# Patient Record
Sex: MALE | Race: WHITE | Employment: OTHER | URBAN - METROPOLITAN AREA
[De-identification: names, ages, dates, MRNs, and addresses within clinical notes are randomized per-mention and may not be internally consistent; named-entity substitution may affect disease eponyms.]

---

## 2018-02-19 PROBLEM — I10 ESSENTIAL HYPERTENSION WITH GOAL BLOOD PRESSURE LESS THAN 130/80: Status: ACTIVE | Noted: 2018-02-19

## 2018-11-20 ENCOUNTER — HOSPITAL ENCOUNTER (OUTPATIENT)
Dept: LAB | Age: 68
Discharge: HOME | End: 2018-11-20
Attending: INTERNAL MEDICINE
Payer: COMMERCIAL

## 2018-11-20 DIAGNOSIS — I10 ESSENTIAL HYPERTENSION WITH GOAL BLOOD PRESSURE LESS THAN 130/80: ICD-10-CM

## 2018-11-20 LAB
ANION GAP SERPL CALC-SCNC: 9 MMOL/L
BUN SERPL-MCNC: 14 MG/DL (ref 8–23)
CALCIUM SERPL-MCNC: 9.1 MG/DL (ref 8.3–10.4)
CHLORIDE SERPL-SCNC: 100 MMOL/L (ref 98–107)
CO2 SERPL-SCNC: 28 MMOL/L (ref 21–32)
CREAT SERPL-MCNC: 1.1 MG/DL (ref 0.8–1.5)
GLUCOSE SERPL-MCNC: 115 MG/DL (ref 65–100)
POTASSIUM SERPL-SCNC: 3.9 MMOL/L (ref 3.5–5.1)
SODIUM SERPL-SCNC: 137 MMOL/L (ref 136–145)

## 2018-11-20 PROCEDURE — 36415 COLL VENOUS BLD VENIPUNCTURE: CPT

## 2018-11-20 PROCEDURE — 80048 BASIC METABOLIC PNL TOTAL CA: CPT

## 2019-03-04 ENCOUNTER — HOSPITAL ENCOUNTER (OUTPATIENT)
Dept: LAB | Age: 69
Discharge: HOME | End: 2019-03-04
Attending: INTERNAL MEDICINE
Payer: COMMERCIAL

## 2019-03-04 DIAGNOSIS — I10 ESSENTIAL HYPERTENSION: ICD-10-CM

## 2019-03-04 LAB
ANION GAP SERPL CALC-SCNC: 9 MMOL/L
BUN SERPL-MCNC: 13 MG/DL (ref 8–23)
CALCIUM SERPL-MCNC: 9.4 MG/DL (ref 8.3–10.4)
CHLORIDE SERPL-SCNC: 101 MMOL/L (ref 98–107)
CO2 SERPL-SCNC: 28 MMOL/L (ref 21–32)
CREAT SERPL-MCNC: 1.2 MG/DL (ref 0.8–1.5)
GLUCOSE SERPL-MCNC: 132 MG/DL (ref 65–100)
POTASSIUM SERPL-SCNC: 3.7 MMOL/L (ref 3.5–5.1)
SODIUM SERPL-SCNC: 138 MMOL/L (ref 136–145)

## 2019-03-04 PROCEDURE — 36415 COLL VENOUS BLD VENIPUNCTURE: CPT

## 2019-03-04 PROCEDURE — 80048 BASIC METABOLIC PNL TOTAL CA: CPT

## 2020-01-01 ENCOUNTER — HOSPITAL ENCOUNTER (OUTPATIENT)
Dept: LAB | Age: 70
Discharge: HOME | End: 2020-12-23
Payer: COMMERCIAL

## 2020-01-01 ENCOUNTER — PATIENT OUTREACH (OUTPATIENT)
Dept: CASE MANAGEMENT | Age: 70
End: 2020-01-01

## 2020-01-01 ENCOUNTER — HOSPITAL ENCOUNTER (OUTPATIENT)
Dept: LAB | Age: 70
Discharge: HOME | End: 2020-09-29
Payer: MEDICARE

## 2020-01-01 ENCOUNTER — HOSPITAL ENCOUNTER (OUTPATIENT)
Age: 70
Setting detail: OUTPATIENT SURGERY
Discharge: HOME | End: 2020-12-28
Attending: INTERNAL MEDICINE | Admitting: INTERNAL MEDICINE
Payer: COMMERCIAL

## 2020-01-01 ENCOUNTER — HOSPITAL ENCOUNTER (OUTPATIENT)
Dept: INFUSION THERAPY | Age: 70
Discharge: HOME | End: 2020-10-27
Payer: COMMERCIAL

## 2020-01-01 ENCOUNTER — APPOINTMENT (OUTPATIENT)
Dept: INFUSION THERAPY | Age: 70
End: 2020-01-01

## 2020-01-01 ENCOUNTER — HOSPITAL ENCOUNTER (OUTPATIENT)
Dept: CT IMAGING | Age: 70
Discharge: HOME | End: 2020-12-22
Attending: INTERNAL MEDICINE

## 2020-01-01 ENCOUNTER — HOSPITAL ENCOUNTER (OUTPATIENT)
Dept: INFUSION THERAPY | Age: 70
Discharge: HOME | End: 2020-10-13
Payer: COMMERCIAL

## 2020-01-01 ENCOUNTER — HOSPITAL ENCOUNTER (OUTPATIENT)
Dept: INFUSION THERAPY | Age: 70
Discharge: HOME | End: 2020-10-06
Payer: COMMERCIAL

## 2020-01-01 ENCOUNTER — HOSPITAL ENCOUNTER (OUTPATIENT)
Dept: INFUSION THERAPY | Age: 70
Discharge: HOME | End: 2020-10-20
Payer: COMMERCIAL

## 2020-01-01 ENCOUNTER — HOSPITAL ENCOUNTER (OUTPATIENT)
Dept: INFUSION THERAPY | Age: 70
Discharge: HOME | End: 2020-09-29
Payer: COMMERCIAL

## 2020-01-01 ENCOUNTER — HOSPITAL ENCOUNTER (OUTPATIENT)
Dept: LAB | Age: 70
Discharge: HOME | End: 2020-11-20
Payer: COMMERCIAL

## 2020-01-01 VITALS — DIASTOLIC BLOOD PRESSURE: 59 MMHG | OXYGEN SATURATION: 89 % | SYSTOLIC BLOOD PRESSURE: 96 MMHG

## 2020-01-01 DIAGNOSIS — C34.31 MALIGNANT NEOPLASM OF LOWER LOBE OF RIGHT LUNG (HCC): ICD-10-CM

## 2020-01-01 DIAGNOSIS — D70.1 CHEMOTHERAPY INDUCED NEUTROPENIA (HCC): ICD-10-CM

## 2020-01-01 DIAGNOSIS — J90 PLEURAL EFFUSION ON RIGHT: ICD-10-CM

## 2020-01-01 DIAGNOSIS — C34.31 MALIGNANT NEOPLASM OF LOWER LOBE OF RIGHT LUNG (HCC): Chronic | ICD-10-CM

## 2020-01-01 DIAGNOSIS — J90 PLEURAL EFFUSION: ICD-10-CM

## 2020-01-01 DIAGNOSIS — R06.02 SHORTNESS OF BREATH: ICD-10-CM

## 2020-01-01 DIAGNOSIS — E86.0 DEHYDRATION: Primary | ICD-10-CM

## 2020-01-01 DIAGNOSIS — E86.0 DEHYDRATION: ICD-10-CM

## 2020-01-01 DIAGNOSIS — Z79.899 HIGH RISK MEDICATION USE: ICD-10-CM

## 2020-01-01 DIAGNOSIS — T45.1X5A CHEMOTHERAPY INDUCED NEUTROPENIA (HCC): ICD-10-CM

## 2020-01-01 DIAGNOSIS — C34.31 MALIGNANT NEOPLASM OF LOWER LOBE OF RIGHT LUNG (HCC): Primary | ICD-10-CM

## 2020-01-01 DIAGNOSIS — C34.91 MALIGNANT NEOPLASM OF RIGHT LUNG, UNSPECIFIED PART OF LUNG (HCC): ICD-10-CM

## 2020-01-01 DIAGNOSIS — R91.8 MASS OF LOWER LOBE OF RIGHT LUNG: ICD-10-CM

## 2020-01-01 LAB
ALBUMIN SERPL-MCNC: 3.2 G/DL (ref 3.2–4.6)
ALBUMIN SERPL-MCNC: 3.2 G/DL (ref 3.2–4.6)
ALBUMIN SERPL-MCNC: 3.3 G/DL (ref 3.2–4.6)
ALBUMIN SERPL-MCNC: 3.5 G/DL (ref 3.2–4.6)
ALBUMIN/GLOB SERPL: 0.8 {RATIO} (ref 1.2–3.5)
ALBUMIN/GLOB SERPL: 0.8 {RATIO} (ref 1.2–3.5)
ALBUMIN/GLOB SERPL: 0.9 {RATIO} (ref 1.2–3.5)
ALP SERPL-CCNC: 101 U/L (ref 50–136)
ALP SERPL-CCNC: 111 U/L (ref 50–136)
ALP SERPL-CCNC: 115 U/L (ref 50–136)
ALP SERPL-CCNC: 89 U/L (ref 50–136)
ALP SERPL-CCNC: 92 U/L (ref 50–136)
ALP SERPL-CCNC: 93 U/L (ref 50–136)
ALT SERPL-CCNC: 10 U/L (ref 12–65)
ALT SERPL-CCNC: 10 U/L (ref 12–65)
ALT SERPL-CCNC: 12 U/L (ref 12–65)
ALT SERPL-CCNC: 12 U/L (ref 12–65)
ALT SERPL-CCNC: 13 U/L (ref 12–65)
ALT SERPL-CCNC: 9 U/L (ref 12–65)
ANION GAP SERPL CALC-SCNC: 4 MMOL/L (ref 7–16)
ANION GAP SERPL CALC-SCNC: 6 MMOL/L (ref 7–16)
ANION GAP SERPL CALC-SCNC: 6 MMOL/L (ref 7–16)
ANION GAP SERPL CALC-SCNC: 7 MMOL/L (ref 7–16)
ANION GAP SERPL CALC-SCNC: 8 MMOL/L (ref 7–16)
ANION GAP SERPL CALC-SCNC: 8 MMOL/L (ref 7–16)
APPEARANCE FLD: NORMAL
AST SERPL-CCNC: 12 U/L (ref 15–37)
AST SERPL-CCNC: 14 U/L (ref 15–37)
AST SERPL-CCNC: 15 U/L (ref 15–37)
AST SERPL-CCNC: 16 U/L (ref 15–37)
BACTERIA SPEC CULT: NORMAL
BASOPHILS # BLD: 0 K/UL (ref 0–0.2)
BASOPHILS NFR BLD: 0 % (ref 0–2)
BASOPHILS NFR BLD: 1 % (ref 0–2)
BILIRUB SERPL-MCNC: 0.7 MG/DL (ref 0.2–1.1)
BILIRUB SERPL-MCNC: 0.8 MG/DL (ref 0.2–1.1)
BNP SERPL-MCNC: 202 PG/ML (ref 5–125)
BUN SERPL-MCNC: 12 MG/DL (ref 8–23)
BUN SERPL-MCNC: 6 MG/DL (ref 8–23)
BUN SERPL-MCNC: 8 MG/DL (ref 8–23)
BUN SERPL-MCNC: 8 MG/DL (ref 8–23)
BUN SERPL-MCNC: 9 MG/DL (ref 8–23)
BUN SERPL-MCNC: 9 MG/DL (ref 8–23)
CALCIUM SERPL-MCNC: 8.8 MG/DL (ref 8.3–10.4)
CALCIUM SERPL-MCNC: 8.8 MG/DL (ref 8.3–10.4)
CALCIUM SERPL-MCNC: 8.9 MG/DL (ref 8.3–10.4)
CALCIUM SERPL-MCNC: 8.9 MG/DL (ref 8.3–10.4)
CALCIUM SERPL-MCNC: 9.2 MG/DL (ref 8.3–10.4)
CALCIUM SERPL-MCNC: 9.6 MG/DL (ref 8.3–10.4)
CEA SERPL-MCNC: 0.6 NG/ML (ref 0–3)
CEA SERPL-MCNC: 1 NG/ML (ref 0–3)
CEA SERPL-MCNC: 1.4 NG/ML (ref 0–3)
CHLORIDE SERPL-SCNC: 101 MMOL/L (ref 98–107)
CHLORIDE SERPL-SCNC: 103 MMOL/L (ref 98–107)
CHLORIDE SERPL-SCNC: 103 MMOL/L (ref 98–107)
CHLORIDE SERPL-SCNC: 105 MMOL/L (ref 98–107)
CHLORIDE SERPL-SCNC: 106 MMOL/L (ref 98–107)
CHLORIDE SERPL-SCNC: 106 MMOL/L (ref 98–107)
CO2 SERPL-SCNC: 24 MMOL/L (ref 21–32)
CO2 SERPL-SCNC: 24 MMOL/L (ref 21–32)
CO2 SERPL-SCNC: 27 MMOL/L (ref 21–32)
CO2 SERPL-SCNC: 28 MMOL/L (ref 21–32)
COLOR FLD: NORMAL
CREAT SERPL-MCNC: 0.8 MG/DL (ref 0.8–1.5)
CREAT SERPL-MCNC: 0.8 MG/DL (ref 0.8–1.5)
CREAT SERPL-MCNC: 0.9 MG/DL (ref 0.8–1.5)
CREAT SERPL-MCNC: 1 MG/DL (ref 0.8–1.5)
DIFFERENTIAL METHOD BLD: ABNORMAL
EOSINOPHIL # BLD: 0 K/UL (ref 0–0.8)
EOSINOPHIL # BLD: 0.1 K/UL (ref 0–0.8)
EOSINOPHIL # BLD: 0.1 K/UL (ref 0–0.8)
EOSINOPHIL NFR BLD: 0 % (ref 0.5–7.8)
EOSINOPHIL NFR BLD: 1 % (ref 0.5–7.8)
EOSINOPHIL NFR BLD: 2 % (ref 0.5–7.8)
ERYTHROCYTE [DISTWIDTH] IN BLOOD BY AUTOMATED COUNT: 14.4 % (ref 11.9–14.6)
ERYTHROCYTE [DISTWIDTH] IN BLOOD BY AUTOMATED COUNT: 14.4 % (ref 11.9–14.6)
ERYTHROCYTE [DISTWIDTH] IN BLOOD BY AUTOMATED COUNT: 15.1 % (ref 11.9–14.6)
ERYTHROCYTE [DISTWIDTH] IN BLOOD BY AUTOMATED COUNT: 15.8 % (ref 11.9–14.6)
ERYTHROCYTE [DISTWIDTH] IN BLOOD BY AUTOMATED COUNT: 16.9 % (ref 11.9–14.6)
ERYTHROCYTE [DISTWIDTH] IN BLOOD BY AUTOMATED COUNT: 17 % (ref 11.9–14.6)
GLOBULIN SER CALC-MCNC: 3.5 G/DL (ref 2.3–3.5)
GLOBULIN SER CALC-MCNC: 3.6 G/DL (ref 2.3–3.5)
GLOBULIN SER CALC-MCNC: 3.7 G/DL (ref 2.3–3.5)
GLOBULIN SER CALC-MCNC: 3.7 G/DL (ref 2.3–3.5)
GLOBULIN SER CALC-MCNC: 4.1 G/DL (ref 2.3–3.5)
GLOBULIN SER CALC-MCNC: 4.4 G/DL (ref 2.3–3.5)
GLUCOSE FLD-MCNC: 67 MG/DL
GLUCOSE SERPL-MCNC: 108 MG/DL (ref 65–100)
GLUCOSE SERPL-MCNC: 113 MG/DL (ref 65–100)
GLUCOSE SERPL-MCNC: 125 MG/DL (ref 65–100)
GLUCOSE SERPL-MCNC: 93 MG/DL (ref 65–100)
GLUCOSE SERPL-MCNC: 97 MG/DL (ref 65–100)
GLUCOSE SERPL-MCNC: 99 MG/DL (ref 65–100)
GRAM STN SPEC: NORMAL
GRAM STN SPEC: NORMAL
HCT VFR BLD AUTO: 33 % (ref 41.1–50.3)
HCT VFR BLD AUTO: 33.4 % (ref 41.1–50.3)
HCT VFR BLD AUTO: 34.4 % (ref 41.1–50.3)
HCT VFR BLD AUTO: 34.8 % (ref 41.1–50.3)
HCT VFR BLD AUTO: 38.3 % (ref 41.1–50.3)
HCT VFR BLD AUTO: 39.5 % (ref 41.1–50.3)
HGB BLD-MCNC: 11.3 G/DL (ref 13.6–17.2)
HGB BLD-MCNC: 11.5 G/DL (ref 13.6–17.2)
HGB BLD-MCNC: 11.6 G/DL (ref 13.6–17.2)
HGB BLD-MCNC: 11.6 G/DL (ref 13.6–17.2)
HGB BLD-MCNC: 12.7 G/DL (ref 13.6–17.2)
HGB BLD-MCNC: 12.8 G/DL (ref 13.6–17.2)
IMM GRANULOCYTES # BLD AUTO: 0 K/UL (ref 0–0.5)
IMM GRANULOCYTES NFR BLD AUTO: 0 % (ref 0–5)
IMM GRANULOCYTES NFR BLD AUTO: 1 % (ref 0–5)
IMM GRANULOCYTES NFR BLD AUTO: 1 % (ref 0–5)
LDH FLD L TO P-CCNC: 263 U/L
LYMPHOCYTES # BLD: 0.7 K/UL (ref 0.5–4.6)
LYMPHOCYTES # BLD: 1 K/UL (ref 0.5–4.6)
LYMPHOCYTES # BLD: 1.1 K/UL (ref 0.5–4.6)
LYMPHOCYTES # BLD: 1.2 K/UL (ref 0.5–4.6)
LYMPHOCYTES NFR BLD: 18 % (ref 13–44)
LYMPHOCYTES NFR BLD: 22 % (ref 13–44)
LYMPHOCYTES NFR BLD: 34 % (ref 13–44)
LYMPHOCYTES NFR BLD: 40 % (ref 13–44)
LYMPHOCYTES NFR BLD: 40 % (ref 13–44)
LYMPHOCYTES NFR BLD: 48 % (ref 13–44)
LYMPHOCYTES NFR BRONCH MANUAL: 73 %
MACROPHAGES NFR BRONCH MANUAL: 25 %
MAGNESIUM SERPL-MCNC: 2 MG/DL (ref 1.8–2.4)
MAGNESIUM SERPL-MCNC: 2.1 MG/DL (ref 1.8–2.4)
MAGNESIUM SERPL-MCNC: 2.2 MG/DL (ref 1.8–2.4)
MAGNESIUM SERPL-MCNC: 2.3 MG/DL (ref 1.8–2.4)
MCH RBC QN AUTO: 28.3 PG (ref 26.1–32.9)
MCH RBC QN AUTO: 30 PG (ref 26.1–32.9)
MCH RBC QN AUTO: 30.3 PG (ref 26.1–32.9)
MCH RBC QN AUTO: 30.3 PG (ref 26.1–32.9)
MCH RBC QN AUTO: 30.5 PG (ref 26.1–32.9)
MCH RBC QN AUTO: 30.9 PG (ref 26.1–32.9)
MCHC RBC AUTO-ENTMCNC: 32.2 G/DL (ref 31.4–35)
MCHC RBC AUTO-ENTMCNC: 33.3 G/DL (ref 31.4–35)
MCHC RBC AUTO-ENTMCNC: 33.4 G/DL (ref 31.4–35)
MCHC RBC AUTO-ENTMCNC: 33.4 G/DL (ref 31.4–35)
MCHC RBC AUTO-ENTMCNC: 34.2 G/DL (ref 31.4–35)
MCHC RBC AUTO-ENTMCNC: 34.7 G/DL (ref 31.4–35)
MCV RBC AUTO: 88 FL (ref 79.6–97.8)
MCV RBC AUTO: 88.8 FL (ref 79.6–97.8)
MCV RBC AUTO: 88.9 FL (ref 79.6–97.8)
MCV RBC AUTO: 89.9 FL (ref 79.6–97.8)
MCV RBC AUTO: 90.8 FL (ref 79.6–97.8)
MCV RBC AUTO: 90.9 FL (ref 79.6–97.8)
MONOCYTES # BLD: 0.1 K/UL (ref 0.1–1.3)
MONOCYTES # BLD: 0.2 K/UL (ref 0.1–1.3)
MONOCYTES # BLD: 0.2 K/UL (ref 0.1–1.3)
MONOCYTES # BLD: 0.3 K/UL (ref 0.1–1.3)
MONOCYTES # BLD: 0.4 K/UL (ref 0.1–1.3)
MONOCYTES # BLD: 0.5 K/UL (ref 0.1–1.3)
MONOCYTES NFR BLD: 10 % (ref 4–12)
MONOCYTES NFR BLD: 14 % (ref 4–12)
MONOCYTES NFR BLD: 6 % (ref 4–12)
MONOCYTES NFR BLD: 7 % (ref 4–12)
MONOCYTES NFR BLD: 8 % (ref 4–12)
MONOCYTES NFR BLD: 9 % (ref 4–12)
NEUTROPHILS NFR BRONCH MANUAL: 2 %
NEUTS SEG # BLD: 0.8 K/UL (ref 1.7–8.2)
NEUTS SEG # BLD: 1 K/UL (ref 1.7–8.2)
NEUTS SEG # BLD: 1.3 K/UL (ref 1.7–8.2)
NEUTS SEG # BLD: 1.4 K/UL (ref 1.7–8.2)
NEUTS SEG # BLD: 3 K/UL (ref 1.7–8.2)
NEUTS SEG # BLD: 4.8 K/UL (ref 1.7–8.2)
NEUTS SEG NFR BLD: 39 % (ref 43–78)
NEUTS SEG NFR BLD: 49 % (ref 43–78)
NEUTS SEG NFR BLD: 50 % (ref 43–78)
NEUTS SEG NFR BLD: 53 % (ref 43–78)
NEUTS SEG NFR BLD: 67 % (ref 43–78)
NEUTS SEG NFR BLD: 72 % (ref 43–78)
NRBC # BLD: 0 K/UL (ref 0–0.2)
NUC CELL # FLD: 493 /CU MM
PLATELET # BLD AUTO: 111 K/UL (ref 150–450)
PLATELET # BLD AUTO: 141 K/UL (ref 150–450)
PLATELET # BLD AUTO: 142 K/UL (ref 150–450)
PLATELET # BLD AUTO: 144 K/UL (ref 150–450)
PLATELET # BLD AUTO: 192 K/UL (ref 150–450)
PLATELET # BLD AUTO: 219 K/UL (ref 150–450)
PMV BLD AUTO: 10 FL (ref 9.4–12.3)
PMV BLD AUTO: 10.1 FL (ref 9.4–12.3)
PMV BLD AUTO: 10.2 FL (ref 9.4–12.3)
PMV BLD AUTO: 9.1 FL (ref 9.4–12.3)
PMV BLD AUTO: 9.3 FL (ref 9.4–12.3)
PMV BLD AUTO: 9.5 FL (ref 9.4–12.3)
POTASSIUM SERPL-SCNC: 3.7 MMOL/L (ref 3.5–5.1)
POTASSIUM SERPL-SCNC: 3.7 MMOL/L (ref 3.5–5.1)
POTASSIUM SERPL-SCNC: 3.8 MMOL/L (ref 3.5–5.1)
POTASSIUM SERPL-SCNC: 3.8 MMOL/L (ref 3.5–5.1)
POTASSIUM SERPL-SCNC: 4.1 MMOL/L (ref 3.5–5.1)
POTASSIUM SERPL-SCNC: 4.2 MMOL/L (ref 3.5–5.1)
PROT FLD-MCNC: 3.8 G/DL
PROT SERPL-MCNC: 6.7 G/DL (ref 6.3–8.2)
PROT SERPL-MCNC: 6.8 G/DL (ref 6.3–8.2)
PROT SERPL-MCNC: 7 G/DL (ref 6.3–8.2)
PROT SERPL-MCNC: 7 G/DL (ref 6.3–8.2)
PROT SERPL-MCNC: 7.4 G/DL (ref 6.3–8.2)
PROT SERPL-MCNC: 7.9 G/DL (ref 6.3–8.2)
RBC # BLD AUTO: 3.71 M/UL (ref 4.23–5.67)
RBC # BLD AUTO: 3.76 M/UL (ref 4.23–5.67)
RBC # BLD AUTO: 3.79 M/UL (ref 4.23–5.67)
RBC # BLD AUTO: 3.83 M/UL (ref 4.23–5.67)
RBC # BLD AUTO: 4.26 M/UL (ref 4.23–5.67)
RBC # BLD AUTO: 4.49 M/UL (ref 4.23–5.67)
RBC # FLD: NORMAL /CU MM
SERVICE CMNT-IMP: NORMAL
SODIUM SERPL-SCNC: 134 MMOL/L (ref 136–145)
SODIUM SERPL-SCNC: 136 MMOL/L (ref 136–145)
SODIUM SERPL-SCNC: 137 MMOL/L (ref 136–145)
SODIUM SERPL-SCNC: 137 MMOL/L (ref 136–145)
SODIUM SERPL-SCNC: 138 MMOL/L (ref 136–145)
SODIUM SERPL-SCNC: 138 MMOL/L (ref 136–145)
SPECIMEN SOURCE FLD: NORMAL
TSH SERPL DL<=0.005 MIU/L-ACNC: 1.11 UIU/ML (ref 0.36–3.74)
WBC # BLD AUTO: 2.1 K/UL (ref 4.3–11.1)
WBC # BLD AUTO: 2.1 K/UL (ref 4.3–11.1)
WBC # BLD AUTO: 2.5 K/UL (ref 4.3–11.1)
WBC # BLD AUTO: 2.7 K/UL (ref 4.3–11.1)
WBC # BLD AUTO: 4.5 K/UL (ref 4.3–11.1)
WBC # BLD AUTO: 6.7 K/UL (ref 4.3–11.1)

## 2020-01-01 PROCEDURE — 74011000250 HC RX REV CODE- 250: Performed by: INTERNAL MEDICINE

## 2020-01-01 PROCEDURE — 36415 COLL VENOUS BLD VENIPUNCTURE: CPT

## 2020-01-01 PROCEDURE — 96413 CHEMO IV INFUSION 1 HR: CPT

## 2020-01-01 PROCEDURE — 2709999900 HC NON-CHARGEABLE SUPPLY: Performed by: INTERNAL MEDICINE

## 2020-01-01 PROCEDURE — 96375 TX/PRO/DX INJ NEW DRUG ADDON: CPT

## 2020-01-01 PROCEDURE — 82378 CARCINOEMBRYONIC ANTIGEN: CPT

## 2020-01-01 PROCEDURE — 83735 ASSAY OF MAGNESIUM: CPT

## 2020-01-01 PROCEDURE — 83615 LACTATE (LD) (LDH) ENZYME: CPT

## 2020-01-01 PROCEDURE — 85025 COMPLETE CBC W/AUTO DIFF WBC: CPT

## 2020-01-01 PROCEDURE — 80053 COMPREHEN METABOLIC PANEL: CPT

## 2020-01-01 PROCEDURE — 88305 TISSUE EXAM BY PATHOLOGIST: CPT

## 2020-01-01 PROCEDURE — 96361 HYDRATE IV INFUSION ADD-ON: CPT

## 2020-01-01 PROCEDURE — 74011250636 HC RX REV CODE- 250/636: Performed by: INTERNAL MEDICINE

## 2020-01-01 PROCEDURE — 82945 GLUCOSE OTHER FLUID: CPT

## 2020-01-01 PROCEDURE — 89050 BODY FLUID CELL COUNT: CPT

## 2020-01-01 PROCEDURE — 76040000007: Performed by: INTERNAL MEDICINE

## 2020-01-01 PROCEDURE — 87205 SMEAR GRAM STAIN: CPT

## 2020-01-01 PROCEDURE — 32555 ASPIRATE PLEURA W/ IMAGING: CPT | Performed by: INTERNAL MEDICINE

## 2020-01-01 PROCEDURE — 88112 CYTOPATH CELL ENHANCE TECH: CPT

## 2020-01-01 PROCEDURE — 83880 ASSAY OF NATRIURETIC PEPTIDE: CPT

## 2020-01-01 PROCEDURE — 96417 CHEMO IV INFUS EACH ADDL SEQ: CPT

## 2020-01-01 PROCEDURE — 84443 ASSAY THYROID STIM HORMONE: CPT

## 2020-01-01 PROCEDURE — 74011000258 HC RX REV CODE- 258: Performed by: INTERNAL MEDICINE

## 2020-01-01 PROCEDURE — 77030014147 HC TY THORCENT PARA TELE -B: Performed by: INTERNAL MEDICINE

## 2020-01-01 PROCEDURE — 96360 HYDRATION IV INFUSION INIT: CPT

## 2020-01-01 PROCEDURE — 36591 DRAW BLOOD OFF VENOUS DEVICE: CPT

## 2020-01-01 PROCEDURE — 84157 ASSAY OF PROTEIN OTHER: CPT

## 2020-01-01 PROCEDURE — 74011250636 HC RX REV CODE- 250/636: Performed by: NURSE PRACTITIONER

## 2020-01-01 PROCEDURE — 87102 FUNGUS ISOLATION CULTURE: CPT

## 2020-01-01 PROCEDURE — 87116 MYCOBACTERIA CULTURE: CPT

## 2020-01-01 RX ORDER — SODIUM CHLORIDE 9 MG/ML
1000 INJECTION, SOLUTION INTRAVENOUS CONTINUOUS
Status: DISCONTINUED | OUTPATIENT
Start: 2020-01-01 | End: 2020-01-01 | Stop reason: HOSPADM

## 2020-01-01 RX ORDER — SODIUM CHLORIDE 0.9 % (FLUSH) 0.9 %
10-40 SYRINGE (ML) INJECTION AS NEEDED
Status: DISCONTINUED | OUTPATIENT
Start: 2020-01-01 | End: 2020-01-01 | Stop reason: HOSPADM

## 2020-01-01 RX ORDER — SODIUM CHLORIDE 0.9 % (FLUSH) 0.9 %
10 SYRINGE (ML) INJECTION AS NEEDED
Status: ACTIVE | OUTPATIENT
Start: 2020-01-01 | End: 2020-01-01

## 2020-01-01 RX ORDER — ONDANSETRON 2 MG/ML
8 INJECTION INTRAMUSCULAR; INTRAVENOUS ONCE
Status: COMPLETED | OUTPATIENT
Start: 2020-01-01 | End: 2020-01-01

## 2020-01-01 RX ORDER — DIPHENHYDRAMINE HYDROCHLORIDE 50 MG/ML
50 INJECTION, SOLUTION INTRAMUSCULAR; INTRAVENOUS ONCE
Status: COMPLETED | OUTPATIENT
Start: 2020-01-01 | End: 2020-01-01

## 2020-01-01 RX ORDER — SODIUM CHLORIDE 0.9 % (FLUSH) 0.9 %
10 SYRINGE (ML) INJECTION
Status: COMPLETED | OUTPATIENT
Start: 2020-01-01 | End: 2020-01-01

## 2020-01-01 RX ORDER — SODIUM CHLORIDE 9 MG/ML
25 INJECTION, SOLUTION INTRAVENOUS CONTINUOUS
Status: ACTIVE | OUTPATIENT
Start: 2020-01-01 | End: 2020-01-01

## 2020-01-01 RX ORDER — HEPARIN SODIUM (PORCINE) LOCK FLUSH IV SOLN 100 UNIT/ML 100 UNIT/ML
500 SOLUTION INTRAVENOUS AS NEEDED
Status: DISCONTINUED | OUTPATIENT
Start: 2020-01-01 | End: 2020-01-01 | Stop reason: HOSPADM

## 2020-01-01 RX ADMIN — HEPARIN SODIUM (PORCINE) LOCK FLUSH IV SOLN 100 UNIT/ML 500 UNITS: 100 SOLUTION at 11:09

## 2020-01-01 RX ADMIN — Medication 10 ML: at 14:05

## 2020-01-01 RX ADMIN — PACLITAXEL 100 MG: 6 INJECTION, SOLUTION INTRAVENOUS at 11:20

## 2020-01-01 RX ADMIN — DEXAMETHASONE SODIUM PHOSPHATE 12 MG: 4 INJECTION, SOLUTION INTRAMUSCULAR; INTRAVENOUS at 10:54

## 2020-01-01 RX ADMIN — DIPHENHYDRAMINE HYDROCHLORIDE 50 MG: 50 INJECTION, SOLUTION INTRAMUSCULAR; INTRAVENOUS at 12:02

## 2020-01-01 RX ADMIN — Medication 10 ML: at 13:12

## 2020-01-01 RX ADMIN — DIPHENHYDRAMINE HYDROCHLORIDE 50 MG: 50 INJECTION, SOLUTION INTRAMUSCULAR; INTRAVENOUS at 11:10

## 2020-01-01 RX ADMIN — SODIUM CHLORIDE 25 ML/HR: 900 INJECTION, SOLUTION INTRAVENOUS at 10:38

## 2020-01-01 RX ADMIN — Medication 10 ML: at 14:12

## 2020-01-01 RX ADMIN — CARBOPLATIN 283 MG: 10 INJECTION, SOLUTION INTRAVENOUS at 13:41

## 2020-01-01 RX ADMIN — ONDANSETRON 8 MG: 2 INJECTION INTRAMUSCULAR; INTRAVENOUS at 10:43

## 2020-01-01 RX ADMIN — Medication 10 ML: at 10:35

## 2020-01-01 RX ADMIN — Medication 10 ML: at 11:00

## 2020-01-01 RX ADMIN — FAMOTIDINE 20 MG: 10 INJECTION INTRAVENOUS at 10:48

## 2020-01-01 RX ADMIN — CARBOPLATIN 256 MG: 10 INJECTION, SOLUTION INTRAVENOUS at 12:25

## 2020-01-01 RX ADMIN — Medication 10 ML: at 10:57

## 2020-01-01 RX ADMIN — SODIUM CHLORIDE 1000 ML: 900 INJECTION, SOLUTION INTRAVENOUS at 13:15

## 2020-01-01 RX ADMIN — SODIUM CHLORIDE 25 ML/HR: 9 INJECTION, SOLUTION INTRAVENOUS at 11:52

## 2020-01-01 RX ADMIN — Medication 10 ML: at 13:01

## 2020-01-01 RX ADMIN — ONDANSETRON 8 MG: 2 INJECTION INTRAMUSCULAR; INTRAVENOUS at 11:58

## 2020-01-01 RX ADMIN — FAMOTIDINE 20 MG: 10 INJECTION INTRAVENOUS at 11:03

## 2020-01-01 RX ADMIN — FAMOTIDINE 20 MG: 10 INJECTION INTRAVENOUS at 12:00

## 2020-01-01 RX ADMIN — SODIUM CHLORIDE 1000 ML: 9 INJECTION, SOLUTION INTRAVENOUS at 11:00

## 2020-01-01 RX ADMIN — DIPHENHYDRAMINE HYDROCHLORIDE 50 MG: 50 INJECTION, SOLUTION INTRAMUSCULAR; INTRAVENOUS at 10:40

## 2020-01-01 RX ADMIN — Medication 10 ML: at 14:30

## 2020-01-01 RX ADMIN — ONDANSETRON 8 MG: 2 INJECTION INTRAMUSCULAR; INTRAVENOUS at 11:06

## 2020-01-01 RX ADMIN — DEXAMETHASONE SODIUM PHOSPHATE 12 MG: 4 INJECTION, SOLUTION INTRAMUSCULAR; INTRAVENOUS at 12:05

## 2020-01-01 RX ADMIN — DEXAMETHASONE SODIUM PHOSPHATE 12 MG: 4 INJECTION, SOLUTION INTRAMUSCULAR; INTRAVENOUS at 11:59

## 2020-01-01 RX ADMIN — CARBOPLATIN 261 MG: 10 INJECTION, SOLUTION INTRAVENOUS at 13:29

## 2020-01-01 RX ADMIN — PACLITAXEL 100 MG: 6 INJECTION, SOLUTION INTRAVENOUS at 12:25

## 2020-01-01 RX ADMIN — PACLITAXEL 100 MG: 6 INJECTION, SOLUTION INTRAVENOUS at 12:40

## 2020-02-23 ENCOUNTER — HOSPITAL ENCOUNTER (EMERGENCY)
Age: 70
Discharge: OTHER INSTITUTION | DRG: 180 | End: 2020-02-23
Attending: EMERGENCY MEDICINE
Payer: COMMERCIAL

## 2020-02-23 ENCOUNTER — APPOINTMENT (OUTPATIENT)
Dept: GENERAL RADIOLOGY | Age: 70
DRG: 180 | End: 2020-02-23
Attending: EMERGENCY MEDICINE
Payer: COMMERCIAL

## 2020-02-23 ENCOUNTER — HOSPITAL ENCOUNTER (INPATIENT)
Age: 70
LOS: 6 days | Discharge: HOME | DRG: 180 | End: 2020-02-29
Attending: INTERNAL MEDICINE | Admitting: FAMILY MEDICINE
Payer: COMMERCIAL

## 2020-02-23 ENCOUNTER — APPOINTMENT (OUTPATIENT)
Dept: CT IMAGING | Age: 70
DRG: 180 | End: 2020-02-23
Attending: EMERGENCY MEDICINE
Payer: COMMERCIAL

## 2020-02-23 VITALS
DIASTOLIC BLOOD PRESSURE: 102 MMHG | BODY MASS INDEX: 31.84 KG/M2 | TEMPERATURE: 98.1 F | WEIGHT: 215 LBS | HEIGHT: 69 IN | RESPIRATION RATE: 16 BRPM | SYSTOLIC BLOOD PRESSURE: 154 MMHG | OXYGEN SATURATION: 94 % | HEART RATE: 115 BPM

## 2020-02-23 DIAGNOSIS — R59.0 MEDIASTINAL LYMPHADENOPATHY: ICD-10-CM

## 2020-02-23 DIAGNOSIS — R91.8 MASS OF LOWER LOBE OF RIGHT LUNG: Primary | ICD-10-CM

## 2020-02-23 DIAGNOSIS — C34.31 MALIGNANT NEOPLASM OF LOWER LOBE OF RIGHT LUNG (HCC): ICD-10-CM

## 2020-02-23 DIAGNOSIS — R09.02 HYPOXIA: ICD-10-CM

## 2020-02-23 DIAGNOSIS — R91.8 MASS OF LOWER LOBE OF RIGHT LUNG: ICD-10-CM

## 2020-02-23 DIAGNOSIS — R04.2 HEMOPTYSIS: ICD-10-CM

## 2020-02-23 DIAGNOSIS — J96.01 ACUTE RESPIRATORY FAILURE WITH HYPOXIA (HCC): ICD-10-CM

## 2020-02-23 LAB
ALBUMIN SERPL-MCNC: 4.3 G/DL (ref 3.2–4.6)
ALBUMIN/GLOB SERPL: 1.1 {RATIO} (ref 1.2–3.5)
ALP SERPL-CCNC: 98 U/L (ref 50–136)
ALT SERPL-CCNC: 19 U/L (ref 12–65)
ANION GAP SERPL CALC-SCNC: 9 MMOL/L (ref 7–16)
AST SERPL-CCNC: 28 U/L (ref 15–37)
ATRIAL RATE: 119 BPM
BASOPHILS # BLD: 0.1 K/UL (ref 0–0.2)
BASOPHILS NFR BLD: 1 % (ref 0–2)
BILIRUB SERPL-MCNC: 0.8 MG/DL (ref 0.2–1.1)
BUN SERPL-MCNC: 11 MG/DL (ref 8–23)
CALCIUM SERPL-MCNC: 9.5 MG/DL (ref 8.3–10.4)
CALCULATED P AXIS, ECG09: 52 DEGREES
CALCULATED R AXIS, ECG10: -38 DEGREES
CALCULATED T AXIS, ECG11: 59 DEGREES
CHLORIDE SERPL-SCNC: 103 MMOL/L (ref 98–107)
CO2 SERPL-SCNC: 26 MMOL/L (ref 21–32)
CREAT SERPL-MCNC: 0.99 MG/DL (ref 0.8–1.5)
DIAGNOSIS, 93000: NORMAL
DIFFERENTIAL METHOD BLD: ABNORMAL
EOSINOPHIL # BLD: 0.2 K/UL (ref 0–0.8)
EOSINOPHIL NFR BLD: 2 % (ref 0.5–7.8)
ERYTHROCYTE [DISTWIDTH] IN BLOOD BY AUTOMATED COUNT: 12.8 % (ref 11.9–14.6)
GLOBULIN SER CALC-MCNC: 3.9 G/DL (ref 2.3–3.5)
GLUCOSE SERPL-MCNC: 91 MG/DL (ref 65–100)
HCT VFR BLD AUTO: 52.7 % (ref 41.1–50.3)
HGB BLD-MCNC: 18.1 G/DL (ref 13.6–17.2)
IMM GRANULOCYTES # BLD AUTO: 0 K/UL (ref 0–0.5)
IMM GRANULOCYTES NFR BLD AUTO: 1 % (ref 0–5)
LYMPHOCYTES # BLD: 1.9 K/UL (ref 0.5–4.6)
LYMPHOCYTES NFR BLD: 21 % (ref 13–44)
MCH RBC QN AUTO: 30.8 PG (ref 26.1–32.9)
MCHC RBC AUTO-ENTMCNC: 34.3 G/DL (ref 31.4–35)
MCV RBC AUTO: 89.8 FL (ref 79.6–97.8)
MONOCYTES # BLD: 0.6 K/UL (ref 0.1–1.3)
MONOCYTES NFR BLD: 7 % (ref 4–12)
NEUTS SEG # BLD: 6 K/UL (ref 1.7–8.2)
NEUTS SEG NFR BLD: 69 % (ref 43–78)
NRBC # BLD: 0 K/UL (ref 0–0.2)
P-R INTERVAL, ECG05: 150 MS
PLATELET # BLD AUTO: 179 K/UL (ref 150–450)
PMV BLD AUTO: 10.7 FL (ref 9.4–12.3)
POTASSIUM SERPL-SCNC: 3.7 MMOL/L (ref 3.5–5.1)
PROT SERPL-MCNC: 8.2 G/DL (ref 6.3–8.2)
Q-T INTERVAL, ECG07: 308 MS
QRS DURATION, ECG06: 84 MS
QTC CALCULATION (BEZET), ECG08: 433 MS
RBC # BLD AUTO: 5.87 M/UL (ref 4.23–5.6)
SODIUM SERPL-SCNC: 138 MMOL/L (ref 136–145)
VENTRICULAR RATE, ECG03: 119 BPM
WBC # BLD AUTO: 8.8 K/UL (ref 4.3–11.1)

## 2020-02-23 PROCEDURE — 80053 COMPREHEN METABOLIC PANEL: CPT

## 2020-02-23 PROCEDURE — 74011250636 HC RX REV CODE- 250/636: Performed by: EMERGENCY MEDICINE

## 2020-02-23 PROCEDURE — 93005 ELECTROCARDIOGRAM TRACING: CPT | Performed by: EMERGENCY MEDICINE

## 2020-02-23 PROCEDURE — 99285 EMERGENCY DEPT VISIT HI MDM: CPT

## 2020-02-23 PROCEDURE — 74011250637 HC RX REV CODE- 250/637: Performed by: FAMILY MEDICINE

## 2020-02-23 PROCEDURE — 85025 COMPLETE CBC W/AUTO DIFF WBC: CPT

## 2020-02-23 PROCEDURE — 65270000029 HC RM PRIVATE

## 2020-02-23 PROCEDURE — 77030040361 HC SLV COMPR DVT MDII -B

## 2020-02-23 PROCEDURE — 74011636320 HC RX REV CODE- 636/320: Performed by: EMERGENCY MEDICINE

## 2020-02-23 PROCEDURE — 71260 CT THORAX DX C+: CPT

## 2020-02-23 PROCEDURE — 71045 X-RAY EXAM CHEST 1 VIEW: CPT

## 2020-02-23 PROCEDURE — 74011250636 HC RX REV CODE- 250/636: Performed by: FAMILY MEDICINE

## 2020-02-23 PROCEDURE — 74011000258 HC RX REV CODE- 258: Performed by: EMERGENCY MEDICINE

## 2020-02-23 RX ORDER — HYDROCODONE BITARTRATE AND ACETAMINOPHEN 10; 325 MG/1; MG/1
1 TABLET ORAL
Status: DISCONTINUED | OUTPATIENT
Start: 2020-02-23 | End: 2020-02-29 | Stop reason: HOSPADM

## 2020-02-23 RX ORDER — ACETAMINOPHEN 325 MG/1
650 TABLET ORAL
Status: DISCONTINUED | OUTPATIENT
Start: 2020-02-23 | End: 2020-02-29 | Stop reason: HOSPADM

## 2020-02-23 RX ORDER — DIPHENHYDRAMINE HCL 25 MG
25 CAPSULE ORAL
Status: DISCONTINUED | OUTPATIENT
Start: 2020-02-23 | End: 2020-02-29 | Stop reason: HOSPADM

## 2020-02-23 RX ORDER — SODIUM CHLORIDE 0.9 % (FLUSH) 0.9 %
5-40 SYRINGE (ML) INJECTION AS NEEDED
Status: DISCONTINUED | OUTPATIENT
Start: 2020-02-23 | End: 2020-02-29 | Stop reason: HOSPADM

## 2020-02-23 RX ORDER — LORAZEPAM 1 MG/1
1 TABLET ORAL
Status: DISCONTINUED | OUTPATIENT
Start: 2020-02-23 | End: 2020-02-29 | Stop reason: HOSPADM

## 2020-02-23 RX ORDER — BISACODYL 5 MG
5 TABLET, DELAYED RELEASE (ENTERIC COATED) ORAL DAILY PRN
Status: DISCONTINUED | OUTPATIENT
Start: 2020-02-23 | End: 2020-02-29 | Stop reason: HOSPADM

## 2020-02-23 RX ORDER — CHLORTHALIDONE 25 MG/1
25 TABLET ORAL DAILY
Status: DISCONTINUED | OUTPATIENT
Start: 2020-02-24 | End: 2020-02-29 | Stop reason: HOSPADM

## 2020-02-23 RX ORDER — METOPROLOL TARTRATE 25 MG/1
25 TABLET, FILM COATED ORAL ONCE
Status: COMPLETED | OUTPATIENT
Start: 2020-02-23 | End: 2020-02-23

## 2020-02-23 RX ORDER — METOPROLOL SUCCINATE 25 MG/1
50 TABLET, EXTENDED RELEASE ORAL DAILY
Status: DISCONTINUED | OUTPATIENT
Start: 2020-02-24 | End: 2020-02-29 | Stop reason: HOSPADM

## 2020-02-23 RX ORDER — NALOXONE HYDROCHLORIDE 0.4 MG/ML
0.4 INJECTION, SOLUTION INTRAMUSCULAR; INTRAVENOUS; SUBCUTANEOUS AS NEEDED
Status: DISCONTINUED | OUTPATIENT
Start: 2020-02-23 | End: 2020-02-29 | Stop reason: HOSPADM

## 2020-02-23 RX ORDER — SODIUM CHLORIDE 9 MG/ML
1000 INJECTION, SOLUTION INTRAVENOUS CONTINUOUS
Status: DISPENSED | OUTPATIENT
Start: 2020-02-23 | End: 2020-02-24

## 2020-02-23 RX ORDER — SODIUM CHLORIDE 0.9 % (FLUSH) 0.9 %
5-40 SYRINGE (ML) INJECTION EVERY 8 HOURS
Status: DISCONTINUED | OUTPATIENT
Start: 2020-02-23 | End: 2020-02-29 | Stop reason: HOSPADM

## 2020-02-23 RX ORDER — SODIUM CHLORIDE 0.9 % (FLUSH) 0.9 %
10 SYRINGE (ML) INJECTION
Status: COMPLETED | OUTPATIENT
Start: 2020-02-23 | End: 2020-02-23

## 2020-02-23 RX ORDER — ONDANSETRON 2 MG/ML
4 INJECTION INTRAMUSCULAR; INTRAVENOUS
Status: DISCONTINUED | OUTPATIENT
Start: 2020-02-23 | End: 2020-02-29 | Stop reason: HOSPADM

## 2020-02-23 RX ADMIN — IOPAMIDOL 100 ML: 755 INJECTION, SOLUTION INTRAVENOUS at 17:19

## 2020-02-23 RX ADMIN — Medication 1 AMPULE: at 21:47

## 2020-02-23 RX ADMIN — SODIUM CHLORIDE 1000 ML: 900 INJECTION, SOLUTION INTRAVENOUS at 21:47

## 2020-02-23 RX ADMIN — SODIUM CHLORIDE 100 ML: 900 INJECTION, SOLUTION INTRAVENOUS at 17:19

## 2020-02-23 RX ADMIN — Medication 10 ML: at 21:48

## 2020-02-23 RX ADMIN — METOPROLOL TARTRATE 25 MG: 25 TABLET, FILM COATED ORAL at 21:47

## 2020-02-23 RX ADMIN — SODIUM CHLORIDE 1000 ML: 900 INJECTION, SOLUTION INTRAVENOUS at 17:33

## 2020-02-23 RX ADMIN — Medication 10 ML: at 17:19

## 2020-02-23 NOTE — ED NOTES
Took bedside report from Ye Gipson, [address] [facility]. Wife went home to let dogs out, will return. Pt awaiting transfer to DT. Call bell within reach

## 2020-02-23 NOTE — ED PROVIDER NOTES
HPI: 
81 M, here with cough, shortness of breath and chest pain. Also coughing up blood. On and off for a few months now. No wt gain or wt loss. Was treated for pneumonia in November. Stated he never felt back to normal.  Was a previous smoker and quit about 6 years ago. Denies any abdominal pain nausea vomiting or diarrhea or fever. Shortness of breath and chest tightness. Coughing up blood today which is seems heavier than on and off that has been in the past few months which is what prompted him to come in. No legs swelling. ROS Constitutional: No fever, no chills Skin: no rash Eye: No vision changes ENMT:  
Respiratory: + shortness of breath, + cough Cardiovascular: + chest pain, no palpitations Gastrointestinal: No vomiting, no nausea, no diarrhea, no abdominal pain : No dysuria MSK: No back pain, no muscle pain, no joint pain Neuro: No headache, no change in mental status, no numbness, no tingling, no weakness Psych:  
Endocrine:  
All other review of systems positive per history of present illness and the above otherwise negative or noncontributory. Visit Vitals BP (!) 159/112 Pulse (!) 125 Temp 98.1 °F (36.7 °C) Resp 24 Ht 5' 9\" (1.753 m) Wt 97.5 kg (215 lb) SpO2 90% BMI 31.75 kg/m² Past Medical History:  
Diagnosis Date  GERD (gastroesophageal reflux disease)   
 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  
Procedure Laterality Date  HX KNEE ARTHROSCOPY Left   
 scope X 1, ACL recon X 1  
 HX KNEE ARTHROSCOPY Right 1974, 1975 X 2   
 HX KNEE REPLACEMENT    
 HX TONSILLECTOMY  1959  
[facility] Smoker SINUS SURGERY [address] [facility] \"nasal plasty\" Prior to Admission Medications Prescriptions Last Dose Informant Patient Reported? Taking?  
chlorthalidone (HYGROTEN) 25 mg tablet   No No  
Sig: Take 1 Tab by mouth daily. metoprolol succinate (TOPROL-XL) 50 mg XL tablet Not Taking at Unknown time  No No  
Sig: Take 1 Tab by mouth daily. Facility-Administered Medications: None Adult Exam  
General: alert, no acute distress Head: normocephalic, atraumatic ENT: moist mucous membranes Neck: supple, non-tender; full range of motion Cardiovascular: Tachycardic, normal peripheral perfusion, no edema, equal pulses Respiratory:  normal respirations Slightly diminished breath sound in the right lung base compared to the left without wheezing crackles or stridor Gastrointestinal: soft, non-tender; no rebound or guarding, no peritoneal signs, no distension Back: non-tender, full range of motion Musculoskeletal: normal range of motion, normal strength, no gross deformities Neurological: alert and oriented x 4, no gross focal deficits; normal speech Psychiatric: cooperative; appropriate mood and affect MDM: 
Is hypoxic and tachycardic on evaluation. With hemoptysis. Concern for PE, mass. Initial lab obtained from triage fairly unremarkable without renal dysfunction. Chest x-ray without consolidation however there is a right perihilar mass. Follow-up CT done stat appear consistent with a large right lung mass suspicious for malignancy. There are no PE, infiltrate or signs of pneumonia. He is likely tachycardic due to the large mass behind the right heart. Oxygenation improved to 92% on 4 L nasal cannula. Tachycardia is likely secondary to hypoxia. Does not need to be intubated at this time. Will need to speak with the hospitalist likely for transfer to [address] for evaluation with oncology and possible pulmonology intervention. Spoke with patient. Updated him about abnormal findings and recommendation for transfer. He and girlfriend at bedside understand. No further questions.  
EKG sinus tachycardia rate of 119 on arrival.  Left axis deviation without any acute STEMI or ischemic changes noted. Ct Chest W Cont Result Date: 2/23/2020 CT OF THE CHEST WITH INTRAVENOUS CONTRAST, 2/23/2020 Indication: Hemoptysis. Abnormal chest x-ray. Comparison: Chest x-ray 2/23/2020 Technique:   2.5 mm axial scans from above the aortic arch to the lung bases following the uneventful administration of 70 mL of Isovue-370. Intravenous contrast was given to evaluate for pulmonary embolism. All CT scans performed at this facility use one or all of the following: Automated exposure control, adjustment of the mA and/or kVp according to patient's size, iterative reconstruction. Findings: The base of the neck is unremarkable in appearance. Although a prominent subcarinal and mildly prominent left hilar lymph nodes are seen, these are not enlarged by size criteria. However, right hilar adenopathy is seen most evident in a right hilar lymph node seen on axial image 52 measuring 1.6 cm short axis. The thoracic aorta is normal in caliber. Opacification of the pulmonary arteries is good. No abnormal filling defects are seen to suggest pulmonary embolism. Evaluation with lung windows demonstrates right lower lobe collapse. This is felt to be due to a central mass which measures approximately 6.7 cm x 5.9 cm in greatest transverse dimension as measured on image 61. This attenuates right lower lobe pulmonary arteries although these remain patent at this time. However, this does appear to obstruct the right lower lobe airway. No concerning distinct pulmonary lesion is otherwise seen. Abnormal attenuation is seen within right middle lobe airways best appreciated on axial image 70 although the appearance is more consistent with fluid within the right middle lobe airways which could represent blood given the history of hemoptysis. No pleural effusion is seen. Lungs are expanded without evidence for pneumothorax. No acute osseous abnormality is seen.   Limited evaluation of the upper abdomen demonstrates no acute abnormality. Calcified stones are seen within a grossly noninflamed gallbladder. IMPRESSION:  1. No evidence for pulmonary. 2. Large central mass in the right lower lobe measuring 6.7 cm x 5.9 cm in greatest dimension. This obstructs the right lower lobe airway with complete collapse of the right lower lobe. The appearance is highly concerning for malignancy. Further evaluation as clinically indicated is recommended. 3. Right hilar adenopathy concerning for metastatic disease. 4.  Abnormal attenuation within right middle lobe airways best appreciated on axial image 70 although the appearance is more consistent with fluid within the right middle lobe airways which could represent blood given the history of hemoptysis. Xr Chest Alayna Hopkins Result Date: 2/23/2020 CHEST X-RAY, single portable view  2/23/2020 History: Coughing up blood. History of pneumonia Technique: Single frontal view of the chest. Comparison: Findings: The cardiac silhouette is normal in respect to size. The lungs are expanded without evidence for pneumothorax. Right basilar airspace changes. And additional somewhat defined masslike density is seen at the right inferior hilum. This is incompletely characterized on this single portable frontal chest x-ray. IMPRESSION: 1. Right basilar airspace changes which may represent atelectasis. However, an additional masslike density is seen at the right inferior hilum which is incompletely characterized. Given the patient's history of hemoptysis, further evaluation with a preferably contrasted CT scan of the chest is recommended. Recent Results (from the past 24 hour(s)) EKG, 12 LEAD, INITIAL Collection Time: 02/23/20  4:00 PM  
Result Value Ref Range Ventricular Rate 119 BPM  
 Atrial Rate 119 BPM  
 P-R Interval 150 ms QRS Duration 84 ms Q-T Interval 308 ms QTC Calculation (Bezet) 433 ms Calculated P Axis 52 degrees Calculated R Axis -38 degrees Calculated T Axis 59 degrees Diagnosis    
  !! AGE AND GENDER SPECIFIC ECG ANALYSIS !! Sinus tachycardia Left axis deviation Nonspecific T wave abnormality Abnormal ECG When compared with ECG of 08-FEB-2016 11:06, No significant change was found CBC WITH AUTOMATED DIFF Collection Time: 02/23/20  4:07 PM  
Result Value Ref Range WBC 8.8 4.3 - 11.1 K/uL  
 RBC 5.87 (H) 4.23 - 5.6 M/uL  
 HGB 18.1 (H) 13.6 - 17.2 g/dL HCT 52.7 (H) 41.1 - 50.3 % MCV 89.8 79.6 - 97.8 FL  
 MCH 30.8 26.1 - 32.9 PG  
 MCHC 34.3 31.4 - 35.0 g/dL  
 RDW 12.8 11.9 - 14.6 % PLATELET 403 072 - 558 K/uL MPV 10.7 9.4 - 12.3 FL ABSOLUTE NRBC 0.00 0.0 - 0.2 K/uL  
 DF AUTOMATED NEUTROPHILS 69 43 - 78 % LYMPHOCYTES 21 13 - 44 % MONOCYTES 7 4.0 - 12.0 % EOSINOPHILS 2 0.5 - 7.8 % BASOPHILS 1 0.0 - 2.0 % IMMATURE GRANULOCYTES 1 0.0 - 5.0 %  
 ABS. NEUTROPHILS 6.0 1.7 - 8.2 K/UL  
 ABS. LYMPHOCYTES 1.9 0.5 - 4.6 K/UL  
 ABS. MONOCYTES 0.6 0.1 - 1.3 K/UL  
 ABS. EOSINOPHILS 0.2 0.0 - 0.8 K/UL  
 ABS. BASOPHILS 0.1 0.0 - 0.2 K/UL  
 ABS. IMM. GRANS. 0.0 0.0 - 0.5 K/UL METABOLIC PANEL, COMPREHENSIVE Collection Time: 02/23/20  4:07 PM  
Result Value Ref Range Sodium 138 136 - 145 mmol/L Potassium 3.7 3.5 - 5.1 mmol/L Chloride 103 98 - 107 mmol/L  
 CO2 26 21 - 32 mmol/L Anion gap 9 7 - 16 mmol/L Glucose 91 65 - 100 mg/dL BUN 11 8 - 23 MG/DL Creatinine 0.99 0.8 - 1.5 MG/DL  
 GFR est AA >60 >60 ml/min/1.73m2 GFR est non-AA >60 >60 ml/min/1.73m2 Calcium 9.5 8.3 - 10.4 MG/DL Bilirubin, total 0.8 0.2 - 1.1 MG/DL  
 ALT (SGPT) 19 12 - 65 U/L  
 AST (SGOT) 28 15 - 37 U/L Alk. phosphatase 98 50 - 136 U/L Protein, total 8.2 6.3 - 8.2 g/dL Albumin 4.3 3.2 - 4.6 g/dL Globulin 3.9 (H) 2.3 - 3.5 g/dL A-G Ratio 1.1 (L) 1.2 - 3.5 Dragon voice recognition software was used to create this note.  Although the note has been reviewed and corrected where necessary, additional errors may have been overlooked and remain in the text.

## 2020-02-23 NOTE — ED TRIAGE NOTES
Patient co hemoptysis that started today. Patient co congestion that has been going on and off for about 3 months. Patient denies chest pain but does co some sob

## 2020-02-24 ENCOUNTER — APPOINTMENT (OUTPATIENT)
Dept: CT IMAGING | Age: 70
DRG: 180 | End: 2020-02-24
Attending: NURSE PRACTITIONER
Payer: COMMERCIAL

## 2020-02-24 PROBLEM — R04.2 HEMOPTYSIS: Status: ACTIVE | Noted: 2020-02-24

## 2020-02-24 PROBLEM — J96.01 ACUTE RESPIRATORY FAILURE WITH HYPOXIA (HCC): Status: ACTIVE | Noted: 2020-02-24

## 2020-02-24 PROBLEM — R09.02 HYPOXIA: Status: ACTIVE | Noted: 2020-02-24

## 2020-02-24 PROBLEM — I10 ESSENTIAL HYPERTENSION WITH GOAL BLOOD PRESSURE LESS THAN 130/80: Chronic | Status: ACTIVE | Noted: 2018-02-19

## 2020-02-24 LAB
ANION GAP SERPL CALC-SCNC: 5 MMOL/L (ref 7–16)
BASOPHILS # BLD: 0.1 K/UL (ref 0–0.2)
BASOPHILS NFR BLD: 1 % (ref 0–2)
BUN SERPL-MCNC: 10 MG/DL (ref 8–23)
CALCIUM SERPL-MCNC: 8.5 MG/DL (ref 8.3–10.4)
CEA SERPL-MCNC: 5 NG/ML (ref 0–3)
CHLORIDE SERPL-SCNC: 112 MMOL/L (ref 98–107)
CO2 SERPL-SCNC: 26 MMOL/L (ref 21–32)
CREAT SERPL-MCNC: 0.85 MG/DL (ref 0.8–1.5)
DIFFERENTIAL METHOD BLD: NORMAL
EOSINOPHIL # BLD: 0.3 K/UL (ref 0–0.8)
EOSINOPHIL NFR BLD: 3 % (ref 0.5–7.8)
ERYTHROCYTE [DISTWIDTH] IN BLOOD BY AUTOMATED COUNT: 12.8 % (ref 11.9–14.6)
GLUCOSE SERPL-MCNC: 101 MG/DL (ref 65–100)
HCT VFR BLD AUTO: 47.1 % (ref 41.1–50.3)
HGB BLD-MCNC: 16.2 G/DL (ref 13.6–17.2)
IMM GRANULOCYTES # BLD AUTO: 0 K/UL (ref 0–0.5)
IMM GRANULOCYTES NFR BLD AUTO: 1 % (ref 0–5)
LYMPHOCYTES # BLD: 2.4 K/UL (ref 0.5–4.6)
LYMPHOCYTES NFR BLD: 32 % (ref 13–44)
MCH RBC QN AUTO: 31.2 PG (ref 26.1–32.9)
MCHC RBC AUTO-ENTMCNC: 34.4 G/DL (ref 31.4–35)
MCV RBC AUTO: 90.6 FL (ref 79.6–97.8)
MONOCYTES # BLD: 0.7 K/UL (ref 0.1–1.3)
MONOCYTES NFR BLD: 9 % (ref 4–12)
NEUTS SEG # BLD: 4.1 K/UL (ref 1.7–8.2)
NEUTS SEG NFR BLD: 55 % (ref 43–78)
NRBC # BLD: 0 K/UL (ref 0–0.2)
PLATELET # BLD AUTO: 163 K/UL (ref 150–450)
PMV BLD AUTO: 11 FL (ref 9.4–12.3)
POTASSIUM SERPL-SCNC: 3.8 MMOL/L (ref 3.5–5.1)
RBC # BLD AUTO: 5.2 M/UL (ref 4.23–5.6)
SODIUM SERPL-SCNC: 143 MMOL/L (ref 136–145)
WBC # BLD AUTO: 7.5 K/UL (ref 4.3–11.1)

## 2020-02-24 PROCEDURE — 99223 1ST HOSP IP/OBS HIGH 75: CPT | Performed by: INTERNAL MEDICINE

## 2020-02-24 PROCEDURE — 74177 CT ABD & PELVIS W/CONTRAST: CPT

## 2020-02-24 PROCEDURE — 74011636320 HC RX REV CODE- 636/320: Performed by: INTERNAL MEDICINE

## 2020-02-24 PROCEDURE — 85025 COMPLETE CBC W/AUTO DIFF WBC: CPT

## 2020-02-24 PROCEDURE — 36415 COLL VENOUS BLD VENIPUNCTURE: CPT

## 2020-02-24 PROCEDURE — 74011250637 HC RX REV CODE- 250/637: Performed by: INTERNAL MEDICINE

## 2020-02-24 PROCEDURE — 94760 N-INVAS EAR/PLS OXIMETRY 1: CPT

## 2020-02-24 PROCEDURE — 74011000258 HC RX REV CODE- 258: Performed by: INTERNAL MEDICINE

## 2020-02-24 PROCEDURE — 65270000029 HC RM PRIVATE

## 2020-02-24 PROCEDURE — 74011250636 HC RX REV CODE- 250/636: Performed by: FAMILY MEDICINE

## 2020-02-24 PROCEDURE — 77010033678 HC OXYGEN DAILY

## 2020-02-24 PROCEDURE — 82378 CARCINOEMBRYONIC ANTIGEN: CPT

## 2020-02-24 PROCEDURE — 74011250637 HC RX REV CODE- 250/637: Performed by: FAMILY MEDICINE

## 2020-02-24 PROCEDURE — 80048 BASIC METABOLIC PNL TOTAL CA: CPT

## 2020-02-24 RX ORDER — AMOXICILLIN 250 MG
1 CAPSULE ORAL
Status: DISCONTINUED | OUTPATIENT
Start: 2020-02-24 | End: 2020-02-29 | Stop reason: HOSPADM

## 2020-02-24 RX ORDER — SODIUM CHLORIDE 0.9 % (FLUSH) 0.9 %
10 SYRINGE (ML) INJECTION
Status: COMPLETED | OUTPATIENT
Start: 2020-02-24 | End: 2020-02-24

## 2020-02-24 RX ORDER — HYDROCODONE BITARTRATE AND HOMATROPINE METHYLBROMIDE 1.5; 5 MG/5ML; MG/5ML
5 SYRUP ORAL
Status: DISPENSED | OUTPATIENT
Start: 2020-02-24 | End: 2020-02-26

## 2020-02-24 RX ADMIN — SODIUM CHLORIDE 100 ML: 900 INJECTION, SOLUTION INTRAVENOUS at 15:25

## 2020-02-24 RX ADMIN — METOPROLOL SUCCINATE 50 MG: 25 TABLET, FILM COATED, EXTENDED RELEASE ORAL at 08:15

## 2020-02-24 RX ADMIN — SENNOSIDES AND DOCUSATE SODIUM 1 TABLET: 8.6; 5 TABLET ORAL at 21:30

## 2020-02-24 RX ADMIN — DIATRIZOATE MEGLUMINE AND DIATRIZOATE SODIUM 15 ML: 660; 100 LIQUID ORAL; RECTAL at 12:28

## 2020-02-24 RX ADMIN — Medication 10 ML: at 22:00

## 2020-02-24 RX ADMIN — Medication 1 AMPULE: at 21:30

## 2020-02-24 RX ADMIN — Medication 10 ML: at 15:25

## 2020-02-24 RX ADMIN — IOPAMIDOL 100 ML: 755 INJECTION, SOLUTION INTRAVENOUS at 15:25

## 2020-02-24 RX ADMIN — HYDROCODONE BITARTRATE AND HOMATROPINE METHYLBROMIDE 5 ML: 5; 1.5 SOLUTION ORAL at 21:30

## 2020-02-24 RX ADMIN — CHLORTHALIDONE 25 MG: 25 TABLET ORAL at 08:15

## 2020-02-24 RX ADMIN — SODIUM CHLORIDE 1000 ML: 900 INJECTION, SOLUTION INTRAVENOUS at 07:55

## 2020-02-24 RX ADMIN — Medication 1 AMPULE: at 08:00

## 2020-02-24 NOTE — CONSULTS
CONSULT NOTE Kanchan Miranda. 
 
2/24/2020 Date of Admission:  2/23/2020 The patient's chart is reviewed and the patient is discussed with the staff. Subjective:  
 
Kanchan Brink  is a 81 y.o.  male seen and evaluated at the request of Dr. Monster Hand for new RLL mass, hypoxia and hemoptysis. He presented with hemoptysis and congestion intermittently for the last 3 months. Was treated for pneumonia in November and quit smoking 6 years ago after a 35pkyr smoking history. He is currently requiring 4lpm of oxygen to maintain O2 sat of 90-91%. Chest CT was performed and ruled out PE but noted RLL mass suggestive of malignancy. Oncology was consulted and CEA elevated 5.0. The pt reports no hx of HA, visual changes, focal neurologic deficits, bone pain, but has noted some dyspnea. Hemoptysis became more prominent yesterday and this prompted his ER evaluation. CT abd/pelvis performed today without evidence of abdominal metastases. He reports coughing up about 1-2 tsp of blood every hour. Not on any antiplatelets or anticoagulants. Review of Systems Denies: fevers, chills, sweats, fatigue, malaise, anorexia, weight loss Denies: blurry vision, loss of vision, eye pain, photophobia Denies: hearing loss, ringing in the ears, earache, epistaxis Denies: chest pain, palpitations, syncope, orthopnea, paroxysmal nocturnal dyspnea, claudication Denies: dysphagia, odynophagia, nausea, vomiting, diarrhea, constipation, abdominal pain, jaundice, melena Denies: frequency, dysuria, nocturia, urinary incontinence, stones, hematuria Denies: polydipsia/polyuria, skin changes, temperature intolerance, unexpected weight gain Denies: back pain, joint pain, joint swelling, muscle pain, muscle weakness Denies: bleeding problems, blood transfusions, bruising, pallor, swollen lymph nodes Denies: headache, dysarthria, blurred vision, diplopia,seizure, focal deficits. Admits to: hemoptysis Patient Active Problem List  
Diagnosis Code  Essential hypertension with goal blood pressure less than 130/80 I10  Mass of lower lobe of right lung R91.8  Right lower lobe lung mass R91.8  Hypoxia R09.02  
 Acute respiratory failure with hypoxia (HCC) J96.01  
 Hemoptysis R04.2 Prior to Admission Medications Prescriptions Last Dose Informant Patient Reported? Taking?  
chlorthalidone (HYGROTEN) 25 mg tablet Not Taking at Unknown time  No No  
Sig: Take 1 Tab by mouth daily. metoprolol succinate (TOPROL-XL) 50 mg XL tablet Not Taking at Unknown time  No No  
Sig: Take 1 Tab by mouth daily. Facility-Administered Medications: None Past Medical History:  
Diagnosis Date  GERD (gastroesophageal reflux disease)   
 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  
Procedure Laterality Date  HX KNEE ARTHROSCOPY Left   
 scope X 1, ACL recon X 1  
 HX KNEE ARTHROSCOPY Right 1974, 1975 X 2   
 HX KNEE REPLACEMENT    
 HX TONSILLECTOMY  1959  
[place] SINUS SURGERY [address] [facility] \"nasal plasty\" Social History Socioeconomic History  Marital status:  Spouse name: Not on file  Number of children: Not on file  Years of education: Not on file  Highest education level: Not on file Occupational History  Not on file Social Needs  Financial resource strain: Not on file  Food insecurity:  
  Worry: Not on file Inability: Not on file  Transportation needs:  
  Medical: Not on file Non-medical: Not on file Tobacco Use  Smoking status: Former Smoker Packs/day: 1.00 Years: 20.00 Pack years: 20.00  Smokeless tobacco: Never Used  Tobacco comment: quit 2014 // smoked off and on Substance and Sexual Activity  Alcohol use: Yes Alcohol/week: 2.0 standard drinks Types: 2 Glasses of wine per week  Drug use: No  
 Sexual activity: Yes  
  Partners: Female Lifestyle  Physical activity:  
  Days per week: Not on file Minutes per session: Not on file  Stress: Not on file Relationships  Social connections:  
  Talks on phone: Not on file Gets together: Not on file Attends Druze service: Not on file Active member of club or organization: Not on file Attends meetings of clubs or organizations: Not on file Relationship status: Not on file  Intimate partner violence:  
  Fear of current or ex partner: Not on file Emotionally abused: Not on file Physically abused: Not on file Forced sexual activity: Not on file Other Topics Concern   Service No  
 Blood Transfusions No  
 Caffeine Concern No  
 Occupational Exposure No  
 Hobby Hazards No  
 Sleep Concern No  
 Stress Concern No  
 Weight Concern No  
 Special Diet No  
 Back Care Not Asked  Exercise Yes  Bike Helmet Not Asked [address] [facility] Yes  Self-Exams Not Asked Social History Narrative  Not on file Family History Problem Relation Age of Onset  Cancer Mother   
     uterine  Arrhythmia Sister   
     afib Allergies Allergen Reactions  Celebrex [Celecoxib] Itching Current Facility-Administered Medications Medication Dose Route Frequency  chlorthalidone (HYGROTEN) tablet 25 mg  25 mg Oral DAILY  metoprolol succinate (TOPROL-XL) XL tablet 50 mg  50 mg Oral DAILY  sodium chloride (NS) flush 5-40 mL  5-40 mL IntraVENous Q8H  
 sodium chloride (NS) flush 5-40 mL  5-40 mL IntraVENous PRN  
 acetaminophen (TYLENOL) tablet 650 mg  650 mg Oral Q4H PRN  
 HYDROcodone-acetaminophen (NORCO)  mg tablet 1 Tab  1 Tab Oral Q4H PRN  
 naloxone (NARCAN) injection 0.4 mg  0.4 mg IntraVENous PRN  
 diphenhydrAMINE (BENADRYL) capsule 25 mg  25 mg Oral Q6H PRN  
  ondansetron (ZOFRAN) injection 4 mg  4 mg IntraVENous Q4H PRN  
 bisacodyL (DULCOLAX) tablet 5 mg  5 mg Oral DAILY PRN  
 LORazepam (ATIVAN) tablet 1 mg  1 mg Oral BID PRN  
 influenza vaccine 2019-20 (6 mos+)(PF) (FLUARIX/FLULAVAL/FLUZONE QUAD) injection 0.5 mL  0.5 mL IntraMUSCular PRIOR TO DISCHARGE  alcohol 62% (NOZIN) nasal  1 Ampule  1 Ampule Topical Q12H Objective:  
 
Vitals:  
 02/23/20 2347 02/24/20 0400 02/24/20 0805 02/24/20 1215 BP: (!) 144/98 (!) 138/91 (!) 145/101 (!) [id number] Pulse: (!) 105 92 (!) 102 88 Resp: 19 18 18 18 Temp: 98.2 °F (36.8 °C) 98 °F (36.7 °C) 98 °F (36.7 °C) 98 °F (36.7 °C) SpO2: 91% 93% 94% 93% Weight: PHYSICAL EXAM  
 
Constitutional:  the patient is well developed and in no acute distress, NC 4L sat 93% EENMT:  Sclera clear, pupils equal, oral mucosa moist 
Respiratory: decreased in R base, minor wheeze posterior r lung field Cardiovascular:  RRR without M,G,R 
Gastrointestinal: soft and non-tender; with positive bowel sounds. Musculoskeletal: warm without cyanosis. There is no lower extremity edema. Skin:  no jaundice ,no wounds +psoriasis on L elbow Neurologic: no gross neuro deficits Psychiatric:  alert and oriented x 3 Chest CT 2/23/20: 
1. No evidence for pulmonary. 2. Large central mass in the right lower lobe measuring 6.7 cm x 5.9 cm in 
greatest dimension. This obstructs the right lower lobe airway with complete 
collapse of the right lower lobe. The appearance is highly concerning for 
malignancy. Further evaluation as clinically indicated is recommended. 3. Right hilar adenopathy concerning for metastatic disease. 4.  Abnormal attenuation within right middle lobe airways best appreciated on 
axial image 70 although the appearance is more consistent with fluid within the 
right middle lobe airways which could represent blood given the history of 
hemoptysis.   
 
 
 
 
 
CXR:   
 2/23/20:  Right basilar airspace changes which may represent atelectasis. However, an 
additional masslike density is seen at the right inferior hilum which is 
incompletely characterized. Given the patient's history of hemoptysis, further 
evaluation with a preferably contrasted CT scan of the chest is recommended. Recent Labs  
  02/24/20 
0415 02/23/20 
1607 WBC 7.5 8.8 HGB 16.2 18.1* HCT 47.1 52.7*  
 179 Recent Labs  
  02/24/20 
0415 02/23/20 
1607  138  
K 3.8 3.7 * 103 * 91  
CO2 26 26 BUN 10 11 CREA 0.85 0.99 CA 8.5 9.5 ALB  --  4.3 TBILI  --  0.8 ALT  --  19 SGOT  --  28 No results for input(s): PH, PCO2, PO2, HCO3, PHI, PCO2I, PO2I, HCO3I in the last 72 hours. No results for input(s): LCAD, LAC in the last 72 hours. Assessment:  (Medical Decision Making) Hospital Problems  Date Reviewed: 2/24/2020 Codes Class Noted POA * (Principal) Hemoptysis ICD-10-CM: R04.2 ICD-9-CM: 786.30  2/24/2020 Yes From likely underlying lung cancer Acute respiratory failure with hypoxia [facility]) ICD-10-CM: J96.01 
ICD-9-CM: 518.81  2/24/2020 Yes Now on 4L Mass of lower lobe of right lung ICD-10-CM: R91.8 ICD-9-CM: 786.6  2/23/2020 Yes Probably has endobronchial disease Hypoxia ICD-10-CM: R09.02 
ICD-9-CM: 799.02  2/24/2020 Yes Right lower lobe lung mass ICD-10-CM: R91.8 ICD-9-CM: 786.6  2/23/2020 Yes Essential hypertension with goal blood pressure less than 130/80 (Chronic) ICD-10-CM: I10 
ICD-9-CM: 401.9  2/19/2018 Yes Plan:  (Medical Decision Making) --Will look for time time for bronch with Bx and likely EBUS to determine if mets to R hilar node and subcarinal node assuming there are no other metastatic lesions on the abdominopelvic CT. --Likely will need MRI with reg involvement. --Bronch tentative for Wednesday at 2 PM . More than 50% of the time documented was spent in face-to-face contact with the patient and in the care of the patient on the floor/unit where the patient is located. Thank you very much for this referral.  We appreciate the opportunity to participate in this patient's care. Will follow along with above stated plan. Bia Rollins NP I have spoken with and examined the patient. I agree with the above assessment and plan as documented. Patient planned for Bronch Wed 2pm/EBUS for airway evaluation and TBNA. In meantime, will add scheduled cough suppressant for hemoptysis. Gen: pleasant on 4lpm with O2 sat 91% Lungs: decreased on R with slight wheeze R midpost lung field Heart:  RRR with no Murmur/Rubs/Gallops Abd; NTND Ext: no edema 
 
--as above, bronch planned 2pm Wed with EBUS. Please keep NPO after breakfast of clears on Wednesday. --cough suppressant scheduled. Monitor volume of hemoptysis closely.  
 
Jaclyn Crespo MD

## 2020-02-24 NOTE — CONSULTS
[facility] Inpatient Hematology / Oncology Consult Note Reason for Consult:  Right lower lobe lung mass [R91.8] Referring Physician:  Ester Jenkins MD 
 
History of Present Illness: Mr. Adela Rich is a 81 y.o. male admitted on 2/23/2020 with a primary diagnosis of RLL lung mass. His PMH includes GERD, HTN, OA, and former smoker (quit 6 years ago). He presented to ED with c/o hemoptysis. He reports cough, shortness of breath, and intermittent chest pain since November 2019. He was found to be hypoxic in ED, improved with O2.  CT chest with RLL mass that obstructs RLL airway with complete collapse of the RLL; R hilar adenopathy; and RML with fluid/?blood. Pulm consult pending. We were consulted d/t the findings on his CT chest and concern for malignancy. Review of Systems: 
Constitutional Denies fever, chills, weight loss, appetite changes, fatigue, night sweats. HEENT Denies trauma, blurry vision, hearing loss, ear pain, nosebleeds, sore throat, neck pain and ear discharge. Skin Denies lesions or rashes. Lungs +cough +dyspnea +hemoptysis Cardiovascular +intermittent chest pain. Denies palpitations, or lower extremity edema. Gastrointestinal Denies nausea, vomiting, changes in bowel habits, bloody or black stools, abdominal pain.  Denies dysuria, frequency or hesitancy of urination. Neuro Denies headaches, visual changes or ataxia. Denies dizziness, tingling, tremors, sensory change, speech change, focal weakness or headaches. Hematology Denies easy bruising or bleeding, denies gingival bleeding or epistaxis. Endo Denies heat/cold intolerance, denies diabetes or thyroid abnormalities. MSK Denies back pain, arthralgias, myalgias or frequent falls. Psychiatric/Behavioral Denies depression and substance abuse. The patient is not nervous/anxious. Allergies Allergen Reactions  Celebrex [Celecoxib] Itching Past Medical History: Diagnosis Date  GERD (gastroesophageal reflux disease)   
 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  
Procedure Laterality Date  HX KNEE ARTHROSCOPY Left   
 scope X 1, ACL recon X 1  
 HX KNEE ARTHROSCOPY Right 1974, 1975 X 2   
 HX KNEE REPLACEMENT    
 HX TONSILLECTOMY  1959  
[facility] SINUS SURGERY [address] \"nasal plasty\" Family History Problem Relation Age of Onset  Cancer Mother   
     uterine  Arrhythmia Sister   
     afib Social History Socioeconomic History  Marital status:  Spouse name: Not on file  Number of children: Not on file  Years of education: Not on file  Highest education level: Not on file Occupational History  Not on file Social Needs  Financial resource strain: Not on file  Food insecurity:  
  Worry: Not on file Inability: Not on file  Transportation needs:  
  Medical: Not on file Non-medical: Not on file Tobacco Use  Smoking status: Former Smoker Packs/day: 1.00 Years: 20.00 Pack years: 20.00  Smokeless tobacco: Never Used  Tobacco comment: quit 2014 // smoked off and on Substance and Sexual Activity  Alcohol use: Yes Alcohol/week: 2.0 standard drinks Types: 2 Glasses of wine per week  Drug use: No  
 Sexual activity: Yes  
  Partners: Female Lifestyle  Physical activity:  
  Days per week: Not on file Minutes per session: Not on file  Stress: Not on file Relationships  Social connections:  
  Talks on phone: Not on file Gets together: Not on file Attends Religion service: Not on file Active member of club or organization: Not on file Attends meetings of clubs or organizations: Not on file Relationship status: Not on file  Intimate partner violence:  
  Fear of current or ex partner: Not on file Emotionally abused: Not on file Physically abused: Not on file Forced sexual activity: Not on file Other Topics Concern   Service No  
 Blood Transfusions No  
 Caffeine Concern No  
 Occupational Exposure No  
 Hobby Hazards No  
 Sleep Concern No  
 Stress Concern No  
 Weight Concern No  
 Special Diet No  
 Back Care Not Asked  Exercise Yes  Bike Helmet Not Asked [address] Yes  Self-Exams Not Asked Social History Narrative  Not on file Current Facility-Administered Medications Medication Dose Route Frequency Provider Last Rate Last Dose  chlorthalidone (HYGROTEN) tablet 25 mg  25 mg Oral DAILY Gael Smith MD   25 mg at 02/24/20 0815  
 metoprolol succinate (TOPROL-XL) XL tablet 50 mg  50 mg Oral DAILY Gael Smith MD   50 mg at 02/24/20 6352  sodium chloride (NS) flush 5-40 mL  5-40 mL IntraVENous Luz Maria Funes MD   Stopped at 02/24/20 0700  
 sodium chloride (NS) flush 5-40 mL  5-40 mL IntraVENous PRN Susanne Marroquin MD      
 acetaminophen (TYLENOL) tablet 650 mg  650 mg Oral Q4H PRN Gael Smith MD      
 HYDROcodone-acetaminophen [facility])  mg tablet 1 Tab  1 Tab Oral Q4H PRN Gael Smith MD      
 naloxone [facility]) injection 0.4 mg  0.4 mg IntraVENous PRN Gael Smith MD      
 diphenhydrAMINE (BENADRYL) capsule 25 mg  25 mg Oral Q6H PRN Gael Smith MD      
 ondansetron [facility]) injection 4 mg  4 mg IntraVENous Q4H PRN Susanne Marroquin MD      
 bisacodyL (DULCOLAX) tablet 5 mg  5 mg Oral DAILY PRN Gael Smith MD      
 LORazepam (ATIVAN) tablet 1 mg  1 mg Oral BID PRN Gael Smith MD      
 influenza vaccine 2019-20 (6 mos+)(PF) (FLUARIX/FLULAVAL/FLUZONE QUAD) injection 0.5 mL  0.5 mL IntraMUSCular PRIOR TO DISCHARGE Janay Fernandze MD      
 alcohol 62% (NOZIN) nasal  1 Ampule  1 Ampule Topical Q12H Windsor Holstein, MD   1 Ampule at 20 0800 OBJECTIVE: 
Patient Vitals for the past 8 hrs: 
 BP Temp Pulse Resp SpO2  
20 0805 (!) 145/101 98 °F (36.7 °C) (!) 102 18 94 % 20 0400 (!) 138/91 98 °F (36.7 °C) 92 18 93 % Temp (24hrs), Av.2 °F (36.8 °C), Min:98 °F (36.7 °C), Max:98.7 °F (37.1 °C) 
 
 0701 -  1900 In: -  
Out: 825 [Urine:825] Physical Exam: 
Constitutional: Well developed, well nourished male in no acute distress, sitting comfortably in the hospital bed. Wife at bedside. HEENT: Normocephalic and atraumatic. Oropharynx is clear, mucous membranes are moist.  Extraocular muscles are intact. Sclerae anicteric. Neck supple without JVD. No thyromegaly present. Skin Warm and dry. No bruising and no rash noted. No erythema. No pallor. Respiratory Lungs are clear to auscultation bilaterally without wheezes, rales or rhonchi, normal air exchange without accessory muscle use. On O2 via NC.  
CVS Normal rate, regular rhythm and normal S1 and S2. No murmurs, gallops, or rubs. Abdomen Soft, nontender and nondistended, normoactive bowel sounds. No palpable mass. No hepatosplenomegaly. Neuro Grossly nonfocal with no obvious sensory or motor deficits. MSK Normal range of motion in general.  No edema and no tenderness. Psych Appropriate mood and affect. Labs:   
Recent Results (from the past 24 hour(s)) EKG, 12 LEAD, INITIAL Collection Time: 20  4:00 PM  
Result Value Ref Range Ventricular Rate 119 BPM  
 Atrial Rate 119 BPM  
 P-R Interval 150 ms QRS Duration 84 ms Q-T Interval 308 ms QTC Calculation (Bezet) 433 ms Calculated P Axis 52 degrees Calculated R Axis -38 degrees Calculated T Axis 59 degrees Diagnosis    
  !! AGE AND GENDER SPECIFIC ECG ANALYSIS !! Sinus tachycardia Left axis deviation Nonspecific T wave abnormality Abnormal ECG When compared with ECG of 2016 11:06, 
 No significant change was found Confirmed by ST ELENI MIRANDA MD (), KARAN ATKINSON ([id number]) on 2/23/2020 9:27:05 PM 
  
CBC WITH AUTOMATED DIFF Collection Time: 02/23/20  4:07 PM  
Result Value Ref Range WBC 8.8 4.3 - 11.1 K/uL  
 RBC 5.87 (H) 4.23 - 5.6 M/uL  
 HGB 18.1 (H) 13.6 - 17.2 g/dL HCT 52.7 (H) 41.1 - 50.3 % MCV 89.8 79.6 - 97.8 FL  
 MCH 30.8 26.1 - 32.9 PG  
 MCHC 34.3 31.4 - 35.0 g/dL  
 RDW 12.8 11.9 - 14.6 % PLATELET 156 689 - 159 K/uL MPV 10.7 9.4 - 12.3 FL ABSOLUTE NRBC 0.00 0.0 - 0.2 K/uL  
 DF AUTOMATED NEUTROPHILS 69 43 - 78 % LYMPHOCYTES 21 13 - 44 % MONOCYTES 7 4.0 - 12.0 % EOSINOPHILS 2 0.5 - 7.8 % BASOPHILS 1 0.0 - 2.0 % IMMATURE GRANULOCYTES 1 0.0 - 5.0 %  
 ABS. NEUTROPHILS 6.0 1.7 - 8.2 K/UL  
 ABS. LYMPHOCYTES 1.9 0.5 - 4.6 K/UL  
 ABS. MONOCYTES 0.6 0.1 - 1.3 K/UL  
 ABS. EOSINOPHILS 0.2 0.0 - 0.8 K/UL  
 ABS. BASOPHILS 0.1 0.0 - 0.2 K/UL  
 ABS. IMM. GRANS. 0.0 0.0 - 0.5 K/UL METABOLIC PANEL, COMPREHENSIVE Collection Time: 02/23/20  4:07 PM  
Result Value Ref Range Sodium 138 136 - 145 mmol/L Potassium 3.7 3.5 - 5.1 mmol/L Chloride 103 98 - 107 mmol/L  
 CO2 26 21 - 32 mmol/L Anion gap 9 7 - 16 mmol/L Glucose 91 65 - 100 mg/dL BUN 11 8 - 23 MG/DL Creatinine 0.99 0.8 - 1.5 MG/DL  
 GFR est AA >60 >60 ml/min/1.73m2 GFR est non-AA >60 >60 ml/min/1.73m2 Calcium 9.5 8.3 - 10.4 MG/DL Bilirubin, total 0.8 0.2 - 1.1 MG/DL  
 ALT (SGPT) 19 12 - 65 U/L  
 AST (SGOT) 28 15 - 37 U/L Alk. phosphatase 98 50 - 136 U/L Protein, total 8.2 6.3 - 8.2 g/dL Albumin 4.3 3.2 - 4.6 g/dL Globulin 3.9 (H) 2.3 - 3.5 g/dL A-G Ratio 1.1 (L) 1.2 - 3.5 METABOLIC PANEL, BASIC Collection Time: 02/24/20  4:15 AM  
Result Value Ref Range Sodium 143 136 - 145 mmol/L Potassium 3.8 3.5 - 5.1 mmol/L Chloride 112 (H) 98 - 107 mmol/L  
 CO2 26 21 - 32 mmol/L Anion gap 5 (L) 7 - 16 mmol/L Glucose 101 (H) 65 - 100 mg/dL BUN 10 8 - 23 MG/DL Creatinine 0.85 0.8 - 1.5 MG/DL  
 GFR est AA >60 >60 ml/min/1.73m2 GFR est non-AA >60 >60 ml/min/1.73m2 Calcium 8.5 8.3 - 10.4 MG/DL  
CBC WITH AUTOMATED DIFF Collection Time: 02/24/20  4:15 AM  
Result Value Ref Range WBC 7.5 4.3 - 11.1 K/uL  
 RBC 5.20 4.23 - 5.6 M/uL  
 HGB 16.2 13.6 - 17.2 g/dL HCT 47.1 41.1 - 50.3 % MCV 90.6 79.6 - 97.8 FL  
 MCH 31.2 26.1 - 32.9 PG  
 MCHC 34.4 31.4 - 35.0 g/dL  
 RDW 12.8 11.9 - 14.6 % PLATELET 076 288 - 888 K/uL MPV 11.0 9.4 - 12.3 FL ABSOLUTE NRBC 0.00 0.0 - 0.2 K/uL  
 DF AUTOMATED NEUTROPHILS 55 43 - 78 % LYMPHOCYTES 32 13 - 44 % MONOCYTES 9 4.0 - 12.0 % EOSINOPHILS 3 0.5 - 7.8 % BASOPHILS 1 0.0 - 2.0 % IMMATURE GRANULOCYTES 1 0.0 - 5.0 %  
 ABS. NEUTROPHILS 4.1 1.7 - 8.2 K/UL  
 ABS. LYMPHOCYTES 2.4 0.5 - 4.6 K/UL  
 ABS. MONOCYTES 0.7 0.1 - 1.3 K/UL  
 ABS. EOSINOPHILS 0.3 0.0 - 0.8 K/UL  
 ABS. BASOPHILS 0.1 0.0 - 0.2 K/UL  
 ABS. IMM. GRANS. 0.0 0.0 - 0.5 K/UL Imaging: 
CT OF THE CHEST WITH INTRAVENOUS CONTRAST, 2/23/2020 
  
Indication: Hemoptysis. Abnormal chest x-ray. 
  
Comparison: Chest x-ray 2/23/2020 
  
Technique:   2.5 mm axial scans from above the aortic arch to the lung bases 
following the uneventful administration of 70 mL of Isovue-370. Intravenous 
contrast was given to evaluate for pulmonary embolism. All CT scans performed at 
this facility use one or all of the following: Automated exposure control, 
adjustment of the mA and/or kVp according to patient's size, iterative 
reconstruction. 
  
Findings: The base of the neck is unremarkable in appearance. Although a prominent 
subcarinal and mildly prominent left hilar lymph nodes are seen, these are not 
enlarged by size criteria. However, right hilar adenopathy is seen most evident 
in a right hilar lymph node seen on axial image 52 measuring 1.6 cm short axis. The thoracic aorta is normal in caliber. Opacification of the pulmonary arteries 
is good. No abnormal filling defects are seen to suggest pulmonary embolism.   
  
Evaluation with lung windows demonstrates right lower lobe collapse. This is 
felt to be due to a central mass which measures approximately 6.7 cm x 5.9 cm in 
greatest transverse dimension as measured on image 61. This attenuates right 
lower lobe pulmonary arteries although these remain patent at this time. However, this does appear to obstruct the right lower lobe airway. No concerning 
distinct pulmonary lesion is otherwise seen. Abnormal attenuation is seen within 
right middle lobe airways best appreciated on axial image 70 although the 
appearance is more consistent with fluid within the right middle lobe airways 
which could represent blood given the history of hemoptysis. No pleural effusion 
is seen. Lungs are expanded without evidence for pneumothorax. No acute 
osseous abnormality is seen.   
  
Limited evaluation of the upper abdomen demonstrates no acute abnormality. Calcified stones are seen within a grossly noninflamed gallbladder. 
  
IMPRESSION IMPRESSION:   
1. No evidence for pulmonary.  
  
2. Large central mass in the right lower lobe measuring 6.7 cm x 5.9 cm in 
greatest dimension. This obstructs the right lower lobe airway with complete 
collapse of the right lower lobe. The appearance is highly concerning for 
malignancy. Further evaluation as clinically indicated is recommended. 
  
3. Right hilar adenopathy concerning for metastatic disease. 
  
4. Abnormal attenuation within right middle lobe airways best appreciated on 
axial image 70 although the appearance is more consistent with fluid within the 
right middle lobe airways which could represent blood given the history of 
hemoptysis. ASSESSMENT: 
Problem List  Date Reviewed: 3/4/2019 Codes Class Noted Mass of lower lobe of right lung ICD-10-CM: R91.8 ICD-9-CM: 786.6  2/23/2020 Right lower lobe lung mass ICD-10-CM: R91.8 ICD-9-CM: 786.6  2/23/2020 Essential hypertension with goal blood pressure less than 130/80 ICD-10-CM: I10 
ICD-9-CM: 401.9  2/19/2018 RECOMMENDATIONS: 
RLL mass 
- CT chest with RLL mass that obstructs RLL airway with complete collapse of the RLL; R hilar adenopathy; and RML with fluid/?blood. - Pulm consult pending. Will need bx for tissue dx. - Check CT AP to complete staging. Check CEA. Lab studies and imaging studies were personally reviewed. Thank you for allowing us to participate in the care of Mr. Glinda Hashimoto. Our office will arrange f/u with Dr. Shane Elliott. Reese Matute NP [facility] [id number] [place] [address] Office : [phone] Fax : [phone] I personally saw, exammed and counselled the patient, and discussed with NP, agree with above history/assessment/plan. 81 y.o.male developed SOB, and admitted for hemoptysis and hypoxia, CT found RLL with atelectasis and mediastinal LN, I reviewed CT personally and discussed with pt this was most suspicious of lung cancer with probably LN metastasis, and he was motivated to treat, arrange complete staging CT A/P, EBUS/navigational biopsy and potential argon ablation vs stent to open the RLL bronchus, will also arrange MRI later. Will follow. Costa Armstrong M.D. [place] [address] [address] Office : [phone] Fax : [phone]

## 2020-02-24 NOTE — H&P
HOSPITALIST H&P 
NAME:  Checo Mccormick. Age:  81 y.o. 
:   1950 MRN:   [id number] PCP: Joce Lobato MD 
Treatment Team: Attending Provider: Ryan Hicks MD 
 
Prior CC: Reason for admission is: New right lower lung mass HPI:  
Patient history was obtained from the ER provider prior to seeing the patient. Patient is a 81 y.o. male who presents to the ER due to coughing up blood. He reports that this started today. He reports he has had an ongoing cough, shortness of breath, and intermittent chest pain since last November, when he had pneumonia. He just felt that it was taking a long time to get back to normal, and then he can get used to it. He was a previous smoker and states he quit 6 years ago. He denies fever/chills, nausea/vomiting/diarrhea, abdominal pain, weight loss, or pain/swelling in legs. In triage, he was noted to be hypoxic on room air, and ultimately placed on 4 L of oxygen with improvement to an O2 saturation of 92%. ER work-up included a CT scan of his chest to rule out a pulmonary embolus. He does not have a PE however a mass was found in his right lower lobe causing collapse of that lobe. Findings overall highly suggestive of malignancy. CT shows:IMPRESSION:  1. No evidence for pulmonary. 2. Large central mass in the right lower lobe measuring 6.7 cm x 5.9 cm in greatest dimension. This obstructs the right lower lobe airway with complete collapse of the right lower lobe. The appearance is highly concerning for malignancy. Further evaluation as clinically indicated is recommended. 3. Right hilar adenopathy concerning for metastatic disease. 4.  Abnormal attenuation within right middle lobe airways best appreciated on axial image 70 although the appearance is more consistent with fluid within the right middle lobe airways which could represent blood given the history of hemoptysis. I am asked to admit for transfer downtown for subsequent pulmonology and oncology work-up. ROS: 
All systems have been reviewed and are negative except as stated in HPI or elsewhere. Past Medical History:  
Diagnosis Date  GERD (gastroesophageal reflux disease)   
 nexium prn  Hypertension  Osteoarthritis  Status post total right knee replacement 2016 Past Surgical History:  
Procedure Laterality Date  HX KNEE ARTHROSCOPY Left   
 scope X 1, ACL recon X 1  
 HX KNEE ARTHROSCOPY Right , 1975 X 2   
 HX KNEE REPLACEMENT    
 HX TONSILLECTOMY    
[facility] SINUS SURGERY [address] [facility] \"nasal plasty\" Social History Tobacco Use  Smoking status: Former Smoker Packs/day: 1.00 Years: 20.00 Pack years: 20.00  Smokeless tobacco: Never Used  Tobacco comment: quit 2014 smoked off and on Substance Use Topics  Alcohol use: Yes Alcohol/week: 2.0 standard drinks Types: 2 Glasses of wine per week Family History Problem Relation Age of Onset  Cancer Mother   
     uterine  Arrhythmia Sister   
     afib FH Reviewed and non-contributory to admitting diagnosis Allergies Allergen Reactions  Celebrex [Celecoxib] Itching Cannot display prior to admission medications because the patient has not been admitted in this contact. Objective: No intake or output data in the 24 hours ending 20 Temp (24hrs), Av.1 °F (36.7 °C), Min:98.1 °F (36.7 °C), Max:98.1 °F (36.7 °C) There is no height or weight on file to calculate BMI. No data found. Physical Exam: 
 
General:    WD and WN, No apparent distress. Head:   Normocephalic, without obvious abnormality, atraumatic. Eyes:  PERRL; EOMI; sclera normal/non-icteric ENT:  Hearing is normal.  Oropharynx is clear with tacky mucous membranes Resp:    Clear to auscultation bilaterally. No Wheezing or Rhonchi.   Resp are even and unlabored; diminished breath sounds in the right lower lobe Heart[de-identified]  Regular rate and rhythm,  no murmur,   No LE edema Abdomen:   Soft, non-tender. Not distended. Bowel sounds normal.  hepato-splenomegaly -none Musc/SK: Muscle strength is good and tone normal; No cyanosis. No clubbing Skin:     Texture, turgor normal. No significant rashes or lesions. Capillary refill < 2 sec Neurologic: CN II - XII are grossly intact - Eye exam as noted above Psych: Alert and oriented x 4;  Judgement and insight are normal 
  
Data Review:  
Recent Results (from the past 24 hour(s)) EKG, 12 LEAD, INITIAL Collection Time: 02/23/20  4:00 PM  
Result Value Ref Range Ventricular Rate 119 BPM  
 Atrial Rate 119 BPM  
 P-R Interval 150 ms QRS Duration 84 ms Q-T Interval 308 ms QTC Calculation (Bezet) 433 ms Calculated P Axis 52 degrees Calculated R Axis -38 degrees Calculated T Axis 59 degrees Diagnosis    
  !! AGE AND GENDER SPECIFIC ECG ANALYSIS !! Sinus tachycardia Left axis deviation Nonspecific T wave abnormality Abnormal ECG When compared with ECG of 08-FEB-2016 11:06, No significant change was found CBC WITH AUTOMATED DIFF Collection Time: 02/23/20  4:07 PM  
Result Value Ref Range WBC 8.8 4.3 - 11.1 K/uL  
 RBC 5.87 (H) 4.23 - 5.6 M/uL  
 HGB 18.1 (H) 13.6 - 17.2 g/dL HCT 52.7 (H) 41.1 - 50.3 % MCV 89.8 79.6 - 97.8 FL  
 MCH 30.8 26.1 - 32.9 PG  
 MCHC 34.3 31.4 - 35.0 g/dL  
 RDW 12.8 11.9 - 14.6 % PLATELET 895 296 - 967 K/uL MPV 10.7 9.4 - 12.3 FL ABSOLUTE NRBC 0.00 0.0 - 0.2 K/uL  
 DF AUTOMATED NEUTROPHILS 69 43 - 78 % LYMPHOCYTES 21 13 - 44 % MONOCYTES 7 4.0 - 12.0 % EOSINOPHILS 2 0.5 - 7.8 % BASOPHILS 1 0.0 - 2.0 % IMMATURE GRANULOCYTES 1 0.0 - 5.0 %  
 ABS. NEUTROPHILS 6.0 1.7 - 8.2 K/UL  
 ABS. LYMPHOCYTES 1.9 0.5 - 4.6 K/UL  
 ABS. MONOCYTES 0.6 0.1 - 1.3 K/UL  
 ABS. EOSINOPHILS 0.2 0.0 - 0.8 K/UL ABS. BASOPHILS 0.1 0.0 - 0.2 K/UL  
 ABS. IMM. GRANS. 0.0 0.0 - 0.5 K/UL METABOLIC PANEL, COMPREHENSIVE Collection Time: 02/23/20  4:07 PM  
Result Value Ref Range Sodium 138 136 - 145 mmol/L Potassium 3.7 3.5 - 5.1 mmol/L Chloride 103 98 - 107 mmol/L  
 CO2 26 21 - 32 mmol/L Anion gap 9 7 - 16 mmol/L Glucose 91 65 - 100 mg/dL BUN 11 8 - 23 MG/DL Creatinine 0.99 0.8 - 1.5 MG/DL  
 GFR est AA >60 >60 ml/min/1.73m2 GFR est non-AA >60 >60 ml/min/1.73m2 Calcium 9.5 8.3 - 10.4 MG/DL Bilirubin, total 0.8 0.2 - 1.1 MG/DL  
 ALT (SGPT) 19 12 - 65 U/L  
 AST (SGOT) 28 15 - 37 U/L Alk. phosphatase 98 50 - 136 U/L Protein, total 8.2 6.3 - 8.2 g/dL Albumin 4.3 3.2 - 4.6 g/dL Globulin 3.9 (H) 2.3 - 3.5 g/dL A-G Ratio 1.1 (L) 1.2 - 3.5 CXR Results  (Last 48 hours) 02/23/20 1624  XR CHEST PORT Final result Impression:  IMPRESSION:   
1. Right basilar airspace changes which may represent atelectasis. However, an  
additional masslike density is seen at the right inferior hilum which is  
incompletely characterized. Given the patient's history of hemoptysis, further  
evaluation with a preferably contrasted CT scan of the chest is recommended. Narrative:  CHEST X-RAY, single portable view  2/23/2020 History: Coughing up blood. History of pneumonia Technique: Single frontal view of the chest.  
   
Comparison:  
   
Findings: The cardiac silhouette is normal in respect to size. The lungs are expanded  
without evidence for pneumothorax. Right basilar airspace changes. And  
additional somewhat defined masslike density is seen at the right inferior  
hilum. This is incompletely characterized on this single portable frontal chest  
x-ray. CT Results  (Last 48 hours) 02/23/20 1726  CT CHEST W CONT Final result Impression:  IMPRESSION:    
1. No evidence for pulmonary. 2. Large central mass in the right lower lobe measuring 6.7 cm x 5.9 cm in  
greatest dimension. This obstructs the right lower lobe airway with complete  
collapse of the right lower lobe. The appearance is highly concerning for  
malignancy. Further evaluation as clinically indicated is recommended. 3. Right hilar adenopathy concerning for metastatic disease. 4.  Abnormal attenuation within right middle lobe airways best appreciated on  
axial image 70 although the appearance is more consistent with fluid within the  
right middle lobe airways which could represent blood given the history of  
hemoptysis. Narrative:  CT OF THE CHEST WITH INTRAVENOUS CONTRAST, 2/23/2020 Indication: Hemoptysis. Abnormal chest x-ray. Comparison: Chest x-ray 2/23/2020 Technique:   2.5 mm axial scans from above the aortic arch to the lung bases  
following the uneventful administration of 70 mL of Isovue-370. Intravenous  
contrast was given to evaluate for pulmonary embolism. All CT scans performed at  
this facility use one or all of the following: Automated exposure control,  
adjustment of the mA and/or kVp according to patient's size, iterative  
reconstruction. Findings: The base of the neck is unremarkable in appearance. Although a prominent  
subcarinal and mildly prominent left hilar lymph nodes are seen, these are not  
enlarged by size criteria. However, right hilar adenopathy is seen most evident  
in a right hilar lymph node seen on axial image 52 measuring 1.6 cm short axis. The thoracic aorta is normal in caliber. Opacification of the pulmonary arteries  
is good. No abnormal filling defects are seen to suggest pulmonary embolism. Evaluation with lung windows demonstrates right lower lobe collapse.  This is  
felt to be due to a central mass which measures approximately 6.7 cm x 5.9 cm in  
 greatest transverse dimension as measured on image 61. This attenuates right  
lower lobe pulmonary arteries although these remain patent at this time. However, this does appear to obstruct the right lower lobe airway. No concerning  
distinct pulmonary lesion is otherwise seen. Abnormal attenuation is seen within  
right middle lobe airways best appreciated on axial image 70 although the  
appearance is more consistent with fluid within the right middle lobe airways  
which could represent blood given the history of hemoptysis. No pleural effusion  
is seen. Lungs are expanded without evidence for pneumothorax. No acute  
osseous abnormality is seen. Limited evaluation of the upper abdomen demonstrates no acute abnormality. Calcified stones are seen within a grossly noninflamed gallbladder. Assessment and Plan: Active Hospital Problems Diagnosis Date Noted  Mass of lower lobe of right lung 02/23/2020  Essential hypertension with goal blood pressure less than 130/80 02/19/2018 Active Problems: 
  Essential hypertension with goal blood pressure less than 130/80 (2/19/2018) Continue home meds and add prn hydralazine, if needed. Mass of lower lobe of right lung (2/23/2020) We will transfer patient downtown for consultation with pulmonology and oncology in the morning. I will keep him n.p.o. after midnight in case biopsy or other procedure is planned. Due to the hemoptysis I will place him on SCDs rather than Lovenox Tachycardia: Anticipate this will improve with improvement of oxygenation. If persist, can give him p.o. beta-blocker. · PLAN General 
· DVT prophylaxis: SCDs due to bleeding risk · Code status: Full;  HCPOA:  
· Risk: high · Anticipated DC needs: · Estimated LOS:  Greater than 2 midnights · Plans discussed with patient and/or caregiver; questions answered.  
 
Parts of this document were created using dragon voice recognition software. The chart has been reviewed but errors may still be present Med records reviewed if applicable; findings:  
 
Critical care time if applicable:   
 
Signed By: Cielo Belcher MD   
 February 23, 2020

## 2020-02-24 NOTE — PROGRESS NOTES
Hospitalist Progress Note Admit Date:  2020  9:06 PM  
Name:  Lynda Gonzalez. Age:  81 y.o. 
:  1950 MRN:  [id number] PCP:  Michelle Benedict MD 
Treatment Team: Attending Provider: Ester Jenkins MD; Consulting Provider: Hu Wilkes MD; Consulting Provider: Juanjo Braga MD 
 
Subjective:  
CC:  Coughing up blood HPI: 
 
  
Patient is a 81 y.o. male who presents to the ER due to coughing up blood. He reports that this started today. He reports he has had an ongoing cough, shortness of breath, and intermittent chest pain since last November, when he had pneumonia. He just felt that it was taking a long time to get back to normal, and then he can get used to it. He was a previous smoker and states he quit 6 years ago. He denies fever/chills, nausea/vomiting/diarrhea, abdominal pain, weight loss, or pain/swelling in legs. In triage, he was noted to be hypoxic on room air, and ultimately placed on 4 L of oxygen with improvement to an O2 saturation of 92%. ER work-up included a CT scan of his chest to rule out a pulmonary embolus. He does not have a PE however a mass was found in his right lower lobe causing collapse of that lobe. Findings overall highly suggestive of malignancy. CT shows:IMPRESSION:  1.  No evidence for pulmonary. 2. Large central mass in the right lower lobe measuring 6.7 cm x 5.9 cm in greatest dimension. This obstructs the right lower lobe airway with complete collapse of the right lower lobe. The appearance is highly concerning for malignancy. Further evaluation as clinically indicated is recommended. 3. Right hilar adenopathy concerning for metastatic disease. 4.  Abnormal attenuation within right middle lobe airways best appreciated on axial image 70 although the appearance is more consistent with fluid within the right middle lobe airways which could represent blood given the history of hemoptysis. I am asked to admit for transfer downtown for subsequent pulmonology and oncology work-up. 2/24:  Pt s/o mild shortness of breath. Occ hemoptysis. Awaiting pulm med eval.  Heme onc eval pt this am.  CT AP ordered for further staging. Pt denies CP. No N/V/D. No F/C. Other than HPI, a 10 pt ROS is negative Objective:  
 
Patient Vitals for the past 24 hrs: 
 Temp Pulse Resp BP SpO2  
02/24/20 0805 98 °F (36.7 °C) (!) 102 18 (!) 145/101 94 % 02/24/20 0400 98 °F (36.7 °C) 92 18 (!) 138/91 93 % 02/23/20 2347 98.2 °F (36.8 °C) (!) 105 19 (!) 144/98 91 % 02/23/20 2045 98.7 °F (37.1 °C) (!) 118 20 (!) 143/95 91 % Oxygen Therapy O2 Sat (%): 94 % (02/24/20 0805) O2 Device: Nasal cannula (02/24/20 0805) O2 Flow Rate (L/min): 4 l/min (02/24/20 0805) Intake/Output Summary (Last 24 hours) at 2/24/2020 1250 Last data filed at 2/24/2020 6650 Gross per 24 hour Intake 1000 ml Output 1525 ml Net -525 ml General:    Well nourished. Alert. NAD. Pleasant. Skin   Warm and dry. No rash. OP:   Moist 
CV:   RRR. No murmur, rub, or gallop. Lungs:   CTAB. No wheezing, rhonchi, or rales. Dimin   bs bases Abdomen:   Soft, nontender, nondistended. Extremities: Warm and dry. No cyanosis or edema. Neuro:   Nonfocal  
 
Data Review: 
I have reviewed all labs, meds, telemetry events, and studies from the last 24 hours. Recent Results (from the past 24 hour(s)) EKG, 12 LEAD, INITIAL Collection Time: 02/23/20  4:00 PM  
Result Value Ref Range Ventricular Rate 119 BPM  
 Atrial Rate 119 BPM  
 P-R Interval 150 ms QRS Duration 84 ms Q-T Interval 308 ms QTC Calculation (Bezet) 433 ms Calculated P Axis 52 degrees Calculated R Axis -38 degrees Calculated T Axis 59 degrees Diagnosis    
  !! AGE AND GENDER SPECIFIC ECG ANALYSIS !! Sinus tachycardia Left axis deviation Nonspecific T wave abnormality Abnormal ECG When compared with ECG of 08-FEB-2016 11:06, 
 No significant change was found Confirmed by [facility] Melo Garcia MD (), KARAN ATKINSON ([id number]) on 2/23/2020 9:27:05 PM 
  
CBC WITH AUTOMATED DIFF Collection Time: 02/23/20  4:07 PM  
Result Value Ref Range WBC 8.8 4.3 - 11.1 K/uL  
 RBC 5.87 (H) 4.23 - 5.6 M/uL  
 HGB 18.1 (H) 13.6 - 17.2 g/dL HCT 52.7 (H) 41.1 - 50.3 % MCV 89.8 79.6 - 97.8 FL  
 MCH 30.8 26.1 - 32.9 PG  
 MCHC 34.3 31.4 - 35.0 g/dL  
 RDW 12.8 11.9 - 14.6 % PLATELET 892 688 - 618 K/uL MPV 10.7 9.4 - 12.3 FL ABSOLUTE NRBC 0.00 0.0 - 0.2 K/uL  
 DF AUTOMATED NEUTROPHILS 69 43 - 78 % LYMPHOCYTES 21 13 - 44 % MONOCYTES 7 4.0 - 12.0 % EOSINOPHILS 2 0.5 - 7.8 % BASOPHILS 1 0.0 - 2.0 % IMMATURE GRANULOCYTES 1 0.0 - 5.0 %  
 ABS. NEUTROPHILS 6.0 1.7 - 8.2 K/UL  
 ABS. LYMPHOCYTES 1.9 0.5 - 4.6 K/UL  
 ABS. MONOCYTES 0.6 0.1 - 1.3 K/UL  
 ABS. EOSINOPHILS 0.2 0.0 - 0.8 K/UL  
 ABS. BASOPHILS 0.1 0.0 - 0.2 K/UL  
 ABS. IMM. GRANS. 0.0 0.0 - 0.5 K/UL METABOLIC PANEL, COMPREHENSIVE Collection Time: 02/23/20  4:07 PM  
Result Value Ref Range Sodium 138 136 - 145 mmol/L Potassium 3.7 3.5 - 5.1 mmol/L Chloride 103 98 - 107 mmol/L  
 CO2 26 21 - 32 mmol/L Anion gap 9 7 - 16 mmol/L Glucose 91 65 - 100 mg/dL BUN 11 8 - 23 MG/DL Creatinine 0.99 0.8 - 1.5 MG/DL  
 GFR est AA >60 >60 ml/min/1.73m2 GFR est non-AA >60 >60 ml/min/1.73m2 Calcium 9.5 8.3 - 10.4 MG/DL Bilirubin, total 0.8 0.2 - 1.1 MG/DL  
 ALT (SGPT) 19 12 - 65 U/L  
 AST (SGOT) 28 15 - 37 U/L Alk. phosphatase 98 50 - 136 U/L Protein, total 8.2 6.3 - 8.2 g/dL Albumin 4.3 3.2 - 4.6 g/dL Globulin 3.9 (H) 2.3 - 3.5 g/dL A-G Ratio 1.1 (L) 1.2 - 3.5 METABOLIC PANEL, BASIC Collection Time: 02/24/20  4:15 AM  
Result Value Ref Range Sodium 143 136 - 145 mmol/L Potassium 3.8 3.5 - 5.1 mmol/L Chloride 112 (H) 98 - 107 mmol/L  
 CO2 26 21 - 32 mmol/L Anion gap 5 (L) 7 - 16 mmol/L Glucose 101 (H) 65 - 100 mg/dL BUN 10 8 - 23 MG/DL Creatinine 0.85 0.8 - 1.5 MG/DL  
 GFR est AA >60 >60 ml/min/1.73m2 GFR est non-AA >60 >60 ml/min/1.73m2 Calcium 8.5 8.3 - 10.4 MG/DL  
CBC WITH AUTOMATED DIFF Collection Time: 02/24/20  4:15 AM  
Result Value Ref Range WBC 7.5 4.3 - 11.1 K/uL  
 RBC 5.20 4.23 - 5.6 M/uL  
 HGB 16.2 13.6 - 17.2 g/dL HCT 47.1 41.1 - 50.3 % MCV 90.6 79.6 - 97.8 FL  
 MCH 31.2 26.1 - 32.9 PG  
 MCHC 34.4 31.4 - 35.0 g/dL  
 RDW 12.8 11.9 - 14.6 % PLATELET 846 557 - 077 K/uL MPV 11.0 9.4 - 12.3 FL ABSOLUTE NRBC 0.00 0.0 - 0.2 K/uL  
 DF AUTOMATED NEUTROPHILS 55 43 - 78 % LYMPHOCYTES 32 13 - 44 % MONOCYTES 9 4.0 - 12.0 % EOSINOPHILS 3 0.5 - 7.8 % BASOPHILS 1 0.0 - 2.0 % IMMATURE GRANULOCYTES 1 0.0 - 5.0 %  
 ABS. NEUTROPHILS 4.1 1.7 - 8.2 K/UL  
 ABS. LYMPHOCYTES 2.4 0.5 - 4.6 K/UL  
 ABS. MONOCYTES 0.7 0.1 - 1.3 K/UL  
 ABS. EOSINOPHILS 0.3 0.0 - 0.8 K/UL  
 ABS. BASOPHILS 0.1 0.0 - 0.2 K/UL  
 ABS. IMM. GRANS. 0.0 0.0 - 0.5 K/UL All Micro Results None Current Meds: 
Current Facility-Administered Medications Medication Dose Route Frequency  saline peripheral flush soln 10 mL  10 mL InterCATHeter RAD ONCE  
 sodium chloride 0.9 % bolus infusion 100 mL  100 mL IntraVENous RAD ONCE  
 iopamidoL (ISOVUE-370) 76 % injection 100 mL  100 mL IntraVENous RAD ONCE  chlorthalidone (HYGROTEN) tablet 25 mg  25 mg Oral DAILY  metoprolol succinate (TOPROL-XL) XL tablet 50 mg  50 mg Oral DAILY  sodium chloride (NS) flush 5-40 mL  5-40 mL IntraVENous Q8H  
 sodium chloride (NS) flush 5-40 mL  5-40 mL IntraVENous PRN  
 acetaminophen (TYLENOL) tablet 650 mg  650 mg Oral Q4H PRN  
 HYDROcodone-acetaminophen (NORCO)  mg tablet 1 Tab  1 Tab Oral Q4H PRN  
 naloxone (NARCAN) injection 0.4 mg  0.4 mg IntraVENous PRN  
 diphenhydrAMINE (BENADRYL) capsule 25 mg  25 mg Oral Q6H PRN  
  ondansetron (ZOFRAN) injection 4 mg  4 mg IntraVENous Q4H PRN  
 bisacodyL (DULCOLAX) tablet 5 mg  5 mg Oral DAILY PRN  
 LORazepam (ATIVAN) tablet 1 mg  1 mg Oral BID PRN  
 influenza vaccine 2019-20 (6 mos+)(PF) (FLUARIX/FLULAVAL/FLUZONE QUAD) injection 0.5 mL  0.5 mL IntraMUSCular PRIOR TO DISCHARGE  alcohol 62% (NOZIN) nasal  1 Ampule  1 Ampule Topical Q12H Other Studies (last 24 hours): 
Ct Chest W Cont Result Date: 2/23/2020 CT OF THE CHEST WITH INTRAVENOUS CONTRAST, 2/23/2020 Indication: Hemoptysis. Abnormal chest x-ray. Comparison: Chest x-ray 2/23/2020 Technique:   2.5 mm axial scans from above the aortic arch to the lung bases following the uneventful administration of 70 mL of Isovue-370. Intravenous contrast was given to evaluate for pulmonary embolism. All CT scans performed at this facility use one or all of the following: Automated exposure control, adjustment of the mA and/or kVp according to patient's size, iterative reconstruction. Findings: The base of the neck is unremarkable in appearance. Although a prominent subcarinal and mildly prominent left hilar lymph nodes are seen, these are not enlarged by size criteria. However, right hilar adenopathy is seen most evident in a right hilar lymph node seen on axial image 52 measuring 1.6 cm short axis. The thoracic aorta is normal in caliber. Opacification of the pulmonary arteries is good. No abnormal filling defects are seen to suggest pulmonary embolism. Evaluation with lung windows demonstrates right lower lobe collapse. This is felt to be due to a central mass which measures approximately 6.7 cm x 5.9 cm in greatest transverse dimension as measured on image 61. This attenuates right lower lobe pulmonary arteries although these remain patent at this time. However, this does appear to obstruct the right lower lobe airway.  No concerning distinct pulmonary lesion is otherwise seen. Abnormal attenuation is seen within right middle lobe airways best appreciated on axial image 70 although the appearance is more consistent with fluid within the right middle lobe airways which could represent blood given the history of hemoptysis. No pleural effusion is seen. Lungs are expanded without evidence for pneumothorax. No acute osseous abnormality is seen. Limited evaluation of the upper abdomen demonstrates no acute abnormality. Calcified stones are seen within a grossly noninflamed gallbladder. IMPRESSION:  1. No evidence for pulmonary. 2. Large central mass in the right lower lobe measuring 6.7 cm x 5.9 cm in greatest dimension. This obstructs the right lower lobe airway with complete collapse of the right lower lobe. The appearance is highly concerning for malignancy. Further evaluation as clinically indicated is recommended. 3. Right hilar adenopathy concerning for metastatic disease. 4.  Abnormal attenuation within right middle lobe airways best appreciated on axial image 70 although the appearance is more consistent with fluid within the right middle lobe airways which could represent blood given the history of hemoptysis. Xr Chest [facility] Result Date: 2/23/2020 CHEST X-RAY, single portable view  2/23/2020 History: Coughing up blood. History of pneumonia Technique: Single frontal view of the chest. Comparison: Findings: The cardiac silhouette is normal in respect to size. The lungs are expanded without evidence for pneumothorax. Right basilar airspace changes. And additional somewhat defined masslike density is seen at the right inferior hilum. This is incompletely characterized on this single portable frontal chest x-ray. IMPRESSION: 1. Right basilar airspace changes which may represent atelectasis. However, an additional masslike density is seen at the right inferior hilum which is incompletely characterized.  Given the patient's history of hemoptysis, further evaluation with a preferably contrasted CT scan of the chest is recommended. Assessment and Plan:  
 
Hospital Problems as of 2/24/2020 Date Reviewed: 3/4/2019 Codes Class Noted - Resolved POA Mass of lower lobe of right lung ICD-10-CM: R91.8 ICD-9-CM: 786.6  2/23/2020 - Present Yes Right lower lobe lung mass ICD-10-CM: R91.8 ICD-9-CM: 786.6  2/23/2020 - Present Unknown Essential hypertension with goal blood pressure less than 130/80 ICD-10-CM: I10 
ICD-9-CM: 401.9  2/19/2018 - Present Yes PLAN:   
 
Hemopytsis 
-likely due to lung CA 
-Pulm and oncology consulted Lung Mass 
-as above Acute Hypoxemic Resp Failure 
-in part related to lung mass/cancer, may have underlying COPD as well HTN 
-home rx, monitor DVT Prophy 
-SCD's due to hemopysis DC planning/Dispo: DVT ppx:   
 
Signed: 
Oliva Mojica MD

## 2020-02-24 NOTE — PROGRESS NOTES
TRANSFER - IN REPORT: 
 
Verbal report received from [address] [place] [address] on Alma Delia Guard.  being received from ER at [place] for routine progression of care Report consisted of patients Situation, Background, Assessment and  
Recommendations(SBAR). Information from the following report(s) SBAR, Kardex and ED Summary was reviewed with the receiving nurse. Opportunity for questions and clarification was provided. Assessment completed upon patients arrival to unit and care assumed.

## 2020-02-24 NOTE — PROGRESS NOTES
Pt arrived to the floor alert and oriented x 4, resp even and unlabored on 4 liter nc. Pt denies pain, dual skin assessment completed with Alvarez Devries. Pt's skin is dry and intact. Pt has a dry patch on left elbow. Will continue to monitor.

## 2020-02-24 NOTE — ED NOTES
TRANSFER - OUT REPORT: 
 
Verbal report given to Salma Singh.  being transferred to [facility] [id number] [id number] DT for routine progression of care Report consisted of patients Situation, Background, Assessment and  
Recommendations(SBAR). Information from the following report(s) ED Summary was reviewed with the receiving nurse. Lines:  
Peripheral IV 02/23/20 Right Antecubital (Active) Site Assessment Clean, dry, & intact 2/23/2020  4:04 PM  
Phlebitis Assessment 0 2/23/2020  4:04 PM  
Infiltration Assessment 0 2/23/2020  4:04 PM  
Dressing Type Gauze;Tape;Transparent 2/23/2020  4:04 PM  
Hub Color/Line Status Green;Flushed 2/23/2020  4:04 PM  
Action Taken Blood drawn 2/23/2020  4:04 PM  
  
 
Opportunity for questions and clarification was provided. Patient transported with: 
Kwaku Denton

## 2020-02-24 NOTE — H&P (VIEW-ONLY)
CONSULT NOTE Clarissa Arguello. 
 
2/24/2020 Date of Admission:  2/23/2020 The patient's chart is reviewed and the patient is discussed with the staff. Subjective:  
 
Clarissa Glover  is a 81 y.o.  male seen and evaluated at the request of Dr. Brenden Leon for new RLL mass, hypoxia and hemoptysis. He presented with hemoptysis and congestion intermittently for the last 3 months. Was treated for pneumonia in November and quit smoking 6 years ago after a 35pkyr smoking history. He is currently requiring 4lpm of oxygen to maintain O2 sat of 90-91%. Chest CT was performed and ruled out PE but noted RLL mass suggestive of malignancy. Oncology was consulted and CEA elevated 5.0. The pt reports no hx of HA, visual changes, focal neurologic deficits, bone pain, but has noted some dyspnea. Hemoptysis became more prominent yesterday and this prompted his ER evaluation. CT abd/pelvis performed today without evidence of abdominal metastases. He reports coughing up about 1-2 tsp of blood every hour. Not on any antiplatelets or anticoagulants. Review of Systems Denies: fevers, chills, sweats, fatigue, malaise, anorexia, weight loss Denies: blurry vision, loss of vision, eye pain, photophobia Denies: hearing loss, ringing in the ears, earache, epistaxis Denies: chest pain, palpitations, syncope, orthopnea, paroxysmal nocturnal dyspnea, claudication Denies: dysphagia, odynophagia, nausea, vomiting, diarrhea, constipation, abdominal pain, jaundice, melena Denies: frequency, dysuria, nocturia, urinary incontinence, stones, hematuria Denies: polydipsia/polyuria, skin changes, temperature intolerance, unexpected weight gain Denies: back pain, joint pain, joint swelling, muscle pain, muscle weakness Denies: bleeding problems, blood transfusions, bruising, pallor, swollen lymph nodes Denies: headache, dysarthria, blurred vision, diplopia,seizure, focal deficits. Admits to: hemoptysis Patient Active Problem List  
Diagnosis Code  Essential hypertension with goal blood pressure less than 130/80 I10  Mass of lower lobe of right lung R91.8  Right lower lobe lung mass R91.8  Hypoxia R09.02  
 Acute respiratory failure with hypoxia (HCC) J96.01  
 Hemoptysis R04.2 Prior to Admission Medications Prescriptions Last Dose Informant Patient Reported? Taking?  
chlorthalidone (HYGROTEN) 25 mg tablet Not Taking at Unknown time  No No  
Sig: Take 1 Tab by mouth daily. metoprolol succinate (TOPROL-XL) 50 mg XL tablet Not Taking at Unknown time  No No  
Sig: Take 1 Tab by mouth daily. Facility-Administered Medications: None Past Medical History:  
Diagnosis Date  GERD (gastroesophageal reflux disease)   
 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  
Procedure Laterality Date  HX KNEE ARTHROSCOPY Left   
 scope X 1, ACL recon X 1  
 HX KNEE ARTHROSCOPY Right 1974, 1975 X 2   
 HX KNEE REPLACEMENT    
 HX TONSILLECTOMY  1959  
[facility] SINUS SURGERY [address] [facility] \"nasal plasty\" Social History Socioeconomic History  Marital status:  Spouse name: Not on file  Number of children: Not on file  Years of education: Not on file  Highest education level: Not on file Occupational History  Not on file Social Needs  Financial resource strain: Not on file  Food insecurity:  
  Worry: Not on file Inability: Not on file  Transportation needs:  
  Medical: Not on file Non-medical: Not on file Tobacco Use  Smoking status: Former Smoker Packs/day: 1.00 Years: 20.00 Pack years: 20.00  Smokeless tobacco: Never Used  Tobacco comment: quit 2014 // smoked off and on Substance and Sexual Activity  Alcohol use: Yes Alcohol/week: 2.0 standard drinks Types: 2 Glasses of wine per week  Drug use: No  
 Sexual activity: Yes  
  Partners: Female Lifestyle  Physical activity:  
  Days per week: Not on file Minutes per session: Not on file  Stress: Not on file Relationships  Social connections:  
  Talks on phone: Not on file Gets together: Not on file Attends Church service: Not on file Active member of club or organization: Not on file Attends meetings of clubs or organizations: Not on file Relationship status: Not on file  Intimate partner violence:  
  Fear of current or ex partner: Not on file Emotionally abused: Not on file Physically abused: Not on file Forced sexual activity: Not on file Other Topics Concern   Service No  
 Blood Transfusions No  
 Caffeine Concern No  
 Occupational Exposure No  
 Hobby Hazards No  
 Sleep Concern No  
 Stress Concern No  
 Weight Concern No  
 Special Diet No  
 Back Care Not Asked  Exercise Yes  Bike Helmet Not Asked [address] [facility] Yes  Self-Exams Not Asked Social History Narrative  Not on file Family History Problem Relation Age of Onset  Cancer Mother   
     uterine  Arrhythmia Sister   
     afib Allergies Allergen Reactions  Celebrex [Celecoxib] Itching Current Facility-Administered Medications Medication Dose Route Frequency  chlorthalidone (HYGROTEN) tablet 25 mg  25 mg Oral DAILY  metoprolol succinate (TOPROL-XL) XL tablet 50 mg  50 mg Oral DAILY  sodium chloride (NS) flush 5-40 mL  5-40 mL IntraVENous Q8H  
 sodium chloride (NS) flush 5-40 mL  5-40 mL IntraVENous PRN  
 acetaminophen (TYLENOL) tablet 650 mg  650 mg Oral Q4H PRN  
 HYDROcodone-acetaminophen (NORCO)  mg tablet 1 Tab  1 Tab Oral Q4H PRN  
 naloxone (NARCAN) injection 0.4 mg  0.4 mg IntraVENous PRN  
 diphenhydrAMINE (BENADRYL) capsule 25 mg  25 mg Oral Q6H PRN  
  ondansetron (ZOFRAN) injection 4 mg  4 mg IntraVENous Q4H PRN  
 bisacodyL (DULCOLAX) tablet 5 mg  5 mg Oral DAILY PRN  
 LORazepam (ATIVAN) tablet 1 mg  1 mg Oral BID PRN  
 influenza vaccine 2019-20 (6 mos+)(PF) (FLUARIX/FLULAVAL/FLUZONE QUAD) injection 0.5 mL  0.5 mL IntraMUSCular PRIOR TO DISCHARGE  alcohol 62% (NOZIN) nasal  1 Ampule  1 Ampule Topical Q12H Objective:  
 
Vitals:  
 02/23/20 2347 02/24/20 0400 02/24/20 0805 02/24/20 1215 BP: (!) 144/98 (!) 138/91 (!) 145/101 (!) [id number] Pulse: (!) 105 92 (!) 102 88 Resp: 19 18 18 18 Temp: 98.2 °F (36.8 °C) 98 °F (36.7 °C) 98 °F (36.7 °C) 98 °F (36.7 °C) SpO2: 91% 93% 94% 93% Weight: PHYSICAL EXAM  
 
Constitutional:  the patient is well developed and in no acute distress, NC 4L sat 93% EENMT:  Sclera clear, pupils equal, oral mucosa moist 
Respiratory: decreased in R base, minor wheeze posterior r lung field Cardiovascular:  RRR without M,G,R 
Gastrointestinal: soft and non-tender; with positive bowel sounds. Musculoskeletal: warm without cyanosis. There is no lower extremity edema. Skin:  no jaundice ,no wounds +psoriasis on L elbow Neurologic: no gross neuro deficits Psychiatric:  alert and oriented x 3 Chest CT 2/23/20: 
1. No evidence for pulmonary. 2. Large central mass in the right lower lobe measuring 6.7 cm x 5.9 cm in 
greatest dimension. This obstructs the right lower lobe airway with complete 
collapse of the right lower lobe. The appearance is highly concerning for 
malignancy. Further evaluation as clinically indicated is recommended. 3. Right hilar adenopathy concerning for metastatic disease. 4.  Abnormal attenuation within right middle lobe airways best appreciated on 
axial image 70 although the appearance is more consistent with fluid within the 
right middle lobe airways which could represent blood given the history of 
hemoptysis.   
 
 
 
 
 
CXR:   
 2/23/20:  Right basilar airspace changes which may represent atelectasis. However, an 
additional masslike density is seen at the right inferior hilum which is 
incompletely characterized. Given the patient's history of hemoptysis, further 
evaluation with a preferably contrasted CT scan of the chest is recommended. Recent Labs  
  02/24/20 
0415 02/23/20 
1607 WBC 7.5 8.8 HGB 16.2 18.1* HCT 47.1 52.7*  
 179 Recent Labs  
  02/24/20 
0415 02/23/20 
1607  138  
K 3.8 3.7 * 103 * 91  
CO2 26 26 BUN 10 11 CREA 0.85 0.99 CA 8.5 9.5 ALB  --  4.3 TBILI  --  0.8 ALT  --  19 SGOT  --  28 No results for input(s): PH, PCO2, PO2, HCO3, PHI, PCO2I, PO2I, HCO3I in the last 72 hours. No results for input(s): LCAD, LAC in the last 72 hours. Assessment:  (Medical Decision Making) Hospital Problems  Date Reviewed: 2/24/2020 Codes Class Noted POA * (Principal) Hemoptysis ICD-10-CM: R04.2 ICD-9-CM: 786.30  2/24/2020 Yes From likely underlying lung cancer Acute respiratory failure with hypoxia [facility]) ICD-10-CM: J96.01 
ICD-9-CM: 518.81  2/24/2020 Yes Now on 4L Mass of lower lobe of right lung ICD-10-CM: R91.8 ICD-9-CM: 786.6  2/23/2020 Yes Probably has endobronchial disease Hypoxia ICD-10-CM: R09.02 
ICD-9-CM: 799.02  2/24/2020 Yes Right lower lobe lung mass ICD-10-CM: R91.8 ICD-9-CM: 786.6  2/23/2020 Yes Essential hypertension with goal blood pressure less than 130/80 (Chronic) ICD-10-CM: I10 
ICD-9-CM: 401.9  2/19/2018 Yes Plan:  (Medical Decision Making) --Will look for time time for bronch with Bx and likely EBUS to determine if mets to R hilar node and subcarinal node assuming there are no other metastatic lesions on the abdominopelvic CT. --Likely will need MRI with reg involvement. --Bronch tentative for Wednesday at 2 PM . More than 50% of the time documented was spent in face-to-face contact with the patient and in the care of the patient on the floor/unit where the patient is located. Thank you very much for this referral.  We appreciate the opportunity to participate in this patient's care. Will follow along with above stated plan. Pieter Lucas NP I have spoken with and examined the patient. I agree with the above assessment and plan as documented. Patient planned for Bronch Wed 2pm/EBUS for airway evaluation and TBNA. In meantime, will add scheduled cough suppressant for hemoptysis. Gen: pleasant on 4lpm with O2 sat 91% Lungs: decreased on R with slight wheeze R midpost lung field Heart:  RRR with no Murmur/Rubs/Gallops Abd; NTND Ext: no edema 
 
--as above, bronch planned 2pm Wed with EBUS. Please keep NPO after breakfast of clears on Wednesday. --cough suppressant scheduled. Monitor volume of hemoptysis closely.  
 
Maren Baker MD

## 2020-02-25 LAB
ANION GAP SERPL CALC-SCNC: 6 MMOL/L (ref 7–16)
BASOPHILS # BLD: 0.1 K/UL (ref 0–0.2)
BASOPHILS NFR BLD: 1 % (ref 0–2)
BUN SERPL-MCNC: 9 MG/DL (ref 8–23)
CALCIUM SERPL-MCNC: 9.1 MG/DL (ref 8.3–10.4)
CHLORIDE SERPL-SCNC: 108 MMOL/L (ref 98–107)
CO2 SERPL-SCNC: 26 MMOL/L (ref 21–32)
CREAT SERPL-MCNC: 0.93 MG/DL (ref 0.8–1.5)
DIFFERENTIAL METHOD BLD: NORMAL
EOSINOPHIL # BLD: 0.3 K/UL (ref 0–0.8)
EOSINOPHIL NFR BLD: 3 % (ref 0.5–7.8)
ERYTHROCYTE [DISTWIDTH] IN BLOOD BY AUTOMATED COUNT: 13 % (ref 11.9–14.6)
GLUCOSE SERPL-MCNC: 95 MG/DL (ref 65–100)
HCT VFR BLD AUTO: 48.9 % (ref 41.1–50.3)
HGB BLD-MCNC: 16.5 G/DL (ref 13.6–17.2)
IMM GRANULOCYTES # BLD AUTO: 0 K/UL (ref 0–0.5)
IMM GRANULOCYTES NFR BLD AUTO: 0 % (ref 0–5)
LYMPHOCYTES # BLD: 2.4 K/UL (ref 0.5–4.6)
LYMPHOCYTES NFR BLD: 25 % (ref 13–44)
MCH RBC QN AUTO: 30.8 PG (ref 26.1–32.9)
MCHC RBC AUTO-ENTMCNC: 33.7 G/DL (ref 31.4–35)
MCV RBC AUTO: 91.4 FL (ref 79.6–97.8)
MONOCYTES # BLD: 0.7 K/UL (ref 0.1–1.3)
MONOCYTES NFR BLD: 8 % (ref 4–12)
NEUTS SEG # BLD: 5.9 K/UL (ref 1.7–8.2)
NEUTS SEG NFR BLD: 63 % (ref 43–78)
NRBC # BLD: 0 K/UL (ref 0–0.2)
PLATELET # BLD AUTO: 177 K/UL (ref 150–450)
PMV BLD AUTO: 10.7 FL (ref 9.4–12.3)
POTASSIUM SERPL-SCNC: 4 MMOL/L (ref 3.5–5.1)
RBC # BLD AUTO: 5.35 M/UL (ref 4.23–5.6)
SODIUM SERPL-SCNC: 140 MMOL/L (ref 136–145)
WBC # BLD AUTO: 9.4 K/UL (ref 4.3–11.1)

## 2020-02-25 PROCEDURE — 65270000029 HC RM PRIVATE

## 2020-02-25 PROCEDURE — 85025 COMPLETE CBC W/AUTO DIFF WBC: CPT

## 2020-02-25 PROCEDURE — 74011250637 HC RX REV CODE- 250/637: Performed by: FAMILY MEDICINE

## 2020-02-25 PROCEDURE — 80048 BASIC METABOLIC PNL TOTAL CA: CPT

## 2020-02-25 PROCEDURE — 36415 COLL VENOUS BLD VENIPUNCTURE: CPT

## 2020-02-25 PROCEDURE — 99232 SBSQ HOSP IP/OBS MODERATE 35: CPT | Performed by: INTERNAL MEDICINE

## 2020-02-25 PROCEDURE — 74011250637 HC RX REV CODE- 250/637: Performed by: INTERNAL MEDICINE

## 2020-02-25 RX ADMIN — Medication 10 ML: at 13:20

## 2020-02-25 RX ADMIN — Medication 1 AMPULE: at 20:46

## 2020-02-25 RX ADMIN — HYDROCODONE BITARTRATE AND HOMATROPINE METHYLBROMIDE 5 ML: 5; 1.5 SOLUTION ORAL at 05:51

## 2020-02-25 RX ADMIN — Medication 1 AMPULE: at 08:31

## 2020-02-25 RX ADMIN — CHLORTHALIDONE 25 MG: 25 TABLET ORAL at 08:31

## 2020-02-25 RX ADMIN — HYDROCODONE BITARTRATE AND HOMATROPINE METHYLBROMIDE 5 ML: 5; 1.5 SOLUTION ORAL at 20:47

## 2020-02-25 RX ADMIN — Medication 10 ML: at 20:47

## 2020-02-25 RX ADMIN — HYDROCODONE BITARTRATE AND HOMATROPINE METHYLBROMIDE 5 ML: 5; 1.5 SOLUTION ORAL at 17:50

## 2020-02-25 RX ADMIN — SENNOSIDES AND DOCUSATE SODIUM 1 TABLET: 8.6; 5 TABLET ORAL at 20:46

## 2020-02-25 RX ADMIN — METOPROLOL SUCCINATE 50 MG: 25 TABLET, FILM COATED, EXTENDED RELEASE ORAL at 08:31

## 2020-02-25 RX ADMIN — Medication 10 ML: at 05:51

## 2020-02-25 RX ADMIN — HYDROCODONE BITARTRATE AND HOMATROPINE METHYLBROMIDE 5 ML: 5; 1.5 SOLUTION ORAL at 09:49

## 2020-02-25 RX ADMIN — HYDROCODONE BITARTRATE AND HOMATROPINE METHYLBROMIDE 5 ML: 5; 1.5 SOLUTION ORAL at 13:19

## 2020-02-25 NOTE — PROGRESS NOTES
Problem: Falls - Risk of 
Goal: *Absence of Falls Description Document Beto Cobos Fall Risk and appropriate interventions in the flowsheet. Outcome: Progressing Towards Goal 
Note: Fall Risk Interventions: 
  
 
  
 
Medication Interventions: Patient to call before getting OOB, Teach patient to arise slowly History of Falls Interventions: Investigate reason for fall

## 2020-02-25 NOTE — PROGRESS NOTES
END OF SHIFT NOTE: 
 
Intake/Output 
02/24 1901 - 02/25 0700 In: 61 [P.O.:60] Out: -   
Voiding: YES Catheter: NO 
Drain:   
 
 
 
 
 
Stool:  0 occurrences. Emesis:  0 occurrences. VITAL SIGNS Patient Vitals for the past 12 hrs: 
 Temp Pulse Resp BP SpO2  
02/25/20 0400 98 °F (36.7 °C) 86 17 125/79 92 % 02/24/20 2332 97.9 °F (36.6 °C) 82 18 129/86 92 % 02/24/20 1934 97.9 °F (36.6 °C) 96 17 133/86 96 % Pain Assessment Pain 1 Pain Scale 1: Numeric (0 - 10) (02/24/20 2050) Pain Intensity 1: 0 (02/24/20 2050) Patient Stated Pain Goal: 0 (02/24/20 2050) Ambulating Yes Additional Information: Pt rested well throughout night. No needs voiced at this time Shift report given to oncoming nurse at the bedside.  
 
Beryl Parker RN

## 2020-02-25 NOTE — PROGRESS NOTES
Hospitalist Progress Note Admit Date:  2020  9:06 PM  
Name:  Aldair Florentino. Age:  81 y.o. 
:  1950 MRN:  [id number] PCP:  Colten Conley MD 
Treatment Team: Attending Provider: Arturo Ramirez MD; Consulting Provider: Paolo Ackerman MD; Consulting Provider: Fabricio Funes MD 
 
Subjective:  
CC:  Coughing up blood HPI: 
 
  
Patient is a 81 y.o. male who presents to the ER due to coughing up blood. He reports that this started today. He reports he has had an ongoing cough, shortness of breath, and intermittent chest pain since last November, when he had pneumonia. He just felt that it was taking a long time to get back to normal, and then he can get used to it. He was a previous smoker and states he quit 6 years ago. He denies fever/chills, nausea/vomiting/diarrhea, abdominal pain, weight loss, or pain/swelling in legs. In triage, he was noted to be hypoxic on room air, and ultimately placed on 4 L of oxygen with improvement to an O2 saturation of 92%. ER work-up included a CT scan of his chest to rule out a pulmonary embolus. He does not have a PE however a mass was found in his right lower lobe causing collapse of that lobe. Findings overall highly suggestive of malignancy. CT shows:IMPRESSION:  1.  No evidence for pulmonary. 2. Large central mass in the right lower lobe measuring 6.7 cm x 5.9 cm in greatest dimension. This obstructs the right lower lobe airway with complete collapse of the right lower lobe. The appearance is highly concerning for malignancy. Further evaluation as clinically indicated is recommended. 3. Right hilar adenopathy concerning for metastatic disease. 4.  Abnormal attenuation within right middle lobe airways best appreciated on axial image 70 although the appearance is more consistent with fluid within the right middle lobe airways which could represent blood given the history of hemoptysis. I am asked to admit for transfer downtown for subsequent pulmonology and oncology work-up. 2/24:  Pt s/o mild shortness of breath. Occ hemoptysis. Awaiting pulm med eval.  Heme onc eval pt this am.  CT AP ordered for further staging. Pt denies CP. No N/V/D. No F/C. 
 
2/25: Coughing up small amounts of blood at a time, less than a teaspoon. Chest pain. No shortness of breath. Colostomy scheduled for tomorrow. Allergy input appreciated. CT abdomen and pelvis negative for any obvious malignancy. Other than HPI, a 10 pt ROS is negative Objective:  
 
Patient Vitals for the past 24 hrs: 
 Temp Pulse Resp BP SpO2  
02/25/20 1526 98.8 °F (37.1 °C) 89 18 120/81 94 % 02/25/20 1127 98.4 °F (36.9 °C) 82 18 (!) 130/91 92 % 02/25/20 0758 98.2 °F (36.8 °C) 87 18 (!) 126/93 91 % 02/25/20 0400 98 °F (36.7 °C) 86 17 125/79 92 % 02/24/20 2332 97.9 °F (36.6 °C) 82 18 129/86 92 % 02/24/20 1934 97.9 °F (36.6 °C) 96 17 133/86 96 % Oxygen Therapy O2 Sat (%): 94 % (02/25/20 1526) O2 Device: Nasal cannula (02/24/20 1455) O2 Flow Rate (L/min): 4 l/min (02/24/20 1455) Intake/Output Summary (Last 24 hours) at 2/25/2020 1746 Last data filed at 2/24/2020 2050 Gross per 24 hour Intake 60 ml Output  Net 60 ml General:    Well nourished. Alert. NAD. Pleasant. Nontoxic appearance Skin   Warm and dry. No rash. OP:   Moist 
CV:   RRR. No murmur, rub, or gallop. Lungs:   CTAB. No wheezing, rhonchi, or rales. Dimin  bs bases Abdomen:   Soft, nontender, nondistended. Extremities: Warm and dry. No cyanosis or edema. Neuro:   Nonfocal  
 
Data Review: 
I have reviewed all labs, meds, telemetry events, and studies from the last 24 hours. Recent Results (from the past 24 hour(s)) METABOLIC PANEL, BASIC Collection Time: 02/25/20  3:46 AM  
Result Value Ref Range Sodium 140 136 - 145 mmol/L Potassium 4.0 3.5 - 5.1 mmol/L  Chloride 108 (H) 98 - 107 mmol/L  
 CO2 26 21 - 32 mmol/L Anion gap 6 (L) 7 - 16 mmol/L Glucose 95 65 - 100 mg/dL BUN 9 8 - 23 MG/DL Creatinine 0.93 0.8 - 1.5 MG/DL  
 GFR est AA >60 >60 ml/min/1.73m2 GFR est non-AA >60 >60 ml/min/1.73m2 Calcium 9.1 8.3 - 10.4 MG/DL  
CBC WITH AUTOMATED DIFF Collection Time: 02/25/20  3:46 AM  
Result Value Ref Range WBC 9.4 4.3 - 11.1 K/uL  
 RBC 5.35 4.23 - 5.6 M/uL  
 HGB 16.5 13.6 - 17.2 g/dL HCT 48.9 41.1 - 50.3 % MCV 91.4 79.6 - 97.8 FL  
 MCH 30.8 26.1 - 32.9 PG  
 MCHC 33.7 31.4 - 35.0 g/dL  
 RDW 13.0 11.9 - 14.6 % PLATELET 582 301 - 016 K/uL MPV 10.7 9.4 - 12.3 FL ABSOLUTE NRBC 0.00 0.0 - 0.2 K/uL  
 DF AUTOMATED NEUTROPHILS 63 43 - 78 % LYMPHOCYTES 25 13 - 44 % MONOCYTES 8 4.0 - 12.0 % EOSINOPHILS 3 0.5 - 7.8 % BASOPHILS 1 0.0 - 2.0 % IMMATURE GRANULOCYTES 0 0.0 - 5.0 %  
 ABS. NEUTROPHILS 5.9 1.7 - 8.2 K/UL  
 ABS. LYMPHOCYTES 2.4 0.5 - 4.6 K/UL  
 ABS. MONOCYTES 0.7 0.1 - 1.3 K/UL  
 ABS. EOSINOPHILS 0.3 0.0 - 0.8 K/UL  
 ABS. BASOPHILS 0.1 0.0 - 0.2 K/UL  
 ABS. IMM. GRANS. 0.0 0.0 - 0.5 K/UL All Micro Results None Current Meds: 
Current Facility-Administered Medications Medication Dose Route Frequency  HYDROcodone-homatropine (HYCODAN) 5-1.5 mg/5 mL (5 mL) syrup 5 mL  5 mL Oral Q4HWA  senna-docusate (PERICOLACE) 8.6-50 mg per tablet 1 Tab  1 Tab Oral QHS  chlorthalidone (HYGROTEN) tablet 25 mg  25 mg Oral DAILY  metoprolol succinate (TOPROL-XL) XL tablet 50 mg  50 mg Oral DAILY  sodium chloride (NS) flush 5-40 mL  5-40 mL IntraVENous Q8H  
 sodium chloride (NS) flush 5-40 mL  5-40 mL IntraVENous PRN  
 acetaminophen (TYLENOL) tablet 650 mg  650 mg Oral Q4H PRN  
 HYDROcodone-acetaminophen (NORCO)  mg tablet 1 Tab  1 Tab Oral Q4H PRN  
 naloxone (NARCAN) injection 0.4 mg  0.4 mg IntraVENous PRN  
 diphenhydrAMINE (BENADRYL) capsule 25 mg  25 mg Oral Q6H PRN  
  ondansetron (ZOFRAN) injection 4 mg  4 mg IntraVENous Q4H PRN  
 bisacodyL (DULCOLAX) tablet 5 mg  5 mg Oral DAILY PRN  
 LORazepam (ATIVAN) tablet 1 mg  1 mg Oral BID PRN  
 influenza vaccine 2019-20 (6 mos+)(PF) (FLUARIX/FLULAVAL/FLUZONE QUAD) injection 0.5 mL  0.5 mL IntraMUSCular PRIOR TO DISCHARGE  alcohol 62% (NOZIN) nasal  1 Ampule  1 Ampule Topical Q12H Other Studies (last 24 hours): No results found. Assessment and Plan:  
 
Hospital Problems as of 2/25/2020 Date Reviewed: 2/24/2020 Codes Class Noted - Resolved POA Hypoxia ICD-10-CM: R09.02 
ICD-9-CM: 799.02  2/24/2020 - Present Yes Acute respiratory failure with hypoxia [facility]) ICD-10-CM: J96.01 
ICD-9-CM: 518.81  2/24/2020 - Present Yes * (Principal) Hemoptysis ICD-10-CM: R04.2 ICD-9-CM: 786.30  2/24/2020 - Present Yes Mass of lower lobe of right lung ICD-10-CM: R91.8 ICD-9-CM: 786.6  2/23/2020 - Present Yes Right lower lobe lung mass ICD-10-CM: R91.8 ICD-9-CM: 786.6  2/23/2020 - Present Yes Essential hypertension with goal blood pressure less than 130/80 (Chronic) ICD-10-CM: I10 
ICD-9-CM: 401.9  2/19/2018 - Present Yes PLAN:   
 
Hemopytsis 
-likely due to lung CA 
-Pulm and oncology consulted 2/25:  Bronchoscopy planned for tomorrow Lung Mass 
-as above Acute Hypoxemic Resp Failure 
-in part related to lung mass/cancer, may have underlying COPD as well 2/25:  supplem O2 as neeed HTN 
-home rx, monitor DVT Prophy 
-SCD's due to hemopysis DC planning/Dispo: DVT ppx:   
 
Signed: 
Cordell Orozco MD

## 2020-02-25 NOTE — PROGRESS NOTES
Chari Bailon. Admission Date: 2/23/2020 Daily Progress Note: 2/25/2020 The patient's chart is reviewed and the patient is discussed with the staff. 81 y.o.  male seen and evaluated at the request of Dr. Shona Lesch for new RLL mass, hypoxia and hemoptysis. He presented with hemoptysis and congestion intermittently for the last 3 months. Was treated for pneumonia in November and quit smoking 6 years ago after a 35pkyr smoking history. He is currently requiring 4lpm of oxygen to maintain O2 sat of 90-91%. Chest CT was performed and ruled out PE but noted RLL mass suggestive of malignancy. Oncology was consulted and CEA elevated 5.0. Subjective:  
  Continues to cough up about a tsp of pure blood. Has been short of breath for several months at home. Wife has heard wheezing. Discussed xray results and plan for bronch tomorrow. Current Facility-Administered Medications Medication Dose Route Frequency  HYDROcodone-homatropine (HYCODAN) 5-1.5 mg/5 mL (5 mL) syrup 5 mL  5 mL Oral Q4HWA  senna-docusate (PERICOLACE) 8.6-50 mg per tablet 1 Tab  1 Tab Oral QHS  chlorthalidone (HYGROTEN) tablet 25 mg  25 mg Oral DAILY  metoprolol succinate (TOPROL-XL) XL tablet 50 mg  50 mg Oral DAILY  sodium chloride (NS) flush 5-40 mL  5-40 mL IntraVENous Q8H  
 sodium chloride (NS) flush 5-40 mL  5-40 mL IntraVENous PRN  
 acetaminophen (TYLENOL) tablet 650 mg  650 mg Oral Q4H PRN  
 HYDROcodone-acetaminophen (NORCO)  mg tablet 1 Tab  1 Tab Oral Q4H PRN  
 naloxone (NARCAN) injection 0.4 mg  0.4 mg IntraVENous PRN  
 diphenhydrAMINE (BENADRYL) capsule 25 mg  25 mg Oral Q6H PRN  
 ondansetron (ZOFRAN) injection 4 mg  4 mg IntraVENous Q4H PRN  
 bisacodyL (DULCOLAX) tablet 5 mg  5 mg Oral DAILY PRN  
 LORazepam (ATIVAN) tablet 1 mg  1 mg Oral BID PRN  
 influenza vaccine 2019-20 (6 mos+)(PF) (FLUARIX/FLULAVAL/FLUZONE QUAD) injection 0.5 mL  0.5 mL IntraMUSCular PRIOR TO DISCHARGE  alcohol 62% (NOZIN) nasal  1 Ampule  1 Ampule Topical Q12H Objective:  
 
Vitals:  
 02/24/20 2332 02/25/20 0400 02/25/20 0758 02/25/20 1127 BP: 129/86 125/79 (!) 126/93 (!) 130/91 Pulse: 82 86 87 82 Resp: 18 17 18 18 Temp: 97.9 °F (36.6 °C) 98 °F (36.7 °C) 98.2 °F (36.8 °C) 98.4 °F (36.9 °C) SpO2: 92% 92% 91% 92% Weight:      
 
Intake and Output:  
02/23 1901 - 02/25 0700 In: 1060 [P.O.:1060] Out: 1525 [ZDONA:7635] No intake/output data recorded. Physical Exam:  
Constitutional:  the patient is well developed and in no acute distress HEENT:  Sclera clear, pupils equal, oral mucosa moist 
Lungs: clear bilaterally. Wearing 4 liter cannula. Cardiovascular:  RRR without M,G,R 
Abd/GI: soft and non-tender; with positive bowel sounds. Ext: warm without cyanosis. There is no lower leg edema. Musculoskeletal: moves all four extremities with equal strength Skin:  no jaundice or rashes, no wounds Neuro: no gross neuro deficits Musculoskeletal: can ambulate. No deformity Psychiatric: Calm. Review of Systems - Review of Systems - General ROS: positive for - none Respiratory ROS: positive for - hemoptysis, shortness of breath and wheezing Cardiovascular ROS: positive for - none Gastrointestinal ROS: no abdominal pain, change in bowel habits, or black or bloody stools Musculoskeletal ROS: negative Lines: peripheral IV 
 
CHEST XRAY:   
 
 
 
Abd CT:   
1. No specific CT evidence of abdominopelvic metastatic disease.  
2.  Cholelithiasis and sigmoid diverticulosis without evidence of cholecystitis, 
diverticulitis, or other acute abdominopelvic abnormality identified. LAB Recent Labs  
  02/25/20 
0346 02/24/20 
0415 02/23/20 
1607 WBC 9.4 7.5 8.8 HGB 16.5 16.2 18.1* HCT 48.9 47.1 52.7*  
 163 179 Recent Labs  
  02/25/20 
0346 02/24/20 
0415 02/23/20 
1607  143 138 K 4.0 3.8 3.7 * 112* 103 CO2 26 26 26 GLU 95 101* 91  
BUN 9 10 11 CREA 0.93 0.85 0.99  
ALB  --   --  4.3 SGOT  --   --  28 Assessment:  (Medical Decision Making) Hospital Problems  Date Reviewed: 2/24/2020 Codes Class Noted POA Hypoxia ICD-10-CM: R09.02 
ICD-9-CM: 799.02  2/24/2020 Yes Noted polycythemia - ? chronic Acute respiratory failure with hypoxia [facility]) ICD-10-CM: J96.01 
ICD-9-CM: 518.81  2/24/2020 Yes Currently using 4 liters * (Principal) Hemoptysis ICD-10-CM: R04.2 ICD-9-CM: 786.30  2/24/2020 Yes Persistent and suspect related to below Mass of lower lobe of right lung ICD-10-CM: R91.8 ICD-9-CM: 786.6  2/23/2020 Yes Plans for bronch tomorrow Right lower lobe lung mass ICD-10-CM: R91.8 ICD-9-CM: 786.6  2/23/2020 Yes As above Essential hypertension with goal blood pressure less than 130/80 (Chronic) ICD-10-CM: I10 
ICD-9-CM: 401.9  2/19/2018 Yes  
 controlled Plan:  (Medical Decision Making) 1. Bronch scheduled for bronch tomorrow at 2 pm. CT abdomen negative. ? ablation/stent to RLL 2. Oncology has seen - plans for MRI later. Awaiting biopsy results 3. Hycodan for cough suppression 4. Assess oxygen needs at discharge - may need at home Nic Perry NP More than 50% of time documented was spent face-to-face contact with the patient and in the care of the patient on the floor/unit where the patient is located. Lungs:  Slightly decreased on R Heart:  RRR with no Murmur/Rubs/Gallops Additional Comments:  Pt to have bronch with EBUS Bx and ? Intervention tomorrow. f 
I have spoken with and examined the patient. I agree with the above assessment and plan as documented.  
 
Melanie Fraga MD

## 2020-02-25 NOTE — PROGRESS NOTES
END OF SHIFT NOTE: 
 
Intake/Output No intake/output data recorded. Voiding: YES Catheter: NO 
Drain:   
 
 
 
 
 
Stool:  0 occurrences. Emesis:  0 occurrences. VITAL SIGNS Patient Vitals for the past 12 hrs: 
 Temp Pulse Resp BP SpO2  
02/25/20 1526 98.8 °F (37.1 °C) 89 18 120/81 94 % 02/25/20 1127 98.4 °F (36.9 °C) 82 18 (!) 130/91 92 % 02/25/20 0758 98.2 °F (36.8 °C) 87 18 (!) 126/93 91 % Pain Assessment Pain 1 Pain Scale 1: Numeric (0 - 10) (02/25/20 0830) Pain Intensity 1: 0 (02/25/20 0830) Patient Stated Pain Goal: 0 (02/25/20 0830) Pain Reassessment 1: Yes (02/25/20 0830) Ambulating Yes Additional Information: NPO at 5AM  
 
Shift report given to oncoming nurse at the bedside.  
 
Mat Lyons RN

## 2020-02-25 NOTE — PROGRESS NOTES
Report given to [facility] AND [facility] - [facility]. Pt resting in bed, no complaints voiced.

## 2020-02-26 PROBLEM — C34.31 MALIGNANT NEOPLASM OF LOWER LOBE OF RIGHT LUNG (HCC): Status: ACTIVE | Noted: 2020-02-26

## 2020-02-26 PROCEDURE — 74011250636 HC RX REV CODE- 250/636: Performed by: INTERNAL MEDICINE

## 2020-02-26 PROCEDURE — 88305 TISSUE EXAM BY PATHOLOGIST: CPT

## 2020-02-26 PROCEDURE — 99152 MOD SED SAME PHYS/QHP 5/>YRS: CPT | Performed by: INTERNAL MEDICINE

## 2020-02-26 PROCEDURE — 77030003406 HC NDL ASPIR BIOP OCOA -C: Performed by: INTERNAL MEDICINE

## 2020-02-26 PROCEDURE — 31625 BRONCHOSCOPY W/BIOPSY(S): CPT | Performed by: INTERNAL MEDICINE

## 2020-02-26 PROCEDURE — 99153 MOD SED SAME PHYS/QHP EA: CPT | Performed by: INTERNAL MEDICINE

## 2020-02-26 PROCEDURE — 76040000026: Performed by: INTERNAL MEDICINE

## 2020-02-26 PROCEDURE — 88177 CYTP FNA EVAL EA ADDL: CPT

## 2020-02-26 PROCEDURE — 88173 CYTOPATH EVAL FNA REPORT: CPT

## 2020-02-26 PROCEDURE — 74011000250 HC RX REV CODE- 250: Performed by: INTERNAL MEDICINE

## 2020-02-26 PROCEDURE — 74011250637 HC RX REV CODE- 250/637: Performed by: FAMILY MEDICINE

## 2020-02-26 PROCEDURE — 77030021593 HC FCPS BIOP ENDOSC BSC -A: Performed by: INTERNAL MEDICINE

## 2020-02-26 PROCEDURE — 65270000029 HC RM PRIVATE

## 2020-02-26 PROCEDURE — 31652 BRONCH EBUS SAMPLNG 1/2 NODE: CPT | Performed by: INTERNAL MEDICINE

## 2020-02-26 PROCEDURE — 88172 CYTP DX EVAL FNA 1ST EA SITE: CPT

## 2020-02-26 PROCEDURE — 07D78ZX EXTRACTION OF THORAX LYMPHATIC, VIA NATURAL OR ARTIFICIAL OPENING ENDOSCOPIC, DIAGNOSTIC: ICD-10-PCS | Performed by: INTERNAL MEDICINE

## 2020-02-26 PROCEDURE — 74011250637 HC RX REV CODE- 250/637: Performed by: INTERNAL MEDICINE

## 2020-02-26 PROCEDURE — 88342 IMHCHEM/IMCYTCHM 1ST ANTB: CPT

## 2020-02-26 PROCEDURE — 88341 IMHCHEM/IMCYTCHM EA ADD ANTB: CPT

## 2020-02-26 PROCEDURE — 99232 SBSQ HOSP IP/OBS MODERATE 35: CPT | Performed by: INTERNAL MEDICINE

## 2020-02-26 PROCEDURE — 0BB38ZX EXCISION OF RIGHT MAIN BRONCHUS, VIA NATURAL OR ARTIFICIAL OPENING ENDOSCOPIC, DIAGNOSTIC: ICD-10-PCS | Performed by: INTERNAL MEDICINE

## 2020-02-26 PROCEDURE — 77030009046 HC CATH BRNCH BLLN OCOA -B: Performed by: INTERNAL MEDICINE

## 2020-02-26 PROCEDURE — 77030012699 HC VLV SUC CNTRL OCOA -A: Performed by: INTERNAL MEDICINE

## 2020-02-26 RX ORDER — LIDOCAINE HYDROCHLORIDE 20 MG/ML
JELLY TOPICAL ONCE
Status: COMPLETED | OUTPATIENT
Start: 2020-02-26 | End: 2020-02-26

## 2020-02-26 RX ORDER — MIDAZOLAM HYDROCHLORIDE 1 MG/ML
.25-5 INJECTION, SOLUTION INTRAMUSCULAR; INTRAVENOUS
Status: DISCONTINUED | OUTPATIENT
Start: 2020-02-26 | End: 2020-02-26 | Stop reason: HOSPADM

## 2020-02-26 RX ORDER — LIDOCAINE HYDROCHLORIDE 40 MG/ML
SOLUTION TOPICAL ONCE
Status: COMPLETED | OUTPATIENT
Start: 2020-02-26 | End: 2020-02-26

## 2020-02-26 RX ORDER — FENTANYL CITRATE 50 UG/ML
25-100 INJECTION, SOLUTION INTRAMUSCULAR; INTRAVENOUS
Status: DISCONTINUED | OUTPATIENT
Start: 2020-02-26 | End: 2020-02-26 | Stop reason: HOSPADM

## 2020-02-26 RX ORDER — SODIUM CHLORIDE 9 MG/ML
1000 INJECTION, SOLUTION INTRAVENOUS CONTINUOUS
Status: DISCONTINUED | OUTPATIENT
Start: 2020-02-26 | End: 2020-02-26 | Stop reason: HOSPADM

## 2020-02-26 RX ADMIN — Medication 1 AMPULE: at 07:28

## 2020-02-26 RX ADMIN — FENTANYL CITRATE 50 MCG: 50 INJECTION, SOLUTION INTRAMUSCULAR; INTRAVENOUS at 15:00

## 2020-02-26 RX ADMIN — Medication 10 ML: at 05:07

## 2020-02-26 RX ADMIN — SODIUM CHLORIDE 1000 ML: 900 INJECTION, SOLUTION INTRAVENOUS at 14:01

## 2020-02-26 RX ADMIN — Medication 10 ML: at 21:09

## 2020-02-26 RX ADMIN — FENTANYL CITRATE 50 MCG: 50 INJECTION, SOLUTION INTRAMUSCULAR; INTRAVENOUS at 15:11

## 2020-02-26 RX ADMIN — FENTANYL CITRATE 50 MCG: 50 INJECTION, SOLUTION INTRAMUSCULAR; INTRAVENOUS at 14:50

## 2020-02-26 RX ADMIN — LIDOCAINE HYDROCHLORIDE: 20 JELLY TOPICAL at 15:00

## 2020-02-26 RX ADMIN — MIDAZOLAM 2 MG: 1 INJECTION INTRAMUSCULAR; INTRAVENOUS at 14:50

## 2020-02-26 RX ADMIN — CHLORTHALIDONE 25 MG: 25 TABLET ORAL at 07:29

## 2020-02-26 RX ADMIN — METOPROLOL SUCCINATE 50 MG: 25 TABLET, FILM COATED, EXTENDED RELEASE ORAL at 07:29

## 2020-02-26 RX ADMIN — HYDROCODONE BITARTRATE AND HOMATROPINE METHYLBROMIDE 5 ML: 5; 1.5 SOLUTION ORAL at 09:24

## 2020-02-26 RX ADMIN — FENTANYL CITRATE 50 MCG: 50 INJECTION, SOLUTION INTRAMUSCULAR; INTRAVENOUS at 15:06

## 2020-02-26 RX ADMIN — BISACODYL 5 MG: 5 TABLET, COATED ORAL at 07:38

## 2020-02-26 RX ADMIN — LIDOCAINE HYDROCHLORIDE: 40 SOLUTION TOPICAL at 15:00

## 2020-02-26 RX ADMIN — SENNOSIDES AND DOCUSATE SODIUM 1 TABLET: 8.6; 5 TABLET ORAL at 21:09

## 2020-02-26 RX ADMIN — Medication 1 AMPULE: at 21:09

## 2020-02-26 RX ADMIN — Medication 10 ML: at 07:31

## 2020-02-26 NOTE — PROGRESS NOTES
Awaiting transport, vss, o2 at 4-5l via nc for o2 sat >/= 90%. Pt is alert and oriented x4, pt is [place], his right ear is best for hearing

## 2020-02-26 NOTE — PROGRESS NOTES
Admission for right lower lung mass Chart screened by  for discharge planning. No needs identified at this time. Please consult  if any new issues arise Case management will continue to follow. Care Management Interventions PCP Verified by CM: Yes Mode of Transport at Discharge: Self Transition of Care Consult (CM Consult): Discharge Planning Physical Therapy Consult: No 
Occupational Therapy Consult: No 
Speech Therapy Consult: No 
Current Support Network: Own Home, Family Lives Nearby(girlfriend lives with pt) Confirm Follow Up Transport: Family The Patient and/or Patient Representative was Provided with a Choice of Provider and Agrees with the Discharge Plan?: Yes Freedom of Choice List was Provided with Basic Dialogue that Supports the Patient's Individualized Plan of Care/Goals, Treatment Preferences and Shares the Quality Data Associated with the Providers?: Yes [employer] Information Provided?: No 
Discharge Location Discharge Placement: Unable to determine at this time

## 2020-02-26 NOTE — PROGRESS NOTES
Hospitalist Progress Note Admit Date:  2020  9:06 PM  
Name:  Margareth Gil. Age:  81 y.o. 
:  1950 MRN:  [id number] PCP:  Jennifer Law MD 
Treatment Team: Attending Provider: Valeta Rinne, MD; Consulting Provider: Robb Lujan MD; Consulting Provider: Gustabo Blackwell MD; Care Manager: Ovidio Lamas RN Subjective:  
CC:  Coughing up blood HPI: 
 
  
Patient is a 81 y.o. male who presents to the ER due to coughing up blood. He reports that this started today. He reports he has had an ongoing cough, shortness of breath, and intermittent chest pain since last November, when he had pneumonia. He just felt that it was taking a long time to get back to normal, and then he can get used to it. He was a previous smoker and states he quit 6 years ago. He denies fever/chills, nausea/vomiting/diarrhea, abdominal pain, weight loss, or pain/swelling in legs. In triage, he was noted to be hypoxic on room air, and ultimately placed on 4 L of oxygen with improvement to an O2 saturation of 92%. ER work-up included a CT scan of his chest to rule out a pulmonary embolus. He does not have a PE however a mass was found in his right lower lobe causing collapse of that lobe. Findings overall highly suggestive of malignancy. CT shows:IMPRESSION:  1.  No evidence for pulmonary. 2. Large central mass in the right lower lobe measuring 6.7 cm x 5.9 cm in greatest dimension. This obstructs the right lower lobe airway with complete collapse of the right lower lobe. The appearance is highly concerning for malignancy. Further evaluation as clinically indicated is recommended. 3. Right hilar adenopathy concerning for metastatic disease. 4.  Abnormal attenuation within right middle lobe airways best appreciated on axial image 70 although the appearance is more consistent with fluid within the right middle lobe airways which could represent blood given the history of hemoptysis. I am asked to admit for transfer downtown for subsequent pulmonology and oncology work-up. 2/24:  Pt s/o mild shortness of breath. Occ hemoptysis. Awaiting pulm med eval.  Heme onc eval pt this am.  CT AP ordered for further staging. Pt denies CP. No N/V/D. No F/C. 
 
2/26: States his hemoptysis has slowed down. No chest pain. Shortness of breath improved. Awaiting bronchoscopy Other than HPI, a 10 pt ROS is negative Objective:  
 
Patient Vitals for the past 24 hrs: 
 Temp Pulse Resp BP SpO2  
02/26/20 0720 98.8 °F (37.1 °C) 95 19 (!) 131/94 91 % 02/26/20 0346 98.5 °F (36.9 °C) 94 16 105/80 92 % 02/25/20 2341 98.1 °F (36.7 °C) 84 17 115/83 94 % 02/25/20 1914 98.2 °F (36.8 °C) 85 17 113/80 92 % 02/25/20 1526 98.8 °F (37.1 °C) 89 18 120/81 94 % 02/25/20 1127 98.4 °F (36.9 °C) 82 18 (!) 130/91 92 % Oxygen Therapy O2 Sat (%): 91 %(Increased to 5L NC, [facility] notified) (02/26/20 0720) O2 Device: Nasal cannula (02/26/20 0720) O2 Flow Rate (L/min): 5 l/min (02/26/20 0720) Intake/Output Summary (Last 24 hours) at 2/26/2020 9779 Last data filed at 2/26/2020 8862 Gross per 24 hour Intake 120 ml Output  Net 120 ml General:    Well nourished. Alert. NAD. Pleasant. Nontoxic appearance Skin   Warm and dry. No rash. OP:  Moist 
CV:   RRR. No murmur, rub, or gallop. Lungs:   CTAB. No wheezing, rhonchi, or rales. Dimin  bs bases Abdomen:   Soft, nontender, nondistended. Extremities: Warm and dry. No cyanosis or edema. Neuro:   Nonfocal  
 
Data Review: 
I have reviewed all labs, meds, telemetry events, and studies from the last 24 hours. No results found for this or any previous visit (from the past 24 hour(s)). All Micro Results None Current Meds: 
Current Facility-Administered Medications Medication Dose Route Frequency  HYDROcodone-homatropine (HYCODAN) 5-1.5 mg/5 mL (5 mL) syrup 5 mL  5 mL Oral Q4HWA  senna-docusate (PERICOLACE) 8.6-50 mg per tablet 1 Tab  1 Tab Oral QHS  chlorthalidone (HYGROTEN) tablet 25 mg  25 mg Oral DAILY  metoprolol succinate (TOPROL-XL) XL tablet 50 mg  50 mg Oral DAILY  sodium chloride (NS) flush 5-40 mL  5-40 mL IntraVENous Q8H  
 sodium chloride (NS) flush 5-40 mL  5-40 mL IntraVENous PRN  
 acetaminophen (TYLENOL) tablet 650 mg  650 mg Oral Q4H PRN  
 HYDROcodone-acetaminophen (NORCO)  mg tablet 1 Tab  1 Tab Oral Q4H PRN  
 naloxone (NARCAN) injection 0.4 mg  0.4 mg IntraVENous PRN  
 diphenhydrAMINE (BENADRYL) capsule 25 mg  25 mg Oral Q6H PRN  
 ondansetron (ZOFRAN) injection 4 mg  4 mg IntraVENous Q4H PRN  
 bisacodyL (DULCOLAX) tablet 5 mg  5 mg Oral DAILY PRN  
 LORazepam (ATIVAN) tablet 1 mg  1 mg Oral BID PRN  
 influenza vaccine 2019-20 (6 mos+)(PF) (FLUARIX/FLULAVAL/FLUZONE QUAD) injection 0.5 mL  0.5 mL IntraMUSCular PRIOR TO DISCHARGE  alcohol 62% (NOZIN) nasal  1 Ampule  1 Ampule Topical Q12H Other Studies (last 24 hours): No results found. Assessment and Plan:  
 
Hospital Problems as of 2/26/2020 Date Reviewed: 2/24/2020 Codes Class Noted - Resolved POA Hypoxia ICD-10-CM: R09.02 
ICD-9-CM: 799.02  2/24/2020 - Present Yes Acute respiratory failure with hypoxia [facility]) ICD-10-CM: J96.01 
ICD-9-CM: 518.81  2/24/2020 - Present Yes * (Principal) Hemoptysis ICD-10-CM: R04.2 ICD-9-CM: 786.30  2/24/2020 - Present Yes Mass of lower lobe of right lung ICD-10-CM: R91.8 ICD-9-CM: 786.6  2/23/2020 - Present Yes Right lower lobe lung mass ICD-10-CM: R91.8 ICD-9-CM: 786.6  2/23/2020 - Present Yes Essential hypertension with goal blood pressure less than 130/80 (Chronic) ICD-10-CM: I10 
ICD-9-CM: 401.9  2/19/2018 - Present Yes PLAN:   
 
Hemopytsis 
-likely due to lung CA 
-Pulm and oncology consulted 2/26:  Bronchoscopy planned this afternoon Lung Mass 
-as above Acute Hypoxemic Resp Failure 
-in part related to lung mass/cancer, may have underlying COPD as well 2/26:  supplem O2 as neeed, incentive spirom HTN 
-home rx, monitor DVT Prophy 
-SCD's due to hemopys Signed: 
Oliva Mojica MD

## 2020-02-26 NOTE — PROGRESS NOTES
TRANSFER - IN REPORT: 
 
Verbal report received from [place], [place] on [address] [place].  being received from [phone] for ordered procedure Report consisted of patients Situation, Background, Assessment and  
Recommendations(SBAR). Information from the following report(s) SBAR, Kardex, Intake/Output, MAR, Recent Results and Procedure Verification was reviewed with the receiving nurse. Opportunity for questions and clarification was provided.

## 2020-02-26 NOTE — PROGRESS NOTES
Aldair Florentino. Admission Date: 2/23/2020 Daily Progress Note: 2/26/2020 The patient's chart is reviewed and the patient is discussed with the staff. 81 y.o.  male seen and evaluated at the request of Dr. Basilio Carpio for new RLL mass, hypoxia and hemoptysis. He presented with hemoptysis and congestion intermittently for the last 3 months. Was treated for pneumonia in November and quit smoking 6 years ago after a 35pkyr smoking history. He is currently requiring 4lpm of oxygen to maintain O2 sat of 90-91%. Chest CT was performed and ruled out PE but noted RLL mass suggestive of malignancy. Oncology was consulted and CEA elevated 5.0. Subjective:  
 Seen prior to bronchoscopy very little hemoptysis today. Current Facility-Administered Medications Medication Dose Route Frequency  HYDROcodone-homatropine (HYCODAN) 5-1.5 mg/5 mL (5 mL) syrup 5 mL  5 mL Oral Q4HWA  senna-docusate (PERICOLACE) 8.6-50 mg per tablet 1 Tab  1 Tab Oral QHS  chlorthalidone (HYGROTEN) tablet 25 mg  25 mg Oral DAILY  metoprolol succinate (TOPROL-XL) XL tablet 50 mg  50 mg Oral DAILY  sodium chloride (NS) flush 5-40 mL  5-40 mL IntraVENous Q8H  
 sodium chloride (NS) flush 5-40 mL  5-40 mL IntraVENous PRN  
 acetaminophen (TYLENOL) tablet 650 mg  650 mg Oral Q4H PRN  
 HYDROcodone-acetaminophen (NORCO)  mg tablet 1 Tab  1 Tab Oral Q4H PRN  
 naloxone (NARCAN) injection 0.4 mg  0.4 mg IntraVENous PRN  
 diphenhydrAMINE (BENADRYL) capsule 25 mg  25 mg Oral Q6H PRN  
 ondansetron (ZOFRAN) injection 4 mg  4 mg IntraVENous Q4H PRN  
 bisacodyL (DULCOLAX) tablet 5 mg  5 mg Oral DAILY PRN  
 LORazepam (ATIVAN) tablet 1 mg  1 mg Oral BID PRN  
 influenza vaccine 2019-20 (6 mos+)(PF) (FLUARIX/FLULAVAL/FLUZONE QUAD) injection 0.5 mL  0.5 mL IntraMUSCular PRIOR TO DISCHARGE  alcohol 62% (NOZIN) nasal  1 Ampule  1 Ampule Topical Q12H Objective: Vitals:  
 02/25/20 3460 02/26/20 9836 02/26/20 0720 02/26/20 1238 BP: 115/83 105/80 (!) 131/94 113/84 Pulse: 84 94 95 (!) 101 Resp: 17 16 19 16 Temp: 98.1 °F (36.7 °C) 98.5 °F (36.9 °C) 98.8 °F (37.1 °C) 98.4 °F (36.9 °C) SpO2: 94% 92% 91% 93% Weight:      
 
Intake and Output:  
02/24 1901 - 02/26 0700 In: 180 [P.O.:180] Out: -  
02/26 9734 - 02/26 1900 In: 20 [I.V.:20] Out: - Physical Exam:  
Constitutional:  the patient is well developed and in no acute distress HEENT:  Sclera clear, pupils equal, oral mucosa moist 
Lungs: clear bilaterally. Wearing 4 liter cannula. Cardiovascular:  RRR without M,G,R 
Abd/GI: soft and non-tender; with positive bowel sounds. Ext: warm without cyanosis. There is no lower leg edema. Musculoskeletal: moves all four extremities with equal strength Skin:  no jaundice or rashes, no wounds Neuro: no gross neuro deficits Musculoskeletal: can ambulate. No deformity Psychiatric: Calm. Review of Systems - Review of Systems - General ROS: positive for - none Respiratory ROS: positive for - hemoptysis, shortness of breath and wheezing Cardiovascular ROS: positive for - none Gastrointestinal ROS: no abdominal pain, change in bowel habits, or black or bloody stools Musculoskeletal ROS: negative Lines: peripheral IV 
 
CHEST XRAY:   
 
 
 
Abd CT:   
1. No specific CT evidence of abdominopelvic metastatic disease.  
2.  Cholelithiasis and sigmoid diverticulosis without evidence of cholecystitis, 
diverticulitis, or other acute abdominopelvic abnormality identified. LAB Recent Labs  
  02/25/20 
0346 02/24/20 
0415 02/23/20 
1607 WBC 9.4 7.5 8.8 HGB 16.5 16.2 18.1* HCT 48.9 47.1 52.7*  
 163 179 Recent Labs  
  02/25/20 
0346 02/24/20 
0415 02/23/20 
1607  143 138  
K 4.0 3.8 3.7 * 112* 103 CO2 26 26 26 GLU 95 101* 91  
BUN 9 10 11 CREA 0.93 0.85 0.99  
ALB  --   --  4.3 SGOT  --   --  28  
 
 
 Assessment:  (Medical Decision Making) Hospital Problems  Date Reviewed: 2/24/2020 Codes Class Noted POA Hypoxia ICD-10-CM: R09.02 
ICD-9-CM: 799.02  2/24/2020 Yes Noted polycythemia - ? chronic Acute respiratory failure with hypoxia [facility]) ICD-10-CM: J96.01 
ICD-9-CM: 518.81  2/24/2020 Yes Currently using 4 liters * (Principal) Hemoptysis ICD-10-CM: R04.2 ICD-9-CM: 786.30  2/24/2020 Yes Persistent and suspect related to below Mass of lower lobe of right lung ICD-10-CM: R91.8 ICD-9-CM: 786.6  2/23/2020 Yes Plans for bronch tomorrow Right lower lobe lung mass ICD-10-CM: R91.8 ICD-9-CM: 786.6  2/23/2020 Yes As above Essential hypertension with goal blood pressure less than 130/80 (Chronic) ICD-10-CM: I10 
ICD-9-CM: 401.9  2/19/2018 Yes  
 controlled Plan:  (Medical Decision Making) 1. Proceed with  Bronch as scheduled 2. Oncology has seen - plans for MRI later. Awaiting biopsy results 3. Hycodan for cough suppression 4. Assess oxygen needs at discharge - may need at home Edwina Dunn MD 
 
More than 50% of time documented was spent face-to-face contact with the patient and in the care of the patient on the floor/unit where the patient is located.

## 2020-02-26 NOTE — PROCEDURES
PROCEDURE Bronchoscopy with Endobronchial Ultrasound Guided Fine Needle Aspiration Of Medistinal Lymph nodes and airway inspection. INDICATION Diagnosis of Mediastinal Lymphadenopathy/Staging of Lung Cancer POST OP DIAGNOSIS: 
Stations 7 and 11Rs  were biopsied and negative for malignancy on ITZEL. Based on CT chestand EBUS pt is a STAGE [T3,N0,Mx] as of now stage IIB NSC lung Cancer. EBBX from [facility] tumor was performed x4, ROS evaluation of first specimen was positive for non small cell cancer on ITZEL. ANESTHESIA Concious sedation with: Fentanyl 200 mcg IV; Versed  2 mg IV; Lidocaine 180 mg to tracheo-bronchial tree and vocal cords. AIRWAY INSPECTION After obtaining informed consent, using a bite block, an Olympus T 180 viedeo bronchoscope was  introduced into the trachea through the vocal cords without complications. RIGHT 
LOCATION NORM/ABNORMAL DESCRIPTION  
VOCAL CORDS NL   
TRACHEA NL   
MARISSA NL   
RMSB NL   
RUL NL   
BI ABNL Completely occluded by tumor. airways below this level could neither be seen or probed RML    
SUP SEGM RLL    
MED BASAL ANTERIOR BASAL    
LATERAL BASAL POSTERIOR BASAL    
      
      
       
 
 
      
 
 
      LEFT 
LOCATION NORMAL/ABNORMAL TYPE  
LMSB NL   
HOLA NL   
LINGULA NL   
SUPERIOR DIVISION NL   
SUPERIOR SEG LLL NL   
MAAME-MEDIAL LLL NL   
LATERAL LLL NL   
POSTERIOR LLL NL   
 
 
      
 
 
 
 
 
EBUS After completing the airway inspection an Olympus  F EBUS bronchoscope was introduced into the trachea through the vocal chords without complication. The balloon was inflated with saline and a mediastinal inspection commenced: STATION SIZE IN CM  
11R sup 1.2/0.9  
11R inf tumor  
10R No tgt  
7 1.2/1.0  
4R 0.2/0.3  
11L No tgt  
10L No tgt  
4L 0.2/0.2 2L No tgt 2R No tgt After identifying targets the following samples were obtained: STATION PASS# LYMPHOCYTES MALIGNANT ATYPICAL GRANULOMA NONDGNSTC  
7 1 - - - - blood 2 + - - -   
 3 + - - -   
        
 1 - - - - blood 2 - - - - \"  
 3 - - - - \"  
 4 + - - - Touch prep BI tumor forceps Bx 1 - ++++ Non small cell cancer - -   
 
© 2018 UpToDate, Inc. and/or its affiliates. All Rights Reserved. T, Jeff Mckeon and BEST descriptors for the eighth edition of TNM classification for lung cancer T: Primary tumor Tx Primary tumor cannot be assessed or tumor proven by presence of malignant cells in sputum or bronchial washings but not visualized by imaging or bronchoscopy T0 No evidence of primary tumor Tis Carcinoma in situ T1 Tumor ? 3 cm in greatest dimension surrounded by lung or visceral pleura without bronchoscopic evidence of invasion more proximal than the lobar bronchus (ie, not in the main bronchus)*  
T1a(mi) Minimally invasive adenocarcinoma¶  
T1a Tumor ? 1 cm in greatest dimension*  
T1b Tumor >1 cm but ?2 cm in greatest dimension*  
T1c Tumor >2 cm but ?3 cm in greatest dimension*  
T2 Tumor >3 cm but ?5 cm or tumor with any of the following features:? 
· Involves main bronchus regardless of distance from the niharika but without involvement of the niharika · Invades visceral pleura · Associated with atelectasis or obstructive pneumonitis that extends to the hilar region, involving part or all of the lung  
T2a Tumor >3 cm but ?4 cm in greatest dimension T2b Tumor >4 cm but ?5 cm in greatest dimension T3 Tumor >5 cm but ?7 cm in greatest dimension or associated with separate tumor nodule(s) in the same lobe as the primary tumor or directly invades any of the following structures: chest wall (including the parietal pleura and superior sulcus tumors), phrenic nerve, parietal pericardium T4 Tumor >7 cm in greatest dimension or associated with separate tumor nodule(s) in a different ipsilateral lobe than that of the primary tumor or invades any of the following structures: diaphragm, mediastinum, heart, great vessels, trachea, recurrent laryngeal nerve, esophagus, vertebral body, and niharika N: Regional lymph node involvement Nx Regional lymph nodes cannot be assessed N0 No regional lymph node metastasis N1 Metastasis in ipsilateral peribronchial and/or ipsilateral hilar lymph nodes and intrapulmonary nodes, including involvement by direct extension N2 Metastasis in ipsilateral mediastinal and/or subcarinal lymph node(s) N3 Metastasis in contralateral mediastinal, contralateral hilar, ipsilateral or contralateral scalene, or supraclavicular lymph node(s) M: Distant metastasis M0 No distant metastasis M1 Distant metastasis present M1a Separate tumor nodule(s) in a contralateral lobe; tumor with pleural or pericardial nodule(s) or malignant pleural or pericardial effusion? M1b Single extrathoracic metastasis§ M1c Multiple extrathoracic metastases in one or more organs Stage groupings Occult carcinoma TX N0 M0 Stage 0 Tis N0 M0 Stage IA1 T1a(mi) N0 M0  
 T1a N0 M0 Stage IA2 T1b N0 M0 Stage IA3 T1c N0 M0 Stage IB T2a N0 M0 Stage IIA T2b N0 M0 Stage IIB T1a to c N1 M0  
 T2a N1 M0  
 T2b N1 M0  
 T3 N0 M0 Stage IIIA T1a to c N2 M0  
 T2a to b N2 M0  
 T3 N1 M0  
 T4 N0 M0  
 T4 N1 M0 Stage IIIB T1a to c N3 M0  
 T2a to b N3 M0  
 T3 N2 M0  
 T4 N2 M0 Stage IIIC T3 N3 M0  
 T4 N3 M0 Stage SHAKIRA Any T Any N M1a Any T Any N M1b Stage IVB Any T Any N M1c  
NOTE: Changes to the seventh edition are in bold. TNM: tumor, node, metastasis; Tis: carcinoma in situ; T1a(mi): minimally invasive adenocarcinoma. * The uncommon superficial spreading tumor of any size with its invasive component limited to the bronchial wall, which may extend proximal to the main bronchus, is also classified as T1a. ¶ Solitary adenocarcinoma, ?3 cm with a predominately lepidic pattern and ?5 mm invasion in any one focus. ? T2 tumors with these features are classified as T2a if ?4 cm in greatest dimension or if size cannot be determined, and T2b if >4 cm but ?5 cm in greatest dimension. ? Most pleural (pericardial) effusions with lung cancer are due to tumor. In a few patients, however, multiple microscopic examinations of pleural (pericardial) fluid are negative for tumor and the fluid is nonbloody and not an exudate. When these elements and clinical judgment dictate that the effusion is not related to the tumor, the effusion should be excluded as a staging descriptor. § This includes involvement of a single distant (nonregional) lymph node. Reproduced from: Elizabeth Cota et al. The IASLC Lung Cancer Staging Project: Proposals for Revision of the TNM Stage Groupings in the Forthcoming (Eighth) Edition of the TNM Classification for Lung Cancer. J Thorac Oncol 2016; 11:39. Table used with the permission of [employer]. All rights reserved. Graphic [id number] Version 1.0 After completing the EBUS an Olympus T 180 bronchoscope was re introduced into the airways to biopsy the identified BI tumor on earlier airway inspection. The following was obtained: EBBX: BI tumor x 4- sent for histo, touch prep from first biopsy positive for non small cell cancer on ITZEL. The procedure was completed without complication and the patient tolerated the procedure well.  
 
EBL: 15 ml 
 
Julián Miranda MD.

## 2020-02-26 NOTE — PROGRESS NOTES
TRANSFER - OUT REPORT: 
 
Verbal report given to Keya RN(name) on Samantha Erwin.  being transferred to Endo(unit) for ordered procedure Report consisted of patients Situation, Background, Assessment and  
Recommendations(SBAR). Information from the following report(s) SBAR, Kardex, Intake/Output, MAR, Recent Results and Procedure Verification was reviewed with the receiving nurse. Lines:  
Peripheral IV 02/24/20 Right Hand (Active) Site Assessment Clean, dry, & intact 2/26/2020  8:00 AM  
Phlebitis Assessment 0 2/26/2020  8:00 AM  
Infiltration Assessment 0 2/26/2020  8:00 AM  
Dressing Status Clean, dry, & intact 2/26/2020  8:00 AM  
Dressing Type Tape;Transparent 2/26/2020  8:00 AM  
Hub Color/Line Status Pink;Flushed;Patent 2/26/2020  8:00 AM  
Alcohol Cap Used No 2/26/2020  8:00 AM  
  
 
Opportunity for questions and clarification was provided. Patient transported with: 
 [employer]

## 2020-02-26 NOTE — INTERVAL H&P NOTE
H&P Update: 
Teo Martinez. was seen and examined. History and physical has been reviewed. The patient has been examined.  There have been no significant clinical changes since the completion of the originally dated History and Physical.

## 2020-02-27 PROCEDURE — 74011250637 HC RX REV CODE- 250/637: Performed by: INTERNAL MEDICINE

## 2020-02-27 PROCEDURE — 74011250637 HC RX REV CODE- 250/637: Performed by: FAMILY MEDICINE

## 2020-02-27 PROCEDURE — 99232 SBSQ HOSP IP/OBS MODERATE 35: CPT | Performed by: INTERNAL MEDICINE

## 2020-02-27 PROCEDURE — 99233 SBSQ HOSP IP/OBS HIGH 50: CPT | Performed by: INTERNAL MEDICINE

## 2020-02-27 PROCEDURE — 65270000029 HC RM PRIVATE

## 2020-02-27 RX ADMIN — Medication 10 ML: at 05:19

## 2020-02-27 RX ADMIN — Medication 1 AMPULE: at 09:12

## 2020-02-27 RX ADMIN — METOPROLOL SUCCINATE 50 MG: 25 TABLET, FILM COATED, EXTENDED RELEASE ORAL at 09:12

## 2020-02-27 RX ADMIN — Medication 10 ML: at 21:48

## 2020-02-27 RX ADMIN — CHLORTHALIDONE 25 MG: 25 TABLET ORAL at 09:12

## 2020-02-27 RX ADMIN — Medication 1 AMPULE: at 21:47

## 2020-02-27 RX ADMIN — Medication 10 ML: at 14:06

## 2020-02-27 RX ADMIN — SENNOSIDES AND DOCUSATE SODIUM 1 TABLET: 8.6; 5 TABLET ORAL at 21:47

## 2020-02-27 NOTE — CONSULTS
H&P/Consult Note/Progress Note/Office Note:  
Clarissa Glover MRN: [id number]  :1950  Age:69 y.o. 
 
HPI: Clarissa Glover is a 81 y.o. male who has PMHx of GERD, HTN, OA who we are asked by Oncology to see for RLL mass. Pt presented to the ED on 2020 with hemoptysis. He reports a h/o PNA, mild SOB, productive cough and intermittent hemoptysis since November. He had a CT chest in ED to r/o PE which demonstrated obstructing RLL lung mass. He had an EBUS yesterday with Dr. Grider that demonstrated RLL malignancy, favors NSCLC. Station 7 and 11R LNS reportedly negative. Possible stage IIB. CEA 5. He continues to have hemoptysis after EBUS. Dr. Shira Mendoza was consulted to evaluate for surgical candidacy. Pt has a 35 pack year history, he quit 6 years ago. He has a family h/o uterine cancer in his mother. He was not on O2 prior to this admission. His last colonoscopy was 15 years ago and he was found to have polyps but did not follow up. CT Chest 2020: 
IMPRESSION:   
1. No evidence for pulmonary.  
  
2. Large central mass in the right lower lobe measuring 6.7 cm x 5.9 cm in 
greatest dimension. This obstructs the right lower lobe airway with complete 
collapse of the right lower lobe. The appearance is highly concerning for 
malignancy. Further evaluation as clinically indicated is recommended. 
  
3. Right hilar adenopathy concerning for metastatic disease. 
  
4. Abnormal attenuation within right middle lobe airways best appreciated on 
axial image 70 although the appearance is more consistent with fluid within the 
right middle lobe airways which could represent blood given the history of 
hemoptysis.  
  
 
CTAP 2020: 
IMPRESSION: 
  
1. No specific CT evidence of abdominopelvic metastatic disease. 
  
2. Cholelithiasis and sigmoid diverticulosis without evidence of cholecystitis, 
diverticulitis, or other acute abdominopelvic abnormality identified. CXR 2020: IMPRESSION:  
1. Right basilar airspace changes which may represent atelectasis. However, an 
additional masslike density is seen at the right inferior hilum which is 
incompletely characterized. Given the patient's history of hemoptysis, further 
evaluation with a preferably contrasted CT scan of the chest is recommended. 
  
 
Past Medical History:  
Diagnosis Date  GERD (gastroesophageal reflux disease)   
 nexium prn  Hypertension  Malignant neoplasm of lower lobe of right lung ([address] 2/26/2020  Osteoarthritis  Status post total right knee replacement 2/29/2016 Past Surgical History:  
Procedure Laterality Date  HX KNEE ARTHROSCOPY Left   
 scope X 1, ACL recon X 1  
 HX KNEE ARTHROSCOPY Right 1974, 1975 X 2   
 HX KNEE REPLACEMENT    
 HX TONSILLECTOMY  1959  
24 [facility] SINUS SURGERY [address] [place] [address] \"nasal plasty\" Current Facility-Administered Medications Medication Dose Route Frequency  senna-docusate (PERICOLACE) 8.6-50 mg per tablet 1 Tab  1 Tab Oral QHS  chlorthalidone (HYGROTEN) tablet 25 mg  25 mg Oral DAILY  metoprolol succinate (TOPROL-XL) XL tablet 50 mg  50 mg Oral DAILY  sodium chloride (NS) flush 5-40 mL  5-40 mL IntraVENous Q8H  
 sodium chloride (NS) flush 5-40 mL  5-40 mL IntraVENous PRN  
 acetaminophen (TYLENOL) tablet 650 mg  650 mg Oral Q4H PRN  
 HYDROcodone-acetaminophen (NORCO)  mg tablet 1 Tab  1 Tab Oral Q4H PRN  
 naloxone (NARCAN) injection 0.4 mg  0.4 mg IntraVENous PRN  
 diphenhydrAMINE (BENADRYL) capsule 25 mg  25 mg Oral Q6H PRN  
 ondansetron (ZOFRAN) injection 4 mg  4 mg IntraVENous Q4H PRN  
 bisacodyL (DULCOLAX) tablet 5 mg  5 mg Oral DAILY PRN  
 LORazepam (ATIVAN) tablet 1 mg  1 mg Oral BID PRN  
 influenza vaccine 2019-20 (6 mos+)(PF) (FLUARIX/FLULAVAL/FLUZONE QUAD) injection 0.5 mL  0.5 mL IntraMUSCular PRIOR TO DISCHARGE  alcohol 62% (NOZIN) nasal  1 Ampule  1 Ampule Topical Q12H Celebrex [celecoxib] Social History Socioeconomic History  Marital status:  Spouse name: Not on file  Number of children: Not on file  Years of education: Not on file  Highest education level: Not on file Tobacco Use  Smoking status: Former Smoker Packs/day: 1.00 Years: 20.00 Pack years: 20.00  Smokeless tobacco: Never Used  Tobacco comment: quit 2014 // smoked off and on Substance and Sexual Activity  Alcohol use: Yes Alcohol/week: 2.0 standard drinks Types: 2 Glasses of wine per week  Drug use: No  
 Sexual activity: Yes  
  Partners: Female Other Topics Concern   Service No  
 Blood Transfusions No  
 Caffeine Concern No  
 Occupational Exposure No  
 Hobby Hazards No  
 Sleep Concern No  
 Stress Concern No  
 Weight Concern No  
 Special Diet No  
 Exercise Yes [address] [facility] Yes Social History Tobacco Use Smoking Status Former Smoker  Packs/day: 1.00  Years: 20.00  Pack years: 20.00 Smokeless Tobacco Never Used Tobacco Comment  
 quit 2014 // smoked off and on Family History Problem Relation Age of Onset  Cancer Mother   
     uterine  Arrhythmia Sister   
     afib ROS: The patient has no difficulty with chest pain. + shortness of breath. No fever or chills. Comprehensive review of systems was otherwise unremarkable except as noted above. Physical Exam:  
Visit Vitals /76 (BP 1 Location: Left arm, BP Patient Position: Head of bed elevated (Comment degrees)) Pulse (!) 103 Temp 97.5 °F (36.4 °C) Resp 18 Ht 5' 9\" (1.753 m) Wt 216 lb (98 kg) SpO2 91% BMI 31.90 kg/m² Constitutional: Alert, oriented, cooperative patient in no acute distress; appears stated age Eyes:Sclera are clear. EOMs intact ENMT: no external lesions gross hearing normal; no obvious neck masses, no ear or lip lesions, nares normal; + NC 
CV: RRR. Normal perfusion Resp: No JVD. Breathing is  non-labored; no audible wheezing. Decreased BS right lung base. GI: soft and non-distended Musculoskeletal: unremarkable with normal function. No embolic signs or cyanosis. Neuro:  Oriented; moves all 4; no focal deficits Psychiatric: normal affect and mood, no memory impairment Recent vitals (if inpt): 
Patient Vitals for the past 24 hrs: 
 BP Temp Pulse Resp SpO2  
02/27/20 1156 113/76 97.5 °F (36.4 °C) (!) 103 18 91 % 02/27/20 0839 118/84 98.6 °F (37 °C) 100 17 94 % 02/27/20 0240 113/81 97.8 °F (36.6 °C) 96 18 93 % 02/26/20 2310 115/75 98 °F (36.7 °C) (!) 110 18 96 % 02/26/20 1915 134/87 99.3 °F (37.4 °C) (!) 106 20 96 % 02/26/20 1559 105/84  92 18 90 % 02/26/20 1549 105/79  97 20 90 % 02/26/20 1540 114/81  93 18 92 % 02/26/20 1529 131/89  98 20 91 % 02/26/20 1520 128/87  (!) 101 16 95 % 02/26/20 1515 123/84  94 16 94 % 02/26/20 1510 108/77  85 16 97 % 02/26/20 1505 116/73  85 18 95 % 02/26/20 1500 100/71  88 18 94 % 02/26/20 1455 (!) 134/100  (!) 105 18 91 % 02/26/20 1450 (!) 154/92  (!) 105 18 91 % Labs: 
Recent Labs  
  02/25/20 
4312 WBC 9.4 HGB 16.5    
K 4.0  
* CO2 26 BUN 9  
CREA 0.93 GLU 95 Lab Results Component Value Date/Time WBC 9.4 02/25/2020 03:46 AM  
 HGB 16.5 02/25/2020 03:46 AM  
 PLATELET 905 08/31/0219 03:46 AM  
 Sodium 140 02/25/2020 03:46 AM  
 Potassium 4.0 02/25/2020 03:46 AM  
 Chloride 108 (H) 02/25/2020 03:46 AM  
 CO2 26 02/25/2020 03:46 AM  
 BUN 9 02/25/2020 03:46 AM  
 Creatinine 0.93 02/25/2020 03:46 AM  
 Glucose 95 02/25/2020 03:46 AM  
 INR 1.1 02/08/2016 09:15 AM  
 aPTT 28.0 02/08/2016 09:15 AM  
 Bilirubin, total 0.8 02/23/2020 04:07 PM  
 AST (SGOT) 28 02/23/2020 04:07 PM  
 ALT (SGPT) 19 02/23/2020 04:07 PM  
 Alk. phosphatase 98 02/23/2020 04:07 PM  
 
 
I reviewed recent labs and recent radiologic studies. I independently reviewed radiology images for studies I described above or studies I have ordered. Admission date (for inpatients): 2/23/2020  
1 Day Post-Op  Procedure(s): BRONCHOSCOPY 
ENDOSCOPIC BRONCHOSCOPY ULTRASOUND (EBUS) FINE NEEDLE ASPIRATION 
BRONCHOSCOPY WITH BIOPSY ASSESSMENT/PLAN: 
Problem List  Date Reviewed: 2/24/2020 Codes Class Noted Malignant neoplasm of lower lobe of right lung [facility]) ICD-10-CM: C34.31 
ICD-9-CM: 162.5  2/26/2020 Hypoxia ICD-10-CM: R09.02 
ICD-9-CM: 799.02  2/24/2020 Acute respiratory failure with hypoxia [facility]) ICD-10-CM: J96.01 
ICD-9-CM: 518.81  2/24/2020 * (Principal) Hemoptysis ICD-10-CM: R04.2 ICD-9-CM: 786.30  2/24/2020 Mass of lower lobe of right lung ICD-10-CM: R91.8 ICD-9-CM: 786.6  2/23/2020 Right lower lobe lung mass ICD-10-CM: R91.8 ICD-9-CM: 786.6  2/23/2020 Essential hypertension with goal blood pressure less than 130/80 (Chronic) ICD-10-CM: I10 
ICD-9-CM: 401.9  2/19/2018 Principal Problem: 
  Hemoptysis (2/24/2020) Active Problems: 
  Essential hypertension with goal blood pressure less than 130/80 (2/19/2018) Mass of lower lobe of right lung (2/23/2020) Right lower lobe lung mass (2/23/2020) Hypoxia (2/24/2020) Acute respiratory failure with hypoxia ([address] (2/24/2020) Malignant neoplasm of lower lobe of right lung ([address] (2/26/2020) Plan: 
 
Await cPFTS and final path Dr. Casey Valle to review case and advise Continue current care Signed:  HARSH Gonzalez I have seen and examined the patient with the Nurse Practitioner and agree with the above assessment and plan.   
 
Right lower lobe mass with hemoptysis and post obstructive pneumonia, regional LN are negative on EBUS (still waiting for final results), however the mass is touching/invading the right main pulmonary artery trunk just distal to the upper lobe branches, upfront resection may be risky and could leave positive margin, especially in the setting of pneumonia. I would prefer chemotherapy first if possible, his hemoptysis appears mild and pneumonia appears in control. Will discuss with oncology.   
 
Lourdes Javier MD

## 2020-02-27 NOTE — PROGRESS NOTES
[facility] Inpatient Hematology / Oncology Progress Note Admission Date: 2020  9:06 PM 
Reason for Admission/Hospital Course: Right lower lobe lung mass [R91.8] 24 Hour Events: 
Had bronch yesterday Sitting in the bed Girlfriend at bedside ROS: 
No complaints this AM 
 
10 point review of systems is otherwise negative with the exception of the elements mentioned above in the HPI. Allergies Allergen Reactions  Celebrex [Celecoxib] Itching OBJECTIVE: 
Patient Vitals for the past 8 hrs: 
 BP Temp Pulse Resp SpO2  
20 1156 113/76 97.5 °F (36.4 °C) (!) 103 18 91 % 20 0839 118/84 98.6 °F (37 °C) 100 17 94 % Temp (24hrs), Av.3 °F (36.8 °C), Min:97.5 °F (36.4 °C), Max:99.3 °F (37.4 °C) No intake/output data recorded. Physical Exam: 
Constitutional: Well developed, well nourished male in no acute distress, sitting comfortably in the hospital bed. HEENT: Normocephalic and atraumatic. Oropharynx is clear, mucous membranes are moist.  Pupils are equal, round, and reactive to light. Extraocular muscles are intact. Sclerae anicteric. Neck supple without JVD. No thyromegaly present. Lymph node 
 deferred. Skin Warm and dry. No bruising and no rash noted. No erythema. No pallor. Respiratory Lungs are clear to auscultation bilaterally without wheezes, rales or rhonchi, normal air exchange without accessory muscle use. CVS Normal rate, regular rhythm and normal S1 and S2. No murmurs, gallops, or rubs. Abdomen Soft, nontender and nondistended, normoactive bowel sounds. No palpable mass. No hepatosplenomegaly. Neuro Grossly nonfocal with no obvious sensory or motor deficits. MSK Normal range of motion in general.  No edema and no tenderness. Psych Appropriate mood and affect. Labs: 
   
Recent Labs  
  20 
9063 WBC 9.4  
RBC 5.35  
HGB 16.5 HCT 48.9 MCV 91.4 MCH 30.8 MCHC 33.7 RDW 13.0  GRANS 63 LYMPH 25 MONOS 8  
EOS 3  
BASOS 1 IG 0  
DF AUTOMATED ANEU 5.9 ABL 2.4 ABM 0.7 RICHAR 0.3 ABB 0.1 AIG 0.0 Recent Labs  
  02/25/20 
3077   
K 4.0  
* CO2 26 AGAP 6*  
GLU 95 BUN 9  
CREA 0.93 GFRAA >60  
GFRNA >60  
CA 9.1 Imaging: 
 
Medications: 
Current Facility-Administered Medications Medication Dose Route Frequency  senna-docusate (PERICOLACE) 8.6-50 mg per tablet 1 Tab  1 Tab Oral QHS  chlorthalidone (HYGROTEN) tablet 25 mg  25 mg Oral DAILY  metoprolol succinate (TOPROL-XL) XL tablet 50 mg  50 mg Oral DAILY  sodium chloride (NS) flush 5-40 mL  5-40 mL IntraVENous Q8H  
 sodium chloride (NS) flush 5-40 mL  5-40 mL IntraVENous PRN  
 acetaminophen (TYLENOL) tablet 650 mg  650 mg Oral Q4H PRN  
 HYDROcodone-acetaminophen (NORCO)  mg tablet 1 Tab  1 Tab Oral Q4H PRN  
 naloxone (NARCAN) injection 0.4 mg  0.4 mg IntraVENous PRN  
 diphenhydrAMINE (BENADRYL) capsule 25 mg  25 mg Oral Q6H PRN  
 ondansetron (ZOFRAN) injection 4 mg  4 mg IntraVENous Q4H PRN  
 bisacodyL (DULCOLAX) tablet 5 mg  5 mg Oral DAILY PRN  
 LORazepam (ATIVAN) tablet 1 mg  1 mg Oral BID PRN  
 influenza vaccine 2019-20 (6 mos+)(PF) (FLUARIX/FLULAVAL/FLUZONE QUAD) injection 0.5 mL  0.5 mL IntraMUSCular PRIOR TO DISCHARGE  alcohol 62% (NOZIN) nasal  1 Ampule  1 Ampule Topical Q12H  
 
 
 
ASSESSMENT: 
 
Problem List  Date Reviewed: 2/24/2020 Codes Class Noted Malignant neoplasm of lower lobe of right lung [facility]) ICD-10-CM: C34.31 
ICD-9-CM: 162.5  2/26/2020 Hypoxia ICD-10-CM: R09.02 
ICD-9-CM: 799.02  2/24/2020 Acute respiratory failure with hypoxia [facility]) ICD-10-CM: J96.01 
ICD-9-CM: 518.81  2/24/2020 * (Principal) Hemoptysis ICD-10-CM: R04.2 ICD-9-CM: 786.30  2/24/2020 Mass of lower lobe of right lung ICD-10-CM: R91.8 ICD-9-CM: 786.6  2/23/2020 Right lower lobe lung mass ICD-10-CM: R91.8 ICD-9-CM: 786.6  2/23/2020 Essential hypertension with goal blood pressure less than 130/80 (Chronic) ICD-10-CM: I10 
ICD-9-CM: 401.9  2/19/2018 Mr. Lynette Lawson is a 81 y.o. male admitted on 2/23/2020 with a primary diagnosis of RLL lung mass. His PMH includes GERD, HTN, OA, and former smoker (quit 6 years ago). He presented to ED with c/o hemoptysis. He reports cough, shortness of breath, and intermittent chest pain since November 2019. He was found to be hypoxic in ED, improved with O2.  CT chest with RLL mass that obstructs RLL airway with complete collapse of the RLL; R hilar adenopathy; and RML with fluid/?blood. Pulm consult pending. We were consulted d/t the findings on his CT chest and concern for malignancy. 
  
 
PLAN: 
RLL mass 
- CT chest with RLL mass that obstructs RLL airway with complete collapse of the RLL; R hilar adenopathy; and RML with fluid/?blood. - Pulm consult pending. Will need bx for tissue dx. - Check CT AP to complete staging. Check CEA. 2/27 abdomen and pelvis without signs of disease. Underwent bronch yesterday with concerns for non small cell lung cancer. Await final pathology. ?surgical candidate, will consult Dr. Anthony Templeton for further recommendations. Appreciate pulmonology's thoughts as well on surgery.  
  
 
Goals and plan of care reviewed with the patient. All questions answered to the best of our ability. Martha Ndiaye, FNP-C [facility] [address] [address] Office : [phone] Fax : [phone] I personally saw, exammed and counselled the patient, and discussed with NP, agree with above history/assessment/plan.  81 y.o.male developed SOB, and admitted for hemoptysis and hypoxia, CT found RLL with atelectasis and mediastinal LN, I reviewed CT personally and discussed with pt this was most suspicious of lung cancer with probably LN metastasis, and he was motivated to treat, arrange complete staging CT A/P negative, EBUS/navigational biopsy result pending but ITZEL negative for LN metastasis, discussed the clinical staging may be eligible for surgery, current hypoxia can be result of right to left shunt due to RLL atelectasis, consult surgery and pulm to comment on resectability and PFT, arrange out pt PET and MRI. 
  
 
Tracy Sanchez M.D. [address] Office : [phone] Fax : [phone]

## 2020-02-27 NOTE — PROGRESS NOTES
Hospitalist Progress Note Admit Date:  2020  9:06 PM  
Name:  Mehnaz Javed. Age:  81 y.o. 
:  1950 MRN:  [id number] PCP:  Janelle Sandoval MD 
Treatment Team: Attending Provider: Ariadne Blandon MD; Consulting Provider: Felipe Barr MD; Consulting Provider: Quang Lara MD; Care Manager: Candelaria Ramos RN Subjective:  
CC:  Coughing up blood HPI: 
 
  
Patient is a 81 y.o. male who presents to the ER due to coughing up blood. He reports that this started today. He reports he has had an ongoing cough, shortness of breath, and intermittent chest pain since last November, when he had pneumonia. He just felt that it was taking a long time to get back to normal, and then he can get used to it. He was a previous smoker and states he quit 6 years ago. He denies fever/chills, nausea/vomiting/diarrhea, abdominal pain, weight loss, or pain/swelling in legs. In triage, he was noted to be hypoxic on room air, and ultimately placed on 4 L of oxygen with improvement to an O2 saturation of 92%. ER work-up included a CT scan of his chest to rule out a pulmonary embolus. He does not have a PE however a mass was found in his right lower lobe causing collapse of that lobe. Findings overall highly suggestive of malignancy. CT shows:IMPRESSION:  1.  No evidence for pulmonary. 2. Large central mass in the right lower lobe measuring 6.7 cm x 5.9 cm in greatest dimension. This obstructs the right lower lobe airway with complete collapse of the right lower lobe. The appearance is highly concerning for malignancy. Further evaluation as clinically indicated is recommended. 3. Right hilar adenopathy concerning for metastatic disease. 4.  Abnormal attenuation within right middle lobe airways best appreciated on axial image 70 although the appearance is more consistent with fluid within the right middle lobe airways which could represent blood given the history of hemoptysis. I am asked to admit for transfer downtown for subsequent pulmonology and oncology work-up. 2/24:  Pt s/o mild shortness of breath. Occ hemoptysis. Awaiting pulm med eval.  Heme onc eval pt this am.  CT AP ordered for further staging. Pt denies CP. No N/V/D. No F/C. 
 
2/27: States his hemoptysis has slowed down. No chest pain. Shortness of breath improved. Bronchoscopy done yest, [facility]. Other than HPI, a 10 pt ROS is negative Objective:  
 
Patient Vitals for the past 24 hrs: 
 Temp Pulse Resp BP SpO2  
02/27/20 0839 98.6 °F (37 °C) 100 17 118/84 94 % 02/27/20 0240 97.8 °F (36.6 °C) 96 18 113/81 93 % 02/26/20 2310 98 °F (36.7 °C) (!) 110 18 115/75 96 % 02/26/20 1915 99.3 °F (37.4 °C) (!) 106 20 134/87 96 % 02/26/20 1559  92 18 105/84 90 % 02/26/20 1549  97 20 105/79 90 % 02/26/20 1540  93 18 114/81 92 % 02/26/20 1529  98 20 131/89 91 % 02/26/20 1520  (!) 101 16 128/87 95 % 02/26/20 1515  94 16 123/84 94 % 02/26/20 1510  85 16 108/77 97 % 02/26/20 1505  85 18 116/73 95 % 02/26/20 1500  88 18 100/71 94 % 02/26/20 1455  (!) 105 18 (!) 134/100 91 % 02/26/20 1450  (!) 105 18 (!) 154/92 91 % 02/26/20 1354 98.8 °F (37.1 °C) (!) 107 18 127/86 92 % 02/26/20 1238 98.4 °F (36.9 °C) (!) 101 16 113/84 93 % Oxygen Therapy O2 Sat (%): 94 % (02/27/20 0839) Pulse via Oximetry: 92 beats per minute (02/26/20 1559) O2 Device: Nasal cannula (02/26/20 1915) O2 Flow Rate (L/min): 5 l/min (02/26/20 1915) Intake/Output Summary (Last 24 hours) at 2/27/2020 1131 Last data filed at 2/27/2020 5101 Gross per 24 hour Intake 720 ml Output  Net 720 ml General:    Well nourished. Alert. NAD. Pleasant. Nontoxic appearance Skin   Warm and dry. No rash. OP:  Moist 
CV:   RRR. No murmur, rub, or gallop. Lungs:   CTAB. No wheezing, rhonchi, or rales. Dimin bs bases Abdomen:   Soft, nontender, nondistended. Extremities: Warm and dry. No cyanosis or edema. Neuro:   Nonfocal  
 
Data Review: 
I have reviewed all labs, meds, telemetry events, and studies from the last 24 hours. No results found for this or any previous visit (from the past 24 hour(s)). All Micro Results None Current Meds: 
Current Facility-Administered Medications Medication Dose Route Frequency  senna-docusate (PERICOLACE) 8.6-50 mg per tablet 1 Tab  1 Tab Oral QHS  chlorthalidone (HYGROTEN) tablet 25 mg  25 mg Oral DAILY  metoprolol succinate (TOPROL-XL) XL tablet 50 mg  50 mg Oral DAILY  sodium chloride (NS) flush 5-40 mL  5-40 mL IntraVENous Q8H  
 sodium chloride (NS) flush 5-40 mL  5-40 mL IntraVENous PRN  
 acetaminophen (TYLENOL) tablet 650 mg  650 mg Oral Q4H PRN  
 HYDROcodone-acetaminophen (NORCO)  mg tablet 1 Tab  1 Tab Oral Q4H PRN  
 naloxone (NARCAN) injection 0.4 mg  0.4 mg IntraVENous PRN  
 diphenhydrAMINE (BENADRYL) capsule 25 mg  25 mg Oral Q6H PRN  
 ondansetron (ZOFRAN) injection 4 mg  4 mg IntraVENous Q4H PRN  
 bisacodyL (DULCOLAX) tablet 5 mg  5 mg Oral DAILY PRN  
 LORazepam (ATIVAN) tablet 1 mg  1 mg Oral BID PRN  
 influenza vaccine 2019-20 (6 mos+)(PF) (FLUARIX/FLULAVAL/FLUZONE QUAD) injection 0.5 mL  0.5 mL IntraMUSCular PRIOR TO DISCHARGE  alcohol 62% (NOZIN) nasal  1 Ampule  1 Ampule Topical Q12H Other Studies (last 24 hours): No results found. Assessment and Plan:  
 
Hospital Problems as of 2/27/2020 Date Reviewed: 2/24/2020 Codes Class Noted - Resolved POA Malignant neoplasm of lower lobe of right lung [facility]) ICD-10-CM: C34.31 
ICD-9-CM: 162.5  2/26/2020 - Present Unknown Hypoxia ICD-10-CM: R09.02 
ICD-9-CM: 799.02  2/24/2020 - Present Yes Acute respiratory failure with hypoxia [facility]) ICD-10-CM: J96.01 
ICD-9-CM: 518.81  2/24/2020 - Present Yes * (Principal) Hemoptysis ICD-10-CM: R04.2 ICD-9-CM: 786.30  2/24/2020 - Present Yes Mass of lower lobe of right lung ICD-10-CM: R91.8 ICD-9-CM: 786.6  2/23/2020 - Present Yes Right lower lobe lung mass ICD-10-CM: R91.8 ICD-9-CM: 786.6  2/23/2020 - Present Yes Essential hypertension with goal blood pressure less than 130/80 (Chronic) ICD-10-CM: I10 
ICD-9-CM: 401.9  2/19/2018 - Present Yes PLAN:   
 
Hemopytsis 
-likely due to lung CA 
-Pulm and oncology consulted 2/26:  Bronchoscopy planned this afternoon Lung Cancer 
-Pulm and Onc following Acute Hypoxemic Resp Failure 
-in part related to lung mass/cancer, may have underlying COPD as well 2/27:  Wean supplem O2 as able, incentive spirom HTN 
-home rx, monitor DVT Prophy 
-SCD's due to hemoptysis Signed: 
French Tate MD

## 2020-02-27 NOTE — PROGRESS NOTES
Debra Roslyn. Admission Date: 2/23/2020 Daily Progress Note: 2/27/2020 The patient's chart is reviewed and the patient is discussed with the staff. 
 
 32 y.o.  male seen and evaluated at the request of Dr. Ella Solomon for new RLL mass, hypoxia and hemoptysis. [facility] presented with hemoptysis and congestion intermittently for the last 3 months.  Was treated for pneumonia in November and quit smoking 6 years ago after a 35pkyr smoking history. He is currently requiring 4lpm of oxygen to maintain O2 sat of 90-91%.  Chest CT was performed and ruled out PE but noted RLL mass suggestive of malignancy.  Oncology was consulted and CEA elevated 5.0. Subjective:  
 
Patient underwent bronch with EBUS yesterday. Had station 7 and 11Rs biopsied and lymph nodes were negative. Endobronchial biopsies were then performed from the bronchus intermedius tumor which revealed malignancy, favor non small cell lung cancer. Pt's O2 need was up to 10L following the procedure. Down to 5L this morning. Still coughing up fresh blood every 20 minutes. Does not appear that cautery was attempted. Current Facility-Administered Medications Medication Dose Route Frequency  senna-docusate (PERICOLACE) 8.6-50 mg per tablet 1 Tab  1 Tab Oral QHS  chlorthalidone (HYGROTEN) tablet 25 mg  25 mg Oral DAILY  metoprolol succinate (TOPROL-XL) XL tablet 50 mg  50 mg Oral DAILY  sodium chloride (NS) flush 5-40 mL  5-40 mL IntraVENous Q8H  
 sodium chloride (NS) flush 5-40 mL  5-40 mL IntraVENous PRN  
 acetaminophen (TYLENOL) tablet 650 mg  650 mg Oral Q4H PRN  
 HYDROcodone-acetaminophen (NORCO)  mg tablet 1 Tab  1 Tab Oral Q4H PRN  
 naloxone (NARCAN) injection 0.4 mg  0.4 mg IntraVENous PRN  
 diphenhydrAMINE (BENADRYL) capsule 25 mg  25 mg Oral Q6H PRN  
 ondansetron (ZOFRAN) injection 4 mg  4 mg IntraVENous Q4H PRN  
 bisacodyL (DULCOLAX) tablet 5 mg  5 mg Oral DAILY PRN  
  LORazepam (ATIVAN) tablet 1 mg  1 mg Oral BID PRN  
 influenza vaccine 2019-20 (6 mos+)(PF) (FLUARIX/FLULAVAL/FLUZONE QUAD) injection 0.5 mL  0.5 mL IntraMUSCular PRIOR TO DISCHARGE  alcohol 62% (NOZIN) nasal  1 Ampule  1 Ampule Topical Q12H Review of Systems Constitutional: negative for fever, chills, sweats Cardiovascular: negative for chest pain, palpitations, syncope, edema Gastrointestinal: negative for dysphagia, reflux, vomiting, diarrhea, abdominal pain, or melena Neurologic: negative for focal weakness, numbness, headache Objective:  
 
Vitals:  
 02/26/20 2310 02/27/20 0240 02/27/20 1427 02/27/20 1156 BP: 115/75 113/81 118/84 113/76 Pulse: (!) 110 96 100 (!) 103 Resp: 18 18 17 18 Temp: 98 °F (36.7 °C) 97.8 °F (36.6 °C) 98.6 °F (37 °C) 97.5 °F (36.4 °C) SpO2: 96% 93% 94% 91% Weight:      
Height:      
 
Intake and Output:  
02/25 1901 - 02/27 0700 In: 18 [P.O.:840; I.V.:20] Out: - No intake/output data recorded. Physical Exam:  
Constitution:  the patient is well developed and in no acute distress EENMT:  Sclera clear, pupils equal, oral mucosa moist 
Respiratory: CTA B, no w/r/r 
Cardiovascular:  RRR without M,G,R 
Gastrointestinal: soft and non-tender; with positive bowel sounds. Musculoskeletal: warm without cyanosis. There is no lower leg edema. Skin:  no jaundice or rashes, no wounds Neurologic: no gross neuro deficits Psychiatric:  alert and oriented x ppt CHEST XRAY:  
No new imaging LAB No lab exists for component: [place] Recent Labs  
  02/25/20 
7947 WBC 9.4 HGB 16.5 HCT 48.9  Recent Labs  
  02/25/20 
9315   
K 4.0  
* CO2 26 GLU 95 BUN 9  
CREA 0.93  
CA 9.1 ABG:  No results found for: PH, PHI, PCO2, PCO2I, PO2, PO2I, HCO3, HCO3I, FIO2, FIO2I Assessment:  (Medical Decision Making) Hospital Problems  Date Reviewed: 2/24/2020 Codes Class Noted POA Malignant neoplasm of lower lobe of right lung [facility]) ICD-10-CM: C34.31 
ICD-9-CM: 162.5  2/26/2020 Unknown Hypoxia ICD-10-CM: R09.02 
ICD-9-CM: 799.02  2/24/2020 Yes Acute respiratory failure with hypoxia [facility]) ICD-10-CM: J96.01 
ICD-9-CM: 518.81  2/24/2020 Yes * (Principal) Hemoptysis ICD-10-CM: R04.2 ICD-9-CM: 786.30  2/24/2020 Yes Mass of lower lobe of right lung ICD-10-CM: R91.8 ICD-9-CM: 786.6  2/23/2020 Yes Right lower lobe lung mass ICD-10-CM: R91.8 ICD-9-CM: 786.6  2/23/2020 Yes Essential hypertension with goal blood pressure less than 130/80 (Chronic) ICD-10-CM: I10 
ICD-9-CM: 401.9  2/19/2018 Yes Patient with endobronchial disease fully occluding RML and RLL. Ongoing hemoptysis. Looks like non small cell lung cancer. Plan:  (Medical Decision Making)  
-will need cPFT's to determine if he is a surgical candidate.  
-will have to see if the location of the tumor is resectable in a way to leave the RUL as well.  
-having ongoing hemoptysis. Unsure why cautery was not attempted yesterday. Maybe due to high O2 requirement? Discussed that needs to be able to turn O2 to less than 2-3L before could safely attempt cautery not for tumor removal purposes but just to slow his hemoptysis. See how low can wean his o2 today.   
 
 
 
Brandon Carter MD

## 2020-02-28 PROCEDURE — 94760 N-INVAS EAR/PLS OXIMETRY 1: CPT

## 2020-02-28 PROCEDURE — 74011250637 HC RX REV CODE- 250/637: Performed by: FAMILY MEDICINE

## 2020-02-28 PROCEDURE — 94761 N-INVAS EAR/PLS OXIMETRY MLT: CPT

## 2020-02-28 PROCEDURE — 77010033678 HC OXYGEN DAILY

## 2020-02-28 PROCEDURE — 99232 SBSQ HOSP IP/OBS MODERATE 35: CPT | Performed by: INTERNAL MEDICINE

## 2020-02-28 PROCEDURE — 65270000029 HC RM PRIVATE

## 2020-02-28 PROCEDURE — 74011250637 HC RX REV CODE- 250/637: Performed by: INTERNAL MEDICINE

## 2020-02-28 RX ADMIN — Medication 1 AMPULE: at 19:39

## 2020-02-28 RX ADMIN — Medication 10 ML: at 15:15

## 2020-02-28 RX ADMIN — Medication 1 AMPULE: at 08:51

## 2020-02-28 RX ADMIN — METOPROLOL SUCCINATE 50 MG: 25 TABLET, FILM COATED, EXTENDED RELEASE ORAL at 08:51

## 2020-02-28 RX ADMIN — CHLORTHALIDONE 25 MG: 25 TABLET ORAL at 08:51

## 2020-02-28 RX ADMIN — SENNOSIDES AND DOCUSATE SODIUM 1 TABLET: 8.6; 5 TABLET ORAL at 19:38

## 2020-02-28 RX ADMIN — Medication 10 ML: at 19:39

## 2020-02-28 NOTE — PROGRESS NOTES
Problem: Falls - Risk of 
Goal: *Absence of Falls Description Document Nicole Moraels Fall Risk and appropriate interventions in the flowsheet. Outcome: Progressing Towards Goal 
Note: Fall Risk Interventions: 
  
 
  
 
Medication Interventions: Teach patient to arise slowly, Patient to call before getting OOB History of Falls Interventions: Room close to nurse's station, Door open when patient unattended

## 2020-02-28 NOTE — PROGRESS NOTES
Margareth Gil. Admission Date: 2/23/2020 Daily Progress Note: 2/28/2020 The patient's chart is reviewed and the patient is discussed with the staff.   87 y.o.  male seen and evaluated at the request of Dr. Nirav Massey for new RLL mass, hypoxia and hemoptysis. Debora Sanchez presented with hemoptysis and congestion intermittently for the last 3 months.  Was treated for pneumonia in November and quit smoking 6 years ago after a 35pkyr smoking history.  Chest CT was performed and ruled out PE but noted RLL mass suggestive of malignancy. Patient underwent bronch with EBUS on 2/26. . Had station 7 and 11Rs biopsied and lymph nodes were negative. Endobronchial biopsies were then performed from the bronchus intermedius tumor which revealed malignancy, favor non small cell lung cancer. He continues to have hemoptysis but it has improved.  
 
  
Subjective:  
   Not coughing up as much blood - none for at least an hour. Has talked to oncology and surgery and sounds like chemo with radiation favored. Current Facility-Administered Medications Medication Dose Route Frequency  senna-docusate (PERICOLACE) 8.6-50 mg per tablet 1 Tab  1 Tab Oral QHS  chlorthalidone (HYGROTEN) tablet 25 mg  25 mg Oral DAILY  metoprolol succinate (TOPROL-XL) XL tablet 50 mg  50 mg Oral DAILY  sodium chloride (NS) flush 5-40 mL  5-40 mL IntraVENous Q8H  
 sodium chloride (NS) flush 5-40 mL  5-40 mL IntraVENous PRN  
 acetaminophen (TYLENOL) tablet 650 mg  650 mg Oral Q4H PRN  
 HYDROcodone-acetaminophen (NORCO)  mg tablet 1 Tab  1 Tab Oral Q4H PRN  
 naloxone (NARCAN) injection 0.4 mg  0.4 mg IntraVENous PRN  
 diphenhydrAMINE (BENADRYL) capsule 25 mg  25 mg Oral Q6H PRN  
 ondansetron (ZOFRAN) injection 4 mg  4 mg IntraVENous Q4H PRN  
 bisacodyL (DULCOLAX) tablet 5 mg  5 mg Oral DAILY PRN  
 LORazepam (ATIVAN) tablet 1 mg  1 mg Oral BID PRN  
  influenza vaccine 2019-20 (6 mos+)(PF) (FLUARIX/FLULAVAL/FLUZONE QUAD) injection 0.5 mL  0.5 mL IntraMUSCular PRIOR TO DISCHARGE  alcohol 62% (NOZIN) nasal  1 Ampule  1 Ampule Topical Q12H Objective:  
 
Vitals:  
 02/27/20 2131 02/27/20 2315 02/28/20 3688 02/28/20 7670 BP:  115/80 112/90 (!) 112/93 Pulse:  97 92 (!) 102 Resp:  18 18 18 Temp:  97.8 °F (36.6 °C) 97.5 °F (36.4 °C) 97.8 °F (36.6 °C) SpO2:  90% 90% 93% Weight: 213 lb 12.8 oz (97 kg) Height:      
 
Intake and Output:  
02/26 1901 - 02/28 0700 In: 1200 [P.O.:1200] Out: - No intake/output data recorded. Physical Exam:  
Constitutional:  the patient is well developed and in no acute distress HEENT:  Sclera clear, pupils equal, oral mucosa moist 
Lungs: a few crackles in the left base. Oxygen via cannula - down to 3 liters Cardiovascular:  RRR without M,G,R 
Abd/GI: soft and non-tender; with positive bowel sounds. Ext: warm without cyanosis. There is no lower leg edema. Musculoskeletal: moves all four extremities with equal strength Skin:  no jaundice or rashes, no wounds Neuro: no gross neuro deficits Musculoskeletal: can ambulate. No deformity Psychiatric: Calm. Review of Systems - Review of Systems - General ROS: positive for  - none Respiratory ROS: positive for - cough, hemoptysis and shortness of breath Cardiovascular ROS: negative Gastrointestinal ROS: no abdominal pain, change in bowel habits, or black or bloody stools 
negative Lines: peripheral IV 
 
CHEST XRAY: none today LAB: none today Assessment:  (Medical Decision Making) Hospital Problems  Date Reviewed: 2/24/2020 Codes Class Noted POA Malignant neoplasm of lower lobe of right lung [facility]) ICD-10-CM: C34.31 
ICD-9-CM: 162.5  2/26/2020 Unknown Hypoxia ICD-10-CM: R09.02 
ICD-9-CM: 799.02  2/24/2020 Yes  Acute respiratory failure with hypoxia (HCC) ICD-10-CM: J96.01 
 ICD-9-CM: 518.81  2/24/2020 Yes * (Principal) Hemoptysis ICD-10-CM: R04.2 ICD-9-CM: 786.30  2/24/2020 Yes Mass of lower lobe of right lung ICD-10-CM: R91.8 ICD-9-CM: 786.6  2/23/2020 Yes Right lower lobe lung mass ICD-10-CM: R91.8 ICD-9-CM: 786.6  2/23/2020 Yes Essential hypertension with goal blood pressure less than 130/80 (Chronic) ICD-10-CM: I10 
ICD-9-CM: 401.9  2/19/2018 Yes Plan:  (Medical Decision Making) 1. Has been seen by surgery and oncology. Dr. Stanley Estrada noted that the mass is touching/invading the right main pulmonary artery trunk just distal to the upper lobe branches, and that upfront resection may be risky and could leave positive margin, especially in the setting of pneumonia. He prefers chemotherapy first if possible. Per oncology -- Dr. Ta Pelayo, oncology,  discussed the clinical staging and that he may be eligible for surgery, and that current hypoxia may be result of right to left shunt due to RLL atelectasis. Plans to arrange out pt PET and MRI. 2. Hemoptysis has improved. Will hold off on repeat bronch with cautery 3. Have RT assess oxygen needs - ? Home soon Kendall Myrick NP I have spoken with and examined the patient. I agree with the above assessment and plan as documented. Suspected IIB NSCLC based on CT/EBUS/bronch but final pathology and staging not complete. Considering upfront chemoradiation with possible surgery depending upon extent (currently touching/invading R PA). Gen: pleasant, no distress currently on RA Lungs: decreased R base Heart:  RRR with no Murmur/Rubs/Gallops Abd: NTND Ext: no edema 
 
--agree with assessment for home oxygen. --f/u pathology, with plans for close f/u with oncology. 
--Dr. King Santos reviewed and no potential for cautery (inaccessible for that bronchoscopically) Ade Montgomery MD 
 
 
More than 50% of time documented was spent face-to-face contact with the patient and in the care of the patient on the floor/unit where the patient is located.

## 2020-02-28 NOTE — PROGRESS NOTES
Hospitalist Progress Note Admit Date:  2020  9:06 PM  
Name:  Es Dodge. Age:  81 y.o. 
:  1950 MRN:  [id number] PCP:  Efe Christianson MD 
Treatment Team: Attending Provider: Mary Ching MD; Consulting Provider: Erlinda Carter MD; Consulting Provider: Jasen Fry MD; Care Manager: Cierra Garza, RN; Consulting Provider: Prachi Mccormick MD; Primary Nurse: French Moses; Staff Nurse: Kenan Francis Subjective:  
CC:  Coughing up blood HPI/Hosp course thru  (prelim dc summary) 53-year-old male who has a history of tobacco abuse (quit 6 yr ago), admitted for hemoptysis. RLL mass on CT. He underwent bronchoscopy on  with biopsy of a 6.7 cm x 5.9 cm right lower lobe mass revealing non-small cell lung cancer. Pt did have a CT abdomen and pelvis which showed no evidence of disease. He has been seen by Thoracic surgery who is recommending chemotherapy to reduce tumor bulk prior to any potential surgical resection, especially since the mass is poss invading his right main pulm artery, Patient remains on supplemental oxygen, he is going to be arranged for outpatient PET scan and MRI pulmonary recommendations. Oncology is following this patient as well and we are waiting further recommendations. We continue to wean oxygen as able but he will likely need to go home with oxygen. Suspect stable for discharge over the weekend. HPI 
: Patient states he still dyspneic but may be improved. He is having scant hemoptysis but this is certainly reduced in amount. Denies any chest pain. No fevers or chills. No nausea, vomiting, diarrhea. Other than that mentioned above in HPI, a 10 point review of systems is negative Objective:  
 
Patient Vitals for the past 24 hrs: 
 Temp Pulse Resp BP SpO2  
20 0749 97.8 °F (36.6 °C) (!) 102 18 (!) 112/93 93 % 20 0243 97.5 °F (36.4 °C) 92 18 112/90 90 % 02/27/20 2315 97.8 °F (36.6 °C) 97 18 115/80 90 % 02/27/20 1922 98.3 °F (36.8 °C) (!) 102 18 126/86 91 % 02/27/20 1533 97.9 °F (36.6 °C) 93 18 114/86 94 % 02/27/20 1156 97.5 °F (36.4 °C) (!) 103 18 113/76 91 % Oxygen Therapy O2 Sat (%): 93 % (02/28/20 0749) Pulse via Oximetry: 92 beats per minute (02/26/20 1559) O2 Device: Nasal cannula (02/28/20 0220) O2 Flow Rate (L/min): 3 l/min (02/28/20 0220) Intake/Output Summary (Last 24 hours) at 2/28/2020 1130 Last data filed at 2/27/2020 4416 Gross per 24 hour Intake 480 ml Output  Net 480 ml General:    Well nourished. Alert. NAD. Pleasant. Nontoxic appearance. Min conversational dyspnea Skin   Warm and dry. No rash. OP:  Moist 
CV:   RRR. No murmur, rub, or gallop. Lungs:   CTAB. No wheezing, rhonchi, or rales. Dimin bs bases Abdomen:   Soft, nontender, nondistended. Extremities: Warm and dry. No cyanosis or edema. Neuro:   Nonfocal  
 
Data Review: 
I have reviewed all labs, meds, telemetry events, and studies from the last 24 hours. No results found for this or any previous visit (from the past 24 hour(s)). All Micro Results None Current Meds: 
Current Facility-Administered Medications Medication Dose Route Frequency  senna-docusate (PERICOLACE) 8.6-50 mg per tablet 1 Tab  1 Tab Oral QHS  chlorthalidone (HYGROTEN) tablet 25 mg  25 mg Oral DAILY  metoprolol succinate (TOPROL-XL) XL tablet 50 mg  50 mg Oral DAILY  sodium chloride (NS) flush 5-40 mL  5-40 mL IntraVENous Q8H  
 sodium chloride (NS) flush 5-40 mL  5-40 mL IntraVENous PRN  
 acetaminophen (TYLENOL) tablet 650 mg  650 mg Oral Q4H PRN  
 HYDROcodone-acetaminophen (NORCO)  mg tablet 1 Tab  1 Tab Oral Q4H PRN  
 naloxone (NARCAN) injection 0.4 mg  0.4 mg IntraVENous PRN  
 diphenhydrAMINE (BENADRYL) capsule 25 mg  25 mg Oral Q6H PRN  
 ondansetron (ZOFRAN) injection 4 mg  4 mg IntraVENous Q4H PRN  
  bisacodyL (DULCOLAX) tablet 5 mg  5 mg Oral DAILY PRN  
 LORazepam (ATIVAN) tablet 1 mg  1 mg Oral BID PRN  
 influenza vaccine 2019-20 (6 mos+)(PF) (FLUARIX/FLULAVAL/FLUZONE QUAD) injection 0.5 mL  0.5 mL IntraMUSCular PRIOR TO DISCHARGE  alcohol 62% (NOZIN) nasal  1 Ampule  1 Ampule Topical Q12H Other Studies (last 24 hours): No results found. Assessment and Plan:  
 
Hospital Problems as of 2/28/2020 Date Reviewed: 2/24/2020 Codes Class Noted - Resolved POA Malignant neoplasm of lower lobe of right lung [facility]) ICD-10-CM: C34.31 
ICD-9-CM: 162.5  2/26/2020 - Present Unknown Hypoxia ICD-10-CM: R09.02 
ICD-9-CM: 799.02  2/24/2020 - Present Yes Acute respiratory failure with hypoxia [facility]) ICD-10-CM: J96.01 
ICD-9-CM: 518.81  2/24/2020 - Present Yes * (Principal) Hemoptysis ICD-10-CM: R04.2 ICD-9-CM: 786.30  2/24/2020 - Present Yes Mass of lower lobe of right lung ICD-10-CM: R91.8 ICD-9-CM: 786.6  2/23/2020 - Present Yes Right lower lobe lung mass ICD-10-CM: R91.8 ICD-9-CM: 786.6  2/23/2020 - Present Yes Essential hypertension with goal blood pressure less than 130/80 (Chronic) ICD-10-CM: I10 
ICD-9-CM: 401.9  2/19/2018 - Present Yes PLAN:   
 
Hemopytsis 
-likely due to lung CA 
-Pulm and oncology consulted 2/26:  Bronchoscopy with rLL Bx--->Nonsmall Cell Lung CA 
-CT A/P no evidence of metastasis 2/28: Mopped assist improving Lung Cancer 
-Pulm and Onc following 2/28: Awaiting final oncology recommendations 
-plannningfor chemo Acute Hypoxemic Resp Failure 
-in part related to lung mass/cancer, may have underlying COPD as well 2/28: Wean supplem O2 as able, incentive spirom 
-no obv infection 
-will likely need home O2 
 
HTN 
-home rx, monitor DVT Prophy 
-SCD's due to hemoptysis Signed: 
Nicki Schilling MD

## 2020-02-28 NOTE — PROGRESS NOTES
[facility] Inpatient Hematology / Oncology Progress Note Admission Date: 2020  9:06 PM 
Reason for Admission/Hospital Course: Right lower lobe lung mass [R91.8] 24 Hour Events: 
Sitting in the bed No complaints this AM 
 
ROS: 
No complaints this AM 
 
10 point review of systems is otherwise negative with the exception of the elements mentioned above in the HPI. Allergies Allergen Reactions  Celebrex [Celecoxib] Itching OBJECTIVE: 
Patient Vitals for the past 8 hrs: 
 BP Temp Pulse Resp SpO2  
20 1217     91 % 20 1205     92 % 20 1138 131/90 98.2 °F (36.8 °C) (!) 103 16 92 % 20 0749 (!) 112/93 97.8 °F (36.6 °C) (!) 102 18 93 % Temp (24hrs), Av.9 °F (36.6 °C), Min:97.5 °F (36.4 °C), Max:98.3 °F (36.8 °C) No intake/output data recorded. Physical Exam: 
Constitutional: Well developed, well nourished male in no acute distress, sitting comfortably in the hospital bed. HEENT: Normocephalic and atraumatic. Oropharynx is clear, mucous membranes are moist.  Pupils are equal, round, and reactive to light. Extraocular muscles are intact. Sclerae anicteric. Neck supple without JVD. No thyromegaly present. Lymph node 
 deferred. Skin Warm and dry. No bruising and no rash noted. No erythema. No pallor. Respiratory Lungs are clear to auscultation bilaterally without wheezes, rales or rhonchi, normal air exchange without accessory muscle use. CVS Normal rate, regular rhythm and normal S1 and S2. No murmurs, gallops, or rubs. Abdomen Soft, nontender and nondistended, normoactive bowel sounds. No palpable mass. No hepatosplenomegaly. Neuro Grossly nonfocal with no obvious sensory or motor deficits. MSK Normal range of motion in general.  No edema and no tenderness. Psych Appropriate mood and affect.    
 
 
Labs: 
   
No results for input(s): WBC, RBC, HGB, HCT, MCV, MCH, MCHC, RDW, PLT, GRANS, LYMPH, MONOS, EOS, BASOS, IG, DF, ANEU, ABL, ABM, RICHAR, ABB, AIG, HGBEXT, HCTEXT, PLTEXT, HGBEXT, HCTEXT, PLTEXT in the last 72 hours. No lab exists for component: MPV No results for input(s): NA, K, CL, CO2, AGAP, GLU, BUN, CREA, BUCR, GFRAA, GFRNA, CA, TBIL, GPT, SGOT, AP, TP, ALB, GLOB, AGRAT, MG, PHOS in the last 72 hours. Imaging: 
 
Medications: 
Current Facility-Administered Medications Medication Dose Route Frequency  senna-docusate (PERICOLACE) 8.6-50 mg per tablet 1 Tab  1 Tab Oral QHS  chlorthalidone (HYGROTEN) tablet 25 mg  25 mg Oral DAILY  metoprolol succinate (TOPROL-XL) XL tablet 50 mg  50 mg Oral DAILY  sodium chloride (NS) flush 5-40 mL  5-40 mL IntraVENous Q8H  
 sodium chloride (NS) flush 5-40 mL  5-40 mL IntraVENous PRN  
 acetaminophen (TYLENOL) tablet 650 mg  650 mg Oral Q4H PRN  
 HYDROcodone-acetaminophen (NORCO)  mg tablet 1 Tab  1 Tab Oral Q4H PRN  
 naloxone (NARCAN) injection 0.4 mg  0.4 mg IntraVENous PRN  
 diphenhydrAMINE (BENADRYL) capsule 25 mg  25 mg Oral Q6H PRN  
 ondansetron (ZOFRAN) injection 4 mg  4 mg IntraVENous Q4H PRN  
 bisacodyL (DULCOLAX) tablet 5 mg  5 mg Oral DAILY PRN  
 LORazepam (ATIVAN) tablet 1 mg  1 mg Oral BID PRN  
 influenza vaccine 2019-20 (6 mos+)(PF) (FLUARIX/FLULAVAL/FLUZONE QUAD) injection 0.5 mL  0.5 mL IntraMUSCular PRIOR TO DISCHARGE  alcohol 62% (NOZIN) nasal  1 Ampule  1 Ampule Topical Q12H  
 
 
 
ASSESSMENT: 
 
Problem List  Date Reviewed: 2/24/2020 Codes Class Noted Malignant neoplasm of lower lobe of right lung [facility]) ICD-10-CM: C34.31 
ICD-9-CM: 162.5  2/26/2020 Hypoxia ICD-10-CM: R09.02 
ICD-9-CM: 799.02  2/24/2020 Acute respiratory failure with hypoxia [facility]) ICD-10-CM: J96.01 
ICD-9-CM: 518.81  2/24/2020 * (Principal) Hemoptysis ICD-10-CM: R04.2 ICD-9-CM: 786.30  2/24/2020 Mass of lower lobe of right lung ICD-10-CM: R91.8 ICD-9-CM: 786.6  2/23/2020 Right lower lobe lung mass ICD-10-CM: R91.8 ICD-9-CM: 786.6  2/23/2020 Essential hypertension with goal blood pressure less than 130/80 (Chronic) ICD-10-CM: I10 
ICD-9-CM: 401.9  2/19/2018 Mr. Palma Swan is a 81 y.o. male admitted on 2/23/2020 with a primary diagnosis of RLL lung mass. His PMH includes GERD, HTN, OA, and former smoker (quit 6 years ago). He presented to ED with c/o hemoptysis. He reports cough, shortness of breath, and intermittent chest pain since November 2019. He was found to be hypoxic in ED, improved with O2.  CT chest with RLL mass that obstructs RLL airway with complete collapse of the RLL; R hilar adenopathy; and RML with fluid/?blood. Pulm consult pending. We were consulted d/t the findings on his CT chest and concern for malignancy. 
  
 
PLAN: 
RLL mass 
- CT chest with RLL mass that obstructs RLL airway with complete collapse of the RLL; R hilar adenopathy; and RML with fluid/?blood. - Pulm consult pending. Will need bx for tissue dx. - Check CT AP to complete staging. Check CEA. 2/27 abdomen and pelvis without signs of disease. Underwent bronch yesterday with concerns for non small cell lung cancer. Await final pathology. ?surgical candidate, will consult Dr. Lennox Cooks for further recommendations. Appreciate pulmonology's thoughts as well on surgery. 2/28 surgery recommends chemo prior to surgical resection. Will await pathology for final treatment plan. Consult rad onc as he may need chemo/XRT. Disposition: ok to discharge from hem/onc perspective when stable from a pulmonology perspective. He will need outpatient MRI and PET/CT scan which have been ordered.  
  
Goals and plan of care reviewed with the patient. All questions answered to the best of our ability. Martha Ndiaye, JOHANNA-C [facility] On[facility] [address] [place] [address] Office : [phone] Fax : [phone] I personally saw, exammed and counselled the patient, and discussed with NP, agree with above history/assessment/plan. 69 y.o.male developed SOB, and admitted for hemoptysis and hypoxia, CT found RLL with atelectasis and mediastinal LN, I reviewed CT personally and discussed with pt this was most suspicious of lung cancer with probably LN metastasis, and he was motivated to treat, arrange complete staging CT A/P negative, EBUS/navigational biopsy result pending but ITZEL negative for LN metastasis, discussed the clinical staging may be eligible for surgery, current hypoxia can be result of right to left shunt due to RLL atelectasis, consult surgery and pulm to comment on resectability and PFT, rec chemorad, consult rad onc, arrange out pt PET and MRI, pulm working on wean oxygen and possible cauterization. 
  
 
Tracy Sanchez M.D. [address] Office : [phone] Fax : [phone]

## 2020-02-28 NOTE — PROGRESS NOTES
Oxygen Qualifier Room air: SpO2 with O2 and liter flow Resting SpO2  91% Ambulating SpO2  93% Completed by: 
 
Mundo Nelson

## 2020-02-29 VITALS
HEART RATE: 101 BPM | SYSTOLIC BLOOD PRESSURE: 139 MMHG | BODY MASS INDEX: 31.67 KG/M2 | DIASTOLIC BLOOD PRESSURE: 83 MMHG | OXYGEN SATURATION: 93 % | WEIGHT: 213.8 LBS | RESPIRATION RATE: 18 BRPM | HEIGHT: 69 IN | TEMPERATURE: 97.9 F

## 2020-02-29 PROCEDURE — 74011250637 HC RX REV CODE- 250/637: Performed by: FAMILY MEDICINE

## 2020-02-29 RX ORDER — CHLORTHALIDONE 25 MG/1
25 TABLET ORAL DAILY
Qty: 90 TAB | Refills: 0 | Status: SHIPPED | OUTPATIENT
Start: 2020-02-29 | End: 2020-04-13

## 2020-02-29 RX ORDER — METOPROLOL SUCCINATE 50 MG/1
50 TABLET, EXTENDED RELEASE ORAL DAILY
Qty: 90 TAB | Refills: 0 | Status: SHIPPED | OUTPATIENT
Start: 2020-02-29 | End: 2020-05-11

## 2020-02-29 RX ADMIN — CHLORTHALIDONE 25 MG: 25 TABLET ORAL at 08:02

## 2020-02-29 RX ADMIN — METOPROLOL SUCCINATE 50 MG: 25 TABLET, FILM COATED, EXTENDED RELEASE ORAL at 08:02

## 2020-02-29 RX ADMIN — Medication 1 AMPULE: at 08:01

## 2020-02-29 RX ADMIN — Medication 10 ML: at 06:00

## 2020-02-29 NOTE — DISCHARGE INSTRUCTIONS
If worsened, call your doctor or return to hospital.   When follow up with your doctor, make sure that your doctor is aware of this admission and review hospital record and follow up on lab or other results for continuity of care. Also, review your medications with your doctor for possible need of adjustment or refills. DISCHARGE SUMMARY from Nurse    PATIENT INSTRUCTIONS:    What to do at Home:  Recommended activity: Activity as tolerated    If you experience any of the following symptoms fever greater than 100.4F, pain/nausea/vomiting/diarrhea not relieved by home medications, increasing shortness of breath, or any questions or concerns, please follow up with your primary doctor, oncologist, or pulmonologist.    *  Please give a list of your current medications to your Primary Care Provider. *  Please update this list whenever your medications are discontinued, doses are      changed, or new medications (including over-the-counter products) are added. *  Please carry medication information at all times in case of emergency situations. These are general instructions for a healthy lifestyle:    No smoking/ No tobacco products/ Avoid exposure to second hand smoke  Surgeon General's Warning:  Quitting smoking now greatly reduces serious risk to your health. Obesity, smoking, and sedentary lifestyle greatly increases your risk for illness    A healthy diet, regular physical exercise & weight monitoring are important for maintaining a healthy lifestyle    You may be retaining fluid if you have a history of heart failure or if you experience any of the following symptoms:  Weight gain of 3 pounds or more overnight or 5 pounds in a week, increased swelling in our hands or feet or shortness of breath while lying flat in bed. Please call your doctor as soon as you notice any of these symptoms; do not wait until your next office visit. The discharge information has been reviewed with the patient. The patient verbalized understanding. Discharge medications reviewed with the patient and appropriate educational materials and side effects teaching were provided.   ___________________________________________________________________________________________________________________________________

## 2020-02-29 NOTE — PROGRESS NOTES
Pt is for discharge home today with no needs/supportive care orders recieved for CM at this time. Pt will have close follow up at the [address] [place] [address]. Care Management Interventions PCP Verified by CM: Yes Mode of Transport at Discharge: Self Transition of Care Consult (CM Consult): Discharge Planning Physical Therapy Consult: No 
Occupational Therapy Consult: No 
Speech Therapy Consult: No 
Current Support Network: Own Home, Family Lives Nearby(girlfriend lives with pt) Confirm Follow Up Transport: Family The Patient and/or Patient Representative was Provided with a Choice of Provider and Agrees with the Discharge Plan?: Yes Freedom of Choice List was Provided with Basic Dialogue that Supports the Patient's Individualized Plan of Care/Goals, Treatment Preferences and Shares the Quality Data Associated with the Providers?: Yes [employer] Information Provided?: No 
Discharge Location Discharge Placement: Home

## 2020-02-29 NOTE — PROGRESS NOTES
Problem: Falls - Risk of 
Goal: *Absence of Falls Description Document [place] Fall Risk and appropriate interventions in the flowsheet. Outcome: Progressing Towards Goal 
Note: Fall Risk Interventions: 
  
 
  
 
Medication Interventions: Teach patient to arise slowly History of Falls Interventions: Room close to nurse's station Problem: Patient Education: Go to Patient Education Activity Goal: Patient/Family Education Outcome: Progressing Towards Goal 
  
Problem: General Medical Care Plan Goal: *Vital signs within specified parameters Outcome: Progressing Towards Goal 
Goal: *Labs within defined limits Outcome: Progressing Towards Goal 
Goal: *Absence of infection signs and symptoms Outcome: Progressing Towards Goal 
Goal: *Optimal pain control at patient's stated goal 
Outcome: Progressing Towards Goal 
Goal: *Skin integrity maintained Outcome: Progressing Towards Goal 
Goal: *Fluid volume balance Outcome: Progressing Towards Goal 
Goal: *Optimize nutritional status Outcome: Progressing Towards Goal 
Goal: *Anxiety reduced or absent Outcome: Progressing Towards Goal 
Goal: *Progressive mobility and function (eg: ADL's) Outcome: Progressing Towards Goal 
  
Problem: Patient Education: Go to Patient Education Activity Goal: Patient/Family Education Outcome: Progressing Towards Goal

## 2020-02-29 NOTE — DISCHARGE SUMMARY
Discharge Summary Patient: Ananda Barajas MRN: [id number]  SSN: [id number] YOB: 1950  Age: 81 y.o. Sex: male Admit Date: 2/23/2020 Discharge Date: 2/29/2020 Admission Diagnoses: Right lower lobe lung mass [R91.8] Discharge Diagnoses:  
Problem List as of 2/29/2020 Date Reviewed: 2/24/2020 Codes Class Noted - Resolved Malignant neoplasm of lower lobe of right lung [facility]) ICD-10-CM: C34.31 
ICD-9-CM: 162.5  2/26/2020 - Present Hypoxia ICD-10-CM: R09.02 
ICD-9-CM: 799.02  2/24/2020 - Present Acute respiratory failure with hypoxia [facility]) ICD-10-CM: J96.01 
ICD-9-CM: 518.81  2/24/2020 - Present * (Principal) Hemoptysis ICD-10-CM: R04.2 ICD-9-CM: 786.30  2/24/2020 - Present Mass of lower lobe of right lung ICD-10-CM: R91.8 ICD-9-CM: 786.6  2/23/2020 - Present Right lower lobe lung mass ICD-10-CM: R91.8 ICD-9-CM: 786.6  2/23/2020 - Present Essential hypertension with goal blood pressure less than 130/80 (Chronic) ICD-10-CM: I10 
ICD-9-CM: 401.9  2/19/2018 - Present RESOLVED: Status post total right knee replacement ICD-10-CM: Y21.011 ICD-9-CM: V43.65  2/29/2016 - 2/23/2020 Discharge Condition: Stable Hospital Course:  
 
Patient was admitted due to hemoptysis. PMH of smoking, hypertension. Found to have RLL mass on CT. He underwent bronchoscopy on February 26 with biopsy of a 6.7 cm x 5.9 cm right lower lobe mass revealing non-small cell lung cancer. Pt did have a CT abdomen and pelvis which showed no evidence of disease.   He has been seen by Thoracic surgery who is recommending chemotherapy to reduce tumor bulk prior to any potential surgical resection, especially since the mass is poss invading his right main pulm artery, 
  
Patient is going to be arranged for outpatient PET scan and MRI pulmonary recommendations.   
  
Oncology is following this patient as well and will arrange for chemotherapy.  
  
Patient is on room air now and feeling comfortable about being discharged to home. Physical Exam:   
 
General:                    The patient is a pleasant male in no acute distress. He is sitting up in bed on room air. Head:                                   Normocephalic/atraumatic. Eyes:                                   No palpebral pallor or scleral icterus. ENT:                                    External auricular and nasal exam within normal limits. Mucous membranes are moist. 
Neck:                                   Supple, non-tender, no JVD. Lungs:                       Clear to auscultation bilaterally without wheezes or crackles. No respiratory distress or accessory muscle use. Heart:                                  Regular rate and rhythm, without murmurs, rubs, or gallops. Abdomen:                  Soft, non-tender, mildly distended due to truncal obesity with normoactive bowel sounds. Genitourinary:           No tenderness over the bladder or bilateral CVAs. Extremities:               Without clubbing, cyanosis, or edema. Skin:                                    Normal color, texture, and turgor. No rashes, lesions, or jaundice. Pulses:                      Radial and dorsalis pedis pulses present 2+ bilaterally. Capillary refill <2s. Neurologic:                CN II-XII grossly intact and symmetrical.  
                                            Moving all four extremities well with no focal deficits. Psychiatric:                Pleasant demeanor, appropriate affect. Alert and oriented x 3 
 
  
 
Consults: General Surgery, Hematology/Oncology and Pulmonary/Critical Care Significant Diagnostic Studies: METABOLIC PANEL, BASIC [[id number]] (Order [id number]) Lab Date: 2/24/2020 Department: [facility] Released By: Alix Fernandez RN (auto-released) Authorizing: Trevor Paredes MD  
Component Value Flag Ref Range Units Status Sodium 140   136 - 145 mmol/L Final  
Potassium 4.0   3.5 - 5.1 mmol/L Final  
Chloride 108  High   98 - 107 mmol/L Final  
CO2 26   21 - 32 mmol/L Final  
Anion gap 6  Low   7 - 16 mmol/L Final  
Glucose 95   65 - 100 mg/dL Final  
Comment:  
47 - 60 mg/dl Consistent with, but not fully diagnostic of hypoglycemia. 101 - 125 mg/dl Impaired fasting glucose/consistent with pre-diabetes mellitus  
> 126 mg/dl Fasting glucose consistent with overt diabetes mellitus BUN 9   8 - 23 MG/DL Final  
Creatinine 0.93   0.8 - 1.5 MG/DL Final  
GFR est AA >60   >60 ml/min/1.73m2 Final  
GFR est non-AA >60   >60 ml/min/1.73m2 Final  
Comment:  
(NOTE) Estimated GFR is calculated using the Modification of Diet in Renal  
Disease (MDRD) Study equation, reported for both  [facility]) and non- Americans (GFRNA), and normalized to 1.73m2  
body surface area. The physician must decide which value applies to  
the patient. The MDRD study equation should only be used in  
individuals age 25 or older. It has not been validated for the  
following: pregnant women, patients with serious comorbid conditions,  
or on certain medications, or persons with extremes of body size,  
muscle mass, or nutritional status. Calcium 9.1   8.3 - 10.4 MG/DL Final  
 
CBC WITH AUTOMATED DIFF [[id number]] (Order [id number]) Lab Date: 2/24/2020 Department: [facility] Released By: Alix Fernandez RN (auto-released) Authorizing: Trevor Paredes MD  
Component Value Flag Ref Range Units Status WBC 9.4   4.3 - 11.1 K/uL Final  
RBC 5.35   4.23 - 5.6 M/uL Final  
HGB 16.5   13.6 - 17.2 g/dL Final  
HCT 48.9   41.1 - 50.3 % Final  
MCV 91.4   79.6 - 97.8 FL Final  
MCH 30.8   26.1 - 32.9 PG Final  
MCHC 33.7   31.4 - 35.0 g/dL Final  
 RDW 13.0   11.9 - 14.6 % Final  
PLATELET 534   314 - 450 K/uL Final  
MPV 10.7   9.4 - 12.3 FL Final  
ABSOLUTE NRBC 0.00   0.0 - 0.2 K/uL Final  
Comment: **Note: Absolute NRBC parameter is now reported with Hemogram**  
DF AUTOMATED      Final  
NEUTROPHILS 63   43 - 78 % Final  
LYMPHOCYTES 25   13 - 44 % Final  
MONOCYTES 8   4.0 - 12.0 % Final  
EOSINOPHILS 3   0.5 - 7.8 % Final  
BASOPHILS 1   0.0 - 2.0 % Final  
IMMATURE GRANULOCYTES 0   0.0 - 5.0 % Final  
ABS. NEUTROPHILS 5.9   1.7 - 8.2 K/UL Final  
ABS. LYMPHOCYTES 2.4   0.5 - 4.6 K/UL Final  
ABS. MONOCYTES 0.7   0.1 - 1.3 K/UL Final  
ABS. EOSINOPHILS 0.3   0.0 - 0.8 K/UL Final  
ABS. BASOPHILS 0.1   0.0 - 0.2 K/UL Final  
ABS. IMM. GRANS. 0.0   0.0 - 0.5 K/UL Final  
 
CT abdomen and pelvis 2- IMPRESSION: 
  
1. No specific CT evidence of abdominopelvic metastatic disease. 
  
2. Cholelithiasis and sigmoid diverticulosis without evidence of cholecystitis, 
diverticulitis, or other acute abdominopelvic abnormality identified. CT chest 
2- IMPRESSION:   
1. No evidence for pulmonary.  
  
2. Large central mass in the right lower lobe measuring 6.7 cm x 5.9 cm in 
greatest dimension. This obstructs the right lower lobe airway with complete 
collapse of the right lower lobe. The appearance is highly concerning for 
malignancy. Further evaluation as clinically indicated is recommended. 
  
3. Right hilar adenopathy concerning for metastatic disease. 
  
4. Abnormal attenuation within right middle lobe airways best appreciated on 
axial image 70 although the appearance is more consistent with fluid within the 
right middle lobe airways which could represent blood given the history of 
hemoptysis. XR chest  
2- IMPRESSION:  
1. Right basilar airspace changes which may represent atelectasis. However, an 
additional masslike density is seen at the right inferior hilum which is incompletely characterized. Given the patient's history of hemoptysis, further 
evaluation with a preferably contrasted CT scan of the chest is recommended. 
  
 
 
 
Disposition: home Discharge Medications:  
Current Discharge Medication List  
  
CONTINUE these medications which have CHANGED Details  
chlorthalidone (HYGROTEN) 25 mg tablet Take 1 Tab by mouth daily for 30 days. Qty: 90 Tab, Refills: 0  
  
metoprolol succinate (TOPROL-XL) 50 mg XL tablet Take 1 Tab by mouth daily for 30 days. Qty: 90 Tab, Refills: 0 Activity: Activity as tolerated Diet: cardiac diet Wound Care: Keep wound clean and dry Follow-up Appointments Procedures  FOLLOW UP VISIT Appointment in: Other (Specify) See your primary doctor in 3-5 days. See oncologist as per their recommendation. See pulmonologist as per their recommendation. See your primary doctor in 3-5 days. See oncologist as per their recommendation. See pulmonologist as per their recommendation. Standing Status:   Standing Number of Occurrences:   1 Order Specific Question:   Appointment in Answer: Other (Specify) I have discussed the plan of care with patient. Time spent on discharge is 37 minutes. Signed By: Pop Staley MD   
 February 29, 2020

## 2020-03-02 ENCOUNTER — PATIENT OUTREACH (OUTPATIENT)
Dept: CASE MANAGEMENT | Age: 70
End: 2020-03-02

## 2020-03-02 NOTE — PROGRESS NOTES
This note will not be viewable in [address] Transition of Care Discharge Follow-up Questionnaire Date/Time of Call: 
  3/2/2020 1:32 PM  
What was the patient hospitalized for? Hemoptysis Does the patient understand his/her diagnosis and/or treatment and what happened during the hospitalization? Yes, spoke with patient and he states he has an understanding of his recent hospitalization, diagnosis and treatment. Did the patient receive discharge instructions? Yes   
CM Assessed Risk for Readmission:  
  
  
Patient stated Risk for Readmission:  
  
  High risk for readmission. Review any discharge instructions (see discharge instructions/AVS in [facility]). Ask patient if they understand these. Do they have any questions? Discharge instructions reviewed with patient and he has an understanding. Were home services ordered (nursing, PT, OT, ST, etc.)? No   
If so, has the first visit occurred? If not, why? (Assist with coordination of services if necessary.) 
  N/A Was any DME ordered? No   
If so, has it been received? If not, why?  (Assist patient in obtaining DME orders &/or equipment if necessary.) N/A Completed review of all medications (new, discontinued, and continued meds per the d/c instructions and medication tab in [facility]) No changes in medication. Were all new prescriptions filled? If not, why?  (Assist patient in obtaining medications if necessary  escalate for CCM &/or SW if ongoing issues are verbalized by pt or anticipated) N/A Does the patient understand the purpose and dosing instructions for all medications? (If patient has questions, provide explanation and education.) Medications reviewed with patient and he verbalizes an understanding. Does the patient have any problems in performing ADLs? (If patient is unable to perform ADLs  what is the limiting factor(s)?   Do they have a support system that can assist? If no support system is present, discuss possible assistance that they may be able to obtain. Escalate for CCM/SW if ongoing issues are verbalized by pt or anticipated) Patient has no difficulty with ADLs. Does the patient have all follow-up appointments scheduled? 7 day f/up with PCP?  
(f/up with PCP may be w/in 14 days if patient has a f/up with their specialist w/in 7 days) 7-14 day f/up with specialist?  
(or per discharge instructions) If f/up has not been made  what actions has the care coordinator made to accomplish this? Has transportation been arranged? FU with PCP Dr. Shira BOONE. New patient appointment with oncology Dr. Shane Elliott scheduled for 3/10/2020. All other appointments are scheduled appropriately. Discussed the importance of FU appointments with patient and he verbalized understanding. Patient states that she has no transportation needs at this time. Any other questions or concerns expressed by the patient? Patient states that he is doing well. Patient is able to perform all ADLs independently. Patient does not voice any concerns or issues at this time. Schedule next appointment with GENE Hughes or refer to RN Case Manager/ per the workflow guidelines. When is care coordinators next follow-up call scheduled? If referred for CCM  what RN care manager Was the referral assigned? 7-14 days ZOEY Call Completed By: Bard Franklyn LPN Care Coordinator

## 2020-03-10 ENCOUNTER — HOSPITAL ENCOUNTER (OUTPATIENT)
Dept: PET IMAGING | Age: 70
End: 2020-03-10
Payer: COMMERCIAL

## 2020-03-10 ENCOUNTER — HOSPITAL ENCOUNTER (OUTPATIENT)
Dept: LAB | Age: 70
Discharge: HOME | End: 2020-03-10
Payer: COMMERCIAL

## 2020-03-10 DIAGNOSIS — C34.31 MALIGNANT NEOPLASM OF LOWER LOBE OF RIGHT LUNG (HCC): ICD-10-CM

## 2020-03-10 LAB
ALBUMIN SERPL-MCNC: 3.9 G/DL (ref 3.2–4.6)
ALBUMIN/GLOB SERPL: 1 {RATIO} (ref 1.2–3.5)
ALP SERPL-CCNC: 85 U/L (ref 50–136)
ALT SERPL-CCNC: 18 U/L (ref 12–65)
ANION GAP SERPL CALC-SCNC: 6 MMOL/L (ref 7–16)
AST SERPL-CCNC: 19 U/L (ref 15–37)
BASOPHILS # BLD: 0.1 K/UL (ref 0–0.2)
BASOPHILS NFR BLD: 1 % (ref 0–2)
BILIRUB SERPL-MCNC: 0.6 MG/DL (ref 0.2–1.1)
BUN SERPL-MCNC: 11 MG/DL (ref 8–23)
CALCIUM SERPL-MCNC: 9.6 MG/DL (ref 8.3–10.4)
CHLORIDE SERPL-SCNC: 104 MMOL/L (ref 98–107)
CO2 SERPL-SCNC: 27 MMOL/L (ref 21–32)
CREAT SERPL-MCNC: 1.15 MG/DL (ref 0.8–1.5)
DIFFERENTIAL METHOD BLD: ABNORMAL
EOSINOPHIL # BLD: 0.2 K/UL (ref 0–0.8)
EOSINOPHIL NFR BLD: 2 % (ref 0.5–7.8)
ERYTHROCYTE [DISTWIDTH] IN BLOOD BY AUTOMATED COUNT: 12.5 % (ref 11.9–14.6)
GLOBULIN SER CALC-MCNC: 4.1 G/DL (ref 2.3–3.5)
GLUCOSE SERPL-MCNC: 121 MG/DL (ref 65–100)
HCT VFR BLD AUTO: 51 % (ref 41.1–50.3)
HGB BLD-MCNC: 17.3 G/DL (ref 13.6–17.2)
IMM GRANULOCYTES # BLD AUTO: 0 K/UL (ref 0–0.5)
IMM GRANULOCYTES NFR BLD AUTO: 0 % (ref 0–5)
LYMPHOCYTES # BLD: 1.8 K/UL (ref 0.5–4.6)
LYMPHOCYTES NFR BLD: 22 % (ref 13–44)
MCH RBC QN AUTO: 30.5 PG (ref 26.1–32.9)
MCHC RBC AUTO-ENTMCNC: 33.9 G/DL (ref 31.4–35)
MCV RBC AUTO: 89.8 FL (ref 79.6–97.8)
MONOCYTES # BLD: 0.5 K/UL (ref 0.1–1.3)
MONOCYTES NFR BLD: 6 % (ref 4–12)
NEUTS SEG # BLD: 5.6 K/UL (ref 1.7–8.2)
NEUTS SEG NFR BLD: 68 % (ref 43–78)
NRBC # BLD: 0 K/UL (ref 0–0.2)
PLATELET # BLD AUTO: 210 K/UL (ref 150–450)
PMV BLD AUTO: 10.6 FL (ref 9.4–12.3)
POTASSIUM SERPL-SCNC: 4.1 MMOL/L (ref 3.5–5.1)
PROT SERPL-MCNC: 8 G/DL (ref 6.3–8.2)
RBC # BLD AUTO: 5.68 M/UL (ref 4.23–5.67)
SODIUM SERPL-SCNC: 137 MMOL/L (ref 136–145)
WBC # BLD AUTO: 8.2 K/UL (ref 4.3–11.1)

## 2020-03-10 PROCEDURE — 36415 COLL VENOUS BLD VENIPUNCTURE: CPT

## 2020-03-10 PROCEDURE — 80053 COMPREHEN METABOLIC PANEL: CPT

## 2020-03-10 PROCEDURE — 85025 COMPLETE CBC W/AUTO DIFF WBC: CPT

## 2020-03-12 ENCOUNTER — PATIENT OUTREACH (OUTPATIENT)
Dept: CASE MANAGEMENT | Age: 70
End: 2020-03-12

## 2020-03-12 NOTE — PROGRESS NOTES
This note will not be viewable in [address] Transitions of Care  Follow up Outreach Note Outreach type Phone call: Spoke with patient Home visit:  
Date/Time of Outreach: 3/12/2020 12:30 PM  
  
Has patient attended PCP or specialist follow-up appointments since last contact? What was outcome of appointment? When is next follow-up scheduled? FU with PCP Dr. Marjorie BOONE. FU with oncology Dr. Selina Crawford completed on 3/10/2020. All other appointments scheduled appropriately. Review medications. Any medication changes since last outreach? Does patient have any questions or issues related to their medications? Medications reviewed with patient. He verbalizes an understanding of medications. Home health active? If yes  any issue? Progress? No  
  
Referrals needed? 
(CM, SW, HH, etc. ) No   
Other issues/Miscellaneous? (Transportation, access to meals, ability to perform ADLs, adequate caregiver support, etc.) Patient states he is doing well and is in good spirits. He is able to perform all ADLs independently. Patient does not voice any concerns at this time. Next Outreach Scheduled? Graduation from program? 
  15-30 days Next Steps/Goals (if applicable): Outreach completed by: 
  Karin Turk LPN Care Coordinator

## 2020-03-13 ENCOUNTER — PATIENT OUTREACH (OUTPATIENT)
Dept: CASE MANAGEMENT | Age: 70
End: 2020-03-13

## 2020-03-13 ENCOUNTER — HOSPITAL ENCOUNTER (OUTPATIENT)
Dept: RADIATION ONCOLOGY | Age: 70
Discharge: HOME | End: 2020-03-13
Payer: COMMERCIAL

## 2020-03-13 VITALS
BODY MASS INDEX: 31.57 KG/M2 | RESPIRATION RATE: 16 BRPM | WEIGHT: 220 LBS | OXYGEN SATURATION: 93 % | DIASTOLIC BLOOD PRESSURE: 97 MMHG | HEART RATE: 93 BPM | SYSTOLIC BLOOD PRESSURE: 148 MMHG | TEMPERATURE: 98.5 F

## 2020-03-13 PROCEDURE — 77470 SPECIAL RADIATION TREATMENT: CPT

## 2020-03-13 PROCEDURE — 99211 OFF/OP EST MAY X REQ PHY/QHP: CPT

## 2020-03-13 NOTE — PROGRESS NOTES
Pt here today for the initial RT consult for RLL lung cancer. According to pt the plan is for him to start concurrent chemo/RT on 3/31/20. Pt will be completing a PET scan on 3/19/20 and MRI Brain on 3/17/20. An overview of RT was given. Mauro Marshall RN is pt's navigator. Pt is aware of the CT/Sim appt on 3/17/20. RT consents signed.

## 2020-03-13 NOTE — CONSULTS
Patient: Alma Delia Niño MRN: [id number]  SSN: [id number] YOB: 1950  Age: 81 y.o. Sex: male Other Providers: Caryn Eddy MD 
 
CHIEF COMPLAINT: Shortness of breath and hemoptysis DIAGNOSIS: T3N1M0, Stage IIIA NSCLC, SCC 
 
PREVIOUS TREATMENT: 
1) None HISTORY OF PRESENT ILLNESS:  Alma Delia Niño is a 81 y.o. male who I am seeing at the request of Dr. Charu Brice for lung cancer. He originally presented to the emergency department with a complaint of hemoptysis although he was also expressing cough, shortness of breath, and intermittent chest pain that have began in November 2019. He was found to be hypoxic and a CT of the chest showed a significant right lower lobe mass obstructing the right lower lobe airway with complete colla along with significant adenopathy. He was scheduled for biopsy which was positive for squamous cell carcinoma in the primary, while 11 R and 7 were negative. He did meet with surgery, Dr. Radha Gibbs, who did not feel the disease was resectable. He was scheduled as an outpatient with medical oncology who discussed definitive treatment with a combination of chemotherapy and radiation and referred him to our office for management. PET CT was pending at time of our consultation and was accompanied by his long-term girlfriend` PAST MEDICAL HISTORY:   
Past Medical History:  
Diagnosis Date  GERD (gastroesophageal reflux disease)   
 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 PAST SURGICAL HISTORY:  
Past Surgical History:  
Procedure Laterality Date  HX KNEE ARTHROSCOPY Left   
 scope X 1, ACL recon X 1  
 HX KNEE ARTHROSCOPY Right 1974, 1975 X 2   
 HX KNEE REPLACEMENT    
 HX TONSILLECTOMY  [id number] [address] [place] [address] W \"nasal plasty\" MEDICATIONS:  
 
Current Outpatient Medications:  
  betamethasone dipropionate (DIPROSONE) 0.05 % topical cream, , Disp: , Rfl:  
  chlorthalidone (HYGROTEN) 25 mg tablet, Take 1 Tab by mouth daily for 30 days. , Disp: 90 Tab, Rfl: 0 
  metoprolol succinate (TOPROL-XL) 50 mg XL tablet, Take 1 Tab by mouth daily for 30 days. , Disp: 90 Tab, Rfl: 0 
  prochlorperazine (COMPAZINE) 10 mg tablet, Take 1 Tab by mouth every six (6) hours as needed for Nausea. Indications: nausea and vomiting caused by cancer drugs, nausea and vomiting, Disp: 90 Tab, Rfl: 3 
  ondansetron hcl (ZOFRAN) 8 mg tablet, Take 1 Tab by mouth every eight (8) hours as needed for Nausea. Indications: nausea and vomiting caused by cancer drugs, Disp: 60 Tab, Rfl: 3 
  lidocaine-prilocaine (EMLA) topical cream, Apply  to affected area as needed for Pain., Disp: 30 g, Rfl: 0 ALLERGIES:  
Allergies Allergen Reactions  Celebrex [Celecoxib] Itching SOCIAL HISTORY:  
Social History Socioeconomic History  Marital status:  Spouse name: Not on file  Number of children: Not on file  Years of education: Not on file  Highest education level: Not on file Occupational History  Not on file Social Needs  Financial resource strain: Not on file  Food insecurity Worry: Not on file Inability: Not on file  Transportation needs Medical: Not on file Non-medical: Not on file Tobacco Use  Smoking status: Former Smoker Packs/day: 1.00 Years: 20.00 Pack years: 20.00  Smokeless tobacco: Never Used  Tobacco comment: quit 2014 // smoked off and on Substance and Sexual Activity  Alcohol use: Yes Alcohol/week: 2.0 standard drinks Types: 2 Glasses of wine per week  Drug use: No  
 Sexual activity: Yes  
  Partners: Female Lifestyle  Physical activity Days per week: Not on file Minutes per session: Not on file  Stress: Not on file Relationships  Social connections Talks on phone: Not on file Gets together: Not on file Attends Buddhism service: Not on file Active member of club or organization: Not on file Attends meetings of clubs or organizations: Not on file Relationship status: Not on file  Intimate partner violence Fear of current or ex partner: Not on file Emotionally abused: Not on file Physically abused: Not on file Forced sexual activity: Not on file Other Topics Concern   Service No  
 Blood Transfusions No  
 Caffeine Concern No  
 Occupational Exposure No  
 Hobby Hazards No  
 Sleep Concern No  
 Stress Concern No  
 Weight Concern No  
 Special Diet No  
 Back Care Not Asked  Exercise Yes  Bike Helmet Not Asked [address] Yes  Self-Exams Not Asked Social History Narrative  Not on file FAMILY HISTORY:  
Family History Problem Relation Age of Onset  Cancer Mother   
     uterine  Arrhythmia Sister   
     afib REVIEW OF SYSTEMS: Please see the completed review of systems sheet in the chart that I have reviewed today. PHYSICAL EXAMINATION:  
ECOG Performance status 1 
VITAL SIGNS:  
Visit Vitals BP (!) 148/97 (BP 1 Location: Left arm, BP Patient Position: Sitting) Pulse 93 Temp 98.5 °F (36.9 °C) Resp 16 Wt 99.8 kg (220 lb) SpO2 93% BMI 31.57 kg/m² GENERAL: The patient is well-developed, ambulatory, alert and in no acute distress. HEENT: Head is normocephalic, atraumatic. Pupils are equal, round and reactive to light and accommodation. Extraocular movement intact. Hearing is intact bilaterally to finger rub. Oral cavity reveals no lesions. Mucous membranes are moist. NECK: Neck is supple with no masses. CARDIOVASCULAR: Heart is regular rate and rhythm. There are no murmurs rubs or gallups.  Radial pulses are 2+ RESPIRATORY: Lungs are clear to auscultation and percussion. There is normal respiratory effort. GASTROINTESTINAL: The abdomen is soft, non-tender, nondistended with no hepatospelnomagaly. Digital rectal examination: deferred LYMPHATIC: There is no cervical, supraclavicular or axillary lymphadenopathy bilaterally. MUSCULOSKELETAL: Extremities reveal no cyanosis, clubbing or edema.  is 5+/5. NEURO:  Cranial nerves II-XII grossly intact. Muscular strength and sensation are intact throughout all four extremities. PATHOLOGY:   
 
 
  
 
LABORATORY:  
Lab Results Component Value Date/Time Sodium 137 03/10/2020 08:53 AM  
 Potassium 4.1 03/10/2020 08:53 AM  
 Chloride 104 03/10/2020 08:53 AM  
 CO2 27 03/10/2020 08:53 AM  
 Anion gap 6 (L) 03/10/2020 08:53 AM  
 Glucose 121 (H) 03/10/2020 08:53 AM  
 BUN 11 03/10/2020 08:53 AM  
 Creatinine 1.15 03/10/2020 08:53 AM  
 GFR est AA >60 03/10/2020 08:53 AM  
 GFR est non-AA >60 03/10/2020 08:53 AM  
 Calcium 9.6 03/10/2020 08:53 AM  
 Albumin 3.9 03/10/2020 08:53 AM  
 Protein, total 8.0 03/10/2020 08:53 AM  
 Globulin 4.1 (H) 03/10/2020 08:53 AM  
 A-G Ratio 1.0 (L) 03/10/2020 08:53 AM  
 AST (SGOT) 19 03/10/2020 08:53 AM  
 ALT (SGPT) 18 03/10/2020 08:53 AM  
 
Lab Results Component Value Date/Time WBC 8.2 03/10/2020 08:53 AM  
 HGB 17.3 (H) 03/10/2020 08:53 AM  
 HCT 51.0 (H) 03/10/2020 08:53 AM  
 PLATELET 589 33/94/5455 08:53 AM  
 
 
RADIOLOGY:   
Ct Chest W Cont Result Date: 2/23/2020 CT OF THE CHEST WITH INTRAVENOUS CONTRAST, 2/23/2020 Indication: Hemoptysis. Abnormal chest x-ray. Comparison: Chest x-ray 2/23/2020 Technique:   2.5 mm axial scans from above the aortic arch to the lung bases following the uneventful administration of 70 mL of Isovue-370. Intravenous contrast was given to evaluate for pulmonary embolism.  All CT scans performed at this facility use one or all of the following: Automated exposure control, adjustment of the mA and/or kVp according to patient's size, iterative reconstruction. Findings: The base of the neck is unremarkable in appearance. Although a prominent subcarinal and mildly prominent left hilar lymph nodes are seen, these are not enlarged by size criteria. However, right hilar adenopathy is seen most evident in a right hilar lymph node seen on axial image 52 measuring 1.6 cm short axis. The thoracic aorta is normal in caliber. Opacification of the pulmonary arteries is good. No abnormal filling defects are seen to suggest pulmonary embolism. Evaluation with lung windows demonstrates right lower lobe collapse. This is felt to be due to a central mass which measures approximately 6.7 cm x 5.9 cm in greatest transverse dimension as measured on image 61. This attenuates right lower lobe pulmonary arteries although these remain patent at this time. However, this does appear to obstruct the right lower lobe airway. No concerning distinct pulmonary lesion is otherwise seen. Abnormal attenuation is seen within right middle lobe airways best appreciated on axial image 70 although the appearance is more consistent with fluid within the right middle lobe airways which could represent blood given the history of hemoptysis. No pleural effusion is seen. Lungs are expanded without evidence for pneumothorax. No acute osseous abnormality is seen. Limited evaluation of the upper abdomen demonstrates no acute abnormality. Calcified stones are seen within a grossly noninflamed gallbladder. IMPRESSION:  1. No evidence for pulmonary. 2. Large central mass in the right lower lobe measuring 6.7 cm x 5.9 cm in greatest dimension. This obstructs the right lower lobe airway with complete collapse of the right lower lobe. The appearance is highly concerning for malignancy. Further evaluation as clinically indicated is recommended. 3. Right hilar adenopathy concerning for metastatic disease.  4.  Abnormal attenuation within right middle lobe airways best appreciated on axial image 70 although the appearance is more consistent with fluid within the right middle lobe airways which could represent blood given the history of hemoptysis. Ct Abd Pelv W Cont Result Date: 2/24/2020 CT OF THE ABDOMEN AND PELVIS WITH CONTRAST:          CLINICAL HISTORY:   Follow-up abnormal chest CT with large right lung mass in a patient with hemoptysis and presumed bronchogenic carcinoma. Now with elevated CEA. TECHNIQUE:  Oral and nonionic intravenous contrast was administered, and the abdomen and pelvis were scanned with spiral technique. Radiation dose reduction was achieved using one or all of the following techniques: automated exposure control, weight-based dosing, iterative reconstruction. COMPARISON:  February 23, 2020. CT ABDOMEN FINDINGS: Right infrahilar mass and postobstructive atelectasis/consolidation appears unchanged. There are calcified stones in the gallbladder without biliary ductal dilatation. The liver, spleen, pancreas, adrenals, and kidneys appear unremarkable. CT PELVIS FINDINGS: The appendix is not distended. There are multiple sigmoid diverticula without adjacent inflammatory changes. No pathologically enlarged lymph nodes or free fluid is evident. The prostate is not enlarged. Bone windows demonstrate no definite aggressive process, accounting for degenerative changes. IMPRESSION: 1. No specific CT evidence of abdominopelvic metastatic disease. 2.  Cholelithiasis and sigmoid diverticulosis without evidence of cholecystitis, diverticulitis, or other acute abdominopelvic abnormality identified. PET/CT and MRI  pending. IMPRESSION:  Nancy Campbell is a 81 y.o. male with locally advanced lung cancer.   We discussed the natural history of lung cancer and the implications of various tumor and patient characteristics including tumor markers, stage with both primary tumor stage and involvement of hilar and mediastinal lymph nodes, and performance status. He does have an MRI brain and PET/CT scan pending at time of initial consultation and could good certainly change our approach if more advanced disease is noted. We discussed the role of surgery which is limited in patients with locally advanced lung cancer. He did meet with surgery and this was not felt feasible But we may consider after appropriate therapy or during depending on his response. There is substantial clinical data in support of concurrent chemotherapy as demonstrated in the meta analysis published by Steph et al where a 5% improvement in overall survival was noted at the cost of increased esophagitis. We discussed the logistics of radiation delivering definitive radiation to the grossly involved tumor and lymph nodes without elective reg coverage over the course of 6 weeks delivering 60 Gy at 2 Gy per fraction. We discussed the potential toxicities related to the location of the disease but mainly including esophagitis, pneumonitis, pericarditis, fatigue, and rare but possible side effects such as brachial plexus or spinal cord injury. At the completion of our visit, all of the patient's questions were answered and informed consent was obtained. I will plan for CT simulation shortly with radiation to begin generally 7-10 days after this time. This start date will be coordinated with medical oncology. Again we will readdress  The plan if more advanced disease as noted on PET/CT  Or seen on MRI. PLAN:   
1) Consented patient for treatment with external beam radiation after discussing risk,  benefits, and side effects from treatment. 2) Reviewed available research treatment and cancer care protocols for which patient may be eligible. Unfortunately there are no matching clinical trials available at  this time. 3) Coordinate care and start date with medical oncology. Follow up on PET/CT and brain MRI. 4) CT Simulation planned shortly with radiation to begin 7-10 days after this time. Chiki Ramsey MD  
March 13, 2020

## 2020-03-13 NOTE — PROGRESS NOTES
Patient phoned to change appointments stating that after he met with [facility] Pt states he prefers to have weekly carbo/taxol vs. Definitive cis/eoposide. MD notified. D1C1 scheduled for 3/31/20. Encouraged to call with questions. Navigation will continue to follow.

## 2020-03-16 NOTE — PROGRESS NOTES
Screening for COVID-19 During Preassessment 1) Do you currently have signs or symptoms of a respiratory infection, such as fever, cough, shortness of breath or sore throat? 
no 
 
2) In the last 14 days have you had contact with any of the following: 
 A) Someone with confirmed or presumptive diagnosis of COVID-19? NO Or B) Someone under investigation for COVID-19? NO Or  C) Someone who has been diagnosed with COVID-19? NO 
 
3) In the last 14 days have you traveled or has someone in your home traveled to [place], [facility], [place], [place], [place], [place], [place], or [place]?  
 No

## 2020-03-17 ENCOUNTER — ANESTHESIA EVENT (OUTPATIENT)
Dept: SURGERY | Age: 70
End: 2020-03-17
Payer: COMMERCIAL

## 2020-03-17 ENCOUNTER — HOSPITAL ENCOUNTER (OUTPATIENT)
Dept: RADIATION ONCOLOGY | Age: 70
Discharge: HOME | End: 2020-03-17
Payer: COMMERCIAL

## 2020-03-17 ENCOUNTER — HOSPITAL ENCOUNTER (OUTPATIENT)
Dept: MRI IMAGING | Age: 70
Discharge: HOME | End: 2020-03-17
Attending: NURSE PRACTITIONER
Payer: COMMERCIAL

## 2020-03-17 DIAGNOSIS — R91.8 LUNG MASS: ICD-10-CM

## 2020-03-17 PROCEDURE — A9575 INJ GADOTERATE MEGLUMI 0.1ML: HCPCS | Performed by: NURSE PRACTITIONER

## 2020-03-17 PROCEDURE — 74011250636 HC RX REV CODE- 250/636: Performed by: NURSE PRACTITIONER

## 2020-03-17 PROCEDURE — 70553 MRI BRAIN STEM W/O & W/DYE: CPT

## 2020-03-17 RX ORDER — FLUMAZENIL 0.1 MG/ML
0.2 INJECTION INTRAVENOUS
Status: CANCELLED | OUTPATIENT
Start: 2020-03-17

## 2020-03-17 RX ORDER — SODIUM CHLORIDE, SODIUM LACTATE, POTASSIUM CHLORIDE, CALCIUM CHLORIDE 600; 310; 30; 20 MG/100ML; MG/100ML; MG/100ML; MG/100ML
75 INJECTION, SOLUTION INTRAVENOUS CONTINUOUS
Status: CANCELLED | OUTPATIENT
Start: 2020-03-17

## 2020-03-17 RX ORDER — DIPHENHYDRAMINE HYDROCHLORIDE 50 MG/ML
12.5 INJECTION, SOLUTION INTRAMUSCULAR; INTRAVENOUS
Status: CANCELLED | OUTPATIENT
Start: 2020-03-17

## 2020-03-17 RX ORDER — HYDROMORPHONE HYDROCHLORIDE 2 MG/ML
0.2 INJECTION, SOLUTION INTRAMUSCULAR; INTRAVENOUS; SUBCUTANEOUS
Status: CANCELLED | OUTPATIENT
Start: 2020-03-17

## 2020-03-17 RX ORDER — LIDOCAINE HYDROCHLORIDE 10 MG/ML
0.1 INJECTION INFILTRATION; PERINEURAL AS NEEDED
Status: CANCELLED | OUTPATIENT
Start: 2020-03-17

## 2020-03-17 RX ORDER — SODIUM CHLORIDE 0.9 % (FLUSH) 0.9 %
10 SYRINGE (ML) INJECTION
Status: COMPLETED | OUTPATIENT
Start: 2020-03-17 | End: 2020-03-17

## 2020-03-17 RX ORDER — SODIUM CHLORIDE, SODIUM LACTATE, POTASSIUM CHLORIDE, CALCIUM CHLORIDE 600; 310; 30; 20 MG/100ML; MG/100ML; MG/100ML; MG/100ML
100 INJECTION, SOLUTION INTRAVENOUS CONTINUOUS
Status: CANCELLED | OUTPATIENT
Start: 2020-03-17 | End: 2020-03-18

## 2020-03-17 RX ORDER — MIDAZOLAM HYDROCHLORIDE 1 MG/ML
2 INJECTION, SOLUTION INTRAMUSCULAR; INTRAVENOUS
Status: CANCELLED | OUTPATIENT
Start: 2020-03-17 | End: 2020-03-18

## 2020-03-17 RX ORDER — OXYCODONE HYDROCHLORIDE 5 MG/1
5 TABLET ORAL
Status: CANCELLED | OUTPATIENT
Start: 2020-03-17 | End: 2020-03-18

## 2020-03-17 RX ORDER — NALOXONE HYDROCHLORIDE 0.4 MG/ML
0.1 INJECTION, SOLUTION INTRAMUSCULAR; INTRAVENOUS; SUBCUTANEOUS AS NEEDED
Status: CANCELLED | OUTPATIENT
Start: 2020-03-17

## 2020-03-17 RX ORDER — GADOTERATE MEGLUMINE 376.9 MG/ML
20 INJECTION INTRAVENOUS
Status: COMPLETED | OUTPATIENT
Start: 2020-03-17 | End: 2020-03-17

## 2020-03-17 RX ADMIN — Medication 10 ML: at 08:24

## 2020-03-17 RX ADMIN — GADOTERATE MEGLUMINE 20 ML: 376.9 INJECTION INTRAVENOUS at 08:24

## 2020-03-18 ENCOUNTER — HOSPITAL ENCOUNTER (OUTPATIENT)
Age: 70
Setting detail: OUTPATIENT SURGERY
Discharge: HOME | End: 2020-03-18
Attending: RADIOLOGY | Admitting: RADIOLOGY
Payer: COMMERCIAL

## 2020-03-18 ENCOUNTER — ANESTHESIA (OUTPATIENT)
Dept: SURGERY | Age: 70
End: 2020-03-18
Payer: COMMERCIAL

## 2020-03-18 ENCOUNTER — APPOINTMENT (OUTPATIENT)
Dept: INTERVENTIONAL RADIOLOGY/VASCULAR | Age: 70
End: 2020-03-18
Attending: INTERNAL MEDICINE
Payer: COMMERCIAL

## 2020-03-18 VITALS
DIASTOLIC BLOOD PRESSURE: 93 MMHG | TEMPERATURE: 97.6 F | RESPIRATION RATE: 18 BRPM | HEART RATE: 78 BPM | SYSTOLIC BLOOD PRESSURE: 132 MMHG | OXYGEN SATURATION: 93 %

## 2020-03-18 DIAGNOSIS — Z79.899 NEED FOR PROPHYLACTIC CHEMOTHERAPY: ICD-10-CM

## 2020-03-18 PROCEDURE — 74011000250 HC RX REV CODE- 250: Performed by: RADIOLOGY

## 2020-03-18 PROCEDURE — 76060000032 HC ANESTHESIA 0.5 TO 1 HR: Performed by: RADIOLOGY

## 2020-03-18 PROCEDURE — 74011250636 HC RX REV CODE- 250/636: Performed by: NURSE ANESTHETIST, CERTIFIED REGISTERED

## 2020-03-18 PROCEDURE — 77001 FLUOROGUIDE FOR VEIN DEVICE: CPT

## 2020-03-18 PROCEDURE — 77030031131 HC SUT MXN P COVD -B

## 2020-03-18 PROCEDURE — C1788 PORT, INDWELLING, IMP: HCPCS

## 2020-03-18 PROCEDURE — 74011000250 HC RX REV CODE- 250: Performed by: NURSE ANESTHETIST, CERTIFIED REGISTERED

## 2020-03-18 PROCEDURE — 77030031139 HC SUT VCRL2 J&J -A

## 2020-03-18 PROCEDURE — C1894 INTRO/SHEATH, NON-LASER: HCPCS

## 2020-03-18 PROCEDURE — 74011250636 HC RX REV CODE- 250/636: Performed by: RADIOLOGY

## 2020-03-18 PROCEDURE — 77030010507 HC ADH SKN DERMBND J&J -B

## 2020-03-18 RX ORDER — PROPOFOL 10 MG/ML
INJECTION, EMULSION INTRAVENOUS
Status: DISCONTINUED | OUTPATIENT
Start: 2020-03-18 | End: 2020-03-18 | Stop reason: HOSPADM

## 2020-03-18 RX ORDER — CEFAZOLIN SODIUM/WATER 2 G/20 ML
2 SYRINGE (ML) INTRAVENOUS ONCE
Status: COMPLETED | OUTPATIENT
Start: 2020-03-18 | End: 2020-03-18

## 2020-03-18 RX ORDER — PROPOFOL 10 MG/ML
INJECTION, EMULSION INTRAVENOUS AS NEEDED
Status: DISCONTINUED | OUTPATIENT
Start: 2020-03-18 | End: 2020-03-18 | Stop reason: HOSPADM

## 2020-03-18 RX ORDER — LIDOCAINE HYDROCHLORIDE 20 MG/ML
INJECTION, SOLUTION EPIDURAL; INFILTRATION; INTRACAUDAL; PERINEURAL AS NEEDED
Status: DISCONTINUED | OUTPATIENT
Start: 2020-03-18 | End: 2020-03-18 | Stop reason: HOSPADM

## 2020-03-18 RX ORDER — HEPARIN SODIUM (PORCINE) LOCK FLUSH IV SOLN 100 UNIT/ML 100 UNIT/ML
500 SOLUTION INTRAVENOUS ONCE
Status: COMPLETED | OUTPATIENT
Start: 2020-03-18 | End: 2020-03-18

## 2020-03-18 RX ORDER — SODIUM CHLORIDE, SODIUM LACTATE, POTASSIUM CHLORIDE, CALCIUM CHLORIDE 600; 310; 30; 20 MG/100ML; MG/100ML; MG/100ML; MG/100ML
INJECTION, SOLUTION INTRAVENOUS
Status: DISCONTINUED | OUTPATIENT
Start: 2020-03-18 | End: 2020-03-18 | Stop reason: HOSPADM

## 2020-03-18 RX ADMIN — LIDOCAINE HYDROCHLORIDE 20 ML: 10; .005 INJECTION, SOLUTION EPIDURAL; INFILTRATION; INTRACAUDAL; PERINEURAL at 09:39

## 2020-03-18 RX ADMIN — SODIUM CHLORIDE, SODIUM LACTATE, POTASSIUM CHLORIDE, AND CALCIUM CHLORIDE: 600; 310; 30; 20 INJECTION, SOLUTION INTRAVENOUS at 09:19

## 2020-03-18 RX ADMIN — Medication 2 G: at 09:30

## 2020-03-18 RX ADMIN — PROPOFOL 40 MG: 10 INJECTION, EMULSION INTRAVENOUS at 09:25

## 2020-03-18 RX ADMIN — LIDOCAINE HYDROCHLORIDE 30 MG: 20 INJECTION, SOLUTION EPIDURAL; INFILTRATION; INTRACAUDAL; PERINEURAL at 09:25

## 2020-03-18 RX ADMIN — PROPOFOL 100 MCG/KG/MIN: 10 INJECTION, EMULSION INTRAVENOUS at 09:25

## 2020-03-18 RX ADMIN — HEPARIN SODIUM (PORCINE) LOCK FLUSH IV SOLN 100 UNIT/ML 500 UNITS: 100 SOLUTION at 09:45

## 2020-03-18 NOTE — ANESTHESIA POSTPROCEDURE EVALUATION
Procedure(s): PORT PLACEMENT. total IV anesthesia Anesthesia Post Evaluation Patient location during evaluation: PACU Patient participation: complete - patient participated Level of consciousness: awake and alert Pain management: adequate Airway patency: patent Anesthetic complications: no 
Cardiovascular status: acceptable Respiratory status: acceptable Hydration status: acceptable Post anesthesia nausea and vomiting:  controlled Visit Vitals BP (!) 132/93 Pulse 78 Temp 36.4 °C (97.6 °F) Resp 16 SpO2 93%

## 2020-03-18 NOTE — PROGRESS NOTES
Recovery period without difficulty. Pt alert and oriented and denies pain. Dressing is clean, dry, and intact. Reviewed discharge instructions with patient and family, both verbalized understanding. Pt escorted to lobby discharge area via wheelchair. Vital signs and Nel score completed.

## 2020-03-18 NOTE — PROGRESS NOTES
Spoke with Dr. Bia Weeks anesthesiologist and permission received to discharge patient from outpatient surgery .

## 2020-03-18 NOTE — PROGRESS NOTES
Spiritual Care visit. Initial Visit, Pre Surgery Consult. Visit and prayer before patient goes to surgery. Visit by Pa Fernandez M.Ed., Th.B. ,Staff

## 2020-03-18 NOTE — PROCEDURES
Department of Interventional Radiology 
[phone] Interventional Radiology Brief Procedure Note Patient: Ananda Barajas MRN: [id number]  SSN: [id number] YOB: 1950  Age: 81 y.o. Sex: male Date of Procedure: 3/18/2020 Pre-Procedure Diagnosis: lung cancer Post-Procedure Diagnosis: SAME Procedure(s): Venous Chest [facility] Placement Brief Description of Procedure: US, fluoro guided right IJ venous chest port placed Performed By: Donny Mike PA-C Assistants: None Anesthesia:TIVS/MAC Estimated Blood Loss: Less than 10ml Specimens:  None Implants:  Chest [facility] Placement Findings: catheter tip in right atrium Complications: None Recommendations: ok to use port Follow Up: referring MD 
 
Signed By: Donny Mike PA-C March 18, 2020

## 2020-03-18 NOTE — ANESTHESIA PREPROCEDURE EVALUATION
Anesthetic History No history of anesthetic complications Review of Systems / Medical History Patient summary reviewed and pertinent labs reviewed Pulmonary Smoker (former) Comments: Lung CA Neuro/Psych Within defined limits Cardiovascular Hypertension Exercise tolerance: >4 METS: Denied chest pain, SOB, syncope GI/Hepatic/Renal 
  
GERD: well controlled Endo/Other Obesity, arthritis and cancer (lung) Other Findings Physical Exam 
 
Airway Mallampati: II 
TM Distance: > 6 cm Neck ROM: normal range of motion Mouth opening: Normal 
 
 Cardiovascular Rhythm: regular Rate: normal 
 
 
 
 Dental 
 
Dentition: Caps/crowns Pulmonary Breath sounds clear to auscultation Abdominal 
GI exam deferred Other Findings Anesthetic Plan ASA: 2 Anesthesia type: total IV anesthesia Induction: Intravenous Anesthetic plan and risks discussed with: Patient Girlfriend present

## 2020-03-18 NOTE — DISCHARGE INSTRUCTIONS
[address]  Department of Interventional Radiology  [facility]  [phone] Office  [phone] Fax  Implanted Port Discharge Instructions      General Instructions:   A port is like an implanted IV. They are usually ordered for patients who will be getting chemotherapy, but can also be used as an IV for long term antibiotics, large amounts of fluids, and/or blood products. Your blood can be drawn from your port for labs also. Those patients who do not have good veins find the ports convenient as they can get the IV they need with one stick. The port can be used long term, and the care is easy. The device is under the skin, and once the skin heals, care is minimal. All that is required is the nurse who accesses the port will need to flush it with heparinized saline after each use. Ports are usually placed in the chest wall, usually on the right side. But they can be place in the arms and in the abdomen. Home Care Instructions: If your port is in your arm, do not allow blood pressure or other IVs to be place in that arm. Do not allow bra straps or any clothing to rub the skin over the port. Do not bathe or swim until the skin has healed and if the port is accessed. Once it is healed, and when the port is not accessed, it is okay to bathe and swim. Restrict yourself to light activity for the first 5 days after getting the port put in, after that, resume normal activity slowly. You may resume your normal diet and medications. Follow-Up Instructions: Please see your oncologist, or whatever physician ordered the port as he/she has requested of you. Call If: You should call your Physician and/or the Radiology Nurse if you notice redness, pus, swelling, or pain from the area of your incision. Call if you should develop a fever. The nurses who access your port will know to call your doctor if the port does not seem to be working properly. You need to tell the nurses who use the port if you should have any pain or swelling at the site during an infusion. To Reach Us: Interventional Radiology General Nurse Discharge    After general anesthesia or intravenous sedation, for 24 hours or while taking prescription Narcotics:  · Limit your activities  · Do not drive and operate hazardous machinery  · Do not make important personal or business decisions  · Do  not drink alcoholic beverages  · If you have not urinated within 8 hours after discharge, please contact your surgeon on call. * Please give a list of your current medications to your Primary Care Provider. * Please update this list whenever your medications are discontinued, doses are     changed, or new medications (including over-the-counter products) are added. * Please carry medication information at all times in case of emergency situations. These are general instructions for a healthy lifestyle:    No smoking/ No tobacco products/ Avoid exposure to second hand smoke  Surgeon General's Warning:  Quitting smoking now greatly reduces serious risk to your health. Obesity, smoking, and sedentary lifestyle greatly increases your risk for illness  A healthy diet, regular physical exercise & weight monitoring are important for maintaining a healthy lifestyle    You may be retaining fluid if you have a history of heart failure or if you experience any of the following symptoms:  Weight gain of 3 pounds or more overnight or 5 pounds in a week, increased swelling in our hands or feet or shortness of breath while lying flat in bed. Please call your doctor as soon as you notice any of these symptoms; do not wait until your next office visit.     Recognize signs and symptoms of STROKE:  F-face looks uneven    A-arms unable to move or move unevenly    S-speech slurred or non-existent    T-time-call 911 as soon as signs and symptoms begin-DO NOT go       Back to bed or wait to see if you get better-TIME IS BRAIN. If you have any questions about your procedure, please call the Interventional Radiology department at [phone]. After business hours (5pm) and weekends, call the answering service at [phone] and ask for the Radiologist on call to be paged.    .         Patient Signature:  Date: 3/18/2020  Discharging Nurse: Vipin Miranda RN

## 2020-03-18 NOTE — H&P
Department of Interventional Radiology 
[phone] History and Physical 
 
Patient:  Sally Craig. MRN:  [id number]  SSN:  [id number] YOB: 1950  Age:  81 y.o. Sex:  male Primary Care Provider:  Karen Mckeon MD 
Referring Physician:  Liam Gillette MD 
 
Subjective: Chief Complaint: port History of the Present Illness: The patient is a 81 y.o. male with lung cancer who presents for venous chest port placement. NPO x Toprol. Past Medical History:  
Diagnosis Date  GERD (gastroesophageal reflux disease)  Hypertension  Hypoxia 02/24/2020  Malignant neoplasm of lower lobe of right lung ([address] 2/26/2020  Osteoarthritis  Right lower lobe lung mass 02/24/2020  Status post total right knee replacement 2/29/2016 Past Surgical History:  
Procedure Laterality Date  HX COLONOSCOPY    
 HX KNEE ARTHROSCOPY Left   
 scope X 1, ACL recon X 1  
 HX KNEE ARTHROSCOPY Right 1974, 1975 X 2   
 HX KNEE REPLACEMENT Right [facility] \"nasal plasty\" Review of Systems:   
Pertinent items are noted in the History of Present Illness. Prior to Admission medications Medication Sig Start Date End Date Taking? Authorizing Provider  
chlorthalidone (HYGROTEN) 25 mg tablet Take 1 Tab by mouth daily for 30 days. 2/29/20 3/30/20 Yes Clifford Frank MD  
metoprolol succinate (TOPROL-XL) 50 mg XL tablet Take 1 Tab by mouth daily for 30 days. 2/29/20 3/30/20 Yes Clifford Frank MD  
esomeprazole (NexIUM) 20 mg capsule Take  by mouth daily. Provider, Historical  
betamethasone dipropionate (DIPROSONE) 0.05 % topical cream  3/7/20   Provider, Historical  
prochlorperazine (COMPAZINE) 10 mg tablet Take 1 Tab by mouth every six (6) hours as needed for Nausea.  Indications: nausea and vomiting caused by cancer drugs, nausea and vomiting 3/10/20   Liam Gillette MD  
 ondansetron hcl (ZOFRAN) 8 mg tablet Take 1 Tab by mouth every eight (8) hours as needed for Nausea. Indications: nausea and vomiting caused by cancer drugs 3/10/20   Adriel Medel MD  
lidocaine-prilocaine (EMLA) topical cream Apply  to affected area as needed for Pain. 3/10/20   Adriel Medel MD  
  
 
Allergies Allergen Reactions  Celebrex [Celecoxib] Itching Family History Problem Relation Age of Onset  Cancer Mother   
     uterine  Arrhythmia Sister   
     afib Social History Tobacco Use  Smoking status: Former Smoker Packs/day: 1.00 Years: 20.00 Pack years: 20.00 Last attempt to quit:  Years since quittin.2  Smokeless tobacco: Never Used Substance Use Topics  Alcohol use: Yes Alcohol/week: 4.0 standard drinks Types: 4 Glasses of wine per week Objective:    
 
Physical Examination:   
Vitals:  
 20 8139 BP: (!) 147/97 Pulse: 86 Resp: 18 Temp: 98.1 °F (36.7 °C) SpO2: 92% Pain Assessment Pain 0 HEART: regular rate and rhythm LUNG: clear to auscultation bilaterally ABDOMEN: normal findings: soft, non-tender EXTREMITIES: normal strength, tone, and muscle mass Laboratory:    
Lab Results Component Value Date/Time  Sodium 137 03/10/2020 08:53 AM  
 Sodium 140 2020 03:46 AM  
 Potassium 4.1 03/10/2020 08:53 AM  
 Potassium 4.0 2020 03:46 AM  
 Chloride 104 03/10/2020 08:53 AM  
 Chloride 108 (H) 2020 03:46 AM  
 CO2 27 03/10/2020 08:53 AM  
 CO2 26 2020 03:46 AM  
 Anion gap 6 (L) 03/10/2020 08:53 AM  
 Anion gap 6 (L) 2020 03:46 AM  
 Glucose 121 (H) 03/10/2020 08:53 AM  
 Glucose 95 2020 03:46 AM  
 BUN 11 03/10/2020 08:53 AM  
 BUN 9 2020 03:46 AM  
 Creatinine 1.15 03/10/2020 08:53 AM  
 Creatinine 0.93 2020 03:46 AM  
 GFR est AA >60 03/10/2020 08:53 AM  
 GFR est AA >60 2020 03:46 AM  
 GFR est non-AA >60 03/10/2020 08:53 AM  
 GFR est non-AA >60 02/25/2020 03:46 AM  
 Calcium 9.6 03/10/2020 08:53 AM  
 Calcium 9.1 02/25/2020 03:46 AM  
 Albumin 3.9 03/10/2020 08:53 AM  
 Albumin 4.3 02/23/2020 04:07 PM  
 Protein, total 8.0 03/10/2020 08:53 AM  
 Protein, total 8.2 02/23/2020 04:07 PM  
 Globulin 4.1 (H) 03/10/2020 08:53 AM  
 Globulin 3.9 (H) 02/23/2020 04:07 PM  
 A-G Ratio 1.0 (L) 03/10/2020 08:53 AM  
 A-G Ratio 1.1 (L) 02/23/2020 04:07 PM  
 AST (SGOT) 19 03/10/2020 08:53 AM  
 AST (SGOT) 28 02/23/2020 04:07 PM  
 ALT (SGPT) 18 03/10/2020 08:53 AM  
 ALT (SGPT) 19 02/23/2020 04:07 PM  
 
Lab Results Component Value Date/Time WBC 8.2 03/10/2020 08:53 AM  
 WBC 9.4 02/25/2020 03:46 AM  
 HGB 17.3 (H) 03/10/2020 08:53 AM  
 HGB 16.5 02/25/2020 03:46 AM  
 HCT 51.0 (H) 03/10/2020 08:53 AM  
 HCT 48.9 02/25/2020 03:46 AM  
 PLATELET 447 56/66/2635 08:53 AM  
 PLATELET 949 65/31/0872 03:46 AM  
 
Lab Results Component Value Date/Time  
 aPTT 28.0 02/08/2016 09:15 AM  
 Prothrombin time 11.0 02/08/2016 09:15 AM  
 INR 1.1 02/08/2016 09:15 AM  
 
 
Assessment:  
 
Lung cancer Hospital Problems  Date Reviewed: 3/10/2020 None Plan:  
 
Planned Procedure:  Port placement Risks, benefits, and alternatives reviewed with patient and he agrees to proceed with the procedure. Signed By: Alan Patel PA-C March 18, 2020

## 2020-03-19 ENCOUNTER — DOCUMENTATION ONLY (OUTPATIENT)
Dept: HEMATOLOGY | Age: 70
End: 2020-03-19

## 2020-03-19 ENCOUNTER — HOSPITAL ENCOUNTER (OUTPATIENT)
Dept: PET IMAGING | Age: 70
Discharge: HOME | End: 2020-03-19
Payer: COMMERCIAL

## 2020-03-19 DIAGNOSIS — R91.8 LUNG MASS: ICD-10-CM

## 2020-03-19 PROCEDURE — 74011636320 HC RX REV CODE- 636/320: Performed by: INTERNAL MEDICINE

## 2020-03-19 PROCEDURE — A9552 F18 FDG: HCPCS

## 2020-03-19 RX ORDER — SODIUM CHLORIDE 0.9 % (FLUSH) 0.9 %
10 SYRINGE (ML) INJECTION
Status: COMPLETED | OUTPATIENT
Start: 2020-03-19 | End: 2020-03-19

## 2020-03-19 RX ADMIN — Medication 10 ML: at 09:48

## 2020-03-19 RX ADMIN — DIATRIZOATE MEGLUMINE AND DIATRIZOATE SODIUM 10 ML: 660; 100 LIQUID ORAL; RECTAL at 09:48

## 2020-03-19 NOTE — PROGRESS NOTES
I spoke with Mr. Juvencio Madison. regarding his insurance coverage, potential oral medication authorizations, enrollment in the [address] [place] [address] ([facility]) and the [address] [facility] ([address]), and assistance organization resource sheet. Mr. Juvencio Vitale Has [employer] and Medicare A/B insurances. With [employer], he has a $1,500 deductible ($544 remaining). Once met, insurance pays approved claims at 80% with a 20% [place]. He has a max OP with this policy of $0,965 ($1,332.30 reminding). All approved claims with [facility] will to go his Medicare insurance to be paid. Medicare pays approved claims at 80% with a 20% [place]. Next, I spoke with Mr. Juvencio Madison. regarding potential oral medication authorizations. I told him that if he ever had any problems getting his oral medications filled to give the dedicated [facility] , Aye Smith, a call. Most of the time, it is simply an authorization that needs to be done with the insurance company. Next, I spoke with Mr. Juvencio Vitale regarding enrolling with [facility] and [facility]. I went over some of the services that [facility] and [facility] offers and the enrollment process. Mr. Juvencio Vitale Declined the enrollment with [facility]. Next, I gave Mr. Juvencio Vitale flyers about the free Yoga classes for cancer survivors and the Oncology Massage program.  I let him know that he can get a free 30 minute massage. Lastly, I gave Mr. Juvencio Vitale a form with various resource organizations that could assist with specific needs (example:  transportation, lodging, preparing meals, home cleaning)     Faxed Physician's Statement to the [address] at [phone]. Phone [phone]. Form scanned into chart. Patient expressed understanding of the information above and all questions were answered to his satisfaction. LPOA will be scanned into chart.

## 2020-03-27 ENCOUNTER — HOSPITAL ENCOUNTER (OUTPATIENT)
Dept: RADIATION ONCOLOGY | Age: 70
Discharge: HOME | End: 2020-03-27
Payer: COMMERCIAL

## 2020-03-27 PROCEDURE — 77301 RADIOTHERAPY DOSE PLAN IMRT: CPT

## 2020-03-27 PROCEDURE — 77399 UNLISTED PX MED RADJ PHYSICS: CPT

## 2020-03-27 PROCEDURE — 77300 RADIATION THERAPY DOSE PLAN: CPT

## 2020-03-30 ENCOUNTER — HOSPITAL ENCOUNTER (OUTPATIENT)
Dept: RADIATION ONCOLOGY | Age: 70
Discharge: HOME | End: 2020-03-30
Payer: COMMERCIAL

## 2020-03-30 ENCOUNTER — HOSPITAL ENCOUNTER (OUTPATIENT)
Dept: LAB | Age: 70
Discharge: HOME | End: 2020-03-30
Payer: COMMERCIAL

## 2020-03-30 DIAGNOSIS — C34.31 MALIGNANT NEOPLASM OF LOWER LOBE OF RIGHT LUNG (HCC): ICD-10-CM

## 2020-03-30 LAB
ALBUMIN SERPL-MCNC: 3.6 G/DL (ref 3.2–4.6)
ALBUMIN/GLOB SERPL: 0.9 {RATIO} (ref 1.2–3.5)
ALP SERPL-CCNC: 90 U/L (ref 50–136)
ALT SERPL-CCNC: 13 U/L (ref 12–65)
ANION GAP SERPL CALC-SCNC: 5 MMOL/L (ref 7–16)
AST SERPL-CCNC: 15 U/L (ref 15–37)
BASOPHILS # BLD: 0.1 K/UL (ref 0–0.2)
BASOPHILS NFR BLD: 1 % (ref 0–2)
BILIRUB SERPL-MCNC: 0.5 MG/DL (ref 0.2–1.1)
BUN SERPL-MCNC: 12 MG/DL (ref 8–23)
CALCIUM SERPL-MCNC: 9.4 MG/DL (ref 8.3–10.4)
CHLORIDE SERPL-SCNC: 107 MMOL/L (ref 98–107)
CO2 SERPL-SCNC: 26 MMOL/L (ref 21–32)
CREAT SERPL-MCNC: 1.01 MG/DL (ref 0.8–1.5)
DIFFERENTIAL METHOD BLD: NORMAL
EOSINOPHIL # BLD: 0.2 K/UL (ref 0–0.8)
EOSINOPHIL NFR BLD: 3 % (ref 0.5–7.8)
ERYTHROCYTE [DISTWIDTH] IN BLOOD BY AUTOMATED COUNT: 12.6 % (ref 11.9–14.6)
GLOBULIN SER CALC-MCNC: 4.1 G/DL (ref 2.3–3.5)
GLUCOSE SERPL-MCNC: 113 MG/DL (ref 65–100)
HCT VFR BLD AUTO: 48.4 % (ref 41.1–50.3)
HGB BLD-MCNC: 16.5 G/DL (ref 13.6–17.2)
IMM GRANULOCYTES # BLD AUTO: 0 K/UL (ref 0–0.5)
IMM GRANULOCYTES NFR BLD AUTO: 0 % (ref 0–5)
LYMPHOCYTES # BLD: 2.2 K/UL (ref 0.5–4.6)
LYMPHOCYTES NFR BLD: 26 % (ref 13–44)
MCH RBC QN AUTO: 30.3 PG (ref 26.1–32.9)
MCHC RBC AUTO-ENTMCNC: 34.1 G/DL (ref 31.4–35)
MCV RBC AUTO: 89 FL (ref 79.6–97.8)
MONOCYTES # BLD: 0.6 K/UL (ref 0.1–1.3)
MONOCYTES NFR BLD: 8 % (ref 4–12)
NEUTS SEG # BLD: 5.2 K/UL (ref 1.7–8.2)
NEUTS SEG NFR BLD: 63 % (ref 43–78)
NRBC # BLD: 0 K/UL (ref 0–0.2)
PLATELET # BLD AUTO: 184 K/UL (ref 150–450)
PMV BLD AUTO: 10.7 FL (ref 9.4–12.3)
POTASSIUM SERPL-SCNC: 3.9 MMOL/L (ref 3.5–5.1)
PROT SERPL-MCNC: 7.7 G/DL (ref 6.3–8.2)
RBC # BLD AUTO: 5.44 M/UL (ref 4.23–5.67)
SODIUM SERPL-SCNC: 138 MMOL/L (ref 136–145)
WBC # BLD AUTO: 8.4 K/UL (ref 4.3–11.1)

## 2020-03-30 PROCEDURE — 85025 COMPLETE CBC W/AUTO DIFF WBC: CPT

## 2020-03-30 PROCEDURE — 77338 DESIGN MLC DEVICE FOR IMRT: CPT

## 2020-03-30 PROCEDURE — 36415 COLL VENOUS BLD VENIPUNCTURE: CPT

## 2020-03-30 PROCEDURE — 80053 COMPREHEN METABOLIC PANEL: CPT

## 2020-03-30 NOTE — ADT AUTH CERT NOTES
Letter of Recommendation for Inpatient by Celi Meyer RN  
 
   
Review Status Review Entered In Primary 3/30/2020 09:39  
   
Criteria Review Letter of Determination: Inpatient Status Appropriate 
  
This patient was originally hospitalized as Inpatient Status on 2/23/2020 for 
hemoptysis. This patient is appropriate for Inpatient Admission in accordance 
with CMS regulation Section 43 .3. Specifically, patient's stay is 
expected to be more than Two Midnights and was medically necessary. The 
patient's stay was medically necessary based on right lower lung mass, with plan 
for tissue biopsy with pulmonary medicine. Consistent with CMS guidelines, 
patient meets for inpatient status. 
  
It is our recommendation that this patient's hospitalization status should be INPATIENT status.  
  
The final decision regarding the patient's hospitalization status depends on the 
attending physician's judgement. 
  
Ze Forrest MD, VICTOR MANUEL, Physician Advisor [address] [place] [address].  
   
Additional Clinical requested 3/30/20 by Celi Meyer RN  
 
   
Review Status Review Entered In Primary 3/30/2020 09:36  
   
Criteria Review 2/24/20 
  
Vitals: 98.0, 102, 18, 145/101, 94% 4L NC Meds: NS infusion 100mL/hr, hycodan 5mL PO Q4h, metoprolol 50mg PO daily Labs: chloride 112, anion gap 5, glucose 101, CEA 5.0 
CT abd/pelv: No specific CT evidence of abdominopelvic metastatic disease. Cholelithiasis and sigmoid diverticulosis without evidence of cholecystitis, diverticulitis, or other acute abdominopelvic abnormality identified. Plan: IV fluids, SCDs, regular diet 
  
IM: 
Pt s/o mild shortness of breath.  Occ hemoptysis.  Awaiting pulm med eval.  Heme onc eval pt this am.  CT AP ordered for further staging.  Pt denies CP.  No N/V/D.  No F/C. 
  
PLAN:   
  
Hemopytsis 
-likely due to lung CA 
-Pulm and oncology consulted 
  
Lung Mass 
-as above 
  
 Acute Hypoxemic Resp Failure 
-in part related to lung mass/cancer, may have underlying COPD as well 
  
HTN 
-home rx, monitor 
  
DVT Prophy 
-SCD's due to hemopysis 
  
Pulmonary: 
--Will look for time time for bronch with Bx and likely EBUS to determine if mets to R hilar node and subcarinal node assuming there are no other metastatic lesions on the abdominopelvic CT. --Likely will need MRI with reg involvement.  
--Bronch tentative for Wednesday at 2 PM .  
  
Hema/Onc: 
RLL mass 
- CT chest with RLL mass that obstructs RLL airway with complete collapse of the RLL; R hilar adenopathy; and RML with fluid/?blood.   
- Pulm consult pending.  Will need bx for tissue dx. - Check CT AP to complete staging.  Check CEA.

## 2020-03-31 ENCOUNTER — HOSPITAL ENCOUNTER (OUTPATIENT)
Dept: RADIATION ONCOLOGY | Age: 70
Discharge: HOME | End: 2020-03-31
Payer: COMMERCIAL

## 2020-03-31 ENCOUNTER — HOSPITAL ENCOUNTER (OUTPATIENT)
Dept: INFUSION THERAPY | Age: 70
Discharge: HOME | End: 2020-03-31
Payer: COMMERCIAL

## 2020-03-31 VITALS
SYSTOLIC BLOOD PRESSURE: 113 MMHG | WEIGHT: 215 LBS | BODY MASS INDEX: 30.85 KG/M2 | HEART RATE: 92 BPM | OXYGEN SATURATION: 92 % | TEMPERATURE: 97.8 F | RESPIRATION RATE: 17 BRPM | DIASTOLIC BLOOD PRESSURE: 82 MMHG

## 2020-03-31 DIAGNOSIS — C34.31 MALIGNANT NEOPLASM OF LOWER LOBE OF RIGHT LUNG (HCC): Primary | ICD-10-CM

## 2020-03-31 PROCEDURE — 74011250636 HC RX REV CODE- 250/636: Performed by: INTERNAL MEDICINE

## 2020-03-31 PROCEDURE — 74011000258 HC RX REV CODE- 258: Performed by: INTERNAL MEDICINE

## 2020-03-31 PROCEDURE — 77386 HC IMRT TRMT DLVR COMPL: CPT

## 2020-03-31 PROCEDURE — 74011000250 HC RX REV CODE- 250: Performed by: INTERNAL MEDICINE

## 2020-03-31 PROCEDURE — 96375 TX/PRO/DX INJ NEW DRUG ADDON: CPT

## 2020-03-31 PROCEDURE — 96417 CHEMO IV INFUS EACH ADDL SEQ: CPT

## 2020-03-31 PROCEDURE — 96413 CHEMO IV INFUSION 1 HR: CPT

## 2020-03-31 RX ORDER — SODIUM CHLORIDE 0.9 % (FLUSH) 0.9 %
10 SYRINGE (ML) INJECTION AS NEEDED
Status: ACTIVE | OUTPATIENT
Start: 2020-03-31 | End: 2020-03-31

## 2020-03-31 RX ORDER — ONDANSETRON 2 MG/ML
8 INJECTION INTRAMUSCULAR; INTRAVENOUS AS NEEDED
Status: DISPENSED | OUTPATIENT
Start: 2020-03-31 | End: 2020-03-31

## 2020-03-31 RX ORDER — SODIUM CHLORIDE 0.9 % (FLUSH) 0.9 %
10-30 SYRINGE (ML) INJECTION AS NEEDED
Status: DISCONTINUED | OUTPATIENT
Start: 2020-03-31 | End: 2020-04-04 | Stop reason: HOSPADM

## 2020-03-31 RX ORDER — DIPHENHYDRAMINE HYDROCHLORIDE 50 MG/ML
50 INJECTION, SOLUTION INTRAMUSCULAR; INTRAVENOUS ONCE
Status: COMPLETED | OUTPATIENT
Start: 2020-03-31 | End: 2020-03-31

## 2020-03-31 RX ORDER — DIPHENHYDRAMINE HYDROCHLORIDE 50 MG/ML
25 INJECTION, SOLUTION INTRAMUSCULAR; INTRAVENOUS AS NEEDED
Status: DISPENSED | OUTPATIENT
Start: 2020-03-31 | End: 2020-03-31

## 2020-03-31 RX ORDER — ONDANSETRON 2 MG/ML
8 INJECTION INTRAMUSCULAR; INTRAVENOUS ONCE
Status: COMPLETED | OUTPATIENT
Start: 2020-03-31 | End: 2020-03-31

## 2020-03-31 RX ORDER — EPINEPHRINE 1 MG/ML
0.3 INJECTION, SOLUTION, CONCENTRATE INTRAVENOUS AS NEEDED
Status: DISPENSED | OUTPATIENT
Start: 2020-03-31 | End: 2020-03-31

## 2020-03-31 RX ORDER — SODIUM CHLORIDE 9 MG/ML
25 INJECTION, SOLUTION INTRAVENOUS CONTINUOUS
Status: ACTIVE | OUTPATIENT
Start: 2020-03-31 | End: 2020-03-31

## 2020-03-31 RX ORDER — HYDROCORTISONE SODIUM SUCCINATE 100 MG/2ML
100 INJECTION, POWDER, FOR SOLUTION INTRAMUSCULAR; INTRAVENOUS AS NEEDED
Status: DISPENSED | OUTPATIENT
Start: 2020-03-31 | End: 2020-03-31

## 2020-03-31 RX ORDER — ALBUTEROL SULFATE 0.83 MG/ML
2.5 SOLUTION RESPIRATORY (INHALATION) AS NEEDED
Status: DISPENSED | OUTPATIENT
Start: 2020-03-31 | End: 2020-03-31

## 2020-03-31 RX ADMIN — DIPHENHYDRAMINE HYDROCHLORIDE 50 MG: 50 INJECTION, SOLUTION INTRAMUSCULAR; INTRAVENOUS at 09:49

## 2020-03-31 RX ADMIN — ONDANSETRON 8 MG: 2 INJECTION INTRAMUSCULAR; INTRAVENOUS at 09:51

## 2020-03-31 RX ADMIN — Medication 10 ML: at 09:42

## 2020-03-31 RX ADMIN — SODIUM CHLORIDE 25 ML/HR: 900 INJECTION, SOLUTION INTRAVENOUS at 10:28

## 2020-03-31 RX ADMIN — DEXAMETHASONE SODIUM PHOSPHATE 12 MG: 4 INJECTION, SOLUTION INTRAMUSCULAR; INTRAVENOUS at 10:13

## 2020-03-31 RX ADMIN — FAMOTIDINE 20 MG: 10 INJECTION, SOLUTION INTRAVENOUS at 09:52

## 2020-03-31 RX ADMIN — Medication 10 ML: at 13:02

## 2020-03-31 RX ADMIN — Medication 10 ML: at 09:25

## 2020-03-31 RX ADMIN — PACLITAXEL 100 MG: 6 INJECTION, SOLUTION INTRAVENOUS at 10:58

## 2020-03-31 RX ADMIN — SODIUM CHLORIDE 246 MG: 900 INJECTION, SOLUTION INTRAVENOUS at 12:00

## 2020-03-31 NOTE — PROGRESS NOTES
Arrived to the [facility]. Assessment complete, labs reviewed. Consent obtained/verified for chemotherapy. D1C1 Taxol and Carboplatin completed. Patient tolerated without problems. Pt moniterd thirty minuets post completion of treatment, no issues noted. Any issues or concerns during appointment: Pt educated on how to take prescribed antiemetics. Pt instructed to call with any concerns or issues. Pt verbalized understanding. Time for questions allowed. Pt denies any questions at this time. Patient aware of next infusion appointment on 4/6/2020 (date) at 1400 (time). Discharged ambulatory.

## 2020-03-31 NOTE — ADT AUTH CERT NOTES
Progress Note dos 3/28/20 by Dimas Blizzard, RN  
 
   
Review Status Review Entered In Primary 3/31/2020 13:09  
   
Criteria Review  
dos 3/28/20 Hospitalist Progress Note: 
  
Patient states he still dyspneic but may be improved. Zoraida Chapman is having scant hemoptysis but this is certainly reduced in amount.  Denies any chest pain.  No fevers or chills.  No nausea, vomiting, diarrhea. 
  
VS 97.8  -  102  -  18  -  112/93 O2 sat 93% 3 liters NC 
  
General:          Well nourished.  Alert.  NAD.  Pleasant. Nontoxic appearance.                    Min conversational dyspnea Skin                 Warm and dry.  No rash. OP:                  Moist 
CV:                  RRR.  No murmur, rub, or gallop. Lungs:             CTAB.  No wheezing, rhonchi, or rales.  Dimin bs bases Abdomen:        Soft, nontender, nondistended. Extremities:     Warm and dry.  No cyanosis or edema. Neuro:             Nonfocal 
  
A/P: 
Hemopytsis 
-likely due to lung CA 
-Pulm and oncology consulted 2/26:  Bronchoscopy with rLL Bx--->Nonsmall Cell Lung CA 
-CT A/P no evidence of metastasis 2/28: Mopped assist improving 
  
Lung Cancer 
-Pulm and Onc following 2/28: Awaiting final oncology recommendations 
-plannningfor chemo 
  
Acute Hypoxemic Resp Failure 
-in part related to lung mass/cancer, may have underlying COPD as well 2/28:  Wean supplem O2 as able, incentive spirom 
-no obv infection 
-will likely need home O2 
  
HTN 
-home rx, monitor 
  
DVT Prophy 
-SCD's due to hemoptysis 
  
Pulmonary Progress Note: 
  
Not coughing up as much blood - none for at least an hour. Has talked to oncology and surgery and sounds like chemo with radiation favored.   
  
Physical Exam:  
Constitutional:  the patient is well developed and in no acute distress Lungs: a few crackles in the left base. Oxygen via cannula - down to 3 liters Cardiovascular:  RRR without M,G,R 
  
LAB: none today 
  
Assessment:  (Medical Decision Making)  
  
                      
Hospital Problems      
                  
  Malignant neoplasm of lower lobe of right lung (HCC)            
     
  Hypoxia            
     
  Acute respiratory failure with hypoxia (HCC)            
     
  * (Principal) Hemoptysis            
     
  Mass of lower lobe of right lung            
     
  Right lower lobe lung mass            
     
  Essential hypertension with goal blood pressure less than 130/80 (Chronic)            
     
   
  
  
Plan:  (Medical Decision Making) 1. Has been seen by surgery and oncology. Dr. Anthony Templeton noted that the mass is touching/invading the right main pulmonary artery trunk just distal to the upper lobe branches, and that upfront resection may be risky and could leave positive margin, especially in the setting of pneumonia. He prefers chemotherapy first if possible.  Per oncology -- Dr. Lorenza Wilson, oncology,  discussed the clinical staging and that [facility] be eligible for surgery, and that current hypoxia may be result of right to left shunt due to RLL atelectasis. Plans to arrange out pt PET and MRI. 2. Hemoptysis has improved. Will hold off on repeat bronch with cautery 3. Have RT assess oxygen needs - ? Home soon 
  
  
Oncology Progress Note: 
  
Sitting in the bed No complaints this AM 
  
ROS: 
No complaints this AM 
  
Physical Exam: 
Constitutional: Well developed, well nourished male in no acute distress, sitting comfortably in the hospital bed. HEENT: Normocephalic and atraumatic. Oropharynx is clear, mucous membranes are moist.  Pupils are equal, round, and reactive to light. Extraocular muscles are intact.  Sclerae anicteric. Neck supple without JVD. No thyromegaly present. Lymph node 
  deferred. Skin Warm and dry.  No bruising and no rash noted.  No erythema.  No pallor.    
Respiratory Lungs are clear to auscultation bilaterally without wheezes, rales or rhonchi, normal air exchange without accessory muscle use. CVS Normal rate, regular rhythm and normal S1 and S2.  No murmurs, gallops, or rubs.   
  
  
A/P 
RLL mass 
- CT chest with RLL mass that obstructs RLL airway with complete collapse of the RLL; R hilar adenopathy; and RML with fluid/?blood.   
- Pulm consult pending.  Will need bx for tissue dx. - Check CT AP to complete staging.  Check CEA. 2/27 abdomen and pelvis without signs of disease. Underwent bronch yesterday with concerns for non small cell lung cancer. Await final pathology. ?surgical candidate, will consult Dr. Jeff Valerio for further recommendations. Appreciate pulmonology's thoughts as well on surgery.  
2/28 surgery recommends chemo prior to surgical resection. Will await pathology for final treatment plan. Consult rad onc as he may need chemo/XRT.    
  
 Disposition: ok to discharge from hem/onc perspective when stable from a pulmonology perspective. He will need outpatient MRI and PET/CT scan which have been ordered.   
   
progress note dos 2/27/20 by Alvaro Cisneros RN  
 
   
Review Status Review Entered In Primary 3/31/2020 13:01  
   
Criteria Review Hospitalist Progress Note 
  
States his hemoptysis has slowed down.  No chest pain.  Shortness of breath improved. Bronchoscopy done yest, Jane Lung CA.   
  
VS  98.6  -  100  -  17  -  118/84 O2 sat 94% 5 liters NC 
  
General:          Well nourished.  Alert.  NAD.  Pleasant. Nontoxic appearance Skin                 Warm and dry.  No rash. OP:                  Moist 
CV:                  RRR.  No murmur, rub, or gallop. Lungs:             CTAB.  No wheezing, rhonchi, or rales.  Dimin bs bases Abdomen:        Soft, nontender, nondistended. Extremities:     Warm and dry.  No cyanosis or edema. Neuro:             Nonfocal 
  
A/P: 
Hemopytsis 
-likely due to lung CA 
-Pulm and oncology consulted 2/26:  Bronchoscopy planned this afternoon 
  
Lung Cancer -Pulm and Onc following 
  
Acute Hypoxemic Resp Failure 
-in part related to lung mass/cancer, may have underlying COPD as well 2/27:  Wean supplem O2 as able, incentive spirom 
  
HTN 
-home rx, monitor 
  
DVT Prophy 
-SCD's due to hemoptysis 
  
Pulmonary Progress Note: 
  
Patient underwent bronch with EBUS yesterday. Had station 7 and 11Rs biopsied and lymph nodes were negative. Endobronchial biopsies were then performed from the bronchus intermedius tumor which revealed malignancy, favor non small cell lung cancer. Pt's O2 need was up to 10L following the procedure. Down to 5L this morning. Still coughing up fresh blood every 20 minutes. Does not appear that cautery was attempted.  
  
Physical Exam:  
Constitution:  the patient is well developed and in no acute distress Respiratory: CTA B, no w/r/r 
Cardiovascular:  RRR without M,G,R 
  
Assessment:  (Medical Decision Making)  
  
                       
                     
Hospital Problems      
                  
  Malignant neoplasm of lower lobe of right lung (HCC)            
     
  Hypoxia            
     
  Acute respiratory failure with hypoxia (HCC)            
     
  * (Principal) Hemoptysis            
     
  Mass of lower lobe of right lung            
     
  Right lower lobe lung mass            
     
  Essential hypertension with goal blood pressure less than 130/80 (Chronic)            
     
   
  
  
Patient with endobronchial disease fully occluding RML and RLL. Ongoing hemoptysis. Looks like non small cell lung cancer.  
  
Plan:  (Medical Decision Making)  
-will need cPFT's to determine if he is a surgical candidate.  
-will have to see if the location of the tumor is resectable in a way to leave the RUL as well.  
-having ongoing hemoptysis. Unsure why cautery was not attempted yesterday. Maybe due to high O2 requirement?  Discussed that needs to be able to turn O2 to less than 2-3L before could safely attempt cautery not for tumor removal purposes but just to slow his hemoptysis. See how low can wean his o2 today.  
  
  
  
Surgery Consult: 
  
Arti Nugent Jr. is L 89 y. o. male who has PMHx of GERD, HTN, OA who we are asked by Oncology to see for RLL mass.  Pt presented to the ED on 2/23/2020 with hemoptysis. Zoraida Chapman reports a h/o PNA, mild SOB, productive cough and intermittent hemoptysis since November. Zoraida Chapman had a CT chest in ED to r/o PE which demonstrated obstructing RLL lung mass. Zoraida Chapman had an EBUS yesterday with Dr. Grider that demonstrated RLL malignancy, favors NSCLC.  Station 7 and 11R LNS reportedly negative.  Possible stage IIB. Stephanie Valle continues to have hemoptysis after EBUS. Dr. Connie Caballero was consulted to evaluate for surgical candidacy.  
  
Pt has a 35 pack year history, he quit 6 years ago. He has a family h/o uterine cancer in his mother. Zoraida Chapman was not on O2 prior to this admission. His last colonoscopy was 15 years ago and he was found to have polyps but did not follow up. 
  
Constitutional: Alert, oriented, cooperative patient in no acute distress; appears stated age   
CV: RRR. Normal perfusion Resp: No JVD.  Breathing is  non-labored; no audible wheezing.  Decreased BS right lung base. 
  
A/P: 
Principal Problem: 
  Hemoptysis (2/24/2020) 
  Active Problems: 
  Essential hypertension with goal blood pressure less than 130/80 (2/19/2018)   
  Mass of lower lobe of right lung (2/23/2020)   
  Right lower lobe lung mass (2/23/2020)   
  Hypoxia (2/24/2020)   
  Acute respiratory failure with hypoxia ([address] (2/24/2020)   
  Malignant neoplasm of lower lobe of right lung ([address] (2/26/2020)   
  
  
Plan: 
 
Await cPFTS and final path Dr. Connie Caballero to review case and advise Continue current care 
  
I have seen and examined the patient with the Nurse Practitioner and agree with the above assessment and plan.  
  
Right lower lobe mass with hemoptysis and post obstructive pneumonia, regional LN are negative on EBUS (still waiting for final results), however the mass is touching/invading the right main pulmonary artery trunk just distal to the upper lobe branches, upfront resection may be risky and could leave positive margin, especially in the setting of pneumonia. I would prefer chemotherapy first if possible, his hemoptysis appears mild and pneumonia appears in control. Will discuss with oncology.  
  
Shasta Spaulding MD 
  
Oncology Progress Note 
  
24 Hour Events: 
Had bronch yesterday Sitting in the bed Girlfriend at bedside  
  
ROS: 
No complaints this AM 
  
Physical Exam: 
Constitutional: Well developed, well nourished male in no acute distress, sitting comfortably in the hospital bed. HEENT: Normocephalic and atraumatic. Oropharynx is clear, mucous membranes are moist.  Pupils are equal, round, and reactive to light. Extraocular muscles are intact.  Sclerae anicteric. Neck supple without JVD. No thyromegaly present. Lymph node 
  deferred. Skin Warm and dry.  No bruising and no rash noted.  No erythema.  No pallor. Respiratory Lungs are clear to auscultation bilaterally without wheezes, rales or rhonchi, normal air exchange without accessory muscle use. CVS Normal rate, regular rhythm and normal S1 and S2.  No murmurs, gallops, or rubs.  
  
AP 
RLL mass 
- CT chest with RLL mass that obstructs RLL airway with complete collapse of the RLL; R hilar adenopathy; and RML with fluid/?blood.   
- Pulm consult pending.  Will need bx for tissue dx. - Check CT AP to complete staging.  Check CEA. 2/27 abdomen and pelvis without signs of disease. Underwent bronch yesterday with concerns for non small cell lung cancer. Await final pathology. ?surgical candidate, will consult Dr. Army Coe for further recommendations. Appreciate pulmonology's thoughts as well on surgery.  
   
   
progress note dos 2/26/20 by Chinmay Hare RN  
 
   
Review Status Review Entered In Primary 3/31/2020 12:52  
   
Criteria Review Hospitalist Progress Note States his hemoptysis has slowed down.  No chest pain.  Shortness of breath improved.  Awaiting bronchoscopy 
  
VS 98.8  -  95  -  19  -  131/94 O2 sat 91% 5 liters NC 
  
General:          Well nourished.  Alert.  NAD.  Pleasant.                    Nontoxic appearance Skin                 Warm and dry.  No rash. OP:                  Moist 
CV:                  RRR.  No murmur, rub, or gallop. Lungs:             CTAB.  No wheezing, rhonchi, or rales.                     Dimin   bs bases Abdomen:        Soft, nontender, nondistended. Extremities:     Warm and dry.  No cyanosis or edema. Neuro:             Nonfocal 
  
A/P: 
Hemopytsis 
-likely due to lung CA 
-Pulm and oncology consulted 2/26:  Bronchoscopy planned this afternoon 
  
Lung Mass 
-as above 
  
Acute Hypoxemic Resp Failure 
-in part related to lung mass/cancer, may have underlying COPD as well 2/26:  supplem O2 as neeed, incentive spirom 
  
HTN 
-home rx, monitor 
  
DVT Prophy 
-SCD's due to hemopys 
  
Pulmonary Progress Note 
  
Seen prior to bronchoscopy very little hemoptysis today. 
  
Physical Exam:  
Constitutional:  the patient is well developed and in no acute distress Lungs: clear bilaterally. Wearing 4 liter cannula. Cardiovascular:  RRR without M,G,R 
  
no labs or imaging 
  
Assessment:  (Medical Decision Making)  
  
                       
                       
Hospital Problems      
                 
  Hypoxia           
      
  Acute respiratory failure with hypoxia (HCC)           
      
  * (Principal) Hemoptysis           
      
  Mass of lower lobe of right lung           
      
  Right lower lobe lung mass           
      
  Essential hypertension with goal blood pressure less than 130/80 (Chronic)           
  controlled   
    
  
  
Plan:  (Medical Decision Making) 1. Proceed with  Bronch as scheduled 2. Oncology has seen - plans for MRI later. Awaiting biopsy results 3. Hycodan for cough suppression 4. Assess oxygen needs at discharge - may need at home 
  
  
  
Procedure Note: PROCEDURE Bronchoscopy with Endobronchial Ultrasound Guided Fine Needle Aspiration Of Medistinal Lymph nodes and airway inspection. 
  
INDICATION  
Diagnosis of Mediastinal Lymphadenopathy/Staging of Lung Cancer AIRWAY INSPECTION After obtaining informed consent, using a bite block, an Olympus T 180 viedeo bronchoscope was  introduced into the trachea through the vocal cords without complications. 
  
                                                            RIGHT 
LOCATION NORM/ABNORMAL DESCRIPTION  
VOCAL CORDS NL    
TRACHEA NL    
MARISSA NL    
RMSB NL    
RUL NL    
BI ABNL Completely occluded by tumor. airways below this level could neither be seen or probed RML      
SUP SEGM RLL      
MED BASAL      
ANTERIOR BASAL      
LATERAL BASAL      
POSTERIOR BASAL      
   
   
progress note dos 2/25/20 by Drake Bello RN  
 
   
Review Status Review Entered In Primary 3/31/2020 12:46  
   
Criteria Review Pulmonary Progress Note Continues to cough up about a tsp of pure blood.  Has been short of breath for several months at home. Ether Im has heard wheezing. Discussed xray results and plan for bronch tomorrow. 
  
Physical Exam:  
Constitutional:  the patient is well developed and in no acute distress Lungs: clear bilaterally. Wearing 4 liter cannula.  
Cardiovascular:  RRR without M,G,R 
  
WBC 9.4      
HGB 16.5      
HCT 48.9      
      
  
      
K 4.0      
*      
CO2 26      
GLU 95      
BUN 9      
CREA 0.93      
  
no imaging 
  
Assessment:  (Medical Decision Making)  
  
                  
                     
Hospital Problems      
                 
  Hypoxia          
      
  Acute respiratory failure with hypoxia (HCC)          
      
  * (Principal) Hemoptysis          
      
   Mass of lower lobe of right lung          
      
  Right lower lobe lung mass          
      
  Essential hypertension with goal blood pressure less than 130/80 (Chronic)          
  controlled   
   
  
  
Plan:  (Medical Decision Making) 1. Bronch scheduled for bronch tomorrow at 2 pm. CT abdomen negative. ? ablation/stent to RLL 2. Oncology has seen - plans for MRI later. Awaiting biopsy results 3. Hycodan for cough suppression 4. Assess oxygen needs at discharge - may need at home 
  
  
Hospitalist Progress Note 
  
Coughing up small amounts of blood at a time, less than a teaspoon.  Chest pain.  No shortness of breath.  Colostomy scheduled for tomorrow. Gabbie Oconnell input appreciated.  CT abdomen and pelvis negative for any obvious malignancy. 
  
VS 98.8  -  89  -  18  -  120/81 O2 sat 94% 4 liters NC 
  
  
General:          Well nourished.  Alert.  NAD.  Pleasant.                    Nontoxic appearance Skin                 Warm and dry.  No rash. OP:                   Moist 
CV:                  RRR.  No murmur, rub, or gallop. Lungs:             CTAB.  No wheezing, rhonchi, or rales.                     Dimin   bs bases Abdomen:        Soft, nontender, nondistended. Extremities:     Warm and dry.  No cyanosis or edema. Neuro:             Nonfocal 
  
A/P Hemopytsis 
-likely due to lung CA 
-Pulm and oncology consulted 2/25:  Bronchoscopy planned for tomorrow 
  
Lung Mass 
-as above 
  
Acute Hypoxemic Resp Failure 
-in part related to lung mass/cancer, may have underlying COPD as well 2/25:  supplem O2 as neeed 
  
HTN 
-home rx, monitor 
  
DVT Prophy 
-SCD's due to hemopysis

## 2020-04-01 ENCOUNTER — HOSPITAL ENCOUNTER (OUTPATIENT)
Dept: RADIATION ONCOLOGY | Age: 70
Discharge: HOME | End: 2020-04-01
Payer: COMMERCIAL

## 2020-04-01 ENCOUNTER — HOSPITAL ENCOUNTER (OUTPATIENT)
Dept: INFUSION THERAPY | Age: 70
End: 2020-04-01

## 2020-04-01 PROCEDURE — 77386 HC IMRT TRMT DLVR COMPL: CPT

## 2020-04-02 ENCOUNTER — HOSPITAL ENCOUNTER (OUTPATIENT)
Dept: RADIATION ONCOLOGY | Age: 70
Discharge: HOME | End: 2020-04-02
Payer: COMMERCIAL

## 2020-04-02 ENCOUNTER — HOSPITAL ENCOUNTER (OUTPATIENT)
Dept: INFUSION THERAPY | Age: 70
End: 2020-04-02

## 2020-04-02 PROCEDURE — 77386 HC IMRT TRMT DLVR COMPL: CPT

## 2020-04-03 ENCOUNTER — HOSPITAL ENCOUNTER (OUTPATIENT)
Dept: RADIATION ONCOLOGY | Age: 70
Discharge: HOME | End: 2020-04-03
Payer: COMMERCIAL

## 2020-04-03 ENCOUNTER — APPOINTMENT (OUTPATIENT)
Dept: INFUSION THERAPY | Age: 70
End: 2020-04-03

## 2020-04-03 PROCEDURE — 77399 UNLISTED PX MED RADJ PHYSICS: CPT

## 2020-04-03 PROCEDURE — 77293 RESPIRATOR MOTION MGMT SIMUL: CPT

## 2020-04-03 PROCEDURE — 77386 HC IMRT TRMT DLVR COMPL: CPT

## 2020-04-03 PROCEDURE — 77300 RADIATION THERAPY DOSE PLAN: CPT

## 2020-04-03 PROCEDURE — 77301 RADIOTHERAPY DOSE PLAN IMRT: CPT

## 2020-04-06 ENCOUNTER — HOSPITAL ENCOUNTER (OUTPATIENT)
Dept: LAB | Age: 70
Discharge: HOME | End: 2020-04-06
Payer: COMMERCIAL

## 2020-04-06 ENCOUNTER — HOSPITAL ENCOUNTER (OUTPATIENT)
Dept: INFUSION THERAPY | Age: 70
Discharge: HOME | End: 2020-04-06
Payer: COMMERCIAL

## 2020-04-06 ENCOUNTER — HOSPITAL ENCOUNTER (OUTPATIENT)
Dept: RADIATION ONCOLOGY | Age: 70
Discharge: HOME | End: 2020-04-06
Payer: COMMERCIAL

## 2020-04-06 DIAGNOSIS — C34.31 MALIGNANT NEOPLASM OF LOWER LOBE OF RIGHT LUNG (HCC): Primary | ICD-10-CM

## 2020-04-06 DIAGNOSIS — C34.31 MALIGNANT NEOPLASM OF LOWER LOBE OF RIGHT LUNG (HCC): ICD-10-CM

## 2020-04-06 LAB
ALBUMIN SERPL-MCNC: 3.7 G/DL (ref 3.2–4.6)
ALBUMIN/GLOB SERPL: 0.9 {RATIO} (ref 1.2–3.5)
ALP SERPL-CCNC: 87 U/L (ref 50–136)
ALT SERPL-CCNC: 16 U/L (ref 12–65)
ANION GAP SERPL CALC-SCNC: 5 MMOL/L (ref 7–16)
AST SERPL-CCNC: 16 U/L (ref 15–37)
BASOPHILS # BLD: 0.1 K/UL (ref 0–0.2)
BASOPHILS NFR BLD: 1 % (ref 0–2)
BILIRUB SERPL-MCNC: 0.8 MG/DL (ref 0.2–1.1)
BUN SERPL-MCNC: 12 MG/DL (ref 8–23)
CALCIUM SERPL-MCNC: 9.3 MG/DL (ref 8.3–10.4)
CHLORIDE SERPL-SCNC: 103 MMOL/L (ref 98–107)
CO2 SERPL-SCNC: 27 MMOL/L (ref 21–32)
CREAT SERPL-MCNC: 0.99 MG/DL (ref 0.8–1.5)
DIFFERENTIAL METHOD BLD: NORMAL
EOSINOPHIL # BLD: 0.2 K/UL (ref 0–0.8)
EOSINOPHIL NFR BLD: 3 % (ref 0.5–7.8)
ERYTHROCYTE [DISTWIDTH] IN BLOOD BY AUTOMATED COUNT: 12.4 % (ref 11.9–14.6)
GLOBULIN SER CALC-MCNC: 4 G/DL (ref 2.3–3.5)
GLUCOSE SERPL-MCNC: 114 MG/DL (ref 65–100)
HCT VFR BLD AUTO: 47.8 % (ref 41.1–50.3)
HGB BLD-MCNC: 16.6 G/DL (ref 13.6–17.2)
IMM GRANULOCYTES # BLD AUTO: 0.1 K/UL (ref 0–0.5)
IMM GRANULOCYTES NFR BLD AUTO: 1 % (ref 0–5)
LYMPHOCYTES # BLD: 1.5 K/UL (ref 0.5–4.6)
LYMPHOCYTES NFR BLD: 24 % (ref 13–44)
MCH RBC QN AUTO: 30.7 PG (ref 26.1–32.9)
MCHC RBC AUTO-ENTMCNC: 34.7 G/DL (ref 31.4–35)
MCV RBC AUTO: 88.5 FL (ref 79.6–97.8)
MONOCYTES # BLD: 0.4 K/UL (ref 0.1–1.3)
MONOCYTES NFR BLD: 6 % (ref 4–12)
NEUTS SEG # BLD: 4.2 K/UL (ref 1.7–8.2)
NEUTS SEG NFR BLD: 65 % (ref 43–78)
NRBC # BLD: 0 K/UL (ref 0–0.2)
PLATELET # BLD AUTO: 176 K/UL (ref 150–450)
PMV BLD AUTO: 10.7 FL (ref 9.4–12.3)
POTASSIUM SERPL-SCNC: 3.8 MMOL/L (ref 3.5–5.1)
PROT SERPL-MCNC: 7.7 G/DL (ref 6.3–8.2)
RBC # BLD AUTO: 5.4 M/UL (ref 4.23–5.67)
SODIUM SERPL-SCNC: 135 MMOL/L (ref 136–145)
WBC # BLD AUTO: 6.4 K/UL (ref 4.3–11.1)

## 2020-04-06 PROCEDURE — 80053 COMPREHEN METABOLIC PANEL: CPT

## 2020-04-06 PROCEDURE — 74011000250 HC RX REV CODE- 250: Performed by: INTERNAL MEDICINE

## 2020-04-06 PROCEDURE — 96417 CHEMO IV INFUS EACH ADDL SEQ: CPT

## 2020-04-06 PROCEDURE — 74011250636 HC RX REV CODE- 250/636: Performed by: INTERNAL MEDICINE

## 2020-04-06 PROCEDURE — 96375 TX/PRO/DX INJ NEW DRUG ADDON: CPT

## 2020-04-06 PROCEDURE — 74011000258 HC RX REV CODE- 258: Performed by: INTERNAL MEDICINE

## 2020-04-06 PROCEDURE — 85025 COMPLETE CBC W/AUTO DIFF WBC: CPT

## 2020-04-06 PROCEDURE — 77338 DESIGN MLC DEVICE FOR IMRT: CPT

## 2020-04-06 PROCEDURE — 96413 CHEMO IV INFUSION 1 HR: CPT

## 2020-04-06 PROCEDURE — 77386 HC IMRT TRMT DLVR COMPL: CPT

## 2020-04-06 PROCEDURE — 77336 RADIATION PHYSICS CONSULT: CPT

## 2020-04-06 RX ORDER — SODIUM CHLORIDE 0.9 % (FLUSH) 0.9 %
10 SYRINGE (ML) INJECTION AS NEEDED
Status: DISCONTINUED | OUTPATIENT
Start: 2020-04-06 | End: 2020-04-07 | Stop reason: HOSPADM

## 2020-04-06 RX ORDER — ONDANSETRON 2 MG/ML
8 INJECTION INTRAMUSCULAR; INTRAVENOUS ONCE
Status: COMPLETED | OUTPATIENT
Start: 2020-04-06 | End: 2020-04-06

## 2020-04-06 RX ORDER — DIPHENHYDRAMINE HYDROCHLORIDE 50 MG/ML
50 INJECTION, SOLUTION INTRAMUSCULAR; INTRAVENOUS ONCE
Status: COMPLETED | OUTPATIENT
Start: 2020-04-06 | End: 2020-04-06

## 2020-04-06 RX ORDER — SODIUM CHLORIDE 9 MG/ML
25 INJECTION, SOLUTION INTRAVENOUS CONTINUOUS
Status: DISCONTINUED | OUTPATIENT
Start: 2020-04-06 | End: 2020-04-07 | Stop reason: HOSPADM

## 2020-04-06 RX ADMIN — PACLITAXEL 100 MG: 6 INJECTION, SOLUTION INTRAVENOUS at 16:25

## 2020-04-06 RX ADMIN — ONDANSETRON 8 MG: 2 INJECTION, SOLUTION INTRAMUSCULAR; INTRAVENOUS at 15:41

## 2020-04-06 RX ADMIN — SODIUM CHLORIDE 250 MG: 900 INJECTION, SOLUTION INTRAVENOUS at 17:28

## 2020-04-06 RX ADMIN — Medication 10 ML: at 18:02

## 2020-04-06 RX ADMIN — DEXAMETHASONE SODIUM PHOSPHATE 12 MG: 4 INJECTION, SOLUTION INTRAMUSCULAR; INTRAVENOUS at 15:51

## 2020-04-06 RX ADMIN — DIPHENHYDRAMINE HYDROCHLORIDE 50 MG: 50 INJECTION, SOLUTION INTRAMUSCULAR; INTRAVENOUS at 15:42

## 2020-04-06 RX ADMIN — FAMOTIDINE 20 MG: 10 INJECTION, SOLUTION INTRAVENOUS at 15:45

## 2020-04-06 RX ADMIN — SODIUM CHLORIDE 25 ML/HR: 900 INJECTION, SOLUTION INTRAVENOUS at 15:23

## 2020-04-06 NOTE — PROGRESS NOTES
Arrived to the [facility]. Chemo completed. Patient tolerated well. Any issues or concerns during appointment: None. Patient aware of next infusion appointment on 4/13 (date) at 1400 (time). Discharged ambulatory in stable condition.

## 2020-04-07 ENCOUNTER — HOSPITAL ENCOUNTER (OUTPATIENT)
Dept: RADIATION ONCOLOGY | Age: 70
Discharge: HOME | End: 2020-04-07
Payer: COMMERCIAL

## 2020-04-07 ENCOUNTER — HOSPITAL ENCOUNTER (OUTPATIENT)
Dept: CT IMAGING | Age: 70
Discharge: HOME | End: 2020-04-07
Attending: RADIOLOGY

## 2020-04-07 DIAGNOSIS — C34.91 MALIGNANT NEOPLASM OF RIGHT LUNG, UNSPECIFIED PART OF LUNG (HCC): ICD-10-CM

## 2020-04-07 DIAGNOSIS — C34.91 MALIGNANT NEOPLASM OF RIGHT LUNG, UNSPECIFIED PART OF LUNG (HCC): Primary | ICD-10-CM

## 2020-04-08 ENCOUNTER — HOSPITAL ENCOUNTER (OUTPATIENT)
Dept: CT IMAGING | Age: 70
Discharge: HOME | End: 2020-04-08
Attending: RADIOLOGY
Payer: COMMERCIAL

## 2020-04-08 ENCOUNTER — HOSPITAL ENCOUNTER (OUTPATIENT)
Dept: RADIATION ONCOLOGY | Age: 70
Discharge: HOME | End: 2020-04-08
Payer: COMMERCIAL

## 2020-04-08 PROCEDURE — 77386 HC IMRT TRMT DLVR COMPL: CPT

## 2020-04-08 PROCEDURE — 71260 CT THORAX DX C+: CPT

## 2020-04-08 PROCEDURE — 74011636320 HC RX REV CODE- 636/320: Performed by: RADIOLOGY

## 2020-04-08 PROCEDURE — 74011000258 HC RX REV CODE- 258: Performed by: RADIOLOGY

## 2020-04-08 RX ORDER — SODIUM CHLORIDE 0.9 % (FLUSH) 0.9 %
10 SYRINGE (ML) INJECTION
Status: COMPLETED | OUTPATIENT
Start: 2020-04-08 | End: 2020-04-08

## 2020-04-08 RX ADMIN — IOPAMIDOL 80 ML: 755 INJECTION, SOLUTION INTRAVENOUS at 11:10

## 2020-04-08 RX ADMIN — Medication 10 ML: at 11:11

## 2020-04-08 RX ADMIN — SODIUM CHLORIDE 100 ML: 900 INJECTION, SOLUTION INTRAVENOUS at 11:11

## 2020-04-09 ENCOUNTER — HOSPITAL ENCOUNTER (OUTPATIENT)
Dept: RADIATION ONCOLOGY | Age: 70
Discharge: HOME | End: 2020-04-09
Payer: COMMERCIAL

## 2020-04-09 PROCEDURE — 77386 HC IMRT TRMT DLVR COMPL: CPT

## 2020-04-10 ENCOUNTER — HOSPITAL ENCOUNTER (OUTPATIENT)
Dept: RADIATION ONCOLOGY | Age: 70
Discharge: HOME | End: 2020-04-10
Payer: COMMERCIAL

## 2020-04-10 PROCEDURE — 77386 HC IMRT TRMT DLVR COMPL: CPT

## 2020-04-13 ENCOUNTER — HOSPITAL ENCOUNTER (OUTPATIENT)
Dept: RADIATION ONCOLOGY | Age: 70
Discharge: HOME | End: 2020-04-13
Payer: COMMERCIAL

## 2020-04-13 ENCOUNTER — HOSPITAL ENCOUNTER (OUTPATIENT)
Dept: LAB | Age: 70
Discharge: HOME | End: 2020-04-13
Payer: COMMERCIAL

## 2020-04-13 ENCOUNTER — HOSPITAL ENCOUNTER (OUTPATIENT)
Dept: INFUSION THERAPY | Age: 70
Discharge: HOME | End: 2020-04-13
Payer: COMMERCIAL

## 2020-04-13 DIAGNOSIS — C34.31 MALIGNANT NEOPLASM OF LOWER LOBE OF RIGHT LUNG (HCC): Primary | ICD-10-CM

## 2020-04-13 DIAGNOSIS — C34.91 MALIGNANT NEOPLASM OF RIGHT LUNG, UNSPECIFIED PART OF LUNG (HCC): ICD-10-CM

## 2020-04-13 DIAGNOSIS — C34.31 MALIGNANT NEOPLASM OF LOWER LOBE OF RIGHT LUNG (HCC): ICD-10-CM

## 2020-04-13 LAB
ALBUMIN SERPL-MCNC: 3.6 G/DL (ref 3.2–4.6)
ALBUMIN/GLOB SERPL: 1 {RATIO} (ref 1.2–3.5)
ALP SERPL-CCNC: 83 U/L (ref 50–136)
ALT SERPL-CCNC: 14 U/L (ref 12–65)
ANION GAP SERPL CALC-SCNC: 5 MMOL/L (ref 7–16)
AST SERPL-CCNC: 16 U/L (ref 15–37)
BASOPHILS # BLD: 0 K/UL (ref 0–0.2)
BASOPHILS NFR BLD: 1 % (ref 0–2)
BILIRUB SERPL-MCNC: 0.7 MG/DL (ref 0.2–1.1)
BUN SERPL-MCNC: 8 MG/DL (ref 8–23)
CALCIUM SERPL-MCNC: 9.4 MG/DL (ref 8.3–10.4)
CHLORIDE SERPL-SCNC: 104 MMOL/L (ref 98–107)
CO2 SERPL-SCNC: 28 MMOL/L (ref 21–32)
CREAT SERPL-MCNC: 0.89 MG/DL (ref 0.8–1.5)
DIFFERENTIAL METHOD BLD: ABNORMAL
EOSINOPHIL # BLD: 0.1 K/UL (ref 0–0.8)
EOSINOPHIL NFR BLD: 3 % (ref 0.5–7.8)
ERYTHROCYTE [DISTWIDTH] IN BLOOD BY AUTOMATED COUNT: 12.8 % (ref 11.9–14.6)
GLOBULIN SER CALC-MCNC: 3.7 G/DL (ref 2.3–3.5)
GLUCOSE SERPL-MCNC: 110 MG/DL (ref 65–100)
HCT VFR BLD AUTO: 44.5 % (ref 41.1–50.3)
HGB BLD-MCNC: 15.5 G/DL (ref 13.6–17.2)
IMM GRANULOCYTES # BLD AUTO: 0 K/UL (ref 0–0.5)
IMM GRANULOCYTES NFR BLD AUTO: 1 % (ref 0–5)
LYMPHOCYTES # BLD: 0.8 K/UL (ref 0.5–4.6)
LYMPHOCYTES NFR BLD: 27 % (ref 13–44)
MAGNESIUM SERPL-MCNC: 2.2 MG/DL (ref 1.8–2.4)
MCH RBC QN AUTO: 30.7 PG (ref 26.1–32.9)
MCHC RBC AUTO-ENTMCNC: 34.8 G/DL (ref 31.4–35)
MCV RBC AUTO: 88.1 FL (ref 79.6–97.8)
MONOCYTES # BLD: 0.3 K/UL (ref 0.1–1.3)
MONOCYTES NFR BLD: 8 % (ref 4–12)
NEUTS SEG # BLD: 1.8 K/UL (ref 1.7–8.2)
NEUTS SEG NFR BLD: 60 % (ref 43–78)
NRBC # BLD: 0 K/UL (ref 0–0.2)
PLATELET # BLD AUTO: 151 K/UL (ref 150–450)
PMV BLD AUTO: 10.4 FL (ref 9.4–12.3)
POTASSIUM SERPL-SCNC: 3.6 MMOL/L (ref 3.5–5.1)
PROT SERPL-MCNC: 7.3 G/DL (ref 6.3–8.2)
RBC # BLD AUTO: 5.05 M/UL (ref 4.23–5.67)
SODIUM SERPL-SCNC: 137 MMOL/L (ref 136–145)
WBC # BLD AUTO: 3 K/UL (ref 4.3–11.1)

## 2020-04-13 PROCEDURE — 74011250636 HC RX REV CODE- 250/636: Performed by: INTERNAL MEDICINE

## 2020-04-13 PROCEDURE — 96413 CHEMO IV INFUSION 1 HR: CPT

## 2020-04-13 PROCEDURE — 83735 ASSAY OF MAGNESIUM: CPT

## 2020-04-13 PROCEDURE — 74011000258 HC RX REV CODE- 258: Performed by: INTERNAL MEDICINE

## 2020-04-13 PROCEDURE — 80053 COMPREHEN METABOLIC PANEL: CPT

## 2020-04-13 PROCEDURE — 96375 TX/PRO/DX INJ NEW DRUG ADDON: CPT

## 2020-04-13 PROCEDURE — 85025 COMPLETE CBC W/AUTO DIFF WBC: CPT

## 2020-04-13 PROCEDURE — 74011000250 HC RX REV CODE- 250: Performed by: INTERNAL MEDICINE

## 2020-04-13 PROCEDURE — 96417 CHEMO IV INFUS EACH ADDL SEQ: CPT

## 2020-04-13 PROCEDURE — 77386 HC IMRT TRMT DLVR COMPL: CPT

## 2020-04-13 RX ORDER — SODIUM CHLORIDE 0.9 % (FLUSH) 0.9 %
10 SYRINGE (ML) INJECTION AS NEEDED
Status: DISCONTINUED | OUTPATIENT
Start: 2020-04-13 | End: 2020-04-14 | Stop reason: HOSPADM

## 2020-04-13 RX ORDER — SODIUM CHLORIDE 9 MG/ML
25 INJECTION, SOLUTION INTRAVENOUS CONTINUOUS
Status: DISCONTINUED | OUTPATIENT
Start: 2020-04-13 | End: 2020-04-14 | Stop reason: HOSPADM

## 2020-04-13 RX ORDER — DIPHENHYDRAMINE HYDROCHLORIDE 50 MG/ML
50 INJECTION, SOLUTION INTRAMUSCULAR; INTRAVENOUS ONCE
Status: COMPLETED | OUTPATIENT
Start: 2020-04-13 | End: 2020-04-13

## 2020-04-13 RX ORDER — ONDANSETRON 2 MG/ML
8 INJECTION INTRAMUSCULAR; INTRAVENOUS ONCE
Status: COMPLETED | OUTPATIENT
Start: 2020-04-13 | End: 2020-04-13

## 2020-04-13 RX ADMIN — Medication 10 ML: at 15:15

## 2020-04-13 RX ADMIN — SODIUM CHLORIDE 271 MG: 900 INJECTION, SOLUTION INTRAVENOUS at 17:06

## 2020-04-13 RX ADMIN — FAMOTIDINE 20 MG: 10 INJECTION, SOLUTION INTRAVENOUS at 15:28

## 2020-04-13 RX ADMIN — DIPHENHYDRAMINE HYDROCHLORIDE 50 MG: 50 INJECTION, SOLUTION INTRAMUSCULAR; INTRAVENOUS at 15:30

## 2020-04-13 RX ADMIN — PACLITAXEL 100 MG: 6 INJECTION, SOLUTION INTRAVENOUS at 16:03

## 2020-04-13 RX ADMIN — Medication 10 ML: at 17:44

## 2020-04-13 RX ADMIN — ONDANSETRON 8 MG: 2 INJECTION INTRAMUSCULAR; INTRAVENOUS at 15:27

## 2020-04-13 RX ADMIN — SODIUM CHLORIDE 25 ML/HR: 900 INJECTION, SOLUTION INTRAVENOUS at 15:15

## 2020-04-13 RX ADMIN — DEXAMETHASONE SODIUM PHOSPHATE 12 MG: 4 INJECTION, SOLUTION INTRAMUSCULAR; INTRAVENOUS at 15:33

## 2020-04-13 NOTE — PROGRESS NOTES
Arrived ambulatory to [facility] with port previously accessed with good blood return. NS infusing. Pre meds given as ordered. Taxol infused. Carboplatin infused. Port flushed and de accessed. Pt aware of next appt on 4/20/2020 at 0830. Discharged ambulatory.

## 2020-04-14 ENCOUNTER — PATIENT OUTREACH (OUTPATIENT)
Dept: CASE MANAGEMENT | Age: 70
End: 2020-04-14

## 2020-04-14 ENCOUNTER — HOSPITAL ENCOUNTER (OUTPATIENT)
Dept: RADIATION ONCOLOGY | Age: 70
Discharge: HOME | End: 2020-04-14
Payer: COMMERCIAL

## 2020-04-14 PROCEDURE — 77386 HC IMRT TRMT DLVR COMPL: CPT

## 2020-04-14 PROCEDURE — 77336 RADIATION PHYSICS CONSULT: CPT

## 2020-04-15 ENCOUNTER — PATIENT OUTREACH (OUTPATIENT)
Dept: CASE MANAGEMENT | Age: 70
End: 2020-04-15

## 2020-04-15 ENCOUNTER — HOSPITAL ENCOUNTER (OUTPATIENT)
Dept: RADIATION ONCOLOGY | Age: 70
Discharge: HOME | End: 2020-04-15
Payer: COMMERCIAL

## 2020-04-15 PROCEDURE — 77386 HC IMRT TRMT DLVR COMPL: CPT

## 2020-04-15 NOTE — PROGRESS NOTES
This note will not be viewable in [address] Transitions of Care  Follow up Outreach Note Outreach type Phone call: Spoke with patient Home visit:  
Date/Time of Outreach: 4/15/2020 2:00 PM  
  
Has patient attended PCP or specialist follow-up appointments since last contact? What was outcome of appointment? When is next follow-up scheduled? FU with PCP Dr. Fredrick BOONE, FU with oncology Dr. Xi Gaffney completed on 3/30/2020. Patient is currently receiving chemo treatments. All other appointments scheduled appropriately. Review medications. Any medication changes since last outreach? Does patient have any questions or issues related to their medications? Medications reviewed with patient. He verbalizes an understanding of medications. Home health active? If yes  any issue? Progress? No  
  
Referrals needed? 
(CM, SW, HH, etc. ) No   
Other issues/Miscellaneous? (Transportation, access to meals, ability to perform ADLs, adequate caregiver support, etc.) Patient states that he is doing well. He remains in good spirits considering chemotherapy has started. Patient states his appetite is good. Patient is not having any pain and is able to perform all ADLs independently. No concerns voiced by patient at this time. Next Outreach Scheduled? Graduation from program? 
  Yes Next Steps/Goals (if applicable): Outreach completed by: 
  Nadia Jacobo LPN Care Coordinator

## 2020-04-15 NOTE — PROGRESS NOTES
DIAGNOSIS: Lung cancer. 
  
TREATMENT SITE: lung DOSE and FRACTIONATION: 1200/5200 cGy, 6/26 fractions 
  INTERVAL HISTORY:  Deena Zuñiga is a 81 y.o. male being treated for . He was doing well without complaints .    
  
OBJECTIVE:  No findings. There were no vitals taken for this visit. Lab Results Component Value Date/Time  
  Sodium 135 (L) 04/06/2020 02:03 PM  
  Potassium 3.8 04/06/2020 02:03 PM  
  Chloride 103 04/06/2020 02:03 PM  
  CO2 27 04/06/2020 02:03 PM  
  Anion gap 5 (L) 04/06/2020 02:03 PM  
  Glucose 114 (H) 04/06/2020 02:03 PM  
  BUN 12 04/06/2020 02:03 PM  
  Creatinine 0.99 04/06/2020 02:03 PM  
  GFR est AA >60 04/06/2020 02:03 PM  
  GFR est non-AA >60 04/06/2020 02:03 PM  
  Calcium 9.3 04/06/2020 02:03 PM  
  Albumin 3.7 04/06/2020 02:03 PM  
  Protein, total 7.7 04/06/2020 02:03 PM  
  Globulin 4.0 (H) 04/06/2020 02:03 PM  
  A-G Ratio 0.9 (L) 04/06/2020 02:03 PM  
  AST (SGOT) 16 04/06/2020 02:03 PM  
  ALT (SGPT) 16 04/06/2020 02:03 PM  
  
     
Lab Results Component Value Date/Time  
  WBC 6.4 04/06/2020 02:03 PM  
  HGB 16.6 04/06/2020 02:03 PM  
  HCT 47.8 04/06/2020 02:03 PM  
  PLATELET 182 96/33/4952 02:03 PM  
  
  
ASSESSMENT and PLAN:  Deena Zuñiga is tolerating radiation as anticipated for the current dose and fraction. We will continue on as planned with another treatment visit anticipated next week.

## 2020-04-16 ENCOUNTER — HOSPITAL ENCOUNTER (OUTPATIENT)
Dept: RADIATION ONCOLOGY | Age: 70
Discharge: HOME | End: 2020-04-16
Payer: COMMERCIAL

## 2020-04-16 PROCEDURE — 77386 HC IMRT TRMT DLVR COMPL: CPT

## 2020-04-17 ENCOUNTER — HOSPITAL ENCOUNTER (OUTPATIENT)
Dept: RADIATION ONCOLOGY | Age: 70
Discharge: HOME | End: 2020-04-17
Payer: COMMERCIAL

## 2020-04-17 PROCEDURE — 77386 HC IMRT TRMT DLVR COMPL: CPT

## 2020-04-20 ENCOUNTER — HOSPITAL ENCOUNTER (OUTPATIENT)
Dept: INFUSION THERAPY | Age: 70
Discharge: HOME | End: 2020-04-20
Payer: COMMERCIAL

## 2020-04-20 ENCOUNTER — PATIENT OUTREACH (OUTPATIENT)
Dept: CASE MANAGEMENT | Age: 70
End: 2020-04-20

## 2020-04-20 ENCOUNTER — HOSPITAL ENCOUNTER (OUTPATIENT)
Dept: RADIATION ONCOLOGY | Age: 70
Discharge: HOME | End: 2020-04-20
Payer: COMMERCIAL

## 2020-04-20 DIAGNOSIS — C34.31 MALIGNANT NEOPLASM OF LOWER LOBE OF RIGHT LUNG (HCC): Primary | ICD-10-CM

## 2020-04-20 PROCEDURE — 74011000258 HC RX REV CODE- 258: Performed by: INTERNAL MEDICINE

## 2020-04-20 PROCEDURE — 74011250636 HC RX REV CODE- 250/636: Performed by: INTERNAL MEDICINE

## 2020-04-20 PROCEDURE — 74011000250 HC RX REV CODE- 250: Performed by: INTERNAL MEDICINE

## 2020-04-20 PROCEDURE — 96413 CHEMO IV INFUSION 1 HR: CPT

## 2020-04-20 PROCEDURE — 96417 CHEMO IV INFUS EACH ADDL SEQ: CPT

## 2020-04-20 PROCEDURE — 96375 TX/PRO/DX INJ NEW DRUG ADDON: CPT

## 2020-04-20 PROCEDURE — 77386 HC IMRT TRMT DLVR COMPL: CPT

## 2020-04-20 RX ORDER — DIPHENHYDRAMINE HYDROCHLORIDE 50 MG/ML
50 INJECTION, SOLUTION INTRAMUSCULAR; INTRAVENOUS ONCE
Status: COMPLETED | OUTPATIENT
Start: 2020-04-20 | End: 2020-04-20

## 2020-04-20 RX ORDER — SODIUM CHLORIDE 9 MG/ML
25 INJECTION, SOLUTION INTRAVENOUS CONTINUOUS
Status: ACTIVE | OUTPATIENT
Start: 2020-04-20 | End: 2020-04-20

## 2020-04-20 RX ORDER — SODIUM CHLORIDE 0.9 % (FLUSH) 0.9 %
10 SYRINGE (ML) INJECTION AS NEEDED
Status: ACTIVE | OUTPATIENT
Start: 2020-04-20 | End: 2020-04-20

## 2020-04-20 RX ORDER — ONDANSETRON 2 MG/ML
8 INJECTION INTRAMUSCULAR; INTRAVENOUS ONCE
Status: COMPLETED | OUTPATIENT
Start: 2020-04-20 | End: 2020-04-20

## 2020-04-20 RX ADMIN — Medication 10 ML: at 11:42

## 2020-04-20 RX ADMIN — SODIUM CHLORIDE 25 ML/HR: 900 INJECTION, SOLUTION INTRAVENOUS at 08:34

## 2020-04-20 RX ADMIN — PACLITAXEL 100 MG: 6 INJECTION, SOLUTION INTRAVENOUS at 09:51

## 2020-04-20 RX ADMIN — ONDANSETRON 8 MG: 2 INJECTION INTRAMUSCULAR; INTRAVENOUS at 08:57

## 2020-04-20 RX ADMIN — SODIUM CHLORIDE 268 MG: 900 INJECTION, SOLUTION INTRAVENOUS at 11:02

## 2020-04-20 RX ADMIN — DIPHENHYDRAMINE HYDROCHLORIDE 50 MG: 50 INJECTION, SOLUTION INTRAMUSCULAR; INTRAVENOUS at 09:04

## 2020-04-20 RX ADMIN — FAMOTIDINE 20 MG: 10 INJECTION INTRAVENOUS at 08:59

## 2020-04-20 RX ADMIN — Medication 10 ML: at 08:34

## 2020-04-20 RX ADMIN — DEXAMETHASONE SODIUM PHOSPHATE 12 MG: 4 INJECTION, SOLUTION INTRAMUSCULAR; INTRAVENOUS at 09:08

## 2020-04-20 NOTE — PROGRESS NOTES
4/20/20:  Patient in for cycle #4 carbo/taxol with daily radiation. Patient doing well but admits to not drinking enough fluids throughout the day. Holly Nicole NP reviewed labs and assessed the patient. Patient encouraged to increase oral hydration. Continuing with radiation and chemo as planned. Patient to return to see NP in 1 week.

## 2020-04-20 NOTE — PROGRESS NOTES
Arrived to the [facility]. Chemo completed. Patient tolerated well. Port flushed and de-accessed, site clear. Any issues or concerns during appointment: None. Patient aware of next infusion appointment on 4/27 (date) at 1400 (time). Discharged ambulatory in stable condition.

## 2020-04-21 ENCOUNTER — HOSPITAL ENCOUNTER (OUTPATIENT)
Dept: RADIATION ONCOLOGY | Age: 70
Discharge: HOME | End: 2020-04-21
Payer: COMMERCIAL

## 2020-04-21 PROCEDURE — 77386 HC IMRT TRMT DLVR COMPL: CPT

## 2020-04-21 NOTE — PROGRESS NOTES
Progress Notes Signed Date of Service:  04/21/20 1245 []Hide copied text []Junior for details DIAGNOSIS: Lung cancer. 
  
TREATMENT SITE: lung DOSE and FRACTIONATION: 2200/5200 cGy, 11/26 fractions 
  INTERVAL HISTORY:  Jeramie Gurrola Jr. is a 81 y. o. male being treated for . [facility] doing well without complaints .    
  
OBJECTIVE:  No findings. There were no vitals taken for this visit.   
         
Lab Results Component Value Date/Time  
  Sodium 135 (L) 04/06/2020 02:03 PM  
  Potassium 3.8 04/06/2020 02:03 PM  
  Chloride 103 04/06/2020 02:03 PM  
  CO2 27 04/06/2020 02:03 PM  
  Anion gap 5 (L) 04/06/2020 02:03 PM  
  Glucose 114 (H) 04/06/2020 02:03 PM  
  BUN 12 04/06/2020 02:03 PM  
  Creatinine 0.99 04/06/2020 02:03 PM  
  GFR est AA >60 04/06/2020 02:03 PM  
  GFR est non-AA >60 04/06/2020 02:03 PM  
  Calcium 9.3 04/06/2020 02:03 PM  
  Albumin 3.7 04/06/2020 02:03 PM  
  Protein, total 7.7 04/06/2020 02:03 PM  
  Globulin 4.0 (H) 04/06/2020 02:03 PM  
  A-G Ratio 0.9 (L) 04/06/2020 02:03 PM  
  AST (SGOT) 16 04/06/2020 02:03 PM  
  ALT (SGPT) 16 04/06/2020 02:03 PM  
  
         
Lab Results Component Value Date/Time  
  WBC 6.4 04/06/2020 02:03 PM  
  HGB 16.6 04/06/2020 02:03 PM  
  HCT 47.8 04/06/2020 02:03 PM  
  PLATELET 635 97/16/5259 02:03 PM  
  
  
ASSESSMENT and PLAN:  Jeramie Dowd Jr. is tolerating radiation as anticipated for the current dose and fraction.  We will continue on as planned with another treatment visit anticipated next week.

## 2020-04-21 NOTE — PROGRESS NOTES
DIAGNOSIS: Lung cancer. 
  
TREATMENT SITE: lung DOSE and FRACTIONATION: 2200/5200 cGy, 11/26 fractions 
  INTERVAL HISTORY:  Joe Delila Collet Jr. is a 81 y. o. male being treated for . [facility] doing well without complaints .    
  
OBJECTIVE:  No findings. There were no vitals taken for this visit.   
         
Lab Results Component Value Date/Time  
  Sodium 135 (L) 04/06/2020 02:03 PM  
  Potassium 3.8 04/06/2020 02:03 PM  
  Chloride 103 04/06/2020 02:03 PM  
  CO2 27 04/06/2020 02:03 PM  
  Anion gap 5 (L) 04/06/2020 02:03 PM  
  Glucose 114 (H) 04/06/2020 02:03 PM  
  BUN 12 04/06/2020 02:03 PM  
  Creatinine 0.99 04/06/2020 02:03 PM  
  GFR est AA >60 04/06/2020 02:03 PM  
  GFR est non-AA >60 04/06/2020 02:03 PM  
  Calcium 9.3 04/06/2020 02:03 PM  
  Albumin 3.7 04/06/2020 02:03 PM  
  Protein, total 7.7 04/06/2020 02:03 PM  
  Globulin 4.0 (H) 04/06/2020 02:03 PM  
  A-G Ratio 0.9 (L) 04/06/2020 02:03 PM  
  AST (SGOT) 16 04/06/2020 02:03 PM  
  ALT (SGPT) 16 04/06/2020 02:03 PM  
  
         
Lab Results Component Value Date/Time  
  WBC 6.4 04/06/2020 02:03 PM  
  HGB 16.6 04/06/2020 02:03 PM  
  HCT 47.8 04/06/2020 02:03 PM  
  PLATELET 626 56/89/6998 02:03 PM  
  
  
ASSESSMENT and PLAN:  Jeramie Dowd Jr. is tolerating radiation as anticipated for the current dose and fraction.  We will continue on as planned with another treatment visit anticipated next week.

## 2020-04-22 ENCOUNTER — HOSPITAL ENCOUNTER (OUTPATIENT)
Dept: RADIATION ONCOLOGY | Age: 70
Discharge: HOME | End: 2020-04-22
Payer: COMMERCIAL

## 2020-04-22 PROCEDURE — 77336 RADIATION PHYSICS CONSULT: CPT

## 2020-04-22 PROCEDURE — 77386 HC IMRT TRMT DLVR COMPL: CPT

## 2020-04-23 ENCOUNTER — HOSPITAL ENCOUNTER (OUTPATIENT)
Dept: RADIATION ONCOLOGY | Age: 70
Discharge: HOME | End: 2020-04-23
Payer: COMMERCIAL

## 2020-04-23 PROCEDURE — 77386 HC IMRT TRMT DLVR COMPL: CPT

## 2020-04-24 ENCOUNTER — HOSPITAL ENCOUNTER (OUTPATIENT)
Dept: RADIATION ONCOLOGY | Age: 70
Discharge: HOME | End: 2020-04-24
Payer: COMMERCIAL

## 2020-04-24 PROCEDURE — 77386 HC IMRT TRMT DLVR COMPL: CPT

## 2020-04-27 ENCOUNTER — HOSPITAL ENCOUNTER (OUTPATIENT)
Dept: RADIATION ONCOLOGY | Age: 70
Discharge: HOME | End: 2020-04-27
Payer: COMMERCIAL

## 2020-04-27 ENCOUNTER — HOSPITAL ENCOUNTER (OUTPATIENT)
Dept: INFUSION THERAPY | Age: 70
Discharge: HOME | End: 2020-04-27
Payer: COMMERCIAL

## 2020-04-27 DIAGNOSIS — C34.31 MALIGNANT NEOPLASM OF LOWER LOBE OF RIGHT LUNG (HCC): Primary | ICD-10-CM

## 2020-04-27 LAB
ALBUMIN SERPL-MCNC: 3.4 G/DL (ref 3.2–4.6)
ALBUMIN/GLOB SERPL: 0.9 {RATIO} (ref 1.2–3.5)
ALP SERPL-CCNC: 78 U/L (ref 50–136)
ALT SERPL-CCNC: 19 U/L (ref 12–65)
ANION GAP SERPL CALC-SCNC: 7 MMOL/L (ref 7–16)
AST SERPL-CCNC: 20 U/L (ref 15–37)
BASOPHILS # BLD: 0 K/UL (ref 0–0.2)
BASOPHILS NFR BLD: 1 % (ref 0–2)
BILIRUB SERPL-MCNC: 0.9 MG/DL (ref 0.2–1.1)
BUN SERPL-MCNC: 15 MG/DL (ref 8–23)
CALCIUM SERPL-MCNC: 9.2 MG/DL (ref 8.3–10.4)
CHLORIDE SERPL-SCNC: 105 MMOL/L (ref 98–107)
CO2 SERPL-SCNC: 25 MMOL/L (ref 21–32)
CREAT SERPL-MCNC: 0.8 MG/DL (ref 0.8–1.5)
DIFFERENTIAL METHOD BLD: ABNORMAL
EOSINOPHIL # BLD: 0 K/UL (ref 0–0.8)
EOSINOPHIL NFR BLD: 0 % (ref 0.5–7.8)
ERYTHROCYTE [DISTWIDTH] IN BLOOD BY AUTOMATED COUNT: 13.3 % (ref 11.9–14.6)
GLOBULIN SER CALC-MCNC: 3.9 G/DL (ref 2.3–3.5)
GLUCOSE SERPL-MCNC: 108 MG/DL (ref 65–100)
HCT VFR BLD AUTO: 39.8 % (ref 41.1–50.3)
HGB BLD-MCNC: 14.2 G/DL (ref 13.6–17.2)
IMM GRANULOCYTES # BLD AUTO: 0 K/UL (ref 0–0.5)
IMM GRANULOCYTES NFR BLD AUTO: 2 % (ref 0–5)
LYMPHOCYTES # BLD: 0.7 K/UL (ref 0.5–4.6)
LYMPHOCYTES NFR BLD: 26 % (ref 13–44)
MCH RBC QN AUTO: 30.7 PG (ref 26.1–32.9)
MCHC RBC AUTO-ENTMCNC: 35.7 G/DL (ref 31.4–35)
MCV RBC AUTO: 86 FL (ref 79.6–97.8)
MONOCYTES # BLD: 0.3 K/UL (ref 0.1–1.3)
MONOCYTES NFR BLD: 12 % (ref 4–12)
NEUTS SEG # BLD: 1.6 K/UL (ref 1.7–8.2)
NEUTS SEG NFR BLD: 60 % (ref 43–78)
NRBC # BLD: 0 K/UL (ref 0–0.2)
PLATELET # BLD AUTO: 102 K/UL (ref 150–450)
PMV BLD AUTO: 10.2 FL (ref 9.4–12.3)
POTASSIUM SERPL-SCNC: 3.6 MMOL/L (ref 3.5–5.1)
PROT SERPL-MCNC: 7.3 G/DL (ref 6.3–8.2)
RBC # BLD AUTO: 4.63 M/UL (ref 4.23–5.67)
SODIUM SERPL-SCNC: 137 MMOL/L (ref 136–145)
WBC # BLD AUTO: 2.7 K/UL (ref 4.3–11.1)

## 2020-04-27 PROCEDURE — 77386 HC IMRT TRMT DLVR COMPL: CPT

## 2020-04-27 PROCEDURE — 80053 COMPREHEN METABOLIC PANEL: CPT

## 2020-04-27 PROCEDURE — 74011000258 HC RX REV CODE- 258: Performed by: INTERNAL MEDICINE

## 2020-04-27 PROCEDURE — 96375 TX/PRO/DX INJ NEW DRUG ADDON: CPT

## 2020-04-27 PROCEDURE — 74011250636 HC RX REV CODE- 250/636: Performed by: INTERNAL MEDICINE

## 2020-04-27 PROCEDURE — 96417 CHEMO IV INFUS EACH ADDL SEQ: CPT

## 2020-04-27 PROCEDURE — 74011000250 HC RX REV CODE- 250: Performed by: INTERNAL MEDICINE

## 2020-04-27 PROCEDURE — 85025 COMPLETE CBC W/AUTO DIFF WBC: CPT

## 2020-04-27 PROCEDURE — 96413 CHEMO IV INFUSION 1 HR: CPT

## 2020-04-27 RX ORDER — SODIUM CHLORIDE 9 MG/ML
25 INJECTION, SOLUTION INTRAVENOUS CONTINUOUS
Status: DISCONTINUED | OUTPATIENT
Start: 2020-04-27 | End: 2020-04-28 | Stop reason: HOSPADM

## 2020-04-27 RX ORDER — DIPHENHYDRAMINE HYDROCHLORIDE 50 MG/ML
50 INJECTION, SOLUTION INTRAMUSCULAR; INTRAVENOUS ONCE
Status: COMPLETED | OUTPATIENT
Start: 2020-04-27 | End: 2020-04-27

## 2020-04-27 RX ORDER — SODIUM CHLORIDE 0.9 % (FLUSH) 0.9 %
10 SYRINGE (ML) INJECTION AS NEEDED
Status: DISCONTINUED | OUTPATIENT
Start: 2020-04-27 | End: 2020-04-28 | Stop reason: HOSPADM

## 2020-04-27 RX ORDER — ONDANSETRON 2 MG/ML
8 INJECTION INTRAMUSCULAR; INTRAVENOUS ONCE
Status: COMPLETED | OUTPATIENT
Start: 2020-04-27 | End: 2020-04-27

## 2020-04-27 RX ADMIN — SODIUM CHLORIDE 295 MG: 900 INJECTION, SOLUTION INTRAVENOUS at 16:11

## 2020-04-27 RX ADMIN — DIPHENHYDRAMINE HYDROCHLORIDE 50 MG: 50 INJECTION, SOLUTION INTRAMUSCULAR; INTRAVENOUS at 14:30

## 2020-04-27 RX ADMIN — Medication 10 ML: at 14:24

## 2020-04-27 RX ADMIN — FAMOTIDINE 20 MG: 10 INJECTION, SOLUTION INTRAVENOUS at 14:28

## 2020-04-27 RX ADMIN — PACLITAXEL 100 MG: 6 INJECTION, SOLUTION INTRAVENOUS at 15:04

## 2020-04-27 RX ADMIN — SODIUM CHLORIDE 25 ML/HR: 900 INJECTION, SOLUTION INTRAVENOUS at 14:25

## 2020-04-27 RX ADMIN — ONDANSETRON 8 MG: 2 INJECTION INTRAMUSCULAR; INTRAVENOUS at 14:26

## 2020-04-27 RX ADMIN — DEXAMETHASONE SODIUM PHOSPHATE 12 MG: 4 INJECTION, SOLUTION INTRAMUSCULAR; INTRAVENOUS at 14:40

## 2020-04-27 NOTE — PROGRESS NOTES
Arrived to the [facility]. Taxol and Carboplatin completed. Patient tolerated without difficulty. Port with positive blood return before and after chemo . Port de-accessed at the completion of chemo  Any issues or concerns during appointment: none. Patient aware of next infusion appointment on 5/4 (date) at 1430 (time). Discharged to home.

## 2020-04-28 ENCOUNTER — HOSPITAL ENCOUNTER (OUTPATIENT)
Dept: RADIATION ONCOLOGY | Age: 70
Discharge: HOME | End: 2020-04-28
Payer: COMMERCIAL

## 2020-04-28 ENCOUNTER — PATIENT OUTREACH (OUTPATIENT)
Dept: CASE MANAGEMENT | Age: 70
End: 2020-04-28

## 2020-04-28 PROBLEM — C34.31 MALIGNANT NEOPLASM OF LOWER LOBE OF RIGHT LUNG (HCC): Chronic | Status: ACTIVE | Noted: 2020-02-26

## 2020-04-28 PROCEDURE — 77336 RADIATION PHYSICS CONSULT: CPT

## 2020-04-28 PROCEDURE — 77386 HC IMRT TRMT DLVR COMPL: CPT

## 2020-04-28 NOTE — PROGRESS NOTES
I saw patient on 4-27-20 with Rekha Severino on D15/26 pre Carbo/Taxol. Pt states he had fever times 1 last week but this was isolated. Instructed pt to call office if this happens again and temp >100.4 and he verbalizes understanding. Navigation will continue to monitor

## 2020-04-29 ENCOUNTER — HOSPITAL ENCOUNTER (OUTPATIENT)
Dept: RADIATION ONCOLOGY | Age: 70
Discharge: HOME | End: 2020-04-29
Payer: COMMERCIAL

## 2020-04-29 PROCEDURE — 77386 HC IMRT TRMT DLVR COMPL: CPT

## 2020-04-30 ENCOUNTER — HOSPITAL ENCOUNTER (OUTPATIENT)
Dept: RADIATION ONCOLOGY | Age: 70
Discharge: HOME | End: 2020-04-30
Payer: COMMERCIAL

## 2020-04-30 PROCEDURE — 77386 HC IMRT TRMT DLVR COMPL: CPT

## 2020-05-01 ENCOUNTER — HOSPITAL ENCOUNTER (OUTPATIENT)
Dept: RADIATION ONCOLOGY | Age: 70
Discharge: HOME | End: 2020-05-01
Payer: COMMERCIAL

## 2020-05-01 PROCEDURE — 77386 HC IMRT TRMT DLVR COMPL: CPT

## 2020-05-04 ENCOUNTER — HOSPITAL ENCOUNTER (OUTPATIENT)
Dept: RADIATION ONCOLOGY | Age: 70
Discharge: HOME | End: 2020-05-04
Payer: COMMERCIAL

## 2020-05-04 ENCOUNTER — HOSPITAL ENCOUNTER (OUTPATIENT)
Dept: INFUSION THERAPY | Age: 70
Discharge: HOME | End: 2020-05-04
Payer: COMMERCIAL

## 2020-05-04 DIAGNOSIS — R91.8 MASS OF LOWER LOBE OF RIGHT LUNG: ICD-10-CM

## 2020-05-04 DIAGNOSIS — C34.31 MALIGNANT NEOPLASM OF LOWER LOBE OF RIGHT LUNG (HCC): Primary | ICD-10-CM

## 2020-05-04 LAB
ALBUMIN SERPL-MCNC: 3.4 G/DL (ref 3.2–4.6)
ALBUMIN/GLOB SERPL: 0.9 {RATIO} (ref 1.2–3.5)
ALP SERPL-CCNC: 87 U/L (ref 50–136)
ALT SERPL-CCNC: 24 U/L (ref 12–65)
ANION GAP SERPL CALC-SCNC: 6 MMOL/L (ref 7–16)
AST SERPL-CCNC: 28 U/L (ref 15–37)
BASOPHILS # BLD: 0 K/UL (ref 0–0.2)
BASOPHILS NFR BLD: 1 % (ref 0–2)
BILIRUB SERPL-MCNC: 1.6 MG/DL (ref 0.2–1.1)
BUN SERPL-MCNC: 16 MG/DL (ref 8–23)
CALCIUM SERPL-MCNC: 9.5 MG/DL (ref 8.3–10.4)
CHLORIDE SERPL-SCNC: 106 MMOL/L (ref 98–107)
CO2 SERPL-SCNC: 23 MMOL/L (ref 21–32)
CREAT SERPL-MCNC: 0.91 MG/DL (ref 0.8–1.5)
DIFFERENTIAL METHOD BLD: ABNORMAL
EOSINOPHIL # BLD: 0 K/UL (ref 0–0.8)
EOSINOPHIL NFR BLD: 0 % (ref 0.5–7.8)
ERYTHROCYTE [DISTWIDTH] IN BLOOD BY AUTOMATED COUNT: 14 % (ref 11.9–14.6)
GLOBULIN SER CALC-MCNC: 3.9 G/DL (ref 2.3–3.5)
GLUCOSE SERPL-MCNC: 96 MG/DL (ref 65–100)
HCT VFR BLD AUTO: 36 % (ref 41.1–50.3)
HGB BLD-MCNC: 13 G/DL (ref 13.6–17.2)
IMM GRANULOCYTES # BLD AUTO: 0 K/UL (ref 0–0.5)
IMM GRANULOCYTES NFR BLD AUTO: 0 % (ref 0–5)
LYMPHOCYTES # BLD: 0.5 K/UL (ref 0.5–4.6)
LYMPHOCYTES NFR BLD: 30 % (ref 13–44)
MAGNESIUM SERPL-MCNC: 2.2 MG/DL (ref 1.8–2.4)
MCH RBC QN AUTO: 31.1 PG (ref 26.1–32.9)
MCHC RBC AUTO-ENTMCNC: 36.1 G/DL (ref 31.4–35)
MCV RBC AUTO: 86.1 FL (ref 79.6–97.8)
MONOCYTES # BLD: 0.3 K/UL (ref 0.1–1.3)
MONOCYTES NFR BLD: 15 % (ref 4–12)
NEUTS SEG # BLD: 1 K/UL (ref 1.7–8.2)
NEUTS SEG NFR BLD: 54 % (ref 43–78)
NRBC # BLD: 0.02 K/UL (ref 0–0.2)
PLATELET # BLD AUTO: 115 K/UL (ref 150–450)
PLATELET COMMENTS,PCOM: SLIGHT
PMV BLD AUTO: 10.2 FL (ref 9.4–12.3)
POTASSIUM SERPL-SCNC: 3.5 MMOL/L (ref 3.5–5.1)
PROT SERPL-MCNC: 7.3 G/DL (ref 6.3–8.2)
RBC # BLD AUTO: 4.18 M/UL (ref 4.23–5.67)
RBC MORPH BLD: ABNORMAL
SODIUM SERPL-SCNC: 135 MMOL/L (ref 136–145)
WBC # BLD AUTO: 1.8 K/UL (ref 4.3–11.1)
WBC MORPH BLD: ABNORMAL

## 2020-05-04 PROCEDURE — 77386 HC IMRT TRMT DLVR COMPL: CPT

## 2020-05-04 PROCEDURE — 96413 CHEMO IV INFUSION 1 HR: CPT

## 2020-05-04 PROCEDURE — 83735 ASSAY OF MAGNESIUM: CPT

## 2020-05-04 PROCEDURE — 96417 CHEMO IV INFUS EACH ADDL SEQ: CPT

## 2020-05-04 PROCEDURE — 96375 TX/PRO/DX INJ NEW DRUG ADDON: CPT

## 2020-05-04 PROCEDURE — 74011250636 HC RX REV CODE- 250/636: Performed by: INTERNAL MEDICINE

## 2020-05-04 PROCEDURE — 80053 COMPREHEN METABOLIC PANEL: CPT

## 2020-05-04 PROCEDURE — 74011000258 HC RX REV CODE- 258: Performed by: INTERNAL MEDICINE

## 2020-05-04 PROCEDURE — 74011000250 HC RX REV CODE- 250: Performed by: INTERNAL MEDICINE

## 2020-05-04 PROCEDURE — 85025 COMPLETE CBC W/AUTO DIFF WBC: CPT

## 2020-05-04 RX ORDER — ONDANSETRON 2 MG/ML
8 INJECTION INTRAMUSCULAR; INTRAVENOUS ONCE
Status: COMPLETED | OUTPATIENT
Start: 2020-05-04 | End: 2020-05-04

## 2020-05-04 RX ORDER — SODIUM CHLORIDE 0.9 % (FLUSH) 0.9 %
10 SYRINGE (ML) INJECTION AS NEEDED
Status: DISCONTINUED | OUTPATIENT
Start: 2020-05-04 | End: 2020-05-05 | Stop reason: HOSPADM

## 2020-05-04 RX ORDER — SODIUM CHLORIDE 9 MG/ML
25 INJECTION, SOLUTION INTRAVENOUS CONTINUOUS
Status: DISCONTINUED | OUTPATIENT
Start: 2020-05-04 | End: 2020-05-05 | Stop reason: HOSPADM

## 2020-05-04 RX ORDER — DIPHENHYDRAMINE HYDROCHLORIDE 50 MG/ML
50 INJECTION, SOLUTION INTRAMUSCULAR; INTRAVENOUS ONCE
Status: COMPLETED | OUTPATIENT
Start: 2020-05-04 | End: 2020-05-04

## 2020-05-04 RX ADMIN — FAMOTIDINE 20 MG: 10 INJECTION, SOLUTION INTRAVENOUS at 15:10

## 2020-05-04 RX ADMIN — Medication 10 ML: at 15:05

## 2020-05-04 RX ADMIN — SODIUM CHLORIDE 25 ML/HR: 900 INJECTION, SOLUTION INTRAVENOUS at 15:05

## 2020-05-04 RX ADMIN — Medication 10 ML: at 18:10

## 2020-05-04 RX ADMIN — ONDANSETRON 8 MG: 2 INJECTION INTRAMUSCULAR; INTRAVENOUS at 15:07

## 2020-05-04 RX ADMIN — DEXAMETHASONE SODIUM PHOSPHATE 12 MG: 4 INJECTION, SOLUTION INTRAMUSCULAR; INTRAVENOUS at 15:23

## 2020-05-04 RX ADMIN — PACLITAXEL 100 MG: 6 INJECTION, SOLUTION INTRAVENOUS at 16:16

## 2020-05-04 RX ADMIN — DIPHENHYDRAMINE HYDROCHLORIDE 50 MG: 50 INJECTION, SOLUTION INTRAMUSCULAR; INTRAVENOUS at 15:15

## 2020-05-04 RX ADMIN — SODIUM CHLORIDE 256 MG: 900 INJECTION, SOLUTION INTRAVENOUS at 17:25

## 2020-05-04 NOTE — PROGRESS NOTES
Arrived to the [facility]. Taxol and Carboplatin completed. Patient tolerated well. Any issues or concerns during appointment: NO.  Patient aware of next infusion appointment on 05/11/2020 (date) at 56 (time). Discharged ambulatory.

## 2020-05-05 ENCOUNTER — HOSPITAL ENCOUNTER (INPATIENT)
Age: 70
LOS: 2 days | Discharge: HOME | DRG: 872 | End: 2020-05-07
Attending: EMERGENCY MEDICINE | Admitting: HOSPITALIST
Payer: COMMERCIAL

## 2020-05-05 ENCOUNTER — PATIENT OUTREACH (OUTPATIENT)
Dept: CASE MANAGEMENT | Age: 70
End: 2020-05-05

## 2020-05-05 ENCOUNTER — APPOINTMENT (OUTPATIENT)
Dept: RADIATION ONCOLOGY | Age: 70
End: 2020-05-05
Payer: COMMERCIAL

## 2020-05-05 ENCOUNTER — APPOINTMENT (OUTPATIENT)
Dept: GENERAL RADIOLOGY | Age: 70
DRG: 872 | End: 2020-05-05
Attending: EMERGENCY MEDICINE
Payer: COMMERCIAL

## 2020-05-05 DIAGNOSIS — D70.9 NEUTROPENIC FEVER (HCC): Primary | ICD-10-CM

## 2020-05-05 DIAGNOSIS — C34.31 MALIGNANT NEOPLASM OF LOWER LOBE OF RIGHT LUNG (HCC): Chronic | ICD-10-CM

## 2020-05-05 DIAGNOSIS — R50.81 FEBRILE NEUTROPENIA (HCC): ICD-10-CM

## 2020-05-05 DIAGNOSIS — R50.81 NEUTROPENIC FEVER (HCC): Primary | ICD-10-CM

## 2020-05-05 DIAGNOSIS — A41.9 SEPSIS DUE TO UNDETERMINED ORGANISM (HCC): ICD-10-CM

## 2020-05-05 DIAGNOSIS — D70.9 FEBRILE NEUTROPENIA (HCC): ICD-10-CM

## 2020-05-05 DIAGNOSIS — I10 ESSENTIAL HYPERTENSION WITH GOAL BLOOD PRESSURE LESS THAN 130/80: Chronic | ICD-10-CM

## 2020-05-05 PROBLEM — I95.9 HYPOTENSION: Status: ACTIVE | Noted: 2020-05-05

## 2020-05-05 PROBLEM — R00.0 TACHYCARDIA: Status: ACTIVE | Noted: 2020-05-05

## 2020-05-05 LAB
ALBUMIN SERPL-MCNC: 3.3 G/DL (ref 3.2–4.6)
ALBUMIN/GLOB SERPL: 0.8 {RATIO} (ref 1.2–3.5)
ALP SERPL-CCNC: 94 U/L (ref 50–136)
ALT SERPL-CCNC: 24 U/L (ref 12–65)
ANION GAP SERPL CALC-SCNC: 11 MMOL/L (ref 7–16)
APPEARANCE UR: ABNORMAL
AST SERPL-CCNC: 32 U/L (ref 15–37)
ATRIAL RATE: 159 BPM
BASOPHILS # BLD: 0 K/UL (ref 0–0.2)
BASOPHILS NFR BLD: 2 % (ref 0–2)
BILIRUB DIRECT SERPL-MCNC: 0.8 MG/DL
BILIRUB INDIRECT SERPL-MCNC: 1.2 MG/DL (ref 0–1.1)
BILIRUB SERPL-MCNC: 1.9 MG/DL (ref 0.2–1.1)
BILIRUB SERPL-MCNC: 2 MG/DL (ref 0.2–1.1)
BILIRUB UR QL: NEGATIVE
BUN SERPL-MCNC: 15 MG/DL (ref 8–23)
CALCIUM SERPL-MCNC: 8.3 MG/DL (ref 8.3–10.4)
CALCULATED R AXIS, ECG10: -19 DEGREES
CALCULATED T AXIS, ECG11: 43 DEGREES
CHLORIDE SERPL-SCNC: 107 MMOL/L (ref 98–107)
CO2 SERPL-SCNC: 18 MMOL/L (ref 21–32)
COLOR UR: ABNORMAL
CREAT SERPL-MCNC: 1.02 MG/DL (ref 0.8–1.5)
DIAGNOSIS, 93000: NORMAL
DIFFERENTIAL METHOD BLD: ABNORMAL
EOSINOPHIL # BLD: 0 K/UL (ref 0–0.8)
EOSINOPHIL NFR BLD: 0 % (ref 0.5–7.8)
ERYTHROCYTE [DISTWIDTH] IN BLOOD BY AUTOMATED COUNT: 14.2 % (ref 11.9–14.6)
GLOBULIN SER CALC-MCNC: 4 G/DL (ref 2.3–3.5)
GLUCOSE SERPL-MCNC: 136 MG/DL (ref 65–100)
GLUCOSE UR STRIP.AUTO-MCNC: NEGATIVE MG/DL
HCT VFR BLD AUTO: 35.9 % (ref 41.1–50.3)
HGB BLD-MCNC: 12.8 G/DL (ref 13.6–17.2)
HGB UR QL STRIP: NEGATIVE
IMM GRANULOCYTES # BLD AUTO: 0 K/UL (ref 0–0.5)
IMM GRANULOCYTES NFR BLD AUTO: 0 % (ref 0–5)
KETONES UR QL STRIP.AUTO: ABNORMAL MG/DL
LACTATE SERPL-SCNC: 1.3 MMOL/L (ref 0.4–2)
LACTATE SERPL-SCNC: 2.6 MMOL/L (ref 0.4–2)
LEUKOCYTE ESTERASE UR QL STRIP.AUTO: NEGATIVE
LYMPHOCYTES # BLD: 0.1 K/UL (ref 0.5–4.6)
LYMPHOCYTES NFR BLD: 8 % (ref 13–44)
MCH RBC QN AUTO: 30.7 PG (ref 26.1–32.9)
MCHC RBC AUTO-ENTMCNC: 35.7 G/DL (ref 31.4–35)
MCV RBC AUTO: 86.1 FL (ref 79.6–97.8)
MONOCYTES # BLD: 0 K/UL (ref 0.1–1.3)
MONOCYTES NFR BLD: 5 % (ref 4–12)
NEUTS SEG # BLD: 0.5 K/UL (ref 1.7–8.2)
NEUTS SEG NFR BLD: 85 % (ref 43–78)
NITRITE UR QL STRIP.AUTO: NEGATIVE
NRBC # BLD: 0 K/UL (ref 0–0.2)
P-R INTERVAL, ECG05: 88 MS
PH UR STRIP: 5 [PH] (ref 5–9)
PLATELET # BLD AUTO: 83 K/UL (ref 150–450)
PMV BLD AUTO: 10 FL (ref 9.4–12.3)
POTASSIUM SERPL-SCNC: 3.5 MMOL/L (ref 3.5–5.1)
PROT SERPL-MCNC: 7.3 G/DL (ref 6.3–8.2)
PROT UR STRIP-MCNC: NEGATIVE MG/DL
Q-T INTERVAL, ECG07: 328 MS
QRS DURATION, ECG06: 76 MS
QTC CALCULATION (BEZET), ECG08: 533 MS
RBC # BLD AUTO: 4.17 M/UL (ref 4.23–5.6)
SODIUM SERPL-SCNC: 136 MMOL/L (ref 136–145)
SP GR UR REFRACTOMETRY: 1.03 (ref 1–1.02)
TROPONIN I SERPL-MCNC: <0.02 NG/ML (ref 0.02–0.05)
UROBILINOGEN UR QL STRIP.AUTO: 1 EU/DL (ref 0.2–1)
VENTRICULAR RATE, ECG03: 159 BPM
WBC # BLD AUTO: 0.6 K/UL (ref 4.3–11.1)

## 2020-05-05 PROCEDURE — 83605 ASSAY OF LACTIC ACID: CPT

## 2020-05-05 PROCEDURE — 71045 X-RAY EXAM CHEST 1 VIEW: CPT

## 2020-05-05 PROCEDURE — 87086 URINE CULTURE/COLONY COUNT: CPT

## 2020-05-05 PROCEDURE — 77030040361 HC SLV COMPR DVT MDII -B

## 2020-05-05 PROCEDURE — 74011250636 HC RX REV CODE- 250/636: Performed by: EMERGENCY MEDICINE

## 2020-05-05 PROCEDURE — 87040 BLOOD CULTURE FOR BACTERIA: CPT

## 2020-05-05 PROCEDURE — 80053 COMPREHEN METABOLIC PANEL: CPT

## 2020-05-05 PROCEDURE — 74011250636 HC RX REV CODE- 250/636: Performed by: NURSE PRACTITIONER

## 2020-05-05 PROCEDURE — 65660000000 HC RM CCU STEPDOWN

## 2020-05-05 PROCEDURE — 74011250637 HC RX REV CODE- 250/637: Performed by: HOSPITALIST

## 2020-05-05 PROCEDURE — 74011000258 HC RX REV CODE- 258: Performed by: EMERGENCY MEDICINE

## 2020-05-05 PROCEDURE — 77030003560 HC NDL HUBR BARD -A

## 2020-05-05 PROCEDURE — 82248 BILIRUBIN DIRECT: CPT

## 2020-05-05 PROCEDURE — 93005 ELECTROCARDIOGRAM TRACING: CPT | Performed by: EMERGENCY MEDICINE

## 2020-05-05 PROCEDURE — 96361 HYDRATE IV INFUSION ADD-ON: CPT

## 2020-05-05 PROCEDURE — 84484 ASSAY OF TROPONIN QUANT: CPT

## 2020-05-05 PROCEDURE — 96367 TX/PROPH/DG ADDL SEQ IV INF: CPT

## 2020-05-05 PROCEDURE — 85025 COMPLETE CBC W/AUTO DIFF WBC: CPT

## 2020-05-05 PROCEDURE — 74011000258 HC RX REV CODE- 258: Performed by: HOSPITALIST

## 2020-05-05 PROCEDURE — 74011250636 HC RX REV CODE- 250/636: Performed by: HOSPITALIST

## 2020-05-05 PROCEDURE — 81003 URINALYSIS AUTO W/O SCOPE: CPT

## 2020-05-05 PROCEDURE — 99285 EMERGENCY DEPT VISIT HI MDM: CPT

## 2020-05-05 PROCEDURE — 96365 THER/PROPH/DIAG IV INF INIT: CPT

## 2020-05-05 PROCEDURE — 99223 1ST HOSP IP/OBS HIGH 75: CPT | Performed by: INTERNAL MEDICINE

## 2020-05-05 RX ORDER — LIDOCAINE AND PRILOCAINE 25; 25 MG/G; MG/G
CREAM TOPICAL AS NEEDED
Status: DISCONTINUED | OUTPATIENT
Start: 2020-05-05 | End: 2020-05-07 | Stop reason: HOSPADM

## 2020-05-05 RX ORDER — PROCHLORPERAZINE MALEATE 10 MG
10 TABLET ORAL
Status: DISCONTINUED | OUTPATIENT
Start: 2020-05-05 | End: 2020-05-07 | Stop reason: HOSPADM

## 2020-05-05 RX ORDER — ONDANSETRON 2 MG/ML
4 INJECTION INTRAMUSCULAR; INTRAVENOUS
Status: DISCONTINUED | OUTPATIENT
Start: 2020-05-05 | End: 2020-05-07 | Stop reason: HOSPADM

## 2020-05-05 RX ORDER — BETAMETHASONE DIPROPIONATE 0.5 MG/G
CREAM TOPICAL
Status: DISCONTINUED | OUTPATIENT
Start: 2020-05-05 | End: 2020-05-07 | Stop reason: HOSPADM

## 2020-05-05 RX ORDER — VANCOMYCIN HYDROCHLORIDE
1250 EVERY 12 HOURS
Status: DISCONTINUED | OUTPATIENT
Start: 2020-05-05 | End: 2020-05-06

## 2020-05-05 RX ORDER — POLYETHYLENE GLYCOL 3350 17 G/17G
17 POWDER, FOR SOLUTION ORAL DAILY
Status: DISCONTINUED | OUTPATIENT
Start: 2020-05-05 | End: 2020-05-07 | Stop reason: HOSPADM

## 2020-05-05 RX ORDER — ACETAMINOPHEN 325 MG/1
650 TABLET ORAL
Status: DISCONTINUED | OUTPATIENT
Start: 2020-05-05 | End: 2020-05-07 | Stop reason: HOSPADM

## 2020-05-05 RX ORDER — MAG HYDROX/ALUMINUM HYD/SIMETH 200-200-20
30 SUSPENSION, ORAL (FINAL DOSE FORM) ORAL
Status: DISCONTINUED | OUTPATIENT
Start: 2020-05-05 | End: 2020-05-07 | Stop reason: HOSPADM

## 2020-05-05 RX ORDER — AMOXICILLIN 250 MG
1 CAPSULE ORAL DAILY
Status: DISCONTINUED | OUTPATIENT
Start: 2020-05-05 | End: 2020-05-07 | Stop reason: HOSPADM

## 2020-05-05 RX ORDER — PANTOPRAZOLE SODIUM 40 MG/1
40 TABLET, DELAYED RELEASE ORAL
Status: DISCONTINUED | OUTPATIENT
Start: 2020-05-05 | End: 2020-05-07 | Stop reason: HOSPADM

## 2020-05-05 RX ORDER — ONDANSETRON 8 MG/1
8 TABLET, ORALLY DISINTEGRATING ORAL
Status: DISCONTINUED | OUTPATIENT
Start: 2020-05-05 | End: 2020-05-07 | Stop reason: HOSPADM

## 2020-05-05 RX ORDER — SODIUM CHLORIDE 9 MG/ML
75 INJECTION, SOLUTION INTRAVENOUS CONTINUOUS
Status: DISCONTINUED | OUTPATIENT
Start: 2020-05-05 | End: 2020-05-07 | Stop reason: HOSPADM

## 2020-05-05 RX ORDER — ALBUTEROL SULFATE 0.83 MG/ML
2.5 SOLUTION RESPIRATORY (INHALATION)
Status: DISCONTINUED | OUTPATIENT
Start: 2020-05-05 | End: 2020-05-07 | Stop reason: HOSPADM

## 2020-05-05 RX ADMIN — SODIUM CHLORIDE 1000 ML: 900 INJECTION, SOLUTION INTRAVENOUS at 01:11

## 2020-05-05 RX ADMIN — SODIUM CHLORIDE 4.5 G: 900 INJECTION, SOLUTION INTRAVENOUS at 01:12

## 2020-05-05 RX ADMIN — PIPERACILLIN AND TAZOBACTAM 3.38 G: 3; .375 INJECTION, POWDER, LYOPHILIZED, FOR SOLUTION INTRAVENOUS at 22:12

## 2020-05-05 RX ADMIN — SODIUM CHLORIDE 125 ML/HR: 900 INJECTION, SOLUTION INTRAVENOUS at 19:18

## 2020-05-05 RX ADMIN — PIPERACILLIN AND TAZOBACTAM 3.38 G: 3; .375 INJECTION, POWDER, LYOPHILIZED, FOR SOLUTION INTRAVENOUS at 08:14

## 2020-05-05 RX ADMIN — ACETAMINOPHEN 650 MG: 325 TABLET, FILM COATED ORAL at 07:11

## 2020-05-05 RX ADMIN — PANTOPRAZOLE SODIUM 40 MG: 40 TABLET, DELAYED RELEASE ORAL at 08:14

## 2020-05-05 RX ADMIN — VANCOMYCIN HYDROCHLORIDE 1250 MG: 10 INJECTION, POWDER, LYOPHILIZED, FOR SOLUTION INTRAVENOUS at 11:27

## 2020-05-05 RX ADMIN — SODIUM CHLORIDE 125 ML/HR: 900 INJECTION, SOLUTION INTRAVENOUS at 17:53

## 2020-05-05 RX ADMIN — SODIUM CHLORIDE 1000 ML: 900 INJECTION, SOLUTION INTRAVENOUS at 01:16

## 2020-05-05 RX ADMIN — PIPERACILLIN AND TAZOBACTAM 3.38 G: 3; .375 INJECTION, POWDER, LYOPHILIZED, FOR SOLUTION INTRAVENOUS at 17:50

## 2020-05-05 RX ADMIN — SODIUM CHLORIDE 125 ML/HR: 900 INJECTION, SOLUTION INTRAVENOUS at 04:00

## 2020-05-05 RX ADMIN — VANCOMYCIN HYDROCHLORIDE 1250 MG: 10 INJECTION, POWDER, LYOPHILIZED, FOR SOLUTION INTRAVENOUS at 22:12

## 2020-05-05 RX ADMIN — VANCOMYCIN HYDROCHLORIDE 1000 MG: 1 INJECTION, POWDER, LYOPHILIZED, FOR SOLUTION INTRAVENOUS at 02:08

## 2020-05-05 RX ADMIN — SODIUM CHLORIDE 1000 ML: 900 INJECTION, SOLUTION INTRAVENOUS at 08:14

## 2020-05-05 NOTE — PROGRESS NOTES
Pharmacokinetic Consult to Pharmacist 
 
Raysa Arredondo. is a 81 y.o. male being treated for sepsis of unknown etiology with vancomycin. Lab Results Component Value Date/Time BUN 15 05/05/2020 01:09 AM  
 Creatinine 1.02 05/05/2020 01:09 AM  
 WBC 0.6 (LL) 05/05/2020 01:09 AM  
 Lactic acid 2.6 (HH) 05/05/2020 01:09 AM  
  
Estimated Creatinine Clearance: 80.3 mL/min (based on SCr of 1.02 mg/dL). Day 1 of vancomycin. Goal trough is 15-20. Vancomycin dose initiated at 1000 mg x 1 dose; followed by Vanc 1250 mg IV q12h. Will continue to follow patient and order levels when clinically indicated. Thank you, Odalis Buitrago, PharmD.

## 2020-05-05 NOTE — ED NOTES
TRANSFER - OUT REPORT: 
 
Verbal report given to Donal Sawyer RN (name) on Gwen Sanchez.  being transferred to oncology (unit) for routine progression of care Report consisted of patients Situation, Background, Assessment and  
Recommendations(SBAR). Information from the following report(s) SBAR and ED Summary was reviewed with the receiving nurse. Lines:  
Venous Access Device chest port  03/18/20 Upper chest (subclavicular area, right (Active) Peripheral IV 05/05/20 Right Wrist (Active) Site Assessment Clean, dry, & intact 5/5/2020  1:06 AM  
Phlebitis Assessment 0 5/5/2020  1:06 AM  
Infiltration Assessment 0 5/5/2020  1:06 AM  
Dressing Status Clean, dry, & intact 5/5/2020  1:06 AM  
Dressing Type Tape;Transparent 5/5/2020  1:06 AM  
Hub Color/Line Status Pink;Flushed;Patent 5/5/2020  1:06 AM  
Action Taken Blood drawn 5/5/2020  1:06 AM  
   
Peripheral IV 05/05/20 Left Forearm (Active) Opportunity for questions and clarification was provided. Patient transported with: 
 Registered Nurse

## 2020-05-05 NOTE — PROGRESS NOTES
Nutrition Assessment for:  
Best Practice Alert for Malnutrition Screening Tool: Recently Lost Weight Without Trying: Yes, If Yes, How Much Weight Loss: 2-13 lbs, Eating Poorly Due to Decreased Appetite: Yes Assessment: Patient with recent diagnosis of SCC right lung undergoing weekly chemo (Carbo/taxol) and XRT (21/26 fractions) presented to hospital with fever. He was admitted with neutropenic fever. His PMH is also significant for HTN and GERD. Noted that patient also c/o constipation and bowel regimen on MAR. He was seen today and endorses weight loss. He states that he has not been eating at baseline secondary to poor appetite, taste changes, and nausea following chemo. He does not provide much history regarding intake at home, but states that he has been using [employer] brand of ONS daily. He prefers vanilla. He reports that he ate better for dinner last evening than this am. He reports ~50% of spaghetti and meatballs from [employer]. This am he was able to tolerate his muffin, bites of eggs, and a full Ensure. He states that he does have some sores on his tongue, but denies interfering with PO intake. He states it just makes him want to drink more. DIET REGULAR 
DIET NUTRITIONAL SUPPLEMENTS All Meals; [employer]ure [employer] DIET NUTRITIONAL SUPPLEMENTS Breakfast, Lunch; [employer]ure [employer] Anthropometrics: 
Height: 70\", Weight: 95.6 kg (210 lb 12.8 oz), Weight Source: Bed, Body mass index is 30.25 kg/m². BMI class of obese. Weight history per Oncology Office visits: 
10/20/2019 99.3 kg (215#) 4/6/2020 97.7 kg (215#) 5/4/2020 92.5 kg (204#), BMI 29.27 (overweight) This reveals an 11# (5.1%) weight loss in last month which is clinically significant. Macronutrient needs: (using CBW (Current body weight) standing scale from office yesterday 92.5 kg) EER: 7658-5047 kcal/day (20-25 kcal/kg) EPR:  g/day (20% of kcals) Nutrition Diagnosis: Unintended weight loss related to poor appetite, nausea, dysgeusia  as evidenced by patient reported barriers to PO intake, oral nutrition supplements at baseline due to decline in PO intake, 5.1% weight loss in last month. Nutrition Intervention: 
Meals and snacks: Continue current diet. Explained that trying to keep something on your stomach can help with nausea sensation. Recommended snacks or ONS between meals and in the evening. Provided patient with vanilla pudding from floor stock and explained snacks available when needed. Medical food supplement therapy: Discontinue duplicate order for Ensure Enlive. Add Vanilla preference Coordination of nutrition care by a Nutrition Professional: Discussed with Jimmy Baires Discharge Nutrition Plan: Too soon to determine. [address] [place] [address], [address], [address] [facility]

## 2020-05-05 NOTE — PROGRESS NOTES
END OF SHIFT NOTE: 
 
Intake/Output No intake/output data recorded. Voiding: YES Catheter: NO 
Drain:   
 
 
 
 
 
Stool:  1 occurrences. Stool Assessment Stool Color: [place] (05/05/20 0402) Stool Appearance: Loose; Watery (05/05/20 0402) Stool Amount: Smear (05/05/20 0402) Stool Source/Status: Incontinence; Rectum (05/05/20 0402) Emesis:  0 occurrences. VITAL SIGNS Patient Vitals for the past 12 hrs: 
 Temp Pulse Resp BP SpO2  
05/05/20 0549 98.9 °F (37.2 °C) (!) 126 20 (!) 89/55   
05/05/20 0400 99.3 °F (37.4 °C) (!) 129 25 (!) 89/55 95 % 05/05/20 0342  (!) 123     
05/05/20 0240  (!) 135  106/66 96 % 05/05/20 0220  (!) 137  96/63 94 % 05/05/20 0200 98.3 °F (36.8 °C) (!) 147 20 98/63 95 % 05/05/20 0146  (!) 146 (!) 35  92 % 05/05/20 0140  (!) 145  95/69   
05/05/20 0120  (!) 148 23 102/71   
05/05/20 0118     93 % 05/05/20 0052 100.3 °F (37.9 °C) (!) 160 22 125/71 93 % Pain Assessment Pain 1 Pain Scale 1: Numeric (0 - 10) (05/05/20 0356) Pain Intensity 1: 0 (05/05/20 0356) Patient Stated Pain Goal: 0 (05/05/20 0356) Ambulating Yes Additional Information:  
 
Shift report given to oncoming nurse at the bedside.  
 
Jaimie Sanford RN

## 2020-05-05 NOTE — PROGRESS NOTES
Care Management Interventions Transition of Care Consult (CM Consult): Discharge Planning Current Support Network: Own Home Confirm Follow Up Transport: Family Discharge Location Discharge Placement: Home SW spoke with pt who is A&O. PTA pt indep with [employer], working full time. Pt's girlfriend Genoveva Buerger ) lives with him. Pt is currently receiving weekly chemo ( which he had yesterday & then developed a high fever which led to admission ) & daily radiation. Pt was provided a list of PCP as he states has not seen a PCP in 2 yrs. Pt is followed by Dr. Camden Alegre and NP with oncology. SW will be available if any d/c needs arise.

## 2020-05-05 NOTE — PROGRESS NOTES
I saw patient on 5-4-20 with Dr Paco Barrios in office. Pt will have Carbo/Taxol with WBC 1000 and he is scheduled to finish radiation 5-14-20. Pt will also return for Chemo/radiation in one week if able. Dr Paco Barrios states he understands with ANC 1000, pt may not be able to receive chemo next week and pt also verbalizes understanding of this. Pt told to wash hands frequently and often and report any temps>100.4. Pt will receive fluids on Thursday 5-7-20.  Nurse navigation is following

## 2020-05-05 NOTE — PROGRESS NOTES
OUTREACH NURSE PROGRESS REPORT SUBJECTIVE: In to assess patient secondary to MEWS 5. Raysa Bell was seen and focused assessment complete. MEWS Score: 5 (05/05/20 0929) Vitals:  
 05/05/20 0400 05/05/20 0549 05/05/20 0745 05/05/20 7759 BP: (!) 89/55 (!) 89/55 94/63 98/56 Pulse: (!) 129 (!) 126 (!) 121 (!) 120 Resp: 25 20 18 24 Temp: 99.3 °F (37.4 °C) 98.9 °F (37.2 °C) (!) 101.5 °F (38.6 °C) 99.2 °F (37.3 °C) SpO2: 95%  95% 92% Weight: 95.6 kg (210 lb 12.8 oz) OBJECTIVE: On arrival to room, I found patient to be lying in bed. ASSESSMENT:  
General appearance: alert, cooperative, no distress, appears stated age Lungs: clear to auscultation bilaterally Heart: tachycardic rate and normal rythm Pain Assessment Pain Intensity 1: 0 (05/05/20 0813) Patient Stated Pain Goal: 0 PLAN:  Patient in bed. No distress noted. Pt developed fever of 101.5 this morning and was given tylenol. Temperature down to 99.2. repeat lactic was 1. 3. will continue to follow patient Q6 per protocol.

## 2020-05-05 NOTE — CONSULTS
[address] Hematology Oncology Inpatient Hematology / Oncology Consult Note Reason for Consult:  Febrile neutropenia ([facility] [address] [D70.9, R50.81] Tachycardia [R00.0] Referring Physician:  Magdiel Wen MD 
 
History of Present Illness: Mr. Yasmany Murillo is a 81 y.o. male admitted on 5/5/2020 with a primary diagnosis of neutropenic fever. He is a known patient of Dr. 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 He developed a few last night of 104 so he proceeded to the ER for evaluation. He denies any significant infectious symptoms. No cough, wheezing, chest pain or shortness of breath. No diarrhea or abdominal pain.  
  
In ED he was tachycardic to 160 with a RR of 35. BP 89/55. WBC ct 0.6 with abs neutrophil of 0.5. Lactic acid ws 2.6 and has now normalized. UA negative for infection. Chest xray reported as clear. Pan-cultures were obtained and broad-spectrum antibiotics were started. We have been consulted on our known patient. Review of Systems: 
Constitutional Denies chills, weight loss, night sweats. + fever, fatigue, decreased appetite. HEENT Denies trauma, blurry vision, hearing loss, ear pain, nosebleeds, sore throat, neck pain and ear discharge. Skin Denies lesions or rashes. Lungs Denies dyspnea, cough, sputum production or hemoptysis. Cardiovascular Denies chest pain, palpitations, or lower extremity edema. Gastrointestinal Denies vomiting, bloody or black stools, abdominal pain. + nausea, constipation.  Denies dysuria, frequency or hesitancy of urination. Neuro Denies headaches, visual changes or ataxia. Denies dizziness, tingling, tremors, sensory change, speech change, focal weakness or headaches. Hematology Denies easy bruising or bleeding, denies gingival bleeding or epistaxis. Endo Denies heat/cold intolerance, denies diabetes or thyroid abnormalities. MSK Denies back pain, arthralgias, myalgias or frequent falls. Psychiatric/Behavioral Denies depression and substance abuse. The patient is not nervous/anxious. Allergies Allergen Reactions  Celebrex [Celecoxib] Itching Past Medical History:  
Diagnosis Date  GERD (gastroesophageal reflux disease)  Hypertension  Hypoxia 2020  Malignant neoplasm of lower lobe of right lung ([address] 2020  Osteoarthritis  Right lower lobe lung mass 2020  Status post total right knee replacement 2016 Past Surgical History:  
Procedure Laterality Date  HX COLONOSCOPY    
 HX KNEE ARTHROSCOPY Left   
 scope X 1, ACL recon X 1  
 HX KNEE ARTHROSCOPY Right , 1975 X 2   
 HX KNEE REPLACEMENT Right [address]  HX VASCULAR ACCESS    
 IR INSERT TUNL CVC W PORT OVER 5 YEARS  3/18/2020  
Perfecto SINUS SURGERY PROC UNLISTED  ,  \"nasal plasty\" Family History Problem Relation Age of Onset  Cancer Mother   
     uterine  Arrhythmia Sister   
     afib Social History Socioeconomic History  Marital status:  Spouse name: Not on file  Number of children: Not on file  Years of education: Not on file  Highest education level: Not on file Occupational History  Not on file Social Needs  Financial resource strain: Not on file  Food insecurity Worry: Not on file Inability: Not on file  Transportation needs Medical: Not on file Non-medical: Not on file Tobacco Use  Smoking status: Former Smoker Packs/day: 1.00 Years: 20.00 Pack years: 20.00 Last attempt to quit:  Years since quittin.3  Smokeless tobacco: Never Used Substance and Sexual Activity  Alcohol use: Yes Alcohol/week: 4.0 standard drinks Types: 4 Glasses of wine per week  Drug use:  No  
  Sexual activity: Yes  
  Partners: Female Lifestyle  Physical activity Days per week: Not on file Minutes per session: Not on file  Stress: Not on file Relationships  Social connections Talks on phone: Not on file Gets together: Not on file Attends [employer] service: Not on file Active member of club or organization: Not on file Attends meetings of clubs or organizations: Not on file Relationship status: Not on file  Intimate partner violence Fear of current or ex partner: Not on file Emotionally abused: Not on file Physically abused: Not on file Forced sexual activity: Not on file Other Topics Concern   Service No  
 Blood Transfusions No  
 Caffeine Concern No  
 Occupational Exposure No  
 Hobby Hazards No  
 Sleep Concern No  
 Stress Concern No  
 Weight Concern No  
 Special Diet No  
 Back Care Not Asked  Exercise Yes  Bike Helmet Not Asked [address] [facility] [facility] Yes  Self-Exams Not Asked Social History Narrative  Not on file Current Facility-Administered Medications Medication Dose Route Frequency Provider Last Rate Last Dose  lidocaine-prilocaine (EMLA) 2.5-2.5 % cream   Topical PRN Randolph Berger MD      
 pantoprazole (PROTONIX) tablet 40 mg  40 mg Oral ACB Randolph Berger MD   40 mg at 05/05/20 5655  prochlorperazine (COMPAZINE) tablet 10 mg  10 mg Oral Q6H PRN Randolph Berger MD      
 ondansetron [facility] ODT) tablet 8 mg  8 mg Oral Q8H PRN Randolph Berger MD      
 betamethasone dipropionate (DIPROSONE) 0.05 % cream   Topical DAILY PRN Randolph Berger MD      
 0.9% sodium chloride infusion  125 mL/hr IntraVENous CONTINUOUS Randolph Berger  mL/hr at 05/05/20 0400 125 mL/hr at 05/05/20 0400  piperacillin-tazobactam (ZOSYN) 3.375 g in 0.9% sodium chloride (MBP/ADV) 100 mL  3.375 g IntraVENous Q8H Randolph Berger MD 25 mL/hr at 05/05/20 0814 3.375 g at 05/05/20 5998  ondansetron (ZOFRAN) injection 4 mg  4 mg IntraVENous Q4H PRN Guero Lutz MD      
 acetaminophen (TYLENOL) tablet 650 mg  650 mg Oral Q6H PRN Guero Lutz MD   650 mg at 20 0711  
 albuterol (PROVENTIL VENTOLIN) nebulizer solution 2.5 mg  2.5 mg Nebulization Q6H PRN Guero Lutz MD      
 [facility]) 1250 mg in  ml infusion  1,250 mg IntraVENous Q12H Guero Lutz MD      
 sodium chloride 0.9 % bolus infusion 1,000 mL  1,000 mL IntraVENous ONCE Job Hadley NP 1,000 mL/hr at 20 0814 1,000 mL at 20 2973 OBJECTIVE: 
Patient Vitals for the past 8 hrs: 
 BP Temp Pulse Resp SpO2 Weight 20 0745 94/63 (!) 101.5 °F (38.6 °C) (!) 121 18 95 %   
20 0549 (!) 89/55 98.9 °F (37.2 °C) (!) 126 20    
20 0400 (!) 89/55 99.3 °F (37.4 °C) (!) 129 25 95 % 210 lb 12.8 oz (95.6 kg) 20 0342   (!) 123     
20 0240 106/66  (!) 135  96 %   
20 0220 96/63  (!) 137  94 %   
20 0200 98/63 98.3 °F (36.8 °C) (!) 147 20 95 %   
20 0146   (!) 146 (!) 35 92 %   
20 0140 95/69  (!) 145     
20 0120 102/71  (!) 148 23    
20 0118     93 %   
20 0052 125/71 100.3 °F (37.9 °C) (!) 160 22 93 %  Temp (24hrs), Av.7 °F (37.6 °C), Min:98.3 °F (36.8 °C), Max:101.5 °F (38.6 °C) 
 
 0701 -  1900 In: 616 [I.V.:616] Out: - Physical Exam: 
Constitutional: Well developed, well nourished male in no acute distress, sitting comfortably in the hospital bed. HEENT: Normocephalic and atraumatic. Oropharynx is clear, mucous membranes are moist.  Extraocular muscles are intact. Sclerae anicteric. Neck supple. Skin Warm and dry. No bruising and no rash noted. No erythema. No pallor. Respiratory Lungs are clear to auscultation bilaterally without wheezes, rales or rhonchi, normal air exchange without accessory muscle use. Tachypnea. CVS Tachycardic rate, regular rhythm and normal S1 and S2. No murmurs, gallops, or rubs. Abdomen Soft, nontender and nondistended, normoactive bowel sounds. No palpable mass. Neuro Grossly nonfocal with no obvious sensory or motor deficits. MSK Normal range of motion in general.  No edema and no tenderness. Psych Appropriate mood and affect. Labs:   
Recent Results (from the past 24 hour(s)) MAGNESIUM Collection Time: 05/04/20  1:26 PM  
Result Value Ref Range Magnesium 2.2 1.8 - 2.4 mg/dL CBC WITH AUTOMATED DIFF Collection Time: 05/04/20  1:26 PM  
Result Value Ref Range WBC 1.8 (LL) 4.3 - 11.1 K/uL  
 RBC 4.18 (L) 4.23 - 5.67 M/uL  
 HGB 13.0 (L) 13.6 - 17.2 g/dL HCT 36.0 (L) 41.1 - 50.3 % MCV 86.1 79.6 - 97.8 FL  
 MCH 31.1 26.1 - 32.9 PG  
 MCHC 36.1 (H) 31.4 - 35.0 g/dL  
 RDW 14.0 11.9 - 14.6 % PLATELET 754 (L) 703 - 450 K/uL MPV 10.2 9.4 - 12.3 FL ABSOLUTE NRBC 0.02 0.0 - 0.2 K/uL NEUTROPHILS 54 43 - 78 % LYMPHOCYTES 30 13 - 44 % MONOCYTES 15 (H) 4.0 - 12.0 % EOSINOPHILS 0 (L) 0.5 - 7.8 % BASOPHILS 1 0.0 - 2.0 % IMMATURE GRANULOCYTES 0 0.0 - 5.0 %  
 ABS. NEUTROPHILS 1.0 (L) 1.7 - 8.2 K/UL  
 ABS. LYMPHOCYTES 0.5 0.5 - 4.6 K/UL  
 ABS. MONOCYTES 0.3 0.1 - 1.3 K/UL  
 ABS. EOSINOPHILS 0.0 0.0 - 0.8 K/UL  
 ABS. BASOPHILS 0.0 0.0 - 0.2 K/UL  
 ABS. IMM. GRANS. 0.0 0.0 - 0.5 K/UL  
 RBC COMMENTS SLIGHT 
ANISOCYTOSIS + POIKILOCYTOSIS 
    
 WBC COMMENTS Result Confirmed By Smear PLATELET COMMENTS SLIGHT    
 DF AUTOMATED METABOLIC PANEL, COMPREHENSIVE Collection Time: 05/04/20  1:26 PM  
Result Value Ref Range Sodium 135 (L) 136 - 145 mmol/L Potassium 3.5 3.5 - 5.1 mmol/L Chloride 106 98 - 107 mmol/L  
 CO2 23 21 - 32 mmol/L Anion gap 6 (L) 7 - 16 mmol/L Glucose 96 65 - 100 mg/dL BUN 16 8 - 23 MG/DL Creatinine 0.91 0.8 - 1.5 MG/DL  
 GFR est AA >60 >60 ml/min/1.73m2 GFR est non-AA >60 >60 ml/min/1.73m2 Calcium 9.5 8.3 - 10.4 MG/DL Bilirubin, total 1.6 (H) 0.2 - 1.1 MG/DL  
 ALT (SGPT) 24 12 - 65 U/L  
 AST (SGOT) 28 15 - 37 U/L Alk. phosphatase 87 50 - 136 U/L Protein, total 7.3 6.3 - 8.2 g/dL Albumin 3.4 3.2 - 4.6 g/dL Globulin 3.9 (H) 2.3 - 3.5 g/dL A-G Ratio 0.9 (L) 1.2 - 3.5    
CBC WITH AUTOMATED DIFF Collection Time: 05/05/20  1:09 AM  
Result Value Ref Range WBC 0.6 (LL) 4.3 - 11.1 K/uL  
 RBC 4.17 (L) 4.23 - 5.6 M/uL  
 HGB 12.8 (L) 13.6 - 17.2 g/dL HCT 35.9 (L) 41.1 - 50.3 % MCV 86.1 79.6 - 97.8 FL  
 MCH 30.7 26.1 - 32.9 PG  
 MCHC 35.7 (H) 31.4 - 35.0 g/dL  
 RDW 14.2 11.9 - 14.6 % PLATELET 83 (L) 045 - 450 K/uL MPV 10.0 9.4 - 12.3 FL ABSOLUTE NRBC 0.00 0.0 - 0.2 K/uL  
 DF AUTOMATED NEUTROPHILS 85 (H) 43 - 78 % LYMPHOCYTES 8 (L) 13 - 44 % MONOCYTES 5 4.0 - 12.0 % EOSINOPHILS 0 (L) 0.5 - 7.8 % BASOPHILS 2 0.0 - 2.0 % IMMATURE GRANULOCYTES 0 0.0 - 5.0 %  
 ABS. NEUTROPHILS 0.5 (L) 1.7 - 8.2 K/UL  
 ABS. LYMPHOCYTES 0.1 (L) 0.5 - 4.6 K/UL  
 ABS. MONOCYTES 0.0 (L) 0.1 - 1.3 K/UL  
 ABS. EOSINOPHILS 0.0 0.0 - 0.8 K/UL  
 ABS. BASOPHILS 0.0 0.0 - 0.2 K/UL  
 ABS. IMM. GRANS. 0.0 0.0 - 0.5 K/UL METABOLIC PANEL, COMPREHENSIVE Collection Time: 05/05/20  1:09 AM  
Result Value Ref Range Sodium 136 136 - 145 mmol/L Potassium 3.5 3.5 - 5.1 mmol/L Chloride 107 98 - 107 mmol/L  
 CO2 18 (L) 21 - 32 mmol/L Anion gap 11 7 - 16 mmol/L Glucose 136 (H) 65 - 100 mg/dL BUN 15 8 - 23 MG/DL Creatinine 1.02 0.8 - 1.5 MG/DL  
 GFR est AA >60 >60 ml/min/1.73m2 GFR est non-AA >60 >60 ml/min/1.73m2 Calcium 8.3 8.3 - 10.4 MG/DL Bilirubin, total 1.9 (H) 0.2 - 1.1 MG/DL  
 ALT (SGPT) 24 12 - 65 U/L  
 AST (SGOT) 32 15 - 37 U/L Alk. phosphatase 94 50 - 136 U/L Protein, total 7.3 6.3 - 8.2 g/dL Albumin 3.3 3.2 - 4.6 g/dL Globulin 4.0 (H) 2.3 - 3.5 g/dL A-G Ratio 0.8 (L) 1.2 - 3.5 LACTIC ACID  
 Collection Time: 05/05/20  1:09 AM  
Result Value Ref Range Lactic acid 2.6 (HH) 0.4 - 2.0 MMOL/L  
TROPONIN I Collection Time: 05/05/20  1:09 AM  
Result Value Ref Range Troponin-I, Qt. <0.02 (L) 0.02 - 0.05 NG/ML  
URINALYSIS W/ RFLX MICROSCOPIC Collection Time: 05/05/20  2:46 AM  
Result Value Ref Range Color LAURA Appearance CLOUDY Specific gravity 1.028 (H) 1.001 - 1.023    
 pH (UA) 5.0 5.0 - 9.0 Protein Negative NEG mg/dL Glucose Negative mg/dL Ketone TRACE (A) NEG mg/dL Bilirubin Negative NEG Blood Negative NEG Urobilinogen 1.0 0.2 - 1.0 EU/dL Nitrites Negative NEG Leukocyte Esterase Negative NEG    
LACTIC ACID Collection Time: 05/05/20  6:41 AM  
Result Value Ref Range Lactic acid 1.3 0.4 - 2.0 MMOL/L Imaging: XR CHEST PORT [[id number]] Collected: 05/05/20 0125 Order Status: Completed Updated: 05/05/20 0127 Narrative:    
EXAM: XR CHEST PORT INDICATION: fever COMPARISON: 2/23/2020 FINDINGS: A portable AP radiograph of the chest was obtained at 0117 hours. Unremarkable [id number]. The lungs are clear. The cardiac and mediastinal 
contours and pulmonary vascularity are normal.  The bones and soft tissues are 
grossly within normal limits. Impression:    
IMPRESSION: Normal chest.  
 
 
 
ASSESSMENT: 
Problem List  Date Reviewed: 4/27/2020 Codes Class Noted Tachycardia ICD-10-CM: R00.0 ICD-9-CM: 785.0  5/5/2020 * (Principal) Febrile neutropenia (HCC) ICD-10-CM: D70.9, R50.81 ICD-9-CM: 288.00, 780.61  5/5/2020 GERD (gastroesophageal reflux disease) ICD-10-CM: K21.9 ICD-9-CM: 530.81  Unknown Hypotension ICD-10-CM: I95.9 ICD-9-CM: 458.9  5/5/2020 Sepsis due to undetermined organism [facility]) ICD-10-CM: A41.9 ICD-9-CM: 038.9  5/5/2020 Malignant neoplasm of lower lobe of right lung (HCC) (Chronic) ICD-10-CM: C34.31 
ICD-9-CM: 162.5  2/26/2020  Hypoxia ICD-10-CM: R09.02 
 ICD-9-CM: 799.02  2/24/2020 Acute respiratory failure with hypoxia [facility]) ICD-10-CM: J96.01 
ICD-9-CM: 518.81  2/24/2020 Hemoptysis ICD-10-CM: R04.2 ICD-9-CM: 786.30  2/24/2020 Mass of lower lobe of right lung ICD-10-CM: R91.8 ICD-9-CM: 786.6  2/23/2020 Right lower lobe lung mass ICD-10-CM: R91.8 ICD-9-CM: 786.6  2/23/2020 Essential hypertension with goal blood pressure less than 130/80 (Chronic) ICD-10-CM: I10 
ICD-9-CM: 401.9  2/19/2018 RECOMMENDATIONS: 
Squamous Cell Carcinoma * Currently undergoing chemoradiation - he completed week six of chemotherapy and 21/26 scheduled radiation treatments yesterday. Hold radiation today. Neutropenic Fever/Sepsis * In ED he was tachycardic to 160 with a RR of 35. BP 89/55. WBC ct 0.6 with abs neutrophil of 0.5. Lactic acid ws 2.6 and has now normalized. * Pan-cultures pending. Continue vanc/zosyn. * Ongoing hypotension/fever/tachycardia this morning. IV NS bolus ordered, continue telemetry. Constipation * Start Miralax and violet-colace daily. Elevated Bilirubin * Tbili 1.9 - fractionate. Rashel SOP's 
DVT prophylaxis with SCD's Discharge home once counts recover, sepsis symptoms resolved and pan-cultures return. Lab studies and imaging studies were personally reviewed. Pertinent old records were reviewed. Thank you for allowing us to participate in the care of Mr. Zane Schwartz. Salbador Childers  [address] Hematology Oncology [id number] [place] [address] Avenue Office : [phone] Fax : [phone] Attending Addendum: 
I have personally performed a face to face diagnostic evaluation on this patient.  I have reviewed and agree with the care plan as documented above by  Jemima Gaspar N.P.  My findings are as follows: Patient appears lethargic, heart rate regular without murmurs, abdomen is non-tender, bowel sounds are positive. 81 male history of right-sided non-small cell lung cancer, stage IIb, undergoing chemoXRT w weekly carbo/taxol, now admitted with neutropenic fevers. Panculture, and agree with continuing broad-spectrum IV antibiotics. Reasonable to hold XRT till patient clinically improves. Will get fractionated T bili, already on IV Zosyn. If mostly direct then will consider RUQ abdominal ultrasound as well. Thank you for allowing us to participate in care of this pleasant patient. Please call w any questions. Marisol Short MD 
[facility] Hematology/Oncology [address] [place] [address] Office : [phone] Fax : [phone]

## 2020-05-05 NOTE — PROGRESS NOTES
Discussed with oncology who will assume primary. Hospitalist will sign off. Please call back with questions/concerns.

## 2020-05-05 NOTE — PROGRESS NOTES
OUTREACH NURSE PROGRESS REPORT SUBJECTIVE: In to assess patient secondary to MEWS = 5, admit from ER to [facility] Onc. Amina Pulido was seen and focused assessment complete. MEWS Score: 5 (05/05/20 0400) Vitals:  
 05/05/20 0220 05/05/20 0240 05/05/20 0342 05/05/20 0400 BP: 96/63 106/66  (!) 89/55 Pulse: (!) 137 (!) 135 (!) 123 (!) 129 Resp:    25 Temp:    99.3 °F (37.4 °C) SpO2: 94% 96%  95% OBJECTIVE: On arrival to room, I found patient to be resting in bed with eyes open. ASSESSMENT:  
General appearance: alert, cooperative, no distress, appears stated age Lungs: clear to auscultation bilaterally Pulses: + radial pulses bilaterally Neurologic: Grossly normal 
 
 
 Pain Assessment Pain Intensity 1: 0 (05/05/20 0356) Patient Stated Pain Goal: 0 PLAN:  Spoke with primary RN. Patient admitted with neutropenic fever, tachycardia. IVF given in ED, ABX initiated. Patient with improvement in HR from ER admission, still with tachycardia. Patient with reported temp of 104 at home, 99.3 oral at time of admission. Patient alert/oriented, able to answer all questions. Patient denies any dyspnea or pain. Patient endorses feeling lethargic. With IVF, improvement in HR from 140s to 120s. BP remains marginal, will continue to monitor. Initial lactic in ER > 2, primary RN ordering repeat at current. Patient denies any needs at current, primary RN aware outreach is available to assist. Will initiate Q6 rounding schedule per protocol.

## 2020-05-05 NOTE — PROGRESS NOTES
Outreach Follow Up Note Aubery Dubin. was seen and assessed. MEWS Score: 3 (05/05/20 1156) Vitals:  
 05/05/20 1814 05/05/20 0745 05/05/20 0929 05/05/20 1156 BP: (!) 89/55 94/63 98/56 96/60 Pulse: (!) 126 (!) 121 (!) 120 (!) 106 Resp: 20 18 24 18 Temp: 98.9 °F (37.2 °C) (!) 101.5 °F (38.6 °C) 99.2 °F (37.3 °C) 98.6 °F (37 °C) SpO2:  95% 92% 93% Weight:      
  
 
 Pain Assessment Pain Intensity 1: 0 (05/05/20 1438) Patient Stated Pain Goal: 0 Patient reviewed and discussed with primary nurse. There have been no significant clinical changes since the completion of the last dated Outreach assessment. Will continue to follow up per outreach protocol. Signed By:   Connie Degroot RN   May 5, 2020 4:11 PM

## 2020-05-05 NOTE — PROGRESS NOTES
Pt arrived to the room alert and oriented x 3 resp even and unlabored pt denies pain at the present time. Remote tele placed and verified, skin assessment completed with Bharti Santo RN. Pt has no open areas noted.

## 2020-05-05 NOTE — PROGRESS NOTES
Problem: Falls - Risk of 
Goal: *Absence of Falls Description: Document Earma Russell Fall Risk and appropriate interventions in the flowsheet. Outcome: Progressing Towards Goal 
Note: Fall Risk Interventions: 
  
 
  
 
Medication Interventions: Teach patient to arise slowly

## 2020-05-05 NOTE — ED PROVIDER NOTES
Patient with previous radiation and current chemotherapy last dose today. Noted low white blood cell count prior to chemo. Developed fever tonight 104 at home. Here for evaluation. Tachycardic on arrival.  Has lung cancer. Followed by Dr. Carlos Mesa. Denies any cough congestion chest pain or shortness of breath. The history is provided by the patient. No  was used. Fever This is a new problem. The current episode started 3 to 5 hours ago. The problem occurs constantly. The problem has not changed since onset. The maximum temperature noted was 103 - 104 F. Pertinent negatives include no chest pain, no diarrhea, no vomiting, no congestion, no headaches, no sore throat, no muscle aches, no cough, no shortness of breath, no mental status change, no neck pain, no rash and no urinary symptoms. He has tried nothing for the symptoms. Past Medical History:  
Diagnosis Date  GERD (gastroesophageal reflux disease)  Hypertension  Hypoxia 02/24/2020  Malignant neoplasm of lower lobe of right lung ([facility] [address] 2/26/2020  Osteoarthritis  Right lower lobe lung mass 02/24/2020  Status post total right knee replacement 2/29/2016 Past Surgical History:  
Procedure Laterality Date  HX COLONOSCOPY    
 HX KNEE ARTHROSCOPY Left   
 scope X 1, ACL recon X 1  
 HX KNEE ARTHROSCOPY Right 1974, 1975 X 2   
 HX KNEE REPLACEMENT Right [address]  HX VASCULAR ACCESS    
 IR INSERT TUNL CVC W PORT OVER 5 YEARS  3/18/2020  
[address] [place] [address] SINUS SURGERY PROC UNLISTED  1988, 1991 \"nasal plasty\" Family History:  
Problem Relation Age of Onset  Cancer Mother   
     uterine  Arrhythmia Sister   
     afib Social History Socioeconomic History  Marital status:  Spouse name: Not on file  Number of children: Not on file  Years of education: Not on file  Highest education level: Not on file Occupational History  Not on file Social Needs  Financial resource strain: Not on file  Food insecurity Worry: Not on file Inability: Not on file  Transportation needs Medical: Not on file Non-medical: Not on file Tobacco Use  Smoking status: Former Smoker Packs/day: 1.00 Years: 20.00 Pack years: 20.00 Last attempt to quit: 2014 Years since quittin.3  Smokeless tobacco: Never Used Substance and Sexual Activity  Alcohol use: Yes Alcohol/week: 4.0 standard drinks Types: 4 Glasses of wine per week  Drug use: No  
 Sexual activity: Yes  
  Partners: Female Lifestyle  Physical activity Days per week: Not on file Minutes per session: Not on file  Stress: Not on file Relationships  Social connections Talks on phone: Not on file Gets together: Not on file Attends [employer] service: Not on file Active member of club or organization: Not on file Attends meetings of clubs or organizations: Not on file Relationship status: Not on file  Intimate partner violence Fear of current or ex partner: Not on file Emotionally abused: Not on file Physically abused: Not on file Forced sexual activity: Not on file Other Topics Concern   Service No  
 Blood Transfusions No  
 Caffeine Concern No  
 Occupational Exposure No  
 Hobby Hazards No  
 Sleep Concern No  
 Stress Concern No  
 Weight Concern No  
 Special Diet No  
 Back Care Not Asked  Exercise Yes  Bike Helmet Not Asked  [address] Yes  Self-Exams Not Asked Social History Narrative  Not on file ALLERGIES: Celebrex [celecoxib] Review of Systems Constitutional: Positive for fatigue and fever. Negative for chills. HENT: Negative for congestion and sore throat. Eyes: Negative for pain and redness. Respiratory: Negative for cough, chest tightness, shortness of breath and wheezing. Cardiovascular: Negative for chest pain and leg swelling. Gastrointestinal: Negative for abdominal pain, diarrhea, nausea and vomiting. Genitourinary: Negative for dysuria and hematuria. Musculoskeletal: Negative for back pain, gait problem, neck pain and neck stiffness. Skin: Negative for color change and rash. Neurological: Negative for weakness, numbness and headaches. Vitals:  
 05/05/20 9623 BP: 125/71 Pulse: (!) 160 Resp: 22 Temp: 100.3 °F (37.9 °C) SpO2: 93% Physical Exam 
Constitutional:   
   Appearance: Normal appearance. He is well-developed. HENT:  
   Head: Normocephalic and atraumatic. Eyes:  
   Extraocular Movements: Extraocular movements intact. Pupils: Pupils are equal, round, and reactive to light. Neck: Musculoskeletal: Normal range of motion and neck supple. Cardiovascular:  
   Rate and Rhythm: Regular rhythm. Tachycardia present. Pulmonary:  
   Effort: Pulmonary effort is normal.  
   Breath sounds: Normal breath sounds. No wheezing. Abdominal:  
   General: Bowel sounds are normal.  
   Palpations: Abdomen is soft. Tenderness: There is no abdominal tenderness. Musculoskeletal: Normal range of motion. General: No swelling. Skin: 
   General: Skin is warm and dry. Neurological:  
   Mental Status: He is alert and oriented to person, place, and time. MDM Number of Diagnoses or Management Options Diagnosis management comments: Pt with neutropenic fever. Chemo today. Will admit for abx. Amount and/or Complexity of Data Reviewed Clinical lab tests: ordered and reviewed Tests in the radiology section of CPT®: ordered and reviewed Tests in the medicine section of CPT®: ordered and reviewed Patient Progress Patient progress: stable Procedures EKG: nonspecific ST and T waves changes, sinus tachycardia. Rate 159. XR CHEST PORT (Final result) Result time 05/05/20 01:25:48  
 Final result by Hay Allen MD (05/05/20 01:25:48) Impression:  
 IMPRESSION: Normal chest.  
  
  
  
Narrative: EXAM: XR CHEST PORT INDICATION: fever COMPARISON: 2/23/2020 FINDINGS: A portable AP radiograph of the chest was obtained at 0117 hours. Unremarkable [id number]. The lungs are clear. The cardiac and mediastinal 
contours and pulmonary vascularity are normal.  The bones and soft tissues are 
grossly within normal limits.   
  
  
  
   
 
Results Include: 
 
Recent Results (from the past 24 hour(s)) MAGNESIUM Collection Time: 05/04/20  1:26 PM  
Result Value Ref Range Magnesium 2.2 1.8 - 2.4 mg/dL CBC WITH AUTOMATED DIFF Collection Time: 05/04/20  1:26 PM  
Result Value Ref Range WBC 1.8 (LL) 4.3 - 11.1 K/uL  
 RBC 4.18 (L) 4.23 - 5.67 M/uL  
 HGB 13.0 (L) 13.6 - 17.2 g/dL HCT 36.0 (L) 41.1 - 50.3 % MCV 86.1 79.6 - 97.8 FL  
 MCH 31.1 26.1 - 32.9 PG  
 MCHC 36.1 (H) 31.4 - 35.0 g/dL  
 RDW 14.0 11.9 - 14.6 % PLATELET 525 (L) 271 - 450 K/uL MPV 10.2 9.4 - 12.3 FL ABSOLUTE NRBC 0.02 0.0 - 0.2 K/uL NEUTROPHILS 54 43 - 78 % LYMPHOCYTES 30 13 - 44 % MONOCYTES 15 (H) 4.0 - 12.0 % EOSINOPHILS 0 (L) 0.5 - 7.8 % BASOPHILS 1 0.0 - 2.0 % IMMATURE GRANULOCYTES 0 0.0 - 5.0 %  
 ABS. NEUTROPHILS 1.0 (L) 1.7 - 8.2 K/UL  
 ABS. LYMPHOCYTES 0.5 0.5 - 4.6 K/UL  
 ABS. MONOCYTES 0.3 0.1 - 1.3 K/UL  
 ABS. EOSINOPHILS 0.0 0.0 - 0.8 K/UL  
 ABS. BASOPHILS 0.0 0.0 - 0.2 K/UL  
 ABS. IMM. GRANS. 0.0 0.0 - 0.5 K/UL  
 RBC COMMENTS SLIGHT 
ANISOCYTOSIS + POIKILOCYTOSIS 
    
 WBC COMMENTS Result Confirmed By Smear PLATELET COMMENTS SLIGHT    
 DF AUTOMATED METABOLIC PANEL, COMPREHENSIVE Collection Time: 05/04/20  1:26 PM  
Result Value Ref Range Sodium 135 (L) 136 - 145 mmol/L Potassium 3.5 3.5 - 5.1 mmol/L Chloride 106 98 - 107 mmol/L  
 CO2 23 21 - 32 mmol/L Anion gap 6 (L) 7 - 16 mmol/L Glucose 96 65 - 100 mg/dL BUN 16 8 - 23 MG/DL Creatinine 0.91 0.8 - 1.5 MG/DL  
 GFR est AA >60 >60 ml/min/1.73m2 GFR est non-AA >60 >60 ml/min/1.73m2 Calcium 9.5 8.3 - 10.4 MG/DL Bilirubin, total 1.6 (H) 0.2 - 1.1 MG/DL  
 ALT (SGPT) 24 12 - 65 U/L  
 AST (SGOT) 28 15 - 37 U/L Alk. phosphatase 87 50 - 136 U/L Protein, total 7.3 6.3 - 8.2 g/dL Albumin 3.4 3.2 - 4.6 g/dL Globulin 3.9 (H) 2.3 - 3.5 g/dL A-G Ratio 0.9 (L) 1.2 - 3.5    
CBC WITH AUTOMATED DIFF Collection Time: 05/05/20  1:09 AM  
Result Value Ref Range WBC 0.6 (LL) 4.3 - 11.1 K/uL  
 RBC 4.17 (L) 4.23 - 5.6 M/uL  
 HGB 12.8 (L) 13.6 - 17.2 g/dL HCT 35.9 (L) 41.1 - 50.3 % MCV 86.1 79.6 - 97.8 FL  
 MCH 30.7 26.1 - 32.9 PG  
 MCHC 35.7 (H) 31.4 - 35.0 g/dL  
 RDW 14.2 11.9 - 14.6 % PLATELET 83 (L) 914 - 450 K/uL MPV 10.0 9.4 - 12.3 FL ABSOLUTE NRBC 0.00 0.0 - 0.2 K/uL  
 DF AUTOMATED NEUTROPHILS 85 (H) 43 - 78 % LYMPHOCYTES 8 (L) 13 - 44 % MONOCYTES 5 4.0 - 12.0 % EOSINOPHILS 0 (L) 0.5 - 7.8 % BASOPHILS 2 0.0 - 2.0 % IMMATURE GRANULOCYTES 0 0.0 - 5.0 %  
 ABS. NEUTROPHILS 0.5 (L) 1.7 - 8.2 K/UL  
 ABS. LYMPHOCYTES 0.1 (L) 0.5 - 4.6 K/UL  
 ABS. MONOCYTES 0.0 (L) 0.1 - 1.3 K/UL  
 ABS. EOSINOPHILS 0.0 0.0 - 0.8 K/UL  
 ABS. BASOPHILS 0.0 0.0 - 0.2 K/UL  
 ABS. IMM. GRANS. 0.0 0.0 - 0.5 K/UL METABOLIC PANEL, COMPREHENSIVE Collection Time: 05/05/20  1:09 AM  
Result Value Ref Range Sodium 136 136 - 145 mmol/L Potassium 3.5 3.5 - 5.1 mmol/L Chloride 107 98 - 107 mmol/L  
 CO2 18 (L) 21 - 32 mmol/L Anion gap 11 7 - 16 mmol/L Glucose 136 (H) 65 - 100 mg/dL BUN 15 8 - 23 MG/DL Creatinine 1.02 0.8 - 1.5 MG/DL  
 GFR est AA >60 >60 ml/min/1.73m2 GFR est non-AA >60 >60 ml/min/1.73m2 Calcium 8.3 8.3 - 10.4 MG/DL Bilirubin, total 1.9 (H) 0.2 - 1.1 MG/DL  
 ALT (SGPT) 24 12 - 65 U/L  
 AST (SGOT) 32 15 - 37 U/L Alk. phosphatase 94 50 - 136 U/L Protein, total 7.3 6.3 - 8.2 g/dL Albumin 3.3 3.2 - 4.6 g/dL Globulin 4.0 (H) 2.3 - 3.5 g/dL A-G Ratio 0.8 (L) 1.2 - 3.5 LACTIC ACID Collection Time: 05/05/20  1:09 AM  
Result Value Ref Range  Lactic acid 2.6 (HH) 0.4 - 2.0 MMOL/L

## 2020-05-05 NOTE — H&P
Hospitalist H&P/Consult Note Admit Date:  2020 12:48 AM  
Name:  Toni Adams. Age:  81 y.o. 
:  1950 MRN:  [id number] PCP:  Haily Strauss MD 
Treatment Team: Attending Provider: Randolph Berger MD; Consulting Provider: Abdulaziz Benoit MD 
 
HPI:  
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 His last chemotherapy treatment was yesterday afternoon. He later developed a fever of 104 around 8-10 pm and came to ED for evaluation. He denies any chest pain or shortness of breath. No abdominal pain. He does have nausea after chemo. In ED he was tachycardic to 160 with a RR of 35. BP 89/55. WBC ct 0.6 with abs neutrophil of 0.5. Lactic acid is 2.6. UA negative for infection. Chest xray reported as clear. Broad-spectrum antibiotics started and Hospitalist service consulted for admission. 10 systems reviewed and negative except as noted in HPI. Past Medical History:  
Diagnosis Date  GERD (gastroesophageal reflux disease)  Hypertension  Hypoxia 2020  Malignant neoplasm of lower lobe of right lung ([facility] [address] 2020  Osteoarthritis  Right lower lobe lung mass 2020  Status post total right knee replacement 2016 Past Surgical History:  
Procedure Laterality Date  HX COLONOSCOPY    
 HX KNEE ARTHROSCOPY Left   
 scope X 1, ACL recon X 1  
 HX KNEE ARTHROSCOPY Right , 1975 X 2   
 HX KNEE REPLACEMENT Right [address]  HX VASCULAR ACCESS    
 IR INSERT TUNL CVC W PORT OVER 5 YEARS  3/18/2020  
[address] [place] [address] SINUS SURGERY PROC UNLISTED  ,  \"nasal plasty\" Prior to Admission Medications Prescriptions Last Dose Informant Patient Reported? Taking?   
betamethasone dipropionate (DIPROSONE) 0.05 % topical cream   Yes No  
dexAMETHasone (DECADRON) 2 mg tablet   No No  
 Sig: daily  Indications: prevent nausea and vomiting from cancer chemotherapy  
esomeprazole (NexIUM) 20 mg capsule   Yes No  
Sig: Take  by mouth daily. prn  
lidocaine-prilocaine (EMLA) topical cream   No No  
Sig: Apply  to affected area as needed for Pain. ondansetron hcl (ZOFRAN) 8 mg tablet   No No  
Sig: Take 1 Tab by mouth every eight (8) hours as needed for Nausea. Indications: nausea and vomiting caused by cancer drugs  
prochlorperazine (COMPAZINE) 10 mg tablet   No No  
Sig: Take 1 Tab by mouth every six (6) hours as needed for Nausea. Indications: nausea and vomiting caused by cancer drugs, nausea and vomiting Facility-Administered Medications: None Allergies Allergen Reactions  Celebrex [Celecoxib] Itching Social History Tobacco Use  Smoking status: Former Smoker Packs/day: 1.00 Years: 20.00 Pack years: 20.00 Last attempt to quit:  Years since quittin.3  Smokeless tobacco: Never Used Substance Use Topics  Alcohol use: Yes Alcohol/week: 4.0 standard drinks Types: 4 Glasses of wine per week Family History Problem Relation Age of Onset  Cancer Mother   
     uterine  Arrhythmia Sister   
     afib Immunization History Administered Date(s) Administered  Influenza Vaccine (Quad) PF 2018  TB Skin Test (PPD) Intradermal 2016 Objective:  
 
Patient Vitals for the past 24 hrs: 
 Temp Pulse Resp BP SpO2  
20 0549 98.9 °F (37.2 °C)   (!) 89/55   
20 0400 99.3 °F (37.4 °C) (!) 129 25 (!) 89/55 95 % 20 0342  (!) 123     
20 0240  (!) 135  106/66 96 % 20 0220  (!) 137  96/63 94 % 20 0200 98.3 °F (36.8 °C) (!) 147 20 98/63 95 % 20 0146  (!) 146 (!) 35  92 % 20 0140  (!) 145  95/69   
20 0120  (!) 148 23 102/71   
20 0118     93 % 20 0052 100.3 °F (37.9 °C) (!) 160 22 125/71 93 % Oxygen Therapy O2 Sat (%): 95 % (05/05/20 0400) Pulse via Oximetry: 138 beats per minute (05/05/20 0240) O2 Device: Room air (05/05/20 0200) No intake or output data in the 24 hours ending 05/05/20 0550 Physical Exam: 
General:    Well nourished. Alert. hypotensive Eyes:   Normal sclera. Extraocular movements intact. ENT:  Normocephalic, atraumatic. Moist mucous membranes Wearing mask CV:   tachycardic. No murmur, rub, or gallop. Lungs:  Coarse breath sounds bilaterally Abdomen: Soft, nontender, nondistended. Bowel sounds normal.  
Extremities: Warm and dry. No cyanosis or edema. No peripheral vasoconstriction Neurologic: CN II-XII grossly intact. Sensation intact. Skin:     No rashes or jaundice. No wounds. Psych:  Normal mood and affect. I reviewed the labs, imaging, EKGs, telemetry, and other studies done this admission. Data Review:  
Recent Results (from the past 24 hour(s)) MAGNESIUM Collection Time: 05/04/20  1:26 PM  
Result Value Ref Range Magnesium 2.2 1.8 - 2.4 mg/dL CBC WITH AUTOMATED DIFF Collection Time: 05/04/20  1:26 PM  
Result Value Ref Range WBC 1.8 (LL) 4.3 - 11.1 K/uL  
 RBC 4.18 (L) 4.23 - 5.67 M/uL  
 HGB 13.0 (L) 13.6 - 17.2 g/dL HCT 36.0 (L) 41.1 - 50.3 % MCV 86.1 79.6 - 97.8 FL  
 MCH 31.1 26.1 - 32.9 PG  
 MCHC 36.1 (H) 31.4 - 35.0 g/dL  
 RDW 14.0 11.9 - 14.6 % PLATELET 840 (L) 678 - 450 K/uL MPV 10.2 9.4 - 12.3 FL ABSOLUTE NRBC 0.02 0.0 - 0.2 K/uL NEUTROPHILS 54 43 - 78 % LYMPHOCYTES 30 13 - 44 % MONOCYTES 15 (H) 4.0 - 12.0 % EOSINOPHILS 0 (L) 0.5 - 7.8 % BASOPHILS 1 0.0 - 2.0 % IMMATURE GRANULOCYTES 0 0.0 - 5.0 %  
 ABS. NEUTROPHILS 1.0 (L) 1.7 - 8.2 K/UL  
 ABS. LYMPHOCYTES 0.5 0.5 - 4.6 K/UL  
 ABS. MONOCYTES 0.3 0.1 - 1.3 K/UL  
 ABS. EOSINOPHILS 0.0 0.0 - 0.8 K/UL  
 ABS. BASOPHILS 0.0 0.0 - 0.2 K/UL  
 ABS. IMM.  GRANS. 0.0 0.0 - 0.5 K/UL  
 RBC COMMENTS SLIGHT 
ANISOCYTOSIS + POIKILOCYTOSIS 
    
 WBC COMMENTS Result Confirmed By Smear PLATELET COMMENTS SLIGHT    
 DF AUTOMATED METABOLIC PANEL, COMPREHENSIVE Collection Time: 05/04/20  1:26 PM  
Result Value Ref Range Sodium 135 (L) 136 - 145 mmol/L Potassium 3.5 3.5 - 5.1 mmol/L Chloride 106 98 - 107 mmol/L  
 CO2 23 21 - 32 mmol/L Anion gap 6 (L) 7 - 16 mmol/L Glucose 96 65 - 100 mg/dL BUN 16 8 - 23 MG/DL Creatinine 0.91 0.8 - 1.5 MG/DL  
 GFR est AA >60 >60 ml/min/1.73m2 GFR est non-AA >60 >60 ml/min/1.73m2 Calcium 9.5 8.3 - 10.4 MG/DL Bilirubin, total 1.6 (H) 0.2 - 1.1 MG/DL  
 ALT (SGPT) 24 12 - 65 U/L  
 AST (SGOT) 28 15 - 37 U/L Alk. phosphatase 87 50 - 136 U/L Protein, total 7.3 6.3 - 8.2 g/dL Albumin 3.4 3.2 - 4.6 g/dL Globulin 3.9 (H) 2.3 - 3.5 g/dL A-G Ratio 0.9 (L) 1.2 - 3.5    
CBC WITH AUTOMATED DIFF Collection Time: 05/05/20  1:09 AM  
Result Value Ref Range WBC 0.6 (LL) 4.3 - 11.1 K/uL  
 RBC 4.17 (L) 4.23 - 5.6 M/uL  
 HGB 12.8 (L) 13.6 - 17.2 g/dL HCT 35.9 (L) 41.1 - 50.3 % MCV 86.1 79.6 - 97.8 FL  
 MCH 30.7 26.1 - 32.9 PG  
 MCHC 35.7 (H) 31.4 - 35.0 g/dL  
 RDW 14.2 11.9 - 14.6 % PLATELET 83 (L) 673 - 450 K/uL MPV 10.0 9.4 - 12.3 FL ABSOLUTE NRBC 0.00 0.0 - 0.2 K/uL  
 DF AUTOMATED NEUTROPHILS 85 (H) 43 - 78 % LYMPHOCYTES 8 (L) 13 - 44 % MONOCYTES 5 4.0 - 12.0 % EOSINOPHILS 0 (L) 0.5 - 7.8 % BASOPHILS 2 0.0 - 2.0 % IMMATURE GRANULOCYTES 0 0.0 - 5.0 %  
 ABS. NEUTROPHILS 0.5 (L) 1.7 - 8.2 K/UL  
 ABS. LYMPHOCYTES 0.1 (L) 0.5 - 4.6 K/UL  
 ABS. MONOCYTES 0.0 (L) 0.1 - 1.3 K/UL  
 ABS. EOSINOPHILS 0.0 0.0 - 0.8 K/UL  
 ABS. BASOPHILS 0.0 0.0 - 0.2 K/UL  
 ABS. IMM. GRANS. 0.0 0.0 - 0.5 K/UL METABOLIC PANEL, COMPREHENSIVE Collection Time: 05/05/20  1:09 AM  
Result Value Ref Range Sodium 136 136 - 145 mmol/L Potassium 3.5 3.5 - 5.1 mmol/L Chloride 107 98 - 107 mmol/L  
 CO2 18 (L) 21 - 32 mmol/L  Anion gap 11 7 - 16 mmol/L  
 Glucose 136 (H) 65 - 100 mg/dL BUN 15 8 - 23 MG/DL Creatinine 1.02 0.8 - 1.5 MG/DL  
 GFR est AA >60 >60 ml/min/1.73m2 GFR est non-AA >60 >60 ml/min/1.73m2 Calcium 8.3 8.3 - 10.4 MG/DL Bilirubin, total 1.9 (H) 0.2 - 1.1 MG/DL  
 ALT (SGPT) 24 12 - 65 U/L  
 AST (SGOT) 32 15 - 37 U/L Alk. phosphatase 94 50 - 136 U/L Protein, total 7.3 6.3 - 8.2 g/dL Albumin 3.3 3.2 - 4.6 g/dL Globulin 4.0 (H) 2.3 - 3.5 g/dL A-G Ratio 0.8 (L) 1.2 - 3.5 LACTIC ACID Collection Time: 05/05/20  1:09 AM  
Result Value Ref Range Lactic acid 2.6 (HH) 0.4 - 2.0 MMOL/L  
TROPONIN I Collection Time: 05/05/20  1:09 AM  
Result Value Ref Range Troponin-I, Qt. <0.02 (L) 0.02 - 0.05 NG/ML  
URINALYSIS W/ RFLX MICROSCOPIC Collection Time: 05/05/20  2:46 AM  
Result Value Ref Range Color LAURA Appearance CLOUDY Specific gravity 1.028 (H) 1.001 - 1.023    
 pH (UA) 5.0 5.0 - 9.0 Protein Negative NEG mg/dL Glucose Negative mg/dL Ketone TRACE (A) NEG mg/dL Bilirubin Negative NEG Blood Negative NEG Urobilinogen 1.0 0.2 - 1.0 EU/dL Nitrites Negative NEG Leukocyte Esterase Negative NEG Imaging Studies: CXR Results  (Last 48 hours) 05/05/20 0122  XR CHEST PORT Final result Impression:  IMPRESSION: Normal chest.  
  
 Narrative:  EXAM: XR CHEST PORT INDICATION: fever COMPARISON: 2/23/2020 FINDINGS: A portable AP radiograph of the chest was obtained at 0117 hours. Unremarkable [id number]-[id number]. The lungs are clear. The cardiac and mediastinal  
contours and pulmonary vascularity are normal.  The bones and soft tissues are  
grossly within normal limits. CT Results  (Last 48 hours) None Assessment and Plan:  
 
Hospital Problems as of 5/5/2020 Date Reviewed: 4/27/2020 Codes Class Noted - Resolved POA Sepsis due to undetermined organism [facility]) ICD-10-CM: A41.9 ICD-9-CM: 038.9  5/5/2020 - Present Yes * (Principal) Febrile neutropenia (HCC) ICD-10-CM: D70.9, R50.81 ICD-9-CM: 288.00, 780.61  5/5/2020 - Present Yes Tachycardia ICD-10-CM: R00.0 ICD-9-CM: 785.0  5/5/2020 - Present Yes Hypotension ICD-10-CM: I95.9 ICD-9-CM: 458.9  5/5/2020 - Present Yes Malignant neoplasm of lower lobe of right lung (HCC) (Chronic) ICD-10-CM: C34.31 
ICD-9-CM: 162.5  2/26/2020 - Present Yes GERD (gastroesophageal reflux disease) ICD-10-CM: K21.9 ICD-9-CM: 530.81  Unknown - Present Yes Right lower lobe lung mass ICD-10-CM: R91.8 ICD-9-CM: 786.6  2/23/2020 - Present Yes Essential hypertension with goal blood pressure less than 130/80 (Chronic) ICD-10-CM: I10 
ICD-9-CM: 401.9  2/19/2018 - Present Yes PLAN: 
· Admit inpatient to remote telemetry, Oncology floor · Continue broad-spectrum antibiotics · Follow-up blood cultures · IV fluids, trend lactic acid · Place on neutropenic and thrombocytopenic precautions · Daily CBC. Monitor BMP, Mg, Phos, Uric acid · Prn albuterol nebulizer treatments · SCDs for DVT prophylaxis, incentive spirometry · Oncology consult in am 
 
 
Estimated LOS:  Greater than 2 midnights Signed: 
Duane Fendt, MD

## 2020-05-06 ENCOUNTER — TELEPHONE (OUTPATIENT)
Dept: CASE MANAGEMENT | Age: 70
End: 2020-05-06

## 2020-05-06 ENCOUNTER — APPOINTMENT (OUTPATIENT)
Dept: RADIATION ONCOLOGY | Age: 70
End: 2020-05-06
Payer: COMMERCIAL

## 2020-05-06 LAB
ALBUMIN SERPL-MCNC: 2.3 G/DL (ref 3.2–4.6)
ALBUMIN/GLOB SERPL: 0.9 {RATIO} (ref 1.2–3.5)
ALP SERPL-CCNC: 63 U/L (ref 50–136)
ALT SERPL-CCNC: 27 U/L (ref 12–65)
ANION GAP SERPL CALC-SCNC: 7 MMOL/L (ref 7–16)
AST SERPL-CCNC: 37 U/L (ref 15–37)
BASOPHILS # BLD: 0 K/UL (ref 0–0.2)
BASOPHILS NFR BLD: 2 % (ref 0–2)
BILIRUB SERPL-MCNC: 1.5 MG/DL (ref 0.2–1.1)
BUN SERPL-MCNC: 11 MG/DL (ref 8–23)
CALCIUM SERPL-MCNC: 7.3 MG/DL (ref 8.3–10.4)
CHLORIDE SERPL-SCNC: 107 MMOL/L (ref 98–107)
CO2 SERPL-SCNC: 22 MMOL/L (ref 21–32)
CREAT SERPL-MCNC: 0.71 MG/DL (ref 0.8–1.5)
DIFFERENTIAL METHOD BLD: ABNORMAL
EOSINOPHIL # BLD: 0 K/UL (ref 0–0.8)
EOSINOPHIL NFR BLD: 0 % (ref 0.5–7.8)
ERYTHROCYTE [DISTWIDTH] IN BLOOD BY AUTOMATED COUNT: 14.5 % (ref 11.9–14.6)
GLOBULIN SER CALC-MCNC: 2.7 G/DL (ref 2.3–3.5)
GLUCOSE SERPL-MCNC: 95 MG/DL (ref 65–100)
HCT VFR BLD AUTO: 24.3 % (ref 41.1–50.3)
HGB BLD-MCNC: 8.4 G/DL (ref 13.6–17.2)
IMM GRANULOCYTES # BLD AUTO: 0 K/UL (ref 0–0.5)
IMM GRANULOCYTES NFR BLD AUTO: 2 % (ref 0–5)
LYMPHOCYTES # BLD: 0.1 K/UL (ref 0.5–4.6)
LYMPHOCYTES NFR BLD: 15 % (ref 13–44)
MAGNESIUM SERPL-MCNC: 2 MG/DL (ref 1.8–2.4)
MCH RBC QN AUTO: 30.4 PG (ref 26.1–32.9)
MCHC RBC AUTO-ENTMCNC: 34.6 G/DL (ref 31.4–35)
MCV RBC AUTO: 88 FL (ref 79.6–97.8)
MONOCYTES # BLD: 0.1 K/UL (ref 0.1–1.3)
MONOCYTES NFR BLD: 13 % (ref 4–12)
NEUTS SEG # BLD: 0.3 K/UL (ref 1.7–8.2)
NEUTS SEG NFR BLD: 68 % (ref 43–78)
NRBC # BLD: 0 K/UL (ref 0–0.2)
PHOSPHATE SERPL-MCNC: 2.5 MG/DL (ref 2.3–3.7)
PLATELET # BLD AUTO: 53 K/UL (ref 150–450)
PLATELET COMMENTS,PCOM: ABNORMAL
PMV BLD AUTO: 11.6 FL (ref 9.4–12.3)
POTASSIUM SERPL-SCNC: 3.2 MMOL/L (ref 3.5–5.1)
PROT SERPL-MCNC: 5 G/DL (ref 6.3–8.2)
RBC # BLD AUTO: 2.76 M/UL (ref 4.23–5.6)
RBC MORPH BLD: ABNORMAL
RBC MORPH BLD: ABNORMAL
SODIUM SERPL-SCNC: 136 MMOL/L (ref 136–145)
URATE SERPL-MCNC: 3.5 MG/DL (ref 2.6–6)
VANCOMYCIN TROUGH SERPL-MCNC: 6.6 UG/ML (ref 5–20)
WBC # BLD AUTO: 0.5 K/UL (ref 4.3–11.1)
WBC MORPH BLD: ABNORMAL

## 2020-05-06 PROCEDURE — 80202 ASSAY OF VANCOMYCIN: CPT

## 2020-05-06 PROCEDURE — 65660000000 HC RM CCU STEPDOWN

## 2020-05-06 PROCEDURE — 99232 SBSQ HOSP IP/OBS MODERATE 35: CPT | Performed by: INTERNAL MEDICINE

## 2020-05-06 PROCEDURE — 74011250637 HC RX REV CODE- 250/637: Performed by: HOSPITALIST

## 2020-05-06 PROCEDURE — 74011250637 HC RX REV CODE- 250/637: Performed by: NURSE PRACTITIONER

## 2020-05-06 PROCEDURE — 74011000258 HC RX REV CODE- 258: Performed by: HOSPITALIST

## 2020-05-06 PROCEDURE — 74011250636 HC RX REV CODE- 250/636: Performed by: HOSPITALIST

## 2020-05-06 PROCEDURE — 80053 COMPREHEN METABOLIC PANEL: CPT

## 2020-05-06 PROCEDURE — 83735 ASSAY OF MAGNESIUM: CPT

## 2020-05-06 PROCEDURE — 74011250636 HC RX REV CODE- 250/636: Performed by: INTERNAL MEDICINE

## 2020-05-06 PROCEDURE — 84100 ASSAY OF PHOSPHORUS: CPT

## 2020-05-06 PROCEDURE — 74011250636 HC RX REV CODE- 250/636: Performed by: NURSE PRACTITIONER

## 2020-05-06 PROCEDURE — 84550 ASSAY OF BLOOD/URIC ACID: CPT

## 2020-05-06 PROCEDURE — 36591 DRAW BLOOD OFF VENOUS DEVICE: CPT

## 2020-05-06 PROCEDURE — 74011250637 HC RX REV CODE- 250/637: Performed by: INTERNAL MEDICINE

## 2020-05-06 PROCEDURE — 85025 COMPLETE CBC W/AUTO DIFF WBC: CPT

## 2020-05-06 RX ORDER — VANCOMYCIN HYDROCHLORIDE
1250 EVERY 8 HOURS
Status: DISCONTINUED | OUTPATIENT
Start: 2020-05-06 | End: 2020-05-07 | Stop reason: HOSPADM

## 2020-05-06 RX ORDER — SUCRALFATE 1 G/1
1 TABLET ORAL
Status: DISCONTINUED | OUTPATIENT
Start: 2020-05-07 | End: 2020-05-07 | Stop reason: HOSPADM

## 2020-05-06 RX ORDER — POTASSIUM CHLORIDE 20 MEQ/1
20 TABLET, EXTENDED RELEASE ORAL 2 TIMES DAILY
Status: DISCONTINUED | OUTPATIENT
Start: 2020-05-06 | End: 2020-05-07 | Stop reason: HOSPADM

## 2020-05-06 RX ADMIN — SUCRALFATE 1 G: 1 TABLET ORAL at 23:23

## 2020-05-06 RX ADMIN — VANCOMYCIN HYDROCHLORIDE 1250 MG: 10 INJECTION, POWDER, LYOPHILIZED, FOR SOLUTION INTRAVENOUS at 12:03

## 2020-05-06 RX ADMIN — TBO-FILGRASTIM 480 MCG: 480 INJECTION, SOLUTION SUBCUTANEOUS at 12:01

## 2020-05-06 RX ADMIN — PANTOPRAZOLE SODIUM 40 MG: 40 TABLET, DELAYED RELEASE ORAL at 09:49

## 2020-05-06 RX ADMIN — POLYETHYLENE GLYCOL (3350) 17 G: 17 POWDER, FOR SOLUTION ORAL at 09:47

## 2020-05-06 RX ADMIN — POTASSIUM CHLORIDE 20 MEQ: 1500 TABLET, EXTENDED RELEASE ORAL at 09:49

## 2020-05-06 RX ADMIN — POTASSIUM CHLORIDE 20 MEQ: 1500 TABLET, EXTENDED RELEASE ORAL at 17:34

## 2020-05-06 RX ADMIN — PIPERACILLIN AND TAZOBACTAM 3.38 G: 3; .375 INJECTION, POWDER, LYOPHILIZED, FOR SOLUTION INTRAVENOUS at 05:25

## 2020-05-06 RX ADMIN — SODIUM CHLORIDE 125 ML/HR: 900 INJECTION, SOLUTION INTRAVENOUS at 05:10

## 2020-05-06 RX ADMIN — VANCOMYCIN HYDROCHLORIDE 1250 MG: 10 INJECTION, POWDER, LYOPHILIZED, FOR SOLUTION INTRAVENOUS at 22:54

## 2020-05-06 RX ADMIN — PIPERACILLIN AND TAZOBACTAM 3.38 G: 3; .375 INJECTION, POWDER, LYOPHILIZED, FOR SOLUTION INTRAVENOUS at 21:00

## 2020-05-06 RX ADMIN — ALUMINUM HYDROXIDE, MAGNESIUM HYDROXIDE, AND SIMETHICONE 30 ML: 200; 200; 20 SUSPENSION ORAL at 02:33

## 2020-05-06 RX ADMIN — SENNOSIDES AND DOCUSATE SODIUM 1 TABLET: 8.6; 5 TABLET ORAL at 09:49

## 2020-05-06 RX ADMIN — PIPERACILLIN AND TAZOBACTAM 3.38 G: 3; .375 INJECTION, POWDER, LYOPHILIZED, FOR SOLUTION INTRAVENOUS at 14:15

## 2020-05-06 RX ADMIN — SODIUM CHLORIDE 75 ML/HR: 900 INJECTION, SOLUTION INTRAVENOUS at 20:42

## 2020-05-06 NOTE — PROGRESS NOTES
OUTREACH NURSE PROGRESS REPORT SUBJECTIVE: In to assess patient secondary to outreach follow due to MEWS = 5 last night. Deena Zuñiga. was seen and focused assessment complete. MEWS Score: 4 (05/05/20 1935) Vitals:  
 05/05/20 2423 05/05/20 1156 05/05/20 1504 05/05/20 1935 BP: 98/56 96/60 93/55 92/63 Pulse: (!) 120 (!) 106 100 (!) 111 Resp: 24 18 22 18 Temp: 99.2 °F (37.3 °C) 98.6 °F (37 °C) 98.2 °F (36.8 °C) 98.3 °F (36.8 °C) SpO2: 92% 93% 95% 96% Weight: OBJECTIVE: On arrival to room, I found patient to be supine in bed, restful with eyes closed and even non-labored respirations. ASSESSMENT:  
Patient sleeping at current. Patient with even, non-labored respirations. LS clear = bilat by ausc, skin warm/dry. + bilat radial pulses. Pain Assessment Pain Intensity 1: 0 (05/05/20 1438) Patient Stated Pain Goal: 0 PLAN:  Chart reviewed, patient with improvement during the course of the day. Spoke with primary RN, she endorses the same. Repeat lactic this AM < 2. Patient with no significant fevers during the course of the day. HR improved from 130s/140s at admission to 100-110s today. Spoke with DT monitor room, they advise patient with S. Tach throughout the day. Patient allowed to remain at rest, will round later to speak with him. Primary RN aware that outreach is available with any needs. 2344 In with patient. Patient speaking clearly and in complete sentences. Patient endorses feeling better this evening. Patient denies any needs at current.

## 2020-05-06 NOTE — TELEPHONE ENCOUNTER
Per Dr. Roma Colin, radiation oncologist, pt will not resume RT until his [address] is 500 or above. A CBC was drawn this morning, ANC is 300. Pt is currently an inpatient, this was discussed with the unit charge nurse. Radiation will be held today.

## 2020-05-06 NOTE — PROGRESS NOTES
Outreach Follow Up Note Jazminfransico Menard. was seen and assessed. MEWS Score: 2 (05/06/20 0317) Vitals:  
 05/06/20 5355 05/06/20 0347 05/06/20 0716 05/06/20 1051 BP: 94/62  100/66 97/72 Pulse: 96 96 93 90 Resp: 16  16 16 Temp: 98.2 °F (36.8 °C)  97.9 °F (36.6 °C) 97.8 °F (36.6 °C) SpO2: 95%  93% 97% Weight:      
  
 
 Pain Assessment Pain Intensity 1: 0 (05/06/20 0850) Patient Stated Pain Goal: 0 Patient reviewed and discussed with [place] Nurse Practitioner. The patient has been taken off telemetry monitoring. There have been no other significant clinical changes since the completion of the last dated Outreach assessment. Patient was laying in bed awake, pleasant and cooperative. Will continue to follow up per outreach protocol. Signed By:   Rebecca Larsen RN   May 6, 2020 11:11 AM

## 2020-05-06 NOTE — PROGRESS NOTES
END OF SHIFT NOTE: 
 
Intake/Output 05/05 1901 - 05/06 0700 In: 1992 [P.O.:480; I.V.:1512] Out: 1250 [COKGR:3201] Voiding: YES Catheter: NO 
Drain:   
 
Stool:  0 occurrences. Stool Assessment Stool Color: [place] (05/05/20 0402) Stool Appearance: Loose; Watery(per patient) (05/05/20 1935) Stool Amount: Smear (05/05/20 0402) Stool Source/Status: Incontinence; Rectum (05/05/20 0402) Emesis:  0 occurrences. VITAL SIGNS Patient Vitals for the past 12 hrs: 
 Temp Pulse Resp BP SpO2  
05/06/20 0347  96     
05/06/20 0317 98.2 °F (36.8 °C) 96 16 94/62 95 % 05/05/20 2358  99     
05/05/20 2327 98.8 °F (37.1 °C) 99 18 91/52 93 % 05/05/20 2000  (!) 111     
05/05/20 1935 98.3 °F (36.8 °C) (!) 111 18 92/63 96 % Pain Assessment Pain 1 Pain Scale 1: Numeric (0 - 10) (05/06/20 0231) Pain Intensity 1: 0 (05/06/20 0231) Patient Stated Pain Goal: 0 (05/06/20 0231) Pain Reassessment 1: Patient resting w/respiratory rate greater than 10 (05/05/20 1438) Ambulating Yes Additional Information:  
 
Shift report given to oncoming nurse at the bedside. Mark Oliver Daily Assessment

## 2020-05-06 NOTE — PROGRESS NOTES
Pharmacokinetic Consult to Pharmacist 
 
Deena Zuñiga. is a 81 y.o. male being treated for sepsis with vancomycin and pip/tazo. Weight: 97.5 kg (215 lb) Lab Results Component Value Date/Time BUN 11 05/06/2020 02:35 AM  
 Creatinine 0.71 (L) 05/06/2020 02:35 AM  
 WBC 0.5 (LL) 05/06/2020 02:35 AM  
 Lactic acid 1.3 05/05/2020 06:41 AM  
  
Estimated Creatinine Clearance: 118.2 mL/min (A) (based on SCr of 0.71 mg/dL (L)). Lab Results Component Value Date/Time Vancomycin,trough 6.6 05/06/2020 10:28 AM  
 
 
Day 2 of vancomycin. Goal trough is 15-20. Change to 1.25g q8h, next dose at 2100. Will continue to follow patient. Thank you, Joanne Gooden, Pharm. D. Clinical Pharmacist 
[phone] Normal for race

## 2020-05-06 NOTE — PROGRESS NOTES
OUTREACH NURSE PROGRESS REPORT SUBJECTIVE: In to assess patient secondary to follow due to MEWS of 5 on 5/5/20. Gwen Bonilla was seen and focused assessment complete. MEWS Score: 2 (05/06/20 0317) Vitals:  
 05/05/20 2358 05/06/20 7694 05/06/20 1343 05/06/20 5545 BP:  94/62  100/66 Pulse: 99 96 96 93 Resp:  16  16 Temp:  98.2 °F (36.8 °C)  97.9 °F (36.6 °C) SpO2:  95%  93% Weight: OBJECTIVE: On arrival to room, I found patient to be in bed sleeping. ASSESSMENT:  
Visit Vitals /66 (BP 1 Location: Right arm) Pulse 93 Temp 97.9 °F (36.6 °C) Resp 16 Wt 97.5 kg (215 lb) SpO2 93% BMI 30.85 kg/m² Patient sleeping evaluation,  On evaluation. respirations non-labored, eyes closed, skin warm and dry pulse 90. Pain Assessment Pain Intensity 1: 0 (05/06/20 0231) Patient Stated Pain Goal: 0 PLAN:  Spoke with primary RN regarding patient. Will reevaluate patent per outreach policy.

## 2020-05-06 NOTE — PROGRESS NOTES
[facility] Hematology Oncology Inpatient Hematology / Oncology Progress Note Admission Date: 2020 12:48 AM 
Reason for Admission/Hospital Course: Febrile neutropenia ([address] [D70.9, R50.81] Tachycardia [R00.0] 24 Hour Events: 
Feeling better overall No new complaints Afebrile, VSS  
 
 
ROS: 
Constitutional: Nnegative for fever, chills, weakness, malaise. + mild fatigue. CV: Negative for chest pain, palpitations, edema. Respiratory: Negative for dyspnea, cough, wheezing. GI: Negative for nausea, abdominal pain, diarrhea. + constipation. 10 point review of systems is otherwise negative with the exception of the elements mentioned above in the HPI. Allergies Allergen Reactions  Celebrex [Celecoxib] Itching OBJECTIVE: 
Patient Vitals for the past 8 hrs: 
 BP Temp Pulse Resp SpO2  
20 0716 100/66 97.9 °F (36.6 °C) 93 16 93 % 20 0347   96    
20 0317 94/62 98.2 °F (36.8 °C) 96 16 95 % Temp (24hrs), Av.3 °F (36.8 °C), Min:97.9 °F (36.6 °C), Max:98.8 °F (37.1 °C) 
 
 0701 -  1900 In: 240 [P.O.:240] Out: - Physical Exam: 
Constitutional: Well developed, well nourished male in no acute distress, sitting comfortably in the hospital bed. HEENT: Normocephalic and atraumatic. Oropharynx is clear, mucous membranes are moist.  Extraocular muscles are intact. Sclerae anicteric. Neck supple. Skin Warm and dry. No bruising and no rash noted. No erythema. No pallor. Respiratory Lungs are clear to auscultation bilaterally without wheezes, rales or rhonchi, normal air exchange without accessory muscle use. CVS Normal rate, regular rhythm and normal S1 and S2. No murmurs, gallops, or rubs. Abdomen Soft, nontender and nondistended, normoactive bowel sounds. No palpable mass. No hepatosplenomegaly. Neuro Grossly nonfocal with no obvious sensory or motor deficits. MSK Normal range of motion in general.  No edema and no tenderness. Psych Appropriate mood and affect. Labs: 
   
Recent Labs 05/06/20 0235 05/05/20 
0109 05/04/20 
1326 WBC 0.5* 0.6* 1.8*  
RBC 2.76* 4.17* 4.18* HGB 8.4* 12.8* 13.0*  
HCT 24.3* 35.9* 36.0*  
MCV 88.0 86.1 86.1 MCH 30.4 30.7 31.1 MCHC 34.6 35.7* 36.1*  
RDW 14.5 14.2 14.0  
PLT 53* 83* 115* GRANS 68 85* 54  
LYMPH 15 8* 30  
MONOS 13* 5 15* EOS 0* 0* 0*  
BASOS 2 2 1 IG 2 0 0  
DF AUTOMATED AUTOMATED AUTOMATED ANEU 0.3* 0.5* 1.0* ABL 0.1* 0.1* 0.5 ABM 0.1 0.0* 0.3 RICHAR 0.0 0.0 0.0 ABB 0.0 0.0 0.0 AIG 0.0 0.0 0.0 Recent Labs 05/06/20 0235 05/05/20 
0109 05/04/20 
1326  136 135* K 3.2* 3.5 3.5  107 106 CO2 22 18* 23 AGAP 7 11 6* GLU 95 136* 96 BUN 11 15 16 CREA 0.71* 1.02 0.91 GFRAA >60 >60 >60 GFRNA >60 >60 >60  
CA 7.3* 8.3 9.5 SGOT 37 32 28 AP 63 94 87  
TP 5.0* 7.3 7.3 ALB 2.3* 3.3 3.4 GLOB 2.7 4.0* 3.9* AGRAT 0.9* 0.8* 0.9* MG 2.0  --  2.2 PHOS 2.5  --   --   
 
 
 
Imaging: XR CHEST PORT [[id number]] Collected: 05/05/20 0125 Order Status: Completed Updated: 05/05/20 0127 Narrative:    
EXAM: XR CHEST PORT INDICATION: fever COMPARISON: 2/23/2020 FINDINGS: A portable AP radiograph of the chest was obtained at 0117 hours. Unremarkable [id number]. The lungs are clear. The cardiac and mediastinal 
contours and pulmonary vascularity are normal.  The bones and soft tissues are 
grossly within normal limits. Impression:    
IMPRESSION: Normal chest.  
 
 
 
ASSESSMENT: 
 
Problem List  Date Reviewed: 4/27/2020 Codes Class Noted Tachycardia ICD-10-CM: R00.0 ICD-9-CM: 785.0  5/5/2020 * (Principal) Febrile neutropenia (HCC) ICD-10-CM: D70.9, R50.81 ICD-9-CM: 288.00, 780.61  5/5/2020 GERD (gastroesophageal reflux disease) ICD-10-CM: K21.9 ICD-9-CM: 530.81  Unknown Hypotension ICD-10-CM: I95.9 ICD-9-CM: 458.9  5/5/2020 Sepsis due to undetermined organism [facility]) ICD-10-CM: A41.9 ICD-9-CM: 038.9  5/5/2020 Malignant neoplasm of lower lobe of right lung (HCC) (Chronic) ICD-10-CM: C34.31 
ICD-9-CM: 162.5  2/26/2020 Hypoxia ICD-10-CM: R09.02 
ICD-9-CM: 799.02  2/24/2020 Acute respiratory failure with hypoxia [facility]) ICD-10-CM: J96.01 
ICD-9-CM: 518.81  2/24/2020 Hemoptysis ICD-10-CM: R04.2 ICD-9-CM: 786.30  2/24/2020 Mass of lower lobe of right lung ICD-10-CM: R91.8 ICD-9-CM: 786.6  2/23/2020 Right lower lobe lung mass ICD-10-CM: R91.8 ICD-9-CM: 786.6  2/23/2020 Essential hypertension with goal blood pressure less than 130/80 (Chronic) ICD-10-CM: I10 
ICD-9-CM: 401.9  2/19/2018 PLAN: 
Squamous Cell Carcinoma * Currently undergoing chemoradiation - he completed week six of chemotherapy and 21/26 scheduled radiation treatments yesterday. Hold radiation today. 05/06  - hold radiation per Dr. Brandon Jeter.  
  
Neutropenic Fever/Sepsis * In ED he was tachycardic to 160 with a RR of 35. BP 89/55. WBC ct 0.6 with abs neutrophil of 0.5. Lactic acid ws 2.6 and has now normalized. * Pan-cultures pending. Continue vanc/zosyn. * Ongoing hypotension/fever/tachycardia this morning. IV NS bolus ordered, continue telemetry. 05/06 VS stable today, afebrile. Pan-cultures NGTD. Continue IV antibiotics. [address] - holding radiation so give one dose of G-CSF.  
  
Constipation * Start Miralax and violet-colace daily.  
  
Elevated Bilirubin * Tbili 1.9 - fractionate. 05/06 Trending down. Indirect>direct.  
  
Rashel SOP's 
DVT prophylaxis with SCD's Discharge home once counts recover, sepsis symptoms resolved and pan-cultures return. Aaron Young  [address] Hematology Oncology [address] [place] [address] Office : [phone] Fax : [phone] Attending Addendum: I have personally performed a face to face diagnostic evaluation on this patient. I have reviewed and agree with the care plan as documented above by  Jon Severe, N.P.  My findings are as follows: Patient appears lethargic, heart rate regular without murmurs, abdomen is non-tender, bowel sounds are positive. 81 male history of right-sided non-small cell lung cancer, stage IIb, undergoing chemoXRT w weekly carbo/taxol, now admitted with neutropenic fevers. Cultures -ve, continue broad-spectrum IV antibiotics. Holding XRT till patient clinically improves. T bili mostly indirect, improving, already on IV Zosyn. Granix x 1 today.  
  
     
Total time 25min, 50% in direct consultation. Zachary Cisse MD 
[facility] Hematology/Oncology [id number] [place] [address] Office : [phone] Fax : [phone]

## 2020-05-06 NOTE — PROGRESS NOTES
Outreach Follow Up Note Jose Hines was seen and assessed. MEWS Score: 1 (05/06/20 1559) Vitals:  
 05/06/20 0347 05/06/20 0716 05/06/20 1051 05/06/20 1559 BP:  100/66 97/72 101/66 Pulse: 96 93 90 91 Resp:  16 16 16 Temp:  97.9 °F (36.6 °C) 97.8 °F (36.6 °C) 97.4 °F (36.3 °C) SpO2:  93% 97% 97% Weight:      
  
 
 Pain Assessment Pain Intensity 1: 0 (05/06/20 0850) Patient Stated Pain Goal: 0 Patient reviewed and discussed with primary nurse. There have been no significant clinical changes since the completion of the last dated Outreach assessment. Will continue to follow up per outreach protocol. Signed By:   Kalina Tejeda RN   May 6, 2020 4:09 PM

## 2020-05-06 NOTE — PROGRESS NOTES
Outreach Follow Up Note Pooja Mcgregor. was seen and assessed. MEWS Score: 2 (05/06/20 0317) Vitals:  
 05/05/20 1632 05/05/20 2358 05/06/20 6926 05/06/20 0854 BP: 91/52  94/62 Pulse: 99 99 96 96 Resp: 18  16 Temp: 98.8 °F (37.1 °C)  98.2 °F (36.8 °C) SpO2: 93%  95% Weight: 97.5 kg (215 lb) Pain Assessment Pain Intensity 1: 0 (05/06/20 0231) Patient Stated Pain Goal: 0 Patient reviewed and discussed with primary nurse. There have been no significant clinical changes since the completion of the last dated Outreach assessment. Improvement in VS noted, patient afebrile and now with HR <100. Will continue to follow up per outreach protocol. Signed By:   Nga Stephenson RN   May 6, 2020 4:14 AM

## 2020-05-07 ENCOUNTER — HOSPITAL ENCOUNTER (OUTPATIENT)
Dept: RADIATION ONCOLOGY | Age: 70
Discharge: HOME | End: 2020-05-07
Payer: COMMERCIAL

## 2020-05-07 VITALS
HEART RATE: 93 BPM | WEIGHT: 224 LBS | OXYGEN SATURATION: 95 % | TEMPERATURE: 97.6 F | DIASTOLIC BLOOD PRESSURE: 75 MMHG | RESPIRATION RATE: 16 BRPM | BODY MASS INDEX: 32.14 KG/M2 | SYSTOLIC BLOOD PRESSURE: 120 MMHG

## 2020-05-07 LAB
ALBUMIN SERPL-MCNC: 2.5 G/DL (ref 3.2–4.6)
ALBUMIN/GLOB SERPL: 0.9 {RATIO} (ref 1.2–3.5)
ALP SERPL-CCNC: 64 U/L (ref 50–136)
ALT SERPL-CCNC: 23 U/L (ref 12–65)
ANION GAP SERPL CALC-SCNC: 7 MMOL/L (ref 7–16)
AST SERPL-CCNC: 28 U/L (ref 15–37)
BACTERIA SPEC CULT: NORMAL
BILIRUB SERPL-MCNC: 1.3 MG/DL (ref 0.2–1.1)
BUN SERPL-MCNC: 7 MG/DL (ref 8–23)
CALCIUM SERPL-MCNC: 7.9 MG/DL (ref 8.3–10.4)
CHLORIDE SERPL-SCNC: 110 MMOL/L (ref 98–107)
CO2 SERPL-SCNC: 22 MMOL/L (ref 21–32)
CREAT SERPL-MCNC: 0.62 MG/DL (ref 0.8–1.5)
DIFFERENTIAL METHOD BLD: ABNORMAL
ERYTHROCYTE [DISTWIDTH] IN BLOOD BY AUTOMATED COUNT: 14.4 % (ref 11.9–14.6)
GLOBULIN SER CALC-MCNC: 2.8 G/DL (ref 2.3–3.5)
GLUCOSE SERPL-MCNC: 87 MG/DL (ref 65–100)
HCT VFR BLD AUTO: 25.9 % (ref 41.1–50.3)
HGB BLD-MCNC: 9.2 G/DL (ref 13.6–17.2)
LYMPHOCYTES # BLD: 0.2 K/UL (ref 0.5–4.6)
LYMPHOCYTES NFR BLD MANUAL: 22 % (ref 16–44)
MCH RBC QN AUTO: 31.1 PG (ref 26.1–32.9)
MCHC RBC AUTO-ENTMCNC: 35.5 G/DL (ref 31.4–35)
MCV RBC AUTO: 87.5 FL (ref 79.6–97.8)
MONOCYTES # BLD: 0 K/UL (ref 0.1–1.3)
MONOCYTES NFR BLD MANUAL: 6 % (ref 3–9)
MYELOCYTES NFR BLD MANUAL: 6 %
NEUTS SEG # BLD: 0.6 K/UL (ref 1.7–8.2)
NEUTS SEG NFR BLD MANUAL: 66 % (ref 47–75)
NRBC # BLD: 0 K/UL (ref 0–0.2)
NRBC BLD-RTO: 2 PER 100 WBC
PLATELET # BLD AUTO: 54 K/UL (ref 150–450)
PLATELET COMMENTS,PCOM: ABNORMAL
PMV BLD AUTO: 10.9 FL (ref 9.4–12.3)
POTASSIUM SERPL-SCNC: 3.7 MMOL/L (ref 3.5–5.1)
PROT SERPL-MCNC: 5.3 G/DL (ref 6.3–8.2)
RBC # BLD AUTO: 2.96 M/UL (ref 4.23–5.6)
RBC MORPH BLD: ABNORMAL
SERVICE CMNT-IMP: NORMAL
SODIUM SERPL-SCNC: 139 MMOL/L (ref 136–145)
TOTAL CELLS COUNTED SPEC: 50
WBC # BLD AUTO: 0.8 K/UL (ref 4.3–11.1)

## 2020-05-07 PROCEDURE — 74011250637 HC RX REV CODE- 250/637: Performed by: INTERNAL MEDICINE

## 2020-05-07 PROCEDURE — 74011250637 HC RX REV CODE- 250/637: Performed by: NURSE PRACTITIONER

## 2020-05-07 PROCEDURE — 80053 COMPREHEN METABOLIC PANEL: CPT

## 2020-05-07 PROCEDURE — 77336 RADIATION PHYSICS CONSULT: CPT

## 2020-05-07 PROCEDURE — 77386 HC IMRT TRMT DLVR COMPL: CPT

## 2020-05-07 PROCEDURE — 74011250636 HC RX REV CODE- 250/636: Performed by: HOSPITALIST

## 2020-05-07 PROCEDURE — 85025 COMPLETE CBC W/AUTO DIFF WBC: CPT

## 2020-05-07 PROCEDURE — 74011250637 HC RX REV CODE- 250/637: Performed by: HOSPITALIST

## 2020-05-07 PROCEDURE — 36591 DRAW BLOOD OFF VENOUS DEVICE: CPT

## 2020-05-07 PROCEDURE — 74011250636 HC RX REV CODE- 250/636: Performed by: INTERNAL MEDICINE

## 2020-05-07 PROCEDURE — 99239 HOSP IP/OBS DSCHRG MGMT >30: CPT | Performed by: INTERNAL MEDICINE

## 2020-05-07 PROCEDURE — 74011000258 HC RX REV CODE- 258: Performed by: HOSPITALIST

## 2020-05-07 RX ORDER — POTASSIUM CHLORIDE 20 MEQ/1
20 TABLET, EXTENDED RELEASE ORAL 2 TIMES DAILY
Qty: 14 TAB | Refills: 0 | Status: SHIPPED | OUTPATIENT
Start: 2020-05-07 | End: 2020-05-14

## 2020-05-07 RX ORDER — LEVOFLOXACIN 500 MG/1
500 TABLET, FILM COATED ORAL DAILY
Qty: 7 TAB | Refills: 0 | Status: SHIPPED | OUTPATIENT
Start: 2020-05-07 | End: 2020-06-08 | Stop reason: ALTCHOICE

## 2020-05-07 RX ORDER — POLYETHYLENE GLYCOL 3350 17 G/17G
17 POWDER, FOR SOLUTION ORAL DAILY
Qty: 30 PACKET | Refills: 0 | Status: SHIPPED | OUTPATIENT
Start: 2020-05-08

## 2020-05-07 RX ADMIN — POTASSIUM CHLORIDE 20 MEQ: 1500 TABLET, EXTENDED RELEASE ORAL at 08:09

## 2020-05-07 RX ADMIN — SUCRALFATE 1 G: 1 TABLET ORAL at 08:09

## 2020-05-07 RX ADMIN — PIPERACILLIN AND TAZOBACTAM 3.38 G: 3; .375 INJECTION, POWDER, LYOPHILIZED, FOR SOLUTION INTRAVENOUS at 06:00

## 2020-05-07 RX ADMIN — VANCOMYCIN HYDROCHLORIDE 1250 MG: 10 INJECTION, POWDER, LYOPHILIZED, FOR SOLUTION INTRAVENOUS at 06:01

## 2020-05-07 RX ADMIN — PANTOPRAZOLE SODIUM 40 MG: 40 TABLET, DELAYED RELEASE ORAL at 08:09

## 2020-05-07 RX ADMIN — SENNOSIDES AND DOCUSATE SODIUM 1 TABLET: 8.6; 5 TABLET ORAL at 08:09

## 2020-05-07 NOTE — PROGRESS NOTES
OUTREACH NURSE PROGRESS REPORT SUBJECTIVE: In to assess patient secondary to RN MarcoA Antonio was seen and focused assessment complete. MEWS Score: 1 (05/07/20 0741) Vitals:  
 05/06/20 1925 05/06/20 2235 05/07/20 6992 05/07/20 7154 BP: 114/71 119/78 116/78 120/75 Pulse: 97 96 95 93 Resp: 18 18 16 16 Temp: 98.3 °F (36.8 °C) 98.7 °F (37.1 °C) 98.1 °F (36.7 °C) 97.6 °F (36.4 °C) SpO2: 97% 96% 95% 95% Weight: 101.6 kg (224 lb) OBJECTIVE: On arrival to room, I found patient to be resting ASSESSMENT:  
Visit Vitals /75 (BP 1 Location: Left arm, BP Patient Position: Supine) Pulse 93 Temp 97.6 °F (36.4 °C) Resp 16 Wt 101.6 kg (224 lb) SpO2 95% BMI 32.14 kg/m² General:  Alert, cooperative, no distress. Head:  Normocephalic, without obvious abnormality, atraumatic. Eyes:  Conjunctivae/corneas clear. Pupils equal, round, reactive to light. Extraocular movements intact. Lungs:   Clear to auscultation bilaterally. Chest wall:  No tenderness or deformity. Heart:  Regular rate and rhythm, S1, S2 normal, no murmur, click, rub, or gallop. Abdomen:   Soft, non-tender. Bowel sounds normal. No masses. No organomegaly. Extremities: Extremities normal, atraumatic, no cyanosis or edema. Pulses: 2+ and symmetric all extremities. Skin: Skin color, texture, turgor normal. No rashes or lesions. Lymph nodes: Cervical, supraclavicular, and axillary nodes normal.  
Neurologic: CNII-XII intact. Normal strength, sensation, and reflexes throughout. Pain Assessment Pain Intensity 1: 0 (05/07/20 0810) Patient Stated Pain Goal: 0 PLAN:  To possibly discharge home

## 2020-05-07 NOTE — DISCHARGE INSTRUCTIONS
DISCHARGE SUMMARY from Nurse    PATIENT INSTRUCTIONS:    Report the following to your doctor:  · Temperature over 100.5  · Nausea and vomiting lasting longer than 4 hours or if unable to take medications  · Any signs of decreased circulation or nerve impairment to extremity: change in color, persistent  numbness, tingling, coldness or increase pain  · Any questions    What to do at Home:  Recommended activity: Activity as tolerated    If you experience any of the following symptoms: fever above 100.5, persistent nausea or vomiting or diarrhea, SOB, or any concerns; please follow up with oncology. *  Please give a list of your current medications to your Primary Care Provider. *  Please update this list whenever your medications are discontinued, doses are      changed, or new medications (including over-the-counter products) are added. *  Please carry medication information at all times in case of emergency situations. These are general instructions for a healthy lifestyle:    No smoking/ No tobacco products/ Avoid exposure to second hand smoke     Obesity, smoking, and sedentary lifestyle greatly increases your risk for illness    A healthy diet, regular physical exercise & weight monitoring are important for maintaining a healthy lifestyle    You may be retaining fluid if you have a history of heart failure or if you experience any of the following symptoms:  Weight gain of 3 pounds or more overnight or 5 pounds in a week, increased swelling in our hands or feet or shortness of breath while lying flat in bed. Please call your doctor as soon as you notice any of these symptoms; do not wait until your next office visit. The discharge information has been reviewed with the patient. The patient verbalized understanding.   Discharge medications reviewed with the patient and appropriate educational materials and side effects teaching were provided.   ___________________________________________________________________________________________________________________________________

## 2020-05-07 NOTE — PROGRESS NOTES
OUTREACH NURSE PROGRESS REPORT SUBJECTIVE: In to assess patient secondary to [facility]. Abhijeet Pickard was seen and focused assessment complete. MEWS Score: 1 (05/07/20 0335) Vitals:  
 05/06/20 1559 05/06/20 1925 05/06/20 2235 05/07/20 0335 BP: 101/66 114/71 119/78 116/78 Pulse: 91 97 96 95 Resp: 16 18 18 16 Temp: 97.4 °F (36.3 °C) 98.3 °F (36.8 °C) 98.7 °F (37.1 °C) 98.1 °F (36.7 °C) SpO2: 97% 97% 96% 95% Weight:  101.6 kg (224 lb) OBJECTIVE: On arrival to room, I found patient to be asleep comfortable, afebrile. ASSESSMENT:  
Visit Vitals /78 (BP 1 Location: Left arm, BP Patient Position: At rest;Supine) Pulse 95 Temp 98.1 °F (36.7 °C) Resp 16 Wt 101.6 kg (224 lb) SpO2 95% BMI 32.14 kg/m² General:  Alert, cooperative, no distress. Head:  Normocephalic, without obvious abnormality, atraumatic. Eyes:  Conjunctivae/corneas clear. Pupils equal, round, reactive to light. Extraocular movements intact. Lungs:   Clear to auscultation bilaterally. Chest wall:  No tenderness or deformity. Heart:  Regular rate and rhythm, S1, S2 normal, no murmur, click, rub, or gallop. Abdomen:   Soft, non-tender. Bowel sounds normal. No masses. No organomegaly. Extremities: Extremities normal, atraumatic, no cyanosis or edema. Pulses: 2+ and symmetric all extremities. Skin: Skin color, texture, turgor normal. No rashes or lesions. Lymph nodes: Cervical, supraclavicular, and axillary nodes normal.  
Neurologic: CNII-XII intact. Normal strength, sensation, and reflexes throughout. Pain Assessment Pain Intensity 1: 0 (05/07/20 0053) Patient Stated Pain Goal: 0 PLAN:  Neutropenic fevers resolved with possible d/c in AM

## 2020-05-07 NOTE — PROGRESS NOTES
END OF SHIFT NOTE: 
 
Intake/Output No intake/output data recorded. Voiding: YES Catheter: NO 
Drain:   
Stool:  0 occurrences. Stool Assessment Stool Color: Doren Crandall (05/05/20 0402) Stool Appearance: Loose; Watery(per patient) (05/05/20 1935) Stool Amount: Smear (05/05/20 0402) Stool Source/Status: Incontinence; Rectum (05/05/20 0402) Emesis:  0 occurrences. VITAL SIGNS Patient Vitals for the past 12 hrs: 
 Temp Pulse Resp BP SpO2  
05/06/20 1925 98.3 °F (36.8 °C) 97 18 114/71 97 % 05/06/20 1559 97.4 °F (36.3 °C) 91 16 101/66 97 % 05/06/20 1051 97.8 °F (36.6 °C) 90 16 97/72 97 % Pain Assessment Pain 1 Pain Scale 1: Numeric (0 - 10) (05/06/20 0850) Pain Intensity 1: 0 (05/06/20 0850) Patient Stated Pain Goal: 0 (05/06/20 0850) Pain Reassessment 1: Patient resting w/respiratory rate greater than 10 (05/05/20 1438) Ambulating Yes Additional Information:  
Pt is weak, recovering counts, pt needs assistance with standing. Shift report given to oncoming nurse at the bedside. Chantelle Rocha

## 2020-05-07 NOTE — PROGRESS NOTES
End of Shift Note: Pt was very pleasant the entire night. Pt's temperature has dropped and is within normal range. Pt's upper back is red and irritated. Patient stated that it itches and that he thinks its from radiation. Betamethasone cream was ordered for patient. Pt also stated that his throat hurts when he swallows and eats food; carafate was ordered and administered. Report was given to oncoming RN, Eugenie Felder.  
 
Filomena Olivera RN

## 2020-05-07 NOTE — DISCHARGE SUMMARY
[address] Hematology Oncology In Patient Hematology / Oncology Discharge Summary Note Patient ID: Kasia Mcqueen. 
[id number] 
81 y.o. 
1950 Admit Date: 5/5/2020 Discharge Date: 5/7/2020 Admission Diagnoses: Febrile neutropenia ([address] [D70.9, R50.81] Tachycardia [R00.0] Discharge Diagnoses: 
Principal Diagnosis: Febrile neutropenia ([address] Principal Problem: 
  Febrile neutropenia ([address] (5/5/2020) Active Problems: 
  Essential hypertension with goal blood pressure less than 130/80 (2/19/2018) Right lower lobe lung mass (2/23/2020) Malignant neoplasm of lower lobe of right lung ([address] (2/26/2020) Tachycardia (5/5/2020) GERD (gastroesophageal reflux disease) () Hypotension (5/5/2020) Sepsis due to undetermined organism ([address] (5/5/2020) Hospital Course: Mr. Lida Bird is a 81 y.o. male admitted on 5/5/2020 with a primary diagnosis of neutropenic fever.  
  
He is a known patient of Dr. Grayson Short with a history of squamous cell carcinoma of the right lower lung who is currently undergoing chemoradiation - he completed week six of chemotherapy and 21/26 scheduled radiation treatments yesterday. He developed a few last night of 104 so he proceeded to the ER for evaluation. He denies any significant infectious symptoms. No cough, wheezing, chest pain or shortness of breath. No diarrhea or abdominal pain.  
  
In ED he was tachycardic to 160 with a RR of 35. BP 89/55. WBC ct 0.6 with abs neutrophil of 0.5. Lactic acid ws 2.6 and has now normalized. UA negative for infection. Chest xray reported as clear. Pan-cultures were obtained and broad-spectrum antibiotics were started. We have been consulted on our known patient. Radiation has been held two days but he will resume today. He received one dose of G-CSF yesterday and his ANC is up to 600. Pan-cultures have remained negative and he has remained afebrile for more than 48 hours. Vital signs are stable, hypotension and tachycardia has resolved. He will go home on daily Levaquin x 7 days. He admits to feeling well overall. He will follow up on Monday prior to his scheduled chemoradiation. He knows to call with any fevers, uncontrolled side effects from treatment or any other worrisome/concerning symptoms. Consults: 
IP CONSULT TO ONCOLOGY Significant Diagnostic Studies: XR CHEST PORT [[id number]] Collected: 20 Order Status: Completed Updated: 20 Narrative:    
EXAM: XR CHEST PORT INDICATION: fever COMPARISON: 2020 FINDINGS: A portable AP radiograph of the chest was obtained at 0117 hours. Unremarkable Mtjjya-x-Dccn. The lungs are clear. The cardiac and mediastinal 
contours and pulmonary vascularity are normal.  The bones and soft tissues are 
grossly within normal limits. Impression:    
IMPRESSION: Normal chest.  
 
 
Allergies Allergen Reactions  Celebrex [Celecoxib] Itching OBJECTIVE: 
Patient Vitals for the past 8 hrs: 
 BP Temp Pulse Resp SpO2  
20 0741 120/75 97.6 °F (36.4 °C) 93 16 95 % 20 0335 116/78 98.1 °F (36.7 °C) 95 16 95 % Temp (24hrs), Av °F (36.7 °C), Min:97.4 °F (36.3 °C), Max:98.7 °F (37.1 °C) 
 
 07 -  1900 In: -  
Out: 700 [Urine:700] Physical Exam: 
Constitutional: Oriented to person, place, and time. Well-developed and well-nourished. HEENT: Normocephalic and atraumatic. Oropharynx is clear and moist.  
Conjunctivae and EOM are normal. No scleral icterus. Neck supple. Skin Warm and dry. No bruising and no rash noted. No erythema. No pallor. Respiratory Effort normal and breath sounds normal.  No respiratory distress. No wheezes. No rales. No tenderness. CVS Normal rate, regular rhythm and normal heart sounds. Exam reveals no gallop, no friction and no rub. No murmur heard. Abdomen Soft. Bowel sounds are normal. Exhibits no distension. There is no tenderness. There is no rebound and no guarding. Neuro No obvious focal deficits. MSK Normal range of motion. No edema and no tenderness. Psych Normal mood, affect, behavior, judgment and thought content Labs:   
Recent Labs 05/07/20 
0403 05/06/20 
0235 05/05/20 
0109 05/04/20 
1326 WBC 0.8* 0.5* 0.6* 1.8*  
RBC 2.96* 2.76* 4.17* 4.18* HGB 9.2* 8.4* 12.8* 13.0*  
HCT 25.9* 24.3* 35.9* 36.0*  
MCV 87.5 88.0 86.1 86.1 MCH 31.1 30.4 30.7 31.1 MCHC 35.5* 34.6 35.7* 36.1*  
RDW 14.4 14.5 14.2 14.0  
PLT 54* 53* 83* 115* GRANS 66 68 85* 54  
LYMPH 22 15 8* 30  
MONOS 6 13* 5 15* EOS  --  0* 0* 0*  
BASOS  --  2 2 1 IG  --  2 0 0  
DF MANUAL AUTOMATED AUTOMATED AUTOMATED ANEU 0.6* 0.3* 0.5* 1.0* ABL 0.2* 0.1* 0.1* 0.5 ABM 0.0* 0.1 0.0* 0.3 RICHAR  --  0.0 0.0 0.0 ABB  --  0.0 0.0 0.0 AIG  --  0.0 0.0 0.0 Recent Labs 05/07/20 
0403 05/06/20 
0235 05/05/20 
0109 05/04/20 
1326  136 136 135* K 3.7 3.2* 3.5 3.5 * 107 107 106 CO2 22 22 18* 23 AGAP 7 7 11 6* GLU 87 95 136* 96 BUN 7* 11 15 16 CREA 0.62* 0.71* 1.02 0.91 GFRAA >60 >60 >60 >60 GFRNA >60 >60 >60 >60  
CA 7.9* 7.3* 8.3 9.5 SGOT 28 37 32 28 AP 64 63 94 87  
TP 5.3* 5.0* 7.3 7.3 ALB 2.5* 2.3* 3.3 3.4 GLOB 2.8 2.7 4.0* 3.9* AGRAT 0.9* 0.9* 0.8* 0.9* MG  --  2.0  --  2.2 PHOS  --  2.5  --   --   
 
 
ASSESSMENT: 
 
Principal Problem: 
  Febrile neutropenia ([facility] (5/5/2020) Active Problems: 
  Essential hypertension with goal blood pressure less than 130/80 (2/19/2018) Right lower lobe lung mass (2/23/2020) Malignant neoplasm of lower lobe of right lung ([address] (2/26/2020) Tachycardia (5/5/2020) GERD (gastroesophageal reflux disease) () Hypotension (5/5/2020) Sepsis due to undetermined organism ([facility] (5/5/2020) Current Discharge Medication List  
  
 START taking these medications Details  
polyethylene glycol (MIRALAX) 17 gram packet Take 1 Packet by mouth daily. Indications: constipation 
Qty: 30 Packet, Refills: 0  
  
potassium chloride (K-DUR, KLOR-CON) 20 mEq tablet Take 1 Tab by mouth two (2) times a day for 7 days. Qty: 14 Tab, Refills: 0  
  
levoFLOXacin (Levaquin) 500 mg tablet Take 1 Tab by mouth daily. Qty: 7 Tab, Refills: 0 CONTINUE these medications which have NOT CHANGED Details  
dexAMETHasone (DECADRON) 2 mg tablet daily  Indications: prevent nausea and vomiting from cancer chemotherapy Qty: 30 Tab, Refills: 3  
  
esomeprazole (NexIUM) 20 mg capsule Take  by mouth daily. prn  
  
betamethasone dipropionate (DIPROSONE) 0.05 % topical cream   
  
prochlorperazine (COMPAZINE) 10 mg tablet Take 1 Tab by mouth every six (6) hours as needed for Nausea. Indications: nausea and vomiting caused by cancer drugs, nausea and vomiting 
Qty: 90 Tab, Refills: 3 Associated Diagnoses: CINV (chemotherapy-induced nausea and vomiting)  
  
ondansetron hcl (ZOFRAN) 8 mg tablet Take 1 Tab by mouth every eight (8) hours as needed for Nausea. Indications: nausea and vomiting caused by cancer drugs Qty: 60 Tab, Refills: 3 Associated Diagnoses: CINV (chemotherapy-induced nausea and vomiting) lidocaine-prilocaine (EMLA) topical cream Apply  to affected area as needed for Pain. Qty: 30 g, Refills: 0 Associated Diagnoses: Port-A-Cath in place PLAN: 
 
Follow-up Appointments Procedures  FOLLOW UP VISIT Appointment in: Other (Specify) Please obtain an office visit with labs prior (prior to his scheduled chemotherapy) on Monday, May 11. Please obtain an office visit with labs prior (prior to his scheduled chemotherapy) on Monday, May 11. Standing Status:   Standing Number of Occurrences:   1 Order Specific Question:   Appointment in Answer: Other (Specify) Roshan Ndiaye NP 
 100 [facility] [place] [address] [address] Office : [phone] Fax : [phone] Attending Addendum: 
I have personally performed a face to face diagnostic evaluation on this patient. I have reviewed and agree with the care plan as documented by German Oliver N.P. Patient appears table, heart rate regular without murmurs, abdomen is non-tender, bowel sounds are positive. 81 male history of right-sided non-small cell lung cancer, stage IIb, undergoing chemoXRT w weekly carbo/taxol, hospitalized with neutropenic fevers. Cultures remained -ve. Will resume XRT. ANC improving. Clinically stable for discharge. I spent 32 minutes on evaluation, management, counseling and discharge planning on patient. Dennison Goodpasture, MD 
[facility] Hematology/Oncology [address] [address] Office : [phone] Fax : [phone]

## 2020-05-07 NOTE — PROGRESS NOTES
OUTREACH NURSE PROGRESS REPORT SUBJECTIVE: In to assess patient secondary to [facility]. Juan Carlos Torres was seen and focused assessment complete. MEWS Score: 1 (05/06/20 1925) Vitals:  
 05/06/20 0716 05/06/20 1051 05/06/20 1559 05/06/20 1925 BP: 100/66 97/72 101/66 114/71 Pulse: 93 90 91 97 Resp: 16 16 16 18 Temp: 97.9 °F (36.6 °C) 97.8 °F (36.6 °C) 97.4 °F (36.3 °C) 98.3 °F (36.8 °C) SpO2: 93% 97% 97% 97% Weight:    101.6 kg (224 lb) OBJECTIVE: On arrival to room, I found patient to be non febrile temp 98.3 A & O x4. ASSESSMENT:  
Visit Vitals /71 (BP 1 Location: Left arm) Pulse 97 Temp 98.3 °F (36.8 °C) Resp 18 Wt 101.6 kg (224 lb) SpO2 97% BMI 32.14 kg/m² General:  Alert, cooperative, no distress. Head:  Normocephalic, without obvious abnormality, atraumatic. Eyes:  Conjunctivae/corneas clear. Pupils equal, round, reactive to light. Extraocular movements intact. Lungs:   Clear to auscultation bilaterally. Chest wall:  No tenderness or deformity. Heart:  Regular rate and rhythm, S1, S2 normal, no murmur, click, rub, or gallop. Abdomen:   Soft, non-tender. Bowel sounds normal. No masses. No organomegaly. Extremities: Extremities normal, atraumatic, no cyanosis or edema. Pulses: 2+ and symmetric all extremities. Skin: Skin color, texture, turgor normal. No rashes or lesions. Lymph nodes: Cervical, supraclavicular, and axillary nodes normal.  
Neurologic: CNII-XII intact. Normal strength, sensation, and reflexes throughout. Pain Assessment Pain Intensity 1: 0 (05/06/20 1925) Patient Stated Pain Goal: 0 PLAN:  Neutropenic fevers resolved with possible discharge in AM

## 2020-05-08 ENCOUNTER — HOSPITAL ENCOUNTER (OUTPATIENT)
Dept: RADIATION ONCOLOGY | Age: 70
Discharge: HOME | End: 2020-05-08
Payer: COMMERCIAL

## 2020-05-08 ENCOUNTER — PATIENT OUTREACH (OUTPATIENT)
Dept: CASE MANAGEMENT | Age: 70
End: 2020-05-08

## 2020-05-08 PROCEDURE — 77386 HC IMRT TRMT DLVR COMPL: CPT

## 2020-05-08 NOTE — PROGRESS NOTES
Initial [employer] outreach attempt to patient's home/mobile number was unsuccessful. Left message to return call. Will attempt second outreach within 24 hours.

## 2020-05-10 LAB
BACTERIA SPEC CULT: NORMAL
BACTERIA SPEC CULT: NORMAL
SERVICE CMNT-IMP: NORMAL
SERVICE CMNT-IMP: NORMAL

## 2020-05-11 ENCOUNTER — HOSPITAL ENCOUNTER (OUTPATIENT)
Dept: INFUSION THERAPY | Age: 70
Discharge: HOME | End: 2020-05-11
Payer: COMMERCIAL

## 2020-05-11 ENCOUNTER — PATIENT OUTREACH (OUTPATIENT)
Dept: CASE MANAGEMENT | Age: 70
End: 2020-05-11

## 2020-05-11 ENCOUNTER — HOSPITAL ENCOUNTER (OUTPATIENT)
Dept: RADIATION ONCOLOGY | Age: 70
Discharge: HOME | End: 2020-05-11
Payer: COMMERCIAL

## 2020-05-11 DIAGNOSIS — R91.8 MASS OF LOWER LOBE OF RIGHT LUNG: ICD-10-CM

## 2020-05-11 DIAGNOSIS — C34.11 MALIGNANT NEOPLASM OF UPPER LOBE OF RIGHT LUNG (HCC): ICD-10-CM

## 2020-05-11 DIAGNOSIS — D70.1 CHEMOTHERAPY-INDUCED NEUTROPENIA (HCC): Primary | ICD-10-CM

## 2020-05-11 DIAGNOSIS — T45.1X5A CHEMOTHERAPY-INDUCED NEUTROPENIA (HCC): Primary | ICD-10-CM

## 2020-05-11 DIAGNOSIS — C34.31 MALIGNANT NEOPLASM OF LOWER LOBE OF RIGHT LUNG (HCC): Chronic | ICD-10-CM

## 2020-05-11 LAB
ALBUMIN SERPL-MCNC: 3.4 G/DL (ref 3.2–4.6)
ALBUMIN/GLOB SERPL: 1 {RATIO} (ref 1.2–3.5)
ALP SERPL-CCNC: 86 U/L (ref 50–136)
ALT SERPL-CCNC: 31 U/L (ref 12–65)
ANION GAP SERPL CALC-SCNC: 7 MMOL/L (ref 7–16)
AST SERPL-CCNC: 34 U/L (ref 15–37)
BASOPHILS # BLD: 0 K/UL (ref 0–0.2)
BASOPHILS NFR BLD: 1 % (ref 0–2)
BILIRUB SERPL-MCNC: 1.1 MG/DL (ref 0.2–1.1)
BUN SERPL-MCNC: 13 MG/DL (ref 8–23)
CALCIUM SERPL-MCNC: 9.4 MG/DL (ref 8.3–10.4)
CHLORIDE SERPL-SCNC: 108 MMOL/L (ref 98–107)
CO2 SERPL-SCNC: 23 MMOL/L (ref 21–32)
CREAT SERPL-MCNC: 0.8 MG/DL (ref 0.8–1.5)
DIFFERENTIAL METHOD BLD: ABNORMAL
EOSINOPHIL # BLD: 0 K/UL (ref 0–0.8)
EOSINOPHIL NFR BLD: 0 % (ref 0.5–7.8)
ERYTHROCYTE [DISTWIDTH] IN BLOOD BY AUTOMATED COUNT: 15.2 % (ref 11.9–14.6)
GLOBULIN SER CALC-MCNC: 3.5 G/DL (ref 2.3–3.5)
GLUCOSE SERPL-MCNC: 97 MG/DL (ref 65–100)
HCT VFR BLD AUTO: 30.6 % (ref 41.1–50.3)
HGB BLD-MCNC: 11 G/DL (ref 13.6–17.2)
IMM GRANULOCYTES # BLD AUTO: 0.1 K/UL (ref 0–0.5)
IMM GRANULOCYTES NFR BLD AUTO: 6 % (ref 0–5)
LYMPHOCYTES # BLD: 0.4 K/UL (ref 0.5–4.6)
LYMPHOCYTES NFR BLD: 22 % (ref 13–44)
MCH RBC QN AUTO: 31.6 PG (ref 26.1–32.9)
MCHC RBC AUTO-ENTMCNC: 35.9 G/DL (ref 31.4–35)
MCV RBC AUTO: 87.9 FL (ref 79.6–97.8)
MONOCYTES # BLD: 0.4 K/UL (ref 0.1–1.3)
MONOCYTES NFR BLD: 23 % (ref 4–12)
NEUTS SEG # BLD: 0.8 K/UL (ref 1.7–8.2)
NEUTS SEG NFR BLD: 48 % (ref 43–78)
NRBC # BLD: 0.04 K/UL (ref 0–0.2)
PLATELET # BLD AUTO: 138 K/UL (ref 150–450)
PLATELET COMMENTS,PCOM: ABNORMAL
PMV BLD AUTO: 10.7 FL (ref 9.4–12.3)
POTASSIUM SERPL-SCNC: 3.6 MMOL/L (ref 3.5–5.1)
PROT SERPL-MCNC: 6.9 G/DL (ref 6.3–8.2)
RBC # BLD AUTO: 3.48 M/UL (ref 4.23–5.67)
RBC MORPH BLD: ABNORMAL
SODIUM SERPL-SCNC: 138 MMOL/L (ref 136–145)
WBC # BLD AUTO: 1.7 K/UL (ref 4.3–11.1)
WBC MORPH BLD: ABNORMAL

## 2020-05-11 PROCEDURE — 80053 COMPREHEN METABOLIC PANEL: CPT

## 2020-05-11 PROCEDURE — 77386 HC IMRT TRMT DLVR COMPL: CPT

## 2020-05-11 PROCEDURE — 85025 COMPLETE CBC W/AUTO DIFF WBC: CPT

## 2020-05-11 RX ORDER — HYDROCORTISONE 25 MG/G
CREAM TOPICAL
Qty: 50 G | Refills: 0 | Status: SHIPPED | OUTPATIENT
Start: 2020-05-11 | End: 2020-08-26

## 2020-05-11 NOTE — PROGRESS NOTES
Second [employer] outreach attempt made to patient's home/mobile number was unsuccessful. Left message to return call. Will attempt third outreach within 5 business days.

## 2020-05-11 NOTE — PROGRESS NOTES
s DIAGNOSIS: Lung cancer. 
  
TREATMENT SITE: lung DOSE and FRACTIONATION: 5400/6000 cGy, 23/30 fractions 
  INTERVAL HISTORY:  Jeramie Gurrola Jr. is a 81 y. o. male being treated for . [facility] he feels much better than last week however he complained of increased skin erythema over the back on examination there is significant skin erythema over the back from exit field of radiation. His neutrophil count is 800. OBJECTIVE: On examination there is significant skin irritation and erythema over the back at the exit beam of radiation treatment. There were no vitals taken for this visit.   
         
Lab Results Component Value Date/Time  
  Sodium 135 (L) 04/06/2020 02:03 PM  
  Potassium 3.8 04/06/2020 02:03 PM  
  Chloride 103 04/06/2020 02:03 PM  
  CO2 27 04/06/2020 02:03 PM  
  Anion gap 5 (L) 04/06/2020 02:03 PM  
  Glucose 114 (H) 04/06/2020 02:03 PM  
  BUN 12 04/06/2020 02:03 PM  
  Creatinine 0.99 04/06/2020 02:03 PM  
  GFR est AA >60 04/06/2020 02:03 PM  
  GFR est non-AA >60 04/06/2020 02:03 PM  
  Calcium 9.3 04/06/2020 02:03 PM  
  Albumin 3.7 04/06/2020 02:03 PM  
  Protein, total 7.7 04/06/2020 02:03 PM  
  Globulin 4.0 (H) 04/06/2020 02:03 PM  
  A-G Ratio 0.9 (L) 04/06/2020 02:03 PM  
  AST (SGOT) 16 04/06/2020 02:03 PM  
  ALT (SGPT) 16 04/06/2020 02:03 PM  
  
         
Lab Results Component Value Date/Time  
  WBC 6.4 04/06/2020 02:03 PM  
  HGB 16.6 04/06/2020 02:03 PM  
  HCT 47.8 04/06/2020 02:03 PM  
  PLATELET 643 72/15/7682 02:03 PM  
  
  
ASSESSMENT and PLAN:  Jeramie Dowd Jr. is tolerating radiation as anticipated for the current dose and fraction. He was given hydrocortisone cream to use 4 times a day to his back skin erythema. He will undergo CBC tomorrow to to see how much his neutrophil count.  We will continue on as planned with another treatment visit anticipated next week

## 2020-05-12 ENCOUNTER — HOSPITAL ENCOUNTER (OUTPATIENT)
Dept: RADIATION ONCOLOGY | Age: 70
Discharge: HOME | End: 2020-05-12
Payer: COMMERCIAL

## 2020-05-12 ENCOUNTER — PATIENT OUTREACH (OUTPATIENT)
Dept: CASE MANAGEMENT | Age: 70
End: 2020-05-12

## 2020-05-12 DIAGNOSIS — T45.1X5A CHEMOTHERAPY-INDUCED NEUTROPENIA (HCC): ICD-10-CM

## 2020-05-12 DIAGNOSIS — D70.1 CHEMOTHERAPY-INDUCED NEUTROPENIA (HCC): ICD-10-CM

## 2020-05-12 LAB
BASOPHILS # BLD: 0 K/UL (ref 0–0.2)
BASOPHILS NFR BLD: 1 % (ref 0–2)
DIFFERENTIAL METHOD BLD: ABNORMAL
EOSINOPHIL # BLD: 0 K/UL (ref 0–0.8)
EOSINOPHIL NFR BLD: 0 % (ref 0.5–7.8)
ERYTHROCYTE [DISTWIDTH] IN BLOOD BY AUTOMATED COUNT: 16.2 % (ref 11.9–14.6)
HCT VFR BLD AUTO: 33.3 % (ref 41.1–50.3)
HGB BLD-MCNC: 11.6 G/DL (ref 13.6–17.2)
IMM GRANULOCYTES # BLD AUTO: 0.2 K/UL (ref 0–0.5)
IMM GRANULOCYTES NFR BLD AUTO: 6 % (ref 0–5)
LYMPHOCYTES # BLD: 0.3 K/UL (ref 0.5–4.6)
LYMPHOCYTES NFR BLD: 10 % (ref 13–44)
MCH RBC QN AUTO: 31.3 PG (ref 26.1–32.9)
MCHC RBC AUTO-ENTMCNC: 34.8 G/DL (ref 31.4–35)
MCV RBC AUTO: 89.8 FL (ref 79.6–97.8)
MONOCYTES # BLD: 0.5 K/UL (ref 0.1–1.3)
MONOCYTES NFR BLD: 19 % (ref 4–12)
NEUTS SEG # BLD: 1.5 K/UL (ref 1.7–8.2)
NEUTS SEG NFR BLD: 64 % (ref 43–78)
NRBC # BLD: 0.05 K/UL (ref 0–0.2)
PLATELET # BLD AUTO: 123 K/UL (ref 150–450)
PLATELET COMMENTS,PCOM: ADEQUATE
PMV BLD AUTO: 10.5 FL (ref 9.4–12.3)
RBC # BLD AUTO: 3.71 M/UL (ref 4.23–5.67)
RBC MORPH BLD: ABNORMAL
WBC # BLD AUTO: 2.5 K/UL (ref 4.3–11.1)
WBC MORPH BLD: ABNORMAL

## 2020-05-12 PROCEDURE — 77386 HC IMRT TRMT DLVR COMPL: CPT

## 2020-05-12 PROCEDURE — 36415 COLL VENOUS BLD VENIPUNCTURE: CPT

## 2020-05-12 PROCEDURE — 85025 COMPLETE CBC W/AUTO DIFF WBC: CPT

## 2020-05-12 NOTE — PROGRESS NOTES
Patient: Mely Fontenot MRN: [id number]  SSN: [id number] YOB: 1950  Age: 81 y.o. Sex: male DIAGNOSIS:   
 
TREATMENT SITE:   Lung cancer DOSE and FRACTIONATION: 800 out of 6000 cGy, 4 out of 30 treatments INTERVAL HISTORY:  Mely Fontenot is a 81 y.o. male being treated for lung cancer. He was doing well without complaints week 1. OBJECTIVE:  No findings week 1. There were no vitals taken for this visit. Lab Results Component Value Date/Time Sodium 138 05/11/2020 08:00 AM  
 Potassium 3.6 05/11/2020 08:00 AM  
 Chloride 108 (H) 05/11/2020 08:00 AM  
 CO2 23 05/11/2020 08:00 AM  
 Anion gap 7 05/11/2020 08:00 AM  
 Glucose 97 05/11/2020 08:00 AM  
 BUN 13 05/11/2020 08:00 AM  
 Creatinine 0.80 05/11/2020 08:00 AM  
 GFR est AA >60 05/11/2020 08:00 AM  
 GFR est non-AA >60 05/11/2020 08:00 AM  
 Calcium 9.4 05/11/2020 08:00 AM  
 Magnesium 2.0 05/06/2020 02:35 AM  
 Phosphorus 2.5 05/06/2020 02:35 AM  
 Albumin 3.4 05/11/2020 08:00 AM  
 Protein, total 6.9 05/11/2020 08:00 AM  
 Globulin 3.5 05/11/2020 08:00 AM  
 A-G Ratio 1.0 (L) 05/11/2020 08:00 AM  
 AST (SGOT) 34 05/11/2020 08:00 AM  
 ALT (SGPT) 31 05/11/2020 08:00 AM  
 
Lab Results Component Value Date/Time WBC 2.5 (L) 05/12/2020 12:54 PM  
 HGB 11.6 (L) 05/12/2020 12:54 PM  
 HCT 33.3 (L) 05/12/2020 12:54 PM  
 PLATELET 644 (L) 88/65/6057 12:54 PM  
 
 
ASSESSMENT and PLAN:  Mely Fontenot is tolerating radiation as anticipated for the current dose and fraction. We will continue on as planned with another treatment visit anticipated next week. Walter Mandel MD  
May 12, 2020

## 2020-05-12 NOTE — PROGRESS NOTES
Third [employer] outreach attempt made to home/mobile numbers. Left message to return calls. Unable to reach for [facility] program, will close case. Will reopen if call is returned.

## 2020-05-13 ENCOUNTER — HOSPITAL ENCOUNTER (OUTPATIENT)
Dept: RADIATION ONCOLOGY | Age: 70
Discharge: HOME | End: 2020-05-13
Payer: COMMERCIAL

## 2020-05-13 PROCEDURE — 77386 HC IMRT TRMT DLVR COMPL: CPT

## 2020-05-14 ENCOUNTER — HOSPITAL ENCOUNTER (OUTPATIENT)
Dept: RADIATION ONCOLOGY | Age: 70
Discharge: HOME | End: 2020-05-14
Payer: COMMERCIAL

## 2020-05-14 PROCEDURE — 77336 RADIATION PHYSICS CONSULT: CPT

## 2020-05-14 PROCEDURE — 77386 HC IMRT TRMT DLVR COMPL: CPT

## 2020-05-15 NOTE — DISCHARGE SUMMARY
Patient: Thanh Butler MRN: [id number]  SSN: [id number] YOB: 1950  Age: 81 y.o. Sex: male Thanh Butler is a 81 y.o. male who was seen by Dr. Leti Rangel at the request of Dr. Meryle Christen for lung cancer. He originally presented to the emergency department with a complaint of hemoptysis although he was also expressing cough, shortness of breath, and intermittent chest pain that have began in November 2019. He was found to be hypoxic and a CT of the chest showed a significant right lower lobe mass obstructing the right lower lobe airway with complete colla along with significant adenopathy. He was scheduled for biopsy which was positive for squamous cell carcinoma in the primary, while 11 R and 7 were negative. He did meet with surgery, Dr. Rachel Montes De Oca, who did not feel the disease was resectable. He was scheduled as an outpatient with medical oncology who discussed definitive treatment with a combination of chemotherapy and radiation and referred him to our office for management. He was seen in consultation by Dr Polina Dawson on 3/13/2020. PET CT was pending at time of his consultation. He was accompanied by his long-term girlfriend. The recommendation of Dr. Polina Dawson was to deliver definitive radiation to the grossly involved tumor and lymph nodes without elective reg coverage over the course of 6 weeks delivering 60 Gy at 2 Gy per fraction combined with concurrent chemotherapy. Please see the details of his treatment below as well as my plans for future care and surveillance. Please do not hesitate to call with questions or concerns at any time. DIAGNOSIS: T3N1M0, Stage IIIA NSCLC, SCC 
  
PREVIOUS TREATMENT:   
1) 2/26/2020, biopsy TREATMENT DATES:  Right lung: 3/31/2020 - 5/14/71331/3/2020 ANATOMIC SITE:  Right lung DOSE:  6000 cGy in 30 fractions BEAM ARRANGEMENT:  IMRT - 6MV CHEMOTHERAPY: Carboplatin/Taxol TREATMENT COURSE: Laury Pagan tolerated radiation reasonably well. PLAN:  The patient will be seen in follow up in 4 weeks to assess acute and sub acute side effects. There were no immediate complications. He was able to complete treatment as planned.    
 
Jamin Blancas NP

## 2020-06-04 ENCOUNTER — HOSPITAL ENCOUNTER (OUTPATIENT)
Dept: CT IMAGING | Age: 70
Discharge: HOME | End: 2020-06-04
Attending: INTERNAL MEDICINE

## 2020-06-04 DIAGNOSIS — C34.11 MALIGNANT NEOPLASM OF UPPER LOBE OF RIGHT LUNG (HCC): ICD-10-CM

## 2020-06-04 RX ORDER — SODIUM CHLORIDE 0.9 % (FLUSH) 0.9 %
10 SYRINGE (ML) INJECTION
Status: COMPLETED | OUTPATIENT
Start: 2020-06-04 | End: 2020-06-04

## 2020-06-04 RX ORDER — HEPARIN SODIUM (PORCINE) LOCK FLUSH IV SOLN 100 UNIT/ML 100 UNIT/ML
500 SOLUTION INTRAVENOUS AS NEEDED
Status: DISCONTINUED | OUTPATIENT
Start: 2020-06-04 | End: 2020-06-08 | Stop reason: HOSPADM

## 2020-06-04 RX ADMIN — Medication 10 ML: at 13:45

## 2020-06-04 RX ADMIN — HEPARIN SODIUM (PORCINE) LOCK FLUSH IV SOLN 100 UNIT/ML 500 UNITS: 100 SOLUTION at 14:07

## 2020-06-04 NOTE — PROGRESS NOTES
Port de-accessed past being flushed with 20 cc of normal saline and 5 cc of heparin flush. Site without redness or swelling, had good blood return. Pressure dressing applied with 2x2 and paper tape. Pt tolerated procedure well, offering no complaints.

## 2020-06-04 NOTE — PROGRESS NOTES
Port accessed with #20 gauge 3/4 inch Meza needle. Site without redness or swelling or swelling, has good blood return. Pt tolerated well, without any complaints.

## 2020-06-08 ENCOUNTER — HOSPITAL ENCOUNTER (OUTPATIENT)
Dept: INFUSION THERAPY | Age: 70
Discharge: HOME | End: 2020-06-08
Payer: COMMERCIAL

## 2020-06-08 DIAGNOSIS — C34.11 MALIGNANT NEOPLASM OF UPPER LOBE OF RIGHT LUNG (HCC): ICD-10-CM

## 2020-06-08 LAB
ALBUMIN SERPL-MCNC: 3.4 G/DL (ref 3.2–4.6)
ALBUMIN/GLOB SERPL: 0.9 {RATIO} (ref 1.2–3.5)
ALP SERPL-CCNC: 84 U/L (ref 50–136)
ALT SERPL-CCNC: 19 U/L (ref 12–65)
ANION GAP SERPL CALC-SCNC: 5 MMOL/L (ref 7–16)
AST SERPL-CCNC: 20 U/L (ref 15–37)
BASOPHILS # BLD: 0 K/UL (ref 0–0.2)
BASOPHILS NFR BLD: 1 % (ref 0–2)
BILIRUB SERPL-MCNC: 0.6 MG/DL (ref 0.2–1.1)
BUN SERPL-MCNC: 14 MG/DL (ref 8–23)
CALCIUM SERPL-MCNC: 9 MG/DL (ref 8.3–10.4)
CHLORIDE SERPL-SCNC: 107 MMOL/L (ref 98–107)
CO2 SERPL-SCNC: 26 MMOL/L (ref 21–32)
CREAT SERPL-MCNC: 0.9 MG/DL (ref 0.8–1.5)
DIFFERENTIAL METHOD BLD: ABNORMAL
EOSINOPHIL # BLD: 0.1 K/UL (ref 0–0.8)
EOSINOPHIL NFR BLD: 2 % (ref 0.5–7.8)
ERYTHROCYTE [DISTWIDTH] IN BLOOD BY AUTOMATED COUNT: 17.8 % (ref 11.9–14.6)
GLOBULIN SER CALC-MCNC: 3.6 G/DL (ref 2.3–3.5)
GLUCOSE SERPL-MCNC: 92 MG/DL (ref 65–100)
HCT VFR BLD AUTO: 38.7 % (ref 41.1–50.3)
HGB BLD-MCNC: 13.3 G/DL (ref 13.6–17.2)
IMM GRANULOCYTES # BLD AUTO: 0.1 K/UL (ref 0–0.5)
IMM GRANULOCYTES NFR BLD AUTO: 1 % (ref 0–5)
LYMPHOCYTES # BLD: 1.9 K/UL (ref 0.5–4.6)
LYMPHOCYTES NFR BLD: 30 % (ref 13–44)
MAGNESIUM SERPL-MCNC: 2.2 MG/DL (ref 1.8–2.4)
MCH RBC QN AUTO: 33.8 PG (ref 26.1–32.9)
MCHC RBC AUTO-ENTMCNC: 34.4 G/DL (ref 31.4–35)
MCV RBC AUTO: 98.2 FL (ref 79.6–97.8)
MONOCYTES # BLD: 0.6 K/UL (ref 0.1–1.3)
MONOCYTES NFR BLD: 9 % (ref 4–12)
NEUTS SEG # BLD: 3.6 K/UL (ref 1.7–8.2)
NEUTS SEG NFR BLD: 58 % (ref 43–78)
NRBC # BLD: 0 K/UL (ref 0–0.2)
PLATELET # BLD AUTO: 104 K/UL (ref 150–450)
PMV BLD AUTO: 9.7 FL (ref 9.4–12.3)
POTASSIUM SERPL-SCNC: 3.9 MMOL/L (ref 3.5–5.1)
PROT SERPL-MCNC: 7 G/DL (ref 6.3–8.2)
RBC # BLD AUTO: 3.94 M/UL (ref 4.23–5.67)
SODIUM SERPL-SCNC: 138 MMOL/L (ref 136–145)
WBC # BLD AUTO: 6.2 K/UL (ref 4.3–11.1)

## 2020-06-08 PROCEDURE — 36591 DRAW BLOOD OFF VENOUS DEVICE: CPT

## 2020-06-08 PROCEDURE — 83735 ASSAY OF MAGNESIUM: CPT

## 2020-06-08 PROCEDURE — 80053 COMPREHEN METABOLIC PANEL: CPT

## 2020-06-08 PROCEDURE — 85025 COMPLETE CBC W/AUTO DIFF WBC: CPT

## 2020-06-24 RX ORDER — SODIUM CHLORIDE 0.9 % (FLUSH) 0.9 %
5-40 SYRINGE (ML) INJECTION EVERY 8 HOURS
Status: CANCELLED | OUTPATIENT
Start: 2020-06-24

## 2020-06-24 RX ORDER — SODIUM CHLORIDE 0.9 % (FLUSH) 0.9 %
5-40 SYRINGE (ML) INJECTION AS NEEDED
Status: CANCELLED | OUTPATIENT
Start: 2020-06-24

## 2020-06-30 ENCOUNTER — APPOINTMENT (OUTPATIENT)
Dept: GENERAL RADIOLOGY | Age: 70
End: 2020-06-30
Attending: EMERGENCY MEDICINE
Payer: COMMERCIAL

## 2020-06-30 ENCOUNTER — HOSPITAL ENCOUNTER (EMERGENCY)
Age: 70
Discharge: OTHER INSTITUTION | End: 2020-07-01
Attending: EMERGENCY MEDICINE
Payer: COMMERCIAL

## 2020-06-30 ENCOUNTER — APPOINTMENT (OUTPATIENT)
Dept: CT IMAGING | Age: 70
End: 2020-06-30
Attending: EMERGENCY MEDICINE
Payer: COMMERCIAL

## 2020-06-30 DIAGNOSIS — B34.9 VIRAL SYNDROME: ICD-10-CM

## 2020-06-30 DIAGNOSIS — R09.02 HYPOXIA: Primary | ICD-10-CM

## 2020-06-30 DIAGNOSIS — C34.30 MALIGNANT NEOPLASM OF LOWER LOBE OF LUNG, UNSPECIFIED LATERALITY (HCC): ICD-10-CM

## 2020-06-30 LAB
ANION GAP SERPL CALC-SCNC: 8 MMOL/L (ref 7–16)
BASOPHILS # BLD: 0 K/UL (ref 0–0.2)
BASOPHILS NFR BLD: 0 % (ref 0–2)
BUN SERPL-MCNC: 15 MG/DL (ref 8–23)
CALCIUM SERPL-MCNC: 8.7 MG/DL (ref 8.3–10.4)
CHLORIDE SERPL-SCNC: 102 MMOL/L (ref 98–107)
CO2 SERPL-SCNC: 22 MMOL/L (ref 21–32)
CREAT SERPL-MCNC: 1.08 MG/DL (ref 0.8–1.5)
CRP SERPL-MCNC: 10.5 MG/DL (ref 0–0.9)
DIFFERENTIAL METHOD BLD: ABNORMAL
EOSINOPHIL # BLD: 0.1 K/UL (ref 0–0.8)
EOSINOPHIL NFR BLD: 2 % (ref 0.5–7.8)
ERYTHROCYTE [DISTWIDTH] IN BLOOD BY AUTOMATED COUNT: 13.4 % (ref 11.9–14.6)
GLUCOSE SERPL-MCNC: 131 MG/DL (ref 65–100)
HCT VFR BLD AUTO: 39 % (ref 41.1–50.3)
HGB BLD-MCNC: 13.8 G/DL (ref 13.6–17.2)
IMM GRANULOCYTES # BLD AUTO: 0 K/UL (ref 0–0.5)
IMM GRANULOCYTES NFR BLD AUTO: 0 % (ref 0–5)
LACTATE SERPL-SCNC: 2.4 MMOL/L (ref 0.4–2)
LYMPHOCYTES # BLD: 1 K/UL (ref 0.5–4.6)
LYMPHOCYTES NFR BLD: 17 % (ref 13–44)
MAGNESIUM SERPL-MCNC: 2.1 MG/DL (ref 1.8–2.4)
MCH RBC QN AUTO: 33.9 PG (ref 26.1–32.9)
MCHC RBC AUTO-ENTMCNC: 35.4 G/DL (ref 31.4–35)
MCV RBC AUTO: 95.8 FL (ref 79.6–97.8)
MONOCYTES # BLD: 0.4 K/UL (ref 0.1–1.3)
MONOCYTES NFR BLD: 7 % (ref 4–12)
NEUTS SEG # BLD: 4.2 K/UL (ref 1.7–8.2)
NEUTS SEG NFR BLD: 73 % (ref 43–78)
NRBC # BLD: 0 K/UL (ref 0–0.2)
PLATELET # BLD AUTO: 98 K/UL (ref 150–450)
PMV BLD AUTO: 10.7 FL (ref 9.4–12.3)
POTASSIUM SERPL-SCNC: 4.4 MMOL/L (ref 3.5–5.1)
PROCALCITONIN SERPL-MCNC: 0.05 NG/ML
RBC # BLD AUTO: 4.07 M/UL (ref 4.23–5.6)
SODIUM SERPL-SCNC: 132 MMOL/L (ref 136–145)
WBC # BLD AUTO: 5.8 K/UL (ref 4.3–11.1)

## 2020-06-30 PROCEDURE — 83735 ASSAY OF MAGNESIUM: CPT

## 2020-06-30 PROCEDURE — 87040 BLOOD CULTURE FOR BACTERIA: CPT

## 2020-06-30 PROCEDURE — 99285 EMERGENCY DEPT VISIT HI MDM: CPT

## 2020-06-30 PROCEDURE — 71045 X-RAY EXAM CHEST 1 VIEW: CPT

## 2020-06-30 PROCEDURE — 84145 PROCALCITONIN (PCT): CPT

## 2020-06-30 PROCEDURE — 74011250637 HC RX REV CODE- 250/637: Performed by: EMERGENCY MEDICINE

## 2020-06-30 PROCEDURE — 80048 BASIC METABOLIC PNL TOTAL CA: CPT

## 2020-06-30 PROCEDURE — 93005 ELECTROCARDIOGRAM TRACING: CPT | Performed by: EMERGENCY MEDICINE

## 2020-06-30 PROCEDURE — 74011250636 HC RX REV CODE- 250/636: Performed by: EMERGENCY MEDICINE

## 2020-06-30 PROCEDURE — 71260 CT THORAX DX C+: CPT

## 2020-06-30 PROCEDURE — 85025 COMPLETE CBC W/AUTO DIFF WBC: CPT

## 2020-06-30 PROCEDURE — 86140 C-REACTIVE PROTEIN: CPT

## 2020-06-30 PROCEDURE — 83605 ASSAY OF LACTIC ACID: CPT

## 2020-06-30 PROCEDURE — 87635 SARS-COV-2 COVID-19 AMP PRB: CPT

## 2020-06-30 RX ORDER — SODIUM CHLORIDE 0.9 % (FLUSH) 0.9 %
10 SYRINGE (ML) INJECTION
Status: COMPLETED | OUTPATIENT
Start: 2020-06-30 | End: 2020-07-01

## 2020-06-30 RX ORDER — ACETAMINOPHEN 500 MG
1000 TABLET ORAL
Status: COMPLETED | OUTPATIENT
Start: 2020-06-30 | End: 2020-06-30

## 2020-06-30 RX ADMIN — ACETAMINOPHEN 1000 MG: 500 TABLET, FILM COATED ORAL at 21:17

## 2020-06-30 RX ADMIN — SODIUM CHLORIDE 1000 ML: 900 INJECTION, SOLUTION INTRAVENOUS at 21:37

## 2020-07-01 ENCOUNTER — HOSPITAL ENCOUNTER (INPATIENT)
Age: 70
LOS: 2 days | Discharge: HOME | DRG: 864 | End: 2020-07-03
Attending: FAMILY MEDICINE | Admitting: FAMILY MEDICINE
Payer: COMMERCIAL

## 2020-07-01 VITALS
HEART RATE: 117 BPM | WEIGHT: 210 LBS | OXYGEN SATURATION: 95 % | HEIGHT: 70 IN | TEMPERATURE: 99.3 F | DIASTOLIC BLOOD PRESSURE: 63 MMHG | BODY MASS INDEX: 30.06 KG/M2 | SYSTOLIC BLOOD PRESSURE: 107 MMHG | RESPIRATION RATE: 20 BRPM

## 2020-07-01 DIAGNOSIS — C34.31 MALIGNANT NEOPLASM OF LOWER LOBE OF RIGHT LUNG (HCC): Chronic | ICD-10-CM

## 2020-07-01 DIAGNOSIS — J96.01 ACUTE RESPIRATORY FAILURE WITH HYPOXIA (HCC): ICD-10-CM

## 2020-07-01 DIAGNOSIS — R50.9 FEVER, UNSPECIFIED FEVER CAUSE: ICD-10-CM

## 2020-07-01 DIAGNOSIS — J18.9 PNEUMONITIS: ICD-10-CM

## 2020-07-01 DIAGNOSIS — R05.9 COUGH: ICD-10-CM

## 2020-07-01 LAB
ALBUMIN SERPL-MCNC: 2.8 G/DL (ref 3.2–4.6)
ALBUMIN/GLOB SERPL: 0.7 {RATIO} (ref 1.2–3.5)
ALP SERPL-CCNC: 88 U/L (ref 50–136)
ALT SERPL-CCNC: 14 U/L (ref 12–65)
ANION GAP SERPL CALC-SCNC: 7 MMOL/L (ref 7–16)
AST SERPL-CCNC: 18 U/L (ref 15–37)
ATRIAL RATE: 144 BPM
BILIRUB SERPL-MCNC: 0.9 MG/DL (ref 0.2–1.1)
BUN SERPL-MCNC: 10 MG/DL (ref 8–23)
CALCIUM SERPL-MCNC: 8.8 MG/DL (ref 8.3–10.4)
CALCULATED P AXIS, ECG09: 138 DEGREES
CALCULATED R AXIS, ECG10: -140 DEGREES
CALCULATED T AXIS, ECG11: 133 DEGREES
CHLORIDE SERPL-SCNC: 106 MMOL/L (ref 98–107)
CO2 SERPL-SCNC: 25 MMOL/L (ref 21–32)
CREAT SERPL-MCNC: 0.73 MG/DL (ref 0.8–1.5)
DIAGNOSIS, 93000: NORMAL
GLOBULIN SER CALC-MCNC: 4.2 G/DL (ref 2.3–3.5)
GLUCOSE SERPL-MCNC: 109 MG/DL (ref 65–100)
P-R INTERVAL, ECG05: 176 MS
POTASSIUM SERPL-SCNC: 3.8 MMOL/L (ref 3.5–5.1)
PROT SERPL-MCNC: 7 G/DL (ref 6.3–8.2)
Q-T INTERVAL, ECG07: 268 MS
QRS DURATION, ECG06: 76 MS
QTC CALCULATION (BEZET), ECG08: 414 MS
SODIUM SERPL-SCNC: 138 MMOL/L (ref 136–145)
VENTRICULAR RATE, ECG03: 144 BPM

## 2020-07-01 PROCEDURE — 74011250637 HC RX REV CODE- 250/637: Performed by: FAMILY MEDICINE

## 2020-07-01 PROCEDURE — 80053 COMPREHEN METABOLIC PANEL: CPT

## 2020-07-01 PROCEDURE — 87086 URINE CULTURE/COLONY COUNT: CPT

## 2020-07-01 PROCEDURE — 77010033678 HC OXYGEN DAILY

## 2020-07-01 PROCEDURE — 74011636320 HC RX REV CODE- 636/320: Performed by: EMERGENCY MEDICINE

## 2020-07-01 PROCEDURE — 94760 N-INVAS EAR/PLS OXIMETRY 1: CPT

## 2020-07-01 PROCEDURE — 74011250636 HC RX REV CODE- 250/636: Performed by: FAMILY MEDICINE

## 2020-07-01 PROCEDURE — 36415 COLL VENOUS BLD VENIPUNCTURE: CPT

## 2020-07-01 PROCEDURE — 65660000000 HC RM CCU STEPDOWN

## 2020-07-01 PROCEDURE — 74011000258 HC RX REV CODE- 258: Performed by: EMERGENCY MEDICINE

## 2020-07-01 RX ORDER — PANTOPRAZOLE SODIUM 40 MG/1
40 TABLET, DELAYED RELEASE ORAL
Status: DISCONTINUED | OUTPATIENT
Start: 2020-07-01 | End: 2020-07-03 | Stop reason: HOSPADM

## 2020-07-01 RX ORDER — PROCHLORPERAZINE MALEATE 10 MG
10 TABLET ORAL
Status: DISCONTINUED | OUTPATIENT
Start: 2020-07-01 | End: 2020-07-03 | Stop reason: HOSPADM

## 2020-07-01 RX ORDER — SODIUM CHLORIDE 0.9 % (FLUSH) 0.9 %
5-40 SYRINGE (ML) INJECTION EVERY 8 HOURS
Status: DISCONTINUED | OUTPATIENT
Start: 2020-07-01 | End: 2020-07-03 | Stop reason: HOSPADM

## 2020-07-01 RX ORDER — UREA 10 %
220 LOTION (ML) TOPICAL DAILY
Status: DISCONTINUED | OUTPATIENT
Start: 2020-07-01 | End: 2020-07-03 | Stop reason: HOSPADM

## 2020-07-01 RX ORDER — HYDROXYCHLOROQUINE SULFATE 200 MG/1
400 TABLET, FILM COATED ORAL
Status: DISCONTINUED | OUTPATIENT
Start: 2020-07-02 | End: 2020-07-02

## 2020-07-01 RX ORDER — ONDANSETRON 2 MG/ML
4 INJECTION INTRAMUSCULAR; INTRAVENOUS
Status: DISCONTINUED | OUTPATIENT
Start: 2020-07-01 | End: 2020-07-03 | Stop reason: HOSPADM

## 2020-07-01 RX ORDER — POLYETHYLENE GLYCOL 3350 17 G/17G
17 POWDER, FOR SOLUTION ORAL DAILY
Status: DISCONTINUED | OUTPATIENT
Start: 2020-07-01 | End: 2020-07-03 | Stop reason: HOSPADM

## 2020-07-01 RX ORDER — SODIUM CHLORIDE 0.9 % (FLUSH) 0.9 %
5-40 SYRINGE (ML) INJECTION AS NEEDED
Status: DISCONTINUED | OUTPATIENT
Start: 2020-07-01 | End: 2020-07-03 | Stop reason: HOSPADM

## 2020-07-01 RX ORDER — ENOXAPARIN SODIUM 100 MG/ML
40 INJECTION SUBCUTANEOUS EVERY 24 HOURS
Status: DISCONTINUED | OUTPATIENT
Start: 2020-07-01 | End: 2020-07-03 | Stop reason: HOSPADM

## 2020-07-01 RX ORDER — ACETAMINOPHEN 325 MG/1
650 TABLET ORAL
Status: DISCONTINUED | OUTPATIENT
Start: 2020-07-01 | End: 2020-07-03 | Stop reason: HOSPADM

## 2020-07-01 RX ORDER — ASCORBIC ACID 500 MG
1500 TABLET ORAL 4 TIMES DAILY
Status: DISCONTINUED | OUTPATIENT
Start: 2020-07-01 | End: 2020-07-03 | Stop reason: HOSPADM

## 2020-07-01 RX ORDER — DEXAMETHASONE 4 MG/1
2 TABLET ORAL DAILY
Status: DISCONTINUED | OUTPATIENT
Start: 2020-07-01 | End: 2020-07-02

## 2020-07-01 RX ORDER — ONDANSETRON 8 MG/1
8 TABLET, ORALLY DISINTEGRATING ORAL
Status: DISCONTINUED | OUTPATIENT
Start: 2020-07-01 | End: 2020-07-03 | Stop reason: HOSPADM

## 2020-07-01 RX ORDER — HYDROXYCHLOROQUINE SULFATE 200 MG/1
400 TABLET, FILM COATED ORAL 2 TIMES DAILY WITH MEALS
Status: COMPLETED | OUTPATIENT
Start: 2020-07-01 | End: 2020-07-01

## 2020-07-01 RX ADMIN — ENOXAPARIN SODIUM 40 MG: 40 INJECTION SUBCUTANEOUS at 09:04

## 2020-07-01 RX ADMIN — Medication 10 ML: at 05:30

## 2020-07-01 RX ADMIN — POLYETHYLENE GLYCOL 3350 17 G: 17 POWDER, FOR SOLUTION ORAL at 09:04

## 2020-07-01 RX ADMIN — IOPAMIDOL 100 ML: 755 INJECTION, SOLUTION INTRAVENOUS at 00:10

## 2020-07-01 RX ADMIN — HYDROXYCHLOROQUINE SULFATE 400 MG: 200 TABLET ORAL at 09:03

## 2020-07-01 RX ADMIN — SODIUM CHLORIDE 100 ML: 900 INJECTION, SOLUTION INTRAVENOUS at 00:10

## 2020-07-01 RX ADMIN — Medication 5 ML: at 13:17

## 2020-07-01 RX ADMIN — Medication 1500 MG: at 13:17

## 2020-07-01 RX ADMIN — Medication 1500 MG: at 09:03

## 2020-07-01 RX ADMIN — PANTOPRAZOLE SODIUM 40 MG: 40 TABLET, DELAYED RELEASE ORAL at 09:04

## 2020-07-01 RX ADMIN — DEXAMETHASONE 2 MG: 4 TABLET ORAL at 09:04

## 2020-07-01 RX ADMIN — HYDROXYCHLOROQUINE SULFATE 400 MG: 200 TABLET ORAL at 17:01

## 2020-07-01 RX ADMIN — Medication 1500 MG: at 17:01

## 2020-07-01 RX ADMIN — Medication 10 ML: at 00:10

## 2020-07-01 RX ADMIN — Medication 1500 MG: at 21:00

## 2020-07-01 RX ADMIN — Medication 10 ML: at 21:00

## 2020-07-01 RX ADMIN — Medication 220 MG: at 09:03

## 2020-07-01 NOTE — H&P
HOSPITALIST H&P/CONSULT  NAME:  Kg Bermudez. Age:  81 y.o.  :   1950   MRN:   [id number]  PCP: Ev Moreno MD  Consulting MD:  Treatment Team: Attending Provider: Li Sheehan MD  HPI:   Patient is a 89NVQ with PMH significant for lung cancer who presents with fatigue and general malaise. Reports it started almost a week ago. He reports that yesterday (), he began having fevers. He reports poor PO intake. Also reports associated cough and body aches. Patient found to be tachycardic on ER evaluation with borderline low O2 sats on room air. Given his symptoms, patient was swabbed for COVID-19 and sent to [facility] from [address] for further care. Of note, CT chest with contrast performed to rule-out PE given his tachycardia, did not show evidence of PE, but did show mild bilateral groundglass opacities. Complete ROS done and is as stated in HPI or otherwise negative. Past Medical History:   Diagnosis Date    GERD (gastroesophageal reflux disease)     Hypertension     Hypoxia 2020    Malignant neoplasm of lower lobe of right lung ([facility] [address] 2020    Osteoarthritis     Right lower lobe lung mass 2020    Status post total right knee replacement 2016    Past Medical History reviewed. Past Surgical History:   Procedure Laterality Date    HX COLONOSCOPY      HX KNEE ARTHROSCOPY Left     scope X 1, ACL recon X 1    HX KNEE ARTHROSCOPY Right , 1975    X 2     HX KNEE REPLACEMENT Right     HX TONSILLECTOMY      HX VASCULAR ACCESS      IR INSERT TUNL CVC W PORT OVER 5 YEARS  3/18/2020   [address] [place] [address] SINUS SURGERY PROC UNLISTED  ,     \"nasal plasty\"      Prior to Admission Medications   Prescriptions Last Dose Informant Patient Reported? Taking?    betamethasone dipropionate (DIPROSONE) 0.05 % topical cream   Yes No   dexAMETHasone (DECADRON) 2 mg tablet   No No   Sig: daily  Indications: prevent nausea and vomiting from cancer chemotherapy esomeprazole (NexIUM) 20 mg capsule   Yes No   Sig: Take  by mouth daily. prn   hydrocortisone (HYTONE) 2.5 % topical cream   No No   Sig: Apply  to affected area four (4) times daily as needed (skin changes related to radiation therapy). use thin layer   lidocaine-prilocaine (EMLA) topical cream   No No   Sig: Apply  to affected area as needed for Pain. ondansetron hcl (ZOFRAN) 8 mg tablet   No No   Sig: Take 1 Tab by mouth every eight (8) hours as needed for Nausea. Indications: nausea and vomiting caused by cancer drugs   polyethylene glycol (MIRALAX) 17 gram packet   No No   Sig: Take 1 Packet by mouth daily. Indications: constipation   prochlorperazine (COMPAZINE) 10 mg tablet   No No   Sig: Take 1 Tab by mouth every six (6) hours as needed for Nausea. Indications: nausea and vomiting caused by cancer drugs, nausea and vomiting      Facility-Administered Medications: None     Allergies   Allergen Reactions    Celebrex [Celecoxib] Itching      Social History     Tobacco Use    Smoking status: Former Smoker     Packs/day: 1.00     Years: 20.00     Pack years: 20.00     Last attempt to quit:      Years since quittin.5    Smokeless tobacco: Never Used   Substance Use Topics    Alcohol use: Yes     Alcohol/week: 4.0 standard drinks     Types: 4 Glasses of wine per week      Family History   Problem Relation Age of Onset    Cancer Mother         uterine    Arrhythmia Sister         afib    Family History reviewed and is non-contributory to current presentation. Objective:     Visit Vitals  BP 95/63 (BP 1 Location: Left arm)   Pulse 97   Temp 98.1 °F (36.7 °C)   Resp 18   SpO2 97%      Temp (24hrs), Av.3 °F (37.4 °C), Min:98.1 °F (36.7 °C), Max:100.5 °F (38.1 °C)    Oxygen Therapy  O2 Sat (%): 97 % (20 0130)  Physical Exam:  General:    Alert, cooperative, no distress, appears stated age. Head:   Normocephalic, without obvious abnormality, atraumatic.   Nose:  Nares normal. No drainage or sinus tenderness. Lungs:   Diminished. No Wheezing or Rhonchi. No rales. Heart:   Regular rate and rhythm, no murmur, rub or gallop. Abdomen:   Soft, non-tender. Not distended. Bowel sounds normal.   Extremities: No cyanosis. No edema. No clubbing. Skin:     Texture, turgor normal.  Not Jaundiced. Neurologic: Alert and oriented x 3, no focal deficits. Data Review:   Recent Results (from the past 24 hour(s))   EKG, 12 LEAD, INITIAL    Collection Time: 06/30/20  9:12 PM   Result Value Ref Range    Ventricular Rate 144 BPM    Atrial Rate 144 BPM    P-R Interval 176 ms    QRS Duration 76 ms    Q-T Interval 268 ms    QTC Calculation (Bezet) 414 ms    Calculated P Axis 138 degrees    Calculated R Axis -140 degrees    Calculated T Axis 133 degrees    Diagnosis       !!!  Suspect arm lead reversal, interpretation assumes no reversal  !! AGE AND GENDER SPECIFIC ECG ANALYSIS !!  Unusual P axis, possible ectopic atrial tachycardia with undetermined rhythm   irregularity  Anterolateral infarct , age undetermined  Abnormal ECG  When compared with ECG of 05-MAY-2020 00:55,  Ectopic atrial rhythm has replaced Sinus rhythm  QRS axis Shifted left  Anterolateral infarct is now Present  T wave inversion now evident in Lateral leads     SARS-COV-2    Collection Time: 06/30/20  9:25 PM   Result Value Ref Range    SARS-CoV-2 PENDING     Specimen source Nasopharyngeal      COVID-19 rapid test Not detected NOTD     METABOLIC PANEL, BASIC    Collection Time: 06/30/20  9:36 PM   Result Value Ref Range    Sodium 132 (L) 136 - 145 mmol/L    Potassium 4.4 3.5 - 5.1 mmol/L    Chloride 102 98 - 107 mmol/L    CO2 22 21 - 32 mmol/L    Anion gap 8 7 - 16 mmol/L    Glucose 131 (H) 65 - 100 mg/dL    BUN 15 8 - 23 MG/DL    Creatinine 1.08 0.8 - 1.5 MG/DL    GFR est AA >60 >60 ml/min/1.73m2    GFR est non-AA >60 >60 ml/min/1.73m2    Calcium 8.7 8.3 - 10.4 MG/DL   MAGNESIUM    Collection Time: 06/30/20  9:36 PM   Result Value Ref Range Magnesium 2.1 1.8 - 2.4 mg/dL   CBC WITH AUTOMATED DIFF    Collection Time: 06/30/20  9:36 PM   Result Value Ref Range    WBC 5.8 4.3 - 11.1 K/uL    RBC 4.07 (L) 4.23 - 5.6 M/uL    HGB 13.8 13.6 - 17.2 g/dL    HCT 39.0 (L) 41.1 - 50.3 %    MCV 95.8 79.6 - 97.8 FL    MCH 33.9 (H) 26.1 - 32.9 PG    MCHC 35.4 (H) 31.4 - 35.0 g/dL    RDW 13.4 11.9 - 14.6 %    PLATELET 98 (L) 396 - 450 K/uL    MPV 10.7 9.4 - 12.3 FL    ABSOLUTE NRBC 0.00 0.0 - 0.2 K/uL    DF AUTOMATED      NEUTROPHILS 73 43 - 78 %    LYMPHOCYTES 17 13 - 44 %    MONOCYTES 7 4.0 - 12.0 %    EOSINOPHILS 2 0.5 - 7.8 %    BASOPHILS 0 0.0 - 2.0 %    IMMATURE GRANULOCYTES 0 0.0 - 5.0 %    ABS. NEUTROPHILS 4.2 1.7 - 8.2 K/UL    ABS. LYMPHOCYTES 1.0 0.5 - 4.6 K/UL    ABS. MONOCYTES 0.4 0.1 - 1.3 K/UL    ABS. EOSINOPHILS 0.1 0.0 - 0.8 K/UL    ABS. BASOPHILS 0.0 0.0 - 0.2 K/UL    ABS. IMM. GRANS. 0.0 0.0 - 0.5 K/UL   PROCALCITONIN    Collection Time: 06/30/20  9:36 PM   Result Value Ref Range    Procalcitonin 0.05 ng/mL   C REACTIVE PROTEIN, QT    Collection Time: 06/30/20  9:36 PM   Result Value Ref Range    C-Reactive protein 10.5 (H) 0.0 - 0.9 mg/dL   LACTIC ACID    Collection Time: 06/30/20  9:36 PM   Result Value Ref Range    Lactic acid 2.4 (HH) 0.4 - 2.0 MMOL/L     Imaging /Procedures /Studies   CT Results  (Last 48 hours)               07/01/20 0017  CT CHEST W CONT Final result    Impression:  IMPRESSION:       1. No evidence of pulmonary embolism. 2. Bilateral mild patchy groundglass opacities, nonspecific but may be secondary   to hypersensitivity pneumonitis. Atypical infection is considered less likely. No new lobar consolidation. 3. Know right infrahilar mass, similar to June 4, 2020 study. There is a history   of right lower lobe lung neoplasm. Narrative:  CT Chest with contrast       INDICATION: Chest pain, dyspnea. PE protocol.         COMPARISON: June 4, 2020       TECHNIQUE: Contiguous axial images were obtained from the neck base through the   upper abdomen with intravenous contrast, 100 mL Isovue 370. Radiation dose   reduction techniques were used for this study:  Our CT scanners use one or all   of the following: Automated exposure control, adjustment of the mA and/or kVp   according to patient's size, iterative reconstruction. FINDINGS:       There is no evidence of pulmonary embolism. There is a 3.5 cm ill-defined right infrahilar mass (series 2, image 64). There   is associated thickening of the adjacent right middle and lower lobe bronchi. Findings similar to June 4, 2020 study. There is no endobronchial lesion. There are patchy areas of groundglass   opacities in both lungs. There is no pneumothorax, pulmonary edema or pleural   effusion. No evidence of pulmonary edema. The heart is not enlarged. There is no pericardial effusion. There is coronary   artery calcification. The thoracic aorta is normal in course and caliber. Included upper abdomen is unremarkable. There are degenerative changes of the   spine                     Assessment and Plan: Active Hospital Problems    Diagnosis Date Noted    Fever 07/01/2020    Tachycardia 05/05/2020    Malignant neoplasm of lower lobe of right lung ([address] 02/26/2020    Essential hypertension with goal blood pressure less than 130/80 02/19/2018       PLAN  COVID-19 Rule Out  - CT chest with contrast to rule out PE shows no evidence of PE, but does show \"bilateral mild patchy groundglass opacities\"  - COVID swabbed in ER  - Discussed HCQ with patient. Patient is willing to try this. Will start zinc, vit C as well.   - Wean O2 as tolerated    Fever/Tachycardia  - Possibly related to infection  - Symptomatic treatment    R Lung Cancer  - Mass seen on CT  - Consult with Oncology to make them aware he is admitted for COVID rule out  - Also, patient has appt with Dr. Flaherty Files today in preparation for surgery next week, will need to contact his office as well to make them aware that he is here    HTN  - Home meds      Anticipated discharge: 2 days, pending clinical course    Signed By: Wili Mcnamara MD     July 1, 2020

## 2020-07-01 NOTE — ACP (ADVANCE CARE PLANNING)
Advance Care Planning Advance Care Planning Activator (Inpatient) Conversation Note Date of ACP Conversation: 07/01/20 Conversation Conducted with:   Patient with Decision Making Capacity Healthcare Decision Maker: Named in Advance Directive or Healthcare Power of  (name) ACP Activator: Efraín Cuevas *When Decision Maker makes decisions on behalf of the incapacitated patient: Decision Maker is asked to consider and make decisions based on patient values, known preferences, or best interests. Health Care Decision Maker: 
 
Current Designated Health Care Decision Maker:  
(If there is a valid [place] named in the [address] Makers\" box in the ACP activity, but it is not visible above, be sure to open that field and then select the health care decision maker relationship (ie \"primary\") in the blank space to the right of the name.) Validate  this information as still accurate & up-to-date; edit [place] field as needed.) Note: Assess and validate information in current ACP documents, as indicated. If no Decision Maker listed above or available through scanned documents, then: 
 
If no Authorized Decision Maker has previously been identifie***d, then patient chooses Health Care Decision Maker: \"Who would you like to name as your primary health care decision-maker? \" Name:  Mary Delgado  Relationship: girlfriend Phone number: [phone] \"Can this person be reached easily? \" Kishan Croft \"Who would you like to name as your back-up decision maker? \" Name:Aracelis Petersen Relationship: sister Phone number: [phone] \"Can this person be reached easily? \" Kishan Croft Note: If the relationship of these Decision-Makers to the patient does NOT follow your state's Next of Kin hierarchy, recommend that patient complete ACP document that meets state-specific requirements to allow them to act on the patient's behalf when appropriate. Care Preferences Ventilation: \"If you were in your present state of health and suddenly became very ill and were unable to breathe on your own, what would your preference be about the use of a ventilator (breathing machine) if it were available to you? \" If patient would desire the use of a ventilator (breathing machine), answer \"yes\", if not \"no\":yes \"If your health worsens and it becomes clear that your chance of recovery is unlikely, what would your preference be about the use of a ventilator (breathing machine) if it were available to you? \" Would the patient desire the use of a ventilator (breathing machine)? YES Resuscitation \"CPR works best to restart the heart when there is a sudden event, like a heart attack, in someone who is otherwise healthy. Unfortunately, CPR does not typically restart the heart for people who have serious health conditions or who are very sick. \" \"In the event your heart stopped as a result of an underlying serious health condition, would you want attempts to be made to restart your heart (answer \"yes\" for attempt to resuscitate) or would you prefer a natural death (answer \"no\" for do not attempt to resuscitate)? \" yes NOTE: If the patient has a valid advance directive AND now provides care preference(s) that are inconsistent with that prior directive, advise the patient to consider either: creating a new advance directive that complies with state-specific requirements; or, if that is not possible, orally revoking that prior directive in accordance with state-specific requirements, which must be documented in the EHR. [x] Yes  [] No   Educated Patient / Vicci Ores regarding differences between Advance Directives and portable DNR orders. Length of ACP Conversation in minutes:   
 
Conversation Outcomes: 
[x] ACP discussion completed 
[] Existing advance directive reviewed with patient; no changes to patient's previously recorded wishes [] New Advance Directive completed 
 [] Portable Do Not Resuscitate prepared for Provider review and signature 
[] POLST/POST/MOLST/MOST prepared for Provider review and signature Follow-up plan:   
[] Schedule follow-up conversation to continue planning 
[] Referred individual to Provider for additional questions/concerns  
[] Advised patient/agent/surrogate to review completed ACP document and update if needed with changes in condition, patient preferences or care setting  
 
[] This note routed to one or more involved healthcare providers

## 2020-07-01 NOTE — PROGRESS NOTES
Care Management Interventions PCP Verified by CM: Yes Current Support Network: Other Confirm Follow Up Transport: Self Freedom of Choice List was Provided with Basic Dialogue that Supports the Patient's Individualized Plan of Care/Goals, Treatment Preferences and Shares the Quality Data Associated with the Providers?: Yes [employer] Information Provided?: No 
Discharge Location Discharge Placement: Home

## 2020-07-01 NOTE — ED PROVIDER NOTES
51-year-old gentleman with lung cancer presents with 6-day history of malaise and fatigue. He did not start running fevers until today. Patient has lung cancer and is completed his rounds of chemotherapy he is due for surgery soon. No energy, no appetite, has not been eating or drinking over these 6 days. He notes his urine to be darker than usual. 
Body aches, headache, and a dry nonproductive cough. He has no history of CHF COPD, or asthma. Patient does feel dyspneic with exertion. Past Medical History:  
Diagnosis Date  GERD (gastroesophageal reflux disease)  Hypertension  Hypoxia 02/24/2020  Malignant neoplasm of lower lobe of right lung ([address] 2/26/2020  Osteoarthritis  Right lower lobe lung mass 02/24/2020  Status post total right knee replacement 2/29/2016 Past Surgical History:  
Procedure Laterality Date  HX COLONOSCOPY    
 HX KNEE ARTHROSCOPY Left   
 scope X 1, ACL recon X 1  
 HX KNEE ARTHROSCOPY Right 1974, 1975 X 2   
 HX KNEE REPLACEMENT Right [address] [place] [address]  HX VASCULAR ACCESS    
 IR INSERT TUNL CVC W PORT OVER 5 YEARS  3/18/2020  
[facility] SINUS SURGERY PROC UNLISTED  1988, 1991 \"nasal plasty\" Family History:  
Problem Relation Age of Onset  Cancer Mother   
     uterine  Arrhythmia Sister   
     afib Social History Socioeconomic History  Marital status:  Spouse name: Not on file  Number of children: Not on file  Years of education: Not on file  Highest education level: Not on file Occupational History  Not on file Social Needs  Financial resource strain: Not on file  Food insecurity Worry: Not on file Inability: Not on file  Transportation needs Medical: Not on file Non-medical: Not on file Tobacco Use  Smoking status: Former Smoker Packs/day: 1.00 Years: 20.00 Pack years: 20.00 Last attempt to quit: 2014 Years since quittin.4  Smokeless tobacco: Never Used Substance and Sexual Activity  Alcohol use: Yes Alcohol/week: 4.0 standard drinks Types: 4 Glasses of wine per week  Drug use: No  
 Sexual activity: Yes  
  Partners: Female Lifestyle  Physical activity Days per week: Not on file Minutes per session: Not on file  Stress: Not on file Relationships  Social connections Talks on phone: Not on file Gets together: Not on file Attends Rastafari service: Not on file Active member of club or organization: Not on file Attends meetings of clubs or organizations: Not on file Relationship status: Not on file  Intimate partner violence Fear of current or ex partner: Not on file Emotionally abused: Not on file Physically abused: Not on file Forced sexual activity: Not on file Other Topics Concern   Service No  
 Blood Transfusions No  
 Caffeine Concern No  
 Occupational Exposure No  
 Hobby Hazards No  
 Sleep Concern No  
 Stress Concern No  
 Weight Concern No  
 Special Diet No  
 Back Care Not Asked  Exercise Yes  Bike Helmet Not Asked [place] Yes  Self-Exams Not Asked Social History Narrative  Not on file ALLERGIES: Celebrex [celecoxib] Review of Systems Constitutional: Positive for activity change, appetite change, fatigue and fever. Negative for chills. HENT: Negative for rhinorrhea and sore throat. Eyes: Negative for discharge and redness. Respiratory: Positive for cough and shortness of breath. Cardiovascular: Negative for chest pain and palpitations. Gastrointestinal: Negative for abdominal pain, diarrhea, nausea and vomiting. Genitourinary: Negative for dysuria. Musculoskeletal: Positive for arthralgias, back pain and myalgias. Skin: Negative for rash. Neurological: Positive for headaches. Negative for dizziness and syncope. All other systems reviewed and are negative. Vitals:  
 06/30/20 2102 BP: 132/83 Pulse: (!) 143 Resp: 26 Temp: (!) 100.5 °F (38.1 °C) SpO2: 94% Weight: 95.3 kg (210 lb) Height: 5' 10\" (1.778 m) Physical Exam 
Vitals signs and nursing note reviewed. Constitutional:   
   General: He is not in acute distress. Appearance: Normal appearance. He is well-developed. He is not ill-appearing, toxic-appearing or diaphoretic. Comments: A bit more tachycardic than would be expected for his degree of fever HENT:  
   Head: Normocephalic and atraumatic. Eyes:  
   General: No scleral icterus. Right eye: No discharge. Left eye: No discharge. Conjunctiva/sclera: Conjunctivae normal.  
   Pupils: Pupils are equal, round, and reactive to light. Neck: Musculoskeletal: Normal range of motion and neck supple. Cardiovascular:  
   Rate and Rhythm: Regular rhythm. Tachycardia present. Heart sounds: Normal heart sounds. No murmur. No gallop. Pulmonary:  
   Effort: Pulmonary effort is normal. No respiratory distress. Breath sounds: Normal breath sounds. No wheezing or rales. Abdominal:  
   Palpations: Abdomen is soft. Tenderness: There is no abdominal tenderness. There is no guarding. Musculoskeletal: Normal range of motion. Skin: 
   General: Skin is warm and dry. Neurological:  
   General: No focal deficit present. Mental Status: He is alert and oriented to person, place, and time. Mental status is at baseline. Motor: No abnormal muscle tone. Comments: cni 2-12 grossly Psychiatric:     
   Mood and Affect: Mood normal.     
   Behavior: Behavior normal.  
 
  
 
MDM Number of Diagnoses or Management Options Hypoxia: new and requires workup Malignant neoplasm of lower lobe of lung, unspecified laterality [facility]):  
Viral syndrome: new and requires workup Diagnosis management comments: Medical decision making note: Cancer patient, recently finished with chemo presents with viral syndrome fevers loss of appetite. Will perform COVID swab, after fluids his oxygen saturation dropped to 88 and 89% on room air while laying in bed at rest semi-reclined. Sats up to 95% on 2 L nasal cannula Heart rate has slowed a little bit, we will go ahead and scan his chest in light of the hypoxia to rule out PE as an explanation for tachycardia and hypoxia. We will transfer to [facility] rule out an hypoxic admission. This concludes the \"medical decision making note\" part of this emergency department visit note. Procedures

## 2020-07-01 NOTE — PROGRESS NOTES
Shift assessment complete. A&OX4. Lungs diminished in the bases. Respirations present. Even and unlabored. No s/s of distress. No c/o pain at this time. Call light within reach. Encouraged patient to call for assistance. Patient verbalizes understanding. See Doc Flowsheet for assessment details. Patient resting in bed. Will continue to monitor.

## 2020-07-01 NOTE — ED NOTES
TRANSFER - OUT REPORT: 
 
Verbal report given to [facility], RN(name) on [address].  being transferred to 23-14-20-09 [facility](unit) for routine progression of care Report consisted of patients Situation, Background, Assessment and  
Recommendations(SBAR). Information from the following report(s) SBAR was reviewed with the receiving nurse. Lines:  
Venous Access Device 03/18/20 (Active) Peripheral IV 06/30/20 Right Hand (Active) Site Assessment Clean, dry, & intact 6/30/2020  9:22 PM  
Phlebitis Assessment 0 6/30/2020  9:22 PM  
Infiltration Assessment 0 6/30/2020  9:22 PM  
Dressing Type Gauze;Tape;Transparent 6/30/2020  9:22 PM  
Hub Color/Line Status Pink;Flushed 6/30/2020  9:22 PM  
Action Taken Blood drawn 6/30/2020  9:22 PM  
   
Peripheral IV 07/01/20 Right Antecubital (Active) Site Assessment Clean, dry, & intact 7/1/2020 12:00 AM  
Phlebitis Assessment 0 7/1/2020 12:00 AM  
Infiltration Assessment 0 7/1/2020 12:00 AM  
Dressing Status Clean, dry, & intact 7/1/2020 12:00 AM  
  
 
Opportunity for questions and clarification was provided. Patient transported with: 
 O2 @ 2 liters

## 2020-07-01 NOTE — PROGRESS NOTES
TRANSFER - IN REPORT:    Verbal report received from Teresita, [facility] on [facility] [place].  being received from  ED for routine progression of care      Report consisted of patients Situation, Background, Assessment and   Recommendations(SBAR). Information from the following report(s) ED Summary was reviewed with the receiving nurse. Opportunity for questions and clarification was provided. Assessment completed upon patients arrival to unit and care assumed.

## 2020-07-01 NOTE — ED TRIAGE NOTES
Pt has a hx of lung cancer. Pt states that he has been feeling ill  Since last Thursday. Pt states that he feels weak, lethargic, not himself. Pt states that he has had a headache, cough and loss of suzette itate. Pt masked apon arrival to triage.

## 2020-07-01 NOTE — PROGRESS NOTES
07/01/20 0130 Dual Skin Pressure Injury Assessment Dual Skin Pressure Injury Assessment WDL Second Care Provider (Based on [address] [place] [address]) Derek Felty, [facility] Skin Integumentary Skin Integumentary (WDL) WDL Pressure  Injury Documentation No Pressure Injury Noted-Pressure Ulcer Prevention Initiated

## 2020-07-02 LAB
ANION GAP SERPL CALC-SCNC: 15 MMOL/L (ref 7–16)
ANION GAP SERPL CALC-SCNC: 8 MMOL/L (ref 7–16)
BASOPHILS # BLD: 0 K/UL (ref 0–0.2)
BASOPHILS NFR BLD: 0 % (ref 0–2)
BUN SERPL-MCNC: 10 MG/DL (ref 8–23)
BUN SERPL-MCNC: 9 MG/DL (ref 8–23)
CALCIUM SERPL-MCNC: 9 MG/DL (ref 8.3–10.4)
CALCIUM SERPL-MCNC: 9 MG/DL (ref 8.3–10.4)
CHLORIDE SERPL-SCNC: 107 MMOL/L (ref 98–107)
CHLORIDE SERPL-SCNC: 109 MMOL/L (ref 98–107)
CO2 SERPL-SCNC: 18 MMOL/L (ref 21–32)
CO2 SERPL-SCNC: 26 MMOL/L (ref 21–32)
COVID-19 RAPID TEST, COVR: NOT DETECTED
CREAT SERPL-MCNC: 0.58 MG/DL (ref 0.8–1.5)
CREAT SERPL-MCNC: 0.74 MG/DL (ref 0.8–1.5)
DIFFERENTIAL METHOD BLD: ABNORMAL
EOSINOPHIL # BLD: 0.1 K/UL (ref 0–0.8)
EOSINOPHIL NFR BLD: 1 % (ref 0.5–7.8)
ERYTHROCYTE [DISTWIDTH] IN BLOOD BY AUTOMATED COUNT: 13.1 % (ref 11.9–14.6)
ERYTHROCYTE [DISTWIDTH] IN BLOOD BY AUTOMATED COUNT: 13.3 % (ref 11.9–14.6)
GLUCOSE SERPL-MCNC: 100 MG/DL (ref 65–100)
GLUCOSE SERPL-MCNC: 110 MG/DL (ref 65–100)
HCT VFR BLD AUTO: 34.6 % (ref 41.1–50.3)
HCT VFR BLD AUTO: 35 % (ref 41.1–50.3)
HGB BLD-MCNC: 11.9 G/DL (ref 13.6–17.2)
HGB BLD-MCNC: 12.4 G/DL (ref 13.6–17.2)
IMM GRANULOCYTES # BLD AUTO: 0 K/UL (ref 0–0.5)
IMM GRANULOCYTES NFR BLD AUTO: 1 % (ref 0–5)
LYMPHOCYTES # BLD: 0.7 K/UL (ref 0.5–4.6)
LYMPHOCYTES NFR BLD: 18 % (ref 13–44)
MAGNESIUM SERPL-MCNC: 2.2 MG/DL (ref 1.8–2.4)
MCH RBC QN AUTO: 33.6 PG (ref 26.1–32.9)
MCH RBC QN AUTO: 34.9 PG (ref 26.1–32.9)
MCHC RBC AUTO-ENTMCNC: 34.4 G/DL (ref 31.4–35)
MCHC RBC AUTO-ENTMCNC: 35.4 G/DL (ref 31.4–35)
MCV RBC AUTO: 97.7 FL (ref 79.6–97.8)
MCV RBC AUTO: 98.6 FL (ref 79.6–97.8)
MONOCYTES # BLD: 0.3 K/UL (ref 0.1–1.3)
MONOCYTES NFR BLD: 9 % (ref 4–12)
NEUTS SEG # BLD: 2.6 K/UL (ref 1.7–8.2)
NEUTS SEG NFR BLD: 71 % (ref 43–78)
NRBC # BLD: 0 K/UL (ref 0–0.2)
NRBC # BLD: 0 K/UL (ref 0–0.2)
PHOSPHATE SERPL-MCNC: 3.3 MG/DL (ref 2.3–3.7)
PLATELET # BLD AUTO: 86 K/UL (ref 150–450)
PLATELET # BLD AUTO: 94 K/UL (ref 150–450)
PMV BLD AUTO: 10.2 FL (ref 9.4–12.3)
PMV BLD AUTO: 10.7 FL (ref 9.4–12.3)
POTASSIUM SERPL-SCNC: 3.4 MMOL/L (ref 3.5–5.1)
POTASSIUM SERPL-SCNC: 4 MMOL/L (ref 3.5–5.1)
RBC # BLD AUTO: 3.54 M/UL (ref 4.23–5.6)
RBC # BLD AUTO: 3.55 M/UL (ref 4.23–5.6)
SARS-COV-2, COV2NT: NOT DETECTED
SODIUM SERPL-SCNC: 141 MMOL/L (ref 136–145)
SODIUM SERPL-SCNC: 142 MMOL/L (ref 136–145)
SOURCE, COVRS: NORMAL
WBC # BLD AUTO: 3.7 K/UL (ref 4.3–11.1)
WBC # BLD AUTO: 4.5 K/UL (ref 4.3–11.1)

## 2020-07-02 PROCEDURE — 80048 BASIC METABOLIC PNL TOTAL CA: CPT

## 2020-07-02 PROCEDURE — 85025 COMPLETE CBC W/AUTO DIFF WBC: CPT

## 2020-07-02 PROCEDURE — 74011250636 HC RX REV CODE- 250/636: Performed by: FAMILY MEDICINE

## 2020-07-02 PROCEDURE — 65270000029 HC RM PRIVATE

## 2020-07-02 PROCEDURE — 74011250637 HC RX REV CODE- 250/637: Performed by: FAMILY MEDICINE

## 2020-07-02 PROCEDURE — 85027 COMPLETE CBC AUTOMATED: CPT

## 2020-07-02 PROCEDURE — 87635 SARS-COV-2 COVID-19 AMP PRB: CPT

## 2020-07-02 PROCEDURE — 84100 ASSAY OF PHOSPHORUS: CPT

## 2020-07-02 PROCEDURE — 99222 1ST HOSP IP/OBS MODERATE 55: CPT | Performed by: INTERNAL MEDICINE

## 2020-07-02 PROCEDURE — 83735 ASSAY OF MAGNESIUM: CPT

## 2020-07-02 RX ORDER — DEXAMETHASONE 4 MG/1
6 TABLET ORAL DAILY
Status: DISCONTINUED | OUTPATIENT
Start: 2020-07-03 | End: 2020-07-03 | Stop reason: HOSPADM

## 2020-07-02 RX ORDER — ALBUTEROL SULFATE 90 UG/1
1 AEROSOL, METERED RESPIRATORY (INHALATION)
Status: DISCONTINUED | OUTPATIENT
Start: 2020-07-02 | End: 2020-07-03 | Stop reason: HOSPADM

## 2020-07-02 RX ADMIN — HYDROXYCHLOROQUINE SULFATE 400 MG: 200 TABLET ORAL at 09:43

## 2020-07-02 RX ADMIN — Medication 1500 MG: at 09:41

## 2020-07-02 RX ADMIN — Medication 220 MG: at 09:44

## 2020-07-02 RX ADMIN — PANTOPRAZOLE SODIUM 40 MG: 40 TABLET, DELAYED RELEASE ORAL at 09:41

## 2020-07-02 RX ADMIN — ENOXAPARIN SODIUM 40 MG: 40 INJECTION SUBCUTANEOUS at 09:41

## 2020-07-02 RX ADMIN — Medication 5 ML: at 22:08

## 2020-07-02 RX ADMIN — Medication 1500 MG: at 12:09

## 2020-07-02 RX ADMIN — Medication 1500 MG: at 22:08

## 2020-07-02 RX ADMIN — DEXAMETHASONE 2 MG: 4 TABLET ORAL at 09:41

## 2020-07-02 RX ADMIN — Medication 1500 MG: at 17:11

## 2020-07-02 RX ADMIN — Medication 5 ML: at 12:49

## 2020-07-02 RX ADMIN — POLYETHYLENE GLYCOL 3350 17 G: 17 POWDER, FOR SOLUTION ORAL at 09:42

## 2020-07-02 NOTE — PROGRESS NOTES
Hospitalist Progress Note    2020  Admit Date: 2020  1:24 AM   NAME: Amor Lopez. :  1950   MRN:  [id number]   Attending: Charmayne Living, MD  PCP:  Teri Canavan, MD    SUBJECTIVE:   Patient 89OUS with PMH significant for lung cancer who presents with fatigue and general malaise, body aches, cough, fever symptoms began day prior. Patient found to be tachycardic on ER evaluation with borderline low O2 sats on room air. Given his symptoms, patient was swabbed for COVID-19 and sent to [facility] from [address] for further care.     Of note, CT chest with contrast performed to rule-out PE given his tachycardia, did not show evidence of PE, but did show mild bilateral groundglass opacities.  - reports moderate fatigue and still some dyspnea with exertion. Has been afebrile x 24 hrs.      Review of Systems negative with exception of pertinent positives noted above  PHYSICAL EXAM     Visit Vitals  /73 (BP 1 Location: Right arm, BP Patient Position: At rest)   Pulse 99   Temp 97.9 °F (36.6 °C)   Resp 14   SpO2 97%      Temp (24hrs), Av.7 °F (36.5 °C), Min:97 °F (36.1 °C), Max:98.2 °F (36.8 °C)    Oxygen Therapy  O2 Sat (%): 97 % (20 1525)  Pulse via Oximetry: 112 beats per minute (20 1156)  O2 Device: Nasal cannula (20 1156)  O2 Flow Rate (L/min): 2 l/min (20 1156)    Intake/Output Summary (Last 24 hours) at 2020 1752  Last data filed at 2020 0830  Gross per 24 hour   Intake 300 ml   Output 625 ml   Net -325 ml      General: No acute distress    Lungs:  Diminished bilaterally  Heart:  Regular rate and rhythm,  No murmur, rub, or gallop  Abdomen: Soft, Non distended, Non tender, Positive bowel sounds  Extremities: No cyanosis, clubbing or edema  Neurologic:  No focal deficits    ASSESSMENT      Active Hospital Problems    Diagnosis Date Noted    Fever 2020    Tachycardia 2020    Malignant neoplasm of lower lobe of right lung ([address] 2020    Essential hypertension with goal blood pressure less than 130/80 02/19/2018     A/P  COVID-19 Rule Out  - CT chest with contrast to rule out PE shows no evidence of PE, but does show \"bilateral mild patchy groundglass opacities\"  - COVID swabbed in ER and pending. Rapid negative   - continue zinc/vitamin C  - still requiring supplemental oxygen. - Continue to try to wean. - prn alb inhaler  - already on decadron for recent rtx tx's. Will increase to 6mg daily for now.    - If covid pos would qualify for remdesivir and /or convalescent plasma     Fever/Tachycardia  - Possibly related to infection  - Symptomatic treatment     R Lung Cancer  - Mass seen on CT  - s/p chemo/rtx treatments completed 6 weeks ago  - will need to reschedule follow-up with Dr Lisa Perea to discuss possible resection when clinically improved     HTN  - Home meds    DVT Prophylaxis: lovenox sq    Signed By: Desiree Carreon DO     July 2, 2020

## 2020-07-02 NOTE — PROGRESS NOTES
Pt resting in bed watching tv. On 2L NC. No s/sx of distress noted. Pt denies pain at this time. No needs stated. Encouraged to call for assistance as needed. Call light within reach. Will monitor.

## 2020-07-02 NOTE — PROGRESS NOTES
Brief hospitalist note    Pt seen at bedside. Please refer to H&P dated today. Admitted for concerns of hypoxia, dyspnea, fatigue and CT w/ b/l GGO concerning for covid-19. Pt sitting up in bed conversational reporting fatigue and dyspnea. CVS rrr s1 s2 no mrg, lungs diminished bilaterally, ext no edema. Continue current treatment.  Courtesy consult Dr Jorge A Singh and heme/onc

## 2020-07-02 NOTE — PROGRESS NOTES
Pt resting in bed watching tv. No s/sx of distress noted. Denies pain. Call light within reach. Will monitor.

## 2020-07-02 NOTE — CONSULTS
[facility] Hematology & Oncology        Inpatient Hematology / Oncology Consult Note    Reason for Consult:  Fever [R50.9]  Tachycardia [R00.0]  Referring Physician:  Lauren Aaron MD    History of Present Illness:  Mr. Dawna Mitchell is a 81 y.o. male admitted on 7/1/2020. Mr Dawna Mitchell has a PMH of HTN, GERD, arthritis. He is a known patient of Dr Candy Sam. He is being treated for squamous cell carcinoma of the lung that was diagnosed earlier this year. He completed carbo/taxol and 30 fractions of XRT from 3/31-5/14. Restaging scan on 6/4/202 showed CR and resolution of post-obstructive pneumonitis on previous scan. He was then referred to Dr Jesse Santiago to determine if he is a surgical candidate for resection. Dr Jesse Santiago and Mr Dawna Mitchell agreed to resection as mass was no longer abutting pulmonary artery. Surgery was scheduled for 7/8/2020. Mr Dawna Mitchell presented to the ED 6/30 with complaints of fever, cough, and bodyaches. CT chest showed no PE but showed GGO concerning for possible COVID given symptoms. COVID test pending but preliminarily negative. We have been consulted for recommendations for our patient. Review of Systems:  Constitutional +fever, +malaise +bodyaches. Denies weight loss, appetite changes, night sweats. HEENT Denies trauma, blurry vision, hearing loss, ear pain, nosebleeds, sore throat, neck pain. Skin Denies lesions or rashes. Lungs +cough. Denies dyspnea, sputum production or hemoptysis. Cardiovascular Denies chest pain, palpitations, or lower extremity edema. Gastrointestinal Denies nausea, vomiting, changes in bowel habits, bloody or black stools, abdominal pain.  Denies dysuria, frequency or hesitancy of urination. Neuro Denies headaches, visual changes or ataxia. Denies dizziness, tingling, tremors, sensory change, speech change, focal weakness or headaches. Hematology Denies easy bruising or bleeding, denies gingival bleeding or epistaxis.    Endo Denies heat/cold intolerance, denies diabetes or thyroid abnormalities. MSK Denies back pain, arthralgias, myalgias or frequent falls. Psychiatric/Behavioral Denies depression and substance abuse. The patient is not nervous/anxious. Allergies   Allergen Reactions    Celebrex [Celecoxib] Itching     Past Medical History:   Diagnosis Date    GERD (gastroesophageal reflux disease)     Hypertension     Hypoxia 2020    Malignant neoplasm of lower lobe of right lung ([address] 2020    Osteoarthritis     Right lower lobe lung mass 2020    Status post total right knee replacement 2016     Past Surgical History:   Procedure Laterality Date    HX COLONOSCOPY      HX KNEE ARTHROSCOPY Left     scope X 1, ACL recon X 1    HX KNEE ARTHROSCOPY Right , 1975    X 2     HX KNEE REPLACEMENT Right     HX TONSILLECTOMY      HX VASCULAR ACCESS      IR INSERT TUNL CVC W PORT OVER 5 YEARS  3/18/2020   [employer] SINUS SURGERY PROC UNLISTED  ,     \"nasal plasty\"     Family History   Problem Relation Age of Onset    Cancer Mother         uterine    Arrhythmia Sister         afib     Social History     Socioeconomic History    Marital status:      Spouse name: Not on file    Number of children: Not on file    Years of education: Not on file    Highest education level: Not on file   Occupational History    Not on file   Social Needs    Financial resource strain: Not on file    Food insecurity     Worry: Not on file     Inability: Not on file    Transportation needs     Medical: Not on file     Non-medical: Not on file   Tobacco Use    Smoking status: Former Smoker     Packs/day: 1.00     Years: 20.00     Pack years: 20.00     Last attempt to quit:      Years since quittin.5    Smokeless tobacco: Never Used   Substance and Sexual Activity    Alcohol use:  Yes     Alcohol/week: 4.0 standard drinks     Types: 4 Glasses of wine per week    Drug use: No    Sexual activity: Yes     Partners: Female   Lifestyle    Physical activity     Days per week: Not on file     Minutes per session: Not on file    Stress: Not on file   Relationships    Social connections     Talks on phone: Not on file     Gets together: Not on file     Attends Restorationism service: Not on file     Active member of club or organization: Not on file     Attends meetings of clubs or organizations: Not on file     Relationship status: Not on file    Intimate partner violence     Fear of current or ex partner: Not on file     Emotionally abused: Not on file     Physically abused: Not on file     Forced sexual activity: Not on file   Other Topics Concern     Service No    Blood Transfusions No    Caffeine Concern No    Occupational Exposure No    Hobby Hazards No    Sleep Concern No    Stress Concern No    Weight Concern No    Special Diet No    Back Care Not Asked    Exercise Yes    Bike Helmet Not Asked    Seat Belt Yes    Self-Exams Not Asked   Social History Narrative    Not on file     Current Facility-Administered Medications   Medication Dose Route Frequency Provider Last Rate Last Dose    albuterol (PROVENTIL HFA, VENTOLIN HFA, PROAIR HFA) inhaler 1 Puff  1 Puff Inhalation Q4H PRN Peyton Valadez C, DO        prochlorperazine (COMPAZINE) tablet 10 mg  10 mg Oral Q6H PRN Carlos Washington MD        ondansetron (ZOFRAN ODT) tablet 8 mg  8 mg Oral Q8H PRN Carlos Washington MD        pantoprazole (PROTONIX) tablet 40 mg  40 mg Oral ACB Carlos Washington MD   40 mg at 07/02/20 0941    polyethylene glycol (MIRALAX) packet 17 g  17 g Oral DAILY Carlos Washington MD   17 g at 07/02/20 4433    dexAMETHasone (DECADRON) tablet 2 mg  2 mg Oral DAILY Carlos Washington MD   2 mg at 07/02/20 0941    sodium chloride (NS) flush 5-40 mL  5-40 mL IntraVENous Q8H Carlos Washington MD   10 mL at 07/01/20 2100    sodium chloride (NS) flush 5-40 mL  5-40 mL IntraVENous PRN Carlos Washington MD        acetaminophen (TYLENOL) tablet 650 mg  650 mg Oral Q4H PRN Cheryl Salcedo MD        ondansetron [facility]) injection 4 mg  4 mg IntraVENous Q4H PRN Cheryl Salcedo MD        enoxaparin (LOVENOX) injection 40 mg  40 mg SubCUTAneous Q24H Cheryl Salcedo MD   40 mg at 20 0941    hydrOXYchloroQUINE (PLAQUENIL) tablet 400 mg  400 mg Oral DAILY WITH Damir Paez MD   400 mg at 20 3787    ascorbic acid (vitamin C) (VITAMIN C) tablet 1,500 mg  1,500 mg Oral QID Cheryl Salcedo MD   1,500 mg at 20 0941    zinc sulfate tablet 220 mg  220 mg Oral DAILY Cheryl Salcedo MD   220 mg at 20 0944       OBJECTIVE:  Patient Vitals for the past 8 hrs:   BP Temp Pulse Resp SpO2   20 0830 102/76 98.2 °F (36.8 °C) 97 18 93 %   20 0452 96/68 97 °F (36.1 °C) 86 18 97 %     Temp (24hrs), Av.9 °F (36.6 °C), Min:97 °F (36.1 °C), Max:98.6 °F (37 °C)    701 -  1900  In: 300 [P.O.:300]  Out: -     Physical Exam:  Constitutional: Well developed, well nourished male in no acute distress, sitting comfortably in the hospital bed. HEENT: Normocephalic and atraumatic. Oropharynx is clear, mucous membranes are moist.  Pupils are equal, round, and reactive to light. Extraocular muscles are intact. Sclerae anicteric. Neck supple without JVD. No thyromegaly present. Lymph node   No palpable submandibular, cervical, supraclavicular, axillary or inguinal lymph nodes. Skin Warm and dry. No bruising and no rash noted. No erythema. No pallor. Respiratory Lungs are clear to auscultation bilaterally without wheezes, rales or rhonchi, normal air exchange without accessory muscle use. CVS Normal rate, regular rhythm and normal S1 and S2. No murmurs, gallops, or rubs. Abdomen Soft, nontender and nondistended, normoactive bowel sounds. No palpable mass. No hepatosplenomegaly. Neuro Grossly nonfocal with no obvious sensory or motor deficits.    MSK Normal range of motion in general.  No edema and no tenderness. Psych Appropriate mood and affect. Labs:    Recent Results (from the past 24 hour(s))   CBC WITH AUTOMATED DIFF    Collection Time: 07/02/20  4:23 AM   Result Value Ref Range    WBC 3.7 (L) 4.3 - 11.1 K/uL    RBC 3.55 (L) 4.23 - 5.6 M/uL    HGB 12.4 (L) 13.6 - 17.2 g/dL    HCT 35.0 (L) 41.1 - 50.3 %    MCV 98.6 (H) 79.6 - 97.8 FL    MCH 34.9 (H) 26.1 - 32.9 PG    MCHC 35.4 (H) 31.4 - 35.0 g/dL    RDW 13.3 11.9 - 14.6 %    PLATELET 86 (L) 039 - 450 K/uL    MPV 10.7 9.4 - 12.3 FL    ABSOLUTE NRBC 0.00 0.0 - 0.2 K/uL    DF AUTOMATED      NEUTROPHILS 71 43 - 78 %    LYMPHOCYTES 18 13 - 44 %    MONOCYTES 9 4.0 - 12.0 %    EOSINOPHILS 1 0.5 - 7.8 %    BASOPHILS 0 0.0 - 2.0 %    IMMATURE GRANULOCYTES 1 0.0 - 5.0 %    ABS. NEUTROPHILS 2.6 1.7 - 8.2 K/UL    ABS. LYMPHOCYTES 0.7 0.5 - 4.6 K/UL    ABS. MONOCYTES 0.3 0.1 - 1.3 K/UL    ABS. EOSINOPHILS 0.1 0.0 - 0.8 K/UL    ABS. BASOPHILS 0.0 0.0 - 0.2 K/UL    ABS. IMM.  GRANS. 0.0 0.0 - 0.5 K/UL   MAGNESIUM    Collection Time: 07/02/20  4:23 AM   Result Value Ref Range    Magnesium 2.2 1.8 - 2.4 mg/dL   PHOSPHORUS    Collection Time: 07/02/20  4:23 AM   Result Value Ref Range    Phosphorus 3.3 2.3 - 3.7 MG/DL   METABOLIC PANEL, BASIC    Collection Time: 07/02/20  4:23 AM   Result Value Ref Range    Sodium 142 136 - 145 mmol/L    Potassium 4.0 3.5 - 5.1 mmol/L    Chloride 109 (H) 98 - 107 mmol/L    CO2 18 (L) 21 - 32 mmol/L    Anion gap 15 7 - 16 mmol/L    Glucose 100 65 - 100 mg/dL    BUN 9 8 - 23 MG/DL    Creatinine 0.58 (L) 0.8 - 1.5 MG/DL    GFR est AA >60 >60 ml/min/1.73m2    GFR est non-AA >60 >60 ml/min/1.73m2    Calcium 9.0 8.3 - 10.4 MG/DL       Imaging:  [unfilled]    ASSESSMENT:  Problem List  Date Reviewed: 6/12/2020          Codes Class Noted    * (Principal) Fever ICD-10-CM: R50.9  ICD-9-CM: 780.60  7/1/2020        Tachycardia ICD-10-CM: R00.0  ICD-9-CM: 785.0  5/5/2020        Febrile neutropenia (HCC) ICD-10-CM: D70.9, R50.81  ICD-9-CM: 288.00, 780.61  5/5/2020        GERD (gastroesophageal reflux disease) ICD-10-CM: K21.9  ICD-9-CM: 530.81  Unknown        Hypotension ICD-10-CM: I95.9  ICD-9-CM: 458.9  5/5/2020        Sepsis due to undetermined organism [facility]) ICD-10-CM: A41.9  ICD-9-CM: 038.9  5/5/2020        Malignant neoplasm of lower lobe of right lung (HCC) (Chronic) ICD-10-CM: C34.31  ICD-9-CM: 162.5  2/26/2020        Hypoxia ICD-10-CM: R09.02  ICD-9-CM: 799.02  2/24/2020        Acute respiratory failure with hypoxia (HCC) ICD-10-CM: J96.01  ICD-9-CM: 518.81  2/24/2020        Hemoptysis ICD-10-CM: R04.2  ICD-9-CM: 786.30  2/24/2020        Mass of lower lobe of right lung ICD-10-CM: R91.8  ICD-9-CM: 786.6  2/23/2020        Right lower lobe lung mass ICD-10-CM: R91.8  ICD-9-CM: 786.6  2/23/2020        Essential hypertension with goal blood pressure less than 130/80 (Chronic) ICD-10-CM: I10  ICD-9-CM: 401.9  2/19/2018                RECOMMENDATIONS:    Squamous cell carcinoma of the lung  - s/p chemo/rad  - CT showed no remarkable change in mass since previous study  - scheduled for resection 7/8/2020 with Dr David Lee - per Dr David Lee, may need to reschedule    GGO on CT scan  - r/o COVID, preliminarily negative  - respiratory status stable, afebrile  - started on zinc, plaquenil, decadron  - continue supportive care    Lab studies and imaging studies were personally reviewed. Pertinent old records were reviewed. Thank you for allowing us to participate in the care of Mr. Crystal Vidal. [facility] Hematology & Oncology  [address] [facility] [address]  Office : [phone]  Fax : [phone]  I personally saw, exammed and counselled the patient, and discussed with NP, agree with above history/assessment/plan.  81 y.o.male lung SCC s/p concurrent chemorad with near CR, and scheduled resection with Dr. David Lee in a week but admitted for fever, cough, body ache, CT showed B/L ground glass pneumonitis, had dined out in the pandemic, COVID test pending, agree with above care, please call if needed. Oneyda Dover M.D.   [address], [address] [place] [address]  Office : [phone]  Fax : [phone]

## 2020-07-02 NOTE — PROGRESS NOTES
Problem: Falls - Risk of  Goal: *Absence of Falls  Description: Document Josh Aguirreshire Fall Risk and appropriate interventions in the flowsheet.   Outcome: Progressing Towards Goal  Note: Fall Risk Interventions:  Mobility Interventions: Patient to call before getting OOB              Elimination Interventions: Call light in reach, Toileting schedule/hourly rounds              Problem: Patient Education: Go to Patient Education Activity  Goal: Patient/Family Education  Outcome: Progressing Towards Goal

## 2020-07-02 NOTE — PROGRESS NOTES
Pt sitting up in bed. Pt alert oriented times 3 at this time. Pt on 2 1/2  L NC without distress noted at this time. Pt denies pain. Pt on droplet plus precautions. Pt encouraged to call for assistance if needed call light in reach, will monitor.

## 2020-07-02 NOTE — INTERDISCIPLINARY ROUNDS
Interdisciplinary team rounds were held 7/2/2020 with the following team members:Care Management, Nursing, Nutrition and Physician. Plan of care discussed. See clinical pathway and/or care plan for interventions and desired outcomes.

## 2020-07-03 VITALS
OXYGEN SATURATION: 94 % | TEMPERATURE: 98.1 F | HEART RATE: 100 BPM | DIASTOLIC BLOOD PRESSURE: 66 MMHG | SYSTOLIC BLOOD PRESSURE: 108 MMHG | RESPIRATION RATE: 18 BRPM

## 2020-07-03 LAB
ANION GAP SERPL CALC-SCNC: 8 MMOL/L (ref 7–16)
BACTERIA SPEC CULT: NORMAL
BASOPHILS # BLD: 0 K/UL (ref 0–0.2)
BASOPHILS NFR BLD: 0 % (ref 0–2)
BUN SERPL-MCNC: 9 MG/DL (ref 8–23)
CALCIUM SERPL-MCNC: 9 MG/DL (ref 8.3–10.4)
CHLORIDE SERPL-SCNC: 108 MMOL/L (ref 98–107)
CO2 SERPL-SCNC: 25 MMOL/L (ref 21–32)
COVID-19, XGCOVT: NOT DETECTED
CREAT SERPL-MCNC: 0.65 MG/DL (ref 0.8–1.5)
DIFFERENTIAL METHOD BLD: ABNORMAL
EOSINOPHIL # BLD: 0.1 K/UL (ref 0–0.8)
EOSINOPHIL NFR BLD: 2 % (ref 0.5–7.8)
ERYTHROCYTE [DISTWIDTH] IN BLOOD BY AUTOMATED COUNT: 13.2 % (ref 11.9–14.6)
GLUCOSE SERPL-MCNC: 98 MG/DL (ref 65–100)
HCT VFR BLD AUTO: 35.4 % (ref 41.1–50.3)
HGB BLD-MCNC: 12.6 G/DL (ref 13.6–17.2)
IMM GRANULOCYTES # BLD AUTO: 0 K/UL (ref 0–0.5)
IMM GRANULOCYTES NFR BLD AUTO: 1 % (ref 0–5)
LYMPHOCYTES # BLD: 0.7 K/UL (ref 0.5–4.6)
LYMPHOCYTES NFR BLD: 21 % (ref 13–44)
MAGNESIUM SERPL-MCNC: 2.3 MG/DL (ref 1.8–2.4)
MCH RBC QN AUTO: 34.7 PG (ref 26.1–32.9)
MCHC RBC AUTO-ENTMCNC: 35.6 G/DL (ref 31.4–35)
MCV RBC AUTO: 97.5 FL (ref 79.6–97.8)
MONOCYTES # BLD: 0.3 K/UL (ref 0.1–1.3)
MONOCYTES NFR BLD: 8 % (ref 4–12)
NEUTS SEG # BLD: 2.2 K/UL (ref 1.7–8.2)
NEUTS SEG NFR BLD: 69 % (ref 43–78)
NRBC # BLD: 0 K/UL (ref 0–0.2)
PHOSPHATE SERPL-MCNC: 4.1 MG/DL (ref 2.3–3.7)
PLATELET # BLD AUTO: 102 K/UL (ref 150–450)
PMV BLD AUTO: 10.3 FL (ref 9.4–12.3)
POTASSIUM SERPL-SCNC: 3.9 MMOL/L (ref 3.5–5.1)
RBC # BLD AUTO: 3.63 M/UL (ref 4.23–5.6)
SERVICE CMNT-IMP: NORMAL
SODIUM SERPL-SCNC: 141 MMOL/L (ref 136–145)
WBC # BLD AUTO: 3.2 K/UL (ref 4.3–11.1)

## 2020-07-03 PROCEDURE — 94760 N-INVAS EAR/PLS OXIMETRY 1: CPT

## 2020-07-03 PROCEDURE — 80048 BASIC METABOLIC PNL TOTAL CA: CPT

## 2020-07-03 PROCEDURE — 74011250637 HC RX REV CODE- 250/637: Performed by: INTERNAL MEDICINE

## 2020-07-03 PROCEDURE — 74011250636 HC RX REV CODE- 250/636: Performed by: FAMILY MEDICINE

## 2020-07-03 PROCEDURE — 84100 ASSAY OF PHOSPHORUS: CPT

## 2020-07-03 PROCEDURE — 74011250637 HC RX REV CODE- 250/637: Performed by: FAMILY MEDICINE

## 2020-07-03 PROCEDURE — 94761 N-INVAS EAR/PLS OXIMETRY MLT: CPT

## 2020-07-03 PROCEDURE — 74011250636 HC RX REV CODE- 250/636: Performed by: INTERNAL MEDICINE

## 2020-07-03 PROCEDURE — 85025 COMPLETE CBC W/AUTO DIFF WBC: CPT

## 2020-07-03 PROCEDURE — 94640 AIRWAY INHALATION TREATMENT: CPT

## 2020-07-03 PROCEDURE — 83735 ASSAY OF MAGNESIUM: CPT

## 2020-07-03 RX ORDER — DEXAMETHASONE 2 MG/1
TABLET ORAL
Qty: 30 TAB | Refills: 0 | Status: SHIPPED | OUTPATIENT
Start: 2020-07-03 | End: 2020-01-01

## 2020-07-03 RX ORDER — IPRATROPIUM BROMIDE AND ALBUTEROL SULFATE 2.5; .5 MG/3ML; MG/3ML
3 SOLUTION RESPIRATORY (INHALATION)
Qty: 30 NEBULE | Refills: 0 | Status: SHIPPED | OUTPATIENT
Start: 2020-07-03 | End: 2020-07-07 | Stop reason: SDUPTHER

## 2020-07-03 RX ORDER — ALBUTEROL SULFATE 90 UG/1
1 AEROSOL, METERED RESPIRATORY (INHALATION)
Qty: 1 INHALER | Refills: 0 | Status: SHIPPED | OUTPATIENT
Start: 2020-07-03 | End: 2020-07-07 | Stop reason: SDUPTHER

## 2020-07-03 RX ORDER — NEBULIZER AND COMPRESSOR
EACH MISCELLANEOUS
Qty: 1 EACH | Refills: 0 | Status: SHIPPED | OUTPATIENT
Start: 2020-07-03

## 2020-07-03 RX ADMIN — ALBUTEROL SULFATE 1 PUFF: 90 AEROSOL, METERED RESPIRATORY (INHALATION) at 05:27

## 2020-07-03 RX ADMIN — POLYETHYLENE GLYCOL 3350 17 G: 17 POWDER, FOR SOLUTION ORAL at 09:44

## 2020-07-03 RX ADMIN — PANTOPRAZOLE SODIUM 40 MG: 40 TABLET, DELAYED RELEASE ORAL at 09:34

## 2020-07-03 RX ADMIN — Medication 1500 MG: at 09:34

## 2020-07-03 RX ADMIN — Medication 1500 MG: at 14:43

## 2020-07-03 RX ADMIN — DEXAMETHASONE 6 MG: 4 TABLET ORAL at 09:34

## 2020-07-03 RX ADMIN — ENOXAPARIN SODIUM 40 MG: 40 INJECTION SUBCUTANEOUS at 09:37

## 2020-07-03 RX ADMIN — Medication 220 MG: at 09:36

## 2020-07-03 NOTE — PROGRESS NOTES
Provided nebulizer from [facility], and faxed them the order. He did not qualify for supplemental oxygen. Care Management Interventions  PCP Verified by CM: Yes  Current Support Network:  Other  Confirm Follow Up Transport: Self  Freedom of Choice List was Provided with Basic Dialogue that Supports the Patient's Individualized Plan of Care/Goals, Treatment Preferences and Shares the Quality Data Associated with the Providers?: Yes   Resource Information Provided?: No  Discharge Location  Discharge Placement: Home

## 2020-07-03 NOTE — PROGRESS NOTES
Oxygen Qualifier Room air: SpO2 with O2 and liter flow Resting SpO2  98%  21% on room air Ambulating SpO2  95% While ambulating the patient was 95% on room air Patient recovered at rest at 96% on room air. Completed by: Earle Ramirez

## 2020-07-03 NOTE — PROGRESS NOTES
HOB elevated, no acute distress, neurovascular checks WDL, pain level 0, no complaints of calf pain.

## 2020-07-03 NOTE — DISCHARGE SUMMARY
Hospitalist Discharge Summary     Patient ID:  Josh Perkins  [id number]  81 y.o.  1950  Admit date: 7/1/2020  1:24 AM  Discharge date and time: 7/3/2020  Attending: Alicia Bazzi MD  PCP:  Rebecca Hunt MD  Treatment Team: Attending Provider: Alicia Bazzi MD; Care Manager: Brody Gregorio.; Utilization Review: Ashley Tellez RN; Primary Nurse: Jennifer Holt RN    Principal Diagnosis Fever   Principal Problem:    Fever (7/1/2020)    Active Problems:    Essential hypertension with goal blood pressure less than 130/80 (2/19/2018)      Malignant neoplasm of lower lobe of right lung ([address] (2/26/2020)      Tachycardia (5/5/2020)             Hospital Course:  Please refer to the admission H&P for details of presentation. In summary, the patient is 74yoM with PMH significant for right lung cancer who presents with fatigue and general malaise, body aches, cough, fever symptoms began day prior. Patient found to be tachycardic on ER evaluation with borderline low O2 sats on room air.  Given his symptoms, patient was swabbed for COVID-19 and sent to [facility] from [address] for further care.     Of note, CT chest with contrast performed to rule-out PE given his tachycardia, did not show evidence of PE, but did show mild bilateral groundglass opacities. Patient was treated with zinc, increased dose of decadron and prn albuterol. His covid testing has resulted as negative x 2. He now has good o2 sats on room air and reports he is feeling much better. Suspect pt has underlying COPD with some wheezing on exam. Will prescribe nebulizer for home. Uncertain as to etiology of his mild GGO bilaterally on CT (although R>L) and have requested pulm follow up in clinic next week. Dr Sera Moya made aware of his admission and has put a hold on plans for resection for now.      Significant Diagnostic Studies:   Final [99] 7/01/2020 00:10 7/01/2020 00:17   Study Result     CT Chest with contrast     INDICATION: Chest pain, dyspnea. PE protocol.      COMPARISON: June 4, 2020     TECHNIQUE: Contiguous axial images were obtained from the neck base through the  upper abdomen with intravenous contrast, 100 mL Isovue 370. Radiation dose  reduction techniques were used for this study:  Our CT scanners use one or all  of the following: Automated exposure control, adjustment of the mA and/or kVp  according to patient's size, iterative reconstruction.     FINDINGS:     There is no evidence of pulmonary embolism.     There is a 3.5 cm ill-defined right infrahilar mass (series 2, image 64). There  is associated thickening of the adjacent right middle and lower lobe bronchi. Findings similar to June 4, 2020 study.     There is no endobronchial lesion. There are patchy areas of groundglass  opacities in both lungs. There is no pneumothorax, pulmonary edema or pleural  effusion. No evidence of pulmonary edema.     The heart is not enlarged. There is no pericardial effusion. There is coronary  artery calcification. The thoracic aorta is normal in course and caliber.      Included upper abdomen is unremarkable. There are degenerative changes of the  spine        IMPRESSION  IMPRESSION:     1. No evidence of pulmonary embolism.     2. Bilateral mild patchy groundglass opacities, nonspecific but may be secondary  to hypersensitivity pneumonitis. Atypical infection is considered less likely. No new lobar consolidation.     3. Know right infrahilar mass, similar to June 4, 2020 study. There is a history  of right lower lobe lung neoplasm. Final [99] 6/30/2020 21:12 6/30/2020 21:21   Study Result     Chest portable     CLINICAL INDICATION: Acute fever and tachycardia, sepsis, lung cancer right  lower lobe; GERD, hypertension     COMPARISON: 5/5/2020 radiograph and 6/4/2020 CT     TECHNIQUE: single AP portable view chest at 9:20 PM upright      FINDINGS: There is a stable right portacatheter.  The mediastinal and hilar  contours are within normal limits given technique. The patient's known right  infrahilar mass is very faintly visible and demonstrates no interval worsening  compared to the prior CT. There is no pneumothorax, dense consolidation, pleural  effusion or CHF.         IMPRESSION  IMPRESSION:   1. No new airspace disease. 2. Stable right lower lobe opacity in keeping with the known malignancy, better  evaluated on prior CT. Labs: Results:       Chemistry Recent Labs     07/03/20  0532 07/02/20  1051 07/02/20  0423 07/01/20  0729   GLU 98 110* 100 109*    141 142 138   K 3.9 3.4* 4.0 3.8   * 107 109* 106   CO2 25 26 18* 25   BUN 9 10 9 10   CREA 0.65* 0.74* 0.58* 0.73*   CA 9.0 9.0 9.0 8.8   AGAP 8 8 15 7   AP  --   --   --  88   TP  --   --   --  7.0   ALB  --   --   --  2.8*   GLOB  --   --   --  4.2*   AGRAT  --   --   --  0.7*      CBC w/Diff Recent Labs     07/03/20  0532 07/02/20  1051 07/02/20  0423 06/30/20  2136   WBC 3.2* 4.5 3.7* 5.8   RBC 3.63* 3.54* 3.55* 4.07*   HGB 12.6* 11.9* 12.4* 13.8   HCT 35.4* 34.6* 35.0* 39.0*   * 94* 86* 98*   GRANS 69  --  71 73   LYMPH 21  --  18 17   EOS 2  --  1 2      Cardiac Enzymes No results for input(s): CPK, CKND1, JERMAINE in the last 72 hours. No lab exists for component: CKRMB, TROIP   Coagulation No results for input(s): PTP, INR, APTT, INREXT in the last 72 hours. Lipid Panel Lab Results   Component Value Date/Time    Cholesterol, total 148 05/23/2018 11:20 AM    HDL Cholesterol 34 (L) 05/23/2018 11:20 AM    LDL, calculated 75 05/23/2018 11:20 AM    VLDL, calculated 39 05/23/2018 11:20 AM    Triglyceride 196 (H) 05/23/2018 11:20 AM      BNP No results for input(s): BNPP in the last 72 hours.    Liver Enzymes Recent Labs     07/01/20  0729   TP 7.0   ALB 2.8*   AP 88      Thyroid Studies No results found for: T4, T3U, TSH, TSHEXT         Discharge Exam:  Visit Vitals  /66 (BP 1 Location: Right arm, BP Patient Position: At rest)   Pulse 100   Temp 98.1 °F (36.7 °C)   Resp 18   SpO2 94%     General appearance: alert, cooperative, no distress, appears stated age  Lungs: diminished with few scattered wheezes  Heart: regular rate and rhythm, S1, S2 normal, no murmur, click, rub or gallop  Abdomen: soft, non-tender. Bowel sounds normal. No masses,  no organomegaly  Extremities: no cyanosis or edema  Neurologic: Grossly normal    Disposition: home  Discharge Condition: stable  Patient Instructions:   Current Discharge Medication List      START taking these medications    Details   albuterol (PROVENTIL HFA, VENTOLIN HFA, PROAIR HFA) 90 mcg/actuation inhaler Take 1 Puff by inhalation every four (4) hours as needed for Wheezing or Shortness of Breath. Qty: 1 Inhaler, Refills: 0      albuterol-ipratropium (DUO-NEB) 2.5 mg-0.5 mg/3 ml nebu 3 mL by Nebulization route every six (6) hours as needed for Wheezing. Qty: 30 Nebule, Refills: 0      Nebulizer & Compressor machine COPD  Qty: 1 Each, Refills: 0         CONTINUE these medications which have CHANGED    Details   dexAMETHasone (DECADRON) 2 mg tablet 3 tabs by mouth daily x 4 days then 2 tabs by mouth daily x 4 days then one tab daily then stop  Indications: prevent nausea and vomiting from cancer chemotherapy  Qty: 30 Tab, Refills: 0         CONTINUE these medications which have NOT CHANGED    Details   hydrocortisone (HYTONE) 2.5 % topical cream Apply  to affected area four (4) times daily as needed (skin changes related to radiation therapy). use thin layer  Qty: 50 g, Refills: 0      betamethasone dipropionate (DIPROSONE) 0.05 % topical cream       lidocaine-prilocaine (EMLA) topical cream Apply  to affected area as needed for Pain. Qty: 30 g, Refills: 0    Associated Diagnoses: Port-A-Cath in place             Activity: as tolerated  Diet: regular      Follow-up  · Pulmonology within 1 week, PCP in 1-2 weeks, General surgery Dr Shelby Carrion in 1-2 weeks.      Time spent to discharge patient 35 minutes  Signed:  Jeremy Dowling [address] Joshi DO  7/3/2020  1:09 PM

## 2020-07-05 LAB
BACTERIA SPEC CULT: NORMAL
SERVICE CMNT-IMP: NORMAL

## 2020-07-07 ENCOUNTER — PATIENT OUTREACH (OUTPATIENT)
Dept: CASE MANAGEMENT | Age: 70
End: 2020-07-07

## 2020-07-07 PROBLEM — J96.01 ACUTE RESPIRATORY FAILURE WITH HYPOXIA (HCC): Status: RESOLVED | Noted: 2020-02-24 | Resolved: 2020-07-07

## 2020-07-07 PROBLEM — R04.2 HEMOPTYSIS: Status: RESOLVED | Noted: 2020-02-24 | Resolved: 2020-07-07

## 2020-07-07 PROBLEM — R50.9 FEVER: Status: RESOLVED | Noted: 2020-07-01 | Resolved: 2020-07-07

## 2020-07-07 PROBLEM — R09.02 HYPOXIA: Status: RESOLVED | Noted: 2020-02-24 | Resolved: 2020-07-07

## 2020-07-07 PROBLEM — D70.9 FEBRILE NEUTROPENIA (HCC): Status: RESOLVED | Noted: 2020-05-05 | Resolved: 2020-07-07

## 2020-07-07 PROBLEM — I95.9 HYPOTENSION: Status: RESOLVED | Noted: 2020-05-05 | Resolved: 2020-07-07

## 2020-07-07 PROBLEM — R00.0 TACHYCARDIA: Status: RESOLVED | Noted: 2020-05-05 | Resolved: 2020-07-07

## 2020-07-07 PROBLEM — J43.9 PULMONARY EMPHYSEMA (HCC): Status: ACTIVE | Noted: 2020-07-07

## 2020-07-07 PROBLEM — R50.81 FEBRILE NEUTROPENIA (HCC): Status: RESOLVED | Noted: 2020-05-05 | Resolved: 2020-07-07

## 2020-07-07 NOTE — PROGRESS NOTES
This note will not be viewable in [address] 1st Attempt to contact patient for [facility] call, no answer, left  for returned call. Will attempt to contact patient again within 24 hours

## 2020-07-09 ENCOUNTER — PATIENT OUTREACH (OUTPATIENT)
Dept: CASE MANAGEMENT | Age: 70
End: 2020-07-09

## 2020-07-09 RX ORDER — SODIUM CHLORIDE 0.9 % (FLUSH) 0.9 %
5-40 SYRINGE (ML) INJECTION AS NEEDED
Status: CANCELLED | OUTPATIENT
Start: 2020-07-09

## 2020-07-09 RX ORDER — SODIUM CHLORIDE 0.9 % (FLUSH) 0.9 %
5-40 SYRINGE (ML) INJECTION EVERY 8 HOURS
Status: CANCELLED | OUTPATIENT
Start: 2020-07-09

## 2020-07-09 NOTE — PROGRESS NOTES
This note will not be viewable in [address] 3rd attempt to contact patient for [facility] Call. No answer, left VM for returned call. Episode will be closed at this time as [address] has been unsuccessful in reaching the patient.

## 2020-07-14 ENCOUNTER — APPOINTMENT (OUTPATIENT)
Dept: RADIATION ONCOLOGY | Age: 70
End: 2020-07-14

## 2020-07-16 ENCOUNTER — HOSPITAL ENCOUNTER (OUTPATIENT)
Dept: GENERAL RADIOLOGY | Age: 70
Discharge: HOME | End: 2020-07-16
Attending: SURGERY
Payer: COMMERCIAL

## 2020-07-16 ENCOUNTER — ANESTHESIA EVENT (OUTPATIENT)
Dept: SURGERY | Age: 70
DRG: 164 | End: 2020-07-16
Payer: COMMERCIAL

## 2020-07-16 ENCOUNTER — HOSPITAL ENCOUNTER (OUTPATIENT)
Dept: SURGERY | Age: 70
Discharge: HOME | End: 2020-07-16
Payer: COMMERCIAL

## 2020-07-16 VITALS
OXYGEN SATURATION: 94 % | HEIGHT: 70 IN | DIASTOLIC BLOOD PRESSURE: 82 MMHG | SYSTOLIC BLOOD PRESSURE: 125 MMHG | HEART RATE: 118 BPM | RESPIRATION RATE: 20 BRPM | WEIGHT: 207.6 LBS | BODY MASS INDEX: 29.72 KG/M2 | TEMPERATURE: 97.8 F

## 2020-07-16 LAB
ALBUMIN SERPL-MCNC: 3.2 G/DL (ref 3.2–4.6)
ALBUMIN/GLOB SERPL: 0.8 {RATIO} (ref 1.2–3.5)
ALP SERPL-CCNC: 95 U/L (ref 50–136)
ALT SERPL-CCNC: 17 U/L (ref 12–65)
ANION GAP SERPL CALC-SCNC: 8 MMOL/L (ref 7–16)
APPEARANCE UR: CLEAR
AST SERPL-CCNC: 16 U/L (ref 15–37)
BASOPHILS # BLD: 0 K/UL (ref 0–0.2)
BASOPHILS NFR BLD: 0 % (ref 0–2)
BILIRUB SERPL-MCNC: 0.6 MG/DL (ref 0.2–1.1)
BILIRUB UR QL: NEGATIVE
BUN SERPL-MCNC: 15 MG/DL (ref 8–23)
CALCIUM SERPL-MCNC: 8.9 MG/DL (ref 8.3–10.4)
CHLORIDE SERPL-SCNC: 105 MMOL/L (ref 98–107)
CO2 SERPL-SCNC: 25 MMOL/L (ref 21–32)
COLOR UR: YELLOW
CREAT SERPL-MCNC: 0.9 MG/DL (ref 0.8–1.5)
DIFFERENTIAL METHOD BLD: ABNORMAL
EOSINOPHIL # BLD: 0 K/UL (ref 0–0.8)
EOSINOPHIL NFR BLD: 0 % (ref 0.5–7.8)
ERYTHROCYTE [DISTWIDTH] IN BLOOD BY AUTOMATED COUNT: 14.2 % (ref 11.9–14.6)
GLOBULIN SER CALC-MCNC: 4.1 G/DL (ref 2.3–3.5)
GLUCOSE SERPL-MCNC: 101 MG/DL (ref 65–100)
GLUCOSE UR STRIP.AUTO-MCNC: NEGATIVE MG/DL
HCT VFR BLD AUTO: 44.6 % (ref 41.1–50.3)
HGB BLD-MCNC: 15.6 G/DL (ref 13.6–17.2)
HGB UR QL STRIP: NEGATIVE
IMM GRANULOCYTES # BLD AUTO: 0.1 K/UL (ref 0–0.5)
IMM GRANULOCYTES NFR BLD AUTO: 1 % (ref 0–5)
INR PPP: 1
KETONES UR QL STRIP.AUTO: NEGATIVE MG/DL
LEUKOCYTE ESTERASE UR QL STRIP.AUTO: NEGATIVE
LYMPHOCYTES # BLD: 0.7 K/UL (ref 0.5–4.6)
LYMPHOCYTES NFR BLD: 9 % (ref 13–44)
MCH RBC QN AUTO: 34.8 PG (ref 26.1–32.9)
MCHC RBC AUTO-ENTMCNC: 35 G/DL (ref 31.4–35)
MCV RBC AUTO: 99.6 FL (ref 79.6–97.8)
MONOCYTES # BLD: 0.4 K/UL (ref 0.1–1.3)
MONOCYTES NFR BLD: 6 % (ref 4–12)
NEUTS SEG # BLD: 6.5 K/UL (ref 1.7–8.2)
NEUTS SEG NFR BLD: 85 % (ref 43–78)
NITRITE UR QL STRIP.AUTO: NEGATIVE
NRBC # BLD: 0 K/UL (ref 0–0.2)
PH UR STRIP: 5.5 [PH] (ref 5–9)
PLATELET # BLD AUTO: 122 K/UL (ref 150–450)
PMV BLD AUTO: 10 FL (ref 9.4–12.3)
POTASSIUM SERPL-SCNC: 4 MMOL/L (ref 3.5–5.1)
PROT SERPL-MCNC: 7.3 G/DL (ref 6.3–8.2)
PROT UR STRIP-MCNC: NEGATIVE MG/DL
PROTHROMBIN TIME: 13.1 SEC (ref 12–14.7)
RBC # BLD AUTO: 4.48 M/UL (ref 4.23–5.6)
SODIUM SERPL-SCNC: 138 MMOL/L (ref 136–145)
SP GR UR REFRACTOMETRY: 1.01 (ref 1–1.02)
UROBILINOGEN UR QL STRIP.AUTO: 0.2 EU/DL (ref 0.2–1)
WBC # BLD AUTO: 7.7 K/UL (ref 4.3–11.1)

## 2020-07-16 PROCEDURE — 85025 COMPLETE CBC W/AUTO DIFF WBC: CPT

## 2020-07-16 PROCEDURE — 71046 X-RAY EXAM CHEST 2 VIEWS: CPT

## 2020-07-16 PROCEDURE — 93005 ELECTROCARDIOGRAM TRACING: CPT | Performed by: SURGERY

## 2020-07-16 PROCEDURE — 81003 URINALYSIS AUTO W/O SCOPE: CPT

## 2020-07-16 PROCEDURE — 80053 COMPREHEN METABOLIC PANEL: CPT

## 2020-07-16 PROCEDURE — 36415 COLL VENOUS BLD VENIPUNCTURE: CPT

## 2020-07-16 PROCEDURE — 85610 PROTHROMBIN TIME: CPT

## 2020-07-16 NOTE — PERIOP NOTES
PLEASE CONTINUE TAKING ALL PRESCRIPTION MEDICATIONS UP TO THE DAY OF SURGERY UNLESS OTHERWISE DIRECTED BELOW. DISCONTINUE all vitamins and supplements 7 days prior to surgery. DISCONTINUE Non-Steriodal Anti-Inflammatory (NSAIDS) such as Advil and Aleve 5 days prior to surgery. Home Medications to take  the day of surgery    duo-neb - breathing treatment, dexamethasone, nexium and if needed zofran           Home Medications   to Hold   Bring albuterol inhaler day of surg. Comments          *Visitor policy of 1 visitor per patient discussed. Please do not bring home medications with you on the day of surgery unless otherwise directed by your nurse. If you are instructed to bring home medications, please give them to your nurse as they will be administered by the nursing staff. If you have any questions, please call [facility] [phone] or [facility] [phone]. A copy of this note was provided to the patient for reference.

## 2020-07-16 NOTE — PERIOP NOTES
Patient verified name and     Order for consent WAS found in EHR and matches case posting; patient verified. Type 3 surgery, WALK IN assessment complete. Labs per surgeon: CBC WITH DIFF, CMP, PT, URINE, CXR  Labs per anesthesia protocol: TYPE AND SCREEN DOS--- ORDER PLACED IN CC FOR DOS  EKG: TODAY--  DR ROGERS IN TO SEE PT   A negative Covid swab result is required to proceed with surgery; The testing center is located at the [address], [place]. An appointment is required therefore the patient will be contacted by the Covid swab team. The testing clinic is closed from  for lunch and on weekends. For questions or concerns the patient should call [phone]. Appointment date/time  found in EHR and provided to patient. [facility] approved surgical skin cleanser and instructions given per hospital policy. Patient provided with and instructed on educational handouts including Guide to Surgery, Pain Management, Hand Hygiene, Blood Transfusion Education, and [facility] Brochure. Patient answered medical/surgical history questions at their best of ability. All prior to admission medications documented in [facility]. Original medication prescription bottle WAS NOT visualized during patient appointment. Patient instructed to hold all vitamins 7 days prior to surgery and NSAIDS 5 days prior to surgery, patient verbalized understanding. Patient teach back successful and patient demonstrates knowledge of instructions.

## 2020-07-16 NOTE — ANESTHESIA PREPROCEDURE EVALUATION
Anesthetic History   No history of anesthetic complications            Review of Systems / Medical History  Patient summary reviewed and pertinent labs reviewed    Pulmonary    COPD: mild  Recent URI (negative COVID tests with hospitalization a few weeks ago)        Pertinent negatives: No smoker (former, quit 7 years ago)  Comments: Lung CA   Neuro/Psych   Within defined limits           Cardiovascular    Hypertension              Exercise tolerance: >4 METS: Denied chest pain, SOB, syncope      GI/Hepatic/Renal     GERD: well controlled           Endo/Other        Obesity, arthritis and cancer (lung)     Other Findings            Physical Exam    Airway  Mallampati: II  TM Distance: > 6 cm  Neck ROM: normal range of motion   Mouth opening: Normal     Cardiovascular    Rhythm: regular  Rate: abnormal        Comments: tachycardia Dental    Dentition: Caps/crowns     Pulmonary  Breath sounds clear to auscultation               Abdominal  GI exam deferred       Other Findings            Anesthetic Plan    ASA: 3  Anesthesia type: general    Monitoring Plan: Arterial line      Induction: Intravenous  Anesthetic plan and risks discussed with: Patient    Girlfriend present

## 2020-07-16 NOTE — PERIOP NOTES
todays labs still in process--will send to chartroom for f/u--- cxr results reviewed-- ok for surg--

## 2020-07-17 LAB
ATRIAL RATE: 105 BPM
CALCULATED P AXIS, ECG09: 38 DEGREES
CALCULATED R AXIS, ECG10: -9 DEGREES
CALCULATED T AXIS, ECG11: 36 DEGREES
DIAGNOSIS, 93000: NORMAL
P-R INTERVAL, ECG05: 130 MS
Q-T INTERVAL, ECG07: 338 MS
QRS DURATION, ECG06: 84 MS
QTC CALCULATION (BEZET), ECG08: 446 MS
VENTRICULAR RATE, ECG03: 105 BPM

## 2020-07-23 ENCOUNTER — APPOINTMENT (OUTPATIENT)
Dept: GENERAL RADIOLOGY | Age: 70
DRG: 164 | End: 2020-07-23
Attending: NURSE PRACTITIONER
Payer: COMMERCIAL

## 2020-07-23 ENCOUNTER — ANESTHESIA (OUTPATIENT)
Dept: SURGERY | Age: 70
DRG: 164 | End: 2020-07-23
Payer: COMMERCIAL

## 2020-07-23 ENCOUNTER — HOSPITAL ENCOUNTER (INPATIENT)
Age: 70
LOS: 9 days | Discharge: HOME | DRG: 164 | End: 2020-08-01
Attending: SURGERY | Admitting: SURGERY
Payer: COMMERCIAL

## 2020-07-23 DIAGNOSIS — C34.31 CANCER OF BRONCHUS OF RIGHT LOWER LOBE (HCC): ICD-10-CM

## 2020-07-23 DIAGNOSIS — C34.31 MALIGNANT NEOPLASM OF LOWER LOBE OF RIGHT LUNG (HCC): Chronic | ICD-10-CM

## 2020-07-23 DIAGNOSIS — I47.1 ATRIAL TACHYCARDIA (HCC): ICD-10-CM

## 2020-07-23 DIAGNOSIS — R05.9 COUGH: ICD-10-CM

## 2020-07-23 DIAGNOSIS — Z90.2 S/P LOBECTOMY OF LUNG: ICD-10-CM

## 2020-07-23 DIAGNOSIS — C34.90 MALIGNANT NEOPLASM OF LUNG, UNSPECIFIED LATERALITY, UNSPECIFIED PART OF LUNG (HCC): ICD-10-CM

## 2020-07-23 DIAGNOSIS — I48.91 ATRIAL FIBRILLATION WITH RVR (HCC): ICD-10-CM

## 2020-07-23 DIAGNOSIS — I10 ESSENTIAL HYPERTENSION WITH GOAL BLOOD PRESSURE LESS THAN 130/80: Chronic | ICD-10-CM

## 2020-07-23 PROBLEM — C34.91 CANCER OF RIGHT LUNG (HCC): Status: ACTIVE | Noted: 2020-07-23

## 2020-07-23 LAB
ABO + RH BLD: NORMAL
ARTERIAL PATENCY WRIST A: YES
BASE EXCESS BLD CALC-SCNC: 1 MMOL/L
BDY SITE: ABNORMAL
BLOOD GROUP ANTIBODIES SERPL: NORMAL
CA-I BLD-MCNC: 1.34 MMOL/L (ref 1.12–1.32)
COLLECT TIME,HTIME: 1750
FLOW RATE ISTAT,IFRATE: 4 L/MIN
GAS FLOW.O2 O2 DELIVERY SYS: ABNORMAL L/MIN
GLUCOSE BLD STRIP.AUTO-MCNC: 159 MG/DL (ref 65–100)
HCO3 BLD-SCNC: 26.1 MMOL/L (ref 22–26)
MAGNESIUM SERPL-MCNC: 2.2 MG/DL (ref 1.8–2.4)
PCO2 BLD: 43.5 MMHG (ref 35–45)
PH BLD: 7.39 [PH] (ref 7.35–7.45)
PO2 BLD: 72 MMHG (ref 75–100)
POTASSIUM BLD-SCNC: 4.7 MMOL/L (ref 3.5–5.1)
SAO2 % BLD: 94 % (ref 95–98)
SERVICE CMNT-IMP: ABNORMAL
SERVICE CMNT-IMP: ABNORMAL
SODIUM BLD-SCNC: 135 MMOL/L (ref 136–145)
SPECIMEN EXP DATE BLD: NORMAL
SPECIMEN TYPE: ABNORMAL

## 2020-07-23 PROCEDURE — 77030031139 HC SUT VCRL2 J&J -A: Performed by: SURGERY

## 2020-07-23 PROCEDURE — 77030037378 HC BRONCHOSCOPE DISP TRAN -D: Performed by: NURSE ANESTHETIST, CERTIFIED REGISTERED

## 2020-07-23 PROCEDURE — 74011250636 HC RX REV CODE- 250/636: Performed by: NURSE PRACTITIONER

## 2020-07-23 PROCEDURE — 76010000174 HC OR TIME 3.5 TO 4 HR INTENSV-TIER 1: Performed by: SURGERY

## 2020-07-23 PROCEDURE — 74011000250 HC RX REV CODE- 250: Performed by: NURSE ANESTHETIST, CERTIFIED REGISTERED

## 2020-07-23 PROCEDURE — 71045 X-RAY EXAM CHEST 1 VIEW: CPT

## 2020-07-23 PROCEDURE — C2615 SEALANT, PULMONARY, LIQUID: HCPCS | Performed by: SURGERY

## 2020-07-23 PROCEDURE — 77030020782 HC GWN BAIR PAWS FLX 3M -B: Performed by: NURSE ANESTHETIST, CERTIFIED REGISTERED

## 2020-07-23 PROCEDURE — 77030038079 HC RELD STPLR ENDOSC J&J -G: Performed by: SURGERY

## 2020-07-23 PROCEDURE — 77030039425 HC BLD LARYNG TRULITE DISP TELE -A: Performed by: ANESTHESIOLOGY

## 2020-07-23 PROCEDURE — 77030018846 HC SOL IRR STRL H20 ICUM -A: Performed by: SURGERY

## 2020-07-23 PROCEDURE — 77030020782 HC GWN BAIR PAWS FLX 3M -B: Performed by: ANESTHESIOLOGY

## 2020-07-23 PROCEDURE — 74011000250 HC RX REV CODE- 250: Performed by: NURSE PRACTITIONER

## 2020-07-23 PROCEDURE — 77030040361 HC SLV COMPR DVT MDII -B: Performed by: SURGERY

## 2020-07-23 PROCEDURE — 76060000039 HC ANESTHESIA 4 TO 4.5 HR: Performed by: SURGERY

## 2020-07-23 PROCEDURE — 74011250637 HC RX REV CODE- 250/637: Performed by: NURSE PRACTITIONER

## 2020-07-23 PROCEDURE — 77030005401 HC CATH RAD ARRO -A: Performed by: ANESTHESIOLOGY

## 2020-07-23 PROCEDURE — 77030013292 HC BOWL MX PRSM J&J -A: Performed by: NURSE ANESTHETIST, CERTIFIED REGISTERED

## 2020-07-23 PROCEDURE — 74011250637 HC RX REV CODE- 250/637: Performed by: ANESTHESIOLOGY

## 2020-07-23 PROCEDURE — 77030034850: Performed by: SURGERY

## 2020-07-23 PROCEDURE — 88307 TISSUE EXAM BY PATHOLOGIST: CPT

## 2020-07-23 PROCEDURE — C2618 PROBE/NEEDLE, CRYO: HCPCS | Performed by: SURGERY

## 2020-07-23 PROCEDURE — 82947 ASSAY GLUCOSE BLOOD QUANT: CPT

## 2020-07-23 PROCEDURE — 07B70ZZ EXCISION OF THORAX LYMPHATIC, OPEN APPROACH: ICD-10-PCS | Performed by: SURGERY

## 2020-07-23 PROCEDURE — 74011250636 HC RX REV CODE- 250/636: Performed by: ANESTHESIOLOGY

## 2020-07-23 PROCEDURE — 77030021668 HC NEB PREFIL KT VYRM -A

## 2020-07-23 PROCEDURE — 77030002996 HC SUT SLK J&J -A: Performed by: SURGERY

## 2020-07-23 PROCEDURE — 77030013794 HC KT TRNSDUC BLD EDWD -B: Performed by: NURSE ANESTHETIST, CERTIFIED REGISTERED

## 2020-07-23 PROCEDURE — 77030009967 HC RELD STPLR ENDOSC J&J -C: Performed by: SURGERY

## 2020-07-23 PROCEDURE — 77030008462 HC STPLR SKN PROX J&J -A: Performed by: SURGERY

## 2020-07-23 PROCEDURE — 88305 TISSUE EXAM BY PATHOLOGIST: CPT

## 2020-07-23 PROCEDURE — 74011250636 HC RX REV CODE- 250/636: Performed by: SURGERY

## 2020-07-23 PROCEDURE — 77030040922 HC BLNKT HYPOTHRM STRY -A: Performed by: ANESTHESIOLOGY

## 2020-07-23 PROCEDURE — 74011250636 HC RX REV CODE- 250/636: Performed by: NURSE ANESTHETIST, CERTIFIED REGISTERED

## 2020-07-23 PROCEDURE — 77030027876 HC STPLR ENDOSC FLX PWR J&J -G1: Performed by: SURGERY

## 2020-07-23 PROCEDURE — 77030040922 HC BLNKT HYPOTHRM STRY -A: Performed by: NURSE ANESTHETIST, CERTIFIED REGISTERED

## 2020-07-23 PROCEDURE — 77030036731 HC STPLR ENDOSC J&J -F: Performed by: SURGERY

## 2020-07-23 PROCEDURE — 94640 AIRWAY INHALATION TREATMENT: CPT

## 2020-07-23 PROCEDURE — 83735 ASSAY OF MAGNESIUM: CPT

## 2020-07-23 PROCEDURE — C1729 CATH, DRAINAGE: HCPCS | Performed by: SURGERY

## 2020-07-23 PROCEDURE — 77030008671 HC TU ENDO/BRNC CUF COVD -B: Performed by: ANESTHESIOLOGY

## 2020-07-23 PROCEDURE — 77030013292 HC BOWL MX PRSM J&J -A: Performed by: ANESTHESIOLOGY

## 2020-07-23 PROCEDURE — 76210000006 HC OR PH I REC 0.5 TO 1 HR: Performed by: SURGERY

## 2020-07-23 PROCEDURE — 77030012390 HC DRN CHST BTL GTNG -B: Performed by: SURGERY

## 2020-07-23 PROCEDURE — 77030037378 HC BRONCHOSCOPE DISP TRAN -D: Performed by: ANESTHESIOLOGY

## 2020-07-23 PROCEDURE — 77030013140 HC MSK NEB VYRM -A

## 2020-07-23 PROCEDURE — 65610000001 HC ROOM ICU GENERAL

## 2020-07-23 PROCEDURE — 01580ZZ DESTRUCTION OF THORACIC NERVE, OPEN APPROACH: ICD-10-PCS | Performed by: SURGERY

## 2020-07-23 PROCEDURE — 82803 BLOOD GASES ANY COMBINATION: CPT

## 2020-07-23 PROCEDURE — 77030008671 HC TU ENDO/BRNC CUF COVD -B: Performed by: NURSE ANESTHETIST, CERTIFIED REGISTERED

## 2020-07-23 PROCEDURE — 77030019908 HC STETH ESOPH SIMS -A: Performed by: ANESTHESIOLOGY

## 2020-07-23 PROCEDURE — 0BTD0ZZ RESECTION OF RIGHT MIDDLE LUNG LOBE, OPEN APPROACH: ICD-10-PCS | Performed by: SURGERY

## 2020-07-23 PROCEDURE — 74011000258 HC RX REV CODE- 258: Performed by: NURSE PRACTITIONER

## 2020-07-23 PROCEDURE — 86900 BLOOD TYPING SEROLOGIC ABO: CPT

## 2020-07-23 PROCEDURE — 0BTF0ZZ RESECTION OF RIGHT LOWER LUNG LOBE, OPEN APPROACH: ICD-10-PCS | Performed by: SURGERY

## 2020-07-23 PROCEDURE — 77030011264 HC ELECTRD BLD EXT COVD -A: Performed by: SURGERY

## 2020-07-23 RX ORDER — ACETAMINOPHEN 500 MG
1000 TABLET ORAL ONCE
Status: COMPLETED | OUTPATIENT
Start: 2020-07-23 | End: 2020-07-23

## 2020-07-23 RX ORDER — NALOXONE HYDROCHLORIDE 0.4 MG/ML
0.4 INJECTION, SOLUTION INTRAMUSCULAR; INTRAVENOUS; SUBCUTANEOUS AS NEEDED
Status: DISCONTINUED | OUTPATIENT
Start: 2020-07-23 | End: 2020-08-01 | Stop reason: HOSPADM

## 2020-07-23 RX ORDER — HYDROMORPHONE HYDROCHLORIDE 1 MG/ML
1 INJECTION, SOLUTION INTRAMUSCULAR; INTRAVENOUS; SUBCUTANEOUS
Status: DISCONTINUED | OUTPATIENT
Start: 2020-07-23 | End: 2020-08-01 | Stop reason: HOSPADM

## 2020-07-23 RX ORDER — SODIUM CHLORIDE, SODIUM LACTATE, POTASSIUM CHLORIDE, CALCIUM CHLORIDE 600; 310; 30; 20 MG/100ML; MG/100ML; MG/100ML; MG/100ML
75 INJECTION, SOLUTION INTRAVENOUS CONTINUOUS
Status: DISCONTINUED | OUTPATIENT
Start: 2020-07-23 | End: 2020-07-23 | Stop reason: HOSPADM

## 2020-07-23 RX ORDER — OXYCODONE HYDROCHLORIDE 5 MG/1
5 TABLET ORAL
Status: DISCONTINUED | OUTPATIENT
Start: 2020-07-23 | End: 2020-07-23 | Stop reason: HOSPADM

## 2020-07-23 RX ORDER — ONDANSETRON 2 MG/ML
INJECTION INTRAMUSCULAR; INTRAVENOUS AS NEEDED
Status: DISCONTINUED | OUTPATIENT
Start: 2020-07-23 | End: 2020-07-23 | Stop reason: HOSPADM

## 2020-07-23 RX ORDER — ONDANSETRON 2 MG/ML
4 INJECTION INTRAMUSCULAR; INTRAVENOUS
Status: DISCONTINUED | OUTPATIENT
Start: 2020-07-23 | End: 2020-08-01 | Stop reason: HOSPADM

## 2020-07-23 RX ORDER — HYDROCODONE BITARTRATE AND ACETAMINOPHEN 7.5; 325 MG/1; MG/1
1 TABLET ORAL
Status: DISCONTINUED | OUTPATIENT
Start: 2020-07-23 | End: 2020-08-01 | Stop reason: HOSPADM

## 2020-07-23 RX ORDER — SODIUM CHLORIDE 0.9 % (FLUSH) 0.9 %
5-40 SYRINGE (ML) INJECTION EVERY 8 HOURS
Status: DISCONTINUED | OUTPATIENT
Start: 2020-07-23 | End: 2020-08-01 | Stop reason: HOSPADM

## 2020-07-23 RX ORDER — OXYCODONE AND ACETAMINOPHEN 10; 325 MG/1; MG/1
1 TABLET ORAL AS NEEDED
Status: DISCONTINUED | OUTPATIENT
Start: 2020-07-23 | End: 2020-07-23 | Stop reason: HOSPADM

## 2020-07-23 RX ORDER — GABAPENTIN 300 MG/1
300 CAPSULE ORAL ONCE
Status: COMPLETED | OUTPATIENT
Start: 2020-07-23 | End: 2020-07-23

## 2020-07-23 RX ORDER — DEXAMETHASONE SODIUM PHOSPHATE 4 MG/ML
INJECTION, SOLUTION INTRA-ARTICULAR; INTRALESIONAL; INTRAMUSCULAR; INTRAVENOUS; SOFT TISSUE AS NEEDED
Status: DISCONTINUED | OUTPATIENT
Start: 2020-07-23 | End: 2020-07-23 | Stop reason: HOSPADM

## 2020-07-23 RX ORDER — GLYCOPYRROLATE 0.2 MG/ML
INJECTION INTRAMUSCULAR; INTRAVENOUS AS NEEDED
Status: DISCONTINUED | OUTPATIENT
Start: 2020-07-23 | End: 2020-07-23 | Stop reason: HOSPADM

## 2020-07-23 RX ORDER — SODIUM CHLORIDE, SODIUM LACTATE, POTASSIUM CHLORIDE, CALCIUM CHLORIDE 600; 310; 30; 20 MG/100ML; MG/100ML; MG/100ML; MG/100ML
INJECTION, SOLUTION INTRAVENOUS
Status: DISCONTINUED | OUTPATIENT
Start: 2020-07-23 | End: 2020-07-23 | Stop reason: HOSPADM

## 2020-07-23 RX ORDER — HYDROMORPHONE HYDROCHLORIDE 2 MG/ML
0.5 INJECTION, SOLUTION INTRAMUSCULAR; INTRAVENOUS; SUBCUTANEOUS
Status: DISCONTINUED | OUTPATIENT
Start: 2020-07-23 | End: 2020-07-23 | Stop reason: HOSPADM

## 2020-07-23 RX ORDER — SUCCINYLCHOLINE CHLORIDE 20 MG/ML
INJECTION INTRAMUSCULAR; INTRAVENOUS AS NEEDED
Status: DISCONTINUED | OUTPATIENT
Start: 2020-07-23 | End: 2020-07-23 | Stop reason: HOSPADM

## 2020-07-23 RX ORDER — GABAPENTIN 300 MG/1
300 CAPSULE ORAL 3 TIMES DAILY
Status: DISCONTINUED | OUTPATIENT
Start: 2020-07-23 | End: 2020-08-01 | Stop reason: HOSPADM

## 2020-07-23 RX ORDER — PANTOPRAZOLE SODIUM 40 MG/1
40 TABLET, DELAYED RELEASE ORAL
Status: DISCONTINUED | OUTPATIENT
Start: 2020-07-24 | End: 2020-08-01 | Stop reason: HOSPADM

## 2020-07-23 RX ORDER — SODIUM CHLORIDE 0.9 % (FLUSH) 0.9 %
5-40 SYRINGE (ML) INJECTION AS NEEDED
Status: DISCONTINUED | OUTPATIENT
Start: 2020-07-23 | End: 2020-07-23 | Stop reason: HOSPADM

## 2020-07-23 RX ORDER — SODIUM CHLORIDE 0.9 % (FLUSH) 0.9 %
5-40 SYRINGE (ML) INJECTION EVERY 8 HOURS
Status: DISCONTINUED | OUTPATIENT
Start: 2020-07-23 | End: 2020-07-23 | Stop reason: HOSPADM

## 2020-07-23 RX ORDER — SODIUM CHLORIDE 0.9 % (FLUSH) 0.9 %
5-40 SYRINGE (ML) INJECTION AS NEEDED
Status: DISCONTINUED | OUTPATIENT
Start: 2020-07-23 | End: 2020-08-01 | Stop reason: HOSPADM

## 2020-07-23 RX ORDER — IPRATROPIUM BROMIDE AND ALBUTEROL SULFATE 2.5; .5 MG/3ML; MG/3ML
3 SOLUTION RESPIRATORY (INHALATION)
Status: DISCONTINUED | OUTPATIENT
Start: 2020-07-23 | End: 2020-07-24 | Stop reason: ALTCHOICE

## 2020-07-23 RX ORDER — FENTANYL CITRATE 50 UG/ML
INJECTION, SOLUTION INTRAMUSCULAR; INTRAVENOUS AS NEEDED
Status: DISCONTINUED | OUTPATIENT
Start: 2020-07-23 | End: 2020-07-23 | Stop reason: HOSPADM

## 2020-07-23 RX ORDER — ROCURONIUM BROMIDE 10 MG/ML
INJECTION, SOLUTION INTRAVENOUS AS NEEDED
Status: DISCONTINUED | OUTPATIENT
Start: 2020-07-23 | End: 2020-07-23 | Stop reason: HOSPADM

## 2020-07-23 RX ORDER — PROPOFOL 10 MG/ML
INJECTION, EMULSION INTRAVENOUS AS NEEDED
Status: DISCONTINUED | OUTPATIENT
Start: 2020-07-23 | End: 2020-07-23 | Stop reason: HOSPADM

## 2020-07-23 RX ORDER — SODIUM CHLORIDE, SODIUM LACTATE, POTASSIUM CHLORIDE, CALCIUM CHLORIDE 600; 310; 30; 20 MG/100ML; MG/100ML; MG/100ML; MG/100ML
50 INJECTION, SOLUTION INTRAVENOUS CONTINUOUS
Status: DISCONTINUED | OUTPATIENT
Start: 2020-07-23 | End: 2020-07-24

## 2020-07-23 RX ORDER — AMOXICILLIN 250 MG
2 CAPSULE ORAL 2 TIMES DAILY
Status: DISCONTINUED | OUTPATIENT
Start: 2020-07-23 | End: 2020-08-01 | Stop reason: HOSPADM

## 2020-07-23 RX ORDER — CEFAZOLIN SODIUM/WATER 2 G/20 ML
2 SYRINGE (ML) INTRAVENOUS ONCE
Status: COMPLETED | OUTPATIENT
Start: 2020-07-23 | End: 2020-07-23

## 2020-07-23 RX ORDER — GUAIFENESIN 600 MG/1
1200 TABLET, EXTENDED RELEASE ORAL EVERY 12 HOURS
Status: DISCONTINUED | OUTPATIENT
Start: 2020-07-23 | End: 2020-08-01 | Stop reason: HOSPADM

## 2020-07-23 RX ORDER — NEOSTIGMINE METHYLSULFATE 1 MG/ML
INJECTION, SOLUTION INTRAVENOUS AS NEEDED
Status: DISCONTINUED | OUTPATIENT
Start: 2020-07-23 | End: 2020-07-23 | Stop reason: HOSPADM

## 2020-07-23 RX ORDER — ENOXAPARIN SODIUM 100 MG/ML
40 INJECTION SUBCUTANEOUS EVERY 24 HOURS
Status: DISCONTINUED | OUTPATIENT
Start: 2020-07-24 | End: 2020-07-26

## 2020-07-23 RX ORDER — LIDOCAINE HYDROCHLORIDE 20 MG/ML
INJECTION, SOLUTION EPIDURAL; INFILTRATION; INTRACAUDAL; PERINEURAL AS NEEDED
Status: DISCONTINUED | OUTPATIENT
Start: 2020-07-23 | End: 2020-07-23 | Stop reason: HOSPADM

## 2020-07-23 RX ORDER — MIDAZOLAM HYDROCHLORIDE 1 MG/ML
2 INJECTION, SOLUTION INTRAMUSCULAR; INTRAVENOUS
Status: DISCONTINUED | OUTPATIENT
Start: 2020-07-23 | End: 2020-07-23 | Stop reason: HOSPADM

## 2020-07-23 RX ORDER — KETAMINE HYDROCHLORIDE 50 MG/ML
INJECTION, SOLUTION INTRAMUSCULAR; INTRAVENOUS AS NEEDED
Status: DISCONTINUED | OUTPATIENT
Start: 2020-07-23 | End: 2020-07-23 | Stop reason: HOSPADM

## 2020-07-23 RX ADMIN — PROPOFOL 150 MG: 10 INJECTION, EMULSION INTRAVENOUS at 13:18

## 2020-07-23 RX ADMIN — FENTANYL CITRATE 50 MCG: 50 INJECTION INTRAMUSCULAR; INTRAVENOUS at 14:12

## 2020-07-23 RX ADMIN — KETAMINE HYDROCHLORIDE 25 MG: 50 INJECTION INTRAMUSCULAR; INTRAVENOUS at 15:00

## 2020-07-23 RX ADMIN — ACETAMINOPHEN 1000 MG: 500 TABLET, FILM COATED ORAL at 10:16

## 2020-07-23 RX ADMIN — SODIUM CHLORIDE 1000 MG: 900 INJECTION, SOLUTION INTRAVENOUS at 20:12

## 2020-07-23 RX ADMIN — SODIUM CHLORIDE, SODIUM LACTATE, POTASSIUM CHLORIDE, AND CALCIUM CHLORIDE: 600; 310; 30; 20 INJECTION, SOLUTION INTRAVENOUS at 13:25

## 2020-07-23 RX ADMIN — Medication 2 G: at 13:49

## 2020-07-23 RX ADMIN — FENTANYL CITRATE 50 MCG: 50 INJECTION INTRAMUSCULAR; INTRAVENOUS at 14:01

## 2020-07-23 RX ADMIN — Medication 10 ML: at 23:23

## 2020-07-23 RX ADMIN — Medication 3 MG: at 16:39

## 2020-07-23 RX ADMIN — FENTANYL CITRATE 50 MCG: 50 INJECTION INTRAMUSCULAR; INTRAVENOUS at 13:18

## 2020-07-23 RX ADMIN — ROCURONIUM BROMIDE 10 MG: 10 INJECTION, SOLUTION INTRAVENOUS at 16:10

## 2020-07-23 RX ADMIN — LIDOCAINE HYDROCHLORIDE 100 MG: 20 INJECTION, SOLUTION EPIDURAL; INFILTRATION; INTRACAUDAL; PERINEURAL at 13:18

## 2020-07-23 RX ADMIN — IPRATROPIUM BROMIDE AND ALBUTEROL SULFATE 3 ML: .5; 3 SOLUTION RESPIRATORY (INHALATION) at 19:51

## 2020-07-23 RX ADMIN — GUAIFENESIN 1200 MG: 600 TABLET ORAL at 20:06

## 2020-07-23 RX ADMIN — FENTANYL CITRATE 50 MCG: 50 INJECTION INTRAMUSCULAR; INTRAVENOUS at 14:28

## 2020-07-23 RX ADMIN — SUCCINYLCHOLINE CHLORIDE 180 MG: 20 INJECTION, SOLUTION INTRAMUSCULAR; INTRAVENOUS at 13:18

## 2020-07-23 RX ADMIN — HYDROMORPHONE HYDROCHLORIDE 1 MG: 1 INJECTION, SOLUTION INTRAMUSCULAR; INTRAVENOUS; SUBCUTANEOUS at 20:06

## 2020-07-23 RX ADMIN — SODIUM CHLORIDE, SODIUM LACTATE, POTASSIUM CHLORIDE, AND CALCIUM CHLORIDE 50 ML/HR: 600; 310; 30; 20 INJECTION, SOLUTION INTRAVENOUS at 19:40

## 2020-07-23 RX ADMIN — SENNOSIDES AND DOCUSATE SODIUM 2 TABLET: 8.6; 5 TABLET ORAL at 20:06

## 2020-07-23 RX ADMIN — KETAMINE HYDROCHLORIDE 25 MG: 50 INJECTION INTRAMUSCULAR; INTRAVENOUS at 15:57

## 2020-07-23 RX ADMIN — ROCURONIUM BROMIDE 30 MG: 10 INJECTION, SOLUTION INTRAVENOUS at 13:31

## 2020-07-23 RX ADMIN — ROCURONIUM BROMIDE 20 MG: 10 INJECTION, SOLUTION INTRAVENOUS at 14:25

## 2020-07-23 RX ADMIN — SODIUM CHLORIDE, SODIUM LACTATE, POTASSIUM CHLORIDE, AND CALCIUM CHLORIDE 75 ML/HR: 600; 310; 30; 20 INJECTION, SOLUTION INTRAVENOUS at 10:23

## 2020-07-23 RX ADMIN — PROPOFOL 50 MG: 10 INJECTION, EMULSION INTRAVENOUS at 14:01

## 2020-07-23 RX ADMIN — ROCURONIUM BROMIDE 20 MG: 10 INJECTION, SOLUTION INTRAVENOUS at 14:01

## 2020-07-23 RX ADMIN — KETAMINE HYDROCHLORIDE 50 MG: 50 INJECTION INTRAMUSCULAR; INTRAVENOUS at 14:00

## 2020-07-23 RX ADMIN — ROCURONIUM BROMIDE 20 MG: 10 INJECTION, SOLUTION INTRAVENOUS at 15:00

## 2020-07-23 RX ADMIN — HYDROMORPHONE HYDROCHLORIDE 0.5 MG: 2 INJECTION INTRAMUSCULAR; INTRAVENOUS; SUBCUTANEOUS at 17:45

## 2020-07-23 RX ADMIN — ONDANSETRON 4 MG: 2 INJECTION INTRAMUSCULAR; INTRAVENOUS at 16:18

## 2020-07-23 RX ADMIN — GABAPENTIN 300 MG: 300 CAPSULE ORAL at 23:22

## 2020-07-23 RX ADMIN — GLYCOPYRROLATE 0.4 MG: 0.2 INJECTION, SOLUTION INTRAMUSCULAR; INTRAVENOUS at 16:39

## 2020-07-23 RX ADMIN — DEXAMETHASONE SODIUM PHOSPHATE 4 MG: 4 INJECTION, SOLUTION INTRAMUSCULAR; INTRAVENOUS at 15:04

## 2020-07-23 RX ADMIN — GABAPENTIN 300 MG: 300 CAPSULE ORAL at 10:16

## 2020-07-23 NOTE — PERIOP NOTES
TRANSFER - OUT REPORT:    Verbal report given to Kia Diamond RN on [employer].  being transferred to [address] [place] [address] for routine post - op       Report consisted of patients Situation, Background, Assessment and   Recommendations(SBAR). Information from the following report(s) SBAR, OR Summary, Procedure Summary, Intake/Output, MAR and Cardiac Rhythm NSR was reviewed with the receiving nurse. Lines:   Peripheral IV 07/23/20 Left Wrist (Active)   Site Assessment Clean, dry, & intact 07/23/20 1705   Phlebitis Assessment 0 07/23/20 1705   Infiltration Assessment 0 07/23/20 1705   Dressing Status Clean, dry, & intact; Occlusive 07/23/20 1705   Dressing Type Transparent;Tape 07/23/20 1705   Hub Color/Line Status Green;Patent 07/23/20 1705   Alcohol Cap Used No 07/23/20 1705       Peripheral IV 07/23/20 Right Arm (Active)   Site Assessment Clean, dry, & intact 07/23/20 1705   Phlebitis Assessment 0 07/23/20 1705   Infiltration Assessment 0 07/23/20 1705   Dressing Status Clean, dry, & intact; Occlusive 07/23/20 1705   Dressing Type Transparent;Tape 07/23/20 1705   Hub Color/Line Status Gray;Patent 07/23/20 1705   Alcohol Cap Used No 07/23/20 1705       Arterial Line 07/23/20 Left Radial artery (Active)   Site Assessment Clean, dry, & intact 07/23/20 1705   Dressing Status Clean, dry, & intact; Occlusive 07/23/20 1705   Dressing Type Transparent;Tape 07/23/20 1705   Line Status Intact and in place 07/23/20 1705   Treatment Zeroed or re-zeroed 07/23/20 1705        Opportunity for questions and clarification was provided. Patient transported with:   Monitor  O2 @ 4 liters  Registered Nurse    VTE prophylaxis orders have been written for [employer]. .    Patient and family given floor number and nurses name. Family updated re: pt status after security code verified.

## 2020-07-23 NOTE — H&P
[address] [place] [address]  Arnulfo Bowman, [address] [id number]        Patient:  Ed Mendenhall. : 1950     HPI  Ed Mendenhall. is a 81 y.o. male who is seen for surgical discussion. He was seen before for a large central right lung cancer, and neoadjuvant chemoradiation was recommended. He has finished chemoradiation about 3 weeks ago. Today, he is still recovering from treatments, some congestion and cough, but no SOB.      His other medical issues are reviewed as below. He was quite healthy, no cardiac issues. He quit smoking 7 years ago.      On 20 we are asked by Yvette Borrero see for RLL mass.  Pt presented to the ED on 2020 with hemoptysis. Ana Chavez reports a h/o PNA, mild SOB, productive cough and intermittent hemoptysis since November. Ana Chavez had a CT chest in ED to r/o PE which demonstrated obstructing RLL lung mass. Ana Chavez had an EBUS yesterday with Dr. Shantell Foster that demonstrated RLL malignancy, favors NSCLC.  Station 7 and 11R LNS reportedly negative.  Possible stage IIB. Vivi Smith continues to have hemoptysis after EBUS. Dr. Jl Palacio was consulted to evaluate for surgical candidacy. Pt has a 35 pack year history, he quit 6 years ago. He has a family h/o uterine cancer in his mother. Ana Chavez was not on O2 prior to this admission. His last colonoscopy was 15 years ago and he was found to have polyps but did not follow up.                 Past Medical History:   Diagnosis Date    GERD (gastroesophageal reflux disease)      Hypertension      Hypoxia 2020    Malignant neoplasm of lower lobe of right lung ([facility] 2020    Osteoarthritis      Right lower lobe lung mass 2020    Status post total right knee replacement 2016            Current Outpatient Medications   Medication Sig Dispense Refill    hydrocortisone (HYTONE) 2.5 % topical cream Apply  to affected area four (4) times daily as needed (skin changes related to radiation therapy).  use thin layer 50 g 0    polyethylene glycol (MIRALAX) 17 gram packet Take 1 Packet by mouth daily. Indications: constipation 30 Packet 0    dexAMETHasone (DECADRON) 2 mg tablet daily  Indications: prevent nausea and vomiting from cancer chemotherapy 30 Tab 3    esomeprazole (NexIUM) 20 mg capsule Take  by mouth daily. prn        betamethasone dipropionate (DIPROSONE) 0.05 % topical cream          prochlorperazine (COMPAZINE) 10 mg tablet Take 1 Tab by mouth every six (6) hours as needed for Nausea. Indications: nausea and vomiting caused by cancer drugs, nausea and vomiting 90 Tab 3    ondansetron hcl (ZOFRAN) 8 mg tablet Take 1 Tab by mouth every eight (8) hours as needed for Nausea. Indications: nausea and vomiting caused by cancer drugs 60 Tab 3    lidocaine-prilocaine (EMLA) topical cream Apply  to affected area as needed for Pain. 30 g 0          Allergies   Allergen Reactions    Celebrex [Celecoxib] Itching           Past Surgical History:   Procedure Laterality Date    HX COLONOSCOPY        HX KNEE ARTHROSCOPY Left       scope X 1, ACL recon X 1    HX KNEE ARTHROSCOPY Right ,      X 2     HX KNEE REPLACEMENT Right      HX TONSILLECTOMY       HX VASCULAR ACCESS        IR INSERT TUNL CVC W PORT OVER 5 YEARS   3/18/2020    SINUS SURGERY PROC UNLISTED   ,      \"nasal plasty\"           Family History   Problem Relation Age of Onset    Cancer Mother           uterine    Arrhythmia Sister           afib     Social History            Tobacco Use    Smoking status: Former Smoker       Packs/day: 1.00       Years: 20.00       Pack years: 20.00       Last attempt to quit:        Years since quittin.4    Smokeless tobacco: Never Used   Substance Use Topics    Alcohol use: Yes       Alcohol/week: 4.0 standard drinks       Types: 4 Glasses of wine per week         Review of Systems   A comprehensive review of systems was negative except for that written in the HPI.      Physical Exam  Visit Vitals  /80 Pulse 94   Ht 5' 10\" (1.778 m)   Wt 223 lb (101.2 kg)   SpO2 97%   BMI 32.00 kg/m²         General:          Alert, oriented, cooperative, awake patient in no acute distress   Skin:                Warm, moist with good texture   Eyes:               Sclera are clear, extraocular muscles intact  HENT:             Normocephalic; oral mucosa moist, nares patent; neck is supple; trachea midline  Respiratory:     Lungs clear to auscultation bilaterally, breathing is non-labored   Chest:              Symmetric throughout one respiratory excursion; no supraclavicular lymphadenopathy  CV:                  Regular rate and rhythm, no appreciable murmurs, rubs, gallops  Abdomen:        Soft, protuberant but non-distended; bowel sounds are normoactive   Extremities:     No cyanosis, clubbing or edema  Neurological:   No focal signs        CT:  CT Results (most recent):       Results from Hospital Encounter encounter on 06/04/20   CT CHEST ABD PELV W CONT     Narrative CT of the chest, abdomen, and pelvis with contrast 6/4/2020     Comparison: Chest CT 4/8/2020, PET/CT 3/19/2020.     Indication: Malignant neoplasm upper lobe of right lung, restaging.     Technique:  CT imaging was performed of the chest, abdomen, and pelvis following  the uncomplicated administration of intravenous contrast (Isovue 370, 100 mL). Intravenous contrast was used for better evaluation of solid organs and vascular  structures. Oral contrast was used for bowel opacification. Radiation dose  reduction techniques were used for this study:  Our CT scanners use one or all  of the following: Automated exposure control, adjustment of the mA and/or kVp  according to patient's size, iterative reconstruction.        Findings:   CHEST CT:   Right IJ portacatheter tip cavoatrial junction. Scattered coronary artery  calcifications. The heart size is normal. No pathologically enlarged  mediastinal, hilar, or axillary lymph nodes.  No pleural or pericardial effusion.     The lung parenchyma is unremarkable. Interval patency of the right lower lobe  bronchus. The previously measured associated soft tissue mass with  postobstructive airspace opacities markedly diminished with residual tissue  measuring approximately 1.7 x 1.4 cm, previously 5.3 x 5.3 cm. No new pulmonary  lesion.     ABDOMEN CT:  The liver is normal in appearance, with no focal abnormalities.     Cholelithiasis. The adrenal glands, spleen, kidneys, and pancreas within normal  limits. Upper abdominal vasculature patent. There is no intra or extrahepatic  biliary ductal dilatation.     PELVIS CT:  The bowel is normal in caliber, and there is no focal or diffuse bowel wall  thickening. The appendix is normal. Mild sigmoid diverticulosis.     There is no free air or free fluid in the abdomen or pelvis. There is no  significant retroperitoneal, pelvic, mesenteric, or inguinal lymphadenopathy. The bladder is unremarkable. There are no aggressive appearing osseous lesions.        Impression IMPRESSION:  1. Response to therapy as evidenced by near complete resolution of right lower  lobe mass as above. This results in interval patency right lower lobe bronchus  and near complete resolution of postobstructive pneumonitis mentioned on  previous exam.  2. No new site of disease within the chest, abdomen, or pelvis. PFT: DLCO 65%        Assessment/Plan:   Juanis Valdez is a 81 y.o. male who has signs and symptoms consistent with right central lung cancer, s/p neoadjuvant chemoradiation with excellent response. The cancer is now off right main pulmonary artery, surgical resection (right lower lobectomy) is recommended for better long term outcome.      However, he couldn't make a decision about surgery today, he is instructed that surgery has to be done within a safety window, about 3-5 weeks from now, he promises to call me as soon as he decides.      He had possible pneumonia recently, and surgery was postponed, he appears recovering well.  Ok to proceed with surgery.   Cora Martinez MD

## 2020-07-23 NOTE — BRIEF OP NOTE
Brief Postoperative Note    Patient: Caryn Lama. YOB: 1950  MRN: [id number]    Date of Procedure: 7/23/2020     Pre-Op Diagnosis: Cancer of lower lobe of right lung ([facility] [address] [facility] [C34.31]    Post-Op Diagnosis: Same as preoperative diagnosis. Procedure(s):  RIGHT THORACOTOMY W/ LOWER AND MIDDLE  LOBECTOMY AND MEDIASTINAL LYMPHADNECTOMY/CRYOablation. Surgeon(s):   Stephen Mg MD    Surgical Assistant: Nurse Practitioner: Thomas Sparks NP    Anesthesia: General     Estimated Blood Loss (mL): 641 ml    Complications: None    Specimens:   ID Type Source Tests Collected by Time Destination   1 : BOBO BRONCIAL LYMPH NODE Preservative Lymph Node  Stephen Mg MD 7/23/2020  3:12 PM Pathology   2 : Seng [place] Lymph Node  Stephen Mg MD 7/23/2020  3:23 PM Pathology   3 : rRIGHT LOWER LOBE Fresh Lung  Stephen Mg MD 7/23/2020  3:43 PM Pathology   4 : NUMBER 7 LYMPH NODE Preservative Lymph Node  Stephen Mg MD 7/23/2020  3:50 PM Pathology   5 : MIDDLE LOBE Fresh Lung  Stephen Mg MD 7/23/2020  4:08 PM Pathology        Implants: * No implants in log *    Drains: CT x 2    Findings: 1 dense scarring around lower lobe and middle lobe bronchus, which is consistent with cancer response to chemoradiation    Electronically Signed by Smiley Brito MD on 7/23/2020 at 5:18 PM

## 2020-07-23 NOTE — PROGRESS NOTES
Patient received from PACU and placed on monitors. Patient's VSS, on 4L NC. Patient has two chest tubes with sanquinous drainage, dressing C/D/I. Patient's girlfriend updated on patient's status and visitor policy. Bedside shift report given to [facility].

## 2020-07-23 NOTE — PROGRESS NOTES
TRANSFER - IN REPORT:    Verbal report received from Tuan(name) on Evelia Tristan.  being received from [facility]) for routine post - op      Report consisted of patients Situation, Background, Assessment and   Recommendations(SBAR). Information from the following report(s) SBAR was reviewed with the receiving nurse. Opportunity for questions and clarification was provided. Assessment completed upon patients arrival to unit and care assumed.

## 2020-07-23 NOTE — ANESTHESIA POSTPROCEDURE EVALUATION
Procedure(s):  RIGHT THORACOTOMY W/ LOWER AND MIDDLE  LOBECTOMY AND MEDIASTINAL LYMPHAIDNECTOMY/CRYOPEXY. Hugo Celis general    Anesthesia Post Evaluation      Multimodal analgesia: multimodal analgesia used between 6 hours prior to anesthesia start to PACU discharge  Patient location during evaluation: PACU  Patient participation: complete - patient participated  Level of consciousness: awake  Pain management: adequate  Airway patency: patent  Anesthetic complications: no  Cardiovascular status: acceptable and hemodynamically stable  Respiratory status: acceptable  Hydration status: acceptable  Comments: Acceptable for discharge from PACU. Post anesthesia nausea and vomiting:  none  Final Post Anesthesia Temperature Assessment:  Normothermia (36.0-37.5 degrees C)      INITIAL Post-op Vital signs:   Vitals Value Taken Time   /82 7/23/2020  6:10 PM   Temp 36.5 °C (97.7 °F) 7/23/2020  5:08 PM   Pulse 103 7/23/2020  6:13 PM   Resp 18 7/23/2020  5:20 PM   SpO2 92 % 7/23/2020  6:13 PM   Vitals shown include unvalidated device data.

## 2020-07-23 NOTE — PROGRESS NOTES
Spiritual Care visit. Initial Visit, Pre Surgery Consult. Visit and prayer before patient goes to surgery.     Visit by Patricio Velazco M.Ed., Th.B. ,Staff

## 2020-07-23 NOTE — PROGRESS NOTES
Report received. Patient drowsy. Alert and oriented X 4. Pain rated at 8/10 with a deep breath he states. Vital signs within acceptable range. Left radial arterial line with good waveform, correlating with cuff blood pressures. Patient educated on use of call bell, placed within reach, instructed to call nursing for any needs. Two chest tubes to suction, anterior chest tube with air leak. Dressing clean and dry. Head of bed elevated 30 degrees.

## 2020-07-24 ENCOUNTER — APPOINTMENT (OUTPATIENT)
Dept: GENERAL RADIOLOGY | Age: 70
DRG: 164 | End: 2020-07-24
Attending: NURSE PRACTITIONER
Payer: COMMERCIAL

## 2020-07-24 LAB
ANION GAP SERPL CALC-SCNC: 9 MMOL/L (ref 7–16)
BASOPHILS # BLD: 0 K/UL (ref 0–0.2)
BASOPHILS NFR BLD: 0 % (ref 0–2)
BUN SERPL-MCNC: 18 MG/DL (ref 8–23)
CALCIUM SERPL-MCNC: 9.5 MG/DL (ref 8.3–10.4)
CHLORIDE SERPL-SCNC: 100 MMOL/L (ref 98–107)
CO2 SERPL-SCNC: 27 MMOL/L (ref 21–32)
CREAT SERPL-MCNC: 0.68 MG/DL (ref 0.8–1.5)
DIFFERENTIAL METHOD BLD: ABNORMAL
EOSINOPHIL # BLD: 0 K/UL (ref 0–0.8)
EOSINOPHIL NFR BLD: 0 % (ref 0.5–7.8)
ERYTHROCYTE [DISTWIDTH] IN BLOOD BY AUTOMATED COUNT: 14.1 % (ref 11.9–14.6)
GLUCOSE SERPL-MCNC: 127 MG/DL (ref 65–100)
HCT VFR BLD AUTO: 42.2 % (ref 41.1–50.3)
HGB BLD-MCNC: 14.2 G/DL (ref 13.6–17.2)
IMM GRANULOCYTES # BLD AUTO: 0.1 K/UL (ref 0–0.5)
IMM GRANULOCYTES NFR BLD AUTO: 1 % (ref 0–5)
LYMPHOCYTES # BLD: 0.6 K/UL (ref 0.5–4.6)
LYMPHOCYTES NFR BLD: 8 % (ref 13–44)
MCH RBC QN AUTO: 33 PG (ref 26.1–32.9)
MCHC RBC AUTO-ENTMCNC: 33.6 G/DL (ref 31.4–35)
MCV RBC AUTO: 98.1 FL (ref 79.6–97.8)
MONOCYTES # BLD: 0.4 K/UL (ref 0.1–1.3)
MONOCYTES NFR BLD: 5 % (ref 4–12)
NEUTS SEG # BLD: 7.2 K/UL (ref 1.7–8.2)
NEUTS SEG NFR BLD: 87 % (ref 43–78)
NRBC # BLD: 0 K/UL (ref 0–0.2)
PLATELET # BLD AUTO: 103 K/UL (ref 150–450)
PMV BLD AUTO: 9.6 FL (ref 9.4–12.3)
POTASSIUM SERPL-SCNC: 4.4 MMOL/L (ref 3.5–5.1)
RBC # BLD AUTO: 4.3 M/UL (ref 4.23–5.6)
SODIUM SERPL-SCNC: 136 MMOL/L (ref 136–145)
WBC # BLD AUTO: 8.3 K/UL (ref 4.3–11.1)

## 2020-07-24 PROCEDURE — 74011250637 HC RX REV CODE- 250/637: Performed by: NURSE PRACTITIONER

## 2020-07-24 PROCEDURE — 77010033678 HC OXYGEN DAILY

## 2020-07-24 PROCEDURE — 77030027138 HC INCENT SPIROMETER -A

## 2020-07-24 PROCEDURE — 74011000250 HC RX REV CODE- 250: Performed by: NURSE PRACTITIONER

## 2020-07-24 PROCEDURE — 74011250636 HC RX REV CODE- 250/636: Performed by: NURSE PRACTITIONER

## 2020-07-24 PROCEDURE — 94640 AIRWAY INHALATION TREATMENT: CPT

## 2020-07-24 PROCEDURE — 80048 BASIC METABOLIC PNL TOTAL CA: CPT

## 2020-07-24 PROCEDURE — 74011000258 HC RX REV CODE- 258: Performed by: NURSE PRACTITIONER

## 2020-07-24 PROCEDURE — 85025 COMPLETE CBC W/AUTO DIFF WBC: CPT

## 2020-07-24 PROCEDURE — 71045 X-RAY EXAM CHEST 1 VIEW: CPT

## 2020-07-24 PROCEDURE — 94760 N-INVAS EAR/PLS OXIMETRY 1: CPT

## 2020-07-24 PROCEDURE — 74011250637 HC RX REV CODE- 250/637: Performed by: SURGERY

## 2020-07-24 PROCEDURE — 65270000029 HC RM PRIVATE

## 2020-07-24 PROCEDURE — 51798 US URINE CAPACITY MEASURE: CPT

## 2020-07-24 RX ORDER — LEVALBUTEROL INHALATION SOLUTION 0.63 MG/3ML
0.63 SOLUTION RESPIRATORY (INHALATION)
Status: DISCONTINUED | OUTPATIENT
Start: 2020-07-24 | End: 2020-08-01 | Stop reason: HOSPADM

## 2020-07-24 RX ADMIN — HYDROMORPHONE HYDROCHLORIDE 1 MG: 1 INJECTION, SOLUTION INTRAMUSCULAR; INTRAVENOUS; SUBCUTANEOUS at 07:13

## 2020-07-24 RX ADMIN — HYDROMORPHONE HYDROCHLORIDE 1 MG: 1 INJECTION, SOLUTION INTRAMUSCULAR; INTRAVENOUS; SUBCUTANEOUS at 01:01

## 2020-07-24 RX ADMIN — SODIUM CHLORIDE, SODIUM LACTATE, POTASSIUM CHLORIDE, AND CALCIUM CHLORIDE 50 ML/HR: 600; 310; 30; 20 INJECTION, SOLUTION INTRAVENOUS at 15:48

## 2020-07-24 RX ADMIN — HYDROCODONE BITARTRATE AND ACETAMINOPHEN 1 TABLET: 7.5; 325 TABLET ORAL at 20:07

## 2020-07-24 RX ADMIN — SENNOSIDES AND DOCUSATE SODIUM 2 TABLET: 8.6; 5 TABLET ORAL at 08:17

## 2020-07-24 RX ADMIN — Medication 10 ML: at 20:08

## 2020-07-24 RX ADMIN — HYDROCODONE BITARTRATE AND ACETAMINOPHEN 1 TABLET: 7.5; 325 TABLET ORAL at 11:58

## 2020-07-24 RX ADMIN — SODIUM CHLORIDE, SODIUM LACTATE, POTASSIUM CHLORIDE, AND CALCIUM CHLORIDE 50 ML/HR: 600; 310; 30; 20 INJECTION, SOLUTION INTRAVENOUS at 18:13

## 2020-07-24 RX ADMIN — HYDROCODONE BITARTRATE AND ACETAMINOPHEN 1 TABLET: 7.5; 325 TABLET ORAL at 08:17

## 2020-07-24 RX ADMIN — HYDROCODONE BITARTRATE AND ACETAMINOPHEN 1 TABLET: 7.5; 325 TABLET ORAL at 16:32

## 2020-07-24 RX ADMIN — PANTOPRAZOLE SODIUM 40 MG: 40 TABLET, DELAYED RELEASE ORAL at 07:13

## 2020-07-24 RX ADMIN — Medication 10 ML: at 06:00

## 2020-07-24 RX ADMIN — IPRATROPIUM BROMIDE AND ALBUTEROL SULFATE 3 ML: .5; 3 SOLUTION RESPIRATORY (INHALATION) at 09:38

## 2020-07-24 RX ADMIN — GUAIFENESIN 1200 MG: 600 TABLET ORAL at 08:17

## 2020-07-24 RX ADMIN — GUAIFENESIN 1200 MG: 600 TABLET ORAL at 20:06

## 2020-07-24 RX ADMIN — IPRATROPIUM BROMIDE AND ALBUTEROL SULFATE 3 ML: .5; 3 SOLUTION RESPIRATORY (INHALATION) at 01:20

## 2020-07-24 RX ADMIN — LEVALBUTEROL HYDROCHLORIDE 0.63 MG: 0.63 SOLUTION RESPIRATORY (INHALATION) at 20:48

## 2020-07-24 RX ADMIN — SENNOSIDES AND DOCUSATE SODIUM 2 TABLET: 8.6; 5 TABLET ORAL at 18:12

## 2020-07-24 RX ADMIN — GABAPENTIN 300 MG: 300 CAPSULE ORAL at 18:12

## 2020-07-24 RX ADMIN — ENOXAPARIN SODIUM 40 MG: 40 INJECTION SUBCUTANEOUS at 08:17

## 2020-07-24 RX ADMIN — SODIUM CHLORIDE 1000 MG: 900 INJECTION, SOLUTION INTRAVENOUS at 04:18

## 2020-07-24 RX ADMIN — IPRATROPIUM BROMIDE AND ALBUTEROL SULFATE 3 ML: .5; 3 SOLUTION RESPIRATORY (INHALATION) at 13:11

## 2020-07-24 RX ADMIN — HYDROMORPHONE HYDROCHLORIDE 1 MG: 1 INJECTION, SOLUTION INTRAMUSCULAR; INTRAVENOUS; SUBCUTANEOUS at 21:11

## 2020-07-24 RX ADMIN — GABAPENTIN 300 MG: 300 CAPSULE ORAL at 21:13

## 2020-07-24 RX ADMIN — GABAPENTIN 300 MG: 300 CAPSULE ORAL at 08:17

## 2020-07-24 RX ADMIN — HYDROCODONE BITARTRATE AND ACETAMINOPHEN 1 TABLET: 7.5; 325 TABLET ORAL at 04:27

## 2020-07-24 NOTE — OP NOTES
[facility]  OPERATIVE REPORT    Name:  Estelita Bear  MR#:  [id number]  :  1950  ACCOUNT #:  [de-identified]  DATE OF SERVICE:  2020    PREOPERATIVE DIAGNOSIS:  Right lung cancer. POSTOPERATIVE DIAGNOSIS:  Right lung cancer. PROCEDURES PERFORMED:  1. Right thoracotomy with lower and middle lobectomy. 2.  Mediastinal lymphadenectomy. 3.  Cryoablation of intercostal nerve. SURGEON:  Mariel Chavez MD    ASSISTANT:  Dinah Fragoso NP    ANESTHESIA:   general    COMPLICATIONS:  none    SPECIMENS REMOVED:  As above    IMPLANTS:  CT x 2    ESTIMATED BLOOD LOSS:  150 mL. INDICATION:  This is a 17-year-old gentleman who presented with right lung cancer, which is centrally located involving the major bronchus and the pulmonary artery. He had neoadjuvant chemoradiation and then surgery offered to him for potential best long-term outcome and he understood risks and benefits, agreed to proceed. FINDINGS:  He does have very dense scarring around the lower lobe bronchus and the middle lobe bronchus, which is consistent with cancer response to chemoradiation. No definitive pleural disease or definitive residual tumor identified. PROCEDURE:  After informed consent obtained, the patient was brought into the operating room, left in supine position. General anesthesia was administered. Double-lumen tube placed per Anesthesia. The patient then positioned to left decubitus position with the right side up and his right chest was prepped and draped in routine fashion    A standard posterior lateral thoracotomy incision was made. Latissimus dorsi muscle was divided. The serratus anterior was  and retracted and then the fifth rib was isolated posteriorly and then divided. The intercostal space was entered between the fourth and fifth rib and the rib  was placed to facilitate exposure. The lung, especially the lower lobe was adequately exposed.     We first dissected to divide the inferior pulmonary ligaments and then isolation was performed around the inferior pulmonary vein. This was able to be isolated circumferentially and divided with a vascular stapler. Then, attempt was made to divide the fissure and we immediately noticed dense scarring in the fissure suggesting the previous cancer site. The major fissure was partially divided with Bovie and stapler and posteriorly we tried to isolate the bronchus. Again, there was some dense scarring around the lower lobe bronchus. With tremendous effort, I was able to open the fissure, able to identify the pulmonary artery to the lower lobe. This was carefully dissected and then the Endo-LUIGI stapler with vascular load again was used to divide the pulmonary artery to the lower lobe and lastly I isolated the lower lobe bronchus and the bronchus was very thickened, but I used the Endo-LUIGI stapler with black load to divide the bronchus to the lower lobe. After the lower lobe was removed and the upper lobe inflated very well, however, the middle lobe was not. Then, further examination revealed the middle lobe bronchus is actually really close to the transection line to the lower lobe and so it appears to me middle lobe is not going to function and it may remain atelectatic and causing pneumonia. So, a decision was made to proceed with middle lobe lobectomy. The middle lobe bronchus was divided with black load stapler. Then further dissection revealed the middle lobe vein and artery; both divided with vascular stapler . Then, the minor fissure was divided with a stapler with blue load. After the middle lobe was removed, the staple line was tested under water and no evidence of air leak. The intercostal nerve cryoablation was performed for postop pain control, multiple levels were performed to cover both thoracotomy and chest tube site. Then,  Progel was applied to the lung surface, also the hilum.   Two 32-chest tube was placed, one to the apex, one along the diaphragm. Then the thoracotomy incision was reapproximated with #2 Vicryl in figure-of-eight fashion. Posterior and anterior fascia was closed with 0 Vicryl in a running fashion. Skin closed with staples. The patient tolerated the procedure well, transferred to recovery room in stable condition. All the instrument count and lap count were correct. Estimated blood loss was about 150 mL. As noted, Lita Bañuelos is the first assistant in this case and this is a very challenging case due to chemoradiation and centrally located mass. Crystal offered excellent exposure to make this surgery possible.         Joleen Da Silva MD      [id number]  D:  07/23/2020 17:29  T:  07/24/2020 1:31  JOB #:  [id number]

## 2020-07-24 NOTE — ROUTINE PROCESS
END OF SHIFT NOTE:    INTAKE/OUTPUT  07/23 0701 - 07/24 0700  In: 1700 [I.V.:1600]  Out: 2140 [Urine:1760]  Voiding: no  Catheter: YES               Flatus: Patient does not have flatus present. Stool:  0 occurrences. Characteristics:       Emesis: {0occurrences. VITAL SIGNS  Patient Vitals for the past 12 hrs:   Temp Pulse Resp BP SpO2   07/24/20 1719 98.3 °F (36.8 °C) (!) 114 20 111/84 95 %   07/24/20 1657  (!) 120 27  93 %   07/24/20 1635  (!) 132 25  92 %   07/24/20 1631    115/80 94 %   07/24/20 1623 98.4 °F (36.9 °C)       07/24/20 1601  (!) 109 21 121/83 95 %   07/24/20 1531  (!) 113 28 114/77 95 %   07/24/20 1500  (!) 118 19 112/80 95 %   07/24/20 1431  (!) 120 22 101/73 94 %   07/24/20 1400  (!) 122 20 127/76 94 %   07/24/20 1331  (!) 125 25 (!) 126/93 94 %   07/24/20 1311     93 %   07/24/20 1301  (!) 117  (!) 124/91 93 %   07/24/20 1257  (!) 105 16  92 %   07/24/20 1231  (!) 103 25 110/74 95 %   07/24/20 1200  (!) 101 18 110/74 96 %   07/24/20 1158 98.5 °F (36.9 °C)       07/24/20 1131  (!) 109 21 106/68 94 %   07/24/20 1100  (!) 111 8 103/71 94 %   07/24/20 1031  (!) 125  103/73 94 %   07/24/20 1000  (!) 118 16 104/81 94 %   07/24/20 0938     96 %   07/24/20 0931  (!) 104 13 101/75 95 %   07/24/20 0900  (!) 108 16 110/74 94 %   07/24/20 0832  (!) 108 21 132/79 93 %   07/24/20 0800  (!) 111 (!) 32 116/87 92 %   07/24/20 0731  98 13 121/79 95 %   07/24/20 0700 97.6 °F (36.4 °C) 98 24 114/79 95 %   07/24/20 0645  96 14  95 %       Pain Assessment  Pain Intensity 1: 6 (07/24/20 9453)  Pain Location 1: Incisional  Pain Intervention(s) 1: Repositioned  Patient Stated Pain Goal: 0    Ambulating  Bedrest maintained. Chest tubes x2 intact to right flank, at 20 cm suction. Dressings dry and intact. Pt tolerating meal tray well. Shift report given to oncoming nurse at the bedside.     Roxy Turner RN

## 2020-07-24 NOTE — PROGRESS NOTES
Interdisciplinary team rounds were held 7/24/2020 with the following team members:Nursing, Nurse Practitioner, Nutrition, Palliative Care, Pastoral Care, Pharmacy, Physician and Respiratory Therapy and the patient. Plan of care discussed. See clinical pathway and/or care plan for interventions and desired outcomes.

## 2020-07-24 NOTE — ROUTINE PROCESS
TRANSFER - IN REPORT:    Verbal report received from [address] [place] [address] on Dunia Joseph.  being received from PACU for transfer of care to [facility] ,     Report consisted of patients Situation, Background, Assessment and   Recommendations(SBAR). Pre and Post-op Information  was reviewed with the sending  nurse. Opportunity for questions and clarification was provided. Assessment completed upon patients arrival to unit and care assumed.

## 2020-07-24 NOTE — PROGRESS NOTES
Chart screened by  for discharge planning. Emergency Contact Cordell Sepulveda  No needs identified at this time. Please consult  if any new issues arise  Pt does have a ACP on file dated 2016    Care Management Interventions  PCP Verified by CM:  Yes  Mode of Transport at Discharge: [address] (CM Consult): Discharge Planning  Current Support Network: Own Home, Other(Lives with girlfriend Cordell Sepulveda [phone])  Confirm Follow Up Transport: Family  The Patient and/or Patient Representative was Provided with a Choice of Provider and Agrees with the Discharge Plan?: Yes  Freedom of Choice List was Provided with Basic Dialogue that Supports the Patient's Individualized Plan of Care/Goals, Treatment Preferences and Shares the Quality Data Associated with the Providers?: Yes  [place] Resource Information Provided?: No  Discharge Location  Discharge Placement: Unable to determine at this time

## 2020-07-24 NOTE — PROGRESS NOTES
7/24/20              PLAN:  Diet- Regular   DVT Prophylactics- SCD/Lovenox  Pain control- Dilaudid/Norco/ Neurontin  SUP prophylaxis- Protonix  Encourage C/DB/IS  Right CT x 2 to suction  CXR  Ok to transfer to surgical floor today    ASSESSMENT:  Admit Date: 7/23/2020   1 Day Post-Op  Procedure(s) with comments:  RIGHT THORACOTOMY W/ LOWER AND MIDDLE  LOBECTOMY AND MEDIASTINAL LYMPHAIDNECTOMY/CRYOPEXY. - RIGHT LOWER LUNG    Active Problems:    Cancer of bronchus of right lower lobe ([address] (7/23/2020)      Lung cancer ([address] (7/23/2020)      Cancer of right lung ([address] (7/23/2020)         SUBJECTIVE:  Pt awake in bed. Pain controlled. Right CT x 2 to suction. CXR this am showing: Right chest tubes in place with no pneumothorax evident, Aeration has improved in the left lung, and Possible left effusion, AF, NAD. OBJECTIVE:  Constitutional: Alert oriented cooperative patient in no acute distress; appears stated age   Visit Vitals  /74   Pulse (!) 101   Temp 98.5 °F (36.9 °C)   Resp 18   Ht 5' 10\" (1.778 m)   Wt 209 lb 12.8 oz (95.2 kg)   SpO2 93%   BMI 30.10 kg/m²     Eyes:Sclera are clear. ENMT: no external lesions gross hearing normal; no obvious neck masses, no ear or lip lesions  CV: Tachy. Normal perfusion  Resp: No JVD. Breathing is  non-labored; no audible wheezing, Right CTx 2 to suction. GI: soft and non-distended     Musculoskeletal: unremarkable with normal function. No embolic signs or cyanosis.    Neuro:  Oriented; moves all 4; no focal deficits  Psychiatric: normal affect and mood, no memory impairment      Patient Vitals for the past 24 hrs:   BP Temp Pulse Resp SpO2   07/24/20 1311     93 %   07/24/20 1200 110/74  (!) 101 18 96 %   07/24/20 1158  98.5 °F (36.9 °C)      07/24/20 1131 106/68  (!) 109 21 94 %   07/24/20 1100 103/71  (!) 111 8 94 % 07/24/20 1031 103/73  (!) 125  94 %   07/24/20 1000 104/81  (!) 118 16 94 %   07/24/20 0938     96 %   07/24/20 0931 101/75  (!) 104 13 95 %   07/24/20 0900 110/74  (!) 108 16 94 %   07/24/20 0832 132/79  (!) 108 21 93 %   07/24/20 0800 116/87  (!) 111 (!) 32 92 %   07/24/20 0731 121/79  98 13 95 %   07/24/20 0700 114/79 97.6 °F (36.4 °C) 98 24 95 %   07/24/20 0645   96 14 95 %   07/24/20 0632 119/83  97 14 95 %   07/24/20 0615   99 16 94 %   07/24/20 0600 103/79  97 16 95 %   07/24/20 0545   91 17 94 %   07/24/20 0531 107/81  100 11 94 %   07/24/20 0530   94 14 94 %   07/24/20 0515   96 17 93 %   07/24/20 0500 102/78  98 17 94 %   07/24/20 0445   97 15 94 %   07/24/20 0433 118/81  99 14 94 %   07/24/20 0415   97 26 94 %   07/24/20 0400 104/73 98.8 °F (37.1 °C) 96 21 94 %   07/24/20 0345   99 25 94 %   07/24/20 0331 98/72  (!) 101 26 94 %   07/24/20 0315   (!) 101 27 93 %   07/24/20 0300 113/75  (!) 103 19 94 %   07/24/20 0245   (!) 102 25 94 %   07/24/20 0231 107/80  (!) 101 25 94 %   07/24/20 0215   (!) 101 25 94 %   07/24/20 0200 108/75  (!) 102 14 93 %   07/24/20 0146   (!) 105 15 94 %   07/24/20 0137 120/63  (!) 102 15 93 %   07/24/20 0120     93 %   07/24/20 0115   98 20 93 %   07/24/20 0100 119/80  93 18 94 %   07/24/20 0045   94 15 94 %   07/24/20 0031 110/78  97 15 94 %   07/24/20 0015   97 15 94 %   07/24/20 0000 122/77 98.3 °F (36.8 °C) 93 16 95 %   07/23/20 2345   93 15 95 %   07/23/20 2331 117/84  (!) 102 21 95 %   07/23/20 2300 126/81  89 15 95 %   07/23/20 2245   98 19 95 %   07/23/20 2231 112/86  99 15 95 %   07/23/20 2215   (!) 101 16 95 %   07/23/20 2200 112/79  (!) 102 16 95 %   07/23/20 2131 120/74  (!) 104 16 95 %   07/23/20 2115   (!) 105 16 95 %   07/23/20 2100 129/88  (!) 105 17 95 %   07/23/20 2045   (!) 106 20 95 %   07/23/20 2031 118/82  (!) 109 17 94 %   07/23/20 2015   (!) 117 (!) 37 95 %   07/23/20 2000 119/76  100 19 96 %   07/23/20 1951   (!) 104 19 95 %   07/23/20 1932   (!) 104 18 94 %   07/23/20 1931 127/73  (!) 105 19 95 %   07/23/20 1930   (!) 103 19 94 %   07/23/20 1929   (!) 105 20 95 %   07/23/20 1928   (!) 103 20 95 %   07/23/20 1927   (!) 106 19 94 %   07/23/20 1926   (!) 106 21 94 %   07/23/20 1925   (!) 105 19 95 %   07/23/20 1924   (!) 102 19 95 %   07/23/20 1923   100 19 95 %   07/23/20 1922   (!) 110 29 95 %   07/23/20 1921   (!) 104 19 95 %   07/23/20 1920   (!) 104 20 94 %   07/23/20 1919   (!) 102 19 94 %   07/23/20 1918   (!) 107 25 95 %   07/23/20 1917   (!) 106 24 95 %   07/23/20 1916 129/82  (!) 106 21 95 %   07/23/20 1913   (!) 105 19 94 %   07/23/20 1912   (!) 104 19 95 %   07/23/20 1911   (!) 106 20 94 %   07/23/20 1910   (!) 106 19 94 %   07/23/20 1909   (!) 106 20 94 %   07/23/20 1908   (!) 105 19 95 %   07/23/20 1907   (!) 109 20 95 %   07/23/20 1906   (!) 108 21 94 %   07/23/20 1905   (!) 107 19 94 %   07/23/20 1904   (!) 108 21 95 %   07/23/20 1903   (!) 105 18 94 %   07/23/20 1902   (!) 104 19 94 %   07/23/20 1901 116/81 97.7 °F (36.5 °C) (!) 102 19 95 %   07/23/20 1900   (!) 107 21 94 %   07/23/20 1845   100 20 93 %   07/23/20 1810 129/82  99 18 93 %   07/23/20 1805 125/85  97 16 93 %   07/23/20 1800 114/83  100 18 93 %   07/23/20 1755 121/88  100 18 93 %   07/23/20 1750 120/89 98.5 °F (36.9 °C) 92 18 93 %   07/23/20 1746 120/86  96 16 93 %   07/23/20 1720 120/88  100 18 92 %   07/23/20 1715 (!) 127/94  100 18 93 %   07/23/20 1710 (!) 126/93  (!) 104 16 92 %   07/23/20 1708 (!) 137/96 97.7 °F (36.5 °C) (!) 108 16 93 %   07/23/20 1705 (!) 137/96 97.7 °F (36.5 °C) (!) 108 16 92 %     Labs:    Recent Labs     07/24/20  0325   WBC 8.3   HGB 14.2   *      K 4.4      CO2 27   BUN 18   CREA 0.68*   *       Nola Khoury, NP

## 2020-07-24 NOTE — PROGRESS NOTES
TRANSFER - OUT REPORT:    Verbal report given to Jocelyn(name) on Kayla Rojas.  being transferred to [facility](unit) for routine progression of care       Report consisted of patients Situation, Background, Assessment and   Recommendations(SBAR). Information from the following report(s) SBAR was reviewed with the receiving nurse. Opportunity for questions and clarification was provided.       Patient transported with:   O2 @ 4 liters

## 2020-07-25 ENCOUNTER — APPOINTMENT (OUTPATIENT)
Dept: GENERAL RADIOLOGY | Age: 70
DRG: 164 | End: 2020-07-25
Attending: NURSE PRACTITIONER
Payer: COMMERCIAL

## 2020-07-25 LAB
ANION GAP SERPL CALC-SCNC: 2 MMOL/L (ref 7–16)
BASOPHILS # BLD: 0 K/UL (ref 0–0.2)
BASOPHILS NFR BLD: 0 % (ref 0–2)
BUN SERPL-MCNC: 23 MG/DL (ref 8–23)
CALCIUM SERPL-MCNC: 8.8 MG/DL (ref 8.3–10.4)
CHLORIDE SERPL-SCNC: 102 MMOL/L (ref 98–107)
CO2 SERPL-SCNC: 35 MMOL/L (ref 21–32)
CREAT SERPL-MCNC: 0.81 MG/DL (ref 0.8–1.5)
DIFFERENTIAL METHOD BLD: ABNORMAL
EOSINOPHIL # BLD: 0.1 K/UL (ref 0–0.8)
EOSINOPHIL NFR BLD: 1 % (ref 0.5–7.8)
ERYTHROCYTE [DISTWIDTH] IN BLOOD BY AUTOMATED COUNT: 14.6 % (ref 11.9–14.6)
GLUCOSE SERPL-MCNC: 116 MG/DL (ref 65–100)
HCT VFR BLD AUTO: 40.4 % (ref 41.1–50.3)
HGB BLD-MCNC: 13.5 G/DL (ref 13.6–17.2)
IMM GRANULOCYTES # BLD AUTO: 0.1 K/UL (ref 0–0.5)
IMM GRANULOCYTES NFR BLD AUTO: 1 % (ref 0–5)
LYMPHOCYTES # BLD: 0.9 K/UL (ref 0.5–4.6)
LYMPHOCYTES NFR BLD: 11 % (ref 13–44)
MCH RBC QN AUTO: 33.5 PG (ref 26.1–32.9)
MCHC RBC AUTO-ENTMCNC: 33.4 G/DL (ref 31.4–35)
MCV RBC AUTO: 100.2 FL (ref 79.6–97.8)
MONOCYTES # BLD: 0.6 K/UL (ref 0.1–1.3)
MONOCYTES NFR BLD: 8 % (ref 4–12)
NEUTS SEG # BLD: 6.6 K/UL (ref 1.7–8.2)
NEUTS SEG NFR BLD: 79 % (ref 43–78)
NRBC # BLD: 0 K/UL (ref 0–0.2)
PLATELET # BLD AUTO: 90 K/UL (ref 150–450)
PMV BLD AUTO: 9.8 FL (ref 9.4–12.3)
POTASSIUM SERPL-SCNC: 4.2 MMOL/L (ref 3.5–5.1)
RBC # BLD AUTO: 4.03 M/UL (ref 4.23–5.6)
SODIUM SERPL-SCNC: 139 MMOL/L (ref 136–145)
WBC # BLD AUTO: 8.3 K/UL (ref 4.3–11.1)

## 2020-07-25 PROCEDURE — 85025 COMPLETE CBC W/AUTO DIFF WBC: CPT

## 2020-07-25 PROCEDURE — 80048 BASIC METABOLIC PNL TOTAL CA: CPT

## 2020-07-25 PROCEDURE — 74011250637 HC RX REV CODE- 250/637: Performed by: SURGERY

## 2020-07-25 PROCEDURE — 74011250636 HC RX REV CODE- 250/636: Performed by: SURGERY

## 2020-07-25 PROCEDURE — 94640 AIRWAY INHALATION TREATMENT: CPT

## 2020-07-25 PROCEDURE — 74011000258 HC RX REV CODE- 258: Performed by: SURGERY

## 2020-07-25 PROCEDURE — 94760 N-INVAS EAR/PLS OXIMETRY 1: CPT

## 2020-07-25 PROCEDURE — 74011250636 HC RX REV CODE- 250/636: Performed by: NURSE PRACTITIONER

## 2020-07-25 PROCEDURE — 71045 X-RAY EXAM CHEST 1 VIEW: CPT

## 2020-07-25 PROCEDURE — 65660000000 HC RM CCU STEPDOWN

## 2020-07-25 PROCEDURE — 77010033678 HC OXYGEN DAILY

## 2020-07-25 PROCEDURE — 74011000250 HC RX REV CODE- 250: Performed by: NURSE PRACTITIONER

## 2020-07-25 PROCEDURE — 93005 ELECTROCARDIOGRAM TRACING: CPT | Performed by: SURGERY

## 2020-07-25 PROCEDURE — 74011250637 HC RX REV CODE- 250/637: Performed by: NURSE PRACTITIONER

## 2020-07-25 PROCEDURE — 36415 COLL VENOUS BLD VENIPUNCTURE: CPT

## 2020-07-25 RX ORDER — DEXTROSE MONOHYDRATE AND SODIUM CHLORIDE 5; .45 G/100ML; G/100ML
25 INJECTION, SOLUTION INTRAVENOUS CONTINUOUS
Status: DISCONTINUED | OUTPATIENT
Start: 2020-07-25 | End: 2020-07-30

## 2020-07-25 RX ADMIN — GABAPENTIN 300 MG: 300 CAPSULE ORAL at 08:13

## 2020-07-25 RX ADMIN — GABAPENTIN 300 MG: 300 CAPSULE ORAL at 22:01

## 2020-07-25 RX ADMIN — GUAIFENESIN 1200 MG: 600 TABLET ORAL at 08:13

## 2020-07-25 RX ADMIN — SODIUM CHLORIDE 500 ML: 900 INJECTION, SOLUTION INTRAVENOUS at 21:00

## 2020-07-25 RX ADMIN — DEXTROSE MONOHYDRATE AND SODIUM CHLORIDE 75 ML/HR: 5; .45 INJECTION, SOLUTION INTRAVENOUS at 22:46

## 2020-07-25 RX ADMIN — PANTOPRAZOLE SODIUM 40 MG: 40 TABLET, DELAYED RELEASE ORAL at 05:33

## 2020-07-25 RX ADMIN — GABAPENTIN 300 MG: 300 CAPSULE ORAL at 16:46

## 2020-07-25 RX ADMIN — SENNOSIDES AND DOCUSATE SODIUM 2 TABLET: 8.6; 5 TABLET ORAL at 08:13

## 2020-07-25 RX ADMIN — HYDROCODONE BITARTRATE AND ACETAMINOPHEN 1 TABLET: 7.5; 325 TABLET ORAL at 08:36

## 2020-07-25 RX ADMIN — Medication 20 ML: at 03:08

## 2020-07-25 RX ADMIN — GUAIFENESIN 1200 MG: 600 TABLET ORAL at 22:01

## 2020-07-25 RX ADMIN — HYDROMORPHONE HYDROCHLORIDE 1 MG: 1 INJECTION, SOLUTION INTRAMUSCULAR; INTRAVENOUS; SUBCUTANEOUS at 03:06

## 2020-07-25 RX ADMIN — ENOXAPARIN SODIUM 40 MG: 40 INJECTION SUBCUTANEOUS at 08:13

## 2020-07-25 RX ADMIN — LEVALBUTEROL HYDROCHLORIDE 0.63 MG: 0.63 SOLUTION RESPIRATORY (INHALATION) at 08:53

## 2020-07-25 RX ADMIN — LEVALBUTEROL HYDROCHLORIDE 0.63 MG: 0.63 SOLUTION RESPIRATORY (INHALATION) at 19:23

## 2020-07-25 RX ADMIN — Medication 10 ML: at 22:04

## 2020-07-25 RX ADMIN — LEVALBUTEROL HYDROCHLORIDE 0.63 MG: 0.63 SOLUTION RESPIRATORY (INHALATION) at 13:34

## 2020-07-25 RX ADMIN — Medication 10 ML: at 16:47

## 2020-07-25 RX ADMIN — SENNOSIDES AND DOCUSATE SODIUM 2 TABLET: 8.6; 5 TABLET ORAL at 16:47

## 2020-07-25 RX ADMIN — HYDROCODONE BITARTRATE AND ACETAMINOPHEN 1 TABLET: 7.5; 325 TABLET ORAL at 18:17

## 2020-07-25 NOTE — PROGRESS NOTES
Reviewed notes for spiritual concerns prior to visit Visit with patient to build rapport with . Calm Encouraged with presence and words of hope Patient demonstrates confidence in the care team. 
Appears to be coping with illness. No physical contact with patient per guidelines Assurance of ongoing prayers Colette Pratt,  Staff  C: [phone]  /  [email or web address]

## 2020-07-25 NOTE — PROGRESS NOTES
Date of Outreach Update: 
Aleks Singh. was seen and assessed. MEWS Score: 1 (07/25/20 0707) Vitals:  
 07/25/20 0315 07/25/20 0707 07/25/20 0855 07/25/20 1140 BP: 112/77 103/72  111/69 Pulse: (!) 115 100  98 Resp: 18 16  19 Temp: 97.6 °F (36.4 °C) 98.1 °F (36.7 °C)  96.9 °F (36.1 °C) SpO2: 96% 92% 94% 93% Weight:      
Height:      
  
 
 Pain Assessment Pain Intensity 1: 0 (07/25/20 0715) Pain Location 1: Chest, Incisional 
Pain Intervention(s) 1: Medication (see MAR) Patient Stated Pain Goal: 0 Previous Outreach assessment has been reviewed. There have been no significant clinical changes since the completion of the last dated Outreach assessment. Patient alert and oriented. Respirations unlabored. Both chest tubes patent without air leaks. VS, labs, and progress notes reviewed. Will continue to follow up per outreach protocol.  
 
Signed By:   Kat Powers RN 
  July 25, 2020 1:13 PM

## 2020-07-25 NOTE — PROGRESS NOTES
END OF SHIFT NOTE: 
 
INTAKE/OUTPUT 
07/24 0701 - 07/25 0700 In: 1899.7 [P.O.:960; I.V.:939.7] Out: 2695 [ACNCQ:8770] Voiding: NO 
Catheter: YES Drain:   
 
 
 
 
 
Flatus: Patient does not have flatus present. Stool:  0 occurrences. Characteristics: 
  
 
Emesis: 0 occurrences. Characteristics: VITAL SIGNS Patient Vitals for the past 12 hrs: 
 Temp Pulse Resp BP SpO2  
07/25/20 0315 97.6 °F (36.4 °C) (!) 115 18 112/77 96 %  
07/25/20 0122  (!) 112   95 %  
07/24/20 2316 97.6 °F (36.4 °C) (!) 115 20 103/79 94 %  
07/24/20 2111  (!) 104     
07/24/20 2048     93 % 07/24/20 1927 98 °F (36.7 °C) (!) 120 20 119/79 94 %  
07/24/20 1719 98.3 °F (36.8 °C) (!) 114 20 111/84 95 % Pain Assessment Pain Intensity 1: 0 (07/25/20 0415) Pain Location 1: Chest, Incisional 
Pain Intervention(s) 1: Medication (see MAR) Patient Stated Pain Goal: 0 Ambulating Not oob this shift. Dry drsg to right thoracotomy site Shift report given to oncoming nurse at the bedside.  
 
Luann Howe RN

## 2020-07-25 NOTE — PROGRESS NOTES
[facility] CRITICAL CARE OUTREACH NURSE PROGRESS REPORT      SUBJECTIVE: Called to assess patient secondary to outreach protocol. MEWS Score: 3 (07/24/20 1719)  Vitals:    07/24/20 1657 07/24/20 1719 07/24/20 1927 07/24/20 2048   BP:  111/84 119/79    Pulse: (!) 120 (!) 114 (!) 120    Resp: 27 20 20    Temp:  98.3 °F (36.8 °C) 98 °F (36.7 °C)    SpO2: 93% 95% 94% (P) 93%   Weight:       Height:            LAB DATA:    Recent Labs     07/24/20  0325 07/23/20  1739     --    K 4.4  --      --    CO2 27  --    AGAP 9  --    *  --    BUN 18  --    CREA 0.68*  --    GFRAA >60  --    GFRNA >60  --    CA 9.5  --    MG  --  2.2        Recent Labs     07/24/20 0325   WBC 8.3   HGB 14.2   HCT 42.2   *          OBJECTIVE: On arrival to room, I found patient to be eating in bed. Pain Assessment  Pain Intensity 1: 6 (07/24/20 1719)  Pain Location 1: Incisional  Pain Intervention(s) 1: Repositioned  Patient Stated Pain Goal: 0                                 ASSESSMENT:  Pt is axox4. O2= 93% on RA. HR= 110-120. #1 Chest tube= 110mls and #2 Chest tube= 400mls of sanguinoserous fluid. PLAN:  Will continue to monitor per protocol.

## 2020-07-25 NOTE — PROGRESS NOTES
7/24/20 PLAN: 
Diet- Regular DVT Prophylactics- SCD/Lovenox Pain control- Dilaudid/Norco/ Neurontin SUP prophylaxis- Protonix Encourage C/DB/IS Continue Right CT x 2 to suction CXR Transferred to surgical floor 7/25/20 ASSESSMENT:  Admit Date: 7/23/2020  
2 Day Post-Op  Procedure(s) with comments: 
RIGHT THORACOTOMY W/ LOWER AND MIDDLE  LOBECTOMY AND MEDIASTINAL LYMPHAIDNECTOMY/CRYOPEXY. - RIGHT LOWER LUNG Principal Problem: 
  Cancer of right lung ([address] (7/23/2020) Active Problems: 
  Cancer of bronchus of right lower lobe ([address] (7/23/2020) Lung cancer ([address] (7/23/2020) SUBJECTIVE: 
Pt awake in bed. C/o postoperative pain; controlled with medication. Right CT x 2 to suction. CXR this am showing: Improved mild left lung base atelectasis or infiltrate. AF, NAD. OBJECTIVE: 
Constitutional: Alert oriented cooperative patient in no acute distress; appears stated age Visit Vitals /69 Pulse 98 Temp 96.9 °F (36.1 °C) Resp 19 Ht 5' 10\" (1.778 m) Wt 209 lb 12.8 oz (95.2 kg) SpO2 93% BMI 30.10 kg/m² Eyes: Sclera are clear. ENMT: no external lesions gross hearing normal; no obvious neck masses, no ear or lip lesions CV: Tachy. Normal perfusion Resp: No JVD. Breathing is  non-labored; no audible wheezing, Right CTx 2 to suction. GI: soft and non-distended Musculoskeletal: unremarkable with normal function. No embolic signs or cyanosis. Neuro:  Oriented; moves all 4; no focal deficits Psychiatric: normal affect and mood, no memory impairment Patient Vitals for the past 24 hrs: 
 BP Temp Pulse Resp SpO2  
07/25/20 1140 111/69 96.9 °F (36.1 °C) 98 19 93 % 07/25/20 0855     94 %  
07/25/20 0707 103/72 98.1 °F (36.7 °C) 100 16 92 %  
07/25/20 0315 112/77 97.6 °F (36.4 °C) (!) 115 18 96 % 07/25/20 0122   (!) 112  95 %  
07/24/20 2316 103/79 97.6 °F (36.4 °C) (!) 115 20 94 %  
07/24/20 2111   (!) 104    
07/24/20 2048     93 % 07/24/20 1927 119/79 98 °F (36.7 °C) (!) 120 20 94 %  
07/24/20 1719 111/84 98.3 °F (36.8 °C) (!) 114 20 95 %  
07/24/20 1657   (!) 120 27 93 % 07/24/20 1635   (!) 132 25 92 %  
07/24/20 1631 115/80    94 %  
07/24/20 1623  98.4 °F (36.9 °C)     
07/24/20 1601 121/83  (!) 109 21 95 %  
07/24/20 1531 114/77  (!) 113 28 95 %  
07/24/20 1500 112/80  (!) 118 19 95 %  
07/24/20 1431 101/73  (!) 120 22 94 %  
07/24/20 1400 127/76  (!) 122 20 94 %  
07/24/20 1331 (!) 126/93  (!) 125 25 94 %  
07/24/20 1311     93 % Labs:   
Recent Labs  
  07/25/20 
0530 WBC 8.3 HGB 13.5* PLT 90*   
K 4.2  CO2 35* BUN 23  
CREA 0.81 * Christin Lyons NP I have personally performed a face-to-face diagnostic evaluation and management  service on this patient. I have independently seen the patient. I have independently obtained the above history from the patient/family. I have independently examined the patient with above findings. I have independently reviewed data/labs for this patient and developed the above plan of care (MDM).  
 
Roberto Palacio MD

## 2020-07-25 NOTE — PROGRESS NOTES
Date of Outreach Update: 
Tonja Nichol was seen and assessed. Chest tubes #1 and #2 both putting out serous fluid. Previous Outreach assessment has been reviewed. There have been no significant clinical changes since the completion of the last dated Outreach assessment. Will continue to follow up per outreach protocol. Signed By:   Alix Charles   July 25, 2020 3:56 AM

## 2020-07-25 NOTE — PROGRESS NOTES
Problem: Falls - Risk of 
Goal: *Absence of Falls Description: Document Jodie Enrique Fall Risk and appropriate interventions in the flowsheet. Outcome: Progressing Towards Goal 
Note: Fall Risk Interventions: 
Mobility Interventions: Patient to call before getting OOB Medication Interventions: Patient to call before getting OOB Elimination Interventions: Call light in reach, Toileting schedule/hourly rounds History of Falls Interventions: Door open when patient unattended, Room close to nurse's station Problem: Patient Education: Go to Patient Education Activity Goal: Patient/Family Education Outcome: Progressing Towards Goal 
  
Problem: Pain Goal: *Control of Pain Outcome: Progressing Towards Goal 
Goal: *PALLIATIVE CARE:  Alleviation of Pain Outcome: Progressing Towards Goal 
  
Problem: Patient Education: Go to Patient Education Activity Goal: Patient/Family Education Outcome: Progressing Towards Goal 
  
Problem: Patient Education: Go to Patient Education Activity Goal: Patient/Family Education Outcome: Progressing Towards Goal 
  
Problem: Surgical Pathway Post-Op Day 1 Goal: Activity/Safety Outcome: Progressing Towards Goal 
Goal: Nutrition/Diet Outcome: Progressing Towards Goal 
Goal: Respiratory Outcome: Progressing Towards Goal 
Goal: Treatments/Interventions/Procedures Outcome: Progressing Towards Goal 
Goal: Psychosocial 
Outcome: Progressing Towards Goal 
Goal: *No signs and symptoms of infection or wound complications Outcome: Progressing Towards Goal 
Goal: *Optimal pain control at patient's stated goal 
Outcome: Progressing Towards Goal 
Goal: *Adequate urinary output (equal to or greater than 30 milliliters/hour) Outcome: Progressing Towards Goal 
Goal: *Hemodynamically stable Outcome: Progressing Towards Goal 
Goal: *Tolerating diet Outcome: Progressing Towards Goal 
Goal: *Demonstrates progressive activity Outcome: Progressing Towards Goal 
 Goal: *Lungs clear or at baseline Outcome: Progressing Towards Goal 
  
Problem: Surgical Pathway Post-Op Day 2 through Discharge Goal: Medications Outcome: Progressing Towards Goal 
  
Problem: Pressure Injury - Risk of 
Goal: *Prevention of pressure injury Description: Document Sidney Scale and appropriate interventions in the flowsheet. Outcome: Progressing Towards Goal 
Note: Pressure Injury Interventions: 
Sensory Interventions: Assess changes in LOC Activity Interventions: Increase time out of bed Mobility Interventions: HOB 30 degrees or less Nutrition Interventions: Document food/fluid/supplement intake, Offer support with meals,snacks and hydration Problem: Patient Education: Go to Patient Education Activity Goal: Patient/Family Education Outcome: Progressing Towards Goal

## 2020-07-25 NOTE — ROUTINE PROCESS
END OF SHIFT NOTE: 
 
INTAKE/OUTPUT 
07/24 0701 - 07/25 0700 In: 1899.7 [P.O.:960; I.V.:939.7] Out: 9291 [Y:[id number]] Voiding: NO, catheter removed, feeling urge to void. Instructed to inform staff when able before 8 pm 
 
 
 
 
Flatus: Patient does have flatus present. Stool:  0 occurrences. Characteristics: 
  
 
Emesis: 0 occurrences. Characteristics: VITAL SIGNS Patient Vitals for the past 12 hrs: 
 Temp Pulse Resp BP SpO2  
07/25/20 1452 98.1 °F (36.7 °C) (!) 116 21 103/68 94 %  
07/25/20 1337     95 %  
07/25/20 1140 96.9 °F (36.1 °C) 98 19 111/69 93 % 07/25/20 0855     94 % Pain Assessment Pain Intensity 1: 0 (07/25/20 0715) Pain Location 1: Chest, Incisional 
Pain Intervention(s) 1: Medication (see MAR) Patient Stated Pain Goal: 0 Ambulating Pt up in chair part of shift. Tolerated activity well. Medicated once with [place] for pain this shift. Shift report given to oncoming nurse at the bedside.  
 
Alfred Gibbs RN

## 2020-07-25 NOTE — PROGRESS NOTES
Date of Outreach Update: 
Caryn Meadows was seen and assessed. MEWS Score: 1 (07/25/20 0707) Vitals:  
 07/25/20 0855 07/25/20 1140 07/25/20 1337 07/25/20 1452 BP:  111/69  103/68 Pulse:  98  (!) 116 Resp:  19 21 Temp:  96.9 °F (36.1 °C)  98.1 °F (36.7 °C) SpO2: 94% 93% 95% 94% Weight:      
Height:      
  
 
 Pain Assessment Pain Intensity 1: 0 (07/25/20 0715) Pain Location 1: Chest, Incisional 
Pain Intervention(s) 1: Medication (see MAR) Patient Stated Pain Goal: 0 Previous Outreach assessment has been reviewed. There have been no significant clinical changes since the completion of the last dated Outreach assessment. Will continue to follow up per outreach protocol.  
 
Signed By:   Shawn Olmedo RN 
  July 25, 2020 5:43 PM

## 2020-07-26 ENCOUNTER — APPOINTMENT (OUTPATIENT)
Dept: GENERAL RADIOLOGY | Age: 70
DRG: 164 | End: 2020-07-26
Attending: NURSE PRACTITIONER
Payer: COMMERCIAL

## 2020-07-26 PROBLEM — I47.1 ATRIAL TACHYCARDIA (HCC): Status: ACTIVE | Noted: 2020-07-26

## 2020-07-26 LAB
ANION GAP SERPL CALC-SCNC: 6 MMOL/L (ref 7–16)
ATRIAL RATE: 129 BPM
BASOPHILS # BLD: 0 K/UL (ref 0–0.2)
BASOPHILS NFR BLD: 0 % (ref 0–2)
BUN SERPL-MCNC: 18 MG/DL (ref 8–23)
CALCIUM SERPL-MCNC: 8 MG/DL (ref 8.3–10.4)
CALCULATED P AXIS, ECG09: 27 DEGREES
CALCULATED R AXIS, ECG10: -14 DEGREES
CALCULATED T AXIS, ECG11: 40 DEGREES
CHLORIDE SERPL-SCNC: 104 MMOL/L (ref 98–107)
CO2 SERPL-SCNC: 28 MMOL/L (ref 21–32)
CREAT SERPL-MCNC: 0.68 MG/DL (ref 0.8–1.5)
DIAGNOSIS, 93000: NORMAL
DIFFERENTIAL METHOD BLD: ABNORMAL
EOSINOPHIL # BLD: 0.1 K/UL (ref 0–0.8)
EOSINOPHIL NFR BLD: 1 % (ref 0.5–7.8)
ERYTHROCYTE [DISTWIDTH] IN BLOOD BY AUTOMATED COUNT: 14.4 % (ref 11.9–14.6)
GLUCOSE SERPL-MCNC: 115 MG/DL (ref 65–100)
HCT VFR BLD AUTO: 36.2 % (ref 41.1–50.3)
HGB BLD-MCNC: 12.8 G/DL (ref 13.6–17.2)
IMM GRANULOCYTES # BLD AUTO: 0.1 K/UL (ref 0–0.5)
IMM GRANULOCYTES NFR BLD AUTO: 2 % (ref 0–5)
LYMPHOCYTES # BLD: 0.8 K/UL (ref 0.5–4.6)
LYMPHOCYTES NFR BLD: 13 % (ref 13–44)
MAGNESIUM SERPL-MCNC: 2.2 MG/DL (ref 1.8–2.4)
MCH RBC QN AUTO: 34.4 PG (ref 26.1–32.9)
MCHC RBC AUTO-ENTMCNC: 35.4 G/DL (ref 31.4–35)
MCV RBC AUTO: 97.3 FL (ref 79.6–97.8)
MONOCYTES # BLD: 0.4 K/UL (ref 0.1–1.3)
MONOCYTES NFR BLD: 7 % (ref 4–12)
NEUTS SEG # BLD: 4.8 K/UL (ref 1.7–8.2)
NEUTS SEG NFR BLD: 77 % (ref 43–78)
NRBC # BLD: 0 K/UL (ref 0–0.2)
P-R INTERVAL, ECG05: 130 MS
PLATELET # BLD AUTO: 83 K/UL (ref 150–450)
PMV BLD AUTO: 9.7 FL (ref 9.4–12.3)
POTASSIUM SERPL-SCNC: 3.6 MMOL/L (ref 3.5–5.1)
Q-T INTERVAL, ECG07: 290 MS
QRS DURATION, ECG06: 74 MS
QTC CALCULATION (BEZET), ECG08: 465 MS
RBC # BLD AUTO: 3.72 M/UL (ref 4.23–5.6)
SODIUM SERPL-SCNC: 138 MMOL/L (ref 136–145)
VENTRICULAR RATE, ECG03: 155 BPM
WBC # BLD AUTO: 6.2 K/UL (ref 4.3–11.1)

## 2020-07-26 PROCEDURE — 74011000258 HC RX REV CODE- 258: Performed by: SURGERY

## 2020-07-26 PROCEDURE — 94640 AIRWAY INHALATION TREATMENT: CPT

## 2020-07-26 PROCEDURE — 83735 ASSAY OF MAGNESIUM: CPT

## 2020-07-26 PROCEDURE — 65660000000 HC RM CCU STEPDOWN

## 2020-07-26 PROCEDURE — 71045 X-RAY EXAM CHEST 1 VIEW: CPT

## 2020-07-26 PROCEDURE — 74011250637 HC RX REV CODE- 250/637: Performed by: PHYSICIAN ASSISTANT

## 2020-07-26 PROCEDURE — 74011250637 HC RX REV CODE- 250/637: Performed by: SURGERY

## 2020-07-26 PROCEDURE — 94760 N-INVAS EAR/PLS OXIMETRY 1: CPT

## 2020-07-26 PROCEDURE — 36415 COLL VENOUS BLD VENIPUNCTURE: CPT

## 2020-07-26 PROCEDURE — 85025 COMPLETE CBC W/AUTO DIFF WBC: CPT

## 2020-07-26 PROCEDURE — 74011250637 HC RX REV CODE- 250/637: Performed by: NURSE PRACTITIONER

## 2020-07-26 PROCEDURE — 80048 BASIC METABOLIC PNL TOTAL CA: CPT

## 2020-07-26 PROCEDURE — 77010033678 HC OXYGEN DAILY

## 2020-07-26 PROCEDURE — 74011000250 HC RX REV CODE- 250: Performed by: NURSE PRACTITIONER

## 2020-07-26 PROCEDURE — 99254 IP/OBS CNSLTJ NEW/EST MOD 60: CPT | Performed by: INTERNAL MEDICINE

## 2020-07-26 RX ORDER — METOPROLOL TARTRATE 25 MG/1
25 TABLET, FILM COATED ORAL EVERY 12 HOURS
Status: DISCONTINUED | OUTPATIENT
Start: 2020-07-26 | End: 2020-07-27

## 2020-07-26 RX ORDER — POTASSIUM CHLORIDE 20 MEQ/1
40 TABLET, EXTENDED RELEASE ORAL
Status: COMPLETED | OUTPATIENT
Start: 2020-07-26 | End: 2020-07-26

## 2020-07-26 RX ADMIN — HYDROCODONE BITARTRATE AND ACETAMINOPHEN 1 TABLET: 7.5; 325 TABLET ORAL at 15:19

## 2020-07-26 RX ADMIN — PANTOPRAZOLE SODIUM 40 MG: 40 TABLET, DELAYED RELEASE ORAL at 05:35

## 2020-07-26 RX ADMIN — GABAPENTIN 300 MG: 300 CAPSULE ORAL at 15:19

## 2020-07-26 RX ADMIN — Medication 10 ML: at 14:00

## 2020-07-26 RX ADMIN — LEVALBUTEROL HYDROCHLORIDE 0.63 MG: 0.63 SOLUTION RESPIRATORY (INHALATION) at 07:54

## 2020-07-26 RX ADMIN — GABAPENTIN 300 MG: 300 CAPSULE ORAL at 09:04

## 2020-07-26 RX ADMIN — GUAIFENESIN 1200 MG: 600 TABLET ORAL at 21:18

## 2020-07-26 RX ADMIN — GABAPENTIN 300 MG: 300 CAPSULE ORAL at 21:18

## 2020-07-26 RX ADMIN — HYDROCODONE BITARTRATE AND ACETAMINOPHEN 1 TABLET: 7.5; 325 TABLET ORAL at 05:35

## 2020-07-26 RX ADMIN — DEXTROSE MONOHYDRATE AND SODIUM CHLORIDE 25 ML/HR: 5; .45 INJECTION, SOLUTION INTRAVENOUS at 17:13

## 2020-07-26 RX ADMIN — Medication 10 ML: at 05:37

## 2020-07-26 RX ADMIN — SENNOSIDES AND DOCUSATE SODIUM 2 TABLET: 8.6; 5 TABLET ORAL at 09:04

## 2020-07-26 RX ADMIN — LEVALBUTEROL HYDROCHLORIDE 0.63 MG: 0.63 SOLUTION RESPIRATORY (INHALATION) at 19:51

## 2020-07-26 RX ADMIN — SENNOSIDES AND DOCUSATE SODIUM 2 TABLET: 8.6; 5 TABLET ORAL at 18:00

## 2020-07-26 RX ADMIN — LEVALBUTEROL HYDROCHLORIDE 0.63 MG: 0.63 SOLUTION RESPIRATORY (INHALATION) at 14:45

## 2020-07-26 RX ADMIN — METOPROLOL TARTRATE 25 MG: 25 TABLET, FILM COATED ORAL at 12:45

## 2020-07-26 RX ADMIN — Medication 10 ML: at 21:23

## 2020-07-26 RX ADMIN — POTASSIUM CHLORIDE 40 MEQ: 20 TABLET, EXTENDED RELEASE ORAL at 09:04

## 2020-07-26 RX ADMIN — METOPROLOL TARTRATE 25 MG: 25 TABLET, FILM COATED ORAL at 21:18

## 2020-07-26 RX ADMIN — GUAIFENESIN 1200 MG: 600 TABLET ORAL at 09:04

## 2020-07-26 NOTE — PROGRESS NOTES
Call to Dr Jorge Hoyt regarding increased HR to 140/regular, bp 98/63. EKG, bolus of NS, bladder scan ordered. Will monitor 2048-Up to [facility]; voided 300cc julio césar urine; passed flatus. After resting, EKG done showing rate 130-155 sinus tach with PACs. No CP or SOB. 2130-Call to Dr Jorge Hoyt to inform of this and bp 86//62. 2nd bolus ordered 2230-Bp up to 103/58 /irregular. Call to Dr Jorge Hoyt with orders for D5 1/2ns at 76 and remote tele. No CP or SOB 0000-HR up to 150s when up to [facility] to pass flatus and void. HR staying 110-120 while resting.

## 2020-07-26 NOTE — PROGRESS NOTES
7/26/20 PLAN: 
Diet- Regular DVT Prophylactics- SCD/Lovenox Pain control- Dilaudid/Norco/ Neurontin SUP prophylaxis- Protonix Encourage C/DB/IS Continue Right CT x 2 to suction CXR Transferred to surgical floor 7/25/20 ASSESSMENT:  Admit Date: 7/23/2020  
3 Day Post-Op  Procedure(s) with comments: 
RIGHT THORACOTOMY W/ LOWER AND MIDDLE  LOBECTOMY AND MEDIASTINAL LYMPHAIDNECTOMY/CRYOPEXY. - RIGHT LOWER LUNG Principal Problem: 
  Cancer of right lung ([address] (7/23/2020) Active Problems: 
  Cancer of bronchus of right lower lobe ([address] (7/23/2020) Lung cancer ([address] (7/23/2020) SUBJECTIVE: 
Pt awake in bed. Postoperative pain; controlled with medication. Right CT x 2 to suction. CXR this am showing: Unchanged bibasilar lung atelectasis or infiltrates. AF, NAD. OBJECTIVE: 
Constitutional: Alert oriented cooperative patient in no acute distress; appears stated age Visit Vitals /67 Pulse (!) 114 Temp 98.5 °F (36.9 °C) Resp 20 Ht 5' 10\" (1.778 m) Wt 209 lb 12.8 oz (95.2 kg) SpO2 97% BMI 30.10 kg/m² Eyes: Sclera are clear. ENMT: no external lesions gross hearing normal; no obvious neck masses, no ear or lip lesions CV: Tachy. Normal perfusion Resp: No JVD. Breathing is  non-labored; no audible wheezing, Right CTx 2 to suction. CT#1 with 15mL output last 24hrs, CT#2 with 250mL out last 24 hours. GI: soft and non-distended Musculoskeletal: unremarkable with normal function. No embolic signs or cyanosis. Neuro:  Oriented; moves all 4; no focal deficits Psychiatric: normal affect and mood, no memory impairment Patient Vitals for the past 24 hrs: 
 BP Temp Pulse Resp SpO2  
07/26/20 0754     97 %  
07/26/20 0713 104/67 98.5 °F (36.9 °C) (!) 114 20 95 %  
07/26/20 0323 111/76 99 °F (37.2 °C) (!) 114 18 96 % 07/25/20 2233 103/58 98.7 °F (37.1 °C) (!) 110 18 97 %  
07/25/20 2120 (!) 85/62  (!) 130  95 %  
07/25/20 2119 (!) 86/59      
07/25/20 1958 98/63 98.1 °F (36.7 °C) (!) 140 20 98 %  
07/25/20 1926     92 %  
07/25/20 1915 112/73 99 °F (37.2 °C) (!) 136 21 92 %  
07/25/20 1452 103/68 98.1 °F (36.7 °C) (!) 116 21 94 %  
07/25/20 1337     95 %  
07/25/20 1140 111/69 96.9 °F (36.1 °C) 98 19 93 % 07/25/20 0855     94 % Labs:   
Recent Labs  
  07/26/20 
0281 WBC 6.2 HGB 12.8* PLT 83*   
K 3.6  CO2 28 BUN 18 CREA 0.68* * Linda Holt, NP

## 2020-07-26 NOTE — CONSULTS
[facility] Cardiology Consultation Date of  Admission: 7/23/2020  9:40 AM  
 
Primary Cardiologist: Dr. Turner Both Referring Physician: Dr. Shy Robbins Physician: Dr. Robyn Galo 
 
CC/Reason for consult: tachycardia HPI:  Zoraida Cho is a 81 y.o. male h/o GERD, HTN, OA s/p knee replacement and diagnosed with lung cancer s/p chemo and radiation who was admitted for thoractomy with lobectomy and lymphadenectomy on 7/23. Pt had sugery and was admitted to the floor post operatively. He has had sinus tachycardia with PAC's chronically since 2016. He has had some faster rates up to 150's and [facility] Cardiology was consulted. He denies CP, palpitations, LE swelling, N/V, diaphoresis. He is chronically SOB but not on home O2. He has seen Dr. Juan Carlos Torres in the past for long standing HTN. He has had >20 lb weight loss and BP is much improved. Past Medical History:  
Diagnosis Date  GERD (gastroesophageal reflux disease)   
 controlled with med  Hypertension hx-- off meds since 4/2020--- followed by dr. Nalini Clayton  Hypoxia 02/24/2020  Malignant neoplasm of lower lobe of right lung [address] [facility] 02/26/2020 REC'D CHEMO AND RADIATION--- FOLLOWED BY DR. Kaylan Jonas Osteoarthritis  Right lower lobe lung mass 02/24/2020  Status post total right knee replacement 2/29/2016 Past Surgical History:  
Procedure Laterality Date  HX COLONOSCOPY    
 HX KNEE ARTHROSCOPY Left   
 scope X 1, ACL recon X 1  
 HX KNEE ARTHROSCOPY Right 1974, 1975 X 2   
 HX KNEE REPLACEMENT Right 2016 [address]  HX VASCULAR ACCESS Right placed 3/2020  
 port in chest   
 IR INSERT TUNL CVC W PORT OVER 5 YEARS  3/18/2020  
[address] [facility] UNLISTED  1988, 1991  
 sinus surg Allergies Allergen Reactions  Celebrex [Celecoxib] Itching Social History Socioeconomic History  Marital status:  Spouse name: Not on file  Number of children: Not on file  Years of education: Not on file  Highest education level: Not on file Occupational History  Not on file Social Needs  Financial resource strain: Not on file  Food insecurity Worry: Not on file Inability: Not on file  Transportation needs Medical: Not on file Non-medical: Not on file Tobacco Use  Smoking status: Former Smoker Packs/day: 1.00 Years: 20.00 Pack years: 20.00 Last attempt to quit:  Years since quittin.5  Smokeless tobacco: Never Used Substance and Sexual Activity  Alcohol use: Yes Alcohol/week: 4.0 standard drinks Types: 4 Glasses of wine per week  Drug use: No  
 Sexual activity: Not on file Lifestyle  Physical activity Days per week: Not on file Minutes per session: Not on file  Stress: Not on file Relationships  Social connections Talks on phone: Not on file Gets together: Not on file Attends Uatsdin service: Not on file Active member of club or organization: Not on file Attends meetings of clubs or organizations: Not on file Relationship status: Not on file  Intimate partner violence Fear of current or ex partner: Not on file Emotionally abused: Not on file Physically abused: Not on file Forced sexual activity: Not on file Other Topics Concern   Service No  
 Blood Transfusions No  
 Caffeine Concern No  
 Occupational Exposure No  
 Hobby Hazards No  
 Sleep Concern No  
 Stress Concern No  
 Weight Concern No  
 Special Diet No  
 Back Care Not Asked  Exercise Yes  Bike Helmet Not Asked  [address] Yes  Self-Exams Not Asked Social History Narrative . Has long-time girlfriend. Continues to work doing IT support. Family History Problem Relation Age of Onset  Cancer Mother   
     uterine  Arrhythmia Sister   
     afib Current Facility-Administered Medications Medication Dose Route Frequency  dextrose 5 % - 0.45% NaCl infusion  25 mL/hr IntraVENous CONTINUOUS  
 levalbuterol (XOPENEX) nebulizer soln 0.63 mg/3 mL  0.63 mg Nebulization TID RT  
 sodium chloride (NS) flush 5-40 mL  5-40 mL IntraVENous Q8H  
 sodium chloride (NS) flush 5-40 mL  5-40 mL IntraVENous PRN  
 HYDROmorphone (PF) (DILAUDID) injection 1 mg  1 mg IntraVENous Q4H PRN  
 naloxone (NARCAN) injection 0.4 mg  0.4 mg IntraVENous PRN  
 ondansetron (ZOFRAN) injection 4 mg  4 mg IntraVENous Q4H PRN  
 gabapentin (NEURONTIN) capsule 300 mg  300 mg Oral TID  guaiFENesin ER (MUCINEX) tablet 1,200 mg  1,200 mg Oral Q12H  
 senna-docusate (PERICOLACE) 8.6-50 mg per tablet 2 Tab  2 Tab Oral BID  
 HYDROcodone-acetaminophen (NORCO) 7.5-325 mg per tablet 1 Tab  1 Tab Oral Q4H PRN  pantoprazole (PROTONIX) tablet 40 mg  40 mg Oral ACB Review of symptoms: 
General: + recent weight loss/gain, +weakness, fatigue, no fever or chills Skin: no rashes, lumps, or other skin changes HEENT: no headache, dizziness, lightheadedness, vision changes, hearing changes, tinnitus, vertigo, sinus pressure/pain, bleeding gums, sore throat, or hoarseness Neck: no swollen glands, goiter, pain or stiffness Respiratory: no cough, sputum, hemoptysis, +dyspnea, wheezing Cardiovascular: no chest pain or discomfort, palpitations, +dyspnea, orthopnea, paroxysmal nocturnal dyspnea, peripheral edema Gastrointestinal: no trouble swallowing, heartburn, change of appetite, nausea, change in bowel habits, pain with defecation, rectal bleeding or black/tarry stools, hemorrhoids, constipation, diarrhea, abdominal pain, jaundice, liver or gallbladder problems Urinary: no frequency, urgency , hematuria, burning/pain with urination, recent flank pain, polyuria, nocturia, or difficulty urinating Peripheral Vascular: no claudication, leg cramps, prior DVTs, swelling of calves, legs, or feet, color change, or swelling with redness or tenderness Musculoskeletal: no muscle or joint pain/stiffness, joint swelling, erythema of joints, or back pain Psychiatric: no depression, mental disorders, or excessive stress Neurological: no history of CVA, dizziness, no sensory or motor loss, seizures, syncope, tremors, numbness, tingling, no changes in mood, attention, or speech, no changes in orientation, memory, insight, or judgment. no headache, vertigo. Hematologic: + thrombocytopenia, no anemia, easy bruising or bleeding Endocrine: no diabetes, thyroid problems, heat or cold intolerance, excessive sweating, polyuria, polydipsia Subjective:  
Physical Exam 
 
Visit Vitals /67 Pulse (!) 120 Temp 98.5 °F (36.9 °C) Resp 20 Ht 5' 10\" (1.778 m) Wt 95.2 kg (209 lb 12.8 oz) SpO2 97% BMI 30.10 kg/m² General Appearance:  Well developed, well nourished, alert and oriented x 3, and individual in no acute distress. Appears dyspneic Ears/Nose/Mouth/Throat:   Hearing grossly normal. 
  
    Neck: Supple. Chest:   Lungs diminished on right. Cardiovascular:  Rapid regular rate and rhythm, S1, S2 Abdomen:   Soft, non-tender, bowel sounds are active. Extremities: No edema bilaterally. Skin: Warm and dry. Labs:  
Recent Results (from the past 24 hour(s)) EKG, 12 LEAD, INITIAL Collection Time: 07/25/20  8:53 PM  
Result Value Ref Range Ventricular Rate 155 BPM  
 Atrial Rate 129 BPM  
 P-R Interval 130 ms QRS Duration 74 ms Q-T Interval 290 ms QTC Calculation (Bezet) 465 ms Calculated P Axis 27 degrees Calculated R Axis -14 degrees Calculated T Axis 40 degrees Diagnosis Sinus tachycardia with Fusion complexes Otherwise normal ECG frequent PACS When compared with ECG of 25-JUL-2020 20:52, Fusion complexes are now Present Confirmed by Elina Alicea MD (), CARINE CHRISTINA ([id number]) on 7/26/2020 11:52:33 AM 
  
 CBC WITH AUTOMATED DIFF Collection Time: 07/26/20  5:09 AM  
Result Value Ref Range WBC 6.2 4.3 - 11.1 K/uL  
 RBC 3.72 (L) 4.23 - 5.6 M/uL  
 HGB 12.8 (L) 13.6 - 17.2 g/dL HCT 36.2 (L) 41.1 - 50.3 % MCV 97.3 79.6 - 97.8 FL  
 MCH 34.4 (H) 26.1 - 32.9 PG  
 MCHC 35.4 (H) 31.4 - 35.0 g/dL  
 RDW 14.4 11.9 - 14.6 % PLATELET 83 (L) 867 - 450 K/uL MPV 9.7 9.4 - 12.3 FL ABSOLUTE NRBC 0.00 0.0 - 0.2 K/uL  
 DF AUTOMATED NEUTROPHILS 77 43 - 78 % LYMPHOCYTES 13 13 - 44 % MONOCYTES 7 4.0 - 12.0 % EOSINOPHILS 1 0.5 - 7.8 % BASOPHILS 0 0.0 - 2.0 % IMMATURE GRANULOCYTES 2 0.0 - 5.0 %  
 ABS. NEUTROPHILS 4.8 1.7 - 8.2 K/UL  
 ABS. LYMPHOCYTES 0.8 0.5 - 4.6 K/UL  
 ABS. MONOCYTES 0.4 0.1 - 1.3 K/UL  
 ABS. EOSINOPHILS 0.1 0.0 - 0.8 K/UL  
 ABS. BASOPHILS 0.0 0.0 - 0.2 K/UL  
 ABS. IMM. GRANS. 0.1 0.0 - 0.5 K/UL METABOLIC PANEL, BASIC Collection Time: 07/26/20  5:09 AM  
Result Value Ref Range Sodium 138 136 - 145 mmol/L Potassium 3.6 3.5 - 5.1 mmol/L Chloride 104 98 - 107 mmol/L  
 CO2 28 21 - 32 mmol/L Anion gap 6 (L) 7 - 16 mmol/L Glucose 115 (H) 65 - 100 mg/dL BUN 18 8 - 23 MG/DL Creatinine 0.68 (L) 0.8 - 1.5 MG/DL  
 GFR est AA >60 >60 ml/min/1.73m2 GFR est non-AA >60 >60 ml/min/1.73m2 Calcium 8.0 (L) 8.3 - 10.4 MG/DL MAGNESIUM Collection Time: 07/26/20  5:09 AM  
Result Value Ref Range Magnesium 2.2 1.8 - 2.4 mg/dL Pt has been seen and examined by Dr. Darrius Schreiber. He agrees with the following assessment and plan. Assessment/Plan:  
   
 Diagnosis  Cancer of bronchus of right lower lobe s/p thoracotomy, lobectomy and lymphadenectomy on 7/23 by Dr. Ady Ridley Sinus Tachycardia with PACs and intermittent runs of atrial tach- add Lopressor 25 mg BID, monitor, check echo  Former smoker  Pulmonary emphysema ([address]  GERD (gastroesophageal reflux disease)- PPI  Essential hypertension with goal blood pressure less than 130/80- improved, monitor with addition of BB Thank you for consulting [facility] Cardiology and allowing us to participate in the care of this patient. We will continue to follow along with you.  
 
Marlen Vasquez PA-C

## 2020-07-26 NOTE — PROGRESS NOTES
END OF SHIFT NOTE: 
 
INTAKE/OUTPUT 
07/25 0701 - 07/26 0700 In: 0205 [I.V.:1496] Out: 1490 [Urine:1225] Voiding: YES Catheter: NO 
Drain:   
 
 
 
 
 
Flatus: Patient does have flatus present. Stool:  0 occurrences. Characteristics: 
  
 
Emesis: 0 occurrences. Characteristics: VITAL SIGNS Patient Vitals for the past 12 hrs: 
 Temp Pulse Resp BP SpO2  
07/26/20 0323 99 °F (37.2 °C) (!) 114 18 111/76 96 %  
07/25/20 2233 98.7 °F (37.1 °C) (!) 110 18 103/58 97 %  
07/25/20 2120  (!) 130  (!) 85/62 95 %  
07/25/20 2119    (!) 86/59   
07/25/20 1958 98.1 °F (36.7 °C) (!) 140 20 98/63 98 %  
07/25/20 1926     92 %  
07/25/20 1915 99 °F (37.2 °C) (!) 136 21 112/73 92 % Pain Assessment Pain Intensity 1: 0 (07/25/20 0715) Pain Location 1: Chest, Incisional 
Pain Intervention(s) 1: Medication (see MAR) Patient Stated Pain Goal: 0 Ambulating No but did take a few steps in room. Remains in sinus tach with PACs on monitor with one short burst of SVT rate in 160s while labs drawn and CXR done. Per monitor room HR stayed predominantly in the 110-120 range. Given Norco this am for pain. 0718Call to Dr. Sheldon Padilla with am vitals. Orders obtained for cardiology consult. Will have days call this. Shift report given to oncoming nurse at the bedside.  
 
Angela Gonzalez RN

## 2020-07-26 NOTE — PROGRESS NOTES
END OF SHIFT NOTE: 
 
INTAKE/OUTPUT 
07/25 0701 - 07/26 0700 In: 4898 [I.V.:1496] Out: 1490 [Urine:1225] Voiding: YES Catheter: NO 
Drain:   
 
 
Flatus: Patient does have flatus present. Stool:  1 occurrences. Characteristics: 
Stool Assessment Stool Color: Annemarie Fogo Stool Appearance: Formed Stool Amount: Large Stool Source/Status: Rectum Emesis: 0 occurrences. Characteristics: VITAL SIGNS Patient Vitals for the past 12 hrs: 
 Temp Pulse Resp BP SpO2  
07/26/20 1600  (!) 116     
07/26/20 1505 98 °F (36.7 °C) 86 21 107/70 96 %  
07/26/20 1445     95 %  
07/26/20 1200  (!) 116     
07/26/20 1130 98 °F (36.7 °C) (!) 115 22 125/77 95 %  
07/26/20 0800  (!) 120     
07/26/20 0754     97 % Pain Assessment Pain Intensity 1: 5 (07/26/20 0715) Pain Location 1: Flank Pain Intervention(s) 1: Declines Patient Stated Pain Goal: 0 Ambulating Yes, to bathroom. Shift report given to oncoming nurse at the bedside.  
 
Jess Campo RN

## 2020-07-27 LAB
ANION GAP SERPL CALC-SCNC: 6 MMOL/L (ref 7–16)
BASOPHILS # BLD: 0 K/UL (ref 0–0.2)
BASOPHILS NFR BLD: 0 % (ref 0–2)
BNP SERPL-MCNC: 238 PG/ML (ref 5–125)
BUN SERPL-MCNC: 12 MG/DL (ref 8–23)
CALCIUM SERPL-MCNC: 8.3 MG/DL (ref 8.3–10.4)
CHLORIDE SERPL-SCNC: 103 MMOL/L (ref 98–107)
CO2 SERPL-SCNC: 26 MMOL/L (ref 21–32)
CREAT SERPL-MCNC: 0.61 MG/DL (ref 0.8–1.5)
DIFFERENTIAL METHOD BLD: ABNORMAL
EOSINOPHIL # BLD: 0.1 K/UL (ref 0–0.8)
EOSINOPHIL NFR BLD: 1 % (ref 0.5–7.8)
ERYTHROCYTE [DISTWIDTH] IN BLOOD BY AUTOMATED COUNT: 14.2 % (ref 11.9–14.6)
GLUCOSE SERPL-MCNC: 108 MG/DL (ref 65–100)
HCT VFR BLD AUTO: 35.1 % (ref 41.1–50.3)
HGB BLD-MCNC: 12.6 G/DL (ref 13.6–17.2)
IMM GRANULOCYTES # BLD AUTO: 0.1 K/UL (ref 0–0.5)
IMM GRANULOCYTES NFR BLD AUTO: 2 % (ref 0–5)
LYMPHOCYTES # BLD: 0.7 K/UL (ref 0.5–4.6)
LYMPHOCYTES NFR BLD: 12 % (ref 13–44)
MCH RBC QN AUTO: 34.9 PG (ref 26.1–32.9)
MCHC RBC AUTO-ENTMCNC: 35.9 G/DL (ref 31.4–35)
MCV RBC AUTO: 97.2 FL (ref 79.6–97.8)
MONOCYTES # BLD: 0.4 K/UL (ref 0.1–1.3)
MONOCYTES NFR BLD: 6 % (ref 4–12)
NEUTS SEG # BLD: 4.9 K/UL (ref 1.7–8.2)
NEUTS SEG NFR BLD: 80 % (ref 43–78)
NRBC # BLD: 0 K/UL (ref 0–0.2)
PLATELET # BLD AUTO: 84 K/UL (ref 150–450)
PMV BLD AUTO: 9.7 FL (ref 9.4–12.3)
POTASSIUM SERPL-SCNC: 4 MMOL/L (ref 3.5–5.1)
RBC # BLD AUTO: 3.61 M/UL (ref 4.23–5.6)
SODIUM SERPL-SCNC: 135 MMOL/L (ref 136–145)
WBC # BLD AUTO: 6.2 K/UL (ref 4.3–11.1)

## 2020-07-27 PROCEDURE — 65660000000 HC RM CCU STEPDOWN

## 2020-07-27 PROCEDURE — 83880 ASSAY OF NATRIURETIC PEPTIDE: CPT

## 2020-07-27 PROCEDURE — 99232 SBSQ HOSP IP/OBS MODERATE 35: CPT | Performed by: INTERNAL MEDICINE

## 2020-07-27 PROCEDURE — 74011250636 HC RX REV CODE- 250/636: Performed by: SURGERY

## 2020-07-27 PROCEDURE — 80048 BASIC METABOLIC PNL TOTAL CA: CPT

## 2020-07-27 PROCEDURE — 36415 COLL VENOUS BLD VENIPUNCTURE: CPT

## 2020-07-27 PROCEDURE — 94640 AIRWAY INHALATION TREATMENT: CPT

## 2020-07-27 PROCEDURE — C8929 TTE W OR WO FOL WCON,DOPPLER: HCPCS

## 2020-07-27 PROCEDURE — 74011250637 HC RX REV CODE- 250/637: Performed by: NURSE PRACTITIONER

## 2020-07-27 PROCEDURE — 77010033678 HC OXYGEN DAILY

## 2020-07-27 PROCEDURE — 74011000250 HC RX REV CODE- 250: Performed by: SURGERY

## 2020-07-27 PROCEDURE — 74011250637 HC RX REV CODE- 250/637: Performed by: PHYSICIAN ASSISTANT

## 2020-07-27 PROCEDURE — 94760 N-INVAS EAR/PLS OXIMETRY 1: CPT

## 2020-07-27 PROCEDURE — 85025 COMPLETE CBC W/AUTO DIFF WBC: CPT

## 2020-07-27 PROCEDURE — 74011000250 HC RX REV CODE- 250: Performed by: NURSE PRACTITIONER

## 2020-07-27 PROCEDURE — 74011250637 HC RX REV CODE- 250/637: Performed by: SURGERY

## 2020-07-27 PROCEDURE — 74011250637 HC RX REV CODE- 250/637: Performed by: INTERNAL MEDICINE

## 2020-07-27 RX ORDER — METOPROLOL TARTRATE 25 MG/1
37.5 TABLET, FILM COATED ORAL EVERY 12 HOURS
Status: DISCONTINUED | OUTPATIENT
Start: 2020-07-27 | End: 2020-07-28

## 2020-07-27 RX ADMIN — GABAPENTIN 300 MG: 300 CAPSULE ORAL at 22:12

## 2020-07-27 RX ADMIN — HYDROCODONE BITARTRATE AND ACETAMINOPHEN 1 TABLET: 7.5; 325 TABLET ORAL at 08:57

## 2020-07-27 RX ADMIN — GUAIFENESIN 1200 MG: 600 TABLET ORAL at 22:12

## 2020-07-27 RX ADMIN — LEVALBUTEROL HYDROCHLORIDE 0.63 MG: 0.63 SOLUTION RESPIRATORY (INHALATION) at 13:48

## 2020-07-27 RX ADMIN — PANTOPRAZOLE SODIUM 40 MG: 40 TABLET, DELAYED RELEASE ORAL at 05:47

## 2020-07-27 RX ADMIN — METOPROLOL TARTRATE 37.5 MG: 25 TABLET, FILM COATED ORAL at 22:12

## 2020-07-27 RX ADMIN — GABAPENTIN 300 MG: 300 CAPSULE ORAL at 08:57

## 2020-07-27 RX ADMIN — GABAPENTIN 300 MG: 300 CAPSULE ORAL at 17:03

## 2020-07-27 RX ADMIN — METOPROLOL TARTRATE 25 MG: 25 TABLET, FILM COATED ORAL at 08:57

## 2020-07-27 RX ADMIN — PERFLUTREN 1 ML: 6.52 INJECTION, SUSPENSION INTRAVENOUS at 10:40

## 2020-07-27 RX ADMIN — SENNOSIDES AND DOCUSATE SODIUM 2 TABLET: 8.6; 5 TABLET ORAL at 17:03

## 2020-07-27 RX ADMIN — SENNOSIDES AND DOCUSATE SODIUM 2 TABLET: 8.6; 5 TABLET ORAL at 08:57

## 2020-07-27 RX ADMIN — Medication 10 ML: at 17:02

## 2020-07-27 RX ADMIN — HYDROCODONE BITARTRATE AND ACETAMINOPHEN 1 TABLET: 7.5; 325 TABLET ORAL at 03:24

## 2020-07-27 RX ADMIN — LEVALBUTEROL HYDROCHLORIDE 0.63 MG: 0.63 SOLUTION RESPIRATORY (INHALATION) at 20:40

## 2020-07-27 RX ADMIN — Medication 10 ML: at 22:14

## 2020-07-27 RX ADMIN — LEVALBUTEROL HYDROCHLORIDE 0.63 MG: 0.63 SOLUTION RESPIRATORY (INHALATION) at 07:09

## 2020-07-27 RX ADMIN — Medication 10 ML: at 03:36

## 2020-07-27 RX ADMIN — GUAIFENESIN 1200 MG: 600 TABLET ORAL at 08:57

## 2020-07-27 NOTE — PROGRESS NOTES
END OF SHIFT NOTE: 
 
INTAKE/OUTPUT 
07/26 0701 - 07/27 0700 In: 743.9 [I.V.:743.9] Out: 2164 [[facility]:1574] Voiding: YES Catheter: NO 
Drain:   
 
 
 
 
 
Flatus: Patient does have flatus present. Stool:  0 occurrences. Characteristics: 
Stool Assessment Stool Color: Lewis Hams Stool Appearance: Formed Stool Amount: Large Stool Source/Status: Rectum Emesis: 0 occurrences. Characteristics: VITAL SIGNS Patient Vitals for the past 12 hrs: 
 Temp Pulse Resp BP SpO2  
07/27/20 0357 98.9 °F (37.2 °C) (!) 102 18 102/71 94 %  
07/26/20 2320 99.1 °F (37.3 °C) (!) 101 18 98/66 96 %  
07/26/20 2118 98.6 °F (37 °C) (!) 112 18 100/66 96 %  
07/26/20 1953 98.4 °F (36.9 °C) (!) 114 20 100/75 94 %  
07/26/20 1951     97 % Pain Assessment Pain Intensity 1: 5 (07/26/20 0715) Pain Location 1: Flank Pain Intervention(s) 1: Declines Patient Stated Pain Goal: 0 Ambulating Not oob this shift. Telemetry reports he remains in sinus tach 120-130 With a burst of SVT to 160 once this shift. Spoke with  [facility] cardiology NP; will continue to monitor/ no new orders. Shift report given to oncoming nurse at the bedside.  
 
Charmayne Motts, RN

## 2020-07-27 NOTE — PROGRESS NOTES
Saw patient via virtual visit w Dr Ever Ellis and Alejandra Sullivan on 4-13-20 for Carbo/taxol weekly (week 3) with radioation. He is doing well overall. He was instructed to rinse mouth with Biotene rinse 3-4 times daily nd continue eating and drinking. Pt verbalized understanding and will return in 1 week for prechemo visit C4.  Navigation is following This is a recent snapshot of the patient's [facility] medical record.  For current drug dose and complete information and questions, call [phone] or In Basket pool, [facility]v home infusion ([id number])  CSN Number:  [id number]

## 2020-07-27 NOTE — PROGRESS NOTES
7/26/20 PLAN: 
Diet- Regular DVT Prophylactics- SCD/Lovenox Pain control- Dilaudid/Norco/ Neurontin SUP prophylaxis- Protonix Encourage C/DB/IS 
CXR- daily Transferred to surgical floor 7/25/20 Will DC posterior chest tube today Anterior chest tube to water seal 
Ambulate ASSESSMENT:  Admit Date: 7/23/2020  
4 Day Post-Op  Procedure(s) with comments: 
RIGHT THORACOTOMY W/ LOWER AND MIDDLE  LOBECTOMY AND MEDIASTINAL LYMPHAIDNECTOMY/CRYOPEXY. - RIGHT LOWER LUNG Principal Problem: 
  Cancer of right lung ([address] (7/23/2020) Active Problems: 
  Essential hypertension with goal blood pressure less than 130/80 (2/19/2018) Cancer of bronchus of right lower lobe ([address] (7/23/2020) Lung cancer ([address] (7/23/2020) Atrial tachycardia ([address] (7/26/2020) SUBJECTIVE: 
Pt in bed. No complaints. Encourage to ambulate. On RA. No air leak noted to chest tubes. Good cough effort OBJECTIVE: 
Constitutional: Alert oriented cooperative patient in no acute distress; appears stated age Visit Vitals /72 Pulse (!) 105 Temp 97.7 °F (36.5 °C) Resp 18 Ht 5' 10\" (1.778 m) Wt 209 lb 12.8 oz (95.2 kg) SpO2 96% BMI 30.10 kg/m² Eyes: Sclera are clear. ENMT: no external lesions gross hearing normal; no obvious neck masses, no ear or lip lesions CV: Tachy. Normal perfusion Resp: No JVD. Breathing is  non-labored; no audible wheezing, Right CTx 2 to suction. Place to water seal 
GI: soft and non-distended Musculoskeletal: unremarkable with normal function. No embolic signs or cyanosis. Neuro:  Oriented; moves all 4; no focal deficits Psychiatric: normal affect and mood, no memory impairment Patient Vitals for the past 24 hrs: 
 BP Temp Pulse Resp SpO2  
07/27/20 0711     96 % 07/27/20 0709 101/72 97.7 °F (36.5 °C) (!) 105 18 98 %  
07/27/20 0357 102/71 98.9 °F (37.2 °C) (!) 102 18 94 %  
07/26/20 2320 98/66 99.1 °F (37.3 °C) (!) 101 18 96 %  
07/26/20 2118 100/66 98.6 °F (37 °C) (!) 112 18 96 %  
07/26/20 1953 100/75 98.4 °F (36.9 °C) (!) 114 20 94 %  
07/26/20 1951     97 %  
07/26/20 1600   (!) 116    
07/26/20 1505 107/70 98 °F (36.7 °C) 86 21 96 %  
07/26/20 1445     95 %  
07/26/20 1200   (!) 116    
07/26/20 1130 125/77 98 °F (36.7 °C) (!) 115 22 95 %  
07/26/20 0800   (!) 120   Labs:   
Recent Labs  
  07/27/20 
8687 WBC 6.2 HGB 12.6* PLT 84* * K 4.0  
 CO2 26 BUN 12  
CREA 0.61* * Jody Hudson NP

## 2020-07-27 NOTE — PROGRESS NOTES
[facility] CARDIOLOGY PROGRESS NOTE 
      
 
7/27/2020 6:31 AM 
 
Admit Date: 7/23/2020 Subjective: Remains tachycardic. Breathing is okay this AM no CP. 
 
ROS: 
Cardiovascular:  As noted above Objective:  
  
Vitals:  
 07/26/20 1953 07/26/20 2118 07/26/20 2320 07/27/20 5222 BP: 100/75 100/66 98/66 102/71 Pulse: (!) 114 (!) 112 (!) 101 (!) 102 Resp: 20 18 18 18 Temp: 98.4 °F (36.9 °C) 98.6 °F (37 °C) 99.1 °F (37.3 °C) 98.9 °F (37.2 °C) SpO2: 94% 96% 96% 94% Weight:      
Height:      
 
 
 
Physical Exam: 
General: Well Developed, Well Nourished, No Acute Distress, Alert & Oriented x 3, Appropriate mood Neck: supple, no JVD Heart: S1S2 with RRR without murmurs or gallops Lungs: Clear throughout auscultation bilaterally without adventitious sounds Abd: soft, nontender, nondistended, with good bowel sounds Ext: no edema bilaterally Skin: warm and dry Data Review:  
Recent Labs  
  07/27/20 
0458 07/26/20 
5296 * 138  
K 4.0 3.6 MG  --  2.2 BUN 12 18 CREA 0.61* 0.68* * 115* WBC 6.2 6.2 HGB 12.6* 12.8* HCT 35.1* 36.2*  
PLT 84* 83* No results for input(s): Curlene Grade in the last 72 hours. Assessment/Plan:  
 
Principal Problem: 
  Cancer of right lung ([facility] (7/23/2020) Active Problems: 
  Essential hypertension with goal blood pressure less than 130/80 (2/19/2018) Cancer of bronchus of right lower lobe ([address] (7/23/2020) Lung cancer ([facility] [address] (7/23/2020) Atrial tachycardia ([address] (7/26/2020) A/P 
1) Atrial tach - increase metop to 37.5 mg bid 2) Lung CA - per primary team 
3) HTN - controlled Italia Rob MD 
7/27/2020 6:31 AM

## 2020-07-27 NOTE — PROGRESS NOTES
Posterior chest tube removed at end inspiration. Dressing applied. Patient tolerated well. CXR ordered for AM. Yamini Coulter NP

## 2020-07-28 ENCOUNTER — APPOINTMENT (OUTPATIENT)
Dept: GENERAL RADIOLOGY | Age: 70
DRG: 164 | End: 2020-07-28
Attending: NURSE PRACTITIONER
Payer: COMMERCIAL

## 2020-07-28 LAB
ANION GAP SERPL CALC-SCNC: 6 MMOL/L (ref 7–16)
BASOPHILS # BLD: 0 K/UL (ref 0–0.2)
BASOPHILS NFR BLD: 0 % (ref 0–2)
BUN SERPL-MCNC: 14 MG/DL (ref 8–23)
CALCIUM SERPL-MCNC: 8.1 MG/DL (ref 8.3–10.4)
CHLORIDE SERPL-SCNC: 105 MMOL/L (ref 98–107)
CO2 SERPL-SCNC: 27 MMOL/L (ref 21–32)
CREAT SERPL-MCNC: 0.7 MG/DL (ref 0.8–1.5)
DIFFERENTIAL METHOD BLD: ABNORMAL
EOSINOPHIL # BLD: 0.1 K/UL (ref 0–0.8)
EOSINOPHIL NFR BLD: 1 % (ref 0.5–7.8)
ERYTHROCYTE [DISTWIDTH] IN BLOOD BY AUTOMATED COUNT: 14.1 % (ref 11.9–14.6)
GLUCOSE SERPL-MCNC: 108 MG/DL (ref 65–100)
HCT VFR BLD AUTO: 35.1 % (ref 41.1–50.3)
HGB BLD-MCNC: 11.9 G/DL (ref 13.6–17.2)
IMM GRANULOCYTES # BLD AUTO: 0.1 K/UL (ref 0–0.5)
IMM GRANULOCYTES NFR BLD AUTO: 2 % (ref 0–5)
LYMPHOCYTES # BLD: 0.8 K/UL (ref 0.5–4.6)
LYMPHOCYTES NFR BLD: 13 % (ref 13–44)
MCH RBC QN AUTO: 33.2 PG (ref 26.1–32.9)
MCHC RBC AUTO-ENTMCNC: 33.9 G/DL (ref 31.4–35)
MCV RBC AUTO: 98 FL (ref 79.6–97.8)
MONOCYTES # BLD: 0.4 K/UL (ref 0.1–1.3)
MONOCYTES NFR BLD: 6 % (ref 4–12)
NEUTS SEG # BLD: 4.6 K/UL (ref 1.7–8.2)
NEUTS SEG NFR BLD: 77 % (ref 43–78)
NRBC # BLD: 0 K/UL (ref 0–0.2)
PLATELET # BLD AUTO: 99 K/UL (ref 150–450)
PMV BLD AUTO: 10 FL (ref 9.4–12.3)
POTASSIUM SERPL-SCNC: 3.9 MMOL/L (ref 3.5–5.1)
RBC # BLD AUTO: 3.58 M/UL (ref 4.23–5.6)
SODIUM SERPL-SCNC: 138 MMOL/L (ref 136–145)
WBC # BLD AUTO: 5.9 K/UL (ref 4.3–11.1)

## 2020-07-28 PROCEDURE — 80048 BASIC METABOLIC PNL TOTAL CA: CPT

## 2020-07-28 PROCEDURE — 74011000250 HC RX REV CODE- 250: Performed by: NURSE PRACTITIONER

## 2020-07-28 PROCEDURE — 74011250636 HC RX REV CODE- 250/636: Performed by: SURGERY

## 2020-07-28 PROCEDURE — 77010033678 HC OXYGEN DAILY

## 2020-07-28 PROCEDURE — 74011250637 HC RX REV CODE- 250/637: Performed by: SURGERY

## 2020-07-28 PROCEDURE — 74011250637 HC RX REV CODE- 250/637: Performed by: NURSE PRACTITIONER

## 2020-07-28 PROCEDURE — 65660000000 HC RM CCU STEPDOWN

## 2020-07-28 PROCEDURE — 94760 N-INVAS EAR/PLS OXIMETRY 1: CPT

## 2020-07-28 PROCEDURE — 74011250636 HC RX REV CODE- 250/636: Performed by: NURSE PRACTITIONER

## 2020-07-28 PROCEDURE — 71045 X-RAY EXAM CHEST 1 VIEW: CPT

## 2020-07-28 PROCEDURE — 85025 COMPLETE CBC W/AUTO DIFF WBC: CPT

## 2020-07-28 PROCEDURE — 74011000258 HC RX REV CODE- 258: Performed by: SURGERY

## 2020-07-28 PROCEDURE — 99232 SBSQ HOSP IP/OBS MODERATE 35: CPT | Performed by: INTERNAL MEDICINE

## 2020-07-28 PROCEDURE — 77030040393 HC DRSG OPTIFOAM GENT MDII -B

## 2020-07-28 PROCEDURE — 94640 AIRWAY INHALATION TREATMENT: CPT

## 2020-07-28 PROCEDURE — 74011000250 HC RX REV CODE- 250: Performed by: SURGERY

## 2020-07-28 PROCEDURE — 74011000258 HC RX REV CODE- 258: Performed by: NURSE PRACTITIONER

## 2020-07-28 PROCEDURE — 36415 COLL VENOUS BLD VENIPUNCTURE: CPT

## 2020-07-28 PROCEDURE — 74011250636 HC RX REV CODE- 250/636: Performed by: INTERNAL MEDICINE

## 2020-07-28 RX ORDER — ACETYLCYSTEINE 100 MG/ML
2 SOLUTION ORAL; RESPIRATORY (INHALATION)
Status: DISCONTINUED | OUTPATIENT
Start: 2020-07-28 | End: 2020-07-29

## 2020-07-28 RX ORDER — ADENOSINE 3 MG/ML
12 INJECTION, SOLUTION INTRAVENOUS ONCE
Status: COMPLETED | OUTPATIENT
Start: 2020-07-28 | End: 2020-07-28

## 2020-07-28 RX ORDER — METOPROLOL TARTRATE 25 MG/1
25 TABLET, FILM COATED ORAL EVERY 6 HOURS
Status: DISCONTINUED | OUTPATIENT
Start: 2020-07-28 | End: 2020-07-31

## 2020-07-28 RX ORDER — ALBUTEROL SULFATE 0.83 MG/ML
2.5 SOLUTION RESPIRATORY (INHALATION)
Status: DISCONTINUED | OUTPATIENT
Start: 2020-07-28 | End: 2020-07-28

## 2020-07-28 RX ORDER — ALBUTEROL SULFATE 0.83 MG/ML
2.5 SOLUTION RESPIRATORY (INHALATION)
Status: DISCONTINUED | OUTPATIENT
Start: 2020-07-28 | End: 2020-08-01 | Stop reason: HOSPADM

## 2020-07-28 RX ORDER — ACETAMINOPHEN 325 MG/1
650 TABLET ORAL
Status: DISCONTINUED | OUTPATIENT
Start: 2020-07-27 | End: 2020-08-01 | Stop reason: HOSPADM

## 2020-07-28 RX ADMIN — LEVALBUTEROL HYDROCHLORIDE 0.63 MG: 0.63 SOLUTION RESPIRATORY (INHALATION) at 07:25

## 2020-07-28 RX ADMIN — METOPROLOL TARTRATE 25 MG: 25 TABLET, FILM COATED ORAL at 15:40

## 2020-07-28 RX ADMIN — Medication 10 ML: at 23:28

## 2020-07-28 RX ADMIN — METOPROLOL TARTRATE 25 MG: 25 TABLET, FILM COATED ORAL at 03:44

## 2020-07-28 RX ADMIN — GUAIFENESIN 1200 MG: 600 TABLET ORAL at 22:21

## 2020-07-28 RX ADMIN — Medication 10 ML: at 22:21

## 2020-07-28 RX ADMIN — PANTOPRAZOLE SODIUM 40 MG: 40 TABLET, DELAYED RELEASE ORAL at 06:47

## 2020-07-28 RX ADMIN — GUAIFENESIN 1200 MG: 600 TABLET ORAL at 08:04

## 2020-07-28 RX ADMIN — GABAPENTIN 300 MG: 300 CAPSULE ORAL at 15:40

## 2020-07-28 RX ADMIN — METOPROLOL TARTRATE 25 MG: 25 TABLET, FILM COATED ORAL at 11:44

## 2020-07-28 RX ADMIN — SODIUM CHLORIDE 250 ML: 900 INJECTION, SOLUTION INTRAVENOUS at 00:05

## 2020-07-28 RX ADMIN — LEVALBUTEROL HYDROCHLORIDE 0.63 MG: 0.63 SOLUTION RESPIRATORY (INHALATION) at 14:10

## 2020-07-28 RX ADMIN — ACETYLCYSTEINE 200 MG: 100 SOLUTION ORAL; RESPIRATORY (INHALATION) at 21:44

## 2020-07-28 RX ADMIN — ADENOSINE 12 MG: 3 INJECTION INTRAVENOUS at 22:49

## 2020-07-28 RX ADMIN — METOPROLOL TARTRATE 25 MG: 25 TABLET, FILM COATED ORAL at 22:21

## 2020-07-28 RX ADMIN — DEXTROSE MONOHYDRATE AND SODIUM CHLORIDE 25 ML/HR: 5; .45 INJECTION, SOLUTION INTRAVENOUS at 15:41

## 2020-07-28 RX ADMIN — Medication 10 ML: at 06:47

## 2020-07-28 RX ADMIN — GABAPENTIN 300 MG: 300 CAPSULE ORAL at 08:04

## 2020-07-28 RX ADMIN — SODIUM CHLORIDE 500 ML: 9 INJECTION, SOLUTION INTRAVENOUS at 16:17

## 2020-07-28 RX ADMIN — GABAPENTIN 300 MG: 300 CAPSULE ORAL at 22:21

## 2020-07-28 RX ADMIN — AMIODARONE HYDROCHLORIDE 1 MG/MIN: 50 INJECTION, SOLUTION INTRAVENOUS at 23:33

## 2020-07-28 RX ADMIN — LEVALBUTEROL HYDROCHLORIDE 0.63 MG: 0.63 SOLUTION RESPIRATORY (INHALATION) at 21:44

## 2020-07-28 RX ADMIN — SENNOSIDES AND DOCUSATE SODIUM 2 TABLET: 8.6; 5 TABLET ORAL at 08:04

## 2020-07-28 RX ADMIN — Medication 10 ML: at 13:43

## 2020-07-28 RX ADMIN — AMIODARONE HYDROCHLORIDE 150 MG: 50 INJECTION, SOLUTION INTRAVENOUS at 23:23

## 2020-07-28 RX ADMIN — ACETAMINOPHEN 650 MG: 325 TABLET, FILM COATED ORAL at 00:08

## 2020-07-28 NOTE — PROGRESS NOTES
Problem: Falls - Risk of 
Goal: *Absence of Falls Description: Document Ana Dosss Fall Risk and appropriate interventions in the flowsheet. Outcome: Progressing Towards Goal 
Note: Fall Risk Interventions: 
Mobility Interventions: Patient to call before getting OOB Medication Interventions: Evaluate medications/consider consulting pharmacy, Patient to call before getting OOB Elimination Interventions: Call light in reach, Patient to call for help with toileting needs, Urinal in reach History of Falls Interventions: Door open when patient unattended, Room close to nurse's station

## 2020-07-28 NOTE — PROGRESS NOTES
500 cc NS bolus infusing per order. While in room monitor tech called and stated HR back in the 180's SVT. Sent perfect serve message to Dr. Anca Fuentes re: patient had 2D Echo on 7- @ 0000 and currently back in SVT. No new orders at present.

## 2020-07-28 NOTE — PROGRESS NOTES
Spoke with Dr. Lizzy Patrick (Cardiology) via perfect serve re: monitor tech called and stated patient HR is ranging in the 140-150's and looks to be Afib on remote telemetry monitor. BP=90/63. Verbal order received to continue to scheduled Lopressor.

## 2020-07-28 NOTE — PROGRESS NOTES
Called by primary nurse with continued tachycardia with rates reported into the 180s. Review of monitor strips show sinus tach currently with rate in the 130s. Previous review of strips show sinus tach with PACs probable short runs of ATACH. Per RN and MAR, patient was given 250ml IV bolus ordered by primary team and 37.5mg lopressor ordered by rounding cardiology team. Patient became febrile around 1912 with temp of 101, now 99.8. BP recorded at 95/61. Unable to titrate up BB at this time due to BP. Plan:  
 
- Continue scheduled BB in attempt for rate control as BP allows 
- manage temp and fever control per primary Seema Jenkins NP 
11:44 PM

## 2020-07-28 NOTE — PROGRESS NOTES
[facility] CARDIOLOGY PROGRESS NOTE 
      
 
7/28/2020 7:41 AM 
 
Admit Date: 7/23/2020 Subjective: febrile yesterday had run of AT again. Rate control limited by BP. Had one of his chest tube removed yesterday. ROS: 
Cardiovascular:  As noted above Objective:  
  
Vitals:  
 07/27/20 2303 07/28/20 0215 07/28/20 0234 07/28/20 7780 BP: 95/61 99/62 Pulse: (!) 57 96 98 Resp: 18 18 Temp: 99.8 °F (37.7 °C) 97.9 °F (36.6 °C) SpO2: 90% 92%  93% Weight:      
Height:      
 
 
 
Physical Exam: 
General: Well Developed, Well Nourished, No Acute Distress, Alert & Oriented x 3, Appropriate mood Neck: supple, no JVD Heart: S1S2 with RRR without murmurs or gallops Lungs: Clear throughout auscultation bilaterally without adventitious sounds Abd: soft, nontender, nondistended, with good bowel sounds Ext: no edema bilaterally Skin: warm and dry Data Review:  
Recent Labs  
  07/28/20 
0407 07/27/20 
0458 07/26/20 
6957  135* 138  
K 3.9 4.0 3.6 MG  --   --  2.2 BUN 14 12 18 CREA 0.70* 0.61* 0.68* * 108* 115* WBC 5.9 6.2 6.2 HGB 11.9* 12.6* 12.8* HCT 35.1* 35.1* 36.2*  
PLT 99* 84* 83* No results for input(s): Ro Seats in the last 72 hours. Assessment/Plan:  
 
Principal Problem: 
  Can[facility] ([facility] (7/23/2020) Active Problems: 
  Essential hypertension with goal blood pressure less than 130/80 (2/19/2018) Cancer of bronchus of right lower lobe ([facility] (7/23/2020) [facility] (7/23/2020) Atrial tachycardia ([facility] [facility] (7/26/2020) A/P 
1) Atrial tach - continue metoprolol at 37.5 mg BID BP limits dose escalation 2) Lung CA - per primary team 
3) HTN - controlled 4) Fever - per primary team 
 
 
Acosta Benitez MD 
7/28/2020 7:41 AM

## 2020-07-28 NOTE — PROGRESS NOTES
END OF SHIFT NOTE: 
 
INTAKE/OUTPUT 
07/27 0701 - 07/28 0700 In: 373.4 [I.V.:373.4] Out: 2640 [[facility]:[id number]] Voiding: YES Catheter: NO 
Drain:   
 
 
 
 
 
Flatus: Patient does have flatus present. Stool:  1 occurrences. Characteristics: 
Stool Assessment Stool Color: [place] Stool Appearance: Soft Stool Amount: Large Stool Source/Status: Rectum Emesis: 0 occurrences. Characteristics: VITAL SIGNS Patient Vitals for the past 12 hrs: 
 Temp Pulse Resp BP SpO2  
07/28/20 1923 98.3 °F (36.8 °C) (!) 120 20 94/63 91 %  
07/28/20 1715  (!) 124  108/76 95 %  
07/28/20 1548  (!) 189  90/65   
07/28/20 1502 97.8 °F (36.6 °C) (!) 113 20 103/72 92 %  
07/28/20 1500    90/63   
07/28/20 1410     95 %  
07/28/20 1100 98.1 °F (36.7 °C) (!) 107 18 104/73 95 % Pain Assessment Pain Intensity 1: 0 (07/28/20 1100) Pain Location 1: Chest 
Pain Intervention(s) 1: Declines Patient Stated Pain Goal: 0 Ambulating Yes Shift report given to oncoming nurse at the bedside.  
 
Idris Jesus RN

## 2020-07-28 NOTE — PROGRESS NOTES
Monitor room called regarding heart rate staying in the 180's. Patient was jumping between 130's and 180's. He is now staying in the 180's. Patient was slightly SOB, O2 sat was 92%. Patient placed on 2 liters of oxygen. Dr Cristian Holland called and notified. BP was 95/61. New order for 250 ml bolus of NS and was asked to call Cardiology. Cardiology was notified of the changes. Cardiology to come see patient. Will continue to monitor.

## 2020-07-28 NOTE — PROGRESS NOTES
7/26/20 PLAN: 
Diet- Regular DVT Prophylactics- SCD/Lovenox Pain control- Dilaudid/Norco/ Neurontin SUP prophylaxis- Protonix Encourage C/DB/IS 
CXR- daily Posterior chest tube removed 7/27/20 DC anterior chest tube today Ambulate Appreciate cardiology input ASSESSMENT:  Admit Date: 7/23/2020  
5 Day Post-Op  Procedure(s) with comments: 
RIGHT THORACOTOMY W/ LOWER AND MIDDLE  LOBECTOMY AND MEDIASTINAL LYMPHAIDNECTOMY/CRYOPEXY. - RIGHT LOWER LUNG Principal Problem: 
  Cancer of right lung ([address] (7/23/2020) Active Problems: 
  Essential hypertension with goal blood pressure less than 130/80 (2/19/2018) Cancer of bronchus of right lower lobe ([address] (7/23/2020) Lung cancer ([address] (7/23/2020) Atrial tachycardia ([address] (7/26/2020) SUBJECTIVE: 
Pt in bed. No complaints. Chest tube to water seal, no air leak noted, chest xray reviewed. O2 2L 93%. Heart rate currently low 100's and BP 91/68. Cardiology following. - continue BB as BP allows. Tmax 101 OBJECTIVE: 
Constitutional: Alert oriented cooperative patient in no acute distress; appears stated age Visit Vitals BP 91/68 (BP 1 Location: Left arm, BP Patient Position: At rest) Pulse (!) 104 Temp 98.8 °F (37.1 °C) Resp 18 Ht 5' 10\" (1.778 m) Wt 209 lb 12.8 oz (95.2 kg) SpO2 100% BMI 30.10 kg/m² Eyes: Sclera are clear. ENMT: no external lesions gross hearing normal; no obvious neck masses, no ear or lip lesions CV: Tachy. Normal perfusion Resp: No JVD. Breathing is  non-labored; no audible wheezing, chest tube times 1 with no air leak noted GI: soft and non-distended Musculoskeletal: unremarkable with normal function. No embolic signs or cyanosis. Neuro:  Oriented; moves all 4; no focal deficits Psychiatric: normal affect and mood, no memory impairment Patient Vitals for the past 24 hrs: 
 BP Temp Pulse Resp SpO2  
07/28/20 0730 91/68 98.8 °F (37.1 °C) (!) 104 18 100 % 07/28/20 0726     93 % 07/28/20 0344   98    
07/28/20 0215 99/62 97.9 °F (36.6 °C) 96 18 92 %  
07/27/20 2303 95/61 99.8 °F (37.7 °C) (!) 57 18 90 % 07/27/20 2212   (!) 135    
07/27/20 2137   (!) 131    
07/27/20 2040     95 %  
07/27/20 1922  99.3 °F (37.4 °C) (!) 122    
07/27/20 1912 111/72 (!) 101 °F (38.3 °C) (!) 126 18 94 %  
07/27/20 1600   (!) 116    
07/27/20 1517 90/58 98.7 °F (37.1 °C) (!) 109 18 93 % 07/27/20 1348     95 %  
07/27/20 1200   94    
07/27/20 1134 94/62 97.8 °F (36.6 °C) 95 18 95 % Labs:   
Recent Labs  
  07/28/20 
0407 WBC 5.9 HGB 11.9*  
PLT 99*   
K 3.9  CO2 27 BUN 14  
CREA 0.70* * Luciana Ripper, NP

## 2020-07-28 NOTE — PROGRESS NOTES
Rapid Response called d/t monitor tech called and stated HR was in the high 180's Aflutter. BP=90/65. 02= 93% RA. Patient with intermit SOB. Patient warm/dry. Scheduled Lopressor given @ 66 91 21 Dr. Phoebe Beck and Dr. Celia Fair responded to RR.  
 
1551- BP=82/60. EG=430 Verbal order received for 500 cc NS bolus and 2D Echo .  Labs reviewed by MD.

## 2020-07-28 NOTE — PROGRESS NOTES
END OF SHIFT NOTE: 
 
INTAKE/OUTPUT 
07/27 0701 - 07/28 0700 In: 373.4 [I.V.:373.4] Out: 2640 [[facility]:[id number]] Voiding: YES Catheter: YES Drain:   
 
 
 
 
 
Flatus: Patient does not have flatus present. Stool:  0 occurrences. Characteristics: 
Stool Assessment Stool Color: [place] Stool Appearance: Formed Stool Amount: Large Stool Source/Status: Rectum Emesis: 0 occurrences. Characteristics: VITAL SIGNS Patient Vitals for the past 12 hrs: 
 Temp Pulse Resp BP SpO2  
07/28/20 0730 98.8 °F (37.1 °C) (!) 104 18 91/68 100 % 07/28/20 0726     93 % 07/28/20 0344  98     
07/28/20 0215 97.9 °F (36.6 °C) 96 18 99/62 92 %  
07/27/20 2303 99.8 °F (37.7 °C) (!) 57 18 95/61 90 % 07/27/20 2212  (!) 135     
07/27/20 2137  (!) 131     
07/27/20 2040     95 % Pain Assessment Pain Intensity 1: 0 (07/28/20 0245) Pain Location 1: Flank Pain Intervention(s) 1: Declines Patient Stated Pain Goal: 0 Ambulating No 
 
Shift report given to oncoming nurse at the bedside. Venessa Bui

## 2020-07-29 ENCOUNTER — APPOINTMENT (OUTPATIENT)
Dept: GENERAL RADIOLOGY | Age: 70
DRG: 164 | End: 2020-07-29
Attending: NURSE PRACTITIONER
Payer: COMMERCIAL

## 2020-07-29 PROBLEM — Z90.2 S/P LOBECTOMY OF LUNG: Status: ACTIVE | Noted: 2020-07-29

## 2020-07-29 PROBLEM — R05.9 COUGH: Status: ACTIVE | Noted: 2020-07-29

## 2020-07-29 LAB
ANION GAP SERPL CALC-SCNC: 5 MMOL/L (ref 7–16)
BASOPHILS # BLD: 0 K/UL (ref 0–0.2)
BASOPHILS NFR BLD: 0 % (ref 0–2)
BNP SERPL-MCNC: 390 PG/ML (ref 5–125)
BUN SERPL-MCNC: 13 MG/DL (ref 8–23)
CALCIUM SERPL-MCNC: 8.6 MG/DL (ref 8.3–10.4)
CHLORIDE SERPL-SCNC: 104 MMOL/L (ref 98–107)
CO2 SERPL-SCNC: 29 MMOL/L (ref 21–32)
CREAT SERPL-MCNC: 0.78 MG/DL (ref 0.8–1.5)
DIFFERENTIAL METHOD BLD: ABNORMAL
EOSINOPHIL # BLD: 0.1 K/UL (ref 0–0.8)
EOSINOPHIL NFR BLD: 1 % (ref 0.5–7.8)
ERYTHROCYTE [DISTWIDTH] IN BLOOD BY AUTOMATED COUNT: 14.4 % (ref 11.9–14.6)
GLUCOSE SERPL-MCNC: 100 MG/DL (ref 65–100)
HCT VFR BLD AUTO: 34.8 % (ref 41.1–50.3)
HGB BLD-MCNC: 11.7 G/DL (ref 13.6–17.2)
IMM GRANULOCYTES # BLD AUTO: 0.1 K/UL (ref 0–0.5)
IMM GRANULOCYTES NFR BLD AUTO: 1 % (ref 0–5)
LYMPHOCYTES # BLD: 0.9 K/UL (ref 0.5–4.6)
LYMPHOCYTES NFR BLD: 15 % (ref 13–44)
MCH RBC QN AUTO: 33.4 PG (ref 26.1–32.9)
MCHC RBC AUTO-ENTMCNC: 33.6 G/DL (ref 31.4–35)
MCV RBC AUTO: 99.4 FL (ref 79.6–97.8)
MONOCYTES # BLD: 0.5 K/UL (ref 0.1–1.3)
MONOCYTES NFR BLD: 8 % (ref 4–12)
NEUTS SEG # BLD: 4.6 K/UL (ref 1.7–8.2)
NEUTS SEG NFR BLD: 75 % (ref 43–78)
NRBC # BLD: 0 K/UL (ref 0–0.2)
PLATELET # BLD AUTO: 108 K/UL (ref 150–450)
PMV BLD AUTO: 9.9 FL (ref 9.4–12.3)
POTASSIUM SERPL-SCNC: 4.1 MMOL/L (ref 3.5–5.1)
RBC # BLD AUTO: 3.5 M/UL (ref 4.23–5.6)
SODIUM SERPL-SCNC: 138 MMOL/L (ref 136–145)
WBC # BLD AUTO: 6.1 K/UL (ref 4.3–11.1)

## 2020-07-29 PROCEDURE — 74011250636 HC RX REV CODE- 250/636: Performed by: NURSE PRACTITIONER

## 2020-07-29 PROCEDURE — 74011250637 HC RX REV CODE- 250/637: Performed by: NURSE PRACTITIONER

## 2020-07-29 PROCEDURE — 94640 AIRWAY INHALATION TREATMENT: CPT

## 2020-07-29 PROCEDURE — 85025 COMPLETE CBC W/AUTO DIFF WBC: CPT

## 2020-07-29 PROCEDURE — 74011000250 HC RX REV CODE- 250: Performed by: SURGERY

## 2020-07-29 PROCEDURE — 74011000250 HC RX REV CODE- 250: Performed by: NURSE PRACTITIONER

## 2020-07-29 PROCEDURE — 99232 SBSQ HOSP IP/OBS MODERATE 35: CPT | Performed by: INTERNAL MEDICINE

## 2020-07-29 PROCEDURE — 80048 BASIC METABOLIC PNL TOTAL CA: CPT

## 2020-07-29 PROCEDURE — 99223 1ST HOSP IP/OBS HIGH 75: CPT | Performed by: INTERNAL MEDICINE

## 2020-07-29 PROCEDURE — 74011000250 HC RX REV CODE- 250: Performed by: INTERNAL MEDICINE

## 2020-07-29 PROCEDURE — 83880 ASSAY OF NATRIURETIC PEPTIDE: CPT

## 2020-07-29 PROCEDURE — 65660000000 HC RM CCU STEPDOWN

## 2020-07-29 PROCEDURE — 74011250637 HC RX REV CODE- 250/637: Performed by: SURGERY

## 2020-07-29 PROCEDURE — 71045 X-RAY EXAM CHEST 1 VIEW: CPT

## 2020-07-29 PROCEDURE — 74011000258 HC RX REV CODE- 258: Performed by: NURSE PRACTITIONER

## 2020-07-29 PROCEDURE — 77030003560 HC NDL HUBR BARD -A

## 2020-07-29 PROCEDURE — 36415 COLL VENOUS BLD VENIPUNCTURE: CPT

## 2020-07-29 RX ORDER — GUAIFENESIN/DEXTROMETHORPHAN 100-10MG/5
10 SYRUP ORAL
Status: DISCONTINUED | OUTPATIENT
Start: 2020-07-29 | End: 2020-08-01 | Stop reason: HOSPADM

## 2020-07-29 RX ORDER — ACETYLCYSTEINE 100 MG/ML
2 SOLUTION ORAL; RESPIRATORY (INHALATION)
Status: DISCONTINUED | OUTPATIENT
Start: 2020-07-29 | End: 2020-08-01 | Stop reason: HOSPADM

## 2020-07-29 RX ADMIN — GUAIFENESIN 1200 MG: 600 TABLET ORAL at 08:47

## 2020-07-29 RX ADMIN — ACETYLCYSTEINE 200 MG: 100 SOLUTION ORAL; RESPIRATORY (INHALATION) at 08:38

## 2020-07-29 RX ADMIN — SENNOSIDES AND DOCUSATE SODIUM 2 TABLET: 8.6; 5 TABLET ORAL at 08:47

## 2020-07-29 RX ADMIN — GABAPENTIN 300 MG: 300 CAPSULE ORAL at 21:57

## 2020-07-29 RX ADMIN — GUAIFENESIN AND DEXTROMETHORPHAN 10 ML: 100; 10 SYRUP ORAL at 14:05

## 2020-07-29 RX ADMIN — ACETYLCYSTEINE 200 MG: 100 SOLUTION ORAL; RESPIRATORY (INHALATION) at 13:59

## 2020-07-29 RX ADMIN — AMIODARONE HYDROCHLORIDE 150 MG: 50 INJECTION, SOLUTION INTRAVENOUS at 23:32

## 2020-07-29 RX ADMIN — Medication 10 ML: at 14:11

## 2020-07-29 RX ADMIN — GUAIFENESIN 1200 MG: 600 TABLET ORAL at 21:57

## 2020-07-29 RX ADMIN — Medication 10 ML: at 06:49

## 2020-07-29 RX ADMIN — SENNOSIDES AND DOCUSATE SODIUM 2 TABLET: 8.6; 5 TABLET ORAL at 18:35

## 2020-07-29 RX ADMIN — METOPROLOL TARTRATE 25 MG: 25 TABLET, FILM COATED ORAL at 03:51

## 2020-07-29 RX ADMIN — AMIODARONE HYDROCHLORIDE 1 MG/MIN: 50 INJECTION, SOLUTION INTRAVENOUS at 22:01

## 2020-07-29 RX ADMIN — LEVALBUTEROL HYDROCHLORIDE 0.63 MG: 0.63 SOLUTION RESPIRATORY (INHALATION) at 08:38

## 2020-07-29 RX ADMIN — PANTOPRAZOLE SODIUM 40 MG: 40 TABLET, DELAYED RELEASE ORAL at 06:49

## 2020-07-29 RX ADMIN — GABAPENTIN 300 MG: 300 CAPSULE ORAL at 08:47

## 2020-07-29 RX ADMIN — LEVALBUTEROL HYDROCHLORIDE 0.63 MG: 0.63 SOLUTION RESPIRATORY (INHALATION) at 20:09

## 2020-07-29 RX ADMIN — METOPROLOL TARTRATE 25 MG: 25 TABLET, FILM COATED ORAL at 15:48

## 2020-07-29 RX ADMIN — GABAPENTIN 300 MG: 300 CAPSULE ORAL at 15:48

## 2020-07-29 RX ADMIN — METOPROLOL TARTRATE 25 MG: 25 TABLET, FILM COATED ORAL at 10:08

## 2020-07-29 RX ADMIN — AMIODARONE HYDROCHLORIDE 1 MG/MIN: 50 INJECTION, SOLUTION INTRAVENOUS at 08:49

## 2020-07-29 RX ADMIN — METOPROLOL TARTRATE 25 MG: 25 TABLET, FILM COATED ORAL at 21:57

## 2020-07-29 RX ADMIN — LEVALBUTEROL HYDROCHLORIDE 0.63 MG: 0.63 SOLUTION RESPIRATORY (INHALATION) at 13:58

## 2020-07-29 RX ADMIN — ACETYLCYSTEINE 200 MG: 100 SOLUTION ORAL; RESPIRATORY (INHALATION) at 20:09

## 2020-07-29 NOTE — PROGRESS NOTES
TRANSFER - IN REPORT: 
 
[facility] [address] [place]. being received from @nd surgical  for routine progression of care. Report consisted of patients Situation, Background, Assessment and Recommendations(SBAR). Information from the following report(s) SBAR, Kardex, OR Summary, MAR, Recent Results, Med Rec Status and Cardiac Rhythm SVT was reviewed. Opportunity for questions and clarification was provided. Assessment completed upon patients arrival to unit and care assumed. Patient received to room 315. Patient connected to monitor and assessment completed. Plan of care reviewed. Patient oriented to room and call light. Patient aware to use call light to communicate any chest pain or needs. Admission skin assessment completed with second RN and reveals the following: Right side of chest with dressing from post chest tube site. Incision to posterior right side of back with staples and opened to air. Scar to right knee. Sacrum is free of redness or breakdown.

## 2020-07-29 NOTE — PROGRESS NOTES
Left forearm IV with amio infiltration. IV removed, warm compress placed over site, site tender, no redness, will monitor.

## 2020-07-29 NOTE — PROGRESS NOTES
7/26/20 PLAN: 
Diet- Regular DVT Prophylactics- SCD Pain control- Dilaudid/Norco/ Neurontin SUP prophylaxis- Protonix Encourage C/DB/IS 
CXR- daily Posterior chest tube removed 7/27/20 Anterior chest tube removed 7/28/20 Ambulate Appreciate cardiology input Will consult pulmonary for constant cough OK to start anticoagulation per cardiology from surgery standpoint ASSESSMENT:  Admit Date: 7/23/2020  
5 Day Post-Op  Procedure(s) with comments: 
RIGHT THORACOTOMY W/ LOWER AND MIDDLE  LOBECTOMY AND MEDIASTINAL LYMPHAIDNECTOMY/CRYOPEXY. - RIGHT LOWER LUNG Principal Problem: 
  Cancer of right lung ([address] (7/23/2020) Active Problems: 
  Essential hypertension with goal blood pressure less than 130/80 (2/19/2018) Cancer of bronchus of right lower lobe ([address] (7/23/2020) Lung cancer ([address] (7/23/2020) Atrial tachycardia ([address] (7/26/2020) SUBJECTIVE: 
Pt in bed. Complains of cough. O2 2L 93%. Cardiology following. -currently on amiodarone gtt and in NSR this AM. OBJECTIVE: 
Constitutional: Alert oriented cooperative patient in no acute distress; appears stated age Visit Vitals /77 (BP 1 Location: Left arm, BP Patient Position: At rest) Pulse (!) 102 Temp 98.7 °F (37.1 °C) Resp 18 Ht 5' 10\" (1.778 m) Wt 215 lb (97.5 kg) SpO2 94% BMI 30.85 kg/m² Eyes: Sclera are clear. ENMT: no external lesions gross hearing normal; no obvious neck masses, no ear or lip lesions CV: Tachy. Normal perfusion Resp: No JVD. Breathing is  non-labored; no audible wheezing GI: soft and non-distended Musculoskeletal: unremarkable with normal function. No embolic signs or cyanosis. Neuro:  Oriented; moves all 4; no focal deficits Psychiatric: normal affect and mood, no memory impairment Patient Vitals for the past 24 hrs: 
 BP Temp Pulse Resp SpO2 Height Weight  
07/29/20 0854 106/77 98.7 °F (37.1 °C) (!) 102 18 94 %    
07/29/20 0838     94 %    
07/29/20 0647 99/71  93      
07/29/20 0519 99/70 98.4 °F (36.9 °C) 88 20 95 %  215 lb (97.5 kg) 07/29/20 0345 104/65  96      
07/29/20 0211 91/67  98      
07/29/20 0154 94/63  97      
07/29/20 0145 (!) 86/61  97      
07/29/20 0123 92/62 99.4 °F (37.4 °C) (!) 104 20 97 %    
07/28/20 2337 96/69  (!) 159      
07/28/20 2329 103/55  (!) 176      
07/28/20 2320 107/78 98.9 °F (37.2 °C) (!) 103 20 94 % 5' 10\" (1.778 m) 211 lb 8 oz (95.9 kg) 07/28/20 2249   (!) 190      
07/28/20 2144     92 %    
07/28/20 1923 94/63 98.3 °F (36.8 °C) (!) 120 20 91 %    
07/28/20 1715 108/76  (!) 124  95 %    
07/28/20 1548 90/65  (!) 189      
07/28/20 1502 103/72 97.8 °F (36.6 °C) (!) 113 20 92 %    
07/28/20 1500 90/63        
07/28/20 1410     95 %    
07/28/20 1100 104/73 98.1 °F (36.7 °C) (!) 107 18 95 %   Labs:   
Recent Labs  
  07/29/20 
0107 WBC 6.1 HGB 11.7* *   
K 4.1  CO2 29 BUN 13  
CREA 0.78*  Shaniqua Loza NP

## 2020-07-29 NOTE — PROGRESS NOTES
Called by primary RN with HR in 175-190. Review of cardiac monitor shows SVT v AFIB w RVR. Will give 12mg IV adenosine at the bedside. After adenosine, rhythm strip shows AFIB w RVR. Will transfer to telemetry for continued care. Due to BP of 94/63, will start IV amiodarone drip with 150mg bolus. No anticoagulation for now, will need to be addressed with primary team due to recent surgery.   
 
 
Franca Owen NP 
10:54 PM

## 2020-07-29 NOTE — PROGRESS NOTES
Bedside and verbal report received from Jess, [address] [facility] Sarahi freemanusing

## 2020-07-29 NOTE — PROGRESS NOTES
Amiodarone drip stopped at this time due to low BP 86/61. Patient is in NSR, HR 97. Patient is sleeping without any distress.

## 2020-07-29 NOTE — PROGRESS NOTES
TRANSFER - OUT REPORT: 
 
Verbal report given to Monique Fuentes on [address] [place].  being transferred to Telemetry for change in patient condition(Increase heartrate) Report consisted of patients Situation, Background, Assessment and  
Recommendations(SBAR). Information from the following report(s) SBAR, Kardex and MAR was reviewed with the receiving nurse. Lines:  
Peripheral IV 07/23/20 Right Arm (Active) Site Assessment Clean, dry, & intact 07/28/20 1925 Phlebitis Assessment 0 07/28/20 1925 Infiltration Assessment 0 07/28/20 1925 Dressing Status Clean, dry, & intact 07/28/20 1925 Dressing Type Stabilization/securement device;Tape;Transparent 07/28/20 1925 Hub Color/Line Status Flushed;Patent 07/28/20 1925 Alcohol Cap Used No 07/24/20 0700 Peripheral IV 07/28/20 Left;Posterior Forearm (Active) Opportunity for questions and clarification was provided. Patient transported with: 
 Registered Nurse and WhoAPI

## 2020-07-29 NOTE — PROGRESS NOTES
Care Management Interventions PCP Verified by CM: Yes Mode of Transport at Discharge: Self Transition of Care Consult (CM Consult): Discharge Planning Physical Therapy Consult: No 
Occupational Therapy Consult: No 
Current Support Network: Own Home, Other(Liives with girlfriend) Confirm Follow Up Transport: Family The Patient and/or Patient Representative was Provided with a Choice of Provider and Agrees with the Discharge Plan?: Yes Freedom of Choice List was Provided with Basic Dialogue that Supports the Patient's Individualized Plan of Care/Goals, Treatment Preferences and Shares the Quality Data Associated with the Providers?: Yes [employer] Information Provided?: No 
Discharge Location Discharge Placement: Home CM met with pt to discuss DCP. Pt has no DME needs at this time. No difficulty obtaining medications. Pt drives. CM to continue to follow.

## 2020-07-29 NOTE — PROGRESS NOTES
Heart rate much more controlled 105-110. Continues Amiodarone drip at 1mg. BP's are soft in 90's. Will continue to monitor.

## 2020-07-29 NOTE — PROGRESS NOTES
Per monitor room patient is having V Tach's 190's. On call MEHDI Schulte notified of patient's change in condition. Will be down to see patient.

## 2020-07-29 NOTE — CONSULTS
PULMONARY/CCM CONSULT :  7/29/2020 Date of Admission:  7/23/2020 The patient's chart has been reviewed and the chart has been discussed with nursing staff. Subjective: This patient has been seen and evaluated at the request of Nieves Valladares NP. Patient is a 81 y.o. male presents with persistent cough 5 days post right thoracotomy w/ lower and middle lobectomy and mediastinal lymphoidectomy/cryopexy of the right lower lung  for a large central right lung cancer per Dr David Lee. Anterior CT pulled 7/27, posterior CT pulled 7/28. Now with worsening CXR today and cough since procedure. He is a known patient to us last seen in the office 7/7/20 , but this admission was initially a Category C. He is currently sitting in room on 2 LPM via NC with O2 sat 94%. This hospitalization has been complicated by his ongoing issues with tachycardia. Cardiology was initially trying to stay away from [facility], given his lung history, but he had to be initiated on an infusion on 7/28 for A fib. Anticoagulation are currently being held with recent surgery. He reports a nagging mildly productive cough since surgery. He is a former smoker, 35 pack year history, quit 7 years ago. 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 We were consulted for assistance with management of a cough. Past Surgical History:  
Procedure Laterality Date  HX COLONOSCOPY    
 HX KNEE ARTHROSCOPY Left   
 scope X 1, ACL recon X 1  
 HX KNEE ARTHROSCOPY Right 1974, 1975 X 2   
 HX KNEE REPLACEMENT Right 2016 [address] [facility] [address]  HX VASCULAR ACCESS Right placed 3/2020  
 port in chest   
 IR INSERT TUNL CVC W PORT OVER 5 YEARS  3/18/2020  
[facility] UNLISTED  1988, 1991  
 sinus surg Social History Tobacco Use  Smoking status: Former Smoker Packs/day: 1.00 Years: 20.00 Pack years: 20.00 Last attempt to quit: 2014 Years since quittin.5  Smokeless tobacco: Never Used Substance Use Topics  Alcohol use: Yes Alcohol/week: 4.0 standard drinks Types: 4 Glasses of wine per week Family History Problem Relation Age of Onset  Cancer Mother   
     uterine  Arrhythmia Sister   
     afib Allergies Allergen Reactions  Celebrex [Celecoxib] Itching Prior to Admission Medications Prescriptions Last Dose Informant Patient Reported? Taking? Nebulizer & Compressor machine Unknown at Unknown time  No No  
Sig: COPD  
albuterol (PROVENTIL HFA, VENTOLIN HFA, PROAIR HFA) 90 mcg/actuation inhaler Unknown at Unknown time  No No  
Sig: Take 1 Puff by inhalation every four (4) hours as needed for Wheezing or Shortness of Breath. Patient taking differently: Take 1 Puff by inhalation every four (4) hours as needed for Wheezing or Shortness of Breath. Uses 2 times daily  
albuterol-ipratropium (DUO-NEB) 2.5 mg-0.5 mg/3 ml nebu Unknown at Unknown time  No No  
Sig: 3 mL by Nebulization route every six (6) hours as needed for Wheezing. Patient taking differently: 3 mL by Nebulization route every six (6) hours as needed for Wheezing. Uses 2 times daily  
dexAMETHasone (DECADRON) 2 mg tablet Unknown at Unknown time  No No  
Sig: 3 tabs by mouth daily x 4 days then 2 tabs by mouth daily x 4 days then one tab daily then stop  Indications: prevent nausea and vomiting from cancer chemotherapy Patient taking differently: Take 2 mg by mouth Daily (before breakfast). Indications: prevent nausea and vomiting from cancer chemotherapy  
esomeprazole (NexIUM) 20 mg capsule Unknown at Unknown time  Yes No  
Sig: Take 20 mg by mouth nightly.   
hydrocortisone (HYTONE) 2.5 % topical cream Not Taking at Unknown time  No No  
Sig: Apply  to affected area four (4) times daily as needed (skin changes related to radiation therapy). use thin layer  
lidocaine-prilocaine (EMLA) topical cream Unknown at Unknown time  No No  
Sig: Apply  to affected area as needed for Pain. ondansetron hcl (ZOFRAN) 8 mg tablet Not Taking at Unknown time  No No  
Sig: Take 1 Tab by mouth every eight (8) hours as needed for Nausea. Indications: nausea and vomiting caused by cancer drugs  
polyethylene glycol (MIRALAX) 17 gram packet Unknown at Unknown time  No No  
Sig: Take 1 Packet by mouth daily. Indications: constipation Patient taking differently: Take 17 g by mouth daily as needed. Indications: constipation  
prochlorperazine (COMPAZINE) 10 mg tablet Not Taking at Unknown time  No No  
Sig: Take 1 Tab by mouth every six (6) hours as needed for Nausea. Indications: nausea and vomiting caused by cancer drugs, nausea and vomiting Facility-Administered Medications: None MEDS SCHEDULED:   
Current Facility-Administered Medications Medication Dose Route Frequency  guaiFENesin-dextromethorphan (ROBITUSSIN DM) 100-10 mg/5 mL syrup 10 mL  10 mL Oral Q6H PRN  
 acetylcysteine (MUCOMYST) 100 mg/mL (10 %) nebulizer solution 200 mg  2 mL Nebulization Q6HWA RT  
 acetaminophen (TYLENOL) tablet 650 mg  650 mg Oral Q4H PRN  
 metoprolol tartrate (LOPRESSOR) tablet 25 mg  25 mg Oral Q6H  
 albuterol (PROVENTIL VENTOLIN) nebulizer solution 2.5 mg  2.5 mg Nebulization Q4H PRN  
 amiodarone (CORDARONE) 450 mg in dextrose 5% 250 mL infusion  0.5-1 mg/min IntraVENous TITRATE  dextrose 5 % - 0.45% NaCl infusion  25 mL/hr IntraVENous CONTINUOUS  
 levalbuterol (XOPENEX) nebulizer soln 0.63 mg/3 mL  0.63 mg Nebulization TID RT  
 sodium chloride (NS) flush 5-40 mL  5-40 mL IntraVENous Q8H  
 sodium chloride (NS) flush 5-40 mL  5-40 mL IntraVENous PRN  
 HYDROmorphone (PF) (DILAUDID) injection 1 mg  1 mg IntraVENous Q4H PRN  
 naloxone (NARCAN) injection 0.4 mg  0.4 mg IntraVENous PRN  
  ondansetron (ZOFRAN) injection 4 mg  4 mg IntraVENous Q4H PRN  
 gabapentin (NEURONTIN) capsule 300 mg  300 mg Oral TID  guaiFENesin ER (MUCINEX) tablet 1,200 mg  1,200 mg Oral Q12H  
 senna-docusate (PERICOLACE) 8.6-50 mg per tablet 2 Tab  2 Tab Oral BID  
 HYDROcodone-acetaminophen (NORCO) 7.5-325 mg per tablet 1 Tab  1 Tab Oral Q4H PRN  pantoprazole (PROTONIX) tablet 40 mg  40 mg Oral ACB Review of Systems Pertinent items are noted in HPI. Objective:  
 
Vitals:  
 20 3042 20 0882 20 6918 20 3040 BP: 99/70 99/71  106/77 Pulse: 88 93  (!) 102 Resp: 20   18 Temp: 98.4 °F (36.9 °C)   98.7 °F (37.1 °C) SpO2: 95%  94% 94% Weight: 215 lb (97.5 kg) Height:      
 
No intake/output data recorded.  1901 -  0700 In: 548.1 [I.V.:548.1] Out: 1950 [SMHI] PHYSICAL EXAM  
 
Physical Exam:  
General:  Alert, cooperative, no acute distress, appears stated age. Eyes:  Conjunctivae/corneas clear. Nose: Nares patent and moist.   
Mouth/Throat: Lips, mucosa, and tongue pink and intact. Neck: Supple, symmetrical.  
Respiratory:   Rhonchi on R, L clear Cardiovascular:  Regular rate and rhythm, S1, S2, no murmur, click, rub or gallop. Telemetry monitor:A fib, on amio GI:   Abdomen soft, non-tender. Bowel sounds active X 4 Q. No masses, Musculoskeletal: Extremities symmetrical, atraumatic, no cyanosis, no edema. Pulses: 2+ and symmetric all extremities. Skin: Skin color, texture, turgor normal. No rashes or lesions Neurologic: 2+ strength bilateral upper and lower extremities, sensation throughout appropriate. Alert and oriented. [facility]: 
2020 LABS Recent Labs  
  20 
0348 20 
0407 20 
0458 WBC 6.1 5.9 6.2 HGB 11.7* 11.9* 12.6* HCT 34.8* 35.1* 35.1*  
* 99* 84* Recent Labs  
  20 
0348 20 
0407 20 
0458  138 135* K 4.1 3.9 4.0  
 105 103  108* 108* CO2 29 27 26 BUN 13 14 12 CREA 0.78* 0.70* 0.61* No results for input(s): PH, PCO2, PO2, HCO3 in the last 72 hours. Assessment:  
 
Hospital Problems  Date Reviewed: 7/23/2020 Codes Class Noted POA  
 S/P lobectomy of lung ICD-10-CM: Z90.2 ICD-9-CM: V45.89  7/29/2020 Unknown Cough ICD-10-CM: R05 ICD-9-CM: 786.2  7/29/2020 Unknown Atrial tachycardia (HCC) ICD-10-CM: I47.1 ICD-9-CM: 427.89  7/26/2020 Unknown Cancer of bronchus of right lower lobe [facility]) ICD-10-CM: C34.31 
ICD-9-CM: 162.5  7/23/2020 Unknown Lung cancer [facility]) ICD-10-CM: C34.90 ICD-9-CM: 162.9  7/23/2020 Unknown * (Principal) Cancer of right lung [facility]) ICD-10-CM: C34.91 
ICD-9-CM: 162.9  7/23/2020 Unknown Essential hypertension with goal blood pressure less than 130/80 (Chronic) ICD-10-CM: I10 
ICD-9-CM: 401.9  2/19/2018 Yes 5 days post op RIGHT THORACOTOMY W/ LOWER AND MIDDLE  LOBECTOMY AND MEDIASTINAL LYMPHAIDNECTOMY/CRYOPEXY. - RIGHT LOWER LUNG. Anterior CT pulled 7/27, posterior CT pulled 7/28. Now with worsening CXR today and cough since procedure. Plan:  
--continue protonix --cont BD 
--cont mucomyst nebs 
--cont mucinex 
--add Robitussin, try to limit as don't want to suppress cough entirely, but may help him rest at night 
--monitor R small effusion, may need ultrasound to evaluate need for tap>>  Tap if further development? He is only 5 days post of thoracotomy  
--Pa/Lat tomorrow 
--wean O2 as tolerated  
--monitor labs Theodor Erm,  NP-C More than 50% of time documented was spent in face-to-face contact with the patient and in the care of the patient on the floor/unit where the patient is located. Lungs:  CTA B, no w/r/r Heart:  RRR with no Murmur/Rubs/Gallops Additional Comments:   
Patient with acute onset of cough following lobectomy for stage III lung cancer. Did have cough with B pulmonary infiltrates, covid negative, in the past month treated with steroids. Possibly related to pneumonitis. This time, however, he describes a \"gurgling\" feeling and inability to clear secretions linked to his cough. Most likely retained secretions following surgery. Was bad last night but states he feels significantly better today. Discussed with him the role of bronchoscopy. Does not sound like he needs that right now, but will make him NPO after MN in case he worsens again and needs this tomorrow. I have spoken with and examined the patient. I agree with the above assessment and plan as documented.  
 
Carrol Rojas MD

## 2020-07-29 NOTE — PROGRESS NOTES
Problem: Falls - Risk of 
Goal: *Absence of Falls Description: Document Sissy Wadsworth Fall Risk and appropriate interventions in the flowsheet. Outcome: Progressing Towards Goal 
Note: Fall Risk Interventions: 
Mobility Interventions: Communicate number of staff needed for ambulation/transfer, Patient to call before getting OOB, PT Consult for mobility concerns Medication Interventions: Patient to call before getting OOB, Teach patient to arise slowly Elimination Interventions: Call light in reach, Patient to call for help with toileting needs, Stay With Me (per policy), Toilet paper/wipes in reach, Toileting schedule/hourly rounds, Urinal in reach History of Falls Interventions: Door open when patient unattended, Room close to nurse's station

## 2020-07-29 NOTE — PROGRESS NOTES
[facility] CARDIOLOGY PROGRESS NOTE 
      
 
7/29/2020 9:12 AM 
 
Admit Date: 7/23/2020 Subjective: Multiple episode of Atrial tach yesterday, afib overnight now on amiodarone gtt ROS: 
Cardiovascular:  As noted above Objective:  
  
Vitals:  
 07/29/20 4161 07/29/20 2836 07/29/20 7931 07/29/20 0354 BP: 99/70 99/71  106/77 Pulse: 88 93  (!) 102 Resp: 20   18 Temp: 98.4 °F (36.9 °C)   98.7 °F (37.1 °C) SpO2: 95%  94% 94% Weight: 215 lb (97.5 kg) Height: On telemetry: sr 
 
 
Physical Exam: 
General: Well Developed, Well Nourished, No Acute Distress, Alert & Oriented x 3, Appropriate mood Neck: supple, no JVD Heart: S1S2 with RRR without murmurs or gallops Lungs: Clear throughout auscultation bilaterally without adventitious sounds Abd: soft, nontender, nondistended, with good bowel sounds Ext: no edema bilaterally Skin: warm and dry Data Review:  
Recent Labs  
  07/29/20 
0348 07/28/20 
0407  138  
K 4.1 3.9 BUN 13 14 CREA 0.78* 0.70*  108* WBC 6.1 5.9 HGB 11.7* 11.9*  
HCT 34.8* 35.1*  
* 99* No results for input(s): Yunior Macias in the last 72 hours. Assessment/Plan:  
 
Principal Problem: 
  Cancer of right lung ([address] (7/23/2020) Active Problems: 
  Essential hypertension with goal blood pressure less than 130/80 (2/19/2018) Cancer of bronchus of right lower lobe ([address] (7/23/2020) Lung cancer ([facility] [address] (7/23/2020) Atrial tachycardia ([address] (7/26/2020) A/P 
1) Atrial tach now Afib- started on amiodarone gtt was trying to avoid this with his lung disease be feel that our hands are tied at this point. Will do a gtt, hold anticoag with recent surgery. 2) Lung CA - per primary team 
3) HTN - controlled Michelle Gonzalez MD 
7/29/2020 9:12 AM

## 2020-07-30 ENCOUNTER — APPOINTMENT (OUTPATIENT)
Dept: GENERAL RADIOLOGY | Age: 70
DRG: 164 | End: 2020-07-30
Attending: NURSE PRACTITIONER
Payer: COMMERCIAL

## 2020-07-30 LAB
ATRIAL RATE: 166 BPM
CALCULATED R AXIS, ECG10: -19 DEGREES
CALCULATED T AXIS, ECG11: 43 DEGREES
DIAGNOSIS, 93000: NORMAL
Q-T INTERVAL, ECG07: 354 MS
QRS DURATION, ECG06: 86 MS
QTC CALCULATION (BEZET), ECG08: 476 MS
VENTRICULAR RATE, ECG03: 109 BPM

## 2020-07-30 PROCEDURE — 74011250637 HC RX REV CODE- 250/637: Performed by: INTERNAL MEDICINE

## 2020-07-30 PROCEDURE — 93005 ELECTROCARDIOGRAM TRACING: CPT | Performed by: INTERNAL MEDICINE

## 2020-07-30 PROCEDURE — 74011000250 HC RX REV CODE- 250: Performed by: INTERNAL MEDICINE

## 2020-07-30 PROCEDURE — 74011250636 HC RX REV CODE- 250/636: Performed by: NURSE PRACTITIONER

## 2020-07-30 PROCEDURE — 74011000258 HC RX REV CODE- 258: Performed by: NURSE PRACTITIONER

## 2020-07-30 PROCEDURE — 65660000000 HC RM CCU STEPDOWN

## 2020-07-30 PROCEDURE — 74011000250 HC RX REV CODE- 250: Performed by: NURSE PRACTITIONER

## 2020-07-30 PROCEDURE — 94760 N-INVAS EAR/PLS OXIMETRY 1: CPT

## 2020-07-30 PROCEDURE — 74011250637 HC RX REV CODE- 250/637: Performed by: NURSE PRACTITIONER

## 2020-07-30 PROCEDURE — 99232 SBSQ HOSP IP/OBS MODERATE 35: CPT | Performed by: INTERNAL MEDICINE

## 2020-07-30 PROCEDURE — 71046 X-RAY EXAM CHEST 2 VIEWS: CPT

## 2020-07-30 PROCEDURE — 74011250637 HC RX REV CODE- 250/637: Performed by: SURGERY

## 2020-07-30 PROCEDURE — 94640 AIRWAY INHALATION TREATMENT: CPT

## 2020-07-30 RX ORDER — AMIODARONE HYDROCHLORIDE 200 MG/1
400 TABLET ORAL 2 TIMES DAILY
Status: DISCONTINUED | OUTPATIENT
Start: 2020-07-30 | End: 2020-08-01

## 2020-07-30 RX ADMIN — PANTOPRAZOLE SODIUM 40 MG: 40 TABLET, DELAYED RELEASE ORAL at 05:21

## 2020-07-30 RX ADMIN — LEVALBUTEROL HYDROCHLORIDE 0.63 MG: 0.63 SOLUTION RESPIRATORY (INHALATION) at 13:15

## 2020-07-30 RX ADMIN — ACETYLCYSTEINE 200 MG: 100 SOLUTION ORAL; RESPIRATORY (INHALATION) at 13:15

## 2020-07-30 RX ADMIN — GABAPENTIN 300 MG: 300 CAPSULE ORAL at 21:40

## 2020-07-30 RX ADMIN — GABAPENTIN 300 MG: 300 CAPSULE ORAL at 16:11

## 2020-07-30 RX ADMIN — GUAIFENESIN 1200 MG: 600 TABLET ORAL at 08:34

## 2020-07-30 RX ADMIN — ACETAMINOPHEN 650 MG: 325 TABLET, FILM COATED ORAL at 21:51

## 2020-07-30 RX ADMIN — SENNOSIDES AND DOCUSATE SODIUM 2 TABLET: 8.6; 5 TABLET ORAL at 17:39

## 2020-07-30 RX ADMIN — METOPROLOL TARTRATE 25 MG: 25 TABLET, FILM COATED ORAL at 05:21

## 2020-07-30 RX ADMIN — Medication 10 ML: at 05:24

## 2020-07-30 RX ADMIN — ACETYLCYSTEINE 200 MG: 100 SOLUTION ORAL; RESPIRATORY (INHALATION) at 07:26

## 2020-07-30 RX ADMIN — METOPROLOL TARTRATE 25 MG: 25 TABLET, FILM COATED ORAL at 21:40

## 2020-07-30 RX ADMIN — GABAPENTIN 300 MG: 300 CAPSULE ORAL at 08:34

## 2020-07-30 RX ADMIN — ACETYLCYSTEINE 200 MG: 100 SOLUTION ORAL; RESPIRATORY (INHALATION) at 21:19

## 2020-07-30 RX ADMIN — LEVALBUTEROL HYDROCHLORIDE 0.63 MG: 0.63 SOLUTION RESPIRATORY (INHALATION) at 07:26

## 2020-07-30 RX ADMIN — SENNOSIDES AND DOCUSATE SODIUM 2 TABLET: 8.6; 5 TABLET ORAL at 08:34

## 2020-07-30 RX ADMIN — METOPROLOL TARTRATE 25 MG: 25 TABLET, FILM COATED ORAL at 16:11

## 2020-07-30 RX ADMIN — AMIODARONE HYDROCHLORIDE 400 MG: 200 TABLET ORAL at 17:39

## 2020-07-30 RX ADMIN — AMIODARONE HYDROCHLORIDE 1 MG/MIN: 50 INJECTION, SOLUTION INTRAVENOUS at 05:45

## 2020-07-30 RX ADMIN — APIXABAN 5 MG: 5 TABLET, FILM COATED ORAL at 17:41

## 2020-07-30 RX ADMIN — GUAIFENESIN 1200 MG: 600 TABLET ORAL at 21:40

## 2020-07-30 RX ADMIN — LEVALBUTEROL HYDROCHLORIDE 0.63 MG: 0.63 SOLUTION RESPIRATORY (INHALATION) at 21:20

## 2020-07-30 RX ADMIN — METOPROLOL TARTRATE 25 MG: 25 TABLET, FILM COATED ORAL at 11:32

## 2020-07-30 NOTE — PROGRESS NOTES
[facility] CARDIOLOGY PROGRESS NOTE 
      
 
7/30/2020 12:04 PM 
 
Admit Date: 7/23/2020 Subjective: Doing well ROS: 
Cardiovascular:  As noted above Objective:  
  
Vitals:  
 07/30/20 9664 07/30/20 7306 07/30/20 6559 07/30/20 1132 BP: 96/68  92/64 98/67 Pulse: (!) 108  (!) 110 (!) 110 Resp:   19 Temp:   98.5 °F (36.9 °C) SpO2:  94% 94% Weight:      
Height: On telemetry: In and out of afib Physical Exam: 
General: Well Developed, Well Nourished, No Acute Distress, Alert & Oriented x 3, Appropriate mood Neck: supple, no JVD Heart: S1S2 with RRR without murmurs or gallops Lungs: Clear throughout auscultation bilaterally without adventitious sounds Abd: soft, nontender, nondistended, with good bowel sounds Ext: no edema bilaterally Skin: warm and dry Data Review:  
Recent Labs  
  07/29/20 
0348 07/28/20 
0407  138  
K 4.1 3.9 BUN 13 14 CREA 0.78* 0.70*  108* WBC 6.1 5.9 HGB 11.7* 11.9*  
HCT 34.8* 35.1*  
* 99* No results for input(s): Suzanne Solano in the last 72 hours. Assessment/Plan:  
 
Principal Problem: 
  Cancer of right lung ([facility] [address] (7/23/2020) Active Problems: 
  Essential hypertension with goal blood pressure less than 130/80 (2/19/2018) Cancer of bronchus of right lower lobe ([address] (7/23/2020) Lung cancer ([address] (7/23/2020) Atrial tachycardia ([address] (7/26/2020) S/P lobectomy of lung (7/29/2020) Cough (7/29/2020) A/P 
1) Atrial tach now Afib- will change to oral amiodarone 400 mg BID. Start eliquis 5 mg BID for primary stroke prevention. 2) Lung CA - per primary team 
3) HTN - controlled Rolando Cash MD 
7/30/2020 12:04 PM

## 2020-07-30 NOTE — PROGRESS NOTES
Comprehensive Nutrition Assessment Type and Reason for Visit: Initial, RD nutrition re-screen/LOS Nutrition Assessment:  Patient with recent diagnosis of lung cancer s/p chemo/XRT. Admitted for thoracotomy and lobectomy. Now s/p surgery 7/23. other PMG significant for HTN, GERD. Patient seen today. He states that his appetite is good. He states that he was eating well early in the admission, but has declined recently as he is tired of the food. He states that some things are very good, like dinner last evening, but does not like other foods like lunch today. He states that he thinks he is doing okay, and reports he does not need anything. Current Nutrition Therapies: DIET CARDIAC Regular Anthropometric Measures: 
· Height:  5' 10\" (177.8 cm) · Current Body Wt:  98.9 kg (218 lb 0.6 oz)(bed weight) · Body mass index is 31.28 kg/m². · BMI Categories:  Obese class 1 (BMI 30.0-34. 9) · Patient seen by this RD last admission. Weight then was 95.6 kg, this reveals weight gain. Nutrition Diagnosis:  
· Inadequate oral intake related to (food preferences) as evidenced by (patient reported barrier to PO intake, diet recall) Nutrition Interventions:  
Food and/or Nutrient Delivery: Continue current diet - encouraged patient to work with PDA for meal selections to increase acceptance of meals. Goals: 
Meet at least 75% nutrition needs within 7 days Nutrition Monitoring and Evaluation:  
Food/Nutrient Intake Outcomes: Food and nutrient intake Discharge Planning: Too soon to determine [address] [place], LD on 7/30/2020 at 2:02 PM 
Contact: [phone]

## 2020-07-30 NOTE — PROGRESS NOTES
TRANSFER - OUT REPORT: 
 
Verbal report given to Tamy Means on [employer].  being transferred to GI lab for ordered procedure Report consisted of patients Situation, Background, Assessment and Recommendations(SBAR). Information from the following report(s) SBAR, Kardex, Intake/Output and MAR was reviewed with the receiving nurse. Opportunity for questions and clarification was provided.

## 2020-07-30 NOTE — ROUTINE PROCESS
Bedside and Verbal shift change report given to Toro lipscomb RN (oncoming nurse) by self Gil phoenix). Report included the following information SBAR, Kardex, Intake/Output, MAR, and Recent Results.

## 2020-07-30 NOTE — ROUTINE PROCESS
Bedside and Verbal shift change report given to Branden Quintanilla RN (oncoming nurse) by  Mey hirsch. Report included the following information SBAR, Kardex, Intake/Output, MAR and Recent Results.

## 2020-07-30 NOTE — PROGRESS NOTES
Dunia Joseph. Admission Date: 7/23/2020 Daily Progress Note: 7/30/2020 The patient's chart is reviewed and the patient is discussed with the staff. Patient is a 81 y.o. male presents with persistent cough 5 days post right thoracotomy w/ lower and middle lobectomy and mediastinal lymphoidectomy/cryopexy of the right lower lung  for a large central right lung cancer per Dr Chapin Cowart. Anterior CT pulled 7/27, posterior CT pulled 7/28. Now with worsening CXR today and cough since procedure. He is a known patient to us last seen in the office 7/7/20 , but this admission was initially a Category C. He is currently sitting in room on 2 LPM via NC with O2 sat 94%. This hospitalization has been complicated by his ongoing issues with tachycardia. Cardiology was initially trying to stay away from [facility], given his lung history, but he had to be initiated on an infusion on 7/28 for A fib. Anticoagulation are currently being held with recent surgery. He reports a nagging mildly productive cough since surgery. He is a former smoker, 35 pack year history, quit 7 years ago. PMHx includes RLL mass measuring 6.7 cm x 5.9 cm, EBUS 2/26/20 per Dr Jenna Vásquez, negative brain MRI for mets, 2 mc RT parotid mass on 3/17, GERD, HTN, hypoxia, OA, and emphysema (he was not on home O2 PTA). We were consulted for assistance with management of a cough. Subjective:  
 
Patient is feeling much better today. No reported cough through the night. O2 @ 2L at this time. Took off to go to the bathroom. No reported dyspnea with this activity. Denies other symptoms at this time. Current Facility-Administered Medications Medication Dose Route Frequency  guaiFENesin-dextromethorphan (ROBITUSSIN DM) 100-10 mg/5 mL syrup 10 mL  10 mL Oral Q6H PRN  
 acetylcysteine (MUCOMYST) 100 mg/mL (10 %) nebulizer solution 200 mg  2 mL Nebulization Q6HWA RT  
 acetaminophen (TYLENOL) tablet 650 mg  650 mg Oral Q4H PRN  
  metoprolol tartrate (LOPRESSOR) tablet 25 mg  25 mg Oral Q6H  
 albuterol (PROVENTIL VENTOLIN) nebulizer solution 2.5 mg  2.5 mg Nebulization Q4H PRN  
 amiodarone (CORDARONE) 450 mg in dextrose 5% 250 mL infusion  0.5-1 mg/min IntraVENous TITRATE  dextrose 5 % - 0.45% NaCl infusion  25 mL/hr IntraVENous CONTINUOUS  
 levalbuterol (XOPENEX) nebulizer soln 0.63 mg/3 mL  0.63 mg Nebulization TID RT  
 sodium chloride (NS) flush 5-40 mL  5-40 mL IntraVENous Q8H  
 sodium chloride (NS) flush 5-40 mL  5-40 mL IntraVENous PRN  
 HYDROmorphone (PF) (DILAUDID) injection 1 mg  1 mg IntraVENous Q4H PRN  
 naloxone (NARCAN) injection 0.4 mg  0.4 mg IntraVENous PRN  
 ondansetron (ZOFRAN) injection 4 mg  4 mg IntraVENous Q4H PRN  
 gabapentin (NEURONTIN) capsule 300 mg  300 mg Oral TID  guaiFENesin ER (MUCINEX) tablet 1,200 mg  1,200 mg Oral Q12H  
 senna-docusate (PERICOLACE) 8.6-50 mg per tablet 2 Tab  2 Tab Oral BID  
 HYDROcodone-acetaminophen (NORCO) 7.5-325 mg per tablet 1 Tab  1 Tab Oral Q4H PRN  pantoprazole (PROTONIX) tablet 40 mg  40 mg Oral ACB Review of Systems Constitutional: negative for fever, chills, sweats Cardiovascular: negative for chest pain, palpitations, syncope, edema Gastrointestinal:  negative for dysphagia, reflux, vomiting, diarrhea, abdominal pain, or melena Neurologic:  negative for focal weakness, numbness, headache Objective:  
 
Vitals:  
 07/30/20 9092 07/30/20 4123 07/30/20 9725 07/30/20 1132 BP: 96/68  92/64 98/67 Pulse: (!) 108  (!) 110 (!) 110 Resp:   19 Temp:   98.5 °F (36.9 °C) SpO2:  94% 94% Weight:      
Height:      
 
 
 
Intake/Output Summary (Last 24 hours) at 7/30/2020 1152 Last data filed at 7/30/2020 8445 Gross per 24 hour Intake 370 ml Output 1800 ml Net -1430 ml Physical Exam:  
Constitution:  the patient is well developed and in no acute distress EENMT:  Sclera clear, pupils equal, oral mucosa moist 
 Respiratory: Clear upper lobes bilateral. Diminished right lower. Cardiovascular:  Irregular regular rate & rhythm without M,G,R 
Gastrointestinal: soft and non-tender; with positive bowel sounds. Musculoskeletal: warm without cyanosis. There is no lower extremity edema. Skin:  no jaundice or rashes Neurologic: no gross neuro deficits Psychiatric:  alert and oriented x 3 CXR:  
 
 
CHEST X-RAY, 2 views. 
  
HISTORY:  Status post thoracotomy, right lower lobe lung cancer and pleural 
effusion. 
  
TECHNIQUE: PA and lateral views. 
  
COMPARISON: Multiple recent exams. . 
  
FINDINGS:  
-Lungs: Right hemidiaphragm is elevated. Blunting right costophrenic angle. Faint patchy density right lower lung zone stable. Left lung is clear.  
-Costophrenic angles: are sharp.  
-Heart size: is normal.  
-Pulmonary vasculature: is unremarkable. -Included portion of the upper abdomen: is unremarkable. -Bones: No gross bony lesions. 
-Other: Right-sided chest port and skin staples are present 
  
IMPRESSION IMPRESSION: Stable faint densities right lower lung zone. Probable small pleural 
effusion. No new abnormality. LAB No results for input(s): GLUCPOC in the last 72 hours. No lab exists for component: [place] Recent Labs  
  07/29/20 
0348 07/28/20 
0407 WBC 6.1 5.9 HGB 11.7* 11.9*  
HCT 34.8* 35.1*  
* 99* Recent Labs  
  07/29/20 
0348 07/28/20 
0407  138  
K 4.1 3.9  105 CO2 29 27  108* BUN 13 14 CREA 0.78* 0.70* CA 8.6 8.1* No results for input(s): PH, PCO2, PO2, HCO3, PHI, PCO2I, PO2I, HCO3I in the last 72 hours. No results for input(s): LCAD, LAC in the last 72 hours. Assessment:  (Medical Decision Making) Hospital Problems  Date Reviewed: 7/23/2020 Codes Class Noted POA  
 S/P lobectomy of lung ICD-10-CM: Z90.2 ICD-9-CM: V45.89  7/29/2020 Unknown Cough ICD-10-CM: R05 ICD-9-CM: 786.2  7/29/2020 Unknown Atrial tachycardia (HCC) ICD-10-CM: I47.1 ICD-9-CM: 427.89  7/26/2020 Unknown Cancer of bronchus of right lower lobe [facility]) ICD-10-CM: C34.31 
ICD-9-CM: 162.5  7/23/2020 Unknown Lung cancer [facility]) ICD-10-CM: C34.90 ICD-9-CM: 162.9  7/23/2020 Unknown * (Principal) Cancer of right lung [facility]) ICD-10-CM: C34.91 
ICD-9-CM: 162.9  7/23/2020 Unknown Essential hypertension with goal blood pressure less than 130/80 (Chronic) ICD-10-CM: I10 
ICD-9-CM: 401.9  2/19/2018 Yes Plan:  (Medical Decision Making) --CXR today with stable findings 
--Cough improved today - One dose of Robitussin given 
--O2 at 2L - Continue weaning 
--No labs today 
--Encourage OOT and activity 
--Mucomyst & Xopenox nebs More than 50% of the time documented was spent in face-to-face contact with the patient and in the care of the patient on the floor/unit where the patient is located. Mikie Handler, NP Lungs:  Mildly reduced on the right. Heart:  RRR with no Murmur/Rubs/Gallops Additional Comments:   
Patient's cough significantly improved/resolved. No need for bronch today. Already has diet re-ordered. Continue current cough medications. Hopefully will continue to improve from here. Will check on him once more tomorrow. I have spoken with and examined the patient. I agree with the above assessment and plan as documented.  
 
Eliecer Perry MD

## 2020-07-30 NOTE — PROGRESS NOTES
TRANSFER - IN REPORT: 
 
Verbal report received from Den Lynn RN on Sudha Alvarez.  being received from [id number] for ordered procedure Report consisted of patients Situation, Background, Assessment and  
Recommendations(SBAR). Information from the following report(s) SBAR, Kardex, OR Summary, Intake/Output, MAR, Cardiac Rhythm AFib and Procedure Verification was reviewed with the receiving nurse. Opportunity for questions and clarification was provided.

## 2020-07-30 NOTE — PROGRESS NOTES
Patient has Amio IV drip infusing at 1mg/min. Per protocol says to decrease to 0.5 mg/min after 6 hours. Patient HR is sustaining 130-160 , BP 94/58 patient asymptomatic. Notified Amarilis Andrew NP orders given to give 150 mg bolus of amiodarone and keep the IV infusing at 1 mg/min.

## 2020-07-30 NOTE — ROUTINE PROCESS
Bedside and Verbal shift change report given to self (oncoming nurse) by  Jesus Huang RN (offgoing nurse). Report included the following information SBAR, Kardex, Intake/Output, MAR, and Recent Results.

## 2020-07-30 NOTE — PROGRESS NOTES
Bedside and Verbal shift change report given to  (oncoming nurse) by Nona Spaulding RN (offgoing nurse). Report included the following information SBAR, Kardex, Intake/Output, MAR and Recent Results.

## 2020-07-30 NOTE — ROUTINE PROCESS
Bedside and Verbal shift change report given to  (oncoming nurse) by Bessy Gutierrez RN (offgoing nurse). Report included the following information SBAR, Kardex, Intake/Output, MAR and Recent Results.

## 2020-07-30 NOTE — PROGRESS NOTES
7/26/20 PLAN: 
Diet- Regular DVT Prophylactics- SCD Pain control- Dilaudid/Norco/ Neurontin SUP prophylaxis- Protonix Encourage C/DB/IS Posterior chest tube removed 7/27/20 Anterior chest tube removed 7/28/20 Can remove chest tube dressings 7/31/2020 Ambulate Appreciate cardiology input Appreciate pulmonary input OK for discharge when ok from cardiology standpoint- currently still on amiodarone gtt OK to start anticoagulation per cardiology from surgery standpoint ASSESSMENT:  Admit Date: 7/23/2020  
7 Day Post-Op  Procedure(s) with comments: 
RIGHT THORACOTOMY W/ LOWER AND MIDDLE  LOBECTOMY AND MEDIASTINAL LYMPHAIDNECTOMY/CRYOPEXY. - RIGHT LOWER LUNG Principal Problem: 
  Cancer of right lung ([address] (7/23/2020) Active Problems: 
  Essential hypertension with goal blood pressure less than 130/80 (2/19/2018) Cancer of bronchus of right lower lobe ([address] (7/23/2020) Lung cancer ([address] (7/23/2020) Atrial tachycardia ([address] (7/26/2020) S/P lobectomy of lung (7/29/2020) Cough (7/29/2020) SUBJECTIVE: 
Pt in bed. States cough is much improved. No complaints  Cardiology following. -currently on amiodarone gtt and in NSR this AM. OBJECTIVE: 
Constitutional: Alert oriented cooperative patient in no acute distress; appears stated age Visit Vitals BP 96/68 Pulse (!) 108 Temp 98 °F (36.7 °C) Resp 20 Ht 5' 10\" (1.778 m) Wt 218 lb (98.9 kg) SpO2 94% BMI 31.28 kg/m² Eyes: Sclera are clear. ENMT: no external lesions gross hearing normal; no obvious neck masses, no ear or lip lesions CV: Tachy. Normal perfusion Resp: No JVD. Breathing is  non-labored; no audible wheezing GI: soft and non-distended Musculoskeletal: unremarkable with normal function. No embolic signs or cyanosis. Neuro: Oriented; moves all 4; no focal deficits Psychiatric: normal affect and mood, no memory impairment Patient Vitals for the past 24 hrs: 
 BP Temp Pulse Resp SpO2 Weight  
07/30/20 0724     94 %   
07/30/20 0521 96/68  (!) 108     
07/30/20 0507 96/68 98 °F (36.7 °C) (!) 114 20 96 % 218 lb (98.9 kg) 07/30/20 0257   (!) 101     
07/30/20 0114 102/64 98.1 °F (36.7 °C) (!) 102 20 95 %   
07/30/20 0029   (!) 114     
07/29/20 2345 95/69  (!) 115     
07/29/20 2341 93/58  (!) 149     
07/29/20 2337 (!) 87/60  (!) 140     
07/29/20 2334 92/69  (!) 130     
07/29/20 2332 94/68  (!) 150     
07/29/20 2123 95/61 98.7 °F (37.1 °C) (!) 137 18 94 %   
07/29/20 2045 97/61  (!) 150     
07/29/20 2009     97 %   
07/29/20 1743 100/69 98.3 °F (36.8 °C) (!) 135 18 98 %   
07/29/20 1359     96 %   
07/29/20 1321 99/71 98.6 °F (37 °C) 94 19 96 %   
07/29/20 0854 106/77 98.7 °F (37.1 °C) (!) 102 18 94 %   
07/29/20 0838     94 %  Labs:   
Recent Labs  
  07/29/20 
2174 WBC 6.1 HGB 11.7* *   
K 4.1  CO2 29 BUN 13  
CREA 0.78*  Jody Hudson, NP

## 2020-07-30 NOTE — PROGRESS NOTES
Spiritual Care Visit, initial visit. Visited with patient at bedside. Prayed for patient's healing and health. Visit by Ozzie Bollman Ronda Hammans, Staff .  Drea., Kelechi.B., B.A.

## 2020-07-31 LAB
ANION GAP SERPL CALC-SCNC: 8 MMOL/L (ref 7–16)
BASOPHILS # BLD: 0 K/UL (ref 0–0.2)
BASOPHILS NFR BLD: 0 % (ref 0–2)
BUN SERPL-MCNC: 11 MG/DL (ref 8–23)
CALCIUM SERPL-MCNC: 8.5 MG/DL (ref 8.3–10.4)
CHLORIDE SERPL-SCNC: 105 MMOL/L (ref 98–107)
CO2 SERPL-SCNC: 25 MMOL/L (ref 21–32)
CREAT SERPL-MCNC: 0.77 MG/DL (ref 0.8–1.5)
DIFFERENTIAL METHOD BLD: ABNORMAL
EOSINOPHIL # BLD: 0.1 K/UL (ref 0–0.8)
EOSINOPHIL NFR BLD: 1 % (ref 0.5–7.8)
ERYTHROCYTE [DISTWIDTH] IN BLOOD BY AUTOMATED COUNT: 14.1 % (ref 11.9–14.6)
GLUCOSE SERPL-MCNC: 127 MG/DL (ref 65–100)
HCT VFR BLD AUTO: 35.8 % (ref 41.1–50.3)
HGB BLD-MCNC: 12.1 G/DL (ref 13.6–17.2)
IMM GRANULOCYTES # BLD AUTO: 0.1 K/UL (ref 0–0.5)
IMM GRANULOCYTES NFR BLD AUTO: 1 % (ref 0–5)
LYMPHOCYTES # BLD: 0.7 K/UL (ref 0.5–4.6)
LYMPHOCYTES NFR BLD: 12 % (ref 13–44)
MCH RBC QN AUTO: 33.4 PG (ref 26.1–32.9)
MCHC RBC AUTO-ENTMCNC: 33.8 G/DL (ref 31.4–35)
MCV RBC AUTO: 98.9 FL (ref 79.6–97.8)
MONOCYTES # BLD: 0.2 K/UL (ref 0.1–1.3)
MONOCYTES NFR BLD: 4 % (ref 4–12)
NEUTS SEG # BLD: 4.7 K/UL (ref 1.7–8.2)
NEUTS SEG NFR BLD: 82 % (ref 43–78)
NRBC # BLD: 0 K/UL (ref 0–0.2)
PLATELET # BLD AUTO: 132 K/UL (ref 150–450)
PMV BLD AUTO: 10.1 FL (ref 9.4–12.3)
POTASSIUM SERPL-SCNC: 3.9 MMOL/L (ref 3.5–5.1)
RBC # BLD AUTO: 3.62 M/UL (ref 4.23–5.6)
SODIUM SERPL-SCNC: 138 MMOL/L (ref 136–145)
WBC # BLD AUTO: 5.8 K/UL (ref 4.3–11.1)

## 2020-07-31 PROCEDURE — 74011250637 HC RX REV CODE- 250/637: Performed by: SURGERY

## 2020-07-31 PROCEDURE — 99152 MOD SED SAME PHYS/QHP 5/>YRS: CPT

## 2020-07-31 PROCEDURE — 93314 ECHO TRANSESOPHAGEAL: CPT | Performed by: INTERNAL MEDICINE

## 2020-07-31 PROCEDURE — 74011000250 HC RX REV CODE- 250: Performed by: NURSE PRACTITIONER

## 2020-07-31 PROCEDURE — 93312 ECHO TRANSESOPHAGEAL: CPT

## 2020-07-31 PROCEDURE — 99232 SBSQ HOSP IP/OBS MODERATE 35: CPT | Performed by: INTERNAL MEDICINE

## 2020-07-31 PROCEDURE — B24BZZ4 ULTRASONOGRAPHY OF HEART WITH AORTA, TRANSESOPHAGEAL: ICD-10-PCS | Performed by: INTERNAL MEDICINE

## 2020-07-31 PROCEDURE — 65660000000 HC RM CCU STEPDOWN

## 2020-07-31 PROCEDURE — 74011250637 HC RX REV CODE- 250/637: Performed by: INTERNAL MEDICINE

## 2020-07-31 PROCEDURE — 74011250637 HC RX REV CODE- 250/637: Performed by: NURSE PRACTITIONER

## 2020-07-31 PROCEDURE — 92960 CARDIOVERSION ELECTRIC EXT: CPT | Performed by: INTERNAL MEDICINE

## 2020-07-31 PROCEDURE — 94760 N-INVAS EAR/PLS OXIMETRY 1: CPT

## 2020-07-31 PROCEDURE — 93313 ECHO TRANSESOPHAGEAL: CPT | Performed by: INTERNAL MEDICINE

## 2020-07-31 PROCEDURE — 92960 CARDIOVERSION ELECTRIC EXT: CPT

## 2020-07-31 PROCEDURE — 80048 BASIC METABOLIC PNL TOTAL CA: CPT

## 2020-07-31 PROCEDURE — 36415 COLL VENOUS BLD VENIPUNCTURE: CPT

## 2020-07-31 PROCEDURE — 74011000250 HC RX REV CODE- 250: Performed by: INTERNAL MEDICINE

## 2020-07-31 PROCEDURE — 85025 COMPLETE CBC W/AUTO DIFF WBC: CPT

## 2020-07-31 PROCEDURE — 74011250636 HC RX REV CODE- 250/636: Performed by: INTERNAL MEDICINE

## 2020-07-31 PROCEDURE — 5A2204Z RESTORATION OF CARDIAC RHYTHM, SINGLE: ICD-10-PCS | Performed by: INTERNAL MEDICINE

## 2020-07-31 PROCEDURE — 94640 AIRWAY INHALATION TREATMENT: CPT

## 2020-07-31 PROCEDURE — 77010033678 HC OXYGEN DAILY

## 2020-07-31 PROCEDURE — 77030009397 HC ELECTRD ECG PACE ZOLL -B

## 2020-07-31 RX ORDER — FENTANYL CITRATE 50 UG/ML
25-100 INJECTION, SOLUTION INTRAMUSCULAR; INTRAVENOUS
Status: ACTIVE | OUTPATIENT
Start: 2020-07-31 | End: 2020-07-31

## 2020-07-31 RX ORDER — METOPROLOL TARTRATE 50 MG/1
50 TABLET ORAL EVERY 6 HOURS
Status: DISCONTINUED | OUTPATIENT
Start: 2020-07-31 | End: 2020-08-01

## 2020-07-31 RX ORDER — MIDAZOLAM HYDROCHLORIDE 1 MG/ML
.5-5 INJECTION, SOLUTION INTRAMUSCULAR; INTRAVENOUS
Status: ACTIVE | OUTPATIENT
Start: 2020-07-31 | End: 2020-07-31

## 2020-07-31 RX ADMIN — ALBUTEROL SULFATE 2.5 MG: 2.5 SOLUTION RESPIRATORY (INHALATION) at 13:53

## 2020-07-31 RX ADMIN — LEVALBUTEROL HYDROCHLORIDE 0.63 MG: 0.63 SOLUTION RESPIRATORY (INHALATION) at 13:54

## 2020-07-31 RX ADMIN — SENNOSIDES AND DOCUSATE SODIUM 2 TABLET: 8.6; 5 TABLET ORAL at 08:07

## 2020-07-31 RX ADMIN — APIXABAN 5 MG: 5 TABLET, FILM COATED ORAL at 08:09

## 2020-07-31 RX ADMIN — METOPROLOL TARTRATE 25 MG: 25 TABLET, FILM COATED ORAL at 05:09

## 2020-07-31 RX ADMIN — METOPROLOL TARTRATE 50 MG: 50 TABLET, FILM COATED ORAL at 22:01

## 2020-07-31 RX ADMIN — GUAIFENESIN 1200 MG: 600 TABLET ORAL at 08:08

## 2020-07-31 RX ADMIN — AMIODARONE HYDROCHLORIDE 400 MG: 200 TABLET ORAL at 17:29

## 2020-07-31 RX ADMIN — GABAPENTIN 300 MG: 300 CAPSULE ORAL at 22:02

## 2020-07-31 RX ADMIN — Medication 5 ML: at 05:10

## 2020-07-31 RX ADMIN — ACETYLCYSTEINE 200 MG: 100 SOLUTION ORAL; RESPIRATORY (INHALATION) at 19:35

## 2020-07-31 RX ADMIN — SENNOSIDES AND DOCUSATE SODIUM 2 TABLET: 8.6; 5 TABLET ORAL at 17:29

## 2020-07-31 RX ADMIN — GABAPENTIN 300 MG: 300 CAPSULE ORAL at 08:08

## 2020-07-31 RX ADMIN — APIXABAN 5 MG: 5 TABLET, FILM COATED ORAL at 17:29

## 2020-07-31 RX ADMIN — LEVALBUTEROL HYDROCHLORIDE 0.63 MG: 0.63 SOLUTION RESPIRATORY (INHALATION) at 19:36

## 2020-07-31 RX ADMIN — GABAPENTIN 300 MG: 300 CAPSULE ORAL at 15:45

## 2020-07-31 RX ADMIN — ACETYLCYSTEINE 200 MG: 100 SOLUTION ORAL; RESPIRATORY (INHALATION) at 13:54

## 2020-07-31 RX ADMIN — MIDAZOLAM 3 MG: 1 INJECTION INTRAMUSCULAR; INTRAVENOUS at 09:30

## 2020-07-31 RX ADMIN — GUAIFENESIN 1200 MG: 600 TABLET ORAL at 22:02

## 2020-07-31 RX ADMIN — Medication 10 ML: at 22:01

## 2020-07-31 RX ADMIN — FENTANYL CITRATE 50 MCG: 50 INJECTION INTRAMUSCULAR; INTRAVENOUS at 09:30

## 2020-07-31 RX ADMIN — METOPROLOL TARTRATE 50 MG: 50 TABLET, FILM COATED ORAL at 15:45

## 2020-07-31 RX ADMIN — AMIODARONE HYDROCHLORIDE 400 MG: 200 TABLET ORAL at 08:08

## 2020-07-31 RX ADMIN — PANTOPRAZOLE SODIUM 40 MG: 40 TABLET, DELAYED RELEASE ORAL at 05:09

## 2020-07-31 NOTE — PROGRESS NOTES
REGINO/CVN by Dr Johnson [facility] II ASA II Mallampati 
3mg versed 50mcg fentanyl Viscous Solution given at 0920 
2 shock at 200 joules synced Post cardioversion rhythm Afib Pt tolerated well.

## 2020-07-31 NOTE — PROGRESS NOTES
TRANSFER - IN REPORT: 
 
Verbal report received from Zofia Kebede RN on [employer]. being received from [address] [place] [address] for routine post - op Report consisted of patients Situation, Background, Assessment and Recommendations(SBAR). Information from the following report(s) Procedure Summary was reviewed with the receiving nurse. Opportunity for questions and clarification was provided.

## 2020-07-31 NOTE — ROUTINE PROCESS
Bedside and Verbal shift change report given to self (oncoming nurse) by  Derek Vega RN (offgoing nurse). Report included the following information SBAR, Kardex, Intake/Output, MAR, and Recent Results.

## 2020-07-31 NOTE — PROGRESS NOTES
[facility] CARDIOLOGY PROGRESS NOTE 
      
 
7/31/2020 7:29 AM 
 
Admit Date: 7/23/2020 Subjective:  
Cough better. Some right sided chest discomfort. Reports some pressure a couple of days ago but now at surgical site. Dyspnea was a bit worse this morning. Rates remain 100-110's, afib. ROS: 
Cardiovascular:  As noted above Objective:  
  
Vitals:  
 07/30/20 2319 07/31/20 9419 07/31/20 6371 07/31/20 4268 BP:  102/70  97/68 Pulse: (!) 106 (!) 113 (!) 117 98 Resp:  20  17 Temp:  97.9 °F (36.6 °C)  98.3 °F (36.8 °C) SpO2:  97%  96% Weight:      
Height:      
 
 
Physical Exam: 
General-No Acute Distress Neck- supple, no JVD 
CV- irregularly irregular, tachycardia, distant heart sounds Lung- diminished right base Abd- soft, nontender, nondistended Ext- no edema bilaterally. Skin- warm and dry Data Review:  
Recent Labs  
  07/29/20 
8234   
K 4.1 BUN 13  
CREA 0.78*  WBC 6.1 HGB 11.7* HCT 34.8*  
* Telemetry reviewed - afib with RVR, no ischemic changes Echo - EF 55-60%, ascending aorta said to be mildly dilated dimensions not reported. Assessment/Plan:  
 
Principal Problem: 
  Cancer of right lung ([address] (7/23/2020) Active Problems: 
  Essential hypertension with goal blood pressure less than 130/80 (2/19/2018) Cancer of bronchus of right lower lobe ([address] (7/23/2020) Lung cancer ([address] (7/23/2020) Atrial tachycardia ([address] (7/26/2020) S/P lobectomy of lung (7/29/2020) Cough (7/29/2020) Persistent atrial fib with RVR. BP low, no room to push rate control. Patient would benefit from restoration of sinus rhythm. Amiodarone and eliquis started yesterday. Reviewed REIGNO cardioversion with patient.  Discussed risks of procedure including, but not limited to esophageal damage, allergic reaction, bleeding, low risk of stroke and skin irritation. Pt understands and agrees to proceed.     
 
 
 
 
 
Cat Cagle MD 
7/31/2020 7:29 AM

## 2020-07-31 NOTE — PROGRESS NOTES
7/26/20 PLAN: 
Diet- Regular DVT Prophylactics- SCD Pain control- Dilaudid/Norco/ Neurontin SUP prophylaxis- Protonix Encourage C/DB/IS Posterior chest tube removed 7/27/20 Anterior chest tube removed 7/28/20 Can remove chest tube dressings 7/31/2020 Ambulate Appreciate cardiology input Appreciate pulmonary input OK for discharge when ok from cardiology standpoint- currently still on amiodarone gtt OK to start anticoagulation per cardiology from surgery standpoint ASSESSMENT:  Admit Date: 7/23/2020  
8 Day Post-Op  Procedure(s) with comments: 
RIGHT THORACOTOMY W/ LOWER AND MIDDLE  LOBECTOMY AND MEDIASTINAL LYMPHAIDNECTOMY/CRYOPEXY. - RIGHT LOWER LUNG Principal Problem: 
  Cancer of right lung ([address] (7/23/2020) Active Problems: 
  Essential hypertension with goal blood pressure less than 130/80 (2/19/2018) Cancer of bronchus of right lower lobe ([address] (7/23/2020) Lung cancer ([address] (7/23/2020) Atrial tachycardia ([address] (7/26/2020) S/P lobectomy of lung (7/29/2020) Cough (7/29/2020) SUBJECTIVE: 
Pt in bed. No complaints, no new issues. Cardiology following. AF. NAD. OBJECTIVE: 
Constitutional: Alert oriented cooperative patient in no acute distress; appears stated age Visit Vitals BP (!) 87/61 Pulse (!) 120 Temp 98.3 °F (36.8 °C) Resp 17 Ht 5' 10\" (1.778 m) Wt 218 lb (98.9 kg) SpO2 94% BMI 31.28 kg/m² Eyes: Sclera are clear. ENMT: no external lesions gross hearing normal; no obvious neck masses, no ear or lip lesions CV: Tachy. Normal perfusion Resp: No JVD. Breathing is  non-labored; no audible wheezing GI: soft and non-distended Musculoskeletal: unremarkable with normal function. No embolic signs or cyanosis. Neuro:  Oriented; moves all 4; no focal deficits Psychiatric: normal affect and mood, no memory impairment Patient Vitals for the past 24 hrs: 
 BP Temp Pulse Resp SpO2 Height  
07/31/20 1000 (!) 87/61  (!) 120  94 %   
07/31/20 0955 (!) 83/57  (!) 116     
07/31/20 0952 99/68  (!) 132     
07/31/20 0949 97/75  (!) 120     
07/31/20 0946 102/79  (!) 145     
07/31/20 0943 100/68  (!) 108     
07/31/20 0942 (!) 84/72  (!) 116     
07/31/20 0941   (!) 142     
07/31/20 0940 (!) 89/69  (!) 138  96 %   
07/31/20 0938 (!) 86/72  (!) 125  92 %   
07/31/20 0936 100/65  (!) 110  96 %   
07/31/20 0934 (!) 87/74  (!) 114  99 %   
07/31/20 0932 97/78  (!) 116  99 %   
07/31/20 0931 97/78  (!) 129  100 %   
07/31/20 0930 97/78  (!) 121  100 %   
07/31/20 0807 (!) 87/51  (!) 128     
07/31/20 0526 97/68 98.3 °F (36.8 °C) 98 17 96 %   
07/31/20 0509   (!) 117     
07/31/20 0152 102/70 97.9 °F (36.6 °C) (!) 113 20 97 %   
07/30/20 2319   (!) 106     
07/30/20 2151  99.2 °F (37.3 °C) (!) 145     
07/30/20 2128 109/57 100.2 °F (37.9 °C) (!) 130 18 99 %   
07/30/20 2120     96 %   
07/30/20 1739 94/59  (!) 106     
07/30/20 1649 106/75 100 °F (37.8 °C) 64 18 98 %   
07/30/20 1611 104/66  94     
07/30/20 1350      5' 10\" (1.778 m) 07/30/20 1251 99/61 98.8 °F (37.1 °C) 88 18 94 %   
07/30/20 1132 98/67  (!) 110    Labs:   
Recent Labs  
  07/29/20 
3507 WBC 6.1 HGB 11.7* *   
K 4.1  CO2 29 BUN 13  
CREA 0.78*  Kimani Rendon NP

## 2020-07-31 NOTE — PROGRESS NOTES
Maci Hagen. Admission Date: 7/23/2020 Daily Progress Note: 7/31/2020 The patient's chart is reviewed and the patient is discussed with the staff. Patient is a 81 y.o. male presents with persistent cough 5 days post right thoracotomy w/ lower and middle lobectomy and mediastinal lymphoidectomy/cryopexy of the right lower lung  for a large central right lung cancer per Dr Smith Pod. Anterior CT pulled 7/27, posterior CT pulled 7/28. Now with worsening CXR today and cough since procedure. He is a known patient to us last seen in the office 7/7/20 , but this admission was initially a Category C. He is currently sitting in room on 2 LPM via NC with O2 sat 94%. This hospitalization has been complicated by his ongoing issues with tachycardia. Cardiology was initially trying to stay away from [facility], given his lung history, but he had to be initiated on an infusion on 7/28 for A fib. Anticoagulation are currently being held with recent surgery. He reports a nagging mildly productive cough since surgery. He is a former smoker, 35 pack year history, quit 7 years ago. PMHx includes RLL mass measuring 6.7 cm x 5.9 cm, EBUS 2/26/20 per Dr Amara August, negative brain MRI for mets, 2 mc RT parotid mass on 3/17, GERD, HTN, hypoxia, OA, and emphysema (he was not on home O2 PTA). We were consulted for assistance with management of a cough. Subjective:  
 
Cough is better He went for CV today but it did bot work On RA now Current Facility-Administered Medications Medication Dose Route Frequency  metoprolol tartrate (LOPRESSOR) tablet 50 mg  50 mg Oral Q6H  
 amiodarone (CORDARONE) tablet 400 mg  400 mg Oral BID  
 apixaban (ELIQUIS) tablet 5 mg  5 mg Oral BID  
 guaiFENesin-dextromethorphan (ROBITUSSIN DM) 100-10 mg/5 mL syrup 10 mL  10 mL Oral Q6H PRN  
 acetylcysteine (MUCOMYST) 100 mg/mL (10 %) nebulizer solution 200 mg  2 mL Nebulization Q6HWA RT  
  acetaminophen (TYLENOL) tablet 650 mg  650 mg Oral Q4H PRN  
 albuterol (PROVENTIL VENTOLIN) nebulizer solution 2.5 mg  2.5 mg Nebulization Q4H PRN  
 levalbuterol (XOPENEX) nebulizer soln 0.63 mg/3 mL  0.63 mg Nebulization TID RT  
 sodium chloride (NS) flush 5-40 mL  5-40 mL IntraVENous Q8H  
 sodium chloride (NS) flush 5-40 mL  5-40 mL IntraVENous PRN  
 HYDROmorphone (PF) (DILAUDID) injection 1 mg  1 mg IntraVENous Q4H PRN  
 naloxone (NARCAN) injection 0.4 mg  0.4 mg IntraVENous PRN  
 ondansetron (ZOFRAN) injection 4 mg  4 mg IntraVENous Q4H PRN  
 gabapentin (NEURONTIN) capsule 300 mg  300 mg Oral TID  guaiFENesin ER (MUCINEX) tablet 1,200 mg  1,200 mg Oral Q12H  
 senna-docusate (PERICOLACE) 8.6-50 mg per tablet 2 Tab  2 Tab Oral BID  
 HYDROcodone-acetaminophen (NORCO) 7.5-325 mg per tablet 1 Tab  1 Tab Oral Q4H PRN  pantoprazole (PROTONIX) tablet 40 mg  40 mg Oral ACB Review of Systems Constitutional: negative for fever, chills, sweats Cardiovascular: negative for chest pain, palpitations, syncope, edema Gastrointestinal:  negative for dysphagia, reflux, vomiting, diarrhea, abdominal pain, or melena Neurologic:  negative for focal weakness, numbness, headache Objective:  
 
Vitals:  
 07/31/20 1045 07/31/20 1100 07/31/20 1353 07/31/20 1354 BP: (!) 89/67 (!) 45/97 Pulse: (!) 113 (!) 115 (!) 115 97 Resp:      
Temp:      
SpO2: 95% 96%  92% Weight:      
Height:      
 
 
 
Intake/Output Summary (Last 24 hours) at 7/31/2020 1404 Last data filed at 7/31/2020 1115 Gross per 24 hour Intake 723 ml Output 1200 ml Net -477 ml Physical Exam:  
Constitution:  the patient is well developed and in no acute distress EENMT:  Sclera clear, pupils equal, oral mucosa moist 
Respiratory: Clear upper lobes bilateral. Diminished right lower.   
Cardiovascular:  Irregular regular rate & rhythm without M,G,R 
 Gastrointestinal: soft and non-tender; with positive bowel sounds. Musculoskeletal: warm without cyanosis. There is no lower extremity edema. Skin:  no jaundice or rashes Neurologic: no gross neuro deficits Psychiatric:  alert and oriented x 3 CXR:  
 
 
CHEST X-RAY, 2 views. 
  
HISTORY:  Status post thoracotomy, right lower lobe lung cancer and pleural 
effusion. 
  
TECHNIQUE: PA and lateral views. 
  
COMPARISON: Multiple recent exams. . 
  
FINDINGS:  
-Lungs: Right hemidiaphragm is elevated. Blunting right costophrenic angle. Faint patchy density right lower lung zone stable. Left lung is clear.  
-Costophrenic angles: are sharp.  
-Heart size: is normal.  
-Pulmonary vasculature: is unremarkable. -Included portion of the upper abdomen: is unremarkable. -Bones: No gross bony lesions. 
-Other: Right-sided chest port and skin staples are present 
  
IMPRESSION IMPRESSION: Stable faint densities right lower lung zone. Probable small pleural 
effusion. No new abnormality. LAB No results for input(s): GLUCPOC in the last 72 hours. No lab exists for component: [place] Recent Labs  
  07/31/20 
1218 07/29/20 
0348 WBC 5.8 6.1 HGB 12.1* 11.7* HCT 35.8* 34.8*  
* 108* Recent Labs  
  07/31/20 
1218 07/29/20 
0348  138  
K 3.9 4.1  104 CO2 25 29 * 100 BUN 11 13 CREA 0.77* 0.78* CA 8.5 8.6 No results for input(s): PH, PCO2, PO2, HCO3, PHI, PCO2I, PO2I, HCO3I in the last 72 hours. No results for input(s): LCAD, LAC in the last 72 hours. Assessment:  (Medical Decision Making) Hospital Problems  Date Reviewed: 7/23/2020 Codes Class Noted POA  
 S/P lobectomy of lung ICD-10-CM: Z90.2 ICD-9-CM: V45.89  7/29/2020 Unknown Cough ICD-10-CM: R05 ICD-9-CM: 786.2  7/29/2020 Unknown Atrial tachycardia (HCC) ICD-10-CM: I47.1 ICD-9-CM: 427.89  7/26/2020 Unknown Cancer of bronchus of right lower lobe [facility]) ICD-10-CM: C34.31 
ICD-9-CM: 162.5  7/23/2020 Unknown Lung cancer [facility]) ICD-10-CM: C34.90 ICD-9-CM: 162.9  7/23/2020 Unknown * (Principal) Cancer of right lung [facility]) ICD-10-CM: C34.91 
ICD-9-CM: 162.9  7/23/2020 Unknown Essential hypertension with goal blood pressure less than 130/80 (Chronic) ICD-10-CM: I10 
ICD-9-CM: 401.9  2/19/2018 Yes Plan:  (Medical Decision Making) --CXR today with stable findings 
-cough improving 
-Encourage OOT and activity 
--Mucomyst & Xopenox nebs 
-nothing else to add ,will sign off, will follow up in our office More than 50% of the time documented was spent in face-to-face contact with the patient and in the care of the patient on the floor/unit where the patient is located.  
 
Blair Trevizo MD

## 2020-07-31 NOTE — PROGRESS NOTES
Problem: Falls - Risk of 
Goal: *Absence of Falls Description: Document Ana Ruts Fall Risk and appropriate interventions in the flowsheet. Outcome: Progressing Towards Goal 
Note: Fall Risk Interventions: 
Mobility Interventions: Communicate number of staff needed for ambulation/transfer, Patient to call before getting OOB Medication Interventions: Patient to call before getting OOB, Teach patient to arise slowly Elimination Interventions: Call light in reach History of Falls Interventions: Vital signs minimum Q4HRs X 24 hrs (comment for end date) Problem: Pain Goal: *Control of Pain Outcome: Progressing Towards Goal 
  
Problem: Pressure Injury - Risk of 
Goal: *Prevention of pressure injury Description: Document Sidney Scale and appropriate interventions in the flowsheet. Outcome: Progressing Towards Goal 
Note: Pressure Injury Interventions: 
Sensory Interventions: Avoid rigorous massage over bony prominences, Assess need for specialty bed, Assess changes in LOC Activity Interventions: Increase time out of bed Mobility Interventions: Chair cushion, HOB 30 degrees or less, Pressure redistribution bed/mattress (bed type) Nutrition Interventions: Document food/fluid/supplement intake

## 2020-07-31 NOTE — PROGRESS NOTES
Spiritual Care Visit, initial visit. Visited with patient at bedside. Prayed for patient's healing and health. Visit by Kavon Wadsworth, Staff .  Drea., Th.B., B.A.

## 2020-07-31 NOTE — PROGRESS NOTES
TRANSFER - IN REPORT: 
 
Verbal report received from [facility] on [address]. being received from [facility] for routine progression of care Report consisted of patients Situation, Background, Assessment and Recommendations(SBAR). Information from the following report(s) Procedure Summary was reviewed with the receiving nurse. Opportunity for questions and clarification was provided. Assessment completed upon patients arrival to unit and care assumed.

## 2020-07-31 NOTE — ROUTINE PROCESS
Bedside and Verbal shift change report given to Dian Leach RN (oncoming nurse) by  Nathalie hirsch. Report included the following information SBAR, Kardex, Intake/Output, MAR and Recent Results.

## 2020-07-31 NOTE — PROGRESS NOTES
TRANSFER - OUT REPORT: 
 
Verbal report given to Maliha Jain RN on Racquel Wang.  being transferred to 3rd floor for routine progression of care Report consisted of patients Situation, Background, Assessment and Recommendations(SBAR). Information from the following report(s) SBAR, Kardex and Procedure Summary was reviewed with the receiving nurse. Opportunity for questions and clarification was provided.

## 2020-07-31 NOTE — ROUTINE PROCESS
Bedside and Verbal shift change report given to Maria Alejandra Riley RN (oncoming nurse) by self Gil [place]). Report included the following information SBAR, Kardex, Intake/Output, MAR, and Recent Results.

## 2020-07-31 NOTE — PROCEDURES
Procedure: REGINO directed cardioversion Indications:  Atrial fib with RVR Personnel:   
MD Larry Garcia, RN Sedation time: 1032-8566 Sedation regimen:  50 mcg IV fentanyl/3 mg IV versed Procedure in detail: 
Patient was brought to lab in fasting state and time out performed. Informed consent was previously obtained. Sedated as noted above REGINO probe inserted easily into the distal esophagus and images of the left atrial appendage obtained. There being no evidence of JOSUE thrombus, probe was removed. Following this, 200 J of synchronized energy were delivered to the heart on 2 occasions, unfortunately unsuccessful in restoring SR. Because of baseline low BP  systolic, further attempts with additional sedation were felt to be unwise and the procedure was aborted. Procedure was well tolerated without apparent complications Summary: 
REGINO negative for thrombus. Unsuccessful attempts at cardioversion x 2. Recommendations: 
Continue amiodarone, low dose metoprolol, attempt to carefully increase metoprolol for rate control with attention to BP. Should ongoing rate control prove difficult will need to get EP input. All discussed with patient's significant other following procedure.

## 2020-07-31 NOTE — ROUTINE PROCESS
Bedside and Verbal shift change report given to  (oncoming nurse) by Fredy Michel RN (offgoing nurse). Report included the following information SBAR, Kardex, Intake/Output, MAR and Recent Results.

## 2020-08-01 VITALS
TEMPERATURE: 97.8 F | RESPIRATION RATE: 18 BRPM | HEIGHT: 70 IN | DIASTOLIC BLOOD PRESSURE: 65 MMHG | WEIGHT: 218.5 LBS | OXYGEN SATURATION: 91 % | SYSTOLIC BLOOD PRESSURE: 98 MMHG | HEART RATE: 93 BPM | BODY MASS INDEX: 31.28 KG/M2

## 2020-08-01 LAB
ANION GAP SERPL CALC-SCNC: 7 MMOL/L (ref 7–16)
BASOPHILS # BLD: 0 K/UL (ref 0–0.2)
BASOPHILS NFR BLD: 0 % (ref 0–2)
BUN SERPL-MCNC: 10 MG/DL (ref 8–23)
CALCIUM SERPL-MCNC: 8.6 MG/DL (ref 8.3–10.4)
CHLORIDE SERPL-SCNC: 104 MMOL/L (ref 98–107)
CO2 SERPL-SCNC: 27 MMOL/L (ref 21–32)
CREAT SERPL-MCNC: 0.7 MG/DL (ref 0.8–1.5)
DIFFERENTIAL METHOD BLD: ABNORMAL
EOSINOPHIL # BLD: 0.1 K/UL (ref 0–0.8)
EOSINOPHIL NFR BLD: 2 % (ref 0.5–7.8)
ERYTHROCYTE [DISTWIDTH] IN BLOOD BY AUTOMATED COUNT: 14.1 % (ref 11.9–14.6)
GLUCOSE SERPL-MCNC: 90 MG/DL (ref 65–100)
HCT VFR BLD AUTO: 33.3 % (ref 41.1–50.3)
HGB BLD-MCNC: 11.2 G/DL (ref 13.6–17.2)
IMM GRANULOCYTES # BLD AUTO: 0 K/UL (ref 0–0.5)
IMM GRANULOCYTES NFR BLD AUTO: 1 % (ref 0–5)
LYMPHOCYTES # BLD: 0.6 K/UL (ref 0.5–4.6)
LYMPHOCYTES NFR BLD: 12 % (ref 13–44)
MCH RBC QN AUTO: 32.7 PG (ref 26.1–32.9)
MCHC RBC AUTO-ENTMCNC: 33.6 G/DL (ref 31.4–35)
MCV RBC AUTO: 97.4 FL (ref 79.6–97.8)
MONOCYTES # BLD: 0.4 K/UL (ref 0.1–1.3)
MONOCYTES NFR BLD: 7 % (ref 4–12)
NEUTS SEG # BLD: 4 K/UL (ref 1.7–8.2)
NEUTS SEG NFR BLD: 78 % (ref 43–78)
NRBC # BLD: 0 K/UL (ref 0–0.2)
PLATELET # BLD AUTO: 130 K/UL (ref 150–450)
PMV BLD AUTO: 10.1 FL (ref 9.4–12.3)
POTASSIUM SERPL-SCNC: 3.9 MMOL/L (ref 3.5–5.1)
RBC # BLD AUTO: 3.42 M/UL (ref 4.23–5.6)
SODIUM SERPL-SCNC: 138 MMOL/L (ref 136–145)
WBC # BLD AUTO: 5.1 K/UL (ref 4.3–11.1)

## 2020-08-01 PROCEDURE — 94640 AIRWAY INHALATION TREATMENT: CPT

## 2020-08-01 PROCEDURE — 80048 BASIC METABOLIC PNL TOTAL CA: CPT

## 2020-08-01 PROCEDURE — 74011250637 HC RX REV CODE- 250/637: Performed by: INTERNAL MEDICINE

## 2020-08-01 PROCEDURE — 74011250637 HC RX REV CODE- 250/637: Performed by: SURGERY

## 2020-08-01 PROCEDURE — 74011000250 HC RX REV CODE- 250: Performed by: INTERNAL MEDICINE

## 2020-08-01 PROCEDURE — 99253 IP/OBS CNSLTJ NEW/EST LOW 45: CPT | Performed by: INTERNAL MEDICINE

## 2020-08-01 PROCEDURE — 74011250637 HC RX REV CODE- 250/637: Performed by: NURSE PRACTITIONER

## 2020-08-01 PROCEDURE — 85025 COMPLETE CBC W/AUTO DIFF WBC: CPT

## 2020-08-01 PROCEDURE — 36415 COLL VENOUS BLD VENIPUNCTURE: CPT

## 2020-08-01 PROCEDURE — 74011000250 HC RX REV CODE- 250: Performed by: NURSE PRACTITIONER

## 2020-08-01 RX ORDER — METOPROLOL SUCCINATE 50 MG/1
50 TABLET, EXTENDED RELEASE ORAL DAILY
Qty: 30 TAB | Refills: 0 | Status: SHIPPED | OUTPATIENT
Start: 2020-08-01 | End: 2020-08-26 | Stop reason: SDUPTHER

## 2020-08-01 RX ORDER — GABAPENTIN 300 MG/1
300 CAPSULE ORAL 3 TIMES DAILY
Qty: 30 CAP | Refills: 0 | Status: SHIPPED | OUTPATIENT
Start: 2020-08-01 | End: 2020-08-07 | Stop reason: SDUPTHER

## 2020-08-01 RX ORDER — AMIODARONE HYDROCHLORIDE 400 MG/1
400 TABLET ORAL DAILY
Qty: 30 TAB | Refills: 0 | Status: SHIPPED | OUTPATIENT
Start: 2020-08-02 | End: 2020-08-26 | Stop reason: DRUGHIGH

## 2020-08-01 RX ORDER — METOPROLOL SUCCINATE 50 MG/1
50 TABLET, EXTENDED RELEASE ORAL DAILY
Status: DISCONTINUED | OUTPATIENT
Start: 2020-08-01 | End: 2020-08-01 | Stop reason: HOSPADM

## 2020-08-01 RX ORDER — AMIODARONE HYDROCHLORIDE 200 MG/1
400 TABLET ORAL DAILY
Status: DISCONTINUED | OUTPATIENT
Start: 2020-08-02 | End: 2020-08-01 | Stop reason: HOSPADM

## 2020-08-01 RX ADMIN — GABAPENTIN 300 MG: 300 CAPSULE ORAL at 09:01

## 2020-08-01 RX ADMIN — PANTOPRAZOLE SODIUM 40 MG: 40 TABLET, DELAYED RELEASE ORAL at 05:52

## 2020-08-01 RX ADMIN — APIXABAN 5 MG: 5 TABLET, FILM COATED ORAL at 09:01

## 2020-08-01 RX ADMIN — APIXABAN 5 MG: 5 TABLET, FILM COATED ORAL at 17:00

## 2020-08-01 RX ADMIN — GABAPENTIN 300 MG: 300 CAPSULE ORAL at 17:00

## 2020-08-01 RX ADMIN — ACETYLCYSTEINE 200 MG: 100 SOLUTION ORAL; RESPIRATORY (INHALATION) at 07:34

## 2020-08-01 RX ADMIN — GUAIFENESIN 1200 MG: 600 TABLET ORAL at 09:01

## 2020-08-01 RX ADMIN — AMIODARONE HYDROCHLORIDE 400 MG: 200 TABLET ORAL at 09:00

## 2020-08-01 RX ADMIN — Medication 10 ML: at 05:53

## 2020-08-01 RX ADMIN — Medication 5 ML: at 13:05

## 2020-08-01 RX ADMIN — LEVALBUTEROL HYDROCHLORIDE 0.63 MG: 0.63 SOLUTION RESPIRATORY (INHALATION) at 07:34

## 2020-08-01 RX ADMIN — LEVALBUTEROL HYDROCHLORIDE 0.63 MG: 0.63 SOLUTION RESPIRATORY (INHALATION) at 15:34

## 2020-08-01 RX ADMIN — ACETYLCYSTEINE 200 MG: 100 SOLUTION ORAL; RESPIRATORY (INHALATION) at 15:35

## 2020-08-01 RX ADMIN — METOPROLOL TARTRATE 50 MG: 50 TABLET, FILM COATED ORAL at 09:02

## 2020-08-01 RX ADMIN — METOPROLOL TARTRATE 50 MG: 50 TABLET, FILM COATED ORAL at 05:52

## 2020-08-01 NOTE — ROUTINE PROCESS
Discharge instructions reviewed with pt. Prescriptions given for new meds and med info sheets provided for all new medications. Opportunity for questions provided. pt voiced understanding of all discharge instructions. Iv and lines removed. Pt set up for home oxygen

## 2020-08-01 NOTE — PROGRESS NOTES
Oxygen Qualifier Room air: SpO2 with O2 and liter flow Resting SpO2  87%  94% on 2L Ambulating SpO2  85% 90% on 2L Completed by: 
 
Preet Bhagat RT

## 2020-08-01 NOTE — DISCHARGE SUMMARY
Physician Discharge Summary Patient ID: Laura Canales. 
[id number] 
81 y.o. 
1950 Allergies: Celebrex [celecoxib] Admit Date: 7/23/2020 HPI: Osvaldo Kerr Jr. is G 28 y. o. male who is seen for surgical discussion. He was seen before for a large central right lung cancer, and neoadjuvant chemoradiation was recommended. He has finished chemoradiation about 3 weeks ago. Today, he is still recovering from treatments, some congestion and cough, but no SOB.  
  
His other medical issues are reviewed as below. He was quite healthy, no cardiac issues. He quit smoking 7 years ago.  
  
On 2/27/20 we are asked by Severiano Fujita see for RLL mass.  Pt presented to the ED on 2/23/2020 with hemoptysis. [facility] reports a h/o PNA, mild SOB, productive cough and intermittent hemoptysis since November. [facility] had a CT chest in ED to r/o PE which demonstrated obstructing RLL lung mass. [facility] had an EBUS yesterday with Dr. Fidel Mathur that demonstrated RLL malignancy, favors NSCLC.  Station 7 and 11R LNS reportedly negative.  Possible stage IIB. Dominique Cano continues to have hemoptysis after EBUS. Dr. Amandeep Lakhani was consulted to evaluate for surgical candidacy.  Pt has a 35 pack year history, he quit 6 years ago. He has a family h/o uterine cancer in his mother. [facility] was not on O2 prior to this admission. His last colonoscopy was 15 years ago and he was found to have polyps but did not follow up. Pt underwent Right Thoracotomy w/ Lower and Middle  Lobectomy and Mediastinal Lymphadnectomy/Cryoablation 7/23/20 by Dr Amandeep Lakhani. Pt's hospital stay was complicated by Atrial Fib and Cardiology was consulted.  
  
 
 
Discharge Date: 8/1/2020 * Admission Diagnoses: Cancer of lower lobe of right lung ([address] [C34.31] Cancer of bronchus of right lower lobe ([address] [C34.31] Lung cancer ([address] [C34.90] Cancer of right lung ([address] [C34.91] * Discharge Diagnoses:   
Hospital Problems as of 8/1/2020 Date Reviewed: 7/23/2020 Codes Class Noted - Resolved POA  
 S/P lobectomy of lung ICD-10-CM: Z90.2 ICD-9-CM: V45.89  7/29/2020 - Present Unknown Cough ICD-10-CM: R05 ICD-9-CM: 786.2  7/29/2020 - Present Unknown Atrial tachycardia (HCC) ICD-10-CM: I47.1 ICD-9-CM: 427.89  7/26/2020 - Present Unknown Cancer of bronchus of right lower lobe [facility]) ICD-10-CM: C34.31 
ICD-9-CM: 162.5  7/23/2020 - Present Unknown Lung cancer [facility]) ICD-10-CM: C34.90 ICD-9-CM: 162.9  7/23/2020 - Present Unknown * (Principal) Cancer of right lung [facility]) ICD-10-CM: C34.91 
ICD-9-CM: 162.9  7/23/2020 - Present Unknown Essential hypertension with goal blood pressure less than 130/80 (Chronic) ICD-10-CM: I10 
ICD-9-CM: 401.9  2/19/2018 - Present Yes Admission Condition: Stable * Discharge Condition: good * Procedures: Procedure(s) with comments: 
RIGHT THORACOTOMY W/ LOWER AND MIDDLE  LOBECTOMY AND MEDIASTINAL LYMPHAIDNECTOMY/CRYOPEXY. - RIGHT LOWER LUNG 
 
* Hospital Course:  
Hospital course was complicated by Atrial Fib, which added days to the patient's length of stay. Consults: Cardiology and Pulmonary Significant Diagnostic Studies: labs and radiology * Disposition: Home Discharge Medications:  
Current Discharge Medication List  
  
START taking these medications Details  
metoprolol succinate (TOPROL-XL) 50 mg XL tablet Take 1 Tab by mouth daily for 30 days. Qty: 30 Tab, Refills: 0  
  
amiodarone (PACERONE) 400 mg tablet Take 1 Tab by mouth daily for 30 days. Qty: 30 Tab, Refills: 0  
  
apixaban (ELIQUIS) 5 mg tablet Take 1 Tab by mouth two (2) times a day for 30 days. Qty: 60 Tab, Refills: 0  
  
gabapentin (NEURONTIN) 300 mg capsule Take 1 Cap by mouth three (3) times daily for 10 days. Qty: 30 Cap, Refills: 0 CONTINUE these medications which have NOT CHANGED  Details  
albuterol (PROVENTIL HFA, VENTOLIN HFA, PROAIR HFA) 90 mcg/actuation inhaler Take 1 Puff by inhalation every four (4) hours as needed for Wheezing or Shortness of Breath. Qty: 1 Inhaler, Refills: 5  
  
albuterol-ipratropium (DUO-NEB) 2.5 mg-0.5 mg/3 ml nebu 3 mL by Nebulization route every six (6) hours as needed for Wheezing. Qty: 120 Nebule, Refills: 3  
  
dexAMETHasone (DECADRON) 2 mg tablet 3 tabs by mouth daily x 4 days then 2 tabs by mouth daily x 4 days then one tab daily then stop  Indications: prevent nausea and vomiting from cancer chemotherapy Qty: 30 Tab, Refills: 0 Nebulizer & Compressor machine COPD Qty: 1 Each, Refills: 0  
  
hydrocortisone (HYTONE) 2.5 % topical cream Apply  to affected area four (4) times daily as needed (skin changes related to radiation therapy). use thin layer Qty: 50 g, Refills: 0  
  
polyethylene glycol (MIRALAX) 17 gram packet Take 1 Packet by mouth daily. Indications: constipation 
Qty: 30 Packet, Refills: 0  
  
esomeprazole (NexIUM) 20 mg capsule Take 20 mg by mouth nightly. prochlorperazine (COMPAZINE) 10 mg tablet Take 1 Tab by mouth every six (6) hours as needed for Nausea. Indications: nausea and vomiting caused by cancer drugs, nausea and vomiting 
Qty: 90 Tab, Refills: 3 Associated Diagnoses: CINV (chemotherapy-induced nausea and vomiting)  
  
ondansetron hcl (ZOFRAN) 8 mg tablet Take 1 Tab by mouth every eight (8) hours as needed for Nausea. Indications: nausea and vomiting caused by cancer drugs Qty: 60 Tab, Refills: 3 Associated Diagnoses: CINV (chemotherapy-induced nausea and vomiting) lidocaine-prilocaine (EMLA) topical cream Apply  to affected area as needed for Pain. Qty: 30 g, Refills: 0 Associated Diagnoses: Port-A-Cath in place * Follow-up Care/Patient Instructions: Activity: Activity as tolerated Diet: Regular Diet Wound Care: Keep wound clean and dry Follow-up Information Follow up With Specialties Details Why Contact Info None    None ([phone]) Patient stated that they have no PCP Discharge Instructions/Follow-up Plans: MD Instructions: 
  
Follow-up with Dr. Gus Waldron on 8/7/20. Call office on Monday to schedule appt time Keep incisions clean and dry, may remain uncovered. Do not apply lotions, creams or ointments to incisions. 
  
Diet - as tolerated - Soft foods diet. Activity - ambulate - as tolerated - no heavy lifting >10lb. May shower - no tub baths or soaking/submerging. 
  
F/u in office with Cardiology as instructed.  
 
Resume other home medications.  
  
If problems or questions arise, please call our office at [phone]. 
  
Greater than 30 minutes were spent discharging the patient 
  
  
 
Signed: 
Yasmin Mckeon NP 
8/1/2020 
11:21 AM

## 2020-08-01 NOTE — DISCHARGE INSTRUCTIONS
Discharge Instructions/Follow-up Plans:   MD Instructions:     Follow-up with Dr. Elda Chapman on 8/7/20. Call office on Monday to schedule appt time  Keep incisions clean and dry, may remain uncovered. Do not apply lotions, creams or ointments to incisions.     Diet - as tolerated - Soft foods diet. Activity - ambulate - as tolerated - no heavy lifting >10lb. May shower - no tub baths or soaking/submerging.     F/u in office with Cardiology as instructed.     Resume other home medications. If problems or questions arise, please call our office at [phone]. Patient Education        Learning About Atrial Fibrillation  What is atrial fibrillation? Atrial fibrillation (say \"AY-tree-hi wqa-oikn-MKG-shun\") is the most common type of irregular heartbeat (arrhythmia). Normally, the heart beats in a strong, steady rhythm. In atrial fibrillation, a problem with the heart's electrical system causes the two upper parts of the heart (the atria) to quiver, or fibrillate. Your heart rate also may be faster than normal.  Atrial fibrillation can be dangerous because if the heartbeat isn't strong and steady, blood can collect, or pool, in the atria. And pooled blood is more likely to form clots. Clots can travel to the brain, block blood flow, and cause a stroke. Atrial fibrillation can also lead to heart failure. Treatment for atrial fibrillation helps prevent stroke and heart failure. It also helps relieve symptoms. Atrial fibrillation is often caused by another heart problem. It may happen after heart surgery. It may also be caused by other problems, such as an overactive thyroid gland or lung disease. Many people with atrial fibrillation are able to live full and active lives. What are the symptoms? Some people feel symptoms when they have episodes of atrial fibrillation. But other people don't notice any symptoms.   If you have symptoms, you may feel:  · A fluttering, racing, or pounding feeling in your chest called palpitations. · Weak or tired. · Dizzy or lightheaded. · Short of breath. · Chest pain. · Confused. You may notice signs of atrial fibrillation when you check your pulse. Your pulse may seem uneven or fast.  What can you expect when you have atrial fibrillation? At first, spells of atrial fibrillation may come on suddenly and last a short time. It may go away on its own or it goes away after treatment. This is called paroxysmal atrial fibrillation. Over time, the spells may last longer and occur more often. They often don't go away on their own. How is it treated? Treatments can help you feel better and prevent future problems, especially stroke and heart failure. The main types of treatment slow the heart rate, control the heart rhythm, and help prevent stroke. Your treatment will depend on the cause of your atrial fibrillation, your symptoms, and your risk for stroke. · Heart rate treatment. Medicine may be used to slow your heart rate. Your heartbeat may still be irregular. But these medicines keep your heart from beating too fast. They may also help relieve your symptoms. · Heart rhythm treatment. Different treatments may be used to try to stop atrial fibrillation and keep it from returning. They can also relieve symptoms. These treatments include medicine, electrical cardioversion to shock the heart back to a normal rhythm, a procedure called catheter ablation, and heart surgery. · Stroke prevention. You and your doctor can decide how to lower your risk. You may decide to take a blood-thinning medicine called an anticoagulant. How can you live well with it? You can live well and help manage atrial fibrillation by having a heart-healthy lifestyle. This lifestyle may help reduce how often you have episodes of atrial fibrillation. If you are overweight, losing weight can help relieve symptoms. To have a heart-healthy lifestyle:  · Don't smoke. · Eat heart-healthy foods. · Be active.  Talk to your doctor about what type and level of exercise is safe for you. · Stay at a healthy weight. Lose weight if you need to. · Avoid alcohol if it triggers symptoms. · Manage other health problems such as high blood pressure, high cholesterol, and diabetes. · Avoid getting sick from the flu. Get a flu shot every year. · Manage stress. Where can you learn more? Go to http://diomedesSpect[employer] Imagelashonda.info/  Enter L274 in the search box to learn more about \"Learning About Atrial Fibrillation. \"  Current as of: December 16, 2019               Content Version: 12.5  © 8327-3262 [employer]. Care instructions adapted under license by [employer] (which disclaims liability or warranty for this information). If you have questions about a medical condition or this instruction, always ask your healthcare professional. [place] [address] any warranty or liability for your use of this information.            Patient Education        Thoracotomy: What to Expect at [facility]  A thoracotomy (say \"gjgn-oy-CSG-tuh-danny\") is a cut (incision) that the doctor makes in the chest wall through your front, side, or back. The doctor is able to do surgery inside the chest through the incision. A thoracotomy may be used to do surgery on the lungs, esophagus, trachea, heart, aorta, or diaphragm. The exact place in the chest where the doctor makes the incision depends on the reason for the surgery. It is common to feel tired for 6 to 8 weeks after surgery. Your chest may hurt and be swollen for up to 6 weeks. It may ache or feel stiff for up to 3 months. You may also feel tightness, itching, numbness, or tingling around the incision for up to 3 months. Your doctor will give you medicine to help with pain. You will have stitches or staples in the incision. You may have one or more tubes coming out of your chest to drain fluid and air that can build up after surgery.  The tubes are often removed before you leave the hospital. Your doctor will remove the stitches or staples at your follow-up visit. You may feel short of breath at first after the surgery. Your doctor, nurse, or respiratory therapist will teach you deep-breathing and coughing exercises to help your body get as much oxygen as possible. You also may need to get extra oxygen through a mask or a plastic tube in your nostrils (nasal cannula). This is called oxygen therapy. The amount of time you will need to recover depends on the surgery you had. You probably will need to take at least 1 to 2 months off work. This care sheet gives you a general idea about how long it will take for you to recover. But each person recovers at a different pace. Follow the steps below to get better as quickly as possible. How can you care for yourself at home? Activity  · Rest when you feel tired. Getting enough sleep will help you recover. · Try to walk each day. Start by walking a little more than you did the day before. Bit by bit, increase the amount you walk. Walking boosts blood flow and helps prevent pneumonia and constipation. · Do not smoke or allow others to smoke around you. If you need help quitting, talk to your doctor about stop-smoking programs and medicines. These can increase your chances of quitting for good. · Try to avoid being around people who you know have a cold, the flu, or other illness. · Avoid strenuous activities, such as bicycle riding, jogging, weight lifting, or aerobic exercise, until your doctor says it is okay. Also avoid swimming, tennis, golf, or other activities that could strain your arm and shoulder muscles, until your doctor says it is okay. · Until your doctor says it is okay, avoid lifting anything that would make you strain. This may include a child, heavy grocery bags and milk containers, a heavy briefcase or backpack, cat litter or dog food bags, or a vacuum .   · If your incision is in the front or the side of your chest, hold a pillow over the incision when you cough or take deep breaths. This will support your chest and decrease your pain. · Ask your doctor when it is safe to you to drive or fly. You probably will not be able to drive for at least 4 weeks. This is because your arm and shoulder muscles may be stiff after surgery and could make it difficult to steer. · You may be able to take showers (unless you have a drain near your incision). If you have a drain near your incision, follow your doctor's instructions to empty and care for it. Do not take a bath for the first 2 weeks, or until your doctor tells you it is okay. · Ask your doctor when it is okay for you to have sex. · You will probably need to take at least 1 to 2 months off from work. It depends on the surgery you had and the type of work you do. Diet  · You can eat your normal diet. If your stomach is upset, try bland, low-fat foods like plain rice, broiled chicken, toast, and yogurt. · Drink plenty of fluids (unless your doctor tells you not to). · You may notice that your bowel movements are not regular right after your surgery. This is common. Try to avoid constipation and straining with bowel movements. You may want to take a fiber supplement every day. If you have not had a bowel movement after a couple of days, ask your doctor about taking a mild laxative. Medicines  · Your doctor will tell you if and when you can restart your medicines. He or she will also give you instructions about taking any new medicines. · If you take aspirin or some other blood thinner, ask your doctor if and when to start taking it again. Make sure that you understand exactly what your doctor wants you to do. · Be safe with medicines. Take pain medicines exactly as directed. ? If the doctor gave you a prescription medicine for pain, take it as prescribed.   ? If you are not taking a prescription pain medicine, ask your doctor if you can take an over-the-counter medicine. ? Do not take two or more pain medicines at the same time unless the doctor told you to. Many pain medicines have acetaminophen, which is Tylenol. Too much acetaminophen (Tylenol) can be harmful. · If you think your pain medicine is making you sick to your stomach:  ? Take your medicine after meals (unless your doctor has told you not to). ? Ask your doctor for a different pain medicine. · If your doctor prescribed antibiotics, take them as directed. Do not stop taking them just because you feel better. You need to take the full course of antibiotics. Incision care  · If you have strips of tape on the incision, leave the tape on for a week or until it falls off. · Wash the area daily with warm, soapy water, and pat it dry. Don't use hydrogen peroxide or alcohol, which may delay healing. You may cover the area with a gauze bandage if it weeps or rubs against clothing. Change the bandage every day. · Keep the area clean and dry. Exercise  · To help keep your lungs clear, cough and do deep breathing exercises as you are told by your doctor, nurse, or respiratory therapist.  · Your doctor may send you home with an incentive spirometer. This device helps you practice taking deep breaths, which can help keep your lungs healthy. · Ask your doctor about exercises to keep your arm and shoulder muscles strong and flexible while you recover. Follow-up care is a key part of your treatment and safety. Be sure to make and go to all appointments, and call your doctor if you are having problems. It's also a good idea to know your test results and keep a list of the medicines you take. When should you call for help? [id number] anytime you think you may need emergency care. For example, call if:  · You passed out (lost consciousness). · You have severe trouble breathing. · You have sudden chest pain and shortness of breath, or you cough up blood.   Call your doctor now or seek immediate medical care if:  · You are sick to your stomach or cannot keep fluids down. · You have pain that does not get better after you take pain medicine. · You have a fever over 100°F.  · You have loose stitches, or your incision comes open. · Bright red blood has soaked through the bandage over your incision. · You have signs of infection, such as:  ? Increased pain, swelling, warmth, or redness. ? Red streaks leading from the incision. ? Pus draining from the incision. ? Swollen lymph nodes in your neck, armpits, or groin. ? A fever. · You cough up a lot more mucus than normal, or your mucus changes color. Watch closely for changes in your health, and be sure to contact your doctor if you have any problems. Where can you learn more? Go to http://diomedes-lashonda.info/  Enter T374 in the search box to learn more about \"Thoracotomy: What to Expect at Home. \"  Current as of: February 24, 2020               Content Version: 12.5  © 2006-2020 [employer]. Care instructions adapted under license by [employer] (which disclaims liability or warranty for this information).  If you have questions about a medical condition or this instruction, always ask your healthcare professional. [address] any warranty or liability for your use of this information.

## 2020-08-01 NOTE — CONSULTS
[facility] Cardiology/Electrophyiology Consult Date of  Admission: 7/23/2020  9:40 AM  
 
CC/Reason for consult: afib with RVR, failed DCCV this admission Isabella Linton is a 81 y.o. male admitted for Cancer of lower lobe of right lung ([facility] [address] [C34.31]; Cancer of bronchus of right lower lobe ([facility] [address] [C34.31];Lung cancer ([facility] [address] [C34.90]; Cancer of right lung ([facility] [address] [C34.91]. 81 y.o. male h/o GERD, HTN, OA s/p knee replacement and diagnosed with lung cancer s/p chemo and radiation who was admitted for thoractomy with lobectomy and lymphadenectomy on 7/23 and admission complicated by afib with RVR s/p failed DCCV yesterday and EP was consulted for management of afib with RVR. Pt had sugery and was admitted to the floor post operatively. He has had sinus tachycardia with PAC's chronically since 2016. He has had some faster rates up to 150's and [facility] Card[facility] was consulted and he was noted to be in afib with RVR. He denies CP, palpitations, LE swelling, N/V, diaphoresis. No known prior history of afib, no prior history of rapid or irregular heart rates. He is chronically SOB but not on home O2. He has seen Dr. Bhavna Estes in the past for long standing HTN. He has had >20 lb weight loss and BP is much improved. Pt afib is a newly recognized problem, controlled, no aggravating or alleviating factors, asymptomatic. Cardiac PMH: (Old records have been reviewed and summarized below) Patient Active Problem List  
Diagnosis Code  Essential hypertension with goal blood pressure less than 130/80 I10  Mass of lower lobe of right lung R91.8  Right lower lobe lung mass R91.8  Malignant neoplasm of lower lobe of right lung (HCC) C34.31  
 GERD (gastroesophageal reflux disease) K21.9  Sepsis due to undetermined organism ([facility] [address] A41.9  Pulmonary emphysema ([address] J43.9  Cancer of bronchus of right lower lobe (HCC) C34.31  
 Lung cancer ([address] C34.90  
 Cancer of right lung (HCC) C34.91  
  Atrial tachycardia (HCC) I47.1  S/P lobectomy of lung Z90.2  Cough R05 Past Medical History:  
Diagnosis Date  GERD (gastroesophageal reflux disease)   
 controlled with med  Hypertension hx-- off meds since 4/2020--- followed by dr. Nixon Irizarry  Hypoxia 02/24/2020  Malignant neoplasm of lower lobe of right lung ([address] 02/26/2020 REC'D CHEMO AND RADIATION--- FOLLOWED BY DR. Nedra Santiago Osteoarthritis  Right lower lobe lung mass 02/24/2020  Status post total right knee replacement 2/29/2016 Past Surgical History:  
Procedure Laterality Date  HX COLONOSCOPY    
 HX KNEE ARTHROSCOPY Left   
 scope X 1, ACL recon X 1  
 HX KNEE ARTHROSCOPY Right 1974, 1975 X 2   
 HX KNEE REPLACEMENT Right 2016 [address] [facility] [address]  HX VASCULAR ACCESS Right placed 3/2020  
 port in chest   
 IR INSERT TUNL CVC W PORT OVER 5 YEARS  3/18/2020  
[facility] UNLISTED  1988, 1991  
 sinus surg Allergies Allergen Reactions  Celebrex [Celecoxib] Itching Family History Problem Relation Age of Onset  Cancer Mother   
     uterine  Arrhythmia Sister   
     afib Current Facility-Administered Medications Medication Dose Route Frequency  metoprolol tartrate (LOPRESSOR) tablet 50 mg  50 mg Oral Q6H  
 amiodarone (CORDARONE) tablet 400 mg  400 mg Oral BID  
 apixaban (ELIQUIS) tablet 5 mg  5 mg Oral BID  
 guaiFENesin-dextromethorphan (ROBITUSSIN DM) 100-10 mg/5 mL syrup 10 mL  10 mL Oral Q6H PRN  
 acetylcysteine (MUCOMYST) 100 mg/mL (10 %) nebulizer solution 200 mg  2 mL Nebulization Q6HWA RT  
 acetaminophen (TYLENOL) tablet 650 mg  650 mg Oral Q4H PRN  
 albuterol (PROVENTIL VENTOLIN) nebulizer solution 2.5 mg  2.5 mg Nebulization Q4H PRN  
 levalbuterol (XOPENEX) nebulizer soln 0.63 mg/3 mL  0.63 mg Nebulization TID RT  
 sodium chloride (NS) flush 5-40 mL  5-40 mL IntraVENous Q8H  
  sodium chloride (NS) flush 5-40 mL  5-40 mL IntraVENous PRN  
 HYDROmorphone (PF) (DILAUDID) injection 1 mg  1 mg IntraVENous Q4H PRN  
 naloxone (NARCAN) injection 0.4 mg  0.4 mg IntraVENous PRN  
 ondansetron (ZOFRAN) injection 4 mg  4 mg IntraVENous Q4H PRN  
 gabapentin (NEURONTIN) capsule 300 mg  300 mg Oral TID  guaiFENesin ER (MUCINEX) tablet 1,200 mg  1,200 mg Oral Q12H  
 senna-docusate (PERICOLACE) 8.6-50 mg per tablet 2 Tab  2 Tab Oral BID  
 HYDROcodone-acetaminophen (NORCO) 7.5-325 mg per tablet 1 Tab  1 Tab Oral Q4H PRN  pantoprazole (PROTONIX) tablet 40 mg  40 mg Oral ACB Review of Symptoms: A comprehensive ROS was performed with the pertinent positives and negatives mentioned in the HPI, all other systems reviewed and are negative Physical Exam 
Vitals:  
 07/31/20 2201 08/01/20 5114 08/01/20 0457 08/01/20 8875 BP: 94/55 109/79 95/60 Pulse: 100 92 98 Resp:  19 18 Temp:  98.3 °F (36.8 °C) 97.5 °F (36.4 °C) SpO2:  97% 97% 94% Weight:   218 lb 8 oz (99.1 kg) Height:      
 
 
Physical Exam: 
General appearance - Alert, well appearing, and in no distress Mental status - Affect appropriate to mood. Eyes - Sclerae anicteric, ENMT - Hearing grossly normal bilaterally, Dental hygiene good. Neck - Carotids upstroke normal bilaterally, no bruits, no JVD. Resp - Clear to auscultation, no wheezes, rales or rhonchi, symmetric air entry. Heart - Normal rate, regular rhythm, normal S1, S2, no murmurs, rubs, clicks or gallops. GI - Soft, nontender, nondistended, no masses or organomegaly. Neurological - Grossly intact - normal speech, no focal findings Musculoskeletal - No joint tenderness, deformity or swelling, no muscular tenderness noted. Extremities - Peripheral pulses normal, no pedal edema, no clubbing or cyanosis. Skin - Normal coloration and turgor. Psych -  oriented to person, place, and time. Cardiographics Telemetry: NSR 
 ECG (Indpendently visualized and interpreted): 
Echocardiogram:  
 
Labs:  
Recent Labs 08/01/20 
0406 07/31/20 
1218  138  
K 3.9 3.9 BUN 10 11 CREA 0.70* 0.77* GLU 90 127* WBC 5.1 5.8 HGB 11.2* 12.1*  
HCT 33.3* 35.8*  
* 132* Assessment:  
  
Principal Problem: 
  Cancer of right lung ([facility] [address] (7/23/2020) Active Problems: 
  Essential hypertension with goal blood pressure less than 130/80 (2/19/2018) Cancer of bronchus of right lower lobe ([facility] [address] (7/23/2020) Lung cancer ([facility] [address] (7/23/2020) Atrial tachycardia ([facility] [address] (7/26/2020) S/P lobectomy of lung (7/29/2020) Cough (7/29/2020) 81 y.o. male h/o GERD, HTN, OA s/p knee replacement and diagnosed with lung cancer s/p chemo and radiation who was admitted for thoractomy with lobectomy and lymphadenectomy on 7/23 and admission complicated by afib with RVR s/p failed DCCV yesterday and EP was consulted for management of afib with RVR. Plan: 1. Atrial fibrillation: currently NSR, will transition to oral amiodarone 400mg daily, will need OP cardiology follow up, will consolidate BB and decrease dose to 50mg XL. 2. CVA protection: cont [facility]iology will sign off, will arrange OP follow up Thank you very much for this referral. We appreciate the opportunity to participate in this patient's care. We will follow along with above stated plan. Brady Arreguin MD, MS 
Cardiology/Electrophysiology

## 2020-08-01 NOTE — ROUTINE PROCESS
Bedside and Verbal shift change report given to Andreea Dey RN (oncoming nurse) by  Mendel Kerbs nurse). Report included the following information SBAR, Kardex, Intake/Output, MAR and Recent Results.

## 2020-08-01 NOTE — PROGRESS NOTES
Pt is for discharge home today. Received recommendation for pt to go home on 2 liters O2 as respiratory verified pt has low O2 sats. Referral made to [facility] for home O2, portable tank to pt room prior to his discharge and remainder of equipment to be delivered to pt home. .   
 Care Management Interventions PCP Verified by CM: Yes Mode of Transport at Discharge: Self Transition of Care Consult (CM Consult): Discharge Planning Physical Therapy Consult: No 
Occupational Therapy Consult: No 
Current Support Network: Own Home, Other(Liives with girlfriend) Confirm Follow Up Transport: Family The Patient and/or Patient Representative was Provided with a Choice of Provider and Agrees with the Discharge Plan?: Yes Freedom of Choice List was Provided with Basic Dialogue that Supports the Patient's Individualized Plan of Care/Goals, Treatment Preferences and Shares the Quality Data Associated with the Providers?: Yes [employer] Information Provided?: No 
Discharge Location Discharge Placement: Home

## 2020-08-01 NOTE — PROGRESS NOTES
Problem: Falls - Risk of 
Goal: *Absence of Falls Description: Document Tejas Deyncer Fall Risk and appropriate interventions in the flowsheet. Outcome: Progressing Towards Goal 
Note: Fall Risk Interventions: 
Mobility Interventions: Communicate number of staff needed for ambulation/transfer, Patient to call before getting OOB Medication Interventions: Evaluate medications/consider consulting pharmacy, Patient to call before getting OOB, Teach patient to arise slowly Elimination Interventions: Call light in reach, Elevated toilet seat, Patient to call for help with toileting needs History of Falls Interventions: Door open when patient unattended

## 2020-08-01 NOTE — PROGRESS NOTES
Suzy Waller. Admission Date: 7/23/2020 POD: 9 Days Post-Op Daily Progress Note: 8/1/2020 Subjective: No complaints Objective:  
AVSS Physical Exam:         
GEN: well developed and in no acute distress alert and oriented Incisions clean and dry. Healing well LAB Recent Labs 08/01/20 
0406 07/31/20 
1218 WBC 5.1 5.8 HGB 11.2* 12.1*  
HCT 33.3* 35.8*  
* 132*  138  
K 3.9 3.9  105 CO2 27 25 BUN 10 11 CREA 0.70* 0.77* GLU 90 127* Assessment:  
 
Hospital Problems  Date Reviewed: 7/23/2020 Codes Class Noted POA  
 S/P lobectomy of lung ICD-10-CM: Z90.2 ICD-9-CM: V45.89  7/29/2020 Unknown Cough ICD-10-CM: R05 ICD-9-CM: 786.2  7/29/2020 Unknown Atrial tachycardia (HCC) ICD-10-CM: I47.1 ICD-9-CM: 427.89  7/26/2020 Unknown Cancer of bronchus of right lower lobe [facility]) ICD-10-CM: C34.31 
ICD-9-CM: 162.5  7/23/2020 Unknown Lung cancer [facility]) ICD-10-CM: C34.90 ICD-9-CM: 162.9  7/23/2020 Unknown * (Principal) Cancer of right lung [facility]) ICD-10-CM: C34.91 
ICD-9-CM: 162.9  7/23/2020 Unknown Essential hypertension with goal blood pressure less than 130/80 (Chronic) ICD-10-CM: I10 
ICD-9-CM: 401.9  2/19/2018 Yes Plan:  
 
Doing fine from surgery standpoint Alejandro Sin MD

## 2020-08-03 ENCOUNTER — PATIENT OUTREACH (OUTPATIENT)
Dept: CASE MANAGEMENT | Age: 70
End: 2020-08-03

## 2020-08-03 NOTE — PROGRESS NOTES
Transition of [facility] Discharge Follow-Up Date/Time:  8/3/2020 3:27 PM 
 
Patient was admitted to [facility] on 20 and discharged on 20 for cancer of right lung; s/p RIGHT THORACOTOMY W/ LOWER AND MIDDLE  LOBECTOMY AND MEDIASTINAL LYMPHAIDNECTOMY/CRYOPEXY.p  . The physician discharge summary was available at the time of outreach. Patient was contacted within 1 business days of discharge. Inpatient RUR score: 27 Was this a readmission? No, planned surgery Patient stated reason for the readmission: n/a Patients top risk factors for readmission: comorbidities, treatment and/or complications of surgery LPN Care Coordinator contacted the patient by telephone to perform post hospital discharge assessment. Verified name and  with patient as identifiers. Provided introduction to self, and explanation of the Care Coordinator role. Patient received hospital discharge instructions. LPN reviewed discharge instructions and red flags with patient who verbalized understanding. Patient given an opportunity to ask questions and does not have any further questions or concerns at this time. The patient agrees to contact the PCP office for questions related to their healthcare. LPN provided contact information for future reference. Home Health orders at discharge: none [facility]: n/a Date of initial or scheduled visit: n/a (Assist with coordination of services if necessary.) Durable Medical Equipment ordered at discharge: none [facility]: n/a Durable Medical Equipment received: n/a (Assist patient in obtaining DME orders &/or equipment if necessary.) Medication(s):  
Medication review was performed with patient, who verbalizes understanding of administration of home medications. There were no barriers to obtaining medications identified at this time. Current Outpatient Medications Medication Sig  
 Britt Caruso  metoprolol succinate (TOPROL-XL) 50 mg XL tablet Take 1 Tab by mouth daily for 30 days.  amiodarone (PACERONE) 400 mg tablet Take 1 Tab by mouth daily for 30 days.  apixaban (ELIQUIS) 5 mg tablet Take 1 Tab by mouth two (2) times a day for 30 days.  gabapentin (NEURONTIN) 300 mg capsule Take 1 Cap by mouth three (3) times daily for 10 days.  albuterol (PROVENTIL HFA, VENTOLIN HFA, PROAIR HFA) 90 mcg/actuation inhaler Take 1 Puff by inhalation every four (4) hours as needed for Wheezing or Shortness of Breath. (Patient taking differently: Take 1 Puff by inhalation every four (4) hours as needed for Wheezing or Shortness of Breath. Uses 2 times daily)  albuterol-ipratropium (DUO-NEB) 2.5 mg-0.5 mg/3 ml nebu 3 mL by Nebulization route every six (6) hours as needed for Wheezing. (Patient taking differently: 3 mL by Nebulization route every six (6) hours as needed for Wheezing. Uses 2 times daily)  dexAMETHasone (DECADRON) 2 mg tablet 3 tabs by mouth daily x 4 days then 2 tabs by mouth daily x 4 days then one tab daily then stop  Indications: prevent nausea and vomiting from cancer chemotherapy (Patient taking differently: Take 2 mg by mouth Daily (before breakfast). Indications: prevent nausea and vomiting from cancer chemotherapy)  Nebulizer & Compressor machine COPD  hydrocortisone (HYTONE) 2.5 % topical cream Apply  to affected area four (4) times daily as needed (skin changes related to radiation therapy). use thin layer  polyethylene glycol (MIRALAX) 17 gram packet Take 1 Packet by mouth daily. Indications: constipation (Patient taking differently: Take 17 g by mouth daily as needed. Indications: constipation)  esomeprazole (NexIUM) 20 mg capsule Take 20 mg by mouth nightly.  prochlorperazine (COMPAZINE) 10 mg tablet Take 1 Tab by mouth every six (6) hours as needed for Nausea. Indications: nausea and vomiting caused by cancer drugs, nausea and vomiting  ondansetron hcl (ZOFRAN) 8 mg tablet Take 1 Tab by mouth every eight (8) hours as needed for Nausea. Indications: nausea and vomiting caused by cancer drugs  lidocaine-prilocaine (EMLA) topical cream Apply  to affected area as needed for Pain. No current facility-administered medications for this visit. There are no discontinued medications. ADL assessment: independent (If patient is unable to perform ADLs  what is the limiting factor(s)? Do they have a support system that can assist? If no support system is present, discuss possible assistance that they may be able to obtain. Escalate for SW if ongoing issues are verbalized by pt or anticipated) [facility] follow up appointment(s):  
Future Appointments Date Time Provider Aly Xavier 8/7/2020 12:15 PM Radha Portillo MD [facility] MAIN  
8/10/2020 10:50 AM [facility] [address] [facility]  
8/10/2020 11:15 AM Asya Dominique MD [facility]  
8/25/2020 10:30 AM Linnea Francis NP [facility] PP PP Non[facility] follow up appointment(s): n/a 
7 Day follow up with PCP or Specialist: yes Transportation arranged: no needs at this time Covid Risk Education Patient has following risk factors of: right lung cancer. Education provided regarding infection prevention, and signs and symptoms of COVID-19 and when to seek medical attention with patient who verbalized understanding. Discussed exposure protocols and quarantine From CDC: Are you at higher risk for severe illness?  and given an opportunity for questions and concerns. The patient agrees to contact the COVID-19 hotline [phone] or PCP office for questions related to COVID-19. For more information on steps you can take to protect yourself, see CDC's How to Protect Yourself Patient/family/caregiver given information for [employer] and agrees to enroll no Any other questions or concerns expressed by patient? Not at this time Scheduled next follow up call or referral to CTN/ ACM:  CCC will follow per workflow guidelines Next follow up call: within 21 days Goals Addressed This Visit's Progress  Attends follow-up appointments as directed.

## 2020-08-10 ENCOUNTER — HOSPITAL ENCOUNTER (OUTPATIENT)
Dept: LAB | Age: 70
Discharge: HOME | End: 2020-08-10
Payer: COMMERCIAL

## 2020-08-10 DIAGNOSIS — C34.90 MALIGNANT NEOPLASM OF LUNG, UNSPECIFIED LATERALITY, UNSPECIFIED PART OF LUNG (HCC): ICD-10-CM

## 2020-08-10 LAB
ALBUMIN SERPL-MCNC: 2.8 G/DL (ref 3.2–4.6)
ALBUMIN/GLOB SERPL: 0.6 {RATIO} (ref 1.2–3.5)
ALP SERPL-CCNC: 111 U/L (ref 50–136)
ALT SERPL-CCNC: 12 U/L (ref 12–65)
ANION GAP SERPL CALC-SCNC: 2 MMOL/L (ref 7–16)
AST SERPL-CCNC: 21 U/L (ref 15–37)
BASOPHILS # BLD: 0 K/UL (ref 0–0.2)
BASOPHILS NFR BLD: 1 % (ref 0–2)
BILIRUB SERPL-MCNC: 0.6 MG/DL (ref 0.2–1.1)
BUN SERPL-MCNC: 9 MG/DL (ref 8–23)
CALCIUM SERPL-MCNC: 9.5 MG/DL (ref 8.3–10.4)
CHLORIDE SERPL-SCNC: 104 MMOL/L (ref 98–107)
CO2 SERPL-SCNC: 32 MMOL/L (ref 21–32)
CREAT SERPL-MCNC: 0.9 MG/DL (ref 0.8–1.5)
DIFFERENTIAL METHOD BLD: ABNORMAL
EOSINOPHIL # BLD: 0.5 K/UL (ref 0–0.8)
EOSINOPHIL NFR BLD: 9 % (ref 0.5–7.8)
ERYTHROCYTE [DISTWIDTH] IN BLOOD BY AUTOMATED COUNT: 13.7 % (ref 11.9–14.6)
GLOBULIN SER CALC-MCNC: 4.4 G/DL (ref 2.3–3.5)
GLUCOSE SERPL-MCNC: 114 MG/DL (ref 65–100)
HCT VFR BLD AUTO: 38.2 % (ref 41.1–50.3)
HGB BLD-MCNC: 12.9 G/DL (ref 13.6–17.2)
IMM GRANULOCYTES # BLD AUTO: 0 K/UL (ref 0–0.5)
IMM GRANULOCYTES NFR BLD AUTO: 1 % (ref 0–5)
LYMPHOCYTES # BLD: 0.9 K/UL (ref 0.5–4.6)
LYMPHOCYTES NFR BLD: 16 % (ref 13–44)
MCH RBC QN AUTO: 32.3 PG (ref 26.1–32.9)
MCHC RBC AUTO-ENTMCNC: 33.8 G/DL (ref 31.4–35)
MCV RBC AUTO: 95.7 FL (ref 79.6–97.8)
MONOCYTES # BLD: 0.5 K/UL (ref 0.1–1.3)
MONOCYTES NFR BLD: 9 % (ref 4–12)
NEUTS SEG # BLD: 3.6 K/UL (ref 1.7–8.2)
NEUTS SEG NFR BLD: 64 % (ref 43–78)
NRBC # BLD: 0 K/UL (ref 0–0.2)
PLATELET # BLD AUTO: 201 K/UL (ref 150–450)
PMV BLD AUTO: 9.2 FL (ref 9.4–12.3)
POTASSIUM SERPL-SCNC: 4.2 MMOL/L (ref 3.5–5.1)
PROT SERPL-MCNC: 7.2 G/DL (ref 6.3–8.2)
RBC # BLD AUTO: 3.99 M/UL (ref 4.23–5.67)
SODIUM SERPL-SCNC: 138 MMOL/L (ref 136–145)
WBC # BLD AUTO: 5.7 K/UL (ref 4.3–11.1)

## 2020-08-10 PROCEDURE — 85025 COMPLETE CBC W/AUTO DIFF WBC: CPT

## 2020-08-10 PROCEDURE — 80053 COMPREHEN METABOLIC PANEL: CPT

## 2020-08-10 NOTE — ADT AUTH CERT NOTES
Utilization Reviews  
 
   
clinical for 7/31/20 by Berenice Velasco, RN  
 
   
Review Status  Review Entered In Primary  8/10/2020 15:05   
   
Criteria Review   
  
Pulm MD note: 
Plan:  (Medical Decision Making) --CXR today with stable findings 
-cough improving 
-Encourage OOT and activity 
--Mucomyst & Xopenox nebs 
-nothing else to add ,will sign off, will follow up in our office 
  
    
Recent Labs  
  07/31/20 
1218 07/29/20 
0348 WBC 5.8 6.1 HGB 12.1* 11.7* HCT 35.8* 34.8*  
* 108*  
  
       
Recent Labs  
  07/31/20 
1218 07/29/20 
5128  138  
K 3.9 4.1  104 CO2 25 29 * 100 BUN 11 13 CREA 0.77* 0.78* CA 8.5 8.6  
  
ASSESSMENT:  Admit Date: 7/23/2020  
8 Day Post-Op  Procedure(s) with comments: 
RIGHT THORACOTOMY W/ LOWER AND MIDDLE  LOBECTOMY AND MEDIASTINAL LYMPHAIDNECTOMY/CRYOPEXY. - RIGHT LOWER LUNG 
  
Principal Problem: 
  Cancer of right lung ([address] (7/23/2020) 
  Active Problems: 
  Essential hypertension with goal blood pressure less than 130/80 (2/19/2018)   
  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   
  Lung cancer ([address] (7/23/2020)   
  Atrial tachycardia ([address] (7/26/2020)   
  S/P lobectomy of lung (7/29/2020)   
  Cough (7/29/2020)   
  
  
SUBJECTIVE: 
Pt in bed. No complaints, no new issues.  Cardiology following. AF. NAD. 
  
PLAN: 
Diet- Regular DVT Prophylactics- SCD Pain control- Dilaudid/Norco/ Neurontin SUP prophylaxis- Protonix Encourage C/DB/IS Posterior chest tube removed 7/27/20 Anterior chest tube removed 7/28/20 Can remove chest tube dressings 7/31/2020 Ambulate Appreciate cardiology input Appreciate pulmonary input OK for discharge when ok from cardiology standpoint- currently still on amiodarone gtt OK to start anticoagulation per cardiology from surgery standpoint 
  
Cardiology note: 
Persistent atrial fib with RVR.  BP low, no room to push rate control.  Patient would benefit from restoration of sinus rhythm.  Amiodarone and eliquis started yesterday.  Reviewed REGINO cardioversion with patient. Discussed risks of procedure including, but not limited to esophageal damage, allergic reaction, bleeding, low risk of stroke and skin irritation. Pt understands and agrees to proceed.    
  
BP (!) 87/61 Pulse (!) 120 Temp 98.3 °F (36.8 °C) Resp 17 Ht 5' 10\" (1.778 m) Wt 218 lb (98.9 kg) SpO2 94%  
  Meds: cordarone 450mg IV gtt to titrate, mucomyst 200mg neb tx q6hrs , cordarone 400mg po 2x/d,  Xopenex 0.63mg IN 3x/d, lopressor 50mg po x2, Eliquis 5mg po 2x/d, fentanyl 50mcg IV, Xopenex 0.63mg neb tx 3x/d, versed 3mg IV  
   
clinical for 7/29/20 by Filippo Chung RN  
 
   
Review Status  Review Entered In Primary  8/10/2020 14:49   
   
Criteria Review   
  
  
ASSESSMENT:  Admit Date: 7/23/2020  
5 Day Post-Op  Procedure(s) with comments: 
RIGHT THORACOTOMY W/ LOWER AND MIDDLE  LOBECTOMY AND MEDIASTINAL LYMPHAIDNECTOMY/CRYOPEXY. - RIGHT LOWER LUNG 
  
Principal Problem: 
  Cancer of right lung ([address] (7/23/2020) 
  Active Problems: 
  Essential hypertension with goal blood pressure less than 130/80 (2/19/2018)   
  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   
  Lung cancer ([address] (7/23/2020)   
  Atrial tachycardia ([address] (7/26/2020)   
  
  
SUBJECTIVE: 
Pt in bed. Complains of cough. O2 2L 93%.  Cardiology following. -currently on amiodarone gtt and in NSR this AM.  PLAN: 
Diet- Regular DVT Prophylactics- SCD Pain control- Dilaudid/Norco/ Neurontin SUP prophylaxis- Protonix Encourage C/DB/IS 
CXR- daily Posterior chest tube removed 7/27/20 Anterior chest tube removed 7/28/20 Ambulate Appreciate cardiology input Will consult pulmonary for constant cough 
  
OK to start anticoagulation per cardiology from surgery standpoint 
  
  
Cardiology note: 
  
 Subjective: Multiple episode of Atrial tach yesterday, afib overnight now on amiodarone gtt 
  
A/P: 
Principal Problem: 
  Cancer of right lung ([address] (7/23/2020) 
  Active Problems: 
  Essential hypertension with goal blood pressure less than 130/80 (2/19/2018)   
  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   
  Lung cancer ([address] (7/23/2020)   
  Atrial tachycardia ([address] (7/26/2020) 
  
A/P 
1) Atrial tach now Afib- started on amiodarone gtt was trying to avoid this with his lung disease be feel that our hands are tied at this point. Will do a gtt, hold anticoag with recent surgery. 2) Lung CA - per primary team 
3) HTN - controlled 
  
/77 (BP 1 Location: Left arm, BP Patient Position: At rest) Pulse (!) 102 Temp 98.7 °F (37.1 °C) Resp 18 Ht 5' 10\" (1.778 m) Wt 215 lb (97.5 kg) SpO2 94%  
  
   
Recent Labs  
  07/29/20 
0348 WBC 6.1 HGB 11.7* *   
K 4.1  CO2 29 BUN 13  
CREA 0.78*   
  
    
Recent Labs  
  07/29/20 
0348 07/28/20 
0407  138  
K 4.1 3.9 BUN 13 14 CREA 0.78* 0.70*  108* WBC 6.1 5.9 HGB 11.7* 11.9*  
HCT 34.8* 35.1*  
* 99  
  
  
Meds: cordarone 450mg IV gtt tot titrate, mucomyst 200mg neb tx x3, cordarone 150mg IV bolus, Xopenex 0.63mg IN 3x/dlopressor 25mg po q6hrs  
   
   
clinical for 7/28/20 by Pako Shetty RN  
 
   
Review Status  Review Entered In Primary  8/10/2020 14:43   
   
Criteria Review   
  
ASSESSMENT:  Admit Date: 7/23/2020  
5 Day Post-Op  Procedure(s) with comments: 
RIGHT THORACOTOMY W/ LOWER AND MIDDLE  LOBECTOMY AND MEDIASTINAL LYMPHAIDNECTOMY/CRYOPEXY. - RIGHT LOWER LUNG 
  
Principal Problem: 
  Cancer of right lung ([address] (7/23/2020) 
  Active Problems: 
  Essential hypertension with goal blood pressure less than 130/80 (2/19/2018)   
  Cancer of bronchus of right lower lobe ([facility] (7/23/2020)   
  Lung cancer ([facility] (7/23/2020) 
  
   Atrial tachycardia ([facility] (7/26/2020) 
  
Plan: PLAN: 
Diet- Regular DVT Prophylactics- SCD/Lovenox Pain control- Dilaudid/Norco/ Neurontin SUP prophylaxis- Protonix Encourage C/DB/IS 
CXR- daily Posterior chest tube removed 7/27/20 DC anterior chest tube today Ambulate Appreciate cardiology input 
  
Cardiology note: 
Called by primary RN with HR in 175-190. Review of cardiac monitor shows SVT v AFIB w RVR. Will give 12mg IV adenosine at the bedside. After adenosine, rhythm strip shows AFIB w RVR. Will transfer to telemetry for continued care. Due to BP of 94/63, will start IV amiodarone drip with 150mg bolus. No anticoagulation for now, will need to be addressed with primary team due to recent surgery. VS: 98.9, 103, 20, 107/78, 94% Meds: adenosine 12mg IV, cordarone 150mg IV, NS 250cc/hr IV, NS 500cc IV bolus, cordarone 450mg IV gtt to titrate Labs: hgb 11.9, hct 35.1, plt 99, anion gap 6, gluc 108, creat 0.70 CXR: IMPRESSION: 
1. Stable volume loss and right basilar airspace opacity, favored to represent 
atelectasis status post lobectomy on the right  
   
Lobectomy, Lung - Care Day 8 (7/30/2020) by Sumeet Lomeli RN  
 
   
Review Status  Review Entered Completed  7/31/2020 12:12   
   
Criteria Review Care Day: 8 Care Date: 7/30/2020 Level of Care: Inpatient Floor Guideline Day 3 Level Of Care (X) Telemetry or floor Clinical Status   
(X) * Pain absent or managed ( ) * Respiratory distress absent   
( ) * CXR acceptable Activity   
(X) * Increased ambulation Routes (X) Parenteral or oral medications 7/31/2020 12:12:42 EDT by Barrera Gomez   
  :  Tylenol 650mg po q4 prn x1   mucomyst neb q6   cordarone 400mg po bid   Eliquis 5mg po bid   Neurontin 300mg po tid   Mucinex 1200mg po q12     xopenex neb tid   protonix 40mg po qd    cordarone titrated gtt (X) Advance diet Interventions   
(X) * Chest tube absent   
(X) * Central lines absent (X) Oxygen 7/31/2020 12:12:42 EDT by Brady Yancey 94% 2lnc Medications   
(X) * Epidural analgesia absent * Milestone Additional Notes 7/30/20  inpt telem 100.2   145   19   92/64  94% 2lnc No labs Meds:  Tylenol 650mg po q4 prn x1   mucomyst neb q6   cordarone 400mg po bid   Eliquis 5mg po bid   Neurontin 300mg po tid   Mucinex 1200mg po q12     xopenex neb tid   protonix 40mg po qd    cordarone titrated gtt     
Card note Assessment/Plan:   
 Principal Problem:   
  Cancer of right lung ([address] (7/23/2020)   
Rodo Keto Problems:   
  Essential hypertension with goal blood pressure less than 130/80 (2/19/2018)   
   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   
   Lung cancer ([address] (7/23/2020)   
   Atrial tachycardia ([address] (7/26/2020)   
   S/P lobectomy of lung (7/29/2020)   
   Cough (7/29/2020)   
 A/P   
1) Atrial tach now Afib- will change to oral amiodarone 400 mg BID.  Start eliquis 5 mg BID for primary stroke prevention. 2) Lung CA - per primary team   
3) HTN - controlled   
  
  
 Surgery note PLAN:   
Diet- Regular DVT Prophylactics- SCD Pain control- Dilaudid/Norco/ Neurontin SUP prophylaxis- Protonix Encourage C/DB/IS Posterior chest tube removed 7/27/20 Anterior chest tube removed 7/28/20 Can remove chest tube dressings 7/31/2020 Ambulate Appreciate cardiology input Appreciate pulmonary input OK for discharge when ok from cardiology standpoint- currently still on amiodarone gtt OK to start anticoagulation per cardiology from surgery standpoint   
  ASSESSMENT:  Admit Date: 7/23/2020   
7 Day Post-Op  Procedure(s) with comments:   
RIGHT THORACOTOMY W/ LOWER AND MIDDLE  LOBECTOMY AND MEDIASTINAL LYMPHAIDNECTOMY/CRYOPEXY. - RIGHT LOWER LUNG   
 Principal Problem:   
  Cancer of right lung ([address] (7/23/2020)   
Rodo Machado Problems:   
  Essential hypertension with goal blood pressure less than 130/80 (2/19/2018)    Cancer of bronchus of right lower lobe ([facility] (7/23/2020)   
   Lung cancer ([facility] (7/23/2020)   
   Atrial tachycardia ([facility] (7/26/2020)   
   S/P lobectomy of lung (7/29/2020)   
   Cough (7/29/2020) SUBJECTIVE:   
Pt in bed. States cough is much improved. No complaints  Cardiology following. -currently on amiodarone gtt and in NSR this AM.   
    
    
Pulmonary note Subjective:   
 Patient is feeling much better today. No reported cough through the night. O2 @ 2L at this time. Took off to go to the bathroom. No reported dyspnea with this activity. Denies other symptoms at this time. Plan:  (Medical Decision Making) --CXR today with stable findings   
--Cough improved today - One dose of Robitussin given   
--O2 at 2L - Continue weaning   
--No labs today   
--Encourage OOT and activity   
--Mucomyst & Xopenox nebs   
 More than 50% of the time documented was spent in face-to-face contact with the patient and in the care of the patient on the floor/unit where the patient is located.   
Nicki August, EMHDI   
  Lungs: Jenyoselin Mocha reduced on the right. Heart:  RRR with no Murmur/Rubs/Gallops   
 Additional Comments:     
Patient's cough significantly improved/resolved. No need for bronch today. Already has diet re-ordered. Continue current cough medications. Hopefully will continue to improve from here. Will check on him once more tomorrow

## 2020-08-24 ENCOUNTER — HOSPITAL ENCOUNTER (OUTPATIENT)
Dept: LAB | Age: 70
Discharge: HOME | End: 2020-08-24
Payer: COMMERCIAL

## 2020-08-24 DIAGNOSIS — C34.90 MALIGNANT NEOPLASM OF LUNG, UNSPECIFIED LATERALITY, UNSPECIFIED PART OF LUNG (HCC): ICD-10-CM

## 2020-08-24 LAB
ALBUMIN SERPL-MCNC: 3.4 G/DL (ref 3.2–4.6)
ALBUMIN/GLOB SERPL: 0.8 {RATIO} (ref 1.2–3.5)
ALP SERPL-CCNC: 124 U/L (ref 50–136)
ALT SERPL-CCNC: 12 U/L (ref 12–65)
ANION GAP SERPL CALC-SCNC: 4 MMOL/L (ref 7–16)
AST SERPL-CCNC: 20 U/L (ref 15–37)
BASOPHILS # BLD: 0.1 K/UL (ref 0–0.2)
BASOPHILS NFR BLD: 1 % (ref 0–2)
BILIRUB SERPL-MCNC: 1 MG/DL (ref 0.2–1.1)
BUN SERPL-MCNC: 9 MG/DL (ref 8–23)
CALCIUM SERPL-MCNC: 9.7 MG/DL (ref 8.3–10.4)
CHLORIDE SERPL-SCNC: 104 MMOL/L (ref 98–107)
CO2 SERPL-SCNC: 29 MMOL/L (ref 21–32)
CREAT SERPL-MCNC: 1.1 MG/DL (ref 0.8–1.5)
DIFFERENTIAL METHOD BLD: ABNORMAL
EOSINOPHIL # BLD: 0.3 K/UL (ref 0–0.8)
EOSINOPHIL NFR BLD: 5 % (ref 0.5–7.8)
ERYTHROCYTE [DISTWIDTH] IN BLOOD BY AUTOMATED COUNT: 13.8 % (ref 11.9–14.6)
GLOBULIN SER CALC-MCNC: 4.3 G/DL (ref 2.3–3.5)
GLUCOSE SERPL-MCNC: 113 MG/DL (ref 65–100)
HCT VFR BLD AUTO: 39.7 % (ref 41.1–50.3)
HGB BLD-MCNC: 13.3 G/DL (ref 13.6–17.2)
IMM GRANULOCYTES # BLD AUTO: 0 K/UL (ref 0–0.5)
IMM GRANULOCYTES NFR BLD AUTO: 1 % (ref 0–5)
LYMPHOCYTES # BLD: 1.3 K/UL (ref 0.5–4.6)
LYMPHOCYTES NFR BLD: 19 % (ref 13–44)
MCH RBC QN AUTO: 31.2 PG (ref 26.1–32.9)
MCHC RBC AUTO-ENTMCNC: 33.5 G/DL (ref 31.4–35)
MCV RBC AUTO: 93.2 FL (ref 79.6–97.8)
MONOCYTES # BLD: 0.5 K/UL (ref 0.1–1.3)
MONOCYTES NFR BLD: 8 % (ref 4–12)
NEUTS SEG # BLD: 4.5 K/UL (ref 1.7–8.2)
NEUTS SEG NFR BLD: 67 % (ref 43–78)
NRBC # BLD: 0 K/UL (ref 0–0.2)
PLATELET # BLD AUTO: 187 K/UL (ref 150–450)
PMV BLD AUTO: 9.8 FL (ref 9.4–12.3)
POTASSIUM SERPL-SCNC: 4.1 MMOL/L (ref 3.5–5.1)
PROT SERPL-MCNC: 7.7 G/DL (ref 6.3–8.2)
RBC # BLD AUTO: 4.26 M/UL (ref 4.23–5.67)
SODIUM SERPL-SCNC: 137 MMOL/L (ref 136–145)
WBC # BLD AUTO: 6.7 K/UL (ref 4.3–11.1)

## 2020-08-24 PROCEDURE — 85025 COMPLETE CBC W/AUTO DIFF WBC: CPT

## 2020-08-24 PROCEDURE — 36415 COLL VENOUS BLD VENIPUNCTURE: CPT

## 2020-08-24 PROCEDURE — 80053 COMPREHEN METABOLIC PANEL: CPT

## 2020-08-25 ENCOUNTER — PATIENT OUTREACH (OUTPATIENT)
Dept: CASE MANAGEMENT | Age: 70
End: 2020-08-25

## 2020-08-25 NOTE — PROGRESS NOTES
Transition of Care Hospital Discharge Follow-Up      Date/Time:  2020 12:58 PM    LPN Care Coordinator contacted the patient by telephone to perform post hospital follow up. Verified name and  with patient as identifiers. LPN reinforced discharge instructions and red flags with patient who verbalized understanding. Patient given an opportunity to ask questions and does not have any further questions or concerns at this time. The patient agrees to contact the PCP office for questions related to their healthcare    [facility] follow up appointment(s):   Future Appointments   Date Time Provider Aly Xavier   2020  2:45 PM Lc Sahu MD [facility] UCDG UCD   2020 10:15 AM Ton Burnham MD [facility] CSA CSA MAIN   2020  9:20 AM Maribelvela [address] [facility]   2020  9:45 AM Efraín Blancas MD [facility]   2020 10:30 AM POD1B GCCOPIG [facility]   2020  1:30 PM Elida Maldonado NP [facility] PP PP      Non-[facility] follow up appointment(s): n/a  Patient attended follow up appointments since last contact: Patient has attended follow up visits as schedule and will begin chemo  What was the outcome of the appointment:     [address] [place] [address] Street: [address] [place] [address] Road: n/a  Any issues/ progress:  (Assist with coordination of services if necessary.)      Medication(s):   Medication changes since discharge:     Current Outpatient Medications   Medication Sig    levalbuterol tartrate (Xopenex HFA) 45 mcg/actuation inhaler Take 2 Puffs by inhalation every four (4) hours as needed for Wheezing or Shortness of Breath.  gabapentin (NEURONTIN) 300 mg capsule Take 1 Cap by mouth three (3) times daily for 30 days.  metoprolol succinate (TOPROL-XL) 50 mg XL tablet Take 1 Tab by mouth daily for 30 days.  amiodarone (PACERONE) 400 mg tablet Take 1 Tab by mouth daily for 30 days.  apixaban (ELIQUIS) 5 mg tablet Take 1 Tab by mouth two (2) times a day for 30 days.     dexAMETHasone (DECADRON) 2 mg tablet 3 tabs by mouth daily x 4 days then 2 tabs by mouth daily x 4 days then one tab daily then stop  Indications: prevent nausea and vomiting from cancer chemotherapy (Patient taking differently: Take 2 mg by mouth Daily (before breakfast). Indications: prevent nausea and vomiting from cancer chemotherapy)    Nebulizer & Compressor machine COPD    hydrocortisone (HYTONE) 2.5 % topical cream Apply  to affected area four (4) times daily as needed (skin changes related to radiation therapy). use thin layer    polyethylene glycol (MIRALAX) 17 gram packet Take 1 Packet by mouth daily. Indications: constipation (Patient taking differently: Take 17 g by mouth daily as needed. Indications: constipation)    esomeprazole (NexIUM) 20 mg capsule Take 20 mg by mouth nightly.  prochlorperazine (COMPAZINE) 10 mg tablet Take 1 Tab by mouth every six (6) hours as needed for Nausea. Indications: nausea and vomiting caused by cancer drugs, nausea and vomiting    ondansetron hcl (ZOFRAN) 8 mg tablet Take 1 Tab by mouth every eight (8) hours as needed for Nausea. Indications: nausea and vomiting caused by cancer drugs    lidocaine-prilocaine (EMLA) topical cream Apply  to affected area as needed for Pain. No current facility-administered medications for this visit. Other Issues/ Miscellaneous? (Transportation, access to meals, ability to perform ADLs, adequate caregiver support, etc.) no  Referrals needed? (SW, Diabetes education, HH, etc.) no, patient states he is followed by oncology nurse navigator  Any other questions or concerns expressed by patient?  Not at this time    Schedule next appointment with ZOEY or referral to CTN/ ACM: n/a  Graduation: yes  Next Outreach Scheduled: n/a

## 2020-08-26 ENCOUNTER — HOSPITAL ENCOUNTER (OUTPATIENT)
Dept: LAB | Age: 70
Discharge: HOME | End: 2020-08-26
Payer: COMMERCIAL

## 2020-08-26 DIAGNOSIS — Z79.899 HIGH RISK MEDICATION USE: ICD-10-CM

## 2020-08-26 LAB — TSH SERPL DL<=0.005 MIU/L-ACNC: 2.22 UIU/ML (ref 0.36–3.74)

## 2020-08-26 PROCEDURE — 36415 COLL VENOUS BLD VENIPUNCTURE: CPT

## 2020-08-26 PROCEDURE — 84443 ASSAY THYROID STIM HORMONE: CPT

## 2020-09-08 ENCOUNTER — HOSPITAL ENCOUNTER (OUTPATIENT)
Dept: LAB | Age: 70
Discharge: HOME | End: 2020-09-08
Payer: MEDICARE

## 2020-09-08 ENCOUNTER — HOSPITAL ENCOUNTER (OUTPATIENT)
Dept: INFUSION THERAPY | Age: 70
Discharge: HOME | End: 2020-09-08
Payer: COMMERCIAL

## 2020-09-08 VITALS — WEIGHT: 207.5 LBS | BODY MASS INDEX: 29.77 KG/M2

## 2020-09-08 DIAGNOSIS — C34.31 MALIGNANT NEOPLASM OF LOWER LOBE OF RIGHT LUNG (HCC): ICD-10-CM

## 2020-09-08 DIAGNOSIS — R91.8 MASS OF LOWER LOBE OF RIGHT LUNG: Primary | ICD-10-CM

## 2020-09-08 LAB
ALBUMIN SERPL-MCNC: 3.4 G/DL (ref 3.2–4.6)
ALBUMIN/GLOB SERPL: 0.9 {RATIO} (ref 1.2–3.5)
ALP SERPL-CCNC: 106 U/L (ref 50–136)
ALT SERPL-CCNC: 13 U/L (ref 12–65)
ANION GAP SERPL CALC-SCNC: 5 MMOL/L (ref 7–16)
AST SERPL-CCNC: 18 U/L (ref 15–37)
BASOPHILS # BLD: 0.1 K/UL (ref 0–0.2)
BASOPHILS NFR BLD: 1 % (ref 0–2)
BILIRUB SERPL-MCNC: 0.7 MG/DL (ref 0.2–1.1)
BUN SERPL-MCNC: 10 MG/DL (ref 8–23)
CALCIUM SERPL-MCNC: 9.1 MG/DL (ref 8.3–10.4)
CHLORIDE SERPL-SCNC: 106 MMOL/L (ref 98–107)
CO2 SERPL-SCNC: 24 MMOL/L (ref 21–32)
CREAT SERPL-MCNC: 1 MG/DL (ref 0.8–1.5)
DIFFERENTIAL METHOD BLD: ABNORMAL
EOSINOPHIL # BLD: 0.3 K/UL (ref 0–0.8)
EOSINOPHIL NFR BLD: 5 % (ref 0.5–7.8)
ERYTHROCYTE [DISTWIDTH] IN BLOOD BY AUTOMATED COUNT: 13.6 % (ref 11.9–14.6)
GLOBULIN SER CALC-MCNC: 4 G/DL (ref 2.3–3.5)
GLUCOSE SERPL-MCNC: 99 MG/DL (ref 65–100)
HCT VFR BLD AUTO: 41.8 % (ref 41.1–50.3)
HGB BLD-MCNC: 13.4 G/DL (ref 13.6–17.2)
IMM GRANULOCYTES # BLD AUTO: 0 K/UL (ref 0–0.5)
IMM GRANULOCYTES NFR BLD AUTO: 1 % (ref 0–5)
LYMPHOCYTES # BLD: 1.1 K/UL (ref 0.5–4.6)
LYMPHOCYTES NFR BLD: 18 % (ref 13–44)
MCH RBC QN AUTO: 31.2 PG (ref 26.1–32.9)
MCHC RBC AUTO-ENTMCNC: 32.1 G/DL (ref 31.4–35)
MCV RBC AUTO: 97.4 FL (ref 79.6–97.8)
MONOCYTES # BLD: 0.4 K/UL (ref 0.1–1.3)
MONOCYTES NFR BLD: 7 % (ref 4–12)
NEUTS SEG # BLD: 4.1 K/UL (ref 1.7–8.2)
NEUTS SEG NFR BLD: 69 % (ref 43–78)
NRBC # BLD: 0 K/UL (ref 0–0.2)
PLATELET # BLD AUTO: 221 K/UL (ref 150–450)
PMV BLD AUTO: 9.9 FL (ref 9.4–12.3)
POTASSIUM SERPL-SCNC: 4 MMOL/L (ref 3.5–5.1)
PROT SERPL-MCNC: 7.4 G/DL (ref 6.3–8.2)
RBC # BLD AUTO: 4.29 M/UL (ref 4.23–5.67)
SODIUM SERPL-SCNC: 135 MMOL/L (ref 136–145)
WBC # BLD AUTO: 6 K/UL (ref 4.3–11.1)

## 2020-09-08 PROCEDURE — 74011000250 HC RX REV CODE- 250: Performed by: INTERNAL MEDICINE

## 2020-09-08 PROCEDURE — 96375 TX/PRO/DX INJ NEW DRUG ADDON: CPT

## 2020-09-08 PROCEDURE — 96417 CHEMO IV INFUS EACH ADDL SEQ: CPT

## 2020-09-08 PROCEDURE — 96413 CHEMO IV INFUSION 1 HR: CPT

## 2020-09-08 PROCEDURE — 85025 COMPLETE CBC W/AUTO DIFF WBC: CPT

## 2020-09-08 PROCEDURE — 96368 THER/DIAG CONCURRENT INF: CPT

## 2020-09-08 PROCEDURE — 80053 COMPREHEN METABOLIC PANEL: CPT

## 2020-09-08 PROCEDURE — 96361 HYDRATE IV INFUSION ADD-ON: CPT

## 2020-09-08 PROCEDURE — 74011000258 HC RX REV CODE- 258: Performed by: INTERNAL MEDICINE

## 2020-09-08 PROCEDURE — 36415 COLL VENOUS BLD VENIPUNCTURE: CPT

## 2020-09-08 PROCEDURE — 74011250636 HC RX REV CODE- 250/636: Performed by: INTERNAL MEDICINE

## 2020-09-08 RX ORDER — SODIUM CHLORIDE 9 MG/ML
25 INJECTION, SOLUTION INTRAVENOUS CONTINUOUS
Status: ACTIVE | OUTPATIENT
Start: 2020-09-08 | End: 2020-09-08

## 2020-09-08 RX ORDER — SODIUM CHLORIDE 0.9 % (FLUSH) 0.9 %
10 SYRINGE (ML) INJECTION AS NEEDED
Status: ACTIVE | OUTPATIENT
Start: 2020-09-08 | End: 2020-09-08

## 2020-09-08 RX ORDER — ONDANSETRON 2 MG/ML
8 INJECTION INTRAMUSCULAR; INTRAVENOUS ONCE
Status: COMPLETED | OUTPATIENT
Start: 2020-09-08 | End: 2020-09-08

## 2020-09-08 RX ORDER — DIPHENHYDRAMINE HYDROCHLORIDE 50 MG/ML
50 INJECTION, SOLUTION INTRAMUSCULAR; INTRAVENOUS ONCE
Status: COMPLETED | OUTPATIENT
Start: 2020-09-08 | End: 2020-09-08

## 2020-09-08 RX ADMIN — ONDANSETRON 8 MG: 2 INJECTION INTRAMUSCULAR; INTRAVENOUS at 11:48

## 2020-09-08 RX ADMIN — SODIUM CHLORIDE 1000 ML: 900 INJECTION, SOLUTION INTRAVENOUS at 11:10

## 2020-09-08 RX ADMIN — DIPHENHYDRAMINE HYDROCHLORIDE 50 MG: 50 INJECTION, SOLUTION INTRAMUSCULAR; INTRAVENOUS at 11:46

## 2020-09-08 RX ADMIN — Medication 10 ML: at 11:10

## 2020-09-08 RX ADMIN — Medication 10 ML: at 14:30

## 2020-09-08 RX ADMIN — CARBOPLATIN 241 MG: 10 INJECTION, SOLUTION INTRAVENOUS at 13:35

## 2020-09-08 RX ADMIN — PACLITAXEL 100 MG: 6 INJECTION, SOLUTION INTRAVENOUS at 12:30

## 2020-09-08 RX ADMIN — DEXAMETHASONE SODIUM PHOSPHATE 12 MG: 4 INJECTION, SOLUTION INTRAMUSCULAR; INTRAVENOUS at 11:52

## 2020-09-08 RX ADMIN — FAMOTIDINE 20 MG: 10 INJECTION, SOLUTION INTRAVENOUS at 11:50

## 2020-09-08 RX ADMIN — SODIUM CHLORIDE 25 ML/HR: 900 INJECTION, SOLUTION INTRAVENOUS at 13:30

## 2020-09-08 NOTE — PROGRESS NOTES
Pt arrived ambulatory today at 1105, to receive IV chemotherapy and IV fluids. Pt tolerated without difficulty. Patient discharged via ambulatory accompanied by self. Instructed to notify physician of any problems, questions or concerns. Allowed opportunity for patient/family to ask questions. Verbalized understanding. Next appointment is Sept 15 at 1100 with Nieves Shipman.

## 2020-09-08 NOTE — PROGRESS NOTES
Verbalizes/demonstrates understanding of purpose/procedure/potential side effects of taxol and carboplatin.

## 2020-09-15 ENCOUNTER — HOSPITAL ENCOUNTER (OUTPATIENT)
Dept: INFUSION THERAPY | Age: 70
Discharge: HOME | End: 2020-09-15
Payer: COMMERCIAL

## 2020-09-15 ENCOUNTER — PATIENT OUTREACH (OUTPATIENT)
Dept: CASE MANAGEMENT | Age: 70
End: 2020-09-15

## 2020-09-15 DIAGNOSIS — C34.31 MALIGNANT NEOPLASM OF LOWER LOBE OF RIGHT LUNG (HCC): ICD-10-CM

## 2020-09-15 DIAGNOSIS — E86.0 DEHYDRATION: Primary | ICD-10-CM

## 2020-09-15 PROCEDURE — 96417 CHEMO IV INFUS EACH ADDL SEQ: CPT

## 2020-09-15 PROCEDURE — 74011000250 HC RX REV CODE- 250: Performed by: INTERNAL MEDICINE

## 2020-09-15 PROCEDURE — 74011000258 HC RX REV CODE- 258: Performed by: INTERNAL MEDICINE

## 2020-09-15 PROCEDURE — 96413 CHEMO IV INFUSION 1 HR: CPT

## 2020-09-15 PROCEDURE — 74011250636 HC RX REV CODE- 250/636: Performed by: INTERNAL MEDICINE

## 2020-09-15 PROCEDURE — 96375 TX/PRO/DX INJ NEW DRUG ADDON: CPT

## 2020-09-15 RX ORDER — ONDANSETRON 2 MG/ML
8 INJECTION INTRAMUSCULAR; INTRAVENOUS ONCE
Status: COMPLETED | OUTPATIENT
Start: 2020-09-15 | End: 2020-09-15

## 2020-09-15 RX ORDER — SODIUM CHLORIDE 0.9 % (FLUSH) 0.9 %
10 SYRINGE (ML) INJECTION AS NEEDED
Status: ACTIVE | OUTPATIENT
Start: 2020-09-15 | End: 2020-09-15

## 2020-09-15 RX ORDER — SODIUM CHLORIDE 0.9 % (FLUSH) 0.9 %
10-30 SYRINGE (ML) INJECTION AS NEEDED
Status: DISCONTINUED | OUTPATIENT
Start: 2020-09-15 | End: 2020-09-17 | Stop reason: HOSPADM

## 2020-09-15 RX ORDER — DIPHENHYDRAMINE HYDROCHLORIDE 50 MG/ML
50 INJECTION, SOLUTION INTRAMUSCULAR; INTRAVENOUS ONCE
Status: COMPLETED | OUTPATIENT
Start: 2020-09-15 | End: 2020-09-15

## 2020-09-15 RX ORDER — SODIUM CHLORIDE 9 MG/ML
25 INJECTION, SOLUTION INTRAVENOUS CONTINUOUS
Status: ACTIVE | OUTPATIENT
Start: 2020-09-15 | End: 2020-09-15

## 2020-09-15 RX ADMIN — DIPHENHYDRAMINE HYDROCHLORIDE 50 MG: 50 INJECTION, SOLUTION INTRAMUSCULAR; INTRAVENOUS at 12:00

## 2020-09-15 RX ADMIN — Medication 10 ML: at 14:34

## 2020-09-15 RX ADMIN — Medication 20 ML: at 10:23

## 2020-09-15 RX ADMIN — FAMOTIDINE 20 MG: 10 INJECTION, SOLUTION INTRAVENOUS at 11:55

## 2020-09-15 RX ADMIN — PACLITAXEL 100 MG: 6 INJECTION, SOLUTION INTRAVENOUS at 12:35

## 2020-09-15 RX ADMIN — CARBOPLATIN 263 MG: 10 INJECTION, SOLUTION INTRAVENOUS at 13:45

## 2020-09-15 RX ADMIN — DEXAMETHASONE SODIUM PHOSPHATE 12 MG: 4 INJECTION, SOLUTION INTRAMUSCULAR; INTRAVENOUS at 12:05

## 2020-09-15 RX ADMIN — ONDANSETRON 8 MG: 2 INJECTION INTRAMUSCULAR; INTRAVENOUS at 11:50

## 2020-09-15 RX ADMIN — SODIUM CHLORIDE 25 ML/HR: 900 INJECTION, SOLUTION INTRAVENOUS at 12:20

## 2020-09-15 NOTE — PROGRESS NOTES
Pt. Discharged ambulatory. Tolerated chemotherapy well. No distress noted. To call physician with any problems or concerns. Understanding voiced. To return to Infusions on 9/22/20.

## 2020-09-15 NOTE — PROGRESS NOTES
9/15/2020  Patient seen with [facility] NP for pre-chemo Taxol/Carboplatin C8. Encouraged patient to take his nausea medications. Patient states PO intake is good. Questions and discussion answered to the satisfaction of the patient.  Patient wishes to continue on treatment plan and will continue to be navigated

## 2020-09-22 ENCOUNTER — HOSPITAL ENCOUNTER (OUTPATIENT)
Dept: INFUSION THERAPY | Age: 70
Discharge: HOME | End: 2020-09-22
Payer: COMMERCIAL

## 2020-09-22 DIAGNOSIS — C34.31 MALIGNANT NEOPLASM OF LOWER LOBE OF RIGHT LUNG (HCC): Primary | ICD-10-CM

## 2020-09-22 DIAGNOSIS — E86.0 DEHYDRATION: ICD-10-CM

## 2020-09-22 LAB
ALBUMIN SERPL-MCNC: 3.4 G/DL (ref 3.2–4.6)
ALBUMIN/GLOB SERPL: 0.9 {RATIO} (ref 1.2–3.5)
ALP SERPL-CCNC: 95 U/L (ref 50–136)
ALT SERPL-CCNC: 13 U/L (ref 12–65)
ANION GAP SERPL CALC-SCNC: 5 MMOL/L (ref 7–16)
AST SERPL-CCNC: 14 U/L (ref 15–37)
BASOPHILS # BLD: 0 K/UL (ref 0–0.2)
BASOPHILS NFR BLD: 1 % (ref 0–2)
BILIRUB SERPL-MCNC: 0.8 MG/DL (ref 0.2–1.1)
BUN SERPL-MCNC: 7 MG/DL (ref 8–23)
CALCIUM SERPL-MCNC: 8.6 MG/DL (ref 8.3–10.4)
CEA SERPL-MCNC: 1.1 NG/ML (ref 0–3)
CHLORIDE SERPL-SCNC: 106 MMOL/L (ref 98–107)
CO2 SERPL-SCNC: 26 MMOL/L (ref 21–32)
CREAT SERPL-MCNC: 0.9 MG/DL (ref 0.8–1.5)
DIFFERENTIAL METHOD BLD: ABNORMAL
EOSINOPHIL # BLD: 0.1 K/UL (ref 0–0.8)
EOSINOPHIL NFR BLD: 3 % (ref 0.5–7.8)
ERYTHROCYTE [DISTWIDTH] IN BLOOD BY AUTOMATED COUNT: 13.5 % (ref 11.9–14.6)
GLOBULIN SER CALC-MCNC: 3.9 G/DL (ref 2.3–3.5)
GLUCOSE SERPL-MCNC: 100 MG/DL (ref 65–100)
HCT VFR BLD AUTO: 37.2 % (ref 41.1–50.3)
HGB BLD-MCNC: 12.7 G/DL (ref 13.6–17.2)
IMM GRANULOCYTES # BLD AUTO: 0 K/UL (ref 0–0.5)
IMM GRANULOCYTES NFR BLD AUTO: 0 % (ref 0–5)
LYMPHOCYTES # BLD: 1.1 K/UL (ref 0.5–4.6)
LYMPHOCYTES NFR BLD: 29 % (ref 13–44)
MAGNESIUM SERPL-MCNC: 2 MG/DL (ref 1.8–2.4)
MCH RBC QN AUTO: 30.8 PG (ref 26.1–32.9)
MCHC RBC AUTO-ENTMCNC: 34.1 G/DL (ref 31.4–35)
MCV RBC AUTO: 90.1 FL (ref 79.6–97.8)
MONOCYTES # BLD: 0.2 K/UL (ref 0.1–1.3)
MONOCYTES NFR BLD: 6 % (ref 4–12)
NEUTS SEG # BLD: 2.2 K/UL (ref 1.7–8.2)
NEUTS SEG NFR BLD: 61 % (ref 43–78)
NRBC # BLD: 0 K/UL (ref 0–0.2)
PLATELET # BLD AUTO: 180 K/UL (ref 150–450)
PMV BLD AUTO: 9.5 FL (ref 9.4–12.3)
POTASSIUM SERPL-SCNC: 3.6 MMOL/L (ref 3.5–5.1)
PROT SERPL-MCNC: 7.3 G/DL (ref 6.3–8.2)
RBC # BLD AUTO: 4.13 M/UL (ref 4.23–5.67)
SODIUM SERPL-SCNC: 137 MMOL/L (ref 136–145)
WBC # BLD AUTO: 3.7 K/UL (ref 4.3–11.1)

## 2020-09-22 PROCEDURE — 82378 CARCINOEMBRYONIC ANTIGEN: CPT

## 2020-09-22 PROCEDURE — 96417 CHEMO IV INFUS EACH ADDL SEQ: CPT

## 2020-09-22 PROCEDURE — 74011250636 HC RX REV CODE- 250/636: Performed by: INTERNAL MEDICINE

## 2020-09-22 PROCEDURE — 96413 CHEMO IV INFUSION 1 HR: CPT

## 2020-09-22 PROCEDURE — 96375 TX/PRO/DX INJ NEW DRUG ADDON: CPT

## 2020-09-22 PROCEDURE — 74011000258 HC RX REV CODE- 258: Performed by: INTERNAL MEDICINE

## 2020-09-22 PROCEDURE — 74011000250 HC RX REV CODE- 250: Performed by: INTERNAL MEDICINE

## 2020-09-22 PROCEDURE — 80053 COMPREHEN METABOLIC PANEL: CPT

## 2020-09-22 PROCEDURE — 83735 ASSAY OF MAGNESIUM: CPT

## 2020-09-22 PROCEDURE — 85025 COMPLETE CBC W/AUTO DIFF WBC: CPT

## 2020-09-22 RX ORDER — ONDANSETRON 2 MG/ML
8 INJECTION INTRAMUSCULAR; INTRAVENOUS ONCE
Status: COMPLETED | OUTPATIENT
Start: 2020-09-22 | End: 2020-09-22

## 2020-09-22 RX ORDER — SODIUM CHLORIDE 0.9 % (FLUSH) 0.9 %
10-30 SYRINGE (ML) INJECTION AS NEEDED
Status: DISCONTINUED | OUTPATIENT
Start: 2020-09-22 | End: 2020-01-01 | Stop reason: HOSPADM

## 2020-09-22 RX ORDER — SODIUM CHLORIDE 0.9 % (FLUSH) 0.9 %
10 SYRINGE (ML) INJECTION AS NEEDED
Status: ACTIVE | OUTPATIENT
Start: 2020-09-22 | End: 2020-09-22

## 2020-09-22 RX ORDER — DIPHENHYDRAMINE HYDROCHLORIDE 50 MG/ML
50 INJECTION, SOLUTION INTRAMUSCULAR; INTRAVENOUS ONCE
Status: COMPLETED | OUTPATIENT
Start: 2020-09-22 | End: 2020-09-22

## 2020-09-22 RX ORDER — SODIUM CHLORIDE 9 MG/ML
25 INJECTION, SOLUTION INTRAVENOUS CONTINUOUS
Status: ACTIVE | OUTPATIENT
Start: 2020-09-22 | End: 2020-09-22

## 2020-09-22 RX ADMIN — Medication 20 ML: at 08:50

## 2020-09-22 RX ADMIN — PACLITAXEL 100 MG: 6 INJECTION, SOLUTION INTRAVENOUS at 10:38

## 2020-09-22 RX ADMIN — Medication 10 ML: at 12:28

## 2020-09-22 RX ADMIN — DIPHENHYDRAMINE HYDROCHLORIDE 50 MG: 50 INJECTION, SOLUTION INTRAMUSCULAR; INTRAVENOUS at 10:23

## 2020-09-22 RX ADMIN — FAMOTIDINE 20 MG: 10 INJECTION INTRAVENOUS at 10:26

## 2020-09-22 RX ADMIN — DEXAMETHASONE SODIUM PHOSPHATE 12 MG: 4 INJECTION, SOLUTION INTRAMUSCULAR; INTRAVENOUS at 10:05

## 2020-09-22 RX ADMIN — CARBOPLATIN 260 MG: 10 INJECTION, SOLUTION INTRAVENOUS at 11:46

## 2020-09-22 RX ADMIN — SODIUM CHLORIDE 25 ML/HR: 9 INJECTION, SOLUTION INTRAVENOUS at 09:57

## 2020-09-22 RX ADMIN — ONDANSETRON 8 MG: 2 INJECTION INTRAMUSCULAR; INTRAVENOUS at 10:21

## 2020-09-22 NOTE — PROGRESS NOTES
Arrived to the [facility]. Taxol/Carboplatin completed. Patient tolerated well. Any issues or concerns during appointment: none. Patient aware of next infusion appointment on 9/29 at 11am.  Discharged ambulatory to home.

## 2020-09-29 NOTE — PROGRESS NOTES
Arrived to the [facility]. Carboplatin/Taxol completed. Patient tolerated well. Any issues or concerns during appointment: none. Patient aware of next infusion appointment on 10/6 (date) at 11:30 am (time). Discharged ambulatory.

## 2020-09-29 NOTE — PROGRESS NOTES
9/29/20 saw pt today with Karin Knutson NP for pre chemo cycle 10 carbo/taxol. He is tolerating chemo well. PO intake is good. Having some seasonal allergy issues, instructed to use OTC meds. Extra fluids today. Proceed to infusion. Follow up in 1 week. Encouraged to call with any concerns. Navigation will continue to follow.

## 2020-10-06 NOTE — PROGRESS NOTES
[facility] Taxol/Carbo completed. Patient tolerated well. Any issues or concerns during appointment: none. Discharged ambulatory.     Prachi Astorga RN

## 2020-10-06 NOTE — PROGRESS NOTES
10/6/2020  Pt seen today with Dr Shay Arciniega follow up lung cancer. Due for C11 Carbo/Taxol today. Pt having increased fatigue with some nausea. Instructed pt on proper way to take nausea meds, verbalized understanding. Continue to Infusion for treatment. Encouraged to call with any questions/concerns. Navigation will continue to follow.

## 2020-10-13 NOTE — PROGRESS NOTES
Saw patient with Abigail Zapata NP for lung cancer/Prechemo. Treatment held for low ANC. Dr Shahana Saldivar does intend for patient to receive one last cycle of Carbo/Taxol so he will RTC in one week. IV fluids given today for hypotension/ NBP of 86/64. Called Dr Juan Salinas at [address] Cardiology who agreed to holding Metoprolol at this time. States patient could also cut it in half if he prefers. Notified patient. Encouraged to call with questions. Navigation will continue to follow.

## 2020-10-23 PROBLEM — D70.1 CHEMOTHERAPY INDUCED NEUTROPENIA (HCC): Status: ACTIVE | Noted: 2020-01-01

## 2020-10-23 PROBLEM — T45.1X5A CHEMOTHERAPY INDUCED NEUTROPENIA (HCC): Status: ACTIVE | Noted: 2020-01-01

## 2020-10-27 NOTE — PROGRESS NOTES
Arrived to the [facility]. Taxol/Carboplatin completed. Patient tolerated well. Any issues or concerns during appointment: none  No future infusion appointments. Discharged ambulatory to home.

## 2020-12-22 NOTE — PROGRESS NOTES
Port de-accessed after being flushed with 20 cc of normal saline and 5 cc of heparin flush. Site without redness or swelling, had good blood return. Pressure dressing applied with 2x2 and paper tape. Pt tolerated procedure well, offering no complaints.

## 2020-12-22 NOTE — PROGRESS NOTES
Port accessed with # 20 gauge 3/4 inch Meza needle. Site without redness or swelling, has good blood return. Blood drawn from port for stat creatine, after 10 cc of blood wasted. Then flushed with 10 cc of normal saline. Pt tolerated well, offering no complaints.

## 2020-12-28 PROBLEM — J90 PLEURAL EFFUSION ON RIGHT: Status: ACTIVE | Noted: 2020-01-01

## 2020-12-28 NOTE — H&P
Elke Blount. Admission Date: 12/28/2020             Daily Progress Note: 12/28/2020    The patient's chart is reviewed and the patient is discussed with the staff. Subjective:     77 yo WM with hx of squamous cell lung CA s/p recent lung resection in 8/2020. S/P chemo x 6 cycles. Developed increased SOB and found to have large R effusion on CT. Now here for diagnostic/therapeutic thoracentesis. No current facility-administered medications for this encounter. Current Outpatient Medications   Medication Sig    gabapentin (NEURONTIN) 300 mg capsule Take 1 Cap by mouth three (3) times daily.  Eliquis 5 mg tablet TAKE 1 TAB BY MOUTH TWO (2) TIMES A DAY    METOPROLOL SUCCINATE PO Take  by mouth. Indications: 25 mg once daily    amiodarone (CORDARONE) 200 mg tablet Take 1 Tab by mouth daily.  polyethylene glycol (MIRALAX) 17 gram packet Take 1 Packet by mouth daily. Indications: constipation (Patient taking differently: Take 17 g by mouth daily as needed. Indications: constipation)    esomeprazole (NexIUM) 20 mg capsule Take 20 mg by mouth nightly.  prochlorperazine (COMPAZINE) 10 mg tablet Take 1 Tab by mouth every six (6) hours as needed for Nausea. Indications: nausea and vomiting caused by cancer drugs, nausea and vomiting    ondansetron hcl (ZOFRAN) 8 mg tablet Take 1 Tab by mouth every eight (8) hours as needed for Nausea. Indications: nausea and vomiting caused by cancer drugs    DISABLED PLACARD (DISABLED PLACARD) DMV Pt has medical condition that makes it difficult to walk 100 feet non stop without exacerbating existing condition.  levalbuterol tartrate (Xopenex HFA) 45 mcg/actuation inhaler Take 2 Puffs by inhalation every four (4) hours as needed for Wheezing or Shortness of Breath.  Nebulizer & Compressor machine COPD    lidocaine-prilocaine (EMLA) topical cream Apply  to affected area as needed for Pain.        Review of Systems    Constitutional: negative for fever, chills, sweats  Cardiovascular: negative for chest pain, palpitations, syncope, edema  Gastrointestinal:  negative for dysphagia, reflux, vomiting, diarrhea, abdominal pain, or melena  Neurologic:  negative for focal weakness, numbness, headache    Objective:     Vitals:    12/28/20 1246 12/28/20 1311 12/28/20 1316   BP: 112/68 106/64 (!) 96/59   SpO2: 94% 95% (!) 89%       No intake or output data in the 24 hours ending 12/28/20 1325    Physical Exam:   Constitution:  the patient is well developed and in no acute distress  EENMT:  Sclera clear, pupils equal, oral mucosa moist  Respiratory: Decreased BS R  Cardiovascular:  RRR without M,G,R  Gastrointestinal: soft and non-tender; with positive bowel sounds. Musculoskeletal: warm without cyanosis. There is no lower extremity edema. Skin:  no jaundice or rashes  Neurologic: no gross neuro deficits     Psychiatric:  alert and oriented x 3    CXR:     Chest CT: IMPRESSION:  1. New from July 30,2020, there is complete collapse of the residual right lung  with development of a large pleural effusion involving the entire hemithorax. There is some smooth thin peripheral enhancement of the effusion which may  reflect mild complexity. 2. No definite mediastinal adenopathy  3. Possible circumferential thickening of the distal esophagus. Correlation  could be made for esophagitis. LAB  No results for input(s): GLUCPOC in the last 72 hours. No lab exists for component: GLPOC   No results for input(s): WBC, HGB, HCT, PLT, INR, HGBEXT, HCTEXT, PLTEXT, INREXT, HGBEXT, HCTEXT, PLTEXT, INREXT in the last 72 hours. No results for input(s): NA, K, CL, CO2, GLU, BUN, CREA, MG, CA, PHOS, TROIQ, ALB, TBIL, TBILI, ALT, BNPP in the last 72 hours. No lab exists for component: TROIP, GPT, SGOT  No results for input(s): PH, PCO2, PO2, HCO3, PHI, PCO2I, PO2I, HCO3I in the last 72 hours.   No results for input(s): LCAD, LAC in the last 72 hours.      Assessment:  (Medical Decision Making)     Hospital Problems  Date Reviewed: 12/28/2020          Codes Class Noted POA    Pleural effusion on right ICD-10-CM: J90  ICD-9-CM: 511.9  12/28/2020 Unknown    Likely malignant in setting of prior lung CA    Malignant neoplasm of lower lobe of right lung (HCC) (Chronic) ICD-10-CM: C34.31  ICD-9-CM: 162.5  2/26/2020 Yes    Squamous cell on prior Bx. Plan:  (Medical Decision Making)     Diagnostic and therapeutic thoracentesis. Send fluid for full studies. --    More than 50% of the time documented was spent in face-to-face contact with the patient and in the care of the patient on the floor/unit where the patient is located.     Charley Jenkins MD

## 2020-12-28 NOTE — DISCHARGE INSTRUCTIONS
Patient Education        Thoracentesis: What to Expect at Home  Your Recovery  Thoracentesis (say \"vuze-dq-kym-REGINO-sis\") is a procedure to remove fluid from the space between the lungs and the chest wall (pleural space). This procedure may also be called a \"chest tap. \" It's normal to have a small amount of fluid in the pleural space. But too much fluid can build up because of problems such as infection, heart failure, or lung cancer. The procedure may have been done to help with shortness of breath and pain caused by the fluid buildup. Or you may have had this procedure so the doctor could test the fluid to find the cause of the buildup. Your chest may be sore where the doctor put the needle or catheter into your skin (the puncture site). This usually gets better after a day or two. You can go back to work or your normal activities as soon as you feel up to it. If a large amount of pleural fluid was removed during the procedure, you will probably be able to breathe more easily. If more pleural fluid collects and needs to be removed, another thoracentesis may be done later. If the doctor sent the fluid to a lab for testing, it may take several days to get the results. The doctor or nurse will discuss the results with you. This care sheet gives you a general idea about how long it will take for you to recover. But each person recovers at a different pace. Follow the steps below to feel better as quickly as possible. How can you care for yourself at home? Activity    · Rest when you feel tired. Getting enough sleep will help you recover.     · Avoid strenuous activities, such as bicycle riding, jogging, weight lifting, or aerobic exercise, until your doctor says it is okay.     · You may shower. Do not take a bath until the puncture site has healed, or until your doctor tells you it is okay.     · Ask your doctor when you can drive again.     · You may need to take 1 or 2 days off from work.  It depends on the type of work you do and how you feel. Diet    · You can eat your normal diet.     · Drink plenty of fluids (unless your doctor tells you not to). Medicines    · Your doctor will tell you if and when you can restart your medicines. He or she will also give you instructions about taking any new medicines.     · If you take aspirin or some other blood thinner, ask your doctor if and when to start taking it again. Make sure that you understand exactly what your doctor wants you to do.     · Be safe with medicines. Take pain medicines exactly as directed. ? If the doctor gave you a prescription medicine for pain, take it as prescribed. ? If you are not taking a prescription pain medicine, ask your doctor if you can take an over-the-counter medicine. ? Do not take two or more pain medicines at the same time unless the doctor told you to. Many pain medicines have acetaminophen, which is Tylenol. Too much acetaminophen (Tylenol) can be harmful.     · If you think your pain medicine is making you sick to your stomach:  ? Take your medicine after meals (unless your doctor has told you not to). ? Ask your doctor for a different pain medicine.     · If your doctor prescribed antibiotics, take them as directed. Do not stop taking them just because you feel better. You need to take the full course of antibiotics. Care of the puncture site    · Wash the area daily with warm, soapy water, and pat it dry. Don't use hydrogen peroxide or alcohol, which may delay healing. You may cover the area with a gauze bandage if it weeps or rubs against clothing. Change the bandage every day.     · Keep the area clean and dry. Follow-up care is a key part of your treatment and safety. Be sure to make and go to all appointments, and call your doctor if you are having problems. It's also a good idea to know your test results and keep a list of the medicines you take. When should you call for help?    Call 911 anytime you think you may need emergency care. For example, call if:    · You passed out (lost consciousness).     · You have severe trouble breathing.     · You have sudden chest pain and shortness of breath, or you cough up blood. Call your doctor now or seek immediate medical care if:    · You have shortness of breath that is new or getting worse.     · You have new or worse pain in your chest, especially when you take a deep breath.     · You are sick to your stomach or cannot keep fluids down.     · You have a fever over 100°F.     · Bright red blood has soaked through the bandage over your puncture site.     · You have signs of infection, such as:  ? Increased pain, swelling, warmth, or redness. ? Red streaks leading from the puncture site. ? Pus draining from the puncture site. ? Swollen lymph nodes in your neck, armpits, or groin. ? A fever.     · You cough up a lot more mucus than normal, or your mucus changes color. Watch closely for changes in your health, and be sure to contact your doctor if you have any problems. Where can you learn more? Go to http://www.gray.com/  Enter Q755 in the search box to learn more about \"Thoracentesis: What to Expect at Home. \"  Current as of: February 24, 2020               Content Version: 12.6  © 6329-2251 [employer]. Care instructions adapted under license by [employer] (which disclaims liability or warranty for this information). If you have questions about a medical condition or this instruction, always ask your healthcare professional. [place] [address] any warranty or liability for your use of this information.

## 2020-12-28 NOTE — OP NOTES
Thoracentesis Procedure Note      Procedure:  R Thoracentesis with ultrasound guidance    Indication:  R Pleural effusion      Summary:    After informed consent was obtained, the patient was positioned upright. Ultrasound was used to identify the optimal spot for pleural drainage. The lower L intercostal space was anesthetized with 1% Lidocaine to achieve adequate anesthesia. The pleural space was entered with bloody fluid identified. Lidocaine was instilled within the pleural space as well. A small stab incision was made at the site of local anesthesia and the thoracentesis catheter was advanced into the pleural space. Approximately 900ml of fluid was obtained with ease. The procedure was terminated after significant R chest pain developed. Air was not aspirated during the procedure. A pressure dressing was placed over the incision after adequate hemostasis was achieved. A CXR is not pending as a follow up US revealed a + lung slide c/w no PTX. The pleural fluid will be sent for protein, LDH, glucose, cell count with differential, Gram stain, culture and sensitivity, AFB smear and culture, fungal smear and culture, cytology and cell block.     EBL: 1 drop    Post Procedure Dx: Pleural effusion         Signed By: iKrt Carvalho MD

## 2020-12-28 NOTE — ROUTINE PROCESS
Patient denies any needs. Vital signs stable. Discharge instructions given to patient. No other concerns noted at this time. Patient wheeled out to car via wheelchair with staff.

## 2021-01-01 ENCOUNTER — HOSPITAL ENCOUNTER (OUTPATIENT)
Dept: CARDIAC REHAB | Age: 71
Discharge: HOME | End: 2021-04-19
Payer: COMMERCIAL

## 2021-01-01 ENCOUNTER — APPOINTMENT (OUTPATIENT)
Dept: CARDIAC CATH/INVASIVE PROCEDURES | Age: 71
End: 2021-01-01
Payer: COMMERCIAL

## 2021-01-01 ENCOUNTER — APPOINTMENT (OUTPATIENT)
Dept: GENERAL RADIOLOGY | Age: 71
DRG: 190 | End: 2021-01-01
Attending: EMERGENCY MEDICINE
Payer: COMMERCIAL

## 2021-01-01 ENCOUNTER — APPOINTMENT (OUTPATIENT)
Dept: GENERAL RADIOLOGY | Age: 71
DRG: 853 | End: 2021-01-01
Attending: INTERNAL MEDICINE
Payer: COMMERCIAL

## 2021-01-01 ENCOUNTER — APPOINTMENT (OUTPATIENT)
Dept: GENERAL RADIOLOGY | Age: 71
DRG: 853 | End: 2021-01-01
Attending: EMERGENCY MEDICINE
Payer: COMMERCIAL

## 2021-01-01 ENCOUNTER — ANESTHESIA (OUTPATIENT)
Dept: SURGERY | Age: 71
DRG: 163 | End: 2021-01-01
Payer: COMMERCIAL

## 2021-01-01 ENCOUNTER — HOSPITAL ENCOUNTER (OUTPATIENT)
Dept: GENERAL RADIOLOGY | Age: 71
Discharge: HOME | End: 2021-04-05
Attending: INTERNAL MEDICINE

## 2021-01-01 ENCOUNTER — HOSPITAL ENCOUNTER (OUTPATIENT)
Dept: CARDIAC REHAB | Age: 71
End: 2021-01-01

## 2021-01-01 ENCOUNTER — APPOINTMENT (OUTPATIENT)
Dept: GENERAL RADIOLOGY | Age: 71
DRG: 163 | End: 2021-01-01
Attending: NURSE PRACTITIONER
Payer: COMMERCIAL

## 2021-01-01 ENCOUNTER — APPOINTMENT (OUTPATIENT)
Dept: CARDIAC REHAB | Age: 71
End: 2021-01-01
Payer: COMMERCIAL

## 2021-01-01 ENCOUNTER — HOSPITAL ENCOUNTER (INPATIENT)
Age: 71
LOS: 1 days | DRG: 871 | End: 2021-09-24
Admitting: INTERNAL MEDICINE
Payer: MEDICARE

## 2021-01-01 ENCOUNTER — HOSPITAL ENCOUNTER (OUTPATIENT)
Dept: CARDIAC REHAB | Age: 71
Discharge: HOME | End: 2021-05-03

## 2021-01-01 ENCOUNTER — APPOINTMENT (OUTPATIENT)
Dept: CARDIAC REHAB | Age: 71
End: 2021-01-01

## 2021-01-01 ENCOUNTER — APPOINTMENT (OUTPATIENT)
Dept: GENERAL RADIOLOGY | Age: 71
DRG: 871 | End: 2021-01-01
Attending: ANESTHESIOLOGY
Payer: MEDICARE

## 2021-01-01 ENCOUNTER — APPOINTMENT (OUTPATIENT)
Dept: GENERAL RADIOLOGY | Age: 71
DRG: 186 | End: 2021-01-01
Attending: NURSE PRACTITIONER
Payer: MEDICARE

## 2021-01-01 ENCOUNTER — APPOINTMENT (OUTPATIENT)
Dept: GENERAL RADIOLOGY | Age: 71
DRG: 853 | End: 2021-01-01
Attending: SURGERY
Payer: COMMERCIAL

## 2021-01-01 ENCOUNTER — APPOINTMENT (OUTPATIENT)
Dept: GENERAL RADIOLOGY | Age: 71
DRG: 163 | End: 2021-01-01
Attending: INTERNAL MEDICINE
Payer: COMMERCIAL

## 2021-01-01 ENCOUNTER — APPOINTMENT (OUTPATIENT)
Dept: CT IMAGING | Age: 71
DRG: 853 | End: 2021-01-01
Attending: NURSE PRACTITIONER
Payer: COMMERCIAL

## 2021-01-01 ENCOUNTER — HOSPITAL ENCOUNTER (OUTPATIENT)
Dept: LAB | Age: 71
Discharge: HOME | End: 2021-04-08
Payer: COMMERCIAL

## 2021-01-01 ENCOUNTER — HOSPITAL ENCOUNTER (OUTPATIENT)
Dept: CARDIAC REHAB | Age: 71
Discharge: HOME | End: 2021-03-31
Payer: COMMERCIAL

## 2021-01-01 ENCOUNTER — TRANSCRIBE ORDER (OUTPATIENT)
Dept: SCHEDULING | Age: 71
End: 2021-01-01

## 2021-01-01 ENCOUNTER — APPOINTMENT (OUTPATIENT)
Dept: CT IMAGING | Age: 71
DRG: 163 | End: 2021-01-01
Attending: INTERNAL MEDICINE
Payer: COMMERCIAL

## 2021-01-01 ENCOUNTER — HOSPITAL ENCOUNTER (OUTPATIENT)
Dept: CARDIAC REHAB | Age: 71
Discharge: HOME | End: 2021-03-30
Payer: COMMERCIAL

## 2021-01-01 ENCOUNTER — ANESTHESIA EVENT (OUTPATIENT)
Dept: ENDOSCOPY | Age: 71
DRG: 186 | End: 2021-01-01
Payer: MEDICARE

## 2021-01-01 ENCOUNTER — APPOINTMENT (OUTPATIENT)
Dept: GENERAL RADIOLOGY | Age: 71
DRG: 163 | End: 2021-01-01
Payer: COMMERCIAL

## 2021-01-01 ENCOUNTER — APPOINTMENT (OUTPATIENT)
Dept: GENERAL RADIOLOGY | Age: 71
End: 2021-01-01
Attending: INTERNAL MEDICINE
Payer: COMMERCIAL

## 2021-01-01 ENCOUNTER — APPOINTMENT (OUTPATIENT)
Dept: GENERAL RADIOLOGY | Age: 71
DRG: 871 | End: 2021-01-01
Attending: INTERNAL MEDICINE
Payer: MEDICARE

## 2021-01-01 ENCOUNTER — ANESTHESIA (OUTPATIENT)
Dept: SURGERY | Age: 71
DRG: 853 | End: 2021-01-01
Payer: COMMERCIAL

## 2021-01-01 ENCOUNTER — HOSPITAL ENCOUNTER (OUTPATIENT)
Dept: LAB | Age: 71
Discharge: HOME | End: 2021-02-17
Payer: COMMERCIAL

## 2021-01-01 ENCOUNTER — HOSPITAL ENCOUNTER (INPATIENT)
Age: 71
LOS: 13 days | Discharge: NURSING, REHAB OR LONG-TERM CARE | DRG: 186 | End: 2021-07-08
Attending: SURGERY | Admitting: SURGERY
Payer: MEDICARE

## 2021-01-01 ENCOUNTER — HOSPITAL ENCOUNTER (INPATIENT)
Age: 71
LOS: 19 days | Discharge: HOME | DRG: 163 | End: 2021-01-23
Attending: INTERNAL MEDICINE | Admitting: SURGERY
Payer: COMMERCIAL

## 2021-01-01 ENCOUNTER — ANESTHESIA EVENT (OUTPATIENT)
Dept: SURGERY | Age: 71
End: 2021-01-01
Payer: COMMERCIAL

## 2021-01-01 ENCOUNTER — APPOINTMENT (OUTPATIENT)
Dept: GENERAL RADIOLOGY | Age: 71
DRG: 186 | End: 2021-01-01
Attending: INTERNAL MEDICINE
Payer: MEDICARE

## 2021-01-01 ENCOUNTER — HOSPITAL ENCOUNTER (OUTPATIENT)
Dept: CARDIAC REHAB | Age: 71
Discharge: HOME | End: 2021-06-02
Payer: COMMERCIAL

## 2021-01-01 ENCOUNTER — HOSPITAL ENCOUNTER (OUTPATIENT)
Dept: CARDIAC REHAB | Age: 71
Discharge: HOME | End: 2021-04-28
Payer: COMMERCIAL

## 2021-01-01 ENCOUNTER — HOSPITAL ENCOUNTER (OUTPATIENT)
Dept: CARDIAC REHAB | Age: 71
Discharge: HOME | End: 2021-05-10

## 2021-01-01 ENCOUNTER — APPOINTMENT (OUTPATIENT)
Dept: CT IMAGING | Age: 71
DRG: 190 | End: 2021-01-01
Attending: HOSPITALIST
Payer: COMMERCIAL

## 2021-01-01 ENCOUNTER — ANESTHESIA (OUTPATIENT)
Dept: ENDOSCOPY | Age: 71
DRG: 186 | End: 2021-01-01
Payer: MEDICARE

## 2021-01-01 ENCOUNTER — ANESTHESIA EVENT (OUTPATIENT)
Dept: SURGERY | Age: 71
DRG: 853 | End: 2021-01-01
Payer: COMMERCIAL

## 2021-01-01 ENCOUNTER — APPOINTMENT (OUTPATIENT)
Dept: GENERAL RADIOLOGY | Age: 71
End: 2021-01-01
Attending: EMERGENCY MEDICINE
Payer: COMMERCIAL

## 2021-01-01 ENCOUNTER — HOSPITAL ENCOUNTER (OUTPATIENT)
Dept: CARDIAC REHAB | Age: 71
Discharge: HOME | End: 2021-04-12
Payer: COMMERCIAL

## 2021-01-01 ENCOUNTER — HOSPITAL ENCOUNTER (OUTPATIENT)
Dept: CARDIAC REHAB | Age: 71
End: 2021-01-01
Payer: COMMERCIAL

## 2021-01-01 ENCOUNTER — HOSPITAL ENCOUNTER (EMERGENCY)
Age: 71
Discharge: HOME | End: 2021-02-25
Payer: COMMERCIAL

## 2021-01-01 ENCOUNTER — HOSPITAL ENCOUNTER (OUTPATIENT)
Age: 71
Setting detail: OBSERVATION
Discharge: HOME | End: 2021-05-04
Attending: INTERNAL MEDICINE | Admitting: INTERNAL MEDICINE
Payer: COMMERCIAL

## 2021-01-01 ENCOUNTER — APPOINTMENT (OUTPATIENT)
Dept: GENERAL RADIOLOGY | Age: 71
DRG: 163 | End: 2021-01-01
Attending: SURGERY
Payer: COMMERCIAL

## 2021-01-01 ENCOUNTER — HOSPITAL ENCOUNTER (OUTPATIENT)
Dept: CARDIAC REHAB | Age: 71
Discharge: HOME | End: 2021-07-21

## 2021-01-01 ENCOUNTER — ANESTHESIA EVENT (OUTPATIENT)
Dept: SURGERY | Age: 71
DRG: 163 | End: 2021-01-01
Payer: COMMERCIAL

## 2021-01-01 ENCOUNTER — HOSPITAL ENCOUNTER (OUTPATIENT)
Dept: CARDIAC REHAB | Age: 71
Discharge: HOME | End: 2021-04-14
Payer: COMMERCIAL

## 2021-01-01 ENCOUNTER — ANESTHESIA (OUTPATIENT)
Dept: SURGERY | Age: 71
End: 2021-01-01
Payer: COMMERCIAL

## 2021-01-01 ENCOUNTER — APPOINTMENT (OUTPATIENT)
Dept: GENERAL RADIOLOGY | Age: 71
DRG: 186 | End: 2021-01-01
Attending: ANESTHESIOLOGY
Payer: MEDICARE

## 2021-01-01 ENCOUNTER — HOSPITAL ENCOUNTER (OUTPATIENT)
Dept: CARDIAC REHAB | Age: 71
Discharge: HOME | End: 2021-06-07
Payer: COMMERCIAL

## 2021-01-01 ENCOUNTER — HOSPITAL ENCOUNTER (OUTPATIENT)
Dept: CARDIAC REHAB | Age: 71
Discharge: HOME | End: 2021-04-21
Payer: COMMERCIAL

## 2021-01-01 ENCOUNTER — HOSPITAL ENCOUNTER (INPATIENT)
Age: 71
LOS: 3 days | Discharge: HOME | DRG: 190 | End: 2021-05-19
Attending: EMERGENCY MEDICINE | Admitting: HOSPITALIST
Payer: COMMERCIAL

## 2021-01-01 ENCOUNTER — HOSPITAL ENCOUNTER (OUTPATIENT)
Dept: CARDIAC REHAB | Age: 71
Discharge: HOME | End: 2021-04-26
Payer: COMMERCIAL

## 2021-01-01 ENCOUNTER — HOSPITAL ENCOUNTER (OUTPATIENT)
Dept: CARDIAC REHAB | Age: 71
Discharge: HOME | End: 2021-06-21
Payer: COMMERCIAL

## 2021-01-01 ENCOUNTER — HOSPITAL ENCOUNTER (INPATIENT)
Age: 71
LOS: 9 days | Discharge: HOME | DRG: 853 | End: 2021-06-17
Attending: EMERGENCY MEDICINE | Admitting: SURGERY
Payer: COMMERCIAL

## 2021-01-01 ENCOUNTER — HOSPITAL ENCOUNTER (OUTPATIENT)
Dept: CT IMAGING | Age: 71
Discharge: HOME | End: 2021-07-21
Attending: INTERNAL MEDICINE
Payer: MEDICARE

## 2021-01-01 ENCOUNTER — APPOINTMENT (OUTPATIENT)
Dept: GENERAL RADIOLOGY | Age: 71
DRG: 871 | End: 2021-01-01
Payer: MEDICARE

## 2021-01-01 ENCOUNTER — HOSPITAL ENCOUNTER (OUTPATIENT)
Dept: CARDIAC REHAB | Age: 71
Discharge: HOME | End: 2021-04-05
Payer: COMMERCIAL

## 2021-01-01 ENCOUNTER — HOSPITAL ENCOUNTER (OUTPATIENT)
Dept: CARDIAC REHAB | Age: 71
Discharge: HOME | End: 2021-03-22

## 2021-01-01 VITALS
RESPIRATION RATE: 18 BRPM | BODY MASS INDEX: 25.31 KG/M2 | OXYGEN SATURATION: 95 % | SYSTOLIC BLOOD PRESSURE: 101 MMHG | HEIGHT: 70 IN | DIASTOLIC BLOOD PRESSURE: 72 MMHG | TEMPERATURE: 98.4 F | HEART RATE: 68 BPM | WEIGHT: 176.8 LBS

## 2021-01-01 VITALS
TEMPERATURE: 98.2 F | SYSTOLIC BLOOD PRESSURE: 115 MMHG | RESPIRATION RATE: 18 BRPM | HEIGHT: 70 IN | HEART RATE: 72 BPM | WEIGHT: 172.7 LBS | DIASTOLIC BLOOD PRESSURE: 72 MMHG | OXYGEN SATURATION: 97 % | BODY MASS INDEX: 24.73 KG/M2

## 2021-01-01 VITALS
HEART RATE: 124 BPM | SYSTOLIC BLOOD PRESSURE: 123 MMHG | BODY MASS INDEX: 25.77 KG/M2 | TEMPERATURE: 97.8 F | OXYGEN SATURATION: 97 % | HEIGHT: 70 IN | WEIGHT: 180 LBS | DIASTOLIC BLOOD PRESSURE: 89 MMHG | RESPIRATION RATE: 20 BRPM

## 2021-01-01 VITALS
WEIGHT: 169 LBS | DIASTOLIC BLOOD PRESSURE: 59 MMHG | TEMPERATURE: 97.5 F | OXYGEN SATURATION: 67 % | BODY MASS INDEX: 25.03 KG/M2 | SYSTOLIC BLOOD PRESSURE: 91 MMHG | HEIGHT: 69 IN | RESPIRATION RATE: 24 BRPM

## 2021-01-01 VITALS
SYSTOLIC BLOOD PRESSURE: 103 MMHG | WEIGHT: 188.05 LBS | OXYGEN SATURATION: 98 % | TEMPERATURE: 97.8 F | DIASTOLIC BLOOD PRESSURE: 69 MMHG | BODY MASS INDEX: 26.92 KG/M2 | RESPIRATION RATE: 17 BRPM | HEIGHT: 70 IN | HEART RATE: 86 BPM

## 2021-01-01 VITALS — BODY MASS INDEX: 25.11 KG/M2 | WEIGHT: 175 LBS

## 2021-01-01 VITALS
DIASTOLIC BLOOD PRESSURE: 60 MMHG | TEMPERATURE: 98.2 F | BODY MASS INDEX: 25.81 KG/M2 | HEIGHT: 70 IN | OXYGEN SATURATION: 98 % | SYSTOLIC BLOOD PRESSURE: 91 MMHG | RESPIRATION RATE: 17 BRPM | WEIGHT: 180.3 LBS | HEART RATE: 84 BPM

## 2021-01-01 VITALS
RESPIRATION RATE: 18 BRPM | SYSTOLIC BLOOD PRESSURE: 104 MMHG | BODY MASS INDEX: 26.08 KG/M2 | HEART RATE: 69 BPM | DIASTOLIC BLOOD PRESSURE: 75 MMHG | WEIGHT: 182.2 LBS | HEIGHT: 70 IN | TEMPERATURE: 98.5 F | OXYGEN SATURATION: 94 %

## 2021-01-01 VITALS — BODY MASS INDEX: 25.37 KG/M2 | WEIGHT: 176.8 LBS

## 2021-01-01 VITALS — BODY MASS INDEX: 24.22 KG/M2 | WEIGHT: 168.8 LBS

## 2021-01-01 VITALS — WEIGHT: 171.6 LBS | BODY MASS INDEX: 24.62 KG/M2

## 2021-01-01 VITALS — WEIGHT: 174.8 LBS | BODY MASS INDEX: 25.08 KG/M2

## 2021-01-01 VITALS — WEIGHT: 174 LBS | HEIGHT: 70 IN | BODY MASS INDEX: 24.91 KG/M2

## 2021-01-01 VITALS — WEIGHT: 177.8 LBS | BODY MASS INDEX: 25.51 KG/M2

## 2021-01-01 DIAGNOSIS — J18.9 PNEUMONIA OF LEFT LUNG DUE TO INFECTIOUS ORGANISM, UNSPECIFIED PART OF LUNG: ICD-10-CM

## 2021-01-01 DIAGNOSIS — J96.11 CHRONIC RESPIRATORY FAILURE WITH HYPOXIA (HCC): Chronic | ICD-10-CM

## 2021-01-01 DIAGNOSIS — I47.1 ATRIAL TACHYCARDIA (HCC): ICD-10-CM

## 2021-01-01 DIAGNOSIS — J44.1 COPD EXACERBATION (HCC): Primary | ICD-10-CM

## 2021-01-01 DIAGNOSIS — R09.02 HYPOXIA: ICD-10-CM

## 2021-01-01 DIAGNOSIS — J96.01 ACUTE HYPOXEMIC RESPIRATORY FAILURE (HCC): ICD-10-CM

## 2021-01-01 DIAGNOSIS — J86.9 EMPYEMA (HCC): Primary | ICD-10-CM

## 2021-01-01 DIAGNOSIS — C34.31 MALIGNANT NEOPLASM OF LOWER LOBE OF RIGHT LUNG (HCC): ICD-10-CM

## 2021-01-01 DIAGNOSIS — R50.9 FEVER, UNSPECIFIED FEVER CAUSE: ICD-10-CM

## 2021-01-01 DIAGNOSIS — J86.9 EMPYEMA (HCC): ICD-10-CM

## 2021-01-01 DIAGNOSIS — Z98.890 HISTORY OF THORACOTOMY: Chronic | ICD-10-CM

## 2021-01-01 DIAGNOSIS — J98.11 COLLAPSE OF RIGHT LUNG: ICD-10-CM

## 2021-01-01 DIAGNOSIS — J86.0 BRONCHOPLEURAL FISTULA (HCC): ICD-10-CM

## 2021-01-01 DIAGNOSIS — I95.89 OTHER SPECIFIED HYPOTENSION: ICD-10-CM

## 2021-01-01 DIAGNOSIS — C34.91 MALIGNANT NEOPLASM OF RIGHT LUNG, UNSPECIFIED PART OF LUNG (HCC): ICD-10-CM

## 2021-01-01 DIAGNOSIS — J94.2 HEMOTHORAX ON RIGHT: ICD-10-CM

## 2021-01-01 DIAGNOSIS — R53.81 DEBILITY: ICD-10-CM

## 2021-01-01 DIAGNOSIS — Z98.890 S/P THORACOTOMY: ICD-10-CM

## 2021-01-01 DIAGNOSIS — I48.91 ATRIAL FIBRILLATION WITH RVR (HCC): ICD-10-CM

## 2021-01-01 DIAGNOSIS — J98.09 BRONCHIAL STENOSIS: ICD-10-CM

## 2021-01-01 DIAGNOSIS — Z90.2 S/P PNEUMONECTOMY: ICD-10-CM

## 2021-01-01 DIAGNOSIS — R91.8 ENDOBRONCHIAL MASS: ICD-10-CM

## 2021-01-01 DIAGNOSIS — J96.21 ACUTE ON CHRONIC RESPIRATORY FAILURE WITH HYPOXEMIA (HCC): ICD-10-CM

## 2021-01-01 DIAGNOSIS — I48.91 ATRIAL FIBRILLATION, UNSPECIFIED TYPE (HCC): ICD-10-CM

## 2021-01-01 DIAGNOSIS — J98.11 ATELECTASIS OF RIGHT LUNG: ICD-10-CM

## 2021-01-01 DIAGNOSIS — J90 PLEURAL EFFUSION ON RIGHT: ICD-10-CM

## 2021-01-01 DIAGNOSIS — J40 TRACHEOBRONCHITIS: ICD-10-CM

## 2021-01-01 DIAGNOSIS — J43.9 PULMONARY EMPHYSEMA, UNSPECIFIED EMPHYSEMA TYPE (HCC): Chronic | ICD-10-CM

## 2021-01-01 DIAGNOSIS — I48.0 PAROXYSMAL ATRIAL FIBRILLATION (HCC): Chronic | ICD-10-CM

## 2021-01-01 DIAGNOSIS — J96.22 ACUTE ON CHRONIC RESPIRATORY FAILURE WITH HYPOXIA AND HYPERCAPNIA (HCC): ICD-10-CM

## 2021-01-01 DIAGNOSIS — R06.03 RESPIRATORY DISTRESS: Primary | ICD-10-CM

## 2021-01-01 DIAGNOSIS — C34.31 CANCER OF BRONCHUS OF RIGHT LOWER LOBE (HCC): ICD-10-CM

## 2021-01-01 DIAGNOSIS — J96.11 CHRONIC RESPIRATORY FAILURE WITH HYPOXIA (HCC): ICD-10-CM

## 2021-01-01 DIAGNOSIS — Z90.2 S/P LOBECTOMY OF LUNG: ICD-10-CM

## 2021-01-01 DIAGNOSIS — Z95.0 PACEMAKER: Primary | ICD-10-CM

## 2021-01-01 DIAGNOSIS — J43.2 CENTRILOBULAR EMPHYSEMA (HCC): Chronic | ICD-10-CM

## 2021-01-01 DIAGNOSIS — J18.9 PNEUMONIA OF RIGHT LOWER LOBE DUE TO INFECTIOUS ORGANISM: Primary | ICD-10-CM

## 2021-01-01 DIAGNOSIS — J96.21 ACUTE ON CHRONIC RESPIRATORY FAILURE WITH HYPOXIA AND HYPERCAPNIA (HCC): ICD-10-CM

## 2021-01-01 DIAGNOSIS — R05.9 COUGH: ICD-10-CM

## 2021-01-01 DIAGNOSIS — R09.02 HYPOXEMIA: ICD-10-CM

## 2021-01-01 DIAGNOSIS — C34.31 CANCER OF BRONCHUS OF RIGHT LOWER LOBE (HCC): Chronic | ICD-10-CM

## 2021-01-01 DIAGNOSIS — J86.0: ICD-10-CM

## 2021-01-01 DIAGNOSIS — C34.31 MALIGNANT NEOPLASM OF LOWER LOBE OF RIGHT LUNG (HCC): Chronic | ICD-10-CM

## 2021-01-01 DIAGNOSIS — A41.9 SEPSIS DUE TO UNDETERMINED ORGANISM (HCC): ICD-10-CM

## 2021-01-01 DIAGNOSIS — R06.03 RESPIRATORY DISTRESS: ICD-10-CM

## 2021-01-01 DIAGNOSIS — I48.0 PAROXYSMAL ATRIAL FIBRILLATION WITH RVR (HCC): Primary | ICD-10-CM

## 2021-01-01 DIAGNOSIS — A41.9 SEPSIS DUE TO UNDETERMINED ORGANISM (HCC): Primary | ICD-10-CM

## 2021-01-01 DIAGNOSIS — I95.89 OTHER SPECIFIED HYPOTENSION: Chronic | ICD-10-CM

## 2021-01-01 DIAGNOSIS — I48.4 ATYPICAL ATRIAL FLUTTER (HCC): ICD-10-CM

## 2021-01-01 DIAGNOSIS — J98.01 ACUTE BRONCHOSPASM: ICD-10-CM

## 2021-01-01 LAB
ABO + RH BLD: NORMAL
ACID FAST STN SPEC: NEGATIVE
ACID FAST STN SPEC: NEGATIVE
ALBUMIN SERPL-MCNC: 2.3 G/DL (ref 3.2–4.6)
ALBUMIN SERPL-MCNC: 2.4 G/DL (ref 3.2–4.6)
ALBUMIN SERPL-MCNC: 2.6 G/DL (ref 3.2–4.6)
ALBUMIN SERPL-MCNC: 2.7 G/DL (ref 3.2–4.6)
ALBUMIN SERPL-MCNC: 3.1 G/DL (ref 3.2–4.6)
ALBUMIN SERPL-MCNC: 3.1 G/DL (ref 3.2–4.6)
ALBUMIN SERPL-MCNC: 3.2 G/DL (ref 3.2–4.6)
ALBUMIN SERPL-MCNC: 3.2 G/DL (ref 3.2–4.6)
ALBUMIN/GLOB SERPL: 0.5 {RATIO} (ref 1.2–3.5)
ALBUMIN/GLOB SERPL: 0.6 {RATIO} (ref 1.2–3.5)
ALBUMIN/GLOB SERPL: 0.6 {RATIO} (ref 1.2–3.5)
ALBUMIN/GLOB SERPL: 0.7 {RATIO} (ref 1.2–3.5)
ALP SERPL-CCNC: 112 U/L (ref 50–136)
ALP SERPL-CCNC: 113 U/L (ref 50–136)
ALP SERPL-CCNC: 121 U/L (ref 50–136)
ALP SERPL-CCNC: 124 U/L (ref 50–136)
ALP SERPL-CCNC: 133 U/L (ref 50–136)
ALP SERPL-CCNC: 136 U/L (ref 50–136)
ALP SERPL-CCNC: 142 U/L (ref 50–136)
ALP SERPL-CCNC: 156 U/L (ref 50–136)
ALP SERPL-CCNC: 162 U/L (ref 50–136)
ALP SERPL-CCNC: 99 U/L (ref 50–136)
ALT SERPL-CCNC: 11 U/L (ref 12–65)
ALT SERPL-CCNC: 12 U/L (ref 12–65)
ALT SERPL-CCNC: 13 U/L (ref 12–65)
ALT SERPL-CCNC: 17 U/L (ref 12–65)
ALT SERPL-CCNC: 17 U/L (ref 12–65)
ALT SERPL-CCNC: 8 U/L (ref 12–65)
ALT SERPL-CCNC: 8 U/L (ref 12–65)
ALT SERPL-CCNC: 9 U/L (ref 12–65)
ALT SERPL-CCNC: <6 U/L (ref 12–65)
ALT SERPL-CCNC: <6 U/L (ref 12–65)
ANION GAP SERPL CALC-SCNC: 11 MMOL/L (ref 7–16)
ANION GAP SERPL CALC-SCNC: 2 MMOL/L (ref 7–16)
ANION GAP SERPL CALC-SCNC: 3 MMOL/L (ref 7–16)
ANION GAP SERPL CALC-SCNC: 4 MMOL/L (ref 7–16)
ANION GAP SERPL CALC-SCNC: 5 MMOL/L (ref 7–16)
ANION GAP SERPL CALC-SCNC: 6 MMOL/L (ref 7–16)
ANION GAP SERPL CALC-SCNC: 7 MMOL/L (ref 7–16)
ANION GAP SERPL CALC-SCNC: 7 MMOL/L (ref 7–16)
ANION GAP SERPL CALC-SCNC: 8 MMOL/L (ref 7–16)
ANION GAP SERPL CALC-SCNC: 8 MMOL/L (ref 7–16)
ANION GAP SERPL CALC-SCNC: ABNORMAL MMOL/L (ref 7–16)
APPEARANCE FLD: NORMAL
APTT PPP: 35.2 SEC (ref 24.1–35.1)
APTT PPP: 36.1 SEC (ref 24.1–35.1)
APTT PPP: 41.2 SEC (ref 24.1–35.1)
APTT PPP: 43.4 SEC (ref 24.1–35.1)
APTT PPP: 45.6 SEC (ref 24.1–35.1)
APTT PPP: 51 SEC (ref 24.1–35.1)
APTT PPP: 71.2 SEC (ref 24.1–35.1)
ARTERIAL PATENCY WRIST A: ABNORMAL
ARTERIAL PATENCY WRIST A: POSITIVE
AST SERPL-CCNC: 11 U/L (ref 15–37)
AST SERPL-CCNC: 11 U/L (ref 15–37)
AST SERPL-CCNC: 18 U/L (ref 15–37)
AST SERPL-CCNC: 21 U/L (ref 15–37)
AST SERPL-CCNC: 22 U/L (ref 15–37)
AST SERPL-CCNC: 22 U/L (ref 15–37)
AST SERPL-CCNC: 24 U/L (ref 15–37)
AST SERPL-CCNC: 25 U/L (ref 15–37)
AST SERPL-CCNC: 37 U/L (ref 15–37)
AST SERPL-CCNC: 8 U/L (ref 15–37)
ATRIAL RATE: 122 BPM
ATRIAL RATE: 277 BPM
ATRIAL RATE: 45 BPM
ATRIAL RATE: 70 BPM
ATRIAL RATE: 74 BPM
ATRIAL RATE: 89 BPM
ATRIAL RATE: 90 BPM
BACTERIA SPEC CULT: ABNORMAL
BACTERIA SPEC CULT: NORMAL
BASE DEFICIT BLD-SCNC: 2.1 MMOL/L
BASE DEFICIT BLD-SCNC: 2.2 MMOL/L
BASE EXCESS BLD CALC-SCNC: 2.6 MMOL/L
BASE EXCESS BLD CALC-SCNC: 3 MMOL/L
BASE EXCESS BLD CALC-SCNC: 3.1 MMOL/L
BASE EXCESS BLD CALC-SCNC: 4.3 MMOL/L
BASE EXCESS BLD CALC-SCNC: 5 MMOL/L
BASE EXCESS BLD CALC-SCNC: 6 MMOL/L
BASOPHILS # BLD: 0 K/UL (ref 0–0.2)
BASOPHILS # BLD: 0.1 K/UL (ref 0–0.2)
BASOPHILS # BLD: 0.1 K/UL (ref 0–0.2)
BASOPHILS NFR BLD: 0 % (ref 0–2)
BASOPHILS NFR BLD: 1 % (ref 0–2)
BDY SITE: ABNORMAL
BILIRUB DIRECT SERPL-MCNC: 0.2 MG/DL
BILIRUB INDIRECT SERPL-MCNC: 0.5 MG/DL (ref 0–1.1)
BILIRUB SERPL-MCNC: 0.2 MG/DL (ref 0.2–1.1)
BILIRUB SERPL-MCNC: 0.3 MG/DL (ref 0.2–1.1)
BILIRUB SERPL-MCNC: 0.4 MG/DL (ref 0.2–1.1)
BILIRUB SERPL-MCNC: 0.5 MG/DL (ref 0.2–1.1)
BILIRUB SERPL-MCNC: 0.7 MG/DL (ref 0.2–1.1)
BILIRUB SERPL-MCNC: 1.2 MG/DL (ref 0.2–1.1)
BILIRUB SERPL-MCNC: 1.5 MG/DL (ref 0.2–1.1)
BLD PROD TYP BPU: NORMAL
BLOOD BANK CMNT PATIENT-IMP: NORMAL
BLOOD GROUP ANTIBODIES SERPL: NORMAL
BNP SERPL-MCNC: 1673 PG/ML (ref 5–125)
BODY FLD TYPE: NORMAL
BPU ID: NORMAL
BUN SERPL-MCNC: 10 MG/DL (ref 8–23)
BUN SERPL-MCNC: 11 MG/DL (ref 8–23)
BUN SERPL-MCNC: 12 MG/DL (ref 8–23)
BUN SERPL-MCNC: 13 MG/DL (ref 8–23)
BUN SERPL-MCNC: 14 MG/DL (ref 8–23)
BUN SERPL-MCNC: 15 MG/DL (ref 8–23)
BUN SERPL-MCNC: 15 MG/DL (ref 8–23)
BUN SERPL-MCNC: 16 MG/DL (ref 8–23)
BUN SERPL-MCNC: 18 MG/DL (ref 8–23)
BUN SERPL-MCNC: 4 MG/DL (ref 8–23)
BUN SERPL-MCNC: 5 MG/DL (ref 8–23)
BUN SERPL-MCNC: 6 MG/DL (ref 8–23)
BUN SERPL-MCNC: 6 MG/DL (ref 8–23)
BUN SERPL-MCNC: 7 MG/DL (ref 8–23)
BUN SERPL-MCNC: 7 MG/DL (ref 8–23)
BUN SERPL-MCNC: 8 MG/DL (ref 8–23)
BUN SERPL-MCNC: 9 MG/DL (ref 8–23)
CA-I BLD-MCNC: 1.2 MMOL/L (ref 1.12–1.32)
CA-I BLD-MCNC: 1.2 MMOL/L (ref 1.12–1.32)
CALCIUM SERPL-MCNC: 10.1 MG/DL (ref 8.3–10.4)
CALCIUM SERPL-MCNC: 8.1 MG/DL (ref 8.3–10.4)
CALCIUM SERPL-MCNC: 8.2 MG/DL (ref 8.3–10.4)
CALCIUM SERPL-MCNC: 8.2 MG/DL (ref 8.3–10.4)
CALCIUM SERPL-MCNC: 8.4 MG/DL (ref 8.3–10.4)
CALCIUM SERPL-MCNC: 8.6 MG/DL (ref 8.3–10.4)
CALCIUM SERPL-MCNC: 8.7 MG/DL (ref 8.3–10.4)
CALCIUM SERPL-MCNC: 8.8 MG/DL (ref 8.3–10.4)
CALCIUM SERPL-MCNC: 8.9 MG/DL (ref 8.3–10.4)
CALCIUM SERPL-MCNC: 9 MG/DL (ref 8.3–10.4)
CALCIUM SERPL-MCNC: 9.2 MG/DL (ref 8.3–10.4)
CALCIUM SERPL-MCNC: 9.2 MG/DL (ref 8.3–10.4)
CALCIUM SERPL-MCNC: 9.3 MG/DL (ref 8.3–10.4)
CALCIUM SERPL-MCNC: 9.4 MG/DL (ref 8.3–10.4)
CALCIUM SERPL-MCNC: 9.4 MG/DL (ref 8.3–10.4)
CALCIUM SERPL-MCNC: 9.6 MG/DL (ref 8.3–10.4)
CALCIUM SERPL-MCNC: 9.8 MG/DL (ref 8.3–10.4)
CALCULATED P AXIS, ECG09: -27 DEGREES
CALCULATED P AXIS, ECG09: -63 DEGREES
CALCULATED P AXIS, ECG09: 0 DEGREES
CALCULATED P AXIS, ECG09: 17 DEGREES
CALCULATED P AXIS, ECG09: 89 DEGREES
CALCULATED R AXIS, ECG10: -101 DEGREES
CALCULATED R AXIS, ECG10: -107 DEGREES
CALCULATED R AXIS, ECG10: -116 DEGREES
CALCULATED R AXIS, ECG10: -15 DEGREES
CALCULATED R AXIS, ECG10: -15 DEGREES
CALCULATED R AXIS, ECG10: -85 DEGREES
CALCULATED R AXIS, ECG10: 36 DEGREES
CALCULATED T AXIS, ECG11: 21 DEGREES
CALCULATED T AXIS, ECG11: 41 DEGREES
CALCULATED T AXIS, ECG11: 46 DEGREES
CALCULATED T AXIS, ECG11: 61 DEGREES
CALCULATED T AXIS, ECG11: 61 DEGREES
CALCULATED T AXIS, ECG11: 62 DEGREES
CALCULATED T AXIS, ECG11: 64 DEGREES
CHLORIDE SERPL-SCNC: 100 MMOL/L (ref 98–107)
CHLORIDE SERPL-SCNC: 100 MMOL/L (ref 98–107)
CHLORIDE SERPL-SCNC: 101 MMOL/L (ref 98–107)
CHLORIDE SERPL-SCNC: 101 MMOL/L (ref 98–107)
CHLORIDE SERPL-SCNC: 102 MMOL/L (ref 98–107)
CHLORIDE SERPL-SCNC: 103 MMOL/L (ref 98–107)
CHLORIDE SERPL-SCNC: 104 MMOL/L (ref 98–107)
CHLORIDE SERPL-SCNC: 105 MMOL/L (ref 98–107)
CHLORIDE SERPL-SCNC: 106 MMOL/L (ref 98–107)
CHLORIDE SERPL-SCNC: 107 MMOL/L (ref 98–107)
CHLORIDE SERPL-SCNC: 107 MMOL/L (ref 98–107)
CHLORIDE SERPL-SCNC: 108 MMOL/L (ref 98–107)
CHLORIDE SERPL-SCNC: 108 MMOL/L (ref 98–107)
CHLORIDE SERPL-SCNC: 109 MMOL/L (ref 98–107)
CHLORIDE SERPL-SCNC: 99 MMOL/L (ref 98–107)
CHLORIDE SERPL-SCNC: 99 MMOL/L (ref 98–107)
CO2 BLD-SCNC: 29 MMOL/L (ref 13–23)
CO2 BLD-SCNC: 32 MMOL/L
CO2 SERPL-SCNC: 25 MMOL/L (ref 21–32)
CO2 SERPL-SCNC: 26 MMOL/L (ref 21–32)
CO2 SERPL-SCNC: 26 MMOL/L (ref 21–32)
CO2 SERPL-SCNC: 27 MMOL/L (ref 21–32)
CO2 SERPL-SCNC: 27 MMOL/L (ref 21–32)
CO2 SERPL-SCNC: 28 MMOL/L (ref 21–32)
CO2 SERPL-SCNC: 29 MMOL/L (ref 21–32)
CO2 SERPL-SCNC: 30 MMOL/L (ref 21–32)
CO2 SERPL-SCNC: 32 MMOL/L (ref 21–32)
CO2 SERPL-SCNC: 33 MMOL/L (ref 21–32)
CO2 SERPL-SCNC: 34 MMOL/L (ref 21–32)
CO2 SERPL-SCNC: 35 MMOL/L (ref 21–32)
CO2 SERPL-SCNC: 36 MMOL/L (ref 21–32)
COLLECT TIME,HTIME: 1220
COLLECT TIME,HTIME: 430
COLOR FLD: NORMAL
COVID-19 RAPID TEST, COVR: NOT DETECTED
CREAT SERPL-MCNC: 0.4 MG/DL (ref 0.8–1.5)
CREAT SERPL-MCNC: 0.42 MG/DL (ref 0.8–1.5)
CREAT SERPL-MCNC: 0.45 MG/DL (ref 0.8–1.5)
CREAT SERPL-MCNC: 0.47 MG/DL (ref 0.8–1.5)
CREAT SERPL-MCNC: 0.5 MG/DL (ref 0.8–1.5)
CREAT SERPL-MCNC: 0.5 MG/DL (ref 0.8–1.5)
CREAT SERPL-MCNC: 0.51 MG/DL (ref 0.8–1.5)
CREAT SERPL-MCNC: 0.52 MG/DL (ref 0.8–1.5)
CREAT SERPL-MCNC: 0.52 MG/DL (ref 0.8–1.5)
CREAT SERPL-MCNC: 0.54 MG/DL (ref 0.8–1.5)
CREAT SERPL-MCNC: 0.54 MG/DL (ref 0.8–1.5)
CREAT SERPL-MCNC: 0.55 MG/DL (ref 0.8–1.5)
CREAT SERPL-MCNC: 0.57 MG/DL (ref 0.8–1.5)
CREAT SERPL-MCNC: 0.6 MG/DL (ref 0.8–1.5)
CREAT SERPL-MCNC: 0.62 MG/DL (ref 0.8–1.5)
CREAT SERPL-MCNC: 0.63 MG/DL (ref 0.8–1.5)
CREAT SERPL-MCNC: 0.63 MG/DL (ref 0.8–1.5)
CREAT SERPL-MCNC: 0.65 MG/DL (ref 0.8–1.5)
CREAT SERPL-MCNC: 0.68 MG/DL (ref 0.8–1.5)
CREAT SERPL-MCNC: 0.77 MG/DL (ref 0.8–1.5)
CREAT SERPL-MCNC: 0.77 MG/DL (ref 0.8–1.5)
CREAT SERPL-MCNC: 0.79 MG/DL (ref 0.8–1.5)
CREAT SERPL-MCNC: 0.79 MG/DL (ref 0.8–1.5)
CREAT SERPL-MCNC: 0.8 MG/DL (ref 0.8–1.5)
CREAT SERPL-MCNC: 0.82 MG/DL (ref 0.8–1.5)
CREAT SERPL-MCNC: 0.86 MG/DL (ref 0.8–1.5)
CREAT SERPL-MCNC: 0.88 MG/DL (ref 0.8–1.5)
CREAT SERPL-MCNC: 0.96 MG/DL (ref 0.8–1.5)
CREAT SERPL-MCNC: 0.97 MG/DL (ref 0.8–1.5)
CREAT SERPL-MCNC: 0.97 MG/DL (ref 0.8–1.5)
CREAT SERPL-MCNC: 1 MG/DL (ref 0.8–1.5)
CREAT SERPL-MCNC: 1.02 MG/DL (ref 0.8–1.5)
CREAT SERPL-MCNC: 1.03 MG/DL (ref 0.8–1.5)
CROSSMATCH RESULT,%XM: NORMAL
CRP SERPL-MCNC: 14.6 MG/DL (ref 0–0.9)
DIAGNOSIS, 93000: NORMAL
DIFFERENTIAL METHOD BLD: ABNORMAL
EOSINOPHIL # BLD: 0 K/UL (ref 0–0.8)
EOSINOPHIL # BLD: 0.1 K/UL (ref 0–0.8)
EOSINOPHIL # BLD: 0.2 K/UL (ref 0–0.8)
EOSINOPHIL # BLD: 0.3 K/UL (ref 0–0.8)
EOSINOPHIL # BLD: 0.3 K/UL (ref 0–0.8)
EOSINOPHIL NFR BLD: 0 % (ref 0.5–7.8)
EOSINOPHIL NFR BLD: 1 % (ref 0.5–7.8)
EOSINOPHIL NFR BLD: 2 % (ref 0.5–7.8)
EOSINOPHIL NFR BLD: 3 % (ref 0.5–7.8)
EOSINOPHIL NFR BLD: 4 % (ref 0.5–7.8)
EOSINOPHIL NFR BLD: 5 % (ref 0.5–7.8)
ERYTHROCYTE [DISTWIDTH] IN BLOOD BY AUTOMATED COUNT: 14.6 % (ref 11.9–14.6)
ERYTHROCYTE [DISTWIDTH] IN BLOOD BY AUTOMATED COUNT: 14.6 % (ref 11.9–14.6)
ERYTHROCYTE [DISTWIDTH] IN BLOOD BY AUTOMATED COUNT: 14.8 % (ref 11.9–14.6)
ERYTHROCYTE [DISTWIDTH] IN BLOOD BY AUTOMATED COUNT: 14.9 % (ref 11.9–14.6)
ERYTHROCYTE [DISTWIDTH] IN BLOOD BY AUTOMATED COUNT: 14.9 % (ref 11.9–14.6)
ERYTHROCYTE [DISTWIDTH] IN BLOOD BY AUTOMATED COUNT: 15 % (ref 11.9–14.6)
ERYTHROCYTE [DISTWIDTH] IN BLOOD BY AUTOMATED COUNT: 15 % (ref 11.9–14.6)
ERYTHROCYTE [DISTWIDTH] IN BLOOD BY AUTOMATED COUNT: 15.1 % (ref 11.9–14.6)
ERYTHROCYTE [DISTWIDTH] IN BLOOD BY AUTOMATED COUNT: 15.1 % (ref 11.9–14.6)
ERYTHROCYTE [DISTWIDTH] IN BLOOD BY AUTOMATED COUNT: 15.2 % (ref 11.9–14.6)
ERYTHROCYTE [DISTWIDTH] IN BLOOD BY AUTOMATED COUNT: 15.3 % (ref 11.9–14.6)
ERYTHROCYTE [DISTWIDTH] IN BLOOD BY AUTOMATED COUNT: 15.7 % (ref 11.9–14.6)
ERYTHROCYTE [DISTWIDTH] IN BLOOD BY AUTOMATED COUNT: 15.8 % (ref 11.9–14.6)
ERYTHROCYTE [DISTWIDTH] IN BLOOD BY AUTOMATED COUNT: 16.2 % (ref 11.9–14.6)
ERYTHROCYTE [DISTWIDTH] IN BLOOD BY AUTOMATED COUNT: 16.2 % (ref 11.9–14.6)
ERYTHROCYTE [DISTWIDTH] IN BLOOD BY AUTOMATED COUNT: 16.4 % (ref 11.9–14.6)
ERYTHROCYTE [DISTWIDTH] IN BLOOD BY AUTOMATED COUNT: 16.6 % (ref 11.9–14.6)
ERYTHROCYTE [DISTWIDTH] IN BLOOD BY AUTOMATED COUNT: 16.8 % (ref 11.9–14.6)
ERYTHROCYTE [DISTWIDTH] IN BLOOD BY AUTOMATED COUNT: 17.2 % (ref 11.9–14.6)
ERYTHROCYTE [DISTWIDTH] IN BLOOD BY AUTOMATED COUNT: 17.8 % (ref 11.9–14.6)
ERYTHROCYTE [DISTWIDTH] IN BLOOD BY AUTOMATED COUNT: 17.9 % (ref 11.9–14.6)
ERYTHROCYTE [DISTWIDTH] IN BLOOD BY AUTOMATED COUNT: 17.9 % (ref 11.9–14.6)
ERYTHROCYTE [DISTWIDTH] IN BLOOD BY AUTOMATED COUNT: 18 % (ref 11.9–14.6)
ERYTHROCYTE [DISTWIDTH] IN BLOOD BY AUTOMATED COUNT: 18.1 % (ref 11.9–14.6)
ERYTHROCYTE [DISTWIDTH] IN BLOOD BY AUTOMATED COUNT: 18.1 % (ref 11.9–14.6)
ERYTHROCYTE [DISTWIDTH] IN BLOOD BY AUTOMATED COUNT: 18.3 % (ref 11.9–14.6)
ERYTHROCYTE [DISTWIDTH] IN BLOOD BY AUTOMATED COUNT: 18.4 % (ref 11.9–14.6)
ERYTHROCYTE [DISTWIDTH] IN BLOOD BY AUTOMATED COUNT: 18.7 % (ref 11.9–14.6)
ERYTHROCYTE [DISTWIDTH] IN BLOOD BY AUTOMATED COUNT: 18.7 % (ref 11.9–14.6)
ERYTHROCYTE [DISTWIDTH] IN BLOOD BY AUTOMATED COUNT: 18.8 % (ref 11.9–14.6)
FERRITIN SERPL-MCNC: 815 NG/ML (ref 8–388)
FLOW RATE ISTAT,IFRATE: 2 L/MIN
FLOW RATE ISTAT,IFRATE: 6 L/MIN
FOLATE SERPL-MCNC: 9.2 NG/ML (ref 3.1–17.5)
FUNGUS CULTURE, RFCO2T: NEGATIVE
FUNGUS CULTURE, RFCO2T: NEGATIVE
FUNGUS CULTURE, RFCO2T: POSITIVE
FUNGUS CULTURE, RFCO2T: POSITIVE
FUNGUS ID 1, (DID), RFIM1T: NORMAL
FUNGUS ID 1, (DID), RFIM1T: NORMAL
FUNGUS SMEAR, RFCO1T: ABNORMAL
FUNGUS SMEAR, RFCO1T: ABNORMAL
FUNGUS SMEAR, RFCO1T: NORMAL
FUNGUS SMEAR, RFCO1T: NORMAL
FUNGUS SPEC CULT: NORMAL
FUNGUS STAIN, 188244: NORMAL
GAS FLOW.O2 O2 DELIVERY SYS: ABNORMAL L/MIN
GLOBULIN SER CALC-MCNC: 3.8 G/DL (ref 2.3–3.5)
GLOBULIN SER CALC-MCNC: 4.3 G/DL (ref 2.3–3.5)
GLOBULIN SER CALC-MCNC: 4.5 G/DL (ref 2.3–3.5)
GLOBULIN SER CALC-MCNC: 4.7 G/DL (ref 2.3–3.5)
GLOBULIN SER CALC-MCNC: 4.7 G/DL (ref 2.3–3.5)
GLOBULIN SER CALC-MCNC: 4.9 G/DL (ref 2.3–3.5)
GLOBULIN SER CALC-MCNC: 4.9 G/DL (ref 2.3–3.5)
GLOBULIN SER CALC-MCNC: 5 G/DL (ref 2.3–3.5)
GLOBULIN SER CALC-MCNC: 5 G/DL (ref 2.3–3.5)
GLOBULIN SER CALC-MCNC: 6 G/DL (ref 2.3–3.5)
GLUCOSE BLD STRIP.AUTO-MCNC: 100 MG/DL (ref 65–100)
GLUCOSE BLD STRIP.AUTO-MCNC: 103 MG/DL (ref 65–100)
GLUCOSE BLD STRIP.AUTO-MCNC: 104 MG/DL (ref 65–100)
GLUCOSE BLD STRIP.AUTO-MCNC: 105 MG/DL (ref 65–100)
GLUCOSE BLD STRIP.AUTO-MCNC: 110 MG/DL (ref 65–100)
GLUCOSE BLD STRIP.AUTO-MCNC: 134 MG/DL (ref 65–100)
GLUCOSE BLD STRIP.AUTO-MCNC: 156 MG/DL (ref 65–100)
GLUCOSE FLD-MCNC: 7 MG/DL
GLUCOSE SERPL-MCNC: 100 MG/DL (ref 65–100)
GLUCOSE SERPL-MCNC: 101 MG/DL (ref 65–100)
GLUCOSE SERPL-MCNC: 103 MG/DL (ref 65–100)
GLUCOSE SERPL-MCNC: 105 MG/DL (ref 65–100)
GLUCOSE SERPL-MCNC: 106 MG/DL (ref 65–100)
GLUCOSE SERPL-MCNC: 108 MG/DL (ref 65–100)
GLUCOSE SERPL-MCNC: 110 MG/DL (ref 65–100)
GLUCOSE SERPL-MCNC: 110 MG/DL (ref 65–100)
GLUCOSE SERPL-MCNC: 112 MG/DL (ref 65–100)
GLUCOSE SERPL-MCNC: 116 MG/DL (ref 65–100)
GLUCOSE SERPL-MCNC: 117 MG/DL (ref 65–100)
GLUCOSE SERPL-MCNC: 117 MG/DL (ref 65–100)
GLUCOSE SERPL-MCNC: 120 MG/DL (ref 65–100)
GLUCOSE SERPL-MCNC: 132 MG/DL (ref 65–100)
GLUCOSE SERPL-MCNC: 157 MG/DL (ref 65–100)
GLUCOSE SERPL-MCNC: 158 MG/DL (ref 65–100)
GLUCOSE SERPL-MCNC: 184 MG/DL (ref 65–100)
GLUCOSE SERPL-MCNC: 86 MG/DL (ref 65–100)
GLUCOSE SERPL-MCNC: 88 MG/DL (ref 65–100)
GLUCOSE SERPL-MCNC: 90 MG/DL (ref 65–100)
GLUCOSE SERPL-MCNC: 90 MG/DL (ref 65–100)
GLUCOSE SERPL-MCNC: 92 MG/DL (ref 65–100)
GLUCOSE SERPL-MCNC: 92 MG/DL (ref 65–100)
GLUCOSE SERPL-MCNC: 93 MG/DL (ref 65–100)
GLUCOSE SERPL-MCNC: 94 MG/DL (ref 65–100)
GLUCOSE SERPL-MCNC: 96 MG/DL (ref 65–100)
GLUCOSE SERPL-MCNC: 97 MG/DL (ref 65–100)
GLUCOSE SERPL-MCNC: 99 MG/DL (ref 65–100)
GRAM STN SPEC: ABNORMAL
GRAM STN SPEC: NORMAL
HAPTOGLOB SERPL-MCNC: 172 MG/DL (ref 30–200)
HCO3 BLD-SCNC: 25.6 MMOL/L (ref 22–26)
HCO3 BLD-SCNC: 25.9 MMOL/L (ref 22–26)
HCO3 BLD-SCNC: 28.8 MMOL/L (ref 22–26)
HCO3 BLD-SCNC: 28.9 MMOL/L (ref 22–26)
HCO3 BLD-SCNC: 29.5 MMOL/L (ref 22–26)
HCO3 BLD-SCNC: 29.6 MMOL/L (ref 22–26)
HCO3 BLD-SCNC: 29.8 MMOL/L (ref 22–26)
HCO3 BLD-SCNC: 30.7 MMOL/L (ref 22–26)
HCT VFR BLD AUTO: 21.9 % (ref 41.1–50.3)
HCT VFR BLD AUTO: 22.2 % (ref 41.1–50.3)
HCT VFR BLD AUTO: 23.7 % (ref 41.1–50.3)
HCT VFR BLD AUTO: 24.5 % (ref 41.1–50.3)
HCT VFR BLD AUTO: 24.9 % (ref 41.1–50.3)
HCT VFR BLD AUTO: 25.2 % (ref 41.1–50.3)
HCT VFR BLD AUTO: 25.4 % (ref 41.1–50.3)
HCT VFR BLD AUTO: 26 % (ref 41.1–50.3)
HCT VFR BLD AUTO: 26.4 % (ref 41.1–50.3)
HCT VFR BLD AUTO: 27.4 % (ref 41.1–50.3)
HCT VFR BLD AUTO: 27.7 % (ref 41.1–50.3)
HCT VFR BLD AUTO: 27.8 % (ref 41.1–50.3)
HCT VFR BLD AUTO: 28.3 % (ref 41.1–50.3)
HCT VFR BLD AUTO: 28.4 % (ref 41.1–50.3)
HCT VFR BLD AUTO: 28.6 % (ref 41.1–50.3)
HCT VFR BLD AUTO: 29.3 % (ref 41.1–50.3)
HCT VFR BLD AUTO: 30.3 % (ref 41.1–50.3)
HCT VFR BLD AUTO: 30.6 % (ref 41.1–50.3)
HCT VFR BLD AUTO: 31.5 % (ref 41.1–50.3)
HCT VFR BLD AUTO: 31.9 % (ref 41.1–50.3)
HCT VFR BLD AUTO: 32.5 % (ref 41.1–50.3)
HCT VFR BLD AUTO: 32.7 % (ref 41.1–50.3)
HCT VFR BLD AUTO: 32.9 % (ref 41.1–50.3)
HCT VFR BLD AUTO: 33.3 % (ref 41.1–50.3)
HCT VFR BLD AUTO: 33.7 % (ref 41.1–50.3)
HCT VFR BLD AUTO: 33.9 % (ref 41.1–50.3)
HCT VFR BLD AUTO: 34.8 % (ref 41.1–50.3)
HCT VFR BLD AUTO: 34.8 % (ref 41.1–50.3)
HCT VFR BLD AUTO: 35.1 % (ref 41.1–50.3)
HCT VFR BLD AUTO: 35.7 % (ref 41.1–50.3)
HCT VFR BLD AUTO: 36.3 % (ref 41.1–50.3)
HCT VFR BLD AUTO: 36.4 % (ref 41.1–50.3)
HCT VFR BLD AUTO: 37.8 % (ref 41.1–50.3)
HCT VFR BLD AUTO: 37.9 % (ref 41.1–50.3)
HCT VFR BLD AUTO: 39.5 % (ref 41.1–50.3)
HCT VFR BLD AUTO: 39.6 % (ref 41.1–50.3)
HCT VFR BLD AUTO: 40.2 % (ref 41.1–50.3)
HCT VFR BLD AUTO: 40.7 % (ref 41.1–50.3)
HGB BLD-MCNC: 10 G/DL (ref 13.6–17.2)
HGB BLD-MCNC: 10.5 G/DL (ref 13.6–17.2)
HGB BLD-MCNC: 10.6 G/DL (ref 13.6–17.2)
HGB BLD-MCNC: 10.8 G/DL (ref 13.6–17.2)
HGB BLD-MCNC: 11 G/DL (ref 13.6–17.2)
HGB BLD-MCNC: 11.1 G/DL (ref 13.6–17.2)
HGB BLD-MCNC: 11.1 G/DL (ref 13.6–17.2)
HGB BLD-MCNC: 11.3 G/DL (ref 13.6–17.2)
HGB BLD-MCNC: 11.4 G/DL (ref 13.6–17.2)
HGB BLD-MCNC: 11.5 G/DL (ref 13.6–17.2)
HGB BLD-MCNC: 11.7 G/DL (ref 13.6–17.2)
HGB BLD-MCNC: 11.8 G/DL (ref 13.6–17.2)
HGB BLD-MCNC: 12.1 G/DL (ref 13.6–17.2)
HGB BLD-MCNC: 12.2 G/DL (ref 13.6–17.2)
HGB BLD-MCNC: 12.7 G/DL (ref 13.6–17.2)
HGB BLD-MCNC: 12.7 G/DL (ref 13.6–17.2)
HGB BLD-MCNC: 6.7 G/DL (ref 13.6–17.2)
HGB BLD-MCNC: 7 G/DL (ref 13.6–17.2)
HGB BLD-MCNC: 7.3 G/DL (ref 13.6–17.2)
HGB BLD-MCNC: 7.7 G/DL (ref 13.6–17.2)
HGB BLD-MCNC: 7.8 G/DL (ref 13.6–17.2)
HGB BLD-MCNC: 7.9 G/DL (ref 13.6–17.2)
HGB BLD-MCNC: 7.9 G/DL (ref 13.6–17.2)
HGB BLD-MCNC: 8 G/DL (ref 13.6–17.2)
HGB BLD-MCNC: 8.1 G/DL (ref 13.6–17.2)
HGB BLD-MCNC: 8.4 G/DL (ref 13.6–17.2)
HGB BLD-MCNC: 8.5 G/DL (ref 13.6–17.2)
HGB BLD-MCNC: 8.6 G/DL (ref 13.6–17.2)
HGB BLD-MCNC: 8.6 G/DL (ref 13.6–17.2)
HGB BLD-MCNC: 8.7 G/DL (ref 13.6–17.2)
HGB BLD-MCNC: 9 G/DL (ref 13.6–17.2)
HGB BLD-MCNC: 9.1 G/DL (ref 13.6–17.2)
HGB BLD-MCNC: 9.1 G/DL (ref 13.6–17.2)
HGB BLD-MCNC: 9.4 G/DL (ref 13.6–17.2)
HGB BLD-MCNC: 9.7 G/DL (ref 13.6–17.2)
HGB BLD-MCNC: 9.9 G/DL (ref 13.6–17.2)
HGB RETIC QN AUTO: 32 PG (ref 29–35)
HISTORY CHECKED?,CKHIST: NORMAL
IMM GRANULOCYTES # BLD AUTO: 0 K/UL (ref 0–0.5)
IMM GRANULOCYTES # BLD AUTO: 0.1 K/UL (ref 0–0.5)
IMM GRANULOCYTES # BLD AUTO: 0.2 K/UL (ref 0–0.5)
IMM GRANULOCYTES # BLD AUTO: 0.2 K/UL (ref 0–0.5)
IMM GRANULOCYTES NFR BLD AUTO: 0 % (ref 0–5)
IMM GRANULOCYTES NFR BLD AUTO: 1 % (ref 0–5)
IMM GRANULOCYTES NFR BLD AUTO: 2 % (ref 0–5)
IMM GRANULOCYTES NFR BLD AUTO: 2 % (ref 0–5)
IMM RETICS NFR: 24.3 % (ref 2.3–13.4)
INR PPP: 1.1
INR PPP: 1.2
INR PPP: 1.9
INSPIRATION.DURATION SETTING TIME VENT: 0.9 SEC
IRON SATN MFR SERPL: 20 %
IRON SERPL-MCNC: 36 UG/DL (ref 35–150)
LACTATE SERPL-SCNC: 1.7 MMOL/L (ref 0.4–2)
LACTATE SERPL-SCNC: 2.2 MMOL/L (ref 0.4–2)
LACTATE SERPL-SCNC: 2.2 MMOL/L (ref 0.4–2)
LACTATE SERPL-SCNC: 3 MMOL/L (ref 0.4–2)
LDH FLD L TO P-CCNC: 3156 U/L
LDH SERPL L TO P-CCNC: 344 U/L (ref 110–210)
LYMPHOCYTES # BLD: 0.4 K/UL (ref 0.5–4.6)
LYMPHOCYTES # BLD: 0.4 K/UL (ref 0.5–4.6)
LYMPHOCYTES # BLD: 0.5 K/UL (ref 0.5–4.6)
LYMPHOCYTES # BLD: 0.6 K/UL (ref 0.5–4.6)
LYMPHOCYTES # BLD: 0.6 K/UL (ref 0.5–4.6)
LYMPHOCYTES # BLD: 0.7 K/UL (ref 0.5–4.6)
LYMPHOCYTES # BLD: 0.8 K/UL (ref 0.5–4.6)
LYMPHOCYTES # BLD: 0.9 K/UL (ref 0.5–4.6)
LYMPHOCYTES # BLD: 1.1 K/UL (ref 0.5–4.6)
LYMPHOCYTES # BLD: 1.2 K/UL (ref 0.5–4.6)
LYMPHOCYTES # BLD: 1.3 K/UL (ref 0.5–4.6)
LYMPHOCYTES # BLD: 1.4 K/UL (ref 0.5–4.6)
LYMPHOCYTES # BLD: 1.5 K/UL (ref 0.5–4.6)
LYMPHOCYTES # BLD: 1.9 K/UL (ref 0.5–4.6)
LYMPHOCYTES NFR BLD: 10 % (ref 13–44)
LYMPHOCYTES NFR BLD: 11 % (ref 13–44)
LYMPHOCYTES NFR BLD: 13 % (ref 13–44)
LYMPHOCYTES NFR BLD: 14 % (ref 13–44)
LYMPHOCYTES NFR BLD: 14 % (ref 13–44)
LYMPHOCYTES NFR BLD: 15 % (ref 13–44)
LYMPHOCYTES NFR BLD: 15 % (ref 13–44)
LYMPHOCYTES NFR BLD: 16 % (ref 13–44)
LYMPHOCYTES NFR BLD: 17 % (ref 13–44)
LYMPHOCYTES NFR BLD: 18 % (ref 13–44)
LYMPHOCYTES NFR BLD: 19 % (ref 13–44)
LYMPHOCYTES NFR BLD: 20 % (ref 13–44)
LYMPHOCYTES NFR BLD: 20 % (ref 13–44)
LYMPHOCYTES NFR BLD: 3 % (ref 13–44)
LYMPHOCYTES NFR BLD: 4 % (ref 13–44)
LYMPHOCYTES NFR BLD: 4 % (ref 13–44)
LYMPHOCYTES NFR BLD: 6 % (ref 13–44)
LYMPHOCYTES NFR BLD: 6 % (ref 13–44)
LYMPHOCYTES NFR BLD: 7 % (ref 13–44)
LYMPHOCYTES NFR BLD: 8 % (ref 13–44)
LYMPHOCYTES NFR BLD: 9 % (ref 13–44)
LYMPHOCYTES NFR BLD: 9 % (ref 13–44)
LYMPHOCYTES NFR BRONCH MANUAL: 46 %
Lab: NORMAL
Lab: NORMAL
MACROPHAGES NFR BRONCH MANUAL: 5 %
MAGNESIUM SERPL-MCNC: 1.8 MG/DL (ref 1.8–2.4)
MAGNESIUM SERPL-MCNC: 2.1 MG/DL (ref 1.8–2.4)
MAGNESIUM SERPL-MCNC: 2.2 MG/DL (ref 1.8–2.4)
MAGNESIUM SERPL-MCNC: 2.3 MG/DL (ref 1.8–2.4)
MAGNESIUM SERPL-MCNC: 2.3 MG/DL (ref 1.8–2.4)
MAGNESIUM SERPL-MCNC: 2.4 MG/DL (ref 1.8–2.4)
MAGNESIUM SERPL-MCNC: 2.5 MG/DL (ref 1.8–2.4)
MAGNESIUM SERPL-MCNC: 2.5 MG/DL (ref 1.8–2.4)
MAGNESIUM SERPL-MCNC: 2.6 MG/DL (ref 1.8–2.4)
MCH RBC QN AUTO: 24.8 PG (ref 26.1–32.9)
MCH RBC QN AUTO: 25.4 PG (ref 26.1–32.9)
MCH RBC QN AUTO: 25.5 PG (ref 26.1–32.9)
MCH RBC QN AUTO: 25.7 PG (ref 26.1–32.9)
MCH RBC QN AUTO: 26 PG (ref 26.1–32.9)
MCH RBC QN AUTO: 26.2 PG (ref 26.1–32.9)
MCH RBC QN AUTO: 26.2 PG (ref 26.1–32.9)
MCH RBC QN AUTO: 26.3 PG (ref 26.1–32.9)
MCH RBC QN AUTO: 26.5 PG (ref 26.1–32.9)
MCH RBC QN AUTO: 26.6 PG (ref 26.1–32.9)
MCH RBC QN AUTO: 26.7 PG (ref 26.1–32.9)
MCH RBC QN AUTO: 26.7 PG (ref 26.1–32.9)
MCH RBC QN AUTO: 26.9 PG (ref 26.1–32.9)
MCH RBC QN AUTO: 27.1 PG (ref 26.1–32.9)
MCH RBC QN AUTO: 27.2 PG (ref 26.1–32.9)
MCH RBC QN AUTO: 27.3 PG (ref 26.1–32.9)
MCH RBC QN AUTO: 27.4 PG (ref 26.1–32.9)
MCH RBC QN AUTO: 27.4 PG (ref 26.1–32.9)
MCH RBC QN AUTO: 27.7 PG (ref 26.1–32.9)
MCH RBC QN AUTO: 27.8 PG (ref 26.1–32.9)
MCH RBC QN AUTO: 28 PG (ref 26.1–32.9)
MCH RBC QN AUTO: 28.1 PG (ref 26.1–32.9)
MCH RBC QN AUTO: 28.1 PG (ref 26.1–32.9)
MCH RBC QN AUTO: 28.2 PG (ref 26.1–32.9)
MCH RBC QN AUTO: 28.3 PG (ref 26.1–32.9)
MCH RBC QN AUTO: 28.4 PG (ref 26.1–32.9)
MCH RBC QN AUTO: 28.4 PG (ref 26.1–32.9)
MCH RBC QN AUTO: 28.6 PG (ref 26.1–32.9)
MCH RBC QN AUTO: 28.8 PG (ref 26.1–32.9)
MCH RBC QN AUTO: 29.1 PG (ref 26.1–32.9)
MCHC RBC AUTO-ENTMCNC: 29.4 G/DL (ref 31.4–35)
MCHC RBC AUTO-ENTMCNC: 29.7 G/DL (ref 31.4–35)
MCHC RBC AUTO-ENTMCNC: 29.8 G/DL (ref 31.4–35)
MCHC RBC AUTO-ENTMCNC: 30 G/DL (ref 31.4–35)
MCHC RBC AUTO-ENTMCNC: 30 G/DL (ref 31.4–35)
MCHC RBC AUTO-ENTMCNC: 30.3 G/DL (ref 31.4–35)
MCHC RBC AUTO-ENTMCNC: 30.4 G/DL (ref 31.4–35)
MCHC RBC AUTO-ENTMCNC: 30.5 G/DL (ref 31.4–35)
MCHC RBC AUTO-ENTMCNC: 30.7 G/DL (ref 31.4–35)
MCHC RBC AUTO-ENTMCNC: 30.8 G/DL (ref 31.4–35)
MCHC RBC AUTO-ENTMCNC: 30.9 G/DL (ref 31.4–35)
MCHC RBC AUTO-ENTMCNC: 30.9 G/DL (ref 31.4–35)
MCHC RBC AUTO-ENTMCNC: 31 G/DL (ref 31.4–35)
MCHC RBC AUTO-ENTMCNC: 31 G/DL (ref 31.4–35)
MCHC RBC AUTO-ENTMCNC: 31.1 G/DL (ref 31.4–35)
MCHC RBC AUTO-ENTMCNC: 31.1 G/DL (ref 31.4–35)
MCHC RBC AUTO-ENTMCNC: 31.2 G/DL (ref 31.4–35)
MCHC RBC AUTO-ENTMCNC: 31.2 G/DL (ref 31.4–35)
MCHC RBC AUTO-ENTMCNC: 31.3 G/DL (ref 31.4–35)
MCHC RBC AUTO-ENTMCNC: 31.3 G/DL (ref 31.4–35)
MCHC RBC AUTO-ENTMCNC: 31.4 G/DL (ref 31.4–35)
MCHC RBC AUTO-ENTMCNC: 31.5 G/DL (ref 31.4–35)
MCHC RBC AUTO-ENTMCNC: 31.6 G/DL (ref 31.4–35)
MCHC RBC AUTO-ENTMCNC: 31.6 G/DL (ref 31.4–35)
MCHC RBC AUTO-ENTMCNC: 31.7 G/DL (ref 31.4–35)
MCHC RBC AUTO-ENTMCNC: 31.8 G/DL (ref 31.4–35)
MCHC RBC AUTO-ENTMCNC: 31.8 G/DL (ref 31.4–35)
MCHC RBC AUTO-ENTMCNC: 31.9 G/DL (ref 31.4–35)
MCHC RBC AUTO-ENTMCNC: 31.9 G/DL (ref 31.4–35)
MCHC RBC AUTO-ENTMCNC: 32 G/DL (ref 31.4–35)
MCHC RBC AUTO-ENTMCNC: 32.1 G/DL (ref 31.4–35)
MCHC RBC AUTO-ENTMCNC: 32.4 G/DL (ref 31.4–35)
MCHC RBC AUTO-ENTMCNC: 32.8 G/DL (ref 31.4–35)
MCHC RBC AUTO-ENTMCNC: 33.9 G/DL (ref 31.4–35)
MCV RBC AUTO: 82.7 FL (ref 79.6–97.8)
MCV RBC AUTO: 83.4 FL (ref 79.6–97.8)
MCV RBC AUTO: 83.5 FL (ref 79.6–97.8)
MCV RBC AUTO: 83.7 FL (ref 79.6–97.8)
MCV RBC AUTO: 84.2 FL (ref 79.6–97.8)
MCV RBC AUTO: 84.7 FL (ref 79.6–97.8)
MCV RBC AUTO: 85.7 FL (ref 79.6–97.8)
MCV RBC AUTO: 86 FL (ref 79.6–97.8)
MCV RBC AUTO: 86.2 FL (ref 79.6–97.8)
MCV RBC AUTO: 86.5 FL (ref 79.6–97.8)
MCV RBC AUTO: 86.5 FL (ref 79.6–97.8)
MCV RBC AUTO: 86.6 FL (ref 79.6–97.8)
MCV RBC AUTO: 86.9 FL (ref 79.6–97.8)
MCV RBC AUTO: 87.1 FL (ref 79.6–97.8)
MCV RBC AUTO: 87.3 FL (ref 79.6–97.8)
MCV RBC AUTO: 87.4 FL (ref 79.6–97.8)
MCV RBC AUTO: 87.9 FL (ref 79.6–97.8)
MCV RBC AUTO: 88 FL (ref 79.6–97.8)
MCV RBC AUTO: 88.1 FL (ref 79.6–97.8)
MCV RBC AUTO: 88.3 FL (ref 79.6–97.8)
MCV RBC AUTO: 88.5 FL (ref 79.6–97.8)
MCV RBC AUTO: 88.7 FL (ref 79.6–97.8)
MCV RBC AUTO: 88.8 FL (ref 79.6–97.8)
MCV RBC AUTO: 88.8 FL (ref 79.6–97.8)
MCV RBC AUTO: 88.9 FL (ref 79.6–97.8)
MCV RBC AUTO: 89 FL (ref 79.6–97.8)
MCV RBC AUTO: 89 FL (ref 79.6–97.8)
MCV RBC AUTO: 89.1 FL (ref 79.6–97.8)
MCV RBC AUTO: 89.3 FL (ref 79.6–97.8)
MCV RBC AUTO: 89.3 FL (ref 79.6–97.8)
MCV RBC AUTO: 89.4 FL (ref 79.6–97.8)
MCV RBC AUTO: 89.5 FL (ref 79.6–97.8)
MCV RBC AUTO: 89.5 FL (ref 79.6–97.8)
MCV RBC AUTO: 89.8 FL (ref 79.6–97.8)
MM INDURATION POC: 0 MM (ref 0–5)
MONOCYTES # BLD: 0.1 K/UL (ref 0.1–1.3)
MONOCYTES # BLD: 0.1 K/UL (ref 0.1–1.3)
MONOCYTES # BLD: 0.2 K/UL (ref 0.1–1.3)
MONOCYTES # BLD: 0.2 K/UL (ref 0.1–1.3)
MONOCYTES # BLD: 0.3 K/UL (ref 0.1–1.3)
MONOCYTES # BLD: 0.4 K/UL (ref 0.1–1.3)
MONOCYTES # BLD: 0.5 K/UL (ref 0.1–1.3)
MONOCYTES # BLD: 0.6 K/UL (ref 0.1–1.3)
MONOCYTES # BLD: 0.6 K/UL (ref 0.1–1.3)
MONOCYTES # BLD: 0.7 K/UL (ref 0.1–1.3)
MONOCYTES # BLD: 0.9 K/UL (ref 0.1–1.3)
MONOCYTES NFR BLD: 1 % (ref 4–12)
MONOCYTES NFR BLD: 2 % (ref 4–12)
MONOCYTES NFR BLD: 3 % (ref 4–12)
MONOCYTES NFR BLD: 4 % (ref 4–12)
MONOCYTES NFR BLD: 4 % (ref 4–12)
MONOCYTES NFR BLD: 5 % (ref 4–12)
MONOCYTES NFR BLD: 5 % (ref 4–12)
MONOCYTES NFR BLD: 6 % (ref 4–12)
MONOCYTES NFR BLD: 6 % (ref 4–12)
MONOCYTES NFR BLD: 7 % (ref 4–12)
MONOCYTES NFR BLD: 8 % (ref 4–12)
MONOCYTES NFR BLD: 9 % (ref 4–12)
MYCOBACTERIUM SPEC QL CULT: NEGATIVE
MYCOBACTERIUM SPEC QL CULT: NEGATIVE
NEUTROPHILS NFR BRONCH MANUAL: 49 %
NEUTS SEG # BLD: 13 K/UL (ref 1.7–8.2)
NEUTS SEG # BLD: 15.3 K/UL (ref 1.7–8.2)
NEUTS SEG # BLD: 19.3 K/UL (ref 1.7–8.2)
NEUTS SEG # BLD: 2.2 K/UL (ref 1.7–8.2)
NEUTS SEG # BLD: 2.2 K/UL (ref 1.7–8.2)
NEUTS SEG # BLD: 2.7 K/UL (ref 1.7–8.2)
NEUTS SEG # BLD: 2.8 K/UL (ref 1.7–8.2)
NEUTS SEG # BLD: 2.8 K/UL (ref 1.7–8.2)
NEUTS SEG # BLD: 2.9 K/UL (ref 1.7–8.2)
NEUTS SEG # BLD: 3.1 K/UL (ref 1.7–8.2)
NEUTS SEG # BLD: 3.3 K/UL (ref 1.7–8.2)
NEUTS SEG # BLD: 3.9 K/UL (ref 1.7–8.2)
NEUTS SEG # BLD: 3.9 K/UL (ref 1.7–8.2)
NEUTS SEG # BLD: 4.1 K/UL (ref 1.7–8.2)
NEUTS SEG # BLD: 4.2 K/UL (ref 1.7–8.2)
NEUTS SEG # BLD: 4.4 K/UL (ref 1.7–8.2)
NEUTS SEG # BLD: 4.5 K/UL (ref 1.7–8.2)
NEUTS SEG # BLD: 4.7 K/UL (ref 1.7–8.2)
NEUTS SEG # BLD: 4.7 K/UL (ref 1.7–8.2)
NEUTS SEG # BLD: 5.4 K/UL (ref 1.7–8.2)
NEUTS SEG # BLD: 5.5 K/UL (ref 1.7–8.2)
NEUTS SEG # BLD: 5.6 K/UL (ref 1.7–8.2)
NEUTS SEG # BLD: 5.7 K/UL (ref 1.7–8.2)
NEUTS SEG # BLD: 5.8 K/UL (ref 1.7–8.2)
NEUTS SEG # BLD: 6.4 K/UL (ref 1.7–8.2)
NEUTS SEG # BLD: 7 K/UL (ref 1.7–8.2)
NEUTS SEG # BLD: 7 K/UL (ref 1.7–8.2)
NEUTS SEG # BLD: 7.1 K/UL (ref 1.7–8.2)
NEUTS SEG # BLD: 7.5 K/UL (ref 1.7–8.2)
NEUTS SEG # BLD: 7.6 K/UL (ref 1.7–8.2)
NEUTS SEG # BLD: 8.8 K/UL (ref 1.7–8.2)
NEUTS SEG # BLD: 8.9 K/UL (ref 1.7–8.2)
NEUTS SEG # BLD: 9.5 K/UL (ref 1.7–8.2)
NEUTS SEG NFR BLD: 67 % (ref 43–78)
NEUTS SEG NFR BLD: 67 % (ref 43–78)
NEUTS SEG NFR BLD: 69 % (ref 43–78)
NEUTS SEG NFR BLD: 69 % (ref 43–78)
NEUTS SEG NFR BLD: 70 % (ref 43–78)
NEUTS SEG NFR BLD: 71 % (ref 43–78)
NEUTS SEG NFR BLD: 71 % (ref 43–78)
NEUTS SEG NFR BLD: 72 % (ref 43–78)
NEUTS SEG NFR BLD: 73 % (ref 43–78)
NEUTS SEG NFR BLD: 74 % (ref 43–78)
NEUTS SEG NFR BLD: 75 % (ref 43–78)
NEUTS SEG NFR BLD: 75 % (ref 43–78)
NEUTS SEG NFR BLD: 76 % (ref 43–78)
NEUTS SEG NFR BLD: 76 % (ref 43–78)
NEUTS SEG NFR BLD: 78 % (ref 43–78)
NEUTS SEG NFR BLD: 78 % (ref 43–78)
NEUTS SEG NFR BLD: 79 % (ref 43–78)
NEUTS SEG NFR BLD: 79 % (ref 43–78)
NEUTS SEG NFR BLD: 80 % (ref 43–78)
NEUTS SEG NFR BLD: 80 % (ref 43–78)
NEUTS SEG NFR BLD: 82 % (ref 43–78)
NEUTS SEG NFR BLD: 82 % (ref 43–78)
NEUTS SEG NFR BLD: 83 % (ref 43–78)
NEUTS SEG NFR BLD: 85 % (ref 43–78)
NEUTS SEG NFR BLD: 88 % (ref 43–78)
NEUTS SEG NFR BLD: 89 % (ref 43–78)
NEUTS SEG NFR BLD: 90 % (ref 43–78)
NEUTS SEG NFR BLD: 90 % (ref 43–78)
NEUTS SEG NFR BLD: 93 % (ref 43–78)
NEUTS SEG NFR BLD: 93 % (ref 43–78)
NEUTS SEG NFR BLD: 95 % (ref 43–78)
NRBC # BLD: 0 K/UL (ref 0–0.2)
NUC CELL # FLD: 1915 /CU MM
O2/TOTAL GAS SETTING VFR VENT: 100 %
O2/TOTAL GAS SETTING VFR VENT: 32 %
O2/TOTAL GAS SETTING VFR VENT: 50 %
O2/TOTAL GAS SETTING VFR VENT: 60 %
P-R INTERVAL, ECG05: 134 MS
P-R INTERVAL, ECG05: 150 MS
P-R INTERVAL, ECG05: 168 MS
PCO2 BLD: 42.3 MMHG (ref 35–45)
PCO2 BLD: 42.8 MMHG (ref 35–45)
PCO2 BLD: 45.5 MMHG (ref 35–45)
PCO2 BLD: 49.1 MMHG (ref 35–45)
PCO2 BLD: 54.3 MMHG (ref 35–45)
PCO2 BLD: 55.4 MMHG (ref 35–45)
PCO2 BLD: 55.6 MMHG (ref 35–45)
PCO2 BLD: 58.1 MMHG (ref 35–45)
PEEP RESPIRATORY: 10 CMH2O
PEEP RESPIRATORY: 10 CMH2O
PEEP RESPIRATORY: 6 CMH2O
PH BLD: 7.26 [PH] (ref 7.35–7.45)
PH BLD: 7.27 [PH] (ref 7.35–7.45)
PH BLD: 7.34 [PH] (ref 7.35–7.45)
PH BLD: 7.34 [PH] (ref 7.35–7.45)
PH BLD: 7.38 [PH] (ref 7.35–7.45)
PH BLD: 7.44 [PH] (ref 7.35–7.45)
PH BLD: 7.44 [PH] (ref 7.35–7.45)
PH BLD: 7.45 [PH] (ref 7.35–7.45)
PH FLD: 7 [PH]
PHOSPHATE SERPL-MCNC: 3.3 MG/DL (ref 2.3–3.7)
PHOSPHATE SERPL-MCNC: 3.4 MG/DL (ref 2.3–3.7)
PHOSPHATE SERPL-MCNC: 3.5 MG/DL (ref 2.3–3.7)
PHOSPHATE SERPL-MCNC: 3.5 MG/DL (ref 2.3–3.7)
PHOSPHATE SERPL-MCNC: 3.6 MG/DL (ref 2.3–3.7)
PHOSPHATE SERPL-MCNC: 3.8 MG/DL (ref 2.3–3.7)
PHOSPHATE SERPL-MCNC: 3.9 MG/DL (ref 2.3–3.7)
PHOSPHATE SERPL-MCNC: 4 MG/DL (ref 2.3–3.7)
PHOSPHATE SERPL-MCNC: 4.5 MG/DL (ref 2.3–3.7)
PLATELET # BLD AUTO: 121 K/UL (ref 150–450)
PLATELET # BLD AUTO: 124 K/UL (ref 150–450)
PLATELET # BLD AUTO: 130 K/UL (ref 150–450)
PLATELET # BLD AUTO: 130 K/UL (ref 150–450)
PLATELET # BLD AUTO: 131 K/UL (ref 150–450)
PLATELET # BLD AUTO: 131 K/UL (ref 150–450)
PLATELET # BLD AUTO: 138 K/UL (ref 150–450)
PLATELET # BLD AUTO: 141 K/UL (ref 150–450)
PLATELET # BLD AUTO: 141 K/UL (ref 150–450)
PLATELET # BLD AUTO: 144 K/UL (ref 150–450)
PLATELET # BLD AUTO: 152 K/UL (ref 150–450)
PLATELET # BLD AUTO: 155 K/UL (ref 150–450)
PLATELET # BLD AUTO: 158 K/UL (ref 150–450)
PLATELET # BLD AUTO: 162 K/UL (ref 150–450)
PLATELET # BLD AUTO: 167 K/UL (ref 150–450)
PLATELET # BLD AUTO: 168 K/UL (ref 150–450)
PLATELET # BLD AUTO: 171 K/UL (ref 150–450)
PLATELET # BLD AUTO: 182 K/UL (ref 150–450)
PLATELET # BLD AUTO: 197 K/UL (ref 150–450)
PLATELET # BLD AUTO: 197 K/UL (ref 150–450)
PLATELET # BLD AUTO: 200 K/UL (ref 150–450)
PLATELET # BLD AUTO: 214 K/UL (ref 150–450)
PLATELET # BLD AUTO: 214 K/UL (ref 150–450)
PLATELET # BLD AUTO: 217 K/UL (ref 150–450)
PLATELET # BLD AUTO: 223 K/UL (ref 150–450)
PLATELET # BLD AUTO: 234 K/UL (ref 150–450)
PLATELET # BLD AUTO: 235 K/UL (ref 150–450)
PLATELET # BLD AUTO: 237 K/UL (ref 150–450)
PLATELET # BLD AUTO: 238 K/UL (ref 150–450)
PLATELET # BLD AUTO: 245 K/UL (ref 150–450)
PLATELET # BLD AUTO: 247 K/UL (ref 150–450)
PLATELET # BLD AUTO: 253 K/UL (ref 150–450)
PLATELET # BLD AUTO: 254 K/UL (ref 150–450)
PLATELET # BLD AUTO: 262 K/UL (ref 150–450)
PLATELET # BLD AUTO: 273 K/UL (ref 150–450)
PLATELET # BLD AUTO: 311 K/UL (ref 150–450)
PLATELET COMMENTS,PCOM: ADEQUATE
PMV BLD AUTO: 10 FL (ref 9.4–12.3)
PMV BLD AUTO: 10.1 FL (ref 9.4–12.3)
PMV BLD AUTO: 10.3 FL (ref 9.4–12.3)
PMV BLD AUTO: 10.4 FL (ref 9.4–12.3)
PMV BLD AUTO: 9 FL (ref 9.4–12.3)
PMV BLD AUTO: 9.1 FL (ref 9.4–12.3)
PMV BLD AUTO: 9.2 FL (ref 9.4–12.3)
PMV BLD AUTO: 9.3 FL (ref 9.4–12.3)
PMV BLD AUTO: 9.4 FL (ref 9.4–12.3)
PMV BLD AUTO: 9.5 FL (ref 9.4–12.3)
PMV BLD AUTO: 9.6 FL (ref 9.4–12.3)
PMV BLD AUTO: 9.6 FL (ref 9.4–12.3)
PMV BLD AUTO: 9.7 FL (ref 9.4–12.3)
PMV BLD AUTO: 9.7 FL (ref 9.4–12.3)
PMV BLD AUTO: 9.8 FL (ref 9.4–12.3)
PMV BLD AUTO: 9.9 FL (ref 9.4–12.3)
PO2 BLD: 112 MMHG (ref 75–100)
PO2 BLD: 136 MMHG (ref 75–100)
PO2 BLD: 163 MMHG (ref 75–100)
PO2 BLD: 190 MMHG (ref 75–100)
PO2 BLD: 240 MMHG (ref 75–100)
PO2 BLD: 365 MMHG (ref 75–100)
PO2 BLD: 84 MMHG (ref 75–100)
PO2 BLD: 94 MMHG (ref 75–100)
POTASSIUM BLD-SCNC: 4.1 MMOL/L (ref 3.5–5.1)
POTASSIUM BLD-SCNC: 4.5 MMOL/L (ref 3.5–5.1)
POTASSIUM BLD-SCNC: 4.6 MMOL/L (ref 3.5–5.1)
POTASSIUM SERPL-SCNC: 3.5 MMOL/L (ref 3.5–5.1)
POTASSIUM SERPL-SCNC: 3.5 MMOL/L (ref 3.5–5.1)
POTASSIUM SERPL-SCNC: 3.6 MMOL/L (ref 3.5–5.1)
POTASSIUM SERPL-SCNC: 3.8 MMOL/L (ref 3.5–5.1)
POTASSIUM SERPL-SCNC: 3.9 MMOL/L (ref 3.5–5.1)
POTASSIUM SERPL-SCNC: 4 MMOL/L (ref 3.5–5.1)
POTASSIUM SERPL-SCNC: 4.1 MMOL/L (ref 3.5–5.1)
POTASSIUM SERPL-SCNC: 4.2 MMOL/L (ref 3.5–5.1)
POTASSIUM SERPL-SCNC: 4.3 MMOL/L (ref 3.5–5.1)
POTASSIUM SERPL-SCNC: 4.3 MMOL/L (ref 3.5–5.1)
POTASSIUM SERPL-SCNC: 4.4 MMOL/L (ref 3.5–5.1)
POTASSIUM SERPL-SCNC: 4.5 MMOL/L (ref 3.5–5.1)
POTASSIUM SERPL-SCNC: 4.5 MMOL/L (ref 3.5–5.1)
POTASSIUM SERPL-SCNC: 4.6 MMOL/L (ref 3.5–5.1)
POTASSIUM SERPL-SCNC: 4.6 MMOL/L (ref 3.5–5.1)
POTASSIUM SERPL-SCNC: 5.2 MMOL/L (ref 3.5–5.1)
POTASSIUM SERPL-SCNC: 5.2 MMOL/L (ref 3.5–5.1)
PPD POC: NEGATIVE NEGATIVE
PROCALCITONIN SERPL-MCNC: 0.05 NG/ML
PROCALCITONIN SERPL-MCNC: 0.05 NG/ML
PROCALCITONIN SERPL-MCNC: 0.07 NG/ML
PROCALCITONIN SERPL-MCNC: 0.09 NG/ML
PROCALCITONIN SERPL-MCNC: 0.19 NG/ML
PROCALCITONIN SERPL-MCNC: <0.05 NG/ML
PROT FLD-MCNC: 2.6 G/DL
PROT SERPL-MCNC: 6.5 G/DL (ref 6.3–8.2)
PROT SERPL-MCNC: 6.7 G/DL (ref 6.3–8.2)
PROT SERPL-MCNC: 6.8 G/DL (ref 6.3–8.2)
PROT SERPL-MCNC: 7.6 G/DL (ref 6.3–8.2)
PROT SERPL-MCNC: 7.6 G/DL (ref 6.3–8.2)
PROT SERPL-MCNC: 7.7 G/DL (ref 6.3–8.2)
PROT SERPL-MCNC: 7.8 G/DL (ref 6.3–8.2)
PROT SERPL-MCNC: 7.8 G/DL (ref 6.3–8.2)
PROT SERPL-MCNC: 8.1 G/DL (ref 6.3–8.2)
PROT SERPL-MCNC: 9.2 G/DL (ref 6.3–8.2)
PROTHROMBIN TIME: 14.8 SEC (ref 12.5–14.7)
PROTHROMBIN TIME: 15.2 SEC (ref 12.5–14.7)
PROTHROMBIN TIME: 21.7 SEC (ref 12.5–14.7)
Q-T INTERVAL, ECG07: 272 MS
Q-T INTERVAL, ECG07: 384 MS
Q-T INTERVAL, ECG07: 442 MS
Q-T INTERVAL, ECG07: 444 MS
Q-T INTERVAL, ECG07: 456 MS
Q-T INTERVAL, ECG07: 520 MS
Q-T INTERVAL, ECG07: 588 MS
QRS DURATION, ECG06: 138 MS
QRS DURATION, ECG06: 144 MS
QRS DURATION, ECG06: 162 MS
QRS DURATION, ECG06: 164 MS
QRS DURATION, ECG06: 80 MS
QRS DURATION, ECG06: 82 MS
QRS DURATION, ECG06: 84 MS
QTC CALCULATION (BEZET), ECG08: 456 MS
QTC CALCULATION (BEZET), ECG08: 467 MS
QTC CALCULATION (BEZET), ECG08: 473 MS
QTC CALCULATION (BEZET), ECG08: 488 MS
QTC CALCULATION (BEZET), ECG08: 508 MS
QTC CALCULATION (BEZET), ECG08: 531 MS
QTC CALCULATION (BEZET), ECG08: 561 MS
RBC # BLD AUTO: 2.62 M/UL (ref 4.23–5.6)
RBC # BLD AUTO: 2.74 M/UL (ref 4.23–5.6)
RBC # BLD AUTO: 2.85 M/UL (ref 4.23–5.6)
RBC # BLD AUTO: 2.88 M/UL (ref 4.23–5.6)
RBC # BLD AUTO: 2.91 M/UL (ref 4.23–5.6)
RBC # BLD AUTO: 2.91 M/UL (ref 4.23–5.6)
RBC # BLD AUTO: 2.94 M/UL (ref 4.23–5.6)
RBC # BLD AUTO: 3.08 M/UL (ref 4.23–5.6)
RBC # BLD AUTO: 3.16 M/UL (ref 4.23–5.6)
RBC # BLD AUTO: 3.17 M/UL (ref 4.23–5.6)
RBC # BLD AUTO: 3.2 M/UL (ref 4.23–5.6)
RBC # BLD AUTO: 3.2 M/UL (ref 4.23–5.6)
RBC # BLD AUTO: 3.25 M/UL (ref 4.23–5.6)
RBC # BLD AUTO: 3.3 M/UL (ref 4.23–5.6)
RBC # BLD AUTO: 3.43 M/UL (ref 4.23–5.6)
RBC # BLD AUTO: 3.45 M/UL (ref 4.23–5.6)
RBC # BLD AUTO: 3.52 M/UL (ref 4.23–5.6)
RBC # BLD AUTO: 3.64 M/UL (ref 4.23–5.6)
RBC # BLD AUTO: 3.68 M/UL (ref 4.23–5.6)
RBC # BLD AUTO: 3.71 M/UL (ref 4.23–5.6)
RBC # BLD AUTO: 3.79 M/UL (ref 4.23–5.6)
RBC # BLD AUTO: 3.83 M/UL (ref 4.23–5.6)
RBC # BLD AUTO: 3.89 M/UL (ref 4.23–5.6)
RBC # BLD AUTO: 3.91 M/UL (ref 4.23–5.6)
RBC # BLD AUTO: 3.94 M/UL (ref 4.23–5.6)
RBC # BLD AUTO: 4.02 M/UL (ref 4.23–5.6)
RBC # BLD AUTO: 4.04 M/UL (ref 4.23–5.6)
RBC # BLD AUTO: 4.05 M/UL (ref 4.23–5.6)
RBC # BLD AUTO: 4.35 M/UL (ref 4.23–5.6)
RBC # BLD AUTO: 4.35 M/UL (ref 4.23–5.67)
RBC # BLD AUTO: 4.39 M/UL (ref 4.23–5.6)
RBC # BLD AUTO: 4.5 M/UL (ref 4.23–5.6)
RBC # BLD AUTO: 4.58 M/UL (ref 4.23–5.6)
RBC # BLD AUTO: 4.69 M/UL (ref 4.23–5.6)
RBC # BLD AUTO: 4.75 M/UL (ref 4.23–5.6)
RBC # BLD AUTO: 4.88 M/UL (ref 4.23–5.6)
RBC # FLD: NORMAL /CU MM
RBC MORPH BLD: ABNORMAL
REFERENCE LAB,REFLB: NORMAL
REFERENCE LAB,REFLB: NORMAL
REFLEX TO ID, RFCO3T: ABNORMAL
REFLEX TO ID, RFCO3T: ABNORMAL
REFLEX TO ID, RFCO3T: NORMAL
REFLEX TO ID, RFCO3T: NORMAL
RETICS # AUTO: 0.1 M/UL (ref 0.03–0.1)
RETICS/RBC NFR AUTO: 3.2 % (ref 0.3–2)
SAO2 % BLD: 100 %
SAO2 % BLD: 100 % (ref 95–98)
SAO2 % BLD: 94.5 % (ref 95–98)
SAO2 % BLD: 96.6 % (ref 95–98)
SAO2 % BLD: 97.4 % (ref 95–98)
SAO2 % BLD: 99 % (ref 95–98)
SAO2 % BLD: 99 % (ref 95–98)
SAO2 % BLD: 99.6 % (ref 95–98)
SARS COV-2, XPGCVT: NEGATIVE
SARS-COV-2, COV2: NORMAL
SARS-COV-2, COV2: NOT DETECTED
SERVICE CMNT-IMP: ABNORMAL
SERVICE CMNT-IMP: NORMAL
SODIUM BLD-SCNC: 132 MMOL/L (ref 136–145)
SODIUM BLD-SCNC: 132 MMOL/L (ref 136–145)
SODIUM BLD-SCNC: 142 MMOL/L (ref 136–145)
SODIUM SERPL-SCNC: 136 MMOL/L (ref 136–145)
SODIUM SERPL-SCNC: 136 MMOL/L (ref 138–145)
SODIUM SERPL-SCNC: 136 MMOL/L (ref 138–145)
SODIUM SERPL-SCNC: 137 MMOL/L (ref 136–145)
SODIUM SERPL-SCNC: 137 MMOL/L (ref 138–145)
SODIUM SERPL-SCNC: 137 MMOL/L (ref 138–145)
SODIUM SERPL-SCNC: 138 MMOL/L (ref 136–145)
SODIUM SERPL-SCNC: 138 MMOL/L (ref 138–145)
SODIUM SERPL-SCNC: 139 MMOL/L (ref 136–145)
SODIUM SERPL-SCNC: 139 MMOL/L (ref 138–145)
SODIUM SERPL-SCNC: 140 MMOL/L (ref 136–145)
SODIUM SERPL-SCNC: 141 MMOL/L (ref 136–145)
SODIUM SERPL-SCNC: 143 MMOL/L (ref 136–145)
SODIUM SERPL-SCNC: 144 MMOL/L (ref 136–145)
SOURCE, COVRS: NORMAL
SPECIMEN EXP DATE BLD: NORMAL
SPECIMEN PREPARATION: NORMAL
SPECIMEN PREPARATION: NORMAL
SPECIMEN SITE: ABNORMAL
SPECIMEN SOURCE FLD: NORMAL
SPECIMEN SOURCE, FCOV2M: NORMAL
SPECIMEN SOURCE: ABNORMAL
SPECIMEN SOURCE: ABNORMAL
SPECIMEN SOURCE: NORMAL
SPECIMEN TYPE: ABNORMAL
STATUS OF UNIT,%ST: NORMAL
TEST DESCRIPTION:,ATST: NORMAL
TEST DESCRIPTION:,ATST: NORMAL
TIBC SERPL-MCNC: 179 UG/DL (ref 250–450)
TOTAL RESP. RATE, ITRR: 35
TROPONIN-HIGH SENSITIVITY: 5.7 PG/ML (ref 0–14)
TSH SERPL DL<=0.005 MIU/L-ACNC: 0.49 UIU/ML (ref 0.36–3.74)
UFH PPP CHRO-ACNC: 0.87 IU/ML (ref 0.3–0.7)
UFH PPP CHRO-ACNC: 1.02 IU/ML (ref 0.3–0.7)
UFH PPP CHRO-ACNC: >1.1 IU/ML (ref 0.3–0.7)
UFH PPP CHRO-ACNC: >1.1 IU/ML (ref 0.3–0.7)
UNIT DIVISION, %UDIV: 0
VANCOMYCIN TROUGH SERPL-MCNC: 20.1 UG/ML (ref 5–20)
VENTILATION MODE VENT: ABNORMAL
VENTRICULAR RATE, ECG03: 169 BPM
VENTRICULAR RATE, ECG03: 45 BPM
VENTRICULAR RATE, ECG03: 69 BPM
VENTRICULAR RATE, ECG03: 69 BPM
VENTRICULAR RATE, ECG03: 70 BPM
VENTRICULAR RATE, ECG03: 86 BPM
VENTRICULAR RATE, ECG03: 89 BPM
VIT B12 SERPL-MCNC: 413 PG/ML (ref 193–986)
VT SETTING VENT: 435 ML
WBC # BLD AUTO: 10 K/UL (ref 4.3–11.1)
WBC # BLD AUTO: 10.1 K/UL (ref 4.3–11.1)
WBC # BLD AUTO: 11.7 K/UL (ref 4.3–11.1)
WBC # BLD AUTO: 14 K/UL (ref 4.3–11.1)
WBC # BLD AUTO: 16.2 K/UL (ref 4.3–11.1)
WBC # BLD AUTO: 21.5 K/UL (ref 4.3–11.1)
WBC # BLD AUTO: 3.2 K/UL (ref 4.3–11.1)
WBC # BLD AUTO: 3.3 K/UL (ref 4.3–11.1)
WBC # BLD AUTO: 3.8 K/UL (ref 4.3–11.1)
WBC # BLD AUTO: 3.9 K/UL (ref 4.3–11.1)
WBC # BLD AUTO: 4.2 K/UL (ref 4.3–11.1)
WBC # BLD AUTO: 4.2 K/UL (ref 4.3–11.1)
WBC # BLD AUTO: 4.5 K/UL (ref 4.3–11.1)
WBC # BLD AUTO: 4.5 K/UL (ref 4.3–11.1)
WBC # BLD AUTO: 5.2 K/UL (ref 4.3–11.1)
WBC # BLD AUTO: 5.4 K/UL (ref 4.3–11.1)
WBC # BLD AUTO: 5.6 K/UL (ref 4.3–11.1)
WBC # BLD AUTO: 5.7 K/UL (ref 4.3–11.1)
WBC # BLD AUTO: 5.8 K/UL (ref 4.3–11.1)
WBC # BLD AUTO: 5.9 K/UL (ref 4.3–11.1)
WBC # BLD AUTO: 5.9 K/UL (ref 4.3–11.1)
WBC # BLD AUTO: 6.2 K/UL (ref 4.3–11.1)
WBC # BLD AUTO: 6.3 K/UL (ref 4.3–11.1)
WBC # BLD AUTO: 6.4 K/UL (ref 4.3–11.1)
WBC # BLD AUTO: 6.6 K/UL (ref 4.3–11.1)
WBC # BLD AUTO: 6.8 K/UL (ref 4.3–11.1)
WBC # BLD AUTO: 7 K/UL (ref 4.3–11.1)
WBC # BLD AUTO: 7.1 K/UL (ref 4.3–11.1)
WBC # BLD AUTO: 7.2 K/UL (ref 4.3–11.1)
WBC # BLD AUTO: 7.8 K/UL (ref 4.3–11.1)
WBC # BLD AUTO: 7.9 K/UL (ref 4.3–11.1)
WBC # BLD AUTO: 8.8 K/UL (ref 4.3–11.1)
WBC # BLD AUTO: 8.9 K/UL (ref 4.3–11.1)
WBC # BLD AUTO: 9.3 K/UL (ref 4.3–11.1)
WBC # BLD AUTO: 9.5 K/UL (ref 4.3–11.1)
WBC # BLD AUTO: 9.8 K/UL (ref 4.3–11.1)
WBC MORPH BLD: ABNORMAL

## 2021-01-01 PROCEDURE — 74011250637 HC RX REV CODE- 250/637: Performed by: SURGERY

## 2021-01-01 PROCEDURE — 0BJ08ZZ INSPECTION OF TRACHEOBRONCHIAL TREE, VIA NATURAL OR ARTIFICIAL OPENING ENDOSCOPIC: ICD-10-PCS | Performed by: INTERNAL MEDICINE

## 2021-01-01 PROCEDURE — 74011250637 HC RX REV CODE- 250/637: Performed by: INTERNAL MEDICINE

## 2021-01-01 PROCEDURE — G0239 OTH RESP PROC, GROUP: HCPCS

## 2021-01-01 PROCEDURE — 96376 TX/PRO/DX INJ SAME DRUG ADON: CPT

## 2021-01-01 PROCEDURE — 99233 SBSQ HOSP IP/OBS HIGH 50: CPT | Performed by: SURGERY

## 2021-01-01 PROCEDURE — 74011250636 HC RX REV CODE- 250/636: Performed by: INTERNAL MEDICINE

## 2021-01-01 PROCEDURE — 77030018673: Performed by: SURGERY

## 2021-01-01 PROCEDURE — 74011250637 HC RX REV CODE- 250/637: Performed by: NURSE PRACTITIONER

## 2021-01-01 PROCEDURE — 01580ZZ DESTRUCTION OF THORACIC NERVE, OPEN APPROACH: ICD-10-PCS | Performed by: SURGERY

## 2021-01-01 PROCEDURE — 94660 CPAP INITIATION&MGMT: CPT

## 2021-01-01 PROCEDURE — 94760 N-INVAS EAR/PLS OXIMETRY 1: CPT

## 2021-01-01 PROCEDURE — 36415 COLL VENOUS BLD VENIPUNCTURE: CPT

## 2021-01-01 PROCEDURE — 97110 THERAPEUTIC EXERCISES: CPT

## 2021-01-01 PROCEDURE — 88112 CYTOPATH CELL ENHANCE TECH: CPT

## 2021-01-01 PROCEDURE — 74011000250 HC RX REV CODE- 250: Performed by: INTERNAL MEDICINE

## 2021-01-01 PROCEDURE — 97165 OT EVAL LOW COMPLEX 30 MIN: CPT

## 2021-01-01 PROCEDURE — 77030013292 HC BOWL MX PRSM J&J -A: Performed by: ANESTHESIOLOGY

## 2021-01-01 PROCEDURE — 85025 COMPLETE CBC W/AUTO DIFF WBC: CPT

## 2021-01-01 PROCEDURE — 83735 ASSAY OF MAGNESIUM: CPT

## 2021-01-01 PROCEDURE — 87075 CULTR BACTERIA EXCEPT BLOOD: CPT

## 2021-01-01 PROCEDURE — 0BBM8ZX EXCISION OF BILATERAL LUNGS, VIA NATURAL OR ARTIFICIAL OPENING ENDOSCOPIC, DIAGNOSTIC: ICD-10-PCS | Performed by: INTERNAL MEDICINE

## 2021-01-01 PROCEDURE — 74011250636 HC RX REV CODE- 250/636: Performed by: PHYSICIAN ASSISTANT

## 2021-01-01 PROCEDURE — 77030020407 HC IV BLD WRMR ST 3M -A: Performed by: NURSE ANESTHETIST, CERTIFIED REGISTERED

## 2021-01-01 PROCEDURE — 99218 HC RM OBSERVATION: CPT

## 2021-01-01 PROCEDURE — 65270000029 HC RM PRIVATE

## 2021-01-01 PROCEDURE — 74011250636 HC RX REV CODE- 250/636: Performed by: ANESTHESIOLOGY

## 2021-01-01 PROCEDURE — 94640 AIRWAY INHALATION TREATMENT: CPT

## 2021-01-01 PROCEDURE — 85730 THROMBOPLASTIN TIME PARTIAL: CPT

## 2021-01-01 PROCEDURE — 99223 1ST HOSP IP/OBS HIGH 75: CPT | Performed by: INTERNAL MEDICINE

## 2021-01-01 PROCEDURE — 2709999900 HC NON-CHARGEABLE SUPPLY

## 2021-01-01 PROCEDURE — 74011000250 HC RX REV CODE- 250: Performed by: NURSE PRACTITIONER

## 2021-01-01 PROCEDURE — 83010 ASSAY OF HAPTOGLOBIN QUANT: CPT

## 2021-01-01 PROCEDURE — 77010033678 HC OXYGEN DAILY

## 2021-01-01 PROCEDURE — 99232 SBSQ HOSP IP/OBS MODERATE 35: CPT | Performed by: INTERNAL MEDICINE

## 2021-01-01 PROCEDURE — 74011250637 HC RX REV CODE- 250/637: Performed by: HOSPITALIST

## 2021-01-01 PROCEDURE — 03HY32Z INSERTION OF MONITORING DEVICE INTO UPPER ARTERY, PERCUTANEOUS APPROACH: ICD-10-PCS | Performed by: ANESTHESIOLOGY

## 2021-01-01 PROCEDURE — 97530 THERAPEUTIC ACTIVITIES: CPT

## 2021-01-01 PROCEDURE — 74011000250 HC RX REV CODE- 250: Performed by: ANESTHESIOLOGY

## 2021-01-01 PROCEDURE — 99153 MOD SED SAME PHYS/QHP EA: CPT | Performed by: INTERNAL MEDICINE

## 2021-01-01 PROCEDURE — 85014 HEMATOCRIT: CPT

## 2021-01-01 PROCEDURE — 65610000001 HC ROOM ICU GENERAL

## 2021-01-01 PROCEDURE — P9016 RBC LEUKOCYTES REDUCED: HCPCS

## 2021-01-01 PROCEDURE — 74011250636 HC RX REV CODE- 250/636: Performed by: SURGERY

## 2021-01-01 PROCEDURE — 74011250637 HC RX REV CODE- 250/637: Performed by: ANESTHESIOLOGY

## 2021-01-01 PROCEDURE — 77030027138 HC INCENT SPIROMETER -A

## 2021-01-01 PROCEDURE — 74011250636 HC RX REV CODE- 250/636: Performed by: HOSPITALIST

## 2021-01-01 PROCEDURE — 76040000025: Performed by: INTERNAL MEDICINE

## 2021-01-01 PROCEDURE — 74011250637 HC RX REV CODE- 250/637: Performed by: PHYSICIAN ASSISTANT

## 2021-01-01 PROCEDURE — 82803 BLOOD GASES ANY COMBINATION: CPT

## 2021-01-01 PROCEDURE — 84100 ASSAY OF PHOSPHORUS: CPT

## 2021-01-01 PROCEDURE — APPSS45 APP SPLIT SHARED TIME 31-45 MINUTES: Performed by: NURSE PRACTITIONER

## 2021-01-01 PROCEDURE — 74011250636 HC RX REV CODE- 250/636: Performed by: NURSE PRACTITIONER

## 2021-01-01 PROCEDURE — 87076 CULTURE ANAEROBE IDENT EACH: CPT

## 2021-01-01 PROCEDURE — 86901 BLOOD TYPING SEROLOGIC RH(D): CPT

## 2021-01-01 PROCEDURE — 84295 ASSAY OF SERUM SODIUM: CPT

## 2021-01-01 PROCEDURE — 80053 COMPREHEN METABOLIC PANEL: CPT

## 2021-01-01 PROCEDURE — 71045 X-RAY EXAM CHEST 1 VIEW: CPT

## 2021-01-01 PROCEDURE — 0W9930Z DRAINAGE OF RIGHT PLEURAL CAVITY WITH DRAINAGE DEVICE, PERCUTANEOUS APPROACH: ICD-10-PCS | Performed by: INTERNAL MEDICINE

## 2021-01-01 PROCEDURE — 77030008542 HC TBNG MON PRSS EDWD -A: Performed by: ANESTHESIOLOGY

## 2021-01-01 PROCEDURE — 74011000250 HC RX REV CODE- 250: Performed by: NURSE ANESTHETIST, CERTIFIED REGISTERED

## 2021-01-01 PROCEDURE — 97161 PT EVAL LOW COMPLEX 20 MIN: CPT

## 2021-01-01 PROCEDURE — 74011000250 HC RX REV CODE- 250: Performed by: PHYSICIAN ASSISTANT

## 2021-01-01 PROCEDURE — 80048 BASIC METABOLIC PNL TOTAL CA: CPT

## 2021-01-01 PROCEDURE — 74011250636 HC RX REV CODE- 250/636

## 2021-01-01 PROCEDURE — 74011250636 HC RX REV CODE- 250/636: Performed by: NURSE ANESTHETIST, CERTIFIED REGISTERED

## 2021-01-01 PROCEDURE — 77030013794 HC KT TRNSDUC BLD EDWD -B: Performed by: ANESTHESIOLOGY

## 2021-01-01 PROCEDURE — 77030040393 HC DRSG OPTIFOAM GENT MDII -B

## 2021-01-01 PROCEDURE — 77030020407 HC IV BLD WRMR ST 3M -A: Performed by: ANESTHESIOLOGY

## 2021-01-01 PROCEDURE — 94668 MNPJ CHEST WALL SBSQ: CPT

## 2021-01-01 PROCEDURE — 77030013670 HC FCPS BIOP DISP ENDOSC OCOA -B: Performed by: INTERNAL MEDICINE

## 2021-01-01 PROCEDURE — 74011000258 HC RX REV CODE- 258: Performed by: PHYSICIAN ASSISTANT

## 2021-01-01 PROCEDURE — 77030013794 HC KT TRNSDUC BLD EDWD -B: Performed by: NURSE ANESTHETIST, CERTIFIED REGISTERED

## 2021-01-01 PROCEDURE — 94002 VENT MGMT INPAT INIT DAY: CPT

## 2021-01-01 PROCEDURE — 74011000250 HC RX REV CODE- 250

## 2021-01-01 PROCEDURE — 86923 COMPATIBILITY TEST ELECTRIC: CPT

## 2021-01-01 PROCEDURE — 2709999900 HC NON-CHARGEABLE SUPPLY: Performed by: INTERNAL MEDICINE

## 2021-01-01 PROCEDURE — 77030005401 HC CATH RAD ARRO -A: Performed by: NURSE ANESTHETIST, CERTIFIED REGISTERED

## 2021-01-01 PROCEDURE — 85027 COMPLETE CBC AUTOMATED: CPT

## 2021-01-01 PROCEDURE — 36430 TRANSFUSION BLD/BLD COMPNT: CPT

## 2021-01-01 PROCEDURE — 77030019908 HC STETH ESOPH SIMS -A: Performed by: NURSE ANESTHETIST, CERTIFIED REGISTERED

## 2021-01-01 PROCEDURE — 87070 CULTURE OTHR SPECIMN AEROBIC: CPT

## 2021-01-01 PROCEDURE — 85610 PROTHROMBIN TIME: CPT

## 2021-01-01 PROCEDURE — 87635 SARS-COV-2 COVID-19 AMP PRB: CPT

## 2021-01-01 PROCEDURE — 85520 HEPARIN ASSAY: CPT

## 2021-01-01 PROCEDURE — 77030037088 HC TUBE ENDOTRACH ORAL NSL COVD-A: Performed by: INTERNAL MEDICINE

## 2021-01-01 PROCEDURE — 94664 DEMO&/EVAL PT USE INHALER: CPT

## 2021-01-01 PROCEDURE — APPSS15 APP SPLIT SHARED TIME 0-15 MINUTES: Performed by: NURSE PRACTITIONER

## 2021-01-01 PROCEDURE — 85046 RETICYTE/HGB CONCENTRATE: CPT

## 2021-01-01 PROCEDURE — 77030040922 HC BLNKT HYPOTHRM STRY -A: Performed by: NURSE ANESTHETIST, CERTIFIED REGISTERED

## 2021-01-01 PROCEDURE — 74011000258 HC RX REV CODE- 258: Performed by: INTERNAL MEDICINE

## 2021-01-01 PROCEDURE — 76010000176 HC OR TIME 4.5 TO 5 HR INTENSV-TIER 1: Performed by: SURGERY

## 2021-01-01 PROCEDURE — 65660000000 HC RM CCU STEPDOWN

## 2021-01-01 PROCEDURE — 33208 INSRT HEART PM ATRIAL & VENT: CPT | Performed by: INTERNAL MEDICINE

## 2021-01-01 PROCEDURE — 77030003560 HC NDL HUBR BARD -A: Performed by: INTERNAL MEDICINE

## 2021-01-01 PROCEDURE — 87641 MR-STAPH DNA AMP PROBE: CPT

## 2021-01-01 PROCEDURE — C2615 SEALANT, PULMONARY, LIQUID: HCPCS | Performed by: SURGERY

## 2021-01-01 PROCEDURE — 74011250636 HC RX REV CODE- 250/636: Performed by: EMERGENCY MEDICINE

## 2021-01-01 PROCEDURE — 99024 POSTOP FOLLOW-UP VISIT: CPT | Performed by: NURSE PRACTITIONER

## 2021-01-01 PROCEDURE — 92610 EVALUATE SWALLOWING FUNCTION: CPT

## 2021-01-01 PROCEDURE — 77030012699 HC VLV SUC CNTRL OCOA -A: Performed by: INTERNAL MEDICINE

## 2021-01-01 PROCEDURE — 76060000036 HC ANESTHESIA 2.5 TO 3 HR: Performed by: INTERNAL MEDICINE

## 2021-01-01 PROCEDURE — C1892 INTRO/SHEATH,FIXED,PEEL-AWAY: HCPCS

## 2021-01-01 PROCEDURE — 99024 POSTOP FOLLOW-UP VISIT: CPT | Performed by: PHYSICIAN ASSISTANT

## 2021-01-01 PROCEDURE — 76010000172 HC OR TIME 2.5 TO 3 HR INTENSV-TIER 1: Performed by: SURGERY

## 2021-01-01 PROCEDURE — 76604 US EXAM CHEST: CPT | Performed by: INTERNAL MEDICINE

## 2021-01-01 PROCEDURE — 99233 SBSQ HOSP IP/OBS HIGH 50: CPT | Performed by: INTERNAL MEDICINE

## 2021-01-01 PROCEDURE — 84145 PROCALCITONIN (PCT): CPT

## 2021-01-01 PROCEDURE — 77030008518 HC TBNG INSUF ENDO STRY -B: Performed by: SURGERY

## 2021-01-01 PROCEDURE — 31645 BRNCHSC W/THER ASPIR 1ST: CPT | Performed by: INTERNAL MEDICINE

## 2021-01-01 PROCEDURE — 77030012390 HC DRN CHST BTL GTNG -B: Performed by: SURGERY

## 2021-01-01 PROCEDURE — 84484 ASSAY OF TROPONIN QUANT: CPT

## 2021-01-01 PROCEDURE — 77030008477 HC STYL SATN SLP COVD -A: Performed by: ANESTHESIOLOGY

## 2021-01-01 PROCEDURE — 0BQ30ZZ REPAIR RIGHT MAIN BRONCHUS, OPEN APPROACH: ICD-10-PCS | Performed by: SURGERY

## 2021-01-01 PROCEDURE — 74011000258 HC RX REV CODE- 258: Performed by: NURSE PRACTITIONER

## 2021-01-01 PROCEDURE — 76010010054 HC POST OP PAIN BLOCK: Performed by: SURGERY

## 2021-01-01 PROCEDURE — 65270000015 HC RM PRIVATE ONCOLOGY

## 2021-01-01 PROCEDURE — 77030031139 HC SUT VCRL2 J&J -A: Performed by: SURGERY

## 2021-01-01 PROCEDURE — 97112 NEUROMUSCULAR REEDUCATION: CPT

## 2021-01-01 PROCEDURE — 99232 SBSQ HOSP IP/OBS MODERATE 35: CPT | Performed by: NURSE PRACTITIONER

## 2021-01-01 PROCEDURE — 74011000258 HC RX REV CODE- 258: Performed by: HOSPITALIST

## 2021-01-01 PROCEDURE — 77030008031

## 2021-01-01 PROCEDURE — 87185 SC STD ENZYME DETCJ PER NZM: CPT

## 2021-01-01 PROCEDURE — 77030008606 HC TRCR ENDOSC KII AMR -B: Performed by: SURGERY

## 2021-01-01 PROCEDURE — 87040 BLOOD CULTURE FOR BACTERIA: CPT

## 2021-01-01 PROCEDURE — 74011000272 HC RX REV CODE- 272: Performed by: INTERNAL MEDICINE

## 2021-01-01 PROCEDURE — C1769 GUIDE WIRE: HCPCS

## 2021-01-01 PROCEDURE — C2618 PROBE/NEEDLE, CRYO: HCPCS | Performed by: SURGERY

## 2021-01-01 PROCEDURE — 74011250636 HC RX REV CODE- 250/636: Performed by: STUDENT IN AN ORGANIZED HEALTH CARE EDUCATION/TRAINING PROGRAM

## 2021-01-01 PROCEDURE — 97164 PT RE-EVAL EST PLAN CARE: CPT

## 2021-01-01 PROCEDURE — 74011000258 HC RX REV CODE- 258: Performed by: EMERGENCY MEDICINE

## 2021-01-01 PROCEDURE — 74011250636 HC RX REV CODE- 250/636: Performed by: REGISTERED NURSE

## 2021-01-01 PROCEDURE — 36600 WITHDRAWAL OF ARTERIAL BLOOD: CPT

## 2021-01-01 PROCEDURE — 97110 THERAPEUTIC EXERCISES: CPT | Performed by: PHYSICAL THERAPIST

## 2021-01-01 PROCEDURE — 4A133B1 MONITORING OF ARTERIAL PRESSURE, PERIPHERAL, PERCUTANEOUS APPROACH: ICD-10-PCS | Performed by: ANESTHESIOLOGY

## 2021-01-01 PROCEDURE — 77030041030 HC DEV AEROSZR TRACHBRONCH STNT MRTM -C: Performed by: INTERNAL MEDICINE

## 2021-01-01 PROCEDURE — 77030020829: Performed by: SURGERY

## 2021-01-01 PROCEDURE — 94667 MNPJ CHEST WALL 1ST: CPT

## 2021-01-01 PROCEDURE — 77030013658: Performed by: INTERNAL MEDICINE

## 2021-01-01 PROCEDURE — 77030037088 HC TUBE ENDOTRACH ORAL NSL COVD-A: Performed by: NURSE ANESTHETIST, CERTIFIED REGISTERED

## 2021-01-01 PROCEDURE — 97535 SELF CARE MNGMENT TRAINING: CPT

## 2021-01-01 PROCEDURE — 0W993ZZ DRAINAGE OF RIGHT PLEURAL CAVITY, PERCUTANEOUS APPROACH: ICD-10-PCS | Performed by: INTERNAL MEDICINE

## 2021-01-01 PROCEDURE — 77030006565 HC BG DRN BILE BARD -A: Performed by: SURGERY

## 2021-01-01 PROCEDURE — 5A09357 ASSISTANCE WITH RESPIRATORY VENTILATION, LESS THAN 24 CONSECUTIVE HOURS, CONTINUOUS POSITIVE AIRWAY PRESSURE: ICD-10-PCS | Performed by: EMERGENCY MEDICINE

## 2021-01-01 PROCEDURE — 32551 INSERTION OF CHEST TUBE: CPT | Performed by: INTERNAL MEDICINE

## 2021-01-01 PROCEDURE — APPSS15 APP SPLIT SHARED TIME 0-15 MINUTES: Performed by: PHYSICIAN ASSISTANT

## 2021-01-01 PROCEDURE — 0B5C8ZZ DESTRUCTION OF RIGHT UPPER LUNG LOBE, VIA NATURAL OR ARTIFICIAL OPENING ENDOSCOPIC: ICD-10-PCS | Performed by: INTERNAL MEDICINE

## 2021-01-01 PROCEDURE — C1732 CATH, EP, DIAG/ABL, 3D/VECT: HCPCS

## 2021-01-01 PROCEDURE — 96374 THER/PROPH/DIAG INJ IV PUSH: CPT

## 2021-01-01 PROCEDURE — 83550 IRON BINDING TEST: CPT

## 2021-01-01 PROCEDURE — 77030008462 HC STPLR SKN PROX J&J -A

## 2021-01-01 PROCEDURE — 77030038552 HC DRN WND MDII -A: Performed by: SURGERY

## 2021-01-01 PROCEDURE — 88305 TISSUE EXAM BY PATHOLOGIST: CPT

## 2021-01-01 PROCEDURE — 71260 CT THORAX DX C+: CPT

## 2021-01-01 PROCEDURE — 30233N1 TRANSFUSION OF NONAUTOLOGOUS RED BLOOD CELLS INTO PERIPHERAL VEIN, PERCUTANEOUS APPROACH: ICD-10-PCS | Performed by: NURSE PRACTITIONER

## 2021-01-01 PROCEDURE — 82247 BILIRUBIN TOTAL: CPT

## 2021-01-01 PROCEDURE — 89050 BODY FLUID CELL COUNT: CPT

## 2021-01-01 PROCEDURE — 77030012390 HC DRN CHST BTL GTNG -B: Performed by: INTERNAL MEDICINE

## 2021-01-01 PROCEDURE — 77030011264 HC ELECTRD BLD EXT COVD -A: Performed by: SURGERY

## 2021-01-01 PROCEDURE — APPSS30 APP SPLIT SHARED TIME 16-30 MINUTES: Performed by: NURSE PRACTITIONER

## 2021-01-01 PROCEDURE — 77030034850: Performed by: SURGERY

## 2021-01-01 PROCEDURE — 2709999900 HC NON-CHARGEABLE SUPPLY: Performed by: SURGERY

## 2021-01-01 PROCEDURE — 86140 C-REACTIVE PROTEIN: CPT

## 2021-01-01 PROCEDURE — C1729 CATH, DRAINAGE: HCPCS | Performed by: SURGERY

## 2021-01-01 PROCEDURE — 0BTC0ZZ RESECTION OF RIGHT UPPER LUNG LOBE, OPEN APPROACH: ICD-10-PCS | Performed by: SURGERY

## 2021-01-01 PROCEDURE — 74011000250 HC RX REV CODE- 250: Performed by: SURGERY

## 2021-01-01 PROCEDURE — 0B948ZZ DRAINAGE OF RIGHT UPPER LOBE BRONCHUS, VIA NATURAL OR ARTIFICIAL OPENING ENDOSCOPIC: ICD-10-PCS | Performed by: INTERNAL MEDICINE

## 2021-01-01 PROCEDURE — 82962 GLUCOSE BLOOD TEST: CPT

## 2021-01-01 PROCEDURE — 77030008756 HC TU IRR SUC STRY -B: Performed by: SURGERY

## 2021-01-01 PROCEDURE — 74011000258 HC RX REV CODE- 258

## 2021-01-01 PROCEDURE — C1898 LEAD, PMKR, OTHER THAN TRANS: HCPCS

## 2021-01-01 PROCEDURE — 32220 RELEASE OF LUNG: CPT | Performed by: SURGERY

## 2021-01-01 PROCEDURE — 82947 ASSAY GLUCOSE BLOOD QUANT: CPT

## 2021-01-01 PROCEDURE — 74011250637 HC RX REV CODE- 250/637: Performed by: STUDENT IN AN ORGANIZED HEALTH CARE EDUCATION/TRAINING PROGRAM

## 2021-01-01 PROCEDURE — 77030002996 HC SUT SLK J&J -A: Performed by: SURGERY

## 2021-01-01 PROCEDURE — 93005 ELECTROCARDIOGRAM TRACING: CPT

## 2021-01-01 PROCEDURE — 83615 LACTATE (LD) (LDH) ENZYME: CPT

## 2021-01-01 PROCEDURE — 99285 EMERGENCY DEPT VISIT HI MDM: CPT

## 2021-01-01 PROCEDURE — 77030035291 HC TBNG PMP SMARTABLATE J&J -B

## 2021-01-01 PROCEDURE — APPSS45 APP SPLIT SHARED TIME 31-45 MINUTES: Performed by: PHYSICIAN ASSISTANT

## 2021-01-01 PROCEDURE — APPNB15 APP NON BILLABLE TIME 0-15 MINS: Performed by: PHYSICIAN ASSISTANT

## 2021-01-01 PROCEDURE — 77030040361 HC SLV COMPR DVT MDII -B: Performed by: SURGERY

## 2021-01-01 PROCEDURE — C2621 PMKR, OTHER THAN SING/DUAL: HCPCS

## 2021-01-01 PROCEDURE — 76210000017 HC OR PH I REC 1.5 TO 2 HR: Performed by: SURGERY

## 2021-01-01 PROCEDURE — 99152 MOD SED SAME PHYS/QHP 5/>YRS: CPT | Performed by: INTERNAL MEDICINE

## 2021-01-01 PROCEDURE — 77030027876 HC STPLR ENDOSC FLX PWR J&J -G1: Performed by: SURGERY

## 2021-01-01 PROCEDURE — 87205 SMEAR GRAM STAIN: CPT

## 2021-01-01 PROCEDURE — 74011000302 HC RX REV CODE- 302: Performed by: SURGERY

## 2021-01-01 PROCEDURE — 76040000027: Performed by: INTERNAL MEDICINE

## 2021-01-01 PROCEDURE — 0BH17EZ INSERTION OF ENDOTRACHEAL AIRWAY INTO TRACHEA, VIA NATURAL OR ARTIFICIAL OPENING: ICD-10-PCS | Performed by: ANESTHESIOLOGY

## 2021-01-01 PROCEDURE — 84443 ASSAY THYROID STIM HORMONE: CPT

## 2021-01-01 PROCEDURE — 77030008671 HC TU ENDO/BRNC CUF COVD -B: Performed by: NURSE ANESTHETIST, CERTIFIED REGISTERED

## 2021-01-01 PROCEDURE — 32440 REMOVE LUNG PNEUMONECTOMY: CPT | Performed by: NURSE PRACTITIONER

## 2021-01-01 PROCEDURE — 77030040830 HC CATH URETH FOL MDII -A: Performed by: SURGERY

## 2021-01-01 PROCEDURE — 94762 N-INVAS EAR/PLS OXIMTRY CONT: CPT

## 2021-01-01 PROCEDURE — 74011000636 HC RX REV CODE- 636: Performed by: INTERNAL MEDICINE

## 2021-01-01 PROCEDURE — 77030008031: Performed by: SURGERY

## 2021-01-01 PROCEDURE — 96366 THER/PROPH/DIAG IV INF ADDON: CPT

## 2021-01-01 PROCEDURE — C1900 LEAD, CORONARY VENOUS: HCPCS

## 2021-01-01 PROCEDURE — 87077 CULTURE AEROBIC IDENTIFY: CPT

## 2021-01-01 PROCEDURE — 96365 THER/PROPH/DIAG IV INF INIT: CPT

## 2021-01-01 PROCEDURE — 82746 ASSAY OF FOLIC ACID SERUM: CPT

## 2021-01-01 PROCEDURE — 74011000258 HC RX REV CODE- 258: Performed by: SURGERY

## 2021-01-01 PROCEDURE — 99222 1ST HOSP IP/OBS MODERATE 55: CPT | Performed by: INTERNAL MEDICINE

## 2021-01-01 PROCEDURE — P9045 ALBUMIN (HUMAN), 5%, 250 ML: HCPCS | Performed by: ANESTHESIOLOGY

## 2021-01-01 PROCEDURE — 77030031139 HC SUT VCRL2 J&J -A

## 2021-01-01 PROCEDURE — 0BNC0ZZ RELEASE RIGHT UPPER LUNG LOBE, OPEN APPROACH: ICD-10-PCS | Performed by: SURGERY

## 2021-01-01 PROCEDURE — 87186 SC STD MICRODIL/AGAR DIL: CPT

## 2021-01-01 PROCEDURE — 77030013687 HC GD NDL BARD -B

## 2021-01-01 PROCEDURE — 32551 INSERTION OF CHEST TUBE: CPT

## 2021-01-01 PROCEDURE — 4A133J1 MONITORING OF ARTERIAL PULSE, PERIPHERAL, PERCUTANEOUS APPROACH: ICD-10-PCS | Performed by: ANESTHESIOLOGY

## 2021-01-01 PROCEDURE — 99283 EMERGENCY DEPT VISIT LOW MDM: CPT | Performed by: INTERNAL MEDICINE

## 2021-01-01 PROCEDURE — 77030008462 HC STPLR SKN PROX J&J -A: Performed by: SURGERY

## 2021-01-01 PROCEDURE — 36591 DRAW BLOOD OFF VENOUS DEVICE: CPT

## 2021-01-01 PROCEDURE — C1729 CATH, DRAINAGE: HCPCS | Performed by: INTERNAL MEDICINE

## 2021-01-01 PROCEDURE — 33225 L VENTRIC PACING LEAD ADD-ON: CPT

## 2021-01-01 PROCEDURE — 70486 CT MAXILLOFACIAL W/O DYE: CPT

## 2021-01-01 PROCEDURE — 77030005401 HC CATH RAD ARRO -A: Performed by: ANESTHESIOLOGY

## 2021-01-01 PROCEDURE — U0005 INFEC AGEN DETEC AMPLI PROBE: HCPCS

## 2021-01-01 PROCEDURE — 76210000063 HC OR PH I REC FIRST 0.5 HR: Performed by: SURGERY

## 2021-01-01 PROCEDURE — 77030039425 HC BLD LARYNG TRULITE DISP TELE -A: Performed by: NURSE ANESTHETIST, CERTIFIED REGISTERED

## 2021-01-01 PROCEDURE — 77030019605: Performed by: SURGERY

## 2021-01-01 PROCEDURE — 76942 ECHO GUIDE FOR BIOPSY: CPT | Performed by: SURGERY

## 2021-01-01 PROCEDURE — 99223 1ST HOSP IP/OBS HIGH 75: CPT | Performed by: SURGERY

## 2021-01-01 PROCEDURE — 77030037378 HC BRONCHOSCOPE DISP TRAN -D: Performed by: NURSE ANESTHETIST, CERTIFIED REGISTERED

## 2021-01-01 PROCEDURE — 82728 ASSAY OF FERRITIN: CPT

## 2021-01-01 PROCEDURE — 83605 ASSAY OF LACTIC ACID: CPT

## 2021-01-01 PROCEDURE — 93650 ICAR CATH ABLTJ AV NODE FUNC: CPT | Performed by: INTERNAL MEDICINE

## 2021-01-01 PROCEDURE — 88307 TISSUE EXAM BY PATHOLOGIST: CPT

## 2021-01-01 PROCEDURE — 87106 FUNGI IDENTIFICATION YEAST: CPT

## 2021-01-01 PROCEDURE — 93005 ELECTROCARDIOGRAM TRACING: CPT | Performed by: EMERGENCY MEDICINE

## 2021-01-01 PROCEDURE — 76060000041 HC ANESTHESIA 5 TO 5.5 HR: Performed by: SURGERY

## 2021-01-01 PROCEDURE — 74011000250 HC RX REV CODE- 250: Performed by: HOSPITALIST

## 2021-01-01 PROCEDURE — 77030037088 HC TUBE ENDOTRACH ORAL NSL COVD-A: Performed by: REGISTERED NURSE

## 2021-01-01 PROCEDURE — 80202 ASSAY OF VANCOMYCIN: CPT

## 2021-01-01 PROCEDURE — 31646 BRNCHSC W/THER ASPIR SBSQ: CPT | Performed by: INTERNAL MEDICINE

## 2021-01-01 PROCEDURE — 77030037378 HC BRONCHOSCOPE DISP TRAN -D: Performed by: ANESTHESIOLOGY

## 2021-01-01 PROCEDURE — 87176 TISSUE HOMOGENIZATION CULTR: CPT

## 2021-01-01 PROCEDURE — 77030019908 HC STETH ESOPH SIMS -A: Performed by: ANESTHESIOLOGY

## 2021-01-01 PROCEDURE — P9045 ALBUMIN (HUMAN), 5%, 250 ML: HCPCS | Performed by: NURSE ANESTHETIST, CERTIFIED REGISTERED

## 2021-01-01 PROCEDURE — 74011000636 HC RX REV CODE- 636: Performed by: HOSPITALIST

## 2021-01-01 PROCEDURE — 36592 COLLECT BLOOD FROM PICC: CPT

## 2021-01-01 PROCEDURE — 65610000006 HC RM INTENSIVE CARE

## 2021-01-01 PROCEDURE — 77030039425 HC BLD LARYNG TRULITE DISP TELE -A: Performed by: ANESTHESIOLOGY

## 2021-01-01 PROCEDURE — C1887 CATHETER, GUIDING: HCPCS

## 2021-01-01 PROCEDURE — 5A09357 ASSISTANCE WITH RESPIRATORY VENTILATION, LESS THAN 24 CONSECUTIVE HOURS, CONTINUOUS POSITIVE AIRWAY PRESSURE: ICD-10-PCS | Performed by: INTERNAL MEDICINE

## 2021-01-01 PROCEDURE — 0B748ZZ DILATION OF RIGHT UPPER LOBE BRONCHUS, VIA NATURAL OR ARTIFICIAL OPENING ENDOSCOPIC: ICD-10-PCS | Performed by: INTERNAL MEDICINE

## 2021-01-01 PROCEDURE — APPNB30 APP NON BILLABLE TIME 0-30 MINS: Performed by: PHYSICIAN ASSISTANT

## 2021-01-01 PROCEDURE — 32220 RELEASE OF LUNG: CPT | Performed by: NURSE PRACTITIONER

## 2021-01-01 PROCEDURE — 77030009968 HC RELD STPLR ENDOSC J&J -D: Performed by: SURGERY

## 2021-01-01 PROCEDURE — APPSS180 APP SPLIT SHARED TIME > 60 MINUTES: Performed by: NURSE PRACTITIONER

## 2021-01-01 PROCEDURE — 30233N1 TRANSFUSION OF NONAUTOLOGOUS RED BLOOD CELLS INTO PERIPHERAL VEIN, PERCUTANEOUS APPROACH: ICD-10-PCS | Performed by: INTERNAL MEDICINE

## 2021-01-01 PROCEDURE — 84132 ASSAY OF SERUM POTASSIUM: CPT

## 2021-01-01 PROCEDURE — 84157 ASSAY OF PROTEIN OTHER: CPT

## 2021-01-01 PROCEDURE — 76060000040 HC ANESTHESIA 4.5 TO 5 HR: Performed by: SURGERY

## 2021-01-01 PROCEDURE — 93005 ELECTROCARDIOGRAM TRACING: CPT | Performed by: NURSE PRACTITIONER

## 2021-01-01 PROCEDURE — 0BCN0ZZ EXTIRPATION OF MATTER FROM RIGHT PLEURA, OPEN APPROACH: ICD-10-PCS | Performed by: SURGERY

## 2021-01-01 PROCEDURE — 77030013292 HC BOWL MX PRSM J&J -A: Performed by: NURSE ANESTHETIST, CERTIFIED REGISTERED

## 2021-01-01 PROCEDURE — 74018 RADEX ABDOMEN 1 VIEW: CPT

## 2021-01-01 PROCEDURE — 76060000036 HC ANESTHESIA 2.5 TO 3 HR: Performed by: SURGERY

## 2021-01-01 PROCEDURE — 83880 ASSAY OF NATRIURETIC PEPTIDE: CPT

## 2021-01-01 PROCEDURE — 77030008671 HC TU ENDO/BRNC CUF COVD -B: Performed by: ANESTHESIOLOGY

## 2021-01-01 PROCEDURE — 33208 INSRT HEART PM ATRIAL & VENT: CPT

## 2021-01-01 PROCEDURE — 77030002912 HC SUT ETHBND J&J -A

## 2021-01-01 PROCEDURE — 74011250637 HC RX REV CODE- 250/637: Performed by: EMERGENCY MEDICINE

## 2021-01-01 PROCEDURE — 99221 1ST HOSP IP/OBS SF/LOW 40: CPT | Performed by: INTERNAL MEDICINE

## 2021-01-01 PROCEDURE — 97162 PT EVAL MOD COMPLEX 30 MIN: CPT

## 2021-01-01 PROCEDURE — APPNB180 APP NON BILLABLE TIME > 60 MINS: Performed by: NURSE PRACTITIONER

## 2021-01-01 PROCEDURE — 87206 SMEAR FLUORESCENT/ACID STAI: CPT

## 2021-01-01 PROCEDURE — 77030040132 HC BLANKET THRM DISP CSZ -B

## 2021-01-01 PROCEDURE — 76040000026: Performed by: INTERNAL MEDICINE

## 2021-01-01 PROCEDURE — 99291 CRITICAL CARE FIRST HOUR: CPT | Performed by: INTERNAL MEDICINE

## 2021-01-01 PROCEDURE — 31640 BRONCHOSCOPY W/TUMOR EXCISE: CPT | Performed by: INTERNAL MEDICINE

## 2021-01-01 PROCEDURE — 77030014147 HC TY THORCENT PARA TELE -B: Performed by: INTERNAL MEDICINE

## 2021-01-01 PROCEDURE — 77030041279 HC DRSG PRMSL AG MDII -B

## 2021-01-01 PROCEDURE — 83986 ASSAY PH BODY FLUID NOS: CPT

## 2021-01-01 PROCEDURE — 77030034849: Performed by: SURGERY

## 2021-01-01 PROCEDURE — C1894 INTRO/SHEATH, NON-LASER: HCPCS

## 2021-01-01 PROCEDURE — 0BJK4ZZ INSPECTION OF RIGHT LUNG, PERCUTANEOUS ENDOSCOPIC APPROACH: ICD-10-PCS | Performed by: SURGERY

## 2021-01-01 PROCEDURE — 93650 ICAR CATH ABLTJ AV NODE FUNC: CPT

## 2021-01-01 PROCEDURE — 76060000032 HC ANESTHESIA 0.5 TO 1 HR: Performed by: INTERNAL MEDICINE

## 2021-01-01 PROCEDURE — 77030013140 HC MSK NEB VYRM -A

## 2021-01-01 PROCEDURE — C8924 2D TTE W OR W/O FOL W/CON,FU: HCPCS

## 2021-01-01 PROCEDURE — 87102 FUNGUS ISOLATION CULTURE: CPT

## 2021-01-01 PROCEDURE — 76010000177 HC OR TIME 5 TO 5.5 HR INTENSV-TIER 1: Performed by: SURGERY

## 2021-01-01 PROCEDURE — APPNB45 APP NON BILLABLE 31-45 MINUTES: Performed by: PHYSICIAN ASSISTANT

## 2021-01-01 PROCEDURE — 77030002966 HC SUT PDS J&J -A: Performed by: SURGERY

## 2021-01-01 PROCEDURE — 77030012770 HC TRCR OPT FX AMR -B: Performed by: SURGERY

## 2021-01-01 PROCEDURE — 74011000636 HC RX REV CODE- 636: Performed by: SURGERY

## 2021-01-01 PROCEDURE — 32815 CLOSE BRONCHIAL FISTULA: CPT | Performed by: SURGERY

## 2021-01-01 PROCEDURE — 77030040361 HC SLV COMPR DVT MDII -B

## 2021-01-01 PROCEDURE — 74011000250 HC RX REV CODE- 250: Performed by: REGISTERED NURSE

## 2021-01-01 PROCEDURE — 32440 REMOVE LUNG PNEUMONECTOMY: CPT | Performed by: SURGERY

## 2021-01-01 PROCEDURE — 32555 ASPIRATE PLEURA W/ IMAGING: CPT | Performed by: INTERNAL MEDICINE

## 2021-01-01 PROCEDURE — C1751 CATH, INF, PER/CENT/MIDLINE: HCPCS

## 2021-01-01 PROCEDURE — 31630 BRONCHOSCOPY DILATE/FX REPR: CPT | Performed by: INTERNAL MEDICINE

## 2021-01-01 PROCEDURE — 76040000019: Performed by: INTERNAL MEDICINE

## 2021-01-01 PROCEDURE — 82945 GLUCOSE OTHER FLUID: CPT

## 2021-01-01 PROCEDURE — 76210000006 HC OR PH I REC 0.5 TO 1 HR: Performed by: SURGERY

## 2021-01-01 PROCEDURE — 77030039425 HC BLD LARYNG TRULITE DISP TELE -A: Performed by: REGISTERED NURSE

## 2021-01-01 PROCEDURE — 82607 VITAMIN B-12: CPT

## 2021-01-01 PROCEDURE — 33225 L VENTRIC PACING LEAD ADD-ON: CPT | Performed by: INTERNAL MEDICINE

## 2021-01-01 PROCEDURE — 5A1935Z RESPIRATORY VENTILATION, LESS THAN 24 CONSECUTIVE HOURS: ICD-10-PCS | Performed by: ANESTHESIOLOGY

## 2021-01-01 PROCEDURE — 31625 BRONCHOSCOPY W/BIOPSY(S): CPT | Performed by: INTERNAL MEDICINE

## 2021-01-01 PROCEDURE — 77030040361 HC SLV COMPR DVT MDII -B: Performed by: INTERNAL MEDICINE

## 2021-01-01 PROCEDURE — 86580 TB INTRADERMAL TEST: CPT | Performed by: SURGERY

## 2021-01-01 PROCEDURE — 99232 SBSQ HOSP IP/OBS MODERATE 35: CPT | Performed by: SURGERY

## 2021-01-01 PROCEDURE — 77030003602 HC NDL NRV BLK BBMI -B: Performed by: ANESTHESIOLOGY

## 2021-01-01 PROCEDURE — 93005 ELECTROCARDIOGRAM TRACING: CPT | Performed by: INTERNAL MEDICINE

## 2021-01-01 PROCEDURE — 77030040922 HC BLNKT HYPOTHRM STRY -A: Performed by: ANESTHESIOLOGY

## 2021-01-01 PROCEDURE — 71250 CT THORAX DX C-: CPT

## 2021-01-01 PROCEDURE — 93798 PHYS/QHP OP CAR RHAB W/ECG: CPT

## 2021-01-01 PROCEDURE — 97168 OT RE-EVAL EST PLAN CARE: CPT

## 2021-01-01 RX ORDER — VANCOMYCIN 2 GRAM/500 ML IN 0.9 % SODIUM CHLORIDE INTRAVENOUS
2000 ONCE
Status: DISCONTINUED | OUTPATIENT
Start: 2021-01-01 | End: 2021-01-01

## 2021-01-01 RX ORDER — SODIUM CHLORIDE, SODIUM LACTATE, POTASSIUM CHLORIDE, CALCIUM CHLORIDE 600; 310; 30; 20 MG/100ML; MG/100ML; MG/100ML; MG/100ML
75 INJECTION, SOLUTION INTRAVENOUS CONTINUOUS
Status: DISCONTINUED | OUTPATIENT
Start: 2021-01-01 | End: 2021-01-01

## 2021-01-01 RX ORDER — DEXMEDETOMIDINE HYDROCHLORIDE 4 UG/ML
.1-1.5 INJECTION, SOLUTION INTRAVENOUS
Status: DISCONTINUED | OUTPATIENT
Start: 2021-01-01 | End: 2021-01-01

## 2021-01-01 RX ORDER — DOCUSATE SODIUM 100 MG/1
100 CAPSULE, LIQUID FILLED ORAL 2 TIMES DAILY
Status: DISCONTINUED | OUTPATIENT
Start: 2021-01-01 | End: 2021-01-01 | Stop reason: HOSPADM

## 2021-01-01 RX ORDER — NOREPINEPHRINE BITARTRATE/D5W 4MG/250ML
.5-3 PLASTIC BAG, INJECTION (ML) INTRAVENOUS
Status: DISCONTINUED | OUTPATIENT
Start: 2021-01-01 | End: 2021-01-01

## 2021-01-01 RX ORDER — ACETAMINOPHEN 500 MG
1000 TABLET ORAL
Status: DISCONTINUED | OUTPATIENT
Start: 2021-01-01 | End: 2021-01-01 | Stop reason: SDUPTHER

## 2021-01-01 RX ORDER — METRONIDAZOLE 500 MG/100ML
500 INJECTION, SOLUTION INTRAVENOUS EVERY 12 HOURS
Status: DISCONTINUED | OUTPATIENT
Start: 2021-01-01 | End: 2021-01-01

## 2021-01-01 RX ORDER — ETOMIDATE 2 MG/ML
0.3 INJECTION INTRAVENOUS ONCE
Status: DISCONTINUED | OUTPATIENT
Start: 2021-01-01 | End: 2021-01-01

## 2021-01-01 RX ORDER — DOCUSATE SODIUM 100 MG/1
100 CAPSULE, LIQUID FILLED ORAL 2 TIMES DAILY
Qty: 60 CAPSULE | Refills: 2 | Status: SHIPPED | COMMUNITY
Start: 2021-01-01

## 2021-01-01 RX ORDER — LIDOCAINE HYDROCHLORIDE 20 MG/ML
JELLY TOPICAL ONCE
Status: COMPLETED | OUTPATIENT
Start: 2021-01-01 | End: 2021-01-01

## 2021-01-01 RX ORDER — GENTAMICIN SULFATE 0.3 %
0.5 OINTMENT (GRAM) OPHTHALMIC (EYE) 3 TIMES DAILY
Status: COMPLETED | OUTPATIENT
Start: 2021-01-01 | End: 2021-01-01

## 2021-01-01 RX ORDER — IPRATROPIUM BROMIDE 21 UG/1
2 SPRAY, METERED NASAL 2 TIMES DAILY
Status: DISCONTINUED | OUTPATIENT
Start: 2021-01-01 | End: 2021-01-01 | Stop reason: HOSPADM

## 2021-01-01 RX ORDER — SODIUM CHLORIDE 0.9 % (FLUSH) 0.9 %
5-40 SYRINGE (ML) INJECTION EVERY 8 HOURS
Status: DISCONTINUED | OUTPATIENT
Start: 2021-01-01 | End: 2021-01-01 | Stop reason: HOSPADM

## 2021-01-01 RX ORDER — OXYMETAZOLINE HCL 0.05 %
2 SPRAY, NON-AEROSOL (ML) NASAL 2 TIMES DAILY
Qty: 1 EACH | Refills: 0 | Status: SHIPPED | OUTPATIENT
Start: 2021-01-01 | End: 2021-01-01

## 2021-01-01 RX ORDER — ONDANSETRON 2 MG/ML
INJECTION INTRAMUSCULAR; INTRAVENOUS AS NEEDED
Status: DISCONTINUED | OUTPATIENT
Start: 2021-01-01 | End: 2021-01-01 | Stop reason: HOSPADM

## 2021-01-01 RX ORDER — SODIUM CHLORIDE FOR INHALATION 3 %
4 VIAL, NEBULIZER (ML) INHALATION 2 TIMES DAILY
Qty: 240 ML | Refills: 0 | Status: SHIPPED
Start: 2021-01-01 | End: 2021-01-01

## 2021-01-01 RX ORDER — ENOXAPARIN SODIUM 100 MG/ML
80 INJECTION SUBCUTANEOUS EVERY 12 HOURS
Status: DISCONTINUED | OUTPATIENT
Start: 2021-01-01 | End: 2021-01-01

## 2021-01-01 RX ORDER — PANTOPRAZOLE SODIUM 40 MG/1
40 TABLET, DELAYED RELEASE ORAL
Status: DISCONTINUED | OUTPATIENT
Start: 2021-01-01 | End: 2021-01-01 | Stop reason: HOSPADM

## 2021-01-01 RX ORDER — OXYCODONE HYDROCHLORIDE 5 MG/1
5 TABLET ORAL
Status: DISCONTINUED | OUTPATIENT
Start: 2021-01-01 | End: 2021-01-01

## 2021-01-01 RX ORDER — FACIAL-BODY WIPES
10 EACH TOPICAL DAILY PRN
Status: DISCONTINUED | OUTPATIENT
Start: 2021-01-01 | End: 2021-01-01 | Stop reason: HOSPADM

## 2021-01-01 RX ORDER — CEPHALEXIN 500 MG/1
500 CAPSULE ORAL 4 TIMES DAILY
Qty: 88 CAPSULE | Refills: 0 | Status: SHIPPED
Start: 2021-01-01 | End: 2021-01-01

## 2021-01-01 RX ORDER — DEXAMETHASONE SODIUM PHOSPHATE 4 MG/ML
INJECTION, SOLUTION INTRA-ARTICULAR; INTRALESIONAL; INTRAMUSCULAR; INTRAVENOUS; SOFT TISSUE
Status: COMPLETED | OUTPATIENT
Start: 2021-01-01 | End: 2021-01-01

## 2021-01-01 RX ORDER — METRONIDAZOLE 500 MG/1
500 TABLET ORAL EVERY 12 HOURS
Status: DISCONTINUED | OUTPATIENT
Start: 2021-01-01 | End: 2021-01-01

## 2021-01-01 RX ORDER — METOPROLOL TARTRATE 25 MG/1
12.5 TABLET, FILM COATED ORAL ONCE
Status: COMPLETED | OUTPATIENT
Start: 2021-01-01 | End: 2021-01-01

## 2021-01-01 RX ORDER — SODIUM CHLORIDE 0.9 % (FLUSH) 0.9 %
5-40 SYRINGE (ML) INJECTION AS NEEDED
Status: CANCELLED | OUTPATIENT
Start: 2021-01-01

## 2021-01-01 RX ORDER — GLYCOPYRROLATE 0.2 MG/ML
INJECTION INTRAMUSCULAR; INTRAVENOUS AS NEEDED
Status: DISCONTINUED | OUTPATIENT
Start: 2021-01-01 | End: 2021-01-01 | Stop reason: HOSPADM

## 2021-01-01 RX ORDER — AMOXICILLIN AND CLAVULANATE POTASSIUM 875; 125 MG/1; MG/1
1 TABLET, FILM COATED ORAL EVERY 12 HOURS
Status: DISCONTINUED | OUTPATIENT
Start: 2021-01-01 | End: 2021-01-01 | Stop reason: HOSPADM

## 2021-01-01 RX ORDER — FENTANYL CITRATE 50 UG/ML
50 INJECTION, SOLUTION INTRAMUSCULAR; INTRAVENOUS
Status: DISCONTINUED | OUTPATIENT
Start: 2021-01-01 | End: 2021-01-01 | Stop reason: HOSPADM

## 2021-01-01 RX ORDER — KETAMINE HYDROCHLORIDE 50 MG/ML
INJECTION, SOLUTION INTRAMUSCULAR; INTRAVENOUS AS NEEDED
Status: DISCONTINUED | OUTPATIENT
Start: 2021-01-01 | End: 2021-01-01 | Stop reason: HOSPADM

## 2021-01-01 RX ORDER — MONTELUKAST SODIUM 10 MG/1
10 TABLET ORAL
Qty: 30 TABLET | Refills: 1 | Status: SHIPPED
Start: 2021-01-01

## 2021-01-01 RX ORDER — MIDAZOLAM HYDROCHLORIDE 1 MG/ML
.25-5 INJECTION, SOLUTION INTRAMUSCULAR; INTRAVENOUS
Status: DISCONTINUED | OUTPATIENT
Start: 2021-01-01 | End: 2021-01-01 | Stop reason: HOSPADM

## 2021-01-01 RX ORDER — SODIUM CHLORIDE, SODIUM LACTATE, POTASSIUM CHLORIDE, CALCIUM CHLORIDE 600; 310; 30; 20 MG/100ML; MG/100ML; MG/100ML; MG/100ML
100 INJECTION, SOLUTION INTRAVENOUS CONTINUOUS
Status: CANCELLED | OUTPATIENT
Start: 2021-01-01

## 2021-01-01 RX ORDER — OXYMETAZOLINE HCL 0.05 %
2 SPRAY, NON-AEROSOL (ML) NASAL 2 TIMES DAILY
Status: DISCONTINUED | OUTPATIENT
Start: 2021-01-01 | End: 2021-01-01 | Stop reason: HOSPADM

## 2021-01-01 RX ORDER — LIDOCAINE HYDROCHLORIDE 20 MG/ML
INJECTION, SOLUTION EPIDURAL; INFILTRATION; INTRACAUDAL; PERINEURAL AS NEEDED
Status: DISCONTINUED | OUTPATIENT
Start: 2021-01-01 | End: 2021-01-01 | Stop reason: HOSPADM

## 2021-01-01 RX ORDER — ASPIRIN 81 MG/1
81 TABLET ORAL DAILY
Status: DISCONTINUED | OUTPATIENT
Start: 2021-01-01 | End: 2021-01-01 | Stop reason: HOSPADM

## 2021-01-01 RX ORDER — HEPARIN SODIUM 5000 [USP'U]/100ML
600 INJECTION, SOLUTION INTRAVENOUS
Status: DISCONTINUED | OUTPATIENT
Start: 2021-01-01 | End: 2021-01-01

## 2021-01-01 RX ORDER — HEPARIN SODIUM (PORCINE) LOCK FLUSH IV SOLN 100 UNIT/ML 100 UNIT/ML
500 SOLUTION INTRAVENOUS AS NEEDED
Status: DISCONTINUED | OUTPATIENT
Start: 2021-01-01 | End: 2021-01-01

## 2021-01-01 RX ORDER — SODIUM CHLORIDE 0.9 % (FLUSH) 0.9 %
5-40 SYRINGE (ML) INJECTION AS NEEDED
Status: DISCONTINUED | OUTPATIENT
Start: 2021-01-01 | End: 2021-01-01 | Stop reason: HOSPADM

## 2021-01-01 RX ORDER — SODIUM CHLORIDE 0.9 % (FLUSH) 0.9 %
5-10 SYRINGE (ML) INJECTION AS NEEDED
Status: DISCONTINUED | OUTPATIENT
Start: 2021-01-01 | End: 2021-01-01

## 2021-01-01 RX ORDER — METOPROLOL SUCCINATE 50 MG/1
50 TABLET, EXTENDED RELEASE ORAL DAILY
Status: CANCELLED | OUTPATIENT
Start: 2021-01-01

## 2021-01-01 RX ORDER — LORAZEPAM 0.5 MG/1
0.5 TABLET ORAL
Status: DISCONTINUED | OUTPATIENT
Start: 2021-01-01 | End: 2021-01-01 | Stop reason: HOSPADM

## 2021-01-01 RX ORDER — PANTOPRAZOLE SODIUM 40 MG/1
40 TABLET, DELAYED RELEASE ORAL DAILY
COMMUNITY

## 2021-01-01 RX ORDER — FENTANYL CITRATE 50 UG/ML
INJECTION, SOLUTION INTRAMUSCULAR; INTRAVENOUS AS NEEDED
Status: DISCONTINUED | OUTPATIENT
Start: 2021-01-01 | End: 2021-01-01 | Stop reason: HOSPADM

## 2021-01-01 RX ORDER — BUDESONIDE 0.5 MG/2ML
500 INHALANT ORAL
Status: DISCONTINUED | OUTPATIENT
Start: 2021-01-01 | End: 2021-01-01

## 2021-01-01 RX ORDER — OXYCODONE AND ACETAMINOPHEN 5; 325 MG/1; MG/1
1 TABLET ORAL
Qty: 20 TABLET | Refills: 0 | Status: SHIPPED | OUTPATIENT
Start: 2021-01-01 | End: 2021-01-01

## 2021-01-01 RX ORDER — PROPOFOL 10 MG/ML
INJECTION, EMULSION INTRAVENOUS AS NEEDED
Status: DISCONTINUED | OUTPATIENT
Start: 2021-01-01 | End: 2021-01-01 | Stop reason: HOSPADM

## 2021-01-01 RX ORDER — SODIUM CHLORIDE 0.9 % (FLUSH) 0.9 %
5-40 SYRINGE (ML) INJECTION AS NEEDED
Status: DISCONTINUED | OUTPATIENT
Start: 2021-01-01 | End: 2021-01-01

## 2021-01-01 RX ORDER — SODIUM CHLORIDE, SODIUM LACTATE, POTASSIUM CHLORIDE, CALCIUM CHLORIDE 600; 310; 30; 20 MG/100ML; MG/100ML; MG/100ML; MG/100ML
125 INJECTION, SOLUTION INTRAVENOUS CONTINUOUS
Status: DISCONTINUED | OUTPATIENT
Start: 2021-01-01 | End: 2021-01-01

## 2021-01-01 RX ORDER — ONDANSETRON 2 MG/ML
4 INJECTION INTRAMUSCULAR; INTRAVENOUS
Status: DISCONTINUED | OUTPATIENT
Start: 2021-01-01 | End: 2021-01-01 | Stop reason: HOSPADM

## 2021-01-01 RX ORDER — DIPHENHYDRAMINE HYDROCHLORIDE 50 MG/ML
12.5 INJECTION, SOLUTION INTRAMUSCULAR; INTRAVENOUS
Status: DISCONTINUED | OUTPATIENT
Start: 2021-01-01 | End: 2021-01-01

## 2021-01-01 RX ORDER — FENTANYL CITRATE 50 UG/ML
100 INJECTION, SOLUTION INTRAMUSCULAR; INTRAVENOUS ONCE
Status: COMPLETED | OUTPATIENT
Start: 2021-01-01 | End: 2021-01-01

## 2021-01-01 RX ORDER — FENTANYL CITRATE 50 UG/ML
50 INJECTION, SOLUTION INTRAMUSCULAR; INTRAVENOUS ONCE
Status: DISCONTINUED | OUTPATIENT
Start: 2021-01-01 | End: 2021-01-01

## 2021-01-01 RX ORDER — OXYCODONE AND ACETAMINOPHEN 5; 325 MG/1; MG/1
1 TABLET ORAL
Qty: 30 TAB | Refills: 0 | Status: SHIPPED
Start: 2021-01-01 | End: 2021-01-01

## 2021-01-01 RX ORDER — FLUMAZENIL 0.1 MG/ML
0.2 INJECTION INTRAVENOUS AS NEEDED
Status: DISCONTINUED | OUTPATIENT
Start: 2021-01-01 | End: 2021-01-01

## 2021-01-01 RX ORDER — METOPROLOL SUCCINATE 50 MG/1
50 TABLET, EXTENDED RELEASE ORAL DAILY
Status: DISCONTINUED | OUTPATIENT
Start: 2021-01-01 | End: 2021-01-01 | Stop reason: HOSPADM

## 2021-01-01 RX ORDER — ACETAMINOPHEN 500 MG
1000 TABLET ORAL ONCE
Status: COMPLETED | OUTPATIENT
Start: 2021-01-01 | End: 2021-01-01

## 2021-01-01 RX ORDER — MORPHINE SULFATE 2 MG/ML
2 INJECTION, SOLUTION INTRAMUSCULAR; INTRAVENOUS
Status: DISCONTINUED | OUTPATIENT
Start: 2021-01-01 | End: 2021-01-01 | Stop reason: HOSPADM

## 2021-01-01 RX ORDER — ROPIVACAINE HYDROCHLORIDE 5 MG/ML
INJECTION, SOLUTION EPIDURAL; INFILTRATION; PERINEURAL
Status: COMPLETED | OUTPATIENT
Start: 2021-01-01 | End: 2021-01-01

## 2021-01-01 RX ORDER — FLUTICASONE PROPIONATE 50 MCG
2 SPRAY, SUSPENSION (ML) NASAL DAILY
Status: DISCONTINUED | OUTPATIENT
Start: 2021-01-01 | End: 2021-01-01 | Stop reason: HOSPADM

## 2021-01-01 RX ORDER — SODIUM CHLORIDE 0.9 % (FLUSH) 0.9 %
5-10 SYRINGE (ML) INJECTION EVERY 8 HOURS
Status: DISCONTINUED | OUTPATIENT
Start: 2021-01-01 | End: 2021-09-25 | Stop reason: HOSPADM

## 2021-01-01 RX ORDER — SODIUM CHLORIDE, SODIUM LACTATE, POTASSIUM CHLORIDE, CALCIUM CHLORIDE 600; 310; 30; 20 MG/100ML; MG/100ML; MG/100ML; MG/100ML
75 INJECTION, SOLUTION INTRAVENOUS ONCE
Status: COMPLETED | OUTPATIENT
Start: 2021-01-01 | End: 2021-01-01

## 2021-01-01 RX ORDER — VANCOMYCIN 1.75 GRAM/500 ML IN 0.9 % SODIUM CHLORIDE INTRAVENOUS
1750 EVERY 12 HOURS
Status: DISCONTINUED | OUTPATIENT
Start: 2021-01-01 | End: 2021-01-01

## 2021-01-01 RX ORDER — SODIUM CHLORIDE 0.9 % (FLUSH) 0.9 %
5-10 SYRINGE (ML) INJECTION EVERY 8 HOURS
Status: DISCONTINUED | OUTPATIENT
Start: 2021-01-01 | End: 2021-01-01 | Stop reason: HOSPADM

## 2021-01-01 RX ORDER — LORAZEPAM 2 MG/ML
2 INJECTION INTRAMUSCULAR
Status: DISCONTINUED | OUTPATIENT
Start: 2021-01-01 | End: 2021-09-25 | Stop reason: HOSPADM

## 2021-01-01 RX ORDER — LIDOCAINE HYDROCHLORIDE 10 MG/ML
0.1 INJECTION INFILTRATION; PERINEURAL AS NEEDED
Status: DISCONTINUED | OUTPATIENT
Start: 2021-01-01 | End: 2021-01-01

## 2021-01-01 RX ORDER — DIPHENHYDRAMINE HYDROCHLORIDE 50 MG/ML
25 INJECTION, SOLUTION INTRAMUSCULAR; INTRAVENOUS ONCE
Status: DISCONTINUED | OUTPATIENT
Start: 2021-01-01 | End: 2021-01-01 | Stop reason: HOSPADM

## 2021-01-01 RX ORDER — ALBUMIN HUMAN 50 G/1000ML
25 SOLUTION INTRAVENOUS
Status: COMPLETED | OUTPATIENT
Start: 2021-01-01 | End: 2021-01-01

## 2021-01-01 RX ORDER — DOXYCYCLINE 100 MG/1
100 CAPSULE ORAL EVERY 12 HOURS
Status: DISCONTINUED | OUTPATIENT
Start: 2021-01-01 | End: 2021-01-01 | Stop reason: HOSPADM

## 2021-01-01 RX ORDER — ONDANSETRON 8 MG/1
4 TABLET, ORALLY DISINTEGRATING ORAL
Status: DISCONTINUED | OUTPATIENT
Start: 2021-01-01 | End: 2021-01-01 | Stop reason: HOSPADM

## 2021-01-01 RX ORDER — SODIUM CHLORIDE FOR INHALATION 3 %
4 VIAL, NEBULIZER (ML) INHALATION 2 TIMES DAILY
Status: DISCONTINUED | OUTPATIENT
Start: 2021-01-01 | End: 2021-01-01

## 2021-01-01 RX ORDER — SODIUM CHLORIDE FOR INHALATION 3 %
4 VIAL, NEBULIZER (ML) INHALATION
Status: DISCONTINUED | OUTPATIENT
Start: 2021-01-01 | End: 2021-01-01 | Stop reason: HOSPADM

## 2021-01-01 RX ORDER — OXYCODONE HYDROCHLORIDE 5 MG/1
10 TABLET ORAL
Status: DISCONTINUED | OUTPATIENT
Start: 2021-01-01 | End: 2021-01-01

## 2021-01-01 RX ORDER — MIDAZOLAM HYDROCHLORIDE 1 MG/ML
2 INJECTION, SOLUTION INTRAMUSCULAR; INTRAVENOUS ONCE
Status: DISCONTINUED | OUTPATIENT
Start: 2021-01-01 | End: 2021-01-01

## 2021-01-01 RX ORDER — LORAZEPAM 2 MG/ML
0.5 INJECTION INTRAMUSCULAR
Status: COMPLETED | OUTPATIENT
Start: 2021-01-01 | End: 2021-01-01

## 2021-01-01 RX ORDER — ACETAMINOPHEN 650 MG/1
650 SUPPOSITORY RECTAL
Status: DISCONTINUED | OUTPATIENT
Start: 2021-01-01 | End: 2021-01-01 | Stop reason: HOSPADM

## 2021-01-01 RX ORDER — HEPARIN 100 UNIT/ML
500 SYRINGE INTRAVENOUS AS NEEDED
Status: DISCONTINUED | OUTPATIENT
Start: 2021-01-01 | End: 2021-01-01 | Stop reason: HOSPADM

## 2021-01-01 RX ORDER — BUDESONIDE 0.5 MG/2ML
500 INHALANT ORAL
Status: DISCONTINUED | OUTPATIENT
Start: 2021-01-01 | End: 2021-01-01 | Stop reason: HOSPADM

## 2021-01-01 RX ORDER — SODIUM CHLORIDE 9 MG/ML
INJECTION, SOLUTION INTRAVENOUS
Status: DISCONTINUED | OUTPATIENT
Start: 2021-01-01 | End: 2021-01-01 | Stop reason: HOSPADM

## 2021-01-01 RX ORDER — POLYETHYLENE GLYCOL 3350 17 G/17G
17 POWDER, FOR SOLUTION ORAL
Status: DISCONTINUED | OUTPATIENT
Start: 2021-01-01 | End: 2021-01-01

## 2021-01-01 RX ORDER — LIDOCAINE HYDROCHLORIDE 10 MG/ML
10 INJECTION INFILTRATION; PERINEURAL
Status: DISCONTINUED | OUTPATIENT
Start: 2021-01-01 | End: 2021-01-01 | Stop reason: HOSPADM

## 2021-01-01 RX ORDER — METRONIDAZOLE 500 MG/1
500 TABLET ORAL EVERY 8 HOURS
Status: DISCONTINUED | OUTPATIENT
Start: 2021-01-01 | End: 2021-01-01

## 2021-01-01 RX ORDER — ENOXAPARIN SODIUM 100 MG/ML
40 INJECTION SUBCUTANEOUS EVERY 24 HOURS
Status: DISCONTINUED | OUTPATIENT
Start: 2021-01-01 | End: 2021-01-01 | Stop reason: HOSPADM

## 2021-01-01 RX ORDER — SODIUM CHLORIDE, SODIUM LACTATE, POTASSIUM CHLORIDE, CALCIUM CHLORIDE 600; 310; 30; 20 MG/100ML; MG/100ML; MG/100ML; MG/100ML
1000 INJECTION, SOLUTION INTRAVENOUS CONTINUOUS
Status: DISCONTINUED | OUTPATIENT
Start: 2021-01-01 | End: 2021-01-01

## 2021-01-01 RX ORDER — NALOXONE HYDROCHLORIDE 0.4 MG/ML
0.4 INJECTION, SOLUTION INTRAMUSCULAR; INTRAVENOUS; SUBCUTANEOUS
Status: DISCONTINUED | OUTPATIENT
Start: 2021-01-01 | End: 2021-01-01 | Stop reason: HOSPADM

## 2021-01-01 RX ORDER — MONTELUKAST SODIUM 10 MG/1
10 TABLET ORAL
Status: DISCONTINUED | OUTPATIENT
Start: 2021-01-01 | End: 2021-01-01 | Stop reason: HOSPADM

## 2021-01-01 RX ORDER — FENTANYL CITRATE 50 UG/ML
100 INJECTION, SOLUTION INTRAMUSCULAR; INTRAVENOUS ONCE
Status: DISCONTINUED | OUTPATIENT
Start: 2021-01-01 | End: 2021-01-01

## 2021-01-01 RX ORDER — NEOSTIGMINE METHYLSULFATE 1 MG/ML
INJECTION INTRAVENOUS AS NEEDED
Status: DISCONTINUED | OUTPATIENT
Start: 2021-01-01 | End: 2021-01-01 | Stop reason: HOSPADM

## 2021-01-01 RX ORDER — ASPIRIN 81 MG/1
81 TABLET ORAL DAILY
Qty: 30 TAB | Refills: 0 | Status: SHIPPED
Start: 2021-01-01 | End: 2021-01-01

## 2021-01-01 RX ORDER — SODIUM CHLORIDE 0.9 % (FLUSH) 0.9 %
5-40 SYRINGE (ML) INJECTION EVERY 8 HOURS
Status: DISCONTINUED | OUTPATIENT
Start: 2021-01-01 | End: 2021-01-01 | Stop reason: SDUPTHER

## 2021-01-01 RX ORDER — MIDODRINE HYDROCHLORIDE 5 MG/1
10 TABLET ORAL
Status: DISCONTINUED | OUTPATIENT
Start: 2021-01-01 | End: 2021-01-01 | Stop reason: HOSPADM

## 2021-01-01 RX ORDER — AMOXICILLIN AND CLAVULANATE POTASSIUM 875; 125 MG/1; MG/1
1 TABLET, FILM COATED ORAL EVERY 12 HOURS
Qty: 6 TABLET | Refills: 0 | Status: SHIPPED | OUTPATIENT
Start: 2021-01-01 | End: 2021-01-01

## 2021-01-01 RX ORDER — BISACODYL 5 MG
5 TABLET, DELAYED RELEASE (ENTERIC COATED) ORAL DAILY PRN
Status: DISCONTINUED | OUTPATIENT
Start: 2021-01-01 | End: 2021-01-01 | Stop reason: HOSPADM

## 2021-01-01 RX ORDER — NOREPINEPHRINE BITARTRATE/D5W 4MG/250ML
PLASTIC BAG, INJECTION (ML) INTRAVENOUS
Status: COMPLETED
Start: 2021-01-01 | End: 2021-01-01

## 2021-01-01 RX ORDER — EPHEDRINE SULFATE/0.9% NACL/PF 50 MG/5 ML
SYRINGE (ML) INTRAVENOUS AS NEEDED
Status: DISCONTINUED | OUTPATIENT
Start: 2021-01-01 | End: 2021-01-01 | Stop reason: HOSPADM

## 2021-01-01 RX ORDER — HYDROCODONE BITARTRATE AND ACETAMINOPHEN 5; 325 MG/1; MG/1
1 TABLET ORAL
Status: CANCELLED | OUTPATIENT
Start: 2021-01-01

## 2021-01-01 RX ORDER — SODIUM CHLORIDE 0.9 % (FLUSH) 0.9 %
5-40 SYRINGE (ML) INJECTION EVERY 8 HOURS
Status: CANCELLED | OUTPATIENT
Start: 2021-01-01

## 2021-01-01 RX ORDER — AMOXICILLIN 250 MG
1 CAPSULE ORAL 2 TIMES DAILY
Status: DISCONTINUED | OUTPATIENT
Start: 2021-01-01 | End: 2021-01-01 | Stop reason: HOSPADM

## 2021-01-01 RX ORDER — LEVALBUTEROL INHALATION SOLUTION 1.25 MG/3ML
1.25 SOLUTION RESPIRATORY (INHALATION) 4 TIMES DAILY
Status: CANCELLED | OUTPATIENT
Start: 2021-01-01

## 2021-01-01 RX ORDER — SODIUM CHLORIDE 0.9 % (FLUSH) 0.9 %
5-10 SYRINGE (ML) INJECTION AS NEEDED
Status: DISCONTINUED | OUTPATIENT
Start: 2021-01-01 | End: 2021-09-25 | Stop reason: HOSPADM

## 2021-01-01 RX ORDER — SODIUM CHLORIDE 0.9 % (FLUSH) 0.9 %
10 SYRINGE (ML) INJECTION AS NEEDED
Status: DISCONTINUED | OUTPATIENT
Start: 2021-01-01 | End: 2021-01-01 | Stop reason: HOSPADM

## 2021-01-01 RX ORDER — OXYCODONE AND ACETAMINOPHEN 5; 325 MG/1; MG/1
1 TABLET ORAL
Status: DISCONTINUED | OUTPATIENT
Start: 2021-01-01 | End: 2021-01-01 | Stop reason: HOSPADM

## 2021-01-01 RX ORDER — NOREPINEPHRINE BITARTRATE/D5W 4MG/250ML
PLASTIC BAG, INJECTION (ML) INTRAVENOUS
Status: ACTIVE
Start: 2021-01-01 | End: 2021-01-01

## 2021-01-01 RX ORDER — ETOMIDATE 2 MG/ML
20 INJECTION INTRAVENOUS ONCE
Status: COMPLETED | OUTPATIENT
Start: 2021-01-01 | End: 2021-01-01

## 2021-01-01 RX ORDER — SODIUM CHLORIDE, SODIUM LACTATE, POTASSIUM CHLORIDE, CALCIUM CHLORIDE 600; 310; 30; 20 MG/100ML; MG/100ML; MG/100ML; MG/100ML
50 INJECTION, SOLUTION INTRAVENOUS CONTINUOUS
Status: DISCONTINUED | OUTPATIENT
Start: 2021-01-01 | End: 2021-01-01

## 2021-01-01 RX ORDER — HYDROMORPHONE HYDROCHLORIDE 1 MG/ML
1 INJECTION, SOLUTION INTRAMUSCULAR; INTRAVENOUS; SUBCUTANEOUS
Status: DISCONTINUED | OUTPATIENT
Start: 2021-01-01 | End: 2021-01-01 | Stop reason: HOSPADM

## 2021-01-01 RX ORDER — GABAPENTIN 300 MG/1
300 CAPSULE ORAL 3 TIMES DAILY
Status: DISCONTINUED | OUTPATIENT
Start: 2021-01-01 | End: 2021-01-01 | Stop reason: HOSPADM

## 2021-01-01 RX ORDER — ROCURONIUM BROMIDE 10 MG/ML
INJECTION, SOLUTION INTRAVENOUS AS NEEDED
Status: DISCONTINUED | OUTPATIENT
Start: 2021-01-01 | End: 2021-01-01 | Stop reason: HOSPADM

## 2021-01-01 RX ORDER — PROPOFOL 10 MG/ML
INJECTION, EMULSION INTRAVENOUS
Status: DISCONTINUED | OUTPATIENT
Start: 2021-01-01 | End: 2021-01-01 | Stop reason: HOSPADM

## 2021-01-01 RX ORDER — ALBUMIN HUMAN 50 G/1000ML
SOLUTION INTRAVENOUS AS NEEDED
Status: DISCONTINUED | OUTPATIENT
Start: 2021-01-01 | End: 2021-01-01 | Stop reason: HOSPADM

## 2021-01-01 RX ORDER — LEVALBUTEROL TARTRATE 45 UG/1
2 AEROSOL, METERED ORAL
Status: DISCONTINUED | OUTPATIENT
Start: 2021-01-01 | End: 2021-01-01 | Stop reason: HOSPADM

## 2021-01-01 RX ORDER — ESMOLOL HYDROCHLORIDE 10 MG/ML
0-200 INJECTION INTRAVENOUS
Status: DISCONTINUED | OUTPATIENT
Start: 2021-01-01 | End: 2021-01-01

## 2021-01-01 RX ORDER — ACETAMINOPHEN 500 MG
1000 TABLET ORAL
Status: CANCELLED | OUTPATIENT
Start: 2021-01-01 | End: 2021-01-01

## 2021-01-01 RX ORDER — ACETAMINOPHEN 500 MG
1000 TABLET ORAL
Status: COMPLETED | OUTPATIENT
Start: 2021-01-01 | End: 2021-01-01

## 2021-01-01 RX ORDER — SODIUM CHLORIDE 9 MG/ML
250 INJECTION, SOLUTION INTRAVENOUS AS NEEDED
Status: DISCONTINUED | OUTPATIENT
Start: 2021-01-01 | End: 2021-01-01

## 2021-01-01 RX ORDER — MONTELUKAST SODIUM 10 MG/1
10 TABLET ORAL
Qty: 7 TABLET | Refills: 0 | Status: SHIPPED | OUTPATIENT
Start: 2021-01-01 | End: 2021-01-01

## 2021-01-01 RX ORDER — LIDOCAINE HYDROCHLORIDE ANHYDROUS AND DEXTROSE MONOHYDRATE .8; 5 G/100ML; G/100ML
INJECTION, SOLUTION INTRAVENOUS
Status: DISCONTINUED | OUTPATIENT
Start: 2021-01-01 | End: 2021-01-01 | Stop reason: HOSPADM

## 2021-01-01 RX ORDER — LIDOCAINE HYDROCHLORIDE 40 MG/ML
SOLUTION TOPICAL
Status: COMPLETED | OUTPATIENT
Start: 2021-01-01 | End: 2021-01-01

## 2021-01-01 RX ORDER — NALOXONE HYDROCHLORIDE 0.4 MG/ML
0.1 INJECTION, SOLUTION INTRAMUSCULAR; INTRAVENOUS; SUBCUTANEOUS
Status: DISCONTINUED | OUTPATIENT
Start: 2021-01-01 | End: 2021-01-01

## 2021-01-01 RX ORDER — POLYETHYLENE GLYCOL 3350 17 G/17G
17 POWDER, FOR SOLUTION ORAL DAILY
Status: DISCONTINUED | OUTPATIENT
Start: 2021-01-01 | End: 2021-01-01 | Stop reason: HOSPADM

## 2021-01-01 RX ORDER — HYDROCODONE BITARTRATE AND HOMATROPINE METHYLBROMIDE 1.5; 5 MG/5ML; MG/5ML
5 SYRUP ORAL
Status: DISCONTINUED | OUTPATIENT
Start: 2021-01-01 | End: 2021-01-01 | Stop reason: HOSPADM

## 2021-01-01 RX ORDER — CEFAZOLIN SODIUM/WATER 2 G/20 ML
2 SYRINGE (ML) INTRAVENOUS
Status: COMPLETED | OUTPATIENT
Start: 2021-01-01 | End: 2021-01-01

## 2021-01-01 RX ORDER — HYDROMORPHONE HYDROCHLORIDE 1 MG/ML
0.5 INJECTION, SOLUTION INTRAMUSCULAR; INTRAVENOUS; SUBCUTANEOUS
Status: DISCONTINUED | OUTPATIENT
Start: 2021-01-01 | End: 2021-01-01 | Stop reason: HOSPADM

## 2021-01-01 RX ORDER — FENTANYL CITRATE-0.9 % NACL/PF 25 MCG/ML
0-200 PLASTIC BAG, INJECTION (ML) INJECTION
Status: DISCONTINUED | OUTPATIENT
Start: 2021-01-01 | End: 2021-01-01

## 2021-01-01 RX ORDER — AMIODARONE HYDROCHLORIDE 200 MG/1
200 TABLET ORAL 2 TIMES DAILY
Qty: 90 TAB | Refills: 3 | Status: SHIPPED
Start: 2021-01-01 | End: 2021-01-01

## 2021-01-01 RX ORDER — SODIUM CHLORIDE 0.9 % (FLUSH) 0.9 %
10 SYRINGE (ML) INJECTION
Status: COMPLETED | OUTPATIENT
Start: 2021-01-01 | End: 2021-01-01

## 2021-01-01 RX ORDER — LEVALBUTEROL INHALATION SOLUTION 1.25 MG/3ML
1.25 SOLUTION RESPIRATORY (INHALATION) 4 TIMES DAILY
Qty: 120 NEBULE | Refills: 5 | Status: SHIPPED | OUTPATIENT
Start: 2021-01-01

## 2021-01-01 RX ORDER — ONDANSETRON 4 MG/1
8 TABLET, ORALLY DISINTEGRATING ORAL
Status: DISCONTINUED | OUTPATIENT
Start: 2021-01-01 | End: 2021-01-01 | Stop reason: HOSPADM

## 2021-01-01 RX ORDER — OXYCODONE HYDROCHLORIDE 5 MG/1
5 TABLET ORAL
Status: DISCONTINUED | OUTPATIENT
Start: 2021-01-01 | End: 2021-01-01 | Stop reason: HOSPADM

## 2021-01-01 RX ORDER — MORPHINE SULFATE 2 MG/ML
2 INJECTION, SOLUTION INTRAMUSCULAR; INTRAVENOUS
Status: DISCONTINUED | OUTPATIENT
Start: 2021-01-01 | End: 2021-01-01

## 2021-01-01 RX ORDER — ACETAMINOPHEN 500 MG
500 TABLET ORAL
Status: DISCONTINUED | OUTPATIENT
Start: 2021-01-01 | End: 2021-01-01 | Stop reason: HOSPADM

## 2021-01-01 RX ORDER — MIDAZOLAM HYDROCHLORIDE 1 MG/ML
2 INJECTION, SOLUTION INTRAMUSCULAR; INTRAVENOUS
Status: COMPLETED | OUTPATIENT
Start: 2021-01-01 | End: 2021-01-01

## 2021-01-01 RX ORDER — LEVALBUTEROL INHALATION SOLUTION 1.25 MG/3ML
1.25 SOLUTION RESPIRATORY (INHALATION)
Status: DISCONTINUED | OUTPATIENT
Start: 2021-01-01 | End: 2021-01-01 | Stop reason: HOSPADM

## 2021-01-01 RX ORDER — SODIUM CHLORIDE FOR INHALATION 3 %
4 VIAL, NEBULIZER (ML) INHALATION
Status: DISCONTINUED | OUTPATIENT
Start: 2021-01-01 | End: 2021-09-25 | Stop reason: HOSPADM

## 2021-01-01 RX ORDER — OXYCODONE HYDROCHLORIDE 5 MG/1
10 TABLET ORAL
Status: COMPLETED | OUTPATIENT
Start: 2021-01-01 | End: 2021-01-01

## 2021-01-01 RX ORDER — BUDESONIDE AND FORMOTEROL FUMARATE DIHYDRATE 160; 4.5 UG/1; UG/1
2 AEROSOL RESPIRATORY (INHALATION)
Status: DISCONTINUED | OUTPATIENT
Start: 2021-01-01 | End: 2021-01-01 | Stop reason: HOSPADM

## 2021-01-01 RX ORDER — SUCCINYLCHOLINE CHLORIDE 20 MG/ML INJECTION SOLUTION
1.5 SOLUTION
Status: DISCONTINUED | OUTPATIENT
Start: 2021-01-01 | End: 2021-01-01

## 2021-01-01 RX ORDER — HYDROMORPHONE HYDROCHLORIDE 2 MG/ML
0.5 INJECTION, SOLUTION INTRAMUSCULAR; INTRAVENOUS; SUBCUTANEOUS
Status: DISCONTINUED | OUTPATIENT
Start: 2021-01-01 | End: 2021-01-01 | Stop reason: HOSPADM

## 2021-01-01 RX ORDER — SODIUM CHLORIDE, SODIUM LACTATE, POTASSIUM CHLORIDE, CALCIUM CHLORIDE 600; 310; 30; 20 MG/100ML; MG/100ML; MG/100ML; MG/100ML
75 INJECTION, SOLUTION INTRAVENOUS CONTINUOUS
Status: DISCONTINUED | OUTPATIENT
Start: 2021-01-01 | End: 2021-01-01 | Stop reason: HOSPADM

## 2021-01-01 RX ORDER — HEPARIN 100 UNIT/ML
500 SYRINGE INTRAVENOUS ONCE
Status: COMPLETED | OUTPATIENT
Start: 2021-01-01 | End: 2021-01-01

## 2021-01-01 RX ORDER — MIDAZOLAM HYDROCHLORIDE 1 MG/ML
2 INJECTION, SOLUTION INTRAMUSCULAR; INTRAVENOUS
Status: DISCONTINUED | OUTPATIENT
Start: 2021-01-01 | End: 2021-01-01

## 2021-01-01 RX ORDER — HYDROCODONE BITARTRATE AND ACETAMINOPHEN 5; 325 MG/1; MG/1
1 TABLET ORAL
Status: DISCONTINUED | OUTPATIENT
Start: 2021-01-01 | End: 2021-01-01 | Stop reason: HOSPADM

## 2021-01-01 RX ORDER — LORAZEPAM 2 MG/ML
2 INJECTION INTRAMUSCULAR
Status: COMPLETED | OUTPATIENT
Start: 2021-01-01 | End: 2021-01-01

## 2021-01-01 RX ORDER — VANCOMYCIN 1.75 GRAM/500 ML IN 0.9 % SODIUM CHLORIDE INTRAVENOUS
1750 EVERY 12 HOURS
Status: DISCONTINUED | OUTPATIENT
Start: 2021-01-01 | End: 2021-09-25 | Stop reason: HOSPADM

## 2021-01-01 RX ORDER — MIDAZOLAM HYDROCHLORIDE 1 MG/ML
2 INJECTION, SOLUTION INTRAMUSCULAR; INTRAVENOUS
Status: DISCONTINUED | OUTPATIENT
Start: 2021-01-01 | End: 2021-01-01 | Stop reason: HOSPADM

## 2021-01-01 RX ORDER — IPRATROPIUM BROMIDE AND ALBUTEROL SULFATE 2.5; .5 MG/3ML; MG/3ML
3 SOLUTION RESPIRATORY (INHALATION)
Status: COMPLETED | OUTPATIENT
Start: 2021-01-01 | End: 2021-01-01

## 2021-01-01 RX ORDER — ONDANSETRON 8 MG/1
8 TABLET, ORALLY DISINTEGRATING ORAL
Status: DISCONTINUED | OUTPATIENT
Start: 2021-01-01 | End: 2021-01-01 | Stop reason: HOSPADM

## 2021-01-01 RX ORDER — HYDROMORPHONE HYDROCHLORIDE 2 MG/ML
0.5 INJECTION, SOLUTION INTRAMUSCULAR; INTRAVENOUS; SUBCUTANEOUS
Status: DISCONTINUED | OUTPATIENT
Start: 2021-01-01 | End: 2021-01-01

## 2021-01-01 RX ORDER — BUDESONIDE 0.5 MG/2ML
500 INHALANT ORAL
Status: CANCELLED | OUTPATIENT
Start: 2021-01-01

## 2021-01-01 RX ORDER — METOPROLOL TARTRATE 25 MG/1
12.5 TABLET, FILM COATED ORAL EVERY 12 HOURS
Status: DISCONTINUED | OUTPATIENT
Start: 2021-01-01 | End: 2021-01-01 | Stop reason: HOSPADM

## 2021-01-01 RX ORDER — ADHESIVE BANDAGE
30 BANDAGE TOPICAL DAILY PRN
Status: DISCONTINUED | OUTPATIENT
Start: 2021-01-01 | End: 2021-01-01 | Stop reason: HOSPADM

## 2021-01-01 RX ORDER — PREDNISONE 10 MG/1
TABLET ORAL
Qty: 21 TABLET | Refills: 0 | Status: SHIPPED | OUTPATIENT
Start: 2021-01-01 | End: 2021-01-01

## 2021-01-01 RX ORDER — VANCOMYCIN 2 GRAM/500 ML IN 0.9 % SODIUM CHLORIDE INTRAVENOUS
2000 ONCE
Status: COMPLETED | OUTPATIENT
Start: 2021-01-01 | End: 2021-01-01

## 2021-01-01 RX ORDER — ALBUTEROL SULFATE 0.83 MG/ML
5 SOLUTION RESPIRATORY (INHALATION)
Status: COMPLETED | OUTPATIENT
Start: 2021-01-01 | End: 2021-01-01

## 2021-01-01 RX ORDER — SODIUM CHLORIDE 0.9 % (FLUSH) 0.9 %
5-10 SYRINGE (ML) INJECTION EVERY 8 HOURS
Status: DISCONTINUED | OUTPATIENT
Start: 2021-01-01 | End: 2021-01-01

## 2021-01-01 RX ORDER — LIDOCAINE AND PRILOCAINE 25; 25 MG/G; MG/G
CREAM TOPICAL AS NEEDED
Status: DISCONTINUED | OUTPATIENT
Start: 2021-01-01 | End: 2021-01-01 | Stop reason: HOSPADM

## 2021-01-01 RX ORDER — DEXAMETHASONE SODIUM PHOSPHATE 100 MG/10ML
INJECTION INTRAMUSCULAR; INTRAVENOUS AS NEEDED
Status: DISCONTINUED | OUTPATIENT
Start: 2021-01-01 | End: 2021-01-01 | Stop reason: HOSPADM

## 2021-01-01 RX ORDER — SODIUM CHLORIDE, SODIUM LACTATE, POTASSIUM CHLORIDE, CALCIUM CHLORIDE 600; 310; 30; 20 MG/100ML; MG/100ML; MG/100ML; MG/100ML
INJECTION, SOLUTION INTRAVENOUS
Status: DISCONTINUED | OUTPATIENT
Start: 2021-01-01 | End: 2021-01-01 | Stop reason: HOSPADM

## 2021-01-01 RX ORDER — ALBUTEROL SULFATE 90 UG/1
2 AEROSOL, METERED RESPIRATORY (INHALATION)
Status: DISCONTINUED | OUTPATIENT
Start: 2021-01-01 | End: 2021-01-01

## 2021-01-01 RX ORDER — LIDOCAINE HYDROCHLORIDE 40 MG/ML
SOLUTION TOPICAL ONCE
Status: COMPLETED | OUTPATIENT
Start: 2021-01-01 | End: 2021-01-01

## 2021-01-01 RX ORDER — HEPARIN SODIUM 200 [USP'U]/100ML
1000 INJECTION, SOLUTION INTRAVENOUS AS NEEDED
Status: DISCONTINUED | OUTPATIENT
Start: 2021-01-01 | End: 2021-01-01 | Stop reason: HOSPADM

## 2021-01-01 RX ORDER — ACETAMINOPHEN 325 MG/1
650 TABLET ORAL
Status: DISCONTINUED | OUTPATIENT
Start: 2021-01-01 | End: 2021-01-01 | Stop reason: HOSPADM

## 2021-01-01 RX ORDER — SODIUM CHLORIDE 9 MG/ML
1000 INJECTION, SOLUTION INTRAVENOUS CONTINUOUS
Status: DISCONTINUED | OUTPATIENT
Start: 2021-01-01 | End: 2021-01-01

## 2021-01-01 RX ORDER — DOXYCYCLINE 100 MG/1
100 CAPSULE ORAL EVERY 12 HOURS
Status: COMPLETED | OUTPATIENT
Start: 2021-01-01 | End: 2021-01-01

## 2021-01-01 RX ORDER — GABAPENTIN 300 MG/1
300 CAPSULE ORAL 3 TIMES DAILY
Qty: 90 CAPSULE | Refills: 1 | Status: SHIPPED | OUTPATIENT
Start: 2021-01-01

## 2021-01-01 RX ORDER — LIDOCAINE HYDROCHLORIDE 10 MG/ML
0.1 INJECTION INFILTRATION; PERINEURAL AS NEEDED
Status: DISCONTINUED | OUTPATIENT
Start: 2021-01-01 | End: 2021-01-01 | Stop reason: HOSPADM

## 2021-01-01 RX ORDER — METOPROLOL SUCCINATE 50 MG/1
25 TABLET, EXTENDED RELEASE ORAL DAILY
Status: DISCONTINUED | OUTPATIENT
Start: 2021-01-01 | End: 2021-01-01 | Stop reason: HOSPADM

## 2021-01-01 RX ORDER — METOPROLOL SUCCINATE 50 MG/1
50 TABLET, EXTENDED RELEASE ORAL DAILY
Status: DISCONTINUED | OUTPATIENT
Start: 2021-01-01 | End: 2021-01-01

## 2021-01-01 RX ORDER — VANCOMYCIN 2 GRAM/500 ML IN 0.9 % SODIUM CHLORIDE INTRAVENOUS
2000 ONCE
Status: DISCONTINUED | OUTPATIENT
Start: 2021-01-01 | End: 2021-01-01 | Stop reason: ALTCHOICE

## 2021-01-01 RX ORDER — HYDROCODONE BITARTRATE AND HOMATROPINE METHYLBROMIDE 1.5; 5 MG/5ML; MG/5ML
5 SYRUP ORAL
Qty: 150 ML | Refills: 0 | Status: SHIPPED | OUTPATIENT
Start: 2021-01-01 | End: 2021-01-01

## 2021-01-01 RX ORDER — PANTOPRAZOLE SODIUM 40 MG/1
40 TABLET, DELAYED RELEASE ORAL DAILY
Qty: 30 TABLET | Refills: 0 | Status: SHIPPED | OUTPATIENT
Start: 2021-01-01 | End: 2021-01-01

## 2021-01-01 RX ORDER — MIDODRINE HYDROCHLORIDE 10 MG/1
10 TABLET ORAL
Qty: 90 TAB | Refills: 0 | Status: SHIPPED | OUTPATIENT
Start: 2021-01-01 | End: 2021-01-01

## 2021-01-01 RX ORDER — CEPHALEXIN 500 MG/1
500 CAPSULE ORAL 4 TIMES DAILY
Status: DISCONTINUED | OUTPATIENT
Start: 2021-01-01 | End: 2021-01-01 | Stop reason: HOSPADM

## 2021-01-01 RX ORDER — CEFAZOLIN SODIUM/WATER 2 G/20 ML
SYRINGE (ML) INTRAVENOUS AS NEEDED
Status: DISCONTINUED | OUTPATIENT
Start: 2021-01-01 | End: 2021-01-01 | Stop reason: HOSPADM

## 2021-01-01 RX ORDER — SUCCINYLCHOLINE CHLORIDE 20 MG/ML
INJECTION INTRAMUSCULAR; INTRAVENOUS AS NEEDED
Status: DISCONTINUED | OUTPATIENT
Start: 2021-01-01 | End: 2021-01-01 | Stop reason: HOSPADM

## 2021-01-01 RX ORDER — ENOXAPARIN SODIUM 100 MG/ML
40 INJECTION SUBCUTANEOUS EVERY 24 HOURS
Status: CANCELLED | OUTPATIENT
Start: 2021-01-01

## 2021-01-01 RX ORDER — LEVALBUTEROL INHALATION SOLUTION 0.63 MG/3ML
0.63 SOLUTION RESPIRATORY (INHALATION)
Status: DISCONTINUED | OUTPATIENT
Start: 2021-01-01 | End: 2021-01-01 | Stop reason: HOSPADM

## 2021-01-01 RX ORDER — IPRATROPIUM BROMIDE 21 UG/1
2 SPRAY, METERED NASAL 2 TIMES DAILY
Status: CANCELLED | OUTPATIENT
Start: 2021-01-01

## 2021-01-01 RX ORDER — IPRATROPIUM BROMIDE AND ALBUTEROL SULFATE 2.5; .5 MG/3ML; MG/3ML
3 SOLUTION RESPIRATORY (INHALATION)
Status: DISCONTINUED | OUTPATIENT
Start: 2021-01-01 | End: 2021-01-01

## 2021-01-01 RX ORDER — METOPROLOL SUCCINATE 50 MG/1
50 TABLET, EXTENDED RELEASE ORAL DAILY
Qty: 30 TAB | Refills: 5 | Status: SHIPPED | OUTPATIENT
Start: 2021-01-01

## 2021-01-01 RX ORDER — SODIUM CHLORIDE 9 MG/ML
250 INJECTION, SOLUTION INTRAVENOUS AS NEEDED
Status: DISCONTINUED | OUTPATIENT
Start: 2021-01-01 | End: 2021-01-01 | Stop reason: HOSPADM

## 2021-01-01 RX ORDER — HEPARIN 100 UNIT/ML
300 SYRINGE INTRAVENOUS AS NEEDED
Status: DISCONTINUED | OUTPATIENT
Start: 2021-01-01 | End: 2021-09-25 | Stop reason: HOSPADM

## 2021-01-01 RX ORDER — HYDROCODONE BITARTRATE AND ACETAMINOPHEN 5; 325 MG/1; MG/1
1 TABLET ORAL ONCE
Status: COMPLETED | OUTPATIENT
Start: 2021-01-01 | End: 2021-01-01

## 2021-01-01 RX ORDER — OXYCODONE AND ACETAMINOPHEN 5; 325 MG/1; MG/1
1 TABLET ORAL
Qty: 10 TAB | Refills: 0 | Status: SHIPPED | OUTPATIENT
Start: 2021-01-01 | End: 2021-01-01

## 2021-01-01 RX ORDER — HYDROCODONE BITARTRATE AND ACETAMINOPHEN 5; 325 MG/1; MG/1
1 TABLET ORAL
Qty: 10 TAB | Refills: 0 | Status: SHIPPED | OUTPATIENT
Start: 2021-01-01 | End: 2021-01-01

## 2021-01-01 RX ORDER — MIDODRINE HYDROCHLORIDE 5 MG/1
5 TABLET ORAL
Status: DISCONTINUED | OUTPATIENT
Start: 2021-01-01 | End: 2021-01-01 | Stop reason: HOSPADM

## 2021-01-01 RX ORDER — GUAIFENESIN 600 MG/1
1200 TABLET, EXTENDED RELEASE ORAL EVERY 12 HOURS
Status: DISCONTINUED | OUTPATIENT
Start: 2021-01-01 | End: 2021-01-01 | Stop reason: HOSPADM

## 2021-01-01 RX ORDER — MORPHINE SULFATE 2 MG/ML
2 INJECTION, SOLUTION INTRAMUSCULAR; INTRAVENOUS
Status: DISCONTINUED | OUTPATIENT
Start: 2021-01-01 | End: 2021-09-25 | Stop reason: HOSPADM

## 2021-01-01 RX ORDER — METRONIDAZOLE 500 MG/1
500 TABLET ORAL EVERY 12 HOURS
Status: DISCONTINUED | OUTPATIENT
Start: 2021-01-01 | End: 2021-01-01 | Stop reason: HOSPADM

## 2021-01-01 RX ORDER — FLUMAZENIL 0.1 MG/ML
0.2 INJECTION INTRAVENOUS
Status: DISCONTINUED | OUTPATIENT
Start: 2021-01-01 | End: 2021-01-01 | Stop reason: HOSPADM

## 2021-01-01 RX ORDER — LORAZEPAM 2 MG/ML
INJECTION INTRAMUSCULAR
Status: COMPLETED
Start: 2021-01-01 | End: 2021-01-01

## 2021-01-01 RX ORDER — HEPARIN SODIUM 5000 [USP'U]/100ML
12-25 INJECTION, SOLUTION INTRAVENOUS
Status: DISCONTINUED | OUTPATIENT
Start: 2021-01-01 | End: 2021-01-01

## 2021-01-01 RX ORDER — DOXYCYCLINE 100 MG/1
100 CAPSULE ORAL EVERY 12 HOURS
Qty: 56 CAPSULE | Refills: 0 | Status: SHIPPED
Start: 2021-01-01 | End: 2021-01-01

## 2021-01-01 RX ORDER — VANCOMYCIN HYDROCHLORIDE
1250 EVERY 8 HOURS
Status: DISCONTINUED | OUTPATIENT
Start: 2021-01-01 | End: 2021-01-01

## 2021-01-01 RX ORDER — FENTANYL CITRATE 50 UG/ML
INJECTION, SOLUTION INTRAMUSCULAR; INTRAVENOUS
Status: COMPLETED
Start: 2021-01-01 | End: 2021-01-01

## 2021-01-01 RX ORDER — NEOSTIGMINE METHYLSULFATE 1 MG/ML
INJECTION, SOLUTION INTRAVENOUS AS NEEDED
Status: DISCONTINUED | OUTPATIENT
Start: 2021-01-01 | End: 2021-01-01 | Stop reason: HOSPADM

## 2021-01-01 RX ORDER — SODIUM CHLORIDE 9 MG/ML
1000 INJECTION, SOLUTION INTRAVENOUS CONTINUOUS
Status: DISCONTINUED | OUTPATIENT
Start: 2021-01-01 | End: 2021-01-01 | Stop reason: HOSPADM

## 2021-01-01 RX ORDER — LORAZEPAM 2 MG/ML
4 INJECTION INTRAMUSCULAR ONCE
Status: COMPLETED | OUTPATIENT
Start: 2021-01-01 | End: 2021-01-01

## 2021-01-01 RX ORDER — SCOLOPAMINE TRANSDERMAL SYSTEM 1 MG/1
1 PATCH, EXTENDED RELEASE TRANSDERMAL
Status: DISCONTINUED | OUTPATIENT
Start: 2021-01-01 | End: 2021-09-25 | Stop reason: HOSPADM

## 2021-01-01 RX ORDER — CEPHALEXIN 500 MG/1
500 CAPSULE ORAL EVERY 6 HOURS
Status: DISCONTINUED | OUTPATIENT
Start: 2021-01-01 | End: 2021-01-01

## 2021-01-01 RX ORDER — HYDROCODONE BITARTRATE AND HOMATROPINE METHYLBROMIDE ORAL SOLUTION 5; 1.5 MG/5ML; MG/5ML
5 LIQUID ORAL
Status: CANCELLED | OUTPATIENT
Start: 2021-01-01

## 2021-01-01 RX ORDER — SODIUM CHLORIDE 0.9 % (FLUSH) 0.9 %
5-40 SYRINGE (ML) INJECTION EVERY 8 HOURS
Status: DISCONTINUED | OUTPATIENT
Start: 2021-01-01 | End: 2021-01-01

## 2021-01-01 RX ORDER — ENOXAPARIN SODIUM 100 MG/ML
40 INJECTION SUBCUTANEOUS DAILY
Status: DISCONTINUED | OUTPATIENT
Start: 2021-01-01 | End: 2021-01-01 | Stop reason: SDUPTHER

## 2021-01-01 RX ORDER — SODIUM CHLORIDE 0.9 % (FLUSH) 0.9 %
5-10 SYRINGE (ML) INJECTION AS NEEDED
Status: DISCONTINUED | OUTPATIENT
Start: 2021-01-01 | End: 2021-01-01 | Stop reason: HOSPADM

## 2021-01-01 RX ORDER — IPRATROPIUM BROMIDE AND ALBUTEROL SULFATE 2.5; .5 MG/3ML; MG/3ML
3 SOLUTION RESPIRATORY (INHALATION)
Status: DISCONTINUED | OUTPATIENT
Start: 2021-01-01 | End: 2021-01-01 | Stop reason: HOSPADM

## 2021-01-01 RX ORDER — CEFAZOLIN SODIUM/WATER 2 G/20 ML
2 SYRINGE (ML) INTRAVENOUS EVERY 8 HOURS
Status: COMPLETED | OUTPATIENT
Start: 2021-01-01 | End: 2021-01-01

## 2021-01-01 RX ORDER — BISACODYL 5 MG
5 TABLET, DELAYED RELEASE (ENTERIC COATED) ORAL DAILY PRN
Status: CANCELLED | OUTPATIENT
Start: 2021-01-01

## 2021-01-01 RX ORDER — CEPHALEXIN 500 MG/1
500 CAPSULE ORAL 4 TIMES DAILY
Qty: 120 CAPSULE | Refills: 0 | Status: ON HOLD | OUTPATIENT
Start: 2021-01-01 | End: 2021-01-01 | Stop reason: SDUPTHER

## 2021-01-01 RX ORDER — GLYCOPYRROLATE 0.2 MG/ML
0.2 INJECTION INTRAMUSCULAR; INTRAVENOUS
Status: DISCONTINUED | OUTPATIENT
Start: 2021-01-01 | End: 2021-09-25 | Stop reason: HOSPADM

## 2021-01-01 RX ORDER — AMIODARONE HYDROCHLORIDE 200 MG/1
200 TABLET ORAL DAILY
Status: DISCONTINUED | OUTPATIENT
Start: 2021-01-01 | End: 2021-01-01 | Stop reason: HOSPADM

## 2021-01-01 RX ORDER — DEXAMETHASONE SODIUM PHOSPHATE 4 MG/ML
INJECTION, SOLUTION INTRA-ARTICULAR; INTRALESIONAL; INTRAMUSCULAR; INTRAVENOUS; SOFT TISSUE AS NEEDED
Status: DISCONTINUED | OUTPATIENT
Start: 2021-01-01 | End: 2021-01-01 | Stop reason: HOSPADM

## 2021-01-01 RX ORDER — DIPHENHYDRAMINE HCL 25 MG
25 CAPSULE ORAL
Status: DISCONTINUED | OUTPATIENT
Start: 2021-01-01 | End: 2021-01-01 | Stop reason: HOSPADM

## 2021-01-01 RX ORDER — HEPARIN 100 UNIT/ML
500 SYRINGE INTRAVENOUS AS NEEDED
Status: DISCONTINUED | OUTPATIENT
Start: 2021-01-01 | End: 2021-01-01 | Stop reason: SDUPTHER

## 2021-01-01 RX ORDER — LEVALBUTEROL INHALATION SOLUTION 1.25 MG/3ML
1.25 SOLUTION RESPIRATORY (INHALATION)
Status: DISCONTINUED | OUTPATIENT
Start: 2021-01-01 | End: 2021-01-01

## 2021-01-01 RX ORDER — SODIUM CHLORIDE, SODIUM LACTATE, POTASSIUM CHLORIDE, CALCIUM CHLORIDE 600; 310; 30; 20 MG/100ML; MG/100ML; MG/100ML; MG/100ML
1000 INJECTION, SOLUTION INTRAVENOUS CONTINUOUS
Status: DISCONTINUED | OUTPATIENT
Start: 2021-01-01 | End: 2021-01-01 | Stop reason: HOSPADM

## 2021-01-01 RX ORDER — TRAMADOL HYDROCHLORIDE 50 MG/1
50 TABLET ORAL
Status: DISCONTINUED | OUTPATIENT
Start: 2021-01-01 | End: 2021-01-01 | Stop reason: HOSPADM

## 2021-01-01 RX ORDER — METRONIDAZOLE 500 MG/1
500 TABLET ORAL 3 TIMES DAILY
Qty: 90 TABLET | Refills: 0 | Status: SHIPPED | OUTPATIENT
Start: 2021-01-01 | End: 2021-01-01

## 2021-01-01 RX ORDER — SODIUM CHLORIDE FOR INHALATION 3 %
4 VIAL, NEBULIZER (ML) INHALATION 3 TIMES DAILY
Status: DISCONTINUED | OUTPATIENT
Start: 2021-01-01 | End: 2021-01-01

## 2021-01-01 RX ORDER — BUDESONIDE 0.5 MG/2ML
500 INHALANT ORAL 2 TIMES DAILY
Status: DISCONTINUED | OUTPATIENT
Start: 2021-01-01 | End: 2021-01-01

## 2021-01-01 RX ORDER — SODIUM CHLORIDE, SODIUM LACTATE, POTASSIUM CHLORIDE, CALCIUM CHLORIDE 600; 310; 30; 20 MG/100ML; MG/100ML; MG/100ML; MG/100ML
100 INJECTION, SOLUTION INTRAVENOUS CONTINUOUS
Status: ACTIVE | OUTPATIENT
Start: 2021-01-01 | End: 2021-01-01

## 2021-01-01 RX ORDER — LORAZEPAM 2 MG/ML
INJECTION INTRAMUSCULAR
Status: DISCONTINUED
Start: 2021-01-01 | End: 2021-09-25 | Stop reason: HOSPADM

## 2021-01-01 RX ORDER — AMIODARONE HYDROCHLORIDE 200 MG/1
400 TABLET ORAL ONCE
Status: COMPLETED | OUTPATIENT
Start: 2021-01-01 | End: 2021-01-01

## 2021-01-01 RX ORDER — HEPARIN SODIUM 5000 [USP'U]/ML
5000 INJECTION, SOLUTION INTRAVENOUS; SUBCUTANEOUS ONCE
Status: CANCELLED | OUTPATIENT
Start: 2021-01-01 | End: 2021-01-01

## 2021-01-01 RX ORDER — ONDANSETRON 2 MG/ML
4 INJECTION INTRAMUSCULAR; INTRAVENOUS
Status: CANCELLED | OUTPATIENT
Start: 2021-01-01

## 2021-01-01 RX ORDER — SUCCINYLCHOLINE CHLORIDE 20 MG/ML INJECTION SOLUTION
120 SOLUTION
Status: COMPLETED | OUTPATIENT
Start: 2021-01-01 | End: 2021-01-01

## 2021-01-01 RX ADMIN — MIDODRINE HYDROCHLORIDE 5 MG: 5 TABLET ORAL at 16:17

## 2021-01-01 RX ADMIN — LEVALBUTEROL HYDROCHLORIDE 1.25 MG: 1.25 SOLUTION RESPIRATORY (INHALATION) at 19:25

## 2021-01-01 RX ADMIN — HYDROCODONE BITARTRATE AND HOMATROPINE METHYLBROMIDE 5 ML: 5; 1.5 SOLUTION ORAL at 15:02

## 2021-01-01 RX ADMIN — DEXAMETHASONE SODIUM PHOSPHATE 10 MG: 10 INJECTION INTRAMUSCULAR; INTRAVENOUS at 07:30

## 2021-01-01 RX ADMIN — PANTOPRAZOLE SODIUM 40 MG: 40 TABLET, DELAYED RELEASE ORAL at 17:09

## 2021-01-01 RX ADMIN — ACETAMINOPHEN 1000 MG: 500 TABLET ORAL at 05:50

## 2021-01-01 RX ADMIN — GUAIFENESIN 1200 MG: 600 TABLET ORAL at 08:20

## 2021-01-01 RX ADMIN — DOXYCYCLINE HYCLATE 100 MG: 100 CAPSULE ORAL at 09:18

## 2021-01-01 RX ADMIN — HYDROCODONE BITARTRATE AND HOMATROPINE METHYLBROMIDE 5 ML: 5; 1.5 SOLUTION ORAL at 19:24

## 2021-01-01 RX ADMIN — LEVALBUTEROL HYDROCHLORIDE 0.63 MG: 0.63 SOLUTION RESPIRATORY (INHALATION) at 20:05

## 2021-01-01 RX ADMIN — LEVALBUTEROL HYDROCHLORIDE 1.25 MG: 1.25 SOLUTION RESPIRATORY (INHALATION) at 12:06

## 2021-01-01 RX ADMIN — APIXABAN 5 MG: 5 TABLET, FILM COATED ORAL at 10:45

## 2021-01-01 RX ADMIN — AMIODARONE HYDROCHLORIDE 200 MG: 200 TABLET ORAL at 08:23

## 2021-01-01 RX ADMIN — SODIUM CHLORIDE 10 ML: 9 INJECTION, SOLUTION INTRAMUSCULAR; INTRAVENOUS; SUBCUTANEOUS at 11:26

## 2021-01-01 RX ADMIN — GABAPENTIN 300 MG: 300 CAPSULE ORAL at 22:40

## 2021-01-01 RX ADMIN — OXYCODONE AND ACETAMINOPHEN 1 TABLET: 5; 325 TABLET ORAL at 01:28

## 2021-01-01 RX ADMIN — SODIUM CHLORIDE SOLN NEBU 3% 4 ML: 3 NEBU SOLN at 20:08

## 2021-01-01 RX ADMIN — BUDESONIDE 500 MCG: 0.5 INHALANT RESPIRATORY (INHALATION) at 07:47

## 2021-01-01 RX ADMIN — GABAPENTIN 300 MG: 300 CAPSULE ORAL at 09:17

## 2021-01-01 RX ADMIN — GABAPENTIN 300 MG: 300 CAPSULE ORAL at 21:05

## 2021-01-01 RX ADMIN — SODIUM CHLORIDE 10 ML: 9 INJECTION, SOLUTION INTRAMUSCULAR; INTRAVENOUS; SUBCUTANEOUS at 16:07

## 2021-01-01 RX ADMIN — MIDODRINE HYDROCHLORIDE 10 MG: 5 TABLET ORAL at 07:59

## 2021-01-01 RX ADMIN — GUAIFENESIN 1200 MG: 600 TABLET ORAL at 09:36

## 2021-01-01 RX ADMIN — AMPICILLIN SODIUM AND SULBACTAM SODIUM 3 G: 2; 1 INJECTION, POWDER, FOR SOLUTION INTRAMUSCULAR; INTRAVENOUS at 15:25

## 2021-01-01 RX ADMIN — LEVALBUTEROL HYDROCHLORIDE 1.25 MG: 1.25 SOLUTION RESPIRATORY (INHALATION) at 21:22

## 2021-01-01 RX ADMIN — LEVALBUTEROL HYDROCHLORIDE 1.25 MG: 1.25 SOLUTION RESPIRATORY (INHALATION) at 04:40

## 2021-01-01 RX ADMIN — LEVALBUTEROL HYDROCHLORIDE 1.25 MG: 1.25 SOLUTION RESPIRATORY (INHALATION) at 15:39

## 2021-01-01 RX ADMIN — ROCURONIUM BROMIDE 50 MG: 10 INJECTION, SOLUTION INTRAVENOUS at 07:16

## 2021-01-01 RX ADMIN — BUDESONIDE AND FORMOTEROL FUMARATE DIHYDRATE 2 PUFF: 160; 4.5 AEROSOL RESPIRATORY (INHALATION) at 20:05

## 2021-01-01 RX ADMIN — BUDESONIDE 500 MCG: 0.5 INHALANT RESPIRATORY (INHALATION) at 20:33

## 2021-01-01 RX ADMIN — METOPROLOL SUCCINATE 50 MG: 50 TABLET, EXTENDED RELEASE ORAL at 09:00

## 2021-01-01 RX ADMIN — HYDROCODONE BITARTRATE AND ACETAMINOPHEN 1 TABLET: 5; 325 TABLET ORAL at 23:13

## 2021-01-01 RX ADMIN — SUGAMMADEX 200 MG: 100 INJECTION, SOLUTION INTRAVENOUS at 15:50

## 2021-01-01 RX ADMIN — AMIODARONE HYDROCHLORIDE 200 MG: 200 TABLET ORAL at 09:26

## 2021-01-01 RX ADMIN — LEVALBUTEROL HYDROCHLORIDE 1.25 MG: 1.25 SOLUTION RESPIRATORY (INHALATION) at 11:30

## 2021-01-01 RX ADMIN — GABAPENTIN 300 MG: 300 CAPSULE ORAL at 15:00

## 2021-01-01 RX ADMIN — OXYCODONE AND ACETAMINOPHEN 1 TABLET: 5; 325 TABLET ORAL at 22:21

## 2021-01-01 RX ADMIN — SODIUM CHLORIDE SOLN NEBU 3% 4 ML: 3 NEBU SOLN at 19:40

## 2021-01-01 RX ADMIN — SODIUM CHLORIDE 20 ML: 9 INJECTION, SOLUTION INTRAMUSCULAR; INTRAVENOUS; SUBCUTANEOUS at 06:01

## 2021-01-01 RX ADMIN — LEVALBUTEROL HYDROCHLORIDE 1.25 MG: 1.25 SOLUTION RESPIRATORY (INHALATION) at 01:10

## 2021-01-01 RX ADMIN — GABAPENTIN 300 MG: 300 CAPSULE ORAL at 21:57

## 2021-01-01 RX ADMIN — PIPERACILLIN SODIUM AND TAZOBACTAM SODIUM 4.5 G: 4; .5 INJECTION, POWDER, LYOPHILIZED, FOR SOLUTION INTRAVENOUS at 01:39

## 2021-01-01 RX ADMIN — ESMOLOL HYDROCHLORIDE 50 MCG/KG/MIN: 10 INJECTION INTRAVENOUS at 13:26

## 2021-01-01 RX ADMIN — SODIUM CHLORIDE 10 ML: 9 INJECTION, SOLUTION INTRAMUSCULAR; INTRAVENOUS; SUBCUTANEOUS at 06:00

## 2021-01-01 RX ADMIN — LEVALBUTEROL HYDROCHLORIDE 1.25 MG: 1.25 SOLUTION RESPIRATORY (INHALATION) at 07:47

## 2021-01-01 RX ADMIN — ACETAMINOPHEN 1000 MG: 500 TABLET ORAL at 16:36

## 2021-01-01 RX ADMIN — Medication 5 MG: at 13:50

## 2021-01-01 RX ADMIN — APIXABAN 5 MG: 5 TABLET, FILM COATED ORAL at 17:14

## 2021-01-01 RX ADMIN — LORAZEPAM 2 MG: 2 INJECTION INTRAMUSCULAR at 13:08

## 2021-01-01 RX ADMIN — LEVALBUTEROL HYDROCHLORIDE 1.25 MG: 1.25 SOLUTION RESPIRATORY (INHALATION) at 15:23

## 2021-01-01 RX ADMIN — SODIUM CHLORIDE: 900 INJECTION, SOLUTION INTRAVENOUS at 13:45

## 2021-01-01 RX ADMIN — SODIUM CHLORIDE 10 ML: 9 INJECTION, SOLUTION INTRAMUSCULAR; INTRAVENOUS; SUBCUTANEOUS at 16:32

## 2021-01-01 RX ADMIN — SODIUM CHLORIDE 10 ML: 9 INJECTION, SOLUTION INTRAMUSCULAR; INTRAVENOUS; SUBCUTANEOUS at 21:02

## 2021-01-01 RX ADMIN — GUAIFENESIN 1200 MG: 600 TABLET ORAL at 21:58

## 2021-01-01 RX ADMIN — BUDESONIDE 500 MCG: 0.5 INHALANT RESPIRATORY (INHALATION) at 19:40

## 2021-01-01 RX ADMIN — DOXYCYCLINE HYCLATE 100 MG: 100 CAPSULE ORAL at 21:07

## 2021-01-01 RX ADMIN — FENTANYL CITRATE 50 MCG: 50 INJECTION INTRAMUSCULAR; INTRAVENOUS at 08:59

## 2021-01-01 RX ADMIN — GUAIFENESIN 1200 MG: 600 TABLET ORAL at 09:26

## 2021-01-01 RX ADMIN — LEVALBUTEROL HYDROCHLORIDE 1.25 MG: 1.25 SOLUTION RESPIRATORY (INHALATION) at 07:50

## 2021-01-01 RX ADMIN — IOPAMIDOL 80 ML: 755 INJECTION, SOLUTION INTRAVENOUS at 09:41

## 2021-01-01 RX ADMIN — APIXABAN 5 MG: 5 TABLET, FILM COATED ORAL at 21:33

## 2021-01-01 RX ADMIN — GABAPENTIN 300 MG: 300 CAPSULE ORAL at 21:34

## 2021-01-01 RX ADMIN — GABAPENTIN 300 MG: 300 CAPSULE ORAL at 20:33

## 2021-01-01 RX ADMIN — METHYLPREDNISOLONE SODIUM SUCCINATE 40 MG: 40 INJECTION, POWDER, FOR SOLUTION INTRAMUSCULAR; INTRAVENOUS at 05:38

## 2021-01-01 RX ADMIN — FENTANYL CITRATE 100 MCG: 50 INJECTION, SOLUTION INTRAMUSCULAR; INTRAVENOUS at 18:42

## 2021-01-01 RX ADMIN — PHENYLEPHRINE HYDROCHLORIDE 100 MCG: 10 INJECTION INTRAVENOUS at 15:09

## 2021-01-01 RX ADMIN — GUAIFENESIN 1200 MG: 600 TABLET ORAL at 05:49

## 2021-01-01 RX ADMIN — MIDODRINE HYDROCHLORIDE 10 MG: 5 TABLET ORAL at 08:29

## 2021-01-01 RX ADMIN — SODIUM CHLORIDE 10 ML: 9 INJECTION, SOLUTION INTRAMUSCULAR; INTRAVENOUS; SUBCUTANEOUS at 21:43

## 2021-01-01 RX ADMIN — TRAMADOL HYDROCHLORIDE 50 MG: 50 TABLET, COATED ORAL at 08:51

## 2021-01-01 RX ADMIN — IPRATROPIUM BROMIDE 2 SPRAY: 21 SPRAY NASAL at 09:00

## 2021-01-01 RX ADMIN — LEVALBUTEROL HYDROCHLORIDE 1.25 MG: 1.25 SOLUTION RESPIRATORY (INHALATION) at 11:38

## 2021-01-01 RX ADMIN — DOXYCYCLINE HYCLATE 100 MG: 100 CAPSULE ORAL at 09:11

## 2021-01-01 RX ADMIN — AMPICILLIN SODIUM AND SULBACTAM SODIUM 3 G: 2; 1 INJECTION, POWDER, FOR SOLUTION INTRAMUSCULAR; INTRAVENOUS at 13:56

## 2021-01-01 RX ADMIN — FENTANYL CITRATE 50 MCG: 50 INJECTION INTRAMUSCULAR; INTRAVENOUS at 09:20

## 2021-01-01 RX ADMIN — POLYETHYLENE GLYCOL 3350 17 G: 17 POWDER, FOR SOLUTION ORAL at 09:14

## 2021-01-01 RX ADMIN — PANTOPRAZOLE SODIUM 40 MG: 40 TABLET, DELAYED RELEASE ORAL at 05:17

## 2021-01-01 RX ADMIN — DEXAMETHASONE SODIUM PHOSPHATE 10 MG: 10 INJECTION INTRAMUSCULAR; INTRAVENOUS at 07:50

## 2021-01-01 RX ADMIN — SODIUM CHLORIDE 10 ML: 9 INJECTION, SOLUTION INTRAMUSCULAR; INTRAVENOUS; SUBCUTANEOUS at 05:15

## 2021-01-01 RX ADMIN — NEOSTIGMINE METHYLSULFATE 4 MG: 1 INJECTION INTRAVENOUS at 18:00

## 2021-01-01 RX ADMIN — CEFTRIAXONE 2 G: 2 INJECTION, POWDER, FOR SOLUTION INTRAMUSCULAR; INTRAVENOUS at 12:18

## 2021-01-01 RX ADMIN — ENOXAPARIN SODIUM 40 MG: 40 INJECTION SUBCUTANEOUS at 08:59

## 2021-01-01 RX ADMIN — PHENYLEPHRINE HYDROCHLORIDE 100 MCG: 10 INJECTION INTRAVENOUS at 09:00

## 2021-01-01 RX ADMIN — VANCOMYCIN HYDROCHLORIDE 1250 MG: 10 INJECTION, POWDER, LYOPHILIZED, FOR SOLUTION INTRAVENOUS at 21:05

## 2021-01-01 RX ADMIN — METOPROLOL TARTRATE 12.5 MG: 25 TABLET, FILM COATED ORAL at 08:20

## 2021-01-01 RX ADMIN — FENTANYL CITRATE 50 MCG: 50 INJECTION INTRAMUSCULAR; INTRAVENOUS at 13:10

## 2021-01-01 RX ADMIN — MORPHINE SULFATE 2 MG: 2 INJECTION, SOLUTION INTRAMUSCULAR; INTRAVENOUS at 18:08

## 2021-01-01 RX ADMIN — ACETAMINOPHEN 500 MG: 500 TABLET ORAL at 20:40

## 2021-01-01 RX ADMIN — HYDROCODONE BITARTRATE AND ACETAMINOPHEN 1 TABLET: 5; 325 TABLET ORAL at 19:06

## 2021-01-01 RX ADMIN — SODIUM CHLORIDE SOLN NEBU 3% 4 ML: 3 NEBU SOLN at 21:13

## 2021-01-01 RX ADMIN — MIDODRINE HYDROCHLORIDE 5 MG: 5 TABLET ORAL at 09:03

## 2021-01-01 RX ADMIN — MIDODRINE HYDROCHLORIDE 10 MG: 5 TABLET ORAL at 09:26

## 2021-01-01 RX ADMIN — AMIODARONE HYDROCHLORIDE 200 MG: 200 TABLET ORAL at 09:12

## 2021-01-01 RX ADMIN — MIDODRINE HYDROCHLORIDE 10 MG: 5 TABLET ORAL at 16:18

## 2021-01-01 RX ADMIN — Medication 5 ML: at 05:52

## 2021-01-01 RX ADMIN — LEVALBUTEROL HYDROCHLORIDE 1.25 MG: 1.25 SOLUTION RESPIRATORY (INHALATION) at 08:00

## 2021-01-01 RX ADMIN — Medication 1 AMPULE: at 09:36

## 2021-01-01 RX ADMIN — CEFTRIAXONE 2 G: 2 INJECTION, POWDER, FOR SOLUTION INTRAMUSCULAR; INTRAVENOUS at 09:05

## 2021-01-01 RX ADMIN — Medication 5 MG: at 14:28

## 2021-01-01 RX ADMIN — SODIUM CHLORIDE 1000 ML: 900 INJECTION, SOLUTION INTRAVENOUS at 09:41

## 2021-01-01 RX ADMIN — FENTANYL CITRATE 100 MCG: 50 INJECTION INTRAMUSCULAR; INTRAVENOUS at 13:16

## 2021-01-01 RX ADMIN — LEVALBUTEROL HYDROCHLORIDE 1.25 MG: 1.25 SOLUTION RESPIRATORY (INHALATION) at 11:11

## 2021-01-01 RX ADMIN — SODIUM CHLORIDE, SODIUM LACTATE, POTASSIUM CHLORIDE, AND CALCIUM CHLORIDE 1000 ML: 600; 310; 30; 20 INJECTION, SOLUTION INTRAVENOUS at 01:51

## 2021-01-01 RX ADMIN — HYDROCODONE BITARTRATE AND HOMATROPINE METHYLBROMIDE 5 ML: 5; 1.5 SOLUTION ORAL at 23:05

## 2021-01-01 RX ADMIN — GABAPENTIN 300 MG: 300 CAPSULE ORAL at 08:29

## 2021-01-01 RX ADMIN — GABAPENTIN 300 MG: 300 CAPSULE ORAL at 21:00

## 2021-01-01 RX ADMIN — PANTOPRAZOLE SODIUM 40 MG: 40 TABLET, DELAYED RELEASE ORAL at 08:16

## 2021-01-01 RX ADMIN — IPRATROPIUM BROMIDE 2 SPRAY: 21 SPRAY NASAL at 09:13

## 2021-01-01 RX ADMIN — BUDESONIDE 500 MCG: 0.5 INHALANT RESPIRATORY (INHALATION) at 07:48

## 2021-01-01 RX ADMIN — LEVALBUTEROL HYDROCHLORIDE 1.25 MG: 1.25 SOLUTION RESPIRATORY (INHALATION) at 20:25

## 2021-01-01 RX ADMIN — GABAPENTIN 300 MG: 300 CAPSULE ORAL at 17:10

## 2021-01-01 RX ADMIN — LEVALBUTEROL HYDROCHLORIDE 1.25 MG: 1.25 SOLUTION RESPIRATORY (INHALATION) at 15:42

## 2021-01-01 RX ADMIN — PHENYLEPHRINE HYDROCHLORIDE 120 MCG: 10 INJECTION INTRAVENOUS at 13:30

## 2021-01-01 RX ADMIN — LEVALBUTEROL HYDROCHLORIDE 1.25 MG: 1.25 SOLUTION RESPIRATORY (INHALATION) at 20:12

## 2021-01-01 RX ADMIN — CEPHALEXIN 500 MG: 500 CAPSULE ORAL at 17:33

## 2021-01-01 RX ADMIN — LEVALBUTEROL HYDROCHLORIDE 1.25 MG: 1.25 SOLUTION RESPIRATORY (INHALATION) at 05:18

## 2021-01-01 RX ADMIN — ROCURONIUM BROMIDE 10 MG: 10 INJECTION, SOLUTION INTRAVENOUS at 10:01

## 2021-01-01 RX ADMIN — LEVALBUTEROL HYDROCHLORIDE 1.25 MG: 1.25 SOLUTION RESPIRATORY (INHALATION) at 15:50

## 2021-01-01 RX ADMIN — PANTOPRAZOLE SODIUM 40 MG: 40 TABLET, DELAYED RELEASE ORAL at 15:38

## 2021-01-01 RX ADMIN — BUDESONIDE 500 MCG: 0.5 INHALANT RESPIRATORY (INHALATION) at 21:21

## 2021-01-01 RX ADMIN — METRONIDAZOLE 500 MG: 500 TABLET ORAL at 09:05

## 2021-01-01 RX ADMIN — GABAPENTIN 300 MG: 300 CAPSULE ORAL at 10:11

## 2021-01-01 RX ADMIN — PHENYLEPHRINE HYDROCHLORIDE 120 MCG: 10 INJECTION INTRAVENOUS at 13:57

## 2021-01-01 RX ADMIN — SENNOSIDES AND DOCUSATE SODIUM 1 TABLET: 8.6; 5 TABLET ORAL at 21:22

## 2021-01-01 RX ADMIN — CEPHALEXIN 500 MG: 500 CAPSULE ORAL at 21:23

## 2021-01-01 RX ADMIN — GABAPENTIN 300 MG: 300 CAPSULE ORAL at 16:57

## 2021-01-01 RX ADMIN — PANTOPRAZOLE SODIUM 40 MG: 40 TABLET, DELAYED RELEASE ORAL at 18:04

## 2021-01-01 RX ADMIN — CEPHALEXIN 500 MG: 500 CAPSULE ORAL at 20:54

## 2021-01-01 RX ADMIN — Medication 5 ML: at 17:52

## 2021-01-01 RX ADMIN — ENOXAPARIN SODIUM 80 MG: 40 INJECTION SUBCUTANEOUS at 18:13

## 2021-01-01 RX ADMIN — SODIUM CHLORIDE SOLN NEBU 3% 4 ML: 3 NEBU SOLN at 07:36

## 2021-01-01 RX ADMIN — ROCURONIUM BROMIDE 20 MG: 10 INJECTION, SOLUTION INTRAVENOUS at 07:48

## 2021-01-01 RX ADMIN — PHENYLEPHRINE HYDROCHLORIDE 100 MCG: 10 INJECTION INTRAVENOUS at 15:00

## 2021-01-01 RX ADMIN — LEVALBUTEROL HYDROCHLORIDE 1.25 MG: 1.25 SOLUTION RESPIRATORY (INHALATION) at 11:34

## 2021-01-01 RX ADMIN — MIDODRINE HYDROCHLORIDE 10 MG: 5 TABLET ORAL at 08:59

## 2021-01-01 RX ADMIN — PIPERACILLIN SODIUM AND TAZOBACTAM SODIUM 4.5 G: 4; .5 INJECTION, POWDER, LYOPHILIZED, FOR SOLUTION INTRAVENOUS at 08:59

## 2021-01-01 RX ADMIN — PIPERACILLIN AND TAZOBACTAM 4.5 G: 4; .5 INJECTION, POWDER, FOR SOLUTION INTRAVENOUS at 23:44

## 2021-01-01 RX ADMIN — GABAPENTIN 300 MG: 300 CAPSULE ORAL at 15:45

## 2021-01-01 RX ADMIN — GABAPENTIN 300 MG: 300 CAPSULE ORAL at 08:34

## 2021-01-01 RX ADMIN — AMOXICILLIN AND CLAVULANATE POTASSIUM 1 TABLET: 875; 125 TABLET, FILM COATED ORAL at 21:22

## 2021-01-01 RX ADMIN — LEVALBUTEROL HYDROCHLORIDE 1.25 MG: 1.25 SOLUTION RESPIRATORY (INHALATION) at 11:08

## 2021-01-01 RX ADMIN — POTASSIUM BICARBONATE 40 MEQ: 391 TABLET, EFFERVESCENT ORAL at 17:05

## 2021-01-01 RX ADMIN — PHENYLEPHRINE HYDROCHLORIDE 100 MCG: 10 INJECTION INTRAVENOUS at 09:08

## 2021-01-01 RX ADMIN — DOXYCYCLINE HYCLATE 100 MG: 100 CAPSULE ORAL at 12:35

## 2021-01-01 RX ADMIN — GABAPENTIN 300 MG: 300 CAPSULE ORAL at 15:03

## 2021-01-01 RX ADMIN — LORAZEPAM 4 MG: 2 INJECTION INTRAMUSCULAR; INTRAVENOUS at 13:01

## 2021-01-01 RX ADMIN — METOPROLOL SUCCINATE 25 MG: 50 TABLET, EXTENDED RELEASE ORAL at 15:14

## 2021-01-01 RX ADMIN — PROPOFOL 100 MG: 10 INJECTION, EMULSION INTRAVENOUS at 07:16

## 2021-01-01 RX ADMIN — HYDROCODONE BITARTRATE AND ACETAMINOPHEN 1 TABLET: 5; 325 TABLET ORAL at 04:00

## 2021-01-01 RX ADMIN — AZITHROMYCIN MONOHYDRATE 500 MG: 500 INJECTION, POWDER, LYOPHILIZED, FOR SOLUTION INTRAVENOUS at 22:18

## 2021-01-01 RX ADMIN — Medication 2 G: at 17:13

## 2021-01-01 RX ADMIN — POLYETHYLENE GLYCOL 3350 17 G: 17 POWDER, FOR SOLUTION ORAL at 09:05

## 2021-01-01 RX ADMIN — SODIUM CHLORIDE 1000 ML: 900 INJECTION, SOLUTION INTRAVENOUS at 20:52

## 2021-01-01 RX ADMIN — CEPHALEXIN 500 MG: 500 CAPSULE ORAL at 13:00

## 2021-01-01 RX ADMIN — CEPHALEXIN 500 MG: 500 CAPSULE ORAL at 12:10

## 2021-01-01 RX ADMIN — SODIUM CHLORIDE, SODIUM LACTATE, POTASSIUM CHLORIDE, AND CALCIUM CHLORIDE 125 ML/HR: 600; 310; 30; 20 INJECTION, SOLUTION INTRAVENOUS at 01:40

## 2021-01-01 RX ADMIN — APIXABAN 5 MG: 5 TABLET, FILM COATED ORAL at 21:47

## 2021-01-01 RX ADMIN — Medication 10 ML: at 05:36

## 2021-01-01 RX ADMIN — PHENYLEPHRINE HYDROCHLORIDE 120 MCG: 10 INJECTION INTRAVENOUS at 13:23

## 2021-01-01 RX ADMIN — PANTOPRAZOLE SODIUM 40 MG: 40 TABLET, DELAYED RELEASE ORAL at 05:06

## 2021-01-01 RX ADMIN — Medication 10 ML: at 20:44

## 2021-01-01 RX ADMIN — FENTANYL CITRATE 50 MCG: 50 INJECTION INTRAMUSCULAR; INTRAVENOUS at 09:12

## 2021-01-01 RX ADMIN — LEVALBUTEROL HYDROCHLORIDE 1.25 MG: 1.25 SOLUTION RESPIRATORY (INHALATION) at 20:33

## 2021-01-01 RX ADMIN — LEVALBUTEROL HYDROCHLORIDE 1.25 MG: 1.25 SOLUTION RESPIRATORY (INHALATION) at 11:18

## 2021-01-01 RX ADMIN — KETAMINE HYDROCHLORIDE 25 MG: 50 INJECTION INTRAMUSCULAR; INTRAVENOUS at 08:15

## 2021-01-01 RX ADMIN — GABAPENTIN 300 MG: 300 CAPSULE ORAL at 21:31

## 2021-01-01 RX ADMIN — Medication 10 ML: at 22:00

## 2021-01-01 RX ADMIN — SODIUM CHLORIDE 100 ML: 900 INJECTION, SOLUTION INTRAVENOUS at 20:44

## 2021-01-01 RX ADMIN — OXYMETAZOLINE HCL 2 SPRAY: 0.05 SPRAY NASAL at 09:00

## 2021-01-01 RX ADMIN — DOXYCYCLINE HYCLATE 100 MG: 100 CAPSULE ORAL at 21:33

## 2021-01-01 RX ADMIN — PHENYLEPHRINE HYDROCHLORIDE 120 MCG: 10 INJECTION INTRAVENOUS at 14:07

## 2021-01-01 RX ADMIN — Medication 5 MG: at 11:13

## 2021-01-01 RX ADMIN — MIDODRINE HYDROCHLORIDE 10 MG: 5 TABLET ORAL at 12:39

## 2021-01-01 RX ADMIN — HYDROCODONE BITARTRATE AND HOMATROPINE METHYLBROMIDE 5 ML: 5; 1.5 SOLUTION ORAL at 16:47

## 2021-01-01 RX ADMIN — GABAPENTIN 300 MG: 300 CAPSULE ORAL at 21:07

## 2021-01-01 RX ADMIN — TIOTROPIUM BROMIDE INHALATION SPRAY 2 PUFF: 3.12 SPRAY, METERED RESPIRATORY (INHALATION) at 07:26

## 2021-01-01 RX ADMIN — HEPARIN SODIUM (PORCINE) LOCK FLUSH IV SOLN 100 UNIT/ML 500 UNITS: 100 SOLUTION at 11:15

## 2021-01-01 RX ADMIN — MIDODRINE HYDROCHLORIDE 10 MG: 5 TABLET ORAL at 21:37

## 2021-01-01 RX ADMIN — BUDESONIDE AND FORMOTEROL FUMARATE DIHYDRATE 2 PUFF: 160; 4.5 AEROSOL RESPIRATORY (INHALATION) at 07:34

## 2021-01-01 RX ADMIN — SODIUM CHLORIDE SOLN NEBU 3% 4 ML: 3 NEBU SOLN at 11:36

## 2021-01-01 RX ADMIN — PANTOPRAZOLE SODIUM 40 MG: 40 TABLET, DELAYED RELEASE ORAL at 17:44

## 2021-01-01 RX ADMIN — Medication 1 AMPULE: at 09:25

## 2021-01-01 RX ADMIN — CEPHALEXIN 500 MG: 500 CAPSULE ORAL at 09:11

## 2021-01-01 RX ADMIN — SODIUM CHLORIDE 10 ML: 9 INJECTION, SOLUTION INTRAMUSCULAR; INTRAVENOUS; SUBCUTANEOUS at 21:09

## 2021-01-01 RX ADMIN — BUDESONIDE AND FORMOTEROL FUMARATE DIHYDRATE 2 PUFF: 160; 4.5 AEROSOL RESPIRATORY (INHALATION) at 22:33

## 2021-01-01 RX ADMIN — APIXABAN 5 MG: 5 TABLET, FILM COATED ORAL at 09:13

## 2021-01-01 RX ADMIN — Medication 10 ML: at 13:45

## 2021-01-01 RX ADMIN — GABAPENTIN 300 MG: 300 CAPSULE ORAL at 22:22

## 2021-01-01 RX ADMIN — AMIODARONE HYDROCHLORIDE 200 MG: 200 TABLET ORAL at 09:14

## 2021-01-01 RX ADMIN — LIDOCAINE HYDROCHLORIDE 300 MG: 10; .005 INJECTION, SOLUTION EPIDURAL; INFILTRATION; INTRACAUDAL; PERINEURAL at 14:00

## 2021-01-01 RX ADMIN — Medication 10 ML: at 22:34

## 2021-01-01 RX ADMIN — Medication 10 ML: at 05:49

## 2021-01-01 RX ADMIN — PANTOPRAZOLE SODIUM 40 MG: 40 TABLET, DELAYED RELEASE ORAL at 08:20

## 2021-01-01 RX ADMIN — FENTANYL CITRATE 100 MCG: 50 INJECTION INTRAMUSCULAR; INTRAVENOUS at 07:09

## 2021-01-01 RX ADMIN — POLYETHYLENE GLYCOL 3350 17 G: 17 POWDER, FOR SOLUTION ORAL at 10:31

## 2021-01-01 RX ADMIN — SODIUM CHLORIDE, SODIUM LACTATE, POTASSIUM CHLORIDE, AND CALCIUM CHLORIDE 75 ML/HR: 600; 310; 30; 20 INJECTION, SOLUTION INTRAVENOUS at 06:44

## 2021-01-01 RX ADMIN — APIXABAN 5 MG: 5 TABLET, FILM COATED ORAL at 08:23

## 2021-01-01 RX ADMIN — CEFAZOLIN SODIUM 2 G: 100 INJECTION, POWDER, LYOPHILIZED, FOR SOLUTION INTRAVENOUS at 21:01

## 2021-01-01 RX ADMIN — POLYETHYLENE GLYCOL 3350 17 G: 17 POWDER, FOR SOLUTION ORAL at 09:18

## 2021-01-01 RX ADMIN — Medication 10 ML: at 13:59

## 2021-01-01 RX ADMIN — OXYCODONE AND ACETAMINOPHEN 1 TABLET: 5; 325 TABLET ORAL at 13:19

## 2021-01-01 RX ADMIN — GUAIFENESIN 1200 MG: 600 TABLET ORAL at 20:33

## 2021-01-01 RX ADMIN — Medication 10 ML: at 13:10

## 2021-01-01 RX ADMIN — LEVALBUTEROL HYDROCHLORIDE 1.25 MG: 1.25 SOLUTION RESPIRATORY (INHALATION) at 15:06

## 2021-01-01 RX ADMIN — LEVALBUTEROL HYDROCHLORIDE 1.25 MG: 1.25 SOLUTION RESPIRATORY (INHALATION) at 00:58

## 2021-01-01 RX ADMIN — MIDODRINE HYDROCHLORIDE 10 MG: 5 TABLET ORAL at 17:43

## 2021-01-01 RX ADMIN — DOXYCYCLINE HYCLATE 100 MG: 100 CAPSULE ORAL at 08:29

## 2021-01-01 RX ADMIN — ACETAMINOPHEN 1000 MG: 500 TABLET ORAL at 12:40

## 2021-01-01 RX ADMIN — LIDOCAINE HYDROCHLORIDE 60 MG: 20 INJECTION, SOLUTION EPIDURAL; INFILTRATION; INTRACAUDAL; PERINEURAL at 07:16

## 2021-01-01 RX ADMIN — HYDROCODONE BITARTRATE AND HOMATROPINE METHYLBROMIDE 5 ML: 5; 1.5 SOLUTION ORAL at 08:38

## 2021-01-01 RX ADMIN — LEVALBUTEROL HYDROCHLORIDE 1.25 MG: 1.25 SOLUTION RESPIRATORY (INHALATION) at 04:09

## 2021-01-01 RX ADMIN — SODIUM CHLORIDE SOLN NEBU 3% 4 ML: 3 NEBU SOLN at 07:52

## 2021-01-01 RX ADMIN — SUGAMMADEX 50 MG: 100 INJECTION, SOLUTION INTRAVENOUS at 09:18

## 2021-01-01 RX ADMIN — BUDESONIDE 500 MCG: 0.5 INHALANT RESPIRATORY (INHALATION) at 19:56

## 2021-01-01 RX ADMIN — BUDESONIDE 500 MCG: 0.5 INHALANT RESPIRATORY (INHALATION) at 09:25

## 2021-01-01 RX ADMIN — SUGAMMADEX 200 MG: 100 INJECTION, SOLUTION INTRAVENOUS at 11:40

## 2021-01-01 RX ADMIN — SODIUM CHLORIDE 10 ML: 9 INJECTION, SOLUTION INTRAMUSCULAR; INTRAVENOUS; SUBCUTANEOUS at 21:23

## 2021-01-01 RX ADMIN — SODIUM CHLORIDE, SODIUM LACTATE, POTASSIUM CHLORIDE, AND CALCIUM CHLORIDE 125 ML/HR: 600; 310; 30; 20 INJECTION, SOLUTION INTRAVENOUS at 00:00

## 2021-01-01 RX ADMIN — GUAIFENESIN 1200 MG: 600 TABLET ORAL at 08:12

## 2021-01-01 RX ADMIN — CEPHALEXIN 500 MG: 500 CAPSULE ORAL at 13:19

## 2021-01-01 RX ADMIN — LORAZEPAM 0.5 MG: 0.5 TABLET ORAL at 08:51

## 2021-01-01 RX ADMIN — LORAZEPAM 0.5 MG: 0.5 TABLET ORAL at 05:50

## 2021-01-01 RX ADMIN — PANTOPRAZOLE SODIUM 40 MG: 40 TABLET, DELAYED RELEASE ORAL at 16:42

## 2021-01-01 RX ADMIN — DEXMEDETOMIDINE HYDROCHLORIDE 0.4 MCG/KG/HR: 400 INJECTION INTRAVENOUS at 12:03

## 2021-01-01 RX ADMIN — LEVALBUTEROL HYDROCHLORIDE 1.25 MG: 1.25 SOLUTION RESPIRATORY (INHALATION) at 15:16

## 2021-01-01 RX ADMIN — PANTOPRAZOLE SODIUM 40 MG: 40 TABLET, DELAYED RELEASE ORAL at 05:30

## 2021-01-01 RX ADMIN — FENTANYL CITRATE 50 MCG/HR: 50 INJECTION INTRAVENOUS at 11:46

## 2021-01-01 RX ADMIN — DOCUSATE SODIUM 100 MG: 100 CAPSULE, LIQUID FILLED ORAL at 08:01

## 2021-01-01 RX ADMIN — SODIUM CHLORIDE, SODIUM LACTATE, POTASSIUM CHLORIDE, AND CALCIUM CHLORIDE 125 ML/HR: 600; 310; 30; 20 INJECTION, SOLUTION INTRAVENOUS at 07:59

## 2021-01-01 RX ADMIN — PIPERACILLIN AND TAZOBACTAM 4.5 G: 4; .5 INJECTION, POWDER, FOR SOLUTION INTRAVENOUS at 15:53

## 2021-01-01 RX ADMIN — OXYCODONE AND ACETAMINOPHEN 1 TABLET: 5; 325 TABLET ORAL at 21:58

## 2021-01-01 RX ADMIN — HYDROCODONE BITARTRATE AND HOMATROPINE METHYLBROMIDE 5 ML: 5; 1.5 SOLUTION ORAL at 23:54

## 2021-01-01 RX ADMIN — SODIUM CHLORIDE SOLN NEBU 3% 4 ML: 3 NEBU SOLN at 20:49

## 2021-01-01 RX ADMIN — LEVALBUTEROL HYDROCHLORIDE 1.25 MG: 1.25 SOLUTION RESPIRATORY (INHALATION) at 12:36

## 2021-01-01 RX ADMIN — GUAIFENESIN 1200 MG: 600 TABLET ORAL at 21:19

## 2021-01-01 RX ADMIN — TRAMADOL HYDROCHLORIDE 50 MG: 50 TABLET, COATED ORAL at 10:48

## 2021-01-01 RX ADMIN — Medication 10 ML: at 21:01

## 2021-01-01 RX ADMIN — PHENYLEPHRINE HYDROCHLORIDE 40 MCG/MIN: 10 INJECTION INTRAVENOUS at 21:00

## 2021-01-01 RX ADMIN — CEPHALEXIN 500 MG: 500 CAPSULE ORAL at 17:29

## 2021-01-01 RX ADMIN — ONDANSETRON 4 MG: 2 INJECTION INTRAMUSCULAR; INTRAVENOUS at 17:44

## 2021-01-01 RX ADMIN — GUAIFENESIN 1200 MG: 600 TABLET ORAL at 21:49

## 2021-01-01 RX ADMIN — HYDROCODONE BITARTRATE AND HOMATROPINE METHYLBROMIDE 5 ML: 5; 1.5 SOLUTION ORAL at 12:34

## 2021-01-01 RX ADMIN — APIXABAN 5 MG: 5 TABLET, FILM COATED ORAL at 10:00

## 2021-01-01 RX ADMIN — POLYETHYLENE GLYCOL 3350 17 G: 17 POWDER, FOR SOLUTION ORAL at 09:13

## 2021-01-01 RX ADMIN — OXYCODONE AND ACETAMINOPHEN 1 TABLET: 5; 325 TABLET ORAL at 08:13

## 2021-01-01 RX ADMIN — SODIUM CHLORIDE SOLN NEBU 3% 4 ML: 3 NEBU SOLN at 20:47

## 2021-01-01 RX ADMIN — Medication 10 MG: at 13:43

## 2021-01-01 RX ADMIN — SODIUM CHLORIDE SOLN NEBU 3% 4 ML: 3 NEBU SOLN at 07:35

## 2021-01-01 RX ADMIN — PROPOFOL 140 MCG/KG/MIN: 10 INJECTION, EMULSION INTRAVENOUS at 13:31

## 2021-01-01 RX ADMIN — SODIUM CHLORIDE SOLN NEBU 3% 4 ML: 3 NEBU SOLN at 19:42

## 2021-01-01 RX ADMIN — HYDROCODONE BITARTRATE AND HOMATROPINE METHYLBROMIDE 5 ML: 5; 1.5 SOLUTION ORAL at 08:49

## 2021-01-01 RX ADMIN — ROCURONIUM BROMIDE 50 MG: 10 INJECTION, SOLUTION INTRAVENOUS at 13:10

## 2021-01-01 RX ADMIN — GABAPENTIN 300 MG: 300 CAPSULE ORAL at 16:38

## 2021-01-01 RX ADMIN — SODIUM CHLORIDE, SODIUM LACTATE, POTASSIUM CHLORIDE, AND CALCIUM CHLORIDE 1000 ML: 600; 310; 30; 20 INJECTION, SOLUTION INTRAVENOUS at 06:15

## 2021-01-01 RX ADMIN — OXYMETAZOLINE HCL 2 SPRAY: 0.05 SPRAY NASAL at 21:35

## 2021-01-01 RX ADMIN — LEVALBUTEROL HYDROCHLORIDE 1.25 MG: 1.25 SOLUTION RESPIRATORY (INHALATION) at 11:27

## 2021-01-01 RX ADMIN — PHENYLEPHRINE HYDROCHLORIDE 120 MCG: 10 INJECTION INTRAVENOUS at 14:06

## 2021-01-01 RX ADMIN — GABAPENTIN 300 MG: 300 CAPSULE ORAL at 22:15

## 2021-01-01 RX ADMIN — CEPHALEXIN 500 MG: 500 CAPSULE ORAL at 08:56

## 2021-01-01 RX ADMIN — HYDROCODONE BITARTRATE AND HOMATROPINE METHYLBROMIDE 5 ML: 5; 1.5 SOLUTION ORAL at 21:29

## 2021-01-01 RX ADMIN — MIDODRINE HYDROCHLORIDE 5 MG: 5 TABLET ORAL at 16:38

## 2021-01-01 RX ADMIN — PANTOPRAZOLE SODIUM 40 MG: 40 TABLET, DELAYED RELEASE ORAL at 07:59

## 2021-01-01 RX ADMIN — AMPICILLIN SODIUM AND SULBACTAM SODIUM 3 G: 2; 1 INJECTION, POWDER, FOR SOLUTION INTRAMUSCULAR; INTRAVENOUS at 02:02

## 2021-01-01 RX ADMIN — AMIODARONE HYDROCHLORIDE 200 MG: 200 TABLET ORAL at 09:35

## 2021-01-01 RX ADMIN — MIDAZOLAM 2 MG: 1 INJECTION INTRAMUSCULAR; INTRAVENOUS at 11:04

## 2021-01-01 RX ADMIN — GABAPENTIN 300 MG: 300 CAPSULE ORAL at 21:59

## 2021-01-01 RX ADMIN — PHENYLEPHRINE HYDROCHLORIDE 120 MCG: 10 INJECTION INTRAVENOUS at 14:10

## 2021-01-01 RX ADMIN — ALBUMIN HUMAN 250 ML: 0.05 INJECTION, SOLUTION INTRAVENOUS at 15:39

## 2021-01-01 RX ADMIN — METHYLPREDNISOLONE SODIUM SUCCINATE 40 MG: 40 INJECTION, POWDER, FOR SOLUTION INTRAMUSCULAR; INTRAVENOUS at 21:34

## 2021-01-01 RX ADMIN — CEFAZOLIN 1 G: 1 INJECTION, POWDER, FOR SOLUTION INTRAMUSCULAR; INTRAVENOUS at 15:05

## 2021-01-01 RX ADMIN — BUDESONIDE 500 MCG: 0.5 INHALANT RESPIRATORY (INHALATION) at 11:36

## 2021-01-01 RX ADMIN — MIDODRINE HYDROCHLORIDE 5 MG: 5 TABLET ORAL at 11:10

## 2021-01-01 RX ADMIN — SENNOSIDES AND DOCUSATE SODIUM 1 TABLET: 8.6; 5 TABLET ORAL at 21:34

## 2021-01-01 RX ADMIN — PHENYLEPHRINE HYDROCHLORIDE 100 MCG: 10 INJECTION INTRAVENOUS at 08:35

## 2021-01-01 RX ADMIN — BUDESONIDE 500 MCG: 0.5 INHALANT RESPIRATORY (INHALATION) at 08:57

## 2021-01-01 RX ADMIN — SODIUM CHLORIDE, SODIUM LACTATE, POTASSIUM CHLORIDE, AND CALCIUM CHLORIDE 75 ML/HR: 600; 310; 30; 20 INJECTION, SOLUTION INTRAVENOUS at 12:17

## 2021-01-01 RX ADMIN — SODIUM CHLORIDE SOLN NEBU 3% 4 ML: 3 NEBU SOLN at 07:50

## 2021-01-01 RX ADMIN — AMIODARONE HYDROCHLORIDE 200 MG: 200 TABLET ORAL at 08:20

## 2021-01-01 RX ADMIN — SODIUM CHLORIDE, SODIUM LACTATE, POTASSIUM CHLORIDE, AND CALCIUM CHLORIDE: 600; 310; 30; 20 INJECTION, SOLUTION INTRAVENOUS at 13:27

## 2021-01-01 RX ADMIN — Medication 1 AMPULE: at 09:26

## 2021-01-01 RX ADMIN — GABAPENTIN 300 MG: 300 CAPSULE ORAL at 09:19

## 2021-01-01 RX ADMIN — SODIUM CHLORIDE 1000 ML: 900 INJECTION, SOLUTION INTRAVENOUS at 15:18

## 2021-01-01 RX ADMIN — LEVALBUTEROL HYDROCHLORIDE 1.25 MG: 1.25 SOLUTION RESPIRATORY (INHALATION) at 07:52

## 2021-01-01 RX ADMIN — GABAPENTIN 300 MG: 300 CAPSULE ORAL at 09:13

## 2021-01-01 RX ADMIN — LEVALBUTEROL HYDROCHLORIDE 1.25 MG: 1.25 SOLUTION RESPIRATORY (INHALATION) at 15:45

## 2021-01-01 RX ADMIN — Medication 2 G: at 07:44

## 2021-01-01 RX ADMIN — IPRATROPIUM BROMIDE 2 SPRAY: 21 SPRAY NASAL at 08:36

## 2021-01-01 RX ADMIN — ROCURONIUM BROMIDE 10 MG: 10 INJECTION, SOLUTION INTRAVENOUS at 09:16

## 2021-01-01 RX ADMIN — METRONIDAZOLE 500 MG: 500 INJECTION, SOLUTION INTRAVENOUS at 21:18

## 2021-01-01 RX ADMIN — MIDODRINE HYDROCHLORIDE 5 MG: 5 TABLET ORAL at 18:27

## 2021-01-01 RX ADMIN — GUAIFENESIN 1200 MG: 600 TABLET ORAL at 21:11

## 2021-01-01 RX ADMIN — BUDESONIDE 500 MCG: 0.5 INHALANT RESPIRATORY (INHALATION) at 11:16

## 2021-01-01 RX ADMIN — SODIUM CHLORIDE, SODIUM LACTATE, POTASSIUM CHLORIDE, AND CALCIUM CHLORIDE: 600; 310; 30; 20 INJECTION, SOLUTION INTRAVENOUS at 07:24

## 2021-01-01 RX ADMIN — GUAIFENESIN 1200 MG: 600 TABLET ORAL at 21:02

## 2021-01-01 RX ADMIN — Medication 10 ML: at 05:23

## 2021-01-01 RX ADMIN — PIPERACILLIN SODIUM AND TAZOBACTAM SODIUM 4.5 G: 4; .5 INJECTION, POWDER, LYOPHILIZED, FOR SOLUTION INTRAVENOUS at 15:17

## 2021-01-01 RX ADMIN — SODIUM CHLORIDE 10 ML: 9 INJECTION, SOLUTION INTRAMUSCULAR; INTRAVENOUS; SUBCUTANEOUS at 15:12

## 2021-01-01 RX ADMIN — Medication 1 AMPULE: at 21:37

## 2021-01-01 RX ADMIN — GUAIFENESIN 1200 MG: 600 TABLET ORAL at 09:00

## 2021-01-01 RX ADMIN — PHENYLEPHRINE HYDROCHLORIDE 100 MCG: 10 INJECTION INTRAVENOUS at 14:40

## 2021-01-01 RX ADMIN — BUDESONIDE 500 MCG: 0.5 INHALANT RESPIRATORY (INHALATION) at 19:15

## 2021-01-01 RX ADMIN — GUAIFENESIN 1200 MG: 600 TABLET ORAL at 20:40

## 2021-01-01 RX ADMIN — LEVALBUTEROL HYDROCHLORIDE 1.25 MG: 1.25 SOLUTION RESPIRATORY (INHALATION) at 19:56

## 2021-01-01 RX ADMIN — CEPHALEXIN 500 MG: 500 CAPSULE ORAL at 12:26

## 2021-01-01 RX ADMIN — LIDOCAINE HYDROCHLORIDE: 20 JELLY TOPICAL at 11:40

## 2021-01-01 RX ADMIN — PANTOPRAZOLE SODIUM 40 MG: 40 TABLET, DELAYED RELEASE ORAL at 16:24

## 2021-01-01 RX ADMIN — APIXABAN 5 MG: 5 TABLET, FILM COATED ORAL at 20:43

## 2021-01-01 RX ADMIN — OXYCODONE AND ACETAMINOPHEN 1 TABLET: 5; 325 TABLET ORAL at 06:54

## 2021-01-01 RX ADMIN — GABAPENTIN 300 MG: 300 CAPSULE ORAL at 21:14

## 2021-01-01 RX ADMIN — SODIUM CHLORIDE SOLN NEBU 3% 4 ML: 3 NEBU SOLN at 07:49

## 2021-01-01 RX ADMIN — GUAIFENESIN 1200 MG: 600 TABLET ORAL at 21:07

## 2021-01-01 RX ADMIN — PHENYLEPHRINE HYDROCHLORIDE 120 MCG: 10 INJECTION INTRAVENOUS at 16:17

## 2021-01-01 RX ADMIN — GABAPENTIN 300 MG: 300 CAPSULE ORAL at 21:38

## 2021-01-01 RX ADMIN — APIXABAN 5 MG: 5 TABLET, FILM COATED ORAL at 08:49

## 2021-01-01 RX ADMIN — SODIUM CHLORIDE 10 ML: 9 INJECTION, SOLUTION INTRAMUSCULAR; INTRAVENOUS; SUBCUTANEOUS at 17:14

## 2021-01-01 RX ADMIN — GABAPENTIN 300 MG: 300 CAPSULE ORAL at 08:56

## 2021-01-01 RX ADMIN — Medication 20 ML: at 06:00

## 2021-01-01 RX ADMIN — GABAPENTIN 300 MG: 300 CAPSULE ORAL at 16:42

## 2021-01-01 RX ADMIN — PHENYLEPHRINE HYDROCHLORIDE 35 MCG/MIN: 10 INJECTION INTRAVENOUS at 11:56

## 2021-01-01 RX ADMIN — GABAPENTIN 300 MG: 300 CAPSULE ORAL at 16:24

## 2021-01-01 RX ADMIN — GABAPENTIN 300 MG: 300 CAPSULE ORAL at 20:39

## 2021-01-01 RX ADMIN — MIDODRINE HYDROCHLORIDE 10 MG: 5 TABLET ORAL at 18:58

## 2021-01-01 RX ADMIN — SODIUM CHLORIDE SOLN NEBU 3% 4 ML: 3 NEBU SOLN at 20:50

## 2021-01-01 RX ADMIN — SENNOSIDES AND DOCUSATE SODIUM 1 TABLET: 8.6; 5 TABLET ORAL at 08:59

## 2021-01-01 RX ADMIN — ENOXAPARIN SODIUM 80 MG: 40 INJECTION SUBCUTANEOUS at 05:50

## 2021-01-01 RX ADMIN — PANTOPRAZOLE SODIUM 40 MG: 40 TABLET, DELAYED RELEASE ORAL at 16:25

## 2021-01-01 RX ADMIN — CEPHALEXIN 500 MG: 500 CAPSULE ORAL at 17:32

## 2021-01-01 RX ADMIN — AMPICILLIN SODIUM AND SULBACTAM SODIUM 3 G: 2; 1 INJECTION, POWDER, FOR SOLUTION INTRAMUSCULAR; INTRAVENOUS at 08:00

## 2021-01-01 RX ADMIN — LEVALBUTEROL HYDROCHLORIDE 1.25 MG: 1.25 SOLUTION RESPIRATORY (INHALATION) at 03:48

## 2021-01-01 RX ADMIN — ENOXAPARIN SODIUM 40 MG: 40 INJECTION SUBCUTANEOUS at 09:19

## 2021-01-01 RX ADMIN — METHYLPREDNISOLONE SODIUM SUCCINATE 40 MG: 40 INJECTION, POWDER, FOR SOLUTION INTRAMUSCULAR; INTRAVENOUS at 09:22

## 2021-01-01 RX ADMIN — OXYCODONE AND ACETAMINOPHEN 1 TABLET: 5; 325 TABLET ORAL at 21:54

## 2021-01-01 RX ADMIN — MIDODRINE HYDROCHLORIDE 10 MG: 5 TABLET ORAL at 09:35

## 2021-01-01 RX ADMIN — PIPERACILLIN SODIUM AND TAZOBACTAM SODIUM 4.5 G: 4; .5 INJECTION, POWDER, LYOPHILIZED, FOR SOLUTION INTRAVENOUS at 00:10

## 2021-01-01 RX ADMIN — MORPHINE SULFATE 2 MG: 2 INJECTION, SOLUTION INTRAMUSCULAR; INTRAVENOUS at 07:58

## 2021-01-01 RX ADMIN — FENTANYL CITRATE 50 MCG: 50 INJECTION INTRAMUSCULAR; INTRAVENOUS at 14:57

## 2021-01-01 RX ADMIN — PANTOPRAZOLE SODIUM 40 MG: 40 TABLET, DELAYED RELEASE ORAL at 17:43

## 2021-01-01 RX ADMIN — GUAIFENESIN 1200 MG: 600 TABLET ORAL at 10:12

## 2021-01-01 RX ADMIN — APIXABAN 5 MG: 5 TABLET, FILM COATED ORAL at 09:32

## 2021-01-01 RX ADMIN — ONDANSETRON 4 MG: 2 INJECTION INTRAMUSCULAR; INTRAVENOUS at 14:19

## 2021-01-01 RX ADMIN — LEVALBUTEROL HYDROCHLORIDE 1.25 MG: 1.25 SOLUTION RESPIRATORY (INHALATION) at 23:29

## 2021-01-01 RX ADMIN — PHENYLEPHRINE HYDROCHLORIDE 120 MCG: 10 INJECTION INTRAVENOUS at 13:32

## 2021-01-01 RX ADMIN — POLYETHYLENE GLYCOL 3350 17 G: 17 POWDER, FOR SOLUTION ORAL at 08:59

## 2021-01-01 RX ADMIN — Medication 10 ML: at 22:26

## 2021-01-01 RX ADMIN — CEPHALEXIN 500 MG: 500 CAPSULE ORAL at 10:45

## 2021-01-01 RX ADMIN — LEVALBUTEROL HYDROCHLORIDE 1.25 MG: 1.25 SOLUTION RESPIRATORY (INHALATION) at 00:12

## 2021-01-01 RX ADMIN — LEVALBUTEROL HYDROCHLORIDE 1.25 MG: 1.25 SOLUTION RESPIRATORY (INHALATION) at 11:40

## 2021-01-01 RX ADMIN — PHENYLEPHRINE HYDROCHLORIDE 120 MCG: 10 INJECTION INTRAVENOUS at 14:03

## 2021-01-01 RX ADMIN — PANTOPRAZOLE SODIUM 40 MG: 40 TABLET, DELAYED RELEASE ORAL at 08:12

## 2021-01-01 RX ADMIN — MIDODRINE HYDROCHLORIDE 10 MG: 5 TABLET ORAL at 10:00

## 2021-01-01 RX ADMIN — LIDOCAINE HYDROCHLORIDE 100 MG: 20 INJECTION, SOLUTION EPIDURAL; INFILTRATION; INTRACAUDAL; PERINEURAL at 13:16

## 2021-01-01 RX ADMIN — OXYMETAZOLINE HCL 2 SPRAY: 0.05 SPRAY NASAL at 17:15

## 2021-01-01 RX ADMIN — PANTOPRAZOLE SODIUM 40 MG: 40 TABLET, DELAYED RELEASE ORAL at 07:45

## 2021-01-01 RX ADMIN — GABAPENTIN 300 MG: 300 CAPSULE ORAL at 08:02

## 2021-01-01 RX ADMIN — MIDODRINE HYDROCHLORIDE 10 MG: 5 TABLET ORAL at 17:36

## 2021-01-01 RX ADMIN — ENOXAPARIN SODIUM 40 MG: 40 INJECTION SUBCUTANEOUS at 08:30

## 2021-01-01 RX ADMIN — PIPERACILLIN SODIUM AND TAZOBACTAM SODIUM 4.5 G: 4; .5 INJECTION, POWDER, LYOPHILIZED, FOR SOLUTION INTRAVENOUS at 00:02

## 2021-01-01 RX ADMIN — SODIUM CHLORIDE SOLN NEBU 3% 4 ML: 3 NEBU SOLN at 08:54

## 2021-01-01 RX ADMIN — LEVALBUTEROL HYDROCHLORIDE 1.25 MG: 1.25 SOLUTION RESPIRATORY (INHALATION) at 07:35

## 2021-01-01 RX ADMIN — IOPAMIDOL 100 ML: 755 INJECTION, SOLUTION INTRAVENOUS at 11:02

## 2021-01-01 RX ADMIN — VASOPRESSIN 0.03 UNITS/MIN: 20 INJECTION INTRAVENOUS at 17:09

## 2021-01-01 RX ADMIN — PIPERACILLIN SODIUM AND TAZOBACTAM SODIUM 4.5 G: 4; .5 INJECTION, POWDER, LYOPHILIZED, FOR SOLUTION INTRAVENOUS at 09:18

## 2021-01-01 RX ADMIN — PANTOPRAZOLE SODIUM 40 MG: 40 TABLET, DELAYED RELEASE ORAL at 06:00

## 2021-01-01 RX ADMIN — LEVALBUTEROL HYDROCHLORIDE 1.25 MG: 1.25 SOLUTION RESPIRATORY (INHALATION) at 12:03

## 2021-01-01 RX ADMIN — SODIUM CHLORIDE SOLN NEBU 3% 4 ML: 3 NEBU SOLN at 21:03

## 2021-01-01 RX ADMIN — PIPERACILLIN SODIUM AND TAZOBACTAM SODIUM 4.5 G: 4; .5 INJECTION, POWDER, LYOPHILIZED, FOR SOLUTION INTRAVENOUS at 09:00

## 2021-01-01 RX ADMIN — SODIUM CHLORIDE, SODIUM LACTATE, POTASSIUM CHLORIDE, AND CALCIUM CHLORIDE 1000 ML: 600; 310; 30; 20 INJECTION, SOLUTION INTRAVENOUS at 19:59

## 2021-01-01 RX ADMIN — BUDESONIDE 500 MCG: 0.5 INHALANT RESPIRATORY (INHALATION) at 07:01

## 2021-01-01 RX ADMIN — GABAPENTIN 300 MG: 300 CAPSULE ORAL at 21:41

## 2021-01-01 RX ADMIN — DOXYCYCLINE HYCLATE 100 MG: 100 CAPSULE ORAL at 22:22

## 2021-01-01 RX ADMIN — MIDODRINE HYDROCHLORIDE 10 MG: 5 TABLET ORAL at 11:48

## 2021-01-01 RX ADMIN — SODIUM CHLORIDE 5 ML: 9 INJECTION, SOLUTION INTRAMUSCULAR; INTRAVENOUS; SUBCUTANEOUS at 06:00

## 2021-01-01 RX ADMIN — MIDODRINE HYDROCHLORIDE 10 MG: 5 TABLET ORAL at 12:43

## 2021-01-01 RX ADMIN — GABAPENTIN 300 MG: 300 CAPSULE ORAL at 09:26

## 2021-01-01 RX ADMIN — LEVALBUTEROL HYDROCHLORIDE 1.25 MG: 1.25 SOLUTION RESPIRATORY (INHALATION) at 12:01

## 2021-01-01 RX ADMIN — ROCURONIUM BROMIDE 10 MG: 10 INJECTION, SOLUTION INTRAVENOUS at 08:30

## 2021-01-01 RX ADMIN — BUDESONIDE 500 MCG: 0.5 INHALANT RESPIRATORY (INHALATION) at 05:57

## 2021-01-01 RX ADMIN — PANTOPRAZOLE SODIUM 40 MG: 40 TABLET, DELAYED RELEASE ORAL at 05:49

## 2021-01-01 RX ADMIN — LEVALBUTEROL HYDROCHLORIDE 1.25 MG: 1.25 SOLUTION RESPIRATORY (INHALATION) at 07:01

## 2021-01-01 RX ADMIN — SODIUM CHLORIDE, SODIUM LACTATE, POTASSIUM CHLORIDE, AND CALCIUM CHLORIDE 125 ML/HR: 600; 310; 30; 20 INJECTION, SOLUTION INTRAVENOUS at 04:59

## 2021-01-01 RX ADMIN — ENOXAPARIN SODIUM 80 MG: 40 INJECTION SUBCUTANEOUS at 17:39

## 2021-01-01 RX ADMIN — SODIUM CHLORIDE 10 ML: 9 INJECTION, SOLUTION INTRAMUSCULAR; INTRAVENOUS; SUBCUTANEOUS at 05:24

## 2021-01-01 RX ADMIN — AMIODARONE HYDROCHLORIDE 200 MG: 200 TABLET ORAL at 08:00

## 2021-01-01 RX ADMIN — OXYCODONE AND ACETAMINOPHEN 1 TABLET: 5; 325 TABLET ORAL at 03:50

## 2021-01-01 RX ADMIN — PANTOPRAZOLE SODIUM 40 MG: 40 TABLET, DELAYED RELEASE ORAL at 17:10

## 2021-01-01 RX ADMIN — DOXYCYCLINE HYCLATE 100 MG: 100 CAPSULE ORAL at 08:39

## 2021-01-01 RX ADMIN — GABAPENTIN 300 MG: 300 CAPSULE ORAL at 09:32

## 2021-01-01 RX ADMIN — LEVALBUTEROL HYDROCHLORIDE 1.25 MG: 1.25 SOLUTION RESPIRATORY (INHALATION) at 23:45

## 2021-01-01 RX ADMIN — GABAPENTIN 300 MG: 300 CAPSULE ORAL at 16:18

## 2021-01-01 RX ADMIN — Medication 1 AMPULE: at 05:53

## 2021-01-01 RX ADMIN — Medication 10 ML: at 22:21

## 2021-01-01 RX ADMIN — SODIUM CHLORIDE 10 ML: 9 INJECTION, SOLUTION INTRAMUSCULAR; INTRAVENOUS; SUBCUTANEOUS at 17:10

## 2021-01-01 RX ADMIN — IPRATROPIUM BROMIDE AND ALBUTEROL SULFATE 3 ML: .5; 3 SOLUTION RESPIRATORY (INHALATION) at 17:38

## 2021-01-01 RX ADMIN — PANTOPRAZOLE SODIUM 40 MG: 40 TABLET, DELAYED RELEASE ORAL at 06:33

## 2021-01-01 RX ADMIN — LEVALBUTEROL HYDROCHLORIDE 1.25 MG: 1.25 SOLUTION RESPIRATORY (INHALATION) at 09:54

## 2021-01-01 RX ADMIN — GENTAMICIN SULFATE 0.5 INCH: 3 OINTMENT OPHTHALMIC at 14:13

## 2021-01-01 RX ADMIN — ACETAMINOPHEN 1000 MG: 500 TABLET, FILM COATED ORAL at 10:51

## 2021-01-01 RX ADMIN — IOPAMIDOL 80 ML: 755 INJECTION, SOLUTION INTRAVENOUS at 20:44

## 2021-01-01 RX ADMIN — MIDODRINE HYDROCHLORIDE 10 MG: 5 TABLET ORAL at 10:14

## 2021-01-01 RX ADMIN — NEOMYCIN AND POLYMYXIN B SULFATES 1000 ML: 40; 200000 SOLUTION IRRIGATION at 14:02

## 2021-01-01 RX ADMIN — BUDESONIDE 500 MCG: 0.5 INHALANT RESPIRATORY (INHALATION) at 07:35

## 2021-01-01 RX ADMIN — SODIUM CHLORIDE SOLN NEBU 3% 4 ML: 3 NEBU SOLN at 07:45

## 2021-01-01 RX ADMIN — LEVALBUTEROL HYDROCHLORIDE 1.25 MG: 1.25 SOLUTION RESPIRATORY (INHALATION) at 12:00

## 2021-01-01 RX ADMIN — GABAPENTIN 300 MG: 300 CAPSULE ORAL at 17:13

## 2021-01-01 RX ADMIN — LEVALBUTEROL HYDROCHLORIDE 1.25 MG: 1.25 SOLUTION RESPIRATORY (INHALATION) at 08:54

## 2021-01-01 RX ADMIN — PIPERACILLIN SODIUM AND TAZOBACTAM SODIUM 4.5 G: 4; .5 INJECTION, POWDER, LYOPHILIZED, FOR SOLUTION INTRAVENOUS at 17:15

## 2021-01-01 RX ADMIN — POLYETHYLENE GLYCOL 3350 17 G: 17 POWDER, FOR SOLUTION ORAL at 10:19

## 2021-01-01 RX ADMIN — LEVALBUTEROL HYDROCHLORIDE 1.25 MG: 1.25 SOLUTION RESPIRATORY (INHALATION) at 15:35

## 2021-01-01 RX ADMIN — LEVALBUTEROL HYDROCHLORIDE 1.25 MG: 1.25 SOLUTION RESPIRATORY (INHALATION) at 07:09

## 2021-01-01 RX ADMIN — AMPICILLIN SODIUM AND SULBACTAM SODIUM 3 G: 2; 1 INJECTION, POWDER, FOR SOLUTION INTRAMUSCULAR; INTRAVENOUS at 09:00

## 2021-01-01 RX ADMIN — MORPHINE SULFATE 2 MG: 2 INJECTION, SOLUTION INTRAMUSCULAR; INTRAVENOUS at 20:39

## 2021-01-01 RX ADMIN — GUAIFENESIN 1200 MG: 600 TABLET ORAL at 09:19

## 2021-01-01 RX ADMIN — MORPHINE SULFATE 2 MG: 2 INJECTION, SOLUTION INTRAMUSCULAR; INTRAVENOUS at 23:42

## 2021-01-01 RX ADMIN — BUDESONIDE 500 MCG: 0.5 INHALANT RESPIRATORY (INHALATION) at 21:22

## 2021-01-01 RX ADMIN — LEVALBUTEROL HYDROCHLORIDE 0.63 MG: 0.63 SOLUTION RESPIRATORY (INHALATION) at 13:32

## 2021-01-01 RX ADMIN — CEPHALEXIN 500 MG: 500 CAPSULE ORAL at 13:46

## 2021-01-01 RX ADMIN — GUAIFENESIN 1200 MG: 600 TABLET ORAL at 21:46

## 2021-01-01 RX ADMIN — GABAPENTIN 300 MG: 300 CAPSULE ORAL at 21:29

## 2021-01-01 RX ADMIN — ENOXAPARIN SODIUM 80 MG: 40 INJECTION SUBCUTANEOUS at 05:21

## 2021-01-01 RX ADMIN — PIPERACILLIN SODIUM AND TAZOBACTAM SODIUM 4.5 G: 4; .5 INJECTION, POWDER, LYOPHILIZED, FOR SOLUTION INTRAVENOUS at 06:01

## 2021-01-01 RX ADMIN — PHENYLEPHRINE HYDROCHLORIDE 120 MCG: 10 INJECTION INTRAVENOUS at 14:00

## 2021-01-01 RX ADMIN — PANTOPRAZOLE SODIUM 40 MG: 40 TABLET, DELAYED RELEASE ORAL at 16:06

## 2021-01-01 RX ADMIN — MIDODRINE HYDROCHLORIDE 10 MG: 5 TABLET ORAL at 09:16

## 2021-01-01 RX ADMIN — PHENYLEPHRINE HYDROCHLORIDE 120 MCG: 10 INJECTION INTRAVENOUS at 15:47

## 2021-01-01 RX ADMIN — CEFAZOLIN 1 G: 1 INJECTION, POWDER, FOR SOLUTION INTRAMUSCULAR; INTRAVENOUS at 00:21

## 2021-01-01 RX ADMIN — PANTOPRAZOLE SODIUM 40 MG: 40 TABLET, DELAYED RELEASE ORAL at 16:20

## 2021-01-01 RX ADMIN — Medication 10 ML: at 21:22

## 2021-01-01 RX ADMIN — AMIODARONE HYDROCHLORIDE 200 MG: 200 TABLET ORAL at 09:00

## 2021-01-01 RX ADMIN — SODIUM CHLORIDE 10 ML: 9 INJECTION, SOLUTION INTRAMUSCULAR; INTRAVENOUS; SUBCUTANEOUS at 13:29

## 2021-01-01 RX ADMIN — SODIUM CHLORIDE 10 ML: 9 INJECTION, SOLUTION INTRAMUSCULAR; INTRAVENOUS; SUBCUTANEOUS at 05:28

## 2021-01-01 RX ADMIN — LEVALBUTEROL HYDROCHLORIDE 1.25 MG: 1.25 SOLUTION RESPIRATORY (INHALATION) at 03:31

## 2021-01-01 RX ADMIN — AMIODARONE HYDROCHLORIDE 200 MG: 200 TABLET ORAL at 08:12

## 2021-01-01 RX ADMIN — Medication 10 ML: at 05:43

## 2021-01-01 RX ADMIN — CEFTRIAXONE 2 G: 2 INJECTION, POWDER, FOR SOLUTION INTRAMUSCULAR; INTRAVENOUS at 12:28

## 2021-01-01 RX ADMIN — GABAPENTIN 300 MG: 300 CAPSULE ORAL at 21:23

## 2021-01-01 RX ADMIN — PANTOPRAZOLE SODIUM 40 MG: 40 TABLET, DELAYED RELEASE ORAL at 17:40

## 2021-01-01 RX ADMIN — CEPHALEXIN 500 MG: 500 CAPSULE ORAL at 12:33

## 2021-01-01 RX ADMIN — PROPOFOL 150 MG: 10 INJECTION, EMULSION INTRAVENOUS at 13:10

## 2021-01-01 RX ADMIN — HYDROCODONE BITARTRATE AND HOMATROPINE METHYLBROMIDE 5 ML: 5; 1.5 SOLUTION ORAL at 23:53

## 2021-01-01 RX ADMIN — HYDROCODONE BITARTRATE AND HOMATROPINE METHYLBROMIDE 5 ML: 5; 1.5 SOLUTION ORAL at 17:27

## 2021-01-01 RX ADMIN — SODIUM CHLORIDE 20 ML: 9 INJECTION, SOLUTION INTRAMUSCULAR; INTRAVENOUS; SUBCUTANEOUS at 22:01

## 2021-01-01 RX ADMIN — GABAPENTIN 300 MG: 300 CAPSULE ORAL at 15:34

## 2021-01-01 RX ADMIN — PIPERACILLIN SODIUM AND TAZOBACTAM SODIUM 4.5 G: 4; .5 INJECTION, POWDER, LYOPHILIZED, FOR SOLUTION INTRAVENOUS at 08:57

## 2021-01-01 RX ADMIN — PHENYLEPHRINE HYDROCHLORIDE 30 MCG/MIN: 10 INJECTION INTRAVENOUS at 19:35

## 2021-01-01 RX ADMIN — CEPHALEXIN 500 MG: 500 CAPSULE ORAL at 08:39

## 2021-01-01 RX ADMIN — CEPHALEXIN 500 MG: 500 CAPSULE ORAL at 17:01

## 2021-01-01 RX ADMIN — SODIUM CHLORIDE 10 ML: 9 INJECTION, SOLUTION INTRAMUSCULAR; INTRAVENOUS; SUBCUTANEOUS at 15:03

## 2021-01-01 RX ADMIN — MIDODRINE HYDROCHLORIDE 5 MG: 5 TABLET ORAL at 12:28

## 2021-01-01 RX ADMIN — GLYCOPYRROLATE 0.6 MG: 0.2 INJECTION, SOLUTION INTRAMUSCULAR; INTRAVENOUS at 18:00

## 2021-01-01 RX ADMIN — Medication 120 MG: at 11:40

## 2021-01-01 RX ADMIN — PIPERACILLIN SODIUM AND TAZOBACTAM SODIUM 4.5 G: 4; .5 INJECTION, POWDER, LYOPHILIZED, FOR SOLUTION INTRAVENOUS at 08:30

## 2021-01-01 RX ADMIN — DOXYCYCLINE HYCLATE 100 MG: 100 CAPSULE ORAL at 10:14

## 2021-01-01 RX ADMIN — LIDOCAINE HYDROCHLORIDE 60 MG: 20 INJECTION, SOLUTION EPIDURAL; INFILTRATION; INTRACAUDAL; PERINEURAL at 13:10

## 2021-01-01 RX ADMIN — SODIUM CHLORIDE SOLN NEBU 3% 4 ML: 3 NEBU SOLN at 21:55

## 2021-01-01 RX ADMIN — LORAZEPAM 0.5 MG: 0.5 TABLET ORAL at 05:39

## 2021-01-01 RX ADMIN — LEVALBUTEROL HYDROCHLORIDE 1.25 MG: 1.25 SOLUTION RESPIRATORY (INHALATION) at 20:47

## 2021-01-01 RX ADMIN — SODIUM CHLORIDE SOLN NEBU 3% 4 ML: 3 NEBU SOLN at 05:57

## 2021-01-01 RX ADMIN — BUDESONIDE 500 MCG: 0.5 INHALANT RESPIRATORY (INHALATION) at 21:03

## 2021-01-01 RX ADMIN — LEVALBUTEROL HYDROCHLORIDE 1.25 MG: 1.25 SOLUTION RESPIRATORY (INHALATION) at 19:42

## 2021-01-01 RX ADMIN — SODIUM CHLORIDE 10 ML: 9 INJECTION, SOLUTION INTRAMUSCULAR; INTRAVENOUS; SUBCUTANEOUS at 05:53

## 2021-01-01 RX ADMIN — ENOXAPARIN SODIUM 80 MG: 40 INJECTION SUBCUTANEOUS at 05:18

## 2021-01-01 RX ADMIN — GABAPENTIN 300 MG: 300 CAPSULE ORAL at 17:40

## 2021-01-01 RX ADMIN — DOCUSATE SODIUM 100 MG: 100 CAPSULE, LIQUID FILLED ORAL at 18:27

## 2021-01-01 RX ADMIN — HYDROCODONE BITARTRATE AND HOMATROPINE METHYLBROMIDE 5 ML: 5; 1.5 SOLUTION ORAL at 23:50

## 2021-01-01 RX ADMIN — Medication 10 ML: at 14:01

## 2021-01-01 RX ADMIN — MIDODRINE HYDROCHLORIDE 10 MG: 5 TABLET ORAL at 05:49

## 2021-01-01 RX ADMIN — Medication 10 ML: at 05:18

## 2021-01-01 RX ADMIN — Medication 10 ML: at 22:41

## 2021-01-01 RX ADMIN — HYDROMORPHONE HYDROCHLORIDE 0.5 MG: 1 INJECTION, SOLUTION INTRAMUSCULAR; INTRAVENOUS; SUBCUTANEOUS at 18:42

## 2021-01-01 RX ADMIN — METOPROLOL SUCCINATE 50 MG: 50 TABLET, EXTENDED RELEASE ORAL at 09:13

## 2021-01-01 RX ADMIN — BUDESONIDE 500 MCG: 0.5 INHALANT RESPIRATORY (INHALATION) at 07:36

## 2021-01-01 RX ADMIN — GABAPENTIN 300 MG: 300 CAPSULE ORAL at 10:13

## 2021-01-01 RX ADMIN — SODIUM CHLORIDE 5 ML: 9 INJECTION, SOLUTION INTRAMUSCULAR; INTRAVENOUS; SUBCUTANEOUS at 15:02

## 2021-01-01 RX ADMIN — Medication 10 ML: at 17:10

## 2021-01-01 RX ADMIN — DOXYCYCLINE HYCLATE 100 MG: 100 CAPSULE ORAL at 21:23

## 2021-01-01 RX ADMIN — LEVALBUTEROL HYDROCHLORIDE 0.63 MG: 0.63 SOLUTION RESPIRATORY (INHALATION) at 08:03

## 2021-01-01 RX ADMIN — LORAZEPAM 0.5 MG: 2 INJECTION INTRAMUSCULAR; INTRAVENOUS at 17:33

## 2021-01-01 RX ADMIN — Medication 1 AMPULE: at 22:22

## 2021-01-01 RX ADMIN — GUAIFENESIN 1200 MG: 600 TABLET ORAL at 09:25

## 2021-01-01 RX ADMIN — HYDROCODONE BITARTRATE AND HOMATROPINE METHYLBROMIDE 5 ML: 5; 1.5 SOLUTION ORAL at 17:33

## 2021-01-01 RX ADMIN — POLYETHYLENE GLYCOL 3350 17 G: 17 POWDER, FOR SOLUTION ORAL at 09:03

## 2021-01-01 RX ADMIN — MORPHINE SULFATE 2 MG: 2 INJECTION, SOLUTION INTRAMUSCULAR; INTRAVENOUS at 14:01

## 2021-01-01 RX ADMIN — Medication 10 ML: at 05:53

## 2021-01-01 RX ADMIN — SODIUM CHLORIDE SOLN NEBU 3% 4 ML: 3 NEBU SOLN at 19:00

## 2021-01-01 RX ADMIN — PIPERACILLIN SODIUM AND TAZOBACTAM SODIUM 4.5 G: 4; .5 INJECTION, POWDER, LYOPHILIZED, FOR SOLUTION INTRAVENOUS at 16:56

## 2021-01-01 RX ADMIN — SODIUM CHLORIDE, SODIUM LACTATE, POTASSIUM CHLORIDE, AND CALCIUM CHLORIDE: 600; 310; 30; 20 INJECTION, SOLUTION INTRAVENOUS at 08:43

## 2021-01-01 RX ADMIN — HYDROMORPHONE HYDROCHLORIDE 0.5 MG: 2 INJECTION INTRAMUSCULAR; INTRAVENOUS; SUBCUTANEOUS at 21:17

## 2021-01-01 RX ADMIN — SODIUM CHLORIDE 10 ML: 9 INJECTION, SOLUTION INTRAMUSCULAR; INTRAVENOUS; SUBCUTANEOUS at 21:20

## 2021-01-01 RX ADMIN — MORPHINE SULFATE 2 MG: 2 INJECTION, SOLUTION INTRAMUSCULAR; INTRAVENOUS at 04:57

## 2021-01-01 RX ADMIN — Medication 10 MG: at 13:55

## 2021-01-01 RX ADMIN — LEVALBUTEROL HYDROCHLORIDE 1.25 MG: 1.25 SOLUTION RESPIRATORY (INHALATION) at 11:20

## 2021-01-01 RX ADMIN — OXYCODONE AND ACETAMINOPHEN 1 TABLET: 5; 325 TABLET ORAL at 20:23

## 2021-01-01 RX ADMIN — TRAMADOL HYDROCHLORIDE 50 MG: 50 TABLET, COATED ORAL at 21:13

## 2021-01-01 RX ADMIN — SODIUM CHLORIDE SOLN NEBU 3% 4 ML: 3 NEBU SOLN at 19:52

## 2021-01-01 RX ADMIN — PIPERACILLIN AND TAZOBACTAM 4.5 G: 4; .5 INJECTION, POWDER, FOR SOLUTION INTRAVENOUS at 13:28

## 2021-01-01 RX ADMIN — MIDODRINE HYDROCHLORIDE 5 MG: 5 TABLET ORAL at 17:13

## 2021-01-01 RX ADMIN — GUAIFENESIN 1200 MG: 600 TABLET ORAL at 08:00

## 2021-01-01 RX ADMIN — TIOTROPIUM BROMIDE INHALATION SPRAY 2 PUFF: 3.12 SPRAY, METERED RESPIRATORY (INHALATION) at 07:34

## 2021-01-01 RX ADMIN — Medication 10 ML: at 22:20

## 2021-01-01 RX ADMIN — LORAZEPAM 0.5 MG: 0.5 TABLET ORAL at 21:34

## 2021-01-01 RX ADMIN — PANTOPRAZOLE SODIUM 40 MG: 40 TABLET, DELAYED RELEASE ORAL at 10:31

## 2021-01-01 RX ADMIN — Medication 10 ML: at 14:02

## 2021-01-01 RX ADMIN — CEFTRIAXONE 2 G: 2 INJECTION, POWDER, FOR SOLUTION INTRAMUSCULAR; INTRAVENOUS at 12:40

## 2021-01-01 RX ADMIN — SODIUM CHLORIDE SOLN NEBU 3% 4 ML: 3 NEBU SOLN at 20:33

## 2021-01-01 RX ADMIN — ROCURONIUM BROMIDE 10 MG: 10 INJECTION, SOLUTION INTRAVENOUS at 14:40

## 2021-01-01 RX ADMIN — GUAIFENESIN 1200 MG: 600 TABLET ORAL at 11:20

## 2021-01-01 RX ADMIN — PANTOPRAZOLE SODIUM 40 MG: 40 TABLET, DELAYED RELEASE ORAL at 05:47

## 2021-01-01 RX ADMIN — OXYCODONE AND ACETAMINOPHEN 1 TABLET: 5; 325 TABLET ORAL at 04:21

## 2021-01-01 RX ADMIN — PIPERACILLIN SODIUM AND TAZOBACTAM SODIUM 4.5 G: 4; .5 INJECTION, POWDER, LYOPHILIZED, FOR SOLUTION INTRAVENOUS at 22:18

## 2021-01-01 RX ADMIN — METHYLPREDNISOLONE SODIUM SUCCINATE 40 MG: 40 INJECTION, POWDER, FOR SOLUTION INTRAMUSCULAR; INTRAVENOUS at 21:22

## 2021-01-01 RX ADMIN — BUDESONIDE 500 MCG: 0.5 INHALANT RESPIRATORY (INHALATION) at 20:12

## 2021-01-01 RX ADMIN — LEVALBUTEROL HYDROCHLORIDE 1.25 MG: 1.25 SOLUTION RESPIRATORY (INHALATION) at 04:28

## 2021-01-01 RX ADMIN — GABAPENTIN 300 MG: 300 CAPSULE ORAL at 17:43

## 2021-01-01 RX ADMIN — SODIUM CHLORIDE SOLN NEBU 3% 4 ML: 3 NEBU SOLN at 21:22

## 2021-01-01 RX ADMIN — SODIUM CHLORIDE 10 ML: 9 INJECTION, SOLUTION INTRAMUSCULAR; INTRAVENOUS; SUBCUTANEOUS at 14:30

## 2021-01-01 RX ADMIN — BUDESONIDE 500 MCG: 0.5 INHALANT RESPIRATORY (INHALATION) at 07:09

## 2021-01-01 RX ADMIN — TIOTROPIUM BROMIDE INHALATION SPRAY 2 PUFF: 3.12 SPRAY, METERED RESPIRATORY (INHALATION) at 08:03

## 2021-01-01 RX ADMIN — SODIUM CHLORIDE 10 ML: 9 INJECTION, SOLUTION INTRAMUSCULAR; INTRAVENOUS; SUBCUTANEOUS at 22:00

## 2021-01-01 RX ADMIN — ASPIRIN 81 MG: 81 TABLET ORAL at 09:16

## 2021-01-01 RX ADMIN — POTASSIUM BICARBONATE 40 MEQ: 391 TABLET, EFFERVESCENT ORAL at 08:09

## 2021-01-01 RX ADMIN — MORPHINE SULFATE 2 MG: 2 INJECTION, SOLUTION INTRAMUSCULAR; INTRAVENOUS at 01:26

## 2021-01-01 RX ADMIN — ENOXAPARIN SODIUM 80 MG: 40 INJECTION SUBCUTANEOUS at 17:05

## 2021-01-01 RX ADMIN — Medication 10 ML: at 22:15

## 2021-01-01 RX ADMIN — PHENYLEPHRINE HYDROCHLORIDE 120 MCG: 10 INJECTION INTRAVENOUS at 13:53

## 2021-01-01 RX ADMIN — CEFAZOLIN 1 G: 1 INJECTION, POWDER, FOR SOLUTION INTRAMUSCULAR; INTRAVENOUS at 08:46

## 2021-01-01 RX ADMIN — CEFAZOLIN 1 G: 1 INJECTION, POWDER, FOR SOLUTION INTRAMUSCULAR; INTRAVENOUS at 22:53

## 2021-01-01 RX ADMIN — BUDESONIDE 500 MCG: 0.5 INHALANT RESPIRATORY (INHALATION) at 21:55

## 2021-01-01 RX ADMIN — AMIODARONE HYDROCHLORIDE 200 MG: 200 TABLET ORAL at 08:59

## 2021-01-01 RX ADMIN — CEPHALEXIN 500 MG: 500 CAPSULE ORAL at 08:23

## 2021-01-01 RX ADMIN — Medication 10 ML: at 13:52

## 2021-01-01 RX ADMIN — LEVALBUTEROL HYDROCHLORIDE 1.25 MG: 1.25 SOLUTION RESPIRATORY (INHALATION) at 08:57

## 2021-01-01 RX ADMIN — SODIUM CHLORIDE 10 ML: 9 INJECTION, SOLUTION INTRAMUSCULAR; INTRAVENOUS; SUBCUTANEOUS at 21:21

## 2021-01-01 RX ADMIN — KETAMINE HYDROCHLORIDE 25 MG: 50 INJECTION INTRAMUSCULAR; INTRAVENOUS at 09:15

## 2021-01-01 RX ADMIN — MIDODRINE HYDROCHLORIDE 5 MG: 5 TABLET ORAL at 15:03

## 2021-01-01 RX ADMIN — Medication 10 ML: at 06:38

## 2021-01-01 RX ADMIN — OXYCODONE AND ACETAMINOPHEN 1 TABLET: 5; 325 TABLET ORAL at 20:33

## 2021-01-01 RX ADMIN — SODIUM CHLORIDE SOLN NEBU 3% 4 ML: 3 NEBU SOLN at 19:47

## 2021-01-01 RX ADMIN — DOXYCYCLINE HYCLATE 100 MG: 100 CAPSULE ORAL at 09:32

## 2021-01-01 RX ADMIN — LEVALBUTEROL HYDROCHLORIDE 1.25 MG: 1.25 SOLUTION RESPIRATORY (INHALATION) at 07:36

## 2021-01-01 RX ADMIN — SODIUM CHLORIDE 10 ML: 9 INJECTION, SOLUTION INTRAMUSCULAR; INTRAVENOUS; SUBCUTANEOUS at 05:49

## 2021-01-01 RX ADMIN — MIDODRINE HYDROCHLORIDE 10 MG: 5 TABLET ORAL at 12:32

## 2021-01-01 RX ADMIN — GUAIFENESIN 1200 MG: 600 TABLET ORAL at 21:57

## 2021-01-01 RX ADMIN — SODIUM CHLORIDE SOLN NEBU 3% 4 ML: 3 NEBU SOLN at 20:05

## 2021-01-01 RX ADMIN — POLYETHYLENE GLYCOL 3350 17 G: 17 POWDER, FOR SOLUTION ORAL at 08:01

## 2021-01-01 RX ADMIN — DOXYCYCLINE HYCLATE 100 MG: 100 CAPSULE ORAL at 21:49

## 2021-01-01 RX ADMIN — LEVALBUTEROL HYDROCHLORIDE 1.25 MG: 1.25 SOLUTION RESPIRATORY (INHALATION) at 07:56

## 2021-01-01 RX ADMIN — SENNOSIDES AND DOCUSATE SODIUM 1 TABLET: 8.6; 5 TABLET ORAL at 22:17

## 2021-01-01 RX ADMIN — MIDODRINE HYDROCHLORIDE 10 MG: 5 TABLET ORAL at 08:20

## 2021-01-01 RX ADMIN — DOXYCYCLINE HYCLATE 100 MG: 100 CAPSULE ORAL at 21:02

## 2021-01-01 RX ADMIN — OXYCODONE AND ACETAMINOPHEN 1 TABLET: 5; 325 TABLET ORAL at 22:11

## 2021-01-01 RX ADMIN — MIDAZOLAM 1 MG: 1 INJECTION INTRAMUSCULAR; INTRAVENOUS at 09:30

## 2021-01-01 RX ADMIN — PIPERACILLIN SODIUM AND TAZOBACTAM SODIUM 4.5 G: 4; .5 INJECTION, POWDER, LYOPHILIZED, FOR SOLUTION INTRAVENOUS at 10:19

## 2021-01-01 RX ADMIN — GUAIFENESIN 1200 MG: 600 TABLET ORAL at 08:30

## 2021-01-01 RX ADMIN — PHENYLEPHRINE HYDROCHLORIDE 100 MCG: 10 INJECTION INTRAVENOUS at 15:02

## 2021-01-01 RX ADMIN — GABAPENTIN 300 MG: 300 CAPSULE ORAL at 11:20

## 2021-01-01 RX ADMIN — BUDESONIDE 500 MCG: 0.5 INHALANT RESPIRATORY (INHALATION) at 08:08

## 2021-01-01 RX ADMIN — MIDODRINE HYDROCHLORIDE 5 MG: 5 TABLET ORAL at 17:39

## 2021-01-01 RX ADMIN — LEVALBUTEROL HYDROCHLORIDE 1.25 MG: 1.25 SOLUTION RESPIRATORY (INHALATION) at 20:50

## 2021-01-01 RX ADMIN — MIDODRINE HYDROCHLORIDE 5 MG: 5 TABLET ORAL at 17:24

## 2021-01-01 RX ADMIN — MIDODRINE HYDROCHLORIDE 5 MG: 5 TABLET ORAL at 13:10

## 2021-01-01 RX ADMIN — GABAPENTIN 300 MG: 300 CAPSULE ORAL at 18:58

## 2021-01-01 RX ADMIN — Medication 1 AMPULE: at 16:17

## 2021-01-01 RX ADMIN — HYDROCODONE BITARTRATE AND HOMATROPINE METHYLBROMIDE 5 ML: 5; 1.5 SOLUTION ORAL at 08:29

## 2021-01-01 RX ADMIN — IPRATROPIUM BROMIDE 2 SPRAY: 21 SPRAY NASAL at 17:15

## 2021-01-01 RX ADMIN — TRAMADOL HYDROCHLORIDE 50 MG: 50 TABLET, COATED ORAL at 08:59

## 2021-01-01 RX ADMIN — SODIUM CHLORIDE SOLN NEBU 3% 4 ML: 3 NEBU SOLN at 20:12

## 2021-01-01 RX ADMIN — GABAPENTIN 300 MG: 300 CAPSULE ORAL at 08:51

## 2021-01-01 RX ADMIN — PANTOPRAZOLE SODIUM 40 MG: 40 TABLET, DELAYED RELEASE ORAL at 18:58

## 2021-01-01 RX ADMIN — ALBUTEROL SULFATE 2 PUFF: 90 AEROSOL, METERED RESPIRATORY (INHALATION) at 22:33

## 2021-01-01 RX ADMIN — BUDESONIDE 500 MCG: 0.5 INHALANT RESPIRATORY (INHALATION) at 08:16

## 2021-01-01 RX ADMIN — VANCOMYCIN HYDROCHLORIDE 1250 MG: 10 INJECTION, POWDER, LYOPHILIZED, FOR SOLUTION INTRAVENOUS at 11:47

## 2021-01-01 RX ADMIN — KETAMINE HYDROCHLORIDE 20 MG: 50 INJECTION INTRAMUSCULAR; INTRAVENOUS at 15:04

## 2021-01-01 RX ADMIN — SODIUM CHLORIDE SOLN NEBU 3% 4 ML: 3 NEBU SOLN at 19:56

## 2021-01-01 RX ADMIN — CEFAZOLIN 2 G: 1 INJECTION, POWDER, FOR SOLUTION INTRAVENOUS at 13:57

## 2021-01-01 RX ADMIN — LEVALBUTEROL HYDROCHLORIDE 1.25 MG: 1.25 SOLUTION RESPIRATORY (INHALATION) at 23:18

## 2021-01-01 RX ADMIN — POLYETHYLENE GLYCOL 3350 17 G: 17 POWDER, FOR SOLUTION ORAL at 09:41

## 2021-01-01 RX ADMIN — Medication 5 ML: at 12:44

## 2021-01-01 RX ADMIN — Medication 1 AMPULE: at 09:17

## 2021-01-01 RX ADMIN — METRONIDAZOLE 500 MG: 500 TABLET ORAL at 22:08

## 2021-01-01 RX ADMIN — MIDODRINE HYDROCHLORIDE 10 MG: 5 TABLET ORAL at 18:04

## 2021-01-01 RX ADMIN — DIPHENHYDRAMINE HYDROCHLORIDE 25 MG: 25 CAPSULE ORAL at 21:08

## 2021-01-01 RX ADMIN — FLUTICASONE PROPIONATE 2 SPRAY: 50 SPRAY, METERED NASAL at 09:00

## 2021-01-01 RX ADMIN — PHENYLEPHRINE HYDROCHLORIDE 50 MCG: 10 INJECTION INTRAVENOUS at 08:04

## 2021-01-01 RX ADMIN — METOPROLOL SUCCINATE 50 MG: 50 TABLET, EXTENDED RELEASE ORAL at 08:39

## 2021-01-01 RX ADMIN — SODIUM CHLORIDE 10 ML: 9 INJECTION, SOLUTION INTRAMUSCULAR; INTRAVENOUS; SUBCUTANEOUS at 09:01

## 2021-01-01 RX ADMIN — MORPHINE SULFATE 2 MG: 2 INJECTION, SOLUTION INTRAMUSCULAR; INTRAVENOUS at 22:40

## 2021-01-01 RX ADMIN — BUDESONIDE 500 MCG: 0.5 INHALANT RESPIRATORY (INHALATION) at 08:27

## 2021-01-01 RX ADMIN — GABAPENTIN 300 MG: 300 CAPSULE ORAL at 09:03

## 2021-01-01 RX ADMIN — Medication 10 ML: at 05:50

## 2021-01-01 RX ADMIN — PIPERACILLIN AND TAZOBACTAM 4.5 G: 4; .5 INJECTION, POWDER, FOR SOLUTION INTRAVENOUS at 23:02

## 2021-01-01 RX ADMIN — DEXAMETHASONE SODIUM PHOSPHATE 4 MG: 4 INJECTION, SOLUTION INTRAMUSCULAR; INTRAVENOUS at 14:19

## 2021-01-01 RX ADMIN — OXYCODONE AND ACETAMINOPHEN 1 TABLET: 5; 325 TABLET ORAL at 08:51

## 2021-01-01 RX ADMIN — FLUTICASONE PROPIONATE 2 SPRAY: 50 SPRAY, METERED NASAL at 10:00

## 2021-01-01 RX ADMIN — POLYETHYLENE GLYCOL 3350 17 G: 17 POWDER, FOR SOLUTION ORAL at 08:02

## 2021-01-01 RX ADMIN — LEVALBUTEROL HYDROCHLORIDE 1.25 MG: 1.25 SOLUTION RESPIRATORY (INHALATION) at 07:28

## 2021-01-01 RX ADMIN — GABAPENTIN 300 MG: 300 CAPSULE ORAL at 18:04

## 2021-01-01 RX ADMIN — GABAPENTIN 300 MG: 300 CAPSULE ORAL at 17:00

## 2021-01-01 RX ADMIN — HYDROCODONE BITARTRATE AND HOMATROPINE METHYLBROMIDE 5 ML: 5; 1.5 SOLUTION ORAL at 20:37

## 2021-01-01 RX ADMIN — SODIUM CHLORIDE 20 ML: 9 INJECTION, SOLUTION INTRAMUSCULAR; INTRAVENOUS; SUBCUTANEOUS at 22:23

## 2021-01-01 RX ADMIN — DIPHENHYDRAMINE HYDROCHLORIDE 25 MG: 25 CAPSULE ORAL at 09:09

## 2021-01-01 RX ADMIN — LEVALBUTEROL HYDROCHLORIDE 1.25 MG: 1.25 SOLUTION RESPIRATORY (INHALATION) at 15:26

## 2021-01-01 RX ADMIN — GABAPENTIN 300 MG: 300 CAPSULE ORAL at 21:19

## 2021-01-01 RX ADMIN — OXYCODONE AND ACETAMINOPHEN 1 TABLET: 5; 325 TABLET ORAL at 16:06

## 2021-01-01 RX ADMIN — GABAPENTIN 300 MG: 300 CAPSULE ORAL at 20:49

## 2021-01-01 RX ADMIN — SODIUM CHLORIDE 10 ML: 9 INJECTION, SOLUTION INTRAMUSCULAR; INTRAVENOUS; SUBCUTANEOUS at 05:23

## 2021-01-01 RX ADMIN — LEVALBUTEROL HYDROCHLORIDE 1.25 MG: 1.25 SOLUTION RESPIRATORY (INHALATION) at 09:02

## 2021-01-01 RX ADMIN — OXYCODONE 10 MG: 5 TABLET ORAL at 05:33

## 2021-01-01 RX ADMIN — PANTOPRAZOLE SODIUM 40 MG: 40 TABLET, DELAYED RELEASE ORAL at 05:50

## 2021-01-01 RX ADMIN — Medication 10 ML: at 21:33

## 2021-01-01 RX ADMIN — LEVALBUTEROL HYDROCHLORIDE 0.63 MG: 0.63 SOLUTION RESPIRATORY (INHALATION) at 07:34

## 2021-01-01 RX ADMIN — Medication 1 AMPULE: at 21:13

## 2021-01-01 RX ADMIN — PERFLUTREN 1 ML: 6.52 INJECTION, SUSPENSION INTRAVENOUS at 14:40

## 2021-01-01 RX ADMIN — SODIUM CHLORIDE SOLN NEBU 3% 4 ML: 3 NEBU SOLN at 20:35

## 2021-01-01 RX ADMIN — BUDESONIDE 500 MCG: 0.5 INHALANT RESPIRATORY (INHALATION) at 20:25

## 2021-01-01 RX ADMIN — ACETAMINOPHEN 500 MG: 500 TABLET ORAL at 20:30

## 2021-01-01 RX ADMIN — MONTELUKAST 10 MG: 10 TABLET, FILM COATED ORAL at 22:17

## 2021-01-01 RX ADMIN — TRAMADOL HYDROCHLORIDE 50 MG: 50 TABLET, COATED ORAL at 16:35

## 2021-01-01 RX ADMIN — POLYETHYLENE GLYCOL 3350 17 G: 17 POWDER, FOR SOLUTION ORAL at 11:24

## 2021-01-01 RX ADMIN — SODIUM CHLORIDE, SODIUM LACTATE, POTASSIUM CHLORIDE, AND CALCIUM CHLORIDE 125 ML/HR: 600; 310; 30; 20 INJECTION, SOLUTION INTRAVENOUS at 09:22

## 2021-01-01 RX ADMIN — Medication 10 ML: at 05:46

## 2021-01-01 RX ADMIN — LEVALBUTEROL HYDROCHLORIDE 1.25 MG: 1.25 SOLUTION RESPIRATORY (INHALATION) at 16:39

## 2021-01-01 RX ADMIN — LEVALBUTEROL HYDROCHLORIDE 1.25 MG: 1.25 SOLUTION RESPIRATORY (INHALATION) at 00:11

## 2021-01-01 RX ADMIN — APIXABAN 5 MG: 5 TABLET, FILM COATED ORAL at 12:30

## 2021-01-01 RX ADMIN — HYDROMORPHONE HYDROCHLORIDE 0.5 MG: 1 INJECTION, SOLUTION INTRAMUSCULAR; INTRAVENOUS; SUBCUTANEOUS at 16:53

## 2021-01-01 RX ADMIN — GABAPENTIN 300 MG: 300 CAPSULE ORAL at 09:12

## 2021-01-01 RX ADMIN — METRONIDAZOLE 500 MG: 500 TABLET ORAL at 20:59

## 2021-01-01 RX ADMIN — APIXABAN 5 MG: 5 TABLET, FILM COATED ORAL at 22:17

## 2021-01-01 RX ADMIN — LORAZEPAM 0.5 MG: 0.5 TABLET ORAL at 17:14

## 2021-01-01 RX ADMIN — Medication 1 AMPULE: at 21:31

## 2021-01-01 RX ADMIN — SODIUM CHLORIDE: 900 INJECTION, SOLUTION INTRAVENOUS at 16:15

## 2021-01-01 RX ADMIN — VANCOMYCIN HYDROCHLORIDE 1750 MG: 10 INJECTION, POWDER, LYOPHILIZED, FOR SOLUTION INTRAVENOUS at 15:03

## 2021-01-01 RX ADMIN — GABAPENTIN 300 MG: 300 CAPSULE ORAL at 21:33

## 2021-01-01 RX ADMIN — GABAPENTIN 300 MG: 300 CAPSULE ORAL at 16:06

## 2021-01-01 RX ADMIN — TRAMADOL HYDROCHLORIDE 50 MG: 50 TABLET, COATED ORAL at 04:17

## 2021-01-01 RX ADMIN — CEPHALEXIN 500 MG: 500 CAPSULE ORAL at 21:36

## 2021-01-01 RX ADMIN — LORAZEPAM 0.5 MG: 0.5 TABLET ORAL at 21:22

## 2021-01-01 RX ADMIN — GABAPENTIN 300 MG: 300 CAPSULE ORAL at 08:20

## 2021-01-01 RX ADMIN — OXYCODONE AND ACETAMINOPHEN 1 TABLET: 5; 325 TABLET ORAL at 15:54

## 2021-01-01 RX ADMIN — SODIUM CHLORIDE 10 ML: 9 INJECTION, SOLUTION INTRAMUSCULAR; INTRAVENOUS; SUBCUTANEOUS at 21:38

## 2021-01-01 RX ADMIN — PANTOPRAZOLE SODIUM 40 MG: 40 TABLET, DELAYED RELEASE ORAL at 17:13

## 2021-01-01 RX ADMIN — AMOXICILLIN AND CLAVULANATE POTASSIUM 1 TABLET: 875; 125 TABLET, FILM COATED ORAL at 08:51

## 2021-01-01 RX ADMIN — SODIUM CHLORIDE, SODIUM LACTATE, POTASSIUM CHLORIDE, AND CALCIUM CHLORIDE 75 ML/HR: 600; 310; 30; 20 INJECTION, SOLUTION INTRAVENOUS at 08:00

## 2021-01-01 RX ADMIN — PHENYLEPHRINE HYDROCHLORIDE 240 MCG: 10 INJECTION INTRAVENOUS at 16:05

## 2021-01-01 RX ADMIN — LIDOCAINE HYDROCHLORIDE 10 ML: 10 INJECTION, SOLUTION INFILTRATION; PERINEURAL at 15:12

## 2021-01-01 RX ADMIN — FENTANYL CITRATE 50 MCG: 50 INJECTION INTRAMUSCULAR; INTRAVENOUS at 11:18

## 2021-01-01 RX ADMIN — LEVALBUTEROL HYDROCHLORIDE 1.25 MG: 1.25 SOLUTION RESPIRATORY (INHALATION) at 21:13

## 2021-01-01 RX ADMIN — Medication 10 ML: at 21:05

## 2021-01-01 RX ADMIN — PANTOPRAZOLE SODIUM 40 MG: 40 TABLET, DELAYED RELEASE ORAL at 05:38

## 2021-01-01 RX ADMIN — MIDODRINE HYDROCHLORIDE 10 MG: 5 TABLET ORAL at 09:18

## 2021-01-01 RX ADMIN — PHENYLEPHRINE HYDROCHLORIDE 120 MCG: 10 INJECTION INTRAVENOUS at 15:20

## 2021-01-01 RX ADMIN — PIPERACILLIN SODIUM AND TAZOBACTAM SODIUM 4.5 G: 4; .5 INJECTION, POWDER, LYOPHILIZED, FOR SOLUTION INTRAVENOUS at 23:58

## 2021-01-01 RX ADMIN — MIDODRINE HYDROCHLORIDE 10 MG: 5 TABLET ORAL at 12:12

## 2021-01-01 RX ADMIN — IOPAMIDOL 100 ML: 755 INJECTION, SOLUTION INTRAVENOUS at 14:01

## 2021-01-01 RX ADMIN — HEPARIN SODIUM AND DEXTROSE 12 UNITS/KG/HR: 5000; 5 INJECTION INTRAVENOUS at 10:23

## 2021-01-01 RX ADMIN — NOREPINEPHRINE BITARTRATE 4 MCG/MIN: 1 SOLUTION INTRAVENOUS at 13:54

## 2021-01-01 RX ADMIN — ENOXAPARIN SODIUM 40 MG: 40 INJECTION SUBCUTANEOUS at 09:36

## 2021-01-01 RX ADMIN — LEVALBUTEROL HYDROCHLORIDE 1.25 MG: 1.25 SOLUTION RESPIRATORY (INHALATION) at 08:17

## 2021-01-01 RX ADMIN — LEVALBUTEROL HYDROCHLORIDE 1.25 MG: 1.25 SOLUTION RESPIRATORY (INHALATION) at 11:15

## 2021-01-01 RX ADMIN — CEPHALEXIN 500 MG: 500 CAPSULE ORAL at 17:06

## 2021-01-01 RX ADMIN — DOXYCYCLINE HYCLATE 100 MG: 100 CAPSULE ORAL at 10:45

## 2021-01-01 RX ADMIN — PANTOPRAZOLE SODIUM 40 MG: 40 TABLET, DELAYED RELEASE ORAL at 05:36

## 2021-01-01 RX ADMIN — SODIUM CHLORIDE 10 ML: 9 INJECTION, SOLUTION INTRAMUSCULAR; INTRAVENOUS; SUBCUTANEOUS at 05:38

## 2021-01-01 RX ADMIN — Medication 5 ML: at 21:29

## 2021-01-01 RX ADMIN — HYDROMORPHONE HYDROCHLORIDE 0.5 MG: 2 INJECTION INTRAMUSCULAR; INTRAVENOUS; SUBCUTANEOUS at 23:38

## 2021-01-01 RX ADMIN — LEVALBUTEROL HYDROCHLORIDE 1.25 MG: 1.25 SOLUTION RESPIRATORY (INHALATION) at 15:04

## 2021-01-01 RX ADMIN — OXYCODONE AND ACETAMINOPHEN 1 TABLET: 5; 325 TABLET ORAL at 21:35

## 2021-01-01 RX ADMIN — LEVALBUTEROL HYDROCHLORIDE 1.25 MG: 1.25 SOLUTION RESPIRATORY (INHALATION) at 19:47

## 2021-01-01 RX ADMIN — LIDOCAINE HYDROCHLORIDE 1 MG/KG/HR: 8 INJECTION, SOLUTION INTRAVENOUS at 13:37

## 2021-01-01 RX ADMIN — Medication 1 AMPULE: at 21:59

## 2021-01-01 RX ADMIN — METRONIDAZOLE 500 MG: 500 TABLET ORAL at 09:04

## 2021-01-01 RX ADMIN — OXYCODONE AND ACETAMINOPHEN 1 TABLET: 5; 325 TABLET ORAL at 19:40

## 2021-01-01 RX ADMIN — AMPICILLIN SODIUM AND SULBACTAM SODIUM 3 G: 2; 1 INJECTION, POWDER, FOR SOLUTION INTRAMUSCULAR; INTRAVENOUS at 21:34

## 2021-01-01 RX ADMIN — SODIUM CHLORIDE SOLN NEBU 3% 4 ML: 3 NEBU SOLN at 08:27

## 2021-01-01 RX ADMIN — LEVALBUTEROL HYDROCHLORIDE 1.25 MG: 1.25 SOLUTION RESPIRATORY (INHALATION) at 12:35

## 2021-01-01 RX ADMIN — GABAPENTIN 300 MG: 300 CAPSULE ORAL at 17:24

## 2021-01-01 RX ADMIN — ALBUTEROL SULFATE 2 PUFF: 90 AEROSOL, METERED RESPIRATORY (INHALATION) at 07:25

## 2021-01-01 RX ADMIN — PIPERACILLIN AND TAZOBACTAM 4.5 G: 4; .5 INJECTION, POWDER, FOR SOLUTION INTRAVENOUS at 16:40

## 2021-01-01 RX ADMIN — KETAMINE HYDROCHLORIDE 25 MG: 50 INJECTION INTRAMUSCULAR; INTRAVENOUS at 10:15

## 2021-01-01 RX ADMIN — PHENYLEPHRINE HYDROCHLORIDE 120 MCG: 10 INJECTION INTRAVENOUS at 16:35

## 2021-01-01 RX ADMIN — MIDODRINE HYDROCHLORIDE 10 MG: 5 TABLET ORAL at 17:13

## 2021-01-01 RX ADMIN — METOPROLOL SUCCINATE 50 MG: 50 TABLET, EXTENDED RELEASE ORAL at 08:23

## 2021-01-01 RX ADMIN — SODIUM CHLORIDE 100 ML: 900 INJECTION, SOLUTION INTRAVENOUS at 11:02

## 2021-01-01 RX ADMIN — PIPERACILLIN SODIUM AND TAZOBACTAM SODIUM 4.5 G: 4; .5 INJECTION, POWDER, LYOPHILIZED, FOR SOLUTION INTRAVENOUS at 16:41

## 2021-01-01 RX ADMIN — PIPERACILLIN AND TAZOBACTAM 4.5 G: 4; .5 INJECTION, POWDER, FOR SOLUTION INTRAVENOUS at 17:28

## 2021-01-01 RX ADMIN — ROCURONIUM BROMIDE 10 MG: 10 INJECTION, SOLUTION INTRAVENOUS at 13:56

## 2021-01-01 RX ADMIN — SODIUM CHLORIDE SOLN NEBU 3% 4 ML: 3 NEBU SOLN at 09:24

## 2021-01-01 RX ADMIN — DOXYCYCLINE HYCLATE 100 MG: 100 CAPSULE ORAL at 09:12

## 2021-01-01 RX ADMIN — PANTOPRAZOLE SODIUM 40 MG: 40 TABLET, DELAYED RELEASE ORAL at 08:23

## 2021-01-01 RX ADMIN — MIDODRINE HYDROCHLORIDE 5 MG: 5 TABLET ORAL at 14:27

## 2021-01-01 RX ADMIN — DOCUSATE SODIUM 100 MG: 100 CAPSULE, LIQUID FILLED ORAL at 09:03

## 2021-01-01 RX ADMIN — PANTOPRAZOLE SODIUM 40 MG: 40 TABLET, DELAYED RELEASE ORAL at 06:02

## 2021-01-01 RX ADMIN — METOPROLOL SUCCINATE 50 MG: 50 TABLET, EXTENDED RELEASE ORAL at 09:11

## 2021-01-01 RX ADMIN — LEVALBUTEROL HYDROCHLORIDE 1.25 MG: 1.25 SOLUTION RESPIRATORY (INHALATION) at 15:52

## 2021-01-01 RX ADMIN — GABAPENTIN 300 MG: 300 CAPSULE ORAL at 16:25

## 2021-01-01 RX ADMIN — LEVALBUTEROL HYDROCHLORIDE 0.63 MG: 0.63 SOLUTION RESPIRATORY (INHALATION) at 14:11

## 2021-01-01 RX ADMIN — GABAPENTIN 300 MG: 300 CAPSULE ORAL at 21:54

## 2021-01-01 RX ADMIN — DOCUSATE SODIUM 100 MG: 100 CAPSULE, LIQUID FILLED ORAL at 09:04

## 2021-01-01 RX ADMIN — LEVALBUTEROL HYDROCHLORIDE 1.25 MG: 1.25 SOLUTION RESPIRATORY (INHALATION) at 09:24

## 2021-01-01 RX ADMIN — SODIUM CHLORIDE: 900 INJECTION, SOLUTION INTRAVENOUS at 13:18

## 2021-01-01 RX ADMIN — ONDANSETRON 4 MG: 2 INJECTION INTRAMUSCULAR; INTRAVENOUS at 09:10

## 2021-01-01 RX ADMIN — BUDESONIDE 500 MCG: 0.5 INHALANT RESPIRATORY (INHALATION) at 21:13

## 2021-01-01 RX ADMIN — SODIUM CHLORIDE 10 ML: 9 INJECTION, SOLUTION INTRAMUSCULAR; INTRAVENOUS; SUBCUTANEOUS at 21:36

## 2021-01-01 RX ADMIN — GABAPENTIN 300 MG: 300 CAPSULE ORAL at 15:38

## 2021-01-01 RX ADMIN — Medication 10 ML: at 21:41

## 2021-01-01 RX ADMIN — METOPROLOL SUCCINATE 50 MG: 50 TABLET, EXTENDED RELEASE ORAL at 08:56

## 2021-01-01 RX ADMIN — SODIUM CHLORIDE SOLN NEBU 3% 4 ML: 3 NEBU SOLN at 07:10

## 2021-01-01 RX ADMIN — GUAIFENESIN 1200 MG: 600 TABLET ORAL at 09:16

## 2021-01-01 RX ADMIN — HYDROCODONE BITARTRATE AND HOMATROPINE METHYLBROMIDE 5 ML: 5; 1.5 SOLUTION ORAL at 18:12

## 2021-01-01 RX ADMIN — Medication 10 ML: at 21:45

## 2021-01-01 RX ADMIN — GLYCOPYRROLATE 0.8 MG: 0.2 INJECTION, SOLUTION INTRAMUSCULAR; INTRAVENOUS at 11:13

## 2021-01-01 RX ADMIN — LIDOCAINE HYDROCHLORIDE: 40 SOLUTION TOPICAL at 09:25

## 2021-01-01 RX ADMIN — SODIUM CHLORIDE SOLN NEBU 3% 4 ML: 3 NEBU SOLN at 20:03

## 2021-01-01 RX ADMIN — POLYETHYLENE GLYCOL 3350 17 G: 17 POWDER, FOR SOLUTION ORAL at 10:11

## 2021-01-01 RX ADMIN — Medication 10 MG: at 14:48

## 2021-01-01 RX ADMIN — Medication 10 ML: at 16:30

## 2021-01-01 RX ADMIN — SODIUM CHLORIDE SOLN NEBU 3% 4 ML: 3 NEBU SOLN at 09:06

## 2021-01-01 RX ADMIN — GUAIFENESIN 1200 MG: 600 TABLET ORAL at 08:59

## 2021-01-01 RX ADMIN — Medication 10 ML: at 22:30

## 2021-01-01 RX ADMIN — POLYETHYLENE GLYCOL 3350 17 G: 17 POWDER, FOR SOLUTION ORAL at 08:30

## 2021-01-01 RX ADMIN — DOXYCYCLINE HYCLATE 100 MG: 100 CAPSULE ORAL at 09:26

## 2021-01-01 RX ADMIN — PANTOPRAZOLE SODIUM 40 MG: 40 TABLET, DELAYED RELEASE ORAL at 07:30

## 2021-01-01 RX ADMIN — GABAPENTIN 300 MG: 300 CAPSULE ORAL at 21:49

## 2021-01-01 RX ADMIN — GABAPENTIN 300 MG: 300 CAPSULE ORAL at 21:11

## 2021-01-01 RX ADMIN — POLYETHYLENE GLYCOL 3350 17 G: 17 POWDER, FOR SOLUTION ORAL at 08:51

## 2021-01-01 RX ADMIN — APIXABAN 5 MG: 5 TABLET, FILM COATED ORAL at 08:51

## 2021-01-01 RX ADMIN — MIDODRINE HYDROCHLORIDE 10 MG: 5 TABLET ORAL at 12:13

## 2021-01-01 RX ADMIN — DOXYCYCLINE HYCLATE 100 MG: 100 CAPSULE ORAL at 08:23

## 2021-01-01 RX ADMIN — PANTOPRAZOLE SODIUM 40 MG: 40 TABLET, DELAYED RELEASE ORAL at 05:27

## 2021-01-01 RX ADMIN — GABAPENTIN 300 MG: 300 CAPSULE ORAL at 21:58

## 2021-01-01 RX ADMIN — PANTOPRAZOLE SODIUM 40 MG: 40 TABLET, DELAYED RELEASE ORAL at 16:30

## 2021-01-01 RX ADMIN — BUDESONIDE 500 MCG: 0.5 INHALANT RESPIRATORY (INHALATION) at 20:47

## 2021-01-01 RX ADMIN — MIDODRINE HYDROCHLORIDE 10 MG: 5 TABLET ORAL at 17:10

## 2021-01-01 RX ADMIN — SODIUM CHLORIDE 10 ML: 9 INJECTION, SOLUTION INTRAMUSCULAR; INTRAVENOUS; SUBCUTANEOUS at 09:03

## 2021-01-01 RX ADMIN — SODIUM CHLORIDE SOLN NEBU 3% 4 ML: 3 NEBU SOLN at 09:26

## 2021-01-01 RX ADMIN — PANTOPRAZOLE SODIUM 40 MG: 40 TABLET, DELAYED RELEASE ORAL at 05:58

## 2021-01-01 RX ADMIN — SODIUM CHLORIDE 10 ML: 9 INJECTION, SOLUTION INTRAMUSCULAR; INTRAVENOUS; SUBCUTANEOUS at 21:50

## 2021-01-01 RX ADMIN — GUAIFENESIN 1200 MG: 600 TABLET ORAL at 21:59

## 2021-01-01 RX ADMIN — SODIUM CHLORIDE, SODIUM LACTATE, POTASSIUM CHLORIDE, AND CALCIUM CHLORIDE: 600; 310; 30; 20 INJECTION, SOLUTION INTRAVENOUS at 14:00

## 2021-01-01 RX ADMIN — LEVALBUTEROL HYDROCHLORIDE 1.25 MG: 1.25 SOLUTION RESPIRATORY (INHALATION) at 11:55

## 2021-01-01 RX ADMIN — FENTANYL CITRATE 100 MCG: 50 INJECTION INTRAMUSCULAR; INTRAVENOUS at 11:40

## 2021-01-01 RX ADMIN — Medication 10 ML: at 05:38

## 2021-01-01 RX ADMIN — AMIODARONE HYDROCHLORIDE 200 MG: 200 TABLET ORAL at 08:30

## 2021-01-01 RX ADMIN — HYDROCODONE BITARTRATE AND HOMATROPINE METHYLBROMIDE 5 ML: 5; 1.5 SOLUTION ORAL at 00:36

## 2021-01-01 RX ADMIN — METRONIDAZOLE 500 MG: 500 TABLET ORAL at 21:46

## 2021-01-01 RX ADMIN — PHENYLEPHRINE HYDROCHLORIDE 60 MCG: 10 INJECTION INTRAVENOUS at 15:41

## 2021-01-01 RX ADMIN — LEVALBUTEROL HYDROCHLORIDE 1.25 MG: 1.25 SOLUTION RESPIRATORY (INHALATION) at 21:03

## 2021-01-01 RX ADMIN — ROCURONIUM BROMIDE 5 MG: 10 INJECTION, SOLUTION INTRAVENOUS at 08:57

## 2021-01-01 RX ADMIN — AMIODARONE HYDROCHLORIDE 200 MG: 200 TABLET ORAL at 10:19

## 2021-01-01 RX ADMIN — MIDODRINE HYDROCHLORIDE 5 MG: 5 TABLET ORAL at 11:26

## 2021-01-01 RX ADMIN — PHENYLEPHRINE HYDROCHLORIDE 120 MCG: 10 INJECTION INTRAVENOUS at 15:27

## 2021-01-01 RX ADMIN — Medication 10 ML: at 05:31

## 2021-01-01 RX ADMIN — SODIUM CHLORIDE SOLN NEBU 3% 4 ML: 3 NEBU SOLN at 07:47

## 2021-01-01 RX ADMIN — TRAMADOL HYDROCHLORIDE 50 MG: 50 TABLET, COATED ORAL at 21:57

## 2021-01-01 RX ADMIN — SODIUM CHLORIDE SOLN NEBU 3% 4 ML: 3 NEBU SOLN at 08:08

## 2021-01-01 RX ADMIN — VANCOMYCIN HYDROCHLORIDE 2000 MG: 10 INJECTION, POWDER, LYOPHILIZED, FOR SOLUTION INTRAVENOUS at 06:04

## 2021-01-01 RX ADMIN — KETAMINE HYDROCHLORIDE 10 MG: 50 INJECTION INTRAMUSCULAR; INTRAVENOUS at 14:30

## 2021-01-01 RX ADMIN — MIDODRINE HYDROCHLORIDE 5 MG: 5 TABLET ORAL at 12:13

## 2021-01-01 RX ADMIN — GABAPENTIN 300 MG: 300 CAPSULE ORAL at 21:02

## 2021-01-01 RX ADMIN — SODIUM CHLORIDE 10 ML: 9 INJECTION, SOLUTION INTRAMUSCULAR; INTRAVENOUS; SUBCUTANEOUS at 21:06

## 2021-01-01 RX ADMIN — HYDROMORPHONE HYDROCHLORIDE 0.5 MG: 2 INJECTION INTRAMUSCULAR; INTRAVENOUS; SUBCUTANEOUS at 19:39

## 2021-01-01 RX ADMIN — GENTAMICIN SULFATE 0.5 INCH: 3 OINTMENT OPHTHALMIC at 22:54

## 2021-01-01 RX ADMIN — GABAPENTIN 300 MG: 300 CAPSULE ORAL at 17:09

## 2021-01-01 RX ADMIN — Medication 10 ML: at 22:29

## 2021-01-01 RX ADMIN — MIDODRINE HYDROCHLORIDE 5 MG: 5 TABLET ORAL at 09:04

## 2021-01-01 RX ADMIN — LEVALBUTEROL HYDROCHLORIDE 0.63 MG: 0.63 SOLUTION RESPIRATORY (INHALATION) at 13:41

## 2021-01-01 RX ADMIN — GABAPENTIN 300 MG: 300 CAPSULE ORAL at 15:22

## 2021-01-01 RX ADMIN — LEVALBUTEROL HYDROCHLORIDE 1.25 MG: 1.25 SOLUTION RESPIRATORY (INHALATION) at 09:06

## 2021-01-01 RX ADMIN — MIDODRINE HYDROCHLORIDE 10 MG: 5 TABLET ORAL at 12:09

## 2021-01-01 RX ADMIN — LEVALBUTEROL HYDROCHLORIDE 1.25 MG: 1.25 SOLUTION RESPIRATORY (INHALATION) at 04:15

## 2021-01-01 RX ADMIN — MORPHINE SULFATE 2 MG: 2 INJECTION, SOLUTION INTRAMUSCULAR; INTRAVENOUS at 13:48

## 2021-01-01 RX ADMIN — FENTANYL CITRATE 50 MCG: 50 INJECTION INTRAMUSCULAR; INTRAVENOUS at 09:30

## 2021-01-01 RX ADMIN — PIPERACILLIN AND TAZOBACTAM 4.5 G: 4; .5 INJECTION, POWDER, FOR SOLUTION INTRAVENOUS at 08:39

## 2021-01-01 RX ADMIN — HYDROCODONE BITARTRATE AND HOMATROPINE METHYLBROMIDE 5 ML: 5; 1.5 SOLUTION ORAL at 03:58

## 2021-01-01 RX ADMIN — SODIUM CHLORIDE 10 ML: 9 INJECTION, SOLUTION INTRAMUSCULAR; INTRAVENOUS; SUBCUTANEOUS at 21:22

## 2021-01-01 RX ADMIN — SODIUM CHLORIDE, SODIUM LACTATE, POTASSIUM CHLORIDE, AND CALCIUM CHLORIDE 500 ML: 600; 310; 30; 20 INJECTION, SOLUTION INTRAVENOUS at 00:00

## 2021-01-01 RX ADMIN — SODIUM CHLORIDE, SODIUM LACTATE, POTASSIUM CHLORIDE, AND CALCIUM CHLORIDE 125 ML/HR: 600; 310; 30; 20 INJECTION, SOLUTION INTRAVENOUS at 19:57

## 2021-01-01 RX ADMIN — GABAPENTIN 300 MG: 300 CAPSULE ORAL at 08:16

## 2021-01-01 RX ADMIN — MIDODRINE HYDROCHLORIDE 10 MG: 5 TABLET ORAL at 16:06

## 2021-01-01 RX ADMIN — SODIUM CHLORIDE SOLN NEBU 3% 4 ML: 3 NEBU SOLN at 21:25

## 2021-01-01 RX ADMIN — MIDODRINE HYDROCHLORIDE 10 MG: 5 TABLET ORAL at 16:22

## 2021-01-01 RX ADMIN — SODIUM CHLORIDE SOLN NEBU 3% 4 ML: 3 NEBU SOLN at 08:17

## 2021-01-01 RX ADMIN — MIDODRINE HYDROCHLORIDE 10 MG: 5 TABLET ORAL at 16:41

## 2021-01-01 RX ADMIN — SODIUM CHLORIDE SOLN NEBU 3% 4 ML: 3 NEBU SOLN at 12:37

## 2021-01-01 RX ADMIN — PHENYLEPHRINE HYDROCHLORIDE 100 MCG: 10 INJECTION INTRAVENOUS at 14:22

## 2021-01-01 RX ADMIN — LEVALBUTEROL HYDROCHLORIDE 1.25 MG: 1.25 SOLUTION RESPIRATORY (INHALATION) at 19:52

## 2021-01-01 RX ADMIN — PANTOPRAZOLE SODIUM 40 MG: 40 TABLET, DELAYED RELEASE ORAL at 07:16

## 2021-01-01 RX ADMIN — GUAIFENESIN 1200 MG: 600 TABLET ORAL at 09:12

## 2021-01-01 RX ADMIN — MORPHINE SULFATE 2 MG: 2 INJECTION, SOLUTION INTRAMUSCULAR; INTRAVENOUS at 23:24

## 2021-01-01 RX ADMIN — BUDESONIDE AND FORMOTEROL FUMARATE DIHYDRATE 2 PUFF: 160; 4.5 AEROSOL RESPIRATORY (INHALATION) at 08:03

## 2021-01-01 RX ADMIN — DOXYCYCLINE HYCLATE 100 MG: 100 CAPSULE ORAL at 21:36

## 2021-01-01 RX ADMIN — PHENYLEPHRINE HYDROCHLORIDE 180 MCG: 10 INJECTION INTRAVENOUS at 15:34

## 2021-01-01 RX ADMIN — MIDODRINE HYDROCHLORIDE 10 MG: 5 TABLET ORAL at 12:22

## 2021-01-01 RX ADMIN — BUDESONIDE 500 MCG: 0.5 INHALANT RESPIRATORY (INHALATION) at 19:00

## 2021-01-01 RX ADMIN — PHENYLEPHRINE HYDROCHLORIDE 120 MCG: 10 INJECTION INTRAVENOUS at 16:39

## 2021-01-01 RX ADMIN — GUAIFENESIN 1200 MG: 600 TABLET ORAL at 22:22

## 2021-01-01 RX ADMIN — MORPHINE SULFATE 2 MG: 2 INJECTION, SOLUTION INTRAMUSCULAR; INTRAVENOUS at 01:03

## 2021-01-01 RX ADMIN — PANTOPRAZOLE SODIUM 40 MG: 40 TABLET, DELAYED RELEASE ORAL at 08:51

## 2021-01-01 RX ADMIN — METOPROLOL SUCCINATE 50 MG: 50 TABLET, EXTENDED RELEASE ORAL at 08:34

## 2021-01-01 RX ADMIN — GABAPENTIN 300 MG: 300 CAPSULE ORAL at 22:00

## 2021-01-01 RX ADMIN — HYDROCODONE BITARTRATE AND HOMATROPINE METHYLBROMIDE 5 ML: 5; 1.5 SOLUTION ORAL at 21:47

## 2021-01-01 RX ADMIN — TRAMADOL HYDROCHLORIDE 50 MG: 50 TABLET, COATED ORAL at 17:09

## 2021-01-01 RX ADMIN — GABAPENTIN 300 MG: 300 CAPSULE ORAL at 16:20

## 2021-01-01 RX ADMIN — AMIODARONE HYDROCHLORIDE 200 MG: 200 TABLET ORAL at 09:18

## 2021-01-01 RX ADMIN — PANTOPRAZOLE SODIUM 40 MG: 40 TABLET, DELAYED RELEASE ORAL at 05:23

## 2021-01-01 RX ADMIN — BUDESONIDE 500 MCG: 0.5 INHALANT RESPIRATORY (INHALATION) at 20:06

## 2021-01-01 RX ADMIN — Medication 10 ML: at 21:34

## 2021-01-01 RX ADMIN — DOXYCYCLINE HYCLATE 100 MG: 100 CAPSULE ORAL at 08:58

## 2021-01-01 RX ADMIN — PANTOPRAZOLE SODIUM 40 MG: 40 TABLET, DELAYED RELEASE ORAL at 15:34

## 2021-01-01 RX ADMIN — VANCOMYCIN HYDROCHLORIDE 1250 MG: 10 INJECTION, POWDER, LYOPHILIZED, FOR SOLUTION INTRAVENOUS at 03:36

## 2021-01-01 RX ADMIN — LEVALBUTEROL HYDROCHLORIDE 1.25 MG: 1.25 SOLUTION RESPIRATORY (INHALATION) at 23:43

## 2021-01-01 RX ADMIN — MORPHINE SULFATE 2 MG: 2 INJECTION, SOLUTION INTRAMUSCULAR; INTRAVENOUS at 13:44

## 2021-01-01 RX ADMIN — TRAMADOL HYDROCHLORIDE 50 MG: 50 TABLET, COATED ORAL at 09:45

## 2021-01-01 RX ADMIN — SODIUM CHLORIDE SOLN NEBU 3% 4 ML: 3 NEBU SOLN at 08:57

## 2021-01-01 RX ADMIN — SUCCINYLCHOLINE CHLORIDE 140 MG: 20 INJECTION, SOLUTION INTRAMUSCULAR; INTRAVENOUS at 08:58

## 2021-01-01 RX ADMIN — MIDODRINE HYDROCHLORIDE 10 MG: 5 TABLET ORAL at 15:03

## 2021-01-01 RX ADMIN — POLYETHYLENE GLYCOL 3350 17 G: 17 POWDER, FOR SOLUTION ORAL at 08:23

## 2021-01-01 RX ADMIN — CEPHALEXIN 500 MG: 500 CAPSULE ORAL at 21:33

## 2021-01-01 RX ADMIN — PHENYLEPHRINE HYDROCHLORIDE 120 MCG: 10 INJECTION INTRAVENOUS at 13:16

## 2021-01-01 RX ADMIN — CEPHALEXIN 500 MG: 500 CAPSULE ORAL at 21:56

## 2021-01-01 RX ADMIN — SODIUM CHLORIDE 10 ML: 9 INJECTION, SOLUTION INTRAMUSCULAR; INTRAVENOUS; SUBCUTANEOUS at 14:00

## 2021-01-01 RX ADMIN — SODIUM CHLORIDE 10 ML: 9 INJECTION, SOLUTION INTRAMUSCULAR; INTRAVENOUS; SUBCUTANEOUS at 16:31

## 2021-01-01 RX ADMIN — LEVALBUTEROL HYDROCHLORIDE 1.25 MG: 1.25 SOLUTION RESPIRATORY (INHALATION) at 15:03

## 2021-01-01 RX ADMIN — BUDESONIDE 500 MCG: 0.5 INHALANT RESPIRATORY (INHALATION) at 09:02

## 2021-01-01 RX ADMIN — GABAPENTIN 300 MG: 300 CAPSULE ORAL at 22:20

## 2021-01-01 RX ADMIN — KETAMINE HYDROCHLORIDE 20 MG: 50 INJECTION INTRAMUSCULAR; INTRAVENOUS at 16:03

## 2021-01-01 RX ADMIN — METOPROLOL SUCCINATE 50 MG: 50 TABLET, EXTENDED RELEASE ORAL at 10:00

## 2021-01-01 RX ADMIN — DOXYCYCLINE HYCLATE 100 MG: 100 CAPSULE ORAL at 21:56

## 2021-01-01 RX ADMIN — FENTANYL CITRATE 50 MCG: 50 INJECTION INTRAMUSCULAR; INTRAVENOUS at 14:15

## 2021-01-01 RX ADMIN — POLYETHYLENE GLYCOL 3350 17 G: 17 POWDER, FOR SOLUTION ORAL at 09:25

## 2021-01-01 RX ADMIN — IPRATROPIUM BROMIDE 2 SPRAY: 21 SPRAY NASAL at 21:35

## 2021-01-01 RX ADMIN — MIDODRINE HYDROCHLORIDE 5 MG: 5 TABLET ORAL at 10:11

## 2021-01-01 RX ADMIN — LIDOCAINE HYDROCHLORIDE: 40 SOLUTION TOPICAL at 11:40

## 2021-01-01 RX ADMIN — CEPHALEXIN 500 MG: 500 CAPSULE ORAL at 13:59

## 2021-01-01 RX ADMIN — LORAZEPAM 2 MG: 2 INJECTION INTRAMUSCULAR; INTRAVENOUS at 13:08

## 2021-01-01 RX ADMIN — LEVALBUTEROL HYDROCHLORIDE 1.25 MG: 1.25 SOLUTION RESPIRATORY (INHALATION) at 03:43

## 2021-01-01 RX ADMIN — PIPERACILLIN AND TAZOBACTAM 4.5 G: 4; .5 INJECTION, POWDER, FOR SOLUTION INTRAVENOUS at 08:49

## 2021-01-01 RX ADMIN — MORPHINE SULFATE 2 MG: 2 INJECTION, SOLUTION INTRAMUSCULAR; INTRAVENOUS at 21:46

## 2021-01-01 RX ADMIN — DOXYCYCLINE HYCLATE 100 MG: 100 CAPSULE ORAL at 21:19

## 2021-01-01 RX ADMIN — ROCURONIUM BROMIDE 50 MG: 10 INJECTION, SOLUTION INTRAVENOUS at 13:16

## 2021-01-01 RX ADMIN — Medication 10 ML: at 05:48

## 2021-01-01 RX ADMIN — ETOMIDATE 20 MG: 2 INJECTION, SOLUTION INTRAVENOUS at 11:40

## 2021-01-01 RX ADMIN — BUDESONIDE 500 MCG: 0.5 INHALANT RESPIRATORY (INHALATION) at 20:05

## 2021-01-01 RX ADMIN — ALTEPLASE 1 MG: 2.2 INJECTION, POWDER, LYOPHILIZED, FOR SOLUTION INTRAVENOUS at 22:54

## 2021-01-01 RX ADMIN — TRAMADOL HYDROCHLORIDE 50 MG: 50 TABLET, COATED ORAL at 22:30

## 2021-01-01 RX ADMIN — MONTELUKAST 10 MG: 10 TABLET, FILM COATED ORAL at 21:34

## 2021-01-01 RX ADMIN — LEVALBUTEROL HYDROCHLORIDE 1.25 MG: 1.25 SOLUTION RESPIRATORY (INHALATION) at 20:24

## 2021-01-01 RX ADMIN — SODIUM CHLORIDE 1000 ML: 900 INJECTION, SOLUTION INTRAVENOUS at 09:44

## 2021-01-01 RX ADMIN — SODIUM CHLORIDE 10 ML: 9 INJECTION, SOLUTION INTRAMUSCULAR; INTRAVENOUS; SUBCUTANEOUS at 06:38

## 2021-01-01 RX ADMIN — LEVALBUTEROL HYDROCHLORIDE 1.25 MG: 1.25 SOLUTION RESPIRATORY (INHALATION) at 04:44

## 2021-01-01 RX ADMIN — GABAPENTIN 300 MG: 300 CAPSULE ORAL at 09:05

## 2021-01-01 RX ADMIN — CEFTRIAXONE 2 G: 2 INJECTION, POWDER, FOR SOLUTION INTRAMUSCULAR; INTRAVENOUS at 12:22

## 2021-01-01 RX ADMIN — CEFAZOLIN SODIUM 2 G: 100 INJECTION, POWDER, LYOPHILIZED, FOR SOLUTION INTRAVENOUS at 05:52

## 2021-01-01 RX ADMIN — GABAPENTIN 300 MG: 300 CAPSULE ORAL at 21:46

## 2021-01-01 RX ADMIN — Medication 10 ML: at 06:09

## 2021-01-01 RX ADMIN — BUDESONIDE 500 MCG: 0.5 INHALANT RESPIRATORY (INHALATION) at 19:48

## 2021-01-01 RX ADMIN — AZITHROMYCIN MONOHYDRATE 500 MG: 500 INJECTION, POWDER, LYOPHILIZED, FOR SOLUTION INTRAVENOUS at 22:15

## 2021-01-01 RX ADMIN — AMIODARONE HYDROCHLORIDE 400 MG: 200 TABLET ORAL at 15:46

## 2021-01-01 RX ADMIN — AMPICILLIN SODIUM AND SULBACTAM SODIUM 3 G: 2; 1 INJECTION, POWDER, FOR SOLUTION INTRAMUSCULAR; INTRAVENOUS at 00:48

## 2021-01-01 RX ADMIN — HEPARIN SODIUM AND DEXTROSE 600 UNITS/HR: 5000; 5 INJECTION INTRAVENOUS at 14:42

## 2021-01-01 RX ADMIN — VANCOMYCIN HYDROCHLORIDE 2000 MG: 10 INJECTION, POWDER, LYOPHILIZED, FOR SOLUTION INTRAVENOUS at 19:48

## 2021-01-01 RX ADMIN — LORAZEPAM 0.5 MG: 0.5 TABLET ORAL at 09:22

## 2021-01-01 RX ADMIN — DEXAMETHASONE SODIUM PHOSPHATE 4 MG: 4 INJECTION, SOLUTION INTRAMUSCULAR; INTRAVENOUS at 12:10

## 2021-01-01 RX ADMIN — Medication 10 ML: at 14:00

## 2021-01-01 RX ADMIN — HYDROMORPHONE HYDROCHLORIDE 0.5 MG: 2 INJECTION INTRAMUSCULAR; INTRAVENOUS; SUBCUTANEOUS at 01:22

## 2021-01-01 RX ADMIN — LEVALBUTEROL HYDROCHLORIDE 1.25 MG: 1.25 SOLUTION RESPIRATORY (INHALATION) at 15:48

## 2021-01-01 RX ADMIN — SODIUM CHLORIDE 100 ML: 900 INJECTION, SOLUTION INTRAVENOUS at 09:43

## 2021-01-01 RX ADMIN — LEVALBUTEROL HYDROCHLORIDE 1.25 MG: 1.25 SOLUTION RESPIRATORY (INHALATION) at 07:45

## 2021-01-01 RX ADMIN — SODIUM CHLORIDE, SODIUM LACTATE, POTASSIUM CHLORIDE, AND CALCIUM CHLORIDE 1000 ML: 600; 310; 30; 20 INJECTION, SOLUTION INTRAVENOUS at 11:00

## 2021-01-01 RX ADMIN — ALBUTEROL SULFATE 2 PUFF: 90 AEROSOL, METERED RESPIRATORY (INHALATION) at 05:55

## 2021-01-01 RX ADMIN — Medication 10 ML: at 06:04

## 2021-01-01 RX ADMIN — HYDROCODONE BITARTRATE AND HOMATROPINE METHYLBROMIDE 5 ML: 5; 1.5 SOLUTION ORAL at 17:03

## 2021-01-01 RX ADMIN — PANTOPRAZOLE SODIUM 40 MG: 40 TABLET, DELAYED RELEASE ORAL at 16:18

## 2021-01-01 RX ADMIN — MIDODRINE HYDROCHLORIDE 5 MG: 5 TABLET ORAL at 16:57

## 2021-01-01 RX ADMIN — PIPERACILLIN SODIUM AND TAZOBACTAM SODIUM 4.5 G: 4; .5 INJECTION, POWDER, LYOPHILIZED, FOR SOLUTION INTRAVENOUS at 16:17

## 2021-01-01 RX ADMIN — MORPHINE SULFATE 2 MG: 2 INJECTION, SOLUTION INTRAMUSCULAR; INTRAVENOUS at 09:05

## 2021-01-01 RX ADMIN — BUDESONIDE 500 MCG: 0.5 INHALANT RESPIRATORY (INHALATION) at 20:49

## 2021-01-01 RX ADMIN — BUDESONIDE 500 MCG: 0.5 INHALANT RESPIRATORY (INHALATION) at 08:28

## 2021-01-01 RX ADMIN — SODIUM CHLORIDE 10 ML: 9 INJECTION, SOLUTION INTRAMUSCULAR; INTRAVENOUS; SUBCUTANEOUS at 14:16

## 2021-01-01 RX ADMIN — SODIUM CHLORIDE SOLN NEBU 3% 4 ML: 3 NEBU SOLN at 20:25

## 2021-01-01 RX ADMIN — SODIUM CHLORIDE SOLN NEBU 3% 4 ML: 3 NEBU SOLN at 19:15

## 2021-01-01 RX ADMIN — PIPERACILLIN AND TAZOBACTAM 4.5 G: 4; .5 INJECTION, POWDER, FOR SOLUTION INTRAVENOUS at 23:36

## 2021-01-01 RX ADMIN — OXYCODONE AND ACETAMINOPHEN 1 TABLET: 5; 325 TABLET ORAL at 05:58

## 2021-01-01 RX ADMIN — SODIUM CHLORIDE, SODIUM LACTATE, POTASSIUM CHLORIDE, AND CALCIUM CHLORIDE 125 ML/HR: 600; 310; 30; 20 INJECTION, SOLUTION INTRAVENOUS at 16:37

## 2021-01-01 RX ADMIN — SODIUM CHLORIDE SOLN NEBU 3% 4 ML: 3 NEBU SOLN at 08:29

## 2021-01-01 RX ADMIN — SODIUM CHLORIDE 10 ML: 9 INJECTION, SOLUTION INTRAMUSCULAR; INTRAVENOUS; SUBCUTANEOUS at 15:04

## 2021-01-01 RX ADMIN — AMIODARONE HYDROCHLORIDE 200 MG: 200 TABLET ORAL at 09:43

## 2021-01-01 RX ADMIN — SODIUM CHLORIDE 10 ML: 9 INJECTION, SOLUTION INTRAMUSCULAR; INTRAVENOUS; SUBCUTANEOUS at 21:34

## 2021-01-01 RX ADMIN — Medication 10 ML: at 09:22

## 2021-01-01 RX ADMIN — FENTANYL CITRATE 50 MCG: 50 INJECTION INTRAMUSCULAR; INTRAVENOUS at 08:51

## 2021-01-01 RX ADMIN — DOCUSATE SODIUM 100 MG: 100 CAPSULE, LIQUID FILLED ORAL at 17:24

## 2021-01-01 RX ADMIN — KETAMINE HYDROCHLORIDE 40 MG: 50 INJECTION INTRAMUSCULAR; INTRAVENOUS at 13:30

## 2021-01-01 RX ADMIN — ROCURONIUM BROMIDE 20 MG: 10 INJECTION, SOLUTION INTRAVENOUS at 15:25

## 2021-01-01 RX ADMIN — PHENYLEPHRINE HYDROCHLORIDE 50 MCG: 10 INJECTION INTRAVENOUS at 07:42

## 2021-01-01 RX ADMIN — BUDESONIDE 500 MCG: 0.5 INHALANT RESPIRATORY (INHALATION) at 20:35

## 2021-01-01 RX ADMIN — GUAIFENESIN 1200 MG: 600 TABLET ORAL at 21:13

## 2021-01-01 RX ADMIN — GABAPENTIN 300 MG: 300 CAPSULE ORAL at 05:48

## 2021-01-01 RX ADMIN — SODIUM CHLORIDE 10 ML: 9 INJECTION, SOLUTION INTRAMUSCULAR; INTRAVENOUS; SUBCUTANEOUS at 20:40

## 2021-01-01 RX ADMIN — GUAIFENESIN 1200 MG: 600 TABLET ORAL at 21:54

## 2021-01-01 RX ADMIN — GABAPENTIN 300 MG: 300 CAPSULE ORAL at 21:36

## 2021-01-01 RX ADMIN — PIPERACILLIN SODIUM AND TAZOBACTAM SODIUM 4.5 G: 4; .5 INJECTION, POWDER, LYOPHILIZED, FOR SOLUTION INTRAVENOUS at 17:36

## 2021-01-01 RX ADMIN — GUAIFENESIN 1200 MG: 600 TABLET ORAL at 21:14

## 2021-01-01 RX ADMIN — OXYMETAZOLINE HCL 2 SPRAY: 0.05 SPRAY NASAL at 09:13

## 2021-01-01 RX ADMIN — MIDAZOLAM 1 MG: 1 INJECTION INTRAMUSCULAR; INTRAVENOUS at 12:05

## 2021-01-01 RX ADMIN — LEVALBUTEROL HYDROCHLORIDE 1.25 MG: 1.25 SOLUTION RESPIRATORY (INHALATION) at 00:05

## 2021-01-01 RX ADMIN — LEVALBUTEROL HYDROCHLORIDE 1.25 MG: 1.25 SOLUTION RESPIRATORY (INHALATION) at 16:00

## 2021-01-01 RX ADMIN — PHENYLEPHRINE HYDROCHLORIDE 100 MCG: 10 INJECTION INTRAVENOUS at 13:10

## 2021-01-01 RX ADMIN — METRONIDAZOLE 500 MG: 500 TABLET ORAL at 15:05

## 2021-01-01 RX ADMIN — DOXYCYCLINE HYCLATE 100 MG: 100 CAPSULE ORAL at 08:56

## 2021-01-01 RX ADMIN — MORPHINE SULFATE 2 MG: 2 INJECTION, SOLUTION INTRAMUSCULAR; INTRAVENOUS at 06:28

## 2021-01-01 RX ADMIN — HEPARIN SODIUM (PORCINE) LOCK FLUSH IV SOLN 100 UNIT/ML 500 UNITS: 100 SOLUTION at 14:12

## 2021-01-01 RX ADMIN — GABAPENTIN 300 MG: 300 CAPSULE ORAL at 08:09

## 2021-01-01 RX ADMIN — PIPERACILLIN SODIUM AND TAZOBACTAM SODIUM 4.5 G: 4; .5 INJECTION, POWDER, LYOPHILIZED, FOR SOLUTION INTRAVENOUS at 09:22

## 2021-01-01 RX ADMIN — SODIUM CHLORIDE 10 ML: 9 INJECTION, SOLUTION INTRAMUSCULAR; INTRAVENOUS; SUBCUTANEOUS at 06:01

## 2021-01-01 RX ADMIN — PROPOFOL 160 MG: 10 INJECTION, EMULSION INTRAVENOUS at 08:57

## 2021-01-01 RX ADMIN — LEVALBUTEROL HYDROCHLORIDE 1.25 MG: 1.25 SOLUTION RESPIRATORY (INHALATION) at 00:34

## 2021-01-01 RX ADMIN — SODIUM CHLORIDE 10 ML: 9 INJECTION, SOLUTION INTRAMUSCULAR; INTRAVENOUS; SUBCUTANEOUS at 06:29

## 2021-01-01 RX ADMIN — SODIUM CHLORIDE 10 ML: 9 INJECTION, SOLUTION INTRAMUSCULAR; INTRAVENOUS; SUBCUTANEOUS at 22:24

## 2021-01-01 RX ADMIN — MIDODRINE HYDROCHLORIDE 10 MG: 5 TABLET ORAL at 08:23

## 2021-01-01 RX ADMIN — MIDODRINE HYDROCHLORIDE 5 MG: 5 TABLET ORAL at 08:08

## 2021-01-01 RX ADMIN — OXYCODONE AND ACETAMINOPHEN 1 TABLET: 5; 325 TABLET ORAL at 00:12

## 2021-01-01 RX ADMIN — POLYETHYLENE GLYCOL 3350 17 G: 17 POWDER, FOR SOLUTION ORAL at 08:12

## 2021-01-01 RX ADMIN — GABAPENTIN 300 MG: 300 CAPSULE ORAL at 10:31

## 2021-01-01 RX ADMIN — MIDODRINE HYDROCHLORIDE 5 MG: 5 TABLET ORAL at 12:19

## 2021-01-01 RX ADMIN — SODIUM CHLORIDE, SODIUM LACTATE, POTASSIUM CHLORIDE, AND CALCIUM CHLORIDE: 600; 310; 30; 20 INJECTION, SOLUTION INTRAVENOUS at 14:30

## 2021-01-01 RX ADMIN — LEVALBUTEROL HYDROCHLORIDE 1.25 MG: 1.25 SOLUTION RESPIRATORY (INHALATION) at 19:00

## 2021-01-01 RX ADMIN — MIDODRINE HYDROCHLORIDE 10 MG: 5 TABLET ORAL at 07:16

## 2021-01-01 RX ADMIN — FLUTICASONE PROPIONATE 2 SPRAY: 50 SPRAY, METERED NASAL at 09:13

## 2021-01-01 RX ADMIN — PIPERACILLIN SODIUM AND TAZOBACTAM SODIUM 4.5 G: 4; .5 INJECTION, POWDER, LYOPHILIZED, FOR SOLUTION INTRAVENOUS at 17:42

## 2021-01-01 RX ADMIN — SODIUM CHLORIDE SOLN NEBU 3% 4 ML: 3 NEBU SOLN at 20:06

## 2021-01-01 RX ADMIN — PHENYLEPHRINE HYDROCHLORIDE 100 MCG: 10 INJECTION INTRAVENOUS at 14:10

## 2021-01-01 RX ADMIN — POLYETHYLENE GLYCOL 3350 17 G: 17 POWDER, FOR SOLUTION ORAL at 09:59

## 2021-01-01 RX ADMIN — TRAMADOL HYDROCHLORIDE 50 MG: 50 TABLET, COATED ORAL at 13:35

## 2021-01-01 RX ADMIN — MIDODRINE HYDROCHLORIDE 10 MG: 5 TABLET ORAL at 08:12

## 2021-01-01 RX ADMIN — APIXABAN 5 MG: 5 TABLET, FILM COATED ORAL at 21:36

## 2021-01-01 RX ADMIN — SODIUM CHLORIDE, SODIUM LACTATE, POTASSIUM CHLORIDE, AND CALCIUM CHLORIDE 500 ML: 600; 310; 30; 20 INJECTION, SOLUTION INTRAVENOUS at 14:00

## 2021-01-01 RX ADMIN — KETAMINE HYDROCHLORIDE 45 MG: 50 INJECTION INTRAMUSCULAR; INTRAVENOUS at 07:16

## 2021-01-01 RX ADMIN — PHENYLEPHRINE HYDROCHLORIDE 100 MCG: 10 INJECTION INTRAVENOUS at 07:56

## 2021-01-01 RX ADMIN — DOXYCYCLINE HYCLATE 100 MG: 100 CAPSULE ORAL at 20:33

## 2021-01-01 RX ADMIN — OXYCODONE AND ACETAMINOPHEN 1 TABLET: 5; 325 TABLET ORAL at 15:00

## 2021-01-01 RX ADMIN — Medication 10 ML: at 21:24

## 2021-01-01 RX ADMIN — MIDODRINE HYDROCHLORIDE 10 MG: 5 TABLET ORAL at 11:42

## 2021-01-01 RX ADMIN — DOXYCYCLINE HYCLATE 100 MG: 100 CAPSULE ORAL at 12:59

## 2021-01-01 RX ADMIN — BUDESONIDE AND FORMOTEROL FUMARATE DIHYDRATE 2 PUFF: 160; 4.5 AEROSOL RESPIRATORY (INHALATION) at 07:26

## 2021-01-01 RX ADMIN — DOXYCYCLINE HYCLATE 100 MG: 100 CAPSULE ORAL at 20:54

## 2021-01-01 RX ADMIN — LEVALBUTEROL HYDROCHLORIDE 1.25 MG: 1.25 SOLUTION RESPIRATORY (INHALATION) at 20:08

## 2021-01-01 RX ADMIN — LEVALBUTEROL HYDROCHLORIDE 1.25 MG: 1.25 SOLUTION RESPIRATORY (INHALATION) at 16:04

## 2021-01-01 RX ADMIN — APIXABAN 5 MG: 5 TABLET, FILM COATED ORAL at 21:22

## 2021-01-01 RX ADMIN — LEVALBUTEROL HYDROCHLORIDE 1.25 MG: 1.25 SOLUTION RESPIRATORY (INHALATION) at 19:15

## 2021-01-01 RX ADMIN — MIDODRINE HYDROCHLORIDE 10 MG: 5 TABLET ORAL at 17:09

## 2021-01-01 RX ADMIN — METOPROLOL SUCCINATE 50 MG: 50 TABLET, EXTENDED RELEASE ORAL at 08:51

## 2021-01-01 RX ADMIN — HYDROCODONE BITARTRATE AND HOMATROPINE METHYLBROMIDE 5 ML: 5; 1.5 SOLUTION ORAL at 00:56

## 2021-01-01 RX ADMIN — METHYLPREDNISOLONE SODIUM SUCCINATE 40 MG: 40 INJECTION, POWDER, FOR SOLUTION INTRAMUSCULAR; INTRAVENOUS at 08:52

## 2021-01-01 RX ADMIN — SODIUM CHLORIDE 10 ML: 9 INJECTION, SOLUTION INTRAMUSCULAR; INTRAVENOUS; SUBCUTANEOUS at 05:29

## 2021-01-01 RX ADMIN — PHENYLEPHRINE HYDROCHLORIDE 30 MCG/MIN: 10 INJECTION INTRAVENOUS at 23:07

## 2021-01-01 RX ADMIN — HYDROCODONE BITARTRATE AND HOMATROPINE METHYLBROMIDE 5 ML: 5; 1.5 SOLUTION ORAL at 23:26

## 2021-01-01 RX ADMIN — METOPROLOL SUCCINATE 50 MG: 50 TABLET, EXTENDED RELEASE ORAL at 09:32

## 2021-01-01 RX ADMIN — ONDANSETRON 4 MG: 2 INJECTION INTRAMUSCULAR; INTRAVENOUS at 11:52

## 2021-01-01 RX ADMIN — PIPERACILLIN SODIUM AND TAZOBACTAM SODIUM 4.5 G: 4; .5 INJECTION, POWDER, LYOPHILIZED, FOR SOLUTION INTRAVENOUS at 22:53

## 2021-01-01 RX ADMIN — IPRATROPIUM BROMIDE AND ALBUTEROL SULFATE 3 ML: .5; 3 SOLUTION RESPIRATORY (INHALATION) at 23:59

## 2021-01-01 RX ADMIN — MIDODRINE HYDROCHLORIDE 10 MG: 5 TABLET ORAL at 12:23

## 2021-01-01 RX ADMIN — MIDAZOLAM 2 MG: 1 INJECTION INTRAMUSCULAR; INTRAVENOUS at 09:12

## 2021-01-01 RX ADMIN — CEFTRIAXONE 2 G: 2 INJECTION, POWDER, FOR SOLUTION INTRAMUSCULAR; INTRAVENOUS at 13:09

## 2021-01-01 RX ADMIN — SENNOSIDES AND DOCUSATE SODIUM 1 TABLET: 8.6; 5 TABLET ORAL at 08:51

## 2021-01-01 RX ADMIN — LEVALBUTEROL HYDROCHLORIDE 1.25 MG: 1.25 SOLUTION RESPIRATORY (INHALATION) at 11:57

## 2021-01-01 RX ADMIN — SODIUM CHLORIDE 10 ML: 9 INJECTION, SOLUTION INTRAMUSCULAR; INTRAVENOUS; SUBCUTANEOUS at 21:14

## 2021-01-01 RX ADMIN — GABAPENTIN 300 MG: 300 CAPSULE ORAL at 21:13

## 2021-01-01 RX ADMIN — APIXABAN 5 MG: 5 TABLET, FILM COATED ORAL at 10:20

## 2021-01-01 RX ADMIN — NOREPINEPHRINE BITARTRATE 30 MCG/MIN: 1 INJECTION, SOLUTION, CONCENTRATE INTRAVENOUS at 17:05

## 2021-01-01 RX ADMIN — LEVALBUTEROL HYDROCHLORIDE 1.25 MG: 1.25 SOLUTION RESPIRATORY (INHALATION) at 12:12

## 2021-01-01 RX ADMIN — PIPERACILLIN SODIUM AND TAZOBACTAM SODIUM 4.5 G: 4; .5 INJECTION, POWDER, LYOPHILIZED, FOR SOLUTION INTRAVENOUS at 06:04

## 2021-01-01 RX ADMIN — BUDESONIDE 500 MCG: 0.5 INHALANT RESPIRATORY (INHALATION) at 07:52

## 2021-01-01 RX ADMIN — BUDESONIDE 500 MCG: 0.5 INHALANT RESPIRATORY (INHALATION) at 19:42

## 2021-01-01 RX ADMIN — Medication 10 ML: at 18:05

## 2021-01-01 RX ADMIN — ENOXAPARIN SODIUM 40 MG: 40 INJECTION SUBCUTANEOUS at 09:16

## 2021-01-01 RX ADMIN — LIDOCAINE HYDROCHLORIDE: 20 JELLY TOPICAL at 09:25

## 2021-01-01 RX ADMIN — MIDODRINE HYDROCHLORIDE 10 MG: 5 TABLET ORAL at 17:44

## 2021-01-01 RX ADMIN — SODIUM CHLORIDE 10 ML: 9 INJECTION, SOLUTION INTRAMUSCULAR; INTRAVENOUS; SUBCUTANEOUS at 18:05

## 2021-01-01 RX ADMIN — Medication 15 ML: at 15:07

## 2021-01-01 RX ADMIN — PHENYLEPHRINE HYDROCHLORIDE 120 MCG: 10 INJECTION INTRAVENOUS at 13:42

## 2021-01-01 RX ADMIN — LEVALBUTEROL HYDROCHLORIDE 1.25 MG: 1.25 SOLUTION RESPIRATORY (INHALATION) at 08:27

## 2021-01-01 RX ADMIN — LIDOCAINE HYDROCHLORIDE 60 MG: 20 INJECTION, SOLUTION EPIDURAL; INFILTRATION; INTRACAUDAL; PERINEURAL at 13:28

## 2021-01-01 RX ADMIN — SODIUM CHLORIDE SOLN NEBU 3% 4 ML: 3 NEBU SOLN at 09:02

## 2021-01-01 RX ADMIN — MAGNESIUM HYDROXIDE 30 ML: 2400 SUSPENSION ORAL at 08:58

## 2021-01-01 RX ADMIN — PIPERACILLIN SODIUM AND TAZOBACTAM SODIUM 4.5 G: 4; .5 INJECTION, POWDER, LYOPHILIZED, FOR SOLUTION INTRAVENOUS at 23:55

## 2021-01-01 RX ADMIN — MONTELUKAST 10 MG: 10 TABLET, FILM COATED ORAL at 21:22

## 2021-01-01 RX ADMIN — APIXABAN 5 MG: 5 TABLET, FILM COATED ORAL at 09:00

## 2021-01-01 RX ADMIN — METRONIDAZOLE 500 MG: 500 TABLET ORAL at 09:03

## 2021-01-01 RX ADMIN — METRONIDAZOLE 500 MG: 500 TABLET ORAL at 08:01

## 2021-01-01 RX ADMIN — APIXABAN 5 MG: 5 TABLET, FILM COATED ORAL at 08:39

## 2021-01-01 RX ADMIN — ROCURONIUM BROMIDE 20 MG: 10 INJECTION, SOLUTION INTRAVENOUS at 13:58

## 2021-01-01 RX ADMIN — GABAPENTIN 300 MG: 300 CAPSULE ORAL at 18:27

## 2021-01-01 RX ADMIN — APIXABAN 5 MG: 5 TABLET, FILM COATED ORAL at 21:23

## 2021-01-01 RX ADMIN — ENOXAPARIN SODIUM 40 MG: 40 INJECTION SUBCUTANEOUS at 07:17

## 2021-01-01 RX ADMIN — SODIUM CHLORIDE 5 ML: 9 INJECTION, SOLUTION INTRAMUSCULAR; INTRAVENOUS; SUBCUTANEOUS at 01:26

## 2021-01-01 RX ADMIN — GABAPENTIN 300 MG: 300 CAPSULE ORAL at 08:59

## 2021-01-01 RX ADMIN — POLYETHYLENE GLYCOL 3350 17 G: 17 POWDER, FOR SOLUTION ORAL at 12:19

## 2021-01-01 RX ADMIN — HEPARIN SODIUM AND DEXTROSE 500 UNITS/HR: 5000; 5 INJECTION INTRAVENOUS at 06:48

## 2021-01-01 RX ADMIN — SODIUM CHLORIDE 10 ML: 9 INJECTION, SOLUTION INTRAMUSCULAR; INTRAVENOUS; SUBCUTANEOUS at 13:17

## 2021-01-01 RX ADMIN — Medication 5 ML: at 21:01

## 2021-01-01 RX ADMIN — GUAIFENESIN 1200 MG: 600 TABLET ORAL at 21:38

## 2021-01-01 RX ADMIN — MIDODRINE HYDROCHLORIDE 5 MG: 5 TABLET ORAL at 07:45

## 2021-01-01 RX ADMIN — MORPHINE SULFATE 2 MG: 2 INJECTION, SOLUTION INTRAMUSCULAR; INTRAVENOUS at 06:33

## 2021-01-01 RX ADMIN — OXYCODONE AND ACETAMINOPHEN 1 TABLET: 5; 325 TABLET ORAL at 13:37

## 2021-01-01 RX ADMIN — APIXABAN 5 MG: 5 TABLET, FILM COATED ORAL at 17:10

## 2021-01-01 RX ADMIN — METOPROLOL SUCCINATE 50 MG: 50 TABLET, EXTENDED RELEASE ORAL at 08:30

## 2021-01-01 RX ADMIN — AMIODARONE HYDROCHLORIDE 200 MG: 200 TABLET ORAL at 09:17

## 2021-01-01 RX ADMIN — OXYCODONE AND ACETAMINOPHEN 1 TABLET: 5; 325 TABLET ORAL at 21:19

## 2021-01-01 RX ADMIN — AMIODARONE HYDROCHLORIDE 200 MG: 200 TABLET ORAL at 11:20

## 2021-01-01 RX ADMIN — METOPROLOL SUCCINATE 50 MG: 50 TABLET, EXTENDED RELEASE ORAL at 10:45

## 2021-01-01 RX ADMIN — GABAPENTIN 300 MG: 300 CAPSULE ORAL at 08:00

## 2021-01-01 RX ADMIN — BUDESONIDE 500 MCG: 0.5 INHALANT RESPIRATORY (INHALATION) at 07:29

## 2021-01-01 RX ADMIN — ROPIVACAINE HYDROCHLORIDE 30 ML: 150 INJECTION, SOLUTION EPIDURAL; INFILTRATION; PERINEURAL at 12:10

## 2021-01-01 RX ADMIN — MIDODRINE HYDROCHLORIDE 10 MG: 5 TABLET ORAL at 17:15

## 2021-01-01 RX ADMIN — POLYETHYLENE GLYCOL 3350 17 G: 17 POWDER, FOR SOLUTION ORAL at 09:16

## 2021-01-01 RX ADMIN — PANTOPRAZOLE SODIUM 40 MG: 40 TABLET, DELAYED RELEASE ORAL at 06:31

## 2021-01-01 RX ADMIN — APIXABAN 5 MG: 5 TABLET, FILM COATED ORAL at 08:56

## 2021-01-01 RX ADMIN — MIDAZOLAM 2 MG: 1 INJECTION INTRAMUSCULAR; INTRAVENOUS at 11:15

## 2021-01-01 RX ADMIN — Medication 10 MG: at 15:09

## 2021-01-01 RX ADMIN — GABAPENTIN 300 MG: 300 CAPSULE ORAL at 09:36

## 2021-01-01 RX ADMIN — CEFAZOLIN 2 G: 1 INJECTION, POWDER, FOR SOLUTION INTRAVENOUS at 13:38

## 2021-01-01 RX ADMIN — ALBUTEROL SULFATE 5 MG: 2.5 SOLUTION RESPIRATORY (INHALATION) at 09:04

## 2021-01-01 RX ADMIN — MIDODRINE HYDROCHLORIDE 10 MG: 5 TABLET ORAL at 17:14

## 2021-01-01 RX ADMIN — HYDROMORPHONE HYDROCHLORIDE 0.5 MG: 1 INJECTION, SOLUTION INTRAMUSCULAR; INTRAVENOUS; SUBCUTANEOUS at 16:20

## 2021-01-01 RX ADMIN — PROPOFOL 160 MG: 10 INJECTION, EMULSION INTRAVENOUS at 13:16

## 2021-01-01 RX ADMIN — PIPERACILLIN SODIUM AND TAZOBACTAM SODIUM 4.5 G: 4; .5 INJECTION, POWDER, LYOPHILIZED, FOR SOLUTION INTRAVENOUS at 17:46

## 2021-01-01 RX ADMIN — LIDOCAINE HYDROCHLORIDE 60 MG: 20 INJECTION, SOLUTION EPIDURAL; INFILTRATION; INTRACAUDAL; PERINEURAL at 08:57

## 2021-01-01 RX ADMIN — Medication 10 ML: at 09:42

## 2021-01-01 RX ADMIN — SODIUM CHLORIDE SOLN NEBU 3% 4 ML: 3 NEBU SOLN at 07:01

## 2021-01-01 RX ADMIN — SODIUM CHLORIDE 5 ML: 9 INJECTION, SOLUTION INTRAMUSCULAR; INTRAVENOUS; SUBCUTANEOUS at 15:01

## 2021-01-01 RX ADMIN — LEVALBUTEROL HYDROCHLORIDE 1.25 MG: 1.25 SOLUTION RESPIRATORY (INHALATION) at 19:39

## 2021-01-01 RX ADMIN — BUDESONIDE 500 MCG: 0.5 INHALANT RESPIRATORY (INHALATION) at 09:24

## 2021-01-01 RX ADMIN — CEPHALEXIN 500 MG: 500 CAPSULE ORAL at 17:57

## 2021-01-01 RX ADMIN — SENNOSIDES AND DOCUSATE SODIUM 1 TABLET: 8.6; 5 TABLET ORAL at 09:13

## 2021-01-01 RX ADMIN — Medication 10 ML: at 05:06

## 2021-01-01 RX ADMIN — PHENYLEPHRINE HYDROCHLORIDE 120 MCG: 10 INJECTION INTRAVENOUS at 17:29

## 2021-01-01 RX ADMIN — MIDODRINE HYDROCHLORIDE 10 MG: 5 TABLET ORAL at 09:12

## 2021-01-01 RX ADMIN — SODIUM CHLORIDE, SODIUM LACTATE, POTASSIUM CHLORIDE, AND CALCIUM CHLORIDE 125 ML/HR: 600; 310; 30; 20 INJECTION, SOLUTION INTRAVENOUS at 02:03

## 2021-01-01 RX ADMIN — GABAPENTIN 300 MG: 300 CAPSULE ORAL at 08:23

## 2021-01-01 RX ADMIN — FENTANYL CITRATE 50 MCG: 50 INJECTION INTRAMUSCULAR; INTRAVENOUS at 11:05

## 2021-01-01 RX ADMIN — SODIUM CHLORIDE, SODIUM LACTATE, POTASSIUM CHLORIDE, AND CALCIUM CHLORIDE 1000 ML: 600; 310; 30; 20 INJECTION, SOLUTION INTRAVENOUS at 23:04

## 2021-01-01 RX ADMIN — DOXYCYCLINE HYCLATE 100 MG: 100 CAPSULE ORAL at 21:31

## 2021-01-01 RX ADMIN — Medication 10 ML: at 06:05

## 2021-01-01 RX ADMIN — GABAPENTIN 300 MG: 300 CAPSULE ORAL at 08:12

## 2021-01-01 RX ADMIN — BUDESONIDE 500 MCG: 0.5 INHALANT RESPIRATORY (INHALATION) at 20:50

## 2021-01-01 RX ADMIN — CEPHALEXIN 500 MG: 500 CAPSULE ORAL at 09:32

## 2021-01-01 RX ADMIN — SODIUM CHLORIDE 10 ML: 9 INJECTION, SOLUTION INTRAMUSCULAR; INTRAVENOUS; SUBCUTANEOUS at 06:20

## 2021-01-01 RX ADMIN — PHENYLEPHRINE HYDROCHLORIDE 120 MCG: 10 INJECTION INTRAVENOUS at 13:55

## 2021-01-01 RX ADMIN — HYDROCODONE BITARTRATE AND HOMATROPINE METHYLBROMIDE 5 ML: 5; 1.5 SOLUTION ORAL at 09:59

## 2021-01-01 RX ADMIN — METOPROLOL SUCCINATE 50 MG: 50 TABLET, EXTENDED RELEASE ORAL at 08:49

## 2021-01-01 RX ADMIN — LEVALBUTEROL HYDROCHLORIDE 1.25 MG: 1.25 SOLUTION RESPIRATORY (INHALATION) at 00:48

## 2021-01-01 RX ADMIN — SODIUM CHLORIDE SOLN NEBU 3% 4 ML: 3 NEBU SOLN at 07:28

## 2021-01-01 RX ADMIN — PANTOPRAZOLE SODIUM 40 MG: 40 TABLET, DELAYED RELEASE ORAL at 06:48

## 2021-01-01 RX ADMIN — METOPROLOL SUCCINATE 50 MG: 50 TABLET, EXTENDED RELEASE ORAL at 09:31

## 2021-01-01 RX ADMIN — PIPERACILLIN SODIUM AND TAZOBACTAM SODIUM 4.5 G: 4; .5 INJECTION, POWDER, LYOPHILIZED, FOR SOLUTION INTRAVENOUS at 01:15

## 2021-01-01 RX ADMIN — LEVALBUTEROL HYDROCHLORIDE 1.25 MG: 1.25 SOLUTION RESPIRATORY (INHALATION) at 16:30

## 2021-01-01 RX ADMIN — FENTANYL CITRATE 50 MCG: 50 INJECTION INTRAMUSCULAR; INTRAVENOUS at 11:15

## 2021-01-01 RX ADMIN — LEVALBUTEROL HYDROCHLORIDE 1.25 MG: 1.25 SOLUTION RESPIRATORY (INHALATION) at 05:55

## 2021-01-01 RX ADMIN — Medication 10 ML: at 20:47

## 2021-01-01 RX ADMIN — MIDODRINE HYDROCHLORIDE 5 MG: 5 TABLET ORAL at 08:12

## 2021-01-01 RX ADMIN — CEFTRIAXONE 2 G: 2 INJECTION, POWDER, FOR SOLUTION INTRAMUSCULAR; INTRAVENOUS at 14:28

## 2021-01-01 RX ADMIN — BISACODYL 10 MG: 10 SUPPOSITORY RECTAL at 19:48

## 2021-01-01 RX ADMIN — HYDROCODONE BITARTRATE AND ACETAMINOPHEN 1 TABLET: 5; 325 TABLET ORAL at 15:10

## 2021-01-01 RX ADMIN — TUBERCULIN PURIFIED PROTEIN DERIVATIVE 5 UNITS: 5 INJECTION, SOLUTION INTRADERMAL at 15:04

## 2021-01-01 RX ADMIN — MIDODRINE HYDROCHLORIDE 5 MG: 5 TABLET ORAL at 08:20

## 2021-01-01 RX ADMIN — Medication 10 ML: at 06:00

## 2021-01-01 RX ADMIN — ONDANSETRON 4 MG: 2 INJECTION INTRAMUSCULAR; INTRAVENOUS at 10:45

## 2021-01-01 RX ADMIN — SODIUM CHLORIDE 10 ML: 9 INJECTION, SOLUTION INTRAMUSCULAR; INTRAVENOUS; SUBCUTANEOUS at 22:26

## 2021-01-01 RX ADMIN — HEPARIN SODIUM (PORCINE) LOCK FLUSH IV SOLN 100 UNIT/ML 300 UNITS: 100 SOLUTION at 06:03

## 2021-01-01 RX ADMIN — PHENYLEPHRINE HYDROCHLORIDE 120 MCG: 10 INJECTION INTRAVENOUS at 15:15

## 2021-01-01 RX ADMIN — OXYCODONE AND ACETAMINOPHEN 1 TABLET: 5; 325 TABLET ORAL at 07:16

## 2021-01-01 RX ADMIN — GUAIFENESIN 1200 MG: 600 TABLET ORAL at 08:23

## 2021-01-01 RX ADMIN — PHENYLEPHRINE HYDROCHLORIDE 100 MCG: 10 INJECTION INTRAVENOUS at 14:04

## 2021-01-01 RX ADMIN — Medication 10 ML: at 11:02

## 2021-01-01 RX ADMIN — VANCOMYCIN HYDROCHLORIDE 1250 MG: 10 INJECTION, POWDER, LYOPHILIZED, FOR SOLUTION INTRAVENOUS at 03:07

## 2021-01-01 RX ADMIN — ALBUMIN (HUMAN) 25 G: 12.5 INJECTION, SOLUTION INTRAVENOUS at 18:55

## 2021-01-01 RX ADMIN — MIDODRINE HYDROCHLORIDE 5 MG: 5 TABLET ORAL at 10:31

## 2021-01-01 RX ADMIN — POLYETHYLENE GLYCOL 3350 17 G: 17 POWDER, FOR SOLUTION ORAL at 09:26

## 2021-01-01 RX ADMIN — FENTANYL CITRATE 50 MCG: 50 INJECTION INTRAMUSCULAR; INTRAVENOUS at 11:19

## 2021-01-01 RX ADMIN — Medication 10 ML: at 12:30

## 2021-01-01 RX ADMIN — PIPERACILLIN SODIUM AND TAZOBACTAM SODIUM 4.5 G: 4; .5 INJECTION, POWDER, LYOPHILIZED, FOR SOLUTION INTRAVENOUS at 00:50

## 2021-01-01 RX ADMIN — HYDROCODONE BITARTRATE AND HOMATROPINE METHYLBROMIDE 5 ML: 5; 1.5 SOLUTION ORAL at 06:02

## 2021-01-01 RX ADMIN — SUGAMMADEX 50 MG: 100 INJECTION, SOLUTION INTRAVENOUS at 09:20

## 2021-01-01 RX ADMIN — Medication 2 G: at 13:15

## 2021-01-01 RX ADMIN — GABAPENTIN 300 MG: 300 CAPSULE ORAL at 16:00

## 2021-01-01 RX ADMIN — METRONIDAZOLE 500 MG: 500 TABLET ORAL at 05:40

## 2021-01-01 RX ADMIN — PIPERACILLIN SODIUM AND TAZOBACTAM SODIUM 4.5 G: 4; .5 INJECTION, POWDER, LYOPHILIZED, FOR SOLUTION INTRAVENOUS at 17:10

## 2021-01-01 RX ADMIN — MIDODRINE HYDROCHLORIDE 10 MG: 5 TABLET ORAL at 12:35

## 2021-01-01 RX ADMIN — GABAPENTIN 300 MG: 300 CAPSULE ORAL at 17:44

## 2021-01-01 RX ADMIN — PHENYLEPHRINE HYDROCHLORIDE 120 MCG: 10 INJECTION INTRAVENOUS at 14:19

## 2021-01-01 RX ADMIN — GENTAMICIN SULFATE 0.5 INCH: 3 OINTMENT OPHTHALMIC at 19:42

## 2021-01-01 RX ADMIN — Medication 4 MCG/MIN: at 13:54

## 2021-01-01 RX ADMIN — MIDODRINE HYDROCHLORIDE 10 MG: 5 TABLET ORAL at 11:20

## 2021-01-01 RX ADMIN — HEPARIN SODIUM AND DEXTROSE 500 UNITS/HR: 5000; 5 INJECTION INTRAVENOUS at 23:10

## 2021-01-01 RX ADMIN — METHYLPREDNISOLONE SODIUM SUCCINATE 40 MG: 40 INJECTION, POWDER, FOR SOLUTION INTRAMUSCULAR; INTRAVENOUS at 17:14

## 2021-01-01 RX ADMIN — SODIUM CHLORIDE SOLN NEBU 3% 4 ML: 3 NEBU SOLN at 19:25

## 2021-01-01 RX ADMIN — PROPOFOL 50 MG: 10 INJECTION, EMULSION INTRAVENOUS at 13:31

## 2021-01-01 RX ADMIN — LEVALBUTEROL HYDROCHLORIDE 1.25 MG: 1.25 SOLUTION RESPIRATORY (INHALATION) at 16:13

## 2021-01-01 RX ADMIN — AMIODARONE HYDROCHLORIDE 200 MG: 200 TABLET ORAL at 09:19

## 2021-01-01 RX ADMIN — PANTOPRAZOLE SODIUM 40 MG: 40 TABLET, DELAYED RELEASE ORAL at 05:37

## 2021-01-01 RX ADMIN — SODIUM CHLORIDE 10 ML: 9 INJECTION, SOLUTION INTRAMUSCULAR; INTRAVENOUS; SUBCUTANEOUS at 22:03

## 2021-01-01 RX ADMIN — GABAPENTIN 300 MG: 300 CAPSULE ORAL at 10:45

## 2021-01-01 RX ADMIN — CEFTRIAXONE 2 G: 2 INJECTION, POWDER, FOR SOLUTION INTRAMUSCULAR; INTRAVENOUS at 13:58

## 2021-01-01 RX ADMIN — LEVALBUTEROL HYDROCHLORIDE 1.25 MG: 1.25 SOLUTION RESPIRATORY (INHALATION) at 20:49

## 2021-01-04 PROBLEM — J96.11 CHRONIC RESPIRATORY FAILURE WITH HYPOXIA (HCC): Status: ACTIVE | Noted: 2021-01-01

## 2021-01-04 PROBLEM — J98.11 COLLAPSE OF RIGHT LUNG: Status: ACTIVE | Noted: 2021-01-01

## 2021-01-04 PROBLEM — I48.91 A-FIB (HCC): Status: ACTIVE | Noted: 2021-01-01

## 2021-01-04 NOTE — PROGRESS NOTES
TRANSFER - IN REPORT: 
 
Verbal report received from Al, RN(name) on Tally Gess.  being received from 7th floor Rm 733(unit) for routine progression of care Report consisted of patients Situation, Background, Assessment and  
Recommendations(SBAR). Information from the following report(s) SBAR was reviewed with the receiving nurse. Opportunity for questions and clarification was provided. Assessment completed upon patients arrival to unit and care assumed.

## 2021-01-04 NOTE — CONSULTS
H&P/Consult Note/Progress Note/Office Note:  
Ailyn Disla MRN: [id number]  :1950  Age:75 y.o. 
 
HPI: Ailyn Disla is a 78 y.o. male who is well known to Dr. Camilo Gannon.  He underwent right thoracotomy with lower and middle lobectomy 20 for squamous cell carcinoma. He underwent neoaduvant chemo/radiation and adjuvant chemo. .  CT 20 showed large pleural effusion and collapse of RUL. He underwent thoracentesis by Dr. Garcia Draft 20 with approximately drainage of 900ml, but was unable to completely drain related to complaints of pain. He felt should hold Eliquis for admission for Chest tube placement. Dr. Kayli Simmons placed chest tube this AM 21. General surgery was consulted for complicated right pleural effusion, ?hemothorax not draining properly with chest tube placement. On exam, Mr. Jose Miller appears comfortable.  CT scan reviewed and current chest xray, NAD. On 3 L NC sats upper 90's. Chest tube maintained with 800ml serosanguineous drainage noted. Remains on 20cmsx. Patient states since his lobectomy he has felt \"pressure\". He does use 2L oxygen at night. She states he does feel like he can breath better since this chest tube has been placed. His lost dose of eliquis was Friday (for A-fib). He does have smoking history and quit about 6 years ago Medical history as below Past Medical History:  
Diagnosis Date  GERD (gastroesophageal reflux disease)   
 controlled with med  Hypertension hx-- off meds since 2020--- followed by dr. Donna Santiago  Hypoxia 2020  Malignant neoplasm of lower lobe of right lung ([facility] [address] 2020 REC'D CHEMO AND RADIATION--- FOLLOWED BY DR. Barbara Pace Osteoarthritis  Right lower lobe lung mass 2020  Status post total right knee replacement 2016 Past Surgical History:  
Procedure Laterality Date  HX COLONOSCOPY    
 HX KNEE ARTHROSCOPY Left   
 scope X 1, ACL recon X 1  
 HX KNEE ARTHROSCOPY Right ,  X 2   
 HX KNEE REPLACEMENT Right 2016 [address]  HX VASCULAR ACCESS Right placed 3/2020  
 port in chest   
 IR INSERT TUNL CVC W PORT OVER 5 YEARS  3/18/2020  PA SINUS SURGERY PROC UNLISTED  1991  
 sinus surg Current Facility-Administered Medications Medication Dose Route Frequency  0.9% sodium chloride infusion 1,000 mL  1,000 mL IntraVENous CONTINUOUS  
 sodium chloride (NS) flush 5-40 mL  5-40 mL IntraVENous Q8H  
 sodium chloride (NS) flush 5-40 mL  5-40 mL IntraVENous PRN Celebrex [celecoxib] Social History Socioeconomic History  Marital status:  Spouse name: Not on file  Number of children: Not on file  Years of education: Not on file  Highest education level: Not on file Tobacco Use  Smoking status: Former Smoker Packs/day: 1.00 Years: 20.00 Pack years: 20.00 Quit date:  Years since quittin.0  Smokeless tobacco: Never Used Substance and Sexual Activity  Alcohol use: Yes Alcohol/week: 4.0 standard drinks Types: 4 Glasses of wine per week  Drug use: No  
Other Topics Concern   Service No  
 Blood Transfusions No  
 Caffeine Concern No  
 Occupational Exposure No  
 Hobby Hazards No  
 Sleep Concern No  
 Stress Concern No  
 Weight Concern No  
 Special Diet No  
 Exercise Yes  [facility] Yes Social History Narrative . Has long-time girlfriend. Continues to work doing IT support. Social History Tobacco Use Smoking Status Former Smoker  Packs/day: 1.00  Years: 20.00  Pack years: 20.00  Quit date:   Years since quittin.0 Smokeless Tobacco Never Used Family History Problem Relation Age of Onset  Cancer Mother   
     uterine  Arrhythmia Sister   
     afib ROS: The patient has no difficulty with chest pain or shortness of breath. No fever or chills. Comprehensive review of systems was otherwise unremarkable except as noted above. Physical Exam:  
Visit Vitals /74 (BP 1 Location: Left arm, BP Patient Position: At rest) Pulse 80 Temp 97.3 °F (36.3 °C) Resp 18 Ht 5' 10\" (1.778 m) Wt 195 lb (88.5 kg) SpO2 98% BMI 27.98 kg/m² Constitutional: Alert, oriented, cooperative patient in no acute distress; appears stated age Eyes:Sclera are clear. EOMs intact ENMT: no external lesions gross hearing normal; no obvious neck masses, no ear or lip lesions, nares normal 
CV: RRR. Normal perfusion Resp: No JVD. Breathing is  non-labored; no audible wheezing. Chest tube maintained to suction with 800ml serosanguineous drainage GI: soft and non-distended Musculoskeletal: unremarkable with normal function. No embolic signs or cyanosis. Neuro:  Oriented; moves all 4; no focal deficits Psychiatric: normal affect and mood, no memory impairment Recent vitals (if inpt): 
Patient Vitals for the past 24 hrs: 
 BP Temp Pulse Resp SpO2 Height Weight 01/04/21 1357 106/74 97.3 °F (36.3 °C) 80 18 98 %    
01/04/21 1222 94/68  81 16 98 %    
01/04/21 1213 102/69  83 17 92 %    
01/04/21 1203 99/66  84 16 91 %    
01/04/21 1143 99/64  90 16 96 %    
01/04/21 1135 104/67  77 16 96 %    
01/04/21 1130 100/68  77 16 97 %    
01/04/21 1125 100/68  87 16 98 %    
01/04/21 1120 96/64  86 16 98 %    
01/04/21 1116 100/66  88 16 98 %    
01/04/21 1111 96/67  88 18 100 %    
01/04/21 1106 103/70  95 18 94 %    
01/04/21 1101 122/81  96 20 95 %    
01/04/21 0933 118/77 98.5 °F (36.9 °C) (!) 101 20 96 % 5' 10\" (1.778 m) 195 lb (88.5 kg) XR Results (most recent): 
Results from Hospital Encounter encounter on 01/04/21 XR CHEST SNGL V  
 Narrative Portable chest:  
 
History: chest tube, large hemothorax. Comparison: 07/30/2020. Correlation is made to the CT chest 12/22/2020. Findings: A single view of the chest was obtained at 12:29 PM. A right internal 
jugular Mediport catheter is unchanged. Right-sided chest tube has been placed. There is extensive opacification of right hemithorax with mild ipsilateral 
mediastinal shift. Relative lucency at the right lung base could have present 
aerated lung versus a basilar pneumothorax. The left lung appears grossly clear. Cardiac silhouette appears obscured. Impression Impression: 
1. Interval placement of right-sided chest tube with possible aerated lung at 
the base versus basilar pneumothorax. 2. Large right pleural effusion, confirmed on recent chest CT. CT Results (most recent): 
Results from [facility] [place] encounter on 12/22/20 CT CHEST W CONT Narrative EXAMINATION: CT CHEST W CONT 12/22/2020 10:57 AM 
 
INDICATION: Malignant neoplasm of the right lower lobe. Restaging COMPARISON: CT chest July 1, 2020 and chest radiograph July 30, 2020 TECHNIQUE: Multiple contiguous axial CT images of the chest were obtained from 
the lung apices to the lung bases after the intravenous administration of 100 mL Isovue-370 contrast material . Radiation dose reduction techniques were used for this study:  Our CT scanners 
use one or all of the following: Automated exposure control, adjustment of the 
mA and/or kVp according to patient's size, iterative reconstruction. FINDINGS: 
Lower Neck: No abnormality Chest soft tissues: Right chest port terminates at the cavoatrial junction. Heart/Mediastinum: There is slight mediastinal shift to the right. No definite 
hilar or mediastinal adenopathy. There may be circumferential wall thickening of 
the distal esophagus. Normal caliber thoracic aorta. Patent proximal pulmonary 
arteries. Coronary atherosclerosis. Lungs: There has been complete atelectasis of the residual right lung.  No 
 abnormality of the left lung. Pleura: There has been development of a large right pleural effusion which fills 
the entirety of the right hemithorax there is some smooth peripheral enhancement 
of the pleural effusion. This is new from chest radiograph July 30, 2020 and 
chest CT July 1, 2020. Visualized upper abdomen: Cholelithiasis without cholecystitis. Osseous structures: No suspicious osseous lesion. Right posterior thoracotomy 
changes. Impression IMPRESSION: 
1. New from July 30,2020, there is complete collapse of the residual right lung 
with development of a large pleural effusion involving the entire hemithorax. There is some smooth thin peripheral enhancement of the effusion which may 
reflect mild complexity. 2. No definite mediastinal adenopathy 3. Possible circumferential thickening of the distal esophagus. Correlation 
could be made for esophagitis. [address] Labs: 
No results for input(s): WBC, HGB, PLT, NA, K, CL, CO2, BUN, CREA, GLU, PTP, INR, APTT, TBIL, TBILI, CBIL, ALT, AP, AML, LPSE, LCAD, NH4, TROPT, TROIQ, PCO2, PO2, HCO3, HGBEXT, PLTEXT, INREXT in the last 72 hours. No lab exists for component: SGOT, GPT,  PH Lab Results Component Value Date/Time WBC 6.7 12/23/2020 01:03 PM  
 HGB 12.7 (L) 12/23/2020 01:03 PM  
 PLATELET 662 45/81/7633 01:03 PM  
 Sodium 134 (L) 12/23/2020 01:03 PM  
 Potassium 4.2 12/23/2020 01:03 PM  
 Chloride 101 12/23/2020 01:03 PM  
 CO2 27 12/23/2020 01:03 PM  
 BUN 9 12/23/2020 01:03 PM  
 Creatinine 1.00 12/23/2020 01:03 PM  
 Glucose 97 12/23/2020 01:03 PM  
 INR 1.0 07/16/2020 03:40 PM  
 aPTT 28.0 02/08/2016 09:15 AM  
 Bilirubin, total 0.8 12/23/2020 01:03 PM  
 Bilirubin, direct 0.8 (H) 05/05/2020 02:27 PM  
 ALT (SGPT) 10 (L) 12/23/2020 01:03 PM  
 Alk. phosphatase 115 12/23/2020 01:03 PM  
 Troponin-I, Qt. <0.02 (L) 05/05/2020 01:09 AM  
 
 
I reviewed recent labs and recent radiologic studies. I independently reviewed radiology images for studies I described above or studies I have ordered. Admission date (for inpatients): 1/4/2021 Day of Surgery  Procedure(s): ULTRASOUND 
CHEST TUBES INSERTION pt to be admitted post procedure ASSESSMENT/PLAN: 
Problem List  Date Reviewed: 12/28/2020 Codes Class Noted * (Principal) Collapse of right lung ICD-10-CM: J98.11 ICD-9-CM: 518.0  1/4/2021 A-fib [facility]) ICD-10-CM: I48.91 
ICD-9-CM: 427.31  1/4/2021 Chronic respiratory failure with hypoxia [facility]) ICD-10-CM: J96.11 
ICD-9-CM: 518.83, 799.02  1/4/2021 Pleural effusion on right ICD-10-CM: J90 ICD-9-CM: 511.9  12/28/2020 Chemotherapy induced neutropenia (HCC) ICD-10-CM: D70.1, T45.1X5A 
ICD-9-CM: 288.03, E933.1  10/23/2020 Dehydration ICD-10-CM: E86.0 ICD-9-CM: 276.51  9/15/2020 S/P lobectomy of lung ICD-10-CM: Z90.2 ICD-9-CM: V45.89  7/29/2020 Cough ICD-10-CM: R05 ICD-9-CM: 786.2  7/29/2020 Atrial tachycardia (HCC) ICD-10-CM: I47.1 ICD-9-CM: 427.89  7/26/2020 Cancer of bronchus of right lower lobe [facility]) ICD-10-CM: C34.31 
ICD-9-CM: 162.5  7/23/2020 Lung cancer [facility]) ICD-10-CM: C34.90 ICD-9-CM: 162.9  7/23/2020 Cancer of right lung [facility]) ICD-10-CM: C34.91 
ICD-9-CM: 162.9  7/23/2020 Pulmonary emphysema ([address] ICD-10-CM: J43.9 ICD-9-CM: 492.8  7/7/2020 GERD (gastroesophageal reflux disease) ICD-10-CM: K21.9 ICD-9-CM: 530.81  Unknown Sepsis due to undetermined organism [facility]) ICD-10-CM: A41.9 ICD-9-CM: 038.9  5/5/2020 Malignant neoplasm of lower lobe of right lung (HCC) (Chronic) ICD-10-CM: C34.31 
ICD-9-CM: 162.5  2/26/2020 Overview Signed 1/4/2021  7:38 AM by Tamara Russo MD  
  Dec 2020- Echo EF 50%, technically difficult, cannot assess regional wall motion. Aortic root 4.1 cm. No significant valvular disease.   Normal DF 
  
  
   
 Mass of lower lobe of right lung ICD-10-CM: R91.8 ICD-9-CM: 786.6  2/23/2020 Right lower lobe lung mass ICD-10-CM: R91.8 ICD-9-CM: 786.6  2/23/2020 Essential hypertension with goal blood pressure less than 130/80 (Chronic) ICD-10-CM: I10 
ICD-9-CM: 401.9  2/19/2018 Principal Problem: 
  Collapse of right lung (1/4/2021) Active Problems: 
  Lung cancer ([address] (7/23/2020) Pleural effusion on right (12/28/2020) A-fib ([address] (1/4/2021) Chronic respiratory failure with hypoxia ([address] (1/4/2021) Plan Continue care per primary Review chest xray in AM (chest tube was only placed this AM by Dr. Genaro Gowers) May need to repeat CT scan or schedule for surgery. Continue chest tube to 20 cmsx Signed:  Richard Iglesias NP I have seen and examined the patient with the Nurse Practitioner and agree with the above assessment and plan. Right hemathorax, probably loculated. Last eliquis was 3 days ago. A large bore chest tube is placed, will repeat CXR in AM. May need VATS if no improvements.   
 
Bello Thomas MD

## 2021-01-04 NOTE — INTERVAL H&P NOTE
Update History & Physical 
 
The Patient's History and Physical of January 4, Chest tube was reviewed with the patient and I examined the patient. There was no change. The surgical site was confirmed by the patient and me. Plan:  The risk, benefits, expected outcome, and alternative to the recommended procedure have been discussed with the patient. Patient understands and wants to proceed with the procedure.  
 
Electronically signed by Karol Forrest MD on 1/4/2021 at 11:02 AM

## 2021-01-04 NOTE — H&P
HISTORY AND PHYSICAL Alejandro Vásquez. 
 
1/4/2021 Date of Admission:  1/4/2021 The patient's chart is reviewed and the patient is discussed with the staff. Subjective:  
 
Patient is a 78 y.o.  male presents with RLL collapse. 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 He was found to have RLL mass 2/2020, measuring 6.7cm x 5.9cm. He had an EBUS On 2/26/2020 and biopsy consistent with SCC. His Brain MRI was negative for brain mets but revealed a 2cm R parotid mass. His PET CT revealed a hypermetabolic RLL mass extending into the hilum and excessively hypermetabolic R parotid gland. He had radiation and chemo and then underwent RML lobectomy 7/23/2020. He then completed 6 more rounds of chemo around 10/2020. He had repeat Chest CT on 12/22/2020 which revealed a large R pleural effusion and collapse of the remaining RUL. Pt underwent R thoracentesis on 12/28/2020 by Dr. Nai Miranda with 900mL bloody fluid removed. Pt was unable to tolerate complete drainage and it was felt that his Eliquis should be held for admission for CT placement and possible surgical evaluation depending on findings. Pt is being admitted for CT placement and further evaluation. Pt reports that since his lobectomy, he has noticed shortness of breath and pressure in his R lung. He does cough up clear phlegm. He reports rare wheezing. He denies fever, chills, or sweats. He is on 2L O2 qhs. He has xopenex which he very rarely uses. He admits to chronic post nasal drip and uses flonase. Review of Systems Constitutional: positive for fatigue Eyes: positive for contacts/glasses Ears, nose, mouth, throat, and face: positive for nasal congestion Respiratory: positive for cough, sputum or dyspnea on exertion Cardiovascular: positive for palpitations, dyspnea on exertion Gastrointestinal: negative Genitourinary:positive for frequency Hematologic/lymphatic: negative Musculoskeletal:positive for arthralgias and stiff joints Neurological: negative Behavioral/Psych: positive for anxiety Endocrine: negative Allergic/Immunologic: positive for hay fever Patient Active Problem List  
Diagnosis Code  Essential hypertension with goal blood pressure less than 130/80 I10  Mass of lower lobe of right lung R91.8  Right lower lobe lung mass R91.8  Malignant neoplasm of lower lobe of right lung (HCC) C34.31  
 GERD (gastroesophageal reflux disease) K21.9  Sepsis due to undetermined organism ([facility] [address] A41.9  Pulmonary emphysema ([facility] [address] J43.9  Cancer of bronchus of right lower lobe (HCC) C34.31  
 Lung cancer ([facility] [address] C34.90  
 Cancer of right lung (HCC) C34.91  
 Atrial tachycardia (HCC) I47.1  S/P lobectomy of lung Z90.2  Cough R05  Dehydration E86.0  Chemotherapy induced neutropenia ([facility]) D70.1, T45.1X5A  Pleural effusion on right J90  
 Collapse of right lung J98.11 Prior to Admission Medications Prescriptions Last Dose Informant Patient Reported? Taking? DISABLED PLACARD (DISABLED PLACARD) DMV   No No  
Sig: Pt has medical condition that makes it difficult to walk 100 feet non stop without exacerbating existing condition. Eliquis 5 mg tablet   Yes No  
Sig: TAKE 1 TAB BY MOUTH TWO (2) TIMES A DAY METOPROLOL SUCCINATE PO 1/4/2021 at Unknown time  Yes Yes Sig: Take  by mouth. Indications: 25 mg once daily Nebulizer & Compressor machine   No No  
Sig: COPD  
amiodarone (CORDARONE) 200 mg tablet 1/4/2021 at Unknown time  No Yes Sig: Take 1 Tab by mouth daily. esomeprazole (NexIUM) 20 mg capsule   Yes No  
Sig: Take 20 mg by mouth nightly.  
gabapentin (NEURONTIN) 300 mg capsule 1/4/2021 at Unknown time  No Yes Sig: Take 1 Cap by mouth three (3) times daily.   
levalbuterol tartrate (Xopenex HFA) 45 mcg/actuation inhaler   No No  
 Sig: Take 2 Puffs by inhalation every four (4) hours as needed for Wheezing or Shortness of Breath.  
lidocaine-prilocaine (EMLA) topical cream   No No  
Sig: Apply  to affected area as needed for Pain. ondansetron hcl (ZOFRAN) 8 mg tablet   No No  
Sig: Take 1 Tab by mouth every eight (8) hours as needed for Nausea. Indications: nausea and vomiting caused by cancer drugs  
polyethylene glycol (MIRALAX) 17 gram packet   No No  
Sig: Take 1 Packet by mouth daily. Indications: constipation Patient taking differently: Take 17 g by mouth daily as needed. Indications: constipation  
prochlorperazine (COMPAZINE) 10 mg tablet   No No  
Sig: Take 1 Tab by mouth every six (6) hours as needed for Nausea. Indications: nausea and vomiting caused by cancer drugs, nausea and vomiting Facility-Administered Medications: None Past Medical History:  
Diagnosis Date  GERD (gastroesophageal reflux disease)   
 controlled with med  Hypertension hx-- off meds since 4/2020--- followed by dr. Lazara Concepcion  Hypoxia 02/24/2020  Malignant neoplasm of lower lobe of right lung ([facility] [address] 02/26/2020 REC'D CHEMO AND RADIATION--- FOLLOWED BY DR. Deja Colin Osteoarthritis  Right lower lobe lung mass 02/24/2020  Status post total right knee replacement 2/29/2016 Past Surgical History:  
Procedure Laterality Date  HX COLONOSCOPY    
 HX KNEE ARTHROSCOPY Left   
 scope X 1, ACL recon X 1  
 HX KNEE ARTHROSCOPY Right 1974, 1975 X 2   
 HX KNEE REPLACEMENT Right 2016 [address]  HX VASCULAR ACCESS Right placed 3/2020  
 port in chest   
 IR INSERT TUNL CVC W PORT OVER 5 YEARS  3/18/2020  [facility] SINUS SURGERY PROC UNLISTED  1988, 1991  
 sinus surg Social History Socioeconomic History  Marital status:  Spouse name: Not on file  Number of children: Not on file  Years of education: Not on file  Highest education level: Not on file Occupational History  Not on file Social Needs  Financial resource strain: Not on file  Food insecurity Worry: Not on file Inability: Not on file  Transportation needs Medical: Not on file Non-medical: Not on file Tobacco Use  Smoking status: Former Smoker Packs/day: 1.00 Years: 20.00 Pack years: 20.00 Quit date:  Years since quittin.0  Smokeless tobacco: Never Used Substance and Sexual Activity  Alcohol use: Yes Alcohol/week: 4.0 standard drinks Types: 4 Glasses of wine per week  Drug use: No  
 Sexual activity: Not on file Lifestyle  Physical activity Days per week: Not on file Minutes per session: Not on file  Stress: Not on file Relationships  Social connections Talks on phone: Not on file Gets together: Not on file Attends Hinduism service: Not on file Active member of club or organization: Not on file Attends meetings of clubs or organizations: Not on file Relationship status: Not on file  Intimate partner violence Fear of current or ex partner: Not on file Emotionally abused: Not on file Physically abused: Not on file Forced sexual activity: Not on file Other Topics Concern   Service No  
 Blood Transfusions No  
 Caffeine Concern No  
 Occupational Exposure No  
 Hobby Hazards No  
 Sleep Concern No  
 Stress Concern No  
 Weight Concern No  
 Special Diet No  
 Back Care Not Asked  Exercise Yes  Bike Helmet Not Asked  [address] Yes  Self-Exams Not Asked Social History Narrative . Has long-time girlfriend. Continues to work doing IT support. Family History Problem Relation Age of Onset  Cancer Mother   
     uterine  Arrhythmia Sister   
     afib Allergies Allergen Reactions  Celebrex [Celecoxib] Itching Current Facility-Administered Medications Medication Dose Route Frequency  0.9% sodium chloride infusion 1,000 mL  1,000 mL IntraVENous CONTINUOUS Objective:  
 
Vitals:  
 01/04/21 0933 BP: 118/77 Pulse: (!) 101 Resp: 20 Temp: 98.5 °F (36.9 °C) SpO2: 96% Weight: 195 lb (88.5 kg) Height: 5' 10\" (1.778 m) PHYSICAL EXAM  
 
Constitutional:  the patient is well developed and in no acute distress, on RA 
EENMT:  Sclera clear, pupils equal, oral mucosa moist 
Respiratory: diminished RLL Cardiovascular:  RRR without M,G,R 
Gastrointestinal: soft and non-tender; with positive bowel sounds. Musculoskeletal: warm without cyanosis. There is no lower extremity edema. Skin:  no jaundice or rashes Neurologic: no gross neuro deficits Psychiatric:  alert and oriented x 3 Chest CT:12/22/2020 CXR Results  (Last 48 hours) None No results for input(s): WBC, HGB, HCT, PLT, INR, HGBEXT, HCTEXT, PLTEXT, INREXT, HGBEXT, HCTEXT, PLTEXT, INREXT in the last 72 hours. No results for input(s): NA, K, CL, GLU, CO2, BUN, CREA, MG, PHOS, CA, TROIQ, ALB, TBIL, TBILI, ALT, BNPP in the last 72 hours. No lab exists for component: TROIP, GPT, SGOT No results for input(s): PH, PCO2, PO2, HCO3, PHI, PCO2I, PO2I, HCO3I in the last 72 hours. No results for input(s): LCAD, LAC in the last 72 hours. Assessment:  (Medical Decision Making) Hospital Problems  Date Reviewed: 12/28/2020 Codes Class Noted POA * (Principal) Collapse of right lung ICD-10-CM: J98.11 ICD-9-CM: 518.0  1/4/2021 Unknown Admit for CT placement A-fib [facility]) ICD-10-CM: I48.91 
ICD-9-CM: 427.31  1/4/2021 Unknown Continue home meds, Eliquis on hold Chronic respiratory failure with hypoxia [facility]) ICD-10-CM: J96.11 
ICD-9-CM: 518.83, 799.02  1/4/2021 Unknown On 2L qhs at home Pleural effusion on right ICD-10-CM: J90 ICD-9-CM: 511.9  12/28/2020 Unknown Lung cancer [facility]) ICD-10-CM: C34.90 ICD-9-CM: 162.9  7/23/2020 Yes Followed by Dr. Zoë Hooks Plan:  (Medical Decision Making) --Will admit for further medical management 
--Supplemental O2 qhs and PRN 
--Respiratory nebulizer treatments with xopenex 
--CT placement and imaging to evaluate cause of effusion and RLL collapse. Will make further recommendations based on findings. More than 50% of the time documented was spent in face-to-face contact with the patient and in the care of the patient on the floor/unit where the patient is located. HARSH White Lungs:  Diminished on R, RA 95% Heart:  RRR with no Murmur/Rubs/Gallops Additional Comments:  Plan for large bore chest tube. Will allow to drain initially with waterseal, possibly add suction to facilitate draining and get CT scan when drained or stopped draining to evaluate lungs. High likelihood of needing surgical consult. Discussed all this with the patient including need for admission. I have spoken with and examined the patient. I agree with the above assessment and plan as documented.  
 
Elizabeth Armendariz MD

## 2021-01-04 NOTE — PROGRESS NOTES
Spiritual Care visit. Initial Visit, Pre Surgery Consult. Visit and prayer before patient goes to surgery. Visit by Jordyn Parada M.Ed., Th.B. ,Staff

## 2021-01-04 NOTE — PROCEDURES
PROCEDURE: 
88565 Right 32F chest tube placement INDICATION: 
Hemothorax right Summary: 
 
The 6th chest was prepped and draped in the usual fashion with chlorhexidine. The 5th and 6th IC space in the right midaxillary line was identified and marked with needle cap. The skin, subcutaneous tissue and rib along with the IC muscles were also anesthetised with a total of 30cc of 1% lidocaine. Sterile technique was utilized during the entire procedure. An incision was then made in the skin followed by blunt dissection to the IC space previously marked as noted above. A curved hemostat was then used to penetrate the chest with a rush/ no rush of air present upon ontry into the chest indicating/excluding tension. The index finger was then placed into the chest throught the skin opening to assure lack of lung adherence to the chest and the pleura swiped 360 degrees through the chest opening. Following this a 32F chest tube was inserted into the chest with the aid of a large curved hemostat without complication. 1.0 silk suture was used to close the lateral edge of the wound and affix the chest tube in place. A pursed suture was then placed around the chest tube and the loose ends wrapped arround it and affixed to the chest tube with silk tape. An atrium was then attached and the entire apparatus placed to waterseal 
 
Dressing was applied and chest tube drainage affixed to the chest with silk tape. There were no complications.  
 
EBL:1cc 
 
Tyrel Lawrence MD

## 2021-01-05 NOTE — PROGRESS NOTES
Chart screened by  for discharge planning. No needs identified at this time. Please consult  if any new issues arise. Discharge Location Discharge Placement: Home

## 2021-01-05 NOTE — PROGRESS NOTES
Steve Nieto. Admission Date: 1/4/2021 Daily Progress Note: 1/5/2021 The patient's chart is reviewed and the patient is discussed with the staff. Patient is a 78 y.o.  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 He was found to have RLL mass 2/2020, measuring 6.7cm x 5.9cm. He had an EBUS On 2/26/2020 and biopsy consistent with SCC. His Brain MRI was negative for brain mets but revealed a 2cm R parotid mass. His PET CT revealed a hypermetabolic RLL mass extending into the hilum and excessively hypermetabolic R parotid gland. He had radiation and chemo and then underwent RML lobectomy 7/23/2020. He then completed 6 more rounds of chemo around 10/2020. He had repeat Chest CT on 12/22/2020 which revealed a large R pleural effusion and collapse of the remaining RUL. Pt underwent R thoracentesis on 12/28/2020 by Dr. Dakota King with 900mL bloody fluid removed. Pt was unable to tolerate complete drainage. His Eliquis was held and he was admitted for CT placement on 1/4/21 with 800cc of serosanguineous drainage. General Surgery was consulted and CXR revealed persistent loculated R effusion. Subjective:  
 
Pt sitting up in bed on 2L O2, sat 99%. Pt is on 2L only qhs at home. O2 was discontinued. Pt does report some cough but it is a dry cough. He complains of pressure and discomfort at chest tube insertion site. CT has -1300mL output. Pt to go to the OR for VATs tomorrow afternoon per his report. Current Facility-Administered Medications Medication Dose Route Frequency  0.9% sodium chloride infusion 1,000 mL  1,000 mL IntraVENous CONTINUOUS  
 amiodarone (CORDARONE) tablet 200 mg  200 mg Oral DAILY  [Held by provider] apixaban (ELIQUIS) tablet 5 mg  5 mg Oral Q12H  pantoprazole (PROTONIX) tablet 40 mg  40 mg Oral ACB&D  
  gabapentin (NEURONTIN) capsule 300 mg  300 mg Oral TID  levalbuterol tartrate (XOPENEX) 45 mcg/actuation inhaler HFAA 2 Puff- pt to supply (Patient Supplied)  2 Puff Inhalation Q4H PRN  
 metoprolol succinate (TOPROL-XL) XL tablet 50 mg  50 mg Oral DAILY  ondansetron (ZOFRAN ODT) tablet 8 mg  8 mg Oral Q8H PRN  polyethylene glycol (MIRALAX) packet 17 g  17 g Oral DAILY  sodium chloride (NS) flush 5-40 mL  5-40 mL IntraVENous Q8H  
 sodium chloride (NS) flush 5-40 mL  5-40 mL IntraVENous PRN  
 sodium chloride (NS) flush 5-40 mL  5-40 mL IntraVENous PRN  
 oxyCODONE-acetaminophen (PERCOCET) 5-325 mg per tablet 1 Tab  1 Tab Oral Q4H PRN  
 morphine injection 2 mg  2 mg IntraVENous Q4H PRN  
 influenza vaccine 2020-21 (6 mos+)(PF) (FLUARIX/FLULAVAL/FLUZONE QUAD) injection 0.5 mL  0.5 mL IntraMUSCular PRIOR TO DISCHARGE Review of Systems 
+cough 
+chest tube discomfort Constitutional: negative for fever, chills, sweats Cardiovascular: negative for chest pain, palpitations, syncope, edema Gastrointestinal:  negative for dysphagia, reflux, vomiting, diarrhea, abdominal pain, or melena Neurologic:  negative for focal weakness, numbness, headache Objective:  
 
Vitals:  
 01/04/21 2015 01/05/21 0008 01/05/21 7712 01/05/21 0901 BP: 97/65 111/73 96/64 94/68 Pulse: 81 73 76 72 Resp: 18 20 16 18 Temp: 98.2 °F (36.8 °C) 97.7 °F (36.5 °C) 98.1 °F (36.7 °C) 97.9 °F (36.6 °C) SpO2: 97% 92% 97% 98% Weight:      
Height:      
 
 
 
Intake/Output Summary (Last 24 hours) at 1/5/2021 1110 Last data filed at 1/4/2021 2349 Gross per 24 hour Intake  Output 1475 ml Net -1475 ml Physical Exam:  
Constitution:  the patient is well developed and in no acute distress, on 2L O2  
EENMT:  Sclera clear, pupils equal, oral mucosa moist 
Respiratory: diminished R base Cardiovascular:  RRR without M,G,R 
Gastrointestinal: soft and non-tender; with positive bowel sounds. Musculoskeletal: warm without cyanosis. There is no lower extremity edema. Skin:  no jaundice or rashes Neurologic: no gross neuro deficits Psychiatric:  alert and oriented x 3 CXR:  
 
 
LAB No results for input(s): GLUCPOC in the last 72 hours. No lab exists for component: [place] Recent Labs 01/05/21 
0890 WBC 4.2* HGB 11.0*  
HCT 34.8*  
 Recent Labs 01/05/21 
6434   
K 4.2  CO2 30  
GLU 94 BUN 8  
CREA 0.68* CA 8.8 ALB 2.7* TBILI 0.5 ALT 8* No results for input(s): PH, PCO2, PO2, HCO3, PHI, PCO2I, PO2I, HCO3I in the last 72 hours. No results for input(s): LCAD, LAC in the last 72 hours. Assessment:  (Medical Decision Making) Hospital Problems  Date Reviewed: 1/5/2021 Codes Class Noted POA * (Principal) Collapse of right lung ICD-10-CM: J98.11 ICD-9-CM: 518.0  1/4/2021 Unknown S/p CT placement A-fib [facility]) ICD-10-CM: I48.91 
ICD-9-CM: 427.31  1/4/2021 Unknown Chronic Chronic respiratory failure with hypoxia [facility]) ICD-10-CM: J96.11 
ICD-9-CM: 518.83, 799.02  1/4/2021 Unknown On 2L O2 qhs   
 Pleural effusion on right ICD-10-CM: J90 ICD-9-CM: 511.9  12/28/2020 Unknown S/p CT placement Lung cancer [facility]) ICD-10-CM: C34.90 ICD-9-CM: 162.9  7/23/2020 Yes Plan:  (Medical Decision Making)  
 
--wean O2 off during the day and continue O2 2L qhs as at home 
--CT in place 
--eliquis held --per general surgery, pt to go for R VATs with decortication 1/6. More than 50% of the time documented was spent in face-to-face contact with the patient and in the care of the patient on the floor/unit where the patient is located. Farrel Essex, PA Lungs:  Decreased on right. Chest tube in place with tidal movement and ongoing drainage. Heart:  RRR with no Murmur/Rubs/Gallops Additional Comments: Continue chest tube drainage in anticipation of VATS tomorrow. Hold eliquis and NPO at midnight. Pain adequately controlled at present. He is concerned that starting eliquis too soon after his initial lung surgery may have contributed to his current trapped lung. While this is impossible to prove may be worth holding his eliquis at discharge until healing has taken place. He is aware there is an increased stroke risk in this situation. I have spoken with and examined the patient. I agree with the above assessment and plan as documented.  
 
Yuri Orozco MD

## 2021-01-05 NOTE — ANESTHESIA PREPROCEDURE EVALUATION
Anesthetic History No history of anesthetic complications Review of Systems / Medical History Patient summary reviewed and pertinent labs reviewed Pulmonary COPD: mild Recent URI (negative COVID tests with hospitalization a few weeks ago) Pertinent negatives: No smoker (former, quit 7 years ago) Comments: Lung CA Neuro/Psych Within defined limits Cardiovascular Hypertension Dysrhythmias : atrial fibrillation Exercise tolerance: >4 METS: Denied chest pain, SOB, syncope GI/Hepatic/Renal 
  
GERD: well controlled Endo/Other Diabetes Obesity, arthritis and cancer (lung) Other Findings Comments: Recurrent hemothorax Last eliquis Friday Chest tube yesterday Chemo and radiation for Right Lung Cancer Physical Exam 
 
Airway Mallampati: II 
TM Distance: > 6 cm Neck ROM: normal range of motion Mouth opening: Normal 
 
 Cardiovascular Rhythm: regular Rate: abnormal 
 
 
 
Comments: tachycardia Dental 
 
Dentition: Caps/crowns Pulmonary Breath sounds clear to auscultation Abdominal 
GI exam deferred Other Findings Anesthetic Plan ASA: 3 Anesthesia type: general 
 
Monitoring Plan: Arterial line Induction: Intravenous Anesthetic plan and risks discussed with: Patient Girlfriend present

## 2021-01-05 NOTE — PROGRESS NOTES
's follow-up visit with Mr. Dean Lane requested by nurse. I visited with Mr. Dean Lane and offered spiritual support including prayer as requested. Mr. Dean Lane is anticipating a visitor today. Chaplains remain available for follow-up. Frederik Goldmann, MDiv Board Certified [facility]

## 2021-01-05 NOTE — PROGRESS NOTES
H&P/Consult Note/Progress Note/Office Note:  
Raysa Bell MRN: [id number]  :1950  Age:75 y.o. 
 
HPI: Raysa Bell is a 78 y.o. male who is well known to Dr. Dereje Beyer.  He underwent right thoracotomy with lower and middle lobectomy 20 for squamous cell carcinoma. He underwent neoaduvant chemo/radiation and adjuvant chemo. .  CT 20 showed large pleural effusion and collapse of RUL. He underwent thoracentesis by Dr. Junie Montgomery 20 with approximately drainage of 900ml, but was unable to completely drain related to complaints of pain. He felt should hold Eliquis for admission for Chest tube placement. Dr. Philipp Prieto placed chest tube this AM 21. General surgery was consulted for complicated right pleural effusion, ?hemothorax not draining properly with chest tube placement. On exam, Mr. Trina Woodson appears comfortable.  CT scan reviewed and current chest xray, NAD. On 3 L NC sats upper 90's. Chest tube maintained with 800ml serosanguineous drainage noted. Remains on 20cmsx. Patient states since his lobectomy he has felt \"pressure\". He does use 2L oxygen at night. She states he does feel like he can breath better since this chest tube has been placed. His lost dose of eliquis was Friday (for A-fib). He does have smoking history and quit about 6 years ago 21 Patient states he is breathing better since chest tube. NAD on 3L NC with 98% sat. Chest xray reviewed and fluid collection appears loculated- will need surgery- plan for tomorrow. Medical history as below Past Medical History:  
Diagnosis Date  GERD (gastroesophageal reflux disease)   
 controlled with med  Hypertension hx-- off meds since 2020--- followed by dr. Italia Nam  Hypoxia 2020  Malignant neoplasm of lower lobe of right lung ([facility] [address] 2020 REC'D CHEMO AND RADIATION--- FOLLOWED BY DR. 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  
Procedure Laterality Date  HX COLONOSCOPY    
 HX KNEE ARTHROSCOPY Left   
 scope X 1, ACL recon X 1  
 HX KNEE ARTHROSCOPY Right 1974, 1975 X 2   
 HX KNEE REPLACEMENT Right 2016 [address]  HX VASCULAR ACCESS Right placed 3/2020  
 port in chest   
 IR INSERT TUNL CVC W PORT OVER 5 YEARS  3/18/2020  UT SINUS SURGERY PROC UNLISTED  1988, 1991  
 sinus surg Current Facility-Administered Medications Medication Dose Route Frequency  [START ON 1/6/2021] ceFAZolin (ANCEF) 2 g/20 mL in sterile water IV syringe  2 g IntraVENous ON CALL TO OR  
 0.9% sodium chloride infusion 1,000 mL  1,000 mL IntraVENous CONTINUOUS  
 amiodarone (CORDARONE) tablet 200 mg  200 mg Oral DAILY  [Held by provider] apixaban (ELIQUIS) tablet 5 mg  5 mg Oral Q12H  pantoprazole (PROTONIX) tablet 40 mg  40 mg Oral ACB&D  
 gabapentin (NEURONTIN) capsule 300 mg  300 mg Oral TID  levalbuterol tartrate (XOPENEX) 45 mcg/actuation inhaler HFAA 2 Puff- pt to supply (Patient Supplied)  2 Puff Inhalation Q4H PRN  
 metoprolol succinate (TOPROL-XL) XL tablet 50 mg  50 mg Oral DAILY  ondansetron (ZOFRAN ODT) tablet 8 mg  8 mg Oral Q8H PRN  polyethylene glycol (MIRALAX) packet 17 g  17 g Oral DAILY  sodium chloride (NS) flush 5-40 mL  5-40 mL IntraVENous Q8H  
 sodium chloride (NS) flush 5-40 mL  5-40 mL IntraVENous PRN  
 sodium chloride (NS) flush 5-40 mL  5-40 mL IntraVENous PRN  
 oxyCODONE-acetaminophen (PERCOCET) 5-325 mg per tablet 1 Tab  1 Tab Oral Q4H PRN  
 morphine injection 2 mg  2 mg IntraVENous Q4H PRN  
 influenza vaccine 2020-21 (6 mos+)(PF) (FLUARIX/FLULAVAL/FLUZONE QUAD) injection 0.5 mL  0.5 mL IntraMUSCular PRIOR TO DISCHARGE Celebrex [celecoxib] Social History Socioeconomic History  Marital status:  Spouse name: Not on file  Number of children: Not on file  Years of education: Not on file  Highest education level: Not on file Tobacco Use  Smoking status: Former Smoker Packs/day: 1.00 Years: 20.00 Pack years: 20.00 Quit date:  Years since quittin.0  Smokeless tobacco: Never Used Substance and Sexual Activity  Alcohol use: Yes Alcohol/week: 4.0 standard drinks Types: 4 Glasses of wine per week  Drug use: No  
Other Topics Concern   Service No  
 Blood Transfusions No  
 Caffeine Concern No  
 Occupational Exposure No  
 Hobby Hazards No  
 Sleep Concern No  
 Stress Concern No  
 Weight Concern No  
 Special Diet No  
 Exercise Yes  [facility] Yes Social History Narrative . Has long-time girlfriend. Continues to work doing IT support. Social History Tobacco Use Smoking Status Former Smoker  Packs/day: 1.00  Years: 20.00  Pack years: 20.00  Quit date:   Years since quittin.0 Smokeless Tobacco Never Used Family History Problem Relation Age of Onset  Cancer Mother   
     uterine  Arrhythmia Sister   
     afib ROS: The patient has no difficulty with chest pain or shortness of breath. No fever or chills. Comprehensive review of systems was otherwise unremarkable except as noted above. Physical Exam:  
Visit Vitals BP 94/68 (BP 1 Location: Left arm, BP Patient Position: At rest) Pulse 72 Temp 97.9 °F (36.6 °C) Resp 18 Ht 5' 10\" (1.778 m) Wt 195 lb (88.5 kg) SpO2 98% BMI 27.98 kg/m² Constitutional: Alert, oriented, cooperative patient in no acute distress; appears stated age Eyes:Sclera are clear. EOMs intact ENMT: no external lesions gross hearing normal; no obvious neck masses, no ear or lip lesions, nares normal 
CV: RRR. Normal perfusion Resp: No JVD. Breathing is  non-labored; no audible wheezing. Chest tube maintained to suction GI: soft and non-distended Musculoskeletal: unremarkable with normal function. No embolic signs or cyanosis. Neuro:  Oriented; moves all 4; no focal deficits Psychiatric: normal affect and mood, no memory impairment Recent vitals (if inpt): 
Patient Vitals for the past 24 hrs: 
 BP Temp Pulse Resp SpO2  
01/05/21 0901 94/68 97.9 °F (36.6 °C) 72 18 98 % 01/05/21 0442 96/64 98.1 °F (36.7 °C) 76 16 97 % 01/05/21 0008 111/73 97.7 °F (36.5 °C) 73 20 92 % 01/04/21 2015 97/65 98.2 °F (36.8 °C) 81 18 97 % 01/04/21 1357 106/74 97.3 °F (36.3 °C) 80 18 98 % 01/04/21 1222 94/68  81 16 98 % 01/04/21 1213 102/69  83 17 92 % 01/04/21 1203 99/66  84 16 91 % 01/04/21 1143 99/64  90 16 96 % 01/04/21 1135 104/67  77 16 96 % 01/04/21 1130 100/68  77 16 97 % XR Results (most recent): 
Results from Hospital Encounter encounter on 01/04/21 XR CHEST SNGL V  
 Narrative Chest X-ray INDICATION: Follow-up pneumothorax A portable AP view of the chest was obtained. FINDINGS: Large loculated fluid collection is again noted in the upper right 
chest.  Chest tube is present. Left lung remains clear. The heart size is 
stable. Vascular port is also present. Impression IMPRESSION: Stable large loculated fluid collection in the right chest 
  
 
 
 
CT Results (most recent): 
Results from Hospital Encounter encounter on 12/22/20 CT CHEST W CONT Narrative EXAMINATION: CT CHEST W CONT 12/22/2020 10:57 AM 
 
INDICATION: Malignant neoplasm of the right lower lobe. Restaging COMPARISON: CT chest July 1, 2020 and chest radiograph July 30, 2020 TECHNIQUE: Multiple contiguous axial CT images of the chest were obtained from 
the lung apices to the lung bases after the intravenous administration of 100 mL Isovue-370 contrast material . Radiation dose reduction techniques were used for this study:  Our CT scanners 
use one or all of the following: Automated exposure control, adjustment of the 
 mA and/or kVp according to patient's size, iterative reconstruction. FINDINGS: 
Lower Neck: No abnormality Chest soft tissues: Right chest port terminates at the cavoatrial junction. Heart/Mediastinum: There is slight mediastinal shift to the right. No definite 
hilar or mediastinal adenopathy. There may be circumferential wall thickening of 
the distal esophagus. Normal caliber thoracic aorta. Patent proximal pulmonary 
arteries. Coronary atherosclerosis. Lungs: There has been complete atelectasis of the residual right lung. No 
abnormality of the left lung. Pleura: There has been development of a large right pleural effusion which fills 
the entirety of the right hemithorax there is some smooth peripheral enhancement 
of the pleural effusion. This is new from chest radiograph July 30, 2020 and 
chest CT July 1, 2020. Visualized upper abdomen: Cholelithiasis without cholecystitis. Osseous structures: No suspicious osseous lesion. Right posterior thoracotomy 
changes. Impression IMPRESSION: 
1. New from July 30,2020, there is complete collapse of the residual right lung 
with development of a large pleural effusion involving the entire hemithorax. There is some smooth thin peripheral enhancement of the effusion which may 
reflect mild complexity. 2. No definite mediastinal adenopathy 3. Possible circumferential thickening of the distal esophagus. Correlation 
could be made for esophagitis. [address] Labs: 
Recent Labs 01/05/21 
0396 WBC 4.2* HGB 11.0*  
   
K 4.2  CO2 30 BUN 8  
CREA 0.68* GLU 94 TBILI 0.5 ALT 8* AP 99 Lab Results Component Value Date/Time  WBC 4.2 (L) 01/05/2021 06:26 AM  
 HGB 11.0 (L) 01/05/2021 06:26 AM  
 PLATELET 861 60/51/0685 06:26 AM  
 Sodium 138 01/05/2021 06:26 AM  
 Potassium 4.2 01/05/2021 06:26 AM  
 Chloride 105 01/05/2021 06:26 AM  
 CO2 30 01/05/2021 06:26 AM  
 BUN 8 01/05/2021 06:26 AM  
 Creatinine 0.68 (L) 01/05/2021 06:26 AM  
 Glucose 94 01/05/2021 06:26 AM  
 INR 1.0 07/16/2020 03:40 PM  
 aPTT 28.0 02/08/2016 09:15 AM  
 Bilirubin, total 0.5 01/05/2021 06:26 AM  
 Bilirubin, direct 0.8 (H) 05/05/2020 02:27 PM  
 ALT (SGPT) 8 (L) 01/05/2021 06:26 AM  
 Alk. phosphatase 99 01/05/2021 06:26 AM  
 Troponin-I, Qt. <0.02 (L) 05/05/2020 01:09 AM  
 
 
I reviewed recent labs and recent radiologic studies. I independently reviewed radiology images for studies I described above or studies I have ordered. Admission date (for inpatients): 1/4/2021 Day of Surgery  Procedure(s): ULTRASOUND 
CHEST TUBES INSERTION pt to be admitted post procedure ASSESSMENT/PLAN: 
Problem List  Date Reviewed: 1/5/2021 Codes Class Noted * (Principal) Collapse of right lung ICD-10-CM: J98.11 ICD-9-CM: 518.0  1/4/2021 A-fib [facility]) ICD-10-CM: I48.91 
ICD-9-CM: 427.31  1/4/2021 Chronic respiratory failure with hypoxia [facility]) ICD-10-CM: J96.11 
ICD-9-CM: 518.83, 799.02  1/4/2021 Pleural effusion on right ICD-10-CM: J90 ICD-9-CM: 511.9  12/28/2020 Chemotherapy induced neutropenia (HCC) ICD-10-CM: D70.1, T45.1X5A 
ICD-9-CM: 288.03, E933.1  10/23/2020 Dehydration ICD-10-CM: E86.0 ICD-9-CM: 276.51  9/15/2020 S/P lobectomy of lung ICD-10-CM: Z90.2 ICD-9-CM: V45.89  7/29/2020 Cough ICD-10-CM: R05 ICD-9-CM: 786.2  7/29/2020 Atrial tachycardia (HCC) ICD-10-CM: I47.1 ICD-9-CM: 427.89  7/26/2020 Cancer of bronchus of right lower lobe [facility]) ICD-10-CM: C34.31 
ICD-9-CM: 162.5  7/23/2020 Lung cancer [facility]) ICD-10-CM: C34.90 ICD-9-CM: 162.9  7/23/2020 Cancer of right lung [facility]) ICD-10-CM: C34.91 
ICD-9-CM: 162.9  7/23/2020 Pulmonary emphysema ([address] ICD-10-CM: J43.9 ICD-9-CM: 492.8  7/7/2020 GERD (gastroesophageal reflux disease) ICD-10-CM: K21.9 ICD-9-CM: 530.81  Unknown Sepsis due to undetermined organism [facility]) ICD-10-CM: A41.9 ICD-9-CM: 038.9  5/5/2020 Malignant neoplasm of lower lobe of right lung (HCC) (Chronic) ICD-10-CM: C34.31 
ICD-9-CM: 162.5  2/26/2020 Overview Signed 1/4/2021  7:38 AM by Tamara Russo MD  
  Dec 2020- Echo EF 50%, technically difficult, cannot assess regional wall motion. Aortic root 4.1 cm. No significant valvular disease. Normal DF Mass of lower lobe of right lung ICD-10-CM: R91.8 ICD-9-CM: 786.6  2/23/2020 Right lower lobe lung mass ICD-10-CM: R91.8 ICD-9-CM: 786.6  2/23/2020 Essential hypertension with goal blood pressure less than 130/80 (Chronic) ICD-10-CM: I10 
ICD-9-CM: 401.9  2/19/2018 Principal Problem: 
  Collapse of right lung (1/4/2021) Active Problems: 
  Lung cancer ([address] (7/23/2020) Pleural effusion on right (12/28/2020) A-fib ([address] (1/4/2021) Chronic respiratory failure with hypoxia ([address] (1/4/2021) Plan Continue care per primary Right hemothorax, appears loculated Surgery tomorrow afternoon NPO after MN Continue chest tube to suction Consent for right VAT decortication, possible thoracotomy Will get stat EKG for pre op with history of Afib and holding Eliquis Last dose of Eliquis was Friday.   
 
 
Signed:  Olivier Hunt NP

## 2021-01-06 NOTE — PROGRESS NOTES
Jeramie Dowd Jr. 
Admission Date: 1/4/2021      
 
    Daily Progress Note: 1/6/2021 
 
The patient's chart is reviewed and the patient is discussed with the staff. 
 
Patient is a 70 y.o.  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 He was found to have RLL mass 2/2020, measuring 6.7cm x 5.9cm. He had an EBUS On 2/26/2020 and biopsy consistent with SCC. His Brain MRI was negative for brain mets but revealed a 2cm R parotid mass. His PET CT revealed a hypermetabolic RLL mass extending into the hilum and excessively hypermetabolic R parotid gland. He had radiation and chemo and then underwent RML lobectomy 7/23/2020. He then completed 6 more rounds of chemo around 10/2020. He had repeat Chest CT on 12/22/2020 which revealed a large R pleural effusion and collapse of the remaining RUL.Pt underwent R thoracentesis on 12/28/2020 by Dr. Mccord with 900mL bloody fluid removed. Pt was unable to tolerate complete drainage. His Eliquis was held and he was admitted for CT placement on 1/4/21 with 800cc of serosanguineous drainage. General Surgery was consulted and CXR revealed persistent loculated R effusion.  
Subjective:  
 
Pt lying in bed on 2L O2. Pt awaiting R VATS today.  
 
Current Facility-Administered Medications  
Medication Dose Route Frequency  
• ceFAZolin (ANCEF) 2 g/20 mL in sterile water IV syringe  2 g IntraVENous ON CALL TO OR  
• 0.9% sodium chloride infusion 1,000 mL  1,000 mL IntraVENous CONTINUOUS  
• amiodarone (CORDARONE) tablet 200 mg  200 mg Oral DAILY  
• [Held by provider] apixaban (ELIQUIS) tablet 5 mg  5 mg Oral Q12H  
• pantoprazole (PROTONIX) tablet 40 mg  40 mg Oral ACB&D  
• gabapentin (NEURONTIN) capsule 300 mg  300 mg Oral TID  
  levalbuterol tartrate (XOPENEX) 45 mcg/actuation inhaler HFAA 2 Puff- pt to supply (Patient Supplied)  2 Puff Inhalation Q4H PRN  
 metoprolol succinate (TOPROL-XL) XL tablet 50 mg  50 mg Oral DAILY  ondansetron (ZOFRAN ODT) tablet 8 mg  8 mg Oral Q8H PRN  polyethylene glycol (MIRALAX) packet 17 g  17 g Oral DAILY  sodium chloride (NS) flush 5-40 mL  5-40 mL IntraVENous Q8H  
 sodium chloride (NS) flush 5-40 mL  5-40 mL IntraVENous PRN  
 sodium chloride (NS) flush 5-40 mL  5-40 mL IntraVENous PRN  
 oxyCODONE-acetaminophen (PERCOCET) 5-325 mg per tablet 1 Tab  1 Tab Oral Q4H PRN  
 morphine injection 2 mg  2 mg IntraVENous Q4H PRN  
 influenza vaccine 2020-21 (6 mos+)(PF) (FLUARIX/FLULAVAL/FLUZONE QUAD) injection 0.5 mL  0.5 mL IntraMUSCular PRIOR TO DISCHARGE Review of Systems 
+cough 
+chest tube discomfort Constitutional: negative for fever, chills, sweats Cardiovascular: negative for chest pain, palpitations, syncope, edema Gastrointestinal:  negative for dysphagia, reflux, vomiting, diarrhea, abdominal pain, or melena Neurologic:  negative for focal weakness, numbness, headache Objective:  
 
Vitals:  
 01/06/21 0033 01/06/21 0349 01/06/21 0800 01/06/21 0919 BP: 100/68 98/63 (!) 85/60 98/69 Pulse: 99 89 85 89 Resp: 18 20 14 Temp: 97.4 °F (36.3 °C) 97.8 °F (36.6 °C) 98.2 °F (36.8 °C) SpO2: 92% 97% 95% Weight:      
Height:      
 
 
 
Intake/Output Summary (Last 24 hours) at 1/6/2021 1026 Last data filed at 1/5/2021 1428 Gross per 24 hour Intake  Output 1075 ml Net -1075 ml Physical Exam:  
Constitution:  the patient is well developed and in no acute distress, on 2L O2  
EENMT:  Sclera clear, pupils equal, oral mucosa moist 
Respiratory: diminished R base Cardiovascular:  RRR without M,G,R 
Gastrointestinal: soft and non-tender; with positive bowel sounds. Musculoskeletal: warm without cyanosis. There is no lower extremity edema. Skin:  no jaundice or rashes Neurologic: no gross neuro deficits Psychiatric:  alert and oriented x 3 CXR:  
 
 
LAB No results for input(s): GLUCPOC in the last 72 hours. No lab exists for component: [place] Recent Labs 01/05/21 
1602 01/05/21 
5407 WBC  --  4.2* HGB  --  11.0*  
HCT  --  34.8* PLT  --  171 INR 1.1  --   
 
Recent Labs 01/05/21 
5752   
K 4.2  CO2 30  
GLU 94 BUN 8  
CREA 0.68* CA 8.8 ALB 2.7* TBILI 0.5 ALT 8* No results for input(s): PH, PCO2, PO2, HCO3, PHI, PCO2I, PO2I, HCO3I in the last 72 hours. No results for input(s): LCAD, LAC in the last 72 hours. Assessment:  (Medical Decision Making) Hospital Problems  Date Reviewed: 1/5/2021 Codes Class Noted POA * (Principal) Collapse of right lung ICD-10-CM: J98.11 ICD-9-CM: 518.0  1/4/2021 Unknown S/p CT placement A-fib [facility]) ICD-10-CM: I48.91 
ICD-9-CM: 427.31  1/4/2021 Unknown Chronic Chronic respiratory failure with hypoxia [facility]) ICD-10-CM: J96.11 
ICD-9-CM: 518.83, 799.02  1/4/2021 Unknown On 2L O2 qhs   
 Pleural effusion on right ICD-10-CM: J90 ICD-9-CM: 511.9  12/28/2020 Unknown S/p CT placement Lung cancer [facility]) ICD-10-CM: C34.90 ICD-9-CM: 162.9  7/23/2020 Yes Plan:  (Medical Decision Making)  
 
--wean O2 off during the day and continue O2 2L qhs as at home 
--CT in place, ~1800mL output  
--eliquis on hold --per general surgery, pt to go for R VATs with decortication today More than 50% of the time documented was spent in face-to-face contact with the patient and in the care of the patient on the floor/unit where the patient is located. HARSH Ness Lungs:  Decreased on right. Heart:  RRR with no Murmur/Rubs/Gallops Additional Comments: Patient seen during surgery as well. Called into OR to perform bronchoscopy to evaluate for any mucus plugging. Even with removal of pleural rind the RUL was not inflating as well as would have been expected. Disposable bronchoscope was available and was utilized to inspect the right sided airways through a double lumen ETT. Architecture was skewed but could locate what appeared to be stump from previous right middle and lower lobectomy. Small area of white tissue could be granulation or cartilage. Less like possible tumor recurrence. No forceps available in the OR to perform biopsy at the time. Evaluated the RUL and all large airways appeared clear with no visible plugging. Airways were flushed with a total of 60cc of saline with suctioning returning a small amount of thick, white secretions. Likely has distal plugging from chronic atelectasis. Flushed until return was clear. Still less than ideal motion of RUL. Discussed with Dr Rosa Pappas. Will try airway clearance and repeat bronchoscopy in 1-2 days to see if can get any improved aeration of the RUL. If not, may need completion pneumonectomy of the right side. Will start 3% saline nebs and vibralung. Increase as tolerated. I have spoken with and examined the patient. I agree with the above assessment and plan as documented.  
 
Chandrika Bruner MD

## 2021-01-06 NOTE — BRIEF OP NOTE
Brief Postoperative Note Patient: Abhijeet Pickard YOB: 1950 MRN: [id number] Date of Procedure: 1/6/2021 Pre-Op Diagnosis: Hemothorax [J94.2] Post-Op Diagnosis: chronic hemothorax with fibrothorax; chronic atelectasis of right lung Procedure(s): RIGHT VIDEO ASISTED THORACOSCOPY converted to open thoracotomy Extensive pneumonolysis with DECORTICATION over 3 hours Intercostal nerve cryoablation Surgeon(s): Jossy Simons MD 
 
Surgical Assistant: Nurse Practitioner: Thelma Saini NP Anesthesia: General  
 
Estimated Blood Loss (mL): 300 Complications: None Specimens:  
ID Type Source Tests Collected by Time Destination 1 : [facility] Fresh Chest  Jossy Simons MD 1/6/2021  5:10 PM Pathology Implants: CT x 2 Drains: * No LDAs found * Findings:  
1 acute on chronic hemothorax, and formed fibrothorax, lysis of adhesion and decortication are extremely difficult to perform 2. After decortication, the right lung (upper lobe) is not able to be inflated with maximal efforts, plugs were washed out by anesthesiologist and pulmonologist (intraop consult), large airways (including 3 segmental branches to right upper lobe) are all open. After discussion with pulmonary (Dr Otoniel Duron), we decide to keep right upper lobe in, and hopefully it will inflate with continued pulmonary efforts. Otherwise he may need completion right pneumonectomy.   
 
Electronically Signed by Adilson Salazar MD on 1/6/2021 at 6:13 PM

## 2021-01-06 NOTE — PROCEDURES
PROCEDURE Bronchoscopy with airway inspection /cleanout. INDICATION Atelectasis RUL 
 
EQUIPMENT: 
Ambu Disposable Bronchoscope ANESTHESIA Please see OR medication record. IMAGING: 
CXR 1/6/2021 AIRWAY INSPECTION After obtaining informed consent, through an endotracheal tube, an Ambu Disposable Bronchoscope was introduced into the trachea without complication. RIGHT 
 
LOCATION NORM/ABNORM DESCRIPTION Larynx ETT   
VOCAL CORDS ETT   
TRACHEA NL   
MARISSA NL   
RMSB NL   
RUL ABNL Generally narrowed airways with poor expansion but no visible occluding lesions or plugs. BI Stump LEFT 
 
LOCATION NORM/ABNORM DESCRIPTION  
LMSB  Double lumen ETT, not examined. Saline was used in 20cc aliquants to flush the airways of the RUL with suction applied between each. Initially there was return of a small amount of thick, white sputum. This tapered off with repeat flushing and eventually cleared by the end of the procedure. No samples were collected. EBL: None Recommendations: 
Patient will complete his VATS and will work on airway clearance mechanisms post op. 3% saline nebs and vibralung have been ordered. Will make him NPO at midnight and depending on CXR findings in the morning may require additional bronchoscopy for clean out either tomorrow or the next day.   
 
Juan Carlos Avina MD

## 2021-01-06 NOTE — PROGRESS NOTES
TRANSFER - OUT REPORT: 
 
Verbal report given to Concepcion Garza on [facility].  being transferred to Pre-op for ordered procedure Report consisted of patients Situation, Background, Assessment and  
Recommendations(SBAR). Information from the following report(s) SBAR was reviewed with the receiving nurse. Lines:  
Venous Access Device chest port  03/18/20 Upper chest (subclavicular area, right (Active) Central Line Being Utilized Yes 01/06/21 5023 Criteria for Appropriate Use Irritant/vesicant medication 01/06/21 0919 Site Assessment Clean, dry, & intact 01/06/21 0919 Date of Last Dressing Change 01/04/21 01/06/21 0919 Dressing Status Clean, dry, & intact 01/06/21 0919 Dressing Type Disk with Chlorhexadine gluconate (CHG); Transparent 01/06/21 0919 Action Taken Blood drawn 01/05/21 2030 Access Needle Size (Site #1) 20 G 01/06/21 0919 Access Needle Length (Medial Site) 0.75 inches 01/06/21 0919 Positive Blood Return (Medial Site) Yes 01/05/21 2030 Action Taken (Medial Site) Infusing 01/06/21 5599 Peripheral IV 01/06/21 Left;Posterior Hand (Active) Site Assessment Clean, dry, & intact 01/06/21 1216 Phlebitis Assessment 0 01/06/21 1216 Infiltration Assessment 0 01/06/21 1216 Dressing Status Clean, dry, & intact 01/06/21 1216 Dressing Type Transparent 01/06/21 1216 Hub Color/Line Status Green; Infusing 01/06/21 1216 Action Taken Blood drawn 01/06/21 1216 Arterial Line 01/06/21 Left Radial artery (Active) Opportunity for questions and clarification was provided. Patient transported with: 
Oxygen, chest tube. Tech

## 2021-01-06 NOTE — PERIOP NOTES
TRANSFER - IN REPORT: 
 
Verbal report received from [employer] (name) on Alicia Person.  being received from 733(unit) for ordered procedure Report consisted of patients Situation, Background, Assessment and  
Recommendations(SBAR). Information from the following report(s) SBAR, Intake/Output, MAR, Recent Results and Med Rec Status was reviewed with the receiving nurse. Opportunity for questions and clarification was provided. Assessment completed upon patients arrival to unit and care assumed.

## 2021-01-06 NOTE — ANESTHESIA PROCEDURE NOTES
Arterial Line Placement Start time: 1/6/2021 1:27 PM 
End time: 1/6/2021 1:29 PM 
Performed by: Asim Nuñez CRNA Authorized by: Curtis Lomeli MD  
 
Pre-Procedure Indications:  Arterial pressure monitoring Preanesthetic Checklist: patient identified, risks and benefits discussed, anesthesia consent, site marked, patient being monitored, timeout performed and patient being monitored Timeout Time: 13:27 Procedure:  
Prep:  Chlorhexidine Seldinger Technique?: Yes Orientation:  Left Location:  Radial artery Catheter size:  20 G Number of attempts:  2 Assessment:  
Post-procedure:  Line secured and sterile dressing applied Patient Tolerance:  Patient tolerated the procedure well with no immediate complications Comment:  
Placed by Lawrance Duane

## 2021-01-07 PROBLEM — Z98.890 S/P THORACOTOMY: Status: ACTIVE | Noted: 2021-01-01

## 2021-01-07 NOTE — PROGRESS NOTES
Chart reviewed s/p tx from 7th floor to OFL ICU post RIGHT VIDEO ASISTED THORACOSCOPY converted to open thoracotomy Extensive pneumonolysis with DECORTICATION over 3 hours/ Intercostal nerve cryoablation by Dr. Jeannine Warner. Chart screened previously by [facility]. CM will continue to follow for any assist and d/c POC when stable per MD.  
 
Care Management Interventions PCP Verified by CM: (?) Mode of Transport at Discharge: Other (see comment) Transition of Care Consult (CM Consult): Discharge Planning Confirm Follow Up Transport: Family Freedom of Choice List was Provided with Basic Dialogue that Supports the Patient's Individualized Plan of Care/Goals, Treatment Preferences and Shares the Quality Data Associated with the Providers?: Yes [employer] Information Provided?: (Cign[employer]/MCR) Discharge Location Discharge Placement: Unable to determine at this time

## 2021-01-07 NOTE — ANESTHESIA POSTPROCEDURE EVALUATION
Procedure(s): RIGHT VIDEO ASISTED THORACOSCOPY, DECORTICATION 
THORACOTOMY . general 
 
Anesthesia Post Evaluation Multimodal analgesia: multimodal analgesia used between 6 hours prior to anesthesia start to PACU discharge Patient location during evaluation: PACU Patient participation: waiting for patient participation Level of consciousness: sleepy but conscious Pain management: adequate Airway patency: patent Anesthetic complications: no 
Cardiovascular status: hypotensive and acceptable (on phenylephrine drip) Respiratory status: acceptable and face mask Hydration status: acceptable (I suspect he was hypovolemic to start but now I think he is at least euvolemic with some colloid resuscitation) Comments: Patient will remain in ICU overnight Post anesthesia nausea and vomiting:  none INITIAL Post-op Vital signs:  
Vitals Value Taken Time BP 97/62 01/06/21 2020 Temp 36.1 °C (97 °F) 01/06/21 2000 Pulse 87 01/06/21 2022 Resp 18 01/06/21 2005 SpO2 96 % 01/06/21 2022 Vitals shown include unvalidated device data.

## 2021-01-07 NOTE — PROCEDURES
PROCEDURE Bronchoscopy with airway inspection /cleanout /EBBX. INDICATION Atelectasis of right lung. EQUIPMENT: 
 scope ANESTHESIA Concious sedation with: Fentanyl 100 mcg IV; Versed 2mg IV; Lidocaine 200 mg to tracheo-bronchial tree and vocal cords. IMAGING: 
CXR 1/7/2021 AIRWAY INSPECTION After obtaining informed consent, orally with use of a bite block, an Olympus  scope was introduced into the trachea without complication. RIGHT 
 
LOCATION NORM/ABNORM DESCRIPTION Larynx NL   
VOCAL CORDS NL   
TRACHEA NL   
MARISSA NL   
RMSB NL   
RUL ABNL No mucus plugging. Apical segment airway seemingly blocked by web. Unable to pass. BI ABNL Stump with small area of abnormal appearing tissue, ebbx x 3. LEFT 
 
LOCATION NORM/ABNORM DESCRIPTION  
LMSB NL   
HOLA NL   
LINGULA NL   
LLL NL   
SUPERIOR SEG LLL NL   
MAAME-MEDIAL LLL NL   
LATERAL LLL NL   
POSTERIOR LLL NL The following samples were obtained: EBBX: BI x 3 for histology. The procedure was completed without complication and the patient tolerated the procedure well. EBL: <10cc Recommendations: 
Continue nebs, vibralung. Daily CXR.   
 
Lory Smith MD

## 2021-01-07 NOTE — PROGRESS NOTES
Critical Care Daily Progress Note: 1/7/2021 Jacob Etienne. Admission Date: 1/4/2021 The patient's chart is reviewed and the patient is discussed with the staff. Patient is G 55 y.o.  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 He was found to have RLL mass 2/2020, measuring 6.7cm x 5.9cm. He had an EBUS On 2/26/2020 and biopsy consistent with SCC. His Brain MRI was negative for brain mets but revealed a 2cm R parotid mass. His PET CT revealed a hypermetabolic RLL mass extending into the hilum and excessively hypermetabolic R parotid gland. He had radiation and chemo and then underwent RML lobectomy 7/23/2020. He then completed 6 more rounds of chemo around 10/2020. He had repeat Chest CT on 12/22/2020 which revealed a large R pleural effusion and collapse of the remaining RUL. Pt underwent R thoracentesis on 12/28/2020 by Dr. Ned Cain with 900mL bloody fluid removed. Pt was unable to tolerate complete drainage. His Eliquis was held and he was admitted for CT placement on 1/4/21 with 800cc of serosanguineous drainage. General Surgery was consulted and CXR revealed persistent loculated R effusion. Pt went to the OR on 1/6 forR VATS which was converted to an open thoracotomy with extensive pneumolysis with decortication. Pt did require a bronchoscopy in the OR secondary to atelectasis of the RUL and mucous plugging. Pt was hypotensive postoperatively requiring donavan. Pt was transferred to ICU overflow. Subjective:  
 
Pt is lying in bed on LR 75/hr. He was just weaned off of Donavan. Pt reports some soreness in his chest, sore throat, and mild dry cough. We discussed findings in the OR and potential need for repeat bronch today or tomorrow. Current Facility-Administered Medications Medication Dose Route Frequency  sodium chloride 3% hypertonic nebulizer soln  4 mL Nebulization BID  ceFAZolin (ANCEF) 1 g in 0.9% sodium chloride (MBP/ADV) 50 mL MBP  1 g IntraVENous Q8H  
 albuterol-ipratropium (DUO-NEB) 2.5 MG-0.5 MG/3 ML  3 mL Nebulization Q6H RT  
 HYDROmorphone (PF) (DILAUDID) injection 1 mg  1 mg IntraVENous Q2H PRN  
 guaiFENesin ER (MUCINEX) tablet 1,200 mg  1,200 mg Oral Q12H  
 HYDROmorphone (PF) (DILAUDID) injection 0.5 mg  0.5 mg IntraVENous Multiple  promethazine (PHENERGAN) with saline injection 6.25 mg  6.25 mg IntraVENous Q15MIN PRN  
 PHENYLephrine (ENRIQUE-SYNEPHRINE) 30 mg in 0.9% sodium chloride 250 mL infusion   mcg/min IntraVENous TITRATE  amiodarone (CORDARONE) tablet 200 mg  200 mg Oral DAILY  [Held by provider] apixaban (ELIQUIS) tablet 5 mg  5 mg Oral Q12H  pantoprazole (PROTONIX) tablet 40 mg  40 mg Oral ACB&D  
 gabapentin (NEURONTIN) capsule 300 mg  300 mg Oral TID  levalbuterol tartrate (XOPENEX) 45 mcg/actuation inhaler HFAA 2 Puff- pt to supply (Patient Supplied)  2 Puff Inhalation Q4H PRN  
 metoprolol succinate (TOPROL-XL) XL tablet 50 mg  50 mg Oral DAILY  ondansetron (ZOFRAN ODT) tablet 8 mg  8 mg Oral Q8H PRN  polyethylene glycol (MIRALAX) packet 17 g  17 g Oral DAILY  sodium chloride (NS) flush 5-40 mL  5-40 mL IntraVENous Q8H  
 sodium chloride (NS) flush 5-40 mL  5-40 mL IntraVENous PRN  
 sodium chloride (NS) flush 5-40 mL  5-40 mL IntraVENous PRN  
 oxyCODONE-acetaminophen (PERCOCET) 5-325 mg per tablet 1 Tab  1 Tab Oral Q4H PRN  
 influenza vaccine 2020-21 (6 mos+)(PF) (FLUARIX/FLULAVAL/FLUZONE QUAD) injection 0.5 mL  0.5 mL IntraMUSCular PRIOR TO DISCHARGE Review of Systems 
+cough 
+chest soreness Constitutional:  negative for fever, chills, sweats Cardiovascular:  negative for chest pain, palpitations, syncope, edema Gastrointestinal:  negative for dysphagia, reflux, vomiting, diarrhea, abdominal pain, or melena Neurologic:  negative for focal weakness, numbness, headache Objective:  
 
Vitals:  
 01/07/21 5014 01/07/21 0628 01/07/21 9199 01/07/21 8392 BP: (!) 97/59 97/63 (!) 89/56 (!) 88/54 Pulse: (!) 102 (!) 103 (!) 104 (!) 108 Resp: 15 14 15 16 Temp: 98.6 °F (37 °C) SpO2: 96% 95% 98% 97% Weight:      
Height:      
 
 
 
Intake/Output Summary (Last 24 hours) at 1/7/2021 4513 Last data filed at 1/7/2021 9563 Gross per 24 hour Intake 2400 ml Output 2425 ml Net -25 ml Physical Exam:         
Constitutional:  the patient is well developed and in no acute distress, on 2L O@ EENMT:  Sclera clear, pupils equal, oral mucosa moist 
Respiratory: diminished on the R Cardiovascular:  RRR without M,G,R 
Gastrointestinal: soft and non-tender; with positive bowel sounds. Musculoskeletal: warm without cyanosis. There is no lower extremity edema. Skin:  no jaundice or rashes, Neurologic: no gross neuro deficits Psychiatric:  alert and oriented x 3 LINES: 
Chest port, peripheral IV L hand, art line, CT x 2, gabriel DRIPS: 
LR 
 
CXR:  
 
 
LAB No results for input(s): GLUCPOC in the last 72 hours. No lab exists for component: [place] Recent Labs 01/07/21 
0330 01/05/21 
1602 01/05/21 
2405 WBC 7.8  --  4.2* HGB 10.6*  --  11.0*  
HCT 32.7*  --  34.8*  
  --  171 INR  --  1.1  --   
 
Recent Labs 01/07/21 
0330 01/06/21 2005 01/05/21 
8098   --  138  
K 4.3  --  4.2   --  105 CO2 29  --  30  
*  --  94 BUN 8  --  8  
CREA 0.65*  --  0.68* MG 2.1 1.8  --   
CA 8.7  --  8.8 ALB  --   --  2.7* TBILI  --   --  0.5 ALT  --   --  8* No results for input(s): PH, PCO2, PO2, HCO3, PHI, PCO2I, PO2I, HCO3I in the last 72 hours. No results for input(s): LCAD, LAC in the last 72 hours. No results for input(s): PH, PCO2, PO2, HCO3, PHI, PCO2I, PO2I, HCO3I in the last 72 hours. Patient Active Problem List  
Diagnosis Code  Essential hypertension with goal blood pressure less than 130/80 I10  Mass of lower lobe of right lung R91.8  Right lower lobe lung mass R91.8  Malignant neoplasm of lower lobe of right lung (HCC) C34.31  
 GERD (gastroesophageal reflux disease) K21.9  Sepsis due to undetermined organism ([facility] A41.9  Pulmonary emphysema ([facility] J43.9  Cancer of bronchus of right lower lobe (HCC) C34.31  
 Lung cancer ([facility] C34.90  
 Cancer of right lung (HCC) C34.91  
 Atrial tachycardia (HCC) I47.1  S/P lobectomy of lung Z90.2  Cough R05  Dehydration E86.0  Chemotherapy induced neutropenia (HCC) D70.1, T45.1X5A  Pleural effusion on right J90  
 Atelectasis of right lung J98.11  
 A-fib (HCC) I48.91  
 Chronic respiratory failure with hypoxia ([facility]) J96.11 Assessment:  (Medical Decision Making) Hospital Problems  Date Reviewed: 1/7/2021 Codes Class Noted POA  
 S/P thoracotomy ICD-10-CM: K54.223 ICD-9-CM: V45.89  1/7/2021 Unknown Per Dr. Roosevelt Vazquez   
 * (Principal) Atelectasis of right lung ICD-10-CM: J98.11 ICD-9-CM: 518.0  1/4/2021 Unknown S/p bronch with airway clearance A-fib [facility]) ICD-10-CM: I48.91 
ICD-9-CM: 427.31  1/4/2021 Unknown Chronic Chronic respiratory failure with hypoxia [facility]) ICD-10-CM: J96.11 
ICD-9-CM: 518.83, 799.02  1/4/2021 Unknown On 2L O2 qhs at home Pleural effusion on right ICD-10-CM: J90 ICD-9-CM: 511.9  12/28/2020 Unknown S/p R VATs Lung cancer [facility]) ICD-10-CM: C34.90 ICD-9-CM: 162.9  7/23/2020 Yes S/p lobectomy 7/2020 Hypotension ICD-10-CM: I95.9 ICD-9-CM: 458.9  5/5/2020 Unknown Now off of Donavan Plan:  (Medical Decision Making)  
 
--wean O2 as tolerated 
--now off of Donavan 
--continue LR 75/hr for now --pt currently NPO for bronch today   
--pain control 
--continue airway clearance, IS 
 
 
 More than 50% of the time documented was spent in face-to-face contact with the patient and in the care of the patient on the floor/unit where the patient is located. HARSH Mendez Lungs:  Decreased on the right. Heart:  RRR with no Murmur/Rubs/Gallops Additional Comments:   
Patient with ongoing atelectasis of the RUL. PTX adjacent to this. Will continue vibralung and 3% saline nebs. Bronch performed today. Did not show any additional mucus plugs. There was an area of abnormal tissue at the BI stump that was biopsied. There was an area in the apical segment of the RUL that dead ended with a web of tissue covering it. This may be a long term issue and not at all related to his more recent atelectasis. Did not feel comfortable attempting to break through this area with risk of ending up in lung tissue or chest cavity. However, this could be attempted during surgery if he is ultimately to have pneumonectomy. I have spoken with and examined the patient. I agree with the above assessment and plan as documented.  
 
Car Gambino MD

## 2021-01-07 NOTE — PROGRESS NOTES
Called to assist with bedside bronchoscopy. Consent noted on chart. Time out performed. Pts vitals monitored throughout procedure. Scope # [id number] used. Procedure tolerated with no adverse rxn. 2 mg versed and 100 mcg fentanyl given Iv per MD order to pts iv line. BI stump site bx'ed with no complications. See pictures and MD notes for specifics. Pts Icu rn Mat at bedside throughout procedure.

## 2021-01-07 NOTE — OP NOTES
[address] [facility] 
OPERATIVE REPORT Name:  Amie Kapoor 
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 
1. Attemptted right thoracoscopy converted to right thoracotomy. 2.  Extensive pneumonolysis with decortication over 3 hours. 3.  Intercostal nerve cryoablation SURGEON:  Donnell Hankins MD 
 
ASSISTANT:  Lillian Cortez NP 
 
ANESTHESIA:  general 
 
COMPLICATIONS:  none SPECIMENS REMOVED: as above IMPLANTS:  CT x 2 on water seal 
 
ESTIMATED BLOOD LOSS:  300 ml INDICATION:  This is a 51-year-old gentleman who presented to Pulmonary Service with right hemothorax. This was initially identified on a scan at the end of 2020. However, he has been feeling sick, was short of breath for quite a while, probably over several months. Pulmonary attempted thoracentesis and eventually placed a large-bore chest tube. However, the fluid appeared to be loculated and Surgical Service was requested. As noted, the patient is well known to the Surgical Service for history of right lung cancer. He had neoadjuvant chemoradiation and then right middle and lower lobectomy in 2020 and he subsequently had adjuvant chemotherapy. Since the chest tube was not working, I offered him surgical exploration to clean up his hemothorax. He understood the risks and benefits, agreed to proceed. FINDINGS: 
1. He does have a very loculated acute-on-chronic hemothorax. 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 This is clearly a chronic process and lysis of adhesions and decortication were extremely difficult to perform. 2.  After decortications, the right lung (upper lobe only) was not able to be inflated with maximal effort. Numerous plugs were washed out by the anesthesiologist and the pulmonologist.  We requested Pulmonology for intraoperative consult. The large airways including three segmental branches to the right upper lobe were all open. However, the lung was not able to be inflated. After discussion with Dr. Socrates Pitts, the pulmonologist,  we decided to leave the right upper lobe in and hopefully, it will inflate with continued pulmonary efforts over the next few days. Otherwise, he may need completion right pneumonectomy. PROCEDURE:  After informed consent obtained, the patient brought into the operating room. General anesthesia was administered. A double-lumen tube placed per Anesthesia. The patient then left on left decubitus position with the right side up. The right chest was prepped and draped in routine fashion. We first placed a trocar at the anterior axillary line, seventh intercostal space. The pleural cavity was entered under direct vision. We immediately entered a loculated collection area. There was extensive scarring. With careful dissections, we created a space in the lower part of the thorax and then we can see the lung stuck to the lateral thoracic chest wall very densely. Blunt dissection turned out to be extremely difficult. We were able to clear some space anteriorly. Again, the fluid is very loculated. We attempted to release the lung from the chest wall. This was impossible. Also, the lung was completely encapsulated with thick rind tissues. This clearly needed open thoracotomy for decortications. Then we decided to proceed with thoracotomy with maximum release of the lower portion of the thorax at the previous thoracotomy incision right at this level and so with assistance of thoracoscopy, we performed the thoracotomy at the same incision. The latissimus dorsi was divided, serratus anterior was lifted anteriorly, and then we identified the previous intercostal space. It was actually covered by lung tissue from the inside and so we chose to take one level down. We divided the #6 rib posteriorly and then we got into the pleural cavity with thoracoscope assistance. Then with mainly blunt dissections, I tried to dissect the lung off the chest wall. This turned out to be extremely difficult process. The fibrosis was really hard.   I stayed extrapleural in some place and then I got back in intrapleural.  With tremendous effort, the lung was able to be released from the chest wall then further dissection to release the lung from the mediastinum and eventually, the upper lobe was released from the surrounding tissues. Then I started decortication. This also turned out to be extremely difficult process. There were multiple layers on the lung that represent a chronic process. He probably had repeated bleedings numerous times. With maximal effort, the lung was able to be released. The rind tissue was removed mostly. The parietal pleura was densely stuck on the lung surface. Some lung had to be decapsulized for decortication. After 3 hours of effort, I felt the decortication was satisfactory. Then I asked the anesthesiologist to inflate the right lung and after several efforts, there was no inflation. Bronchoscopy was performed by the anesthesiologist.  Numerous plugs were washed out. Still, the lung was not able to be inflated. Then we did intraoperative consult. Dr. Yousuf Hinds came in. He performed another bronchoscopy with better equipment and we can see the segmental branch to the upper lobe all open. He did numerous washings and eventually, the right lung was still not able to be inflated 300 mL. As noted, [employer]  is the first assistant in this case. She offered excellent exposure to make this difficult surgery possible. It would be very dangerous to perform this case without her assistance. Sydni Pagan MD 
 
 
[id number] 
D:  01/06/2021 18:59 T:  01/06/2021 20:27 
JOB #:  [id number]

## 2021-01-07 NOTE — PROGRESS NOTES
H&P/Consult Note/Progress Note/Office Note:  
John Subramanian MRN: [id number]  :1950  Age:75 y.o. 
 
HPI: John Subramanian is a 78 y.o. male who is well known to Dr. Alex Hawkins.  He underwent right thoracotomy with lower and middle lobectomy 20 for squamous cell carcinoma. He underwent neoaduvant chemo/radiation and adjuvant chemo. .  CT 20 showed large pleural effusion and collapse of RUL. He underwent thoracentesis by Dr. Lore Keane 20 with approximately drainage of 900ml, but was unable to completely drain related to complaints of pain. He felt should hold Eliquis for admission for Chest tube placement. Dr. Sunita Ayers placed chest tube this AM 21. General surgery was consulted for complicated right pleural effusion, ?hemothorax not draining properly with chest tube placement. On exam, Mr. Hamlet Kwan appears comfortable.  CT scan reviewed and current chest xray, NAD. On 3 L NC sats upper 90's. Chest tube maintained with 800ml serosanguineous drainage noted. Remains on 20cmsx. Patient states since his lobectomy he has felt \"pressure\". He does use 2L oxygen at night. She states he does feel like he can breath better since this chest tube has been placed. His lost dose of eliquis was Friday (for A-fib). He does have smoking history and quit about 6 years ago 21 Patient states he is breathing better since chest tube. NAD on 3L NC with 98% sat. Chest xray reviewed and fluid collection appears loculated- will need surgery- plan for tomorrow. 21 To the OR yesterday PO day1. Difficult surgery. Consulted pulmonary intraoperatively for broch. Unable to get lung to inflate intraoperative, ?chronic mucous plug. Pt in ICU, no complaints this AM- scheduled for bronch this AM. Chest tubes to water seal currently- no air leak- appears lung still collapsed from CXR. Pain controlled. Medical history as below Past Medical History:  
Diagnosis Date  GERD (gastroesophageal reflux disease)   
 controlled with med  Hypertension hx-- off meds since 4/2020--- followed by dr. Dhruv Martinez  Hypoxia 02/24/2020  Malignant neoplasm of lower lobe of right lung ([facility] [address] 02/26/2020 REC'D CHEMO AND RADIATION--- FOLLOWED BY DR. Lizz Nazario Osteoarthritis  Right lower lobe lung mass 02/24/2020  Status post total right knee replacement 2/29/2016 Past Surgical History:  
Procedure Laterality Date  HX COLONOSCOPY    
 HX KNEE ARTHROSCOPY Left   
 scope X 1, ACL recon X 1  
 HX KNEE ARTHROSCOPY Right 1974, 1975 X 2   
 HX KNEE REPLACEMENT Right 2016 [address] [place]  HX VASCULAR ACCESS Right placed 3/2020  
 port in chest   
 IR INSERT TUNL CVC W PORT OVER 5 YEARS  3/18/2020  MT SINUS SURGERY PROC UNLISTED  1988, 1991  
 sinus surg Current Facility-Administered Medications Medication Dose Route Frequency  sodium chloride 3% hypertonic nebulizer soln  4 mL Nebulization BID  albuterol-ipratropium (DUO-NEB) 2.5 MG-0.5 MG/3 ML  3 mL Nebulization Q6H RT  
 HYDROmorphone (PF) (DILAUDID) injection 1 mg  1 mg IntraVENous Q2H PRN  
 guaiFENesin ER (MUCINEX) tablet 1,200 mg  1,200 mg Oral Q12H  
 HYDROmorphone (PF) (DILAUDID) injection 0.5 mg  0.5 mg IntraVENous Multiple  promethazine (PHENERGAN) with saline injection 6.25 mg  6.25 mg IntraVENous Q15MIN PRN  
 PHENYLephrine (ENRIQUE-SYNEPHRINE) 30 mg in 0.9% sodium chloride 250 mL infusion   mcg/min IntraVENous TITRATE  amiodarone (CORDARONE) tablet 200 mg  200 mg Oral DAILY  [Held by provider] apixaban (ELIQUIS) tablet 5 mg  5 mg Oral Q12H  pantoprazole (PROTONIX) tablet 40 mg  40 mg Oral ACB&D  
 gabapentin (NEURONTIN) capsule 300 mg  300 mg Oral TID  levalbuterol tartrate (XOPENEX) 45 mcg/actuation inhaler HFAA 2 Puff- pt to supply (Patient Supplied)  2 Puff Inhalation Q4H PRN  
 metoprolol succinate (TOPROL-XL) XL tablet 50 mg  50 mg Oral DAILY  ondansetron (ZOFRAN ODT) tablet 8 mg  8 mg Oral Q8H PRN  polyethylene glycol (MIRALAX) packet 17 g  17 g Oral DAILY  sodium chloride (NS) flush 5-40 mL  5-40 mL IntraVENous Q8H  
 sodium chloride (NS) flush 5-40 mL  5-40 mL IntraVENous PRN  
 sodium chloride (NS) flush 5-40 mL  5-40 mL IntraVENous PRN  
 oxyCODONE-acetaminophen (PERCOCET) 5-325 mg per tablet 1 Tab  1 Tab Oral Q4H PRN  
 influenza vaccine - (6 mos+)(PF) (FLUARIX/FLULAVAL/FLUZONE QUAD) injection 0.5 mL  0.5 mL IntraMUSCular PRIOR TO DISCHARGE Celebrex [celecoxib] Social History Socioeconomic History  Marital status:  Spouse name: Not on file  Number of children: Not on file  Years of education: Not on file  Highest education level: Not on file Tobacco Use  Smoking status: Former Smoker Packs/day: 1.00 Years: 20.00 Pack years: 20.00 Quit date:  Years since quittin.0  Smokeless tobacco: Never Used Substance and Sexual Activity  Alcohol use: Yes Alcohol/week: 4.0 standard drinks Types: 4 Glasses of wine per week  Drug use: No  
Other Topics Concern   Service No  
 Blood Transfusions No  
 Caffeine Concern No  
 Occupational Exposure No  
 Hobby Hazards No  
 Sleep Concern No  
 Stress Concern No  
 Weight Concern No  
 Special Diet No  
 Exercise Yes  [facility] [facility] Yes Social History Narrative . Has long-time girlfriend. Continues to work doing IT support. Social History Tobacco Use Smoking Status Former Smoker  Packs/day: 1.00  Years: 20.00  Pack years: 20.00  Quit date:   Years since quittin.0 Smokeless Tobacco Never Used Family History Problem Relation Age of Onset  Cancer Mother   
     uterine  Arrhythmia Sister   
     afib ROS: The patient has no difficulty with chest pain or shortness of breath. No fever or chills. Comprehensive review of systems was otherwise unremarkable except as noted above. Physical Exam:  
Visit Vitals BP 95/60 Pulse (!) 111 Temp 98.6 °F (37 °C) Resp 15 Ht 5' 10\" (1.778 m) Wt 195 lb (88.5 kg) SpO2 97% BMI 27.98 kg/m² Constitutional: Alert, oriented, cooperative patient in no acute distress; appears stated age Eyes:Sclera are clear. EOMs intact ENMT: no external lesions gross hearing normal; no obvious neck masses, no ear or lip lesions, nares normal 
CV: RRR. Normal perfusion Resp: No JVD. Breathing is  non-labored; no audible wheezing. Chest tube maintained H2O seal- no air leak GI: soft and non-distended Musculoskeletal: unremarkable with normal function. No embolic signs or cyanosis. Neuro:  Oriented; moves all 4; no focal deficits Psychiatric: normal affect and mood, no memory impairment Recent vitals (if inpt): 
Patient Vitals for the past 24 hrs: 
 BP Temp Pulse Resp SpO2  
01/07/21 1040   (!) 111  97 % 01/07/21 1030 95/60  (!) 110 15 98 % 01/07/21 1022   (!) 109 14 97 % 01/07/21 1015   (!) 109 14   
01/07/21 1011   (!) 111 16 98 % 01/07/21 0959 (!) 95/58  (!) 112 15 97 % 01/07/21 0949   (!) 110 14 97 % 01/07/21 0945 (!) 94/58  (!) 110 16 98 % 01/07/21 0932   (!) 112 15 97 % 01/07/21 0925   (!) 107 14 97 % 01/07/21 0916 (!) 93/56  (!) 108 15 (!) 82 % 01/07/21 0852 96/63  (!) 107 14 94 % 01/07/21 0836   (!) 110 14   
01/07/21 0821 (!) 88/54  (!) 108 16 97 % 01/07/21 0806 (!) 89/56  (!) 104 15 98 % 01/07/21 0751 97/63  (!) 103 14 95 % 01/07/21 0750 (!) 97/59 98.6 °F (37 °C) (!) 102 15 96 % 01/07/21 0738   (!) 101 14 98 % 01/07/21 0721 (!) 86/57  100 16 98 % 01/07/21 0706 (!) 82/54  (!) 101 15 97 % 01/07/21 0650 (!) 89/58  (!) 101 15 97 % 01/07/21 0636 (!) 87/57  100 16 98 % 01/07/21 0621 (!) 89/56  99 16 96 % 01/07/21 0608 (!) 92/58  (!) 102 16 (!) 86 % 01/07/21 0553   98 15 99 % 01/07/21 0551 (!) 92/58  (!) 101 16 99 % 01/07/21 0538 96/60  98 16 98 % 01/07/21 0536 (!) 91/45 97.4 °F (36.3 °C) (!) 103 16 97 % 01/07/21 0523 96/60  97 14 100 % 01/07/21 0515   94 15 100 % 01/07/21 0507 94/62  96 15 98 % 01/07/21 0452 91/60  92 15 98 % 01/07/21 0438 97/60  93 16 100 % 01/07/21 0423 93/60  93 16 100 % 01/07/21 0407 (!) 92/57  94 16 99 % 01/07/21 0352 (!) 92/59  92 16 99 % 01/07/21 0308 98/70  (!) 125 16 96 % 01/07/21 0300 90/68 97.2 °F (36.2 °C) 94 14 98 % 01/07/21 0252 (!) 88/65  93 14 99 % 01/07/21 0238 (!) 89/66  (!) 121 16 96 % 01/07/21 0223 (!) 86/60  (!) 120 14 100 % 01/07/21 0207 (!) 81/53  (!) 129 14 100 % 01/07/21 0152 (!) 81/56  (!) 120 14 100 % 01/07/21 0138 (!) 83/59  (!) 120 16 100 % 01/07/21 0123 (!) 86/62  (!) 119 14 98 % 01/07/21 0108 90/61  (!) 113 14 100 % 01/07/21 0052 100/65  (!) 118 16 100 % 01/07/21 0038 92/65  (!) 114 14 100 % 01/07/21 0008 (!) 88/59  93 14 95 % 01/07/21 0004 (!) 88/59  89 16 97 % 01/06/21 2353 (!) 93/54  88 16 96 % 01/06/21 2347 (!) 74/52  90 17 95 % 01/06/21 2345 (!) 71/51  81 17 94 % 01/06/21 2340 (!) 75/41  93 16   
01/06/21 2323 (!) 85/61  (!) 118 17 95 % 01/06/21 2252 97/65  89 16 95 % 01/06/21 2240   90  95 % 01/06/21 2218 101/65  (!) 115 16 94 % 01/06/21 2212 (!) 83/57  (!) 118 16 94 % 01/06/21 2207 (!) 79/54  (!) 115 16 95 % 01/06/21 2151 (!) 94/59  90 16 94 % 01/06/21 2136 90/61  89 16 94 % 01/06/21 2121 (!) 95/57  91 16 96 % 01/06/21 2105 91/60  90 16 94 % 01/06/21 2051 (!) 94/58  90 18 95 % 01/06/21 2036 99/64  90 16 96 % 01/06/21 2020 97/62  83 18 97 % 01/06/21 2005 106/67  83 18 96 % 01/06/21 2000 100/65 97 °F (36.1 °C) 86 16 95 % 01/06/21 1955 96/60  85 18 94 % 01/06/21 1951 97/60  82 14 96 % 01/06/21 1946 96/63  85 16 96 % 01/06/21 1941 103/66  81 18 94 % 01/06/21 1939    16   
01/06/21 1937 (!) 95/58  78 18 93 % 01/06/21 1930 (!) 93/56  89 18 94 % 01/06/21 1926 (!) 87/62  87 18 93 % 01/06/21 1921 (!) 91/52  88 18 93 % 01/06/21 1917 (!) 91/54  92 18 93 % 01/06/21 1914 (!) 85/54  88 18 92 % 01/06/21 1911 (!) 85/54  94 16 92 % 01/06/21 1905 (!) 86/51  97 18 92 % 01/06/21 1856 104/62  93 16 98 % 01/06/21 1851 (!) 93/57  71 18 98 % 01/06/21 1850   87  97 % 01/06/21 1845 (!) 90/55  87 16 97 % 01/06/21 1840 (!) 89/55  84 14 100 % 01/06/21 1836 (!) 84/54  82 16 98 % 01/06/21 1830 101/65  90 18 98 % 01/06/21 1825 103/67  94 16 97 % 01/06/21 1821 112/66 (!) 96.7 °F (35.9 °C) (!) 101 18 96 % 01/06/21 1820 117/77  99    
01/06/21 1817 112/66  (!) 101  96 % 01/06/21 1816 117/68  (!) 101  97 % 01/06/21 1140 106/73 97.8 °F (36.6 °C) 97 16 98 % XR Results (most recent): 
Results from Hospital Encounter encounter on 01/04/21 XR CHEST SNGL V  
 Narrative  Portable view of the chest  
 
COMPARISON: Yesterday CLINICAL HISTORY: Postthoracotomy. FINDINGS: 
 
There are postsurgical changes in the right hemithorax. There is right 
pneumothorax. Right-sided chest tubes are stable. Right chest port is stable. There is slight deviation of the mediastinum to the right. There is mild left 
lung subsegmental atelectasis. Impression IMPRESSION: 
 
1. Stable post op findings in the right hemithorax. Right chest tubes and right 
pneumothorax, similar to prior exam. CT Results (most recent): 
[facility] encounter on 12/22/20 CT CHEST W CONT Narrative EXAMINATION: CT CHEST W CONT 12/22/2020 10:57 AM 
 
INDICATION: Malignant neoplasm of the right lower lobe. Restaging COMPARISON: CT chest July 1, 2020 and chest radiograph July 30, 2020 TECHNIQUE: Multiple contiguous axial CT images of the chest were obtained from 
the lung apices to the lung bases after the intravenous administration of 100 mL Isovue-370 contrast material . Radiation dose reduction techniques were used for this study:  Our CT scanners 
use one or all of the following: Automated exposure control, adjustment of the 
mA and/or kVp according to patient's size, iterative reconstruction. FINDINGS: 
Lower Neck: No abnormality Chest soft tissues: Right chest port terminates at the cavoatrial junction. Heart/Mediastinum: There is slight mediastinal shift to the right. No definite 
hilar or mediastinal adenopathy. There may be circumferential wall thickening of 
the distal esophagus. Normal caliber thoracic aorta. Patent proximal pulmonary 
arteries. Coronary atherosclerosis. Lungs: There has been complete atelectasis of the residual right lung. No 
abnormality of the left lung. Pleura: There has been development of a large right pleural effusion which fills 
the entirety of the right hemithorax there is some smooth peripheral enhancement 
of the pleural effusion. This is new from chest radiograph July 30, 2020 and 
chest CT July 1, 2020. Visualized upper abdomen: Cholelithiasis without cholecystitis. Osseous structures: No suspicious osseous lesion. Right posterior thoracotomy 
changes. Impression IMPRESSION: 
1. New from July 30,2020, there is complete collapse of the residual right lung 
with development of a large pleural effusion involving the entire hemithorax. There is some smooth thin peripheral enhancement of the effusion which may 
reflect mild complexity. 2. No definite mediastinal adenopathy 3. Possible circumferential thickening of the distal esophagus. Correlation 
could be made for esophagitis. [address] Labs: 
Recent Labs 01/07/21 
0330 01/05/21 
1602 01/05/21 
9554 WBC 7.8  --  4.2* HGB 10.6*  --  11.0*  
   --  171   --  138  
K 4.3  --  4.2   --  105 CO2 29  --  30  
BUN 8  --  8  
CREA 0.65*  --  0.68* *  --  94 PTP  --  14.8*  --   
INR  --  1.1  --   
APTT  --  35.2*  --   
TBILI  --   --  0.5 ALT  --   --  8* AP  --   --  99 Lab Results Component Value Date/Time WBC 7.8 01/07/2021 03:30 AM  
 HGB 10.6 (L) 01/07/2021 03:30 AM  
 PLATELET 919 04/10/7847 03:30 AM  
 Sodium 139 01/07/2021 03:30 AM  
 Potassium 4.3 01/07/2021 03:30 AM  
 Chloride 106 01/07/2021 03:30 AM  
 CO2 29 01/07/2021 03:30 AM  
 BUN 8 01/07/2021 03:30 AM  
 Creatinine 0.65 (L) 01/07/2021 03:30 AM  
 Glucose 105 (H) 01/07/2021 03:30 AM  
 INR 1.1 01/05/2021 04:02 PM  
 aPTT 35.2 (H) 01/05/2021 04:02 PM  
 Bilirubin, total 0.5 01/05/2021 06:26 AM  
 Bilirubin, direct 0.8 (H) 05/05/2020 02:27 PM  
 ALT (SGPT) 8 (L) 01/05/2021 06:26 AM  
 Alk. phosphatase 99 01/05/2021 06:26 AM  
 Troponin-I, Qt. <0.02 (L) 05/05/2020 01:09 AM  
 
 
I reviewed recent labs and recent radiologic studies. I independently reviewed radiology images for studies I described above or studies I have ordered. Admission date (for inpatients): 1/4/2021 Day of Surgery  Procedure(s): RIGHT VIDEO ASISTED THORACOSCOPY, DECORTICATION 
THORACOTOMY ASSESSMENT/PLAN: 
Problem List  Date Reviewed: 1/7/2021 Codes Class Noted S/P thoracotomy ICD-10-CM: F22.536 ICD-9-CM: V45.89  1/7/2021 * (Principal) Atelectasis of right lung ICD-10-CM: J98.11 ICD-9-CM: 518.0  1/4/2021 A-fib [facility]) ICD-10-CM: I48.91 
ICD-9-CM: 427.31  1/4/2021 Chronic respiratory failure with hypoxia [facility]) ICD-10-CM: J96.11 
ICD-9-CM: 518.83, 799.02  1/4/2021 Pleural effusion on right ICD-10-CM: J90 ICD-9-CM: 511.9  12/28/2020 Chemotherapy induced neutropenia (HCC) ICD-10-CM: D70.1, T45.1X5A 
ICD-9-CM: 288.03, E933.1  10/23/2020 Dehydration ICD-10-CM: E86.0 ICD-9-CM: 276.51  9/15/2020 S/P lobectomy of lung ICD-10-CM: Z90.2 ICD-9-CM: V45.89  7/29/2020 Cough ICD-10-CM: R05 ICD-9-CM: 786.2  7/29/2020 Atrial tachycardia (HCC) ICD-10-CM: I47.1 ICD-9-CM: 427.89  7/26/2020 Cancer of bronchus of right lower lobe [facility]) ICD-10-CM: C34.31 
ICD-9-CM: 162.5  7/23/2020 Lung cancer [facility]) ICD-10-CM: C34.90 ICD-9-CM: 162.9  7/23/2020 Cancer of right lung [facility]) ICD-10-CM: C34.91 
ICD-9-CM: 162.9  7/23/2020 Pulmonary emphysema ([address] ICD-10-CM: J43.9 ICD-9-CM: 492.8  7/7/2020 GERD (gastroesophageal reflux disease) ICD-10-CM: K21.9 ICD-9-CM: 530.81  Unknown Hypotension ICD-10-CM: I95.9 ICD-9-CM: 458.9  5/5/2020 Sepsis due to undetermined organism [facility]) ICD-10-CM: A41.9 ICD-9-CM: 038.9  5/5/2020 Malignant neoplasm of lower lobe of right lung (HCC) (Chronic) ICD-10-CM: C34.31 
ICD-9-CM: 162.5  2/26/2020 Overview Signed 1/4/2021  7:38 AM by Jonathan Johnsno MD  
  Dec 2020- Echo EF 50%, technically difficult, cannot assess regional wall motion. Aortic root 4.1 cm. No significant valvular disease. Normal DF Mass of lower lobe of right lung ICD-10-CM: R91.8 ICD-9-CM: 786.6  2/23/2020 Right lower lobe lung mass ICD-10-CM: R91.8 ICD-9-CM: 786.6  2/23/2020 Essential hypertension with goal blood pressure less than 130/80 (Chronic) ICD-10-CM: I10 
ICD-9-CM: 401.9  2/19/2018 Principal Problem: 
  Atelectasis of right lung (1/4/2021) Active Problems: Hypotension (5/5/2020) Lung cancer ([address] (7/23/2020) Pleural effusion on right (12/28/2020) A-fib ([address] (1/4/2021) Chronic respiratory failure with hypoxia ([address] (1/4/2021) S/P thoracotomy (1/7/2021) Plan Chest tubes to water seal 
Bronch per pulmonary today Clear liquid diet Activity as tolerated Pain control Encourage C/DB/IS Signed:  Panda Whitten, NP

## 2021-01-07 NOTE — PROGRESS NOTES
Bedside shift change report given to John Little RN (oncoming nurse) by Leandro Mcardle, RN (offgoing nurse). Report included the following information SBAR, Kardex, Intake/Output, Recent Results, Med Rec Status and Cardiac Rhythm ST.

## 2021-01-08 NOTE — PROGRESS NOTES
Bedside shift change report given to Noman Ramos RN (oncoming nurse) by Ayesha Aleman RN (offgoing nurse). Report included the following information SBAR, Kardex, Intake/Output, Recent Results, Med Rec Status and Cardiac Rhythm ST.

## 2021-01-08 NOTE — PROGRESS NOTES
H&P/Consult Note/Progress Note/Office Note:  
Alicia Butts MRN: [id number]  :1950  Age:75 y.o. 
 
HPI: Alicia Butts is a 78 y.o. male who is well known to Dr. Claudia Curtis.  He underwent right thoracotomy with lower and middle lobectomy 20 for squamous cell carcinoma. He underwent neoaduvant chemo/radiation and adjuvant chemo. .  CT 20 showed large pleural effusion and collapse of RUL. He underwent thoracentesis by Dr. Scarlett Bennett 20 with approximately drainage of 900ml, but was unable to completely drain related to complaints of pain. He felt should hold Eliquis for admission for Chest tube placement. Dr. Caron Chandler placed chest tube this AM 21. General surgery was consulted for complicated right pleural effusion, ?hemothorax not draining properly with chest tube placement. On exam, Mr. Chaparrita Pizarro appears comfortable.  CT scan reviewed and current chest xray, NAD. On 3 L NC sats upper 90's. Chest tube maintained with 800ml serosanguineous drainage noted. Remains on 20cmsx. Patient states since his lobectomy he has felt \"pressure\". He does use 2L oxygen at night. She states he does feel like he can breath better since this chest tube has been placed. His lost dose of eliquis was Friday (for A-fib). He does have smoking history and quit about 6 years ago 21 Patient states he is breathing better since chest tube. NAD on 3L NC with 98% sat. Chest xray reviewed and fluid collection appears loculated- will need surgery- plan for tomorrow. 21 To the OR yesterday PO day1. Difficult surgery. Consulted pulmonary intraoperatively for broch. Unable to get lung to inflate intraoperative, ?chronic mucous plug. Pt in ICU, no complaints this AM- scheduled for bronch this AM. Chest tubes to water seal currently- no air leak- appears lung still collapsed from CXR. Pain controlled. 01/08/21 POD 2. Pt remains in ICU, no complaints this AM. Pain controlled. Chest tubes to water seal currently- no air leak. CXR this am showing No Significant Interval Change. Medical history as below Past Medical History:  
Diagnosis Date  GERD (gastroesophageal reflux disease)   
 controlled with med  Hypertension hx-- off meds since 4/2020--- followed by dr. Anand Tucker  Hypoxia 02/24/2020  Malignant neoplasm of lower lobe of right lung ([address] 02/26/2020 REC'D CHEMO AND RADIATION--- FOLLOWED BY DR. Taco Mohr Osteoarthritis  Right lower lobe lung mass 02/24/2020  Status post total right knee replacement 2/29/2016 Past Surgical History:  
Procedure Laterality Date  HX COLONOSCOPY    
 HX KNEE ARTHROSCOPY Left   
 scope X 1, ACL recon X 1  
 HX KNEE ARTHROSCOPY Right 1974, 1975 X 2   
 HX KNEE REPLACEMENT Right 2016 [address] [place]  HX VASCULAR ACCESS Right placed 3/2020  
 port in chest   
 IR INSERT TUNL CVC W PORT OVER 5 YEARS  3/18/2020  [place] SINUS SURGERY PROC UNLISTED  1988, 1991  
 sinus surg Current Facility-Administered Medications Medication Dose Route Frequency  traMADoL (ULTRAM) tablet 50 mg  50 mg Oral Q6H PRN  
 sodium chloride (NS) flush 5-40 mL  5-40 mL IntraVENous Q8H  
 sodium chloride (NS) flush 5-40 mL  5-40 mL IntraVENous PRN  
 midazolam (VERSED) injection 0.25-5 mg  0.25-5 mg IntraVENous Multiple  fentaNYL citrate (PF) injection 50 mcg  50 mcg IntraVENous Multiple  sodium chloride 3% hypertonic nebulizer soln  4 mL Nebulization BID  budesonide (PULMICORT) 500 mcg/2 ml nebulizer suspension  500 mcg Nebulization BID RT  
 acetaminophen (TYLENOL) tablet 500 mg  500 mg Oral Q6H PRN  
 lactated Ringers infusion  125 mL/hr IntraVENous CONTINUOUS  
 HYDROmorphone (PF) (DILAUDID) injection 1 mg  1 mg IntraVENous Q2H PRN  
 guaiFENesin ER (MUCINEX) tablet 1,200 mg  1,200 mg Oral Q12H  PHENYLephrine (ENRIQUE-SYNEPHRINE) 30 mg in 0.9% sodium chloride 250 mL infusion   mcg/min IntraVENous TITRATE  amiodarone (CORDARONE) tablet 200 mg  200 mg Oral DAILY  [Held by provider] apixaban (ELIQUIS) tablet 5 mg  5 mg Oral Q12H  pantoprazole (PROTONIX) tablet 40 mg  40 mg Oral ACB&D  
 gabapentin (NEURONTIN) capsule 300 mg  300 mg Oral TID  levalbuterol tartrate (XOPENEX) 45 mcg/actuation inhaler HFAA 2 Puff- pt to supply (Patient Supplied)  2 Puff Inhalation Q4H PRN  
 metoprolol succinate (TOPROL-XL) XL tablet 50 mg  50 mg Oral DAILY  ondansetron (ZOFRAN ODT) tablet 8 mg  8 mg Oral Q8H PRN  polyethylene glycol (MIRALAX) packet 17 g  17 g Oral DAILY  sodium chloride (NS) flush 5-40 mL  5-40 mL IntraVENous Q8H  
 sodium chloride (NS) flush 5-40 mL  5-40 mL IntraVENous PRN  
 sodium chloride (NS) flush 5-40 mL  5-40 mL IntraVENous PRN  
 oxyCODONE-acetaminophen (PERCOCET) 5-325 mg per tablet 1 Tab  1 Tab Oral Q4H PRN  
 influenza vaccine 2020-21 (6 mos+)(PF) (FLUARIX/FLULAVAL/FLUZONE QUAD) injection 0.5 mL  0.5 mL IntraMUSCular PRIOR TO DISCHARGE Albuterol and Celebrex [celecoxib] Social History Socioeconomic History  Marital status:  Spouse name: Not on file  Number of children: Not on file  Years of education: Not on file  Highest education level: Not on file Tobacco Use  Smoking status: Former Smoker Packs/day: 1.00 Years: 20.00 Pack years: 20.00 Quit date:  Years since quittin.0  Smokeless tobacco: Never Used Substance and Sexual Activity  Alcohol use: Yes Alcohol/week: 4.0 standard drinks Types: 4 Glasses of wine per week  Drug use: No  
Other Topics Concern   Service No  
 Blood Transfusions No  
 Caffeine Concern No  
 Occupational Exposure No  
 Hobby Hazards No  
 Sleep Concern No  
 Stress Concern No  
 Weight Concern No  
 Special Diet No  
 Exercise Yes [place] Yes Social History Narrative . Has long-time girlfriend. Continues to work doing IT support. Social History Tobacco Use Smoking Status Former Smoker  Packs/day: 1.00  Years: 20.00  Pack years: 20.00  Quit date:   Years since quittin.0 Smokeless Tobacco Never Used Family History Problem Relation Age of Onset  Cancer Mother   
     uterine  Arrhythmia Sister   
     afib ROS: The patient has no difficulty with chest pain or shortness of breath. No fever or chills. Comprehensive review of systems was otherwise unremarkable except as noted above. Physical Exam:  
Visit Vitals /60 Pulse (!) 114 Temp 98.3 °F (36.8 °C) Resp 18 Ht 5' 10\" (1.778 m) Wt 201 lb 12.8 oz (91.5 kg) SpO2 94% BMI 28.96 kg/m² Constitutional: Alert, oriented, cooperative patient in no acute distress; appears stated age Eyes: Sclera are clear. EOMs intact ENMT: no external lesions gross hearing normal; no obvious neck masses, no ear or lip lesions, nares normal 
CV: Tachy. Normal perfusion Resp: No JVD. Breathing is  non-labored; no audible wheezing. Chest tube maintained H2O seal- no air leak GI: soft and non-distended Musculoskeletal: unremarkable with normal function. No embolic signs or cyanosis. Neuro:  Oriented; moves all 4; no focal deficits Psychiatric: normal affect and mood, no memory impairment Recent vitals (if inpt): 
Patient Vitals for the past 24 hrs: 
 BP Temp Pulse Resp SpO2 Height Weight 21 0929 100/60  (!) 114  94 %    
21 0858 94/63  (!) 104  94 %    
21 0843 (!) 94/53  (!) 110  94 %    
21 0833 100/62  (!) 104  97 %    
21 0813 90/65  (!) 109  97 %    
21 0802 (!) 86/52        
21 0758 (!) 91/52  (!) 114  96 %    
21 0743 (!) 89/52  (!) 113  96 %    
21 0728 (!) 92/54  (!) 108  95 %   01/08/21 0714 (!) 90/58  (!) 107  95 %    
01/08/21 0658 (!) 82/51 98.3 °F (36.8 °C) (!) 107 18 94 %    
01/08/21 0558 (!) 104/59  (!) 113  99 %    
01/08/21 0528 (!) 91/55  (!) 106  99 %    
01/08/21 0458 (!) 95/54  (!) 107 16 98 %    
01/08/21 0428 (!) 109/57  (!) 106  99 %    
01/08/21 0358 120/65 98.5 °F (36.9 °C) (!) 122 15 97 %    
01/08/21 0328 (!) 104/59  96  99 %    
01/08/21 0258 94/66  (!) 119 15 98 %    
01/08/21 0228 (!) 100/59  (!) 102  99 %    
01/08/21 0217 120/61  (!) 106      
01/08/21 0158 (!) 101/53  (!) 102 14 99 %    
01/08/21 0143 123/60  (!) 105  98 %    
01/08/21 0128 (!) 96/58  (!) 112  99 %    
01/08/21 0058 101/69  97 16 98 %    
01/08/21 0042 97/64  (!) 102  98 %    
01/08/21 0034 101/63  (!) 105  97 %    
01/08/21 0013 (!) 88/61  (!) 104  97 %    
01/07/21 2358 (!) 85/50 98.6 °F (37 °C) (!) 105 18 95 %    
01/07/21 2329 (!) 83/55  (!) 103      
01/07/21 2328 (!) 83/55  (!) 110  97 %    
01/07/21 2313 (!) 82/54  (!) 108  95 %    
01/07/21 2308 (!) 82/52  (!) 113  95 %    
01/07/21 2307 (!) 82/53  (!) 105      
01/07/21 2304 (!) 69/47  (!) 109  94 %    
01/07/21 2246 (!) 88/55  (!) 119  96 %    
01/07/21 2242 (!) 83/53  (!) 115  97 %    
01/07/21 2200 (!) 84/58  (!) 112 16 97 %    
01/07/21 2100   (!) 119 15 96 %    
01/07/21 2030   (!) 119  91 %    
01/07/21 2005     94 %    
01/07/21 2000 91/64 99 °F (37.2 °C) (!) 119 16 94 %    
01/07/21 1945   (!) 121  92 %    
01/07/21 1930   (!) 122  93 %    
01/07/21 1915 93/66  (!) 128  93 %    
01/07/21 1900   (!) 123  92 %    
01/07/21 1842 93/66  (!) 124  94 %    
01/07/21 1813 93/65  (!) 124  94 %    
01/07/21 1800   (!) 124  93 %    
01/07/21 1544       201 lb 12.8 oz (91.5 kg) 01/07/21 1542 (!) 93/57  (!) 116  92 %    
01/07/21 1528 90/60 98.7 °F (37.1 °C) (!) 113 16 92 %   01/07/21 1413 (!) 93/59  (!) 107  94 %    
01/07/21 1357 93/60  (!) 104  93 %    
01/07/21 1343 (!) 95/58 98.7 °F (37.1 °C) (!) 107 16 93 % 5' 10\" (1.778 m)   
01/07/21 1341   (!) 106  92 %    
01/07/21 1340   83 15 100 %    
01/07/21 1333   83 16 100 %    
01/07/21 1311   (!) 109 16 93 %    
01/07/21 1308   (!) 108 15 94 %    
01/07/21 1303 (!) 88/61  (!) 109 14 91 %    
01/07/21 1256   (!) 111 15 90 %    
01/07/21 1248 (!) 88/56  (!) 107 14 92 %    
01/07/21 1233 (!) 96/56  (!) 109 15 92 %    
01/07/21 1218 (!) 87/60  (!) 110 14 95 %    
01/07/21 1203 (!) 87/61  (!) 107 15 93 %    
01/07/21 1148 (!) 89/59  (!) 113 14 94 %    
01/07/21 1142  98.4 °F (36.9 °C) (!) 110 16 95 %    
01/07/21 1134 108/62  (!) 106 15 (!) 89 %    
01/07/21 1131   (!) 106 18 94 %    
01/07/21 1128 94/60  (!) 105 16 91 %    
01/07/21 1123 (!) 93/58  (!) 104 18 92 %    
01/07/21 1118 (!) 82/58  (!) 108 14 91 %    
01/07/21 1113 96/61  100 18 96 %    
01/07/21 1108 (!) 100/59  (!) 110 18 97 %    
01/07/21 1059 (!) 100/59  (!) 109 18 97 %    
01/07/21 1057   (!) 110 14 97 %    
01/07/21 1044 (!) 96/59  (!) 108  97 %    
01/07/21 1040   (!) 111  97 %    
01/07/21 1030 95/60  (!) 110 15 98 %    
01/07/21 1022   (!) 109 14 97 %    
01/07/21 1015   (!) 109 14     
01/07/21 1011   (!) 111 16 98 %    
01/07/21 0959 (!) 95/58  (!) 112 15 97 %    
01/07/21 0949   (!) 110 14 97 %    
01/07/21 0945 (!) 94/58  (!) 110 16 98 %   XR Results (most recent): 
Results from Hospital Encounter encounter on 01/04/21 XR CHEST SNGL V  
 Narrative PORTABLE CHEST, January 8, 2021 at 0704 hours CLINICAL HISTORY:  Follow-up thoracotomy. COMPARISON:  January 7, 2021. FINDINGS:  AP semi-erect image demonstrates persistent right pneumothorax status 
post thoracotomy with chest tubes in place. Left lung remains hyperexpanded but clear.  The heart size is within normal limits without evidence of congestive 
heart failure. There are overlying radiopaque support devices. Impression IMPRESSION:  NO SIGNIFICANT INTERVAL CHANGE. CT Results (most recent): 
Results from [facility] Encounter encounter on 12/22/20 CT CHEST W CONT Narrative EXAMINATION: CT CHEST W CONT 12/22/2020 10:57 AM 
 
INDICATION: Malignant neoplasm of the right lower lobe. Restaging COMPARISON: CT chest July 1, 2020 and chest radiograph July 30, 2020 TECHNIQUE: Multiple contiguous axial CT images of the chest were obtained from 
the lung apices to the lung bases after the intravenous administration of 100 mL Isovue-370 contrast material . Radiation dose reduction techniques were used for this study:  Our CT scanners 
use one or all of the following: Automated exposure control, adjustment of the 
mA and/or kVp according to patient's size, iterative reconstruction. FINDINGS: 
Lower Neck: No abnormality Chest soft tissues: Right chest port terminates at the cavoatrial junction. Heart/Mediastinum: There is slight mediastinal shift to the right. No definite 
hilar or mediastinal adenopathy. There may be circumferential wall thickening of 
the distal esophagus. Normal caliber thoracic aorta. Patent proximal pulmonary 
arteries. Coronary atherosclerosis. Lungs: There has been complete atelectasis of the residual right lung. No 
abnormality of the left lung. Pleura: There has been development of a large right pleural effusion which fills 
the entirety of the right hemithorax there is some smooth peripheral enhancement 
of the pleural effusion. This is new from chest radiograph July 30, 2020 and 
chest CT July 1, 2020. Visualized upper abdomen: Cholelithiasis without cholecystitis. Osseous structures: No suspicious osseous lesion. Right posterior thoracotomy 
changes.  
  
 Impression IMPRESSION: 
 1. New from July 30,2020, there is complete collapse of the residual right lung 
with development of a large pleural effusion involving the entire hemithorax. There is some smooth thin peripheral enhancement of the effusion which may 
reflect mild complexity. 2. No definite mediastinal adenopathy 3. Possible circumferential thickening of the distal esophagus. Correlation 
could be made for esophagitis. [address] Labs: 
Recent Labs 01/08/21 
0309 01/05/21 
1602 01/05/21 
5546 WBC 7.2   < >  --   
HGB 9.9*   < >  --   
*   < >  --   
   < >  --   
K 4.0   < >  --   
   < >  --   
CO2 33*   < >  --   
BUN 7*   < >  --   
CREA 0.57*   < >  --   
GLU 90   < >  --   
PTP  --   --  14.8* INR  --   --  1.1 APTT  --   --  35.2*  
 < > = values in this interval not displayed. Lab Results Component Value Date/Time WBC 7.2 01/08/2021 03:09 AM  
 HGB 9.9 (L) 01/08/2021 03:09 AM  
 PLATELET 078 (L) 65/36/4398 03:09 AM  
 Sodium 138 01/08/2021 03:09 AM  
 Potassium 4.0 01/08/2021 03:09 AM  
 Chloride 103 01/08/2021 03:09 AM  
 CO2 33 (H) 01/08/2021 03:09 AM  
 BUN 7 (L) 01/08/2021 03:09 AM  
 Creatinine 0.57 (L) 01/08/2021 03:09 AM  
 Glucose 90 01/08/2021 03:09 AM  
 INR 1.1 01/05/2021 04:02 PM  
 aPTT 35.2 (H) 01/05/2021 04:02 PM  
 Bilirubin, total 0.5 01/05/2021 06:26 AM  
 Bilirubin, direct 0.8 (H) 05/05/2020 02:27 PM  
 ALT (SGPT) 8 (L) 01/05/2021 06:26 AM  
 Alk. phosphatase 99 01/05/2021 06:26 AM  
 Troponin-I, Qt. <0.02 (L) 05/05/2020 01:09 AM  
 
 
I reviewed recent labs and recent radiologic studies. I independently reviewed radiology images for studies I described above or studies I have ordered. Admission date (for inpatients): 1/4/2021 Day of Surgery  Procedure(s): RIGHT VIDEO ASISTED THORACOSCOPY, DECORTICATION 
THORACOTOMY ASSESSMENT/PLAN: 
Problem List  Date Reviewed: 1/8/2021 Codes Class Noted  S/P thoracotomy ICD-10-CM: H28.115 
 ICD-9-CM: V45.89  1/7/2021 * (Principal) Atelectasis of right lung ICD-10-CM: J98.11 ICD-9-CM: 518.0  1/4/2021 A-fib [facility]) ICD-10-CM: I48.91 
ICD-9-CM: 427.31  1/4/2021 Chronic respiratory failure with hypoxia [facility]) ICD-10-CM: J96.11 
ICD-9-CM: 518.83, 799.02  1/4/2021 Pleural effusion on right ICD-10-CM: J90 ICD-9-CM: 511.9  12/28/2020 Chemotherapy induced neutropenia (HCC) ICD-10-CM: D70.1, T45.1X5A 
ICD-9-CM: 288.03, E933.1  10/23/2020 Dehydration ICD-10-CM: E86.0 ICD-9-CM: 276.51  9/15/2020 S/P lobectomy of lung ICD-10-CM: Z90.2 ICD-9-CM: V45.89  7/29/2020 Cough ICD-10-CM: R05 ICD-9-CM: 786.2  7/29/2020 Atrial tachycardia (HCC) ICD-10-CM: I47.1 ICD-9-CM: 427.89  7/26/2020 Cancer of bronchus of right lower lobe [facility]) ICD-10-CM: C34.31 
ICD-9-CM: 162.5  7/23/2020 Lung cancer [facility]) ICD-10-CM: C34.90 ICD-9-CM: 162.9  7/23/2020 Cancer of right lung [facility]) ICD-10-CM: C34.91 
ICD-9-CM: 162.9  7/23/2020 Pulmonary emphysema ([address] ICD-10-CM: J43.9 ICD-9-CM: 492.8  7/7/2020 GERD (gastroesophageal reflux disease) ICD-10-CM: K21.9 ICD-9-CM: 530.81  Unknown Hypotension ICD-10-CM: I95.9 ICD-9-CM: 458.9  5/5/2020 Sepsis due to undetermined organism [facility]) ICD-10-CM: A41.9 ICD-9-CM: 038.9  5/5/2020 Malignant neoplasm of lower lobe of right lung (HCC) (Chronic) ICD-10-CM: C34.31 
ICD-9-CM: 162.5  2/26/2020 Overview Signed 1/4/2021  7:38 AM by Erica Dudley MD  
  Dec 2020- Echo EF 50%, technically difficult, cannot assess regional wall motion. Aortic root 4.1 cm. No significant valvular disease. Normal DF Mass of lower lobe of right lung ICD-10-CM: R91.8 ICD-9-CM: 786.6  2/23/2020 Right lower lobe lung mass ICD-10-CM: R91.8 ICD-9-CM: 786.6  2/23/2020  Essential hypertension with goal blood pressure less than 130/80 (Chronic) ICD-10-CM: I10 
 ICD-9-CM: 401.9  2/19/2018 Principal Problem: 
  Atelectasis of right lung (1/4/2021) Active Problems: Hypotension (5/5/2020) Lung cancer ([address] (7/23/2020) Pleural effusion on right (12/28/2020) A-fib ([facility] (1/4/2021) Chronic respiratory failure with hypoxia ([facility] (1/4/2021) S/P thoracotomy (1/7/2021) Plan Clamp Chest tubes Bronch per pulmonary yesterday Clear liquid diet Activity as tolerated Pain control Encourage C/DB/IS Signed:  Tammy Loja NP

## 2021-01-08 NOTE — PROGRESS NOTES
Bedside shift change report given to Julienne Rahman RN (oncoming nurse) by Pramod Mcgrath RN (offgoing nurse). Report included the following information SBAR, Kardex, Intake/Output, Recent Results, Med Rec Status and Cardiac Rhythm ST.

## 2021-01-08 NOTE — PROGRESS NOTES
A follow up visit was made to the patient. Emotional support, spiritual presence and  
prayer were provided for the patient. He was awake, alert and requested prayer. Aiden Manzo, [address] [facility], [facility]

## 2021-01-08 NOTE — PROGRESS NOTES
Critical Care Daily Progress Note: 1/8/2021 eGnoveva Fry. Admission Date: 1/4/2021 The patient's chart is reviewed and the patient is discussed with the staff. Patient is P 8 y.o.  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 He was found to have RLL mass 2/2020, measuring 6.7cm x 5.9cm. He had an EBUS On 2/26/2020 and biopsy consistent with SCC. His Brain MRI was negative for brain mets but revealed a 2cm R parotid mass. His PET CT revealed a hypermetabolic RLL mass extending into the hilum and excessively hypermetabolic R parotid gland. He had radiation and chemo and then underwent RML lobectomy 7/23/2020. He then completed 6 more rounds of chemo around 10/2020. He had repeat Chest CT on 12/22/2020 which revealed a large R pleural effusion and collapse of the remaining RUL. Pt underwent R thoracentesis on 12/28/2020 by Dr. Joi Mooney with 900mL bloody fluid removed. Pt was unable to tolerate complete drainage. His Eliquis was held and he was admitted for CT placement on 1/4/21 with 800cc of serosanguineous drainage. General Surgery was consulted and CXR revealed persistent loculated R effusion. Pt went to the OR on 1/6 for R VATS which was converted to an open thoracotomy with extensive pneumolysis with decortication. Pt did require a bronchoscopy in the OR secondary to atelectasis of the RUL and mucous plugging. Pt was hypotensive postoperatively requiring aylin. Pt was transferred to ICU overflow. Pt had a repeat bronch on 1/7 secondary to persistent RUL atelectasis. He did not have any mucous plugging but his apical segment of the RUL was blocked by a web and the bronchoscope couldn't advance any further. Pt did have a stump area from prior lobectomy with abnormal appearing tissue that was biopsied,pathology is pending. Subjective: Pt lying in bed on 3L O2. Pt complains of chest soreness 7/10. He hasn't gotten anything for pain secondary to hypotension. He had to be started back on Enrique gtt last night. Pt has been tachycardic as well. We discussed bronch yesterday and he is worried about losing his R lung. Current Facility-Administered Medications Medication Dose Route Frequency  sodium chloride (NS) flush 5-40 mL  5-40 mL IntraVENous Q8H  
 sodium chloride (NS) flush 5-40 mL  5-40 mL IntraVENous PRN  
 midazolam (VERSED) injection 0.25-5 mg  0.25-5 mg IntraVENous Multiple  fentaNYL citrate (PF) injection 50 mcg  50 mcg IntraVENous Multiple  sodium chloride 3% hypertonic nebulizer soln  4 mL Nebulization BID  budesonide (PULMICORT) 500 mcg/2 ml nebulizer suspension  500 mcg Nebulization BID RT  
 acetaminophen (TYLENOL) tablet 500 mg  500 mg Oral Q6H PRN  
 lactated Ringers infusion  125 mL/hr IntraVENous CONTINUOUS  
 HYDROmorphone (PF) (DILAUDID) injection 1 mg  1 mg IntraVENous Q2H PRN  
 guaiFENesin ER (MUCINEX) tablet 1,200 mg  1,200 mg Oral Q12H  
 PHENYLephrine (ENRIQUE-SYNEPHRINE) 30 mg in 0.9% sodium chloride 250 mL infusion   mcg/min IntraVENous TITRATE  amiodarone (CORDARONE) tablet 200 mg  200 mg Oral DAILY  [Held by provider] apixaban (ELIQUIS) tablet 5 mg  5 mg Oral Q12H  pantoprazole (PROTONIX) tablet 40 mg  40 mg Oral ACB&D  
 gabapentin (NEURONTIN) capsule 300 mg  300 mg Oral TID  levalbuterol tartrate (XOPENEX) 45 mcg/actuation inhaler HFAA 2 Puff- pt to supply (Patient Supplied)  2 Puff Inhalation Q4H PRN  
 metoprolol succinate (TOPROL-XL) XL tablet 50 mg  50 mg Oral DAILY  ondansetron (ZOFRAN ODT) tablet 8 mg  8 mg Oral Q8H PRN  polyethylene glycol (MIRALAX) packet 17 g  17 g Oral DAILY  sodium chloride (NS) flush 5-40 mL  5-40 mL IntraVENous Q8H  
 sodium chloride (NS) flush 5-40 mL  5-40 mL IntraVENous PRN  
  sodium chloride (NS) flush 5-40 mL  5-40 mL IntraVENous PRN  
 oxyCODONE-acetaminophen (PERCOCET) 5-325 mg per tablet 1 Tab  1 Tab Oral Q4H PRN  
 influenza vaccine 2020-21 (6 mos+)(PF) (FLUARIX/FLULAVAL/FLUZONE QUAD) injection 0.5 mL  0.5 mL IntraMUSCular PRIOR TO DISCHARGE Review of Systems 
+cough 
+chest soreness Constitutional:  negative for fever, chills, sweats Cardiovascular:  negative for chest pain, palpitations, syncope, edema Gastrointestinal:  negative for dysphagia, reflux, vomiting, diarrhea, abdominal pain, or melena Neurologic:  negative for focal weakness, numbness, headache Objective:  
 
Vitals:  
 01/08/21 9223 01/08/21 1241 01/08/21 3960 01/08/21 4053 BP: (!) 109/57 (!) 95/54 (!) 91/55 (!) 104/59 Pulse: (!) 106 (!) 107 (!) 106 (!) 113 Resp:  16 Temp:      
SpO2: 99% 98% 99% 99% Weight:      
Height:      
 
 
 
Intake/Output Summary (Last 24 hours) at 1/8/2021 0759 Last data filed at 1/8/2021 1032 Gross per 24 hour Intake 2909.59 ml Output 3150 ml Net -240.41 ml Physical Exam:         
Constitutional:  the patient is well developed and in no acute distress, on 3L O2   
EENMT:  Sclera clear, pupils equal, oral mucosa moist 
Respiratory: diminished on the R Cardiovascular:  Irregularly irregular Gastrointestinal: soft and non-tender; with positive bowel sounds. Musculoskeletal: warm without cyanosis. There is no lower extremity edema. Skin:  no jaundice or rashes, Neurologic: no gross neuro deficits Psychiatric:  alert and oriented x 3 LINES: 
Chest port, peripheral IV L hand, art line, CT x 2, gabriel DRIPS: 
LR, aylin CXR:  
 
 
LAB No results for input(s): GLUCPOC in the last 72 hours. No lab exists for component: [place] Recent Labs 01/08/21 
0309 01/07/21 
0330 01/05/21 
1602 WBC 7.2 7.8  --   
HGB 9.9* 10.6*  --   
HCT 31.5* 32.7*  --   
* 158  --   
INR  --   --  1.1 Recent Labs 01/08/21 
0309 01/07/21 0330 01/06/21 2005  139  --   
K 4.0 4.3  --   
 106  --   
CO2 33* 29  --   
GLU 90 105*  --   
BUN 7* 8  --   
CREA 0.57* 0.65*  --   
MG 2.1 2.1 1.8  
CA 8.7 8.7  -- No results for input(s): PH, PCO2, PO2, HCO3, PHI, PCO2I, PO2I, HCO3I in the last 72 hours. No results for input(s): LCAD, LAC in the last 72 hours. No results for input(s): PH, PCO2, PO2, HCO3, PHI, PCO2I, PO2I, HCO3I in the last 72 hours. Patient Active Problem List  
Diagnosis Code  Essential hypertension with goal blood pressure less than 130/80 I10  Mass of lower lobe of right lung R91.8  Right lower lobe lung mass R91.8  Malignant neoplasm of lower lobe of right lung (HCC) C34.31  
 GERD (gastroesophageal reflux disease) K21.9  Hypotension I95.9  Sepsis due to undetermined organism ([facility] [address] A41.9  Pulmonary emphysema ([facility] [address] J43.9  Cancer of bronchus of right lower lobe (HCC) C34.31  
 Lung cancer ([facility] [address] C34.90  
 Cancer of right lung (HCC) C34.91  
 Atrial tachycardia (HCC) I47.1  S/P lobectomy of lung Z90.2  Cough R05  Dehydration E86.0  Chemotherapy induced neutropenia (HCC) D70.1, T45.1X5A  Pleural effusion on right J90  
 Atelectasis of right lung J98.11  
 A-fib (HCC) I48.91  
 Chronic respiratory failure with hypoxia (HCC) J96.11  
 S/P thoracotomy Z99.847 Assessment:  (Medical Decision Making) Hospital Problems  Date Reviewed: 1/7/2021 Codes Class Noted POA  
 S/P thoracotomy ICD-10-CM: Y13.058 ICD-9-CM: V45.89  1/7/2021 Unknown Per Dr. Claudia Curtis   
 * (Principal) Atelectasis of right lung ICD-10-CM: J98.11 ICD-9-CM: 518.0  1/4/2021 Unknown S/p bronch with airway clearance A-fib [facility]) ICD-10-CM: I48.91 
ICD-9-CM: 427.31  1/4/2021 Unknown Chronic Chronic respiratory failure with hypoxia [facility]) ICD-10-CM: J96.11 
ICD-9-CM: 518.83, 799.02  1/4/2021 Unknown On 2L O2 qhs at home  Pleural effusion on right ICD-10-CM: J90 
 ICD-9-CM: 511.9  12/28/2020 Unknown S/p R VATs Lung cancer [facility]) ICD-10-CM: C34.90 ICD-9-CM: 162.9  7/23/2020 Yes S/p lobectomy 7/2020 Hypotension ICD-10-CM: I95.9 ICD-9-CM: 458.9  5/5/2020 Unknown Back on Donavan Plan:  (Medical Decision Making)  
 
--wean O2 as tolerated 
--back on Donavan at 40mcg/hr. Wean as tolerated  
--continue /hr for now 
--pain control 
--continue airway clearance, vibra lung, IS, mucinex 
--on protonix, anticoagulation is on hold with recent surgery. Continue SCDs More than 50% of the time documented was spent in face-to-face contact with the patient and in the care of the patient on the floor/unit where the patient is located. Irish Rona, PA Lungs:  Decreased BS in the right base Heart:  RRR with no Murmur/Rubs/Gallops Additional Comments:  Increase IVF and wean Donavan drip. Cont. Airway clearance RX. Dr. Casas Books to manage CT I have spoken with and examined the patient. I agree with the above assessment and plan as documented.  
 
Stephanie Ortiz MD

## 2021-01-09 NOTE — PROGRESS NOTES
Critical Care Daily Progress Note: 1/9/2021 Jacob Etienne. Admission Date: 1/4/2021 The patient's chart is reviewed and the patient is discussed with the staff. Patient is O 64 y.o.  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 He was found to have RLL mass 2/2020, measuring 6.7cm x 5.9cm. He had an EBUS On 2/26/2020 and biopsy consistent with SCC. His Brain MRI was negative for brain mets but revealed a 2cm R parotid mass. His PET CT revealed a hypermetabolic RLL mass extending into the hilum and excessively hypermetabolic R parotid gland. He had radiation and chemo and then underwent RML lobectomy 7/23/2020. He then completed 6 more rounds of chemo around 10/2020. He had repeat Chest CT on 12/22/2020 which revealed a large R pleural effusion and collapse of the remaining RUL. Pt underwent R thoracentesis on 12/28/2020 by Dr. Ned Cain with 900mL bloody fluid removed. Pt was unable to tolerate complete drainage. His Eliquis was held and he was admitted for CT placement on 1/4/21 with 800cc of serosanguineous drainage. General Surgery was consulted and CXR revealed persistent loculated R effusion. Pt went to the OR on 1/6 for R VATS which was converted to an open thoracotomy with extensive pneumolysis with decortication. Pt did require a bronchoscopy in the OR secondary to atelectasis of the RUL and mucous plugging. Pt was hypotensive postoperatively requiring aylin. Pt was transferred to ICU overflow. Pt had a repeat bronch on 1/7 secondary to persistent RUL atelectasis. He did not have any mucous plugging but his apical segment of the RUL was blocked by a web and the bronchoscope couldn't advance any further. Pt did have a stump area from prior lobectomy with abnormal appearing tissue that was biopsied,pathology is pending. Subjective: Pt lying in bed on 3L O2. Remains on enrique for hypotension. C/O chest pressure this AM, CT are clamped since yesterday. Current Facility-Administered Medications Medication Dose Route Frequency  traMADoL (ULTRAM) tablet 50 mg  50 mg Oral Q6H PRN  
 sodium chloride (NS) flush 5-40 mL  5-40 mL IntraVENous Q8H  
 sodium chloride (NS) flush 5-40 mL  5-40 mL IntraVENous PRN  
 midazolam (VERSED) injection 0.25-5 mg  0.25-5 mg IntraVENous Multiple  fentaNYL citrate (PF) injection 50 mcg  50 mcg IntraVENous Multiple  sodium chloride 3% hypertonic nebulizer soln  4 mL Nebulization BID  budesonide (PULMICORT) 500 mcg/2 ml nebulizer suspension  500 mcg Nebulization BID RT  
 acetaminophen (TYLENOL) tablet 500 mg  500 mg Oral Q6H PRN  
 lactated Ringers infusion  125 mL/hr IntraVENous CONTINUOUS  
 HYDROmorphone (PF) (DILAUDID) injection 1 mg  1 mg IntraVENous Q2H PRN  
 guaiFENesin ER (MUCINEX) tablet 1,200 mg  1,200 mg Oral Q12H  
 PHENYLephrine (ENRIQUE-SYNEPHRINE) 30 mg in 0.9% sodium chloride 250 mL infusion   mcg/min IntraVENous TITRATE  amiodarone (CORDARONE) tablet 200 mg  200 mg Oral DAILY  [Held by provider] apixaban (ELIQUIS) tablet 5 mg  5 mg Oral Q12H  pantoprazole (PROTONIX) tablet 40 mg  40 mg Oral ACB&D  
 gabapentin (NEURONTIN) capsule 300 mg  300 mg Oral TID  levalbuterol tartrate (XOPENEX) 45 mcg/actuation inhaler HFAA 2 Puff- pt to supply (Patient Supplied)  2 Puff Inhalation Q4H PRN  
 metoprolol succinate (TOPROL-XL) XL tablet 50 mg  50 mg Oral DAILY  ondansetron (ZOFRAN ODT) tablet 8 mg  8 mg Oral Q8H PRN  polyethylene glycol (MIRALAX) packet 17 g  17 g Oral DAILY  sodium chloride (NS) flush 5-40 mL  5-40 mL IntraVENous Q8H  
 sodium chloride (NS) flush 5-40 mL  5-40 mL IntraVENous PRN  
 sodium chloride (NS) flush 5-40 mL  5-40 mL IntraVENous PRN  
 oxyCODONE-acetaminophen (PERCOCET) 5-325 mg per tablet 1 Tab  1 Tab Oral Q4H PRN  
  influenza vaccine 2020-21 (6 mos+)(PF) (FLUARIX/FLULAVAL/FLUZONE QUAD) injection 0.5 mL  0.5 mL IntraMUSCular PRIOR TO DISCHARGE Review of Systems 
+cough 
+chest soreness Constitutional:  negative for fever, chills, sweats Cardiovascular:  negative for chest pain, palpitations, syncope, edema Gastrointestinal:  negative for dysphagia, reflux, vomiting, diarrhea, abdominal pain, or melena Neurologic:  negative for focal weakness, numbness, headache Objective:  
 
Vitals:  
 01/09/21 4480 01/09/21 0663 01/09/21 0827 01/09/21 4462 BP: (!) 90/59 (!) 90/58 92/62 (!) 44/94 Pulse: 71 71 77 74 Resp:      
Temp:      
SpO2: 99% 98% 95% 95% Weight:      
Height:      
 
 
 
Intake/Output Summary (Last 24 hours) at 1/9/2021 2911 Last data filed at 1/9/2021 9377 Gross per 24 hour Intake 4724.51 ml Output 2875 ml Net 1849.51 ml Physical Exam:         
Constitutional:  the patient is well developed and in no acute distress, on 3L O2   
EENMT:  Sclera clear, pupils equal, oral mucosa moist 
Respiratory: diminished on the R, CT clamped Cardiovascular:  RRR, no MRGs Gastrointestinal: soft and non-tender; with positive bowel sounds. Musculoskeletal: warm without cyanosis. There is no lower extremity edema. Skin:  no jaundice or rashes, Neurologic: no gross neuro deficits Psychiatric:  alert and oriented x 3 LINES: 
Chest port, peripheral IV L hand, art line, CT x 2, gabriel DRIPS: 
LR, aylin CXR:  
1/9/2021 1/8/2021 LAB No results for input(s): GLUCPOC in the last 72 hours. No lab exists for component: [place] Recent Labs 01/09/21 
0408 01/08/21 
0309 01/07/21 
0330 WBC 5.4 7.2 7.8 HGB 9.1* 9.9* 10.6* HCT 28.4* 31.5* 32.7*  
* 141* 158 Recent Labs 01/09/21 
0408 01/08/21 
0309 01/07/21 
0330  138 139  
K 4.0 4.0 4.3  103 106 CO2 36* 33* 29  
GLU 96 90 105* BUN 6* 7* 8  
CREA 0.40* 0.57* 0.65* MG 2.3 2.1 2.1 CA 8.2* 8.7 8.7 No results for input(s): PH, PCO2, PO2, HCO3, PHI, PCO2I, PO2I, HCO3I in the last 72 hours. No results for input(s): LCAD, LAC in the last 72 hours. No results for input(s): PH, PCO2, PO2, HCO3, PHI, PCO2I, PO2I, HCO3I in the last 72 hours. Pathology:  
 
 
 
Patient Active Problem List  
Diagnosis Code  Essential hypertension with goal blood pressure less than 130/80 I10  Mass of lower lobe of right lung R91.8  Right lower lobe lung mass R91.8  Malignant neoplasm of lower lobe of right lung (HCC) C34.31  
 GERD (gastroesophageal reflux disease) K21.9  Hypotension I95.9  Sepsis due to undetermined organism ([facility] [address] A41.9  Pulmonary emphysema ([facility] [address] J43.9  Cancer of bronchus of right lower lobe (HCC) C34.31  
 Lung cancer ([facility] [address] C34.90  
 Cancer of right lung (HCC) C34.91  
 Atrial tachycardia (HCC) I47.1  S/P lobectomy of lung Z90.2  Cough R05  Dehydration E86.0  Chemotherapy induced neutropenia (HCC) D70.1, T45.1X5A  Pleural effusion on right J90  
 Atelectasis of right lung J98.11  
 A-fib (HCC) I48.91  
 Chronic respiratory failure with hypoxia (HCC) J96.11  
 S/P thoracotomy D76.506 Assessment:  (Medical Decision Making) Hospital Problems  Date Reviewed: 1/8/2021 Codes Class Noted POA  
 S/P thoracotomy Per Dr Clarissa Pak: W52.017 ICD-9-CM: V45.89  1/7/2021 Unknown * (Principal) Atelectasis of right lung S/p bronch with airway clearance  ICD-10-CM: J98.11 ICD-9-CM: 518.0  1/4/2021 Unknown A-fib [facility]) 
--currently in NSR ICD-10-CM: I48.91 
ICD-9-CM: 427.31  1/4/2021 Unknown Chronic respiratory failure with hypoxia [facility]) ICD-10-CM: J96.11 
ICD-9-CM: 518.83, 799.02  1/4/2021 Unknown Pleural effusion on right 
--CT clamped  ICD-10-CM: J90 ICD-9-CM: 511.9  12/28/2020 Unknown Lung cancer ([address] 
-s/p lobectomy  ICD-10-CM: C34.90 ICD-9-CM: 162.9  7/23/2020 Yes Hypotension --remains on donavan  ICD-10-CM: I95.9 ICD-9-CM: 458.9  5/5/2020 Unknown Plan:  (Medical Decision Making) --pathology negative for malignant tumor  
--wean O2 as tolerated 
--back on Donavan, wean as tolerated, hold toprol this AM  
--continue /hr for now 
--pain control 
--continue airway clearance, vibra lung, IS, mucinex 
--on protonix, anticoagulation is on hold with recent surgery. Continue SCDs 
--CT per Dr Nael Silva, been clamped since yesterday  
--increase activity if OK with surgery More than 50% of the time documented was spent in face-to-face contact with the patient and in the care of the patient on the floor/unit where the patient is located. Chaitanya Ryan NP Lungs: Decreased BS in the right Heart:  RRR with no Murmur/Rubs/Gallops Additional Comments:  Wean Donavan and add Midodrine. Cont. IVF and follow CXR I have spoken with and examined the patient. I agree with the above assessment and plan as documented.  
 
Tami Lisa MD

## 2021-01-09 NOTE — PROGRESS NOTES
H&P/Consult Note/Progress Note/Office Note:  
Jeramie Dowd Jr.  MRN: [id number]  :1950  Age:70 y.o. 
 
HPI: Jeramie Dowd Jr. is a 70 y.o. male who is well known to Dr. Lazaro.  He underwent right thoracotomy with lower and middle lobectomy 20 for squamous cell carcinoma. He underwent neoaduvant chemo/radiation and adjuvant chemo..  CT 20 showed large pleural effusion and collapse of RUL. He underwent thoracentesis by Dr. Mccord 20 with approximately drainage of 900ml, but was unable to completely drain related to complaints of pain. He felt should hold Eliquis for admission for Chest tube placement.  Dr. Becerra placed chest tube this AM 21.  General surgery was consulted for complicated right pleural effusion, ?hemothorax not draining properly with chest tube placement. 
 
On exam, Mr. Dowd appears comfortable.  CT scan reviewed and current chest xray, NAD. On 3 L NC sats upper 90's.  Chest tube maintained with 800ml serosanguineous drainage noted.  Remains on 20cmsx.  Patient states since his lobectomy he has felt \"pressure\". He does use 2L oxygen at night.  She states he does feel like he can breath better since this chest tube has been placed.  His lost dose of eliquis was Friday (for A-fib).  He does have smoking history and quit about 6 years ago 
 
21 Patient states he is breathing better since chest tube. NAD on 3L NC with 98% sat. Chest xray reviewed and fluid collection appears loculated- will need surgery- plan for tomorrow. 
 
21 To the OR yesterday PO day1. Difficult surgery. Consulted pulmonary intraoperatively for broch. Unable to get lung to inflate intraoperative, ?chronic mucous plug. Pt in ICU, no complaints this AM- scheduled for bronch this AM. Chest tubes to water seal currently- no air leak- appears lung still collapsed from CXR. Pain controlled. 
 
 01/08/21 POD 2. Pt remains in ICU, no complaints this AM. Pain controlled. Chest tubes to water seal currently- no air leak. CXR this am showing No Significant Interval Change. 01/09/21 POD 3. Pt remains in ICU, no complaints this AM. Pain controlled. Chest tubes currently clamped. CXR this am showing No Significant Interval Change. Denies SOB. On 3L o2. Medical history as below Past Medical History:  
Diagnosis Date  GERD (gastroesophageal reflux disease)   
 controlled with med  Hypertension hx-- off meds since 4/2020--- followed by dr. Gissell Liz  Hypoxia 02/24/2020  Malignant neoplasm of lower lobe of right lung ([address] 02/26/2020 REC'D CHEMO AND RADIATION--- FOLLOWED BY DR. Brien Asencio Osteoarthritis  Right lower lobe lung mass 02/24/2020  Status post total right knee replacement 2/29/2016 Past Surgical History:  
Procedure Laterality Date  HX COLONOSCOPY    
 HX KNEE ARTHROSCOPY Left   
 scope X 1, ACL recon X 1  
 HX KNEE ARTHROSCOPY Right 1974, 1975 X 2   
 HX KNEE REPLACEMENT Right 2016 [address]  HX VASCULAR ACCESS Right placed 3/2020  
 port in chest   
 IR INSERT TUNL CVC W PORT OVER 5 YEARS  3/18/2020  UT SINUS SURGERY PROC UNLISTED  1988, 1991  
 sinus surg Current Facility-Administered Medications Medication Dose Route Frequency  midodrine (PROAMATINE) tablet 10 mg  10 mg Oral TID WITH MEALS  traMADoL (ULTRAM) tablet 50 mg  50 mg Oral Q6H PRN  
 sodium chloride (NS) flush 5-40 mL  5-40 mL IntraVENous Q8H  
 sodium chloride (NS) flush 5-40 mL  5-40 mL IntraVENous PRN  
 midazolam (VERSED) injection 0.25-5 mg  0.25-5 mg IntraVENous Multiple  fentaNYL citrate (PF) injection 50 mcg  50 mcg IntraVENous Multiple  sodium chloride 3% hypertonic nebulizer soln  4 mL Nebulization BID  budesonide (PULMICORT) 500 mcg/2 ml nebulizer suspension  500 mcg Nebulization BID RT  
  acetaminophen (TYLENOL) tablet 500 mg  500 mg Oral Q6H PRN  
 lactated Ringers infusion  125 mL/hr IntraVENous CONTINUOUS  
 HYDROmorphone (PF) (DILAUDID) injection 1 mg  1 mg IntraVENous Q2H PRN  
 guaiFENesin ER (MUCINEX) tablet 1,200 mg  1,200 mg Oral Q12H  
 PHENYLephrine (ENRIQUE-SYNEPHRINE) 30 mg in 0.9% sodium chloride 250 mL infusion   mcg/min IntraVENous TITRATE  amiodarone (CORDARONE) tablet 200 mg  200 mg Oral DAILY  [Held by provider] apixaban (ELIQUIS) tablet 5 mg  5 mg Oral Q12H  pantoprazole (PROTONIX) tablet 40 mg  40 mg Oral ACB&D  
 gabapentin (NEURONTIN) capsule 300 mg  300 mg Oral TID  levalbuterol tartrate (XOPENEX) 45 mcg/actuation inhaler HFAA 2 Puff- pt to supply (Patient Supplied)  2 Puff Inhalation Q4H PRN  
 metoprolol succinate (TOPROL-XL) XL tablet 50 mg  50 mg Oral DAILY  ondansetron (ZOFRAN ODT) tablet 8 mg  8 mg Oral Q8H PRN  polyethylene glycol (MIRALAX) packet 17 g  17 g Oral DAILY  sodium chloride (NS) flush 5-40 mL  5-40 mL IntraVENous Q8H  
 sodium chloride (NS) flush 5-40 mL  5-40 mL IntraVENous PRN  
 sodium chloride (NS) flush 5-40 mL  5-40 mL IntraVENous PRN  
 oxyCODONE-acetaminophen (PERCOCET) 5-325 mg per tablet 1 Tab  1 Tab Oral Q4H PRN  
 influenza vaccine 2020-21 (6 mos+)(PF) (FLUARIX/FLULAVAL/FLUZONE QUAD) injection 0.5 mL  0.5 mL IntraMUSCular PRIOR TO DISCHARGE Albuterol and Celebrex [celecoxib] Social History Socioeconomic History  Marital status:  Spouse name: Not on file  Number of children: Not on file  Years of education: Not on file  Highest education level: Not on file Tobacco Use  Smoking status: Former Smoker Packs/day: 1.00 Years: 20.00 Pack years: 20.00 Quit date:  Years since quittin.0  Smokeless tobacco: Never Used Substance and Sexual Activity  Alcohol use: Yes Alcohol/week: 4.0 standard drinks Types: 4 Glasses of wine per week  Drug use: No  
Other Topics Concern   Service No  
 Blood Transfusions No  
 Caffeine Concern No  
 Occupational Exposure No  
 Hobby Hazards No  
 Sleep Concern No  
 Stress Concern No  
 Weight Concern No  
 Special Diet No  
 Exercise Yes  [address] Yes Social History Narrative . Has long-time girlfriend. Continues to work doing IT support. Social History Tobacco Use Smoking Status Former Smoker  Packs/day: 1.00  Years: 20.00  Pack years: 20.00  Quit date:   Years since quittin.0 Smokeless Tobacco Never Used Family History Problem Relation Age of Onset  Cancer Mother   
     uterine  Arrhythmia Sister   
     afib ROS: The patient has no difficulty with chest pain or shortness of breath. No fever or chills. Comprehensive review of systems was otherwise unremarkable except as noted above. Physical Exam:  
Visit Vitals /66 Pulse 71 Temp 97.1 °F (36.2 °C) Resp 14 Ht 5' 10\" (1.778 m) Wt 201 lb 12.8 oz (91.5 kg) SpO2 97% BMI 28.96 kg/m² Constitutional: Alert, oriented, cooperative patient in no acute distress; appears stated age Eyes: Sclera are clear. EOMs intact ENMT: no external lesions gross hearing normal; no obvious neck masses, no ear or lip lesions, nares normal 
CV: RRR. Normal perfusion Resp: No JVD. Breathing is  non-labored; no audible wheezing. Right Chest tube x 2 - clamped GI: soft and non-distended Musculoskeletal: unremarkable with normal function. No embolic signs or cyanosis. Neuro:  Oriented; moves all 4; no focal deficits Psychiatric: normal affect and mood, no memory impairment Recent vitals (if inpt): 
Patient Vitals for the past 24 hrs: 
 BP Temp Pulse Resp SpO2  
21 1137     97 % 21 0858 100/66 97.1 °F (36.2 °C) 71  97 % 21 0843 105/63  75  95 % 21 0827 105/65  80  95 % 21 0627 (!) 94/59  74  95 % 01/09/21 0613 92/62  77  95 % 01/09/21 0558 (!) 90/58  71  98 % 01/09/21 0542 (!) 90/59  71  99 % 01/09/21 0527 94/60  71  99 % 01/09/21 0513 93/62  75  99 % 01/09/21 0458 104/67  74  99 % 01/09/21 0442 111/73  73 14 99 % 01/09/21 0427 107/73  74 15 97 % 01/09/21 0413 101/72  89 15 95 % 01/09/21 0358 98/67  73 14 98 % 01/09/21 0342 99/66 97.9 °F (36.6 °C) 73 15 100 % 01/09/21 0327 94/63  71 15 99 % 01/09/21 0313 91/62  69 14 99 % 01/09/21 0258 (!) 89/60  73 14 99 % 01/09/21 0242 (!) 89/62  76 15 99 % 01/09/21 0227 96/60  76 16 99 % 01/09/21 0213 (!) 89/57  79 16 90 % 01/09/21 0158 (!) 88/57  76 15 98 % 01/09/21 0142 (!) 89/60  80 17 98 % 01/09/21 0127 (!) 87/59  77 15 98 % 01/09/21 0113 (!) 87/55  79 14 98 % 01/09/21 0103 (!) 96/54  80 15 99 % 01/09/21 0058 (!) 87/55  81 16 98 % 01/09/21 0042 (!) 87/55  75 16 98 % 01/09/21 0027 (!) 86/58  75 14 98 % 01/09/21 0013 (!) 91/58  76 15 98 % 01/08/21 2358 (!) 90/59  75 17 99 % 01/08/21 2342 (!) 89/60  77 15 98 % 01/08/21 2327 (!) 89/59  89 14 98 % 01/08/21 2313 (!) 89/58 97.9 °F (36.6 °C) 81 16 98 % 01/08/21 2258 (!) 95/59  81  98 % 01/08/21 2242 (!) 92/58  81  98 % 01/08/21 2227 (!) 92/59  82 17 99 % 01/08/21 2214 102/64  83 15 97 % 01/08/21 2158 (!) 86/58  83 16 97 % 01/08/21 2142 (!) 82/58  86 18 96 % 01/08/21 2127 99/61  87 19 97 % 01/08/21 2113 100/63  87 17 98 % 01/08/21 2058 101/63  89 16 96 % 01/08/21 2042 101/68  89 15 95 % 01/08/21 2027 99/68  90 15 97 % 01/08/21 2013 (!) 96/58  87 16 95 % 01/08/21 2006     96 % 01/08/21 1958 93/62  93 15 96 % 01/08/21 1954 97/62  94 15 97 % 01/08/21 1942 97/62  92 16 96 % 01/08/21 1927 102/63  93 18 97 % 01/08/21 1913 96/68 98.4 °F (36.9 °C) 96 17 99 % 01/08/21 1858 96/66  92 15 99 % 01/08/21 1842 103/69  90 15 99 % 01/08/21 1827 104/68  92 16 100 % 01/08/21 1813 97/67  90  99 % 01/08/21 1758 112/67  95  99 % 01/08/21 1742 104/67  95  100 % 01/08/21 1727 114/68  97  100 % 01/08/21 1713 109/64  89  100 % 01/08/21 1658 104/68  90  98 % 01/08/21 1643 101/66  87  98 % 01/08/21 1628 (!) 88/49  96  97 % 01/08/21 1613 (!) 92/58  91  99 % 01/08/21 1600   88  100 % 01/08/21 1558 (!) 94/58  92  100 % 01/08/21 1544 (!) 106/55  97  96 % 01/08/21 1527 (!) 95/55  (!) 101  99 % 01/08/21 1513 (!) 93/55  96  99 % 01/08/21 1458 (!) 96/53 99 °F (37.2 °C) 95 18 98 % 01/08/21 1443 (!) 92/50  99  96 % 01/08/21 1427 (!) 90/57  92  98 % 01/08/21 1413 (!) 89/57  (!) 106  98 % 01/08/21 1358 (!) 91/54  98  99 % 01/08/21 1343 (!) 83/54  93  99 % XR Results (most recent): 
Results from Hospital Encounter encounter on 01/04/21 XR CHEST SNGL V  
 Narrative EXAM: TEMPORARY INDICATION: Respiratory failure, history of lung carcinoma. COMPARISON: 1/8/2021 FINDINGS: A portable AP radiograph of the chest was obtained at 0407 hours. Persistent right hydropneumothorax a right-sided chest tube is in place. Persistent atelectasis of the right lung. tThe lungs are clear. The cardiac and 
mediastinal contours and pulmonary vascularity are normal.  The bones and soft 
tissues are grossly within normal limits. Impression IMPRESSION: Persistent right hydropneumothorax and atelectasis of the right lung 
unchanged. CT Results (most recent): 
Results from [facility] Encounter encounter on 12/22/20 CT CHEST W CONT Narrative EXAMINATION: CT CHEST W CONT 12/22/2020 10:57 AM 
 
INDICATION: Malignant neoplasm of the right lower lobe. Restaging COMPARISON: CT chest July 1, 2020 and chest radiograph July 30, 2020 TECHNIQUE: Multiple contiguous axial CT images of the chest were obtained from 
the lung apices to the lung bases after the intravenous administration of 100 mL Isovue-370 contrast material .  
 
Radiation dose reduction techniques were used for this study:  Our CT scanners 
use one or all of the following: Automated exposure control, adjustment of the 
mA and/or kVp according to patient's size, iterative reconstruction. 
 
FINDINGS: 
Lower Neck: No abnormality 
 
Chest soft tissues: Right chest port terminates at the cavoatrial junction. 
 
Heart/Mediastinum: There is slight mediastinal shift to the right. No definite 
hilar or mediastinal adenopathy. There may be circumferential wall thickening of 
the distal esophagus. Normal caliber thoracic aorta. Patent proximal pulmonary 
arteries. Coronary atherosclerosis. 
 
Lungs: There has been complete atelectasis of the residual right lung. No 
abnormality of the left lung. 
 
Pleura: There has been development of a large right pleural effusion which fills 
the entirety of the right hemithorax there is some smooth peripheral enhancement 
of the pleural effusion. This is new from chest radiograph July 30, 2020 and 
chest CT July 1, 2020. 
 
Visualized upper abdomen: Cholelithiasis without cholecystitis. 
 
Osseous structures: No suspicious osseous lesion. Right posterior thoracotomy 
changes. 
  
 Impression IMPRESSION: 
1. New from July 30,2020, there is complete collapse of the residual right lung 
with development of a large pleural effusion involving the entire hemithorax. 
There is some smooth thin peripheral enhancement of the effusion which may 
reflect mild complexity. 
2. No definite mediastinal adenopathy 
3. Possible circumferential thickening of the distal esophagus. Correlation 
could be made for esophagitis. 
 
689 
  
 
 
 
 
 
Labs: 
Recent Labs  
  01/09/21 
0408  
WBC 5.4  
HGB 9.1*  
*  
  
K 4.0  
  
CO2 36*  
BUN 6*  
CREA 0.40*  
GLU 96  
 
 
Lab Results  
Component Value Date/Time  
 WBC 5.4 01/09/2021 04:08 AM  
 HGB 9.1 (L) 01/09/2021 04:08 AM  
 PLATELET 144 (L) 01/09/2021 04:08 AM  
 Sodium 139 01/09/2021 04:08 AM  
 Potassium 4.0 01/09/2021 04:08 AM  
 Chloride 104 01/09/2021 04:08 AM  
 CO2 36 (H) 01/09/2021 04:08 AM  
 BUN 6 (L) 01/09/2021 04:08 AM  
 Creatinine 0.40 (L) 01/09/2021 04:08 AM  
 Glucose 96 01/09/2021 04:08 AM  
 INR 1.1 01/05/2021 04:02 PM  
 aPTT 35.2 (H) 01/05/2021 04:02 PM  
 Bilirubin, total 0.5 01/05/2021 06:26 AM  
 Bilirubin, direct 0.8 (H) 05/05/2020 02:27 PM  
 ALT (SGPT) 8 (L) 01/05/2021 06:26 AM  
 Alk. phosphatase 99 01/05/2021 06:26 AM  
 Troponin-I, Qt. <0.02 (L) 05/05/2020 01:09 AM  
 
 
I reviewed recent labs and recent radiologic studies. I independently reviewed radiology images for studies I described above or studies I have ordered. Admission date (for inpatients): 1/4/2021 Day of Surgery  Procedure(s): RIGHT VIDEO ASISTED THORACOSCOPY, DECORTICATION 
THORACOTOMY ASSESSMENT/PLAN: 
Problem List  Date Reviewed: 1/8/2021 Codes Class Noted S/P thoracotomy ICD-10-CM: Z44.422 ICD-9-CM: V45.89  1/7/2021 * (Principal) Atelectasis of right lung ICD-10-CM: J98.11 ICD-9-CM: 518.0  1/4/2021 A-fib [facility]) ICD-10-CM: I48.91 
ICD-9-CM: 427.31  1/4/2021 Chronic respiratory failure with hypoxia [facility]) ICD-10-CM: J96.11 
ICD-9-CM: 518.83, 799.02  1/4/2021 Pleural effusion on right ICD-10-CM: J90 ICD-9-CM: 511.9  12/28/2020 Chemotherapy induced neutropenia (HCC) ICD-10-CM: D70.1, T45.1X5A 
ICD-9-CM: 288.03, E933.1  10/23/2020 Dehydration ICD-10-CM: E86.0 ICD-9-CM: 276.51  9/15/2020 S/P lobectomy of lung ICD-10-CM: Z90.2 ICD-9-CM: V45.89  7/29/2020 Cough ICD-10-CM: R05 ICD-9-CM: 786.2  7/29/2020 Atrial tachycardia (HCC) ICD-10-CM: I47.1 ICD-9-CM: 427.89  7/26/2020 Cancer of bronchus of right lower lobe [facility]) ICD-10-CM: C34.31 
ICD-9-CM: 162.5  7/23/2020 Lung cancer [facility]) ICD-10-CM: C34.90 ICD-9-CM: 162.9  7/23/2020  Cancer of right lung [facility]) ICD-10-CM: C34.91 
 ICD-9-CM: 162.9  7/23/2020 Pulmonary emphysema ([facility] [address] ICD-10-CM: J43.9 ICD-9-CM: 492.8  7/7/2020 GERD (gastroesophageal reflux disease) ICD-10-CM: K21.9 ICD-9-CM: 530.81  Unknown Hypotension ICD-10-CM: I95.9 ICD-9-CM: 458.9  5/5/2020 Sepsis due to undetermined organism [facility]) ICD-10-CM: A41.9 ICD-9-CM: 038.9  5/5/2020 Malignant neoplasm of lower lobe of right lung (HCC) (Chronic) ICD-10-CM: C34.31 
ICD-9-CM: 162.5  2/26/2020 Overview Signed 1/4/2021  7:38 AM by Rosey Prieto MD  
  Dec 2020- Echo EF 50%, technically difficult, cannot assess regional wall motion. Aortic root 4.1 cm. No significant valvular disease. Normal DF Mass of lower lobe of right lung ICD-10-CM: R91.8 ICD-9-CM: 786.6  2/23/2020 Right lower lobe lung mass ICD-10-CM: R91.8 ICD-9-CM: 786.6  2/23/2020 Essential hypertension with goal blood pressure less than 130/80 (Chronic) ICD-10-CM: I10 
ICD-9-CM: 401.9  2/19/2018 Principal Problem: 
  Atelectasis of right lung (1/4/2021) Active Problems: Hypotension (5/5/2020) Lung cancer ([address] (7/23/2020) Pleural effusion on right (12/28/2020) A-fib ([address] (1/4/2021) Chronic respiratory failure with hypoxia ([address] (1/4/2021) S/P thoracotomy (1/7/2021) Plan Right Chest tube x 2 clamped Pulmonary following Regular Diet Activity as tolerated Pain control Encourage C/DB/IS Signed:  Leeann Connor NP

## 2021-01-09 NOTE — PROGRESS NOTES
Bedside report received from [facility]. Pt resting quietly in bed. Alert and oriented x4. Lung sounds clear on the left and absent on the right. Chest tubes on right side clamped per MD order. 02 sats 90's with 3LNC. Pulses palpable and present all extremities. Negative for edema. Abdomen soft and intact with active bowel sounds. Howell in place draining yellow clear urine. Pt states R side is sore- pain 3/10. Discussed pain medication options and pt stated he would wait a little longer. Pt asked about dinner- did not receive dinner tray. Indicated this was the third time it has happened. Ordered a sandwich tray and gave pt pudding and apple sauce. Content at this time with no needs. VSS with aylin infusing at 40mcg and LR at 125 ml/hr.

## 2021-01-10 NOTE — PROGRESS NOTES
Critical Care Daily Progress Note: 1/10/2021 Amina De León. Admission Date: 1/4/2021 The patient's chart is reviewed and the patient is discussed with the staff. Patient is K 89 y.o.  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 He was found to have RLL mass 2/2020, measuring 6.7cm x 5.9cm. He had an EBUS On 2/26/2020 and biopsy consistent with SCC. His Brain MRI was negative for brain mets but revealed a 2cm R parotid mass. His PET CT revealed a hypermetabolic RLL mass extending into the hilum and excessively hypermetabolic R parotid gland. He had radiation and chemo and then underwent RML lobectomy 7/23/2020. He then completed 6 more rounds of chemo around 10/2020. He had repeat Chest CT on 12/22/2020 which revealed a large R pleural effusion and collapse of the remaining RUL. Pt underwent R thoracentesis on 12/28/2020 by Dr. Joe Leal with 900mL bloody fluid removed. Pt was unable to tolerate complete drainage. His Eliquis was held and he was admitted for CT placement on 1/4/21 with 800cc of serosanguineous drainage. General Surgery was consulted and CXR revealed persistent loculated R effusion. Pt went to the OR on 1/6 for R VATS which was converted to an open thoracotomy with extensive pneumolysis with decortication. Pt did require a bronchoscopy in the OR secondary to atelectasis of the RUL and mucous plugging. Pt was hypotensive postoperatively requiring aylin. Pt was transferred to ICU overflow. Pt had a repeat bronch on 1/7 secondary to persistent RUL atelectasis. He did not have any mucous plugging but his apical segment of the RUL was blocked by a web and the bronchoscope couldn't advance any further. Pt did have a stump area from prior lobectomy with abnormal appearing tissue that was biopsied,pathology is pending. Subjective: Pt lying in bed on 3L O2. Receiving breathing treatment at this time. Eaten breakfast.  Now off enrique since 1800 yesterday. CT remain clamped and per surgery. Pt with multiple questions regarding how long his lung has been collapsed. After review of chart, it seems last CT prior to the one in Dec was in July. July CXR showed probable small effusion and faint densities in RLL zone, which is consistent with known mass. CXR in January showed large R pleural effusion that was address with CT. Dr Bulmaro Braga was consulted and is following. Current Facility-Administered Medications Medication Dose Route Frequency  midodrine (PROAMATINE) tablet 10 mg  10 mg Oral TID WITH MEALS  traMADoL (ULTRAM) tablet 50 mg  50 mg Oral Q6H PRN  
 sodium chloride (NS) flush 5-40 mL  5-40 mL IntraVENous Q8H  
 sodium chloride (NS) flush 5-40 mL  5-40 mL IntraVENous PRN  
 midazolam (VERSED) injection 0.25-5 mg  0.25-5 mg IntraVENous Multiple  fentaNYL citrate (PF) injection 50 mcg  50 mcg IntraVENous Multiple  sodium chloride 3% hypertonic nebulizer soln  4 mL Nebulization BID  budesonide (PULMICORT) 500 mcg/2 ml nebulizer suspension  500 mcg Nebulization BID RT  
 acetaminophen (TYLENOL) tablet 500 mg  500 mg Oral Q6H PRN  
 lactated Ringers infusion  125 mL/hr IntraVENous CONTINUOUS  
 HYDROmorphone (PF) (DILAUDID) injection 1 mg  1 mg IntraVENous Q2H PRN  
 guaiFENesin ER (MUCINEX) tablet 1,200 mg  1,200 mg Oral Q12H  
 PHENYLephrine (ENRIQUE-SYNEPHRINE) 30 mg in 0.9% sodium chloride 250 mL infusion   mcg/min IntraVENous TITRATE  amiodarone (CORDARONE) tablet 200 mg  200 mg Oral DAILY  [Held by provider] apixaban (ELIQUIS) tablet 5 mg  5 mg Oral Q12H  pantoprazole (PROTONIX) tablet 40 mg  40 mg Oral ACB&D  
 gabapentin (NEURONTIN) capsule 300 mg  300 mg Oral TID  levalbuterol tartrate (XOPENEX) 45 mcg/actuation inhaler HFAA 2 Puff- pt to supply (Patient Supplied)  2 Puff Inhalation Q4H PRN  
  ondansetron (ZOFRAN ODT) tablet 8 mg  8 mg Oral Q8H PRN  polyethylene glycol (MIRALAX) packet 17 g  17 g Oral DAILY  sodium chloride (NS) flush 5-40 mL  5-40 mL IntraVENous Q8H  
 sodium chloride (NS) flush 5-40 mL  5-40 mL IntraVENous PRN  
 sodium chloride (NS) flush 5-40 mL  5-40 mL IntraVENous PRN  
 oxyCODONE-acetaminophen (PERCOCET) 5-325 mg per tablet 1 Tab  1 Tab Oral Q4H PRN  
 influenza vaccine 2020-21 (6 mos+)(PF) (FLUARIX/FLULAVAL/FLUZONE QUAD) injection 0.5 mL  0.5 mL IntraMUSCular PRIOR TO DISCHARGE Review of Systems 
+cough 
+chest soreness Constitutional:  negative for fever, chills, sweats Cardiovascular:  negative for chest pain, palpitations, syncope, edema Gastrointestinal:  negative for dysphagia, reflux, vomiting, diarrhea, abdominal pain, or melena Neurologic:  negative for focal weakness, numbness, headache Objective:  
 
Vitals:  
 01/10/21 4019 01/10/21 1600 01/10/21 8263 01/10/21 3859 BP: (!) 88/61 (!) 87/64 (!) 86/57 92/62 Pulse: 87 80 87 79 Resp:      
Temp:      
SpO2: 95% 97% 96% 97% Weight:      
Height:      
 
 
 
Intake/Output Summary (Last 24 hours) at 1/10/2021 0830 Last data filed at 1/10/2021 5514 Gross per 24 hour Intake 1992 ml Output 1750 ml Net 242 ml Physical Exam:         
Constitutional:  the patient is well developed and in no acute distress, on 3L O2   
EENMT:  Sclera clear, pupils equal, oral mucosa moist 
Respiratory: diminished on the R CT x 2 clamped Cardiovascular:  Irregularly irregular Gastrointestinal: soft and non-tender; with positive bowel sounds. Musculoskeletal: warm without cyanosis. There is no lower extremity edema. Skin:  no jaundice or rashes, Neurologic: no gross neuro deficits Psychiatric:  alert and oriented x 3 LINES: 
Chest port, peripheral IV L hand, art line, CT x 2, gabriel DRIPS: 
LR 
 
CXR:  
01/10/21      01/09/21 LAB No results for input(s): GLUCPOC in the last 72 hours. No lab exists for component: [place] Recent Labs  
  01/10/21 
0413 01/09/21 
0408 01/08/21 
0309 WBC 3.2* 5.4 7.2 HGB 8.7* 9.1* 9.9*  
HCT 27.4* 28.4* 31.5* * 144* 141* Recent Labs  
  01/10/21 
0413 01/09/21 
0408 01/08/21 
0309  139 138  
K 4.0 4.0 4.0  
 104 103 CO2 34* 36* 33* * 96 90 BUN 4* 6* 7*  
CREA 0.45* 0.40* 0.57* MG  --  2.3 2.1 CA 8.1* 8.2* 8.7 No results for input(s): PH, PCO2, PO2, HCO3, PHI, PCO2I, PO2I, HCO3I in the last 72 hours. No results for input(s): LCAD, LAC in the last 72 hours. No results for input(s): PH, PCO2, PO2, HCO3, PHI, PCO2I, PO2I, HCO3I in the last 72 hours. Patient Active Problem List  
Diagnosis Code  Essential hypertension with goal blood pressure less than 130/80 I10  Mass of lower lobe of right lung R91.8  Right lower lobe lung mass R91.8  Malignant neoplasm of lower lobe of right lung (HCC) C34.31  
 GERD (gastroesophageal reflux disease) K21.9  Hypotension I95.9  Sepsis due to undetermined organism ([address] A41.9  Pulmonary emphysema ([address] J43.9  Cancer of bronchus of right lower lobe (HCC) C34.31  
 Lung cancer ([facility] [address] C34.90  
 Cancer of right lung (HCC) C34.91  
 Atrial tachycardia (HCC) I47.1  S/P lobectomy of lung Z90.2  Cough R05  Dehydration E86.0  Chemotherapy induced neutropenia (HCC) D70.1, T45.1X5A  Pleural effusion on right J90  
 Atelectasis of right lung J98.11  
 A-fib (HCC) I48.91  
 Chronic respiratory failure with hypoxia ([facility]) J96.11  
 S/P thoracotomy G76.686 Assessment:  (Medical Decision Making) Hospital Problems  Date Reviewed: 1/8/2021 Codes Class Noted POA  
 S/P thoracotomy ICD-10-CM: E44.160 ICD-9-CM: V45.89  1/7/2021 Unknown * (Principal) Atelectasis of right lung ICD-10-CM: J98.11 ICD-9-CM: 518.0  1/4/2021 Unknown  A-fib [facility]) ICD-10-CM: I48.91 
 ICD-9-CM: 427.31  1/4/2021 Unknown Chronic respiratory failure with hypoxia [facility]) ICD-10-CM: J96.11 
ICD-9-CM: 518.83, 799.02  1/4/2021 Unknown Pleural effusion on right ICD-10-CM: J90 ICD-9-CM: 511.9  12/28/2020 Unknown Lung cancer [facility]) ICD-10-CM: C34.90 ICD-9-CM: 162.9  7/23/2020 Yes Hypotension ICD-10-CM: I95.9 ICD-9-CM: 458.9  5/5/2020 Unknown Plan:  (Medical Decision Making)  
 
--wean O2 as tolerated, on 3 LPM NC  
--donavan has been off since 1800 yesterday  
--continue /hr for now 
--pain control 
--continue airway clearance, vibra lung, IS, mucinex 
--on protonix, anticoagulation is on hold with recent surgery. Continue SCDs 
--CT remain clamped per surgery, CXR with no significant interval change this AM  
--pt with multiple questions regarding how long lung has been collapsed. Information of scans noted in subjective. MD to discuss please. --ok to move to floor from pulmonary standpoint. More than 50% of the time documented was spent in face-to-face contact with the patient and in the care of the patient on the floor/unit where the patient is located. Terrence Martinez, MEHDI Lungs: decreased BS in the bases Heart:  RRR with no Murmur/Rubs/Gallops Additional Comments:  BP is better with Midodrine, off Donavan drip. Likely need pneumonectomy. Can go to floor if ok with surgery I have spoken with and examined the patient. I agree with the above assessment and plan as documented.  
 
Sujata Dye MD 
 
 
 
 
 
 no

## 2021-01-10 NOTE — PROGRESS NOTES
[facility] 
[facility]. [address] [place] [address], [address] [facility], [address] [facility] 
[phone] Office Note/H&P/Consult Note Raysa Arredondo. MRN: [id number]     : 1950 HPI: Raysa Bell is a 78 y.o. male who is well known to Dr. Dereje Beyer.  He underwent right thoracotomy with lower and middle lobectomy 20 for squamous cell carcinoma. He underwent neoaduvant chemo/radiation and adjuvant chemo. .  CT 20 showed large pleural effusion and collapse of RUL. He underwent thoracentesis by Dr. Junie Montgomery 20 with approximately drainage of 900ml, but was unable to completely drain related to complaints of pain. He felt should hold Eliquis for admission for Chest tube placement. Dr. Philipp Prieto placed chest tube this AM 21. General surgery was consulted for complicated right pleural effusion, ?hemothorax not draining properly with chest tube placement. 
  
On exam, Mr. Trina Woodson appears comfortable.  CT scan reviewed and current chest xray, NAD. On 3 L NC sats upper 90's. Chest tube maintained with 800ml serosanguineous drainage noted. Remains on 20cmsx. Patient states since his lobectomy he has felt \"pressure\". He does use 2L oxygen at night. He states he does feel like he can breath better since this chest tube has been placed. His last dose of eliquis was Friday (for A-fib). He does have smoking history and quit about 6 years ago 
  
21 Patient states he is breathing better since chest tube. NAD on 3L NC with 98% sat. Chest xray reviewed and fluid collection appears loculated- will need surgery- plan for tomorrow. 
  
21 To the OR yesterday PO day1. Difficult surgery. Consulted pulmonary intraoperatively for broch. Unable to get lung to inflate intraoperative, ?chronic mucous plug. Pt in ICU, no complaints this AM- scheduled for bronch this AM. Chest tubes to water seal currently- no air leak- appears lung still collapsed from CXR.  Pain controlled. 
  
 01/08/21 POD 2. Pt remains in ICU, no complaints this AM. Pain controlled. Chest tubes to water seal currently- no air leak. CXR this am showing No Significant Interval Change.  
  
01/09/21 POD 3. Pt remains in ICU, no complaints this AM. Pain controlled. Chest tubes currently clamped. CXR this am showing No Significant Interval Change. Denies SOB. On 3L o2. 
 
01/10/21 POD 4. Pt remains in ICU, no complaints this AM. Pain controlled. Chest tubes currently clamped. CXR this am showing no Significant Interval Change. Denies SOB. O2 stats in the 90's on [facility] Past Medical History:  
Diagnosis Date  GERD (gastroesophageal reflux disease)   
 controlled with med  Hypertension hx-- off meds since 4/2020--- followed by dr. Aminata Wall  Hypoxia 02/24/2020  Malignant neoplasm of lower lobe of right lung ([facility] [address] 02/26/2020 REC'D CHEMO AND RADIATION--- FOLLOWED BY DR. Hilary Coyle Osteoarthritis  Right lower lobe lung mass 02/24/2020  Status post total right knee replacement 2/29/2016 Past Surgical History:  
Procedure Laterality Date  HX COLONOSCOPY    
 HX KNEE ARTHROSCOPY Left   
 scope X 1, ACL recon X 1  
 HX KNEE ARTHROSCOPY Right 1974, 1975 X 2   
 HX KNEE REPLACEMENT Right 2016 [address]  HX VASCULAR ACCESS Right placed 3/2020  
 port in chest   
 IR INSERT TUNL CVC W PORT OVER 5 YEARS  3/18/2020  VT SINUS SURGERY PROC UNLISTED  1988, 1991  
 sinus surg Current Facility-Administered Medications Medication Dose Route Frequency  bisacodyL (DULCOLAX) suppository 10 mg  10 mg Rectal DAILY PRN  
 midodrine (PROAMATINE) tablet 10 mg  10 mg Oral TID WITH MEALS  traMADoL (ULTRAM) tablet 50 mg  50 mg Oral Q6H PRN  
 sodium chloride (NS) flush 5-40 mL  5-40 mL IntraVENous Q8H  
 sodium chloride (NS) flush 5-40 mL  5-40 mL IntraVENous PRN  
 midazolam (VERSED) injection 0.25-5 mg  0.25-5 mg IntraVENous Multiple  fentaNYL citrate (PF) injection 50 mcg  50 mcg IntraVENous Multiple  sodium chloride 3% hypertonic nebulizer soln  4 mL Nebulization BID  budesonide (PULMICORT) 500 mcg/2 ml nebulizer suspension  500 mcg Nebulization BID RT  
 acetaminophen (TYLENOL) tablet 500 mg  500 mg Oral Q6H PRN  
 lactated Ringers infusion  125 mL/hr IntraVENous CONTINUOUS  
 HYDROmorphone (PF) (DILAUDID) injection 1 mg  1 mg IntraVENous Q2H PRN  
 guaiFENesin ER (MUCINEX) tablet 1,200 mg  1,200 mg Oral Q12H  
 PHENYLephrine (ENRIQUE-SYNEPHRINE) 30 mg in 0.9% sodium chloride 250 mL infusion   mcg/min IntraVENous TITRATE  amiodarone (CORDARONE) tablet 200 mg  200 mg Oral DAILY  [Held by provider] apixaban (ELIQUIS) tablet 5 mg  5 mg Oral Q12H  pantoprazole (PROTONIX) tablet 40 mg  40 mg Oral ACB&D  
 gabapentin (NEURONTIN) capsule 300 mg  300 mg Oral TID  levalbuterol tartrate (XOPENEX) 45 mcg/actuation inhaler HFAA 2 Puff- pt to supply (Patient Supplied)  2 Puff Inhalation Q4H PRN  
 ondansetron (ZOFRAN ODT) tablet 8 mg  8 mg Oral Q8H PRN  polyethylene glycol (MIRALAX) packet 17 g  17 g Oral DAILY  sodium chloride (NS) flush 5-40 mL  5-40 mL IntraVENous Q8H  
 sodium chloride (NS) flush 5-40 mL  5-40 mL IntraVENous PRN  
 sodium chloride (NS) flush 5-40 mL  5-40 mL IntraVENous PRN  
 oxyCODONE-acetaminophen (PERCOCET) 5-325 mg per tablet 1 Tab  1 Tab Oral Q4H PRN  
 influenza vaccine 2020-21 (6 mos+)(PF) (FLUARIX/FLULAVAL/FLUZONE QUAD) injection 0.5 mL  0.5 mL IntraMUSCular PRIOR TO DISCHARGE ALLERGIES:  Albuterol and Celebrex [celecoxib] Social History Socioeconomic History  Marital status:  Spouse name: Not on file  Number of children: Not on file  Years of education: Not on file  Highest education level: Not on file Tobacco Use  Smoking status: Former Smoker Packs/day: 1.00 Years: 20.00 Pack years: 20.00 Quit date: 2014 Years since quittin.0  Smokeless tobacco: Never Used Substance and Sexual Activity  Alcohol use: Yes Alcohol/week: 4.0 standard drinks Types: 4 Glasses of wine per week  Drug use: No  
Other Topics Concern   Service No  
 Blood Transfusions No  
 Caffeine Concern No  
 Occupational Exposure No  
 Hobby Hazards No  
 Sleep Concern No  
 Stress Concern No  
 Weight Concern No  
 Special Diet No  
 Exercise Yes  [facility] Yes Social History Narrative . Has long-time girlfriend. Continues to work doing IT support. Social History Tobacco Use Smoking Status Former Smoker  Packs/day: 1.00  Years: 20.00  Pack years: 20.00  Quit date:   Years since quittin.0 Smokeless Tobacco Never Used Family History Problem Relation Age of Onset  Cancer Mother   
     uterine  Arrhythmia Sister   
     afib ROS: The patient has no difficulty with chest pain or shortness of breath. No fever or chills. Comprehensive review of systems was otherwise unremarkable except as noted above. Physical Exam:  
Constitutional: Alert oriented cooperative patient in no acute distress. Appears comfortable in bed. Visit Vitals /67 Pulse 81 Temp 98.3 °F (36.8 °C) Resp 16 Ht 5' 10\" (1.778 m) Wt 201 lb 12.8 oz (91.5 kg) SpO2 99% BMI 28.96 kg/m² Eyes:Sclera are clear, pupils symmetric ENMT: ears have no obvious external lesions; no obvious neck masses; no lip lesions CV: RRR. Normal perfusion; no embolic signs Resp: No JVD. Breathing is  non-labored. No audible wheezing GI: soft and non-distended Musculoskeletal: no significant asymmetry; normal tone; unremarkable with normal function. Neuro:  No obvious focal deficits; moves all 4; A&O x3 
Psychiatric: normal affect and mood, no memory impairment Labs: 
Lab Results Component Value Date/Time  WBC 3.2 (L) 01/10/2021 04:13 AM  
 HGB 8.7 (L) 01/10/2021 04:13 AM  
 PLATELET 985 (L) 25/45/9021 04:13 AM  
 Sodium 138 01/10/2021 04:13 AM  
 Potassium 4.0 01/10/2021 04:13 AM  
 Chloride 101 01/10/2021 04:13 AM  
 CO2 34 (H) 01/10/2021 04:13 AM  
 BUN 4 (L) 01/10/2021 04:13 AM  
 Creatinine 0.45 (L) 01/10/2021 04:13 AM  
 Glucose 106 (H) 01/10/2021 04:13 AM  
 INR 1.1 01/05/2021 04:02 PM  
 aPTT 35.2 (H) 01/05/2021 04:02 PM  
 Bilirubin, total 0.5 01/05/2021 06:26 AM  
 Bilirubin, direct 0.8 (H) 05/05/2020 02:27 PM  
 ALT (SGPT) 8 (L) 01/05/2021 06:26 AM  
 Alk. phosphatase 99 01/05/2021 06:26 AM  
 Troponin-I, Qt. <0.02 (L) 05/05/2020 01:09 AM  
 
Patient Vitals for the past 24 hrs: 
 Temp Pulse Resp BP SpO2  
01/10/21 0953  81  101/67 99 % 01/10/21 0924  89  116/64 98 % 01/10/21 0853  83  95/70 96 % 01/10/21 0832     94 % 01/10/21 0823  90  108/72 93 % 01/10/21 0753 98.3 °F (36.8 °C) 83  92/64 93 % 01/10/21 0723  80  91/63 96 % 01/10/21 0623  79  92/62 97 % 01/10/21 0553  87  (!) 86/57 96 % 01/10/21 0523  80  (!) 87/64 97 % 01/10/21 0453  87  (!) 88/61 95 % 01/10/21 0423  86 16 (!) 93/59 96 % 01/10/21 0353 98.1 °F (36.7 °C) 81 16 90/61 99 % 01/10/21 0324  82 15 (!) 89/55 99 % 01/10/21 0253  87 14 90/61 99 % 01/10/21 0223  79 18 97/63 100 % 01/10/21 0153  82 16 (!) 89/59 99 % 01/10/21 0123  82 14 (!) 88/59 94 % 01/10/21 0053  84 15 (!) 89/60 99 % 01/10/21 0023  82 16 (!) 88/57 100 % 01/09/21 2352  85 15 94/61 100 % 01/09/21 2323  89 14 (!) 92/59 100 % 01/09/21 2253 98.2 °F (36.8 °C) 91 15 94/60 99 % 01/09/21 2230  92 14 91/60 96 % 01/09/21 2155     98 % 01/09/21 2153  85 15 (!) 88/55 98 % 01/09/21 2123  82 16 (!) 83/54 98 % 01/09/21 2053  85 16 (!) 84/54 94 % 01/09/21 2023  85 15 (!) 85/55 97 % 01/1950  85 16 (!) 82/54 95 % 01/09/21 1945 98.5 °F (36.9 °C) 86 15 (!) 90/54 94 % 01/09/21 1923  87 15 (!) 75/51 95 % 01/09/21 1853  87 14 (!) 85/52 94 % 01/09/21 1824  91 15 104/61 97 % 01/09/21 1753  85  124/80 97 % 01/09/21 1631  91  131/87 96 % 01/09/21 1523 97.7 °F (36.5 °C) 82  107/69 99 % 01/09/21 1503  93  99/75 98 % 01/09/21 1443  73  102/68 99 % 01/09/21 1422  69  107/71 99 % 01/09/21 1403  73  96/68 97 % 01/09/21 1342  71  104/68 99 % 01/09/21 1322  85  100/65 99 % 01/09/21 1303  73  98/69 100 % 01/09/21 1243  76  120/63 98 % No results found for this or any previous visit. I reviewed patient's medications, labs, and independently reviewed relevant radiologic studies if available. Labs/radiology studies as ordered in connect care or in scanned in media tab if pt is pre-op. Amt of data reviewed moderate-extensive Assessment/Plan:     )Laury Pagan. is a 78 y.o. male who has signs and symptoms consistent with the below issues: 
Problem List  Date Reviewed: 1/8/2021 Codes Class Noted S/P thoracotomy ICD-10-CM: L60.695 ICD-9-CM: V45.89  1/7/2021 * (Principal) Atelectasis of right lung ICD-10-CM: J98.11 ICD-9-CM: 518.0  1/4/2021 A-fib [facility]) ICD-10-CM: I48.91 
ICD-9-CM: 427.31  1/4/2021 Chronic respiratory failure with hypoxia [facility]) ICD-10-CM: J96.11 
ICD-9-CM: 518.83, 799.02  1/4/2021 Pleural effusion on right ICD-10-CM: J90 ICD-9-CM: 511.9  12/28/2020 Chemotherapy induced neutropenia (HCC) ICD-10-CM: D70.1, T45.1X5A 
ICD-9-CM: 288.03, E933.1  10/23/2020 Dehydration ICD-10-CM: E86.0 ICD-9-CM: 276.51  9/15/2020 S/P lobectomy of lung ICD-10-CM: Z90.2 ICD-9-CM: V45.89  7/29/2020 Cough ICD-10-CM: R05 ICD-9-CM: 786.2  7/29/2020 Atrial tachycardia (HCC) ICD-10-CM: I47.1 ICD-9-CM: 427.89  7/26/2020 Cancer of bronchus of right lower lobe [facility]) ICD-10-CM: C34.31 
ICD-9-CM: 162.5  7/23/2020 Lung cancer [facility]) ICD-10-CM: C34.90 ICD-9-CM: 162.9  7/23/2020 Cancer of right lung [facility]) ICD-10-CM: C34.91 
ICD-9-CM: 162.9  7/23/2020 Pulmonary emphysema ([address] ICD-10-CM: J43.9 ICD-9-CM: 492.8  7/7/2020 GERD (gastroesophageal reflux disease) ICD-10-CM: K21.9 ICD-9-CM: 530.81  Unknown Hypotension ICD-10-CM: I95.9 ICD-9-CM: 458.9  5/5/2020 Sepsis due to undetermined organism [facility]) ICD-10-CM: A41.9 ICD-9-CM: 038.9  5/5/2020 Malignant neoplasm of lower lobe of right lung (HCC) (Chronic) ICD-10-CM: C34.31 
ICD-9-CM: 162.5  2/26/2020 Overview Signed 1/4/2021  7:38 AM by Maisha Scott MD  
  Dec 2020- Echo EF 50%, technically difficult, cannot assess regional wall motion. Aortic root 4.1 cm. No significant valvular disease. Normal DF Mass of lower lobe of right lung ICD-10-CM: R91.8 ICD-9-CM: 786.6  2/23/2020 Right lower lobe lung mass ICD-10-CM: R91.8 ICD-9-CM: 786.6  2/23/2020 Essential hypertension with goal blood pressure less than 130/80 (Chronic) ICD-10-CM: I10 
ICD-9-CM: 401.9  2/19/2018 Plan Right Chest tube x 2 clamped Pulmonary following Regular Diet Activity as tolerated Pain control Encourage C/DB/IS Ara Burkitt, PA-SII

## 2021-01-10 NOTE — PROGRESS NOTES
Bedside report received from [facility]. Pt awake, alert and oriented x4. Lungs sounds clear on left side and absent on right. Chest tubes clamped per MD order. 02 ats 90's on 3LNC. Abdomen soft and intact with active bowel sounds. Pulses palpable and present all extremities. No edema present. Howell draining yellow clear urine. Denies pain at this time. VSS- LR infusing at 125ml/hr. No needs stated. Will continue to monitor.

## 2021-01-11 PROBLEM — J90 PLEURAL EFFUSION ON RIGHT: Chronic | Status: ACTIVE | Noted: 2020-01-01

## 2021-01-11 PROBLEM — I95.9 HYPOTENSION: Chronic | Status: ACTIVE | Noted: 2020-05-05

## 2021-01-11 PROBLEM — Z98.890 S/P THORACOTOMY: Chronic | Status: ACTIVE | Noted: 2021-01-01

## 2021-01-11 PROBLEM — C34.90 LUNG CANCER (HCC): Chronic | Status: ACTIVE | Noted: 2020-07-23

## 2021-01-11 PROBLEM — I48.91 A-FIB (HCC): Chronic | Status: ACTIVE | Noted: 2021-01-01

## 2021-01-11 PROBLEM — J96.11 CHRONIC RESPIRATORY FAILURE WITH HYPOXIA (HCC): Chronic | Status: ACTIVE | Noted: 2021-01-01

## 2021-01-11 NOTE — PROGRESS NOTES
Bedside report received from [facility]. Pt up in chair resting quietly. Lung sounds diminished and and absent on right. Chest tubes x2 present on right- clamped per MD order. 02 sats 90's on 3LNC. Abdomen soft and intact with active bowel sounds. Informed pt of NPO status at midnight for possible AM surgery- verbalized understanding. Howell in place draining yellow clear urine. Pt states generalized discomfort but no specific pain. VSS. LR infsuing @ 125ml/hr. Will continue to monitor.

## 2021-01-11 NOTE — PROGRESS NOTES
H&P/Consult Note/Progress Note/Office Note:  
Vince Flores MRN: [id number]  :1950  Age:75 y.o. 
 
HPI: Vince Flores is a 78 y.o. male who is well known to Dr. Merced Ewing.  He underwent right thoracotomy with lower and middle lobectomy 20 for squamous cell carcinoma. He underwent neoaduvant chemo/radiation and adjuvant chemo. .  CT 20 showed large pleural effusion and collapse of RUL. He underwent thoracentesis by Dr. Kalina Jameson 20 with approximately drainage of 900ml, but was unable to completely drain related to complaints of pain. He felt should hold Eliquis for admission for Chest tube placement. Dr. Astorga Host placed chest tube this AM 21. General surgery was consulted for complicated right pleural effusion, ?hemothorax not draining properly with chest tube placement. On exam, Mr. Garrick Salcedo appears comfortable.  CT scan reviewed and current chest xray, NAD. On 3 L NC sats upper 90's. Chest tube maintained with 800ml serosanguineous drainage noted. Remains on 20cmsx. Patient states since his lobectomy he has felt \"pressure\". He does use 2L oxygen at night. She states he does feel like he can breath better since this chest tube has been placed. His lost dose of eliquis was Friday (for A-fib). He does have smoking history and quit about 6 years ago 21 Patient states he is breathing better since chest tube. NAD on 3L NC with 98% sat. Chest xray reviewed and fluid collection appears loculated- will need surgery- plan for tomorrow. 21 To the OR yesterday PO day1. Difficult surgery. Consulted pulmonary intraoperatively for broch. Unable to get lung to inflate intraoperative, ?chronic mucous plug. Pt in ICU, no complaints this AM- scheduled for bronch this AM. Chest tubes to water seal currently- no air leak- appears lung still collapsed from CXR. Pain controlled. 01/08/21 POD 2. Pt remains in ICU, no complaints this AM. Pain controlled. Chest tubes to water seal currently- no air leak. CXR this am showing No Significant Interval Change. 01/09/21 POD 3. Pt remains in ICU, no complaints this AM. Pain controlled. Chest tubes currently clamped. CXR this am showing No Significant Interval Change. Denies SOB. On 3L o2. 
 
01/11/21  POD 5  Chest tube remains clamped. NO changes in chest xray. Dr. Nael Silva spoke with Dr. Ruth Ann Kraft regarding trying again to clear airway to re inflate lung if unsuccessful then will need a pneumonectomy. Patient in NAD, No SOB on 3 L oxygen with sats upper 90's and continues to be hypotensive at times. Hgb trending down will transfuse 2u pRBC Medical history as below Past Medical History:  
Diagnosis Date  GERD (gastroesophageal reflux disease)   
 controlled with med  Hypertension hx-- off meds since 4/2020--- followed by dr. Laure Saucedo  Hypoxia 02/24/2020  Malignant neoplasm of lower lobe of right lung ([address] 02/26/2020 REC'D CHEMO AND RADIATION--- FOLLOWED BY DR. Trey Morris Osteoarthritis  Right lower lobe lung mass 02/24/2020  Status post total right knee replacement 2/29/2016 Past Surgical History:  
Procedure Laterality Date  HX COLONOSCOPY    
 HX KNEE ARTHROSCOPY Left   
 scope X 1, ACL recon X 1  
 HX KNEE ARTHROSCOPY Right 1974, 1975 X 2   
 HX KNEE REPLACEMENT Right 2016 [facility]  HX VASCULAR ACCESS Right placed 3/2020  
 port in chest   
 IR INSERT TUNL CVC W PORT OVER 5 YEARS  3/18/2020  ID SINUS SURGERY PROC UNLISTED  1988, 1991  
 sinus surg Current Facility-Administered Medications Medication Dose Route Frequency  0.9% sodium chloride infusion 250 mL  250 mL IntraVENous PRN  
 bisacodyL (DULCOLAX) suppository 10 mg  10 mg Rectal DAILY PRN  
 midodrine (PROAMATINE) tablet 10 mg  10 mg Oral TID WITH MEALS  traMADoL (ULTRAM) tablet 50 mg  50 mg Oral Q6H PRN  
  midazolam (VERSED) injection 0.25-5 mg  0.25-5 mg IntraVENous Multiple  fentaNYL citrate (PF) injection 50 mcg  50 mcg IntraVENous Multiple  sodium chloride 3% hypertonic nebulizer soln  4 mL Nebulization BID  budesonide (PULMICORT) 500 mcg/2 ml nebulizer suspension  500 mcg Nebulization BID RT  
 acetaminophen (TYLENOL) tablet 500 mg  500 mg Oral Q6H PRN  
 lactated Ringers infusion  125 mL/hr IntraVENous CONTINUOUS  
 HYDROmorphone (PF) (DILAUDID) injection 1 mg  1 mg IntraVENous Q2H PRN  
 guaiFENesin ER (MUCINEX) tablet 1,200 mg  1,200 mg Oral Q12H  
 PHENYLephrine (ENRIQUE-SYNEPHRINE) 30 mg in 0.9% sodium chloride 250 mL infusion   mcg/min IntraVENous TITRATE  amiodarone (CORDARONE) tablet 200 mg  200 mg Oral DAILY  [Held by provider] apixaban (ELIQUIS) tablet 5 mg  5 mg Oral Q12H  pantoprazole (PROTONIX) tablet 40 mg  40 mg Oral ACB&D  
 gabapentin (NEURONTIN) capsule 300 mg  300 mg Oral TID  levalbuterol tartrate (XOPENEX) 45 mcg/actuation inhaler HFAA 2 Puff- pt to supply (Patient Supplied)  2 Puff Inhalation Q4H PRN  
 ondansetron (ZOFRAN ODT) tablet 8 mg  8 mg Oral Q8H PRN  polyethylene glycol (MIRALAX) packet 17 g  17 g Oral DAILY  sodium chloride (NS) flush 5-40 mL  5-40 mL IntraVENous Q8H  
 sodium chloride (NS) flush 5-40 mL  5-40 mL IntraVENous PRN  
 oxyCODONE-acetaminophen (PERCOCET) 5-325 mg per tablet 1 Tab  1 Tab Oral Q4H PRN  
 influenza vaccine 2020-21 (6 mos+)(PF) (FLUARIX/FLULAVAL/FLUZONE QUAD) injection 0.5 mL  0.5 mL IntraMUSCular PRIOR TO DISCHARGE Albuterol and Celebrex [celecoxib] Social History Socioeconomic History  Marital status:  Spouse name: Not on file  Number of children: Not on file  Years of education: Not on file  Highest education level: Not on file Tobacco Use  Smoking status: Former Smoker Packs/day: 1.00 Years: 20.00 Pack years: 20.00 Quit date: 2014 Years since quittin.0  Smokeless tobacco: Never Used Substance and Sexual Activity  Alcohol use: Yes Alcohol/week: 4.0 standard drinks Types: 4 Glasses of wine per week  Drug use: No  
Other Topics Concern   Service No  
 Blood Transfusions No  
 Caffeine Concern No  
 Occupational Exposure No  
 Hobby Hazards No  
 Sleep Concern No  
 Stress Concern No  
 Weight Concern No  
 Special Diet No  
 Exercise Yes  [address] Yes Social History Narrative . Has long-time girlfriend. Continues to work doing IT support. Social History Tobacco Use Smoking Status Former Smoker  Packs/day: 1.00  Years: 20.00  Pack years: 20.00  Quit date:   Years since quittin.0 Smokeless Tobacco Never Used Family History Problem Relation Age of Onset  Cancer Mother   
     uterine  Arrhythmia Sister   
     afib ROS: The patient has no difficulty with chest pain or shortness of breath. No fever or chills. Comprehensive review of systems was otherwise unremarkable except as noted above. Physical Exam:  
Visit Vitals BP (!) 86/55 Pulse 84 Temp 98.2 °F (36.8 °C) Resp 15 Ht 5' 10\" (1.778 m) Wt 201 lb 12.8 oz (91.5 kg) SpO2 94% BMI 28.96 kg/m² Constitutional: Alert, oriented, cooperative patient in no acute distress; appears stated age Eyes: Sclera are clear. EOMs intact ENMT: no external lesions gross hearing normal; no obvious neck masses, no ear or lip lesions, nares normal 
CV: RRR. Normal perfusion Resp: No JVD. Breathing is  non-labored; no audible wheezing. Right Chest tube x 2 - clamped GI: soft and non-distended Musculoskeletal: unremarkable with normal function. No embolic signs or cyanosis. Neuro:  Oriented; moves all 4; no focal deficits Psychiatric: normal affect and mood, no memory impairment Recent vitals (if inpt): 
Patient Vitals for the past 24 hrs: 
 BP Temp Pulse Resp SpO2 01/11/21 0808     94 % 01/11/21 0623 (!) 86/55  84  98 % 01/11/21 0553 (!) 88/59  85  96 % 01/11/21 0523 (!) 87/59  84  97 % 01/11/21 0453 (!) 85/58  82  97 % 01/11/21 0423 93/70  85  97 % 01/11/21 0400 (!) 96/56  87  93 % 01/11/21 0355 (!) 82/54  89  95 % 01/11/21 0323 (!) 82/54 98.2 °F (36.8 °C) 82 15 98 % 01/11/21 0253 (!) 88/59  81 14 97 % 01/11/21 0223 (!) 87/52  84 15 97 % 01/11/21 0153 (!) 84/59  84 16 97 % 01/11/21 0123 (!) 82/54  84 16 97 % 01/11/21 0053 108/65  82 15 99 % 01/11/21 0023 (!) 82/52  81 14 98 % 01/10/21 2353 (!) 93/58  84 16 97 % 01/10/21 2328 103/61 97.9 °F (36.6 °C) 89 15 97 % 01/10/21 2253 (!) 89/58  88 15 97 % 01/10/21 2223 112/65  89 16 98 % 01/10/21 2153 117/68  88 15 100 % 01/10/21 2123 108/68  89 17 97 % 01/10/21 2053 120/79  92 14 96 % 01/10/21 2052   94 16 91 % 01/10/21 2051 121/70  82 15 96 % 01/10/21 2035    15 95 % 01/10/21 1954 97/62 98.5 °F (36.9 °C) 96 16 94 % 01/10/21 1929 111/60  88  95 % 01/10/21 1923 111/60  88  95 % 01/10/21 1853 108/68  85  98 % 01/10/21 1824 104/65  87  97 % 01/10/21 1753 (!) 89/59  89  97 % 01/10/21 1453 (!) 89/63  87  98 % 01/10/21 1423 (!) 92/59  83  99 % 01/10/21 1353 106/68  85  96 % 01/10/21 1323 97/65  84  97 % 01/10/21 1253 100/61  80  97 % 01/10/21 1223 110/71  83  95 % 01/10/21 1212 106/64  81  96 % 01/10/21 1154 104/65  77  99 % 01/10/21 1124 104/65  78  98 % 01/10/21 1053 94/63  81  98 % 01/10/21 1023 94/65  79  99 % 01/10/21 0953 101/67  81  99 % 01/10/21 0924 116/64  89  98 % 01/10/21 0853 95/70  83  96 % 01/10/21 0832     94 % 01/10/21 0823 108/72  90  93 % XR Results (most recent): 
Results from Hospital Encounter encounter on 01/04/21 XR CHEST SNGL V  
 Narrative EXAM: XR CHEST SNGL V 
 
INDICATION: Respiratory failure, history of lung carcinoma. COMPARISON: 1/10/2021 FINDINGS: A portable AP radiograph of the chest was obtained at 0349 hours. The 
patient is on a cardiac monitor. Right hydropneumothorax and atelectasis the 
remaining right lung unchanged. 2 right-sided chest tubes are in place. . The 
cardiac and mediastinal contours and pulmonary vascularity are normal.  The 
bones and soft tissues are grossly within normal limits. Impression IMPRESSION: Right hydropneumothorax and atelectasis of the remaining right lung 
unchanged. CT Results (most recent): 
Results from [facility] Encounter encounter on 12/22/20 CT CHEST W CONT Narrative EXAMINATION: CT CHEST W CONT 12/22/2020 10:57 AM 
 
INDICATION: Malignant neoplasm of the right lower lobe. Restaging COMPARISON: CT chest July 1, 2020 and chest radiograph July 30, 2020 TECHNIQUE: Multiple contiguous axial CT images of the chest were obtained from 
the lung apices to the lung bases after the intravenous administration of 100 mL Isovue-370 contrast material . Radiation dose reduction techniques were used for this study:  Our CT scanners 
use one or all of the following: Automated exposure control, adjustment of the 
mA and/or kVp according to patient's size, iterative reconstruction. FINDINGS: 
Lower Neck: No abnormality Chest soft tissues: Right chest port terminates at the cavoatrial junction. Heart/Mediastinum: There is slight mediastinal shift to the right. No definite 
hilar or mediastinal adenopathy. There may be circumferential wall thickening of 
the distal esophagus. Normal caliber thoracic aorta. Patent proximal pulmonary 
arteries. Coronary atherosclerosis. Lungs: There has been complete atelectasis of the residual right lung. No 
abnormality of the left lung. Pleura: There has been development of a large right pleural effusion which fills 
the entirety of the right hemithorax there is some smooth peripheral enhancement of the pleural effusion. This is new from chest radiograph July 30, 2020 and 
chest CT July 1, 2020. Visualized upper abdomen: Cholelithiasis without cholecystitis. Osseous structures: No suspicious osseous lesion. Right posterior thoracotomy 
changes. Impression IMPRESSION: 
1. New from July 30,2020, there is complete collapse of the residual right lung 
with development of a large pleural effusion involving the entire hemithorax. There is some smooth thin peripheral enhancement of the effusion which may 
reflect mild complexity. 2. No definite mediastinal adenopathy 3. Possible circumferential thickening of the distal esophagus. Correlation 
could be made for esophagitis. [address] Labs: 
Recent Labs  
  01/11/21 
3447 WBC 3.3* HGB 8.1*  
*   
K 3.8  CO2 32 BUN 5*  
CREA 0.52* * Lab Results Component Value Date/Time WBC 3.3 (L) 01/11/2021 03:32 AM  
 HGB 8.1 (L) 01/11/2021 03:32 AM  
 PLATELET 069 (L) 65/92/5461 03:32 AM  
 Sodium 140 01/11/2021 03:32 AM  
 Potassium 3.8 01/11/2021 03:32 AM  
 Chloride 104 01/11/2021 03:32 AM  
 CO2 32 01/11/2021 03:32 AM  
 BUN 5 (L) 01/11/2021 03:32 AM  
 Creatinine 0.52 (L) 01/11/2021 03:32 AM  
 Glucose 103 (H) 01/11/2021 03:32 AM  
 INR 1.1 01/05/2021 04:02 PM  
 aPTT 35.2 (H) 01/05/2021 04:02 PM  
 Bilirubin, total 0.5 01/05/2021 06:26 AM  
 Bilirubin, direct 0.8 (H) 05/05/2020 02:27 PM  
 ALT (SGPT) 8 (L) 01/05/2021 06:26 AM  
 Alk. phosphatase 99 01/05/2021 06:26 AM  
 Troponin-I, Qt. <0.02 (L) 05/05/2020 01:09 AM  
 
 
I reviewed recent labs and recent radiologic studies. I independently reviewed radiology images for studies I described above or studies I have ordered. Admission date (for inpatients): 1/4/2021 Day of Surgery  Procedure(s): RIGHT VIDEO ASISTED THORACOSCOPY, DECORTICATION 
THORACOTOMY ASSESSMENT/PLAN: 
Problem List  Date Reviewed: 1/8/2021 Codes Class Noted S/P thoracotomy ICD-10-CM: D42.275 ICD-9-CM: V45.89  1/7/2021 * (Principal) Atelectasis of right lung ICD-10-CM: J98.11 ICD-9-CM: 518.0  1/4/2021 A-fib [facility]) ICD-10-CM: I48.91 
ICD-9-CM: 427.31  1/4/2021 Chronic respiratory failure with hypoxia [facility]) ICD-10-CM: J96.11 
ICD-9-CM: 518.83, 799.02  1/4/2021 Pleural effusion on right ICD-10-CM: J90 ICD-9-CM: 511.9  12/28/2020 Chemotherapy induced neutropenia (HCC) ICD-10-CM: D70.1, T45.1X5A 
ICD-9-CM: 288.03, E933.1  10/23/2020 Dehydration ICD-10-CM: E86.0 ICD-9-CM: 276.51  9/15/2020 S/P lobectomy of lung ICD-10-CM: Z90.2 ICD-9-CM: V45.89  7/29/2020 Cough ICD-10-CM: R05 ICD-9-CM: 786.2  7/29/2020 Atrial tachycardia (HCC) ICD-10-CM: I47.1 ICD-9-CM: 427.89  7/26/2020 Cancer of bronchus of right lower lobe [facility]) ICD-10-CM: C34.31 
ICD-9-CM: 162.5  7/23/2020 Lung cancer [facility]) ICD-10-CM: C34.90 ICD-9-CM: 162.9  7/23/2020 Cancer of right lung [facility]) ICD-10-CM: C34.91 
ICD-9-CM: 162.9  7/23/2020 Pulmonary emphysema ([address] ICD-10-CM: J43.9 ICD-9-CM: 492.8  7/7/2020 GERD (gastroesophageal reflux disease) ICD-10-CM: K21.9 ICD-9-CM: 530.81  Unknown Hypotension ICD-10-CM: I95.9 ICD-9-CM: 458.9  5/5/2020 Sepsis due to undetermined organism [facility]) ICD-10-CM: A41.9 ICD-9-CM: 038.9  5/5/2020 Malignant neoplasm of lower lobe of right lung (HCC) (Chronic) ICD-10-CM: C34.31 
ICD-9-CM: 162.5  2/26/2020 Overview Signed 1/4/2021  7:38 AM by Tamara Russo MD  
  Dec 2020- Echo EF 50%, technically difficult, cannot assess regional wall motion. Aortic root 4.1 cm. No significant valvular disease. Normal DF Mass of lower lobe of right lung ICD-10-CM: R91.8 ICD-9-CM: 786.6  2/23/2020 Right lower lobe lung mass ICD-10-CM: R91.8 ICD-9-CM: 786.6  2/23/2020 Essential hypertension with goal blood pressure less than 130/80 (Chronic) ICD-10-CM: I10 
ICD-9-CM: 401.9  2/19/2018 Principal Problem: 
  Atelectasis of right lung (1/4/2021) Active Problems: Hypotension (5/5/2020) Lung cancer ([facility] [address] (7/23/2020) Pleural effusion on right (12/28/2020) A-fib ([facility] [address] (1/4/2021) Chronic respiratory failure with hypoxia ([address] (1/4/2021) S/P thoracotomy (1/7/2021) Plan Right Chest tubes remain clamped Pulmonary following Regular Diet Activity as tolerated Pain control Encourage C/DB/IS Continue ICU Transfuse 2u pRBC Signed:  Michelle Alberts NP

## 2021-01-11 NOTE — PROGRESS NOTES
A follow up visit was made to the patient. Emotional support, spiritual presence and  
prayer were provided for the patient. He was awake, alert and oriented. He shared information about his present health and told me about a possible upcoming procedure. Cari Hutchison, [address] [facility]

## 2021-01-11 NOTE — PROGRESS NOTES
Chart reviewed as pt remains OFL ICU. Currently on 3L O2. CT x2 in place. Plans for bronch on 1/12 per MD notes. No gtts currently.   To stay in ICU currently per Dr. Aletha Holstein. CM following for any assist and d/c POC when stable per MD.

## 2021-01-11 NOTE — PROGRESS NOTES
Johana Amado. Admission Date: 1/4/2021 Daily Progress Note: 1/11/2021 Patient is S 58 y.o.  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 Timeline of events: 
--RLL mass 2/2020, measuring 6.7cm x 5.9cm.  
--EBUS On 2/26/2020 and biopsy consistent with SCC. --Brain MRI was negative for brain mets but revealed a 2cm R parotid mass. --PET CT revealed a hypermetabolic RLL mass extending into the hilum and excessively hypermetabolic R parotid gland. --He had radiation and chemo and then underwent RML lobectomy 7/23/2020 for squamous cell carcinoma 
--He then completed 6 more rounds of chemo around 10/2020.   
--Repeat Chest CT on 12/22/2020 which revealed a large R pleural effusion and collapse of the remaining RUL.  
--R thoracentesis on 12/28/2020 by Dr. Kaye Ly with 900mL bloody fluid removed. Unable to tolerate complete drainage. --CT placement on 1/4/21 with 800cc of serosanguineous drainage. --General Surgery was consulted and CXR revealed persistent loculated R effusion. -- OR on 1/6 for R VATS, converted to an open thoracotomy with extensive pneumolysis with decortication. Difficult surgery. Consulted pulmonary intraoperatively for bronch. Unable to get lung to inflate intraoperative 
--Pathology non-diagnostic. Pt was hypotensive postoperatively requiring aylin. Pt was transferred to ICU. Pt had a repeat bronch on 1/7 secondary to persistent RUL atelectasis. He did not have any mucous plugging but his apical segment of the RUL was blocked by a web and the bronchoscope couldn't advance any further. Pt did have a stump area from prior lobectomy with abnormal appearing tissue that was biopsied,pathology noted below- non-diagnostic. Subjective: On 3 lpm with sat of 94-98% Surgery saw this am and no further intervention planned at this time. Review of Systems Respiratory: positive for dyspnea on exertion Current Facility-Administered Medications Medication Dose Route Frequency  0.9% sodium chloride infusion 250 mL  250 mL IntraVENous PRN  
 bisacodyL (DULCOLAX) suppository 10 mg  10 mg Rectal DAILY PRN  
 midodrine (PROAMATINE) tablet 10 mg  10 mg Oral TID WITH MEALS  traMADoL (ULTRAM) tablet 50 mg  50 mg Oral Q6H PRN  
 midazolam (VERSED) injection 0.25-5 mg  0.25-5 mg IntraVENous Multiple  fentaNYL citrate (PF) injection 50 mcg  50 mcg IntraVENous Multiple  sodium chloride 3% hypertonic nebulizer soln  4 mL Nebulization BID  budesonide (PULMICORT) 500 mcg/2 ml nebulizer suspension  500 mcg Nebulization BID RT  
 acetaminophen (TYLENOL) tablet 500 mg  500 mg Oral Q6H PRN  
 lactated Ringers infusion  125 mL/hr IntraVENous CONTINUOUS  
 HYDROmorphone (PF) (DILAUDID) injection 1 mg  1 mg IntraVENous Q2H PRN  
 guaiFENesin ER (MUCINEX) tablet 1,200 mg  1,200 mg Oral Q12H  
 PHENYLephrine (ENRIQUE-SYNEPHRINE) 30 mg in 0.9% sodium chloride 250 mL infusion   mcg/min IntraVENous TITRATE  amiodarone (CORDARONE) tablet 200 mg  200 mg Oral DAILY  [Held by provider] apixaban (ELIQUIS) tablet 5 mg  5 mg Oral Q12H  pantoprazole (PROTONIX) tablet 40 mg  40 mg Oral ACB&D  
 gabapentin (NEURONTIN) capsule 300 mg  300 mg Oral TID  levalbuterol tartrate (XOPENEX) 45 mcg/actuation inhaler HFAA 2 Puff- pt to supply (Patient Supplied)  2 Puff Inhalation Q4H PRN  
 ondansetron (ZOFRAN ODT) tablet 8 mg  8 mg Oral Q8H PRN  polyethylene glycol (MIRALAX) packet 17 g  17 g Oral DAILY  sodium chloride (NS) flush 5-40 mL  5-40 mL IntraVENous Q8H  
 sodium chloride (NS) flush 5-40 mL  5-40 mL IntraVENous PRN  
 oxyCODONE-acetaminophen (PERCOCET) 5-325 mg per tablet 1 Tab  1 Tab Oral Q4H PRN  
 influenza vaccine 2020-21 (6 mos+)(PF) (FLUARIX/FLULAVAL/FLUZONE QUAD) injection 0.5 mL  0.5 mL IntraMUSCular PRIOR TO DISCHARGE  
 
 
 
 Objective:  
 
Vitals:  
 21 6385 21 2757 21 2512 21 1482 BP: (!) 87/59 (!) 88/59 (!) 86/55 Pulse: 84 85 84 Resp:      
Temp:      
SpO2: 97% 96% 98% 94% Weight:      
Height:      
 
Intake and Output:  
 1901 -  0700 In: 4501.8 [I.V.:4501.8] Out: 8658 [HISU] No intake/output data recorded. Intake/Output Summary (Last 24 hours) at 2021 0899 Last data filed at 2021 0630 Gross per 24 hour Intake 3009.83 ml Output 3000 ml Net 9.83 ml Physical Exam:         
Constitutional: the patient is well develop HEENT: Sclera clear, pupils equal, oral mucosa moist 
Lungs: right anterior rhonchi, no cough or SOB, on 2-3 lpm 
Cardiovascular: RRR without M,G,R 
Abd/GI: soft and non-tender; with positive bowel sounds. Ext: warm without cyanosis. There is no lower leg edema. Musculoskeletal: moves all four extremities with equal strength Skin: no jaundice or rashes, no wounds Neuro: no gross neuro deficits Lines/Drains: port, Chest tube X 2, catheter Nutrition: npo LAB Recent Labs  
  21 
0332 01/10/21 
0413 21 
0408 WBC 3.3* 3.2* 5.4 HGB 8.1* 8.7* 9.1*  
HCT 26.0* 27.4* 28.4*  
* 130* 144* Recent Labs  
  21 
0332 01/10/21 
0413 21 
0408  138 139  
K 3.8 4.0 4.0  
 101 104 CO2 32 34* 36* * 106* 96 BUN 5* 4* 6*  
CREA 0.52* 0.45* 0.40* MG  --   --  2.3 ABG:  No results found for: PH, PHI, PCO2, PCO2I, PO2, PO2I, HCO3, HCO3I, FIO2, FIO2I Assessment:  
 
Hospital Problems  Date Reviewed: 2021 Codes Class Noted POA  
 S/P thoracotomy ICD-10-CM: R56.902 ICD-9-CM: V45.89  2021 Yes * (Principal) Atelectasis of right lung ICD-10-CM: J98.11 ICD-9-CM: 518.0  2021 Yes A-fib (HCC) (Chronic) ICD-10-CM: I48.91 
ICD-9-CM: 427.31  2021 Yes  Chronic respiratory failure with hypoxia (HCC) (Chronic) ICD-10-CM: J96.11 
 ICD-9-CM: 518.83, 799.02  1/4/2021 Yes Pleural effusion on right ICD-10-CM: J90 ICD-9-CM: 511.9  12/28/2020 Yes Lung cancer ([facility] [address] (Chronic) ICD-10-CM: C34.90 ICD-9-CM: 162.9  7/23/2020 Yes Hypotension (Chronic) ICD-10-CM: I95.9 ICD-9-CM: 458.9  5/5/2020 Unknown Overview Signed 1/11/2021  8:39 AM by Xochitl Parkinson NP On midodrine Events 
--RLL mass 2/2020, measuring 6.7cm x 5.9cm.  
--EBUS On 2/26/2020 and biopsy consistent with SCC. --Brain MRI was negative for brain mets but revealed a 2cm R parotid mass. --PET CT revealed a hypermetabolic RLL mass extending into the hilum and excessively hypermetabolic R parotid gland. --He had radiation and chemo and then underwent RML lobectomy 7/23/2020 for squamous cell carcinoma 
--He then completed 6 more rounds of chemo around 10/2020.   
--Repeat Chest CT on 12/22/2020 which revealed a large R pleural effusion and collapse of the remaining RUL.  
--R thoracentesis on 12/28/2020 by Dr. Junie Montgomery with 900mL bloody fluid removed. Unable to tolerate complete drainage. --CT placement on 1/4/21 with 800cc of serosanguineous drainage. --General Surgery was consulted and CXR revealed persistent loculated R effusion. -- OR on 1/6 for R VATS, converted to an open thoracotomy with extensive pneumolysis with decortication. Difficult surgery. Consulted pulmonary intraoperatively for bronch. Unable to get lung to inflate intraoperative 
--Pathology non-diagnostic. PLAN:  
 
On 3 lpm, sat 98% On midodrine for hypotension. MAP 74 mm hg 
Stop IV fluids, add back if needed. Advance diet today and NPO after MN Post op care, chest tubes per per Dr Dereje Beyer. 
[facility] is on hold Keep in ICU per Dr Dereje Beyer for now. - they are primary Spoke with Elvira (NP with Dr Dereje Beyer), they are not planning pneumonectomy at this point. Dr Sandra Hagen will plan to repeat bronchoscopy on 1/12 (9 am) and try to advance through membrane. NPO after MN. Hold eliquis.  
 
Hilary Jimenez, NP-C 
Lungs:   Decreased breath sounds on the left 
Heart:  RRR with no Murmur/Rubs/Gallops 
 
Additional Comments:  Chest tubes are clamped per stephanie Mayo on 1/12 planned 
 
I have spoken with and examined the patient. I agree with the above assessment and plan as documented. 
 
Pepito BASILIO MD 
 
 
More than 50% of time documented was spent in face-to-face contact with the patient and in the care of the patient on the floor/unit where the patient is located.

## 2021-01-11 NOTE — PROGRESS NOTES
Comprehensive Nutrition Assessment Type and Reason for Visit: Initial, RD nutrition re-screen/LOS Nutrition Recommendations/Plan:  
? Continue current diet. Add ensure enlive (vanilla) TID with meals. Malnutrition Assessment: 
Malnutrition Status: At risk for malnutrition (specify)(~10% weight loss over 8 months.) Nutrition Assessment:  
Nutrition History: Nutrition history unable to be obtained. Nutrition Background: Pt admitted with  RLL collapse. 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 repeat Chest CT on 12/22/2020 which revealed a large R pleural effusion and collapse of the remaining RUL. Pt underwent R thoracentesis on 12/28/2020. He is admitted for chest tube placement. Daily Update: 
Per RN, pt eating 100% of meals. Pt has accepted vanilla ensure enlive during previous admission per prior RD notes. Labs and medications: reviewed. Abdominal Status (last documented): Intact, Semi-soft abdomen with Active  bowel sounds. Last BM 01/10/21. Nutrition Related Findings:  
NFPE deferred. Chest tube placed 1/4. S/P R VATS (1/6), bronch (1/7). NPO at [facility] 1/11 for scheduled bronch on 1/12. Current Nutrition Therapies: DIET REGULAR 
DIET NPO Current Intake: Average Meal Intake: Unable to assess(Per RN: 100% of meals. Three recorded intakes of 0-100%. ) Average Supplement Intake: None ordered Anthropometric Measures: 
Height: 5' 10\" (177.8 cm) Current Body Wt: 91.5 kg (201 lb 11.5 oz)(1/7), Weight source: Not specified BMI: 28.9, Overweight (BMI 25.0-29. 9) Admission Body Weight: 194 lb 14.2 oz(1/4, pt stated) Ideal Body Wt: 166 lbs (75 kg), 121.5 % Usual Body Wt: (Wt ranges from 226-194 pounds over 1 year.), WT / BMI 1/7/2021 1/4/2021 12/23/2020 11/30/2020 11/2/2020 WEIGHT 201 lb 12.8 oz  194 lb 9.6 oz 197 lb 6.4 oz 198 lb WT / BMI 10/27/2020 10/20/2020 10/13/2020 10/6/2020 9/29/2020 WEIGHT 201 lb 3 oz 206 lb 5 oz 202 lb 202 lb 14.4 oz 206 lb 3 oz WT / BMI 9/22/2020 9/15/2020 9/8/2020 9/8/2020 9/4/2020 WEIGHT 205 lb 208 lb 5 oz 207 lb 8 oz 207 lb 8 oz 207 lb WT / BMI 8/26/2020 8/25/2020 8/24/2020 8/10/2020 8/7/2020 WEIGHT 207 lb 208 lb 210 lb 216 lb 5 oz 216 lb WT / BMI 8/1/2020 7/23/2020 7/16/2020 7/7/2020 6/30/2020 WEIGHT 218 lb 8 oz  207 lb 9.6 oz 204 lb 3.2 oz 210 lb WT / BMI 6/12/2020 6/8/2020 5/11/2020 5/6/2020 WEIGHT 223 lb 206 lb 204 lb 224 lb Per weights listed in EMR, potential for a 23 pound, 10.3% weight loss over ~8 months. Estimated Daily Nutrient Needs: 
Energy (kcal/day): 6575-9418 (Kcal/kg(20-25), Weight Used: Current(91.5 kg)) Protein (g/day): (20% kcal) Nutrition Diagnosis:  
· Unintended weight loss related to increased demand for energy/nutrients as evidenced by (SCC, s/p resection, chemotherapy/radiation, wt loss noted. ) Nutrition Interventions:  
Food and/or Nutrient Delivery: Continue current diet Coordination of Nutrition Care: Continue to monitor while inpatient Plan of Care discussed with Francis Russ RN. Goals: Active Goal: Continue to meet >75% of estimated needs via PO intake. Nutrition Monitoring and Evaluation:  
  
Food/Nutrient Intake Outcomes: Food and nutrient intake Physical Signs/Symptoms Outcomes: Biochemical data, Hemodynamic status Discharge Planning: Too soon to determine [address], [address] [place] [address], [facility] [address], [address] [employer] Mode active

## 2021-01-12 NOTE — PROGRESS NOTES
TRANSFER - IN REPORT: 
 
Verbal report received from [facility], [address] [place] [address] (name) on Alicia Person.  being received from [facility] (unit) for routine progression of care Report consisted of patients Situation, Background, Assessment and  
Recommendations(SBAR). Information from the following report(s) SBAR, Kardex, Procedure Summary and MAR was reviewed with the receiving nurse. Opportunity for questions and clarification was provided.

## 2021-01-12 NOTE — PROGRESS NOTES
H&P/Consult Note/Progress Note/Office Note:  
Pita Jade MRN: [id number]  :1950  Age:75 y.o. 
 
HPI: Pita Bingham. is a 78 y.o. male who is well known to Dr. Rasta Gloria.  He underwent right thoracotomy with lower and middle lobectomy 20 for squamous cell carcinoma. He underwent neoaduvant chemo/radiation and adjuvant chemo. .  CT 20 showed large pleural effusion and collapse of RUL. He underwent thoracentesis by Dr. Ari Hobbs 20 with approximately drainage of 900ml, but was unable to completely drain related to complaints of pain. He felt should hold Eliquis for admission for Chest tube placement. Dr. Puente Every placed chest tube this AM 21. General surgery was consulted for complicated right pleural effusion, ?hemothorax not draining properly with chest tube placement. On exam, Mr. Luis Miguel Rubi appears comfortable.  CT scan reviewed and current chest xray, NAD. On 3 L NC sats upper 90's. Chest tube maintained with 800ml serosanguineous drainage noted. Remains on 20cmsx. Patient states since his lobectomy he has felt \"pressure\". He does use 2L oxygen at night. She states he does feel like he can breath better since this chest tube has been placed. His lost dose of eliquis was Friday (for A-fib). He does have smoking history and quit about 6 years ago 21 Patient states he is breathing better since chest tube. NAD on 3L NC with 98% sat. Chest xray reviewed and fluid collection appears loculated- will need surgery- plan for tomorrow. 21 To the OR yesterday PO day1. Difficult surgery. Consulted pulmonary intraoperatively for broch. Unable to get lung to inflate intraoperative, ?chronic mucous plug. Pt in ICU, no complaints this AM- scheduled for bronch this AM. Chest tubes to water seal currently- no air leak- appears lung still collapsed from CXR. Pain controlled. 01/08/21 POD 2. Pt remains in ICU, no complaints this AM. Pain controlled. Chest tubes to water seal currently- no air leak. CXR this am showing No Significant Interval Change. 01/09/21 POD 3. Pt remains in ICU, no complaints this AM. Pain controlled. Chest tubes currently clamped. CXR this am showing No Significant Interval Change. Denies SOB. On 3L o2. 
 
01/11/21  POD 5  Chest tube remains clamped. NO changes in chest xray. Dr. Richard Mcclure spoke with Dr. Cleveland Stevenson regarding trying again to clear airway to re inflate lung if unsuccessful then will need a pneumonectomy. Patient in NAD, No SOB on 3 L oxygen with sats upper 90's and continues to be hypotensive at times. Hgb trending down will transfuse 2u pRBC 
 
01/12/21 POD 6 just returned from pulmonary procedure. Chest tubes to suction. States feels less pressure, but no other changes noted. No air leak noted in chest tubes- awaiting chest CT this afternoon. NAD, No SOB. Hgb 10 Medical history as below Past Medical History:  
Diagnosis Date  GERD (gastroesophageal reflux disease)   
 controlled with med  Hypertension hx-- off meds since 4/2020--- followed by dr. Ella Greene  Hypoxia 02/24/2020  Malignant neoplasm of lower lobe of right lung ([facility] [address] 02/26/2020 REC'D CHEMO AND RADIATION--- FOLLOWED BY DR. Barry Valdes Osteoarthritis  Right lower lobe lung mass 02/24/2020  Status post total right knee replacement 2/29/2016 Past Surgical History:  
Procedure Laterality Date  HX COLONOSCOPY    
 HX KNEE ARTHROSCOPY Left   
 scope X 1, ACL recon X 1  
 HX KNEE ARTHROSCOPY Right 1974, 1975 X 2   
 HX KNEE REPLACEMENT Right 2016 [address] [place]  HX VASCULAR ACCESS Right placed 3/2020  
 port in chest   
 IR INSERT TUNL CVC W PORT OVER 5 YEARS  3/18/2020  MI SINUS SURGERY PROC UNLISTED  1988, 1991  
 sinus surg Current Facility-Administered Medications Medication Dose Route Frequency  sodium chloride (NS) flush 5-40 mL  5-40 mL IntraVENous Q8H  
 sodium chloride (NS) flush 5-40 mL  5-40 mL IntraVENous PRN  
 0.9% sodium chloride infusion 250 mL  250 mL IntraVENous PRN  
 HYDROcodone-homatropine (HYCODAN) 5-1.5 mg/5 mL (5 mL) oral solution 5 mL  5 mL Oral Q4H PRN  
 bisacodyL (DULCOLAX) suppository 10 mg  10 mg Rectal DAILY PRN  
 midodrine (PROAMATINE) tablet 10 mg  10 mg Oral TID WITH MEALS  traMADoL (ULTRAM) tablet 50 mg  50 mg Oral Q6H PRN  
 sodium chloride 3% hypertonic nebulizer soln  4 mL Nebulization BID  budesonide (PULMICORT) 500 mcg/2 ml nebulizer suspension  500 mcg Nebulization BID RT  
 acetaminophen (TYLENOL) tablet 500 mg  500 mg Oral Q6H PRN  
 HYDROmorphone (PF) (DILAUDID) injection 1 mg  1 mg IntraVENous Q2H PRN  
 guaiFENesin ER (MUCINEX) tablet 1,200 mg  1,200 mg Oral Q12H  
 amiodarone (CORDARONE) tablet 200 mg  200 mg Oral DAILY  [Held by provider] apixaban (ELIQUIS) tablet 5 mg  5 mg Oral Q12H  pantoprazole (PROTONIX) tablet 40 mg  40 mg Oral ACB&D  
 gabapentin (NEURONTIN) capsule 300 mg  300 mg Oral TID  levalbuterol tartrate (XOPENEX) 45 mcg/actuation inhaler HFAA 2 Puff- pt to supply (Patient Supplied)  2 Puff Inhalation Q4H PRN  
 ondansetron (ZOFRAN ODT) tablet 8 mg  8 mg Oral Q8H PRN  polyethylene glycol (MIRALAX) packet 17 g  17 g Oral DAILY  sodium chloride (NS) flush 5-40 mL  5-40 mL IntraVENous Q8H  
 sodium chloride (NS) flush 5-40 mL  5-40 mL IntraVENous PRN  
 oxyCODONE-acetaminophen (PERCOCET) 5-325 mg per tablet 1 Tab  1 Tab Oral Q4H PRN  
 influenza vaccine 2020-21 (6 mos+)(PF) (FLUARIX/FLULAVAL/FLUZONE QUAD) injection 0.5 mL  0.5 mL IntraMUSCular PRIOR TO DISCHARGE Albuterol and Celebrex [celecoxib] Social History Socioeconomic History  Marital status:  Spouse name: Not on file  Number of children: Not on file  Years of education: Not on file  Highest education level: Not on file Tobacco Use  Smoking status: Former Smoker Packs/day: 1.00 Years: 20.00 Pack years: 20.00 Quit date:  Years since quittin.0  Smokeless tobacco: Never Used Substance and Sexual Activity  Alcohol use: Yes Alcohol/week: 4.0 standard drinks Types: 4 Glasses of wine per week  Drug use: No  
Other Topics Concern   Service No  
 Blood Transfusions No  
 Caffeine Concern No  
 Occupational Exposure No  
 Hobby Hazards No  
 Sleep Concern No  
 Stress Concern No  
 Weight Concern No  
 Special Diet No  
 Exercise Yes  [facility] Yes Social History Narrative . Has long-time girlfriend. Continues to work doing IT support. Social History Tobacco Use Smoking Status Former Smoker  Packs/day: 1.00  Years: 20.00  Pack years: 20.00  Quit date:   Years since quittin.0 Smokeless Tobacco Never Used Family History Problem Relation Age of Onset  Cancer Mother   
     uterine  Arrhythmia Sister   
     afib ROS: The patient has no difficulty with chest pain or shortness of breath. No fever or chills. Comprehensive review of systems was otherwise unremarkable except as noted above. Physical Exam:  
Visit Vitals BP (!) 123/100 (BP 1 Location: Left arm) Pulse 74 Temp 97.5 °F (36.4 °C) Resp 24 Ht 5' 10\" (1.778 m) Wt 201 lb 12.8 oz (91.5 kg) SpO2 94% BMI 28.96 kg/m² Constitutional: Alert, oriented, cooperative patient in no acute distress; appears stated age Eyes: Sclera are clear. EOMs intact ENMT: no external lesions gross hearing normal; no obvious neck masses, no ear or lip lesions, nares normal 
CV: RRR. Normal perfusion Resp: No JVD. Breathing is  non-labored; no audible wheezing. Right Chest tube x 2 -to suction GI: soft and non-distended Musculoskeletal: unremarkable with normal function. No embolic signs or cyanosis. Neuro: Oriented; moves all 4; no focal deficits Psychiatric: normal affect and mood, no memory impairment Recent vitals (if inpt): 
Patient Vitals for the past 24 hrs: 
 BP Temp Pulse Resp SpO2 Height 01/12/21 1130 (!) 123/100 97.5 °F (36.4 °C) 74 24 94 %   
01/12/21 1116 113/84  76     
01/12/21 1056 115/71  79  94 %   
01/12/21 1011 107/71  73 25 96 %   
01/12/21 0957 108/70  81 20 96 %   
01/12/21 0942 101/63  81 22 98 %   
01/12/21 0938 100/63  82 28 98 %   
01/12/21 0934 111/74  90 16 94 %   
01/12/21 0930 113/70  81 20 95 %   
01/12/21 0926 109/68  82 21 95 %   
01/12/21 0922 119/78  87 21 97 %   
01/12/21 0918 125/78  80 16 96 %   
01/12/21 0913 124/79  83 24 95 %   
01/12/21 0912 127/75  84 19 95 %   
01/12/21 0853   84 21 96 %   
01/12/21 0810 111/86  95  90 %   
01/12/21 0756 117/77  80  98 %   
01/12/21 0748     94 %   
01/12/21 0740 105/72 97.6 °F (36.4 °C) 74 20 95 %   
01/12/21 0725 107/71  79  96 %   
01/12/21 0610 101/67  78  96 %   
01/12/21 0555 98/65  74  93 %   
01/12/21 0540 99/64  77  94 %   
01/12/21 0525 106/71  74  95 %   
01/12/21 0510 109/72  76  96 %   
01/12/21 0455 109/74  80  93 %   
01/12/21 0440 99/66  78  94 %   
01/12/21 0425 103/72  77  96 %   
01/12/21 0410 100/65  78  96 %   
01/12/21 0355 104/65  78  97 %   
01/12/21 0340 108/67  76  97 %   
01/12/21 0325 111/69  79  95 %   
01/12/21 0310 106/69 98.5 °F (36.9 °C) 84 16 95 %   
01/12/21 0255 104/67  76 16 95 %   
01/12/21 0240 112/74  78 15 96 %   
01/12/21 0225 108/68  75 15 98 %   
01/12/21 0210 105/64  77 14 97 %   
01/12/21 0155 104/64  73 15 96 %   
01/12/21 0140 101/64  78 14 95 %   
01/12/21 0125 106/69  76 14 95 %   
01/12/21 0110 109/71  76 15 95 %   
01/12/21 0055 110/67  78 16 94 %   
01/12/21 0041 113/71  79 15 95 %   
01/12/21 0025 110/72  81 14 91 %   
01/12/21 0010 111/70  81 14 94 %   
 01/11/21 2355 111/70  80 15 93 %   
01/11/21 2340 122/70  83 17 94 %   
01/11/21 2325 114/73 98.7 °F (37.1 °C) 84 16 91 %   
01/11/21 2310 118/75  82 15 95 %   
01/11/21 2255 122/75  81 15 93 %   
01/11/21 2240 121/75  83 17 95 %   
01/11/21 2225 129/77  93 16 (!) 89 %   
01/11/21 2155 123/74  86 15 91 %   
01/11/21 2140 122/73  84 15 93 %   
01/11/21 2125 120/74  85 16 92 %   
01/11/21 2110 117/66  81 18 94 %   
01/11/21 2056 130/69  86 17 93 %   
01/11/21 2052 130/69 98.6 °F (37 °C) 85 16 93 %   
01/11/21 2040 134/76  86 15 94 %   
01/11/21 2032 132/79 99.8 °F (37.7 °C) 91 15 93 %   
01/11/21 2025 132/79  84 17 92 %   
01/11/21 2016  99.6 °F (37.6 °C)      
01/11/21 2010 134/83  92 15 93 %   
01/11/21 1955 134/82  82 14 99 %   
01/11/21 1949    15 92 %   
01/11/21 1948 132/69  88 16 95 %   
01/11/21 1941 132/69  90 16 93 %   
01/11/21 1925 132/83  87 15 90 %   
01/11/21 1910 107/74 99.8 °F (37.7 °C) 94 15 (!) 89 %   
01/11/21 1855 111/74  90 14 95 %   
01/11/21 1840 114/72  90  92 %   
01/11/21 1825 113/71  88  92 %   
01/11/21 1811 124/76  89  91 %   
01/11/21 1755 117/76  84  94 %   
01/11/21 1740 122/70  84  93 %   
01/11/21 1725 134/80  83  95 %   
01/11/21 1721 134/80 99 °F (37.2 °C) 83  93 %   
01/11/21 1710 115/77  82  91 %   
01/11/21 1707 115/77  83  93 %   
01/11/21 1655 116/78  82  93 %   
01/11/21 1640 112/69  79  92 %   
01/11/21 1627 128/67  93  90 %   
01/11/21 1615      5' 10\" (1.778 m) 01/11/21 1610 108/78  85  93 %   
01/11/21 1555 112/76  83  94 %   
01/11/21 1540 119/74  80  94 %   
01/11/21 1525 112/74  82  96 %   
01/11/21 1510 122/73  79  95 %   
01/11/21 1456 121/75  80  93 %   
01/11/21 1440 106/70  80  93 %   
01/11/21 1425 102/67  84  94 %   
01/11/21 1410 105/69  85  93 %   
01/11/21 1355 99/63  86  91 %   
01/11/21 1340 104/69  86  93 %   
 01/11/21 1325 103/72  92  94 %  XR Results (most recent): 
Results from Hospital Encounter encounter on 01/04/21 XR CHEST SNGL V  
 Narrative EXAM: XR CHEST SNGL V 
 
INDICATION: atelectasis COMPARISON: 1/11/2021 FINDINGS: A portable AP radiograph of the chest was obtained at 0320 hours. Right hydropneumothorax unchanged. Atelectasis of the remaining right lung 
unchanged. Right-sided chest tubes remain in place. . The cardiac and mediastinal 
contours and pulmonary vascularity are normal.  The bones and soft tissues are 
grossly within normal limits. Impression IMPRESSION: Right lung atelectasis in right hydropneumothorax unchanged. CT Results (most recent): 
Results from [facility] Encounter encounter on 12/22/20 CT CHEST W CONT Narrative EXAMINATION: CT CHEST W CONT 12/22/2020 10:57 AM 
 
INDICATION: Malignant neoplasm of the right lower lobe. Restaging COMPARISON: CT chest July 1, 2020 and chest radiograph July 30, 2020 TECHNIQUE: Multiple contiguous axial CT images of the chest were obtained from 
the lung apices to the lung bases after the intravenous administration of 100 mL Isovue-370 contrast material . Radiation dose reduction techniques were used for this study:  Our CT scanners 
use one or all of the following: Automated exposure control, adjustment of the 
mA and/or kVp according to patient's size, iterative reconstruction. FINDINGS: 
Lower Neck: No abnormality Chest soft tissues: Right chest port terminates at the cavoatrial junction. Heart/Mediastinum: There is slight mediastinal shift to the right. No definite 
hilar or mediastinal adenopathy. There may be circumferential wall thickening of 
the distal esophagus. Normal caliber thoracic aorta. Patent proximal pulmonary 
arteries. Coronary atherosclerosis. Lungs: There has been complete atelectasis of the residual right lung. No 
abnormality of the left lung. Pleura: There has been development of a large right pleural effusion which fills 
the entirety of the right hemithorax there is some smooth peripheral enhancement 
of the pleural effusion. This is new from chest radiograph July 30, 2020 and 
chest CT July 1, 2020. Visualized upper abdomen: Cholelithiasis without cholecystitis. Osseous structures: No suspicious osseous lesion. Right posterior thoracotomy 
changes. Impression IMPRESSION: 
1. New from July 30,2020, there is complete collapse of the residual right lung 
with development of a large pleural effusion involving the entire hemithorax. There is some smooth thin peripheral enhancement of the effusion which may 
reflect mild complexity. 2. No definite mediastinal adenopathy 3. Possible circumferential thickening of the distal esophagus. Correlation 
could be made for esophagitis. [address] Labs: 
Recent Labs  
  01/12/21 
0414 WBC 4.2* HGB 10.8* *   
K 3.8  CO2 32 BUN 6*  
CREA 0.50* GLU 94 Lab Results Component Value Date/Time WBC 4.2 (L) 01/12/2021 04:14 AM  
 HGB 10.8 (L) 01/12/2021 04:14 AM  
 PLATELET 997 (L) 36/35/5928 04:14 AM  
 Sodium 139 01/12/2021 04:14 AM  
 Potassium 3.8 01/12/2021 04:14 AM  
 Chloride 105 01/12/2021 04:14 AM  
 CO2 32 01/12/2021 04:14 AM  
 BUN 6 (L) 01/12/2021 04:14 AM  
 Creatinine 0.50 (L) 01/12/2021 04:14 AM  
 Glucose 94 01/12/2021 04:14 AM  
 INR 1.1 01/05/2021 04:02 PM  
 aPTT 35.2 (H) 01/05/2021 04:02 PM  
 Bilirubin, total 0.5 01/05/2021 06:26 AM  
 Bilirubin, direct 0.8 (H) 05/05/2020 02:27 PM  
 ALT (SGPT) 8 (L) 01/05/2021 06:26 AM  
 Alk. phosphatase 99 01/05/2021 06:26 AM  
 Troponin-I, Qt. <0.02 (L) 05/05/2020 01:09 AM  
 
 
I reviewed recent labs and recent radiologic studies. I independently reviewed radiology images for studies I described above or studies I have ordered. Admission date (for inpatients): 1/4/2021 Day of Surgery  Procedure(s) with comments: BRONCHOSCOPY 
ENDOSCOPIC BANDING OR LIGATION - pulmonary knife used to open RUL 
BRONCHOSCOPY WITH BIOPSY - forceps used to open RUL apical segment ASSESSMENT/PLAN: 
Problem List  Date Reviewed: 1/8/2021 Codes Class Noted S/P thoracotomy ICD-10-CM: X21.048 ICD-9-CM: V45.89  1/7/2021 * (Principal) Atelectasis of right lung ICD-10-CM: J98.11 ICD-9-CM: 518.0  1/4/2021 A-fib (HCC) (Chronic) ICD-10-CM: I48.91 
ICD-9-CM: 427.31  1/4/2021 Chronic respiratory failure with hypoxia (HCC) (Chronic) ICD-10-CM: J96.11 
ICD-9-CM: 518.83, 799.02  1/4/2021 Pleural effusion on right ICD-10-CM: J90 ICD-9-CM: 511.9  12/28/2020 Chemotherapy induced neutropenia (HCC) ICD-10-CM: D70.1, T45.1X5A 
ICD-9-CM: 288.03, E933.1  10/23/2020 Dehydration ICD-10-CM: E86.0 ICD-9-CM: 276.51  9/15/2020 S/P lobectomy of lung ICD-10-CM: Z90.2 ICD-9-CM: V45.89  7/29/2020 Cough ICD-10-CM: R05 ICD-9-CM: 786.2  7/29/2020 Atrial tachycardia (HCC) ICD-10-CM: I47.1 ICD-9-CM: 427.89  7/26/2020 Cancer of bronchus of right lower lobe [facility]) ICD-10-CM: C34.31 
ICD-9-CM: 162.5  7/23/2020 Lung cancer ([email or web address] (Chronic) ICD-10-CM: C34.90 ICD-9-CM: 162.9  7/23/2020 Cancer of right lung [facility]) ICD-10-CM: C34.91 
ICD-9-CM: 162.9  7/23/2020 Pulmonary emphysema ([facility] [[email or web address].) ICD-10-CM: J43.9 ICD-9-CM: 492.8  7/7/2020 GERD (gastroesophageal reflux disease) ICD-10-CM: K21.9 ICD-9-CM: 530.81  Unknown Hypotension (Chronic) ICD-10-CM: I95.9 ICD-9-CM: 458.9  5/5/2020 Overview Signed 1/11/2021  8:39 AM by Charmayne Heap, NP On midodrine Sepsis due to undetermined organism [facility]) ICD-10-CM: A41.9 ICD-9-CM: 038.9  5/5/2020 Malignant neoplasm of lower lobe of right lung (HCC) (Chronic) ICD-10-CM: C34.31 
ICD-9-CM: 162.5  2/26/2020 Overview Signed 1/4/2021  7:38 AM by Ofelia Muñoz MD  
  Dec 2020- Echo EF 50%, technically difficult, cannot assess regional wall motion. Aortic root 4.1 cm. No significant valvular disease. Normal DF Mass of lower lobe of right lung ICD-10-CM: R91.8 ICD-9-CM: 786.6  2/23/2020 Right lower lobe lung mass ICD-10-CM: R91.8 ICD-9-CM: 786.6  2/23/2020 Essential hypertension with goal blood pressure less than 130/80 (Chronic) ICD-10-CM: I10 
ICD-9-CM: 401.9  2/19/2018 Principal Problem: 
  Atelectasis of right lung (1/4/2021) Active Problems: Hypotension (5/5/2020) Overview: On midodrine Lung cancer ([address] (7/23/2020) Pleural effusion on right (12/28/2020) A-fib ([address] (1/4/2021) Chronic respiratory failure with hypoxia ([address] (1/4/2021) S/P thoracotomy (1/7/2021) Plan Procedure with pulmonary this AM- awaiting chest CT scheduled for 1pm today Will review and decide if needs further endo procedures or needs pneumonectomy. Pulmonary following Regular Diet Activity as tolerated Pain control Encourage C/DB/IS Signed:  Dara Wade NP

## 2021-01-12 NOTE — PROGRESS NOTES
Larry Guardado. Admission Date: 1/4/2021 Daily Progress Note: 1/12/2021 The patient's chart is reviewed and the patient is discussed with the staff. Patient is D 89 y.o.  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 Timeline of events: 
--RLL mass 2/2020, measuring 6.7cm x 5.9cm.  
--EBUS On 2/26/2020 and biopsy consistent with SCC. --Brain MRI was negative for brain mets but revealed a 2cm R parotid mass. --PET CT revealed a hypermetabolic RLL mass extending into the hilum and excessively hypermetabolic R parotid gland. --He had radiation and chemo and then underwent RML lobectomy 7/23/2020 for squamous cell carcinoma 
--He then completed 6 more rounds of chemo around 10/2020.   
--Repeat Chest CT on 12/22/2020 which revealed a large R pleural effusion and collapse of the remaining RUL.  
--R thoracentesis on 12/28/2020 by Dr. Luke Arreaga with 900mL bloody fluid removed. Unable to tolerate complete drainage. --CT placement on 1/4/21 with 800cc of serosanguineous drainage. --General Surgery was consulted and CXR revealed persistent loculated R effusion. -- OR on 1/6 for R VATS, converted to an open thoracotomy with extensive pneumolysis with decortication. Difficult surgery. Consulted pulmonary intraoperatively for bronch. Unable to get lung to inflate intraoperative 
--Pathology non-diagnostic. Pt was hypotensive postoperatively requiring aylin. Pt was transferred to ICU. Pt had a repeat bronch on 1/7 secondary to persistent RUL atelectasis. He did not have any mucous plugging but his apical segment of the RUL was blocked by a web and the bronchoscope couldn't advance any further. Pt did have a stump area from prior lobectomy with abnormal appearing tissue that was biopsied,pathology noted below- non-diagnostic. Subjective: Patient seen in [address] [place] [address] lab just prior to previously planned procedure. No change in symptoms overnight. Chest tubes have been clamped. CXR showing increased fluid on the right side. Current Facility-Administered Medications Medication Dose Route Frequency  sodium chloride (NS) flush 5-40 mL  5-40 mL IntraVENous Q8H  
 sodium chloride (NS) flush 5-40 mL  5-40 mL IntraVENous PRN  
 diphenhydrAMINE (BENADRYL) injection 25 mg  25 mg IntraVENous ONCE  
 midazolam (VERSED) injection 0.25-5 mg  0.25-5 mg IntraVENous MULTIPLE DOSE GIVEN  fentaNYL citrate (PF) injection 50 mcg  50 mcg IntraVENous Multiple  naloxone (NARCAN) injection 0.4 mg  0.4 mg IntraVENous Multiple  promethazine (PHENERGAN) with saline injection 12.5 mg  12.5 mg IntraVENous MULTIPLE DOSE GIVEN  
 0.9% sodium chloride infusion 1,000 mL  1,000 mL IntraVENous CONTINUOUS  
 0.9% sodium chloride infusion 250 mL  250 mL IntraVENous PRN  
 HYDROcodone-homatropine (HYCODAN) 5-1.5 mg/5 mL (5 mL) oral solution 5 mL  5 mL Oral Q4H PRN  
 bisacodyL (DULCOLAX) suppository 10 mg  10 mg Rectal DAILY PRN  
 midodrine (PROAMATINE) tablet 10 mg  10 mg Oral TID WITH MEALS  traMADoL (ULTRAM) tablet 50 mg  50 mg Oral Q6H PRN  
 midazolam (VERSED) injection 0.25-5 mg  0.25-5 mg IntraVENous Multiple  fentaNYL citrate (PF) injection 50 mcg  50 mcg IntraVENous Multiple  sodium chloride 3% hypertonic nebulizer soln  4 mL Nebulization BID  budesonide (PULMICORT) 500 mcg/2 ml nebulizer suspension  500 mcg Nebulization BID RT  
 acetaminophen (TYLENOL) tablet 500 mg  500 mg Oral Q6H PRN  
 HYDROmorphone (PF) (DILAUDID) injection 1 mg  1 mg IntraVENous Q2H PRN  
 guaiFENesin ER (MUCINEX) tablet 1,200 mg  1,200 mg Oral Q12H  
 amiodarone (CORDARONE) tablet 200 mg  200 mg Oral DAILY  [Held by provider] apixaban (ELIQUIS) tablet 5 mg  5 mg Oral Q12H  pantoprazole (PROTONIX) tablet 40 mg  40 mg Oral ACB&D  
  gabapentin (NEURONTIN) capsule 300 mg  300 mg Oral TID  levalbuterol tartrate (XOPENEX) 45 mcg/actuation inhaler HFAA 2 Puff- pt to supply (Patient Supplied)  2 Puff Inhalation Q4H PRN  
 ondansetron (ZOFRAN ODT) tablet 8 mg  8 mg Oral Q8H PRN  polyethylene glycol (MIRALAX) packet 17 g  17 g Oral DAILY  sodium chloride (NS) flush 5-40 mL  5-40 mL IntraVENous Q8H  
 sodium chloride (NS) flush 5-40 mL  5-40 mL IntraVENous PRN  
 oxyCODONE-acetaminophen (PERCOCET) 5-325 mg per tablet 1 Tab  1 Tab Oral Q4H PRN  
 influenza vaccine 2020-21 (6 mos+)(PF) (FLUARIX/FLULAVAL/FLUZONE QUAD) injection 0.5 mL  0.5 mL IntraMUSCular PRIOR TO DISCHARGE Review of Systems Constitutional: negative for fever, chills, sweats Cardiovascular: negative for chest pain, palpitations, syncope, edema Gastrointestinal: negative for dysphagia, reflux, vomiting, diarrhea, abdominal pain, or melena Neurologic: negative for focal weakness, numbness, headache Objective:  
 
Vitals:  
 01/12/21 0930 01/12/21 0934 01/12/21 8309 01/12/21 0509 BP: 113/70 111/74 100/63 101/63 Pulse: 81 90 82 81 Resp: 20 16 28 22 Temp:      
SpO2: 95% 94% 98% 98% Weight:      
Height:      
 
Intake and Output:  
01/10 1901 - 01/12 0700 In: 2302.5 [I.V.:1470.8] Out: 5400 [INPMR:7383] 01/12 0701 - 01/12 1900 In: -  
Out: 250 [Urine:250] Physical Exam:  
Constitution:  the patient is well developed and in no acute distress EENMT:  Sclera clear, pupils equal, oral mucosa moist 
Respiratory: B rhonchi. Chest tubes in right chest initially to suction. Placed to water seal with tidal movement seen. Cardiovascular:  RRR without M,G,R 
Gastrointestinal: soft and non-tender; with positive bowel sounds. Musculoskeletal: warm without cyanosis. There is no lower leg edema. Skin:  no jaundice or rashes, no wounds Neurologic: no gross neuro deficits Psychiatric:  alert and oriented x ppt CHEST XRAY:  
 CXR Results  (Last 48 hours) 01/12/21 0321  XR CHEST SNGL V Final result Impression:  IMPRESSION: Right lung atelectasis in right hydropneumothorax unchanged. Narrative:  EXAM: XR CHEST SNGL V  
   
INDICATION: atelectasis COMPARISON: 1/11/2021 FINDINGS: A portable AP radiograph of the chest was obtained at 0320 hours. Right hydropneumothorax unchanged. Atelectasis of the remaining right lung  
unchanged. Right-sided chest tubes remain in place. . The cardiac and mediastinal  
contours and pulmonary vascularity are normal.  The bones and soft tissues are  
grossly within normal limits. 01/11/21 0352  XR CHEST SNGL V Final result Impression:  IMPRESSION: Right hydropneumothorax and atelectasis of the remaining right lung  
unchanged. Narrative:  EXAM: XR CHEST SNGL V  
   
INDICATION: Respiratory failure, history of lung carcinoma. COMPARISON: 1/10/2021 FINDINGS: A portable AP radiograph of the chest was obtained at 0349 hours. The  
patient is on a cardiac monitor. Right hydropneumothorax and atelectasis the  
remaining right lung unchanged. 2 right-sided chest tubes are in place. . The  
cardiac and mediastinal contours and pulmonary vascularity are normal.  The  
bones and soft tissues are grossly within normal limits. 1/6/21 1/12/21 LAB No lab exists for component: [place] Recent Labs  
  01/12/21 
0414 01/11/21 
0332 01/10/21 
0413 WBC 4.2* 3.3* 3.2* HGB 10.8* 8.1* 8.7* HCT 31.9* 26.0* 27.4*  
* 130* 130* Recent Labs  
  01/12/21 0414 01/11/21 0332 01/10/21 
0413  140 138  
K 3.8 3.8 4.0  
 104 101 CO2 32 32 34* GLU 94 103* 106* BUN 6* 5* 4*  
CREA 0.50* 0.52* 0.45* CA 8.6 8.4 8.1* ABG:  No results found for: PH, PHI, PCO2, PCO2I, PO2, PO2I, HCO3, HCO3I, FIO2, FIO2I 
 
 
MICRO 
 
SARS-CoV-2 LAB Results [facility] Test:  
Lab Results Component Value Date/Time COV2NT Not Detected 06/30/2020 09:25 PM  
  
[facility] Test: No results found for: [facility] Test: No components found for: [id number] Assessment:  (Medical Decision Making) Hospital Problems  Date Reviewed: 1/8/2021 Codes Class Noted POA  
 S/P thoracotomy ICD-10-CM: W97.888 ICD-9-CM: V45.89  1/7/2021 Yes * (Principal) Atelectasis of right lung ICD-10-CM: J98.11 ICD-9-CM: 518.0  1/4/2021 Yes A-fib (HCC) (Chronic) ICD-10-CM: I48.91 
ICD-9-CM: 427.31  1/4/2021 Yes Chronic respiratory failure with hypoxia (HCC) (Chronic) ICD-10-CM: J96.11 
ICD-9-CM: 518.83, 799.02  1/4/2021 Yes Pleural effusion on right ICD-10-CM: J90 ICD-9-CM: 511.9  12/28/2020 Yes Lung cancer ([address] (Chronic) ICD-10-CM: C34.90 ICD-9-CM: 162.9  7/23/2020 Yes Hypotension (Chronic) ICD-10-CM: I95.9 ICD-9-CM: 458.9  5/5/2020 Unknown Overview Signed 1/11/2021  8:39 AM by Troy Marino NP On midodrine Patient's R lung s/p thoractomy with pneumonolysis but ongoing atelectasis. Bronch without significant mucus plugging. Part of apical segment with web blocking the airway. This is felt to likely be chronic but was discussed with surgery and as last resort attempt to break through this could be made, though it comes with risk of puncturing through the airway. As the patient is likely to have a completion pneumonectomy if nothing improves this was felt to be an acceptable risk. Plan:  (Medical Decision Making)  
-proceeded with bronch today. Web again seen. Cautery knife used to puncture through the web and green forceps used to then dilate the remaining tissue. The area beyond this was difficult to assess. It is so distal and the lumen so small could not get good visualization even after switching to the thinnest bronchoscope available. Impossible to tell if there is viable airway beyond this site or not. -have opened chest tubes back and will place to suction as it looks like he has accumulated more pleural fluid on his recent CXR's (see above). Will wait a few hours and then obtain CT chest as a final determination if there has been any improvement in atelectasis. If not, likely needs completion pneumonectomy.   
 
 
Arben Green MD

## 2021-01-12 NOTE — PROGRESS NOTES
A follow up visit was made to the patient. Emotional support, spiritual presence and  
prayer were provided for the patient. He was scheduled to have a procedure today. Shaina Hope, [address] [facility], [facility]

## 2021-01-12 NOTE — PROGRESS NOTES
Bedside report received from [facility]. Pt in bed, awake, alert and watching his ipad. Oriented x4. Lung sounds diminished on the left and absent on the right. Chest tubes x2 on right side clamped per MD order. 02 sats 90's on 2LNC. Abdomen soft and intact with active bowel sounds. Pulses palpable and present all extremities. No trace of edema. Howell in place draining yellow clear urine. 1 PRBC infusing at this time. Will transfuse second unit. Pt states generalized soreness but no direct pain. VSS. Will cotinue to monitor.

## 2021-01-12 NOTE — PROGRESS NOTES
Notified Luciana Barclay NP (practicioner on-call with primary physician) via perfect serve: \"Just wanted to update you that since this AM, patient has required increasing supplemental O2 after his bronch. He was on 2L/min this AM, then increased to 4L/min around the time of CT (1330) and just recently was increased to 6L/min to maintain O2 sat of 90%. He also is much more significantly wet/coarse this afternoon with audible upper airway gurgles that he is unable to clear even with a yaunker. Dr. Casas Books clamped both Chest tubes around [id number]. \"

## 2021-01-12 NOTE — PROGRESS NOTES
CT reviewed, no evidence of right lung inflation after bronchoscopy this morning. He is tachy and BP is lower, will clamp chest tube to prevent further mediastinal shifting. New pneumonia of left lung. May need to consider to remove remaining right lung, discuss with pulmonary.

## 2021-01-12 NOTE — PROCEDURES
PROCEDURE Bronchoscopy with airway inspection / Cautery knife destruction of endobronchial web, dilation with forceps. INDICATION Bronchial stenosis EQUIPMENT: 
Olympus , Olympus , Olympus MP 160F ANESTHESIA Concious sedation with: Fentanyl 150 mcg IV; Versed 3mg IV; Lidocaine 220 mg to tracheo-bronchial tree and vocal cords. IMAGIN2021 AIRWAY INSPECTION After obtaining informed consent, orally with use of a bite block, an Olympus  was introduced into the trachea without complication. RIGHT 
 
LOCATION NORM/ABNORM DESCRIPTION Larynx NL   
VOCAL CORDS NL   
TRACHEA NL   
MARISSA NL   
RMSB NL   
RUL ABNL Web present in apical segment. BI ABNL Stump LEFT 
 
LOCATION NORM/ABNORM DESCRIPTION  
LMSB NL   
HOLA NL   
LINGULA NL   
LLL NL   
SUPERIOR SEG LLL NL   
MAAME-MEDIAL LLL NL   
LATERAL LLL NL   
POSTERIOR LLL NL Once the location of the web was established the patient's O2 level was turned down. A cautery knife was used to make an incision in the center of the web. After this green forceps were inserted through the incision and gradually opened. This allowed visual inspection of the area behind the web. It was difficult to see so further dilation was performed. After several dilations the thin scope was inserted to inspect. There was still not good clear visualization of the area behind this web. Could not definitively say that there was viable airway present to further interventions were stopped. No samples were obtained. The procedure was completed without complication and the patient tolerated the procedure well. EBL: None Recommendations: Will open chest tubes, place to suction, and perform CT chest in a few hours to see if any improvement has been made.   
 
Chino Mack MD

## 2021-01-12 NOTE — PROGRESS NOTES
TRANSFER - OUT REPORT: 
 
Verbal report given to Fatmata BULL(name) on Abhijeet Maravilla.  being transferred to  23(unit) for routine post - op bronchoscopy. Report consisted of patients Situation, Background, Assessment and  
Recommendations(SBAR). Information from the following report(s) SBAR, Procedure Summary and Recent Results was reviewed with the receiving nurse. Lines:  
Venous Access Device chest port  03/18/20 Upper chest (subclavicular area, right (Active) Central Line Being Utilized Yes 01/12/21 0310 Criteria for Appropriate Use Long term IV/antibiotic administration 01/12/21 0310 Site Assessment Clean, dry, & intact 01/12/21 0310 Date of Last Dressing Change 01/04/21 01/12/21 0310 Dressing Status Clean, dry, & intact 01/12/21 0310 Dressing Type Transparent;Tape;Disk with Chlorhexadine gluconate (CHG) 01/11/21 2325 Action Taken Blood drawn 01/11/21 1208 Access Needle Size (Site #1) 20 G 01/06/21 0919 Access Needle Length (Medial Site) 0.75 inches 01/06/21 0919 Positive Blood Return (Medial Site) Yes 01/07/21 1142 Action Taken (Medial Site) Flushed 01/11/21 8710 Opportunity for questions and clarification was provided. Patient transported with: 
 O2 @ 2 liters on appropriate monitors

## 2021-01-12 NOTE — INTERVAL H&P NOTE
Update History & Physical 
 
The Patient's History and Physical of January 4,  
 was reviewed with the patient and I examined the patient. There was no change. The surgical site was confirmed by the patient and me. Plan:  The risk, benefits, expected outcome, and alternative to the recommended procedure have been discussed with the patient. Patient understands and wants to proceed with the procedure.  
 
Electronically signed by Arben Green MD on 1/12/2021 at 9:11 AM

## 2021-01-13 PROBLEM — J43.9 PULMONARY EMPHYSEMA (HCC): Chronic | Status: ACTIVE | Noted: 2020-07-07

## 2021-01-13 NOTE — MANAGEMENT PLAN
Pharmacokinetic Consult to Pharmacist 
 
John Wagner. is a 78 y.o. male being treated for HAP with vancomycin. Height: 5' 10\" (177.8 cm)  Weight: 91.5 kg (201 lb 12.8 oz) Lab Results Component Value Date/Time BUN 7 (L) 01/13/2021 03:37 AM  
 Creatinine 0.55 (L) 01/13/2021 03:37 AM  
 WBC 5.8 01/13/2021 03:37 AM  
 Procalcitonin 0.05 06/30/2020 09:36 PM  
 Lactic acid 2.4 (HH) 06/30/2020 09:36 PM  
  
Estimated Creatinine Clearance: 114.9 mL/min (A) (by C-G formula based on SCr of 0.55 mg/dL (L)). CULTURES: 
pending Lab Results Component Value Date/Time Vancomycin,trough 6.6 05/06/2020 10:28 AM  
 
 
Day 1 of vancomycin. Goal trough is 15-20. Will load with 2000 mg and then initiate 1750 mg every 12 hours for now. Pharmacy to order levels and adjust the dose as clinically indicated. Will continue to follow patient. Thank you, Lili Soto, PharmD, [facility] Clinical [facility]acy Specialist 
[phone]

## 2021-01-13 NOTE — PROGRESS NOTES
Pt arrived to floor from GI overflow. Pt alert oriented times 3 at this time. Pt complaints of right rib area pain from chest tubes 5/10. Pt has dressing to right side  Clean, dry, intact at this time. Pt on 6  L HF NC. Pt has no acute distress noted at this time. Pt oriented to room and surroundings. Pt encouraged to call for assistance if needed call light in reach, will monitor.

## 2021-01-13 NOTE — ADT AUTH CERT NOTES
Pneumothorax - Care Day 10 (1/13/2021) by Guicho Butler 
 
  
Review Status Review Entered Completed 1/13/2021 13:50  
  
Criteria Review Care Day: 10 Care Date: 1/13/2021 Level of Care: ICU Guideline Day 2 Clinical Status   
(X) * Hypotension absent 1/13/2021 13:50:29 EST by Guicho Butler   
  101/69; 105/70; 109/68   
(X) * Pain absent or managed 1/13/2021 13:50:29 EST by Rosario Patch managed with percocet prn and ultram prn   
(X) * Respiratory distress absent   
(X) * Chest tube air leak absent 1/13/2021 13:50:29 EST by Guicho Butler   
  clamped   
(X) * CXR shows resolution or improvement of pneumothorax 1/13/2021 13:50:29 EST by Guicho Butler   
  Impression:   
  
1. Stable postoperative changes throughout right hemithorax status post 
pneumonectomy. * Milestone Additional Notes 1/13/2021 Continued Stay Review LOC- IP- ICU Bed 101/69; 98.3; 88; 24; 97% RA  
  
MEDS:  
Cordarone 200mg qd PO  
Pulmicort 500mcg bid NEBS Vibramycin 100mg q12hrs PO Neurontin 300mg tid PO Mucinex 1200mg q12hrs PO Hycodan 5ml q4hrs prn PO x3 in 24 hrs Percocet 5-325mg 1 q4hrs prn PO x1 Protonix 40mg bid PO Zosyn 4.5g q8hrs IV Ultram 50mg q6hrs prn PO  
  
MD Notes:  
  
01/11/21  POD 5  Chest tube remains clamped. NO changes in chest xray. Dr. Roosevelt Vazquez spoke with Dr. Shahriar Moore regarding trying again to clear airway to re inflate lung if unsuccessful then will need a pneumonectomy. Patient in NAD, No SOB on 3 L oxygen with sats upper 90's and continues to be hypotensive at times. Hgb trending down will transfuse 2u pRBC  
   
01/12/21 POD 6 just returned from pulmonary procedure. Chest tubes to suction. States feels less pressure, but no other changes noted. No air leak noted in chest tubes- awaiting chest CT this afternoon. NAD, No SOB.  Hgb 10  
   
 01/13/21 POD 7.  No significant changes on CT to right lung. Left lung ?pneumonia.  Will discuss with pulmonary if any further treatment or proceed to pneumonectomy.  Chest tubes maintained clamped. 6 liters oxygen  
   
  
Principal Problem:  
  Atelectasis of right lung Plan Will remove chest tubes Plan pneumonectomy next week Cxr daily Pulmonary following Regular Diet Activity as tolerated Pain control Encourage C/DB/IS Transfer to floor LABS:  
  
1/13/2021 03:37 WBC: 5.8  
RBC: 4.05 (L) HGB: 11.4 (L) HCT: 35.7 (L) MCV: 88.1 MCH: 28.1 MCHC: 31.9  
RDW: 15.3 (H) PLATELET: 503 MPV: 9.8 NEUTROPHILS: 76  
LYMPHOCYTES: 13 MONOCYTES: 8  
EOSINOPHILS: 3  
BASOPHILS: 0 IMMATURE GRANULOCYTES: 1  
DF: AUTOMATED ABSOLUTE NRBC: 0.00  
ABS. NEUTROPHILS: 4.4  
ABS. IMM. GRANS.: 0.0  
ABS. LYMPHOCYTES: 0.7 ABS. MONOCYTES: 0.4  
ABS. EOSINOPHILS: 0.2  
ABS. BASOPHILS: 0.0 Sodium: 136 Potassium: 3.5 Chloride: 99  
CO2: 34 (H) Anion gap: 3 (L) Glucose: 112 (H) BUN: 7 (L) Creatinine: 0.55 (L) Calcium: 8.4 GFR est non-AA: >60  
GFR est AA: >60 Procalcitonin: 0.07  
  
  
1/13/2021 12:10  
CULTURE, RESPIRATORY/SPUTUM/BRONCH W GRAM STAIN: Rpt SARS-COV-2: Rpt  
  
  
  
1/9/2021 Continued Stay Review by Xochilt Mcclain 
 
  
Review Status Review Entered In Primary 1/13/2021 13:43  
  
Criteria Review 1/9/2021 Continued Stay Review LOC- IP- ICU Bed 
  
87/55; 97.1; 71; 14; 97% RA 
  
MEDS: 
Cordarone 200mg qd PO 
Pulmicort 500mcg bid NEBS Neurontin 300mg tid PO Mucinex 1200mg q12hrs PO Oxycodone 5-325mg 1 q4hrs prn PO x2 
Protonix 40mg bid PO Ultram 50mg q6hrs prn PO x3 
  
MD Notes: 
  
 01/07/21 To the OR yesterday PO day1. Difficult surgery. Consulted pulmonary intraoperatively for broch. Unable to get lung to inflate intraoperative, ?chronic mucous plug. Pt in ICU, no complaints this AM- scheduled for bronch this AM. Chest tubes to water seal currently- no air leak- appears lung still collapsed from CXR. Pain controlled. 
  
01/08/21 POD 2. Pt remains in ICU, no complaints this AM. Pain controlled. Chest tubes to water seal currently- no air leak. CXR this am showing No Significant Interval Change.  
  
01/09/21 POD 3. Pt remains in ICU, no complaints this AM. Pain controlled. Chest tubes currently clamped. CXR this am showing No Significant Interval Change. Denies SOB. On 3L o2. 
  
Principal Problem: 
  Atelectasis of right lung 
  
Plan Right Chest tube x 2 clamped Pulmonary following Regular Diet Activity as tolerated Pain control Encourage C/DB/IS 
  
CXR: IMPRESSION: Persistent right hydropneumothorax and atelectasis of the right lung Unchanged. 
  
LABS: 
  
1/9/2021 04:08 WBC: 5.4 
RBC: 3.20 (L) HGB: 9.1 (L) HCT: 28.4 (L) MCV: 88.8 MCH: 28.4 MCHC: 32.0 
RDW: 14.8 (H) PLATELET: 195 (L) MPV: 9.6 NEUTROPHILS: 72 
LYMPHOCYTES: 16 MONOCYTES: 7 
EOSINOPHILS: 4 BASOPHILS: 0 IMMATURE GRANULOCYTES: 1 
DF: AUTOMATED ABSOLUTE NRBC: 0.00 
ABS. NEUTROPHILS: 3.9 
ABS. IMM. GRANS.: 0.0 
ABS. LYMPHOCYTES: 0.9 
ABS. MONOCYTES: 0.4 
ABS. EOSINOPHILS: 0.2 
ABS. BASOPHILS: 0.0 Sodium: 139 Potassium: 4.0 Chloride: 104 CO2: 36 (H) Anion gap: Cannot be calculated Glucose: 96 BUN: 6 (L) Creatinine: 0.40 (L) Calcium: 8.2 (L) Magnesium: 2.3 GFR est non-AA: >60 
GFR est AA: >60 
   
  
1/8/2021 Continued Stay Review by Smith Lynch 
 
  
Review Status Review Entered In Primary 1/13/2021 13:37  
  
Criteria Review 1/8/2021 Continued Stay Review LOC- IP- ICU Bed 
  
89/52; 98.3; 114; 17; 99% 3L n/c 
  
MEDS: 
Cordarone 200mg qd PO 
Pulmicort 500mcg bid NEBS Neurontin 300mg tid PO Mucinex 1200mg q12hrs PO Oxycodone 5-325mg 1 q4hrs prn PO x2 
Protonix 40mg bid PO 
  
MD Notes: 
  
01/08/21 POD 2. Pt remains in ICU, no complaints this AM. Pain controlled. Chest tubes to water seal currently- no air leak. CXR this am showing No Significant Interval Change.  
  
01/07/21 To the OR yesterday PO day1. Difficult surgery. Consulted pulmonary intraoperatively for broch. Unable to get lung to inflate intraoperative, ?chronic mucous plug. Pt in ICU, no complaints this AM- scheduled for bronch this AM. Chest tubes to water seal currently- no air leak- appears lung still collapsed from CXR. Pain controlled. 
  
Assessment Principal Problem: 
  Atelectasis of right lung  
  
Active Problems: 
  Hypotension 
  
  Lung cancer 
  
  Pleural effusion on right 
  
  A-fib  
  
  Chronic respiratory failure with hypoxia  
  
  S/P thoracotomy 
  
  
Plan Clamp Chest tubes Bronch per pulmonary yesterday Clear liquid diet Activity as tolerated Pain control Encourage C/DB/IS 
  
LABS: 
  
Results for Jie Lantigua. \"MAX\" (MRN [id number]) as of 1/13/2021 13:29 
  
1/8/2021 03:09 WBC: 7.2 RBC: 3.52 (L) HGB: 9.9 (L) HCT: 31.5 (L) MCV: 89.5 MCH: 28.1 MCHC: 31.4 
RDW: 14.9 (H) PLATELET: 587 (L) MPV: 9.3 (L) NEUTROPHILS: 80 (H) LYMPHOCYTES: 11 (L) MONOCYTES: 6 
EOSINOPHILS: 3 
BASOPHILS: 0 IMMATURE GRANULOCYTES: 0 
DF: AUTOMATED ABSOLUTE NRBC: 0.00 
ABS. NEUTROPHILS: 5.7 
ABS. IMM. GRANS.: 0.0 
ABS. LYMPHOCYTES: 0.8 ABS. MONOCYTES: 0.4 
ABS. EOSINOPHILS: 0.2 
ABS. BASOPHILS: 0.0 Sodium: 138 Potassium: 4.0 Chloride: 103 CO2: 33 (H) Anion gap: 2 (L) Glucose: 90 BUN: 7 (L) Creatinine: 0.57 (L) Calcium: 8.7 Magnesium: 2.1 GFR est non-AA: >60 
GFR est AA: >60 
  
CXR: IMPRESSION:  NO SIGNIFICANT INTERVAL CHANGE. 
   
  
1/6/2021 Continued Stay Review by Kishan Simms 
 
  
Review Status Review Entered In Primary 1/13/2021 13:28  
  
Criteria Review 1/6/2021 Continued Stay Review LOC- IP- ICU Bed 
  
93/54; 97; 88; 16; 96% 3L n/c 
  
MEDS: 
Cordarone 200mg qd PO Neurontin 300mg tid PO Mucinex 1200mg q12hrs PO Oxycodone 5-325mg 1 q4hrs prn PO x1 Protonix 40mg bid PO Tylenol 1000mg once PO Albuminate 25g now IV Ancef 2g on call IV x1 Dilaudid 0.5mg IV x3 LR 75ml/hr iV Morphine 2mg IV x1 Donavan-synrphrine drip titrat eIV x4 
  
MD Notes: 
  
Operative Note- 
DATE OF SERVICE:  01/06/2021 
  
PREOPERATIVE DIAGNOSIS: Rt hemorthorax 
  
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 
  
PROCEDURES PERFORMED: 1.  Attemptted right thoracoscopy converted to right thoracotomy. 2.  Extensive pneumonolysis with decortication over 3 hours. 3.  Intercostal nerve cryoablation 
  
SURGEON: Juarez Dalal MD 
  
ASSISTANT: Guillermina Mahmood NP 
  
ANESTHESIA:  general 
  
COMPLICATIONS:  none 
  
SPECIMENS REMOVED: as above 
  
IMPLANTS:  CT x 2 on water seal 
  
ESTIMATED BLOOD LOSS:  300 ml 
  INDICATION:  This is a 71-year-old gentleman who presented to Pulmonary Service with right hemothorax.  This was initially identified on a scan at the end of 12/2020. Varsha Romina, he has been feeling sick, was short of breath for quite a while, probably over several months.  Pulmonary attempted thoracentesis and eventually placed a large-bore chest tube.  However, the fluid appeared to be loculated and Surgical Service was requested.  As noted, the patient is well known to the Surgical Service for history of right lung cancer. Jeronimo Bruner had neoadjuvant chemoradiation and then right middle and lower lobectomy in 07/2020 and he subsequently had adjuvant chemotherapy.  Since the chest tube was not working, I offered him surgical exploration to clean up his hemothorax. Jeronimo Bruner understood the risks and benefits, agreed to proceed. 
  
FINDINGS: 
 1.  He does have a very loculated acute-on-chronic hemothorax.  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  This is clearly a chronic process and lysis of adhesions and decortication were extremely difficult to perform. 2.  After decortications, the right lung (upper lobe only) was not able to be inflated with maximal effort.  Numerous plugs were washed out by the anesthesiologist and the pulmonologist. Chinmay Velazco requested Pulmonology for intraoperative consult.  The large airways including three segmental branches to the right upper lobe were all open.  However, the lung was not able to be inflated. After discussion with Dr. Jannette Shaver, the pulmonologist,  we decided to leave the right upper lobe in and hopefully, it will inflate with continued pulmonary efforts over the next few days.  Otherwise, he may need completion right pneumonectomy. 
  
 PROCEDURE:  After informed consent obtained, the patient brought into the operating room.  General anesthesia was administered.  A double-lumen tube placed per Anesthesia.  The patient then left on left decubitus position with the right side up.  The right chest was prepped and draped in routine fashion.  We first placed a trocar at the anterior axillary line, seventh intercostal space.  The pleural cavity was entered under direct vision.  We immediately entered a loculated collection area. Sánchez Lawrence was extensive scarring.  With careful dissections, we created a space in the lower part of the thorax and then we can see the lung stuck to the lateral thoracic chest wall very densely.  Blunt dissection turned out to be extremely difficult.  We were able to clear some space anteriorly.  Again, the fluid is very loculated.  We attempted to release the lung from the chest wall.  This was impossible.  Also, the lung was completely encapsulated with thick rind tissues.  This clearly needed open thoracotomy for decortications.  Then we decided to proceed with thoracotomy with maximum release of the lower portion of the thorax at the previous thoracotomy incision right at this level and so with assistance of thoracoscopy, we performed the thoracotomy at the same incision.  The latissimus dorsi was divided, serratus anterior was lifted anteriorly, and then we identified the previous intercostal space.  It was actually covered by lung tissue from the inside and so we chose to take one level down.  We divided the #6 rib posteriorly and then we got into the pleural cavity with thoracoscope assistance.  Then with mainly blunt dissections, I tried to dissect the lung off the chest wall.  This turned out to be extremely difficult process.  The fibrosis was really hard.  I stayed extrapleural in some place and then I got back in intrapleural.  With tremendous effort, the lung was able to be released from the chest wall then further dissection to release the lung from the mediastinum and eventually, the upper lobe was released from the surrounding tissues.  Then I started decortication.  This also turned out to be extremely difficult process.  There were multiple layers on the lung that represent a chronic process.  He probably had repeated bleedings numerous times.  With maximal effort, the lung was able to be released.  The rind tissue was removed mostly.  The parietal pleura was densely stuck on the lung surface.  Some lung had to be decapsulized for decortication.  After 3 hours of effort, I felt the decortication was satisfactory.  Then I asked the anesthesiologist to inflate the right lung and after several efforts, there was no inflation.  Bronchoscopy was performed by the anesthesiologist.  Numerous plugs were washed out.  Still, the lung was not able to be inflated.  Then we did intraoperative consult.  Dr. Cleveland Stevenson came in. [facility] performed another bronchoscopy with better equipment and we can see the segmental branch to the upper lobe all open.  He did numerous washings and eventually, the right lung was still not able to be inflated 300 mL. 
  
As noted, Elvira  is the first assistant in this case.  She offered excellent exposure to make this difficult surgery possible.  It would be very dangerous to perform this case without her assistance. 
Parish Shoemaker MD 
  
  
    
Physician Pulmonary Disease Procedures       
     
Date of Service:  01/06/21 1732  
  
Pre-procedure Diagnoses Atelectasis of right lung [J98.11] Post-procedure Diagnoses Atelectasis of right lung [J98.11] Procedures AL BRONCHOSCOPY W/THER ASPIR TRACHBRNCL TREE 1ST [id number] (CPT®)]  
  
   
  
  
AIRWAY INSPECTION 
  
After obtaining informed consent, through an endotracheal tube, an Ambu Disposable Bronchoscope was introduced into the trachea without complication. 
  
                                                            RIGHT 
  
 LOCATION NORM/ABNORM DESCRIPTION Larynx ETT    
VOCAL CORDS ETT    
TRACHEA NL    
MARISSA NL    
RMSB NL    
RUL ABNL Generally narrowed airways with poor expansion but no visible occluding lesions or plugs.   
BI Stump    
  
                         
                                                            LEFT 
  
LOCATION NORM/ABNORM DESCRIPTION  
LMSB   Double lumen ETT, not examined.   
  
Saline was used in 20cc aliquants to flush the airways of the RUL with suction applied between each. Initially there was return of a small amount of thick, white sputum. This tapered off with repeat flushing and eventually cleared by the end of the procedure.  
  
No samples were collected.  
  
EBL: None 
  
Recommendations: 
Patient will complete his VATS and will work on airway clearance mechanisms post op. 3% saline nebs and vibralung have been ordered. Will make him NPO at midnight and depending on CXR findings in the morning may require additional bronchoscopy for clean out either tomorrow or the next day. 
   
  
  
Current Labs: 
  
WBC: 4.2 (L) RBC: 3.89 (L) HGB: 11.0 (L) HCT: 34.8 (L) MCV: 89.5 MCH: 28.3 MCHC: 31.6 
RDW: 14.6 PLATELET: 725 MPV: 9.5 NEUTROPHILS: 69 
LYMPHOCYTES: 18 MONOCYTES: 9 
EOSINOPHILS: 3 
BASOPHILS: 1 IMMATURE GRANULOCYTES: 1 
DF: AUTOMATED ABSOLUTE NRBC: 0.00 
ABS. NEUTROPHILS: 2.9 
ABS. IMM. GRANS.: 0.0 
ABS. LYMPHOCYTES: 0.8 ABS. MONOCYTES: 0.4 
ABS. EOSINOPHILS: 0.1 ABS. BASOPHILS: 0.0 Sodium: 138 Potassium: 4.2 Chloride: 105 CO2: 30 Anion gap: 3 (L) Glucose: 94 BUN: 8 Creatinine: 0.68 (L) Calcium: 8.8 GFR est non-AA: >60 
GFR est AA: >60 Bilirubin, total: 0.5 Protein, total: 6.5 Albumin: 2.7 (L) Globulin: 3.8 (H) A-G Ratio: 0.7 (L) ALT: 8 (L) AST: 8 (L) Alk. phosphatase: 99 
  
1/5/2021 16:02 INR: 1.1 Prothrombin time: 14.8 (H) aPTT: 35.2 (H) 
  
1/6/2021 06:12 XR CHEST SNGL V: Rpt 
  
1/6/2021 12:22 Crossmatch Expiration: 01/09/2021,8691 TYPE & SCREEN: Rpt 
  
1/6/2021 13:45 [facility] SURGICAL PATHOLOGY: Rpt 
  
1/6/2021 20:05 Magnesium: 1.8 
   
  
Pneumothorax - Care Day 7 (1/10/2021) by Diane Sewell 
 
  
Review Status Review Entered Completed 1/11/2021 09:24  
  
Criteria Review Care Day: 7 Care Date: 1/10/2021 Level of Care: ICU Guideline Day 2 Clinical Status ( ) * Hypotension absent   
(X) * Pain absent or managed 1/11/2021 09:24:48 EST by Diane Sewell   
  managed   
(X) * Respiratory distress absent 1/11/2021 09:24:48 EST by Diane Sewell   
  15-16   
(X) * Chest tube air leak absent 1/11/2021 09:24:48 EST by Kacie Cox absent/clamped ( ) * CXR shows resolution or improvement of pneumothorax * Milestone Additional Notes 1/10/2021 Continued Stay Review LOC- IP- ICU Bed  
  
89/63; 97.9; 87; 16; 97% 3L n/c  
  
MD Notes: Patient is a 78 y.o.  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 He was found to have RLL mass 2/2020, measuring 6.7cm x 5.9cm. He had an EBUS On 2/26/2020 and biopsy consistent with SCC. His Brain MRI was negative for brain mets but revealed a 2cm R parotid mass. His PET CT revealed a hypermetabolic RLL mass extending into the hilum and excessively hypermetabolic R parotid gland. He had radiation and chemo and then underwent RML lobectomy 7/23/2020. He then completed 6 more rounds of chemo around 10/2020. He had repeat Chest CT on 12/22/2020 which revealed a large R pleural effusion and collapse of the remaining RUL. Pt underwent R thoracentesis on 12/28/2020 by Dr. Nai Miranda with 900mL bloody fluid removed. Pt was unable to tolerate complete drainage. His Eliquis was held and he was admitted for CT placement on 1/4/21 with 800cc of serosanguineous drainage. General Surgery was consulted and CXR revealed persistent loculated R effusion. Pt went to the OR on 1/6 for R VATS which was converted to an open thoracotomy with extensive pneumolysis with decortication. Pt did require a bronchoscopy in the OR secondary to atelectasis of the RUL and mucous plugging. Pt was hypotensive postoperatively requiring aylin. Pt was transferred to ICU overflow. Pt had a repeat bronch on 1/7 secondary to persistent RUL atelectasis. He did not have any mucous plugging but his apical segment of the RUL was blocked by a web and the bronchoscope couldn't advance any further. Pt did have a stump area from prior lobectomy with abnormal appearing tissue that was biopsied,pathology is pending.         
  
  
Pt lying in bed on 3L O2. Receiving breathing treatment at this time.  Eaten breakfast.  Now off aylin since 1800 yesterday.  CT remain clamped and per surgery.   
   
 Pt with multiple questions regarding how long his lung has been collapsed. After review of chart, it seems last CT prior to the one in Dec was in July. July CXR showed probable small effusion and faint densities in RLL zone, which is consistent with known mass. CXR in January showed large R pleural effusion that was address with CT.  Dr Claudia Curtis was consulted and is following.    
   
Assessment:  (Medical Decision Making)  
  Hospital Problems   
    
  S/P thoracotomy   
    
  * (Principal) Atelectasis of right lung   
    
  A-fib (HCC)   
    
  Chronic respiratory failure with hypoxia (HCC)   
    
  Pleural effusion on right   
    
  Lung cancer (HCC)   
    
  Hypotension   
    
  
 Plan:  (Medical Decision Making)  
   
--wean O2 as tolerated, on 3 LPM NC   
--donavan has been off since 1800 yesterday   
--continue /hr for now  
--pain control  
--continue airway clearance, vibra lung, IS, mucinex  
--on protonix, anticoagulation is on hold with recent surgery. Continue SCDs  
--CT remain clamped per surgery, CXR with no significant interval change this AM   
--pt with multiple questions regarding how long lung has been collapsed.  Information of scans noted in subjective. MD to discuss please. --ok to move to floor from pulmonary standpoint.   
   
More than 50% of the time documented was spent in face-to-face contact with the patient and in the care of the patient on the floor/unit where the patient is located.  
   
Krystin Lan, MEHDI  
   
Lungs: decreased BS in the bases Heart:  RRR with no Murmur/Rubs/Gallops  
   
Additional Comments:  BP is better with Midodrine, off Donavan drip. Likely need pneumonectomy. Can go to floor if ok with surgery LABS:  
  
1/10/2021 04:13 WBC: 3.2 (L) RBC: 3.08 (L) HGB: 8.7 (L) HCT: 27.4 (L) MCV: 89.0 MCH: 28.2 MCHC: 31.8  
RDW: 14.9 (H) PLATELET: 262 (L) MPV: 9.8 NEUTROPHILS: 70  
LYMPHOCYTES: 18 MONOCYTES: 7  
EOSINOPHILS: 5  
 BASOPHILS: 0 IMMATURE GRANULOCYTES: 0  
DF: AUTOMATED ABSOLUTE NRBC: 0.00  
ABS. NEUTROPHILS: 2.2  
ABS. IMM. GRANS.: 0.0  
ABS. LYMPHOCYTES: 0.6  
ABS. MONOCYTES: 0.2  
ABS. EOSINOPHILS: 0.2  
ABS. BASOPHILS: 0.0 Sodium: 138 Potassium: 4.0 Chloride: 101 CO2: 34 (H) Anion gap: 3 (L) Glucose: 106 (H) BUN: 4 (L) Creatinine: 0.45 (L) Calcium: 8.1 (L)  
GFR est non-AA: >60  
GFR est AA: >60  
  
  
  
  
Pneumothorax - Care Day 4 (1/7/2021) by Lawyer Severance 
 
  
Review Status Review Entered Completed 1/7/2021 13:42  
  
Criteria Review Care Day: 4 Care Date: 1/7/2021 Level of Care: ICU Guideline Day 2 Clinical Status ( ) * Hypotension absent 1/7/2021 13:41:56 EST by Lawyer Severance   
  88/61   
(X) * Pain absent or managed 1/7/2021 13:41:56 EST by Raphael Vargas managed ( ) * Respiratory distress absent   
(X) * Chest tube air leak absent 1/7/2021 13:41:56 EST by Priscella Sam Chest tubes to water seal currently- no air leak- appears lung still collapsed from CXR. ( ) * CXR shows resolution or improvement of pneumothorax * Milestone Additional Notes 1/7/2021 Continued Stay Review LOC- IP- ICU Bed  
  
88/61; 98.4; 106; 14; 92%on 10L n/c  
  
MD Notes:  
  
01/07/21 To the OR yesterday PO day1. Difficult surgery. Consulted pulmonary intraoperatively for broch. Unable to get lung to inflate intraoperative, ?chronic mucous plug. Pt in ICU, no complaints this AM- scheduled for bronch this AM. Chest tubes to water seal currently- no air leak- appears lung still collapsed from CXR. Pain controlled. CXR:   
Impression IMPRESSION:  
   
1. Stable post op findings in the right hemithorax. Right chest tubes and right  
pneumothorax, similar to prior exam.  
   
  
Assessment:  
  
Principal Problem:  
  Atelectasis of right lung (1/4/2021)  
   S/P thoracotomy (1/7/2021)  
   
   
Plan Chest tubes to water seal  
Bronch per pulmonary today Clear liquid diet Activity as tolerated Pain control Encourage C/DB/IS  
  
OPERATIVE REPORT  
  
DATE OF SERVICE:  01/06/2021  
   
PREOPERATIVE DIAGNOSIS: Rt hemorthorax  
   
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  
   
PROCEDURES PERFORMED: 1.  Attemptted right thoracoscopy converted to right thoracotomy. 2.  Extensive pneumonolysis with decortication over 3 hours. 3.  Intercostal nerve cryoablation  
   
SURGEON: Madisyn Mcknight MD  
   
ASSISTANT: Zarina Angel NP  
   
ANESTHESIA:  general  
   
COMPLICATIONS:  none  
   
SPECIMENS REMOVED: as above  
   
IMPLANTS:  CT x 2 on water seal  
   
ESTIMATED BLOOD LOSS:  300 ml  
  INDICATION:  This is a 63-year-old gentleman who presented to Pulmonary Service with right hemothorax.  This was initially identified on a scan at the end of 12/2020. Tenzin Goddard, he has been feeling sick, was short of breath for quite a while, probably over several months.  Pulmonary attempted thoracentesis and eventually placed a large-bore chest tube.  However, the fluid appeared to be loculated and Surgical Service was requested.  As noted, the patient is well known to the Surgical Service for history of right lung cancer. [facility] had neoadjuvant chemoradiation and then right middle and lower lobectomy in 07/2020 and he subsequently had adjuvant chemotherapy.  Since the chest tube was not working, I offered him surgical exploration to clean up his hemothorax. [facility] understood the risks and benefits, agreed to proceed.  
   
FINDINGS:  
1. [facility] does have a very loculated acute-on-chronic hemothorax.  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  This is clearly a chronic process and lysis of adhesions and decortication were extremely difficult to perform. 2.  After decortications, the right lung (upper lobe only) was not able to be inflated with maximal effort.  Numerous plugs were washed out by the anesthesiologist and the pulmonologist. Sunni Gomez requested Pulmonology for intraoperative consult. Ranny Pepper large airways including three segmental branches to the right upper lobe were all open.  However, the lung was not able to be inflated. After discussion with Dr. Zack Berman, the pulmonologist,  we decided to leave the right upper lobe in and hopefully, it will inflate with continued pulmonary efforts over the next few days.  Otherwise, he may need completion right pneumonectomy.  
   
 dissection to release the lung from the mediastinum and eventually, the upper lobe was released from the surrounding tissues.  Then I started decortication.  This also turned out to be extremely difficult process.  There were multiple layers on the lung that represent a chronic process.  He probably had repeated bleedings numerous times.  With maximal effort, the lung was able to be released.  The rind tissue was removed mostly.  The parietal pleura was densely stuck on the lung surface.  Some lung had to be decapsulized for decortication.  After 3 hours of effort, I felt the decortication was satisfactory.  Then I asked the anesthesiologist to inflate the right lung and after several efforts, there was no inflation.  Bronchoscopy was performed by the anesthesiologist.  Numerous plugs were washed out.  Still, the lung was not able to be inflated.  Then we did intraoperative consult.  Dr. Otoniel Duron came in. [facility] performed another bronchoscopy with better equipment and we can see the segmental branch to the upper lobe all open.  He did numerous washings and eventually, the right lung was still not able to be inflated 300 mL.  
   
As noted, Elvira  is the first assistant in this case.  She offered excellent exposure to make this difficult surgery possible.  It would be very dangerous to perform this case without her assistance. Sylvain Boykin MD  
  
LABS:  
  
1/7/2021 03:30  
WBC: 7.8  
RBC: 3.68 (L) HGB: 10.6 (L) HCT: 32.7 (L) MCV: 88.9 MCH: 28.8 MCHC: 32.4  
RDW: 14.6 PLATELET: 807 MPV: 9.1 (L) NEUTROPHILS: 82 (H) LYMPHOCYTES: 10 (L) MONOCYTES: 6  
EOSINOPHILS: 1 BASOPHILS: 0 IMMATURE GRANULOCYTES: 1  
DF: AUTOMATED ABSOLUTE NRBC: 0.00  
ABS. NEUTROPHILS: 6.4  
ABS. IMM. GRANS.: 0.0  
ABS. LYMPHOCYTES: 0.8 ABS. MONOCYTES: 0.5  
ABS. EOSINOPHILS: 0.1 ABS. BASOPHILS: 0.0 Sodium: 139 Potassium: 4.3 Chloride: 106 CO2: 29 Anion gap: 4 (L) Glucose: 105 (H) BUN: 8  
 Creatinine: 0.65 (L) Calcium: 8.7 Magnesium: 2.1 GFR est non-AA: >60  
GFR est AA: >60

## 2021-01-13 NOTE — PROGRESS NOTES
TRANSFER - IN REPORT: 
 
Verbal report received from [facility] on [facility].  [facility] Report consisted of patients Situation, Background, Assessment and  
Recommendations(SBAR). Information from the following report(s) SBAR and Recent Results was reviewed with the receiving nurse. Opportunity for questions and clarification was provided. Assessment completed upon patients arrival to unit and care assumed.

## 2021-01-13 NOTE — ROUTINE PROCESS
Bedside and Verbal report given to self and Fanta Hernandez RN by Madisyn Allen RN. Report included SBAR, Kardex, ED Summary, Procedure Summary, Intake and Output and Cardiac Rhythm SR-ST.

## 2021-01-13 NOTE — PROGRESS NOTES
A follow up visit was made to the patient. Emotional support, spiritual presence and  
prayer were provided for the patient. He shared that he may be having an upcoming surgery. Geeta Young, [address] [facility]– [place], [facility]

## 2021-01-13 NOTE — CONSULTS
Infectious Disease Consult Today's Date: 1/13/2021 Admit Date: 1/4/2021 Impression:  
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 · Recommend continuing IV Zosyn for 7 days total for L sided PNA. · Discontinue IV Vanc. · Follow sputum cultures. · Follow surgical plan. · Will check COVID today. Anti-infectives: · Zosyn (1/13- 
· Ancef (1/6-1/7) Subjective:  
Date of Consultation:  January 13, 2021 Referring Physician: Tara Moon NP 
 
 Patient is a 78 y.o. male who presented on 1/4 with RLL collapse. He has past medical history of tobacco abuse, chronic nocturnal hypoxemia on 2L at night, a. Fib on eliquis, and found to have RLL mass 2/2020 with subsequent EBUS on 2/26/20 and biopsy consistent with SCC. His brain MRI negative for brain mets but revealed a 2cm R parotid mass. His PET scan revealed hypermetabolic RLL mass extending into hilum and excessively hypermetabolic R parotid gland. He had radiation and chemo and then underwent RLL lobectomy on 7/23/2020. He then completed 6 more rounds of chemo 10/2020. He had repeat Chest CT on 12/22/2020 which revealed large R pleural effusion and collapse of remaining RUL. He underwent R thoracentesis on 12/28/2020 with 900cc of bloody fluid removed. He was unable to tolerate complete drainage thought to be related to Eliquis, so this was held for admission for Chest tube placement and possible surgical eval. He was admitted on 1/4 by [facility] pulmonary. Per chart review, patient noted shortness of breath and pressure in R lung since his lobectomy. He had chest tube placed on 1/4 and then Dr. Nagi Palomino was consulted. He took the patient to surgery on 1/6 for attempted R thoracoscopy which was converted to R thoracotomy with extensive pneumonolysis with decortication and intercostal nerve cryoablation. Per op note, there was loculated collection and extensive scarring and decortication was extremely difficult. After decortication they performed bronchoscopy and were unfortunately unable to inflate the right lung. Patient may require pneumonectomy by General Surgery. He had repeat bronch 1/12 without evidence of right lung inflation. He had CT chest 1/12 with findings of new moderate groundglass opacity in HOLA and tree in bud opacity in LLL which may be infectious or inflammatory. ID was consulted for assistance. WBC 5.8 today. He was started on IV Zosyn/Vanc for PNA. Patient seen resting in bed. He has frequent productive cough. He reports pain with coughing to R chest. He denies any n/v/d. He states he had low grade fever yesterday. Patient Active Problem List  
Diagnosis Code  Essential hypertension with goal blood pressure less than 130/80 I10  Mass of lower lobe of right lung R91.8  Right lower lobe lung mass R91.8  Malignant neoplasm of lower lobe of right lung (HCC) C34.31  
 GERD (gastroesophageal reflux disease) K21.9  Hypotension I95.9  Sepsis due to undetermined organism ([facility] A41.9  Pulmonary emphysema ([facility] J43.9  Cancer of bronchus of right lower lobe ([facility]) C34.31  
 Lung cancer ([facility] C34.90  
 Cancer of right lung (HCC) C34.91  
 Atrial tachycardia ([facility]) I47.1  S/P lobectomy of lung Z90.2  Cough R05  Dehydration E86.0  Chemotherapy induced neutropenia ([facility]) D70.1, T45.1X5A  Pleural effusion on right J90  
 Atelectasis of right lung J98.11  
 A-fib ([facility]) I48.91  
 Chronic respiratory failure with hypoxia ([facility]) J96.11  
 S/P thoracotomy C74.189 Past Medical History:  
Diagnosis Date  GERD (gastroesophageal reflux disease)   
 controlled with med  Hypertension hx-- off meds since 2020--- followed by dr. Charlee Powell  Hypoxia 2020  Malignant neoplasm of lower lobe of right lung ([facility] 2020 REC'D CHEMO AND RADIATION--- FOLLOWED BY DR. Carlos Alfaro Osteoarthritis  Right lower lobe lung mass 2020  Status post total right knee replacement 2016 Family History Problem Relation Age of Onset  Cancer Mother   
     uterine  Arrhythmia Sister   
     afib Social History Tobacco Use  Smoking status: Former Smoker Packs/day: 1.00 Years: 20.00 Pack years: 20.00 Quit date:  Years since quittin.0  Smokeless tobacco: Never Used Substance Use Topics  Alcohol use: Yes Alcohol/week: 4.0 standard drinks Types: 4 Glasses of wine per week Past Surgical History:  
Procedure Laterality Date  HX COLONOSCOPY    
 HX KNEE ARTHROSCOPY Left   
 scope X 1, ACL recon X 1  
 HX KNEE ARTHROSCOPY Right 1974, 1975 X 2   
 HX KNEE REPLACEMENT Right 2016 [address]  HX VASCULAR ACCESS Right placed 3/2020  
 port in chest   
 IR INSERT TUNL CVC W PORT OVER 5 YEARS  3/18/2020  [place] SINUS SURGERY PROC UNLISTED  1988, 1991  
 sinus surg Prior to Admission medications Medication Sig Start Date End Date Taking? Authorizing Provider  
gabapentin (NEURONTIN) 300 mg capsule Take 1 Cap by mouth three (3) times daily. 12/23/20  Yes Hayes Kirk MD  
METOPROLOL SUCCINATE PO Take  by mouth. Indications: 25 mg once daily   Yes Provider, Historical  
amiodarone (CORDARONE) 200 mg tablet Take 1 Tab by mouth daily. 8/26/20  Yes Nancy Johnson MD  
Eliquis 5 mg tablet TAKE 1 TAB BY MOUTH TWO (2) TIMES A DAY 11/22/20   Provider, Historical  
DISABLED PLACARD (DISABLED PLACARD) DMV Pt has medical condition that makes it difficult to walk 100 feet non stop without exacerbating existing condition. 10/13/20   Alanis Lama NP  
levalbuterol tartrate (Xopenex HFA) 45 mcg/actuation inhaler Take 2 Puffs by inhalation every four (4) hours as needed for Wheezing or Shortness of Breath. 8/25/20   Felicia Griggs NP Nebulizer & Compressor machine COPD 7/3/20   Cirilo TREVIÑO, DO  
polyethylene glycol (MIRALAX) 17 gram packet Take 1 Packet by mouth daily. Indications: constipation Patient taking differently: Take 17 g by mouth daily as needed. Indications: constipation 5/8/20   Alanis Lama NP  
esomeprazole (NexIUM) 20 mg capsule Take 20 mg by mouth nightly.     Provider, Historical  
 prochlorperazine (COMPAZINE) 10 mg tablet Take 1 Tab by mouth every six (6) hours as needed for Nausea. Indications: nausea and vomiting caused by cancer drugs, nausea and vomiting 3/10/20   Yris Jones MD  
ondansetron hcl [facility]) 8 mg tablet Take 1 Tab by mouth every eight (8) hours as needed for Nausea. Indications: nausea and vomiting caused by cancer drugs 3/10/20   Yris Jones MD  
lidocaine-prilocaine (EMLA) topical cream Apply  to affected area as needed for Pain. 3/10/20   Yris Jones MD  
 
 
Allergies Allergen Reactions  Albuterol Palpitations  Celebrex [Celecoxib] Itching Review of Systems:  A comprehensive review of systems was negative except for that written in the History of Present Illness. Objective:  
 
Visit Vitals /74 Pulse 97 Temp 98.3 °F (36.8 °C) Resp 24 Ht 5' 10\" (1.778 m) Wt 91.5 kg (201 lb 12.8 oz) SpO2 96% BMI 28.96 kg/m² Temp (24hrs), Av.4 °F (36.9 °C), Min:97.5 °F (36.4 °C), Max:99.3 °F (37.4 °C) Lines:  Ventricular Access Device:   L chest 
 
Physical Exam:   
General:  Alert, cooperative, well noursished, well developed, appears stated age Eyes:  Sclera anicteric. Pupils equally round and reactive to light. Mouth/Throat: Mucous membranes normal, oral pharynx clear Neck: Supple Lungs:   Clear to auscultation bilaterally, good effort; on O2 via NC  
CV:  Regular rate and rhythm,no murmur, click, rub or gallop Abdomen:   Soft, non-tender. bowel sounds normal. non-distended Extremities: No cyanosis or edema Skin: Skin color, texture, turgor normal. no acute rash or lesions Lymph nodes: Cervical and supraclavicular normal  
Musculoskeletal: No swelling or deformity Lines/Devices:  CT x 2 with serosanguinous output Psych: Alert and oriented, normal mood affect given the setting Data Review: CBC: 
Recent Labs  
  21 
0337 21 
0414 21 
9007 WBC 5.8 4.2* 3.3* GRANS 76 67 90  
 MONOS 8 9 8 EOS 3 5 5 ANEU 4.4 2.8 2.2 ABL 0.7 0.8 0.7 HGB 11.4* 10.8* 8.1* HCT 35.7* 31.9* 26.0*  
 138* 130* BMP: 
Recent Labs  
  21 
0337 21 
0414 21 
5651 CREA 0.55* 0.50* 0.52* BUN 7* 6* 5*  139 140  
K 3.5 3.8 3.8 CL 99 105 104 CO2 34* 32 32 AGAP 3* 2* 4*  
* 94 103* LFTS: 
No results for input(s): TBILI, ALT, AP, TP, ALB in the last 72 hours. No lab exists for component: SGOT Microbiology:  
 
All Micro Results Procedure Component Value Units Date/Time CULTURE, RESPIRATORY/SPUTUM/BRONCH Benoit Rosi STAIN [[id number]] Order Status: Sent Specimen: Sputum Imagin/13 Chest Xray Impression: 1. Stable postoperative changes throughout right hemithorax status post 
pneumonectomy.  CT Chest 
IMPRESSION: 
1. Right chest tubes in place with residual moderate right hydropneumothorax. There may be slightly increased aeration of the inferior portion of the residual 
right lung, however majority remains opacified. It is somewhat difficult to 
distinguish lung parenchyma from pleural fluid due to lack of IV contrast. 
2. New moderate area of groundglass opacity in the left upper lobe and 
tree-in-bud opacity in the left lower lobe which may be infectious or 
inflammatory. Signed By: JOHANNA Finch 2021

## 2021-01-13 NOTE — PROGRESS NOTES
Mely Palmer. Admission Date: 1/4/2021 Daily Progress Note: 1/13/2021 The patient's chart is reviewed and the patient is discussed with the staff. Patient is L 47 y.o.  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 Timeline of events: 
--RLL mass 2/2020, measuring 6.7cm x 5.9cm.  
--EBUS On 2/26/2020 and biopsy consistent with SCC. --Brain MRI was negative for brain mets but revealed a 2cm R parotid mass. --PET CT revealed a hypermetabolic RLL mass extending into the hilum and excessively hypermetabolic R parotid gland. --He had radiation and chemo and then underwent RML lobectomy 7/23/2020 for squamous cell carcinoma 
--He then completed 6 more rounds of chemo around 10/2020.   
--Repeat Chest CT on 12/22/2020 which revealed a large R pleural effusion and collapse of the remaining RUL.  
--R thoracentesis on 12/28/2020 by Dr. Coco Guerra with 900mL bloody fluid removed. Unable to tolerate complete drainage. --CT placement on 1/4/21 with 800cc of serosanguineous drainage. --General Surgery was consulted and CXR revealed persistent loculated R effusion. -- OR on 1/6 for R VATS, converted to an open thoracotomy with extensive pneumolysis with decortication. Difficult surgery. Consulted pulmonary intraoperatively for bronch. Unable to get lung to inflate intraoperative 
--Pathology non-diagnostic. Pt was hypotensive postoperatively requiring aylin. Pt was transferred to ICU. Pt had a repeat bronch on 1/7 secondary to persistent RUL atelectasis. He did not have any mucous plugging but his apical segment of the RUL was blocked by a web and the bronchoscope couldn't advance any further. Pt did have a stump area from prior lobectomy with abnormal appearing tissue that was biopsied,pathology noted below- non-diagnostic.  
-- 1/12 bronchoscopy. Subjective: Chest CT repeated with hydropneumothorax and slight improvement in aeration. Left GGO On 4 lpm NC 
 
Current Facility-Administered Medications Medication Dose Route Frequency  sodium chloride (NS) flush 5-40 mL  5-40 mL IntraVENous Q8H  
 sodium chloride (NS) flush 5-40 mL  5-40 mL IntraVENous PRN  
 0.9% sodium chloride infusion 250 mL  250 mL IntraVENous PRN  
 HYDROcodone-homatropine (HYCODAN) 5-1.5 mg/5 mL (5 mL) oral solution 5 mL  5 mL Oral Q4H PRN  
 bisacodyL (DULCOLAX) suppository 10 mg  10 mg Rectal DAILY PRN  
 midodrine (PROAMATINE) tablet 10 mg  10 mg Oral TID WITH MEALS  traMADoL (ULTRAM) tablet 50 mg  50 mg Oral Q6H PRN  
 sodium chloride 3% hypertonic nebulizer soln  4 mL Nebulization BID  budesonide (PULMICORT) 500 mcg/2 ml nebulizer suspension  500 mcg Nebulization BID RT  
 acetaminophen (TYLENOL) tablet 500 mg  500 mg Oral Q6H PRN  
 HYDROmorphone (PF) (DILAUDID) injection 1 mg  1 mg IntraVENous Q2H PRN  
 guaiFENesin ER (MUCINEX) tablet 1,200 mg  1,200 mg Oral Q12H  
 amiodarone (CORDARONE) tablet 200 mg  200 mg Oral DAILY  [Held by provider] apixaban (ELIQUIS) tablet 5 mg  5 mg Oral Q12H  pantoprazole (PROTONIX) tablet 40 mg  40 mg Oral ACB&D  
 gabapentin (NEURONTIN) capsule 300 mg  300 mg Oral TID  levalbuterol tartrate (XOPENEX) 45 mcg/actuation inhaler HFAA 2 Puff- pt to supply (Patient Supplied)  2 Puff Inhalation Q4H PRN  
 ondansetron (ZOFRAN ODT) tablet 8 mg  8 mg Oral Q8H PRN  polyethylene glycol (MIRALAX) packet 17 g  17 g Oral DAILY  sodium chloride (NS) flush 5-40 mL  5-40 mL IntraVENous Q8H  
 sodium chloride (NS) flush 5-40 mL  5-40 mL IntraVENous PRN  
 oxyCODONE-acetaminophen (PERCOCET) 5-325 mg per tablet 1 Tab  1 Tab Oral Q4H PRN  
 influenza vaccine 2020-21 (6 mos+)(PF) (FLUARIX/FLULAVAL/FLUZONE QUAD) injection 0.5 mL  0.5 mL IntraMUSCular PRIOR TO DISCHARGE Review of Systems Constitutional: negative for fever, chills, sweats Cardiovascular: negative for chest pain, palpitations, syncope, edema Gastrointestinal: negative for dysphagia, reflux, vomiting, diarrhea, abdominal pain, or melena Neurologic: negative for focal weakness, numbness, headache Objective:  
 
Vitals:  
 01/13/21 4805 01/13/21 0617 01/13/21 0630 01/13/21 3571 BP: 101/69 Pulse: 89  88 Resp:      
Temp:  98.3 °F (36.8 °C) SpO2: 97%  97% 97% Weight:      
Height:      
 
Intake and Output:  
01/11 1901 - 01/13 0700 In: 1696.7 [P.O.:865] Out: 0648 [HDTXA:[id number]] No intake/output data recorded. Physical Exam:  
Constitution:  the patient is well developed and in no acute distress EENMT:  Sclera clear, pupils equal, oral mucosa moist 
Respiratory: + cough, productive, decreased on right, few rhonchi on left anteriorly Chest tubes X 2 to suction Cardiovascular:  RRR without M,G,R 
Gastrointestinal: soft and non-tender; with positive bowel sounds. Musculoskeletal: warm without cyanosis. There is no lower leg edema. Skin:  no jaundice or rashes, no wounds Neurologic: no gross neuro deficits Psychiatric:  alert and oriented x ppt CHEST XRAY:  
CXR Results  (Last 48 hours) 01/12/21 0321  XR CHEST SNGL V Final result Impression:  IMPRESSION: Right lung atelectasis in right hydropneumothorax unchanged. Narrative:  EXAM: XR CHEST SNGL V  
   
INDICATION: atelectasis COMPARISON: 1/11/2021 FINDINGS: A portable AP radiograph of the chest was obtained at 0320 hours. Right hydropneumothorax unchanged. Atelectasis of the remaining right lung  
unchanged. Right-sided chest tubes remain in place. . The cardiac and mediastinal  
contours and pulmonary vascularity are normal.  The bones and soft tissues are  
grossly within normal limits. 1/6/21 1/12/21 1/12 
 
 
4 weeks prior LAB No lab exists for component: [place] Recent Labs 01/13/21 
1180 01/12/21 
7047 01/11/21 
2264 WBC 5.8 4.2* 3.3* HGB 11.4* 10.8* 8.1* HCT 35.7* 31.9* 26.0*  
 138* 130* Recent Labs  
  01/13/21 
4541 01/12/21 
0414 01/11/21 
8251  139 140  
K 3.5 3.8 3.8 CL 99 105 104 CO2 34* 32 32 * 94 103* BUN 7* 6* 5*  
CREA 0.55* 0.50* 0.52* CA 8.4 8.6 8.4 SARS-CoV-2 LAB Results [facility] Test:  
Lab Results Component Value Date/Time COV2NT Not Detected 06/30/2020 09:25 PM  
  
[facility] Test: No results found for: [facility] Test: No components found for: [id number] Assessment:  (Medical Decision Making) Hospital Problems  Date Reviewed: 1/13/2021 Codes Class Noted POA  
 S/P thoracotomy ICD-10-CM: L78.565 ICD-9-CM: V45.89  1/7/2021 Yes * (Principal) Atelectasis of right lung ICD-10-CM: J98.11 ICD-9-CM: 518.0  1/4/2021 Yes A-fib (HCC) (Chronic) ICD-10-CM: I48.91 
ICD-9-CM: 427.31  1/4/2021 Yes Chronic respiratory failure with hypoxia (HCC) (Chronic) ICD-10-CM: J96.11 
ICD-9-CM: 518.83, 799.02  1/4/2021 Yes Pleural effusion on right ICD-10-CM: J90 ICD-9-CM: 511.9  12/28/2020 Yes Lung cancer ([address] (Chronic) ICD-10-CM: C34.90 ICD-9-CM: 162.9  7/23/2020 Yes Pulmonary emphysema (HCC) (Chronic) ICD-10-CM: J43.9 ICD-9-CM: 492.8  7/7/2020 Yes Patient's R lung s/p thoractomy with pneumonolysis but ongoing atelectasis. Bronch without significant mucus plugging. Part of apical segment with web blocking the airway. This is felt to likely be chronic but was discussed with surgery and as last resort attempt to break through this could be made, though it comes with risk of puncturing through the airway. As the patient is likely to have a completion pneumonectomy if nothing improves this was felt to be an acceptable risk. 1/12  Bronch- Web again seen. Cautery knife used to puncture through the web and green forceps used to then dilate the remaining tissue. The area beyond this was difficult to assess. It is so distal and the lumen so small could not get good visualization even after switching to the thinnest bronchoscope available. Plan:  (Medical Decision Making) -- CT chest without significant improvement in atelectasis. Surgery to decide about needs for completion pneumonectomy. -- NC at 4 lpm 
-- chest tubes to suction, surgery managing -- Left now with GGO, WBC WNL, afebrile, sputum NTD- start broad spectrum abx and check PCT and sputum. Ask ID to evaluate. Alec Lara, NP Lungs:  Clear on left Heart:  RRR with no Murmur/Rubs/Gallops Additional Comments: For surgery  -completion pneumonectomy planned-talked with Dr. Nael Silva, move to floor I have spoken with and examined the patient. I agree with the above assessment and plan as documented.  
 
Sia Ray MD

## 2021-01-13 NOTE — ROUTINE PROCESS
Bedside and Verbal report given to Brittany Mercado RN by self and Loreta Luke RN. Report included SBAR, Kardex, ED summary, procedure summary, recent results and cardiac rhythm SR-ST.

## 2021-01-13 NOTE — PROGRESS NOTES
Pt has remained on 6L through the night with sats greater than 90% . Upper airway gurgles have reduced to nil. Hycodan given 2x during shift. Pt resting comfortably at this time. VSS.

## 2021-01-13 NOTE — ROUTINE PROCESS
Bedside and Verbal report given to self by Tim Sinclair RN. Report included SBAR, Kardex, ED Summary, Procedure Summary, Intake and Output and Cardiac Rhythm.

## 2021-01-13 NOTE — PROGRESS NOTES
H&P/Consult Note/Progress Note/Office Note:  
Alicia Butts MRN: [id number]  :1950  Age:75 y.o. 
 
HPI: Alicia Butts is a 78 y.o. male who is well known to Dr. Claudia Curtis.  He underwent right thoracotomy with lower and middle lobectomy 20 for squamous cell carcinoma. He underwent neoaduvant chemo/radiation and adjuvant chemo. .  CT 20 showed large pleural effusion and collapse of RUL. He underwent thoracentesis by Dr. Scarlett Bennett 20 with approximately drainage of 900ml, but was unable to completely drain related to complaints of pain. He felt should hold Eliquis for admission for Chest tube placement. Dr. Caron Chandler placed chest tube this AM 21. General surgery was consulted for complicated right pleural effusion, ?hemothorax not draining properly with chest tube placement. On exam, Mr. Chaparrita Pizarro appears comfortable.  CT scan reviewed and current chest xray, NAD. On 3 L NC sats upper 90's. Chest tube maintained with 800ml serosanguineous drainage noted. Remains on 20cmsx. Patient states since his lobectomy he has felt \"pressure\". He does use 2L oxygen at night. She states he does feel like he can breath better since this chest tube has been placed. His lost dose of eliquis was Friday (for A-fib). He does have smoking history and quit about 6 years ago 21 Patient states he is breathing better since chest tube. NAD on 3L NC with 98% sat. Chest xray reviewed and fluid collection appears loculated- will need surgery- plan for tomorrow. 21 To the OR yesterday PO day1. Difficult surgery. Consulted pulmonary intraoperatively for broch. Unable to get lung to inflate intraoperative, ?chronic mucous plug. Pt in ICU, no complaints this AM- scheduled for bronch this AM. Chest tubes to water seal currently- no air leak- appears lung still collapsed from CXR. Pain controlled. 01/08/21 POD 2. Pt remains in ICU, no complaints this AM. Pain controlled. Chest tubes to water seal currently- no air leak. CXR this am showing No Significant Interval Change. 01/09/21 POD 3. Pt remains in ICU, no complaints this AM. Pain controlled. Chest tubes currently clamped. CXR this am showing No Significant Interval Change. Denies SOB. On 3L o2. 
 
01/11/21  POD 5  Chest tube remains clamped. NO changes in chest xray. Dr. Camilo Gannon spoke with Dr. Socrates Pitts regarding trying again to clear airway to re inflate lung if unsuccessful then will need a pneumonectomy. Patient in NAD, No SOB on 3 L oxygen with sats upper 90's and continues to be hypotensive at times. Hgb trending down will transfuse 2u pRBC 
 
01/12/21 POD 6 just returned from pulmonary procedure. Chest tubes to suction. States feels less pressure, but no other changes noted. No air leak noted in chest tubes- awaiting chest CT this afternoon. NAD, No SOB. Hgb 10 
 
01/13/21 POD 7. No significant changes on CT to right lung. Left lung ?pneumonia. Will discuss with pulmonary if any further treatment or proceed to pneumonectomy. Chest tubes maintained clamped. 6 liters oxygen Medical history as below Past Medical History:  
Diagnosis Date  GERD (gastroesophageal reflux disease)   
 controlled with med  Hypertension hx-- off meds since 4/2020--- followed by dr. Donna Santiago  Hypoxia 02/24/2020  Malignant neoplasm of lower lobe of right lung ([facility] [address] 02/26/2020 REC'D CHEMO AND RADIATION--- FOLLOWED BY DR. Barbara Pace Osteoarthritis  Right lower lobe lung mass 02/24/2020  Status post total right knee replacement 2/29/2016 Past Surgical History:  
Procedure Laterality Date  HX COLONOSCOPY    
 HX KNEE ARTHROSCOPY Left   
 scope X 1, ACL recon X 1  
 HX KNEE ARTHROSCOPY Right 1974, 1975 X 2   
 HX KNEE REPLACEMENT Right 2016 [address]  HX VASCULAR ACCESS Right placed 3/2020  
 port in chest   
  IR INSERT BETH CVC W PORT OVER 5 YEARS  3/18/2020  CO SINUS SURGERY PROC UNLISTED  1988, 1991  
 sinus surg Current Facility-Administered Medications Medication Dose Route Frequency  sodium chloride (NS) flush 5-40 mL  5-40 mL IntraVENous Q8H  
 sodium chloride (NS) flush 5-40 mL  5-40 mL IntraVENous PRN  
 0.9% sodium chloride infusion 250 mL  250 mL IntraVENous PRN  
 HYDROcodone-homatropine (HYCODAN) 5-1.5 mg/5 mL (5 mL) oral solution 5 mL  5 mL Oral Q4H PRN  
 bisacodyL (DULCOLAX) suppository 10 mg  10 mg Rectal DAILY PRN  
 midodrine (PROAMATINE) tablet 10 mg  10 mg Oral TID WITH MEALS  traMADoL (ULTRAM) tablet 50 mg  50 mg Oral Q6H PRN  
 sodium chloride 3% hypertonic nebulizer soln  4 mL Nebulization BID  budesonide (PULMICORT) 500 mcg/2 ml nebulizer suspension  500 mcg Nebulization BID RT  
 acetaminophen (TYLENOL) tablet 500 mg  500 mg Oral Q6H PRN  
 HYDROmorphone (PF) (DILAUDID) injection 1 mg  1 mg IntraVENous Q2H PRN  
 guaiFENesin ER (MUCINEX) tablet 1,200 mg  1,200 mg Oral Q12H  
 amiodarone (CORDARONE) tablet 200 mg  200 mg Oral DAILY  [Held by provider] apixaban (ELIQUIS) tablet 5 mg  5 mg Oral Q12H  pantoprazole (PROTONIX) tablet 40 mg  40 mg Oral ACB&D  
 gabapentin (NEURONTIN) capsule 300 mg  300 mg Oral TID  levalbuterol tartrate (XOPENEX) 45 mcg/actuation inhaler HFAA 2 Puff- pt to supply (Patient Supplied)  2 Puff Inhalation Q4H PRN  
 ondansetron (ZOFRAN ODT) tablet 8 mg  8 mg Oral Q8H PRN  polyethylene glycol (MIRALAX) packet 17 g  17 g Oral DAILY  sodium chloride (NS) flush 5-40 mL  5-40 mL IntraVENous Q8H  
 sodium chloride (NS) flush 5-40 mL  5-40 mL IntraVENous PRN  
 oxyCODONE-acetaminophen (PERCOCET) 5-325 mg per tablet 1 Tab  1 Tab Oral Q4H PRN  
 influenza vaccine 2020-21 (6 mos+)(PF) (FLUARIX/FLULAVAL/FLUZONE QUAD) injection 0.5 mL  0.5 mL IntraMUSCular PRIOR TO DISCHARGE Albuterol and Celebrex [celecoxib] Social History Socioeconomic History  Marital status:  Spouse name: Not on file  Number of children: Not on file  Years of education: Not on file  Highest education level: Not on file Tobacco Use  Smoking status: Former Smoker Packs/day: 1.00 Years: 20.00 Pack years: 20.00 Quit date:  Years since quittin.0  Smokeless tobacco: Never Used Substance and Sexual Activity  Alcohol use: Yes Alcohol/week: 4.0 standard drinks Types: 4 Glasses of wine per week  Drug use: No  
Other Topics Concern   Service No  
 Blood Transfusions No  
 Caffeine Concern No  
 Occupational Exposure No  
 Hobby Hazards No  
 Sleep Concern No  
 Stress Concern No  
 Weight Concern No  
 Special Diet No  
 Exercise Yes  [facility] [place] Yes Social History Narrative . Has long-time girlfriend. Continues to work doing IT support. Social History Tobacco Use Smoking Status Former Smoker  Packs/day: 1.00  Years: 20.00  Pack years: 20.00  Quit date:   Years since quittin.0 Smokeless Tobacco Never Used Family History Problem Relation Age of Onset  Cancer Mother   
     uterine  Arrhythmia Sister   
     afib ROS: The patient has no difficulty with chest pain or shortness of breath. No fever or chills. Comprehensive review of systems was otherwise unremarkable except as noted above. Physical Exam:  
Visit Vitals /69 Pulse 88 Temp 98.3 °F (36.8 °C) Resp 24 Ht 5' 10\" (1.778 m) Wt 201 lb 12.8 oz (91.5 kg) SpO2 97% BMI 28.96 kg/m² Constitutional: Alert, oriented, cooperative patient in no acute distress; appears stated age Eyes: Sclera are clear. EOMs intact ENMT: no external lesions gross hearing normal; no obvious neck masses, no ear or lip lesions, nares normal 
CV: RRR. Normal perfusion Resp: No JVD. Breathing is  non-labored; no audible wheezing. Right Chest tube x 2 -clamped GI: soft and non-distended Musculoskeletal: unremarkable with normal function. No embolic signs or cyanosis. Neuro:  Oriented; moves all 4; no focal deficits Psychiatric: normal affect and mood, no memory impairment Recent vitals (if inpt): 
Patient Vitals for the past 24 hrs: 
 BP Temp Pulse Resp SpO2  
01/13/21 0630   88  97 % 01/13/21 0617  98.3 °F (36.8 °C)     
01/13/21 0616 101/69  89  97 % 01/13/21 0530   89  98 % 01/13/21 0516 105/70  89  98 % 01/13/21 0500   90  98 % 01/13/21 0430   93  98 % 01/13/21 0416 109/68 97.9 °F (36.6 °C) 94  93 % 01/13/21 0400   (!) 103  93 % 01/13/21 0330   87  96 % 01/13/21 0316 96/66  89  96 % 01/13/21 0245   89  97 % 01/13/21 0216 94/67  86  97 % 01/13/21 0145   86  97 % 01/13/21 0016 99/70  96  96 % 01/12/21 2316 116/84  (!) 102  96 % 01/12/21 2300  99.3 °F (37.4 °C)     
01/12/21 2216 (!) 86/59  (!) 107  96 % 01/12/21 2116 119/80  (!) 101  94 % 01/12/21 2016 125/81 98.9 °F (37.2 °C) (!) 105  91 % 01/12/21 2012     92 % 01/12/21 2000   (!) 103  92 % 01/12/21 1930   (!) 101  93 % 01/12/21 1916 131/76  (!) 101  92 % 01/12/21 1913 125/86  100  91 % 01/12/21 1816 125/86  100  93 % 01/12/21 1751   (!) 111  (!) 86 % 01/12/21 1716 135/83  (!) 102  95 % 01/12/21 1616 112/88  (!) 114  (!) 89 % 01/12/21 1508 130/82 98.5 °F (36.9 °C) (!) 104 24 90 % 01/12/21 1416 (!) 151/85  98  94 % 01/12/21 1329     (!) 86 % 01/12/21 1316 (!) 144/88  97  93 % 01/12/21 1216 (!) 134/90  73  91 % 01/12/21 1132 113/84  72  94 % 01/12/21 1130 (!) 123/100 97.5 °F (36.4 °C) 74 24 94 % 01/12/21 1116 113/84  76  97 % 01/12/21 1056 115/71  79  94 % 01/12/21 1011 107/71  73 25 96 % 01/12/21 0957 108/70  81 20 96 % 01/12/21 0942 101/63  81 22 98 % 01/12/21 0938 100/63  82 28 98 % 01/12/21 0934 111/74  90 16 94 % 01/12/21 0930 113/70  81 20 95 % 01/12/21 0926 109/68  82 21 95 % 01/12/21 0922 119/78  87 21 97 % 01/12/21 0918 125/78  80 16 96 % 01/12/21 0913 124/79  83 24 95 % 01/12/21 0912 127/75  84 19 95 % 01/12/21 0853   84 21 96 % 01/12/21 0810 111/86  95  90 % XR Results (most recent): 
Results from Hospital Encounter encounter on 01/04/21 XR CHEST SNGL V  
 Narrative EXAM: XR CHEST SNGL V 
 
INDICATION: atelectasis COMPARISON: 1/11/2021 FINDINGS: A portable AP radiograph of the chest was obtained at 0320 hours. Right hydropneumothorax unchanged. Atelectasis of the remaining right lung 
unchanged. Right-sided chest tubes remain in place. . The cardiac and mediastinal 
contours and pulmonary vascularity are normal.  The bones and soft tissues are 
grossly within normal limits. Impression IMPRESSION: Right lung atelectasis in right hydropneumothorax unchanged. CT Results (most recent): 
Results from [facility] Encounter encounter on 01/04/21 CT CHEST WO CONT Narrative EXAMINATION: CT CHEST WO CONT 1/12/2021 1:07 PM 
 
INDICATION: Shortness of breath. Evaluate for change in right upper lobe 
atelectasis COMPARISON: Chest CT December 22, 2020 and chest radiograph January 12, 2020 TECHNIQUE: Multiple contiguous axial CT images of the chest were obtained from 
the lung apices to the lung bases without intravenous contrast. Coronal 
reformats are provided. Radiation dose reduction techniques were used for this study. Our CT scanners 
use one or all of the following: Automated exposure control, adjustment of the 
mA and/or kV according to patient size, iterative reconstruction. FINDINGS: 
Lower Neck: Right chest port terminates at the cavoatrial junction. Chest Soft tissues: No significant abnormality. Heart/Mediastinum: Mediastinum remains shifted to the right. No significant 
pericardial abnormality. Normal caliber thoracic aorta. Lungs/pleura: There are 2 right chest tubes in place. Since CT chest on December 22, 2020 and there is significant decrease in right pleural fluid. There may be 
a few areas of aerated lung in the anterior portion of the residual right lung, 
however a majority remains opacified. Layering secretions are seen in the right 
main bronchus which is partially occluded. There is a persistent right 
hydropneumothorax. There is new moderate size area of groundglass opacity in the left upper lobe 
and small area of tree-in-bud opacity in the left lower lobe. Visualized upper abdomen: Cholelithiasis. Osseous structures: No suspicious osseous lesion. Impression IMPRESSION: 
1. Right chest tubes in place with residual moderate right hydropneumothorax. There may be slightly increased aeration of the inferior portion of the residual 
right lung, however majority remains opacified. It is somewhat difficult to 
distinguish lung parenchyma from pleural fluid due to lack of IV contrast. 
2. New moderate area of groundglass opacity in the left upper lobe and 
tree-in-bud opacity in the left lower lobe which may be infectious or 
inflammatory. Labs: 
Recent Labs  
  01/13/21 
4362 WBC 5.8 HGB 11.4*  
   
K 3.5 CL 99  
CO2 34* BUN 7*  
CREA 0.55* * Lab Results Component Value Date/Time  WBC 5.8 01/13/2021 03:37 AM  
 HGB 11.4 (L) 01/13/2021 03:37 AM  
 PLATELET 908 62/45/4059 03:37 AM  
 Sodium 136 01/13/2021 03:37 AM  
 Potassium 3.5 01/13/2021 03:37 AM  
 Chloride 99 01/13/2021 03:37 AM  
 CO2 34 (H) 01/13/2021 03:37 AM  
 BUN 7 (L) 01/13/2021 03:37 AM  
 Creatinine 0.55 (L) 01/13/2021 03:37 AM  
 Glucose 112 (H) 01/13/2021 03:37 AM  
 INR 1.1 01/05/2021 04:02 PM  
 aPTT 35.2 (H) 01/05/2021 04:02 PM  
 Bilirubin, total 0.5 01/05/2021 06:26 AM  
 Bilirubin, direct 0.8 (H) 05/05/2020 02:27 PM  
 ALT (SGPT) 8 (L) 01/05/2021 06:26 AM  
 Alk. phosphatase 99 01/05/2021 06:26 AM  
 Troponin-I, Qt. <0.02 (L) 05/05/2020 01:09 AM  
 
 
I reviewed recent labs and recent radiologic studies. I independently reviewed radiology images for studies I described above or studies I have ordered. Admission date (for inpatients): 1/4/2021 Day of Surgery  Procedure(s) with comments: BRONCHOSCOPY 
BRONCHOSCOPY WITH BIOPSY - forceps used to open RUL apical segment BRONCHOSCOPY WITH AIRWAY DILATION 
BRONCHOSCOPY W EXCISION OF TUMOR 
 
ASSESSMENT/PLAN: 
Problem List  Date Reviewed: 1/8/2021 Codes Class Noted S/P thoracotomy ICD-10-CM: H87.433 ICD-9-CM: V45.89  1/7/2021 * (Principal) Atelectasis of right lung ICD-10-CM: J98.11 ICD-9-CM: 518.0  1/4/2021 A-fib (HCC) (Chronic) ICD-10-CM: I48.91 
ICD-9-CM: 427.31  1/4/2021 Chronic respiratory failure with hypoxia (HCC) (Chronic) ICD-10-CM: J96.11 
ICD-9-CM: 518.83, 799.02  1/4/2021 Pleural effusion on right ICD-10-CM: J90 ICD-9-CM: 511.9  12/28/2020 Chemotherapy induced neutropenia (HCC) ICD-10-CM: D70.1, T45.1X5A 
ICD-9-CM: 288.03, E933.1  10/23/2020 Dehydration ICD-10-CM: E86.0 ICD-9-CM: 276.51  9/15/2020 S/P lobectomy of lung ICD-10-CM: Z90.2 ICD-9-CM: V45.89  7/29/2020 Cough ICD-10-CM: R05 ICD-9-CM: 786.2  7/29/2020 Atrial tachycardia (HCC) ICD-10-CM: I47.1 ICD-9-CM: 427.89  7/26/2020 Cancer of bronchus of right lower lobe [facility]) ICD-10-CM: C34.31 
ICD-9-CM: 162.5  7/23/2020 Lung cancer ([facility] [address] (Chronic) ICD-10-CM: C34.90 ICD-9-CM: 162.9  7/23/2020 Cancer of right lung [facility]) ICD-10-CM: C34.91 
ICD-9-CM: 162.9  7/23/2020 Pulmonary emphysema ([facility] [address] ICD-10-CM: J43.9 ICD-9-CM: 492.8  7/7/2020 GERD (gastroesophageal reflux disease) ICD-10-CM: K21.9 ICD-9-CM: 530.81  Unknown Hypotension (Chronic) ICD-10-CM: I95.9 ICD-9-CM: 458.9  5/5/2020 Overview Signed 1/11/2021  8:39 AM by Brandy Anglin NP On midodrine Sepsis due to undetermined organism [facility]) ICD-10-CM: A41.9 ICD-9-CM: 038.9  5/5/2020 Malignant neoplasm of lower lobe of right lung (HCC) (Chronic) ICD-10-CM: C34.31 
ICD-9-CM: 162.5  2/26/2020 Overview Signed 1/4/2021  7:38 AM by Charles Ward MD  
  Dec 2020- Echo EF 50%, technically difficult, cannot assess regional wall motion. Aortic root 4.1 cm. No significant valvular disease. Normal DF Mass of lower lobe of right lung ICD-10-CM: R91.8 ICD-9-CM: 786.6  2/23/2020 Right lower lobe lung mass ICD-10-CM: R91.8 ICD-9-CM: 786.6  2/23/2020 Essential hypertension with goal blood pressure less than 130/80 (Chronic) ICD-10-CM: I10 
ICD-9-CM: 401.9  2/19/2018 Principal Problem: 
  Atelectasis of right lung (1/4/2021) Active Problems: Hypotension (5/5/2020) Overview: On midodrine Lung cancer ([address] (7/23/2020) Pleural effusion on right (12/28/2020) A-fib ([address] (1/4/2021) Chronic respiratory failure with hypoxia ([address] (1/4/2021) S/P thoracotomy (1/7/2021) Plan Will remove chest tubes Plan pneumonectomy next week Cxr daily Pulmonary following Regular Diet Activity as tolerated Pain control Encourage C/DB/IS Transfer to floor Signed:  Angela Villaseñor NP

## 2021-01-13 NOTE — ROUTINE PROCESS
TRANSFER - OUT REPORT: 
 
Verbal report given to JORGITO Aj(name) on [facility].  being transferred to [facility](unit) for routine progression of care Report consisted of patients Situation, Background, Assessment and  
Recommendations(SBAR). Information from the following report(s) SBAR, Kardex and Procedure Summary was reviewed with the receiving nurse. Lines:  
Venous Access Device chest port  03/18/20 Upper chest (subclavicular area, right (Active) Central Line Being Utilized Yes 01/13/21 1261 Criteria for Appropriate Use Long term IV/antibiotic administration 01/13/21 0827 Site Assessment Clean, dry, & intact 01/13/21 0827 Date of Last Dressing Change 01/12/21 01/13/21 0827 Dressing Status Clean, dry, & intact 01/13/21 0827 Dressing Type Disk with Chlorhexadine gluconate (CHG); Transparent 01/13/21 0827 Action Taken Dressing changed 01/12/21 1515 Access Needle Size (Site #1) 20 G 01/06/21 0919 Access Needle Length (Medial Site) 0.75 inches 01/06/21 0919 Positive Blood Return (Medial Site) Yes 01/12/21 1515 Action Taken (Medial Site) Flushed 01/12/21 1515 Opportunity for questions and clarification was provided. Patient transported with: 
 O2 @ 6 liters Registered Nurse

## 2021-01-14 NOTE — PROGRESS NOTES
MSN, CM:  Spoke with patient this AM about discharge planning. Patient lives with his girlfriend \"Alicia\" in own house with 6 steps for entrance. Patient has a daughter Brady Watson" which lives in [facility] and another Key" which lives in [facility]. Patient is independent with all ADL's and requires a cane for ambulation. Patient requires 2L at home but does not recall the company. Patient denies any rehab but has received [place] 5 yrs ago for knee replacement. Patient works full time at [employer].  Patient confirms he has no PCP; [facility] referred. PT and OT consulted for evaluation and recommendations. Case Management will continue to follow for discharge needs. Care Management Interventions PCP Verified by CM: (Referral sent to [facility]) Mode of Transport at Discharge: Other (see comment)(family to transport) Transition of Care Consult (CM Consult): Discharge Planning Physical Therapy Consult: Yes Occupational Therapy Consult: Yes Current Support Network: Own Home, Other(Girlfriend lives in home) Confirm Follow Up Transport: Self Freedom of Choice List was Provided with Basic Dialogue that Supports the Patient's Individualized Plan of Care/Goals, Treatment Preferences and Shares the Quality Data Associated with the Providers?: Yes [employer] Information Provided?: (Katie/FELICE) Discharge Location Discharge Placement: Unable to determine at this time

## 2021-01-14 NOTE — PROGRESS NOTES
Infectious Disease Progress Note Today's Date: 2021 Admit Date: 2021 Impression: · New L sided patchy PNA seen on CT chest ; sputum cx () pending · Loculated Right effusion: s/p VATS decortication on  and chest tube placement- no cx data; repeat bronch  and  secondary to RUL atelectasis · RLL SCC s/p RLL lobectomy on 2020 · Cough Plan:  
· Surgery plan for pneumonectomy next week. Please send operative cultures aerobic/anaerobic - unclear if this loculated effusion was infected (no prior cultures), however likely is. · Continue zosyn and doxy for atypical coverage. Plan for 7 days of doxy and zosyn eot . If operative cultures are positive, may need further targeted therapy post-op. ·  ID will continue to follow.  
  
Anti-infectives: · Zosyn (- 
· Doxy (- · Vanc () · Ancef (-) Subjective: Interval History: 
Patient seen resting in bed with primary RN at bedside giving Tramadol. Patient still with complaints of pain and pressure to R chest. Chest tubes were removed by surgery yesterday Allergies Allergen Reactions  Albuterol Palpitations  Celebrex [Celecoxib] Itching Review of Systems:  A comprehensive review of systems was negative except for that written in the History of Present Illness. Objective:  
 
Visit Vitals BP 91/67 Pulse 91 Temp 98.3 °F (36.8 °C) Resp 20 Ht 5' 10\" (1.778 m) Wt 91.5 kg (201 lb 12.8 oz) SpO2 96% BMI 28.96 kg/m² Temp (24hrs), Av.5 °F (36.9 °C), Min:98.3 °F (36.8 °C), Max:98.8 °F (37.1 °C) Patient was seen and examined on 21 and physical exam remains unchanged since yesterday, unless noted below: 
 
Lines:  Ventricular Access Device:   L chest 
 
Physical Exam:   
General:  Alert, cooperative, well noursished, well developed, appears stated age Eyes:  Sclera anicteric. Pupils equally round and reactive to light. Mouth/Throat: Mucous membranes normal, oral pharynx clear Neck: Supple Lungs:   Decreased lung sounds on right; on O2 via NC; dressing to previous chest tube sites CV:  Regular rate and rhythm,no murmur, click, rub or gallop Abdomen:   Soft, non-tender. bowel sounds normal. non-distended Extremities: No cyanosis or edema Skin: Skin color, texture, turgor normal. no acute rash or lesions Lymph nodes: Cervical and supraclavicular normal  
Musculoskeletal: No swelling or deformity Lines/Devices:  Clean, dry, intact Psych: Alert and oriented, normal mood affect given the setting Data Review: CBC: 
Recent Labs  
  21 WBC 5.9 5.8 4.2*  
GRANS 76 76 67 MONOS 7 8 9 EOS 4 3 5 ANEU 4.5 4.4 2.8 ABL 0.8 0.7 0.8 HGB 11.5* 11.4* 10.8* HCT 35.1* 35.7* 31.9*  
* 162 138* BMP: 
Recent Labs  
  21 CREA 0.42* 0.55* 0.50* BUN 10 7* 6* * 136 139  
K 3.8 3.5 3.8  99 105 CO2 33* 34* 32 AGAP 4* 3* 2*  
* 112* 94 LFTS: 
No results for input(s): TBILI, ALT, AP, TP, ALB in the last 72 hours. No lab exists for component: SGOT Microbiology:  
 
All Micro Results Procedure Component Value Units Date/Time CULTURE, RESPIRATORY/SPUTUM/BRONCH Reji  [[id number]] Collected: 21 1210 Order Status: Completed Specimen: Sputum Updated: 21 1305 Imagin/13 Chest Xray Impression: 1. Stable postoperative changes throughout right hemithorax status post 
pneumonectomy.  CT Chest 
IMPRESSION: 
1. Right chest tubes in place with residual moderate right hydropneumothorax. There may be slightly increased aeration of the inferior portion of the residual 
right lung, however majority remains opacified.  It is somewhat difficult to 
distinguish lung parenchyma from pleural fluid due to lack of IV contrast. 
 2. New moderate area of groundglass opacity in the left upper lobe and 
tree-in-bud opacity in the left lower lobe which may be infectious or 
inflammatory. Signed By: JOHANNA Ayala January 14, 2021

## 2021-01-14 NOTE — ACP (ADVANCE CARE PLANNING)
Advance Care Planning Advance Care Planning Activator (Inpatient) Conversation Note Date of ACP Conversation: 01/14/21 Conversation Conducted with:   Patient with Decision Making Capacity ACP Activator: Heydi Ochoa RN 
 
*When Decision Maker makes decisions on behalf of the incapacitated patient: Decision Maker is asked to consider and make decisions based on patient values, known preferences, or best interests. Health Care Decision Maker: 
 
Current Designated Health Care Decision Maker:   Primary Decision Maker: Nabor Brayan [facility] - [phone] Secondary Decision Maker: Fredy Burgess [facility] - [phone] (If there is a valid [place] named in the [facility] Makers\" box in the ACP activity, but it is not visible above, be sure to open that field and then select the health care decision maker relationship (ie \"primary\") in the blank space to the right of the name.) Validate  this information as still accurate & up-to-date; edit [place] field as needed.) Note: Assess and validate information in current ACP documents, as indicated. If no Decision Maker listed above or available through scanned documents, then: 
 
If no Authorized Decision Maker has previously been identified, then patient chooses [place]: \"Who would you like to name as your primary health care decision-maker? \" Name: Dash Frost   Relationship: [facility] Phone number: [phone] \"Can this person be reached easily? \" Aristeo Campos \"Who would you like to name as your back-up decision maker? \" Name: Olga Servin  Relationship: [facility] Phone number: [phone] \"Can this person be reached easily? \" Aristeo Campos 
 
 Note: If the relationship of these Decision-Makers to the patient does NOT follow your state's Next of Kin hierarchy, recommend that patient complete ACP document that meets state-specific requirements to allow them to act on the patient's behalf when appropriate. Care Preferences Ventilation: \"If you were in your present state of health and suddenly became very ill and were unable to breathe on your own, what would your preference be about the use of a ventilator (breathing machine) if it were available to you? \" If patient would desire the use of a ventilator (breathing machine), answer \"yes\", if not \"no\":yes \"If your health worsens and it becomes clear that your chance of recovery is unlikely, what would your preference be about the use of a ventilator (breathing machine) if it were available to you? \" Would the patient desire the use of a ventilator (breathing machine)? YES Resuscitation \"CPR works best to restart the heart when there is a sudden event, like a heart attack, in someone who is otherwise healthy. Unfortunately, CPR does not typically restart the heart for people who have serious health conditions or who are very sick. \" \"In the event your heart stopped as a result of an underlying serious health condition, would you want attempts to be made to restart your heart (answer \"yes\" for attempt to resuscitate) or would you prefer a natural death (answer \"no\" for do not attempt to resuscitate)? \" yes NOTE: If the patient has a valid advance directive AND now provides care preference(s) that are inconsistent with that prior directive, advise the patient to consider either: creating a new advance directive that complies with state-specific requirements; or, if that is not possible, orally revoking that prior directive in accordance with state-specific requirements, which must be documented in the EHR. [x] Yes  [] No   Educated Patient / Adeel Blank regarding differences between Advance Directives and portable DNR orders. Length of ACP Conversation in minutes:   
 
Conversation Outcomes: 
[x] ACP discussion completed 
[] Existing advance directive reviewed with patient; no changes to patient's previously recorded wishes 
 
 [] New Advance Directive completed 
 [] Portable Do Not Resuscitate prepared for Provider review and signature 
[] POLST/POST/MOLST/MOST prepared for Provider review and signature Follow-up plan:   
[] Schedule follow-up conversation to continue planning 
[] Referred individual to Provider for additional questions/concerns  
[] Advised patient/agent/surrogate to review completed ACP document and update if needed with changes in condition, patient preferences or care setting  
 
[x] This note routed to one or more involved healthcare providers

## 2021-01-14 NOTE — PROGRESS NOTES
Alicia Ceballos. Admission Date: 1/4/2021 Daily Progress Note: 1/14/2021 The patient's chart is reviewed and the patient is discussed with the staff. Patient is B 36 y.o.  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 Timeline of events: 
--RLL mass 2/2020, measuring 6.7cm x 5.9cm.  
--EBUS On 2/26/2020 and biopsy consistent with SCC. --Brain MRI was negative for brain mets but revealed a 2cm R parotid mass. --PET CT revealed a hypermetabolic RLL mass extending into the hilum and excessively hypermetabolic R parotid gland. --He had radiation and chemo and then underwent RML lobectomy 7/23/2020 for squamous cell carcinoma 
--He then completed 6 more rounds of chemo around 10/2020.   
--Repeat Chest CT on 12/22/2020 which revealed a large R pleural effusion and collapse of the remaining RUL.  
--R thoracentesis on 12/28/2020 by Dr. Scarlett Bennett with 900mL bloody fluid removed. Unable to tolerate complete drainage. --CT placement on 1/4/21 with 800cc of serosanguineous drainage. --General Surgery was consulted and CXR revealed persistent loculated R effusion. -- OR on 1/6 for R VATS, converted to an open thoracotomy with extensive pneumolysis with decortication. Difficult surgery. Consulted pulmonary intraoperatively for bronch. Unable to get lung to inflate intraoperative 
--Pathology non-diagnostic. Pt was hypotensive postoperatively requiring aylin. Pt was transferred to ICU. Pt had a repeat bronch on 1/7 secondary to persistent RUL atelectasis. He did not have any mucous plugging but his apical segment of the RUL was blocked by a web and the bronchoscope couldn't advance any further. Pt did have a stump area from prior lobectomy with abnormal appearing tissue that was biopsied,pathology noted below- non-diagnostic.  
-- 1/12 bronchoscopy. Subjective: Chest CT repeated with hydropneumothorax and slight improvement in aeration. Left GGO. Abx started and ID consulted. Covid sent. Moved to the floor. Chest tubes removed per surgery. For surgery  -completion pneumonectomy planned per  Dr. Richard Mcclure next week. On 5 lpm NC 
 
Current Facility-Administered Medications Medication Dose Route Frequency  piperacillin-tazobactam (ZOSYN) 4.5 g in 0.9% sodium chloride (MBP/ADV) 100 mL MBP  4.5 g IntraVENous Q8H  
 doxycycline (VIBRAMYCIN) capsule 100 mg  100 mg Oral Q12H  
 sodium chloride (NS) flush 5-40 mL  5-40 mL IntraVENous Q8H  
 sodium chloride (NS) flush 5-40 mL  5-40 mL IntraVENous PRN  
 0.9% sodium chloride infusion 250 mL  250 mL IntraVENous PRN  
 HYDROcodone-homatropine (HYCODAN) 5-1.5 mg/5 mL (5 mL) oral solution 5 mL  5 mL Oral Q4H PRN  
 bisacodyL (DULCOLAX) suppository 10 mg  10 mg Rectal DAILY PRN  
 midodrine (PROAMATINE) tablet 10 mg  10 mg Oral TID WITH MEALS  traMADoL (ULTRAM) tablet 50 mg  50 mg Oral Q6H PRN  
 sodium chloride 3% hypertonic nebulizer soln  4 mL Nebulization BID  budesonide (PULMICORT) 500 mcg/2 ml nebulizer suspension  500 mcg Nebulization BID RT  
 acetaminophen (TYLENOL) tablet 500 mg  500 mg Oral Q6H PRN  
 HYDROmorphone (PF) (DILAUDID) injection 1 mg  1 mg IntraVENous Q2H PRN  
 guaiFENesin ER (MUCINEX) tablet 1,200 mg  1,200 mg Oral Q12H  
 amiodarone (CORDARONE) tablet 200 mg  200 mg Oral DAILY  [Held by provider] apixaban (ELIQUIS) tablet 5 mg  5 mg Oral Q12H  pantoprazole (PROTONIX) tablet 40 mg  40 mg Oral ACB&D  
 gabapentin (NEURONTIN) capsule 300 mg  300 mg Oral TID  levalbuterol tartrate (XOPENEX) 45 mcg/actuation inhaler HFAA 2 Puff- pt to supply (Patient Supplied)  2 Puff Inhalation Q4H PRN  
 ondansetron (ZOFRAN ODT) tablet 8 mg  8 mg Oral Q8H PRN  polyethylene glycol (MIRALAX) packet 17 g  17 g Oral DAILY  sodium chloride (NS) flush 5-40 mL  5-40 mL IntraVENous Q8H  
  sodium chloride (NS) flush 5-40 mL  5-40 mL IntraVENous PRN  
 oxyCODONE-acetaminophen (PERCOCET) 5-325 mg per tablet 1 Tab  1 Tab Oral Q4H PRN  
 influenza vaccine 2020-21 (6 mos+)(PF) (FLUARIX/FLULAVAL/FLUZONE QUAD) injection 0.5 mL  0.5 mL IntraMUSCular PRIOR TO DISCHARGE Review of Systems Constitutional: negative for fever, chills, sweats Cardiovascular: negative for chest pain, palpitations, syncope, edema Gastrointestinal: negative for dysphagia, reflux, vomiting, diarrhea, abdominal pain, or melena Neurologic: negative for focal weakness, numbness, headache Objective:  
 
Vitals:  
 01/13/21 1843 01/13/21 2226 01/14/21 7102 01/14/21 1732 BP: 102/69 107/68 (!) 100/59 91/67 Pulse: 93 88 85 91 Resp: 18 20 20 20 Temp: 98.6 °F (37 °C) 98.4 °F (36.9 °C) 98.5 °F (36.9 °C) 98.3 °F (36.8 °C) SpO2: 98% 96% 97% 96% Weight:      
Height:      
 
Intake and Output:  
01/12 1901 - 01/14 0700 In: 440 [P.O.:440] Out: 2000 [RSMZO:[id number]] No intake/output data recorded. Physical Exam:  
Constitution:  the patient is well developed and in no acute distress EENMT:  Sclera clear, pupils equal, oral mucosa moist 
Respiratory:  
Cardiovascular:  RRR without M,G,R 
Gastrointestinal: soft and non-tender; with positive bowel sounds. Musculoskeletal: warm without cyanosis. There is no lower leg edema. Skin:  no jaundice or rashes, no wounds Neurologic: no gross neuro deficits Psychiatric:  alert and oriented x ppt CHEST XRAY:  
CXR Results  (Last 48 hours) 01/14/21 0527  XR CHEST SNGL V Final result Impression:  IMPRESSION: Right hydropneumothorax and atelectasis of the remaining right lung  
unchanged. Narrative:  EXAM: XR CHEST SNGL V  
   
INDICATION: sob COMPARISON: 1/13/2021 FINDINGS: A portable AP radiograph of the chest was obtained at 0424 hours. Right hydropneumothorax unchanged. Atelectasis the remaining right lung unchanged. . The cardiac and mediastinal contours and pulmonary vascularity are  
normal.  The bones and soft tissues are grossly within normal limits. 01/13/21 0823  XR CHEST SNGL V Final result Impression:  Impression: 1. Stable postoperative changes throughout right hemithorax status post  
pneumonectomy. CPT code(s) 16244 Narrative:  Exam: XR CHEST SNGL V on 1/13/2021 8:59 AM  
   
Clinical History: The Male patient is 78years old  presenting for sob. Comparison:  Chest x-ray 1/12/2021 Findings:  Frontal view of the chest was obtained. Stable postoperative changes are seen throughout the right lung with chest tube  
in place, status post pneumonectomy. The left lung remains clear. A right  
jugular chest port is demonstrated. The cardiomediastinal silhouette is within  
normal limits. There are no acute osseous abnormalities. 1/12 
 
 
4 weeks prior LAB No lab exists for component: [place] Recent Labs  
  01/14/21 0446 01/13/21 0337 01/12/21 
0414 WBC 5.9 5.8 4.2* HGB 11.5* 11.4* 10.8* HCT 35.1* 35.7* 31.9*  
* 162 138* Recent Labs  
  01/14/21 0446 01/13/21 0337 01/12/21 
0414 * 136 139  
K 3.8 3.5 3.8  99 105 CO2 33* 34* 32 * 112* 94 BUN 10 7* 6*  
CREA 0.42* 0.55* 0.50* CA 8.6 8.4 8.6  
 
 
1/14 SARS-CoV-2 LAB Results [facility] Test:  
Lab Results Component Value Date/Time COV2NT Not Detected 06/30/2020 09:25 PM  
  
[facility] Test: No results found for: [facility] Test: No components found for: [id number] Assessment:  (Medical Decision Making) Hospital Problems  Date Reviewed: 1/13/2021 Codes Class Noted POA  
 S/P thoracotomy ICD-10-CM: W96.134 ICD-9-CM: V45.89  1/7/2021 Yes * (Principal) Atelectasis of right lung ICD-10-CM: J98.11 ICD-9-CM: 518.0  1/4/2021 Yes  A-fib (HCC) (Chronic) ICD-10-CM: I48.91 
 ICD-9-CM: 427.31  1/4/2021 Yes Chronic respiratory failure with hypoxia (HCC) (Chronic) ICD-10-CM: J96.11 
ICD-9-CM: 518.83, 799.02  1/4/2021 Yes Pleural effusion on right ICD-10-CM: J90 ICD-9-CM: 511.9  12/28/2020 Yes Lung cancer ([facility] [address] (Chronic) ICD-10-CM: C34.90 ICD-9-CM: 162.9  7/23/2020 Yes Pulmonary emphysema (HCC) (Chronic) ICD-10-CM: J43.9 ICD-9-CM: 492.8  7/7/2020 Yes Patient's R lung s/p thoractomy with pneumonolysis but ongoing atelectasis. Bronch without significant mucus plugging. Part of apical segment with web blocking the airway. This is felt to likely be chronic but was discussed with surgery and as last resort attempt to break through this could be made, though it comes with risk of puncturing through the airway. As the patient is likely to have a completion pneumonectomy if nothing improves this was felt to be an acceptable risk. 1/12  Bronch- Web again seen. Cautery knife used to puncture through the web and green forceps used to then dilate the remaining tissue. The area beyond this was difficult to assess. It is so distal and the lumen so small could not get good visualization even after switching to the thinnest bronchoscope available. Plan:  (Medical Decision Making) -- CT chest without significant improvement in atelectasis. Surgery for completion pneumonectomy next week 
-- NC at 5 lpm 
-- chest tubes removed per surgery -- Left now with GGO, WBC WNL, afebrile, sputum NTD- started doxycycline and zosyn 1/13 per ID. PCT 0.07. sputum ordered. Appreciate ID help. Estevan Castillo NP I have spoken with and examined the patient. I agree with the above assessment and plan as documented. Lungs:  Decreased on right Heart:  RRR with no Murmur/Rubs/Gallops Abd: NTND Ext: no edema COVID-19 testing is negative. D/c droplet plus precautions and move patient to nonCOVID area so that family can visit. Anticipating surgery next week. Tommie Christy MD

## 2021-01-14 NOTE — PROGRESS NOTES
Pt resting in bed. Respirations present on 5L NC. No complaints during the night. No signs of distress. Safety measures in place.  Report given to Mich Jung

## 2021-01-14 NOTE — PROGRESS NOTES
Pt remains on 5L O2 NC without c/o SOB or dyspnea. Pt to be transferred off unit d/t confirmed negative Covid test, unknown bed at this time. Pt OOB in chair without c/o. [place] d/c earlier in shift and pt voided \"small amount\" into toilet.

## 2021-01-14 NOTE — PROGRESS NOTES
Problem: Falls - Risk of 
Goal: *Absence of Falls Description: Document Franny Peterson Fall Risk and appropriate interventions in the flowsheet. Outcome: Progressing Towards Goal 
Note: Fall Risk Interventions: 
Mobility Interventions: Patient to call before getting OOB Mentation Interventions: Adequate sleep, hydration, pain control Medication Interventions: Teach patient to arise slowly Elimination Interventions: Call light in reach Problem: Patient Education: Go to Patient Education Activity Goal: Patient/Family Education Outcome: Progressing Towards Goal 
  
Problem: Pressure Injury - Risk of 
Goal: *Prevention of pressure injury Description: Document [place] Scale and appropriate interventions in the flowsheet. Outcome: Progressing Towards Goal 
Note: Pressure Injury Interventions: 
Sensory Interventions: Assess changes in LOC Moisture Interventions: Absorbent underpads Activity Interventions: Pressure redistribution bed/mattress(bed type) Mobility Interventions: Pressure redistribution bed/mattress (bed type) Nutrition Interventions: Document food/fluid/supplement intake Friction and Shear Interventions: Lift sheet, Minimize layers Problem: Patient Education: Go to Patient Education Activity Goal: Patient/Family Education Outcome: Progressing Towards Goal

## 2021-01-14 NOTE — PROGRESS NOTES
H&P/Consult Note/Progress Note/Office Note:  
Thanh Butler MRN: [id number]  :1950  Age:75 y.o. 
 
HPI: Thanh Butler is a 78 y.o. male who is well known to Dr. Rachel Montes De Oca.  He underwent right thoracotomy with lower and middle lobectomy 20 for squamous cell carcinoma. He underwent neoaduvant chemo/radiation and adjuvant chemo. .  CT 20 showed large pleural effusion and collapse of RUL. He underwent thoracentesis by Dr. Kat Cox 20 with approximately drainage of 900ml, but was unable to completely drain related to complaints of pain. He felt should hold Eliquis for admission for Chest tube placement. Dr. Griselda Martines placed chest tube this AM 21. General surgery was consulted for complicated right pleural effusion, ?hemothorax not draining properly with chest tube placement. On exam, Mr. Gigi Richey appears comfortable.  CT scan reviewed and current chest xray, NAD. On 3 L NC sats upper 90's. Chest tube maintained with 800ml serosanguineous drainage noted. Remains on 20cmsx. Patient states since his lobectomy he has felt \"pressure\". He does use 2L oxygen at night. She states he does feel like he can breath better since this chest tube has been placed. His lost dose of eliquis was Friday (for A-fib). He does have smoking history and quit about 6 years ago 21 Patient states he is breathing better since chest tube. NAD on 3L NC with 98% sat. Chest xray reviewed and fluid collection appears loculated- will need surgery- plan for tomorrow. 21 To the OR yesterday PO day1. Difficult surgery. Consulted pulmonary intraoperatively for broch. Unable to get lung to inflate intraoperative, ?chronic mucous plug. Pt in ICU, no complaints this AM- scheduled for bronch this AM. Chest tubes to water seal currently- no air leak- appears lung still collapsed from CXR. Pain controlled. 01/08/21 POD 2. Pt remains in ICU, no complaints this AM. Pain controlled. Chest tubes to water seal currently- no air leak. CXR this am showing No Significant Interval Change. 01/09/21 POD 3. Pt remains in ICU, no complaints this AM. Pain controlled. Chest tubes currently clamped. CXR this am showing No Significant Interval Change. Denies SOB. On 3L o2. 
 
01/11/21  POD 5  Chest tube remains clamped. NO changes in chest xray. Dr. Bauer Son spoke with Dr. Anahi Vieira regarding trying again to clear airway to re inflate lung if unsuccessful then will need a pneumonectomy. Patient in NAD, No SOB on 3 L oxygen with sats upper 90's and continues to be hypotensive at times. Hgb trending down will transfuse 2u pRBC 
 
01/12/21 POD 6 just returned from pulmonary procedure. Chest tubes to suction. States feels less pressure, but no other changes noted. No air leak noted in chest tubes- awaiting chest CT this afternoon. NAD, No SOB. Hgb 10 
 
01/13/21 POD 7. No significant changes on CT to right lung. Left lung ?pneumonia. Will discuss with pulmonary if any further treatment or proceed to pneumonectomy. Chest tubes maintained clamped. 6 liters oxygen 01/14/21 POD 8. No changes in breathing since chest tubes out. ON 5 liters Oxygen upper 90's sat. Chest xray reviewed- no changes- will plan pneumonectomy for next week- possible Monday- states cough is better Medical history as below Past Medical History:  
Diagnosis Date  GERD (gastroesophageal reflux disease)   
 controlled with med  Hypertension hx-- off meds since 4/2020--- followed by dr. Sid Roe  Hypoxia 02/24/2020  Malignant neoplasm of lower lobe of right lung ([facility] [address] 02/26/2020 REC'D CHEMO AND RADIATION--- FOLLOWED BY DR. Delmy Ayala Osteoarthritis  Right lower lobe lung mass 02/24/2020  Status post total right knee replacement 2/29/2016 Past Surgical History:  
Procedure Laterality Date  HX COLONOSCOPY    
 HX KNEE ARTHROSCOPY Left scope X 1, ACL recon X 1  
 HX KNEE ARTHROSCOPY Right 1974, 1975 X 2   
 HX KNEE REPLACEMENT Right 2016 [address]  HX VASCULAR ACCESS Right placed 3/2020  
 port in chest   
 IR INSERT TUNL CVC W PORT OVER 5 YEARS  3/18/2020  CA SINUS SURGERY PROC UNLISTED  1988, 1991  
 sinus surg Current Facility-Administered Medications Medication Dose Route Frequency  piperacillin-tazobactam (ZOSYN) 4.5 g in 0.9% sodium chloride (MBP/ADV) 100 mL MBP  4.5 g IntraVENous Q8H  
 doxycycline (VIBRAMYCIN) capsule 100 mg  100 mg Oral Q12H  
 sodium chloride (NS) flush 5-40 mL  5-40 mL IntraVENous Q8H  
 sodium chloride (NS) flush 5-40 mL  5-40 mL IntraVENous PRN  
 0.9% sodium chloride infusion 250 mL  250 mL IntraVENous PRN  
 HYDROcodone-homatropine (HYCODAN) 5-1.5 mg/5 mL (5 mL) oral solution 5 mL  5 mL Oral Q4H PRN  
 bisacodyL (DULCOLAX) suppository 10 mg  10 mg Rectal DAILY PRN  
 midodrine (PROAMATINE) tablet 10 mg  10 mg Oral TID WITH MEALS  traMADoL (ULTRAM) tablet 50 mg  50 mg Oral Q6H PRN  
 sodium chloride 3% hypertonic nebulizer soln  4 mL Nebulization BID  budesonide (PULMICORT) 500 mcg/2 ml nebulizer suspension  500 mcg Nebulization BID RT  
 acetaminophen (TYLENOL) tablet 500 mg  500 mg Oral Q6H PRN  
 HYDROmorphone (PF) (DILAUDID) injection 1 mg  1 mg IntraVENous Q2H PRN  
 guaiFENesin ER (MUCINEX) tablet 1,200 mg  1,200 mg Oral Q12H  
 amiodarone (CORDARONE) tablet 200 mg  200 mg Oral DAILY  [Held by provider] apixaban (ELIQUIS) tablet 5 mg  5 mg Oral Q12H  pantoprazole (PROTONIX) tablet 40 mg  40 mg Oral ACB&D  
 gabapentin (NEURONTIN) capsule 300 mg  300 mg Oral TID  levalbuterol tartrate (XOPENEX) 45 mcg/actuation inhaler HFAA 2 Puff- pt to supply (Patient Supplied)  2 Puff Inhalation Q4H PRN  
 ondansetron (ZOFRAN ODT) tablet 8 mg  8 mg Oral Q8H PRN  polyethylene glycol (MIRALAX) packet 17 g  17 g Oral DAILY  sodium chloride (NS) flush 5-40 mL  5-40 mL IntraVENous Q8H  
 sodium chloride (NS) flush 5-40 mL  5-40 mL IntraVENous PRN  
 oxyCODONE-acetaminophen (PERCOCET) 5-325 mg per tablet 1 Tab  1 Tab Oral Q4H PRN  
 influenza vaccine 2020-21 (6 mos+)(PF) (FLUARIX/FLULAVAL/FLUZONE QUAD) injection 0.5 mL  0.5 mL IntraMUSCular PRIOR TO DISCHARGE Albuterol and Celebrex [celecoxib] Social History Socioeconomic History  Marital status:  Spouse name: Not on file  Number of children: Not on file  Years of education: Not on file  Highest education level: Not on file Tobacco Use  Smoking status: Former Smoker Packs/day: 1.00 Years: 20.00 Pack years: 20.00 Quit date:  Years since quittin.0  Smokeless tobacco: Never Used Substance and Sexual Activity  Alcohol use: Yes Alcohol/week: 4.0 standard drinks Types: 4 Glasses of wine per week  Drug use: No  
Other Topics Concern   Service No  
 Blood Transfusions No  
 Caffeine Concern No  
 Occupational Exposure No  
 Hobby Hazards No  
 Sleep Concern No  
 Stress Concern No  
 Weight Concern No  
 Special Diet No  
 Exercise Yes  [facility] Yes Social History Narrative . Has long-time girlfriend. Continues to work doing IT support. Social History Tobacco Use Smoking Status Former Smoker  Packs/day: 1.00  Years: 20.00  Pack years: 20.00  Quit date:   Years since quittin.0 Smokeless Tobacco Never Used Family History Problem Relation Age of Onset  Cancer Mother   
     uterine  Arrhythmia Sister   
     afib ROS: The patient has no difficulty with chest pain or shortness of breath. No fever or chills. Comprehensive review of systems was otherwise unremarkable except as noted above. Physical Exam:  
Visit Vitals BP 99/67 Pulse 97 Temp 98.3 °F (36.8 °C) Resp 20 Ht 5' 10\" (1.778 m) Wt 201 lb 12.8 oz (91.5 kg) SpO2 96% BMI 28.96 kg/m² Constitutional: Alert, oriented, cooperative patient in no acute distress; appears stated age Eyes: Sclera are clear. EOMs intact ENMT: no external lesions gross hearing normal; no obvious neck masses, no ear or lip lesions, nares normal 
CV: RRR. Normal perfusion Resp: No JVD. Breathing is  non-labored; no audible wheezing. O2 5 L 
GI: soft and non-distended Musculoskeletal: unremarkable with normal function. No embolic signs or cyanosis. Neuro:  Oriented; moves all 4; no focal deficits Psychiatric: normal affect and mood, no memory impairment Recent vitals (if inpt): 
Patient Vitals for the past 24 hrs: 
 BP Temp Pulse Resp SpO2  
01/14/21 1014 99/67  97    
01/14/21 0624 91/67 98.3 °F (36.8 °C) 91 20 96 % 01/14/21 0242 (!) 100/59 98.5 °F (36.9 °C) 85 20 97 % 01/13/21 2226 107/68 98.4 °F (36.9 °C) 88 20 96 % 01/13/21 1843 102/69 98.6 °F (37 °C) 93 18 98 % 01/13/21 1738 103/67 98.8 °F (37.1 °C) 98 20 95 % 01/13/21 1516 101/65  93  96 % 01/13/21 1416 106/70  97    
01/13/21 1316 110/76  (!) 104    
01/13/21 1216 110/73 98.6 °F (37 °C) 94 25 95 % 01/13/21 1116 119/81  91  95 % XR Results (most recent): 
Results from Hospital Encounter encounter on 01/04/21 XR CHEST SNGL V  
 Narrative EXAM: XR CHEST SNGL V 
 
INDICATION: sob COMPARISON: 1/13/2021 FINDINGS: A portable AP radiograph of the chest was obtained at 0424 hours. Right hydropneumothorax unchanged. Atelectasis the remaining right lung 
unchanged. . The cardiac and mediastinal contours and pulmonary vascularity are 
normal.  The bones and soft tissues are grossly within normal limits. Impression IMPRESSION: Right hydropneumothorax and atelectasis of the remaining right lung 
unchanged. CT Results (most recent): 
Results from [facility] Encounter encounter on 01/04/21 CT CHEST WO CONT Narrative EXAMINATION: CT CHEST WO CONT 1/12/2021 1:07 PM 
 
INDICATION: Shortness of breath. Evaluate for change in right upper lobe 
atelectasis COMPARISON: Chest CT December 22, 2020 and chest radiograph January 12, 2020 TECHNIQUE: Multiple contiguous axial CT images of the chest were obtained from 
the lung apices to the lung bases without intravenous contrast. Coronal 
reformats are provided. Radiation dose reduction techniques were used for this study. Our CT scanners 
use one or all of the following: Automated exposure control, adjustment of the 
mA and/or kV according to patient size, iterative reconstruction. FINDINGS: 
Lower Neck: Right chest port terminates at the cavoatrial junction. Chest Soft tissues: No significant abnormality. Heart/Mediastinum: Mediastinum remains shifted to the right. No significant 
pericardial abnormality. Normal caliber thoracic aorta. Lungs/pleura: There are 2 right chest tubes in place. Since CT chest on December 22, 2020 and there is significant decrease in right pleural fluid. There may be 
a few areas of aerated lung in the anterior portion of the residual right lung, 
however a majority remains opacified. Layering secretions are seen in the right 
main bronchus which is partially occluded. There is a persistent right 
hydropneumothorax. There is new moderate size area of groundglass opacity in the left upper lobe 
and small area of tree-in-bud opacity in the left lower lobe. Visualized upper abdomen: Cholelithiasis. Osseous structures: No suspicious osseous lesion. Impression IMPRESSION: 
1. Right chest tubes in place with residual moderate right hydropneumothorax. There may be slightly increased aeration of the inferior portion of the residual 
right lung, however majority remains opacified.  It is somewhat difficult to 
distinguish lung parenchyma from pleural fluid due to lack of IV contrast. 
 2. New moderate area of groundglass opacity in the left upper lobe and 
tree-in-bud opacity in the left lower lobe which may be infectious or 
inflammatory. Labs: 
Recent Labs  
  01/14/21 
0446 WBC 5.9 HGB 11.5* * * K 3.8  CO2 33* BUN 10  
CREA 0.42* * Lab Results Component Value Date/Time WBC 5.9 01/14/2021 04:46 AM  
 HGB 11.5 (L) 01/14/2021 04:46 AM  
 PLATELET 258 (L) 05/39/9883 04:46 AM  
 Sodium 137 (L) 01/14/2021 04:46 AM  
 Potassium 3.8 01/14/2021 04:46 AM  
 Chloride 100 01/14/2021 04:46 AM  
 CO2 33 (H) 01/14/2021 04:46 AM  
 BUN 10 01/14/2021 04:46 AM  
 Creatinine 0.42 (L) 01/14/2021 04:46 AM  
 Glucose 103 (H) 01/14/2021 04:46 AM  
 INR 1.1 01/05/2021 04:02 PM  
 aPTT 35.2 (H) 01/05/2021 04:02 PM  
 Bilirubin, total 0.5 01/05/2021 06:26 AM  
 Bilirubin, direct 0.8 (H) 05/05/2020 02:27 PM  
 ALT (SGPT) 8 (L) 01/05/2021 06:26 AM  
 Alk. phosphatase 99 01/05/2021 06:26 AM  
 Troponin-I, Qt. <0.02 (L) 05/05/2020 01:09 AM  
 
 
I reviewed recent labs and recent radiologic studies. I independently reviewed radiology images for studies I described above or studies I have ordered. Admission date (for inpatients): 1/4/2021 Day of Surgery  Procedure(s) with comments: BRONCHOSCOPY 
BRONCHOSCOPY WITH BIOPSY - forceps used to open RUL apical segment BRONCHOSCOPY WITH AIRWAY DILATION 
BRONCHOSCOPY W EXCISION OF TUMOR 
 
ASSESSMENT/PLAN: 
Problem List  Date Reviewed: 1/13/2021 Codes Class Noted S/P thoracotomy ICD-10-CM: W96.691 ICD-9-CM: V45.89  1/7/2021 * (Principal) Atelectasis of right lung ICD-10-CM: J98.11 ICD-9-CM: 518.0  1/4/2021 A-fib (HCC) (Chronic) ICD-10-CM: I48.91 
ICD-9-CM: 427.31  1/4/2021 Chronic respiratory failure with hypoxia (HCC) (Chronic) ICD-10-CM: J96.11 
ICD-9-CM: 518.83, 799.02  1/4/2021 Pleural effusion on right ICD-10-CM: J90 ICD-9-CM: 511.9  12/28/2020 Chemotherapy induced neutropenia (HCC) ICD-10-CM: D70.1, T45.1X5A 
ICD-9-CM: 288.03, E933.1  10/23/2020 Dehydration ICD-10-CM: E86.0 ICD-9-CM: 276.51  9/15/2020 S/P lobectomy of lung ICD-10-CM: Z90.2 ICD-9-CM: V45.89  7/29/2020 Cough ICD-10-CM: R05 ICD-9-CM: 786.2  7/29/2020 Atrial tachycardia (HCC) ICD-10-CM: I47.1 ICD-9-CM: 427.89  7/26/2020 Cancer of bronchus of right lower lobe [facility]) ICD-10-CM: C34.31 
ICD-9-CM: 162.5  7/23/2020 Lung cancer ([facility] [address] (Chronic) ICD-10-CM: C34.90 ICD-9-CM: 162.9  7/23/2020 Cancer of right lung [facility]) ICD-10-CM: C34.91 
ICD-9-CM: 162.9  7/23/2020 Pulmonary emphysema (HCC) (Chronic) ICD-10-CM: J43.9 ICD-9-CM: 492.8  7/7/2020 GERD (gastroesophageal reflux disease) ICD-10-CM: K21.9 ICD-9-CM: 530.81  Unknown Hypotension (Chronic) ICD-10-CM: I95.9 ICD-9-CM: 458.9  5/5/2020 Overview Signed 1/11/2021  8:39 AM by Parris Lawson NP On midodrine Sepsis due to undetermined organism [facility]) ICD-10-CM: A41.9 ICD-9-CM: 038.9  5/5/2020 Malignant neoplasm of lower lobe of right lung (HCC) (Chronic) ICD-10-CM: C34.31 
ICD-9-CM: 162.5  2/26/2020 Overview Signed 1/4/2021  7:38 AM by Jonathan Johnson MD  
  Dec 2020- Echo EF 50%, technically difficult, cannot assess regional wall motion. Aortic root 4.1 cm. No significant valvular disease. Normal DF Mass of lower lobe of right lung ICD-10-CM: R91.8 ICD-9-CM: 786.6  2/23/2020 Right lower lobe lung mass ICD-10-CM: R91.8 ICD-9-CM: 786.6  2/23/2020 Essential hypertension with goal blood pressure less than 130/80 (Chronic) ICD-10-CM: I10 
ICD-9-CM: 401.9  2/19/2018 Principal Problem: 
  Atelectasis of right lung (1/4/2021) Active Problems: 
  Pulmonary emphysema ([address] (7/7/2020) Lung cancer ([facility] [address] (7/23/2020) Pleural effusion on right (12/28/2020) A-fib ([address] (1/4/2021) Chronic respiratory failure with hypoxia ([facility] [address] (1/4/2021) S/P thoracotomy (1/7/2021) Plan Plan pneumonectomy next week- possibly Monday Cxr daily Regular Diet Activity as tolerated Pain control Encourage C/DB/IS Oxygen as needed Chest tubes removed 1/13/21 SAADIA Howell R/O covid pending Signed:  Laureano Bañuelos NP

## 2021-01-14 NOTE — PROGRESS NOTES
Pt is resting in bed. Respirations present on NC. Safety measures in place.  Report received from Darryl BEEjoey Magaña

## 2021-01-15 NOTE — PROGRESS NOTES
Ailyn Jeronimo. Admission Date: 1/4/2021 Daily Progress Note: 1/15/2021 The patient's chart is reviewed and the patient is discussed with the staff. Patient is R 47 y.o.  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 Timeline of events: 
--RLL mass 2/2020, measuring 6.7cm x 5.9cm.  
--EBUS On 2/26/2020 and biopsy consistent with SCC. --Brain MRI was negative for brain mets but revealed a 2cm R parotid mass. --PET CT revealed a hypermetabolic RLL mass extending into the hilum and excessively hypermetabolic R parotid gland. --He had radiation and chemo and then underwent RML lobectomy 7/23/2020 for squamous cell carcinoma 
--He then completed 6 more rounds of chemo around 10/2020.   
--Repeat Chest CT on 12/22/2020 which revealed a large R pleural effusion and collapse of the remaining RUL.  
--R thoracentesis on 12/28/2020 by Dr. Jose Larsen with 900mL bloody fluid removed. Unable to tolerate complete drainage. --CT placement on 1/4/21 with 800cc of serosanguineous drainage. --General Surgery was consulted and CXR revealed persistent loculated R effusion. -- OR on 1/6 for R VATS, converted to an open thoracotomy with extensive pneumolysis with decortication. Difficult surgery. Consulted pulmonary intraoperatively for bronch. Unable to get lung to inflate intraoperative 
--Pathology non-diagnostic. Pt was hypotensive postoperatively requiring aylin. Pt was transferred to ICU. Pt had a repeat bronch on 1/7 secondary to persistent RUL atelectasis. He did not have any mucous plugging but his apical segment of the RUL was blocked by a web and the bronchoscope couldn't advance any further. Pt did have a stump area from prior lobectomy with abnormal appearing tissue that was biopsied,pathology noted below- non-diagnostic.  
-- 1/12 bronchoscopy. Subjective: Chest CT repeated this AM. Overall unchanged from yesterday. For surgery-completion pneumonectomy planned per  Dr. Rachel Montes De Oca next week. On 5 lpm NC. Up in chair. Feeling pretty good day. Trying to do some work. Current Facility-Administered Medications Medication Dose Route Frequency  enoxaparin (LOVENOX) injection 40 mg  40 mg SubCUTAneous Q24H  piperacillin-tazobactam (ZOSYN) 4.5 g in 0.9% sodium chloride (MBP/ADV) 100 mL MBP  4.5 g IntraVENous Q8H  
 doxycycline (VIBRAMYCIN) capsule 100 mg  100 mg Oral Q12H  
 sodium chloride (NS) flush 5-40 mL  5-40 mL IntraVENous Q8H  
 sodium chloride (NS) flush 5-40 mL  5-40 mL IntraVENous PRN  
 0.9% sodium chloride infusion 250 mL  250 mL IntraVENous PRN  
 HYDROcodone-homatropine (HYCODAN) 5-1.5 mg/5 mL (5 mL) oral solution 5 mL  5 mL Oral Q4H PRN  
 bisacodyL (DULCOLAX) suppository 10 mg  10 mg Rectal DAILY PRN  
 midodrine (PROAMATINE) tablet 10 mg  10 mg Oral TID WITH MEALS  traMADoL (ULTRAM) tablet 50 mg  50 mg Oral Q6H PRN  
 acetaminophen (TYLENOL) tablet 500 mg  500 mg Oral Q6H PRN  
 HYDROmorphone (PF) (DILAUDID) injection 1 mg  1 mg IntraVENous Q2H PRN  
 guaiFENesin ER (MUCINEX) tablet 1,200 mg  1,200 mg Oral Q12H  
 amiodarone (CORDARONE) tablet 200 mg  200 mg Oral DAILY  [Held by provider] apixaban (ELIQUIS) tablet 5 mg  5 mg Oral Q12H  pantoprazole (PROTONIX) tablet 40 mg  40 mg Oral ACB&D  
 gabapentin (NEURONTIN) capsule 300 mg  300 mg Oral TID  levalbuterol tartrate (XOPENEX) 45 mcg/actuation inhaler HFAA 2 Puff- pt to supply (Patient Supplied)  2 Puff Inhalation Q4H PRN  
 ondansetron (ZOFRAN ODT) tablet 8 mg  8 mg Oral Q8H PRN  polyethylene glycol (MIRALAX) packet 17 g  17 g Oral DAILY  sodium chloride (NS) flush 5-40 mL  5-40 mL IntraVENous Q8H  
 sodium chloride (NS) flush 5-40 mL  5-40 mL IntraVENous PRN  
 oxyCODONE-acetaminophen (PERCOCET) 5-325 mg per tablet 1 Tab  1 Tab Oral Q4H PRN  
  influenza vaccine 2020-21 (6 mos+)(PF) (FLUARIX/FLULAVAL/FLUZONE QUAD) injection 0.5 mL  0.5 mL IntraMUSCular PRIOR TO DISCHARGE Review of Systems Constitutional: negative for fever, chills, sweats Cardiovascular: negative for chest pain, palpitations, syncope, edema Gastrointestinal: negative for dysphagia, reflux, vomiting, diarrhea, abdominal pain, or melena Neurologic: negative for focal weakness, numbness, headache Objective:  
 
Vitals:  
 01/14/21 1935 01/14/21 2311 01/15/21 0330 01/15/21 5300 BP: 109/71 110/69 102/69 105/70 Pulse: 86 92 94 87 Resp: 18 19 18 19 Temp: 98.4 °F (36.9 °C) 98.3 °F (36.8 °C) 98 °F (36.7 °C) 98 °F (36.7 °C) SpO2: 94% 97% 95% 98% Weight:      
Height:      
 
Intake and Output:  
01/13 1901 - 01/15 0700 In: 720 [P.O.:720] Out: 2000 [[id number]:[id number]] No intake/output data recorded. Physical Exam:  
Constitution:  the patient is well developed and in no acute distress EENMT:  Sclera clear, pupils equal, oral mucosa moist 
Respiratory: Right side absent. Left anterior/posterior clear. Cardiovascular:  RRR without M,G,R 
Gastrointestinal: soft and non-tender; with positive bowel sounds. Musculoskeletal: warm without cyanosis. There is no lower leg edema. Skin:  no jaundice or rashes, no wounds Neurologic: no gross neuro deficits Psychiatric:  alert and oriented x ppt CHEST XRAY:  
 
 
CXR Results  (Last 48 hours) 01/15/21 0514  XR CHEST SNGL V Final result Impression:  IMPRESSION: Right hydropneumothorax and atelectasis of the remaining right lung  
unchanged. Narrative:  EXAM: XR CHEST SNGL V  
   
INDICATION: sob COMPARISON: 1/14/2021 FINDINGS: A portable AP radiograph of the chest was obtained at 0434 hours. Right hydropneumothorax unchanged. Atelectasis of the remaining right lung  
unchanged. Left lung is clear.  The cardiac and mediastinal contours and  
 pulmonary vascularity are normal.  The bones and soft tissues are grossly within  
normal limits. 01/14/21 0527  XR CHEST SNGL V Final result Impression:  IMPRESSION: Right hydropneumothorax and atelectasis of the remaining right lung  
unchanged. Narrative:  EXAM: XR CHEST SNGL V  
   
INDICATION: sob COMPARISON: 1/13/2021 FINDINGS: A portable AP radiograph of the chest was obtained at 0424 hours. Right hydropneumothorax unchanged. Atelectasis the remaining right lung  
unchanged. . The cardiac and mediastinal contours and pulmonary vascularity are  
normal.  The bones and soft tissues are grossly within normal limits. 1/12 
 
 
4 weeks prior LAB No lab exists for component: [place] Recent Labs  
  01/15/21 
0448 01/14/21 
0446 01/13/21 
5626 WBC 5.7 5.9 5.8 HGB 11.1* 11.5* 11.4* HCT 34.8* 35.1* 35.7*  
* 131* 162 Recent Labs  
  01/15/21 
0448 01/14/21 
0446 01/13/21 
7158  137* 136  
K 3.9 3.8 3.5  100 99 CO2 33* 33* 34* * 103* 112* BUN 10 10 7* CREA 0.51* 0.42* 0.55* CA 8.9 8.6 8.4  
 
 
1/14 SARS-CoV-2 LAB Results [facility] Test:  
Lab Results Component Value Date/Time COV2NT Not Detected 06/30/2020 09:25 PM  
  
[facility] Test: No results found for: [facility] Test: No components found for: [id number] Assessment:  (Medical Decision Making) Hospital Problems  Date Reviewed: 1/13/2021 Codes Class Noted POA  
 S/P thoracotomy ICD-10-CM: M85.102 ICD-9-CM: V45.89  1/7/2021 Yes * (Principal) Atelectasis of right lung ICD-10-CM: J98.11 ICD-9-CM: 518.0  1/4/2021 Yes A-fib (HCC) (Chronic) ICD-10-CM: I48.91 
ICD-9-CM: 427.31  1/4/2021 Yes Chronic respiratory failure with hypoxia (HCC) (Chronic) ICD-10-CM: J96.11 
ICD-9-CM: 518.83, 799.02  1/4/2021 Yes Pleural effusion on right ICD-10-CM: J90 ICD-9-CM: 511.9  12/28/2020 Yes Lung cancer ([facility] (Chronic) ICD-10-CM: C34.90 ICD-9-CM: 162.9  7/23/2020 Yes Pulmonary emphysema (HCC) (Chronic) ICD-10-CM: J43.9 ICD-9-CM: 492.8  7/7/2020 Yes Patient's R lung s/p thoractomy with pneumonolysis but ongoing atelectasis. Bronch without significant mucus plugging. Part of apical segment with web blocking the airway. This is felt to likely be chronic but was discussed with surgery and as last resort attempt to break through this could be made, though it comes with risk of puncturing through the airway. As the patient is likely to have a completion pneumonectomy if nothing improves this was felt to be an acceptable risk. 1/12  Bronch- Web again seen. Cautery knife used to puncture through the web and green forceps used to then dilate the remaining tissue. The area beyond this was difficult to assess. It is so distal and the lumen so small could not get good visualization even after switching to the thinnest bronchoscope available. Plan:  (Medical Decision Making) --Stable at this point. Surgery for completion pneumonectomy next week. --NC at 5 lpm - baseline is RA during the day at 2L at night for TAMERA 
--Wet cough with production but not significant issue 
--Right chest tube dressing dry & intact. --Doxycycline and zosyn 1/13 per ID. --Percocet may be causing nausea/encouraged use of Tramadol as alternate for pain control --Appetite improving 
--Transition to Hospitalist as primary? TFasolino, NP-C Lungs:  Clear, diminished on R Heart:  RRR with no Murmur/Rubs/Gallops Additional Comments:  R pneumonectomy next week. Wean O2 as able. Supportive for now. I have spoken with and examined the patient. I agree with the above assessment and plan as documented.  
 
Madisyn Barton MD

## 2021-01-15 NOTE — PROGRESS NOTES
Infectious Disease Progress Note Today's Date: 1/15/2021 Admit Date: 2021 Impression: · New L sided patchy PNA seen on CT chest ; sputum cx () NRF 
· Loculated Right effusion: s/p VATS decortication on  and chest tube placement- no cx data; repeat bronch  and  secondary to RUL atelectasis · RLL SCC s/p RLL lobectomy on 2020 · Cough Plan:  
· Surgery plans pneumonectomy next week. Please send operative cultures aerobic/anaerobic - unclear if this loculated effusion was infected (no prior cultures), however likely is · Continue zosyn and doxy for atypical coverage. Plan for 7 days of doxy and zosyn eot . If operative cultures are positive, may need further targeted therapy post-op ·  ID will continue to follow  
  
Anti-infectives: · Zosyn (- 
· Doxy (- · Vanc () · Ancef (-) Subjective: Interval History: 
Sitting up in chair. NAD. Patient still with complaints of pain and pressure to R chest.  
 
 
Allergies Allergen Reactions  Albuterol Palpitations  Celebrex [Celecoxib] Itching Review of Systems:  A comprehensive review of systems was negative except for that written in the History of Present Illness. Objective:  
 
Visit Vitals /72 Pulse 89 Temp 98 °F (36.7 °C) Resp 19 Ht 5' 10\" (1.778 m) Wt 91.5 kg (201 lb 12.8 oz) SpO2 100% BMI 28.96 kg/m² Temp (24hrs), Av.1 °F (36.7 °C), Min:97.9 °F (36.6 °C), Max:98.4 °F (36.9 °C) Patient was seen and examined on 21 and physical exam remains unchanged since yesterday, unless noted below: 
 
Lines:  R chest port Physical Exam:   
General:  Alert, cooperative, well noursished, well developed, appears stated age Eyes:  Sclera anicteric. Pupils equally round and reactive to light. Mouth/Throat: Mucous membranes normal, oral pharynx clear Neck: Supple Lungs:   Decreased lung sounds on right; on O2 via NC; dressing to previous chest tube sites CV:  Regular rate and rhythm,no murmur, click, rub or gallop Abdomen:   Soft, non-tender. bowel sounds normal. non-distended Extremities: No cyanosis or edema Skin: Skin color, texture, turgor normal. no acute rash or lesions Lymph nodes: Cervical and supraclavicular normal  
Musculoskeletal: No swelling or deformity Lines/Devices:  Clean, dry, intact Psych: Alert and oriented, normal mood affect given the setting Data Review: CBC: 
Recent Labs  
  01/15/21 
0448 01/14/21 
0446 01/13/21 
9059 WBC 5.7 5.9 5.8 GRANS 73 76 76 MONOS 9 7 8 EOS 3 4 3 ANEU 4.2 4.5 4.4 ABL 0.8 0.8 0.7 HGB 11.1* 11.5* 11.4* HCT 34.8* 35.1* 35.7*  
* 131* 162 BMP: 
Recent Labs  
  01/15/21 
0448 01/14/21 
0446 01/13/21 
7560 CREA 0.51* 0.42* 0.55* BUN 10 10 7*  137* 136  
K 3.9 3.8 3.5  100 99 CO2 33* 33* 34* AGAP 5* 4* 3*  
* 103* 112* LFTS: 
No results for input(s): TBILI, ALT, AP, TP, ALB in the last 72 hours. No lab exists for component: SGOT Microbiology:  
 
All Micro Results Procedure Component Value Units Date/Time CULTURE, RESPIRATORY/SPUTUM/BRONCH Garrett Joshua [[id number]] Collected: 21 1210 Order Status: Completed Specimen: Sputum Updated: 01/15/21 0831 Special Requests: NO SPECIAL REQUESTS     
  GRAM STAIN 30 TO 50 WBC'S SEEN PER OIF  
   0 TO 2 EPITHELIAL CELLS SEEN PER OIF  
      
  MODERATE GRAM POSITIVE COCCI MODERATE GRAM POSITIVE RODS  
     
   FEW GRAM NEGATIVE RODS     
   FEW YEAST 4+ MUCUS PRESENT Culture result: MODERATE NORMAL RESPIRATORY EDU Imagin/13 Chest Xray Impression: 1. Stable postoperative changes throughout right hemithorax status post 
pneumonectomy.  
 
 CT Chest 
IMPRESSION: 
 1. Right chest tubes in place with residual moderate right hydropneumothorax. There may be slightly increased aeration of the inferior portion of the residual 
right lung, however majority remains opacified. It is somewhat difficult to 
distinguish lung parenchyma from pleural fluid due to lack of IV contrast. 
2. New moderate area of groundglass opacity in the left upper lobe and 
tree-in-bud opacity in the left lower lobe which may be infectious or 
inflammatory. Signed By: Efraín Kingsley NP   
 January 15, 2021

## 2021-01-15 NOTE — PROGRESS NOTES
Problem: Falls - Risk of 
Goal: *Absence of Falls Description: Document Tony Mar Fall Risk and appropriate interventions in the flowsheet. Outcome: Progressing Towards Goal 
Note: Fall Risk Interventions: 
Mobility Interventions: Communicate number of staff needed for ambulation/transfer Mentation Interventions: Adequate sleep, hydration, pain control Medication Interventions: Teach patient to arise slowly Elimination Interventions: Call light in reach Problem: Patient Education: Go to Patient Education Activity Goal: Patient/Family Education Outcome: Progressing Towards Goal 
  
Problem: Pressure Injury - Risk of 
Goal: *Prevention of pressure injury Description: Document Sidney Scale and appropriate interventions in the flowsheet. Outcome: Progressing Towards Goal 
Note: Pressure Injury Interventions: 
Sensory Interventions: Assess changes in LOC Moisture Interventions: Absorbent underpads Activity Interventions: Pressure redistribution bed/mattress(bed type) Mobility Interventions: Pressure redistribution bed/mattress (bed type) Nutrition Interventions: Document food/fluid/supplement intake Friction and Shear Interventions: Lift sheet, Minimize layers Problem: Patient Education: Go to Patient Education Activity Goal: Patient/Family Education Outcome: Progressing Towards Goal 
  
Problem: Patient Education: Go to Patient Education Activity Goal: Patient/Family Education Outcome: Progressing Towards Goal

## 2021-01-15 NOTE — PROGRESS NOTES
Pt resting in bed. Respirations present on NC. Safety measures in place.  Report received from Dion BEE

## 2021-01-15 NOTE — PROGRESS NOTES
MSN, CM:  Patient to have surgery next week and will need HH when ready for discharge. Patient requiring 6L NC at this time. Case Management will continue to follow.

## 2021-01-15 NOTE — PROGRESS NOTES
ACUTE PHYSICAL THERAPY GOALS: 
(Developed with and agreed upon by patient and/or caregiver. ) LTG: 
(1.)Mr. Alberto Reese will move from supine to sit and sit to supine , scoot up and down and roll side to side in bed with INDEPENDENT within 7 treatment day(s). (2.)Mr. Alberto Reese will transfer from bed to chair and chair to bed with INDEPENDENT using the least restrictive device within 7 treatment day(s). (3.)Mr. Alberto Reese will ambulate with INDEPENDENT for 150+ feet with the least restrictive device within 7 treatment day(s) while maintaining normal vital signs. (4.)Mr. Alberto Reese will tolerated 23+ minutes of therapeutic activity within 7 treatment days for increased activity tolerance, while maintaining normal vital signs throughout session. ________________________________________________________________________________________________ PHYSICAL THERAPY ASSESSMENT: Initial Assessment and AM PT Treatment Day # 1 Mercedez Morrell is a 78 y.o. male PRIMARY DIAGNOSIS: Atelectasis of right lung Pleural effusion [J90] Collapse of right lung [J98.11] [facility] ([facility] [C34.90] Procedure(s) (LRB): BRONCHOSCOPY (N/A) BRONCHOSCOPY WITH BIOPSY (N/A) BRONCHOSCOPY WITH AIRWAY DILATION (N/A) BRONCHOSCOPY W EXCISION OF TUMOR (N/A) 3 Days Post-Op Reason for Referral: ICD-10: Treatment Diagnosis: Generalized Muscle Weakness (M62.81) Difficulty in walking, Not elsewhere classified (R26.2) INPATIENT: Payor: Lucía Aburto / Plan: Irene Johnson / Product Type: PPO /  
 
ASSESSMENT:  
 
REHAB RECOMMENDATIONS:  
Recommendation to date pending progress: 
Setting: ? Home Health Therapy or STR pending progress after surgery next week Equipment: ? To Be Determined PRIOR LEVEL OF FUNCTION: 
(Prior to Hospitalization) INITIAL/CURRENT LEVEL OF FUNCTION: 
(Most Recently Demonstrated) Bed Mobility: 
? Independent Sit to Stand: ? Independent Transfers: 
? Independent Gait/Mobility: 
? Independent Bed Mobility: ? Fatoumata Casillas Sit to Stand: 
? Contact Guard Assistance Transfers: 
? [address] Gait/Mobility: 
? [address] ASSESSMENT: 
Mr. Yasmany Murillo presents to hospital on 1/4/21 from MD office with pleural effusion. Pt COVID negative at this time. Pt history of lung cancer, R lobectomy, chest tubes, bronch, VATS 1/6. Pt now on 5 L/min O2, stats 97% at rest. Pt A & O x 3 this date, reports pain 5/10 R rib cage. Pt states initial surgery July 2020, since then, independent but limited ambulation, independent ADLs. Pt lives with GF in private home, 6 steps to enter. Pt has DME at home but currently not using. Pt today SBA to CGA bed mobility, transfers. Pt ambulated in room 60' without assistive device, O2 stats 93-97% with activity. Pt demo good static and dynamic standing balance while working with OT for ADL needs. Pt overall doing well with general mobility. PT to follow for acute care needs to address generalized weakness and overall functional mobility. SUBJECTIVE:  
Mr. Yasmany Murillo states, \"ok let's go. \" SOCIAL HISTORY/LIVING ENVIRONMENT: private residence, 6 steps; lives GF, RA at home OBJECTIVE:  
 
PAIN: VITAL SIGNS: LINES/DRAINS:  
Pre Treatment: Pain Screen Pain Scale 1: Numeric (0 - 10) Pain Intensity 1: 5 Pain Location 1: Rib cage Pain Orientation 1: Anterior;Right Pain Description 1: Aching Pain Intervention(s) 1: Exercise;Repositioned Post Treatment: does not rate, appears comfortable in chair Vital Signs O2 Sat (%): 98 % O2 Device: Nasal cannula O2 Flow Rate (L/min): 5 l/min none O2 Device: Nasal cannula GROSS EVALUATION: 
B LE Within Functional Limits Abnormal/ Functional Abnormal/ Non-Functional (see comments) Not Tested Comments: AROM [x] [] [] [] PROM [x] [] [] [] Strength [x] [] [] [] Balance [] [x] [] [] Posture [] [x] [] [] Forward head Sensation [] [x] [] [] neuropathy Coordination [] [x] [] [] Tone [] [] [] [x] Edema [] [] [] [x] Activity Tolerance [] [x] [] []   
 [] [] [] [] COGNITION/ 
PERCEPTION: Intact Impaired  
(see comments) Comments:  
Orientation [x] [] Vision [x] [] Hearing [x] [] Command Following [x] [] Safety Awareness [x] []   
 [] [] MOBILITY: I Mod I S SBA CGA Min Mod Max Total  NT x2 Comments:  
Bed Mobility Rolling [] [] [] [x] [] [] [] [] [] [] [] Supine to Sit [] [] [] [x] [] [] [] [] [] [] [] Scooting [] [] [] [x] [] [] [] [] [] [] [] Sit to Supine [] [] [] [] [] [] [] [] [] [x] [] In chair Transfers Sit to Stand [] [] [] [x] [x] [] [] [] [] [] [] Bed to Chair [] [] [] [] [x] [] [] [] [] [] []   
Stand to Sit [] [] [] [x] [x] [] [] [] [] [] [] I=Independent, Mod I=Modified Independent, S=Supervision, SBA=Standby Assistance, CGA=Contact Guard Assistance,  
Min=Minimal Assistance, Mod=Moderate Assistance, Max=Maximal Assistance, Total=Total Assistance, NT=Not Tested GAIT: I Mod I S SBA CGA Min Mod Max Total  NT x2 Comments:  
Level of Assistance [] [] [] [] [x] [] [] [] [] [] [] Distance 60 DME None, 5 L O2 Gait Quality Slow yanique, forward posture Weightbearing Status N/A I=Independent, Mod I=Modified Independent, S=Supervision, SBA=Standby Assistance, CGA=Contact Guard Assistance,  
Min=Minimal Assistance, Mod=Moderate Assistance, Max=Maximal Assistance, Total=Total Assistance, NT=Not Tested [facility] Basic Mobility Inpatient Short Form How much difficulty does the patient currently have. .. Unable A Lot A Little None 1. Turning over in bed (including adjusting bedclothes, sheets and blankets)? [] 1   [] 2   [] 3   [x] 4  
2. Sitting down on and standing up from a chair with arms ( e.g., wheelchair, bedside commode, etc.)   [] 1   [] 2   [] 3   [x] 4  
3. Moving from lying on back to sitting on the side of the bed? [] 1   [] 2   [] 3   [x] 4 How much help from another person does the patient currently need. .. Total A Lot A Little None 4. Moving to and from a bed to a chair (including a wheelchair)? [] 1   [] 2   [x] 3   [] 4  
5. Need to walk in hospital room? [] 1   [] 2   [x] 3   [] 4  
6. Climbing 3-5 steps with a railing? [] 1   [] 2   [x] 3   [] 4  
© 2007, Trustees of [address] [place] [address], under license to [employer]. All rights reserved Score:  Initial: 21 Most Recent: X (Date: -- ) Interpretation of Tool:  Represents activities that are increasingly more difficult (i.e. Bed mobility, Transfers, Gait). PLAN:  
FREQUENCY/DURATION: PT Plan of Care: 3 times/week for duration of hospital stay or until stated goals are met, whichever comes first. 
 
PROBLEM LIST:  
(Skilled intervention is medically necessary to address:) 1. Decreased ADL/Functional Activities 2. Decreased Activity Tolerance 3. Decreased Balance 4. Decreased Gait Ability 5. Decreased Strength 6. Decreased Transfer Abilities INTERVENTIONS PLANNED:  
(Benefits and precautions of physical therapy have been discussed with the patient.) 1. Therapeutic Activity 2. Therapeutic Exercise/HEP 3. Neuromuscular Re-education 4. Gait Training 5. Manual Therapy 6. Education TREATMENT:  
 
EVALUATION: Moderate Complexity : (Untimed Charge) TREATMENT:  
($$ Therapeutic Exercises: 23-37 mins    ) Therapeutic Exercise (23 Minutes): Therapeutic exercises to include bed mobility, static and dynamic sitting and standing balance, activity tolerance,energy conservation techniques, ambulation on level surface, posture and weight shifting to improve functional activity tolerance, strength and mobility. Today's treatment session addressed Decreased Strength, Decreased Transfer Abilities, Decreased Ambulation Ability/Technique, Decreased Balance and Decreased Activity Tolerance to progress towards achieving goal(s) 1, 2 and 3. During this session, Occupational Therapy addressed ADLs to progress towards their discipline specific goal(s). Co-treatment was necessary to improve patient's ability to follow higher level commands, ability to increase activity demands and ability to return to normal functional activity. AFTER TREATMENT POSITION/PRECAUTIONS: 
Chair, Needs within reach and RN notified INTERDISCIPLINARY COLLABORATION: 
RN/PCT and OT/WOLFF TOTAL TREATMENT DURATION: 
PT Patient Time In/Time Out Time In: 7157 Time Out: 8066 Trinidad Higginbotham, PT

## 2021-01-15 NOTE — PROGRESS NOTES
TRANSFER - OUT REPORT: 
 
Verbal report given to Fahad Verma on [facility].  being transferred to [facility] for routine progression of care Report consisted of patients Situation, Background, Assessment and  
Recommendations(SBAR). Information from the following report(s) SBAR, Kardex, ED Summary, OR Summary, Intake/Output, MAR, Accordion, Recent Results and Med Rec Status was reviewed with the receiving nurse. Lines:  
Venous Access Device chest port  03/18/20 Upper chest (subclavicular area, right (Active) Central Line Being Utilized Yes 01/15/21 0745 Criteria for Appropriate Use Irritant/vesicant medication 01/15/21 0745 Site Assessment Clean, dry, & intact 01/15/21 0745 Date of Last Dressing Change 01/12/21 01/15/21 0745 Dressing Status Clean, dry, & intact 01/15/21 0745 Dressing Type Disk with Chlorhexadine gluconate (CHG) 01/15/21 0745 Action Taken Dressing changed 01/12/21 1515 Access Needle Size (Site #1) 20 G 01/06/21 0919 Access Needle Length (Medial Site) 0.75 inches 01/06/21 0919 Positive Blood Return (Medial Site) Yes 01/15/21 0745 Action Taken (Medial Site) Flushed 01/12/21 1515 Opportunity for questions and clarification was provided. Patient transported with: 
 O2 @ 5LNC liters Patient negative rapid and send out. Transferred per protocol.

## 2021-01-15 NOTE — PROGRESS NOTES
ACUTE OT GOALS: 
(Developed with and agreed upon by patient and/or caregiver.) 1. Patient will complete total body bathing and dressing with modified independence and adaptive equipment as needed. 2. Patient will tolerate 30 minutes of OT treatment with up to self-incorporated rest breaks to increase activity tolerance for ADLs. 3. Patient will complete functional mobility for ADLs with modified independence and adaptive equipment as needed. 4. Patient will demonstrate modified independence with therapeutic exercise HEP to increase strength in BUEs for increased safety and independence with functional transfers. 5. Patient will verbalize 2 energy conservation techniques with no cues from therapist to increase safety and independence with ADLs. Timeframe: 7 visits OCCUPATIONAL THERAPY ASSESSMENT: Initial Assessment and Daily Note OT Treatment Day # 1 Jose Hines is a 78 y.o. male PRIMARY DIAGNOSIS: Atelectasis of right lung Pleural effusion [J90] Collapse of right lung [J98.11] [facility] [C34.90] Procedure(s) (LRB): BRONCHOSCOPY (N/A) BRONCHOSCOPY WITH BIOPSY (N/A) BRONCHOSCOPY WITH AIRWAY DILATION (N/A) BRONCHOSCOPY W EXCISION OF TUMOR (N/A) 3 Days Post-Op Reason for Referral:  Weakness ICD-10: Treatment Diagnosis: Generalized Muscle Weakness (M62.81) INPATIENT: Payor: Heike Castillo / Plan: Sia Rice / Product Type: PPO /  
ASSESSMENT:  
 
REHAB RECOMMENDATIONS:  
Recommendation to date pending progress: 
Setting: ? Home Health Therapy Equipment:  
? 3 in 1 Bedside Commode PRIOR LEVEL OF FUNCTION: 
(Prior to Hospitalization)  INITIAL/CURRENT LEVEL OF FUNCTION: 
(Based on today's evaluation) Bathing: ? Independent Dressing: ? Independent Feeding/Grooming: ? Independent Toileting: ? Independent Functional Mobility: 
? Independent Bathing: 
? [place] Dressing: 
? [place] Feeding/Grooming: 
? Supervision Toileting: ? Supervision Functional Mobility: 
? Standby Assistance ASSESSMENT: 
Mr. Anahi Brown presents with R lung atelectasis, collapsed R lung, s/p tumor excision. Hx SCC with R lobectomy. At baseline pt lives with girlfriend, completes I/ADLs, driving, and ambulation with independence. No hx falls. Utilizes 2L O2 nocturnally. Pt with deficits in respiratory status, strength, balance, endurance, and mobility impacting ADLs. Pt practiced functional transfers and bed mobility with supervision, ambulation for ADLs with SBA, cues for posture and pacing. Practiced tub shower transfer and dressing with SBA, grooming in standing with supervision. Cues for energy conservation, O2 NC management, and breathing technique. Pt is functioning below baseline and would benefit from continued OT to increase safety and independence. SUBJECTIVE:  
Mr. Anahi Brown states, \"I don't want to get COVID. \" 
 
SOCIAL HISTORY/LIVING ENVIRONMENT: At baseline pt lives with girlfriend, completes I/ADLs, driving, and ambulation with independence. No hx falls. Utilizes 2L O2 nocturnally. Home Environment: Private residence # Steps to Enter: 6 Living Alone: No 
Support Systems: Spouse/Significant Other/Partner OBJECTIVE:  
 
PAIN: VITAL SIGNS: LINES/DRAINS:  
Pre Treatment: Pain Screen Pain Scale 1: Numeric (0 - 10) Pain Intensity 1: 5 Pain Intervention(s) 1: Ambulation/Increased Activity;Repositioned Post Treatment: same Vital Signs O2 Sat (%): 98 % O2 Device: Nasal cannula O2 Flow Rate (L/min): 5 l/min O2 Device: Nasal cannula GROSS EVALUATION: 
BUE Within Functional Limits Abnormal/ Functional Abnormal/ Non-Functional (see comments) Not Tested Comments: AROM [x] [] [] [] PROM [] [] [] [x] Strength [] [x] [] [] Balance [] [x] [] [] Posture [] [x] [] [] Sensation [] [x] [] [] Some tingling 2° neuropathy Coordination [x] [] [] [] Tone [x] [] [] [] Edema [x] [] [] [] Activity Tolerance [] [x] [] [] Limited on 5L  
 [] [] [] [] COGNITION/ 
PERCEPTION: Intact Impaired  
(see comments) Comments:  
Orientation [x] [] Vision [x] [] Hearing [x] [] Judgment/ Insight [x] [] Attention [x] [] Memory [x] [] Command Following [x] [] Emotional Regulation [x] []   
 [] [] ACTIVITIES OF DAILY LIVING: I Mod I S SBA CGA Min Mod Max Total NT Comments BASIC ADLs:             
Bathing/ Showering [] [] [] [] [] [] [] [] [] [x] Toileting [] [] [] [] [] [] [] [] [] [x] Dressing [] [] [] [x] [] [] [] [] [] [] Feeding [] [] [] [] [] [] [] [] [] [x] Grooming [] [] [x] [] [] [] [] [] [] [] Personal Device Care [] [] [] [] [] [] [] [] [] [x] Functional Mobility [] [] [] [x] [] [] [] [] [] [] I=Independent, Mod I=Modified Independent, S=Supervision, SBA=Standby Assistance, CGA=Contact Guard Assistance,  
Min=Minimal Assistance, Mod=Moderate Assistance, Max=Maximal Assistance, Total=Total Assistance, NT=Not Tested MOBILITY: I Mod I S SBA CGA Min Mod Max Total  NT x2 Comments:  
Supine to sit [] [] [x] [] [] [] [] [] [] [] [] Sit to supine [] [] [] [] [] [] [] [] [] [x] [] Sit to stand [] [] [x] [] [] [] [] [] [] [] [] Bed to chair [] [] [] [x] [] [] [] [] [] [] [] I=Independent, Mod I=Modified Independent, S=Supervision, SBA=Standby Assistance, CGA=Contact Guard Assistance,  
Min=Minimal Assistance, Mod=Moderate Assistance, Max=Maximal Assistance, Total=Total Assistance, NT=Not Tested [place] Daily Activity Inpatient Short Form How much help from another person does the patient currently need. .. Total A Lot A Little None 1. Putting on and taking off regular lower body clothing? [] 1   [] 2   [x] 3   [] 4  
2. Bathing (including washing, rinsing, drying)? [] 1   [] 2   [x] 3   [] 4  
3.   Toileting, which includes using toilet, bedpan or urinal?   [] 1   [] 2   [x] 3   [] 4  
 4.  Putting on and taking off regular upper body clothing? [] 1   [] 2   [x] 3   [] 4  
5. Taking care of personal grooming such as brushing teeth? [] 1   [] 2   [x] 3   [] 4  
6. Eating meals? [] 1   [] 2   [] 3   [x] 4  
© 2007, Trustees of [address] [place] [address] [address] [id number], under license to [employer]. All rights reserved Score:  Initial: 19 1/15/21 Most Recent: X (Date: -- ) Interpretation of Tool:  Represents activities that are increasingly more difficult (i.e. Bed mobility, Transfers, Gait). PLAN:  
FREQUENCY/DURATION: OT Plan of Care: 3 times/week for duration of hospital stay or until stated goals are met, whichever comes first. 
 
PROBLEM LIST:  
(Skilled intervention is medically necessary to address:) 1. Decreased ADL/Functional Activities 2. Decreased Activity Tolerance 3. Decreased Balance 4. Decreased Gait Ability 5. Decreased Strength 6. Decreased Transfer Abilities 7. Increased Pain INTERVENTIONS PLANNED:  
(Benefits and precautions of occupational therapy have been discussed with the patient.) 1. Self Care Training 2. Therapeutic Activity 3. Therapeutic Exercise/HEP 4. Neuromuscular Re-education 5. Gait Training 6. Education TREATMENT:  
 
EVALUATION: Low Complexity : (Untimed Charge) TREATMENT:  
($$ Self Care/Home Management: 23-37 mins    ) Co-Treatment PT/OT necessary due to patient's decreased overall endurance/tolerance levels, as well as need for high level skilled assistance to complete functional transfers/mobility and functional tasks Self Care (23 Minutes): Self care including Lower Body Dressing, Grooming and Tub shower transfers and energy conservation training to increase independence and decrease level of assistance required. AFTER TREATMENT POSITION/PRECAUTIONS: 
Chair, Needs within reach and NP at bedside INTERDISCIPLINARY COLLABORATION: 
MD/PA/NP, RN/PCT, PT/PTA and OT/WOLFF TOTAL TREATMENT DURATION: 
OT Patient Time In/Time Out Time In: 8414 Time Out: 5088 Padmini Nickerson, OTR/L

## 2021-01-15 NOTE — PROGRESS NOTES
H&P/Consult Note/Progress Note/Office Note:  
Gwen Bonilla MRN: [id number]  :1950  Age:75 y.o. 
 
HPI: Gwen Bonilla is a 78 y.o. male who is well known to Dr. Rosa Pappas.  He underwent right thoracotomy with lower and middle lobectomy 20 for squamous cell carcinoma. He underwent neoaduvant chemo/radiation and adjuvant chemo. .  CT 20 showed large pleural effusion and collapse of RUL. He underwent thoracentesis by Dr. Basil Bliss 20 with approximately drainage of 900ml, but was unable to completely drain related to complaints of pain. He felt should hold Eliquis for admission for Chest tube placement. Dr. Mckenzie Celestin placed chest tube this AM 21. General surgery was consulted for complicated right pleural effusion, ?hemothorax not draining properly with chest tube placement. On exam, Mr. Curly Ramosr appears comfortable.  CT scan reviewed and current chest xray, NAD. On 3 L NC sats upper 90's. Chest tube maintained with 800ml serosanguineous drainage noted. Remains on 20cmsx. Patient states since his lobectomy he has felt \"pressure\". He does use 2L oxygen at night. She states he does feel like he can breath better since this chest tube has been placed. His lost dose of eliquis was Friday (for A-fib). He does have smoking history and quit about 6 years ago 21 Patient states he is breathing better since chest tube. NAD on 3L NC with 98% sat. Chest xray reviewed and fluid collection appears loculated- will need surgery- plan for tomorrow. 21 To the OR yesterday PO day1. Difficult surgery. Consulted pulmonary intraoperatively for broch. Unable to get lung to inflate intraoperative, ?chronic mucous plug. Pt in ICU, no complaints this AM- scheduled for bronch this AM. Chest tubes to water seal currently- no air leak- appears lung still collapsed from CXR. Pain controlled. 01/08/21 POD 2. Pt remains in ICU, no complaints this AM. Pain controlled. Chest tubes to water seal currently- no air leak. CXR this am showing No Significant Interval Change. 01/09/21 POD 3. Pt remains in ICU, no complaints this AM. Pain controlled. Chest tubes currently clamped. CXR this am showing No Significant Interval Change. Denies SOB. On 3L o2. 
 
01/11/21  POD 5  Chest tube remains clamped. NO changes in chest xray. Dr. Bulmaro Braga spoke with Dr. Abisai Willingham regarding trying again to clear airway to re inflate lung if unsuccessful then will need a pneumonectomy. Patient in NAD, No SOB on 3 L oxygen with sats upper 90's and continues to be hypotensive at times. Hgb trending down will transfuse 2u pRBC 
 
01/12/21 POD 6 just returned from pulmonary procedure. Chest tubes to suction. States feels less pressure, but no other changes noted. No air leak noted in chest tubes- awaiting chest CT this afternoon. NAD, No SOB. Hgb 10 
 
01/13/21 POD 7. No significant changes on CT to right lung. Left lung ?pneumonia. Will discuss with pulmonary if any further treatment or proceed to pneumonectomy. Chest tubes maintained clamped. 6 liters oxygen 01/14/21 POD 8. No changes in breathing since chest tubes out. ON 5 liters Oxygen upper 90's sat. Chest xray reviewed- no changes- will plan pneumonectomy for next week- possible Monday- states cough is better 01/15/21 POD 9. No changes in breathing since chest tubes out. ON 5 liters Oxygen upper 90's sat. Chest xray reviewed- no changes- will plan pneumonectomy for next week- possible Monday- states cough is better Medical history as below Past Medical History:  
Diagnosis Date  GERD (gastroesophageal reflux disease)   
 controlled with med  Hypertension hx-- off meds since 4/2020--- followed by dr. Jenny Pardo  Hypoxia 02/24/2020  Malignant neoplasm of lower lobe of right lung ([facility] [address] 02/26/2020 REC'D CHEMO AND RADIATION--- FOLLOWED BY DR. Fer Bernal  
  Osteoarthritis  Right lower lobe lung mass 02/24/2020  Status post total right knee replacement 2/29/2016 Past Surgical History:  
Procedure Laterality Date  HX COLONOSCOPY    
 HX KNEE ARTHROSCOPY Left   
 scope X 1, ACL recon X 1  
 HX KNEE ARTHROSCOPY Right 1974, 1975 X 2   
 HX KNEE REPLACEMENT Right 2016 [address]  HX VASCULAR ACCESS Right placed 3/2020  
 port in chest   
 IR INSERT TUNL CVC W PORT OVER 5 YEARS  3/18/2020  [place] SINUS SURGERY PROC UNLISTED  1988, 1991  
 sinus surg Current Facility-Administered Medications Medication Dose Route Frequency  enoxaparin (LOVENOX) injection 40 mg  40 mg SubCUTAneous Q24H  piperacillin-tazobactam (ZOSYN) 4.5 g in 0.9% sodium chloride (MBP/ADV) 100 mL MBP  4.5 g IntraVENous Q8H  
 doxycycline (VIBRAMYCIN) capsule 100 mg  100 mg Oral Q12H  
 sodium chloride (NS) flush 5-40 mL  5-40 mL IntraVENous Q8H  
 sodium chloride (NS) flush 5-40 mL  5-40 mL IntraVENous PRN  
 0.9% sodium chloride infusion 250 mL  250 mL IntraVENous PRN  
 HYDROcodone-homatropine (HYCODAN) 5-1.5 mg/5 mL (5 mL) oral solution 5 mL  5 mL Oral Q4H PRN  
 bisacodyL (DULCOLAX) suppository 10 mg  10 mg Rectal DAILY PRN  
 midodrine (PROAMATINE) tablet 10 mg  10 mg Oral TID WITH MEALS  traMADoL (ULTRAM) tablet 50 mg  50 mg Oral Q6H PRN  
 acetaminophen (TYLENOL) tablet 500 mg  500 mg Oral Q6H PRN  
 HYDROmorphone (PF) (DILAUDID) injection 1 mg  1 mg IntraVENous Q2H PRN  
 guaiFENesin ER (MUCINEX) tablet 1,200 mg  1,200 mg Oral Q12H  
 amiodarone (CORDARONE) tablet 200 mg  200 mg Oral DAILY  [Held by provider] apixaban (ELIQUIS) tablet 5 mg  5 mg Oral Q12H  pantoprazole (PROTONIX) tablet 40 mg  40 mg Oral ACB&D  
 gabapentin (NEURONTIN) capsule 300 mg  300 mg Oral TID  levalbuterol tartrate (XOPENEX) 45 mcg/actuation inhaler HFAA 2 Puff- pt to supply (Patient Supplied)  2 Puff Inhalation Q4H PRN  
  ondansetron (ZOFRAN ODT) tablet 8 mg  8 mg Oral Q8H PRN  polyethylene glycol (MIRALAX) packet 17 g  17 g Oral DAILY  sodium chloride (NS) flush 5-40 mL  5-40 mL IntraVENous Q8H  
 sodium chloride (NS) flush 5-40 mL  5-40 mL IntraVENous PRN  
 oxyCODONE-acetaminophen (PERCOCET) 5-325 mg per tablet 1 Tab  1 Tab Oral Q4H PRN  
 influenza vaccine  (6 mos+)(PF) (FLUARIX/FLULAVAL/FLUZONE QUAD) injection 0.5 mL  0.5 mL IntraMUSCular PRIOR TO DISCHARGE Albuterol and Celebrex [celecoxib] Social History Socioeconomic History  Marital status:  Spouse name: Not on file  Number of children: Not on file  Years of education: Not on file  Highest education level: Not on file Tobacco Use  Smoking status: Former Smoker Packs/day: 1.00 Years: 20.00 Pack years: 20.00 Quit date:  Years since quittin.0  Smokeless tobacco: Never Used Substance and Sexual Activity  Alcohol use: Yes Alcohol/week: 4.0 standard drinks Types: 4 Glasses of wine per week  Drug use: No  
Other Topics Concern   Service No  
 Blood Transfusions No  
 Caffeine Concern No  
 Occupational Exposure No  
 Hobby Hazards No  
 Sleep Concern No  
 Stress Concern No  
 Weight Concern No  
 Special Diet No  
 Exercise Yes  [facility] Yes Social History Narrative . Has long-time girlfriend. Continues to work doing IT support. Social History Tobacco Use Smoking Status Former Smoker  Packs/day: 1.00  Years: 20.00  Pack years: 20.00  Quit date:   Years since quittin.0 Smokeless Tobacco Never Used Family History Problem Relation Age of Onset  Cancer Mother   
     uterine  Arrhythmia Sister   
     afib ROS: The patient has no difficulty with chest pain or shortness of breath. No fever or chills. Comprehensive review of systems was otherwise unremarkable except as noted above. Physical Exam:  
Visit Vitals /70 Pulse 87 Temp 98 °F (36.7 °C) Resp 19 Ht 5' 10\" (1.778 m) Wt 201 lb 12.8 oz (91.5 kg) SpO2 98% BMI 28.96 kg/m² Constitutional: Alert, oriented, cooperative patient in no acute distress; appears stated age Eyes: Sclera are clear. EOMs intact ENMT: no external lesions gross hearing normal; no obvious neck masses, no ear or lip lesions, nares normal 
CV: RRR. Normal perfusion Resp: No JVD. Breathing is  non-labored; no audible wheezing. O2 5 L 
GI: soft and non-distended Musculoskeletal: unremarkable with normal function. No embolic signs or cyanosis. Neuro:  Oriented; moves all 4; no focal deficits Psychiatric: normal affect and mood, no memory impairment Recent vitals (if inpt): 
Patient Vitals for the past 24 hrs: 
 BP Temp Pulse Resp SpO2  
01/15/21 0738 105/70 98 °F (36.7 °C) 87 19 98 % 01/15/21 0330 102/69 98 °F (36.7 °C) 94 18 95 % 01/14/21 2311 110/69 98.3 °F (36.8 °C) 92 19 97 % 01/14/21 1935 109/71 98.4 °F (36.9 °C) 86 18 94 % 01/14/21 1505 109/74 97.9 °F (36.6 °C) 83 19 97 % 01/14/21 1143 98/71 98.2 °F (36.8 °C) 97 18 96 % XR Results (most recent): 
Results from Hospital Encounter encounter on 01/04/21 XR CHEST SNGL V  
 Narrative EXAM: XR CHEST SNGL V 
 
INDICATION: sob COMPARISON: 1/14/2021 FINDINGS: A portable AP radiograph of the chest was obtained at 0434 hours. Right hydropneumothorax unchanged. Atelectasis of the remaining right lung 
unchanged. Left lung is clear. The cardiac and mediastinal contours and 
pulmonary vascularity are normal.  The bones and soft tissues are grossly within 
normal limits. Impression IMPRESSION: Right hydropneumothorax and atelectasis of the remaining right lung 
unchanged. CT Results (most recent): 
Results from [facility] Encounter encounter on 01/04/21 CT CHEST WO CONT  Narrative EXAMINATION: CT CHEST WO CONT 1/12/2021 1:07 PM 
 
 INDICATION: Shortness of breath. Evaluate for change in right upper lobe 
atelectasis COMPARISON: Chest CT December 22, 2020 and chest radiograph January 12, 2020 TECHNIQUE: Multiple contiguous axial CT images of the chest were obtained from 
the lung apices to the lung bases without intravenous contrast. Coronal 
reformats are provided. Radiation dose reduction techniques were used for this study. Our CT scanners 
use one or all of the following: Automated exposure control, adjustment of the 
mA and/or kV according to patient size, iterative reconstruction. FINDINGS: 
Lower Neck: Right chest port terminates at the cavoatrial junction. Chest Soft tissues: No significant abnormality. Heart/Mediastinum: Mediastinum remains shifted to the right. No significant 
pericardial abnormality. Normal caliber thoracic aorta. Lungs/pleura: There are 2 right chest tubes in place. Since CT chest on December 22, 2020 and there is significant decrease in right pleural fluid. There may be 
a few areas of aerated lung in the anterior portion of the residual right lung, 
however a majority remains opacified. Layering secretions are seen in the right 
main bronchus which is partially occluded. There is a persistent right 
hydropneumothorax. There is new moderate size area of groundglass opacity in the left upper lobe 
and small area of tree-in-bud opacity in the left lower lobe. Visualized upper abdomen: Cholelithiasis. Osseous structures: No suspicious osseous lesion. Impression IMPRESSION: 
1. Right chest tubes in place with residual moderate right hydropneumothorax. There may be slightly increased aeration of the inferior portion of the residual 
right lung, however majority remains opacified.  It is somewhat difficult to 
distinguish lung parenchyma from pleural fluid due to lack of IV contrast. 
2. New moderate area of groundglass opacity in the left upper lobe and 
 tree-in-bud opacity in the left lower lobe which may be infectious or 
inflammatory. Labs: 
Recent Labs  
  01/15/21 
0448 WBC 5.7 HGB 11.1*  
*   
K 3.9  CO2 33* BUN 10  
CREA 0.51* * Lab Results Component Value Date/Time WBC 5.7 01/15/2021 04:48 AM  
 HGB 11.1 (L) 01/15/2021 04:48 AM  
 PLATELET 663 (L) 70/58/5851 04:48 AM  
 Sodium 138 01/15/2021 04:48 AM  
 Potassium 3.9 01/15/2021 04:48 AM  
 Chloride 100 01/15/2021 04:48 AM  
 CO2 33 (H) 01/15/2021 04:48 AM  
 BUN 10 01/15/2021 04:48 AM  
 Creatinine 0.51 (L) 01/15/2021 04:48 AM  
 Glucose 108 (H) 01/15/2021 04:48 AM  
 INR 1.1 01/05/2021 04:02 PM  
 aPTT 35.2 (H) 01/05/2021 04:02 PM  
 Bilirubin, total 0.5 01/05/2021 06:26 AM  
 Bilirubin, direct 0.8 (H) 05/05/2020 02:27 PM  
 ALT (SGPT) 8 (L) 01/05/2021 06:26 AM  
 Alk. phosphatase 99 01/05/2021 06:26 AM  
 Troponin-I, Qt. <0.02 (L) 05/05/2020 01:09 AM  
 
 
I reviewed recent labs and recent radiologic studies. I independently reviewed radiology images for studies I described above or studies I have ordered. Admission date (for inpatients): 1/4/2021 Day of Surgery  Procedure(s) with comments: BRONCHOSCOPY 
BRONCHOSCOPY WITH BIOPSY - forceps used to open RUL apical segment BRONCHOSCOPY WITH AIRWAY DILATION 
BRONCHOSCOPY W EXCISION OF TUMOR 
 
ASSESSMENT/PLAN: 
Problem List  Date Reviewed: 1/13/2021 Codes Class Noted S/P thoracotomy ICD-10-CM: P01.114 ICD-9-CM: V45.89  1/7/2021 * (Principal) Atelectasis of right lung ICD-10-CM: J98.11 ICD-9-CM: 518.0  1/4/2021 A-fib (HCC) (Chronic) ICD-10-CM: I48.91 
ICD-9-CM: 427.31  1/4/2021 Chronic respiratory failure with hypoxia (HCC) (Chronic) ICD-10-CM: J96.11 
ICD-9-CM: 518.83, 799.02  1/4/2021 Pleural effusion on right ICD-10-CM: J90 ICD-9-CM: 511.9  12/28/2020  Chemotherapy induced neutropenia (HCC) ICD-10-CM: D70.1, T45.1X5A 
 ICD-9-CM: 288.03, E933.1  10/23/2020 Dehydration ICD-10-CM: E86.0 ICD-9-CM: 276.51  9/15/2020 S/P lobectomy of lung ICD-10-CM: Z90.2 ICD-9-CM: V45.89  7/29/2020 Cough ICD-10-CM: R05 ICD-9-CM: 786.2  7/29/2020 Atrial tachycardia (HCC) ICD-10-CM: I47.1 ICD-9-CM: 427.89  7/26/2020 Cancer of bronchus of right lower lobe [facility]) ICD-10-CM: C34.31 
ICD-9-CM: 162.5  7/23/2020 Lung cancer ([facility] [address] (Chronic) ICD-10-CM: C34.90 ICD-9-CM: 162.9  7/23/2020 Cancer of right lung [facility]) ICD-10-CM: C34.91 
ICD-9-CM: 162.9  7/23/2020 Pulmonary emphysema (HCC) (Chronic) ICD-10-CM: J43.9 ICD-9-CM: 492.8  7/7/2020 GERD (gastroesophageal reflux disease) ICD-10-CM: K21.9 ICD-9-CM: 530.81  Unknown Hypotension (Chronic) ICD-10-CM: I95.9 ICD-9-CM: 458.9  5/5/2020 Overview Signed 1/11/2021  8:39 AM by Andrew Lozano NP On midodrine Sepsis due to undetermined organism [facility]) ICD-10-CM: A41.9 ICD-9-CM: 038.9  5/5/2020 Malignant neoplasm of lower lobe of right lung (HCC) (Chronic) ICD-10-CM: C34.31 
ICD-9-CM: 162.5  2/26/2020 Overview Signed 1/4/2021  7:38 AM by Allie James MD  
  Dec 2020- Echo EF 50%, technically difficult, cannot assess regional wall motion. Aortic root 4.1 cm. No significant valvular disease. Normal DF Mass of lower lobe of right lung ICD-10-CM: R91.8 ICD-9-CM: 786.6  2/23/2020 Right lower lobe lung mass ICD-10-CM: R91.8 ICD-9-CM: 786.6  2/23/2020 Essential hypertension with goal blood pressure less than 130/80 (Chronic) ICD-10-CM: I10 
ICD-9-CM: 401.9  2/19/2018 Principal Problem: 
  Atelectasis of right lung (1/4/2021) Active Problems: 
  Pulmonary emphysema ([address] (7/7/2020) Lung cancer ([address] (7/23/2020) Pleural effusion on right (12/28/2020) A-fib ([address] (1/4/2021) Chronic respiratory failure with hypoxia ([address] (1/4/2021) S/P thoracotomy (1/7/2021) Plan Plan pneumonectomy next week- possibly Monday or Wednesday Cxr daily Regular Diet Activity as tolerated Pain control Encourage C/DB/IS Oxygen as needed Chest tubes removed 1/13/21 SAADIA Howell R/O covid negative Signed:  Malik Loomis NP

## 2021-01-15 NOTE — PROGRESS NOTES
01/15/21 1645 Dual Skin Pressure Injury Assessment Dual Skin Pressure Injury Assessment X Second Care Provider (Based on [address] [place] [address]) Clifford Yu RN Sacrum  Mid  
Skin Integumentary Skin Integumentary (WDL) X Pressure  Injury Documentation No Pressure Injury Noted-Pressure Ulcer Prevention Initiated Skin Color Appropriate for ethnicity; Ecchymosis (comment) Skin Condition/Temp Dry;Warm;Flaky;Fragile Skin Integrity Abrasion; Wound (add Wound LDA); Incision (comment) (sacrum wound, slight skin coming off) Wound Prevention and Protection Methods Orientation of Wound Prevention Posterior Location of Wound Prevention Sacrum/Coccyx Dressing Present  No  
Wound Offloading (Prevention Methods) Bed, pressure reduction mattress; Foam silicone;Repositioning;Pillows

## 2021-01-15 NOTE — PROGRESS NOTES
Pt is resting in bed. Respirations even and unlabored on 5L NC. No compaints during the night. No signs of distress. Safety measures in place. Preparing report for oncoming nurse.

## 2021-01-16 NOTE — PROGRESS NOTES
END OF SHIFT NOTE: 
 
INTAKE/OUTPUT 
01/15 0701 - 01/16 0700 In: 542 [P.O.:360; I.V.:182] Out: 525 [Urine:525] Voiding: YES Catheter: NO 
Drain:   
 
 
 
 
 
Flatus: Patient does have flatus present. Stool:  0 occurrences. Characteristics: 
Stool Assessment Stool Color: Lyndol Showman Stool Appearance: Formed Stool Amount: Large Stool Source/Status: Rectum Emesis: 0 occurrences. Characteristics: VITAL SIGNS Patient Vitals for the past 12 hrs: 
 Temp Pulse Resp BP SpO2  
01/16/21 0336 97.7 °F (36.5 °C) 91 18 98/72 96 % 01/15/21 2334 98.4 °F (36.9 °C) 91 18 96/66 95 % 01/15/21 1919 98.7 °F (37.1 °C) 88 18 108/74 96 % Pain Assessment Pain Intensity 1: 0 (01/15/21 1922) Pain Location 1: Rib cage Pain Intervention(s) 1: Ambulation/Increased Activity, Repositioned Patient Stated Pain Goal: 0 Ambulating No 
 
Shift report given to oncoming nurse at the bedside.  
 
Favio Colorado RN

## 2021-01-16 NOTE — PROGRESS NOTES
Will see Monday -- going for pneumonectomy then. Nothing else to add for now.  
 
Nathan Crespo MD

## 2021-01-16 NOTE — PROGRESS NOTES
H&P/Consult Note/Progress Note/Office Note:  
Marli Dietz MRN: [id number]  :1950  Age:75 y.o. 
 
HPI: Marli Dietz is a 78 y.o. male who is well known to Dr. Iraida Jones.  He underwent right thoracotomy with lower and middle lobectomy 20 for squamous cell carcinoma. He underwent neoaduvant chemo/radiation and adjuvant chemo. .  CT 20 showed large pleural effusion and collapse of RUL. He underwent thoracentesis by Dr. Valentín Mitchell 20 with approximately drainage of 900ml, but was unable to completely drain related to complaints of pain. He felt should hold Eliquis for admission for Chest tube placement. Dr. Carmen Thomas placed chest tube this AM 21. General surgery was consulted for complicated right pleural effusion, ?hemothorax not draining properly with chest tube placement. On exam, Mr. Surjit Correa appears comfortable.  CT scan reviewed and current chest xray, NAD. On 3 L NC sats upper 90's. Chest tube maintained with 800ml serosanguineous drainage noted. Remains on 20cmsx. Patient states since his lobectomy he has felt \"pressure\". He does use 2L oxygen at night. She states he does feel like he can breath better since this chest tube has been placed. His lost dose of eliquis was Friday (for A-fib). He does have smoking history and quit about 6 years ago 21 Patient states he is breathing better since chest tube. NAD on 3L NC with 98% sat. Chest xray reviewed and fluid collection appears loculated- will need surgery- plan for tomorrow. 21 To the OR yesterday PO day1. Difficult surgery. Consulted pulmonary intraoperatively for broch. Unable to get lung to inflate intraoperative, ?chronic mucous plug. Pt in ICU, no complaints this AM- scheduled for bronch this AM. Chest tubes to water seal currently- no air leak- appears lung still collapsed from CXR. Pain controlled. 01/08/21 POD 2. Pt remains in ICU, no complaints this AM. Pain controlled. Chest tubes to water seal currently- no air leak. CXR this am showing No Significant Interval Change. 01/09/21 POD 3. Pt remains in ICU, no complaints this AM. Pain controlled. Chest tubes currently clamped. CXR this am showing No Significant Interval Change. Denies SOB. On 3L o2. 
 
01/11/21  POD 5  Chest tube remains clamped. NO changes in chest xray. Dr. Bulmaro Braga spoke with Dr. Abisai Willingham regarding trying again to clear airway to re inflate lung if unsuccessful then will need a pneumonectomy. Patient in NAD, No SOB on 3 L oxygen with sats upper 90's and continues to be hypotensive at times. Hgb trending down will transfuse 2u pRBC 
 
01/12/21 POD 6 just returned from pulmonary procedure. Chest tubes to suction. States feels less pressure, but no other changes noted. No air leak noted in chest tubes- awaiting chest CT this afternoon. NAD, No SOB. Hgb 10 
 
01/13/21 POD 7. No significant changes on CT to right lung. Left lung ?pneumonia. Will discuss with pulmonary if any further treatment or proceed to pneumonectomy. Chest tubes maintained clamped. 6 liters oxygen 01/14/21 POD 8. No changes in breathing since chest tubes out. ON 5 liters Oxygen upper 90's sat. Chest xray reviewed- no changes- will plan pneumonectomy for next week- possible Monday- states cough is better 01/15/21 POD 9. No changes in breathing since chest tubes out. ON 5 liters Oxygen upper 90's sat. Chest xray reviewed- no changes- will plan pneumonectomy for next week- possible Monday- states cough is better 01/16/21 POD 10. No changes in breathing since chest tubes out. Remains on 5 liters Oxygen upper 90's sat. Planning pneumonectomy for next week- possible Monday. Medical history as below Past Medical History:  
Diagnosis Date  GERD (gastroesophageal reflux disease)   
 controlled with med  Hypertension hx-- off meds since 4/2020--- followed by dr. Shirley Randolph  Hypoxia 02/24/2020  Malignant neoplasm of lower lobe of right lung ([facility] [address] 02/26/2020 REC'D CHEMO AND RADIATION--- FOLLOWED BY DR. Ade Huggins Osteoarthritis  Right lower lobe lung mass 02/24/2020  Status post total right knee replacement 2/29/2016 Past Surgical History:  
Procedure Laterality Date  HX COLONOSCOPY    
 HX KNEE ARTHROSCOPY Left   
 scope X 1, ACL recon X 1  
 HX KNEE ARTHROSCOPY Right 1974, 1975 X 2   
 HX KNEE REPLACEMENT Right 2016 [address]  HX VASCULAR ACCESS Right placed 3/2020  
 port in chest   
 IR INSERT TUNL CVC W PORT OVER 5 YEARS  3/18/2020  MS SINUS SURGERY PROC UNLISTED  1988, 1991  
 sinus surg Current Facility-Administered Medications Medication Dose Route Frequency  enoxaparin (LOVENOX) injection 40 mg  40 mg SubCUTAneous Q24H  piperacillin-tazobactam (ZOSYN) 4.5 g in 0.9% sodium chloride (MBP/ADV) 100 mL MBP  4.5 g IntraVENous Q8H  
 doxycycline (VIBRAMYCIN) capsule 100 mg  100 mg Oral Q12H  
 sodium chloride (NS) flush 5-40 mL  5-40 mL IntraVENous Q8H  
 sodium chloride (NS) flush 5-40 mL  5-40 mL IntraVENous PRN  
 0.9% sodium chloride infusion 250 mL  250 mL IntraVENous PRN  
 HYDROcodone-homatropine (HYCODAN) 5-1.5 mg/5 mL (5 mL) oral solution 5 mL  5 mL Oral Q4H PRN  
 bisacodyL (DULCOLAX) suppository 10 mg  10 mg Rectal DAILY PRN  
 midodrine (PROAMATINE) tablet 10 mg  10 mg Oral TID WITH MEALS  traMADoL (ULTRAM) tablet 50 mg  50 mg Oral Q6H PRN  
 acetaminophen (TYLENOL) tablet 500 mg  500 mg Oral Q6H PRN  
 HYDROmorphone (PF) (DILAUDID) injection 1 mg  1 mg IntraVENous Q2H PRN  
 guaiFENesin ER (MUCINEX) tablet 1,200 mg  1,200 mg Oral Q12H  
 amiodarone (CORDARONE) tablet 200 mg  200 mg Oral DAILY  [Held by provider] apixaban (ELIQUIS) tablet 5 mg  5 mg Oral Q12H  pantoprazole (PROTONIX) tablet 40 mg  40 mg Oral ACB&D  
  gabapentin (NEURONTIN) capsule 300 mg  300 mg Oral TID  levalbuterol tartrate (XOPENEX) 45 mcg/actuation inhaler HFAA 2 Puff- pt to supply (Patient Supplied)  2 Puff Inhalation Q4H PRN  
 ondansetron (ZOFRAN ODT) tablet 8 mg  8 mg Oral Q8H PRN  polyethylene glycol (MIRALAX) packet 17 g  17 g Oral DAILY  sodium chloride (NS) flush 5-40 mL  5-40 mL IntraVENous Q8H  
 sodium chloride (NS) flush 5-40 mL  5-40 mL IntraVENous PRN  
 oxyCODONE-acetaminophen (PERCOCET) 5-325 mg per tablet 1 Tab  1 Tab Oral Q4H PRN  
 influenza vaccine - (6 mos+)(PF) (FLUARIX/FLULAVAL/FLUZONE QUAD) injection 0.5 mL  0.5 mL IntraMUSCular PRIOR TO DISCHARGE Albuterol and Celebrex [celecoxib] Social History Socioeconomic History  Marital status:  Spouse name: Not on file  Number of children: Not on file  Years of education: Not on file  Highest education level: Not on file Tobacco Use  Smoking status: Former Smoker Packs/day: 1.00 Years: 20.00 Pack years: 20.00 Quit date:  Years since quittin.0  Smokeless tobacco: Never Used Substance and Sexual Activity  Alcohol use: Yes Alcohol/week: 4.0 standard drinks Types: 4 Glasses of wine per week  Drug use: No  
Other Topics Concern   Service No  
 Blood Transfusions No  
 Caffeine Concern No  
 Occupational Exposure No  
 Hobby Hazards No  
 Sleep Concern No  
 Stress Concern No  
 Weight Concern No  
 Special Diet No  
 Exercise Yes  [facility] Yes Social History Narrative . Has long-time girlfriend. Continues to work doing IT support. Social History Tobacco Use Smoking Status Former Smoker  Packs/day: 1.00  Years: 20.00  Pack years: 20.00  Quit date:   Years since quittin.0 Smokeless Tobacco Never Used Family History Problem Relation Age of Onset  Cancer Mother   
     uterine  Arrhythmia Sister   
     afib ROS: The patient has no difficulty with chest pain or shortness of breath. No fever or chills. Comprehensive review of systems was otherwise unremarkable except as noted above. Physical Exam:  
Visit Vitals /77 Pulse 78 Temp 98 °F (36.7 °C) Resp 18 Ht 5' 10\" (1.778 m) Wt 201 lb 12.8 oz (91.5 kg) SpO2 97% BMI 28.96 kg/m² Constitutional: Alert, oriented, cooperative patient in no acute distress; appears stated age Eyes: Sclera are clear. EOMs intact ENMT: no external lesions gross hearing normal; no obvious neck masses, no ear or lip lesions, nares normal 
CV: RRR. Normal perfusion Resp: No JVD. Breathing is  non-labored; no audible wheezing. O2 5 L 
GI: soft and non-distended Musculoskeletal: unremarkable with normal function. No embolic signs or cyanosis. Neuro:  Oriented; moves all 4; no focal deficits Psychiatric: normal affect and mood, no memory impairment Recent vitals (if inpt): 
Patient Vitals for the past 24 hrs: 
 BP Temp Pulse Resp SpO2  
01/16/21 1114 109/77 98 °F (36.7 °C) 78 18 97 % 01/16/21 0720 94/63 97.5 °F (36.4 °C) 87 18 96 % 01/16/21 0336 98/72 97.7 °F (36.5 °C) 91 18 96 % 01/15/21 2334 96/66 98.4 °F (36.9 °C) 91 18 95 % 01/15/21 1919 108/74 98.7 °F (37.1 °C) 88 18 96 % 01/15/21 1645 104/71 98.4 °F (36.9 °C) 89 18 97 % 01/15/21 1523 101/70 98.9 °F (37.2 °C) 85 19 100 % XR Results (most recent): 
Results from Hospital Encounter encounter on 01/04/21 XR CHEST SNGL V  
 Narrative EXAM: XR CHEST SNGL V 
 
INDICATION: sob COMPARISON: 1/14/2021 FINDINGS: A portable AP radiograph of the chest was obtained at 0434 hours. Right hydropneumothorax unchanged. Atelectasis of the remaining right lung 
unchanged. Left lung is clear. The cardiac and mediastinal contours and 
pulmonary vascularity are normal.  The bones and soft tissues are grossly within 
normal limits. Impression IMPRESSION: Right hydropneumothorax and atelectasis of the remaining right lung 
unchanged. CT Results (most recent): 
Results from [facility] Encounter encounter on 01/04/21 CT CHEST WO CONT Narrative EXAMINATION: CT CHEST WO CONT 1/12/2021 1:07 PM 
 
INDICATION: Shortness of breath. Evaluate for change in right upper lobe 
atelectasis COMPARISON: Chest CT December 22, 2020 and chest radiograph January 12, 2020 TECHNIQUE: Multiple contiguous axial CT images of the chest were obtained from 
the lung apices to the lung bases without intravenous contrast. Coronal 
reformats are provided. Radiation dose reduction techniques were used for this study. Our CT scanners 
use one or all of the following: Automated exposure control, adjustment of the 
mA and/or kV according to patient size, iterative reconstruction. FINDINGS: 
Lower Neck: Right chest port terminates at the cavoatrial junction. Chest Soft tissues: No significant abnormality. Heart/Mediastinum: Mediastinum remains shifted to the right. No significant 
pericardial abnormality. Normal caliber thoracic aorta. Lungs/pleura: There are 2 right chest tubes in place. Since CT chest on December 22, 2020 and there is significant decrease in right pleural fluid. There may be 
a few areas of aerated lung in the anterior portion of the residual right lung, 
however a majority remains opacified. Layering secretions are seen in the right 
main bronchus which is partially occluded. There is a persistent right 
hydropneumothorax. There is new moderate size area of groundglass opacity in the left upper lobe 
and small area of tree-in-bud opacity in the left lower lobe. Visualized upper abdomen: Cholelithiasis. Osseous structures: No suspicious osseous lesion. Impression IMPRESSION: 
1. Right chest tubes in place with residual moderate right hydropneumothorax. There may be slightly increased aeration of the inferior portion of the residual 
right lung, however majority remains opacified. It is somewhat difficult to 
distinguish lung parenchyma from pleural fluid due to lack of IV contrast. 
2. New moderate area of groundglass opacity in the left upper lobe and 
tree-in-bud opacity in the left lower lobe which may be infectious or 
inflammatory. Labs: 
Recent Labs  
  01/16/21 
1746 WBC 5.6 HGB 10.6*  * K 4.1 CL 99  
CO2 34* BUN 8  
CREA 0.52* GLU 90 Lab Results Component Value Date/Time WBC 5.6 01/16/2021 03:33 AM  
 HGB 10.6 (L) 01/16/2021 03:33 AM  
 PLATELET 327 32/62/2118 03:33 AM  
 Sodium 136 (L) 01/16/2021 03:33 AM  
 Potassium 4.1 01/16/2021 03:33 AM  
 Chloride 99 01/16/2021 03:33 AM  
 CO2 34 (H) 01/16/2021 03:33 AM  
 BUN 8 01/16/2021 03:33 AM  
 Creatinine 0.52 (L) 01/16/2021 03:33 AM  
 Glucose 90 01/16/2021 03:33 AM  
 INR 1.1 01/05/2021 04:02 PM  
 aPTT 35.2 (H) 01/05/2021 04:02 PM  
 Bilirubin, total 0.5 01/05/2021 06:26 AM  
 Bilirubin, direct 0.8 (H) 05/05/2020 02:27 PM  
 ALT (SGPT) 8 (L) 01/05/2021 06:26 AM  
 Alk. phosphatase 99 01/05/2021 06:26 AM  
 Troponin-I, Qt. <0.02 (L) 05/05/2020 01:09 AM  
 
 
I reviewed recent labs and recent radiologic studies. I independently reviewed radiology images for studies I described above or studies I have ordered. Admission date (for inpatients): 1/4/2021 Day of Surgery  Procedure(s) with comments: BRONCHOSCOPY 
BRONCHOSCOPY WITH BIOPSY - forceps used to open RUL apical segment BRONCHOSCOPY WITH AIRWAY DILATION 
BRONCHOSCOPY W EXCISION OF TUMOR 
 
ASSESSMENT/PLAN: 
Problem List  Date Reviewed: 1/13/2021 Codes Class Noted S/P thoracotomy ICD-10-CM: W29.441 ICD-9-CM: V45.89  1/7/2021 * (Principal) Atelectasis of right lung ICD-10-CM: J98.11 ICD-9-CM: 518.0  1/4/2021  A-fib (HCC) (Chronic) ICD-10-CM: I48.91 
 ICD-9-CM: 427.31  1/4/2021 Chronic respiratory failure with hypoxia (HCC) (Chronic) ICD-10-CM: J96.11 
ICD-9-CM: 518.83, 799.02  1/4/2021 Pleural effusion on right ICD-10-CM: J90 ICD-9-CM: 511.9  12/28/2020 Chemotherapy induced neutropenia (HCC) ICD-10-CM: D70.1, T45.1X5A 
ICD-9-CM: 288.03, E933.1  10/23/2020 Dehydration ICD-10-CM: E86.0 ICD-9-CM: 276.51  9/15/2020 S/P lobectomy of lung ICD-10-CM: Z90.2 ICD-9-CM: V45.89  7/29/2020 Cough ICD-10-CM: R05 ICD-9-CM: 786.2  7/29/2020 Atrial tachycardia (HCC) ICD-10-CM: I47.1 ICD-9-CM: 427.89  7/26/2020 Cancer of bronchus of right lower lobe [facility]) ICD-10-CM: C34.31 
ICD-9-CM: 162.5  7/23/2020 Lung cancer ([address] (Chronic) ICD-10-CM: C34.90 ICD-9-CM: 162.9  7/23/2020 Cancer of right lung [facility]) ICD-10-CM: C34.91 
ICD-9-CM: 162.9  7/23/2020 Pulmonary emphysema (HCC) (Chronic) ICD-10-CM: J43.9 ICD-9-CM: 492.8  7/7/2020 GERD (gastroesophageal reflux disease) ICD-10-CM: K21.9 ICD-9-CM: 530.81  Unknown Hypotension (Chronic) ICD-10-CM: I95.9 ICD-9-CM: 458.9  5/5/2020 Overview Signed 1/11/2021  8:39 AM by Ariadna Juares NP On midodrine Sepsis due to undetermined organism [facility]) ICD-10-CM: A41.9 ICD-9-CM: 038.9  5/5/2020 Malignant neoplasm of lower lobe of right lung (HCC) (Chronic) ICD-10-CM: C34.31 
ICD-9-CM: 162.5  2/26/2020 Overview Signed 1/4/2021  7:38 AM by Tamara Russo MD  
  Dec 2020- Echo EF 50%, technically difficult, cannot assess regional wall motion. Aortic root 4.1 cm. No significant valvular disease. Normal DF Mass of lower lobe of right lung ICD-10-CM: R91.8 ICD-9-CM: 786.6  2/23/2020 Right lower lobe lung mass ICD-10-CM: R91.8 ICD-9-CM: 786.6  2/23/2020 Essential hypertension with goal blood pressure less than 130/80 (Chronic) ICD-10-CM: I10 
ICD-9-CM: 401.9  2/19/2018 Principal Problem: 
  Atelectasis of right lung (1/4/2021) Active Problems: 
  Pulmonary emphysema ([address] (7/7/2020) Lung cancer ([address] (7/23/2020) Pleural effusion on right (12/28/2020) A-fib ([address] (1/4/2021) Chronic respiratory failure with hypoxia ([address] (1/4/2021) S/P thoracotomy (1/7/2021) Plan Plan pneumonectomy next week- possibly Monday or Wednesday Cxr daily Regular Diet Activity as tolerated Pain control Encourage C/DB/IS Oxygen as needed Chest tubes removed 1/13/21 [place] out 
R/O covid - negative Signed:  Amado Raymundo NP

## 2021-01-17 NOTE — PROGRESS NOTES
END OF SHIFT NOTE: 
 
INTAKE/OUTPUT 
01/16 0701 - 01/17 0700 In: -  
Out: [id number] [[facility]:[id number]] Voiding: YES Catheter: NO 
Drain:   
 
 
 
 
 
Flatus: Patient does have flatus present. Stool:  0 occurrences. Characteristics: 
Stool Assessment Stool Color: Kristina Coates Stool Appearance: Formed Stool Amount: Large Stool Source/Status: Rectum Emesis: 0 occurrences. Characteristics: VITAL SIGNS Patient Vitals for the past 12 hrs: 
 Temp Pulse Resp BP SpO2  
01/17/21 0552 97.9 °F (36.6 °C) 97 18 95/62 96 % 01/17/21 0415 98.2 °F (36.8 °C) 89 18 95/62 97 % 01/16/21 2310 98.7 °F (37.1 °C) 96 18 101/69 93 % 01/16/21 1932 99.5 °F (37.5 °C) (!) 103 18 109/72 94 % Pain Assessment Pain Intensity 1: 0 (01/16/21 1921) Pain Location 1: Rib cage Pain Intervention(s) 1: Ambulation/Increased Activity, Repositioned Patient Stated Pain Goal: 0 Ambulating No 
 
Shift report given to oncoming nurse at the bedside.  
 
Shaheen Carrera RN

## 2021-01-17 NOTE — PROGRESS NOTES
H&P/Consult Note/Progress Note/Office Note:  
John Subramanian MRN: [id number]  :1950  Age:75 y.o. 
 
HPI: John Subramanian is a 78 y.o. male who is well known to Dr. Alex Hawkins.  He underwent right thoracotomy with lower and middle lobectomy 20 for squamous cell carcinoma. He underwent neoaduvant chemo/radiation and adjuvant chemo. .  CT 20 showed large pleural effusion and collapse of RUL. He underwent thoracentesis by Dr. Lore Keane 20 with approximately drainage of 900ml, but was unable to completely drain related to complaints of pain. He felt should hold Eliquis for admission for Chest tube placement. Dr. Sunita Ayers placed chest tube this AM 21. General surgery was consulted for complicated right pleural effusion, ?hemothorax not draining properly with chest tube placement. On exam, Mr. Hamlet Kwan appears comfortable.  CT scan reviewed and current chest xray, NAD. On 3 L NC sats upper 90's. Chest tube maintained with 800ml serosanguineous drainage noted. Remains on 20cmsx. Patient states since his lobectomy he has felt \"pressure\". He does use 2L oxygen at night. She states he does feel like he can breath better since this chest tube has been placed. His lost dose of eliquis was Friday (for A-fib). He does have smoking history and quit about 6 years ago 21 Patient states he is breathing better since chest tube. NAD on 3L NC with 98% sat. Chest xray reviewed and fluid collection appears loculated- will need surgery- plan for tomorrow. 21 To the OR yesterday PO day1. Difficult surgery. Consulted pulmonary intraoperatively for broch. Unable to get lung to inflate intraoperative, ?chronic mucous plug. Pt in ICU, no complaints this AM- scheduled for bronch this AM. Chest tubes to water seal currently- no air leak- appears lung still collapsed from CXR. Pain controlled. 01/08/21 POD 2. Pt remains in ICU, no complaints this AM. Pain controlled. Chest tubes to water seal currently- no air leak. CXR this am showing No Significant Interval Change. 01/09/21 POD 3. Pt remains in ICU, no complaints this AM. Pain controlled. Chest tubes currently clamped. CXR this am showing No Significant Interval Change. Denies SOB. On 3L o2. 
 
01/11/21  POD 5  Chest tube remains clamped. NO changes in chest xray. Dr. Jonathan Silva spoke with Dr. Rosibel Garcia regarding trying again to clear airway to re inflate lung if unsuccessful then will need a pneumonectomy. Patient in NAD, No SOB on 3 L oxygen with sats upper 90's and continues to be hypotensive at times. Hgb trending down will transfuse 2u pRBC 
 
01/12/21 POD 6 just returned from pulmonary procedure. Chest tubes to suction. States feels less pressure, but no other changes noted. No air leak noted in chest tubes- awaiting chest CT this afternoon. NAD, No SOB. Hgb 10 
 
01/13/21 POD 7. No significant changes on CT to right lung. Left lung ?pneumonia. Will discuss with pulmonary if any further treatment or proceed to pneumonectomy. Chest tubes maintained clamped. 6 liters oxygen 01/14/21 POD 8. No changes in breathing since chest tubes out. ON 5 liters Oxygen upper 90's sat. Chest xray reviewed- no changes- will plan pneumonectomy for next week- possible Monday- states cough is better 01/15/21 POD 9. No changes in breathing since chest tubes out. ON 5 liters Oxygen upper 90's sat. Chest xray reviewed- no changes- will plan pneumonectomy for next week- possible Monday- states cough is better 01/16/21 POD 10. No changes in breathing since chest tubes out. Remains on 5 liters Oxygen upper 90's sat. Planning pneumonectomy for next week- possible Monday. 01/17/21 POD 11. No changes in breathing since chest tubes out. Remains on 5 liters Oxygen upper 90's sat. Reports productive cough - clear mucous. CXR this am showing Unchanged right hydropneumothorax. New patchy edema or infiltrate in the left lung. Planning pneumonectomy for next week- possibly Monday or Wednesday. Medical history as below Past Medical History:  
Diagnosis Date  GERD (gastroesophageal reflux disease)   
 controlled with med  Hypertension hx-- off meds since 4/2020--- followed by dr. Gissell Liz  Hypoxia 02/24/2020  Malignant neoplasm of lower lobe of right lung ([facility] [address] 02/26/2020 REC'D CHEMO AND RADIATION--- FOLLOWED BY DR. Brien Asencio Osteoarthritis  Right lower lobe lung mass 02/24/2020  Status post total right knee replacement 2/29/2016 Past Surgical History:  
Procedure Laterality Date  HX COLONOSCOPY    
 HX KNEE ARTHROSCOPY Left   
 scope X 1, ACL recon X 1  
 HX KNEE ARTHROSCOPY Right 1974, 1975 X 2   
 HX KNEE REPLACEMENT Right [address]  HX VASCULAR ACCESS Right placed 3/2020  
 port in chest   
 IR INSERT TUNL CVC W PORT OVER 5 YEARS  3/18/2020  NC SINUS SURGERY PROC UNLISTED  1988, 1991  
 sinus surg Current Facility-Administered Medications Medication Dose Route Frequency  enoxaparin (LOVENOX) injection 40 mg  40 mg SubCUTAneous Q24H  piperacillin-tazobactam (ZOSYN) 4.5 g in 0.9% sodium chloride (MBP/ADV) 100 mL MBP  4.5 g IntraVENous Q8H  
 doxycycline (VIBRAMYCIN) capsule 100 mg  100 mg Oral Q12H  
 sodium chloride (NS) flush 5-40 mL  5-40 mL IntraVENous Q8H  
 sodium chloride (NS) flush 5-40 mL  5-40 mL IntraVENous PRN  
 0.9% sodium chloride infusion 250 mL  250 mL IntraVENous PRN  
 HYDROcodone-homatropine (HYCODAN) 5-1.5 mg/5 mL (5 mL) oral solution 5 mL  5 mL Oral Q4H PRN  
 bisacodyL (DULCOLAX) suppository 10 mg  10 mg Rectal DAILY PRN  
  midodrine (PROAMATINE) tablet 10 mg  10 mg Oral TID WITH MEALS  traMADoL (ULTRAM) tablet 50 mg  50 mg Oral Q6H PRN  
 acetaminophen (TYLENOL) tablet 500 mg  500 mg Oral Q6H PRN  
 HYDROmorphone (PF) (DILAUDID) injection 1 mg  1 mg IntraVENous Q2H PRN  
 guaiFENesin ER (MUCINEX) tablet 1,200 mg  1,200 mg Oral Q12H  
 amiodarone (CORDARONE) tablet 200 mg  200 mg Oral DAILY  [Held by provider] apixaban (ELIQUIS) tablet 5 mg  5 mg Oral Q12H  pantoprazole (PROTONIX) tablet 40 mg  40 mg Oral ACB&D  
 gabapentin (NEURONTIN) capsule 300 mg  300 mg Oral TID  levalbuterol tartrate (XOPENEX) 45 mcg/actuation inhaler HFAA 2 Puff- pt to supply (Patient Supplied)  2 Puff Inhalation Q4H PRN  
 ondansetron (ZOFRAN ODT) tablet 8 mg  8 mg Oral Q8H PRN  polyethylene glycol (MIRALAX) packet 17 g  17 g Oral DAILY  sodium chloride (NS) flush 5-40 mL  5-40 mL IntraVENous Q8H  
 sodium chloride (NS) flush 5-40 mL  5-40 mL IntraVENous PRN  
 oxyCODONE-acetaminophen (PERCOCET) 5-325 mg per tablet 1 Tab  1 Tab Oral Q4H PRN  
 influenza vaccine 2020-21 (6 mos+)(PF) (FLUARIX/FLULAVAL/FLUZONE QUAD) injection 0.5 mL  0.5 mL IntraMUSCular PRIOR TO DISCHARGE Albuterol and Celebrex [celecoxib] Social History Socioeconomic History  Marital status:  Spouse name: Not on file  Number of children: Not on file  Years of education: Not on file  Highest education level: Not on file Tobacco Use  Smoking status: Former Smoker Packs/day: 1.00 Years: 20.00 Pack years: 20.00 Quit date:  Years since quittin.0  Smokeless tobacco: Never Used Substance and Sexual Activity  Alcohol use: Yes Alcohol/week: 4.0 standard drinks Types: 4 Glasses of wine per week  Drug use: No  
Other Topics Concern   Service No  
 Blood Transfusions No  
 Caffeine Concern No  
 Occupational Exposure No  
 Hobby Hazards No  
 Sleep Concern No  
  Stress Concern No  
 Weight Concern No  
 Special Diet No  
 Exercise Yes  [facility] Yes Social History Narrative . Has long-time girlfriend. Continues to work doing IT support. Social History Tobacco Use Smoking Status Former Smoker  Packs/day: 1.00  Years: 20.00  Pack years: 20.00  Quit date:   Years since quittin.0 Smokeless Tobacco Never Used Family History Problem Relation Age of Onset  Cancer Mother   
     uterine  Arrhythmia Sister   
     afib ROS: The patient has no difficulty with chest pain or shortness of breath. No fever or chills. Comprehensive review of systems was otherwise unremarkable except as noted above. Physical Exam:  
Visit Vitals BP 95/62 Pulse 97 Temp 97.9 °F (36.6 °C) Resp 18 Ht 5' 10\" (1.778 m) Wt 201 lb 12.8 oz (91.5 kg) SpO2 96% BMI 28.96 kg/m² Constitutional: Alert, oriented, cooperative patient in no acute distress; appears stated age Eyes: Sclera are clear. EOMs intact ENMT: no external lesions gross hearing normal; no obvious neck masses, no ear or lip lesions, nares normal 
CV: RRR. Normal perfusion Resp: No JVD. Breathing is  non-labored; no audible wheezing. O2 5 L 
GI: soft and non-distended Musculoskeletal: unremarkable with normal function. No embolic signs or cyanosis. Neuro:  Oriented; moves all 4; no focal deficits Psychiatric: normal affect and mood, no memory impairment Recent vitals (if inpt): 
Patient Vitals for the past 24 hrs: 
 BP Temp Pulse Resp SpO2  
21 0552 95/62 97.9 °F (36.6 °C) 97 18 96 % 21 0415 95/62 98.2 °F (36.8 °C) 89 18 97 % 21 2310 101/69 98.7 °F (37.1 °C) 96 18 93 % 21 1932 109/72 99.5 °F (37.5 °C) (!) 103 18 94 % 21 1643  99 °F (37.2 °C)     
21 1559 95/62 100.2 °F (37.9 °C) 88 18 96 % 21 1114 109/77 98 °F (36.7 °C) 78 18 97 % XR Results (most recent): 
 [facility] XR CHEST SNGL V  
 Narrative EXAM: Chest x-ray. INDICATION: Dyspnea. COMPARISON: Yesterday's chest x-ray. TECHNIQUE: Frontal view chest x-ray. FINDINGS: There is unchanged right hydropneumothorax. New patchy edema or 
infiltrate is seen in the upper and perihilar left lung. Again noted is a right 
chest wall infusion port catheter. Impression IMPRESSION:  
1. Unchanged right hydropneumothorax. 2. New patchy edema or infiltrate in the left lung. CT Results (most recent): 
Results from [facility] Encounter encounter on 01/04/21 CT CHEST WO CONT Narrative EXAMINATION: CT CHEST WO CONT 1/12/2021 1:07 PM 
 
INDICATION: Shortness of breath. Evaluate for change in right upper lobe 
atelectasis COMPARISON: Chest CT December 22, 2020 and chest radiograph January 12, 2020 TECHNIQUE: Multiple contiguous axial CT images of the chest were obtained from 
the lung apices to the lung bases without intravenous contrast. Coronal 
reformats are provided. Radiation dose reduction techniques were used for this study. Our CT scanners 
use one or all of the following: Automated exposure control, adjustment of the 
mA and/or kV according to patient size, iterative reconstruction. FINDINGS: 
Lower Neck: Right chest port terminates at the cavoatrial junction. Chest Soft tissues: No significant abnormality. Heart/Mediastinum: Mediastinum remains shifted to the right. No significant 
pericardial abnormality. Normal caliber thoracic aorta. Lungs/pleura: There are 2 right chest tubes in place. Since CT chest on December 22, 2020 and there is significant decrease in right pleural fluid. There may be 
a few areas of aerated lung in the anterior portion of the residual right lung, 
however a majority remains opacified. Layering secretions are seen in the right 
main bronchus which is partially occluded. There is a persistent right hydropneumothorax. There is new moderate size area of groundglass opacity in the left upper lobe 
and small area of tree-in-bud opacity in the left lower lobe. Visualized upper abdomen: Cholelithiasis. Osseous structures: No suspicious osseous lesion. Impression IMPRESSION: 
1. Right chest tubes in place with residual moderate right hydropneumothorax. There may be slightly increased aeration of the inferior portion of the residual 
right lung, however majority remains opacified. It is somewhat difficult to 
distinguish lung parenchyma from pleural fluid due to lack of IV contrast. 
2. New moderate area of groundglass opacity in the left upper lobe and 
tree-in-bud opacity in the left lower lobe which may be infectious or 
inflammatory. Labs: 
Recent Labs  
  01/16/21 
5579 WBC 5.6 HGB 10.6*  * K 4.1 CL 99  
CO2 34* BUN 8  
CREA 0.52* GLU 90 Lab Results Component Value Date/Time WBC 5.6 01/16/2021 03:33 AM  
 HGB 10.6 (L) 01/16/2021 03:33 AM  
 PLATELET 826 55/79/8659 03:33 AM  
 Sodium 136 (L) 01/16/2021 03:33 AM  
 Potassium 4.1 01/16/2021 03:33 AM  
 Chloride 99 01/16/2021 03:33 AM  
 CO2 34 (H) 01/16/2021 03:33 AM  
 BUN 8 01/16/2021 03:33 AM  
 Creatinine 0.52 (L) 01/16/2021 03:33 AM  
 Glucose 90 01/16/2021 03:33 AM  
 INR 1.1 01/05/2021 04:02 PM  
 aPTT 35.2 (H) 01/05/2021 04:02 PM  
 Bilirubin, total 0.5 01/05/2021 06:26 AM  
 Bilirubin, direct 0.8 (H) 05/05/2020 02:27 PM  
 ALT (SGPT) 8 (L) 01/05/2021 06:26 AM  
 Alk. phosphatase 99 01/05/2021 06:26 AM  
 Troponin-I, Qt. <0.02 (L) 05/05/2020 01:09 AM  
 
 
I reviewed recent labs and recent radiologic studies. I independently reviewed radiology images for studies I described above or studies I have ordered. Admission date (for inpatients): 1/4/2021 Day of Surgery  Procedure(s) with comments: BRONCHOSCOPY 
BRONCHOSCOPY WITH BIOPSY - forceps used to open RUL apical segment BRONCHOSCOPY WITH AIRWAY DILATION 
BRONCHOSCOPY W EXCISION OF TUMOR 
 
ASSESSMENT/PLAN: 
Problem List  Date Reviewed: 1/13/2021 Codes Class Noted S/P thoracotomy ICD-10-CM: W75.598 ICD-9-CM: V45.89  1/7/2021 * (Principal) Atelectasis of right lung ICD-10-CM: J98.11 ICD-9-CM: 518.0  1/4/2021 A-fib (HCC) (Chronic) ICD-10-CM: I48.91 
ICD-9-CM: 427.31  1/4/2021 Chronic respiratory failure with hypoxia (HCC) (Chronic) ICD-10-CM: J96.11 
ICD-9-CM: 518.83, 799.02  1/4/2021 Pleural effusion on right ICD-10-CM: J90 ICD-9-CM: 511.9  12/28/2020 Chemotherapy induced neutropenia (HCC) ICD-10-CM: D70.1, T45.1X5A 
ICD-9-CM: 288.03, E933.1  10/23/2020 Dehydration ICD-10-CM: E86.0 ICD-9-CM: 276.51  9/15/2020 S/P lobectomy of lung ICD-10-CM: Z90.2 ICD-9-CM: V45.89  7/29/2020 Cough ICD-10-CM: R05 ICD-9-CM: 786.2  7/29/2020 Atrial tachycardia (HCC) ICD-10-CM: I47.1 ICD-9-CM: 427.89  7/26/2020 Cancer of bronchus of right lower lobe [facility]) ICD-10-CM: C34.31 
ICD-9-CM: 162.5  7/23/2020 Lung cancer ([facility] [address] (Chronic) ICD-10-CM: C34.90 ICD-9-CM: 162.9  7/23/2020 Cancer of right lung [facility]) ICD-10-CM: C34.91 
ICD-9-CM: 162.9  7/23/2020 Pulmonary emphysema (HCC) (Chronic) ICD-10-CM: J43.9 ICD-9-CM: 492.8  7/7/2020 GERD (gastroesophageal reflux disease) ICD-10-CM: K21.9 ICD-9-CM: 530.81  Unknown Hypotension (Chronic) ICD-10-CM: I95.9 ICD-9-CM: 458.9  5/5/2020 Overview Signed 1/11/2021  8:39 AM by Ana Paula Lai NP On midodrine Sepsis due to undetermined organism [facility]) ICD-10-CM: A41.9 ICD-9-CM: 038.9  5/5/2020 Malignant neoplasm of lower lobe of right lung (HCC) (Chronic) ICD-10-CM: C34.31 
ICD-9-CM: 162.5  2/26/2020  Overview Signed 1/4/2021  7:38 AM by Maira Dailey MD  
 Dec 2020- Echo EF 50%, technically difficult, cannot assess regional wall motion. Aortic root 4.1 cm. No significant valvular disease. Normal DF Mass of lower lobe of right lung ICD-10-CM: R91.8 ICD-9-CM: 786.6  2/23/2020 Right lower lobe lung mass ICD-10-CM: R91.8 ICD-9-CM: 786.6  2/23/2020 Essential hypertension with goal blood pressure less than 130/80 (Chronic) ICD-10-CM: I10 
ICD-9-CM: 401.9  2/19/2018 Principal Problem: 
  Atelectasis of right lung (1/4/2021) Active Problems: 
  Pulmonary emphysema ([address] (7/7/2020) Lung cancer ([address] (7/23/2020) Pleural effusion on right (12/28/2020) A-fib ([address] (1/4/2021) Chronic respiratory failure with hypoxia ([address] (1/4/2021) S/P thoracotomy (1/7/2021) Plan Plan pneumonectomy next week- possibly Monday or Wednesday(will make NPO after midnight in case of OR tomorrow) Cxr daily Regular Diet Activity as tolerated Pain control Encourage C/DB/IS Oxygen as needed Chest tubes removed 1/13/21 How[place] out 
R/O covid - negative Signed:  Archie Farias NP

## 2021-01-17 NOTE — PROGRESS NOTES
Problem: Falls - Risk of 
Goal: *Absence of Falls Description: Document Tony Mar Fall Risk and appropriate interventions in the flowsheet. Outcome: Progressing Towards Goal 
Note: Fall Risk Interventions: 
Mobility Interventions: Communicate number of staff needed for ambulation/transfer, Patient to call before getting OOB Mentation Interventions: Adequate sleep, hydration, pain control, Evaluate medications/consider consulting pharmacy, Family/sitter at bedside Medication Interventions: Teach patient to arise slowly, Patient to call before getting OOB Elimination Interventions: Call light in reach, Patient to call for help with toileting needs, Urinal in reach Problem: Patient Education: Go to Patient Education Activity Goal: Patient/Family Education Outcome: Progressing Towards Goal 
  
Problem: Pressure Injury - Risk of 
Goal: *Prevention of pressure injury Description: Document Sidney Scale and appropriate interventions in the flowsheet. Outcome: Progressing Towards Goal 
Note: Pressure Injury Interventions: 
Sensory Interventions: Assess changes in LOC Moisture Interventions: Absorbent underpads Activity Interventions: Increase time out of bed, Pressure redistribution bed/mattress(bed type), PT/OT evaluation Mobility Interventions: HOB 30 degrees or less, Pressure redistribution bed/mattress (bed type), PT/OT evaluation Nutrition Interventions: Document food/fluid/supplement intake, Offer support with meals,snacks and hydration Friction and Shear Interventions: Lift sheet, Minimize layers Problem: Patient Education: Go to Patient Education Activity Goal: Patient/Family Education Outcome: Progressing Towards Goal 
  
Problem: Patient Education: Go to Patient Education Activity Goal: Patient/Family Education Outcome: Progressing Towards Goal

## 2021-01-17 NOTE — PROGRESS NOTES
END OF SHIFT NOTE: 
 
INTAKE/OUTPUT 
01/15 0701 - 01/16 0700 In: 542 [P.O.:360; I.V.:182] Out: 525 [Urine:525] Voiding: YES Catheter: NO 
Drain:   
 
 
 
 
 
Flatus: Patient does have flatus present. Stool:  0 occurrences. Characteristics: 
 
Emesis: 0 occurrences. Characteristics: VITAL SIGNS Patient Vitals for the past 12 hrs: 
 Temp Pulse Resp BP SpO2  
01/16/21 1932 99.5 °F (37.5 °C) (!) 103 18 109/72 94 % 01/16/21 1643 99 °F (37.2 °C)      
01/16/21 1559 100.2 °F (37.9 °C) 88 18 95/62 96 % 01/16/21 1114 98 °F (36.7 °C) 78 18 109/77 97 % Pain Assessment Pain Intensity 1: 0 (01/16/21 0740) Pain Location 1: Rib cage Pain Intervention(s) 1: Ambulation/Increased Activity, Repositioned Patient Stated Pain Goal: 0 Ambulating Yes Shift report given to oncoming nurse at the bedside.  
 
Kirsten Madrid RN

## 2021-01-18 NOTE — ROUTINE PROCESS
Respiratory Care Services Policy Number: [id number] Title: Oxygen Protocol Effective Date: 01/1996 Revised Date: 06/2013, 02/29/2016, 4/2018, 7/2019 Reviewed Date: 05/2014, 03/2015, 06/2017, 11/2020 I. Policy: The Oxygen Protocol will be initiated for all patients upon written order from physician for administration of oxygen therapy or if a patient is found to have an oxygen saturation of 88% or less. Special consideration: the goal of oxygen therapy for COPD patients is to maintain oxygen saturation between 88% - 92% to comply with GOLD Guidelines. II. Purpose: To provide protocol driven respiratory therapy for the administration of oxygen at concentrations greater than that in ambient air with the intent of treating or preventing the symptoms and manifestations of hypoxia. III. Responsibility: Director Respiratory Care Services, all Respiratory Care Practitioners IV. Indications: A. Implement this protocol for patients when physician orders oxygen to be administered or when patient is found to have an oxygen saturation of 88% or less. B. To assure routine monitoring of patient's oxygen saturation b.i.d. and to make appropriate adjustments in accordance with ordered oxygen saturation parameters. C. To assure continuity of respiratory care that meets [facility] Clinical Practice Guidelines and GOLD Guidelines. D. Hb < 8 
E. Sickle Cell anemia crisis V. Assessment:  Assess the following parameters to determine the need to adjust oxygen: A. Measurement of patient's oxygen saturation via pulse oximetry. B. Observation of patient's color, respiratory effort, and responsiveness. C. Measurement of heart rate and respiratory rate. D. Complete a three-step ambulatory oxygen saturation when ordered. VI. Initiation:  Upon receipt of an order for oxygen, the RCP will: A. Verify order in the patient's EMR, which should include the desired oxygen saturation to be maintained. B. The patient shall be placed on oxygen with humidity (except for those oxygen delivery devices that do not require humidity, i.e. venturi masks and non-rebreather masks) as ordered by the physician to achieve the prescribed oxygen saturation. C. In the event that no saturation is specified, a saturation of 90% will be maintained. D. Patients, who are found to have a SaO2 of 88% or less, may be started on supplemental oxygen as described above. E. Patients admitted with cardiac problems/disease shall be maintained at 92% per Chest Committee recommendation. F. The patient will be informed of the \"no smoking policy\" and instructed in the proper use of oxygen therapy. G. Once desired oxygen saturation has been achieved, the RCP will document FIO2 and oxygen saturation in the respiratory section of the patient's EMR. VII. Maintenance: A. 30-second oxygen saturation check will be taken to maintain the saturation ordered by the physician each day. B. Patients will be assessed each shift and as needed by pulse oximetry to determine if oxygen needs to be decreased, increased or discontinued. C. If changes in FIO2 are indicated, all changes will be documented in the respiratory section of the patient's EMR. D. If no changes in FIO2 are required, the patient's oxygen flow rate and saturation will be recorded in the respiratory section of the patient's EMR. E. Per [facility], patients who are receiving oxygen therapy but are not on oxygen at home, should be weaned off oxygen as soon as possible or when anticipated discharge becomes evident. Katheryn Taco will be discontinued after oxygen saturation has been maintained for 24 hours on room air and documented in the patient's EMR. G. Patients on the Inpatient Rehabilitation area on 9th floor will be exempt from having their oxygen discontinued per protocol. Oxygen may be weaned but will be changed to prn to meet the needs of the patient when exercising and participating in therapy. H. The goal of oxygen therapy is to maintain patients with a diagnosis of COPD at oxygen saturation between 88% - 92% to comply with GOLD Guidelines. VIII. Safety: RCP will address the following safety issues: A. Identify patient using the two patient identifiers name and birth date via ID bracelet. B. Perform hand hygiene per hospital policy utilizing Standard Precautions for all patients and following transmission-based isolation as indicated per hospital policy. C. Cardiac patients will be maintained at an oxygen saturation of 92%. D. If a patient's FIO2 requirements necessitate changing oxygen delivery devices to a high concentration of oxygen, documentation indicating the change must be included in the progress notes, as well as in the respiratory flowsheet. E. If a patient has a hemoglobin level <8 mg. RCP will consult physician before discontinuing oxygen. IX. Interventions: A. RCP will assess patient for signs of respiratory distress or suspicion of CO2 retention. B. An ABG may be obtained for patients exhibiting respiratory distress or sickle cell crisis. C. An order should be entered into patient's EMR for ABG under per protocol. X. Documentation A. Document assessment findings in the respiratory section of the patient's EMR. B. Document changes in therapy per protocol in the respiratory orders section and in the care plan section of the patient's EMR. C. Document patient education in the patient education section of the patient's EMR. XI. Reportable Conditions:  Report to the physician immediately: A. Acute changes in patient's respiratory status. B. An oxygen saturation <85%. C. A change in oxygen delivery device to provide a high concentration of oxygen. XII. Patient Instructions: Review with Patient A. Purpose of oxygen therapy B. Proper technique for using oxygen C. No smoking policy Approval: Pulmonary Committee (1-25-96) Revision: Chest Committee (4-28-05) L - Respiratory Care Department Policy, Procedure and Protocol Guideline Manual, 1995, QUYEN Kumar. L - Therapist Driven Respiratory Care Protocols  A Practitioner's Guide for Criteria-Based Respiratory Care by Silas Munoz M.D., and QUYEN Ha, JUAN. L - The rationale for therapist-driven protocols: an update. Respiratory Care 1998. N  [facility] Clinical Practice Guidelines.

## 2021-01-18 NOTE — PROGRESS NOTES
ACUTE PHYSICAL THERAPY GOALS: 
(Developed with and agreed upon by patient and/or caregiver.) 
LTG: 
(1.)Mr. Dowd will move from supine to sit and sit to supine , scoot up and down and roll side to side in bed with INDEPENDENT within 7 treatment day(s).   
(2.)Mr. Dowd will transfer from bed to chair and chair to bed with INDEPENDENT using the least restrictive device within 7 treatment day(s).   
(3.)Mr. Dowd will ambulate with INDEPENDENT for 150+ feet with the least restrictive device within 7 treatment day(s) while maintaining normal vital signs. 
(4.)Mr. Dowd will tolerated 23+ minutes of therapeutic activity within 7 treatment days for increased activity tolerance, while maintaining normal vital signs throughout session.  
 
PHYSICAL THERAPY: Daily Note and AM Treatment Day # 2 
 
Jeramie Dowd Jr. is a 70 y.o. male  
PRIMARY DIAGNOSIS: Atelectasis of right lung 
Pleural effusion [J90] 
Collapse of right lung [J98.11] 
Lung cancer (HCC) [C34.90] 
Procedure(s) (LRB): 
BRONCHOSCOPY (N/A) 
BRONCHOSCOPY WITH BIOPSY (N/A) 
BRONCHOSCOPY WITH AIRWAY DILATION (N/A) 
BRONCHOSCOPY W EXCISION OF TUMOR (N/A) 
6 Days Post-Op 
 
ASSESSMENT:  
 
REHAB RECOMMENDATIONS: CURRENT LEVEL OF FUNCTION: 
(Most Recently Demonstrated)  
Recommendation to date pending progress: 
Setting: 
? Home Health Therapy 
Equipment:  
? To Be Determined Bed Mobility: 
? Supervision 
Sit to Stand: 
? Standby Assistance 
Transfers: 
? Standby Assistance 
Gait/Mobility: 
? Standby Assistance  
 
ASSESSMENT: 
Mr. Dowd was able to increase gait distance and activity tolerance today. Ambulates 200 ft in hallway with RW and CGA/SBA, verbal cues for posture, safety, and body mechanics as well as activity pacing and breathing. Performs below seated exercises with good participation. Planned for another surgery this week, hopefully will continue to do well and could dc home with HH PT.  
 
SUBJECTIVE:  
 Mr. Amada Padilla states, \"I want to drink my coffee, let's make this quick. 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 
Support Systems: Spouse/Significant Other/Partner OBJECTIVE:  
 
PAIN: VITAL SIGNS: LINES/DRAINS:  
Pre Treatment: Pain Screen Pain Scale 1: Numeric (0 - 10) Pain Intensity 1: 0 Post Treatment: 0/10 Vital Signs O2 Device: Nasal cannula O2 Flow Rate (L/min): 4 l/min IV 
O2 Device: Nasal cannula MOBILITY: I Mod I S SBA CGA Min Mod Max Total  NT x2 Comments:  
Bed Mobility Rolling [] [] [x] [] [] [] [] [] [] [] [] Supine to Sit [] [] [x] [] [] [] [] [] [] [] [] Scooting [] [] [x] [] [] [] [] [] [] [] [] Sit to Supine [] [] [] [] [] [] [] [] [] [] []   
Transfers Sit to Stand [] [] [] [x] [] [] [] [] [] [] [] Bed to Chair [] [] [] [x] [] [] [] [] [] [] []   
Stand to Sit [] [] [] [x] [] [] [] [] [] [] [] I=Independent, Mod I=Modified Independent, S=Supervision, SBA=Standby Assistance, CGA=Contact Guard Assistance,  
Min=Minimal Assistance, Mod=Moderate Assistance, Max=Maximal Assistance, Total=Total Assistance, NT=Not Tested GAIT: I Mod I S SBA CGA Min Mod Max Total  NT x2 Comments:  
Level of Assistance [] [] [] [x] [x] [] [] [] [] [] [] Distance 200 DME Angella Sahu Gait Quality Trunk sway Weightbearing Status N/A I=Independent, Mod I=Modified Independent, S=Supervision, SBA=Standby Assistance, CGA=Contact Guard Assistance,  
Min=Minimal Assistance, Mod=Moderate Assistance, Max=Maximal Assistance, Total=Total Assistance, NT=Not Tested PLAN:  
FREQUENCY/DURATION: PT Plan of Care: 3 times/week for duration of hospital stay or until stated goals are met, whichever comes first. 
TREATMENT:  
 
TREATMENT:  
($$ Therapeutic Activity: 8-22 mins 
$$ Therapeutic Exercises: 8-22 mins    ) Therapeutic Activity (15 Minutes): Therapeutic activity included Rolling, Supine to Sit, Transfer Training, Ambulation on level ground, Sitting balance  and Standing balance to improve functional Mobility, Strength, Activity tolerance and balance and posture. Therapeutic Exercise (8 Minutes): Therapeutic exercises noted below to improve functional activity tolerance, strength and mobility. Date: 
1/18/21 Date: 
 Date: Activity/Exercise Parameters Parameters Parameters LAQ 20x AB Seated marching 20x AB Ankle pumps 20x AB Seated hip abduction 20x AB    
     
     
     
 
 
AFTER TREATMENT POSITION/PRECAUTIONS: 
Chair, Needs within reach and RN notified INTERDISCIPLINARY COLLABORATION: 
RN/PCT and PT/PTA TOTAL TREATMENT DURATION: 
PT Patient Time In/Time Out Time In: 5382 Time Out: 1006 FARRUKH OrellanaT

## 2021-01-18 NOTE — PROGRESS NOTES
Chart review complete, pt moved to 2nd floor over weekend, pt continues with iv antibx and oxygen use, pending additional surgery either today or Wednesday per MD notes. CM will remain available for dc needs.

## 2021-01-18 NOTE — PROGRESS NOTES
END OF SHIFT NOTE: 
 
INTAKE/OUTPUT 
01/16 0701 - 01/17 0700 In: -  
Out: 2522 [[id number]] Voiding: YES Catheter: NO 
Drain:   
 
 
 
 
 
Flatus: Patient does have flatus present. Stool:  1 occurrences. Characteristics: 
Stool Assessment Stool Color: Blancas Dew Stool Appearance: Formed Stool Amount: Large Stool Source/Status: Rectum Emesis: 0 occurrences. Characteristics: VITAL SIGNS Patient Vitals for the past 12 hrs: 
 Temp Pulse Resp BP SpO2  
01/17/21 1712  94  105/73   
01/17/21 1610 98.8 °F (37.1 °C) 82 16 101/60 97 % 01/17/21 1228 97.8 °F (36.6 °C) 74 18 118/73 96 % 01/17/21 1213    118/73  Pain Assessment Pain Intensity 1: 0 (01/17/21 0730) Pain Location 1: Rib cage Pain Intervention(s) 1: Ambulation/Increased Activity, Repositioned Patient Stated Pain Goal: 0 Ambulating Yes Shift report given to oncoming nurse at the bedside.  
 
Sadie Genao RN

## 2021-01-18 NOTE — PROGRESS NOTES
H&P/Consult Note/Progress Note/Office Note:  
Carolina Zambrano MRN: [id number]  :1950  Age:75 y.o. 
 
HPI: Carolina Zambrano is a 78 y.o. male who is well known to Dr. Nagi Palomino.  He underwent right thoracotomy with lower and middle lobectomy 20 for squamous cell carcinoma. He underwent neoaduvant chemo/radiation and adjuvant chemo. .  CT 20 showed large pleural effusion and collapse of RUL. He underwent thoracentesis by Dr. Roberto Leventhal 20 with approximately drainage of 900ml, but was unable to completely drain related to complaints of pain. He felt should hold Eliquis for admission for Chest tube placement. Dr. Andrea Ching placed chest tube this AM 21. General surgery was consulted for complicated right pleural effusion, ?hemothorax not draining properly with chest tube placement. On exam, Mr. Tania Jurado appears comfortable.  CT scan reviewed and current chest xray, NAD. On 3 L NC sats upper 90's. Chest tube maintained with 800ml serosanguineous drainage noted. Remains on 20cmsx. Patient states since his lobectomy he has felt \"pressure\". He does use 2L oxygen at night. She states he does feel like he can breath better since this chest tube has been placed. His lost dose of eliquis was Friday (for A-fib). He does have smoking history and quit about 6 years ago 21 Patient states he is breathing better since chest tube. NAD on 3L NC with 98% sat. Chest xray reviewed and fluid collection appears loculated- will need surgery- plan for tomorrow. 21 To the OR yesterday PO day1. Difficult surgery. Consulted pulmonary intraoperatively for broch. Unable to get lung to inflate intraoperative, ?chronic mucous plug. Pt in ICU, no complaints this AM- scheduled for bronch this AM. Chest tubes to water seal currently- no air leak- appears lung still collapsed from CXR. Pain controlled. 01/08/21 POD 2. Pt remains in ICU, no complaints this AM. Pain controlled. Chest tubes to water seal currently- no air leak. CXR this am showing No Significant Interval Change. 01/09/21 POD 3. Pt remains in ICU, no complaints this AM. Pain controlled. Chest tubes currently clamped. CXR this am showing No Significant Interval Change. Denies SOB. On 3L o2. 
 
01/11/21  POD 5  Chest tube remains clamped. NO changes in chest xray. Dr. Aletha Holstein spoke with Dr. Socrates Pitts regarding trying again to clear airway to re inflate lung if unsuccessful then will need a pneumonectomy. Patient in NAD, No SOB on 3 L oxygen with sats upper 90's and continues to be hypotensive at times. Hgb trending down will transfuse 2u pRBC 
 
01/12/21 POD 6 just returned from pulmonary procedure. Chest tubes to suction. States feels less pressure, but no other changes noted. No air leak noted in chest tubes- awaiting chest CT this afternoon. NAD, No SOB. Hgb 10 
 
01/13/21 POD 7. No significant changes on CT to right lung. Left lung ?pneumonia. Will discuss with pulmonary if any further treatment or proceed to pneumonectomy. Chest tubes maintained clamped. 6 liters oxygen 01/14/21 POD 8. No changes in breathing since chest tubes out. ON 5 liters Oxygen upper 90's sat. Chest xray reviewed- no changes- will plan pneumonectomy for next week- possible Monday- states cough is better 01/15/21 POD 9. No changes in breathing since chest tubes out. ON 5 liters Oxygen upper 90's sat. Chest xray reviewed- no changes- will plan pneumonectomy for next week- possible Monday- states cough is better 01/16/21 POD 10. No changes in breathing since chest tubes out. Remains on 5 liters Oxygen upper 90's sat. Planning pneumonectomy for next week- possible Monday. 01/17/21 POD 11. No changes in breathing since chest tubes out. Remains on 5 liters Oxygen upper 90's sat. Reports productive cough - clear mucous. CXR this am showing Unchanged right hydropneumothorax. New patchy edema or infiltrate in the left lung. Planning pneumonectomy for next week- possibly Monday or Wednesday. 01/18/21 POD 12  No significant changes noted in breathing, continues on 5 L oxygen. Complains about increased pressure noted in right chest and some increased cough. Will plan surgery Wednesday. Medical history as below Past Medical History:  
Diagnosis Date  GERD (gastroesophageal reflux disease)   
 controlled with med  Hypertension hx-- off meds since 4/2020--- followed by dr. Roe Sor  Hypoxia 02/24/2020  Malignant neoplasm of lower lobe of right lung ([facility] [address] 02/26/2020 REC'D CHEMO AND RADIATION--- FOLLOWED BY DR. Cora Short Osteoarthritis  Right lower lobe lung mass 02/24/2020  Status post total right knee replacement 2/29/2016 Past Surgical History:  
Procedure Laterality Date  HX COLONOSCOPY    
 HX KNEE ARTHROSCOPY Left   
 scope X 1, ACL recon X 1  
 HX KNEE ARTHROSCOPY Right 1974, 1975 X 2   
 HX KNEE REPLACEMENT Right 2016 [address] [place]  HX VASCULAR ACCESS Right placed 3/2020  
 port in chest   
 IR INSERT TUNL CVC W PORT OVER 5 YEARS  3/18/2020  [place] SINUS SURGERY PROC UNLISTED  1988, 1991  
 sinus surg Current Facility-Administered Medications Medication Dose Route Frequency  [Held by provider] enoxaparin (LOVENOX) injection 40 mg  40 mg SubCUTAneous Q24H  piperacillin-tazobactam (ZOSYN) 4.5 g in 0.9% sodium chloride (MBP/ADV) 100 mL MBP  4.5 g IntraVENous Q8H  
 doxycycline (VIBRAMYCIN) capsule 100 mg  100 mg Oral Q12H  
 sodium chloride (NS) flush 5-40 mL  5-40 mL IntraVENous Q8H  
 sodium chloride (NS) flush 5-40 mL  5-40 mL IntraVENous PRN  
 0.9% sodium chloride infusion 250 mL  250 mL IntraVENous PRN  
  HYDROcodone-homatropine (HYCODAN) 5-1.5 mg/5 mL (5 mL) oral solution 5 mL  5 mL Oral Q4H PRN  
 bisacodyL (DULCOLAX) suppository 10 mg  10 mg Rectal DAILY PRN  
 midodrine (PROAMATINE) tablet 10 mg  10 mg Oral TID WITH MEALS  traMADoL (ULTRAM) tablet 50 mg  50 mg Oral Q6H PRN  
 acetaminophen (TYLENOL) tablet 500 mg  500 mg Oral Q6H PRN  
 HYDROmorphone (PF) (DILAUDID) injection 1 mg  1 mg IntraVENous Q2H PRN  
 guaiFENesin ER (MUCINEX) tablet 1,200 mg  1,200 mg Oral Q12H  
 amiodarone (CORDARONE) tablet 200 mg  200 mg Oral DAILY  [Held by provider] apixaban (ELIQUIS) tablet 5 mg  5 mg Oral Q12H  pantoprazole (PROTONIX) tablet 40 mg  40 mg Oral ACB&D  
 gabapentin (NEURONTIN) capsule 300 mg  300 mg Oral TID  levalbuterol tartrate (XOPENEX) 45 mcg/actuation inhaler HFAA 2 Puff- pt to supply (Patient Supplied)  2 Puff Inhalation Q4H PRN  
 ondansetron (ZOFRAN ODT) tablet 8 mg  8 mg Oral Q8H PRN  polyethylene glycol (MIRALAX) packet 17 g  17 g Oral DAILY  sodium chloride (NS) flush 5-40 mL  5-40 mL IntraVENous Q8H  
 sodium chloride (NS) flush 5-40 mL  5-40 mL IntraVENous PRN  
 oxyCODONE-acetaminophen (PERCOCET) 5-325 mg per tablet 1 Tab  1 Tab Oral Q4H PRN  
 influenza vaccine 2020-21 (6 mos+)(PF) (FLUARIX/FLULAVAL/FLUZONE QUAD) injection 0.5 mL  0.5 mL IntraMUSCular PRIOR TO DISCHARGE Albuterol and Celebrex [celecoxib] Social History Socioeconomic History  Marital status:  Spouse name: Not on file  Number of children: Not on file  Years of education: Not on file  Highest education level: Not on file Tobacco Use  Smoking status: Former Smoker Packs/day: 1.00 Years: 20.00 Pack years: 20.00 Quit date:  Years since quittin.0  Smokeless tobacco: Never Used Substance and Sexual Activity  Alcohol use: Yes Alcohol/week: 4.0 standard drinks Types: 4 Glasses of wine per week  Drug use:  No  
 Other Topics Concern   Service No  
 Blood Transfusions No  
 Caffeine Concern No  
 Occupational Exposure No  
 Hobby Hazards No  
 Sleep Concern No  
 Stress Concern No  
 Weight Concern No  
 Special Diet No  
 Exercise Yes  [address] Yes Social History Narrative . Has long-time girlfriend. Continues to work doing IT support. Social History Tobacco Use Smoking Status Former Smoker  Packs/day: 1.00  Years: 20.00  Pack years: 20.00  Quit date:   Years since quittin.0 Smokeless Tobacco Never Used Family History Problem Relation Age of Onset  Cancer Mother   
     uterine  Arrhythmia Sister   
     afib ROS: The patient has no difficulty with chest pain or shortness of breath. No fever or chills. Comprehensive review of systems was otherwise unremarkable except as noted above. Physical Exam:  
Visit Vitals BP 98/65 Pulse 78 Temp 97.7 °F (36.5 °C) Resp 18 Ht 5' 10\" (1.778 m) Wt 201 lb 12.8 oz (91.5 kg) SpO2 96% BMI 28.96 kg/m² Constitutional: Alert, oriented, cooperative patient in no acute distress; appears stated age Eyes: Sclera are clear. EOMs intact ENMT: no external lesions gross hearing normal; no obvious neck masses, no ear or lip lesions, nares normal 
CV: RRR. Normal perfusion Resp: No JVD. Breathing is  non-labored; no audible wheezing. O2 5 L 
GI: soft and non-distended Musculoskeletal: unremarkable with normal function. No embolic signs or cyanosis. Neuro:  Oriented; moves all 4; no focal deficits Psychiatric: normal affect and mood, no memory impairment Recent vitals (if inpt): 
Patient Vitals for the past 24 hrs: 
 BP Temp Pulse Resp SpO2  
21 0717 98/65 97.7 °F (36.5 °C) 78 18 96 % 21 0307 101/66 98.2 °F (36.8 °C) 68 16 95 % 21 2332 106/72 98.3 °F (36.8 °C) (!) 101 17 95 % 21 1928 102/69 98.4 °F (36.9 °C) 98 17 98 % 01/17/21 1712 105/73  94    
01/17/21 1610 101/60 98.8 °F (37.1 °C) 82 16 97 % 01/17/21 1228 118/73 97.8 °F (36.6 °C) 74 18 96 % 01/17/21 1213 118/73      
 
 
XR Results (most recent): 
Results from Hospital Encounter encounter on 01/04/21 XR CHEST SNGL V  
 Narrative EXAM: Chest x-ray. INDICATION: Dyspnea. COMPARISON: Yesterday's chest x-ray. TECHNIQUE: Frontal view chest x-ray. FINDINGS: Right lung hydropneumothorax is unchanged. Patchy perihilar left lung 
infiltrate has improved, with minimal residual. No left pneumothorax or pleural 
effusion is seen. The cardiac size is stable. A right chest wall infusion port 
catheter remains in place. There are right chest wall skin staples. Impression IMPRESSION:  
1. Unchanged right hydropneumothorax. 2. Improved perihilar left lung infiltrate, with minimal residual.  
 
 
 
CT Results (most recent): 
Results from Hospital Encounter encounter on 01/04/21 CT CHEST WO CONT Narrative EXAMINATION: CT CHEST WO CONT 1/12/2021 1:07 PM 
 
INDICATION: Shortness of breath. Evaluate for change in right upper lobe 
atelectasis COMPARISON: Chest CT December 22, 2020 and chest radiograph January 12, 2020 TECHNIQUE: Multiple contiguous axial CT images of the chest were obtained from 
the lung apices to the lung bases without intravenous contrast. Coronal 
reformats are provided. Radiation dose reduction techniques were used for this study. Our CT scanners 
use one or all of the following: Automated exposure control, adjustment of the 
mA and/or kV according to patient size, iterative reconstruction. FINDINGS: 
Lower Neck: Right chest port terminates at the cavoatrial junction. Chest Soft tissues: No significant abnormality. Heart/Mediastinum: Mediastinum remains shifted to the right. No significant 
pericardial abnormality. Normal caliber thoracic aorta. Lungs/pleura: There are 2 right chest tubes in place. Since CT chest on December 22, 2020 and there is significant decrease in right pleural fluid. There may be 
a few areas of aerated lung in the anterior portion of the residual right lung, 
however a majority remains opacified. Layering secretions are seen in the right 
main bronchus which is partially occluded. There is a persistent right 
hydropneumothorax. There is new moderate size area of groundglass opacity in the left upper lobe 
and small area of tree-in-bud opacity in the left lower lobe. Visualized upper abdomen: Cholelithiasis. Osseous structures: No suspicious osseous lesion. Impression IMPRESSION: 
1. Right chest tubes in place with residual moderate right hydropneumothorax. There may be slightly increased aeration of the inferior portion of the residual 
right lung, however majority remains opacified. It is somewhat difficult to 
distinguish lung parenchyma from pleural fluid due to lack of IV contrast. 
2. New moderate area of groundglass opacity in the left upper lobe and 
tree-in-bud opacity in the left lower lobe which may be infectious or 
inflammatory. Labs: 
Recent Labs  
  01/16/21 
7439 WBC 5.6 HGB 10.6*  * K 4.1 CL 99  
CO2 34* BUN 8  
CREA 0.52* GLU 90 Lab Results Component Value Date/Time  WBC 5.6 01/16/2021 03:33 AM  
 HGB 10.6 (L) 01/16/2021 03:33 AM  
 PLATELET 689 45/55/3738 03:33 AM  
 Sodium 136 (L) 01/16/2021 03:33 AM  
 Potassium 4.1 01/16/2021 03:33 AM  
 Chloride 99 01/16/2021 03:33 AM  
 CO2 34 (H) 01/16/2021 03:33 AM  
 BUN 8 01/16/2021 03:33 AM  
 Creatinine 0.52 (L) 01/16/2021 03:33 AM  
 Glucose 90 01/16/2021 03:33 AM  
 INR 1.1 01/05/2021 04:02 PM  
 aPTT 35.2 (H) 01/05/2021 04:02 PM  
 Bilirubin, total 0.5 01/05/2021 06:26 AM  
 Bilirubin, direct 0.8 (H) 05/05/2020 02:27 PM  
 ALT (SGPT) 8 (L) 01/05/2021 06:26 AM  
 Alk. phosphatase 99 01/05/2021 06:26 AM  
 Troponin-I, Qt. <0.02 (L) 05/05/2020 01:09 AM  
 
 
I reviewed recent labs and recent radiologic studies. I independently reviewed radiology images for studies I described above or studies I have ordered. Admission date (for inpatients): 1/4/2021 Day of Surgery  Procedure(s) with comments: BRONCHOSCOPY 
BRONCHOSCOPY WITH BIOPSY - forceps used to open RUL apical segment BRONCHOSCOPY WITH AIRWAY DILATION 
BRONCHOSCOPY W EXCISION OF TUMOR 
 
ASSESSMENT/PLAN: 
Problem List  Date Reviewed: 1/13/2021 Codes Class Noted S/P thoracotomy ICD-10-CM: M76.579 ICD-9-CM: V45.89  1/7/2021 * (Principal) Atelectasis of right lung ICD-10-CM: J98.11 ICD-9-CM: 518.0  1/4/2021 A-fib (HCC) (Chronic) ICD-10-CM: I48.91 
ICD-9-CM: 427.31  1/4/2021 Chronic respiratory failure with hypoxia (HCC) (Chronic) ICD-10-CM: J96.11 
ICD-9-CM: 518.83, 799.02  1/4/2021 Pleural effusion on right ICD-10-CM: J90 ICD-9-CM: 511.9  12/28/2020 Chemotherapy induced neutropenia (HCC) ICD-10-CM: D70.1, T45.1X5A 
ICD-9-CM: 288.03, E933.1  10/23/2020 Dehydration ICD-10-CM: E86.0 ICD-9-CM: 276.51  9/15/2020 S/P lobectomy of lung ICD-10-CM: Z90.2 ICD-9-CM: V45.89  7/29/2020 Cough ICD-10-CM: R05 ICD-9-CM: 786.2  7/29/2020 Atrial tachycardia (HCC) ICD-10-CM: I47.1 ICD-9-CM: 427.89  7/26/2020 Cancer of bronchus of right lower lobe [facility]) ICD-10-CM: C34.31 
ICD-9-CM: 162.5  7/23/2020 Lung cancer ([facility] [address] (Chronic) ICD-10-CM: C34.90 ICD-9-CM: 162.9  7/23/2020 Cancer of right lung [facility]) ICD-10-CM: C34.91 
ICD-9-CM: 162.9  7/23/2020 Pulmonary emphysema (HCC) (Chronic) ICD-10-CM: J43.9 ICD-9-CM: 492.8  7/7/2020 GERD (gastroesophageal reflux disease) ICD-10-CM: K21.9 ICD-9-CM: 530.81  Unknown Hypotension (Chronic) ICD-10-CM: I95.9 ICD-9-CM: 458.9  5/5/2020 Overview Signed 1/11/2021  8:39 AM by Shana Granados NP On midodrine Sepsis due to undetermined organism [facility]) ICD-10-CM: A41.9 ICD-9-CM: 038.9  5/5/2020 Malignant neoplasm of lower lobe of right lung (HCC) (Chronic) ICD-10-CM: C34.31 
ICD-9-CM: 162.5  2/26/2020 Overview Signed 1/4/2021  7:38 AM by Anjana Fang MD  
  Dec 2020- Echo EF 50%, technically difficult, cannot assess regional wall motion. Aortic root 4.1 cm. No significant valvular disease. Normal DF Mass of lower lobe of right lung ICD-10-CM: R91.8 ICD-9-CM: 786.6  2/23/2020 Right lower lobe lung mass ICD-10-CM: R91.8 ICD-9-CM: 786.6  2/23/2020 Essential hypertension with goal blood pressure less than 130/80 (Chronic) ICD-10-CM: I10 
ICD-9-CM: 401.9  2/19/2018 Principal Problem: 
  Atelectasis of right lung (1/4/2021) Active Problems: 
  Pulmonary emphysema ([address] (7/7/2020) Lung cancer ([address] (7/23/2020) Pleural effusion on right (12/28/2020) A-fib ([address] (1/4/2021) Chronic respiratory failure with hypoxia ([address] (1/4/2021) S/P thoracotomy (1/7/2021) Plan Plan pneumonectomy Wednesday Cxr daily Regular Diet Activity as tolerated Pain control Encourage C/DB/IS Oxygen as needed Chest tubes removed 1/13/21 Signed:  Marcus Barnett NP

## 2021-01-18 NOTE — PROGRESS NOTES
Raysa Arredondo. Admission Date: 1/4/2021 Daily Progress Note: 1/18/2021 The patient's chart is reviewed and the patient is discussed with the staff. Patient is E 28 y.o.  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 Timeline of events: 
--RLL mass 2/2020, measuring 6.7cm x 5.9cm.  
--EBUS On 2/26/2020 and biopsy consistent with SCC. --Brain MRI was negative for brain mets but revealed a 2cm R parotid mass. --PET CT revealed a hypermetabolic RLL mass extending into the hilum and excessively hypermetabolic R parotid gland. --He had radiation and chemo and then underwent RML lobectomy 7/23/2020 for squamous cell carcinoma 
--He then completed 6 more rounds of chemo around 10/2020.   
--Repeat Chest CT on 12/22/2020 which revealed a large R pleural effusion and collapse of the remaining RUL.  
--R thoracentesis on 12/28/2020 by Dr. Junie Montgomery with 900mL bloody fluid removed. Unable to tolerate complete drainage. --CT placement on 1/4/21 with 800cc of serosanguineous drainage. --General Surgery was consulted and CXR revealed persistent loculated R effusion. -- OR on 1/6 for R VATS, converted to an open thoracotomy with extensive pneumolysis with decortication. Difficult surgery. Consulted pulmonary intraoperatively for bronch. Unable to get lung to inflate intraoperative 
--Pathology non-diagnostic. Pt was hypotensive postoperatively requiring aylin. Pt was transferred to ICU. Pt had a repeat bronch on 1/7 secondary to persistent RUL atelectasis. He did not have any mucous plugging but his apical segment of the RUL was blocked by a web and the bronchoscope couldn't advance any further. Pt did have a stump area from prior lobectomy with abnormal appearing tissue that was biopsied,pathology noted below- non-diagnostic.  
-- 1/12 bronchoscopy. Subjective: For surgery-completion pneumonectomy per  Dr. Claudia Curtis next week. On 5 lpm NC- sat 96% Current Facility-Administered Medications Medication Dose Route Frequency  [Held by provider] enoxaparin (LOVENOX) injection 40 mg  40 mg SubCUTAneous Q24H  piperacillin-tazobactam (ZOSYN) 4.5 g in 0.9% sodium chloride (MBP/ADV) 100 mL MBP  4.5 g IntraVENous Q8H  
 doxycycline (VIBRAMYCIN) capsule 100 mg  100 mg Oral Q12H  
 sodium chloride (NS) flush 5-40 mL  5-40 mL IntraVENous Q8H  
 sodium chloride (NS) flush 5-40 mL  5-40 mL IntraVENous PRN  
 0.9% sodium chloride infusion 250 mL  250 mL IntraVENous PRN  
 HYDROcodone-homatropine (HYCODAN) 5-1.5 mg/5 mL (5 mL) oral solution 5 mL  5 mL Oral Q4H PRN  
 bisacodyL (DULCOLAX) suppository 10 mg  10 mg Rectal DAILY PRN  
 midodrine (PROAMATINE) tablet 10 mg  10 mg Oral TID WITH MEALS  traMADoL (ULTRAM) tablet 50 mg  50 mg Oral Q6H PRN  
 acetaminophen (TYLENOL) tablet 500 mg  500 mg Oral Q6H PRN  
 HYDROmorphone (PF) (DILAUDID) injection 1 mg  1 mg IntraVENous Q2H PRN  
 guaiFENesin ER (MUCINEX) tablet 1,200 mg  1,200 mg Oral Q12H  
 amiodarone (CORDARONE) tablet 200 mg  200 mg Oral DAILY  [Held by provider] apixaban (ELIQUIS) tablet 5 mg  5 mg Oral Q12H  pantoprazole (PROTONIX) tablet 40 mg  40 mg Oral ACB&D  
 gabapentin (NEURONTIN) capsule 300 mg  300 mg Oral TID  levalbuterol tartrate (XOPENEX) 45 mcg/actuation inhaler HFAA 2 Puff- pt to supply (Patient Supplied)  2 Puff Inhalation Q4H PRN  
 ondansetron (ZOFRAN ODT) tablet 8 mg  8 mg Oral Q8H PRN  polyethylene glycol (MIRALAX) packet 17 g  17 g Oral DAILY  sodium chloride (NS) flush 5-40 mL  5-40 mL IntraVENous Q8H  
 sodium chloride (NS) flush 5-40 mL  5-40 mL IntraVENous PRN  
 oxyCODONE-acetaminophen (PERCOCET) 5-325 mg per tablet 1 Tab  1 Tab Oral Q4H PRN  
  influenza vaccine 2020-21 (6 mos+)(PF) (FLUARIX/FLULAVAL/FLUZONE QUAD) injection 0.5 mL  0.5 mL IntraMUSCular PRIOR TO DISCHARGE Review of Systems Constitutional: negative for fever, chills, sweats Cardiovascular: negative for chest pain, palpitations, syncope, edema Gastrointestinal: negative for dysphagia, reflux, vomiting, diarrhea, abdominal pain, or melena Neurologic: negative for focal weakness, numbness, headache Objective:  
 
Vitals:  
 01/17/21 1928 01/17/21 2332 01/18/21 3608 01/18/21 1950 BP: 102/69 106/72 101/66 98/65 Pulse: 98 (!) 101 68 78 Resp: 17 17 16 18 Temp: 98.4 °F (36.9 °C) 98.3 °F (36.8 °C) 98.2 °F (36.8 °C) 97.7 °F (36.5 °C) SpO2: 98% 95% 95% 96% Weight:      
Height:      
 
Intake and Output:  
01/16 1901 - 01/18 0700 In: 4760 [P.O.:540; I.V.:575] Out: 1980 [TICJJ:3253] No intake/output data recorded. Physical Exam:  
Constitution:  the patient is well developed and in no acute distress EENMT:  Sclera clear, pupils equal, oral mucosa moist 
Respiratory: Right side absent. CTA anterior on NC Cardiovascular:  RRR without M,G,R 
Gastrointestinal: soft and non-tender; with positive bowel sounds. Musculoskeletal: warm without cyanosis. There is no lower leg edema. Skin:  no jaundice or rashes, no wounds Neurologic: no gross neuro deficits, alert and oriented x ppt CHEST XRAY:  
 
 
CXR Results  (Last 48 hours) 01/18/21 0618  XR CHEST SNGL V Final result Impression:  IMPRESSION:   
1. Unchanged right hydropneumothorax. 2. Improved perihilar left lung infiltrate, with minimal residual.  
  
 Narrative:  EXAM: Chest x-ray. INDICATION: Dyspnea. COMPARISON: Yesterday's chest x-ray. TECHNIQUE: Frontal view chest x-ray. FINDINGS: Right lung hydropneumothorax is unchanged.  Patchy perihilar left lung  
infiltrate has improved, with minimal residual. No left pneumothorax or pleural  
 effusion is seen. The cardiac size is stable. A right chest wall infusion port  
catheter remains in place. There are right chest wall skin staples. 01/17/21 0548  XR CHEST SNGL V Final result Impression:  IMPRESSION:   
1. Unchanged right hydropneumothorax. 2. New patchy edema or infiltrate in the left lung. Narrative:  EXAM: Chest x-ray. INDICATION: Dyspnea. COMPARISON: Yesterday's chest x-ray. TECHNIQUE: Frontal view chest x-ray. FINDINGS: There is unchanged right hydropneumothorax. New patchy edema or  
infiltrate is seen in the upper and perihilar left lung. Again noted is a right  
chest wall infusion port catheter. 01/16/21 1251  XR CHEST SNGL V Final result Impression:  IMPRESSION: Persistent right pleural effusion, surgical changes in the right  
chest.  
   
  
 Narrative:  Chest X-ray INDICATION: Follow-up chest tube removal  
   
A portable AP view of the chest was obtained. FINDINGS: The patient is post partial right pneumonectomy. Large effusion is  
still present in the right chest.  Left lung remains clear. The heart size is  
normal.  Vascular port in place. 1/12 
 
 
4 weeks prior LAB No lab exists for component: [place] Recent Labs  
  01/16/21 
9988 WBC 5.6 HGB 10.6* HCT 33.3*  
 Recent Labs  
  01/16/21 
2040 * K 4.1 CL 99  
CO2 34* GLU 90 BUN 8  
CREA 0.52* CA 8.8  
 
 
1/14 SARS-CoV-2 LAB Results [facility] Test:  
Lab Results Component Value Date/Time COV2NT Not Detected 06/30/2020 09:25 PM  
  
[facility] Test: No results found for: [facility] Test: No components found for: [id number] Assessment:  (Medical Decision Making) Hospital Problems  Date Reviewed: 1/13/2021 Codes Class Noted POA  
 S/P thoracotomy ICD-10-CM: P67.084 ICD-9-CM: V45.89  1/7/2021 Yes  * (Principal) Atelectasis of right lung ICD-10-CM: J98.11 
 ICD-9-CM: 518.0  1/4/2021 Yes A-fib (HCC) (Chronic) ICD-10-CM: I48.91 
ICD-9-CM: 427.31  1/4/2021 Yes Chronic respiratory failure with hypoxia (HCC) (Chronic) ICD-10-CM: J96.11 
ICD-9-CM: 518.83, 799.02  1/4/2021 Yes Pleural effusion on right ICD-10-CM: J90 ICD-9-CM: 511.9  12/28/2020 Yes Lung cancer ([address] (Chronic) ICD-10-CM: C34.90 ICD-9-CM: 162.9  7/23/2020 Yes Pulmonary emphysema (HCC) (Chronic) ICD-10-CM: J43.9 ICD-9-CM: 492.8  7/7/2020 Yes Patient's R lung s/p thoractomy with pneumonolysis but ongoing atelectasis. Bronch without significant mucus plugging. Part of apical segment with web blocking the airway. This is felt to likely be chronic but was discussed with surgery and as last resort attempt to break through this could be made, though it comes with risk of puncturing through the airway. As the patient is likely to have a completion pneumonectomy if nothing improves this was felt to be an acceptable risk. 1/12  Bronch- Web again seen. Cautery knife used to puncture through the web and green forceps used to then dilate the remaining tissue. The area beyond this was difficult to assess. It is so distal and the lumen so small could not get good visualization even after switching to the thinnest bronchoscope available. Plan:  (Medical Decision Making) --NC at 5 lpm - baseline is RA during the day at 2L at night for TAMERA 
--Right chest tube dressing dry & intact. --Doxycycline and zosyn 1/13 per ID.  
-- Plan per Dr Jori Hollis for completion pneumonectomy this week. Will plan to see after surgery, call if problems arise before then. Fidelia Cantrell, NP Lungs:  Less BS on the right Heart:  RRR with no Murmur/Rubs/Gallops Additional Comments:  Wean O2 and await surgery to do complete pneumonectomy. Will be on stand by. Call if needed I have spoken with and examined the patient. I agree with the above assessment and plan as documented. Efrain Lozano MD

## 2021-01-18 NOTE — PROGRESS NOTES
END OF SHIFT NOTE: 
 
INTAKE/OUTPUT 
01/17 0701 - 01/18 0700 In: 4382 [P.O.:540; I.V.:575] Out: 1405 [SPRGT:2233] Voiding: YES Catheter: NO 
Drain:   
 
 
 
 
 
Flatus: Patient does have flatus present. Stool:  0 occurrences. Characteristics: 
Stool Assessment Stool Color: Jocelyn Dire Stool Appearance: Formed Stool Amount: Large Stool Source/Status: Rectum Emesis: 0 occurrences. Characteristics: VITAL SIGNS Patient Vitals for the past 12 hrs: 
 Temp Pulse Resp BP SpO2  
01/18/21 0307 98.2 °F (36.8 °C) 68 16 101/66 95 % 01/17/21 2332 98.3 °F (36.8 °C) (!) 101 17 106/72 95 % 01/17/21 1928 98.4 °F (36.9 °C) 98 17 102/69 98 % Pain Assessment Pain Intensity 1: 0 (01/17/21 1900) Pain Location 1: Rib cage Pain Intervention(s) 1: Ambulation/Increased Activity, Repositioned Patient Stated Pain Goal: 0 Ambulating Yes Shift report given to oncoming nurse at the bedside.  
 
Martha Betancur RN

## 2021-01-18 NOTE — PROGRESS NOTES
Problem: Falls - Risk of 
Goal: *Absence of Falls Description: Document Irwin Sol Fall Risk and appropriate interventions in the flowsheet. Outcome: Progressing Towards Goal 
Note: Fall Risk Interventions: 
Mobility Interventions: Communicate number of staff needed for ambulation/transfer, Patient to call before getting OOB Mentation Interventions: Adequate sleep, hydration, pain control, Evaluate medications/consider consulting pharmacy, Family/sitter at bedside Medication Interventions: Patient to call before getting OOB, Teach patient to arise slowly Elimination Interventions: Call light in reach, Patient to call for help with toileting needs, Toileting schedule/hourly rounds Problem: Patient Education: Go to Patient Education Activity Goal: Patient/Family Education Outcome: Progressing Towards Goal 
  
Problem: Pressure Injury - Risk of 
Goal: *Prevention of pressure injury Description: Document Sidney Scale and appropriate interventions in the flowsheet. Outcome: Progressing Towards Goal 
Note: Pressure Injury Interventions: 
Sensory Interventions: Assess changes in LOC Moisture Interventions: Absorbent underpads Activity Interventions: Increase time out of bed, Pressure redistribution bed/mattress(bed type), PT/OT evaluation Mobility Interventions: HOB 30 degrees or less, Pressure redistribution bed/mattress (bed type), PT/OT evaluation Nutrition Interventions: Document food/fluid/supplement intake, Offer support with meals,snacks and hydration Friction and Shear Interventions: Lift sheet, Minimize layers Problem: Patient Education: Go to Patient Education Activity Goal: Patient/Family Education Outcome: Progressing Towards Goal 
  
Problem: Patient Education: Go to Patient Education Activity Goal: Patient/Family Education Outcome: Progressing Towards Goal

## 2021-01-18 NOTE — PROGRESS NOTES
Comprehensive Nutrition Assessment Type and Reason for Visit: Sunday Rife Nutrition Recommendations/Plan:  
? Continue Ensure Enlive (vanilla only) Malnutrition Assessment: 
Malnutrition Status: At risk for malnutrition (specify)(Potential ~10% weight loss over 6 months) Nutrition Assessment:  
Nutrition History: Pt reports variable intake since July/August due to treatment for cancer. Pt states that some times he is better able to tolerate food and sometimes he is not. When asked for diet recall patient states that it depends on the day and it can vary between 1 meal to 3 meals/day. Pt reports fluctuating weight throughout 2020 (confirmed via EMR). Pt reports usual weight of 205-210lb. EMR shows potential weight loss of 10.5% in ~6 months. Nutrition Background: Pt admitted with  RLL collapse. 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 repeat Chest CT on 12/22/2020 which revealed a large R pleural effusion and collapse of the remaining RUL. Pt underwent R thoracentesis on 12/28/2020. CT was placed 1/4 and removed 1/13. Pt awaiting pneumonectomy expected 1/20. Daily Update: 
Spoke to patient bedside today. Pt reports low appetite and minimal desire to eat. Pt states \"This is the first decent meal I've had here\" in reference to the [employer] plate sitting in front of him. Pt reports only consuming 25-50% of his meals since admission for the previously stated reasons. Pt reports he has been drinking 1 of the 3 Ensures he is receiving daily. Pt reports he sometimes receives a flavor besides vanilla. Discussed menu options with pt including availability of fresh eggs/egg options for breakfast (per patient complaint regarding scrambled eggs), options for lunch and dinner besides main entree, and more savory breakfast options (per patient's statement that he cannot tolerate sweet foods well right now). Spoke to patient regarding the benefit of Ensure for protein and calories while he is preparing for and recovering from surgery, especially if his meal intakes are low. Pt was agreeable to continuing Ensure Enlive (vanilla only) TID and liked the suggestion to drink them over ice. Nutrition Related Findings:  
No signs of fat or muscle loss noted. Current Nutrition Therapies: DIET NUTRITIONAL SUPPLEMENTS All Meals; Ensure Enlive ( ) DIET REGULAR Current Intake: Average Meal Intake: 26-50% Average Supplement Intake: 26-50%(Pt reports drinking 1 Ensure/day) Anthropometric Measures: 
Height: 5' 10\" (177.8 cm) Current Body Wt: 91.5 kg (201 lb 11.5 oz)(1/7), Weight source: Not specified BMI: 28.9, Overweight (BMI 25.0-29. 9) Admission Body Weight: 194 lb 14.2 oz(1/4, pt stated) Ideal Body Wt: 166 lbs (75 kg), 121.5 % Usual Body Wt: (Wt ranges from 226-194 pounds over 1 year.) Potential weight loss of 10.5% in ~6 months. Weight history per EMR: 
Pt weight was 218 lb during hospitalization 07/30/2021 and pt stated weight of 195lb (confirmed via EMR) upon admission 1/4/2021. Estimated Daily Nutrient Needs: 
Energy (kcal/day): 9166-8031 (Kcal/kg(20-25), Weight Used: Current(91.5 kg)) Protein (g/day): (20% kcal) Nutrition Diagnosis:  
· Inadequate oral intake related to increased demand for energy/nutrients(decreased appetite) as evidenced by weight loss(SCC, intake <50%needs, 10.5% wt loss in ~6 months) Nutrition Interventions:  
Food and/or Nutrient Delivery: Continue current diet, Continue oral nutrition supplement Coordination of Nutrition Care: Continue to monitor while inpatient Goals:  
Previous Goal Met: Progress towards goal(s) declining Active Goal: Meet >75% of needs within 5 days. Nutrition Monitoring and Evaluation:  
  
Food/Nutrient Intake Outcomes: Food and nutrient intake, Supplement intake Physical Signs/Symptoms Outcomes: Biochemical data Discharge Planning: Too soon to determine Oj Brink [address], Dietetic Intern, [phone] Disaster Mode active

## 2021-01-18 NOTE — PROGRESS NOTES
Infectious Disease Progress Note Today's Date: 2021 Admit Date: 2021 Impression: · New L sided patchy PNA seen on CT chest ; sputum cx () NRF 
· Loculated Right effusion: s/p VATS decortication on  and chest tube placement- no cx data; repeat bronch  and  secondary to RUL atelectasis · RLL SCC s/p RLL lobectomy on 2020 · Cough Plan:  
· Surgery plans pneumonectomy Wed. ·  Please send operative cultures aerobic/anaerobic - unclear if this loculated effusion was infected (no prior cultures), however likely is · Continue zosyn and doxy for atypical coverage. Plan for 7 days of doxy and zosyn eot . If operative cultures are positive, may need further targeted therapy post-op ·  ID will continue to follow  
  
Anti-infectives: · Zosyn (- 
· Doxy (- · Vanc () · Ancef (-) Subjective: Interval History: 
Sitting up in chair. NAD. Patient still with complaints of pain and pressure to R chest.  
 
 
Allergies Allergen Reactions  Albuterol Palpitations  Celebrex [Celecoxib] Itching Review of Systems:  A comprehensive review of systems was negative except for that written in the History of Present Illness. Objective:  
 
Visit Vitals BP 98/65 Pulse 78 Temp 97.7 °F (36.5 °C) Resp 18 Ht 5' 10\" (1.778 m) Wt 91.5 kg (201 lb 12.8 oz) SpO2 96% BMI 28.96 kg/m² Temp (24hrs), Av.2 °F (36.8 °C), Min:97.7 °F (36.5 °C), Max:98.8 °F (37.1 °C) Pertinent changes have been applied to assessment, plan, and physical exam. 
Lines:  R chest port Physical Exam:   
General:  Alert, cooperative, well noursished, well developed, appears stated age Eyes:  Sclera anicteric. Pupils equally round and reactive to light. Mouth/Throat: Mucous membranes normal, oral pharynx clear Neck: Supple Lungs:   Decreased lung sounds on right; on O2 via NC; dressing to previous chest tube sites CV:  Regular rate and rhythm,no murmur, click, rub or gallop Abdomen:   Soft, non-tender. bowel sounds normal. non-distended Extremities: No cyanosis or edema Skin: Skin color, texture, turgor normal. no acute rash or lesions Lymph nodes: Cervical and supraclavicular normal  
Musculoskeletal: No swelling or deformity Lines/Devices:  Clean, dry, intact Psych: Alert and oriented, normal mood affect given the setting Data Review: CBC: 
Recent Labs  
  21 
0333 WBC 5.6 GRANS 73 MONOS 8  
EOS 3 ANEU 4.1 ABL 0.8 HGB 10.6* HCT 33.3*  
 BMP: 
Recent Labs  
  21 
5611 CREA 0.52* BUN 8  
* K 4.1 CL 99  
CO2 34* AGAP 3*  
GLU 90 LFTS: 
No results for input(s): TBILI, ALT, AP, TP, ALB in the last 72 hours. No lab exists for component: SGOT Microbiology:  
 
All Micro Results Procedure Component Value Units Date/Time CULTURE, RESPIRATORY/SPUTUM/BRONCH Mason Porter [[id number]] Collected: 21 1210 Order Status: Completed Specimen: Sputum Updated: 01/15/21 0831 Special Requests: NO SPECIAL REQUESTS     
  GRAM STAIN 30 TO 50 WBC'S SEEN PER OIF  
   0 TO 2 EPITHELIAL CELLS SEEN PER OIF  
      
  MODERATE GRAM POSITIVE COCCI MODERATE GRAM POSITIVE RODS  
     
   FEW GRAM NEGATIVE RODS     
   FEW YEAST 4+ MUCUS PRESENT Culture result: MODERATE NORMAL RESPIRATORY EDU Imagin/13 Chest Xray Impression: 1. Stable postoperative changes throughout right hemithorax status post 
pneumonectomy.  CT Chest 
IMPRESSION: 
1. Right chest tubes in place with residual moderate right hydropneumothorax. There may be slightly increased aeration of the inferior portion of the residual 
right lung, however majority remains opacified.  It is somewhat difficult to 
distinguish lung parenchyma from pleural fluid due to lack of IV contrast. 
 2. New moderate area of groundglass opacity in the left upper lobe and 
tree-in-bud opacity in the left lower lobe which may be infectious or 
inflammatory. Signed By: Suhail Salinas NP   
 January 18, 2021

## 2021-01-19 NOTE — PROGRESS NOTES
Occupational Therapy Note: 
 
OT treatment attempted. Patient reports he would like to nap and that he is having major sx tomorrow so he is not worried about getting in shape today. Will check back as schedule permits and patient is able.   
 
Padmini Jorge, OTR/L

## 2021-01-19 NOTE — PROGRESS NOTES
END OF SHIFT NOTE: 
 
INTAKE/OUTPUT 
01/18 0701 - 01/19 0700 In: 328 [I.V.:328] Out: 900 [Urine:900] Voiding: YES Catheter: NO 
Drain:   
 
 
 
 
 
Flatus: Patient does have flatus present. Stool:  0 occurrences. Characteristics: 
Stool Assessment Stool Color: Almaraz Maki Stool Appearance: Formed Stool Amount: Large Stool Source/Status: Rectum Emesis: 0 occurrences. Characteristics: VITAL SIGNS Patient Vitals for the past 12 hrs: 
 Temp Pulse Resp BP SpO2  
01/19/21 0342 98.9 °F (37.2 °C) 99 18 101/66 95 % 01/18/21 2335 98.2 °F (36.8 °C) 91 18 99/63 96 % 01/18/21 1923 99 °F (37.2 °C) 94 18 110/71 96 % Pain Assessment Pain Intensity 1: 0 (01/18/21 0943) Pain Location 1: Rib cage Pain Intervention(s) 1: Ambulation/Increased Activity, Repositioned Patient Stated Pain Goal: 0 Ambulating No 
 
Shift report given to oncoming nurse at the bedside.  
 
Debra Espinoza RN

## 2021-01-19 NOTE — PROGRESS NOTES
END OF SHIFT NOTE: 
 
INTAKE/OUTPUT 
01/17 0701 - 01/18 0700 In: 7571 [P.O.:540; I.V.:575] Out: 1405 [EQZPM:9638] Voiding: YES Catheter: NO 
Drain:   
 
 
 
 
 
Flatus: Patient does have flatus present. Stool:  0 occurrences. Characteristics: 
Stool Assessment Stool Color: Wicho [place] Stool Appearance: Formed Stool Amount: Large Stool Source/Status: Rectum Emesis: 0 occurrences. Characteristics: VITAL SIGNS Patient Vitals for the past 12 hrs: 
 Temp Pulse Resp BP SpO2  
01/18/21 1527 98.9 °F (37.2 °C) 69 18 105/70 98 % 01/18/21 1139 98.7 °F (37.1 °C) 76 17 100/71 97 % Pain Assessment Pain Intensity 1: 0 (01/18/21 0943) Pain Location 1: Rib cage Pain Intervention(s) 1: Ambulation/Increased Activity, Repositioned Patient Stated Pain Goal: 0 Ambulating Yes Shift report given to oncoming nurse at the bedside.  
 
Lissy Jacques RN

## 2021-01-19 NOTE — PROGRESS NOTES
Infectious Disease Progress Note Today's Date: 2021 Admit Date: 2021 Impression: · New L sided patchy PNA seen on CT chest ; sputum cx () NRF 
· Loculated Right effusion: s/p VATS decortication on  and chest tube placement- no cx data; repeat bronch  and  secondary to RUL atelectasis · RLL SCC s/p RLL lobectomy on 2020 · Cough Plan:  
· Plan for pneumonectomy Wednesday: recommend intra-op cultures · Plan to discontinue Zosyn and Doxycycline today, and observe off 
 
  
Anti-infectives: · Zosyn (- 
· Doxy (- · Vanc () · Ancef (-) Subjective: Interval History: 
Resting in bed, reports no change in resp status, intermittent fever that is low grade Allergies Allergen Reactions  Albuterol Palpitations  Celebrex [Celecoxib] Itching Review of Systems:  A comprehensive review of systems was negative except for that written in the History of Present Illness. Objective:  
 
Visit Vitals BP 91/63 Pulse 71 Temp 98.9 °F (37.2 °C) Resp 18 Ht 5' 10\" (1.778 m) Wt 91.5 kg (201 lb 12.8 oz) SpO2 96% BMI 28.96 kg/m² Temp (24hrs), Av.8 °F (37.1 °C), Min:98.2 °F (36.8 °C), Max:99 °F (37.2 °C) Patient examined 2021, exam remains unchanged except as noted below: 
 
General:  Alert, cooperative, no acute distress, appears stated age Head:  Normocephalic, atraumatic Eyes:  Anicteric, no drainage, not injected, EOMI Throat: Mucus membranes moist OP clear Neck: Supple, symmetrical, trachea midline, no JVD Lungs:   R side diminished, 3LNC Heart:  Regular rate and rhythm, without audible murmur Abdomen:   Soft, non-tender, no guarding, no distention, bowel sounds active Extremities: Extremities normal, atraumatic, no cyanosis or edema Skin: Skin color, texture, turgor normal, no rashes or lesions. Lines/Devices: None Data Review: CBC: 
 No results for input(s): WBC, GRANS, MONOS, EOS, ANEU, ABL, HGB, HCT, PLT, HGBEXT, HCTEXT, PLTEXT, HGBEXT, HCTEXT, PLTEXT in the last 72 hours. No lab exists for component: LYMPHS,  RICHAR BMP: 
No results for input(s): CREA, BUN, NA, K, CL, CO2, AGAP, GLU in the last 72 hours. LFTS: 
No results for input(s): TBILI, ALT, AP, TP, ALB in the last 72 hours. No lab exists for component: SGOT Microbiology:  
 
All Micro Results Procedure Component Value Units Date/Time CULTURE, RESPIRATORY/SPUTUM/BRONCH Luanne Noel [[id number]] Collected: 01/13/21 1210 Order Status: Completed Specimen: Sputum Updated: 01/15/21 0831 Special Requests: NO SPECIAL REQUESTS     
  GRAM STAIN 30 TO 50 WBC'S SEEN PER OIF  
   0 TO 2 EPITHELIAL CELLS SEEN PER OIF  
      
  MODERATE GRAM POSITIVE COCCI MODERATE GRAM POSITIVE RODS  
     
   FEW GRAM NEGATIVE RODS     
   FEW YEAST 4+ MUCUS PRESENT Culture result: MODERATE NORMAL RESPIRATORY EDU Imaging:  
Reviewed Signed By: Karrie Choe NP   
 January 19, 2021

## 2021-01-19 NOTE — PROGRESS NOTES
ACUTE PHYSICAL THERAPY GOALS: 
(Developed with and agreed upon by patient and/or caregiver. ) LTG: 
(1.)Mr. Curly Akins will move from supine to sit and sit to supine , scoot up and down and roll side to side in bed with INDEPENDENT within 7 treatment day(s). (2.)Mr. Curly Akins will transfer from bed to chair and chair to bed with INDEPENDENT using the least restrictive device within 7 treatment day(s). (3.)Mr. Curly Akins will ambulate with INDEPENDENT for 150+ feet with the least restrictive device within 7 treatment day(s) while maintaining normal vital signs. (4.)Mr. Curly Akins will tolerated 23+ minutes of therapeutic activity within 7 treatment days for increased activity tolerance, while maintaining normal vital signs throughout session. PHYSICAL THERAPY: Daily Note and AM Treatment Day # 3 Gwen Bonilla is a 78 y.o. male PRIMARY DIAGNOSIS: Atelectasis of right lung Pleural effusion [J90] Collapse of right lung [J98.11] [facility] [C34.90] Procedure(s) (LRB): BRONCHOSCOPY (N/A) BRONCHOSCOPY WITH BIOPSY (N/A) BRONCHOSCOPY WITH AIRWAY DILATION (N/A) BRONCHOSCOPY W EXCISION OF TUMOR (N/A) 7 Days Post-Op ASSESSMENT:  
 
REHAB RECOMMENDATIONS: CURRENT LEVEL OF FUNCTION: 
(Most Recently Demonstrated) Recommendation to date pending progress: 
Setting: ? Home Health Therapy Equipment:  
? Rolling Walker Bed Mobility: 
? Supervision Sit to Stand: 
? Fatoumata Casillas Transfers: 
? Fatoumata Casillas Gait/Mobility: 
? Fatoumata Casillas ASSESSMENT: 
 Mr. Dowd demonstrates improved balance, activity tolerance/gait distance, and standing balance. Ambulatory for 300 ft in room and hallway with RW, slow pace, and cueing for posture, gait safety, and walker management/positioning. Returned to room for seated break, then performs standing static/dynamic functional activities to address balance, activity tolerance, and strength. Seated LE exercises + additional transfer training afterwards for strengthening. Overall good progress noted. Plans for additional surgery tomorrow. Will continue with therapy efforts, hopefully will continue to do well and can dc home with  PT.  
  
 
SUBJECTIVE:  
Mr. Dowd states, \"I'm a difficult one.\" 
 
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 
Home Environment: Private residence 
# Steps to Enter: 6 
One/Two Story Residence: One story 
Living Alone: No 
Support Systems: Spouse/Significant Other/Partner 
OBJECTIVE:  
 
PAIN: VITAL SIGNS: LINES/DRAINS:  
Pre Treatment: Pain Screen 
Pain Scale 1: Numeric (0 - 10) 
Pain Intensity 1: 0 
Post Treatment: 0/10 Vital Signs 
O2 Device: Nasal cannula 
O2 Flow Rate (L/min): 3 l/min IV 
O2 Device: Nasal cannula  
 
MOBILITY: I Mod I S SBA CGA Min Mod Max Total  NT x2 Comments:  
Bed Mobility   
Rolling [] [] [x] [] [] [] [] [] [] [] []   
Supine to Sit [] [] [x] [] [] [] [] [] [] [] []   
Scooting [] [] [x] [] [] [] [] [] [] [] []   
Sit to Supine [] [] [] [] [] [] [] [] [] [] []   
Transfers   
Sit to Stand [] [] [] [x] [] [] [] [] [] [] []   
Bed to Chair [] [] [] [x] [] [] [] [] [] [] []   
Stand to Sit [] [] [] [x] [] [] [] [] [] [] []   
I=Independent, Mod I=Modified Independent, S=Supervision, SBA=Standby Assistance, CGA=Contact Guard Assistance,  
Min=Minimal Assistance, Mod=Moderate Assistance, Max=Maximal Assistance, Total=Total Assistance, NT=Not Tested 
 
GAIT: I Mod I S SBA CGA Min Mod Max Total  NT x2 Comments:  
 Level of Assistance [] [] [] [x] [x] [] [] [] [] [] [] Distance 300 DME [place] Gait Quality Trunk sway Weightbearing Status N/A I=Independent, Mod I=Modified Independent, S=Supervision, SBA=Standby Assistance, CGA=Contact Guard Assistance,  
Min=Minimal Assistance, Mod=Moderate Assistance, Max=Maximal Assistance, Total=Total Assistance, NT=Not Tested PLAN:  
FREQUENCY/DURATION: PT Plan of Care: 3 times/week for duration of hospital stay or until stated goals are met, whichever comes first. 
TREATMENT:  
 
TREATMENT:  
($$ Therapeutic Activity: 23-37 mins 
$$ Therapeutic Exercises: 8-22 mins    ) Therapeutic Activity (30 Minutes): Therapeutic activity included Rolling, Supine to Sit, Transfer Training, Ambulation on level ground, Sitting balance , Standing balance and standing static/dynamic functional activities to improve functional Mobility, Strength, Activity tolerance and balance and posture. Therapeutic Exercise (8 Minutes): Therapeutic exercises noted below to improve functional activity tolerance, strength and mobility. Date: 
1/18/21 Date: 
 Date: Activity/Exercise Parameters Parameters Parameters LAQ 20x AB 20x AB Seated marching 20x AB 20x AB Ankle pumps 20x AB 20x AB Seated hip abduction 20x AB 20x AB Sit-stand transfers from chair  6x AFTER TREATMENT POSITION/PRECAUTIONS: 
Chair, Needs within reach and RN notified INTERDISCIPLINARY COLLABORATION: 
RN/PCT and PT/PTA TOTAL TREATMENT DURATION: 
PT Patient Time In/Time Out Time In: 4766 Time Out: 1120 Mayo Anthony DPT

## 2021-01-19 NOTE — ADT AUTH CERT NOTES
Pneumothorax - Care Day 15 (1/18/2021) by Ketty Cardenas 
 
  
Review Status Review Entered Completed 1/18/2021 12:29  
  
Criteria Review Care Day: 15 Care Date: 1/18/2021 Level of Care: Inpatient Floor Guideline Day 3 Clinical Status   
(X) * Hemodynamic stability 1/18/2021 12:29:37 EST by Ketty Cardenas   
  98/65; 98.7; 76; 17; 97% 4L n/c (X) * Mental status at baseline 1/18/2021 12:29:37 EST by Eliel Martini no issue noted ( ) * No evidence of infection requiring inpatient treatment   
( ) * Dyspnea absent or at baseline ( ) * Tachypnea absent   
( ) * Hypoxemia absent   
(X) * Pain absent or managed 1/18/2021 12:29:37 EST by Eliel Martini managed ( ) * Discharge plans and education understood Activity   
(X) * Ambulatory or acceptable for next level of care 1/18/2021 12:29:37 EST by Eliel Martini as tolerated Routes ( ) * Oral hydration, medications, and diet Interventions   
(X) * Chest tube absent [C]   
1/18/2021 12:29:37 EST by Ketty Cardenas   
  absent   
( ) * Oxygen absent * Milestone Additional Notes 1/18/2021 Continued Stay Review LOC-IP- Surgical Bed MEDS:  
Cordarone 200mg qd PO  
Pulmicort 500mcg bid NEBS Vibramycin 100mg q12hrs PO Lovenox 40mg qd SC Neurontin 300mg tid PO Mucinex 1200mg q12hrs PO Hycodan 5ml q4hrs prn PO x3 in 24hrs Protonix 40mg bid PO Zosyn 4.5g q8hrs IV Ultram 50mg q6hrs prn PO x2 [id number] MD Notes:  
  
   
01/18/21 POD 12  No significant changes noted in breathing, continues on 5 L oxygen. Complains about increased pressure noted in right chest and some increased cough.  Will plan surgery Wednesday. 01/17/21 POD 11. No changes in breathing since chest tubes out. Remains on 5 liters Oxygen upper 90's sat. Reports productive cough - clear mucous. CXR this am showing Unchanged right hydropneumothorax. New patchy edema or infiltrate in the left lung. Planning pneumonectomy for next week- possibly Monday or Wednesday.  
  
   
01/16/21 POD 10. No changes in breathing since chest tubes out. Remains on 5 liters Oxygen upper 90's sat. Planning pneumonectomy for next week- possible Monday. Principal Problem:  
  Atelectasis of right lung (1/4/2021)    
   
  S/P thoracotomy (1/7/2021)  
   
   
Plan Plan pneumonectomy Wednesday Cxr daily Regular Diet Activity as tolerated Pain control Encourage C/DB/IS Oxygen as needed Chest tubes removed 1/13/21 Current Labs: WBC: 5.6 RBC: 3.79 (L) HGB: 10.6 (L) HCT: 33.3 (L) MCV: 87.9 MCH: 28.0 MCHC: 31.8  
RDW: 15.0 (H) PLATELET: 871 MPV: 9.8 NEUTROPHILS: 73  
LYMPHOCYTES: 15 MONOCYTES: 8  
EOSINOPHILS: 3  
BASOPHILS: 0 IMMATURE GRANULOCYTES: 1  
DF: AUTOMATED ABSOLUTE NRBC: 0.00  
ABS. NEUTROPHILS: 4.1  
ABS. IMM. GRANS.: 0.0  
ABS. LYMPHOCYTES: 0.8 ABS. MONOCYTES: 0.4  
ABS. EOSINOPHILS: 0.2  
ABS. BASOPHILS: 0.0 Sodium: 136 (L) Potassium: 4.1 Chloride: 99  
CO2: 34 (H) Anion gap: 3 (L) Glucose: 90 BUN: 8 Creatinine: 0.52 (L) Calcium: 8.8 GFR est non-AA: >60  
GFR est AA: >60  
  
   
  
  
Pneumothorax - Care Day 12 (1/15/2021) by Mayra Rojas 
 
  
Review Status Review Entered Completed 1/15/2021 12:38  
  
Criteria Review Care Day: 12 Care Date: 1/15/2021 Level of Care: Inpatient Floor Guideline Day 3 Clinical Status   
(X) * Hemodynamic stability 1/15/2021 12:36:57 EST by Mayra Rojas   
  106/72; 98.3; 89; 19; 98% on 5L n/c (X) * Mental status at baseline 1/15/2021 12:36:57 EST by Mayra Rojas   
  no issue noted (X) * No evidence of infection requiring inpatient treatment 1/15/2021 12:36:57 EST by iMchael Andre none noted ( ) * Dyspnea absent or at baseline ( ) * Tachypnea absent   
( ) * Hypoxemia absent   
(X) * Pain absent or managed 1/15/2021 12:36:57 EST by Michael Andre managed ( ) * Discharge plans and education understood Activity   
(X) * Ambulatory or acceptable for next level of care 1/15/2021 12:36:57 EST by Michael Andre as tolerated PT Routes ( ) * Oral hydration, medications, and diet Interventions   
(X) * Chest tube absent [C]   
1/15/2021 12:36:57 EST by Gaurang Joegar   
  absent   
( ) * Oxygen absent * Milestone Additional Notes 1/15/2021 Continued Stay Review LOC-IP- Surgical Bed MEDS:  
Cordarone 200mg qd PO  
Pulmicort 500mcg bid NEBS Vibramycin 100mg q12hrs PO Lovenox 40mg qd SC Neurontin 300mg tid PO Mucinex 1200mg q12hrs PO Hycodan 5ml q4hrs prn PO Percocet 5-325mg 1 q4hrs prn PO x1 [id number] Protonix 40mg bid PO Zosyn 4.5g q8hrs IV Ultram 50mg q6hrs prn PO x2 [id number] MD Notes:   
  
01/15/21 POD 9. No changes in breathing since chest tubes out. ON 5 liters Oxygen upper 90's sat. Chest xray reviewed- no changes- will plan pneumonectomy for next week- possible Monday- states cough is better ASSESSMENT/PLAN:  
  
S/P thoracotomy   
    
  * (Principal) Atelectasis of right lung   
    
  A-fib (HCC) (Chronic)   
    
  Chronic respiratory failure with hypoxia (HCC) (Chronic)   
  
   
Plan Plan pneumonectomy next week- possibly Monday or Wednesday Cxr daily Regular Diet Activity as tolerated Pain control Encourage C/DB/IS Oxygen as needed Chest tubes removed 21 SAADIA Howell R/O covid negative LABS:  
  
1/15/2021 04:48 WBC: 5.7 RBC: 3.91 (L) HGB: 11.1 (L) HCT: 34.8 (L) MCV: 89.0 MCH: 28.4 MCHC: 31.9  
RDW: 14.8 (H) PLATELET: 686 (L) MPV: 9.7 NEUTROPHILS: 73  
LYMPHOCYTES: 14 MONOCYTES: 9  
EOSINOPHILS: 3  
BASOPHILS: 0 IMMATURE GRANULOCYTES: 1  
DF: AUTOMATED ABSOLUTE NRBC: 0.00  
ABS. NEUTROPHILS: 4.2  
ABS. IMM. GRANS.: 0.0  
ABS. LYMPHOCYTES: 0.8 ABS. MONOCYTES: 0.5  
ABS. EOSINOPHILS: 0.2  
ABS. BASOPHILS: 0.0 Sodium: 138 Potassium: 3.9 Chloride: 100 CO2: 33 (H) Anion gap: 5 (L) Glucose: 108 (H) BUN: 10 Creatinine: 0.51 (L) Calcium: 8.9 GFR est non-AA: >60  
GFR est AA: >60 CXR: IMPRESSION: Right hydropneumothorax and atelectasis of the remaining right lung  
unchanged.  
  
  
Pneumothorax - Care Day 11 (1/14/2021) by Ketty Cardenas 
 
  
Review Status Review Entered Completed 1/14/2021 15:00  
  
Criteria Review Care Day: 11 Care Date: 1/14/2021 Level of Care: Inpatient Floor Guideline Day 3 Clinical Status   
(X) * Hemodynamic stability 1/14/2021 14:54:56 EST by Ketty Cardenas   
  98/71; 98.2; 97; 18; 96% on 5L n/c (X) * Mental status at baseline 1/14/2021 14:54:56 EST by Ketty Cardenas   
  no issue noted ( ) * No evidence of infection requiring inpatient treatment   
( ) * Dyspnea absent or at baseline ( ) * Tachypnea absent   
( ) * Hypoxemia absent   
(X) * Pain absent or managed 1/14/2021 14:54:56 EST by Eliel Martini managed with percocet ( ) * Discharge plans and education understood Activity ( ) * Ambulatory or acceptable for next level of care 1/14/2021 14:54:56 EST by Ketty Cardenas   
  bedrest   
Routes ( ) * Oral hydration, medications, and diet Interventions   
(X) * Chest tube absent [C]   
1/14/2021 15:00:19 EST by Ketty Cardenas   
  Chest tubes removed per surgery. ( ) * Oxygen absent * Milestone Additional Notes 1/14/2021 Continued Stay Review LOC-IP- Surgical Bed MEDS:  
Cordarone 200mg qd PO  
Pulmicort 500mcg bid NEBS Vibramycin 100mg q12hrs PO Neurontin 300mg tid PO Mucinex 1200mg q12hrs PO  
 Hycodan 5ml q4hrs prn PO Percocet 5-325mg 1 q4hrs prn PO x2 [id number] Protonix 40mg bid PO Zosyn 4.5g q8hrs IV Ultram 50mg q6hrs prn PO x1  
  
MD Notes:   
  
01/14/21 POD 8. No changes in breathing since chest tubes out. ON 5 liters Oxygen upper 90's sat. Chest xray reviewed- no changes- will plan pneumonectomy for next week- possible Monday- states cough is better Chest tubes removed per surgery.  
   
  
Plan Plan pneumonectomy next week- possibly Monday Cxr daily Regular Diet Activity as tolerated Pain control Encourage C/DB/IS Oxygen as needed Chest tubes removed 1/13/21 SAADIA Howell R/O covid pending LABS:  
  
1/14/2021 04:46 WBC: 5.9  
RBC: 4.02 (L) HGB: 11.5 (L) HCT: 35.1 (L) MCV: 87.3 MCH: 28.6 MCHC: 32.8  
RDW: 15.0 (H) PLATELET: 616 (L) MPV: 9.0 (L) NEUTROPHILS: 76  
LYMPHOCYTES: 13 MONOCYTES: 7  
EOSINOPHILS: 4 BASOPHILS: 0 IMMATURE GRANULOCYTES: 0  
DF: AUTOMATED ABSOLUTE NRBC: 0.00  
ABS. NEUTROPHILS: 4.5  
ABS. IMM. GRANS.: 0.0  
ABS. LYMPHOCYTES: 0.8 ABS. MONOCYTES: 0.4  
ABS. EOSINOPHILS: 0.2  
ABS. BASOPHILS: 0.0 Sodium: 137 (L) Potassium: 3.8 Chloride: 100 CO2: 33 (H) Anion gap: 4 (L) Glucose: 103 (H) BUN: 10 Creatinine: 0.42 (L) Calcium: 8.6 GFR est non-AA: >60  
GFR est AA: >60

## 2021-01-19 NOTE — ANESTHESIA PREPROCEDURE EVALUATION
Anesthetic History No history of anesthetic complications Review of Systems / Medical History Patient summary reviewed and pertinent labs reviewed Pulmonary COPD: mild Pertinent negatives: No smoker (former, quit 7 years ago) Comments: Lung CA s/p resection of R middle and R lower lobes and CXRT Normally on O2 QHS Neuro/Psych Within defined limits Cardiovascular Hypertension Dysrhythmias (normally on eliquis - held since admission) : atrial fibrillation Hyperlipidemia Exercise tolerance: >4 METS: Denied chest pain, SOB, syncope Comments: Echo 12/20 - EF 50%, no sig valve dz  
GI/Hepatic/Renal 
  
GERD: well controlled Endo/Other Diabetes Obesity, arthritis, cancer (lung) and anemia Other Findings Comments: Recurrent hemothorax s/p VATS and thoracentesis and chest tube (since removed) Physical Exam 
 
Airway Mallampati: II 
TM Distance: > 6 cm Neck ROM: normal range of motion Mouth opening: Normal 
 
 Cardiovascular Rhythm: regular Rate: abnormal 
 
 
 
Comments: tachycardia Dental 
 
Dentition: Caps/crowns Pulmonary Comments: Decreased breath sounds on the R and mild coarse breath sounds on the L Abdominal 
GI exam deferred Other Findings Anesthetic Plan ASA: 3 Anesthesia type: general 
 
Monitoring Plan: Arterial line Induction: Intravenous Anesthetic plan and risks discussed with: Patient Discussed possible blood products and possible post-op ventilation/ICU. Patient endorsed understanding and wishes to proceed. T&C for 2 units.

## 2021-01-19 NOTE — PROGRESS NOTES
H&P/Consult Note/Progress Note/Office Note:  
Mely Fontenot MRN: [id number]  :1950  Age:75 y.o. 
 
HPI: Mely Fontenot is a 78 y.o. male who is well known to Dr. Catracho Kendrick.  He underwent right thoracotomy with lower and middle lobectomy 20 for squamous cell carcinoma. He underwent neoaduvant chemo/radiation and adjuvant chemo. .  CT 20 showed large pleural effusion and collapse of RUL. He underwent thoracentesis by Dr. Coco Guerra 20 with approximately drainage of 900ml, but was unable to completely drain related to complaints of pain. He felt should hold Eliquis for admission for Chest tube placement. Dr. Jim Escobar placed chest tube this AM 21. General surgery was consulted for complicated right pleural effusion, ?hemothorax not draining properly with chest tube placement. On exam, Mr. Christopher Enriquez appears comfortable.  CT scan reviewed and current chest xray, NAD. On 3 L NC sats upper 90's. Chest tube maintained with 800ml serosanguineous drainage noted. Remains on 20cmsx. Patient states since his lobectomy he has felt \"pressure\". He does use 2L oxygen at night. She states he does feel like he can breath better since this chest tube has been placed. His lost dose of eliquis was Friday (for A-fib). He does have smoking history and quit about 6 years ago 21 Patient states he is breathing better since chest tube. NAD on 3L NC with 98% sat. Chest xray reviewed and fluid collection appears loculated- will need surgery- plan for tomorrow. 21 To the OR yesterday PO day1. Difficult surgery. Consulted pulmonary intraoperatively for broch. Unable to get lung to inflate intraoperative, ?chronic mucous plug. Pt in ICU, no complaints this AM- scheduled for bronch this AM. Chest tubes to water seal currently- no air leak- appears lung still collapsed from CXR. Pain controlled. 01/08/21 POD 2. Pt remains in ICU, no complaints this AM. Pain controlled. Chest tubes to water seal currently- no air leak. CXR this am showing No Significant Interval Change. 01/09/21 POD 3. Pt remains in ICU, no complaints this AM. Pain controlled. Chest tubes currently clamped. CXR this am showing No Significant Interval Change. Denies SOB. On 3L o2. 
 
01/11/21  POD 5  Chest tube remains clamped. NO changes in chest xray. Dr. Aletha Holstein spoke with Dr. Yousuf Hinds regarding trying again to clear airway to re inflate lung if unsuccessful then will need a pneumonectomy. Patient in NAD, No SOB on 3 L oxygen with sats upper 90's and continues to be hypotensive at times. Hgb trending down will transfuse 2u pRBC 
 
01/12/21 POD 6 just returned from pulmonary procedure. Chest tubes to suction. States feels less pressure, but no other changes noted. No air leak noted in chest tubes- awaiting chest CT this afternoon. NAD, No SOB. Hgb 10 
 
01/13/21 POD 7. No significant changes on CT to right lung. Left lung ?pneumonia. Will discuss with pulmonary if any further treatment or proceed to pneumonectomy. Chest tubes maintained clamped. 6 liters oxygen 01/14/21 POD 8. No changes in breathing since chest tubes out. ON 5 liters Oxygen upper 90's sat. Chest xray reviewed- no changes- will plan pneumonectomy for next week- possible Monday- states cough is better 01/15/21 POD 9. No changes in breathing since chest tubes out. ON 5 liters Oxygen upper 90's sat. Chest xray reviewed- no changes- will plan pneumonectomy for next week- possible Monday- states cough is better 01/16/21 POD 10. No changes in breathing since chest tubes out. Remains on 5 liters Oxygen upper 90's sat. Planning pneumonectomy for next week- possible Monday. 01/17/21 POD 11. No changes in breathing since chest tubes out. Remains on 5 liters Oxygen upper 90's sat. Reports productive cough - clear mucous. CXR this am showing Unchanged right hydropneumothorax. New patchy edema or infiltrate in the left lung. Planning pneumonectomy for next week- possibly Monday or Wednesday. 01/18/21 POD 12  No significant changes noted in breathing, continues on 5 L oxygen. Complains about increased pressure noted in right chest and some increased cough. Will plan surgery Wednesday. 01/19/21 No changes. For surgery in the AM. T&C 2 units for tomorrow Medical history as below Past Medical History:  
Diagnosis Date  GERD (gastroesophageal reflux disease)   
 controlled with med  Hypertension hx-- off meds since 4/2020--- followed by dr. Lazara Concepcion  Hypoxia 02/24/2020  Malignant neoplasm of lower lobe of right lung ([facility] [address] 02/26/2020 REC'D CHEMO AND RADIATION--- FOLLOWED BY DR. Deja Colin Osteoarthritis  Right lower lobe lung mass 02/24/2020  Status post total right knee replacement 2/29/2016 Past Surgical History:  
Procedure Laterality Date  HX COLONOSCOPY    
 HX KNEE ARTHROSCOPY Left   
 scope X 1, ACL recon X 1  
 HX KNEE ARTHROSCOPY Right 1974, 1975 X 2   
 HX KNEE REPLACEMENT Right 2016 [address]  HX VASCULAR ACCESS Right placed 3/2020  
 port in chest   
 IR INSERT TUNL CVC W PORT OVER 5 YEARS  3/18/2020  [place] SINUS SURGERY PROC UNLISTED  1988, 1991  
 sinus surg Current Facility-Administered Medications Medication Dose Route Frequency  [START ON 1/20/2021] ceFAZolin (ANCEF) 2 g/20 mL in sterile water IV syringe  2 g IntraVENous ON CALL TO OR  
 0.9% sodium chloride infusion 250 mL  250 mL IntraVENous PRN  
 alcohol 62% (NOZIN) nasal  1 Ampule  1 Ampule Topical Q12H  
 [Held by provider] enoxaparin (LOVENOX) injection 40 mg  40 mg SubCUTAneous Q24H  piperacillin-tazobactam (ZOSYN) 4.5 g in 0.9% sodium chloride (MBP/ADV) 100 mL MBP  4.5 g IntraVENous Q8H  
 doxycycline (VIBRAMYCIN) capsule 100 mg  100 mg Oral Q12H  
 sodium chloride (NS) flush 5-40 mL  5-40 mL IntraVENous Q8H  
 sodium chloride (NS) flush 5-40 mL  5-40 mL IntraVENous PRN  
 0.9% sodium chloride infusion 250 mL  250 mL IntraVENous PRN  
 HYDROcodone-homatropine (HYCODAN) 5-1.5 mg/5 mL (5 mL) oral solution 5 mL  5 mL Oral Q4H PRN  
 bisacodyL (DULCOLAX) suppository 10 mg  10 mg Rectal DAILY PRN  
 midodrine (PROAMATINE) tablet 10 mg  10 mg Oral TID WITH MEALS  traMADoL (ULTRAM) tablet 50 mg  50 mg Oral Q6H PRN  
 acetaminophen (TYLENOL) tablet 500 mg  500 mg Oral Q6H PRN  
 HYDROmorphone (PF) (DILAUDID) injection 1 mg  1 mg IntraVENous Q2H PRN  
 guaiFENesin ER (MUCINEX) tablet 1,200 mg  1,200 mg Oral Q12H  
 amiodarone (CORDARONE) tablet 200 mg  200 mg Oral DAILY  [Held by provider] apixaban (ELIQUIS) tablet 5 mg  5 mg Oral Q12H  pantoprazole (PROTONIX) tablet 40 mg  40 mg Oral ACB&D  
 gabapentin (NEURONTIN) capsule 300 mg  300 mg Oral TID  levalbuterol tartrate (XOPENEX) 45 mcg/actuation inhaler HFAA 2 Puff- pt to supply (Patient Supplied)  2 Puff Inhalation Q4H PRN  
 ondansetron (ZOFRAN ODT) tablet 8 mg  8 mg Oral Q8H PRN  polyethylene glycol (MIRALAX) packet 17 g  17 g Oral DAILY  sodium chloride (NS) flush 5-40 mL  5-40 mL IntraVENous Q8H  
 sodium chloride (NS) flush 5-40 mL  5-40 mL IntraVENous PRN  
 oxyCODONE-acetaminophen (PERCOCET) 5-325 mg per tablet 1 Tab  1 Tab Oral Q4H PRN  
 influenza vaccine 2020-21 (6 mos+)(PF) (FLUARIX/FLULAVAL/FLUZONE QUAD) injection 0.5 mL  0.5 mL IntraMUSCular PRIOR TO DISCHARGE Albuterol and Celebrex [celecoxib] Social History Socioeconomic History  Marital status:  Spouse name: Not on file  Number of children: Not on file  Years of education: Not on file  Highest education level: Not on file Tobacco Use  Smoking status: Former Smoker Packs/day: 1.00 Years: 20.00 Pack years: 20.00 Quit date:  Years since quittin.0  Smokeless tobacco: Never Used Substance and Sexual Activity  Alcohol use: Yes Alcohol/week: 4.0 standard drinks Types: 4 Glasses of wine per week  Drug use: No  
Other Topics Concern   Service No  
 Blood Transfusions No  
 Caffeine Concern No  
 Occupational Exposure No  
 Hobby Hazards No  
 Sleep Concern No  
 Stress Concern No  
 Weight Concern No  
 Special Diet No  
 Exercise Yes  [facility] Yes Social History Narrative . Has long-time girlfriend. Continues to work doing IT support. Social History Tobacco Use Smoking Status Former Smoker  Packs/day: 1.00  Years: 20.00  Pack years: 20.00  Quit date:   Years since quittin.0 Smokeless Tobacco Never Used Family History Problem Relation Age of Onset  Cancer Mother   
     uterine  Arrhythmia Sister   
     afib ROS: The patient has no difficulty with chest pain or shortness of breath. No fever or chills. Comprehensive review of systems was otherwise unremarkable except as noted above. Physical Exam:  
Visit Vitals BP 95/61 Pulse 72 Temp 97.3 °F (36.3 °C) Resp 17 Ht 5' 10\" (1.778 m) Wt 201 lb 12.8 oz (91.5 kg) SpO2 96% BMI 28.96 kg/m² Constitutional: Alert, oriented, cooperative patient in no acute distress; appears stated age Eyes: Sclera are clear. EOMs intact ENMT: no external lesions gross hearing normal; no obvious neck masses, no ear or lip lesions, nares normal 
CV: RRR. Normal perfusion Resp: No JVD. Breathing is  non-labored; no audible wheezing. O2 3 L 
GI: soft and non-distended Musculoskeletal: unremarkable with normal function. No embolic signs or cyanosis. Neuro:  Oriented; moves all 4; no focal deficits Psychiatric: normal affect and mood, no memory impairment Recent vitals (if inpt): 
Patient Vitals for the past 24 hrs: 
 BP Temp Pulse Resp SpO2  
01/19/21 1133 95/61 97.3 °F (36.3 °C) 72 17 96 % 01/19/21 0731 91/63 98.9 °F (37.2 °C) 71 18 96 % 01/19/21 0342 101/66 98.9 °F (37.2 °C) 99 18 95 % 01/18/21 2335 99/63 98.2 °F (36.8 °C) 91 18 96 % 01/18/21 1923 110/71 99 °F (37.2 °C) 94 18 96 % 01/18/21 1527 105/70 98.9 °F (37.2 °C) 69 18 98 % XR Results (most recent): 
Results from Hospital Encounter encounter on 01/04/21 XR CHEST SNGL V  
 Narrative EXAM: Chest x-ray. INDICATION: Dyspnea. COMPARISON: Yesterday's chest x-ray. TECHNIQUE: Frontal view chest x-ray. FINDINGS: Right lung hydropneumothorax is unchanged. Patchy perihilar left lung 
infiltrate has improved, with minimal residual. No left pneumothorax or pleural 
effusion is seen. The cardiac size is stable. A right chest wall infusion port 
catheter remains in place. There are right chest wall skin staples. Impression IMPRESSION:  
1. Unchanged right hydropneumothorax. 2. Improved perihilar left lung infiltrate, with minimal residual.  
 
 
 
CT Results (most recent): 
Results from Hospital Encounter encounter on 01/04/21 CT CHEST WO CONT Narrative EXAMINATION: CT CHEST WO CONT 1/12/2021 1:07 PM 
 
INDICATION: Shortness of breath. Evaluate for change in right upper lobe 
atelectasis COMPARISON: Chest CT December 22, 2020 and chest radiograph January 12, 2020 TECHNIQUE: Multiple contiguous axial CT images of the chest were obtained from 
the lung apices to the lung bases without intravenous contrast. Coronal 
reformats are provided. Radiation dose reduction techniques were used for this study. Our CT scanners 
use one or all of the following: Automated exposure control, adjustment of the 
mA and/or kV according to patient size, iterative reconstruction.  
 
FINDINGS: 
 Lower Neck: Right chest port terminates at the cavoatrial junction. Chest Soft tissues: No significant abnormality. Heart/Mediastinum: Mediastinum remains shifted to the right. No significant 
pericardial abnormality. Normal caliber thoracic aorta. Lungs/pleura: There are 2 right chest tubes in place. Since CT chest on December 22, 2020 and there is significant decrease in right pleural fluid. There may be 
a few areas of aerated lung in the anterior portion of the residual right lung, 
however a majority remains opacified. Layering secretions are seen in the right 
main bronchus which is partially occluded. There is a persistent right 
hydropneumothorax. There is new moderate size area of groundglass opacity in the left upper lobe 
and small area of tree-in-bud opacity in the left lower lobe. Visualized upper abdomen: Cholelithiasis. Osseous structures: No suspicious osseous lesion. Impression IMPRESSION: 
1. Right chest tubes in place with residual moderate right hydropneumothorax. There may be slightly increased aeration of the inferior portion of the residual 
right lung, however majority remains opacified. It is somewhat difficult to 
distinguish lung parenchyma from pleural fluid due to lack of IV contrast. 
2. New moderate area of groundglass opacity in the left upper lobe and 
tree-in-bud opacity in the left lower lobe which may be infectious or 
inflammatory. Labs: 
No results for input(s): WBC, HGB, PLT, NA, K, CL, CO2, BUN, CREA, GLU, PTP, INR, APTT, TBIL, TBILI, CBIL, ALT, AP, AML, LPSE, LCAD, NH4, TROPT, TROIQ, PCO2, PO2, HCO3, HGBEXT, PLTEXT, INREXT, HGBEXT, PLTEXT, INREXT in the last 72 hours. No lab exists for component: SGOT, GPT,  PH Lab Results Component Value Date/Time  WBC 5.6 01/16/2021 03:33 AM  
 HGB 10.6 (L) 01/16/2021 03:33 AM  
 PLATELET 838 19/90/3278 03:33 AM  
 Sodium 136 (L) 01/16/2021 03:33 AM  
 Potassium 4.1 01/16/2021 03:33 AM  
 Chloride 99 01/16/2021 03:33 AM  
 CO2 34 (H) 01/16/2021 03:33 AM  
 BUN 8 01/16/2021 03:33 AM  
 Creatinine 0.52 (L) 01/16/2021 03:33 AM  
 Glucose 90 01/16/2021 03:33 AM  
 INR 1.1 01/05/2021 04:02 PM  
 aPTT 35.2 (H) 01/05/2021 04:02 PM  
 Bilirubin, total 0.5 01/05/2021 06:26 AM  
 Bilirubin, direct 0.8 (H) 05/05/2020 02:27 PM  
 ALT (SGPT) 8 (L) 01/05/2021 06:26 AM  
 Alk. phosphatase 99 01/05/2021 06:26 AM  
 Troponin-I, Qt. <0.02 (L) 05/05/2020 01:09 AM  
 
 
I reviewed recent labs and recent radiologic studies. I independently reviewed radiology images for studies I described above or studies I have ordered. Admission date (for inpatients): 1/4/2021 Day of Surgery  Procedure(s) with comments: BRONCHOSCOPY 
BRONCHOSCOPY WITH BIOPSY - forceps used to open RUL apical segment BRONCHOSCOPY WITH AIRWAY DILATION 
BRONCHOSCOPY W EXCISION OF TUMOR 
 
ASSESSMENT/PLAN: 
Problem List  Date Reviewed: 1/13/2021 Codes Class Noted S/P thoracotomy ICD-10-CM: U13.003 ICD-9-CM: V45.89  1/7/2021 * (Principal) Atelectasis of right lung ICD-10-CM: J98.11 ICD-9-CM: 518.0  1/4/2021 A-fib (HCC) (Chronic) ICD-10-CM: I48.91 
ICD-9-CM: 427.31  1/4/2021 Chronic respiratory failure with hypoxia (HCC) (Chronic) ICD-10-CM: J96.11 
ICD-9-CM: 518.83, 799.02  1/4/2021 Pleural effusion on right ICD-10-CM: J90 ICD-9-CM: 511.9  12/28/2020 Chemotherapy induced neutropenia (HCC) ICD-10-CM: D70.1, T45.1X5A 
ICD-9-CM: 288.03, E933.1  10/23/2020 Dehydration ICD-10-CM: E86.0 ICD-9-CM: 276.51  9/15/2020 S/P lobectomy of lung ICD-10-CM: Z90.2 ICD-9-CM: V45.89  7/29/2020 Cough ICD-10-CM: R05 ICD-9-CM: 786.2  7/29/2020 Atrial tachycardia (HCC) ICD-10-CM: I47.1 ICD-9-CM: 427.89  7/26/2020 Cancer of bronchus of right lower lobe [facility]) ICD-10-CM: C34.31 
ICD-9-CM: 162.5  7/23/2020 Lung cancer ([facility] [address] (Chronic) ICD-10-CM: C34.90 ICD-9-CM: 162.9  7/23/2020 Cancer of right lung [facility]) ICD-10-CM: C34.91 
ICD-9-CM: 162.9  7/23/2020 Pulmonary emphysema (HCC) (Chronic) ICD-10-CM: J43.9 ICD-9-CM: 492.8  7/7/2020 GERD (gastroesophageal reflux disease) ICD-10-CM: K21.9 ICD-9-CM: 530.81  Unknown Hypotension (Chronic) ICD-10-CM: I95.9 ICD-9-CM: 458.9  5/5/2020 Overview Signed 1/11/2021  8:39 AM by Sid Anguiano NP On midodrine Sepsis due to undetermined organism [facility]) ICD-10-CM: A41.9 ICD-9-CM: 038.9  5/5/2020 Malignant neoplasm of lower lobe of right lung (HCC) (Chronic) ICD-10-CM: C34.31 
ICD-9-CM: 162.5  2/26/2020 Overview Signed 1/4/2021  7:38 AM by Michi Hand MD  
  Dec 2020- Echo EF 50%, technically difficult, cannot assess regional wall motion. Aortic root 4.1 cm. No significant valvular disease. Normal DF Mass of lower lobe of right lung ICD-10-CM: R91.8 ICD-9-CM: 786.6  2/23/2020 Right lower lobe lung mass ICD-10-CM: R91.8 ICD-9-CM: 786.6  2/23/2020 Essential hypertension with goal blood pressure less than 130/80 (Chronic) ICD-10-CM: I10 
ICD-9-CM: 401.9  2/19/2018 Principal Problem: 
  Atelectasis of right lung (1/4/2021) Active Problems: 
  Pulmonary emphysema ([facility] [address] (7/7/2020) [facility] (7/23/2020) Pleural effusion on right (12/28/2020) A-fib ([facility] [address] (1/4/2021) Chronic respiratory failure with hypoxia ([facility] [address] (1/4/2021) S/P thoracotomy (1/7/2021) Plan Plan pneumonectomy Tomorrow Cxr daily NPO p MN Activity as tolerated Pain control Encourage C/DB/IS Oxygen as needed Signed:  Enid Child, NP

## 2021-01-20 NOTE — PERIOP NOTES
TRANSFER - OUT REPORT: 
 
Verbal report given to Cadence Miguel on [employer].  being transferred to [facility] for routine post - op Report consisted of patients Situation, Background, Assessment and  
Recommendations(SBAR). Information from the following report(s) SBAR, OR Summary, Procedure Summary, Intake/Output, MAR and Recent Results was reviewed with the receiving nurse. Lines:  
Venous Access Device chest port  03/18/20 Upper chest (subclavicular area, right (Active) Central Line Being Utilized Yes 01/20/21 1322 Criteria for Appropriate Use Long term IV/antibiotic administration 01/20/21 1322 Site Assessment Clean, dry, & intact 01/20/21 1322 Date of Last Dressing Change 01/12/21 01/19/21 0050 Dressing Status Clean, dry, & intact 01/20/21 1322 Dressing Type Tape;Transparent;Disk with Chlorhexadine gluconate (CHG) 01/20/21 1322 Action Taken Dressing changed 01/12/21 1515 Access Needle Size (Site #1) 20 G 01/06/21 0919 Access Needle Length (Medial Site) 0.75 inches 01/06/21 0919 Positive Blood Return (Medial Site) Yes 01/19/21 1335 Action Taken (Medial Site) Flushed 01/19/21 1335 Peripheral IV 01/20/21 Right Hand (Active) Site Assessment Clean, dry, & intact 01/20/21 1322 Phlebitis Assessment 0 01/20/21 1322 Infiltration Assessment 0 01/20/21 1322 Dressing Status Clean, dry, & intact 01/20/21 1322 Dressing Type Tape;Transparent 01/20/21 1322 Hub Color/Line Status Patent 01/20/21 1322 Arterial Line 01/20/21 Left Radial artery (Active) Site Assessment Clean, dry, & intact 01/20/21 1322 Dressing Status Clean, dry, & intact 01/20/21 1322 Dressing Type Tape;Transparent 01/20/21 1322 Line Status Intact and in place 01/20/21 1322 Treatment Arm board on;Zeroed or re-zeroed 01/20/21 1322 Opportunity for questions and clarification was provided. Patient transported with: 
 O2 @ 4 liters Monitor  RN 
 
 VTE prophylaxis orders have been written for Aubery Dubin. Vasu Granados Patient and family given floor number and nurses name. Family updated re: pt status after security code verified.

## 2021-01-20 NOTE — PERIOP NOTES
TRANSFER - IN REPORT: 
 
Verbal report received from [facility] on [address].  being received from 214 for routine progression of care Report consisted of patients Situation, Background, Assessment and  
Recommendations(SBAR). Information from the following report(s) SBAR was reviewed with the receiving nurse. Opportunity for questions and clarification was provided. Assessment to be completed upon patients arrival to unit and care to be  assumed.

## 2021-01-20 NOTE — PROGRESS NOTES
TRANSFER - IN REPORT: 
 
Verbal report received from Eveline RN(name) on [address].  being received from [employer]) for routine post - op Report consisted of patients Situation, Background, Assessment and  
Recommendations(SBAR). Information from the following report(s) SBAR, Kardex, OR Summary, Procedure Summary, Intake/Output, MAR, Recent Results, Med Rec Status and Cardiac Rhythm NSR was reviewed with the receiving nurse. Opportunity for questions and clarification was provided. Assessment completed upon patients arrival to unit and care assumed.

## 2021-01-20 NOTE — PROGRESS NOTES
Called regarding pt's belongings. Received from [address] [place] [address]. 
Laptop, Ipad, 2 cell phones, razor, wallet, multiple chargers, clothes, 3-prs of glassess, and other personal effects. Pt stated that all electronics were accounted for.

## 2021-01-20 NOTE — OP NOTES
[address] [facility] 
OPERATIVE REPORT Name:  Patty Chandra 
MR#:  [id number] :  1950 ACCOUNT #:  [de-identified] DATE OF SERVICE:  2021 PREOPERATIVE DIAGNOSIS:  Right nonfunctional lung. POSTOPERATIVE DIAGNOSIS:  Right nonfunctional lung. PROCEDURES PERFORMED: 
1. Right thoracotomy with completion pneumonectomy with extensive lysis of adhesions and decortication. 2.  Cryoablation of intercostal nerves. SURGEON:  Jennifer Vogt MD 
 
ASSISTANT:  Elvira Harmon NP 
 
ANESTHESIA:  general 
 
COMPLICATIONS:  none SPECIMENS REMOVED:  As above IMPLANTS:  none ESTIMATED BLOOD LOSS:  Less than 200 mL. INDICATIONS:  This is a 19-year-old gentleman who had a history of lung cancer, chemoradiation and he developed right pleural effusion recently. He was found to have a collapsed lung on the right side and with extensive effort from Pulmonology, his lung could not be expanded and is considered nonfunctional.  We offered him a completion pneumonectomy. He understood risks and benefits, agreed to proceed. FINDINGS:  He does have a loculated pleural effusion with dense adhesion especially heavy scarring around the hilum , especially the bronchus is very thickened. PROCEDURE:  After informed consent was obtained, the patient was brought into the operating room. General anesthesia was administered. Double-lumen tube placed per Anesthesia. The patient then left on left decubitus position with right side up. His right chest was prepped and draped in usual routine fashion. We reopened his previous thoracotomy incision. Dissection was carried through the latissimus dorsi. The serratus anterior was retracted anteriorly. Then, we identified the previous intercostal space. This appeared to be #6 intercostal space and the rib posteriorly has already been divided and then the pleural cavity was entered sharply. We immediately encountered a loculated fluid and this was broken down both bluntly and sharply. Then, we decorticated the chest wall and removed a large amount of loculated fluid and tissue, part of it was sent to culture and then the lung was isolated and is not expanded and very thick. Then, we focused our dissection around the hilum and with tremendous effort, appeared to be impossible to isolate the blood vessel and the bronchus individually but we tried to thin out the tissues as much as we could. Then, attempted Endo-LUIGI stapler with black load. We initially had trouble to close but on the second attempt, the stapler was able to close and then fired the staplers; however, the stapler seemed only fired half way. I paused for a little bit and tried to fire again and it appeared to be advanced a little; however, I am not sure it is advanced all the way. Then we tried to open the stapler at this time, the stapler was not able to be opened and I tried numerous way and also we tried the manual way to open the stapler and we also talked to the rep over the phone. He eventually came in and the stapler was not able to be opened. At this point, I placed another a large clamp under the stapler over the hilum for safety and then I forcefully opened the stapler with a large clamp. The two jaws were able to slightly widen and then this allowed the tissue in between to slip through. The stapler was removed this way and there was bleeding vessels. It was immediately clamped with the previously placed clamp and this oversewed with 2-0 PDS stitch and also there was a small air leak from the bronchial stump. This was oversewed with 2-0 PDS in a running fashion for 3 times and also additional figure-of-eight stitch was added. At the end of the case, the stump was checked under pressure of 60 mmHg and no evidence of air leak. Then lastly, we sprayed approach Progel along the stump and we did perform intercostal nerve cryoablations. Then the rib was reapproximated with #2-0 Vicryl in figure-of-eight fashion. The serratus was reapproximated and so the latissimus dorsi muscle was reapproximated and the skin closed with staple. The patient tolerated the procedure well, transferred to the recovery room in stable condition. All the instrument count and lap counts were correct. Estimated blood loss was less than 200 mL. As noted, Lewis Pickett is the first assistant in this case. She offered excellent exposure and helped the stapling and it would be impossible or dangerous to perform this case without her assistance. MD OLIMPIA [id number] 
D:  01/20/2021 11:43 
T:  01/20/2021 13:38 
JOB #:  [id number]

## 2021-01-20 NOTE — ANESTHESIA PROCEDURE NOTES
Arterial Line Placement Start time: 1/20/2021 7:24 AM 
End time: 1/20/2021 7:27 AM 
Performed by: Cristian Lopes MD 
Authorized by: Cristian Lopes MD  
 
Pre-Procedure Indications:  Arterial pressure monitoring Preanesthetic Checklist: patient identified, risks and benefits discussed, anesthesia consent, site marked, patient being monitored, timeout performed and patient being monitored Timeout Time: 07:23 Procedure:  
Prep:  Chlorhexidine Seldinger Technique?: Yes Orientation:  Left Location:  Radial artery Catheter size:  20 G Number of attempts:  1 Cont Cardiac Output Sensor: No   
 
Assessment:  
Post-procedure:  Line secured and sterile dressing applied Patient Tolerance:  Patient tolerated the procedure well with no immediate complications

## 2021-01-20 NOTE — PROGRESS NOTES
Infectious Disease Progress Note Today's Date: 2021 Admit Date: 2021 Impression: · New L sided patchy PNA seen on CT chest ; sputum cx () NRF 
· Loculated Right effusion: s/p VATS decortication on  and chest tube placement- no cx data; repeat bronch  and  secondary to RUL atelectasis · RLL SCC s/p RLL lobectomy on 2020 · Cough Plan: · S/p right pneumonectomy today with findings of loculated pleural effusion · Discontinue pip/tazo and doxycycline after · I will sign off. 
 
  
Anti-infectives: · Zosyn (- 
· Doxy (- · Vanc () · Ancef (-) Subjective: Interval History: 
Resting in bed post op on supplemental O2 by nasal cannula; afebrile. Allergies Allergen Reactions  Albuterol Palpitations  Celebrex [Celecoxib] Itching Review of Systems:  A comprehensive review of systems was negative except for that written in the History of Present Illness. Objective:  
 
Visit Vitals /69 Pulse 87 Temp 97 °F (36.1 °C) Resp 13 Ht 5' 10\" (1.778 m) Wt 91.5 kg (201 lb 12.8 oz) SpO2 97% BMI 28.96 kg/m² Temp (24hrs), Av.1 °F (36.7 °C), Min:97 °F (36.1 °C), Max:99.2 °F (37.3 °C) Patient examined 2021, exam remains unchanged except as noted below: 
 
General:  Somnolent; no distress Head:  Normocephalic, atraumatic Eyes:  Anicteric, no drainage, not injected, EOMI Throat: Mucus membranes moist OP clear Neck: Supple, symmetrical, trachea midline, no JVD Lungs:   Absent right side breath sounds; post op dressing in place Heart:  Regular rate and rhythm, without audible murmur Abdomen:   Soft, non-tender, no guarding, no distention, bowel sounds active Extremities: Extremities normal, atraumatic, no cyanosis or edema Skin: Skin color, texture, turgor normal, no rashes or lesions. Lines/Devices: None Data Review: CBC: 
 No results for input(s): WBC, GRANS, MONOS, EOS, ANEU, ABL, HGB, HCT, PLT, HGBEXT, HCTEXT, PLTEXT, HGBEXT, HCTEXT, PLTEXT in the last 72 hours. No lab exists for component: LYMPHS,  RICHAR BMP: 
No results for input(s): CREA, BUN, NA, K, CL, CO2, AGAP, GLU in the last 72 hours. LFTS: 
No results for input(s): TBILI, ALT, AP, TP, ALB in the last 72 hours. No lab exists for component: SGOT Microbiology:  
 
All Micro Results Procedure Component Value Units Date/Time CULTURE, TISSUE Michelle Price [[id number]] Collected: 01/20/21 3941 Order Status: Completed Specimen: Abdomen Updated: 01/20/21 1116 Fahad Amador [[id number]] Collected: 01/20/21 8463 Order Status: Completed Specimen: Abdomen Updated: 01/20/21 1114 [facility]ULTURE AND SMEAR [[id number]] Collected: 01/20/21 7633 Order Status: Completed Updated: 01/20/21 1008 [facility] [[id number]] Order Status: Sent Specimen: Abdomen CULTURE, RESPIRATORY/SPUTUM/BRONCH Michelle Price [[id number]] Collected: 01/13/21 1210 Order Status: Completed Specimen: Sputum Updated: 01/15/21 0831 Special Requests: NO SPECIAL REQUESTS     
  GRAM STAIN 30 TO 50 WBC'S SEEN PER OIF  
   0 TO 2 EPITHELIAL CELLS SEEN PER OIF  
      
  MODERATE GRAM POSITIVE COCCI MODERATE GRAM POSITIVE RODS  
     
   FEW GRAM NEGATIVE RODS     
   FEW YEAST 4+ MUCUS PRESENT Culture result: MODERATE NORMAL RESPIRATORY EDU Imaging:  
Reviewed Signed By: Ed Lee MD   
 January 20, 2021

## 2021-01-20 NOTE — BRIEF OP NOTE
Brief Postoperative Note Patient: Pooja Mcgregor. YOB: 1950 MRN: [id number] Date of Procedure: 1/20/2021 Pre-Op Diagnosis: right non functional lung Post-Op Diagnosis: Same as preoperative diagnosis. Procedure(s): 1. RIGHT THORACOTOMY , COMPLETION PNEUMECTOMY, with extensive lysis of adhesion and decortication 2. CRYOABLATION OF INTERCOSTAL NERVES Surgeon(s): Rashawn Cortés MD 
 
Surgical Assistant: Nurse Practitioner: Lavonia Barthel, NP Anesthesia: General  
 
Estimated Blood Loss (mL): 200 Complications: None Specimens:  
ID Type Source Tests Collected by Time Destination 1 : right completion pneumectomy Fresh Chest  Rashawn Cortés MD 1/20/2021  9:50 AM Pathology 1 : Right pleural effusion Tissue Chest CULTURE, AEROBIC AND ANAEROBIC, CULTURE, Italia Farias MD 1/20/2021  8:22 AM Microbiology Implants: * No implants in log * Drains: * No LDAs found * Findings: 1. Loculated pleural effusion with dense adhesion, especially heavy scarring around hilum Electronically Signed by Donnell Hankins MD on 1/20/2021 at 11:32 AM

## 2021-01-20 NOTE — PROGRESS NOTES
Chart review complete pt currently off unit to OR for pneumonectomy. CM will remain available to assist with dc needs.

## 2021-01-20 NOTE — PROGRESS NOTES
Occupational Therapy Note: 
 
Patient off the floor to OR for pneumonectomy at this time. Will check back as schedule permits and patient is able.   
 
Padmini Leigh, OTR/L

## 2021-01-20 NOTE — PROGRESS NOTES
END OF SHIFT NOTE: 
 
INTAKE/OUTPUT 
01/18 0701 - 01/19 0700 In: 328 [I.V.:328] Out: 900 [Urine:900] Voiding: YES Catheter: NO 
Drain:   
 
 
 
 
 
Flatus: Patient does have flatus present. Stool:  1 occurrences. Characteristics: 
Stool Assessment Stool Color: Rachel Moshe Stool Appearance: Formed Stool Amount: Small Stool Source/Status: Rectum Emesis: 0 occurrences. Characteristics: VITAL SIGNS Patient Vitals for the past 12 hrs: 
 Temp Pulse Resp BP SpO2  
01/19/21 1553 98.3 °F (36.8 °C) 82 17 101/71 95 % 01/19/21 1133 97.3 °F (36.3 °C) 72 17 95/61 96 % Pain Assessment Pain Intensity 1: 6 (01/19/21 1335) Pain Location 1: Rib cage Pain Intervention(s) 1: Medication (see MAR) Patient Stated Pain Goal: 0 Ambulating Yes Shift report given to oncoming nurse at the bedside.  
 
Jaime Ware

## 2021-01-20 NOTE — ANESTHESIA POSTPROCEDURE EVALUATION
Procedure(s): RIGHT THORACOTOMY , COMPLETE PNEUMECTOMY, CRYOABLATION OF INTERCOSTAL NERVES. general 
 
Anesthesia Post Evaluation Multimodal analgesia: multimodal analgesia used between 6 hours prior to anesthesia start to PACU discharge Patient location during evaluation: PACU Patient participation: complete - patient participated Level of consciousness: awake Pain management: adequate Airway patency: patent Anesthetic complications: no 
Cardiovascular status: acceptable and hemodynamically stable Respiratory status: acceptable Hydration status: acceptable Comments: Acceptable for discharge from PACU. Post anesthesia nausea and vomiting:  none Final Post Anesthesia Temperature Assessment:  Normothermia (36.0-37.5 degrees C) INITIAL Post-op Vital signs:  
Vitals Value Taken Time /73 01/20/21 1312 Temp 36.8 °C (98.2 °F) 01/20/21 1203 Pulse 88 01/20/21 1312 Resp 18 01/20/21 1238 SpO2 97 % 01/20/21 1312 Vitals shown include unvalidated device data.

## 2021-01-20 NOTE — PROGRESS NOTES
Pt. Thresea New and easy to arouse. Denies pain. No signs of discomfort or distress. Bed in the lowest position with alarm in place. Phone and call light within reach.

## 2021-01-21 NOTE — PROGRESS NOTES
H&P/Consult Note/Progress Note/Office Note:  
Johana Jordan MRN: [id number]  :1950  Age:75 y.o. 
 
HPI: Johana Jordan is a 78 y.o. male who is well known to Dr. Ramin Fernando.  He underwent right thoracotomy with lower and middle lobectomy 20 for squamous cell carcinoma. He underwent neoaduvant chemo/radiation and adjuvant chemo. .  CT 20 showed large pleural effusion and collapse of RUL. He underwent thoracentesis by Dr. Kaye Ly 20 with approximately drainage of 900ml, but was unable to completely drain related to complaints of pain. He felt should hold Eliquis for admission for Chest tube placement. Dr. Lina Butler placed chest tube this AM 21. General surgery was consulted for complicated right pleural effusion, ?hemothorax not draining properly with chest tube placement. On exam, Mr. Chance Ackerman appears comfortable.  CT scan reviewed and current chest xray, NAD. On 3 L NC sats upper 90's. Chest tube maintained with 800ml serosanguineous drainage noted. Remains on 20cmsx. Patient states since his lobectomy he has felt \"pressure\". He does use 2L oxygen at night. She states he does feel like he can breath better since this chest tube has been placed. His lost dose of eliquis was Friday (for A-fib). He does have smoking history and quit about 6 years ago 21 Patient states he is breathing better since chest tube. NAD on 3L NC with 98% sat. Chest xray reviewed and fluid collection appears loculated- will need surgery- plan for tomorrow. 21 To the OR yesterday PO day1. Difficult surgery. Consulted pulmonary intraoperatively for broch. Unable to get lung to inflate intraoperative, ?chronic mucous plug. Pt in ICU, no complaints this AM- scheduled for bronch this AM. Chest tubes to water seal currently- no air leak- appears lung still collapsed from CXR. Pain controlled. 01/08/21 POD 2. Pt remains in ICU, no complaints this AM. Pain controlled. Chest tubes to water seal currently- no air leak. CXR this am showing No Significant Interval Change. 01/09/21 POD 3. Pt remains in ICU, no complaints this AM. Pain controlled. Chest tubes currently clamped. CXR this am showing No Significant Interval Change. Denies SOB. On 3L o2. 
 
01/11/21  POD 5  Chest tube remains clamped. NO changes in chest xray. Dr. Houston Zavala spoke with Dr. Minnie Kerr regarding trying again to clear airway to re inflate lung if unsuccessful then will need a pneumonectomy. Patient in NAD, No SOB on 3 L oxygen with sats upper 90's and continues to be hypotensive at times. Hgb trending down will transfuse 2u pRBC 
 
01/12/21 POD 6 just returned from pulmonary procedure. Chest tubes to suction. States feels less pressure, but no other changes noted. No air leak noted in chest tubes- awaiting chest CT this afternoon. NAD, No SOB. Hgb 10 
 
01/13/21 POD 7. No significant changes on CT to right lung. Left lung ?pneumonia. Will discuss with pulmonary if any further treatment or proceed to pneumonectomy. Chest tubes maintained clamped. 6 liters oxygen 01/14/21 POD 8. No changes in breathing since chest tubes out. ON 5 liters Oxygen upper 90's sat. Chest xray reviewed- no changes- will plan pneumonectomy for next week- possible Monday- states cough is better 01/15/21 POD 9. No changes in breathing since chest tubes out. ON 5 liters Oxygen upper 90's sat. Chest xray reviewed- no changes- will plan pneumonectomy for next week- possible Monday- states cough is better 01/16/21 POD 10. No changes in breathing since chest tubes out. Remains on 5 liters Oxygen upper 90's sat. Planning pneumonectomy for next week- possible Monday. 01/17/21 POD 11. No changes in breathing since chest tubes out. Remains on 5 liters Oxygen upper 90's sat. Reports productive cough - clear mucous. CXR this am showing Unchanged right hydropneumothorax. New patchy edema or infiltrate in the left lung. Planning pneumonectomy for next week- possibly Monday or Wednesday. 01/18/21 POD 12  No significant changes noted in breathing, continues on 5 L oxygen. Complains about increased pressure noted in right chest and some increased cough. Will plan surgery Wednesday. 01/19/21 No changes. For surgery in the AM. T&C 2 units for tomorrow 01/20/21- right thoracotomy with pneumonectomy 01/21/21 Stable in ICU overnight. Oxygen 2L. No SOB. CXR reviewed. Tolerating diet. No complaints- will transfer to floor. Medical history as below Past Medical History:  
Diagnosis Date  GERD (gastroesophageal reflux disease)   
 controlled with med  Hypertension hx-- off meds since 4/2020--- followed by dr. Tyler Gamez  Hypoxia 02/24/2020  Malignant neoplasm of lower lobe of right lung ([facility] [address] 02/26/2020 REC'D CHEMO AND RADIATION--- FOLLOWED BY DR. Ed Mueller Osteoarthritis  Right lower lobe lung mass 02/24/2020  Status post total right knee replacement 2/29/2016 Past Surgical History:  
Procedure Laterality Date  HX COLONOSCOPY    
 HX KNEE ARTHROSCOPY Left   
 scope X 1, ACL recon X 1  
 HX KNEE ARTHROSCOPY Right 1974, 1975 X 2   
 HX KNEE REPLACEMENT Right 2016 [address]  HX VASCULAR ACCESS Right placed 3/2020  
 port in chest   
 IR INSERT TUNL CVC W PORT OVER 5 YEARS  3/18/2020  TN SINUS SURGERY PROC UNLISTED  1988, 1991  
 sinus surg Current Facility-Administered Medications Medication Dose Route Frequency  0.9% sodium chloride infusion 250 mL  250 mL IntraVENous PRN  
 alcohol 62% (NOZIN) nasal  1 Ampule  1 Ampule Topical Q12H  
 enoxaparin (LOVENOX) injection 40 mg  40 mg SubCUTAneous Q24H  sodium chloride (NS) flush 5-40 mL  5-40 mL IntraVENous Q8H  
 sodium chloride (NS) flush 5-40 mL  5-40 mL IntraVENous PRN  
 0.9% sodium chloride infusion 250 mL  250 mL IntraVENous PRN  
 HYDROcodone-homatropine (HYCODAN) 5-1.5 mg/5 mL (5 mL) oral solution 5 mL  5 mL Oral Q4H PRN  
 bisacodyL (DULCOLAX) suppository 10 mg  10 mg Rectal DAILY PRN  
 midodrine (PROAMATINE) tablet 10 mg  10 mg Oral TID WITH MEALS  traMADoL (ULTRAM) tablet 50 mg  50 mg Oral Q6H PRN  
 acetaminophen (TYLENOL) tablet 500 mg  500 mg Oral Q6H PRN  
 HYDROmorphone (PF) (DILAUDID) injection 1 mg  1 mg IntraVENous Q2H PRN  
 guaiFENesin ER (MUCINEX) tablet 1,200 mg  1,200 mg Oral Q12H  
 amiodarone (CORDARONE) tablet 200 mg  200 mg Oral DAILY  [Held by provider] apixaban (ELIQUIS) tablet 5 mg  5 mg Oral Q12H  pantoprazole (PROTONIX) tablet 40 mg  40 mg Oral ACB&D  
 gabapentin (NEURONTIN) capsule 300 mg  300 mg Oral TID  levalbuterol tartrate (XOPENEX) 45 mcg/actuation inhaler HFAA 2 Puff- pt to supply (Patient Supplied)  2 Puff Inhalation Q4H PRN  
 ondansetron (ZOFRAN ODT) tablet 8 mg  8 mg Oral Q8H PRN  polyethylene glycol (MIRALAX) packet 17 g  17 g Oral DAILY  sodium chloride (NS) flush 5-40 mL  5-40 mL IntraVENous Q8H  
 sodium chloride (NS) flush 5-40 mL  5-40 mL IntraVENous PRN  
 oxyCODONE-acetaminophen (PERCOCET) 5-325 mg per tablet 1 Tab  1 Tab Oral Q4H PRN  
 influenza vaccine 2020-21 (6 mos+)(PF) (FLUARIX/FLULAVAL/FLUZONE QUAD) injection 0.5 mL  0.5 mL IntraMUSCular PRIOR TO DISCHARGE Albuterol and Celebrex [celecoxib] Social History Socioeconomic History  Marital status:  Spouse name: Not on file  Number of children: Not on file  Years of education: Not on file  Highest education level: Not on file Tobacco Use  Smoking status: Former Smoker Packs/day: 1.00 Years: 20.00 Pack years: 20.00 Quit date:  Years since quittin.0  Smokeless tobacco: Never Used Substance and Sexual Activity  Alcohol use: Yes Alcohol/week: 4.0 standard drinks Types: 4 Glasses of wine per week  Drug use: No  
Other Topics Concern   Service No  
 Blood Transfusions No  
 Caffeine Concern No  
 Occupational Exposure No  
 Hobby Hazards No  
 Sleep Concern No  
 Stress Concern No  
 Weight Concern No  
 Special Diet No  
 Exercise Yes  [address] Yes Social History Narrative . Has long-time girlfriend. Continues to work doing IT support. Social History Tobacco Use Smoking Status Former Smoker  Packs/day: 1.00  Years: 20.00  Pack years: 20.00  Quit date:   Years since quittin.0 Smokeless Tobacco Never Used Family History Problem Relation Age of Onset  Cancer Mother   
     uterine  Arrhythmia Sister   
     afib ROS: The patient has no difficulty with chest pain or shortness of breath. No fever or chills. Comprehensive review of systems was otherwise unremarkable except as noted above. Physical Exam:  
Visit Vitals /74 Pulse 70 Temp 97.3 °F (36.3 °C) Resp 19 Ht 5' 10\" (1.778 m) Wt 188 lb 0.8 oz (85.3 kg) SpO2 100% BMI 26.98 kg/m² Constitutional: Alert, oriented, cooperative patient in no acute distress; appears stated age Eyes: Sclera are clear. EOMs intact ENMT: no external lesions gross hearing normal; no obvious neck masses, no ear or lip lesions, nares normal 
CV: RRR. Normal perfusion Resp: No JVD. Breathing is  non-labored; no audible wheezing. O2 2 L 
GI: soft and non-distended Musculoskeletal: unremarkable with normal function. No embolic signs or cyanosis. Neuro:  Oriented; moves all 4; no focal deficits Psychiatric: normal affect and mood, no memory impairment Recent vitals (if inpt): 
Patient Vitals for the past 24 hrs: 
 BP Temp Pulse Resp SpO2 Height Weight  
21 0714 109/74  70 19 100 %   01/21/21 0659 113/73 97.3 °F (36.3 °C) 68 18 99 %    
01/21/21 0630 116/76  66 15 99 %    
01/21/21 0614 112/76  68 17 100 %    
01/21/21 0600 120/75  66 22 99 %    
01/21/21 0545 116/71  69 25 99 %    
01/21/21 0530 113/70  73 26     
01/21/21 0513   70 (!) 0 99 %    
01/21/21 0512   70 18 99 %    
01/21/21 0511   66 18 99 %    
01/21/21 0503 102/72  69 (!) 0 99 %    
01/21/21 0501 109/70  75 20 98 %    
01/21/21 0500   72 19 99 %    
01/21/21 0445   70 18 98 %    
01/21/21 0430   82 27 96 %    
01/21/21 0415   73 17 99 %    
01/21/21 0400   71 (!) 0 98 %    
01/21/21 0345   72 17 99 %    
01/21/21 0330   69 18 100 %    
01/21/21 0315   68 20 98 %    
01/21/21 0300  97.9 °F (36.6 °C) 70 18 99 % 5' 10\" (1.778 m) 188 lb 0.8 oz (85.3 kg) 01/21/21 0245   71 25 99 %    
01/21/21 0230   72 17 98 %    
01/21/21 0215   71 24 99 %    
01/21/21 0200   70 16 99 %    
01/21/21 0145   70 21 99 %    
01/21/21 0130   77 25 98 %    
01/21/21 0115   80 26 98 %    
01/21/21 0100   74 (!) 0 97 %    
01/21/21 0045   79 (!) 44 96 %    
01/21/21 0030   70 21 98 %    
01/21/21 0015   69 18 97 %    
01/21/21 0000   70 20 98 %    
01/20/21 2345   69 19 99 %    
01/20/21 2330   68 18 99 %    
01/20/21 2315   68 19 98 %    
01/20/21 2300  97.8 °F (36.6 °C) 72 21 99 %    
01/20/21 2245   75 20 97 %    
01/20/21 2230   75 18 98 %    
01/20/21 2215   73 17 98 %    
01/20/21 2200   79 15 97 %    
01/20/21 2145   79 23 97 %    
01/20/21 2130   74 12 97 %    
01/20/21 2115   73 (!) 6 96 %    
01/20/21 2100   79 23 97 %    
01/20/21 2045   79 23 95 %    
01/20/21 2030   84 25 96 %    
01/20/21 2015   77 18 99 %    
01/20/21 2000   80 24 99 %    
01/20/21 1945   82 24 97 %    
01/20/21 1930   91 (!) 45 97 %    
01/20/21 1915   84 (!) 33 97 %   01/20/21 1900  97.6 °F (36.4 °C) 84 30 95 %    
01/20/21 1845   85 30 96 %    
01/20/21 1830   84 (!) 34 96 %    
01/20/21 1815   87 (!) 62 95 %    
01/20/21 1800   89 25 96 %    
01/20/21 1745   81 23 97 %    
01/20/21 1730   79 19 98 %    
01/20/21 1715   81 20 99 %    
01/20/21 1700   76 18 99 %    
01/20/21 1645   77 19 98 %    
01/20/21 1630   77 16 99 %    
01/20/21 1615   80 14 98 %    
01/20/21 1600   82 15 97 %    
01/20/21 1545   80 12 98 %    
01/20/21 1530   82 (!) 0 98 %    
01/20/21 1524   80 15 98 %    
01/20/21 1517   86 14 97 %    
01/20/21 1515   89 16 96 %    
01/20/21 1508 111/71  84 18 98 %    
01/20/21 1503 (!) 100/59  85      
01/20/21 1500   87 20 98 %    
01/20/21 1450   81 20 97 %    
01/20/21 1445   83 23 97 %    
01/20/21 1441   84 25 98 %    
01/20/21 1430   83 20 97 %    
01/20/21 1420   83 22 98 %    
01/20/21 1415   84 25 99 %    
01/20/21 1410   85 (!) 33 98 %    
01/20/21 1401  97 °F (36.1 °C) 87 13 97 %    
01/20/21 1338 110/69  88  98 %    
01/20/21 1322 103/70 98 °F (36.7 °C) 88 18 98 %    
01/20/21 1317 111/74  87 18 97 %    
01/20/21 1312 111/73  88 18 97 %    
01/20/21 1307 112/76  85 18 97 %    
01/20/21 1302 111/75  84 18 97 %    
01/20/21 1253 120/75  86 18 97 %    
01/20/21 1243 119/75  87 18 95 %    
01/20/21 1238 116/72  93 18 94 %    
01/20/21 1233 126/77  89 18 95 %    
01/20/21 1227 129/78  92 18 95 %    
01/20/21 1222 136/83  89 18 100 %    
01/20/21 1217 137/81  93 18 100 %    
01/20/21 1213 (!) 141/84  94 18 99 %    
01/20/21 1207 130/80  99 18 98 %    
01/20/21 1205 126/89  99 18 98 %    
01/20/21 1203 (!) 141/84 98.2 °F (36.8 °C) 93 18 99 %   XR Results (most recent): 
Results from Hospital Encounter encounter on 01/04/21 XR CHEST SNGL V  
 Narrative EXAM: Chest x-ray. INDICATION: Post pneumonectomy. COMPARISON: Yesterday's chest x-ray. TECHNIQUE: Frontal view chest x-ray. FINDINGS: The patient is status post right pneumonectomy, with unchanged 
nodularity along the right pleural surface. Again noted are right chest wall 
skin staples and a right chest wall infusion port catheter. Minimal left lung 
base atelectasis or infiltrate is unchanged. The cardiac size is within normal 
limits. No left-sided pneumothorax or pleural effusion is seen. Impression 1. Status post right pneumonectomy. 2. Unchanged mild left lung base atelectasis or infiltrate. Labs: 
Recent Labs  
  01/21/21 
0725 WBC 10.1 HGB 10.6*  Lab Results Component Value Date/Time WBC 10.1 01/21/2021 07:25 AM  
 HGB 10.6 (L) 01/21/2021 07:25 AM  
 PLATELET 433 58/49/2941 07:25 AM  
 Sodium 136 (L) 01/16/2021 03:33 AM  
 Potassium 4.1 01/16/2021 03:33 AM  
 Chloride 99 01/16/2021 03:33 AM  
 CO2 34 (H) 01/16/2021 03:33 AM  
 BUN 8 01/16/2021 03:33 AM  
 Creatinine 0.52 (L) 01/16/2021 03:33 AM  
 Glucose 90 01/16/2021 03:33 AM  
 INR 1.1 01/05/2021 04:02 PM  
 aPTT 35.2 (H) 01/05/2021 04:02 PM  
 Bilirubin, total 0.5 01/05/2021 06:26 AM  
 Bilirubin, direct 0.8 (H) 05/05/2020 02:27 PM  
 ALT (SGPT) 8 (L) 01/05/2021 06:26 AM  
 Alk. phosphatase 99 01/05/2021 06:26 AM  
 Troponin-I, Qt. <0.02 (L) 05/05/2020 01:09 AM  
 
 
I reviewed recent labs and recent radiologic studies. I independently reviewed radiology images for studies I described above or studies I have ordered. Admission date (for inpatients): 1/4/2021 Day of Surgery  Procedure(s): RIGHT THORACOTOMY , COMPLETE PNEUMECTOMY, CRYOABLATION OF INTERCOSTAL NERVES 
 
ASSESSMENT/PLAN: 
Problem List  Date Reviewed: 1/13/2021 Codes Class Noted S/P thoracotomy ICD-10-CM: W13.391 ICD-9-CM: V45.89  1/7/2021 * (Principal) Atelectasis of right lung ICD-10-CM: J98.11 ICD-9-CM: 518.0  1/4/2021 A-fib (HCC) (Chronic) ICD-10-CM: I48.91 
ICD-9-CM: 427.31  1/4/2021 Chronic respiratory failure with hypoxia (HCC) (Chronic) ICD-10-CM: J96.11 
ICD-9-CM: 518.83, 799.02  1/4/2021 Pleural effusion on right ICD-10-CM: J90 ICD-9-CM: 511.9  12/28/2020 Chemotherapy induced neutropenia (HCC) ICD-10-CM: D70.1, T45.1X5A 
ICD-9-CM: 288.03, E933.1  10/23/2020 Dehydration ICD-10-CM: E86.0 ICD-9-CM: 276.51  9/15/2020 S/P lobectomy of lung ICD-10-CM: Z90.2 ICD-9-CM: V45.89  7/29/2020 Cough ICD-10-CM: R05 ICD-9-CM: 786.2  7/29/2020 Atrial tachycardia (HCC) ICD-10-CM: I47.1 ICD-9-CM: 427.89  7/26/2020 Cancer of bronchus of right lower lobe [facility]) ICD-10-CM: C34.31 
ICD-9-CM: 162.5  7/23/2020 Lung cancer ([facility] [address] (Chronic) ICD-10-CM: C34.90 ICD-9-CM: 162.9  7/23/2020 Cancer of right lung [facility]) ICD-10-CM: C34.91 
ICD-9-CM: 162.9  7/23/2020 Pulmonary emphysema (HCC) (Chronic) ICD-10-CM: J43.9 ICD-9-CM: 492.8  7/7/2020 GERD (gastroesophageal reflux disease) ICD-10-CM: K21.9 ICD-9-CM: 530.81  Unknown Hypotension (Chronic) ICD-10-CM: I95.9 ICD-9-CM: 458.9  5/5/2020 Overview Signed 1/11/2021  8:39 AM by Cherry Drake NP On midodrine Sepsis due to undetermined organism [facility]) ICD-10-CM: A41.9 ICD-9-CM: 038.9  5/5/2020 Malignant neoplasm of lower lobe of right lung (HCC) (Chronic) ICD-10-CM: C34.31 
ICD-9-CM: 162.5  2/26/2020 Overview Signed 1/4/2021  7:38 AM by Sobeida Browning MD  
  Dec 2020- Echo EF 50%, technically difficult, cannot assess regional wall motion. Aortic root 4.1 cm. No significant valvular disease. Normal DF Mass of lower lobe of right lung ICD-10-CM: R91.8 ICD-9-CM: 786.6  2/23/2020 Right lower lobe lung mass ICD-10-CM: R91.8 ICD-9-CM: 786.6  2/23/2020  Essential hypertension with goal blood pressure less than 130/80 (Chronic) ICD-10-CM: I10 
 ICD-9-CM: 401.9  2/19/2018 Principal Problem: 
  Atelectasis of right lung (1/4/2021) Active Problems: 
  Pulmonary emphysema ([facility] [address] (7/7/2020) Lung cancer ([facility] [address] (7/23/2020) Pleural effusion on right (12/28/2020) A-fib ([facility] [address] (1/4/2021) Chronic respiratory failure with hypoxia ([facility] [address] (1/4/2021) S/P thoracotomy (1/7/2021) Plan Transfer to floor Cxr daily Regular diet Activity as tolerated Pain control Encourage C/DB/IS Oxygen as needed SAADIA Howell Consult cardiology to assess continued need for eliquis Signed:  Benjy Rizo NP

## 2021-01-21 NOTE — CONSULTS
[address] Cardiology Consult Date of  Admission: 1/4/2021  8:52 AM  
 
Primary Cardiologist: Dr Nikki Meek Referring Physician: Dr Anthony Quintanilla Consulting Physician: Dr Harpreet Jimenez CC/Reason for consult: Eliquis recommendations. Roxanna Torres. is a 78 y.o. male Prior history of paroxysmal atrial fibrillation noted perioperatively during right middle lobe lobectomy from 7/23/2020 (appears cardioversion attempted due to his low BPs which was unsuccessful but then spontaneously converted on amiodarone). Patient underwent adjuvant chemotherapy/radiation prior to lobectomy and then subsequently had adjuvant chemotherapy. Was admitted by pulmonology due to increased dyspnea and noted right pleural effusion. Subsequent work-up suggestive of hemothorax and Eliquis held on admission from 1/4/2021. Had a chest tube placed and subsequently underwent right thoracotomy with extensive pneumo lysis and decortication on 1/6/2021. Repeat right thoracotomy with completion of pneumonectomy and extensive lysis of edition and decortication on 1/20/2021. Last echo from 12/2020 with preserved EF and mild left atrial enlargement. Has been in sinus rhythm since initial visit from 7/2020. Occasional palpitations but no recurrent atrial fibrillation noted. Currently in sinus rhythm as well Patient Active Problem List  
Diagnosis Code  Essential hypertension with goal blood pressure less than 130/80 I10  Mass of lower lobe of right lung R91.8  Right lower lobe lung mass R91.8  Malignant neoplasm of lower lobe of right lung (HCC) C34.31  
 GERD (gastroesophageal reflux disease) K21.9  Hypotension I95.9  Sepsis due to undetermined organism ([address] A41.9  Pulmonary emphysema ([address] J43.9  Cancer of bronchus of right lower lobe (HCC) C34.31  
 Lung cancer ([address] C34.90  
 Cancer of right lung (HCC) C34.91  
 Atrial tachycardia (HCC) I47.1  S/P lobectomy of lung Z90.2  Cough R05  Dehydration E86.0  Chemotherapy induced neutropenia (HCC) D70.1, T45.1X5A  Pleural effusion on right J90  
 Atelectasis of right lung J98.11  
 A-fib (HCC) I48.91  
 Chronic respiratory failure with hypoxia (HCC) J96.11  
 S/P thoracotomy S34.095 Past Medical History:  
Diagnosis Date  GERD (gastroesophageal reflux disease)   
 controlled with med  Hypertension hx-- off meds since 4/2020--- followed by dr. Shirley Randolph  Hypoxia 02/24/2020  Malignant neoplasm of lower lobe of right lung ([facility] 02/26/2020 REC'D CHEMO AND RADIATION--- FOLLOWED BY DR. Ade Huggins Osteoarthritis  Right lower lobe lung mass 02/24/2020  Status post total right knee replacement 2/29/2016 Past Surgical History:  
Procedure Laterality Date  HX COLONOSCOPY    
 HX KNEE ARTHROSCOPY Left   
 scope X 1, ACL recon X 1  
 HX KNEE ARTHROSCOPY Right 1974, 1975 X 2   
 HX KNEE REPLACEMENT Right 2016 [address] [facility]  HX VASCULAR ACCESS Right placed 3/2020  
 port in chest   
 IR INSERT TUNL CVC W PORT OVER 5 YEARS  3/18/2020  CA SINUS SURGERY PROC UNLISTED  1988, 1991  
 sinus surg Allergies Allergen Reactions  Albuterol Palpitations  Celebrex [Celecoxib] Itching Family History Problem Relation Age of Onset  Cancer Mother   
     uterine  Arrhythmia Sister   
     afib Current Facility-Administered Medications Medication Dose Route Frequency  0.9% sodium chloride infusion 250 mL  250 mL IntraVENous PRN  
 alcohol 62% (NOZIN) nasal  1 Ampule  1 Ampule Topical Q12H  
 enoxaparin (LOVENOX) injection 40 mg  40 mg SubCUTAneous Q24H  
 sodium chloride (NS) flush 5-40 mL  5-40 mL IntraVENous Q8H  
 sodium chloride (NS) flush 5-40 mL  5-40 mL IntraVENous PRN  
 0.9% sodium chloride infusion 250 mL  250 mL IntraVENous PRN  
 HYDROcodone-homatropine (HYCODAN) 5-1.5 mg/5 mL (5 mL) oral solution 5 mL  5 mL Oral Q4H PRN  
  bisacodyL (DULCOLAX) suppository 10 mg  10 mg Rectal DAILY PRN  
 midodrine (PROAMATINE) tablet 10 mg  10 mg Oral TID WITH MEALS  traMADoL (ULTRAM) tablet 50 mg  50 mg Oral Q6H PRN  
 acetaminophen (TYLENOL) tablet 500 mg  500 mg Oral Q6H PRN  
 HYDROmorphone (PF) (DILAUDID) injection 1 mg  1 mg IntraVENous Q2H PRN  
 guaiFENesin ER (MUCINEX) tablet 1,200 mg  1,200 mg Oral Q12H  
 amiodarone (CORDARONE) tablet 200 mg  200 mg Oral DAILY  [Held by provider] apixaban (ELIQUIS) tablet 5 mg  5 mg Oral Q12H  pantoprazole (PROTONIX) tablet 40 mg  40 mg Oral ACB&D  
 gabapentin (NEURONTIN) capsule 300 mg  300 mg Oral TID  levalbuterol tartrate (XOPENEX) 45 mcg/actuation inhaler HFAA 2 Puff- pt to supply (Patient Supplied)  2 Puff Inhalation Q4H PRN  
 ondansetron (ZOFRAN ODT) tablet 8 mg  8 mg Oral Q8H PRN  polyethylene glycol (MIRALAX) packet 17 g  17 g Oral DAILY  sodium chloride (NS) flush 5-40 mL  5-40 mL IntraVENous Q8H  
 sodium chloride (NS) flush 5-40 mL  5-40 mL IntraVENous PRN  
 oxyCODONE-acetaminophen (PERCOCET) 5-325 mg per tablet 1 Tab  1 Tab Oral Q4H PRN  
 influenza vaccine 2020-21 (6 mos+)(PF) (FLUARIX/FLULAVAL/FLUZONE QUAD) injection 0.5 mL  0.5 mL IntraMUSCular PRIOR TO DISCHARGE Review of Systems Constitution: Negative for chills, decreased appetite, fever, malaise/fatigue, weight gain and weight loss. HENT: Negative for hearing loss and sore throat. Eyes: Negative for blurred vision and double vision. Cardiovascular: Negative for chest pain, dyspnea on exertion, orthopnea, palpitations, paroxysmal nocturnal dyspnea and syncope. Respiratory: Positive for cough and shortness of breath. Endocrine: Negative for cold intolerance and heat intolerance. Hematologic/Lymphatic: Negative for bleeding problem. Musculoskeletal: Negative for falls, muscle cramps, muscle weakness and myalgias. Gastrointestinal: Negative for abdominal pain, hematemesis, hematochezia and melena. Neurological: Negative for dizziness and headaches. Psychiatric/Behavioral: Negative for altered mental status. The patient is not nervous/anxious. Physical Exam 
Vitals:  
 01/21/21 0859 01/21/21 0914 01/21/21 0930 01/21/21 1121 BP: 110/67 106/64 105/68 (!) 107/55 Pulse: 71 69 (!) 138 80 Resp: 22 (!) 7 (!) 32 20 Temp:    97.2 °F (36.2 °C) SpO2: 99% 99% 99% 99% Weight:      
Height:      
 
 
Physical Exam: 
General: Well Developed, Well Nourished, No Acute Distress HEENT: pupils equal and round, no abnormalities noted Neck: supple, no JVD, no carotid bruits Heart: S1S2 with RRR without murmurs or gallops Lungs: Clear throughout auscultation bilaterally without adventitious sounds; decreased on right side Abd: soft, nontender, nondistended, with good bowel sounds Ext: warm, no edema, calves supple/nontender, pulses 2+ bilaterally Skin: warm and dry Psychiatric: Normal mood and affect Neurologic: Alert and oriented X 3 Labs:  
Recent Labs  
  01/21/21 
0725 01/20/21 
1225   --   
K 4.5  --   
MG  --  2.2 BUN 13  --   
CREA 0.50*  --   
*  --   
WBC 10.1  --   
HGB 10.6*  --   
HCT 33.9*  --   
  -- Assessment/Plan: 
 
 Assessment:  
  
Principal Problem: 
  Atelectasis of right lung (1/4/2021) -Per primary team/pulmonology. Active Problems: 
  Pulmonary emphysema ([facility] (7/7/2020) Lung cancer ([facility] [address] (7/23/2020) -Status post right lobectomy; prior neoadjuvant chemotherapy/radiation and adjuvant chemotherapy (carboplatin/Taxol) Pleural effusion on right (12/28/2020) A-fib ([address] (1/4/2021) -Paroxysmal atrial fibrillation noted perioperative in the setting of prior lobectomy from 7/2020. Hold off restarting at this time especially in the setting of noted hemothorax. Perioperative atrial fibrillation not indicative of long-term recurrence. Can consider an outpatient monitor/loop consideration to help guide anticoagulation in the future. CHADVASc score of 2 
-Noted on amiodarone and continue for now and defer long-term continuation to primary cardiologist as an outpatient. Could consider stopping and using above-stated loop recorder to help guide management. Chronic respiratory failure with hypoxia ([address] (1/4/2021) S/P thoracotomy (1/7/2021) Thank you very much for this referral. We appreciate the opportunity to participate in this patient's care. We will follow along with above stated plan.  
 
Angelique Siegel MD 
Consulting MD

## 2021-01-21 NOTE — PROGRESS NOTES
A follow up visit was made to the patient. Emotional support, spiritual presence and  
prayer were provided for the patient. He shared that he had surgery yesterday and that he was feeling better. He has a realistic view of his life and his health. Alix Turner, [address] [facility] [place], [facility]

## 2021-01-21 NOTE — PROGRESS NOTES
The only c/o this pt has had is his left eye burning, reassured educated on POC and how antibiotic ointment works. Looks less red to sclera since ointment placed at 1430.

## 2021-01-21 NOTE — PROGRESS NOTES
ACUTE OT GOALS: 
(Developed with and agreed upon by patient and/or caregiver.) 1. Patient will complete total body bathing and dressing with modified independence and adaptive equipment as needed. CONTINUE 1/21/21 2. Patient will tolerate 30 minutes of OT treatment with up to self-incorporated rest breaks to increase activity tolerance for ADLs. CONTINUE 1/21/21 3. Patient will complete functional mobility for ADLs with modified independence and adaptive equipment as needed. CONTINUE 1/21/21 4. Patient will demonstrate modified independence with therapeutic exercise HEP to increase strength in BUEs for increased safety and independence with functional transfers. CONTINUE 1/21/21 5. Patient will verbalize 2 energy conservation techniques with no cues from therapist to increase safety and independence with ADLs. CONTINUE 1/21/21 6. Patient will tolerate standing for sink side grooming tasks to improve standing tolerance for ADL. NEW GOAL 1/21/21 7. Patient will complete bed mobility with modified independence for improving positioning for ADL. NEW GOAL 1/21/21 Timeframe: 7 visits OCCUPATIONAL THERAPY ASSESSMENT: Daily Note and Re-evaluation OT Treatment Day # 1 Helen Lima is a 78 y.o. male PRIMARY DIAGNOSIS: Atelectasis of right lung Pleural effusion [J90] Collapse of right lung [J98.11] [facility] [C34.90] Procedure(s) (LRB): 
RIGHT THORACOTOMY , COMPLETE PNEUMECTOMY, CRYOABLATION OF INTERCOSTAL NERVES (Right) 1 Day Post-Op Reason for Referral:  Weakness ICD-10: Treatment Diagnosis: Generalized Muscle Weakness (M62.81) INPATIENT: Payor: Keon Mahoney / Plan: Chantal Lopez / Product Type: PPO /  
ASSESSMENT:  
 
REHAB RECOMMENDATIONS:  
Recommendation to date pending progress: 
Setting: ? Home Health Therapy Equipment:  
? 3 in 1 Bedside Commode PRIOR LEVEL OF FUNCTION: 
(Prior to Hospitalization)  INITIAL/CURRENT LEVEL OF FUNCTION: 
 (Based on today's evaluation) Bathing: ? Independent Dressing: ? Independent Feeding/Grooming: ? Independent Toileting: ? Independent Functional Mobility: 
? Independent Bathing: 
? [address] Dressing: ? Minimal Assistance Feeding/Grooming: 
? Supervision Toileting: ? Minimal Assistance Functional Mobility: ? Minimal Assistance ASSESSMENT: 
Mr. Amada Padilla presents with R lung atelectasis, collapsed R lung, s/p tumor excision. Hx SCC with R lobectomy. At baseline pt lives with girlfriend, completes I/ADLs, driving, and ambulation with independence. No hx falls. Utilizes 2L O2 nocturnally. Pt now presents s/p R complete pneumectomy. Pt is alert and c/o 7/10 pain along his R side. Pt is currently on 3 L of O2 with no major c/o shortness of breath. Pt appears fatigued overall with decreased activity tolerance. Pt educated on log rolling to the L for sitting to the edge of the bed. Pt worked on sitting tolerance while participating in bathing task with CGA and requiring additional time. Pt did stand for perineal bathing with AD and was slightly shaky in standing. Pt returned to supine at end of session. Most goals are still appropriate with a few additional goals added to plan of care. Pt will continue to benefit from OT services. SUBJECTIVE:  
Mr. Amada Padilla states, \"I don't want to get COVID. \" 
 
SOCIAL HISTORY/LIVING ENVIRONMENT: At baseline pt lives with girlfriend, completes I/ADLs, driving, and ambulation with independence. No hx falls. Utilizes 2L O2 nocturnally. Home Environment: Private residence # Steps to Enter: 6 One/Two Story Residence: One story Living Alone: No 
Support Systems: Spouse/Significant Other/Partner OBJECTIVE:  
 
PAIN: VITAL SIGNS: LINES/DRAINS:  
Pre Treatment: Pain Screen Pain Scale 1: Numeric (0 - 10) Pain Intensity 1: 7 Pain Onset 1: postop Pain Location 1: Incisional 
Pain Intervention(s) 1: Repositioned Post Treatment: same O2 Device: Nasal cannula GROSS EVALUATION: 
BUE Within Functional Limits Abnormal/ Functional Abnormal/ Non-Functional (see comments) Not Tested Comments: AROM [x] [x] [] [] Limited on the R side; hx of completed RC tear PROM [] [] [] [x] Strength [] [x] [] [] Balance [] [x] [] [] Posture [] [x] [] [] Sensation [] [x] [] [] Some tingling 2° neuropathy Coordination [x] [] [] [] Tone [x] [] [] [] Edema [x] [] [] [] Activity Tolerance [] [x] [] [] Limited on  3 L  
 [] [] [] [] COGNITION/ 
PERCEPTION: Intact Impaired  
(see comments) Comments:  
Orientation [x] [] Vision [x] [] Hearing [x] [] Judgment/ Insight [x] [] Attention [x] [] Memory [x] [] Command Following [x] [] Emotional Regulation [x] []   
 [] [] ACTIVITIES OF DAILY LIVING: I Mod I S SBA CGA Min Mod Max Total NT Comments BASIC ADLs:             
Bathing/ Showering [] [] [] [] [] [x] [] [] [] [] Toileting [] [] [] [] [] [] [] [] [] [x] Dressing [] [] [] [] [] [x] [] [] [] [] Feeding [] [] [] [] [] [] [] [] [] [x] Grooming [] [] [x] [] [] [] [] [] [] [] Personal Device Care [] [] [] [] [] [] [] [] [] [x] Functional Mobility [] [] [] [] [x] [x] [] [] [] [] I=Independent, Mod I=Modified Independent, S=Supervision, SBA=Standby Assistance, CGA=Contact Guard Assistance,  
Min=Minimal Assistance, Mod=Moderate Assistance, Max=Maximal Assistance, Total=Total Assistance, NT=Not Tested MOBILITY: I Mod I S SBA CGA Min Mod Max Total  NT x2 Comments:  
Supine to sit [] [] [] [] [x] [] [] [] [] [] [] Sit to supine [] [] [] [] [x] [] [] [] [] [] [] Sit to stand [] [] [] [] [x] [] [] [] [] [] [] Bed to chair [] [] [] [] [] [] [] [] [] [x] [] I=Independent, Mod I=Modified Independent, S=Supervision, SBA=Standby Assistance, CGA=Contact Guard Assistance,  
Min=Minimal Assistance, Mod=Moderate Assistance, Max=Maximal Assistance, Total=Total Assistance, NT=Not Tested [facility] Daily Activity Inpatient Short Form How much help from another person does the patient currently need. .. Total A Lot A Little None 1. Putting on and taking off regular lower body clothing? [] 1   [] 2   [x] 3   [] 4  
2. Bathing (including washing, rinsing, drying)? [] 1   [] 2   [x] 3   [] 4  
3. Toileting, which includes using toilet, bedpan or urinal?   [] 1   [] 2   [x] 3   [] 4  
4. Putting on and taking off regular upper body clothing? [] 1   [] 2   [x] 3   [] 4  
5. Taking care of personal grooming such as brushing teeth? [] 1   [] 2   [x] 3   [] 4  
6. Eating meals? [] 1   [] 2   [] 3   [x] 4  
© 2007, Trustees of [address] [place] [address] [address] [address], under license to [employer]. All rights reserved Score:  Initial: 19 1/15/21 Most Recent:18 (Date: 1-21-21 ) Interpretation of Tool:  Represents activities that are increasingly more difficult (i.e. Bed mobility, Transfers, Gait). PLAN:  
FREQUENCY/DURATION: OT Plan of Care: 3 times/week for duration of hospital stay or until stated goals are met, whichever comes first. 
 
PROBLEM LIST:  
(Skilled intervention is medically necessary to address:) 1. Decreased ADL/Functional Activities 2. Decreased Activity Tolerance 3. Decreased Balance 4. Decreased Gait Ability 5. Decreased Strength 6. Decreased Transfer Abilities 7. Increased Pain INTERVENTIONS PLANNED:  
(Benefits and precautions of occupational therapy have been discussed with the patient.) 1. Self Care Training 2. Therapeutic Activity 3. Therapeutic Exercise/HEP 4. Neuromuscular Re-education 5. Gait Training 6. Education TREATMENT:  
 
EVALUATION: Low Complexity : (Untimed Charge) TREATMENT:  
(     ) Self Care (25 Minutes): Self care including Upper Body Bathing, Lower Body Bathing, Upper Body Dressing and Grooming to increase independence and decrease level of assistance required.  
 
AFTER TREATMENT POSITION/PRECAUTIONS: 
 Chair, Needs within reach and NP at bedside INTERDISCIPLINARY COLLABORATION: 
MD/PA/NP, RN/PCT, PT/PTA and OT/WOLFF TOTAL TREATMENT DURATION: 
OT Patient Time In/Time Out Time In: 3148 Time Out: 1104 Sawyer Yuan, OT

## 2021-01-21 NOTE — PROGRESS NOTES
TRANSFER - OUT REPORT: 
 
Verbal report given to Luanne Hopkins RN on [address] [place].  being transferred to 2nd floor room 218 for routine progression of care Report consisted of patients Situation, Background, Assessment and Recommendations(SBAR). Information from the following report(s) SBAR, Kardex, OR Summary, Procedure Summary, Intake/Output, Recent Results and Cardiac Rhythm NSR was reviewed with the receiving nurse. Opportunity for questions and clarification was provided.

## 2021-01-21 NOTE — PROGRESS NOTES
Bedside shift report received from Liza Tejada RN (offgoing nurse). Report given to Shawna Aparicio RN. Vital signs stable. No complaints present. Patient in stable condition.

## 2021-01-21 NOTE — PROGRESS NOTES
Bedside shift report given to Kia Reynoso RN (oncoming nurse) by Saurav James RN (offgoing nurse). Bedside shift report included the following information: SBAR, Kardex, Procedure Summary, MAR, and Recent Results.

## 2021-01-21 NOTE — PROGRESS NOTES
ACUTE PHYSICAL THERAPY GOALS: 
(Developed with and agreed upon by patient and/or caregiver. ) LTG:  Goals modified 1/21/2021  
(1.)Mr. Mari Batista will move from supine to sit and sit to supine , scoot up and down and roll side to side in bed with modified INDEPENDENT within 7 treatment day(s). (2.)Mr. Mari Batista will transfer from bed to chair and chair to bed with INDEPENDENT using the least restrictive/no device within 7 treatment day(s). (3.)Mr. Mari Batista will ambulate with INDEPENDENT for 500+ feet with the least restrictive device within 7 treatment day(s) while maintaining normal vital signs. (4.)Mr. Mari Batista will tolerated 23+ minutes of therapeutic activity within 7 treatment days for increased activity tolerance, while maintaining normal vital signs throughout session. ________________________________________________________________________________________________ PHYSICAL THERAPY ASSESSMENT: Daily Note and Re-evaluation PT Treatment Day # 1 Larry Huggins is a 78 y.o. male PRIMARY DIAGNOSIS: Atelectasis of right lung Pleural effusion [J90] Collapse of right lung [J98.11] Lung cancer ([facility] [place] [facility] [C34.90] Procedure(s) (LRB): 
RIGHT THORACOTOMY , COMPLETE PNEUMECTOMY, CRYOABLATION OF INTERCOSTAL NERVES (Right) 1 Day Post-Op Reason for Referral: ICD-10: Treatment Diagnosis: Generalized Muscle Weakness (M62.81) Difficulty in walking, Not elsewhere classified (R26.2) INPATIENT: Payor: Lisandro Russell / Plan: Eyad Edwards / Product Type: PPO /  
 
ASSESSMENT:  
 
REHAB RECOMMENDATIONS:  
Recommendation to date pending progress: 
Setting: ? Home Health Therapy Equipment: ? To Be Determined PRIOR LEVEL OF FUNCTION: 
(Prior to Hospitalization) INITIAL/CURRENT LEVEL OF FUNCTION: 
(Most Recently Demonstrated) Bed Mobility: ? Modified Independent Sit to Stand: ? Independent Transfers: 
? Independent Gait/Mobility: 
? Independent Bed Mobility: ? Minimal Assistance Sit to Stand: ? Minimal Assistance Transfers: ? Minimal Assistance Gait/Mobility: ? Minimal Assistance ASSESSMENT: 
Mr. Tania Jurado presents to hospital on 1/4/21 from MD office with pleural effusion. Pt COVID negative at this time. Pt history of lung cancer, R lobectomy, chest tubes, bronch, VATS 1/6. On 1/20 underwent R thoracotomy with pneumonectomy, so undergoing reassessment. Pt now on 3 L/min O2, reports pain 6/10 R rib cage. Pt states initial surgery July 2020, since then, independent but limited ambulation, independent ADLs. Sleeps in recliner or sofa. Pt lives with GF in private home, 6 steps to enter. Pt has DME at home but currently not using. Patient has declined since previous PT session. Goals modified. PT will continue to follow for acute care needs to address generalized weakness and overall functional mobility. SUBJECTIVE:  
Mr. Tania Jurado states, [place]. \" 
 
SOCIAL HISTORY/LIVING ENVIRONMENT: independent but limited ambulation, independent ADLs. Sleeps in recliner or sofa. 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 
Support Systems: Spouse/Significant Other/Partner OBJECTIVE:  
 
PAIN: VITAL SIGNS: LINES/DRAINS:  
Pre Treatment: Pain Screen Pain Scale 1: Numeric (0 - 10) Pain Intensity 1: 6 Pain Onset 1: postop Pain Location 1: Incisional 
Pain Intervention(s) 1: Repositioned Post Treatment: does not rate, appears comfortable in chair   none O2 Device: Nasal cannula GROSS EVALUATION: 
B LE Within Functional Limits Abnormal/ Functional Abnormal/ Non-Functional (see comments) Not Tested Comments: AROM [x] [] [] [] PROM [x] [] [] [] Strength [x] [] [] [] Balance [] [x] [] [] Posture [] [x] [] [] Forward head Sensation [] [x] [] [] neuropathy Coordination [] [x] [] [] Tone [] [] [] [x] Edema [] [] [] [x] Activity Tolerance [] [x] [] []   
 [] [] [] [] COGNITION/ 
PERCEPTION: Intact Impaired  
(see comments) Comments:  
Orientation [x] [] Vision [x] [] Hearing [x] [] Command Following [x] [] Safety Awareness [x] []   
 [] [] MOBILITY: I Mod I S SBA CGA Min Mod Max Total  NT x2 Comments:  
Bed Mobility Rolling [] [] [] [] [] [] [] [] [] [x] [] Supine to Sit [] [] [] [] [] [x] [] [] [] [] [] HOB elevated 70 degrees Scooting [] [] [] [] [x] [] [] [] [] [] [] Sit to Supine [] [] [] [] [] [] [] [] [] [x] [] Transfers Sit to Stand [] [] [] [] [] [x] [] [] [] [] [] Bed to Chair [] [] [] [] [] [] [] [] [] [x] [] Stand to Sit [] [] [] [] [] [x] [] [] [] [] [] I=Independent, Mod I=Modified Independent, S=Supervision, SBA=Standby Assistance, CGA=Contact Guard Assistance,  
Min=Minimal Assistance, Mod=Moderate Assistance, Max=Maximal Assistance, Total=Total Assistance, NT=Not Tested GAIT: I Mod I S SBA CGA Min Mod Max Total  NT x2 Comments:  
Level of Assistance [] [] [] [] [] [x] [] [] [] [] [] SpO2 remained 97% on 3L. Distance 125 DME upright cardiac walker Gait Quality Slow yanique, forward posture Weightbearing Status N/A I=Independent, Mod I=Modified Independent, S=Supervision, SBA=Standby Assistance, CGA=Contact Guard Assistance,  
Min=Minimal Assistance, Mod=Moderate Assistance, Max=Maximal Assistance, Total=Total Assistance, NT=Not Tested [facility] Basic Mobility Inpatient Short Form How much difficulty does the patient currently have. .. Unable A Lot A Little None 1. Turning over in bed (including adjusting bedclothes, sheets and blankets)? [] 1   [] 2   [x] 3   [] 4  
2. Sitting down on and standing up from a chair with arms ( e.g., wheelchair, bedside commode, etc.)   [] 1   [] 2   [x] 3   [] 4  
3. Moving from lying on back to sitting on the side of the bed?    [] 1   [] 2   [x] 3   [] 4  
 How much help from another person does the patient currently need. .. Total A Lot A Little None 4. Moving to and from a bed to a chair (including a wheelchair)? [] 1   [] 2   [x] 3   [] 4  
5. Need to walk in hospital room? [] 1   [] 2   [x] 3   [] 4  
6. Climbing 3-5 steps with a railing? [] 1   [] 2   [x] 3   [] 4  
© 2007, Trustees of [facility] [employer], under license to [employer]. All rights reserved Score:  Initial: 21 Most Recent: 18 (Date: -- ) Interpretation of Tool:  Represents activities that are increasingly more difficult (i.e. Bed mobility, Transfers, Gait). PLAN:  
FREQUENCY/DURATION: PT Plan of Care: 3 times/week for duration of hospital stay or until stated goals are met, whichever comes first. 
 
PROBLEM LIST:  
(Skilled intervention is medically necessary to address:) 1. Decreased ADL/Functional Activities 2. Decreased Activity Tolerance 3. Decreased Balance 4. Decreased Gait Ability 5. Decreased Strength 6. Decreased Transfer Abilities INTERVENTIONS PLANNED:  
(Benefits and precautions of physical therapy have been discussed with the patient.) 1. Therapeutic Activity 2. Therapeutic Exercise/HEP 3. Neuromuscular Re-education 4. Gait Training 5. Manual Therapy 6. Education TREATMENT:  
 
EVALUATION: Moderate Complexity : (Untimed Charge) TREATMENT:  
($$ Therapeutic Exercises: 8-22 mins    ) Therapeutic Exercise (8 Minutes): Therapeutic exercises to include LE ex's,  static and dynamic sitting and standing balance, activity tolerance posture and weight shifting to improve functional activity tolerance, strength and mobility. DATE: 01/21/21 Ambulation Hip Flexion x15 AB Long Arc [address] Hip ab/ad Heel Raises Toe Raises Key:  A=active, AA=active assisted, P=passive, B=bilaterally, R=right, L=left DF=dorsiflexion, PF=plantarflexion AFTER TREATMENT POSITION/PRECAUTIONS: 
 sitting in w/c with RN 
 
INTERDISCIPLINARY COLLABORATION: 
RN/PCT and PT/PTA TOTAL TREATMENT DURATION: 
PT Patient Time In/Time Out Time In: 0925 Time Out: 3032 A Javy Hauser, PT, DPT

## 2021-01-21 NOTE — PROGRESS NOTES
Occupational Therapy Note: 
Therapist is discharging patient from OT at this time due to decline in medical status and transfer to ICU. Please reconsult OT when MD deems patient appropriate for continued services. Thank you.  
Jodee Rush, OTR/L

## 2021-01-21 NOTE — MANAGEMENT PLAN
[facility] Cardiology Care Management Date of  Admission: 1/4/2021  8:52 AM  
 
Primary Care Physician: other Primary Cardiologist: Dr. Conor Serra Referring Physician: Dr. Filomena Steward 
Supervising Cardiologist: Dr. Altagracia Banks Reason for care management: need for eliquis Leo Guevara is a 78 y.o. male with prior h/o PAF, GERD, HTN, OA s/p knee replacement and diagnosed with lung cancer s/p chemo and radiation and thoractomy with lobectomy and lymphadenectomy on 7/23/2020. Patient was seen in office with SOB and pressure in right lung. Patient also admitted 1/4 with hemothorax and eliquis was placed on hold. Patient had right CT placed shortly after admission. Patient underwent right thoracotomy with extensive pneumonolysis with decortication on 1/6/2021. The patient underwent right thoracotomy with completion of pneumonectomy with extensive lysis of adhesion and decortication by Dr. Filomena Steward on 1/20/2021. Cardiology was consulted today for need for eliquis. Currently remains in NSR and strips and tele monitor reviewed with no PAF as well. Patient Active Problem List  
Diagnosis Code  Essential hypertension with goal blood pressure less than 130/80 I10  Mass of lower lobe of right lung R91.8  Right lower lobe lung mass R91.8  Malignant neoplasm of lower lobe of right lung (HCC) C34.31  
 GERD (gastroesophageal reflux disease) K21.9  Hypotension I95.9  Sepsis due to undetermined organism ([address] A41.9  Pulmonary emphysema ([address] J43.9  Cancer of bronchus of right lower lobe (HCC) C34.31  
 Lung cancer ([address] C34.90  
 Cancer of right lung (HCC) C34.91  
 Atrial tachycardia (HCC) I47.1  S/P lobectomy of lung Z90.2  Cough R05  Dehydration E86.0  Chemotherapy induced neutropenia (HCC) D70.1, T45.1X5A  Pleural effusion on right J90  
 Atelectasis of right lung J98.11  
 A-fib (HCC) I48.91  
 Chronic respiratory failure with hypoxia (HCC) J96.11  
  S/P thoracotomy U89.869 Past Medical History:  
Diagnosis Date  GERD (gastroesophageal reflux disease)   
 controlled with med  Hypertension hx-- off meds since 4/2020--- followed by dr. Christin Leon  Hypoxia 02/24/2020  Malignant neoplasm of lower lobe of right lung ([address] 02/26/2020 REC'D CHEMO AND RADIATION--- FOLLOWED BY DR. Surendra Taylor Osteoarthritis  Right lower lobe lung mass 02/24/2020  Status post total right knee replacement 2/29/2016 Past Surgical History:  
Procedure Laterality Date  HX COLONOSCOPY    
 HX KNEE ARTHROSCOPY Left   
 scope X 1, ACL recon X 1  
 HX KNEE ARTHROSCOPY Right 1974, 1975 X 2   
 HX KNEE REPLACEMENT Right 2016 [facility]  HX VASCULAR ACCESS Right placed 3/2020  
 port in chest   
 IR INSERT TUNL CVC W PORT OVER 5 YEARS  3/18/2020  CA SINUS SURGERY PROC UNLISTED  1988, 1991  
 sinus surg Allergies Allergen Reactions  Albuterol Palpitations  Celebrex [Celecoxib] Itching Family History Problem Relation Age of Onset  Cancer Mother   
     uterine  Arrhythmia Sister   
     afib Current Facility-Administered Medications Medication Dose Route Frequency  0.9% sodium chloride infusion 250 mL  250 mL IntraVENous PRN  
 alcohol 62% (NOZIN) nasal  1 Ampule  1 Ampule Topical Q12H  
 enoxaparin (LOVENOX) injection 40 mg  40 mg SubCUTAneous Q24H  
 sodium chloride (NS) flush 5-40 mL  5-40 mL IntraVENous Q8H  
 sodium chloride (NS) flush 5-40 mL  5-40 mL IntraVENous PRN  
 0.9% sodium chloride infusion 250 mL  250 mL IntraVENous PRN  
 HYDROcodone-homatropine (HYCODAN) 5-1.5 mg/5 mL (5 mL) oral solution 5 mL  5 mL Oral Q4H PRN  
 bisacodyL (DULCOLAX) suppository 10 mg  10 mg Rectal DAILY PRN  
 midodrine (PROAMATINE) tablet 10 mg  10 mg Oral TID WITH MEALS  traMADoL (ULTRAM) tablet 50 mg  50 mg Oral Q6H PRN  
 acetaminophen (TYLENOL) tablet 500 mg  500 mg Oral Q6H PRN  
  HYDROmorphone (PF) (DILAUDID) injection 1 mg  1 mg IntraVENous Q2H PRN  
 guaiFENesin ER (MUCINEX) tablet 1,200 mg  1,200 mg Oral Q12H  
 amiodarone (CORDARONE) tablet 200 mg  200 mg Oral DAILY  [Held by provider] apixaban (ELIQUIS) tablet 5 mg  5 mg Oral Q12H  pantoprazole (PROTONIX) tablet 40 mg  40 mg Oral ACB&D  
 gabapentin (NEURONTIN) capsule 300 mg  300 mg Oral TID  levalbuterol tartrate (XOPENEX) 45 mcg/actuation inhaler HFAA 2 Puff- pt to supply (Patient Supplied)  2 Puff Inhalation Q4H PRN  
 ondansetron (ZOFRAN ODT) tablet 8 mg  8 mg Oral Q8H PRN  polyethylene glycol (MIRALAX) packet 17 g  17 g Oral DAILY  sodium chloride (NS) flush 5-40 mL  5-40 mL IntraVENous Q8H  
 sodium chloride (NS) flush 5-40 mL  5-40 mL IntraVENous PRN  
 oxyCODONE-acetaminophen (PERCOCET) 5-325 mg per tablet 1 Tab  1 Tab Oral Q4H PRN  
 influenza vaccine 2020-21 (6 mos+)(PF) (FLUARIX/FLULAVAL/FLUZONE QUAD) injection 0.5 mL  0.5 mL IntraMUSCular PRIOR TO DISCHARGE Review of Systems Constitution: Negative for diaphoresis and malaise/fatigue. HENT: Negative for congestion. Cardiovascular: Negative for chest pain, claudication, cyanosis, dyspnea on exertion, irregular heartbeat, leg swelling, near-syncope, orthopnea, palpitations, paroxysmal nocturnal dyspnea and syncope. Respiratory: Negative for cough, shortness of breath and wheezing. Endocrine: Negative for cold intolerance and heat intolerance. Hematologic/Lymphatic: Does not bruise/bleed easily. Skin: Negative for nail changes. Neurological: Negative for dizziness, headaches and weakness. Physical Exam 
Vitals:  
 01/21/21 3907 01/21/21 0630 01/21/21 4898 01/21/21 5751 BP: 112/76 116/76 113/73 109/74 Pulse: 68 66 68 70 Resp: 17 15 18 19 Temp:   97.3 °F (36.3 °C) SpO2: 100% 99% 99% 100% Weight:      
Height:      
 
 
Physical Exam: 
General: Well Developed, Well Nourished, No Acute Distress HEENT: pupils equal and round, no abnormalities noted Neck: supple, no JVD, no carotid bruits Heart: S1S2 with RRR without murmurs or gallops Lungs: Clear throughout auscultation bilaterally without adventitious sounds Abd: soft, nontender, nondistended, with good bowel sounds Ext: warm, no edema, calves supple/nontender, pulses 2+ bilaterally Skin: warm and dry Psychiatric: Normal mood and affect Neurologic: Alert and oriented X 3 Cardiographics Telemetry: NSR 
ECG: Normal sinus rhythm Echocardiogram: 12/2020 showed preserved LV function and mild LA size. Labs:  
Recent Labs  
  01/21/21 
0725 01/20/21 
1225 MG  --  2.2 WBC 10.1  --   
HGB 10.6*  --   
HCT 33.9*  --   
  -- Assessment/Plan: 
 
 Assessment:  
  
Principal Problem: 
  Atelectasis of right lung (1/4/2021) Active Problems: 
  Pulmonary emphysema ([address] (7/7/2020) Lung cancer ([address] (7/23/2020) Pleural effusion on right (12/28/2020) A-fib ([address] (1/4/2021)- PAF; currently in NSR. With underlying lung cancer and score below, will need NOAC when OK with surgery. Atrial Fibrillation CHADSVASC2 Score Stroke Risk:  
78 y.o. 72 to 76  +1  
male Male     +0  
CHF HX: No    + 0 HTN HX: Yes    +1 Stroke/TIA/Thromboembolism No    +0 Vascular Disease HX: No    + 0 Diabetes Mellitus No    + 0 CHADSVASC 2 Score 2      Annual Stroke Risk 2.2%- moderate-high Chronic respiratory failure with hypoxia ([address] (1/4/2021) S/P thoracotomy (1/7/2021) We appreciate the opportunity to participate in this patient's care. Please see supervising physician's full consult note. John Gao NP 
Supervising MD: Dr. Jessica Grullon

## 2021-01-21 NOTE — PROGRESS NOTES
TRANSFER - IN REPORT: 
 
Verbal report received from [facility] on [address] [facility].  being received from CV ICU for routine progression of care Report consisted of patients Situation, Background, Assessment and  
Recommendations(SBAR). Information from the following report(s) SBAR, Kardex, Intake/Output, MAR and Recent Results was reviewed with the receiving nurse. Opportunity for questions and clarification was provided. Assessment completed upon patients arrival to unit and care assumed.

## 2021-01-21 NOTE — PROGRESS NOTES
Pt transferred to this floor. Assessment completed. Open area noted to sacrum. EPC cream used and gentle allevyn in place. Encouraged to change positions off sacrum

## 2021-01-21 NOTE — PROGRESS NOTES
Patient left radial arterial line with dampened waveform, giving inaccurate BPs. Troubleshooting art line positioning unsuccessful. Pt hemodynamically stable, NIBP: 102/72. Arterial line discontinued, pressure held until hemostasis achieved, will continue to trend with NIBP. NIBP cycling q 15mins.

## 2021-01-22 NOTE — PROGRESS NOTES
[facility] CARDIOLOGY PROGRESS NOTE 
      
 
1/22/2021 9:50 AM 
 
Admit Date: 1/4/2021 Subjective: In by consultation service yesterday. History of mid lobectomy with previous history of spontaneous cardioversion from atrial fibrillation admitted for increasing dyspnea pleural effusion subsequent work-up with previous hemothorax chest tube placement with thoracotomy and extensive lysis and decortication 1/6/2021 echo 12/2020 EF normal 
 
Review of Systems Constitutional: Negative for fever. Cardiovascular: Negative for chest pain. Objective:  
  
Vitals:  
 01/21/21 2244 01/22/21 7099 01/22/21 7428 01/22/21 0935 BP: 103/68 (!) 99/58 91/64 91/64 Pulse: 77 69 73 73 Resp: 18 18 19 Temp: 97.4 °F (36.3 °C) 97.5 °F (36.4 °C) 97.7 °F (36.5 °C) SpO2: 98% 97% 96% Weight:      
Height:      
 
 
 
Physical Exam  
Constitutional: He is oriented to person, place, and time. He appears well-developed. HENT:  
Head: Normocephalic and atraumatic. Eyes: Pupils are equal, round, and reactive to light. Neck: Normal range of motion. Cardiovascular: Normal rate. No murmur heard. Pulmonary/Chest: Effort normal. He has decreased breath sounds in the right lower field. Dressing in place on right chest wall Abdominal: Soft. He exhibits no distension. There is no abdominal tenderness. Musculoskeletal:     
   General: No edema. Neurological: He is alert and oriented to person, place, and time. No cranial nerve deficit. Skin: Skin is warm and dry. No rash noted. No erythema. Psychiatric: He has a normal mood and affect. His behavior is normal.  
 
 
Data Review:  
Recent Labs  
  01/22/21 
0522 01/21/21 
0725 01/20/21 
1225  138  --   
K 4.2 4.5  --   
MG  --   --  2.2 BUN 12 13  --   
CREA 0.54* 0.50*  --   
GLU 88 117*  --   
WBC 5.9 10.1  --   
HGB 9.4* 10.6*  --   
HCT 30.6* 33.9*  --   
 234  --   
 
 
 
 Intake/Output Summary (Last 24 hours) at 1/22/2021 4793 Last data filed at 1/21/2021 2244 Gross per 24 hour Intake  Output 400 ml Net -400 ml Current Facility-Administered Medications Medication Dose Route Frequency  0.9% sodium chloride infusion 250 mL  250 mL IntraVENous PRN  
 alcohol 62% (NOZIN) nasal  1 Ampule  1 Ampule Topical Q12H  
 enoxaparin (LOVENOX) injection 40 mg  40 mg SubCUTAneous Q24H  
 sodium chloride (NS) flush 5-40 mL  5-40 mL IntraVENous Q8H  
 sodium chloride (NS) flush 5-40 mL  5-40 mL IntraVENous PRN  
 0.9% sodium chloride infusion 250 mL  250 mL IntraVENous PRN  
 HYDROcodone-homatropine (HYCODAN) 5-1.5 mg/5 mL (5 mL) oral solution 5 mL  5 mL Oral Q4H PRN  
 bisacodyL (DULCOLAX) suppository 10 mg  10 mg Rectal DAILY PRN  
 midodrine (PROAMATINE) tablet 10 mg  10 mg Oral TID WITH MEALS  traMADoL (ULTRAM) tablet 50 mg  50 mg Oral Q6H PRN  
 acetaminophen (TYLENOL) tablet 500 mg  500 mg Oral Q6H PRN  
 HYDROmorphone (PF) (DILAUDID) injection 1 mg  1 mg IntraVENous Q2H PRN  
 guaiFENesin ER (MUCINEX) tablet 1,200 mg  1,200 mg Oral Q12H  
 amiodarone (CORDARONE) tablet 200 mg  200 mg Oral DAILY  [Held by provider] apixaban (ELIQUIS) tablet 5 mg  5 mg Oral Q12H  pantoprazole (PROTONIX) tablet 40 mg  40 mg Oral ACB&D  
 gabapentin (NEURONTIN) capsule 300 mg  300 mg Oral TID  levalbuterol tartrate (XOPENEX) 45 mcg/actuation inhaler HFAA 2 Puff- pt to supply (Patient Supplied)  2 Puff Inhalation Q4H PRN  
 ondansetron (ZOFRAN ODT) tablet 8 mg  8 mg Oral Q8H PRN  polyethylene glycol (MIRALAX) packet 17 g  17 g Oral DAILY  sodium chloride (NS) flush 5-40 mL  5-40 mL IntraVENous Q8H  
 sodium chloride (NS) flush 5-40 mL  5-40 mL IntraVENous PRN  
 oxyCODONE-acetaminophen (PERCOCET) 5-325 mg per tablet 1 Tab  1 Tab Oral Q4H PRN  
  influenza vaccine 2020-21 (6 mos+)(PF) (FLUARIX/FLULAVAL/FLUZONE QUAD) injection 0.5 mL  0.5 mL IntraMUSCular PRIOR TO DISCHARGE Assessment/Plan:  
 
Paroxysmal atrial fibrillation consider outpatient monitor following discharge and possible loop recorder placement in the future to determine if patient has underlying atrial fibrillation whether this was related to an acute event. The patient is on long-term use of amiodarone which is not plan to be a permanent medication. Now maintaining sinus rhythm during acute illness Respiratory failure with hypoxia Improving Status post thoracotomy Pleural effusion on right Pulmonary emphysema Alan Castro MD 
1/22/2021 9:50 AM

## 2021-01-22 NOTE — PROGRESS NOTES
H&P/Consult Note/Progress Note/Office Note:  
Jeramie Dowd Jr.  MRN: [id number]  :1950  Age:70 y.o. 
 
HPI: Jeramie Dowd Jr. is a 70 y.o. male who is well known to Dr. Lazaro.  He underwent right thoracotomy with lower and middle lobectomy 20 for squamous cell carcinoma. He underwent neoaduvant chemo/radiation and adjuvant chemo..  CT 20 showed large pleural effusion and collapse of RUL. He underwent thoracentesis by Dr. Mccord 20 with approximately drainage of 900ml, but was unable to completely drain related to complaints of pain. He felt should hold Eliquis for admission for Chest tube placement.  Dr. Becerra placed chest tube this AM 21.  General surgery was consulted for complicated right pleural effusion, ?hemothorax not draining properly with chest tube placement. 
 
On exam, Mr. Dowd appears comfortable.  CT scan reviewed and current chest xray, NAD. On 3 L NC sats upper 90's.  Chest tube maintained with 800ml serosanguineous drainage noted.  Remains on 20cmsx.  Patient states since his lobectomy he has felt \"pressure\". He does use 2L oxygen at night.  She states he does feel like he can breath better since this chest tube has been placed.  His lost dose of eliquis was Friday (for A-fib).  He does have smoking history and quit about 6 years ago 
 
21 Patient states he is breathing better since chest tube. NAD on 3L NC with 98% sat. Chest xray reviewed and fluid collection appears loculated- will need surgery- plan for tomorrow. 
 
21 To the OR yesterday PO day1. Difficult surgery. Consulted pulmonary intraoperatively for broch. Unable to get lung to inflate intraoperative, ?chronic mucous plug. Pt in ICU, no complaints this AM- scheduled for bronch this AM. Chest tubes to water seal currently- no air leak- appears lung still collapsed from CXR. Pain controlled. 
 
 01/08/21 POD 2. Pt remains in ICU, no complaints this AM. Pain controlled. Chest tubes to water seal currently- no air leak. CXR this am showing No Significant Interval Change. 01/09/21 POD 3. Pt remains in ICU, no complaints this AM. Pain controlled. Chest tubes currently clamped. CXR this am showing No Significant Interval Change. Denies SOB. On 3L o2. 
 
01/11/21  POD 5  Chest tube remains clamped. NO changes in chest xray. Dr. Ruth Shabazz spoke with Dr. Hui Diamond regarding trying again to clear airway to re inflate lung if unsuccessful then will need a pneumonectomy. Patient in NAD, No SOB on 3 L oxygen with sats upper 90's and continues to be hypotensive at times. Hgb trending down will transfuse 2u pRBC 
 
01/12/21 POD 6 just returned from pulmonary procedure. Chest tubes to suction. States feels less pressure, but no other changes noted. No air leak noted in chest tubes- awaiting chest CT this afternoon. NAD, No SOB. Hgb 10 
 
01/13/21 POD 7. No significant changes on CT to right lung. Left lung ?pneumonia. Will discuss with pulmonary if any further treatment or proceed to pneumonectomy. Chest tubes maintained clamped. 6 liters oxygen 01/14/21 POD 8. No changes in breathing since chest tubes out. ON 5 liters Oxygen upper 90's sat. Chest xray reviewed- no changes- will plan pneumonectomy for next week- possible Monday- states cough is better 01/15/21 POD 9. No changes in breathing since chest tubes out. ON 5 liters Oxygen upper 90's sat. Chest xray reviewed- no changes- will plan pneumonectomy for next week- possible Monday- states cough is better 01/16/21 POD 10. No changes in breathing since chest tubes out. Remains on 5 liters Oxygen upper 90's sat. Planning pneumonectomy for next week- possible Monday. 01/17/21 POD 11. No changes in breathing since chest tubes out. Remains on 5 liters Oxygen upper 90's sat. Reports productive cough - clear mucous. CXR this am showing Unchanged right hydropneumothorax. New patchy edema or infiltrate in the left lung. Planning pneumonectomy for next week- possibly Monday or Wednesday. 01/18/21 POD 12  No significant changes noted in breathing, continues on 5 L oxygen. Complains about increased pressure noted in right chest and some increased cough. Will plan surgery Wednesday. 01/19/21 No changes. For surgery in the AM. T&C 2 units for tomorrow 01/20/21- right thoracotomy with pneumonectomy 01/21/21 Stable in ICU overnight. Oxygen 2L. No SOB. CXR reviewed. Tolerating diet. No complaints- will transfer to floor. 01/22/21 Awake in bed. No complaints voiced. Pain controlled. Tolerating diet. Oxygen 3L via NC. No SOB. CXR today showing Improving left lung base atelectasis or infiltrate, with minimal residual.   
 
 
Medical history as below Past Medical History:  
Diagnosis Date  GERD (gastroesophageal reflux disease)   
 controlled with med  Hypertension hx-- off meds since 4/2020--- followed by dr. Tre Jenkins  Hypoxia 02/24/2020  Malignant neoplasm of lower lobe of right lung ([facility] [address] 02/26/2020 REC'D CHEMO AND RADIATION--- FOLLOWED BY DR. Sawyer Jackson Osteoarthritis  Right lower lobe lung mass 02/24/2020  Status post total right knee replacement 2/29/2016 Past Surgical History:  
Procedure Laterality Date  HX COLONOSCOPY    
 HX KNEE ARTHROSCOPY Left   
 scope X 1, ACL recon X 1  
 HX KNEE ARTHROSCOPY Right 1974, 1975 X 2   
 HX KNEE REPLACEMENT Right 2016 [address]  HX VASCULAR ACCESS Right placed 3/2020  
 port in chest   
 IR INSERT TUNL CVC W PORT OVER 5 YEARS  3/18/2020  NE SINUS SURGERY PROC UNLISTED  1988, 1991  
 sinus surg Current Facility-Administered Medications Medication Dose Route Frequency  0.9% sodium chloride infusion 250 mL  250 mL IntraVENous PRN  
 alcohol 62% (NOZIN) nasal  1 Ampule  1 Ampule Topical Q12H  
 enoxaparin (LOVENOX) injection 40 mg  40 mg SubCUTAneous Q24H  
 sodium chloride (NS) flush 5-40 mL  5-40 mL IntraVENous Q8H  
 sodium chloride (NS) flush 5-40 mL  5-40 mL IntraVENous PRN  
 0.9% sodium chloride infusion 250 mL  250 mL IntraVENous PRN  
 HYDROcodone-homatropine (HYCODAN) 5-1.5 mg/5 mL (5 mL) oral solution 5 mL  5 mL Oral Q4H PRN  
 bisacodyL (DULCOLAX) suppository 10 mg  10 mg Rectal DAILY PRN  
 midodrine (PROAMATINE) tablet 10 mg  10 mg Oral TID WITH MEALS  traMADoL (ULTRAM) tablet 50 mg  50 mg Oral Q6H PRN  
 acetaminophen (TYLENOL) tablet 500 mg  500 mg Oral Q6H PRN  
 HYDROmorphone (PF) (DILAUDID) injection 1 mg  1 mg IntraVENous Q2H PRN  
 guaiFENesin ER (MUCINEX) tablet 1,200 mg  1,200 mg Oral Q12H  
 amiodarone (CORDARONE) tablet 200 mg  200 mg Oral DAILY  [Held by provider] apixaban (ELIQUIS) tablet 5 mg  5 mg Oral Q12H  pantoprazole (PROTONIX) tablet 40 mg  40 mg Oral ACB&D  
 gabapentin (NEURONTIN) capsule 300 mg  300 mg Oral TID  levalbuterol tartrate (XOPENEX) 45 mcg/actuation inhaler HFAA 2 Puff- pt to supply (Patient Supplied)  2 Puff Inhalation Q4H PRN  
 ondansetron (ZOFRAN ODT) tablet 8 mg  8 mg Oral Q8H PRN  polyethylene glycol (MIRALAX) packet 17 g  17 g Oral DAILY  sodium chloride (NS) flush 5-40 mL  5-40 mL IntraVENous Q8H  
 sodium chloride (NS) flush 5-40 mL  5-40 mL IntraVENous PRN  
 oxyCODONE-acetaminophen (PERCOCET) 5-325 mg per tablet 1 Tab  1 Tab Oral Q4H PRN  
 influenza vaccine 2020-21 (6 mos+)(PF) (FLUARIX/FLULAVAL/FLUZONE QUAD) injection 0.5 mL  0.5 mL IntraMUSCular PRIOR TO DISCHARGE Albuterol and Celebrex [celecoxib] Social History Socioeconomic History  Marital status:  Spouse name: Not on file  Number of children: Not on file  Years of education: Not on file  Highest education level: Not on file Tobacco Use  Smoking status: Former Smoker Packs/day: 1.00 Years: 20.00 Pack years: 20.00 Quit date:  Years since quittin.0  Smokeless tobacco: Never Used Substance and Sexual Activity  Alcohol use: Yes Alcohol/week: 4.0 standard drinks Types: 4 Glasses of wine per week  Drug use: No  
Other Topics Concern   Service No  
 Blood Transfusions No  
 Caffeine Concern No  
 Occupational Exposure No  
 Hobby Hazards No  
 Sleep Concern No  
 Stress Concern No  
 Weight Concern No  
 Special Diet No  
 Exercise Yes  [facility] [facility] Yes Social History Narrative . Has long-time girlfriend. Continues to work doing IT support. Social History Tobacco Use Smoking Status Former Smoker  Packs/day: 1.00  Years: 20.00  Pack years: 20.00  Quit date:   Years since quittin.0 Smokeless Tobacco Never Used Family History Problem Relation Age of Onset  Cancer Mother   
     uterine  Arrhythmia Sister   
     afib ROS: The patient has no difficulty with chest pain or shortness of breath. No fever or chills. Comprehensive review of systems was otherwise unremarkable except as noted above. Physical Exam:  
Visit Vitals BP 91/64 Pulse 73 Temp 97.7 °F (36.5 °C) Resp 19 Ht 5' 10\" (1.778 m) Wt 188 lb 0.8 oz (85.3 kg) SpO2 96% BMI 26.98 kg/m² Constitutional: Alert, oriented, cooperative patient in no acute distress; appears stated age Eyes: Sclera are clear. EOMs intact ENMT: no external lesions gross hearing normal; no obvious neck masses, no ear or lip lesions, nares normal 
CV: RRR. Normal perfusion Resp: No JVD. Breathing is  non-labored; no audible wheezing. O2 3 L 
GI: soft and non-distended Musculoskeletal: unremarkable with normal function. No embolic signs or cyanosis. Neuro:  Oriented; moves all 4; no focal deficits Psychiatric: normal affect and mood, no memory impairment Recent vitals (if inpt): 
Patient Vitals for the past 24 hrs: 
 BP Temp Pulse Resp SpO2  
01/22/21 0935 91/64  73    
01/22/21 0736 91/64 97.7 °F (36.5 °C) 73 19 96 % 01/22/21 0342 (!) 99/58 97.5 °F (36.4 °C) 69 18 97 % 01/21/21 2244 103/68 97.4 °F (36.3 °C) 77 18 98 % 01/21/21 1953 100/67 97.4 °F (36.3 °C) 75 19 99 % 01/21/21 1607 94/67 97.4 °F (36.3 °C) 77 20 100 % 01/21/21 1121 (!) 107/55 97.2 °F (36.2 °C) 80 20 99 % XR Results (most recent): 
Results from Hospital Encounter encounter on 01/04/21 XR CHEST SNGL V  
 Narrative EXAM: Chest x-ray. INDICATION: Post pneumonectomy. COMPARISON: Yesterday's chest x-ray. TECHNIQUE: Frontal view chest x-ray. FINDINGS: Again noted is a prior right pneumonectomy, with stable nodularity 
along the lateral right pleural surface. Minimal left lung base atelectasis or 
infiltrate has improved. The heart size is within normal limits. No left-sided 
pneumothorax or pleural effusion is seen. Again noted is a right chest wall 
infusion port catheter, skin staples and subcutaneous emphysema. Impression 1. Improving left lung base atelectasis or infiltrate, with minimal residual. 
2. Post right pneumonectomy. Labs: 
Recent Labs  
  01/22/21 0522 WBC 5.9 HGB 9.4*    
K 4.2  CO2 35* BUN 12  
CREA 0.54* GLU 88 Lab Results Component Value Date/Time  WBC 5.9 01/22/2021 05:22 AM  
 HGB 9.4 (L) 01/22/2021 05:22 AM  
 PLATELET 829 95/73/5364 05:22 AM  
 Sodium 139 01/22/2021 05:22 AM  
 Potassium 4.2 01/22/2021 05:22 AM  
 Chloride 102 01/22/2021 05:22 AM  
 CO2 35 (H) 01/22/2021 05:22 AM  
 BUN 12 01/22/2021 05:22 AM  
 Creatinine 0.54 (L) 01/22/2021 05:22 AM  
 Glucose 88 01/22/2021 05:22 AM  
 INR 1.1 01/05/2021 04:02 PM  
 aPTT 35.2 (H) 01/05/2021 04:02 PM  
 Bilirubin, total 0.5 01/05/2021 06:26 AM  
 Bilirubin, direct 0.8 (H) 05/05/2020 02:27 PM  
 ALT (SGPT) 8 (L) 01/05/2021 06:26 AM  
 Alk. phosphatase 99 01/05/2021 06:26 AM  
 Troponin-I, Qt. <0.02 (L) 05/05/2020 01:09 AM  
 
 
I reviewed recent labs and recent radiologic studies. I independently reviewed radiology images for studies I described above or studies I have ordered. Admission date (for inpatients): 1/4/2021 Day of Surgery  Procedure(s): RIGHT THORACOTOMY , COMPLETE PNEUMECTOMY, CRYOABLATION OF INTERCOSTAL NERVES 
 
ASSESSMENT/PLAN: 
Problem List  Date Reviewed: 1/13/2021 Codes Class Noted S/P thoracotomy ICD-10-CM: L83.175 ICD-9-CM: V45.89  1/7/2021 * (Principal) Atelectasis of right lung ICD-10-CM: J98.11 ICD-9-CM: 518.0  1/4/2021 A-fib (HCC) (Chronic) ICD-10-CM: I48.91 
ICD-9-CM: 427.31  1/4/2021 Chronic respiratory failure with hypoxia (HCC) (Chronic) ICD-10-CM: J96.11 
ICD-9-CM: 518.83, 799.02  1/4/2021 Pleural effusion on right ICD-10-CM: J90 ICD-9-CM: 511.9  12/28/2020 Chemotherapy induced neutropenia (HCC) ICD-10-CM: D70.1, T45.1X5A 
ICD-9-CM: 288.03, E933.1  10/23/2020 Dehydration ICD-10-CM: E86.0 ICD-9-CM: 276.51  9/15/2020 S/P lobectomy of lung ICD-10-CM: Z90.2 ICD-9-CM: V45.89  7/29/2020 Cough ICD-10-CM: R05 ICD-9-CM: 786.2  7/29/2020 Atrial tachycardia (HCC) ICD-10-CM: I47.1 ICD-9-CM: 427.89  7/26/2020 Cancer of bronchus of right lower lobe [facility]) ICD-10-CM: C34.31 
ICD-9-CM: 162.5  7/23/2020 Lung cancer ([facility] [address] (Chronic) ICD-10-CM: C34.90 ICD-9-CM: 162.9  7/23/2020 Cancer of right lung [facility]) ICD-10-CM: C34.91 
ICD-9-CM: 162.9  7/23/2020 Pulmonary emphysema (HCC) (Chronic) ICD-10-CM: J43.9 ICD-9-CM: 492.8  7/7/2020 GERD (gastroesophageal reflux disease) ICD-10-CM: K21.9 ICD-9-CM: 530.81  Unknown Hypotension (Chronic) ICD-10-CM: I95.9 ICD-9-CM: 458.9  5/5/2020 Overview Signed 1/11/2021  8:39 AM by Chelsea Basurto NP On midodrine Sepsis due to undetermined organism [facility]) ICD-10-CM: A41.9 ICD-9-CM: 038.9  5/5/2020 Malignant neoplasm of lower lobe of right lung (HCC) (Chronic) ICD-10-CM: C34.31 
ICD-9-CM: 162.5  2/26/2020 Overview Signed 1/4/2021  7:38 AM by Wan Wang MD  
  Dec 2020- Echo EF 50%, technically difficult, cannot assess regional wall motion. Aortic root 4.1 cm. No significant valvular disease. Normal DF Mass of lower lobe of right lung ICD-10-CM: R91.8 ICD-9-CM: 786.6  2/23/2020 Right lower lobe lung mass ICD-10-CM: R91.8 ICD-9-CM: 786.6  2/23/2020 Essential hypertension with goal blood pressure less than 130/80 (Chronic) ICD-10-CM: I10 
ICD-9-CM: 401.9  2/19/2018 Principal Problem: 
  Atelectasis of right lung (1/4/2021) Active Problems: 
  Pulmonary emphysema ([address] (7/7/2020) Lung cancer ([address] (7/23/2020) Pleural effusion on right (12/28/2020) A-fib ([address] (1/4/2021) Chronic respiratory failure with hypoxia ([address] (1/4/2021) S/P thoracotomy (1/7/2021) Plan Transfer to floor 1/21/21 Cxr daily Regular diet Activity as tolerated Pain control Encourage C/DB/IS Oxygen as needed - wean as pt tolerates Howell out Cardiology following to assess continued need for eliquis Possibly DC home tomorrow Signed:  Jaxon Etienne NP

## 2021-01-22 NOTE — PROGRESS NOTES
Problem: Falls - Risk of 
Goal: *Absence of Falls Description: Document Jorge Sharif Fall Risk and appropriate interventions in the flowsheet. Outcome: Progressing Towards Goal 
Note: Fall Risk Interventions: 
Mobility Interventions: Bed/chair exit alarm Mentation Interventions: Adequate sleep, hydration, pain control Medication Interventions: Patient to call before getting OOB, Teach patient to arise slowly Elimination Interventions: Call light in reach

## 2021-01-22 NOTE — PROGRESS NOTES
PT note: Patient eating lunch at this time. Will attempt PT treatment at later time and date as schedule allows. Thank you. Meridith Canavan, PT 
1/22/2021

## 2021-01-23 NOTE — PROGRESS NOTES
END OF SHIFT NOTE: 
 
INTAKE/OUTPUT 
01/22 0701 - 01/23 0700 In: -  
Out: 3049 [[id number]] Voiding: YES Catheter: NO 
Drain:   
 
 
 
 
 
Flatus: Patient does have flatus present. Stool:  0 occurrences. Characteristics: 
Stool Assessment Stool Color: (have not observed) Stool Appearance: Formed Stool Amount: Small Stool Source/Status: Rectum Emesis: 0 occurrences. Characteristics: VITAL SIGNS Patient Vitals for the past 12 hrs: 
 Temp Pulse Resp BP SpO2  
01/23/21 0342 97.9 °F (36.6 °C) 82 18 107/68 99 % 01/22/21 2306 98.7 °F (37.1 °C) 79 19 105/71 100 % 01/22/21 1925 98.2 °F (36.8 °C) 80 19 106/68 98 % 01/22/21 1713  75  97/65  Pain Assessment Pain Intensity 1: 0 (01/22/21 2306) Pain Location 1: Chest 
Pain Intervention(s) 1: Medication (see MAR) Patient Stated Pain Goal: 2 Ambulating Not oob this shift. Hycodan given for cough and Percocet for incisional pain. Dry postop drsg. Shift report given to oncoming nurse at the bedside.  
 
Yair Lovell RN

## 2021-01-23 NOTE — PROGRESS NOTES
H&P/Consult Note/Progress Note/Office Note:  
Dylon Orozco MRN: [id number]  :1950  Age:75 y.o. 
 
HPI: Dylon Orozco is a 78 y.o. male who is well known to Dr. Graylin Bloch.  He underwent right thoracotomy with lower and middle lobectomy 20 for squamous cell carcinoma. He underwent neoaduvant chemo/radiation and adjuvant chemo. .  CT 20 showed large pleural effusion and collapse of RUL. He underwent thoracentesis by Dr. Marcelino Burnett 20 with approximately drainage of 900ml, but was unable to completely drain related to complaints of pain. He felt should hold Eliquis for admission for Chest tube placement. Dr. Kavon Garcia placed chest tube this AM 21. General surgery was consulted for complicated right pleural effusion, ?hemothorax not draining properly with chest tube placement. On exam, Mr. Jonathan Vazquez appears comfortable.  CT scan reviewed and current chest xray, NAD. On 3 L NC sats upper 90's. Chest tube maintained with 800ml serosanguineous drainage noted. Remains on 20cmsx. Patient states since his lobectomy he has felt \"pressure\". He does use 2L oxygen at night. She states he does feel like he can breath better since this chest tube has been placed. His lost dose of eliquis was Friday (for A-fib). He does have smoking history and quit about 6 years ago 21 Patient states he is breathing better since chest tube. NAD on 3L NC with 98% sat. Chest xray reviewed and fluid collection appears loculated- will need surgery- plan for tomorrow. 21 To the OR yesterday PO day1. Difficult surgery. Consulted pulmonary intraoperatively for broch. Unable to get lung to inflate intraoperative, ?chronic mucous plug. Pt in ICU, no complaints this AM- scheduled for bronch this AM. Chest tubes to water seal currently- no air leak- appears lung still collapsed from CXR. Pain controlled. 01/08/21 POD 2. Pt remains in ICU, no complaints this AM. Pain controlled. Chest tubes to water seal currently- no air leak. CXR this am showing No Significant Interval Change. 01/09/21 POD 3. Pt remains in ICU, no complaints this AM. Pain controlled. Chest tubes currently clamped. CXR this am showing No Significant Interval Change. Denies SOB. On 3L o2. 
 
01/11/21  POD 5  Chest tube remains clamped. NO changes in chest xray. Dr. Jonathan Silva spoke with Dr. Rosibel Garcia regarding trying again to clear airway to re inflate lung if unsuccessful then will need a pneumonectomy. Patient in NAD, No SOB on 3 L oxygen with sats upper 90's and continues to be hypotensive at times. Hgb trending down will transfuse 2u pRBC 
 
01/12/21 POD 6 just returned from pulmonary procedure. Chest tubes to suction. States feels less pressure, but no other changes noted. No air leak noted in chest tubes- awaiting chest CT this afternoon. NAD, No SOB. Hgb 10 
 
01/13/21 POD 7. No significant changes on CT to right lung. Left lung ?pneumonia. Will discuss with pulmonary if any further treatment or proceed to pneumonectomy. Chest tubes maintained clamped. 6 liters oxygen 01/14/21 POD 8. No changes in breathing since chest tubes out. ON 5 liters Oxygen upper 90's sat. Chest xray reviewed- no changes- will plan pneumonectomy for next week- possible Monday- states cough is better 01/15/21 POD 9. No changes in breathing since chest tubes out. ON 5 liters Oxygen upper 90's sat. Chest xray reviewed- no changes- will plan pneumonectomy for next week- possible Monday- states cough is better 01/16/21 POD 10. No changes in breathing since chest tubes out. Remains on 5 liters Oxygen upper 90's sat. Planning pneumonectomy for next week- possible Monday. 01/17/21 POD 11. No changes in breathing since chest tubes out. Remains on 5 liters Oxygen upper 90's sat. Reports productive cough - clear mucous. CXR this am showing Unchanged right hydropneumothorax. New patchy edema or infiltrate in the left lung. Planning pneumonectomy for next week- possibly Monday or Wednesday. 01/18/21 POD 12  No significant changes noted in breathing, continues on 5 L oxygen. Complains about increased pressure noted in right chest and some increased cough. Will plan surgery Wednesday. 01/19/21 No changes. For surgery in the AM. T&C 2 units for tomorrow 01/20/21- right thoracotomy with pneumonectomy 01/21/21 Stable in ICU overnight. Oxygen 2L. No SOB. CXR reviewed. Tolerating diet. No complaints- will transfer to floor. 01/22/21 Awake in bed. No complaints voiced. Pain controlled. Tolerating diet. Oxygen 3L via NC. No SOB. CXR today showing Improving left lung base atelectasis or infiltrate, with minimal residual.   
 
01/23/21 Awake in bed. No complaints voiced. Pain controlled. Tolerating diet. Oxygen 3L via NC. No SOB. Medical history as below Past Medical History:  
Diagnosis Date  GERD (gastroesophageal reflux disease)   
 controlled with med  Hypertension hx-- off meds since 4/2020--- followed by dr. Christin Leon  Hypoxia 02/24/2020  Malignant neoplasm of lower lobe of right lung ([facility] [address] 02/26/2020 REC'D CHEMO AND RADIATION--- FOLLOWED BY DR. Surendra Taylor Osteoarthritis  Right lower lobe lung mass 02/24/2020  Status post total right knee replacement 2/29/2016 Past Surgical History:  
Procedure Laterality Date  HX COLONOSCOPY    
 HX KNEE ARTHROSCOPY Left   
 scope X 1, ACL recon X 1  
 HX KNEE ARTHROSCOPY Right 1974, 1975 X 2   
 HX KNEE REPLACEMENT Right 2016 [facility]  HX VASCULAR ACCESS Right placed 3/2020  
 port in chest   
 IR INSERT TUNL CVC W PORT OVER 5 YEARS  3/18/2020  [place] SINUS SURGERY PROC UNLISTED  1988, 1991  
 sinus surg Current Facility-Administered Medications Medication Dose Route Frequency  aspirin delayed-release tablet 81 mg  81 mg Oral DAILY  0.9% sodium chloride infusion 250 mL  250 mL IntraVENous PRN  
 alcohol 62% (NOZIN) nasal  1 Ampule  1 Ampule Topical Q12H  
 enoxaparin (LOVENOX) injection 40 mg  40 mg SubCUTAneous Q24H  
 sodium chloride (NS) flush 5-40 mL  5-40 mL IntraVENous Q8H  
 sodium chloride (NS) flush 5-40 mL  5-40 mL IntraVENous PRN  
 0.9% sodium chloride infusion 250 mL  250 mL IntraVENous PRN  
 HYDROcodone-homatropine (HYCODAN) 5-1.5 mg/5 mL (5 mL) oral solution 5 mL  5 mL Oral Q4H PRN  
 bisacodyL (DULCOLAX) suppository 10 mg  10 mg Rectal DAILY PRN  
 midodrine (PROAMATINE) tablet 10 mg  10 mg Oral TID WITH MEALS  traMADoL (ULTRAM) tablet 50 mg  50 mg Oral Q6H PRN  
 acetaminophen (TYLENOL) tablet 500 mg  500 mg Oral Q6H PRN  
 HYDROmorphone (PF) (DILAUDID) injection 1 mg  1 mg IntraVENous Q2H PRN  
 guaiFENesin ER (MUCINEX) tablet 1,200 mg  1,200 mg Oral Q12H  
 amiodarone (CORDARONE) tablet 200 mg  200 mg Oral DAILY  pantoprazole (PROTONIX) tablet 40 mg  40 mg Oral ACB&D  
 gabapentin (NEURONTIN) capsule 300 mg  300 mg Oral TID  levalbuterol tartrate (XOPENEX) 45 mcg/actuation inhaler HFAA 2 Puff- pt to supply (Patient Supplied)  2 Puff Inhalation Q4H PRN  
 ondansetron (ZOFRAN ODT) tablet 8 mg  8 mg Oral Q8H PRN  polyethylene glycol (MIRALAX) packet 17 g  17 g Oral DAILY  sodium chloride (NS) flush 5-40 mL  5-40 mL IntraVENous Q8H  
 sodium chloride (NS) flush 5-40 mL  5-40 mL IntraVENous PRN  
 oxyCODONE-acetaminophen (PERCOCET) 5-325 mg per tablet 1 Tab  1 Tab Oral Q4H PRN  
 influenza vaccine 2020-21 (6 mos+)(PF) (FLUARIX/FLULAVAL/FLUZONE QUAD) injection 0.5 mL  0.5 mL IntraMUSCular PRIOR TO DISCHARGE Albuterol and Celebrex [celecoxib] Social History Socioeconomic History  Marital status:  Spouse name: Not on file  Number of children: Not on file  Years of education: Not on file  Highest education level: Not on file Tobacco Use  Smoking status: Former Smoker Packs/day: 1.00 Years: 20.00 Pack years: 20.00 Quit date:  Years since quittin.0  Smokeless tobacco: Never Used Substance and Sexual Activity  Alcohol use: Yes Alcohol/week: 4.0 standard drinks Types: 4 Glasses of wine per week  Drug use: No  
Other Topics Concern   Service No  
 Blood Transfusions No  
 Caffeine Concern No  
 Occupational Exposure No  
 Hobby Hazards No  
 Sleep Concern No  
 Stress Concern No  
 Weight Concern No  
 Special Diet No  
 Exercise Yes  [facility] Yes Social History Narrative . Has long-time girlfriend. Continues to work doing IT support. Social History Tobacco Use Smoking Status Former Smoker  Packs/day: 1.00  Years: 20.00  Pack years: 20.00  Quit date:   Years since quittin.0 Smokeless Tobacco Never Used Family History Problem Relation Age of Onset  Cancer Mother   
     uterine  Arrhythmia Sister   
     afib ROS: The patient has no difficulty with chest pain or shortness of breath. No fever or chills. Comprehensive review of systems was otherwise unremarkable except as noted above. Physical Exam:  
Visit Vitals /75 Pulse 87 Temp 98 °F (36.7 °C) Resp 18 Ht 5' 10\" (1.778 m) Wt 188 lb 0.8 oz (85.3 kg) SpO2 99% BMI 26.98 kg/m² Constitutional: Alert, oriented, cooperative patient in no acute distress; appears stated age Eyes: Sclera are clear. EOMs intact ENMT: no external lesions gross hearing normal; no obvious neck masses, no ear or lip lesions, nares normal 
CV: RRR. Normal perfusion Resp: No JVD. Breathing is  non-labored; no audible wheezing. O2 3 L 
GI: soft and non-distended Musculoskeletal: unremarkable with normal function. No embolic signs or cyanosis. Neuro:  Oriented; moves all 4; no focal deficits Psychiatric: normal affect and mood, no memory impairment Recent vitals (if inpt): 
Patient Vitals for the past 24 hrs: 
 BP Temp Pulse Resp SpO2  
01/23/21 0916 113/75  87    
01/23/21 0718 113/75 98 °F (36.7 °C) 87 18 99 % 01/23/21 0342 107/68 97.9 °F (36.6 °C) 82 18 99 % 01/22/21 2306 105/71 98.7 °F (37.1 °C) 79 19 100 % 01/22/21 1925 106/68 98.2 °F (36.8 °C) 80 19 98 % 01/22/21 1713 97/65  75    
01/22/21 1535 97/65 97.5 °F (36.4 °C) 75 19 97 % 01/22/21 1148 100/64  88    
 
 
XR Results (most recent): 
Results from [facility] Encounter encounter on 01/04/21 XR CHEST SNGL V  
 Narrative EXAM: Chest x-ray. INDICATION: Post pneumonectomy. COMPARISON: Yesterday's chest x-ray. TECHNIQUE: Frontal view chest x-ray. FINDINGS: Again noted is a prior right pneumonectomy, with stable nodularity 
along the lateral right pleural surface. Minimal left lung base atelectasis or 
infiltrate has improved. The heart size is within normal limits. No left-sided 
pneumothorax or pleural effusion is seen. Again noted is a right chest wall 
infusion port catheter, skin staples and subcutaneous emphysema. Impression 1. Improving left lung base atelectasis or infiltrate, with minimal residual. 
2. Post right pneumonectomy. Labs: 
Recent Labs  
  01/22/21 0522 WBC 5.9 HGB 9.4*    
K 4.2  CO2 35* BUN 12  
CREA 0.54* GLU 88 Lab Results Component Value Date/Time  WBC 5.9 01/22/2021 05:22 AM  
 HGB 9.4 (L) 01/22/2021 05:22 AM  
 PLATELET 370 39/94/4932 05:22 AM  
 Sodium 139 01/22/2021 05:22 AM  
 Potassium 4.2 01/22/2021 05:22 AM  
 Chloride 102 01/22/2021 05:22 AM  
 CO2 35 (H) 01/22/2021 05:22 AM  
 BUN 12 01/22/2021 05:22 AM  
 Creatinine 0.54 (L) 01/22/2021 05:22 AM  
 Glucose 88 01/22/2021 05:22 AM  
 INR 1.1 01/05/2021 04:02 PM  
 aPTT 35.2 (H) 01/05/2021 04:02 PM  
 Bilirubin, total 0.5 01/05/2021 06:26 AM  
 Bilirubin, direct 0.8 (H) 05/05/2020 02:27 PM  
 ALT (SGPT) 8 (L) 01/05/2021 06:26 AM  
 Alk. phosphatase 99 01/05/2021 06:26 AM  
 Troponin-I, Qt. <0.02 (L) 05/05/2020 01:09 AM  
 
 
I reviewed recent labs and recent radiologic studies. I independently reviewed radiology images for studies I described above or studies I have ordered. Admission date (for inpatients): 1/4/2021 Day of Surgery  Procedure(s): RIGHT THORACOTOMY , COMPLETE PNEUMECTOMY, CRYOABLATION OF INTERCOSTAL NERVES 
 
ASSESSMENT/PLAN: 
Problem List  Date Reviewed: 1/13/2021 Codes Class Noted S/P thoracotomy ICD-10-CM: J87.621 ICD-9-CM: V45.89  1/7/2021 * (Principal) Atelectasis of right lung ICD-10-CM: J98.11 ICD-9-CM: 518.0  1/4/2021 A-fib (HCC) (Chronic) ICD-10-CM: I48.91 
ICD-9-CM: 427.31  1/4/2021 Chronic respiratory failure with hypoxia (HCC) (Chronic) ICD-10-CM: J96.11 
ICD-9-CM: 518.83, 799.02  1/4/2021 Pleural effusion on right ICD-10-CM: J90 ICD-9-CM: 511.9  12/28/2020 Chemotherapy induced neutropenia (HCC) ICD-10-CM: D70.1, T45.1X5A 
ICD-9-CM: 288.03, E933.1  10/23/2020 Dehydration ICD-10-CM: E86.0 ICD-9-CM: 276.51  9/15/2020 S/P lobectomy of lung ICD-10-CM: Z90.2 ICD-9-CM: V45.89  7/29/2020 Cough ICD-10-CM: R05 ICD-9-CM: 786.2  7/29/2020 Atrial tachycardia (HCC) ICD-10-CM: I47.1 ICD-9-CM: 427.89  7/26/2020 Cancer of bronchus of right lower lobe [facility]) ICD-10-CM: C34.31 
ICD-9-CM: 162.5  7/23/2020 Lung cancer ([address] (Chronic) ICD-10-CM: C34.90 ICD-9-CM: 162.9  7/23/2020 Cancer of right lung [facility]) ICD-10-CM: C34.91 
ICD-9-CM: 162.9  7/23/2020 Pulmonary emphysema (HCC) (Chronic) ICD-10-CM: J43.9 ICD-9-CM: 492.8  7/7/2020 GERD (gastroesophageal reflux disease) ICD-10-CM: K21.9 ICD-9-CM: 530.81  Unknown Hypotension (Chronic) ICD-10-CM: I95.9 ICD-9-CM: 458.9  5/5/2020 Overview Signed 1/11/2021  8:39 AM by Parris Lawson NP On midodrine Sepsis due to undetermined organism [facility]) ICD-10-CM: A41.9 ICD-9-CM: 038.9  5/5/2020 Malignant neoplasm of lower lobe of right lung (HCC) (Chronic) ICD-10-CM: C34.31 
ICD-9-CM: 162.5  2/26/2020 Overview Signed 1/4/2021  7:38 AM by Jonathan Johnson MD  
  Dec 2020- Echo EF 50%, technically difficult, cannot assess regional wall motion. Aortic root 4.1 cm. No significant valvular disease. Normal DF Mass of lower lobe of right lung ICD-10-CM: R91.8 ICD-9-CM: 786.6  2/23/2020 Right lower lobe lung mass ICD-10-CM: R91.8 ICD-9-CM: 786.6  2/23/2020 Essential hypertension with goal blood pressure less than 130/80 (Chronic) ICD-10-CM: I10 
ICD-9-CM: 401.9  2/19/2018 Principal Problem: 
  Atelectasis of right lung (1/4/2021) Active Problems: 
  Pulmonary emphysema ([address] (7/7/2020) Lung cancer ([address] (7/23/2020) Pleural effusion on right (12/28/2020) A-fib ([address] (1/4/2021) Chronic respiratory failure with hypoxia ([address] (1/4/2021) S/P thoracotomy (1/7/2021) Plan Transfer to floor 1/21/21 Cxr daily Regular diet Activity as tolerated Pain control Encourage C/DB/IS Oxygen as needed - wean as pt tolerates [place] out Cardiology following to assess continued need for eliquis DC home today F/u in office with Dr. Fatoumata Mitchell in 10 days. Call office on Monday to schedule appt.   
 
 
 
Signed:  Monserrat Bird NP

## 2021-01-23 NOTE — DISCHARGE SUMMARY
Physician Discharge Summary Patient ID: Tip Zavala 
[id number] 
78 y.o. 
1950 Allergies: Albuterol and Celebrex [celecoxib] Admit Date: 1/4/2021 Discharge Date: 1/23/2021 HPI: Tip Zavala is a 78 y.o. male who is well known to Dr. Nael Silva.  He underwent right thoracotomy with lower and middle lobectomy 7/23/20 for squamous cell carcinoma. He underwent neoaduvant chemo/radiation and adjuvant chemo. .  CT 12/22/20 showed large pleural effusion and collapse of RUL. He underwent thoracentesis by Dr. Obed Lama 12/28/20 with approximately drainage of 900ml, but was unable to completely drain related to complaints of pain. He felt should hold Eliquis for admission for Chest tube placement. Dr. Danni Swartz placed chest tube this AM 1/4/21. General surgery was consulted for complicated right pleural effusion, ?hemothorax not draining properly with chest tube placement. 
  
On exam, Mr. Isma Velasquez appears comfortable. 12/22 CT scan reviewed and current chest xray, NAD. On 3 L NC sats upper 90's. Chest tube maintained with 800ml serosanguineous drainage noted. Remains on 20cmsx. Patient states since his lobectomy he has felt \"pressure\". He does use 2L oxygen at night. She states he does feel like he can breath better since this chest tube has been placed. His lost dose of eliquis was Friday (for A-fib). He does have smoking history and quit about 6 years ago. Pt underwent  Attemptted right thoracoscopy converted to right thoracotomy, Extensive pneumonolysis with decortication over 3 hours, and  Intercostal nerve cryoablation 1/6/21 by Dr. Nael Silva. Pt returned to OR 1/20/21 and underwent Right thoracotomy with completion pneumonectomy with extensive lysis of adhesions and decortication and Cryoablation of intercostal nerves by Dr. Nael Silva.  
 
* Admission Diagnoses: Pleural effusion [J90] Collapse of right lung [J98.11] Lung cancer ([facility] [address] [C34.90] * Discharge Diagnoses: Hospital Problems as of 1/23/2021 Date Reviewed: 1/13/2021 Codes Class Noted - Resolved POA  
 S/P thoracotomy ICD-10-CM: T92.705 ICD-9-CM: V45.89  1/7/2021 - Present Yes * (Principal) Atelectasis of right lung ICD-10-CM: J98.11 ICD-9-CM: 518.0  1/4/2021 - Present Yes A-fib [facility]) (Chronic) ICD-10-CM: I48.91 
ICD-9-CM: 427.31  1/4/2021 - Present Yes Chronic respiratory failure with hypoxia (HCC) (Chronic) ICD-10-CM: J96.11 
ICD-9-CM: 518.83, 799.02  1/4/2021 - Present Yes Pleural effusion on right ICD-10-CM: J90 ICD-9-CM: 511.9  12/28/2020 - Present Yes Lung cancer ([address] (Chronic) ICD-10-CM: C34.90 ICD-9-CM: 162.9  7/23/2020 - Present Yes Pulmonary emphysema (HCC) (Chronic) ICD-10-CM: J43.9 ICD-9-CM: 492.8  7/7/2020 - Present Yes Hypotension (Chronic) ICD-10-CM: I95.9 ICD-9-CM: 458.9  5/5/2020 - Present Overview Signed 1/11/2021  8:39 AM by Chelsea Basurto NP On midodrine Admission Condition: Stable * Discharge Condition: stable * Procedures: Procedure(s): RIGHT THORACOTOMY , COMPLETE PNEUMECTOMY, CRYOABLATION OF INTERCOSTAL NERVES [facility] Course:  
Hospital course was complicated by Post operative Nonfunctional Right Lung, which added 7 days to the patient's length of stay. Consults: Cardiology and ID Significant Diagnostic Studies: labs and radiology * Disposition: Home Discharge Medications:  
Current Discharge Medication List  
  
START taking these medications Details  
midodrine (PROAMATINE) 10 mg tablet Take 1 Tab by mouth three (3) times daily (with meals) for 30 days. Qty: 90 Tab, Refills: 0  
  
aspirin delayed-release 81 mg tablet Take 1 Tab by mouth daily. Qty: 30 Tab, Refills: 0  
  
oxyCODONE-acetaminophen (PERCOCET) 5-325 mg per tablet Take 1 Tab by mouth every four (4) hours as needed for Pain for up to 5 days. Max Daily Amount: 6 Tabs. Qty: 30 Tab, Refills: 0 Associated Diagnoses: Malignant neoplasm of lower lobe of right lung ([address] CONTINUE these medications which have NOT CHANGED Details  
gabapentin (NEURONTIN) 300 mg capsule Take 1 Cap by mouth three (3) times daily. Qty: 270 Cap, Refills: 0 METOPROLOL SUCCINATE PO Take  by mouth. Indications: 25 mg once daily  
  
amiodarone (CORDARONE) 200 mg tablet Take 1 Tab by mouth daily. Qty: 90 Tab, Refills: 3  
  
levalbuterol tartrate (Xopenex HFA) 45 mcg/actuation inhaler Take 2 Puffs by inhalation every four (4) hours as needed for Wheezing or Shortness of Breath. Qty: 1 Inhaler, Refills: 11  
  
polyethylene glycol (MIRALAX) 17 gram packet Take 1 Packet by mouth daily. Indications: constipation 
Qty: 30 Packet, Refills: 0  
  
prochlorperazine (COMPAZINE) 10 mg tablet Take 1 Tab by mouth every six (6) hours as needed for Nausea. Indications: nausea and vomiting caused by cancer drugs, nausea and vomiting 
Qty: 90 Tab, Refills: 3 Associated Diagnoses: CINV (chemotherapy-induced nausea and vomiting)  
  
ondansetron hcl (ZOFRAN) 8 mg tablet Take 1 Tab by mouth every eight (8) hours as needed for Nausea. Indications: nausea and vomiting caused by cancer drugs Qty: 60 Tab, Refills: 3 Associated Diagnoses: CINV (chemotherapy-induced nausea and vomiting) DISABLED PLACARD (DISABLED PLACARD) DMV Pt has medical condition that makes it difficult to walk 100 feet non stop without exacerbating existing condition. Qty: 1 Each, Refills: 0 Nebulizer & Compressor machine COPD Qty: 1 Each, Refills: 0  
  
esomeprazole (NexIUM) 20 mg capsule Take 20 mg by mouth nightly. lidocaine-prilocaine (EMLA) topical cream Apply  to affected area as needed for Pain. Qty: 30 g, Refills: 0 Associated Diagnoses: Port-A-Cath in place STOP taking these medications Eliquis 5 mg tablet Comments:  
Reason for Stopping:   
   
  
 
 
* Follow-up Care/Patient Instructions: Activity: Activity as tolerated Diet: Regular Diet Wound Care: Keep wound clean and dry Follow-up Information Follow up With Specialties Details Why Contact Info Other, Phys, MD    Patient can only remember the practice name and not the physician Discharge Instructions/Follow-up Plans: MD Instructions: 
  
Follow-up with Dr. Ruth Shabazz in 10 days. Call office on Monday to schedule appt time. Keep incisions clean and dry, may remain uncovered. Do not apply lotions, creams or ointments to incisions. 
  
Diet - as tolerated - Regular diet. Activity - ambulate - as tolerated - no heavy lifting >10lb. May shower - no tub baths or soaking/submerging. Rx: Percocet May take OTC Muccinex as needed 
  
No driving while taking narcotics. Do not drink alcohol while taking narcotics. Resume other home medications as instructed. Hold Eliquis as instructed by Cardiology.  F/u with Cardiology as instructed.  
  
If problems or questions arise, please call our office at [phone]. 
  
Greater than 30 minutes were spent discharging the patient 
  
  
 
 
Signed: 
Pedro Wang NP 
1/23/2021 
1:36 PM

## 2021-01-23 NOTE — PROGRESS NOTES
Patient is up for discharge today. CM spoke with patient about [facility] recommendation in which, patient ask that referral be sent to [facility]. Order placed, referral faxed to [facility] for PT/OT. No other needs identified at this time. Care Management Interventions PCP Verified by CM: (Referral sent to [facility]) Mode of Transport at Discharge: Other (see comment)(family to transport) Transition of Care Consult (CM Consult): Discharge Planning Physical Therapy Consult: Yes Occupational Therapy Consult: Yes Current Support Network: Own Home, Other(Girlfriend lives in home) Confirm Follow Up Transport: Self Freedom of Choice List was Provided with Basic Dialogue that Supports the Patient's Individualized Plan of Care/Goals, Treatment Preferences and Shares the Quality Data Associated with the Providers?: Yes [employer] Information Provided?: ([employer]/FELICE) Discharge Location Discharge Placement: Home with home health([facility] )

## 2021-01-23 NOTE — ROUTINE PROCESS
END OF SHIFT NOTE: 
 
INTAKE/OUTPUT 
01/21 0701 - 01/22 0700 In: 260 [P.O.:240; I.V.:20] Out: 600 [Urine:600] Voiding: YES Catheter: NO 
Drain:   
 
 
 
 
 
Flatus: Patient does have flatus present. Stool:  0 occurrences. Characteristics: 
Stool Assessment Stool Color: (have not observed) Stool Appearance: Formed Stool Amount: Small Stool Source/Status: Rectum Emesis: 0 occurrences. Characteristics: VITAL SIGNS Patient Vitals for the past 12 hrs: 
 Temp Pulse Resp BP SpO2  
01/22/21 1713  75  97/65   
01/22/21 1535 97.5 °F (36.4 °C) 75 19 97/65 97 % 01/22/21 1148  88  100/64   
01/22/21 1021 97.3 °F (36.3 °C) 88 18 100/64 98 % 01/22/21 0935  73  91/64  Pain Assessment Pain Intensity 1: 7 (01/22/21 1320) Pain Location 1: Back Pain Intervention(s) 1: Medication (see MAR) Patient Stated Pain Goal: 2 Ambulating Yes, general weakness. Shift report given to oncoming nurse at the bedside.  
 
Katelin Paredes RN

## 2021-01-23 NOTE — DISCHARGE INSTRUCTIONS
Patient Education        Thoracotomy: What to Expect at Home  Your Recovery  A thoracotomy (say \"pvzu-wr-YIO-tuh-danny\") is a cut (incision) that the doctor makes in the chest wall through your front, side, or back. The doctor is able to do surgery inside the chest through the incision. A thoracotomy may be used to do surgery on the lungs, esophagus, trachea, heart, aorta, or diaphragm. The exact place in the chest where the doctor makes the incision depends on the reason for the surgery. It is common to feel tired for 6 to 8 weeks after surgery. Your chest may hurt and be swollen for up to 6 weeks. It may ache or feel stiff for up to 3 months. You may also feel tightness, itching, numbness, or tingling around the incision for up to 3 months. Your doctor will give you medicine to help with pain. You will have stitches or staples in the incision. You may have one or more tubes coming out of your chest to drain fluid and air that can build up after surgery. The tubes are often removed before you leave the hospital. Your doctor will remove the stitches or staples at your follow-up visit. You may feel short of breath at first after the surgery. Your doctor, nurse, or respiratory therapist will teach you deep-breathing and coughing exercises to help your body get as much oxygen as possible. You also may need to get extra oxygen through a mask or a plastic tube in your nostrils (nasal cannula). This is called oxygen therapy. The amount of time you will need to recover depends on the surgery you had. You probably will need to take at least 1 to 2 months off work. This care sheet gives you a general idea about how long it will take for you to recover. But each person recovers at a different pace. Follow the steps below to get better as quickly as possible. How can you care for yourself at home? Activity    · Rest when you feel tired. Getting enough sleep will help you recover.     · Try to walk each day.  Start by walking a little more than you did the day before. Bit by bit, increase the amount you walk. Walking boosts blood flow and helps prevent pneumonia and constipation.     · Do not smoke or allow others to smoke around you. If you need help quitting, talk to your doctor about stop-smoking programs and medicines. These can increase your chances of quitting for good.     · Try to avoid being around people who you know have a cold, the flu, or other illness.     · Avoid strenuous activities, such as bicycle riding, jogging, weight lifting, or aerobic exercise, until your doctor says it is okay. Also avoid swimming, tennis, golf, or other activities that could strain your arm and shoulder muscles, until your doctor says it is okay.     · Until your doctor says it is okay, avoid lifting anything that would make you strain. This may include a child, heavy grocery bags and milk containers, a heavy briefcase or backpack, cat litter or dog food bags, or a vacuum .     · If your incision is in the front or the side of your chest, hold a pillow over the incision when you cough or take deep breaths. This will support your chest and decrease your pain.     · Ask your doctor when it is safe to you to drive or fly. You probably will not be able to drive for at least 4 weeks. This is because your arm and shoulder muscles may be stiff after surgery and could make it difficult to steer.     · You may be able to take showers (unless you have a drain near your incision). If you have a drain near your incision, follow your doctor's instructions to empty and care for it. Do not take a bath for the first 2 weeks, or until your doctor tells you it is okay.     · Ask your doctor when it is okay for you to have sex.     · You will probably need to take at least 1 to 2 months off from work. It depends on the surgery you had and the type of work you do. Diet    · You can eat your normal diet.  If your stomach is upset, try bland, low-fat foods like plain rice, broiled chicken, toast, and yogurt.     · Drink plenty of fluids (unless your doctor tells you not to).     · You may notice that your bowel movements are not regular right after your surgery. This is common. Try to avoid constipation and straining with bowel movements. You may want to take a fiber supplement every day. If you have not had a bowel movement after a couple of days, ask your doctor about taking a mild laxative. Medicines    · Your doctor will tell you if and when you can restart your medicines. He or she will also give you instructions about taking any new medicines.     · If you take aspirin or some other blood thinner, ask your doctor if and when to start taking it again. Make sure that you understand exactly what your doctor wants you to do.     · Be safe with medicines. Take pain medicines exactly as directed. ? If the doctor gave you a prescription medicine for pain, take it as prescribed. ? If you are not taking a prescription pain medicine, ask your doctor if you can take an over-the-counter medicine. ? Do not take two or more pain medicines at the same time unless the doctor told you to. Many pain medicines have acetaminophen, which is Tylenol. Too much acetaminophen (Tylenol) can be harmful.     · If you think your pain medicine is making you sick to your stomach:  ? Take your medicine after meals (unless your doctor has told you not to). ? Ask your doctor for a different pain medicine.     · If your doctor prescribed antibiotics, take them as directed. Do not stop taking them just because you feel better. You need to take the full course of antibiotics. Incision care    · If you have strips of tape on the incision, leave the tape on for a week or until it falls off.     · Wash the area daily with warm, soapy water, and pat it dry. Don't use hydrogen peroxide or alcohol, which may delay healing.  You may cover the area with a gauze bandage if it weeps or rubs against clothing. Change the bandage every day.     · Keep the area clean and dry. Exercise    · To help keep your lungs clear, cough and do deep breathing exercises as you are told by your doctor, nurse, or respiratory therapist.     · Your doctor may send you home with an incentive spirometer. This device helps you practice taking deep breaths, which can help keep your lungs healthy.     · Ask your doctor about exercises to keep your arm and shoulder muscles strong and flexible while you recover. Follow-up care is a key part of your treatment and safety. Be sure to make and go to all appointments, and call your doctor if you are having problems. It's also a good idea to know your test results and keep a list of the medicines you take. When should you call for help? Call 911 anytime you think you may need emergency care. For example, call if:    · You passed out (lost consciousness).     · You have severe trouble breathing.     · You have sudden chest pain and shortness of breath, or you cough up blood. Call your doctor now or seek immediate medical care if:    · You are sick to your stomach or cannot keep fluids down.     · You have pain that does not get better after you take pain medicine.     · You have a fever over 100°F.     · You have loose stitches, or your incision comes open.     · Bright red blood has soaked through the bandage over your incision.     · You have signs of infection, such as:  ? Increased pain, swelling, warmth, or redness. ? Red streaks leading from the incision. ? Pus draining from the incision. ? Swollen lymph nodes in your neck, armpits, or groin. ? A fever.     · You cough up a lot more mucus than normal, or your mucus changes color. Watch closely for changes in your health, and be sure to contact your doctor if you have any problems. Where can you learn more?   Go to http://www.gray.com/  Enter T374 in the search box to learn more about \"Thoracotomy: What to Expect at Home. \"  Current as of: February 24, 2020               Content Version: 12.6  © 8761-1653 [employer]. Care instructions adapted under license by [employer] (which disclaims liability or warranty for this information). If you have questions about a medical condition or this instruction, always ask your healthcare professional. [address] any warranty or liability for your use of this information.

## 2021-01-23 NOTE — ROUTINE PROCESS
PT D/C from unit with belongings and RX. Accompanied by family and hospital personnel. No distress at time of D/C.   Transported via w/c. RX.

## 2021-01-23 NOTE — PROGRESS NOTES
[facility] CARDIOLOGY PROGRESS NOTE 
      
 
1/23/2021 9:50 AM 
 
Admit Date: 1/4/2021 Subjective:  
 
 History of mid lobectomy with previous history of spontaneous cardioversion from atrial fibrillation admitted for increasing dyspnea pleural effusion subsequent work-up with previous hemothorax chest tube placement with thoracotomy and extensive lysis and decortication 1/6/2021 echo 12/2020 EF normal 
 
Review of Systems Constitutional: Negative for fever. Cardiovascular: Negative for chest pain. Objective:  
  
Vitals:  
 01/22/21 2306 01/23/21 6901 01/23/21 0754 01/23/21 3953 BP: 105/71 107/68 113/75 113/75 Pulse: 79 82 87 87 Resp: 19 18 18 Temp: 98.7 °F (37.1 °C) 97.9 °F (36.6 °C) 98 °F (36.7 °C) SpO2: 100% 99% 99% Weight:      
Height:      
 
 
 
Physical Exam  
Constitutional: He is oriented to person, place, and time. He appears well-developed. HENT:  
Head: Normocephalic and atraumatic. Eyes: Pupils are equal, round, and reactive to light. Neck: Normal range of motion. No tracheal deviation present. Cardiovascular: Normal rate. No murmur heard. Pulmonary/Chest: Effort normal. He has decreased breath sounds in the right lower field. He has no wheezes. Dressing in place on right chest wall Abdominal: Soft. He exhibits no distension. There is no abdominal tenderness. Musculoskeletal:     
   General: No deformity. Neurological: He is alert and oriented to person, place, and time. No cranial nerve deficit. Skin: Skin is warm and dry. Psychiatric: He has a normal mood and affect. His behavior is normal.  
 
 
Data Review:  
Recent Labs  
  01/22/21 
0522 01/21/21 
0725 01/20/21 
1225  138  --   
K 4.2 4.5  --   
MG  --   --  2.2 BUN 12 13  --   
CREA 0.54* 0.50*  --   
GLU 88 117*  --   
WBC 5.9 10.1  --   
HGB 9.4* 10.6*  --   
HCT 30.6* 33.9*  --   
 234  -- Intake/Output Summary (Last 24 hours) at 1/23/2021 2691 Last data filed at 1/23/2021 4603 Gross per 24 hour Intake  Output 1450 ml Net -1450 ml  
 
Current Facility-Administered Medications Medication Dose Route Frequency  aspirin delayed-release tablet 81 mg  81 mg Oral DAILY  0.9% sodium chloride infusion 250 mL  250 mL IntraVENous PRN  
 alcohol 62% (NOZIN) nasal  1 Ampule  1 Ampule Topical Q12H  
 enoxaparin (LOVENOX) injection 40 mg  40 mg SubCUTAneous Q24H  
 sodium chloride (NS) flush 5-40 mL  5-40 mL IntraVENous Q8H  
 sodium chloride (NS) flush 5-40 mL  5-40 mL IntraVENous PRN  
 0.9% sodium chloride infusion 250 mL  250 mL IntraVENous PRN  
 HYDROcodone-homatropine (HYCODAN) 5-1.5 mg/5 mL (5 mL) oral solution 5 mL  5 mL Oral Q4H PRN  
 bisacodyL (DULCOLAX) suppository 10 mg  10 mg Rectal DAILY PRN  
 midodrine (PROAMATINE) tablet 10 mg  10 mg Oral TID WITH MEALS  traMADoL (ULTRAM) tablet 50 mg  50 mg Oral Q6H PRN  
 acetaminophen (TYLENOL) tablet 500 mg  500 mg Oral Q6H PRN  
 HYDROmorphone (PF) (DILAUDID) injection 1 mg  1 mg IntraVENous Q2H PRN  
 guaiFENesin ER (MUCINEX) tablet 1,200 mg  1,200 mg Oral Q12H  
 amiodarone (CORDARONE) tablet 200 mg  200 mg Oral DAILY  pantoprazole (PROTONIX) tablet 40 mg  40 mg Oral ACB&D  
 gabapentin (NEURONTIN) capsule 300 mg  300 mg Oral TID  levalbuterol tartrate (XOPENEX) 45 mcg/actuation inhaler HFAA 2 Puff- pt to supply (Patient Supplied)  2 Puff Inhalation Q4H PRN  
 ondansetron (ZOFRAN ODT) tablet 8 mg  8 mg Oral Q8H PRN  polyethylene glycol (MIRALAX) packet 17 g  17 g Oral DAILY  sodium chloride (NS) flush 5-40 mL  5-40 mL IntraVENous Q8H  
 sodium chloride (NS) flush 5-40 mL  5-40 mL IntraVENous PRN  
 oxyCODONE-acetaminophen (PERCOCET) 5-325 mg per tablet 1 Tab  1 Tab Oral Q4H PRN  
 influenza vaccine 2020-21 (6 mos+)(PF) (FLUARIX/FLULAVAL/FLUZONE QUAD) injection 0.5 mL  0.5 mL IntraMUSCular PRIOR TO DISCHARGE Assessment/Plan: Paroxysmal atrial fibrillation outpatient monitor following discharge ·  possible loop recorder placement in the future to determine if patient has underlying atrial fibrillation whether this was related to an acute event. · The patient is on long-term use of amiodarone which is not plan to be a permanent medication. · Now maintaining sinus rhythm during acute illness Respiratory failure with hypoxia Improving Status post thoracotomy Pleural effusion on right Pulmonary emphysema Patient be contacted early next week for monitor placement follow-up with primary cardiologist to discuss additional monitoring and time interval for discontinuation of amiodarone. Hold AC now per previous consult recs.   
 
Armen Taylor MD 
1/23/2021 9:50 AM

## 2021-01-23 NOTE — PROGRESS NOTES
Problem: Falls - Risk of 
Goal: *Absence of Falls Description: Document Yvonne Mario Fall Risk and appropriate interventions in the flowsheet. Outcome: Progressing Towards Goal 
Note: Fall Risk Interventions: 
Mobility Interventions: Patient to call before getting OOB Mentation Interventions: Adequate sleep, hydration, pain control Medication Interventions: Patient to call before getting OOB, Teach patient to arise slowly Elimination Interventions: Call light in reach

## 2021-01-23 NOTE — PROGRESS NOTES
ACUTE PHYSICAL THERAPY GOALS: 
(Developed with and agreed upon by patient and/or caregiver. ) LTG:  Goals modified 1/21/2021  
(1.)Mr. Jonathan Vazquez will move from supine to sit and sit to supine , scoot up and down and roll side to side in bed with modified INDEPENDENT within 7 treatment day(s). (2.)Mr. Jonathan Vazquez will transfer from bed to chair and chair to bed with INDEPENDENT using the least restrictive/no device within 7 treatment day(s). (3.)Mr. Jonathan Vazquez will ambulate with INDEPENDENT for 500+ feet with the least restrictive device within 7 treatment day(s) while maintaining normal vital signs. (4.)Mr. Jonathan Vazquez will tolerated 23+ minutes of therapeutic activity within 7 treatment days for increased activity tolerance, while maintaining normal vital signs throughout session. ________________________________________________________________________________________________ PHYSICAL THERAPY ASSESSMENT: Daily Note and AM PT Treatment Day # 2 Dylon Orozco is a 78 y.o. male PRIMARY DIAGNOSIS: Atelectasis of right lung Pleural effusion [J90] Collapse of right lung [J98.11] [facility] [C34.90] Procedure(s) (LRB): 
RIGHT THORACOTOMY , COMPLETE PNEUMECTOMY, CRYOABLATION OF INTERCOSTAL NERVES (Right) 3 Days Post-Op Reason for Referral: ICD-10: Treatment Diagnosis: Generalized Muscle Weakness (M62.81) Difficulty in walking, Not elsewhere classified (R26.2) INPATIENT: Payor: Concepcion Bae / Plan: Victoriano Gloria / Product Type: PPO /  
 
ASSESSMENT:  
 
REHAB RECOMMENDATIONS:  
Recommendation to date pending progress: 
Setting: ? Home Health Therapy Equipment: ? To Be Determined PRIOR LEVEL OF FUNCTION: 
(Prior to Hospitalization) INITIAL/CURRENT LEVEL OF FUNCTION: 
(Most Recently Demonstrated) Bed Mobility: ? Modified Independent Sit to Stand: ? Independent Transfers: 
? Independent Gait/Mobility: 
? Independent Bed Mobility: ? Minimal Assistance Sit to Stand: ? Minimal Assistance Transfers: ? Minimal Assistance Gait/Mobility: ? Minimal Assistance ASSESSMENT: 
Mr. Obinna Alamo presents to hospital on 1/4/21 from MD office with pleural effusion. Pt history of lung cancer, R lobectomy, chest tubes, bronch, VATS 1/6. On 1/20 underwent R thoracotomy with pneumonectomy, so undergoing reassessment. Patient is supine in bed and agreeable to therapy. Pt now on 3 L/min O2, no reports of pain just a lot of soreness. Bed mobility with min assist.  Sitting balance good edge of bed. Sit to stand with min assist.  Gait training with rolling walker x 200 feet with slow but steady yanique. Patient is returned to the recliner and left with the PCT at bedside. Good session. Making progress towards goals. Pt states initial surgery July 2020, since then, independent but limited ambulation, independent ADLs. Sleeps in recliner or sofa. Pt lives with GF in private home, 6 steps to enter. Pt has DME at home but currently not using. PT will continue to follow for acute care needs to address generalized weakness and overall functional mobility. Home with HHPT. Will continue PT efforts. SUBJECTIVE:  
Mr. Obinna Alamo states, [place]. \" 
 
SOCIAL HISTORY/LIVING ENVIRONMENT: independent but limited ambulation, independent ADLs. Sleeps in recliner or sofa. 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 
Support Systems: Spouse/Significant Other/Partner OBJECTIVE:  
 
PAIN: VITAL SIGNS: LINES/DRAINS:  
Pre Treatment: Pain Screen Pain Scale 1: Numeric (0 - 10) Pain Intensity 1: 0 Post Treatment: does not rate, appears comfortable in chair   none O2 Device: Nasal cannula 3L MOBILITY: I Mod I S SBA CGA Min Mod Max Total  NT x2 Comments:  
Bed Mobility Rolling [] [] [] [] [] [] [] [] [] [x] [] Supine to Sit [] [] [] [] [] [x] [] [] [] [] [] HOB elevated 70 degrees Scooting [] [] [] [] [x] [] [] [] [] [] [] Sit to Supine [] [] [] [] [] [] [] [] [] [x] [] Transfers Sit to Stand [] [] [] [] [] [x] [] [] [] [] [] Bed to Chair [] [] [] [] [] [] [] [] [] [x] [] Stand to Sit [] [] [] [] [] [x] [] [] [] [] [] I=Independent, Mod I=Modified Independent, S=Supervision, SBA=Standby Assistance, CGA=Contact Guard Assistance,  
Min=Minimal Assistance, Mod=Moderate Assistance, Max=Maximal Assistance, Total=Total Assistance, NT=Not Tested GAIT: I Mod I S SBA CGA Min Mod Max Total  NT x2 Comments:  
Level of Assistance [] [] [] [] [] [x] [] [] [] [] [] SpO2  on 3L. Distance 200 DME Rolling Walker and [employer] Gait Quality Slow yanique, forward posture Weightbearing Status N/A I=Independent, Mod I=Modified Independent, S=Supervision, SBA=Standby Assistance, CGA=Contact Guard Assistance,  
Min=Minimal Assistance, Mod=Moderate Assistance, Max=Maximal Assistance, Total=Total Assistance, NT=Not Tested [facility] Basic Mobility Inpatient Short Form How much difficulty does the patient currently have. .. Unable A Lot A Little None 1. Turning over in bed (including adjusting bedclothes, sheets and blankets)? [] 1   [] 2   [x] 3   [] 4  
2. Sitting down on and standing up from a chair with arms ( e.g., wheelchair, bedside commode, etc.)   [] 1   [] 2   [x] 3   [] 4  
3. Moving from lying on back to sitting on the side of the bed? [] 1   [] 2   [x] 3   [] 4 How much help from another person does the patient currently need. .. Total A Lot A Little None 4. Moving to and from a bed to a chair (including a wheelchair)? [] 1   [] 2   [x] 3   [] 4  
5. Need to walk in hospital room? [] 1   [] 2   [x] 3   [] 4  
6. Climbing 3-5 steps with a railing?    [] 1   [] 2   [x] 3   [] 4  
 © 2007, Trustees of [address] [place] [address] [address] [address], under license to [employer] - innovate | mentor | invest. All rights reserved Score:  Initial: 21 Most Recent: 18 (Date: -- ) Interpretation of Tool:  Represents activities that are increasingly more difficult (i.e. Bed mobility, Transfers, Gait). PLAN:  
FREQUENCY/DURATION: PT Plan of Care: 3 times/week for duration of hospital stay or until stated goals are met, whichever comes first. 
 
PROBLEM LIST:  
(Skilled intervention is medically necessary to address:) 1. Decreased ADL/Functional Activities 2. Decreased Activity Tolerance 3. Decreased Balance 4. Decreased Gait Ability 5. Decreased Strength 6. Decreased Transfer Abilities INTERVENTIONS PLANNED:  
(Benefits and precautions of physical therapy have been discussed with the patient.) 1. Therapeutic Activity 2. Therapeutic Exercise/HEP 3. Neuromuscular Re-education 4. Gait Training 5. Manual Therapy 6. Education TREATMENT:  
 
 
TREATMENT:  
($$ Therapeutic Activity: 23-37 mins    ) Therapeutic Exercise ( Minutes): Therapeutic exercises to include LE ex's,  static and dynamic sitting and standing balance, activity tolerance posture and weight shifting to improve functional activity tolerance, strength and mobility. Therapeutic Activity: (    23 minutes): Therapeutic activities including bed mobility, sitting balance, sit to stand and gait training  to improve mobility, strength, balance and coordination. Required min assist    to promote safety and independence  with all functional mobility activities . DATE: 01/21/21 Ambulation Hip Flexion x15 AB Long Arc [address] Hip ab/ad Heel Raises Toe Raises Key:  A=active, AA=active assisted, P=passive, B=bilaterally, R=right, L=left DF=dorsiflexion, PF=plantarflexion AFTER TREATMENT POSITION/PRECAUTIONS: 
Chair and PCT at bedside INTERDISCIPLINARY COLLABORATION: 
RN/PCT and PT/PTA TOTAL TREATMENT DURATION: 
PT Patient Time In/Time Out Time In: 1030 Time Out: 3335 Tamy Shi PTA

## 2021-02-25 PROBLEM — I48.0 PAF (PAROXYSMAL ATRIAL FIBRILLATION) (HCC): Status: ACTIVE | Noted: 2021-01-01

## 2021-02-25 PROBLEM — I48.91 ATRIAL FIBRILLATION WITH RVR (HCC): Status: ACTIVE | Noted: 2021-01-01

## 2021-02-25 PROBLEM — I10 HYPERTENSION: Status: ACTIVE | Noted: 2021-01-01

## 2021-02-25 NOTE — ED NOTES
I have reviewed discharge instructions with the patient. The patient verbalized understanding. Patient left ED via Discharge Method: wheelchair to Home with (family). Opportunity for questions and clarification provided. Patient given 0 scripts. To continue your aftercare when you leave the hospital, you may receive an automated call from our care team to check in on how you are doing. This is a free service and part of our promise to provide the best care and service to meet your aftercare needs.  If you have questions, or wish to unsubscribe from this service please call [phone]. Thank you for Choosing our [facility] Department.

## 2021-02-25 NOTE — ED PROVIDER NOTES
22-year-old male complaint of rapid heartbeat. Patient has a history of A. fib RVR has had to have cardioversion before. Noticed his heart rate was high this morning blood pressure was lower than usual.  Patient has a history of lung CA had lobectomy done last month. Patient had been on midodrine for his blood pressure but after 30 days it was finished. Patient is also on metoprolol and amiodarone The history is provided by the patient and the spouse. Irregular Heart Beat This is a recurrent problem. The current episode started more than 1 week ago. The problem has not changed since onset. The problem occurs constantly. The problem is associated with nothing. Associated symptoms include malaise/fatigue, chest pressure, irregular heartbeat and shortness of breath. Pertinent negatives include no diaphoresis, no fever, no chest pain, no abdominal pain, no back pain, no weakness, no cough and no sputum production. Past Medical History:  
Diagnosis Date  GERD (gastroesophageal reflux disease)   
 controlled with med  Hypertension hx-- off meds since 4/2020--- followed by dr. Tre Jenkins  Hypoxia 02/24/2020  Malignant neoplasm of lower lobe of right lung ([facility] 02/26/2020 REC'D CHEMO AND RADIATION--- FOLLOWED BY DR. Jacques Ice Osteoarthritis  Right lower lobe lung mass 02/24/2020  Status post total right knee replacement 2/29/2016 Past Surgical History:  
Procedure Laterality Date  HX COLONOSCOPY    
 HX KNEE ARTHROSCOPY Left   
 scope X 1, ACL recon X 1  
 HX KNEE ARTHROSCOPY Right 1974, 1975 X 2   
 HX KNEE REPLACEMENT Right 2016 [address]  HX VASCULAR ACCESS Right placed 3/2020  
 port in chest   
 IR INSERT TUNL CVC W PORT OVER 5 YEARS  3/18/2020  ME SINUS SURGERY PROC UNLISTED  1988, 1991  
 sinus surg Family History:  
Problem Relation Age of Onset  Cancer Mother   
     uterine  Arrhythmia Sister   
     afib Social History Socioeconomic History  Marital status:  Spouse name: Not on file  Number of children: Not on file  Years of education: Not on file  Highest education level: Not on file Occupational History  Not on file Social Needs  Financial resource strain: Not on file  Food insecurity Worry: Not on file Inability: Not on file  Transportation needs Medical: Not on file Non-medical: Not on file Tobacco Use  Smoking status: Former Smoker Packs/day: 1.00 Years: 20.00 Pack years: 20.00 Quit date:  Years since quittin.1  Smokeless tobacco: Never Used Substance and Sexual Activity  Alcohol use: Yes Alcohol/week: 4.0 standard drinks Types: 4 Glasses of wine per week  Drug use: No  
 Sexual activity: Not on file Lifestyle  Physical activity Days per week: Not on file Minutes per session: Not on file  Stress: Not on file Relationships  Social connections Talks on phone: Not on file Gets together: Not on file Attends Adventism service: Not on file Active member of club or organization: Not on file Attends meetings of clubs or organizations: Not on file Relationship status: Not on file  Intimate partner violence Fear of current or ex partner: Not on file Emotionally abused: Not on file Physically abused: Not on file Forced sexual activity: Not on file Other Topics Concern   Service No  
 Blood Transfusions No  
 Caffeine Concern No  
 Occupational Exposure No  
 Hobby Hazards No  
 Sleep Concern No  
 Stress Concern No  
 Weight Concern No  
 Special Diet No  
 Back Care Not Asked  Exercise Yes  Bike Helmet Not Asked  [address] Yes  Self-Exams Not Asked Social History Narrative . Has long-time girlfriend. Continues to work doing IT support. ALLERGIES: Albuterol and Celebrex [celecoxib] Review of Systems Constitutional: Positive for malaise/fatigue. Negative for diaphoresis and fever. Respiratory: Positive for shortness of breath. Negative for cough and sputum production. Cardiovascular: Positive for palpitations. Negative for chest pain. Gastrointestinal: Negative for abdominal pain. Musculoskeletal: Negative for back pain. Neurological: Negative for weakness. Vitals:  
 02/25/21 1221 02/25/21 1251 BP: 90/62 96/80 Pulse: (!) 105 (!) 162 Resp: 17 Temp: 97.8 °F (36.6 °C) SpO2: 99% 97% Weight: 81.6 kg (180 lb) Height: 5' 10\" (1.778 m) Physical Exam  
 
MDM Number of Diagnoses or Management Options Amount and/or Complexity of Data Reviewed Clinical lab tests: ordered and reviewed Tests in the radiology section of CPT®: ordered and reviewed Tests in the medicine section of CPT®: ordered and reviewed Decide to obtain previous medical records or to obtain history from someone other than the patient: yes Obtain history from someone other than the patient: yes Review and summarize past medical records: yes Discuss the patient with other providers: yes Independent visualization of images, tracings, or specimens: yes Risk of Complications, Morbidity, and/or Mortality Presenting problems: high Diagnostic procedures: high Management options: high Patient Progress Patient progress: stable Critical Care Performed by: Hector Tran MD 
Authorized by: Hector Tran MD  
 
Critical care provider statement:  
  Critical care time (minutes):  40 Critical care time was exclusive of:  Separately billable procedures and treating other patients Critical care was necessary to treat or prevent imminent or life-threatening deterioration of the following conditions:  Circulatory failure, cardiac failure and shock Critical care was time spent personally by me on the following activities:  Blood draw for specimens, development of treatment plan with patient or surrogate, discussions with consultants, evaluation of patient's response to treatment, examination of patient, review of old charts, re-evaluation of patient's condition, pulse oximetry, ordering and review of radiographic studies, ordering and review of laboratory studies and ordering and performing treatments and interventions   I assumed direction of critical care for this patient from another provider in my specialty: no

## 2021-02-25 NOTE — ED TRIAGE NOTES
Pt arrives via pov c/o increased heart rate, states had lung removed a month ago due to a collapse. Pt states hr this am was 135 and then 160. Hx of afib and takes metoprolol. Reports increased weakness, \"felt like I was going to juvenal this am\" and some chest discomfort.

## 2021-02-25 NOTE — H&P
[facility] Cardiology Consult Date of  Admission: 2/25/2021 12:40 PM  
 
Primary Care Physician: JENNIFER 
Primary Cardiologist: Dr. Todd Landau Consulting Physician: Dr. Nancy Franz. Fib RVR 
 
HPI:  Pita Bingham. is a 78 y.o. 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 He had concurrent radiation and chemo (carbo/taxol)  prior to surgery. He had RLLobectomy and RMLobectomy on 7/23/2020.  CT 12/22/20 showed large pleural effusion and collapse of RUL. He underwent thoracentesis by Dr. Ari Hobbs 12/28/20 then attempted right thoracoscopy converted to right thoracotomy, Extensive pneumonolysis with decortication over 3 hours, and intercostal nerve cryoablation 1/6/21 by Dr. Rasta Gloria. Pt returned to OR 1/20/21 and underwent Right thoracotomy with completion pneumonectomy with extensive lysis of adhesions and decortication and Cryoablation of intercostal nerves by Dr. Rasta Gloria. His Eliquis was stopped d/t hemothorax. He recently saw Dr. Todd Landau in office 2/10 for consideration ILR. He was continued on amiodarone and not AC d/t hemothorax. Dr. Todd Landau discussed watchman device. No need to ILR. Patient presented to ED at [facility] with c/o increased heart rate with rates 130- 150s this am with mild SOB and heart palpitations. He called our office and was told to go to ED. He reports only taking 12.5 mg Toprol daily d/t prior hypotension and was on midodrine until 3 days ago when he ran out. Labs in ED showed HS trop 5.7, EKG likely A. Tachy or flutter . He is currently on esmolol gtt in ED. His repeat EKG showed . His B/P has improved to 116/89. Past Medical History:  
Diagnosis Date  GERD (gastroesophageal reflux disease)   
 controlled with med  Hypertension hx-- off meds since 4/2020--- followed by dr. Lazara Concepcion  Hypoxia 02/24/2020  Malignant neoplasm of lower lobe of right lung ([address] 02/26/2020 REC'D CHEMO AND RADIATION--- FOLLOWED BY DR. Marian Henriquez Osteoarthritis  Right lower lobe lung mass 2020  Status post total right knee replacement 2016 Past Surgical History:  
Procedure Laterality Date  HX COLONOSCOPY    
 HX KNEE ARTHROSCOPY Left   
 scope X 1, ACL recon X 1  
 HX KNEE ARTHROSCOPY Right , 1975 X 2   
 HX KNEE REPLACEMENT Right 2016 [address]  HX VASCULAR ACCESS Right placed 3/2020  
 port in chest   
 IR INSERT TUNL CVC W PORT OVER 5 YEARS  3/18/2020  NM SINUS SURGERY PROC UNLISTED  ,   
 sinus surg Allergies Allergen Reactions  Albuterol Palpitations  Celebrex [Celecoxib] Itching Social History Socioeconomic History  Marital status:  Spouse name: Not on file  Number of children: Not on file  Years of education: Not on file  Highest education level: Not on file Occupational History  Not on file Social Needs  Financial resource strain: Not on file  Food insecurity Worry: Not on file Inability: Not on file  Transportation needs Medical: Not on file Non-medical: Not on file Tobacco Use  Smoking status: Former Smoker Packs/day: 1.00 Years: 20.00 Pack years: 20.00 Quit date:  Years since quittin.1  Smokeless tobacco: Never Used Substance and Sexual Activity  Alcohol use: Yes Alcohol/week: 4.0 standard drinks Types: 4 Glasses of wine per week  Drug use: No  
 Sexual activity: Not on file Lifestyle  Physical activity Days per week: Not on file Minutes per session: Not on file  Stress: Not on file Relationships  Social connections Talks on phone: Not on file Gets together: Not on file Attends [employer] service: Not on file Active member of club or organization: Not on file Attends meetings of clubs or organizations: Not on file Relationship status: Not on file  Intimate partner violence Fear of current or ex partner: Not on file Emotionally abused: Not on file Physically abused: Not on file Forced sexual activity: Not on file Other Topics Concern   Service No  
 Blood Transfusions No  
 Caffeine Concern No  
 Occupational Exposure No  
 Hobby Hazards No  
 Sleep Concern No  
 Stress Concern No  
 Weight Concern No  
 Special Diet No  
 Back Care Not Asked  Exercise Yes  Bike Helmet Not Asked [address] [facility] Yes  Self-Exams Not Asked Social History Narrative . Has long-time girlfriend. Continues to work doing IT support. Family History Problem Relation Age of Onset  Cancer Mother   
     uterine  Arrhythmia Sister   
     afib Current Facility-Administered Medications Medication Dose Route Frequency  esmolol (BREVIBLOC) 2,500 mg/250 mL (10 mg/mL) infusion  0-200 mcg/kg/min IntraVENous TITRATE Current Outpatient Medications Medication Sig  
 umeclidinium (Incruse Ellipta) 62.5 mcg/actuation inhaler Take 1 Puff by inhalation daily.  OXYGEN-AIR DELIVERY SYSTEMS by Does Not Apply route. 2lpm qhs  
 ipratropium (ATROVENT) 0.03 % nasal spray 2 Sprays by Both Nostrils route two (2) times a day.  aspirin delayed-release 81 mg tablet Take 2 Tabs by mouth daily.  gabapentin (NEURONTIN) 300 mg capsule Take 1 Cap by mouth three (3) times daily.  METOPROLOL SUCCINATE PO Take  by mouth. Indications: 25 mg once daily  DISABLED PLACARD (DISABLED PLACARD) DMV Pt has medical condition that makes it difficult to walk 100 feet non stop without exacerbating existing condition.  amiodarone (CORDARONE) 200 mg tablet Take 1 Tab by mouth daily.  levalbuterol tartrate (Xopenex HFA) 45 mcg/actuation inhaler Take 2 Puffs by inhalation every four (4) hours as needed for Wheezing or Shortness of Breath.   
 Nebulizer & Compressor machine COPD  
  polyethylene glycol (MIRALAX) 17 gram packet Take 1 Packet by mouth daily. Indications: constipation (Patient taking differently: Take 17 g by mouth daily as needed. Indications: constipation)  esomeprazole (NexIUM) 20 mg capsule Take 20 mg by mouth nightly.  prochlorperazine (COMPAZINE) 10 mg tablet Take 1 Tab by mouth every six (6) hours as needed for Nausea. Indications: nausea and vomiting caused by cancer drugs, nausea and vomiting  ondansetron hcl (ZOFRAN) 8 mg tablet Take 1 Tab by mouth every eight (8) hours as needed for Nausea. Indications: nausea and vomiting caused by cancer drugs  lidocaine-prilocaine (EMLA) topical cream Apply  to affected area as needed for Pain. Review of Systems Review of Systems Constitution: Positive for malaise/fatigue. Negative for diaphoresis. HENT: Negative for congestion. Cardiovascular: Positive for dyspnea on exertion. Negative for chest pain, claudication, cyanosis, irregular heartbeat, leg swelling, near-syncope, orthopnea, palpitations, paroxysmal nocturnal dyspnea and syncope. Respiratory: Positive for shortness of breath. Negative for cough and wheezing. Endocrine: Negative for cold intolerance and heat intolerance. Hematologic/Lymphatic: Does not bruise/bleed easily. Skin: Negative for nail changes. Neurological: Positive for dizziness. Negative for headaches and weakness. Subjective:  
 
Visit Vitals /82 Pulse (!) 117 Temp 97.8 °F (36.6 °C) Resp 22 Ht 5' 10\" (1.778 m) Wt 81.6 kg (180 lb) SpO2 96% BMI 25.83 kg/m² No intake/output data recorded. No intake/output data recorded. Physical Exam: 
General: Well Developed, Well Nourished, No Acute Distress HEENT: pupils equal and round, no abnormalities noted Neck: supple, no JVD, no carotid bruits Heart: S1S2 with RRR without murmurs or gallops Lungs: slightly diminished on right Abd: soft, nontender, nondistended, with good bowel sounds Ext: warm, no edema, calves supple/nontender, pulses 2+ bilaterally Skin: warm and dry Psychiatric: Normal mood and affect Neurologic: Alert and oriented X 3 Cardiographics Telemetry:  ECG: ? A. Tachy or A. Flutter Echocardiogram: 12/20 showed EF 50%,  Mild LA size, mild MR Labs:  
Recent Results (from the past 24 hour(s)) EKG, 12 LEAD, INITIAL Collection Time: 02/25/21 12:22 PM  
Result Value Ref Range Ventricular Rate 169 BPM  
 Atrial Rate 90 BPM  
 QRS Duration 80 ms  
 Q-T Interval 272 ms QTC Calculation (Bezet) 456 ms Calculated R Axis 36 degrees Calculated T Axis 46 degrees Diagnosis Supraventricular tachycardia Septal infarct , age undetermined Abnormal ECG When compared with ECG of 05-JAN-2021 11:37, 
Vent. rate has increased BY  80 BPM 
Septal infarct is now Present CBC WITH AUTOMATED DIFF Collection Time: 02/25/21 12:30 PM  
Result Value Ref Range WBC 10.0 4.3 - 11.1 K/uL  
 RBC 4.69 4.23 - 5.6 M/uL  
 HGB 12.7 (L) 13.6 - 17.2 g/dL HCT 40.2 (L) 41.1 - 50.3 % MCV 85.7 79.6 - 97.8 FL  
 MCH 27.1 26.1 - 32.9 PG  
 MCHC 31.6 31.4 - 35.0 g/dL  
 RDW 15.7 (H) 11.9 - 14.6 % PLATELET 539 344 - 611 K/uL MPV 9.4 9.4 - 12.3 FL ABSOLUTE NRBC 0.00 0.0 - 0.2 K/uL  
 DF AUTOMATED NEUTROPHILS 70 43 - 78 % LYMPHOCYTES 19 13 - 44 % MONOCYTES 7 4.0 - 12.0 % EOSINOPHILS 3 0.5 - 7.8 % BASOPHILS 1 0.0 - 2.0 % IMMATURE GRANULOCYTES 1 0.0 - 5.0 %  
 ABS. NEUTROPHILS 7.0 1.7 - 8.2 K/UL  
 ABS. LYMPHOCYTES 1.9 0.5 - 4.6 K/UL  
 ABS. MONOCYTES 0.7 0.1 - 1.3 K/UL  
 ABS. EOSINOPHILS 0.3 0.0 - 0.8 K/UL  
 ABS. BASOPHILS 0.1 0.0 - 0.2 K/UL  
 ABS. IMM. GRANS. 0.1 0.0 - 0.5 K/UL METABOLIC PANEL, COMPREHENSIVE Collection Time: 02/25/21 12:30 PM  
Result Value Ref Range Sodium 136 (L) 138 - 145 mmol/L Potassium 4.6 3.5 - 5.1 mmol/L Chloride 102 98 - 107 mmol/L  
 CO2 28 21 - 32 mmol/L  Anion gap 6 (L) 7 - 16 mmol/L  
 Glucose 103 (H) 65 - 100 mg/dL BUN 9 8 - 23 MG/DL Creatinine 0.79 (L) 0.8 - 1.5 MG/DL  
 GFR est AA >60 >60 ml/min/1.73m2 GFR est non-AA >60 >60 ml/min/1.73m2 Calcium 9.8 8.3 - 10.4 MG/DL Bilirubin, total 0.7 0.2 - 1.1 MG/DL  
 ALT (SGPT) <6 (L) 12 - 65 U/L  
 AST (SGOT) 21 15 - 37 U/L Alk. phosphatase 156 (H) 50 - 136 U/L Protein, total 8.1 6.3 - 8.2 g/dL Albumin 3.2 3.2 - 4.6 g/dL Globulin 4.9 (H) 2.3 - 3.5 g/dL A-G Ratio 0.7 (L) 1.2 - 3.5    
TROPONIN-HIGH SENSITIVITY Collection Time: 02/25/21 12:30 PM  
Result Value Ref Range Troponin-High Sensitivity 5.7 0 - 14 pg/mL MAGNESIUM Collection Time: 02/25/21 12:30 PM  
Result Value Ref Range Magnesium 2.3 1.8 - 2.4 mg/dL TSH 3RD GENERATION Collection Time: 02/25/21 12:30 PM  
Result Value Ref Range TSH 0.488 0.358 - 3.740 uIU/mL Patient has been seen and examined by Dr. Aramis Russ and he agrees with the following assessment and plan: 
 
 Assessment/Plan:  
  
 Principal Problem: 
  Atrial fibrillation with RVR ([facility] (2/25/2021) vs Atrial tach/SVT- converted post IV esmolol; will stop gtt. Give toprol 25 mg and amiodarone 400 mg now. Will increase toprol to 25 daily at d/c and amiodarone 200 mg bid x 1 week then back to 200  Mg daily. Consider EP for atrial rhythm and watchman outpt. If remains in [address] [place] [address] Dr idalmis santiago home and see Dr. Peggie Oppenheim 3/18/2021 at 10 am in [place] office. Active Problems: 
  Lung cancer ([facility] (7/23/2020) Atrial tachycardia ([facility] (7/26/2020)- see above PAF (paroxysmal atrial fibrillation) ([address] (2/25/2021)- see above Hypertension (2/25/2021)- mild hypotension in ED that resolved once heart rate improved.   
 
 
 
Juhi He NP 
2/25/2021 1:51 PM

## 2021-03-18 PROBLEM — I49.5 SICK SINUS SYNDROME (HCC): Status: ACTIVE | Noted: 2021-01-01

## 2021-03-22 NOTE — CARDIO/PULMONARY
2021 Dear Dr. Tidwell Second: Thank you for referring your patient, Edwar Jasso \"Max\" Dean Lane (: 1950), to the Pulmonary Rehabilitation Program at [address] [facility]. Mr. Dean Lane is a good candidate for the program and should see improvements with regular participation. We will be working to increase your patient's endurance and self care over the next 12 weeks. We will contact you with any issues or concerns that may arise, or you can follow your patient's progress through [address] [place] [address] at any time. We will send you a final summary report when the program is completed. Again, thank you for your referral. If we can be of further assistance, please feel free to contact the Cardiopulmonary Rehab staff at [phone]. Sincerely, Chano Feliciano, RRT, RCP Pulmonary Rehab Specialist  
[facility] Programs CC: File

## 2021-04-30 NOTE — PROGRESS NOTES
Patient pre-assessment complete for BIV Pacemaker & AV Node ablation with Dr Mane Mercado scheduled for 5/3/21 at 1:30pm, arrival time 11:30am. Patient verified using . Patient instructed to bring all home medications in labeled bottles on the day of procedure. NPO status reinforced. Patient instructed to HOLD eliquis x 1 day. Instructed they can take all other medications excluding vitamins & supplements. Patient verbalizes understanding of all instructions & denies any questions at this time.

## 2021-05-03 PROBLEM — Z95.0 PACEMAKER: Status: ACTIVE | Noted: 2021-01-01

## 2021-05-03 PROBLEM — I44.2 HEART BLOCK AV COMPLETE (HCC): Status: ACTIVE | Noted: 2021-01-01

## 2021-05-03 NOTE — ANESTHESIA POSTPROCEDURE EVALUATION
Procedure(s):  BIV PACEMAKER / AV NODE  ABLATION. total IV anesthesia    Anesthesia Post Evaluation      Multimodal analgesia: multimodal analgesia used between 6 hours prior to anesthesia start to PACU discharge  Patient location during evaluation: PACU  Patient participation: complete - patient participated  Level of consciousness: awake  Pain management: adequate  Airway patency: patent  Anesthetic complications: no  Cardiovascular status: acceptable  Respiratory status: acceptable, spontaneous ventilation and nonlabored ventilation  Hydration status: acceptable  Post anesthesia nausea and vomiting:  none      INITIAL Post-op Vital signs:   Vitals Value Taken Time   BP     Temp     Pulse 70 05/03/21 1607   Resp     SpO2 95 % 05/03/21 1607   Vitals shown include unvalidated device data.

## 2021-05-03 NOTE — PROGRESS NOTES
TRANSFER - OUT REPORT:    Verbal report given to Darryn Vidal (name) on Norrine Primrose.  being transferred to room 317 (unit) for routine progression of care       Report consisted of patients Situation, Background, Assessment and   Recommendations(SBAR). Information from the following report(s) Procedure Summary was reviewed with the receiving nurse. Lines:   Venous Access Device chest port  03/18/20 Upper chest (subclavicular area, right (Active)       Peripheral IV 05/03/21 Left;Posterior Hand (Active)        Opportunity for questions and clarification was provided.       Patient transported with:   Hospital transport

## 2021-05-03 NOTE — PROGRESS NOTES
Report received from [address] [employer]. Procedural findings communicated. Intra procedural  medication administration reviewed. Progression of care discussed.      Patient received into [address] [facility] post sheath removal.     Access site without bleeding or swelling     Dressing dry and intact     Patient instructed to limit movement to left upper and right lower extremity    Routine post procedural vital signs and site assessment initiated

## 2021-05-03 NOTE — ROUTINE PROCESS
Admission skin assessment completed with second RN and reveals the following: Skin is intact including sacrum and heels.  Right groin puncture site with suture post ablation and left clavicle with incision and pressure dressing post Bi-V PPM.

## 2021-05-03 NOTE — ROUTINE PROCESS
TRANSFER - IN REPORT:    Verbal report received from [facility] on [address] [place]. being received from [facility] for routine progression of care      Report consisted of patients Situation, Background, Assessment and Recommendations(SBAR). Information from the following report(s) SBAR, Kardex, Procedure Summary, Intake/Output, MAR, Med Rec Status and Cardiac Rhythm AV paced was reviewed with the receiving nurse. Opportunity for questions and clarification was provided. Assessment completed upon patients arrival to unit and care assumed.

## 2021-05-03 NOTE — PROGRESS NOTES
Patient received to [address] [place] [address] room # 15  Ambulatory from [facility]. Patient scheduled for BIV/AVN today with Dr Monik Campos. Procedure reviewed & questions answered, voiced good understanding consent obtained & placed on chart. All medications and medical history reviewed. Will prep patient per orders. Patient & family updated on plan of care.       The patient has a fraility score of 3-MANAGING WELL, based on independent of adl's

## 2021-05-03 NOTE — ANESTHESIA PREPROCEDURE EVALUATION
Anesthetic History   No history of anesthetic complications            Review of Systems / Medical History  Patient summary reviewed and pertinent labs reviewed    Pulmonary    COPD: mild          Pertinent negatives: No smoker (former, quit 7 years ago)  Comments: Lung CA s/p resection of R middle and R lower lobes and CXRT. Returned to OR for nonfunctional right lung.  Completion of pneumonectomy    Normally on O2 QHS   Neuro/Psych   Within defined limits           Cardiovascular    Hypertension        Dysrhythmias () : atrial fibrillation  Hyperlipidemia    Exercise tolerance: >4 METS: Denied chest pain, SOB, syncope   Comments: Echo 12/20 - EF 50%, no sig valve dz    SSS  Tachy-allison     GI/Hepatic/Renal     GERD: well controlled           Endo/Other    Diabetes    Arthritis, cancer (lung) and anemia     Other Findings   Comments: S/p R pneumonectomy January         Physical Exam    Airway  Mallampati: II  TM Distance: > 6 cm  Neck ROM: normal range of motion   Mouth opening: Normal     Cardiovascular    Rhythm: regular  Rate: abnormal        Comments: tachycardia Dental    Dentition: Caps/crowns     Pulmonary                Comments: L side CTA Abdominal  GI exam deferred       Other Findings            Anesthetic Plan    ASA: 3  Anesthesia type: total IV anesthesia          Induction: Intravenous  Anesthetic plan and risks discussed with: Patient and Family

## 2021-05-03 NOTE — PROCEDURES
Pre-Procedure Diagnosis  1. Atrial fibrillation failing medical therapy  2. Documented non-reversible symptomatic bradycardia due to third degree atrioventricular block, AV node ablation     Procedure Performed  1. Insertion of Biotronik biventricular permanent pacemaker. 2. Insertion of left ventricular lead at time of new system Implantation. 3. AV node ablation     Anesthesia: MAC     Estimated Blood Loss: Less than 10 mL     Specimens: * No specimens in log *     The procedure, indications, risks, benefits, and alternatives were discussed with the patient and family members, who desired to proceed after questions were answered and informed consent was documented. Procedure: After informed consent was obtained, the patient was brought to the Electrophysiology Laboratory in a fasting state and was prepped and draped in sterile fashion. Prophylactic antibiotic was administered 10 minutes prior to skin incision (refer to anesthesia documentation for details). MAC was administered by the anesthesia team with continuous oxygen saturation measurement and blood pressure measurement. Local anesthetic (sensorcaine) was delivered to the left pectoral region and an incision was made parallel to the deltopectoral groove. A subcutaneous pocket was created using blunt dissection and electrocautery, and adequate hemostasis was established. Access x 3 was obtained under ultrasound guidance using a modified Seldinger technique with placement of 3 short wires down into the RA and advanced below the diaphragm. Next, placement of a peel-away sheath over the first guidewire was performed. A permanent RV pacemaker lead was then advanced under fluoroscopic guidance into the RV apex in a stable position with satisfactory sensing and pacing characteristics. The peel away sheath was removed. Another Safe-sheath was then placed over the second guidewire.  A permanent pacing lead was advanced under fluoroscopic guidance and positioned in the right atrium at the location of the RAA. Stable RA appendage position with satisfactory sensing characteristics were obtained. The sheath was peeled. The leads were sutured to the underlying pectoralis fascia using 2 non-absorbable sutures around each lead's collar. The lead slack and position was assessed under fluoroscopy while securing the lead collars to ensure proper length. After positioning the RA and RV leads, a standard Merit Alfonzo LV delivery sheath was advanced over the long access wire and dilator and wire were removed. The braided inner sheath was then advanced into the [id number] and used to cannulate the coronary sinus using contrast when necessary. A Glide wire and was used to allow a smooth transition into the CS body. A coronary sinus venogram was then performed using a balloon occluding catheter. A Merit renal inner sheath was used with an Acuity Straight trak to cannulate a posterolateral branch. The renal was advanced into the posterolateral and a sub selected venogram was performed. The left ventricular lead was then advanced into a posterolateral brach over an angioplasty wire. Adequate thresholds were obtained with an adequate margin between phrenic stim and loss of capture. The delivery sheaths were then slit with fluoroscopy demonstrating stable position. The lead was then sutured to the underlying pectoralis fascia using 2 non-absorbable sutures around the lead collar. Final lead testing via the pacing system analyzer (PSA) demonstrated stable sensing, impedance and pacing thresholds. The lead pins were then cleaned with antibiotic soaked gauze, dried gently, and attached to a new pacemaker generator. Pins were directly observed to pass the tip electrode, and the ring hex wrench screws were secured, and leads tug tested. The device and leads were gently positioned within the pocket after the pocket was irrigated copiously with a saline antimicrobial solution.  The wound was closed with multiple layers of absorbable suture followed skin closure with staples. Fluoroscopic images were interpreted by me, in multiple projections. Final device position was confirmed by stored fluoroscopic image from device pocket to lead tips in AP projection. Lead Data:    Pulse Generator Model #  Serial # Location Implant/Explant   [id number] Biotronik [id number] Left Pectoral Implant     Lead Model Number  Serial Number Lead position Implant/Explant   RA [id number] Biotronik [id number] RA Appendage Implant   RV [id number] Biotronik [id number] RV [place] Implant   LV [id number] Biotronik [id number] LV Lateral Implant     Lead Sensitivity and Threshold  Lead R or P sensitivity (mv) Threshold (V) Threshold PW (msec) Impedance (ohms) Final output Voltage (V) Final PW (msec)   RA 1.6 1.0 0.4 429 3.0 0.4   RV 13.2 1.0 0.4 546 3.0 0.4   LV 8.0 2.0 0.4 604 3.0 0.4     Bradycardia Settings  Parker Mode LRL URL Pace AVD (ms) Sense AVD (ms) Rate Response Mode Switching Mode SW Rate   DDDR 70 130 140 140 On On 160     Contrast: 11 cc    LV Pacing Configuration: [id number]     Fluoro:  12.0 min    AV Node Ablation   Access x 1 was obtained under ultrasound guidance using a modified Seldinger technique with placement of a short sheath into the right femoral vein. A Biosense Morel 3.5 mm irrigated ablation catheter was then advanced to the region of the AV junction and with delivery of RF there was complete heart block. This remained after a 15 minute waiting period. Complications: None    IMPRESSION: Successful implantation of Biotronik biventricular permanent pacemaker followed by successful AV node ablation     Sameer Arreguin MD, MS  Clinical Cardiac Electrophysiology  5/3/2021  3:28 PM

## 2021-05-04 NOTE — DISCHARGE SUMMARY
[facility] Cardiology Discharge Summary     Patient ID:  Grady Malloy  [id number]  78 y.o.  1950    Admit date: 5/3/2021    Discharge date:  5/4/2021    Admitting Physician: Kael Guerin MD     Discharge Physician: Le Zaragoza NP/Dr. Summer Maldonado    Admission Diagnoses: Atrial fibrillation, unspecified type [facility]) [I48.91]  Pacemaker [Z95.0]  Paroxysmal atrial fibrillation ([address] [I48.0]  Heart block AV complete ([address] [I44.2]    Discharge Diagnoses:    Diagnosis    Pacemaker    Heart block AV complete ([address]    Sick sinus syndrome ([address]    PAF (paroxysmal atrial fibrillation) [facility])       Cardiology Procedures this admission:  pacemaker implantation, CXR, AV node ablation and device interrogation     Consults: None    Hospital Course: Patient was seen at the office of [facility] Cardiology by Dr. Summer Maldonado for management of uncontrolled AFIB and was subsequently scheduled for an AM Admission PM implantation at [facility] on 5/3/21. Patient was taken to the EP lab and underwent successful implantation of Biotronik PM followed by AV node ablation by Dr. Summer Maldonado. Patient tolerated the procedure well and was taken to the telemetry floor for recovery. Follow up chest xray showed no pneumothorax. The following morning patient was up feeling well without any complaints of chest pain, shortness of breath, or palpitations. Patient's left subclavian cath site was clean, dry and intact without hematoma. Patient's labs were WNL. Patient's device was interrogated prior to d/c showing normal function. Patient was seen and examined by Dr. Summer Maldonado and determined stable and ready for discharge. Patient was instructed on the importance of medication compliance and outpatient follow up. Patient has been scheduled for follow-up with [facility] Card[facility] -- device clinic on 5/17/2021 at 2pm in [address] [place] [address].     DISPOSITION: Patient has been instructed to keep affected arm below shoulder level for the next 4 weeks or until cleared by doctor. The arm sling should be worn while sleeping. The dressing will be removed at follow-up. The incision site must be kept clean and dry. The patient may shower in a few days. Lotions, powders, or creams should be avoided as these can increase the risk of infection. The affected arm should not be used for any pushing, pulling, or lifting until cleared by doctor. Driving is prohibited until cleared by doctor in a follow up appointment. Any signs of infection including increased redness, suspicious drainage, or unexplained fever should be reported to the doctor immediately by calling [phone]. Discharge Exam:  Patient has been seen by Dr. Dennis Gooden: see his progress note for exam details. Visit Vitals  BP 93/63   Pulse 71   Temp 97.7 °F (36.5 °C)   Resp 18   Ht 5' 10\" (1.778 m)   Wt 82.6 kg (182 lb 3.2 oz)   SpO2 90%   BMI 26.14 kg/m²         Recent Results (from the past 24 hour(s))   CBC WITH AUTOMATED DIFF    Collection Time: 05/03/21 12:06 PM   Result Value Ref Range    WBC 9.8 4.3 - 11.1 K/uL    RBC 4.50 4.23 - 5.6 M/uL    HGB 11.8 (L) 13.6 - 17.2 g/dL    HCT 37.9 (L) 41.1 - 50.3 %    MCV 84.2 79.6 - 97.8 FL    MCH 26.2 26.1 - 32.9 PG    MCHC 31.1 (L) 31.4 - 35.0 g/dL    RDW 16.6 (H) 11.9 - 14.6 %    PLATELET 555 260 - 811 K/uL    MPV 9.7 9.4 - 12.3 FL    ABSOLUTE NRBC 0.00 0.0 - 0.2 K/uL    DF AUTOMATED      NEUTROPHILS 78 43 - 78 %    LYMPHOCYTES 14 13 - 44 %    MONOCYTES 7 4.0 - 12.0 %    EOSINOPHILS 1 0.5 - 7.8 %    BASOPHILS 1 0.0 - 2.0 %    IMMATURE GRANULOCYTES 1 0.0 - 5.0 %    ABS. NEUTROPHILS 7.6 1.7 - 8.2 K/UL    ABS. LYMPHOCYTES 1.3 0.5 - 4.6 K/UL    ABS. MONOCYTES 0.7 0.1 - 1.3 K/UL    ABS. EOSINOPHILS 0.1 0.0 - 0.8 K/UL    ABS. BASOPHILS 0.1 0.0 - 0.2 K/UL    ABS. IMM.  GRANS. 0.1 0.0 - 0.5 K/UL   MAGNESIUM    Collection Time: 05/03/21 12:06 PM   Result Value Ref Range    Magnesium 2.4 1.8 - 2.4 mg/dL   METABOLIC PANEL, COMPREHENSIVE    Collection Time: 05/03/21 12:06 PM   Result Value Ref Range    Sodium 138 136 - 145 mmol/L    Potassium 5.2 (H) 3.5 - 5.1 mmol/L    Chloride 105 98 - 107 mmol/L    CO2 26 21 - 32 mmol/L    Anion gap 7 7 - 16 mmol/L    Glucose 94 65 - 100 mg/dL    BUN 13 8 - 23 MG/DL    Creatinine 0.79 (L) 0.8 - 1.5 MG/DL    GFR est AA >60 >60 ml/min/1.73m2    GFR est non-AA >60 >60 ml/min/1.73m2    Calcium 9.6 8.3 - 10.4 MG/DL    Bilirubin, total 0.7 0.2 - 1.1 MG/DL    ALT (SGPT) 11 (L) 12 - 65 U/L    AST (SGOT) 37 15 - 37 U/L    Alk. phosphatase 121 50 - 136 U/L    Protein, total 7.7 6.3 - 8.2 g/dL    Albumin 2.7 (L) 3.2 - 4.6 g/dL    Globulin 5.0 (H) 2.3 - 3.5 g/dL    A-G Ratio 0.5 (L) 1.2 - 3.5     PROTHROMBIN TIME + INR    Collection Time: 05/03/21 12:06 PM   Result Value Ref Range    Prothrombin time 15.2 (H) 12.5 - 14.7 sec    INR 1.2     EKG, 12 LEAD, INITIAL    Collection Time: 05/03/21 12:43 PM   Result Value Ref Range    Ventricular Rate 45 BPM    Atrial Rate 45 BPM    P-R Interval 150 ms    QRS Duration 82 ms    Q-T Interval 588 ms    QTC Calculation (Bezet) 508 ms    Calculated P Axis -27 degrees    Calculated R Axis -15 degrees    Calculated T Axis 41 degrees    Diagnosis       Sinus bradycardia  with short SC  Prolonged QT  Abnormal ECG  When compared with ECG of 25-FEB-2021 12:22,  Vent. rate has decreased  BPM    Confirmed by KATHERIN CLEVELAND ([id number]) on 5/3/2021 6:21:08 PM     EKG, 12 LEAD, INITIAL    Collection Time: 05/03/21  6:22 PM   Result Value Ref Range    Ventricular Rate 70 BPM    Atrial Rate 70 BPM    P-R Interval 134 ms    QRS Duration 164 ms    Q-T Interval 520 ms    QTC Calculation (Bezet) 561 ms    Calculated R Axis -116 degrees    Calculated T Axis 61 degrees    Diagnosis       AV dual-paced rhythm  Abnormal ECG  When compared with ECG of 03-MAY-2021 12:43,  Electronic ventricular pacemaker has replaced Sinus rhythm  Vent.  rate has increased BY  25 BPM           Patient Instructions:     Current Discharge Medication List START taking these medications    Details   oxyCODONE-acetaminophen (Percocet) 5-325 mg per tablet Take 1 Tab by mouth every six (6) hours as needed for Pain for up to 3 days. Max Daily Amount: 4 Tabs. Qty: 10 Tab, Refills: 0    Associated Diagnoses: Pacemaker         CONTINUE these medications which have CHANGED    Details   metoprolol succinate (TOPROL-XL) 50 mg XL tablet Take 1 Tab by mouth daily. Qty: 30 Tab, Refills: 5         CONTINUE these medications which have NOT CHANGED    Details   umeclidinium (Incruse Ellipta) 62.5 mcg/actuation inhaler Take 1 Puff by inhalation daily. Qty: 1 Inhaler, Refills: 11      apixaban (Eliquis) 5 mg tablet Take 1 Tab by mouth two (2) times a day. Qty: 180 Tab, Refills: 3      OXYGEN-AIR DELIVERY SYSTEMS by Does Not Apply route. 2lpm qhs      gabapentin (NEURONTIN) 300 mg capsule Take 1 Cap by mouth three (3) times daily. Qty: 270 Cap, Refills: 0      esomeprazole (NexIUM) 20 mg capsule Take 20 mg by mouth nightly. prn      ipratropium (ATROVENT) 0.03 % nasal spray 2 Sprays by Both Nostrils route two (2) times a day. Qty: 30 mL, Refills: 5      Nebulizer & Compressor machine COPD  Qty: 1 Each, Refills: 0      polyethylene glycol (MIRALAX) 17 gram packet Take 1 Packet by mouth daily. Indications: constipation  Qty: 30 Packet, Refills: 0      ondansetron hcl (ZOFRAN) 8 mg tablet Take 1 Tab by mouth every eight (8) hours as needed for Nausea. Indications: nausea and vomiting caused by cancer drugs  Qty: 60 Tab, Refills: 3    Associated Diagnoses: CINV (chemotherapy-induced nausea and vomiting)      lidocaine-prilocaine (EMLA) topical cream Apply  to affected area as needed for Pain.   Qty: 30 g, Refills: 0    Associated Diagnoses: Port-A-Cath in place         STOP taking these medications       amiodarone (CORDARONE) 200 mg tablet Comments:   Reason for Stopping:                 Signed:  Le Zaragoza NP  5/4/2021 6:26 AM

## 2021-05-04 NOTE — DISCHARGE INSTRUCTIONS
CARDIAC DEVICE INSTRUCTION SHEET    1. You should have received a 1-2 weeks follow up appointment upon discharge from the hospital.  If you did not receive this appointment prior to leaving the hospital, please contact us at [phone].    2.  DO NOT put ointments, creams or lotions on the incision. 3.  Your dressing will be removed at your follow up appointment. If you have sutures/staples, the nurse will remove them at the follow up appointment. The dressing promotes healing and is meant to stay on the wound. Keep incision site completely dry for one week. During this week you may take a sponge bath, but no shower. After one week you may shower, cleaning the wound with soap and gonzales. 4.  Call us IMMEDIATELY if you develop any signs of infection - including redness, drainage at the incision site, swelling or pain at the incision site, or a temperature of 101° F or greater. If you have twitching chest muscles, hiccups that won't stop, or a swollen arm on the side of the incision. Any feelings of light-headedness, chest pain, or shortness of breath. 5.  You may use your pacemaker/ICD arm, but keep your arm lower than your shoulder for the first 8 weeks (or until cleared by a physician) to prevent the device lead(s) from moving. Do not raise your elbow above the level of your shoulder on the ICD implant side. Avoid excessive pushing, pulling, or twisting. 6.         You should not drive until 1 week after your implant or after your first follow-up appointment, whichever comes first. At that time, you can discuss when you may return to your regular driving routine. 7.  Do not lift more than 10 pounds for 4 weeks and 20 pounds for 8 weeks. Do not lift anything heavier than a gallon of milk. 8.  Within 6 weeks you should receive your cardiac device ID card. Carry your card with you at all times. Always show it to any doctor or dentist you see.      9.    You will need to have your device evaluated every 3 months via office, remote, or telephone. 10.  Microwaves WILL NOT harm your device. Warnings do not apply to you. 11.  At airports, always show your cardiac device ID card. You may walk through metal detectors but do not allow the hand held wand near your device. 12  DO NOT have an MRI without contacting your cardiologists office first.     13.  Please refer to the education booklet given to you at the hospital for the activities and equipment you should avoid. Some may reprogram or interfere with your cardiac device. If you receive one shock from your device:   and you feel ok, you may call to let your physician know. but feel poorly, please notify your doctor. You may need to be seen. If you receive more than one shock in a 24 hour period, call your physician to schedule a visit or report to the emergency room. Please call the device clinic or Loli Toussaint (EP Nurse) [phone] if you have questions or concerns about your device. Pacemaker Placement: What to Expect at [address] placement is surgery to put a pacemaker in your chest. This surgery may be done if you have bradycardia (a slow heart rate). Your doctor made a cut (incision) in your chest. The doctor put the pacemaker leads through the cut, into a large blood vessel, then into the heart. The doctor put the pacemaker under the skin of your chest and attached the leads to it. Your chest may be sore where the doctor made the cut. You also may have a bruise and mild swelling. These symptoms usually get better in 1 to 2 weeks. You may feel a hard ridge along the incision. This usually gets softer in the months after surgery. You may be able to see or feel the outline of the pacemaker under your skin. You will probably be able to go back to work or your usual routine 1 to 2 weeks after surgery. Pacemaker batteries usually last 5 to 15 years.  Your doctor will talk to you about how often you will need to have your pacemaker checked. You'll need to take steps to safely use electric devices. Some of these devices can stop your pacemaker from working right for a short time. Check with your doctor about what to avoid and what to keep a short distance away from your pacemaker. For example, you will need to stay away from things with strong magnetic and electrical fields. An example is an MRI machine (unless your pacemaker is safe for an MRI). You can use a cellphone and other wireless devices, but keep them at least 6 inches away from your pacemaker. Many household and office electronics don't affect a pacemaker. These include kitchen appliances and computers. This care sheet gives you a general idea about how long it will take for you to recover. But each person recovers at a different pace. Follow the steps below to get better as quickly as possible. How can you care for yourself at home? Activity    · Rest when you feel tired.     · Be active. Walking is a good choice.     · For 4 to 6 weeks:  ? Avoid activities that strain your chest or upper arm muscles. This includes pushing a  or vacuum, or mopping floors. It also includes swimming, or swinging a golf club or tennis racquet. ? Do not raise your arm (the one on the side of your body where the pacemaker is located) above your shoulder. ? Allow your body to heal. Don't move quickly or lift anything heavy until you are feeling better.     · Many people are able to return to work within 1 to 2 weeks after surgery.     · Ask your doctor when it is okay for you to have sex. Diet    · You can eat your normal diet. If your stomach is upset, try bland, low-fat foods like plain rice, broiled chicken, toast, and yogurt. Medicines    · Your doctor will tell you if and when you can restart your medicines.  He or she will also give you instructions about taking any new medicines.     · If you take aspirin or some other blood thinner, be sure to talk to your doctor. He or she will tell you if and when to start taking this medicine again. Make sure that you understand exactly what your doctor wants you to do.     · Be safe with medicines. Read and follow all instructions on the label. ? If the doctor gave you a prescription medicine for pain, take it as prescribed. ? If you are not taking a prescription pain medicine, ask your doctor if you can take an over-the-counter medicine. ? Do not take aspirin, ibuprofen (Advil, Motrin), naproxen (Aleve), or other nonsteroidal anti-inflammatory drugs (NSAIDs) unless your doctor says it is okay.     · If your doctor prescribed antibiotics, take them as directed. Do not stop taking them just because you feel better. You need to take the full course of antibiotics. Incision care    · If you have strips of tape on the incision, leave the tape on for a week or until it falls off.     · Keep the incision dry while it heals. Your doctor may recommend sponge baths for about 7 days, but do not get the incision wet. Your doctor will let you know when you may take showers. After a shower, pat the incision dry.     · Don't use hydrogen peroxide or alcohol on the incision, which can slow healing. You may cover the area with a gauze bandage if it oozes fluid or rubs against clothing. Change the bandage every day.     · Do not take a bath or get into a hot tub for the first 2 weeks, or until your doctor tells you it is okay. Other instructions    · Keep a medical ID card with you at all times that says you have a pacemaker. The card should include the  and model information.     · Wear medical alert jewelry stating that you have a pacemaker. You can buy this at most trinket.     · Check your pulse as directed by your doctor.     · Have your pacemaker checked as often as your doctor recommends. In some cases, this may be done over the phone or the Internet.  Your doctor will give you instructions about how to do this. Follow-up care is a key part of your treatment and safety. Be sure to make and go to all appointments, and call your doctor if you are having problems. It's also a good idea to know your test results and keep a list of the medicines you take. When should you call for help? Call 911 anytime you think you may need emergency care. For example, call if:    · You passed out (lost consciousness).     · You have trouble breathing. Call your doctor now or seek immediate medical care if:    · You are dizzy or light-headed, or you feel like you may faint.     · You have pain that does not get better after you take pain medicine.     · You hear an alarm or feel a vibration from your pacemaker.     · You have loose stitches, or your incision comes open.     · Bright red blood has soaked through the bandage over your incision.     · You have signs of infection, such as:  ? Increased pain, swelling, warmth, or redness. ? Red streaks leading from the incision. ? Pus draining from the incision. ? A fever. Watch closely for changes in your health, and be sure to contact your doctor if:    · You have any problems with your pacemaker. Where can you learn more? Go to http://diomedes-lashonda.info/  Enter G550 in the search box to learn more about \"Pacemaker Placement: What to Expect at Home. \"  Current as of: August 31, 2020               Content Version: 12.8  © 7784-5379 [employer]. Care instructions adapted under license by [employer] (which disclaims liability or warranty for this information). If you have questions about a medical condition or this instruction, always ask your healthcare professional. [place] [address] any warranty or liability for your use of this information.          Electrophysiology Study and Catheter Ablation: What to Expect at [address] [place] [address] Drive had an electrophysiology study for a problem with your heartbeat. You may also have had a catheter ablation to try to correct the problem. You may have swelling, bruising, or a small lump around the site where the catheters went into your body. These should go away in 3 to 4 weeks. Do not exercise hard or lift anything heavy for a week. You may be able to go back to work and to your normal routine in 1 or 2 days. This care sheet gives you a general idea about how long it will take for you to recover. But each person recovers at a different pace. Follow the steps below to get better as quickly as possible. How can you care for yourself at home? Activity    · For 1 week, do not lift anything that would make you strain. This may include heavy grocery bags and milk containers, a heavy briefcase or backpack, cat litter or dog food bags, a vacuum , or a child.     · For 1 week, do not exercise hard or do any activity that could strain your blood vessels or the site where the catheters went into your body.     · Ask your doctor when it is okay to have sex. Diet    · You can eat your normal diet. If your stomach is upset, try bland, low-fat foods like plain rice, broiled chicken, toast, and yogurt.     · Drink plenty of fluids (unless your doctor tells you not to). Medicines    · Your doctor will tell you if and when you can restart your medicines. He or she will also give you instructions about taking any new medicines.     · If you take aspirin or some other blood thinner, ask your doctor if and when to start taking it again. Make sure that you understand exactly what your doctor wants you to do.     · Ask your doctor if you can take acetaminophen (Tylenol) for pain. Do not take aspirin for 3 days, unless your doctor says it is okay.     · Check with your doctor before you take aspirin or anti-inflammatory medicines to reduce pain and swelling. These include ibuprofen (Advil, Motrin) and naproxen (Aleve).      · Make sure you know which heart medicines to continue and which ones to stop. Ask your doctor if you aren't sure. Catheter site care    · You can remove your bandages the day after the procedure.     · You may shower 24 to 48 hours after the procedure, if your doctor okays it. Pat the incision dry.     · Do not soak the catheter site until it is healed. Don't take a bath for 1 week, or until your doctor tells you it is okay.     · Watch for bleeding from the site. A small amount of blood (up to the size of a quarter) on the bandage can be normal.     · If you are bleeding, lie down and press on the area for 15 minutes to try to make it stop. If the bleeding does not stop, call your doctor or seek immediate medical care. Follow-up care is a key part of your treatment and safety. Be sure to make and go to all appointments, and call your doctor if you are having problems. It's also a good idea to know your test results and keep a list of the medicines you take. When should you call for help? Call  911 anytime you think you may need emergency care. For example, call if:    · You passed out (lost consciousness).     · You have symptoms of a heart attack. These may include:  ? Chest pain or pressure, or a strange feeling in the chest.  ? Sweating. ? Shortness of breath. ? Nausea or vomiting. ? Pain, pressure, or a strange feeling in the back, neck, jaw, or upper belly, or in one or both shoulders or arms. ? Lightheadedness or sudden weakness. ? A fast or irregular heartbeat. After you call 911, the  may tell you to chew 1 adult-strength or 2 to 4 low-dose aspirin. Wait for an ambulance. Do not try to drive yourself.     · You have symptoms of a stroke. These may include:  ? Sudden numbness, tingling, weakness, or loss of movement in your face, arm, or leg, especially on only one side of your body. ? Sudden vision changes. ? Sudden trouble speaking. ? Sudden confusion or trouble understanding simple statements.   ? Sudden problems with walking or balance. ? A sudden, severe headache that is different from past headaches. Call your doctor now or seek immediate medical care if:    · You are bleeding from the area where the catheter was put in your blood vessel.     · You have a fast-growing, painful lump at the catheter site.     · You have signs of infection, such as:  ? Increased pain, swelling, warmth, or redness. ? Red streaks leading from the catheter site. ? Pus draining from the catheter site. ? A fever.     · Your leg, arm, or hand is painful, looks blue, or feels cold, numb, or tingly. Watch closely for any changes in your health, and be sure to contact your doctor if you have any problems. Where can you learn more? Go to http://www.gray.com/  Enter H580 in the search box to learn more about \"Electrophysiology Study and Catheter Ablation: What to Expect at Home. \"  Current as of: August 31, 2020               Content Version: 12.8  © 2006-2021 [employer]. Care instructions adapted under license by [employer] (which disclaims liability or warranty for this information). If you have questions about a medical condition or this instruction, always ask your healthcare professional. [place] [address] any warranty or liability for your use of this information. Patient Education      Narcotic-Analgesic/Acetaminophen (Percocet, [address] [facility], [facility], Lortab 10/325) - (By mouth)   Why this medicine is used:   Relieves pain.   Contact a nurse or doctor right away if you have:  · Extreme weakness, shallow breathing, slow heartbeat  · Severe confusion, lightheadedness, dizziness, fainting  · Yellow skin or eyes, dark urine or pale stools  · Severe constipation, severe stomach pain, nausea, vomiting, loss of appetite  · Sweating or cold, clammy skin     Common side effects:  · Mild constipation, nausea, vomiting  · Sleepiness, tiredness  · Itching, rash  © 2017 [facility] [address] is for End User's use only and may not be sold, redistributed or otherwise used for commercial purposes.

## 2021-05-04 NOTE — PROGRESS NOTES
Pt had a scheduled BIV Pacemaker & AV Node ablation procedure with Dr Chet Mendez on 5/3/21. Pt is now discharging home in stable condition. Pt reported that he does not have a PCP, a referral to [facility] was made. He lives with his girlfriend in a one story home. Was on RA but stated that he is on 2L O2 at night-did not remember the name of the company that supplies it. Uses a cane to ambulate prn, no other DMEs identified. Insurance verified. Pts girlfriend is transporting him home. No other d/c needs identified. Care Management Interventions  PCP Verified by CM: No(PCP referral made to [facility])  Mode of Transport at Discharge:  Other (see comment)(Girlfriend)  Transition of Care Consult (CM Consult): Discharge Planning  Discharge Durable Medical Equipment: No  Physical Therapy Consult: No  Occupational Therapy Consult: No  Speech Therapy Consult: No  Current Support Network: Lives with Spouse, Family Lives Nearby, Own Home(Lives with his girlfriend)  Confirm Follow Up Transport: Family  The Plan for Transition of Care is Related to the Following Treatment Goals : Return to baseline  The Patient and/or Patient Representative was Provided with a Choice of Provider and Agrees with the Discharge Plan?: Yes  Name of the Patient Representative Who was Provided with a Choice of Provider and Agrees with the Discharge Plan: Patient  Freedom of Choice List was Provided with Basic Dialogue that Supports the Patient's Individualized Plan of Care/Goals, Treatment Preferences and Shares the Quality Data Associated with the Providers?: Yes   Resource Information Provided?: No  Discharge Location  Discharge Placement: Home

## 2021-05-04 NOTE — PROGRESS NOTES
Discharge instructions reviewed with patient and patients girlfriend. Prescriptions given for percocet, toprol and med info sheets provided for all new medications. Opportunity for questions provided. Patient voiced understanding of all discharge instructions. IV and tele box removed.

## 2021-05-04 NOTE — PROGRESS NOTES
[facility] CARDIOLOGY PROGRESS NOTE           5/4/2021 7:33 AM    Admit Date: 5/3/2021    Admit Diagnosis: Atrial fibrillation, unspecified type ([facility] [I48.91]; Pacemaker [Z95.0]; Paroxysmal atrial fibrillation ([facility] [I48.0]; Heart block AV complete ([facility]) [I44.2]      Subjective:   No complaints this AM, no chest pain or shortness of breath, appropriate post op device pocket pain    Interval History: (History of pertinent interval events obtained from nursing staff)  Cardiac device placed yesterday, no events overnight, no immediate complications    ROS:  GEN:  No fever or chills  Cardiovascular:  As noted above  Pulmonary:  As noted above  Neuro:  No new focal motor or sensory loss      Objective:     Vitals:    05/03/21 1725 05/03/21 2116 05/04/21 0033 05/04/21 0449   BP: 100/67 111/60 94/61 93/63   Pulse: 70 69 70 71   Resp: 17 18 18 18   Temp: 97.6 °F (36.4 °C) 98.4 °F (36.9 °C) 98 °F (36.7 °C) 97.7 °F (36.5 °C)   SpO2: 94% 98% 92% 90%   Weight:    182 lb 3.2 oz (82.6 kg)   Height:           Physical Exam:  General-Well Developed, Well Nourished, No Acute Distress, Alert & Oriented x 3, appropriate mood. CV- regular rate and paced rhythm no MRG, left infraclavicular pocket with dressing in place, no evidence of hematoma, redness, warmth or excessive drainage  Lung- clear bilaterally  Abd- soft, nontender, nondistended  Ext- no edema bilaterally.     Current Facility-Administered Medications   Medication Dose Route Frequency    ondansetron (ZOFRAN ODT) tablet 8 mg  8 mg Oral Q8H PRN    lidocaine-prilocaine (EMLA) 2.5-2.5 % cream   Topical PRN    gabapentin (NEURONTIN) capsule 300 mg  300 mg Oral TID    ipratropium (ATROVENT) 21 mcg (0.03 %) nasal spray 2 Spray  2 Spray Both Nostrils BID    metoprolol succinate (TOPROL-XL) XL tablet 50 mg  50 mg Oral DAILY    sodium chloride (NS) flush 5-40 mL  5-40 mL IntraVENous Q8H    sodium chloride (NS) flush 5-40 mL  5-40 mL IntraVENous PRN    acetaminophen (TYLENOL) tablet 650 mg  650 mg Oral Q4H PRN    HYDROcodone-acetaminophen (NORCO) 5-325 mg per tablet 1 Tab  1 Tab Oral Q4H PRN    diphenhydrAMINE (BENADRYL) capsule 25 mg  25 mg Oral QHS PRN     Data Review:   Recent Results (from the past 24 hour(s))   CBC WITH AUTOMATED DIFF    Collection Time: 05/03/21 12:06 PM   Result Value Ref Range    WBC 9.8 4.3 - 11.1 K/uL    RBC 4.50 4.23 - 5.6 M/uL    HGB 11.8 (L) 13.6 - 17.2 g/dL    HCT 37.9 (L) 41.1 - 50.3 %    MCV 84.2 79.6 - 97.8 FL    MCH 26.2 26.1 - 32.9 PG    MCHC 31.1 (L) 31.4 - 35.0 g/dL    RDW 16.6 (H) 11.9 - 14.6 %    PLATELET 355 326 - 710 K/uL    MPV 9.7 9.4 - 12.3 FL    ABSOLUTE NRBC 0.00 0.0 - 0.2 K/uL    DF AUTOMATED      NEUTROPHILS 78 43 - 78 %    LYMPHOCYTES 14 13 - 44 %    MONOCYTES 7 4.0 - 12.0 %    EOSINOPHILS 1 0.5 - 7.8 %    BASOPHILS 1 0.0 - 2.0 %    IMMATURE GRANULOCYTES 1 0.0 - 5.0 %    ABS. NEUTROPHILS 7.6 1.7 - 8.2 K/UL    ABS. LYMPHOCYTES 1.3 0.5 - 4.6 K/UL    ABS. MONOCYTES 0.7 0.1 - 1.3 K/UL    ABS. EOSINOPHILS 0.1 0.0 - 0.8 K/UL    ABS. BASOPHILS 0.1 0.0 - 0.2 K/UL    ABS. IMM. GRANS. 0.1 0.0 - 0.5 K/UL   MAGNESIUM    Collection Time: 05/03/21 12:06 PM   Result Value Ref Range    Magnesium 2.4 1.8 - 2.4 mg/dL   METABOLIC PANEL, COMPREHENSIVE    Collection Time: 05/03/21 12:06 PM   Result Value Ref Range    Sodium 138 136 - 145 mmol/L    Potassium 5.2 (H) 3.5 - 5.1 mmol/L    Chloride 105 98 - 107 mmol/L    CO2 26 21 - 32 mmol/L    Anion gap 7 7 - 16 mmol/L    Glucose 94 65 - 100 mg/dL    BUN 13 8 - 23 MG/DL    Creatinine 0.79 (L) 0.8 - 1.5 MG/DL    GFR est AA >60 >60 ml/min/1.73m2    GFR est non-AA >60 >60 ml/min/1.73m2    Calcium 9.6 8.3 - 10.4 MG/DL    Bilirubin, total 0.7 0.2 - 1.1 MG/DL    ALT (SGPT) 11 (L) 12 - 65 U/L    AST (SGOT) 37 15 - 37 U/L    Alk.  phosphatase 121 50 - 136 U/L    Protein, total 7.7 6.3 - 8.2 g/dL    Albumin 2.7 (L) 3.2 - 4.6 g/dL    Globulin 5.0 (H) 2.3 - 3.5 g/dL    A-G Ratio 0.5 (L) 1.2 - 3.5 PROTHROMBIN TIME + INR    Collection Time: 05/03/21 12:06 PM   Result Value Ref Range    Prothrombin time 15.2 (H) 12.5 - 14.7 sec    INR 1.2     EKG, 12 LEAD, INITIAL    Collection Time: 05/03/21 12:43 PM   Result Value Ref Range    Ventricular Rate 45 BPM    Atrial Rate 45 BPM    P-R Interval 150 ms    QRS Duration 82 ms    Q-T Interval 588 ms    QTC Calculation (Bezet) 508 ms    Calculated P Axis -27 degrees    Calculated R Axis -15 degrees    Calculated T Axis 41 degrees    Diagnosis       Sinus bradycardia  with short AL  Prolonged QT  Abnormal ECG  When compared with ECG of 25-FEB-2021 12:22,  Vent. rate has decreased  BPM    Confirmed by KATHERIN CLEVELAND ([id number]) on 5/3/2021 6:21:08 PM     EKG, 12 LEAD, INITIAL    Collection Time: 05/03/21  6:22 PM   Result Value Ref Range    Ventricular Rate 70 BPM    Atrial Rate 70 BPM    P-R Interval 134 ms    QRS Duration 164 ms    Q-T Interval 520 ms    QTC Calculation (Bezet) 561 ms    Calculated R Axis -116 degrees    Calculated T Axis 61 degrees    Diagnosis       AV dual-paced rhythm  Abnormal ECG  When compared with ECG of 03-MAY-2021 12:43,  Electronic ventricular pacemaker has replaced Sinus rhythm  Vent. rate has increased BY  25 BPM         EKG:  (EKG has been independently visualized by me with interpretation below)  Assessment:     Active Problems:    Pacemaker (5/3/2021)      A-fib ([address] (1/4/2021)      Heart block AV complete ([address] (5/3/2021)      Plan:   1. Cardiac device implantation: Pt device interrogation this AM reveals normal function, excellent pacing and sensing parameters, CXR without pneumothorax with excellent device position, no recognized complications, stable for discharge home today with appropriate follow up. Felipe Arreguin MD  Cardiology/Electrophysiology

## 2021-05-04 NOTE — ROUTINE PROCESS
Verbal bedside report given to Armen Newsome, oncoming RN. Patient's situation, background, assessment and recommendations provided. Opportunity for questions provided. Oncoming RN assumed care of patient. Left subclavian PPM site visualized.

## 2021-05-16 PROBLEM — J44.1 COPD EXACERBATION (HCC): Status: ACTIVE | Noted: 2021-01-01

## 2021-05-16 NOTE — ED TRIAGE NOTES
Pt to triage via w/c. Pt states he has cough for months and has runny nose, more so when he eats. Pt states he has these coughing spells lasting about an hour. Pt states he had pacer placed few days ago. Sling in place to control left arm. Pt states he is SOB as well. Denies swelling to BLE or abd. Pt states he had right lobectomy in January r/t lung CA. No current tx for lung CA- returns in June for CT scan. Pt states decreased appetite. Pt states some RUQ pain as well- hx of gallstones.  Denies n/v/d.

## 2021-05-16 NOTE — ED PROVIDER NOTES
Patient is a 60-year-old male with past medical history of A. fib status post pacemaker placement 2 weeks ago, GERD, COPD, and malignant neoplasm of the right lower lobe status post resection, chemo and radiation who presents with complaint of cough, nasal drainage, and shortness of breath. Patient notes that for the past few months he gets episodic coughing fit that lasts approximately 1 to 2 hours followed by significant amount of nasal drainage. Drainage is usually clear and thin in nature. Today he had a coughing fit followed by nasal drainage and postnasal drip that has since resolved. However he went to lay down and while doing so he felt immediate rush of yellow purulent fluid in the back of his throat that nearly choked him. Patient states that since then he feels more short of breath than baseline. He denies any fever or chills. He has received both of his Covid vaccinations. He has a follow-up appointment tomorrow with cardiology to reevaluate pacemaker. No chest pain. He was given nasal spray to help with the drainage but states that it does not help. The history is provided by the patient. Cough Associated symptoms include rhinorrhea and shortness of breath. Pertinent negatives include no chest pain, no chills, no sore throat, no nausea, no vomiting and no confusion. Past Medical History:  
Diagnosis Date  Arrhythmia Afib  Chronic obstructive pulmonary disease ([address] O2 at [facility]  Chronic pain   
 right torn rotator cuff; left knee  GERD (gastroesophageal reflux disease)   
 controlled with med  Hypertension hx-- off meds since 4/2020--- followed by dr. Tiara Fragoso  Hypoxia 02/24/2020  Malignant neoplasm of lower lobe of right lung ([facility] [address] 02/26/2020 REC'D CHEMO AND RADIATION--- FOLLOWED BY DR. Husam Villaseñor Osteoarthritis  Right lower lobe lung mass 02/24/2020  Status post total right knee replacement 2/29/2016 Past Surgical History:  
Procedure Laterality Date  HX COLONOSCOPY    
 HX KNEE ARTHROSCOPY Left   
 scope X 1, ACL recon X 1  
 HX KNEE ARTHROSCOPY Right 1974, 1975 X 2   
 HX KNEE REPLACEMENT Right 2016 [address]  HX VASCULAR ACCESS Right placed 3/2020  
 port in chest   
 IR INSERT TUNL CVC W PORT OVER 5 YEARS  3/18/2020  [place] CHEST SURGERY PROCEDURE UNLISTED    
 R lobectomy 2020; R complete pneumonectomy 2021  [place] SINUS SURGERY PROC UNLISTED  ,   
 sinus surg Family History:  
Problem Relation Age of Onset  Cancer Mother   
     uterine  Arrhythmia Sister   
     afib Social History Socioeconomic History  Marital status:  Spouse name: Not on file  Number of children: Not on file  Years of education: Not on file  Highest education level: Not on file Occupational History  Not on file Social Needs  Financial resource strain: Not on file  Food insecurity Worry: Not on file Inability: Not on file  Transportation needs Medical: Not on file Non-medical: Not on file Tobacco Use  Smoking status: Former Smoker Packs/day: 1.00 Years: 20.00 Pack years: 20.00 Quit date:  Years since quittin.3  Smokeless tobacco: Never Used  Tobacco comment: patient has no desire to resume Substance and Sexual Activity  Alcohol use: Yes Alcohol/week: 4.0 standard drinks Types: 4 Glasses of wine per week  Drug use: No  
 Sexual activity: Not on file Lifestyle  Physical activity Days per week: Not on file Minutes per session: Not on file  Stress: Not on file Relationships  Social connections Talks on phone: Not on file Gets together: Not on file Attends Zoroastrian service: Not on file Active member of club or organization: Not on file Attends meetings of clubs or organizations: Not on file Relationship status: Not on file  Intimate partner violence Fear of current or ex partner: Not on file Emotionally abused: Not on file Physically abused: Not on file Forced sexual activity: Not on file Other Topics Concern   Service No  
 Blood Transfusions No  
 Caffeine Concern No  
 Occupational Exposure No  
 Hobby Hazards No  
 Sleep Concern No  
 Stress Concern No  
 Weight Concern No  
 Special Diet No  
 Back Care Not Asked  Exercise Yes  Bike Helmet Not Asked [place] Yes  Self-Exams Not Asked Social History Narrative . Has long-time girlfriend. Continues to work doing IT support. ALLERGIES: Albuterol and Celebrex [celecoxib] Review of Systems Constitutional: Negative for chills and fever. HENT: Positive for postnasal drip and rhinorrhea. Negative for congestion, sore throat and trouble swallowing. Respiratory: Positive for cough and shortness of breath. Cardiovascular: Negative for chest pain. Gastrointestinal: Negative for abdominal pain, nausea and vomiting. Musculoskeletal: Negative for back pain. Neurological: Negative for dizziness, weakness and numbness. Psychiatric/Behavioral: Negative for agitation and confusion. Vitals:  
 05/16/21 1608 BP: 115/73 Pulse: 70 Resp: 22 Temp: 98.7 °F (37.1 °C) SpO2: 94% Weight: 79.4 kg (175 lb) Height: 5' 10\" (1.778 m) Physical Exam 
Vitals signs and nursing note reviewed. Constitutional:   
   General: He is not in acute distress. Appearance: He is not ill-appearing or toxic-appearing. HENT:  
   Head: Normocephalic and atraumatic. Nose: No rhinorrhea. Mouth/Throat:  
   Mouth: Mucous membranes are moist.  
Cardiovascular:  
   Rate and Rhythm: Normal rate and regular rhythm. Pulses: Normal pulses. Pulmonary:  
   Effort: Tachypnea present. Comments: Pt with conversational dyspnea, speaking in 3-4 word sentences 
diminshed lung sounds of the right Coarse breath sounds on the left 
Skin: 
   General: Skin is warm and dry. Neurological:  
   Mental Status: He is alert and oriented to person, place, and time. Psychiatric:     
   Mood and Affect: Mood normal.     
   Behavior: Behavior normal.     
   Thought Content: Thought content normal.     
   Judgment: Judgment normal.  
 
  
 
MDM Number of Diagnoses or Management Options Acute bronchospasm COPD exacerbation ([facility] [address] Hypoxemia Diagnosis management comments: Pt presenting with increased shortness of breath following coughing fit. Patient afebrile, oxygen sats 94% room air. Will obtain CXR, and labs including CBC, CMP and Mag. 
 
5:14 PM 
Upon re-evaluation, pt having coughing fit with nasal drainage, cough sounds wet in nature, pt's oxygen sats drop down to 86% on room air during coughing fit. Wheezing on auscultation. Will give duoneb and solumedrol and re-evaluate. 6:30 PM 
Following duoneb and IV solumedrol, oxygen saturation still 86-87% on 4 L via nasal canula. Will admit for COPD exacerbation with hypoxemia and persistent bronchospasm. Contacted Dr. Tessa Gipson, who accepts pt for admission. Procedures

## 2021-05-16 NOTE — ACP (ADVANCE CARE PLANNING)
Advance care planninginitial encounter Face to face time - 16 minutes face-to-face time spent discussion the care Pt's decision making capacity [x] Yes 
[ ] NO Names of POA/surrogate decision maker when patient is altered 
:Mara Curiel Other members present in the meeting :   
 
Patient / surrogate decision-maker ultimately chose. .. [x] Full code- full aggressive medical and surgical interventions, including intubations, resuscitations, pressors, artificial tube feeding [ ] DO NOT RESUSCITATE -okay to intubate,  and other aggressive medical and surgical intervention [ ] Not resuscitate, DO NOT INTUBATE, but okay for other aggressive medical and surgical intervention [ ] DNR/DNI, hospice, comfort focus care to maximize patient's quality of life [ ] DNR/DNI, strict comfort care ONLY Further discussion regarding principle disease process. prognosis, plans of care, and treatment goals 
: Full code confirmed, POA confirmed, goals of care discussion held all questions answered

## 2021-05-16 NOTE — H&P
[facility] Service History and Physical 
 
Patient ID: Romaine Moreland. 
male 1950 [id number] Admission Date: 5/16/2021 Chief Complaint: Shortness of breath and wheezing Reason for Admission: COPD exacerbation ASSESSMENT & PLAN: 
 
COPD exacerbationspossible radiation pneumonitis, Start empiric antibiotics, Solu-Medrol, DuoNeb treatments, Singulair, follow-up procalcitonin, de-escalate antibiotics if indicated Chronic rhinosinusitis , very symptomatic , with coughing fit/difficult breathing,  f/u  CT sinus, antibiotics as above, adjunct Flonase History of lung cancer status post lobectomy, chemoradiationas case complexity, no acute based on chest x-ray, will repeat  Ct chest as above History of refractory atrial fibrillation status post AV reg ablation and pacemaker placementdue for outpatient follow-up, clinically stable, monitor while inpatient History of GERD, hypertensions, 
- -resume home medications if indicated ,and monitor clinically while inpatient, currently stable co morbidities add to patient's case complexity Disposition: gen med Diet: regular VTE ppx: lovenox GI ppx: ppi 
CODE STATUS: full Surrogate decision-maker: Amanda Antunez 
  
 
HISTORY OF PRESENT ILLNESS: 
Patient is a 78 y.o. male with medical h/o peroxisomal atrial fibrillation's, hypertensions, lung cancer status post lobectomy and full resolution of postobstructive pneumonia, status post kidney acute chemo and radiation, last admission earlier this month for AV reg ablation and pacemaker placement for refractory uncontrolled atrial fibrillation's, discharged on May 4,  
 
who presented to [facility] ED with shortness of breath at rest, cough, nasal drainage, with coughing fit 1 to 2 hours. Patient did have both Covid vaccine vaccination Patient denies any cardiac symptoms, Allergies Allergen Reactions  Albuterol Palpitations  Celebrex [Celecoxib] Itching Prior to Admission Medications Prescriptions Last Dose Informant Patient Reported? Taking? Nebulizer & Compressor machine   No No  
Sig: COPD OXYGEN-AIR DELIVERY SYSTEMS   Yes No  
Sig: by Does Not Apply route. 2lpm qhs  
apixaban (Eliquis) 5 mg tablet   No No  
Sig: Take 1 Tab by mouth two (2) times a day. esomeprazole (NexIUM) 20 mg capsule   Yes No  
Sig: Take 20 mg by mouth nightly. prn  
gabapentin (NEURONTIN) 300 mg capsule   No No  
Sig: TAKE 1 CAPSULE BY MOUTH THREE TIMES A DAY  
ipratropium (ATROVENT) 0.03 % nasal spray   No No  
Si Sprays by Both Nostrils route two (2) times a day. lidocaine-prilocaine (EMLA) topical cream   No No  
Sig: Apply  to affected area as needed for Pain.  
metoprolol succinate (TOPROL-XL) 50 mg XL tablet   No No  
Sig: Take 1 Tab by mouth daily. ondansetron hcl (ZOFRAN) 8 mg tablet   No No  
Sig: Take 1 Tab by mouth every eight (8) hours as needed for Nausea. Indications: nausea and vomiting caused by cancer drugs  
polyethylene glycol (MIRALAX) 17 gram packet   No No  
Sig: Take 1 Packet by mouth daily. Indications: constipation Patient taking differently: Take 17 g by mouth daily as needed. Indications: constipation  
umeclidinium (Incruse Ellipta) 62.5 mcg/actuation inhaler   No No  
Sig: Take 1 Puff by inhalation daily. Facility-Administered Medications: None Past Medical History:  
Diagnosis Date  Arrhythmia Afib  [facility] pulmonary [facility] [address] [facility]  Chronic pain   
 right torn rotator cuff; left knee  GERD (gastroesophageal reflux disease)   
 controlled with med  Hypertension hx-- off meds since 2020--- followed by dr. Ann Salazar  Hypoxia 2020  Malignant neoplasm of lower lobe of right lung ([facility] [address] 2020 REC'D CHEMO AND RADIATION--- FOLLOWED BY DR. Onofre Alvarez Osteoarthritis  Right lower lobe lung mass 2020  Status post total right knee replacement 2016 Past Surgical History:  
Procedure Laterality Date  HX COLONOSCOPY    
 HX KNEE ARTHROSCOPY Left   
 scope X 1, ACL recon X 1  
 HX KNEE ARTHROSCOPY Right , 1975 X 2   
 HX KNEE REPLACEMENT Right 2016 [address]  HX VASCULAR ACCESS Right placed 3/2020  
 port in chest   
 IR INSERT TUNL CVC W PORT OVER 5 YEARS  3/18/2020  MI CHEST SURGERY PROCEDURE UNLISTED    
 R lobectomy 2020; R complete pneumonectomy 2021  MI SINUS SURGERY PROC UNLISTED  ,   
 sinus surg Social History Tobacco Use  Smoking status: Former Smoker Packs/day: 1.00 Years: 20.00 Pack years: 20.00 Quit date:  Years since quittin.3  Smokeless tobacco: Never Used  Tobacco comment: patient has no desire to resume Substance Use Topics  Alcohol use: Yes Alcohol/week: 4.0 standard drinks Types: 4 Glasses of wine per week FAMILY HISTORY:    
Family History Problem Relation Age of Onset  Cancer Mother   
     uterine  Arrhythmia Sister   
     afib REVIEW OF SYSTEMS: 
A 14 point review of systems was taken and pertinent positive as per HPI. PHYSICAL EXAM: 
 
Visit Vitals /71 Pulse 71 Temp 98.7 °F (37.1 °C) Resp (!) 36 Ht 5' 10\" (1.778 m) Wt 79.4 kg (175 lb) SpO2 94% BMI 25.11 kg/m² General: No acute distress, speaking in full sentences, no use of accessory muscles HEENT: Pupils equal and reactive to light and accommodation, oropharynx is clear Neck: Supple, no lymphadenopathy, no JVD Lungs: Diminished breath sounds in the right lower lung fields, coarse breath sounds on the left, faint wheezing 
cardiovascular: Regular rate and rhythm with normal S1 and S2 Abdomen: Soft, nontender, nondistended, normoactive bowel sounds Extremities: No cyanosis clubbing or edema Neuro: Nonfocal, A&O x3  
Psych: Normal mood and affect Intake/Output last 3 shifts: 
 
 
 
Labs: 
CMP:  
Lab Results Component Value Date/Time  05/16/2021 04:13 PM  
 K 4.1 05/16/2021 04:13 PM  
  05/16/2021 04:13 PM  
 CO2 30 05/16/2021 04:13 PM  
 AGAP 6 (L) 05/16/2021 04:13 PM  
  (H) 05/16/2021 04:13 PM  
 BUN 9 05/16/2021 04:13 PM  
 CREA 0.60 (L) 05/16/2021 04:13 PM  
 GFRAA >60 05/16/2021 04:13 PM  
 GFRNA >60 05/16/2021 04:13 PM  
 CA 9.2 05/16/2021 04:13 PM  
 MG 2.2 05/16/2021 04:13 PM  
 ALB 2.7 (L) 05/16/2021 04:13 PM  
 TBILI 0.5 05/16/2021 04:13 PM  
 TP 7.6 05/16/2021 04:13 PM  
 GLOB 4.9 (H) 05/16/2021 04:13 PM  
 AGRAT 0.6 (L) 05/16/2021 04:13 PM  
 ALT 12 05/16/2021 04:13 PM  
 
 
 
CBC:   
Lab Results Component Value Date/Time WBC 11.7 (H) 05/16/2021 04:13 PM  
 HGB 11.3 (L) 05/16/2021 04:13 PM  
 HCT 36.3 (L) 05/16/2021 04:13 PM  
  05/16/2021 04:13 PM  
 
 
Lab Results Component Value Date/Time INR 1.2 05/03/2021 12:06 PM  
 INR 1.1 01/05/2021 04:02 PM  
 INR 1.0 07/16/2020 03:40 PM  
 Prothrombin time 15.2 (H) 05/03/2021 12:06 PM  
 Prothrombin time 14.8 (H) 01/05/2021 04:02 PM  
 Prothrombin time 13.1 07/16/2020 03:40 PM  
 
 
ABG:  No results found for: PH, PHI, PCO2, PCO2I, PO2, PO2I, HCO3, HCO3I, FIO2, FIO2I Lab Results Component Value Date/Time Troponin-I, Qt. <0.02 (L) 05/05/2020 01:09 AM  
 
 
 
Imaging & Other Studies: XR Results (maximum last 3): Results from [facility] Encounter encounter on 05/16/21 XR CHEST PORT Narrative Portable chest x-ray CLINICAL INDICATION: Cough, syncope, prior right pneumonectomy FINDINGS: Single AP view the chest compared to a similar exam dated 5/3/2021 
again shows a stable amount of air and fluid in the right chest cavity unchanged 
from the prior exam in a patient with a known right pneumonectomy. A right-sided 
chest port is in place. There is a left-sided pacer device in place. The left 
lung is well-expanded and clear. The cardiac silhouette and mediastinum are 
stable. Impression No significant interval change. [place] [facility] [place] XR CHEST PORT Narrative EXAMINATION: XR CHEST PORT 5/3/2021 6:53 PM 
 
ACCESSION NUMBER: [id number] COMPARISON: 04/05/2021 INDICATION: Status post pacer placement TECHNIQUE: A single view of the chest was obtained. FINDINGS:  
 
Support Devices: There is interval placement of a left chest wall pacer. The 
wires appear in appropriate location. There is a right IJ port with the tip in 
the superior vena cava. Lungs: There is previous right pneumonectomy. Fluid and gas is seen in the right 
hemithorax. There is no left pneumothorax. There is no effusion. There is no 
infiltrate. Cardiac Silhouette: Within normal limits in size. Mediastinum: The aorta is normal. 
 
Upper Abdomen: Normal 
 
Miscellaneous: There are no lytic and blastic lesions. Impression 1. Status post pacer placement without evidence of complication. Otherwise 
stable exam. 
 
 
  
[place] [facility] [place] XR CHEST PA LAT Narrative EXAM: CHEST X-RAY, 1 VIEW INDICATION: Status post pneumonectomy COMPARISON: Chest x-ray 2/25/2021, 1/22/2021 TECHNIQUE: Single AP view of the chest was obtained. FINDINGS: Port catheter terminates in the SVC. Increasing fluid in the right 
hemithorax status post pneumonectomy. The left lung is clear. No pneumothorax. The cardiomediastinal silhouette is within normal limits. Medical spine Impression Increasing fluid in the right thorax status post pneumonectomy. No significant 
finding in the left lung. CT Results (maximum last 3): [place] [facility] [place] 01/[place]21 CT CHEST WO CONT Narrative EXAMINATION: CT CHEST WO CONT 1/12/2021 1:07 PM 
 
INDICATION: Shortness of breath. Evaluate for change in right upper lobe 
atelectasis COMPARISON: Chest CT December 22, 2020 and chest radiograph January 12, 2020 TECHNIQUE: Multiple contiguous axial CT images of the chest were obtained from 
the lung apices to the lung bases without intravenous contrast. Coronal 
reformats are provided. Radiation dose reduction techniques were used for this study. Our CT scanners 
use one or all of the following: Automated exposure control, adjustment of the 
mA and/or kV according to patient size, iterative reconstruction. FINDINGS: 
Lower Neck: Right chest port terminates at the cavoatrial junction. Chest Soft tissues: No significant abnormality. Heart/Mediastinum: Mediastinum remains shifted to the right. No significant 
pericardial abnormality. Normal caliber thoracic aorta. Lungs/pleura: There are 2 right chest tubes in place. Since CT chest on December 22, 2020 and there is significant decrease in right pleural fluid. There may be 
a few areas of aerated lung in the anterior portion of the residual right lung, 
however a majority remains opacified. Layering secretions are seen in the right 
main bronchus which is partially occluded. There is a persistent right 
hydropneumothorax. There is new moderate size area of groundglass opacity in the left upper lobe 
and small area of tree-in-bud opacity in the left lower lobe. Visualized upper abdomen: Cholelithiasis. Osseous structures: No suspicious osseous lesion. Impression IMPRESSION: 
1. Right chest tubes in place with residual moderate right hydropneumothorax. There may be slightly increased aeration of the inferior portion of the residual 
right lung, however majority remains opacified. It is somewhat difficult to 
distinguish lung parenchyma from pleural fluid due to lack of IV contrast. 
2. New moderate area of groundglass opacity in the left upper lobe and 
tree-in-bud opacity in the left lower lobe which may be infectious or 
inflammatory. Results from [facility] [place] Encounter encounter on 12/22/20 CT CHEST W CONT  Narrative EXAMINATION: CT CHEST W CONT 12/22/2020 10:57 AM 
 
INDICATION: Malignant neoplasm of the right lower lobe. Restaging COMPARISON: CT chest July 1, 2020 and chest radiograph July 30, 2020 TECHNIQUE: Multiple contiguous axial CT images of the chest were obtained from 
the lung apices to the lung bases after the intravenous administration of 100 mL Isovue-370 contrast material . Radiation dose reduction techniques were used for this study:  Our CT scanners 
use one or all of the following: Automated exposure control, adjustment of the 
mA and/or kVp according to patient's size, iterative reconstruction. FINDINGS: 
Lower Neck: No abnormality Chest soft tissues: Right chest port terminates at the cavoatrial junction. Heart/Mediastinum: There is slight mediastinal shift to the right. No definite 
hilar or mediastinal adenopathy. There may be circumferential wall thickening of 
the distal esophagus. Normal caliber thoracic aorta. Patent proximal pulmonary 
arteries. Coronary atherosclerosis. Lungs: There has been complete atelectasis of the residual right lung. No 
abnormality of the left lung. Pleura: There has been development of a large right pleural effusion which fills 
the entirety of the right hemithorax there is some smooth peripheral enhancement 
of the pleural effusion. This is new from chest radiograph July 30, 2020 and 
chest CT July 1, 2020. Visualized upper abdomen: Cholelithiasis without cholecystitis. Osseous structures: No suspicious osseous lesion. Right posterior thoracotomy 
changes. Impression IMPRESSION: 
1. New from July 30,2020, there is complete collapse of the residual right lung 
with development of a large pleural effusion involving the entire hemithorax. There is some smooth thin peripheral enhancement of the effusion which may 
reflect mild complexity. 2. No definite mediastinal adenopathy 3. Possible circumferential thickening of the distal esophagus. Correlation 
could be made for esophagitis. Karan De [address] 80 Results from Hospital Encounter encounter on 06/30/20 CT CHEST W CONT Narrative CT Chest with contrast 
 
INDICATION: Chest pain, dyspnea. PE protocol. COMPARISON: June 4, 2020 TECHNIQUE: Contiguous axial images were obtained from the neck base through the 
upper abdomen with intravenous contrast, 100 mL Isovue 370. Radiation dose 
reduction techniques were used for this study:  Our CT scanners use one or all 
of the following: Automated exposure control, adjustment of the mA and/or kVp 
according to patient's size, iterative reconstruction. FINDINGS: 
 
There is no evidence of pulmonary embolism. There is a 3.5 cm ill-defined right infrahilar mass (series 2, image 64). There 
is associated thickening of the adjacent right middle and lower lobe bronchi. Findings similar to June 4, 2020 study. There is no endobronchial lesion. There are patchy areas of groundglass 
opacities in both lungs. There is no pneumothorax, pulmonary edema or pleural 
effusion. No evidence of pulmonary edema. The heart is not enlarged. There is no pericardial effusion. There is coronary 
artery calcification. The thoracic aorta is normal in course and caliber. Included upper abdomen is unremarkable. There are degenerative changes of the 
spine Impression IMPRESSION: 
 
1. No evidence of pulmonary embolism. 2. Bilateral mild patchy groundglass opacities, nonspecific but may be secondary 
to hypersensitivity pneumonitis. Atypical infection is considered less likely. No new lobar consolidation. 3. Know right infrahilar mass, similar to June 4, 2020 study. There is a history 
of right lower lobe lung neoplasm. MRI Results (maximum last 3): Results from [facility] [place] Encounter encounter on 03/17/20 MRI BRAIN W WO CONT Narrative EXAMINATION: BRAIN MRI 3/17/2020 8:27 AM 
 
ACCESSION NUMBER: [id number] INDICATION: evaluate for metastatic disease; lung mass COMPARISON: None available TECHNIQUE: Multiplanar multisequence MRI of the brain without and with 
intravenous administration of 20 cc Dotarem contrast agent. FINDINGS: 
 
There is a 2.0 x 1.4 cm round well-circumscribed T1 hypointense right parotid 
mass (sagittal T1-weighted image 3). The mass exhibits restricted diffusion 
(axial diffusion image 1). The ventricles are within normal limits for the degree of global brain 
parenchymal volume loss. There is no midline shift. The basilar cisterns are 
patent. There is no cerebellar tonsillar ectopia or herniation. There are T2 hyperintensities in the periventricular and subcortical white 
matter and azucena, a nonspecific finding which likely represents chronic 
microangiopathy. Prior functional endoscopic sinus surgery. Mucosal retention cysts within both 
maxillary sinuses. Scattered mucosal thickening in the left frontal sinus and 
throughout the remaining left ethmoid air cells and left sphenoid sinus. Diffusion imaging shows no evidence of acute ischemic infarction. Trace bilateral mastoid effusions. The expected large intracranial vascular flow voids are preserved. There is no 
evidence of intracranial blood products. There is no abnormal intra or extra-axial postcontrast enhancement. A subtle focus of hypoenhancement in the posterior pituitary gland on sagittal 
postcontrast image 73 is most likely to be vascular when compared with the other 
reconstruction planes and thin cut reconstructions of the postcontrast images 
created in the AW suite. There are no suspicious osseous lesions. Impression IMPRESSION: 
 
1. No evidence of intracranial metastatic disease. 2. 2.0 x 1.4 cm round well-circumscribed T1 hypointense mass with restricted 
diffusion in the posterior/inferior right parotid gland. This is indeterminate. While a benign etiology is most likely, metastatic disease is also possible.  
Head and neck surgery evaluation and consideration towards tissue sampling are 
recommended. VOICE DICTATED BY: Dr. Denver Garrett Nuclear Medicine Results (maximum last 3): No results found for this or any previous visit. US Results (maximum last 3): DEXA Results (maximum last 3): No results found for this or any previous visit. ROLANDA Results (maximum last 3): No results found for this or any previous visit. IR Results (maximum last 3): [place] [facility] [place] on [place]/18/20 IR INSERT TUNL CVC W PORT OVER 5 YEARS Narrative Title: Chest port placement through the right internal jugular vein using 
ultrasound and fluoroscopic guidance with maximal sterile barrier appropriately 
documented and fluoroscopy time and images documented in report. History: 53-year-old male with lung cancer. :  Duane Alexandria, PA-C Supervising Physician: Jeff Pitt M.D. Consent: Informed written and oral consent was obtained from the patient after 
explanation of benefits and risks (including, but not limited to: infection, 
vascular injury, lung injury, and thrombus formation) of procedure. The 
patient's questions were answered to their satisfaction. They stated 
understanding and requested that we proceed. Procedure: This port was inserted with all elements of maximal sterile barrier 
technique, cap and mask and sterile gown and sterile gloves and sterile full 
body drape and hygiene and 2% chlorhexidine for cutaneous antisepsis and sterile 
ultrasound gel and sterile ultrasound probe cover. Following routine prep and drape of the right neck and chest, a local field 
block with lidocaine was achieved. Ultrasound evaluation of all potential 
access sites was performed due to lack of a palpable vein. The vein was not 
palpable due to overlying adipose tissue. Using real-time ultrasound guidance, 
with appropriate image recording, the patent right internal jugular vein was 
accessed.  Using fluoroscopy, a peel-away sheath was placed over a wire. A 1-inch incision was made on the right chest wall and a subcutaneous pocket 
created. The catheter was tunneled subcutaneously and passed down the peel-away 
sheath positioning the tip in the right atrium, confirmed fluoroscopically. The 
catheter was cut to the appropriate length and connected to the [place] which was 
then placed in the pocket. The Port-A-Cath was accessed, aspirated easily, and 
was filled with heparinized saline. All incisions were closed with absorbable suture. Sterile surgical glue was 
placed on the skin. Complications: None. Radiation dose:  
Fluoroscopy time: 6 seconds. Reference air kerma (mGy): 5 Kerma area product (cGy.cm2):  130 Fluoroscopic images: 2 Contrast:  0 milliliters. Medications:  MAC. Ancef was given for prophylactic antibiotic therapy. Impression Impression: Uncomplicated right internal jugular vein tunneled power injectable 
chest port placement. Plan: The patient will recover for 1 hour. The chest port is ready for use. VAS/US Results (maximum last 3): No results found for this or any previous visit. PET Results (maximum last 3): Results from [facility] [place] Encounter encounter on 03/19/20 PET/CT TUMOR IMAGE SKULL THIGH W (INI) Narrative PET/CT Indication: lung cancer restaging Radiopharmaceutical: 14.3 mCi [id number], intravenously. Technique: Imaging was performed from the skull through the proximal thighs 
using routine PET/CT acquisition protocol. Imaging was performed approximately 60 minutes post injection. Oral contrast was administered. Radiation dose 
reduction techniques were used for this study:  Our CT scanners use one or all 
of the following: Automated exposure control, adjustment of the mA and/or kVp 
according to patient's size, iterative reconstruction. Serum glucose: 101 mg/dL prior to injection.  
 
Comparison studies: Chest CT 2/23/2020, PET/CT abdomen and pelvis 2/24/2020, 
brain MRI 3/17/2020 Findings: 
 
Head and Neck: No anatomic or metabolic cervical adenopathy. 1.7 x 1.6 cm 
markedly hypermetabolic soft tissue nodule in the posterior tail of the right 
parotid gland with maximum SUV of 14.5 corresponding with the diffusion 
restricting lesion identified on recent brain MRI. Chest: Hypermetabolic mass measuring 6.9 x 6.3 cm centered in the left lower 
lobe, which appears to extend through the fissure to the right lung hilum, 
maximum SUV 17.4. No clear extension into the mediastinum. Known hypermetabolic 
subcarinal lymph node measuring 9 mm short axis. No contralateral disease in the 
mediastinum or left lung. Port catheter terminates at the superior cavoatrial junction. Coronary 
atherosclerotic calcifications with possible coexisting stent in the LAD. Abdomen/Pelvis: Mildly prominent but nonhypermetabolic left para-aortic lymph 
node measuring 0.9 cm in short axis. No suspicious uptake within the abdominal 
viscera. The liver is diffusely hypoattenuating. Stone within the gallbladder fundus. Mild nodular thickening of bilateral adrenal glands likely reflecting nodular 
hyperplasia. No definite focal adrenal lesion. Colonic diverticulosis without 
associated inflammation. Nonspecific mild bladder wall thickening. Bones and soft tissues: No aggressive osseous lesion. Nonhypermetabolic probable 
sebaceous cyst of the dorsal subcutaneous tissues overlying the right scapula. Impression IMPRESSION: 
1. Hypermetabolic right lower lobe lung mass extending to the lung hilum 
without clearly involving the mediastinum. 2.  No mediastinal adenopathy or contralateral disease. 3.  Indeterminate 1.7 cm markedly hypermetabolic soft tissue nodule in the right 
parotid gland.  While parotid lesions are commonly benign, in the setting of a 
primary lung malignancy metastasis is a primary consideration and tissue 
sampling or resection is recommended. 4.  No evidence of metastatic disease in the abdomen or pelvis. 5.  Hepatic steatosis. EKG Results Procedure 720 Value Units Date/Time EKG [[id number]] Collected: 05/16/21 1619 Order Status: Completed Updated: 05/16/21 1651 Ventricular Rate 69 BPM   
  Atrial Rate 122 BPM   
  QRS Duration 162 ms Q-T Interval 442 ms QTC Calculation (Bezet) 473 ms Calculated P Axis 89 degrees Calculated R Axis -101 degrees Calculated T Axis 61 degrees Diagnosis --  
  !!! Suspect arm lead reversal, interpretation assumes no reversal 
!! AGE AND GENDER SPECIFIC ECG ANALYSIS !! Sinus tachycardia with A-V dissociation and Wide QRS rhythm Non-specific intra-ventricular conduction block Right ventricular hypertrophy Inferior infarct , age undetermined Anterolateral infarct , age undetermined Abnormal ECG No previous ECGs available Active Problems: 
Patient Active Problem List  
 Diagnosis Date Noted  Pacemaker 05/03/2021 Priority: 1 - One  
 COPD exacerbation ([address] 05/16/2021  
 Heart block AV complete ([address] 05/03/2021  Sick sinus syndrome ([address] 03/18/2021  PAF (paroxysmal atrial fibrillation) ([address] 02/25/2021  Hypertension 02/25/2021  Atrial fibrillation with RVR ([address] 02/25/2021  S/P thoracotomy 01/07/2021  Atelectasis of right lung 01/04/2021  A-fib ([address] 01/04/2021  Chronic respiratory failure with hypoxia ([address] 01/04/2021  Pleural effusion on right 12/28/2020  Chemotherapy induced neutropenia ([address] 10/23/2020  Dehydration 09/15/2020  S/P lobectomy of lung 07/29/2020  Cough 07/29/2020  Atrial tachycardia ([address] 07/26/2020  Cancer of bronchus of right lower lobe ([address] 07/23/2020  Lung cancer ([address] 07/23/2020  Cancer of right lung ([address] 07/23/2020  Pulmonary emphysema ([address] 07/07/2020  Hypotension 05/05/2020  Sepsis due to undetermined organism ([address] 05/05/2020  GERD (gastroesophageal reflux disease)  Malignant neoplasm of lower lobe of right lung ([address] 02/26/2020  Mass of lower lobe of right lung 02/23/2020  Right lower lobe lung mass 02/23/2020  Essential hypertension with goal blood pressure less than 130/80 02/19/2018 Jessica Matamoros  [facility] [place] [facility] 5/16/2021 7:10 PM

## 2021-05-17 NOTE — PROGRESS NOTES
[facility] Hospitalist Progress Note Name:  Romaine Moreland. Age:70 y.o. Sex:male :  1950 MRN:  [id number] Admit Date:  2021 Reason for Admission: COPD exacerbation ([address] [J44.1] Hospital Course/Interval history:  
 
Patient is a 79 y/o male with PMH of PAF, HTN, COPD (2L HS), chronic rhinosinusitis, lung ca s/p R lobectomy (2020), acute chemo and radiation. He further underwent R thoracotomy with R completion pneumonectomy in 2021 where final pathology indicated radiation-induced fibrosis. Patient had recent AV reg ablation and PM placement for refractory uncontrolled atrial fibrillation with discharge on 21. He presented to ED with CC SOB at rest, coughing fit 1-2 hours, and nasal drainage. He denies chest pain, dizziness, and any further cardiac symptoms. He has received 2 doses of the Covid vaccine. He is currently a pulmonary rehab patient. Subjective (21): \"I'm ready for my steroid shot. \" Patient conversationally dyspneic on 1L NC but pleasant and alert. New IV was just placed and he is waiting for his dose that was missed 2 hours prior. He has no complaints. Review of Systems: 14 point review of systems is otherwise negative with the exception of the elements mentioned above. Assessment & Plan Acute on Chronic Respiratory Failure with Hypoxia 
-Vital signs stable on presentation, no SIRS, 94% on RA 
-O2 sat's dropped into high 80's during coughing fit in ED 
-Home O2 is intermittent use only HS, was on NC in ED after coughing fit, weaning down as tolerated, currently on 1L NC 
-CT Chest: patchy, groundglass infiltrates in LLL, near complete resolution of previously seen groundglass infiltrate in HOLA, large fluid collection in R hemithorax, small pericardial effusion 
-Questionable radiation pneumonitis, given pulmonary complexity and history of radiation pneumonitis, if no clinical improvement, will consult pulm 
-Empiric antibiotics -Sputum culture 
-Procal 0.05, repeat 0.05 
-WBC 11.7 -->9.5 Acute on Chronic COPD Exacerbation 
-IV Solumedrol, Xopenex, Singulair, Spiriva 
-Aggressive pulmonary toilet 
-on 1L NC, wean O2 as tolerated Atrial Fibrillation s/p AV Mario Ablation and PM implant 
-Stable, continue home Eliquis, Toprol Chronic Rhinosinusitis 
-CT Maxillofacial: multiple polyps/mucous retention cysts but no evidence of acute sinusitis 
-Symptomatic management-Afrin, Flonase, Atrovent GERD 
-stable, continue home meds Essential HTN 
-stable, continue home meds Pericardial Effusion 
-Echo Dec 2020 EF 50%, technically difficult study 
-Echo pending Diet:  DIET REGULAR 
DVT PPx: Eliquis Code status: Full Code Disposition/Expected LOS: >2 midnights pending clinical course Objective:  
 
Patient Vitals for the past 24 hrs: 
 Temp Pulse Resp BP SpO2  
05/17/21 1106 97.6 °F (36.4 °C) 70 19 99/65 95 % 05/17/21 0905     98 % 05/17/21 0728     99 % 05/17/21 0623 97.4 °F (36.3 °C) 72 19 109/70 98 % 05/17/21 0555     98 % 05/17/21 0430  70 20  99 % 05/17/21 0337 98.2 °F (36.8 °C) (!) 106 18 105/69 91 % 05/17/21 0127   20  98 % 05/16/21 2340 97.6 °F (36.4 °C) 75 25 102/66 96 % 05/16/21 2233     99 % 05/16/21 2147 98.4 °F (36.9 °C) 70 23 109/72 97 % 05/16/21 1945  70 (!) 31 114/60 95 % 05/16/21 1939  72 (!) 31 114/60 94 % 05/16/21 1926     94 % 05/16/21 1919  70 (!) 40 126/76 94 % 05/16/21 1913  70 (!) 34 114/68 95 % 05/16/21 1900  71 (!) 36 118/71 94 % 05/16/21 1830  75 28 (!) 134/92   
05/16/21 1800  82 (!) 33 (!) 144/89 (!) 85 % 05/16/21 1730  77 (!) 35 (!) 152/79 (!) 89 % 05/16/21 1716  84 (!) 47 (!) 183/98 (!) 86 % 05/16/21 1608 98.7 °F (37.1 °C) 70 22 115/73 94 % Oxygen Therapy O2 Sat (%): 95 % (05/17/21 1106) Pulse via Oximetry: 70 beats per minute (05/17/21 0728) O2 Device: None (Room air) (05/17/21 0905) O2 Flow Rate (L/min): 1 l/min (05/17/21 0728) Body mass index is 24.28 kg/m². Physical Exam:  
General:  Conversationally dyspneic but no acute distress, ill-appearing Lungs:  R pneumonectomy, Coarse breath sounds on L, no wheezing auscultated CV:  Regular rate and rhythm with normal S1 and S2 Abdomen:  Soft, nontender, nondistended, normoactive bowel sounds Extremities:  No cyanosis clubbing or edema Neuro:   Nonfocal, A&O x3  
Psych:  Normal affect Data Review: 
I have reviewed all labs, meds, and studies from the last 24 hours: 
 
Labs: 
 
Recent Results (from the past 24 hour(s)) CBC WITH AUTOMATED DIFF Collection Time: 05/16/21  4:13 PM  
Result Value Ref Range WBC 11.7 (H) 4.3 - 11.1 K/uL  
 RBC 4.39 4.23 - 5.6 M/uL  
 HGB 11.3 (L) 13.6 - 17.2 g/dL HCT 36.3 (L) 41.1 - 50.3 % MCV 82.7 79.6 - 97.8 FL  
 MCH 25.7 (L) 26.1 - 32.9 PG  
 MCHC 31.1 (L) 31.4 - 35.0 g/dL  
 RDW 16.4 (H) 11.9 - 14.6 % PLATELET 434 737 - 867 K/uL MPV 9.1 (L) 9.4 - 12.3 FL ABSOLUTE NRBC 0.00 0.0 - 0.2 K/uL  
 DF AUTOMATED NEUTROPHILS 82 (H) 43 - 78 % LYMPHOCYTES 11 (L) 13 - 44 % MONOCYTES 5 4.0 - 12.0 % EOSINOPHILS 2 0.5 - 7.8 % BASOPHILS 0 0.0 - 2.0 % IMMATURE GRANULOCYTES 1 0.0 - 5.0 %  
 ABS. NEUTROPHILS 9.5 (H) 1.7 - 8.2 K/UL  
 ABS. LYMPHOCYTES 1.2 0.5 - 4.6 K/UL  
 ABS. MONOCYTES 0.6 0.1 - 1.3 K/UL  
 ABS. EOSINOPHILS 0.2 0.0 - 0.8 K/UL  
 ABS. BASOPHILS 0.0 0.0 - 0.2 K/UL  
 ABS. IMM. GRANS. 0.1 0.0 - 0.5 K/UL METABOLIC PANEL, COMPREHENSIVE Collection Time: 05/16/21  4:13 PM  
Result Value Ref Range Sodium 138 138 - 145 mmol/L Potassium 4.1 3.5 - 5.1 mmol/L Chloride 102 98 - 107 mmol/L  
 CO2 30 21 - 32 mmol/L Anion gap 6 (L) 7 - 16 mmol/L Glucose 101 (H) 65 - 100 mg/dL BUN 9 8 - 23 MG/DL Creatinine 0.60 (L) 0.8 - 1.5 MG/DL  
 GFR est AA >60 >60 ml/min/1.73m2 GFR est non-AA >60 >60 ml/min/1.73m2 Calcium 9.2 8.3 - 10.4 MG/DL  Bilirubin, total 0.5 0.2 - 1.1 MG/DL ALT (SGPT) 12 12 - 65 U/L  
 AST (SGOT) 22 15 - 37 U/L Alk. phosphatase 133 50 - 136 U/L Protein, total 7.6 6.3 - 8.2 g/dL Albumin 2.7 (L) 3.2 - 4.6 g/dL Globulin 4.9 (H) 2.3 - 3.5 g/dL A-G Ratio 0.6 (L) 1.2 - 3.5 MAGNESIUM Collection Time: 05/16/21  4:13 PM  
Result Value Ref Range Magnesium 2.2 1.8 - 2.4 mg/dL EKG, 12 LEAD, INITIAL Collection Time: 05/16/21  4:19 PM  
Result Value Ref Range Ventricular Rate 69 BPM  
 Atrial Rate 122 BPM  
 QRS Duration 162 ms Q-T Interval 442 ms QTC Calculation (Bezet) 473 ms Calculated P Axis 89 degrees Calculated R Axis -101 degrees Calculated T Axis 61 degrees Diagnosis AV dual-paced rhythm Abnormal ECG No previous ECGs available Confirmed by Glo Miner ([id number]) on 5/17/2021 2:12:13 AM 
  
METABOLIC PANEL, COMPREHENSIVE Collection Time: 05/17/21  6:02 AM  
Result Value Ref Range Sodium 139 138 - 145 mmol/L Potassium 4.0 3.5 - 5.1 mmol/L Chloride 104 98 - 107 mmol/L  
 CO2 27 21 - 32 mmol/L Anion gap 8 7 - 16 mmol/L Glucose 184 (H) 65 - 100 mg/dL BUN 10 8 - 23 MG/DL Creatinine 0.62 (L) 0.8 - 1.5 MG/DL  
 GFR est AA >60 >60 ml/min/1.73m2 GFR est non-AA >60 >60 ml/min/1.73m2 Calcium 9.4 8.3 - 10.4 MG/DL Bilirubin, total 0.4 0.2 - 1.1 MG/DL  
 ALT (SGPT) 13 12 - 65 U/L  
 AST (SGOT) 22 15 - 37 U/L Alk. phosphatase 136 50 - 136 U/L Protein, total 7.6 6.3 - 8.2 g/dL Albumin 2.6 (L) 3.2 - 4.6 g/dL Globulin 5.0 (H) 2.3 - 3.5 g/dL A-G Ratio 0.5 (L) 1.2 - 3.5 MAGNESIUM Collection Time: 05/17/21  6:02 AM  
Result Value Ref Range Magnesium 2.6 (H) 1.8 - 2.4 mg/dL CBC WITH AUTOMATED DIFF Collection Time: 05/17/21  6:02 AM  
Result Value Ref Range WBC 9.5 4.3 - 11.1 K/uL  
 RBC 4.35 4.23 - 5.6 M/uL  
 HGB 11.1 (L) 13.6 - 17.2 g/dL HCT 36.4 (L) 41.1 - 50.3 %  MCV 83.7 79.6 - 97.8 FL  
 MCH 25.5 (L) 26.1 - 32.9 PG  
 MCHC 30.5 (L) 31.4 - 35.0 g/dL  
 RDW 16.2 (H) 11.9 - 14.6 % PLATELET 537 959 - 269 K/uL MPV 9.5 9.4 - 12.3 FL ABSOLUTE NRBC 0.00 0.0 - 0.2 K/uL  
 DF AUTOMATED NEUTROPHILS 93 (H) 43 - 78 % LYMPHOCYTES 6 (L) 13 - 44 % MONOCYTES 1 (L) 4.0 - 12.0 % EOSINOPHILS 0 (L) 0.5 - 7.8 % BASOPHILS 0 0.0 - 2.0 % IMMATURE GRANULOCYTES 1 0.0 - 5.0 %  
 ABS. NEUTROPHILS 8.8 (H) 1.7 - 8.2 K/UL  
 ABS. LYMPHOCYTES 0.5 0.5 - 4.6 K/UL  
 ABS. MONOCYTES 0.1 0.1 - 1.3 K/UL  
 ABS. EOSINOPHILS 0.0 0.0 - 0.8 K/UL  
 ABS. BASOPHILS 0.0 0.0 - 0.2 K/UL  
 ABS. IMM. GRANS. 0.1 0.0 - 0.5 K/UL PROCALCITONIN Collection Time: 05/17/21  6:02 AM  
Result Value Ref Range Procalcitonin 0.05 ng/mL PROCALCITONIN Collection Time: 05/17/21 12:02 PM  
Result Value Ref Range Procalcitonin 0.05 ng/mL All Micro Results Procedure Component Value Units Date/Time CULTURE, RESPIRATORY/SPUTUM/BRONCH Danetta Mantsteve STAIN [[id number]] Order Status: Sent Specimen: Sputum MSSA/MRSA SC BY PCR, NASAL SWAB [[id number]] Order Status: Sent Specimen: Nasal swab EKG Results Procedure 720 Value Units Date/Time EKG [[id number]] Collected: 05/16/21 1619 Order Status: Completed Updated: 05/17/21 8445 Ventricular Rate 69 BPM   
  Atrial Rate 122 BPM   
  QRS Duration 162 ms Q-T Interval 442 ms QTC Calculation (Bezet) 473 ms Calculated P Axis 89 degrees Calculated R Axis -101 degrees Calculated T Axis 61 degrees Diagnosis --  
  AV dual-paced rhythm Abnormal ECG No previous ECGs available Confirmed by Beryl Bunn ([id number]) on 5/17/2021 6:58:42 AM 
  
  
 
 
Other Studies: 
Ct Maxillofacial Wo Cont Result Date: 5/17/2021 EXAMINATION: CT MAXILLOFACIAL WO CONT HISTORY: Chronic rhinosinusitis. TECHNIQUE: CT of the facial bones was performed without contrast. Images were obtained in the axial plane and coronal and sagittal reconstructions were performed.  CT scan performed using appropriate/available dose optimization/reduction/ALARA techniques. COMPARISON: None. FINDINGS: There are a few small polyps or mucous retention cysts in the superior and medial aspect of the maxillary sinus bilaterally. The frontal sinuses are clear. A polyp or mucous retention cyst is seen in a single left posterior ethmoid air cell. Similar finding is noted in the left sphenoid sinus. The remaining paranasal sinuses are clear. There are no fluid levels observed throughout the paranasal sinuses. The medial wall of the right maxillary sinus is absent, likely surgically. The ostiomeatal complex is patent bilaterally. Multiple polyps or mucous retention cysts as described above. No evidence to suggest acute sinusitis. Ct Chest W Cont Result Date: 5/16/2021 EXAMINATION: CT CHEST W CONT HISTORY: Lung cancer, rule out infection. TECHNIQUE: Axial images of the chest were obtained following the administration of intravenous contrast. Coronal reformatted images were submitted. All CT scans are performed using dose optimization technique as appropriate to a performed exam including automated exposure control and/or standardized protocols for targeted exams where dose is matched to indication/reason for exam/patient size. COMPARISON: 1/12/2021 and 12/22/2020 FINDINGS: The visualized thyroid gland is unremarkable. There are no enlarged mediastinal, hilar, or axillary lymph nodes. The heart is not enlarged. There is a small pericardial effusion. The esophagus appears normal. The airways are patent. There are mild patchy groundglass infiltrates seen in the left lower lobe. Groundglass opacity previously seen in the left upper lobe is almost completely resolved. The patient is post right pneumonectomy. There is a moderate-sized fluid collection layering in the right hemithorax. There is pleural thickening throughout the right hemithorax. No pulmonary nodules. Visualized upper upper abdomen is normal. Cholelithiasis.  No aggressive osseous lesions. Multiple healing posterior right rib fractures. The soft tissues are normal.  
 
Subtle, patchy groundglass infiltrates are noted throughout the left lower lobe. Near complete resolution of the previously described groundglass infiltrate in the left upper lobe. The patient is status post right pneumonectomy. A large fluid collection is layering in the right hemithorax. Small pericardial effusion. Xr Chest [facility] Result Date: 5/16/2021 Portable chest x-ray CLINICAL INDICATION: Cough, syncope, prior right pneumonectomy FINDINGS: Single AP view the chest compared to a similar exam dated 5/3/2021 again shows a stable amount of air and fluid in the right chest cavity unchanged from the prior exam in a patient with a known right pneumonectomy. A right-sided chest port is in place. There is a left-sided pacer device in place. The left lung is well-expanded and clear. The cardiac silhouette and mediastinum are stable. No significant interval change. Current Meds:  
Current Facility-Administered Medications Medication Dose Route Frequency  levalbuterol (XOPENEX) nebulizer soln 0.63 mg/3 mL  0.63 mg Nebulization Q6HWA RT  
 sodium chloride (NS) flush 5-10 mL  5-10 mL IntraVENous Q8H  
 sodium chloride (NS) flush 5-10 mL  5-10 mL IntraVENous PRN  
 apixaban (ELIQUIS) tablet 5 mg  5 mg Oral BID  pantoprazole (PROTONIX) tablet 40 mg  40 mg Oral ACB  ipratropium (ATROVENT) 21 mcg (0.03 %) nasal spray 2 Spray  2 Spray Both Nostrils BID  metoprolol succinate (TOPROL-XL) XL tablet 50 mg  50 mg Oral DAILY  sodium chloride (NS) flush 5-40 mL  5-40 mL IntraVENous Q8H  
 sodium chloride (NS) flush 5-40 mL  5-40 mL IntraVENous PRN  
 acetaminophen (TYLENOL) tablet 650 mg  650 mg Oral Q6H PRN Or  
 acetaminophen (TYLENOL) suppository 650 mg  650 mg Rectal Q6H PRN  polyethylene glycol (MIRALAX) packet 17 g  17 g Oral DAILY  senna-docusate (PERICOLACE) 8.6-50 mg per tablet 1 Tab  1 Tab Oral BID  magnesium hydroxide (MILK OF MAGNESIA) 400 mg/5 mL oral suspension 30 mL  30 mL Oral DAILY PRN  
 bisacodyL (DULCOLAX) suppository 10 mg  10 mg Rectal DAILY PRN  
 ondansetron (ZOFRAN ODT) tablet 4 mg  4 mg Oral Q8H PRN Or  
 ondansetron (ZOFRAN) injection 4 mg  4 mg IntraVENous Q6H PRN  
 tiotropium bromide (SPIRIVA RESPIMAT) 2.5 mcg /actuation  2 Puff Inhalation DAILY  budesonide-formoteroL (SYMBICORT) 160-4.5 mcg/actuation HFA inhaler 2 Puff  2 Puff Inhalation BID RT  
 montelukast (SINGULAIR) tablet 10 mg  10 mg Oral QHS  methylPREDNISolone (PF) (SOLU-MEDROL) injection 40 mg  40 mg IntraVENous Q8H  
 azithromycin (ZITHROMAX) 500 mg in 0.9% sodium chloride 250 mL (VIAL-MATE)  500 mg IntraVENous Q24H  
 fluticasone propionate (FLONASE) 50 mcg/actuation nasal spray 2 Spray  2 Spray Both Nostrils DAILY  oxymetazoline (AFRIN) 0.05 % nasal spray 2 Spray  2 Spray Both Nostrils BID  LORazepam (ATIVAN) tablet 0.5 mg  0.5 mg Oral Q4H PRN  
 ampicillin-sulbactam (UNASYN) 3 g in 0.9% sodium chloride (MBP/ADV) 100 mL MBP  3 g IntraVENous Q6H Problem List: 
Hospital Problems as of 5/17/2021 Date Reviewed: 4/13/2021 Codes Class Noted - Resolved POA * (Principal) COPD exacerbation ([facility] ICD-10-CM: J44.1 ICD-9-CM: 491.21  5/16/2021 - Present Unknown Labs, vital signs, and diagnostic testing reviewed. Case discussed with patient and care team.  
 
Part of this note was written by using a voice dictation software and the note has been proof read but may still contain some grammatical/other typographical errors. Signed By: Jazzy Lou [facility]  May 17, 2021  5:15 PM

## 2021-05-17 NOTE — PROGRESS NOTES
CM met patient to discuss d/c plan and needs. Patient alert/orient x4. Patient verified demographic no changes needed. Verified PCP and insurance. Patient states he hasn't visit his PCP he's a new patient and haven't had the first visit. States he use [facility] in [place]. States he has no issue with purchasing or affording medication. Patient states his girlfriend live with him in a one level home with six steps to enter into the home. States he's independent with his ADL's and do has DME's (cane, patient use [facility] / home 02 night only) unsure of the provider for home 02. States he had a pacemaker placed about two weeks and currently receiving Pulmonary rehab. CM informed patient PT consult was pending and will discuss recommendation from PT. CM will follow up with patient concerning PT recommendation and discuss d/c plan further. CM will continue to monitor and remain available for any needs or concerns that may occur. Care Management Interventions PCP Verified by CM: Yes(Maria Isabel Day, ) Mode of Transport at Discharge: Other (see comment) Transition of Care Consult (CM Consult): Discharge Planning Discharge Durable Medical Equipment: No 
Physical Therapy Consult: Yes Occupational Therapy Consult: No 
Speech Therapy Consult: No 
Current Support Network: Own Home(girlfriend lives with patient ) Confirm Follow Up Transport: Family The Patient and/or Patient Representative was Provided with a Choice of Provider and Agrees with the Discharge Plan?: No 
Name of the Patient Representative Who was Provided with a Choice of Provider and Agrees with the Discharge Plan: patient Freedom of Choice List was Provided with Basic Dialogue that Supports the Patient's Individualized Plan of Care/Goals, Treatment Preferences and Shares the Quality Data Associated with the Providers?: No 
 Resource Information Provided?: No 
Discharge Location Discharge Placement: Home

## 2021-05-17 NOTE — PROGRESS NOTES
Patient arrives to room 810 on stretcher with member of transport team. Patient able to slide from stretcher to room bed without difficulty. Respirations even and unlabored on 5L NC. Patient alert and oriented x4. Skin clean, dry, and intact. Well healed incision noted to right chest. Dual skin assessment completed with John Bright RN. Patient oriented to room and floor. All questions answered at this time. Safety measures in place, call light in reach.

## 2021-05-17 NOTE — ED NOTES
TRANSFER - OUT REPORT: 
 
Verbal report given to [facility] RN(name) on Eris Stevens.  being transferred to 810(unit) for routine progression of care Report consisted of patients Situation, Background, Assessment and  
Recommendations(SBAR). Information from the following report(s) ED Summary was reviewed with the receiving nurse. Lines:  
Venous Access Device chest port  03/18/20 Upper chest (subclavicular area, right (Active) Peripheral IV 05/16/21 Right Antecubital (Active) Opportunity for questions and clarification was provided. Patient transported with: 
 O2 @ 5 liters

## 2021-05-17 NOTE — PROGRESS NOTES
Per  at Cardiology [facility], requested this RN to call [employer]/[phone] to interrogate the device/Pacemaker.

## 2021-05-17 NOTE — PROGRESS NOTES
Patient resting quietly in bed. Respirations even and unlabored on 3L NC. No s/sx of distress noted. No needs expressed at this time. Safety measures in place, call light in reach.  [place]

## 2021-05-17 NOTE — PROGRESS NOTES
Patient's 20 G IV in right AC infiltrated at this time. 2 attempts to place new IV by this RN unsuccessful. ICU rover notified for IV attempt. Morning IV medications not administered at this time.

## 2021-05-17 NOTE — PROGRESS NOTES
ACUTE PHYSICAL THERAPY GOALS: 
(Developed with and agreed upon by patient and/or caregiver. ) LTG: 
(1.)Mr. Nikita Gordon will move from supine to sit and sit to supine, scoot up and down and roll side to side in bed INDEPENDENTLY with bed flat within 7 treatment day(s). (2.)Mr. Nikita Gordon will transfer from bed to chair and chair to bed with MODIFIED INDEPENDENCE using the least restrictive device within 7 treatment day(s). (3.)Mr. Nikita Gordon will ambulate with MODIFIED INDEPENDENCE for 450 feet with the least restrictive device within 7 treatment day(s). (4.)Mr. Nikita Gordon will ascend and descend 4 steps with STAND BY ASSIST using handrail(s) within 7 days. (5.)Mr. Nikita Gordon will perform exercises per HEP independently to improve strength and mobility within 7 days. ________________________________________________________________________________________________ PHYSICAL THERAPY ASSESSMENT: Initial Assessment and AM PT Treatment Day # 1 Niru Garcai is a 78 y.o. male PRIMARY DIAGNOSIS: COPD exacerbation ([facility] [address] COPD exacerbation ([facility] [address] [J44.1] Reason for Referral:  weakness ICD-10: Treatment Diagnosis: Generalized Muscle Weakness (M62.81) Difficulty in walking, Not elsewhere classified (R26.2) Other abnormalities of gait and mobility (R26.89) INPATIENT: Payor: Rena  / Plan: Guero Morris / Product Type: PPO /  
 
ASSESSMENT:  
 
REHAB RECOMMENDATIONS:  
Recommendation to date pending progress: 
[address] [place] [address] Equipment:  To Be Determined PRIOR LEVEL OF FUNCTION: 
(Prior to Hospitalization) INITIAL/CURRENT LEVEL OF FUNCTION: 
(Most Recently Demonstrated) Bed Mobility: 
 Independent Sit to Stand:  Modified Independent Transfers:  Modified Independent Gait/Mobility:  Modified Independent Bed Mobility: 
 Supervision Sit to Stand: 
[employer] Transfers:  Minimal Assistance Gait/Mobility:  Minimal Assistance ASSESSMENT: 
Mr. Nikita wilson admitted from home with COPD exacerbation. Lives alone and has girlfriend nearby who can assist as needed. Presents today with generalized weakness, decreased activity tolerance, and unsteady/shaky gait. Transfers to sitting with supervision, needs CGA-min assist for transfers and constant min handheld assist for ambulation in room and hallway. Would do better with cane or walker. Cueing for gait mechanics, safety, and activity pacing. Returned to room and up to chair after activity, positioned comfortably with needs in reach. Mr. Farhana Lucas seems a little weaker overall compared to baseline- would benefit from continued therapy during hospital stay to maximize safety/independence with mobility. Anticipate dc home with HH PT. 
  
 
SUBJECTIVE:  
Mr. Farhana Lucas states, \"I had a pacemaker two weeks ago. \" SOCIAL HISTORY/LIVING ENVIRONMENT: Lives alone. Has girlfriend who checks on him daily. Independent to mod I with cane or walker. Home Environment: Private residence # Steps to Enter: 4 One/Two Story Residence: One story Living Alone: Yes Support Systems: Spouse/Significant Other/Partner, Family member(s) OBJECTIVE:  
 
PAIN: VITAL SIGNS: LINES/DRAINS:  
Pre Treatment: Pain Screen Pain Scale 1: Numeric (0 - 10) Pain Intensity 1: 0 Post Treatment: 0/10 Vital Signs O2 Sat (%): 98 % O2 Device: None (Room air) none O2 Device: None (Room air) GROSS EVALUATION: 
 Within Functional Limits Abnormal/ Functional Abnormal/ Non-Functional (see comments) Not Tested Comments: AROM [x] [] [] [] PROM [] [] [] [] Strength [] [x] [] [] Balance [] [x] [] [] Posture [] [x] [] [] Sensation [] [] [] [] Coordination [] [] [] [] Tone [] [] [] [] Edema [] [] [] [] Activity Tolerance [] [x] [] []   
 [] [] [] [] COGNITION/ 
PERCEPTION: Intact Impaired  
(see comments) Comments:  
Orientation [x] [] Vision [x] [] Hearing [x] [] Command Following [x] [] Safety Awareness [x] []   
 [] [] MOBILITY: I Mod I S SBA CGA Min Mod Max Total  NT x2 Comments:  
Bed Mobility Rolling [] [] [x] [] [] [] [] [] [] [] [] Supine to Sit [] [] [x] [] [] [] [] [] [] [] [] Scooting [] [] [x] [] [] [] [] [] [] [] [] Sit to Supine [] [] [] [] [] [] [] [] [] [] []   
Transfers Sit to Stand [] [] [] [] [x] [x] [] [] [] [] [] Bed to Chair [] [] [] [] [x] [x] [] [] [] [] []   
Stand to Sit [] [] [] [x] [] [] [] [] [] [] [] I=Independent, Mod I=Modified Independent, S=Supervision, SBA=Standby Assistance, CGA=Contact Guard Assistance,  
Min=Minimal Assistance, Mod=Moderate Assistance, Max=Maximal Assistance, Total=Total Assistance, NT=Not Tested GAIT: I Mod I S SBA CGA Min Mod Max Total  NT x2 Comments:  
Level of Assistance [] [] [] [] [] [x] [] [] [] [] [] Distance 140 ft   
DME min handheld assist   
Gait Quality Shaky, unsteady Weightbearing Status N/A I=Independent, Mod I=Modified Independent, S=Supervision, SBA=Standby Assistance, CGA=Contact Guard Assistance,  
Min=Minimal Assistance, Mod=Moderate Assistance, Max=Maximal Assistance, Total=Total Assistance, NT=Not Tested [facility] Basic Mobility Inpatient Short Form How much difficulty does the patient currently have. .. Unable A Lot A Little None 1. Turning over in bed (including adjusting bedclothes, sheets and blankets)? [] 1   [] 2   [] 3   [x] 4  
2. Sitting down on and standing up from a chair with arms ( e.g., wheelchair, bedside commode, etc.)   [] 1   [] 2   [x] 3   [] 4  
3. Moving from lying on back to sitting on the side of the bed? [] 1   [] 2   [x] 3   [] 4 How much help from another person does the patient currently need. .. Total A Lot A Little None 4. Moving to and from a bed to a chair (including a wheelchair)? [] 1   [] 2   [x] 3   [] 4  
5. Need to walk in hospital room? [] 1   [] 2   [x] 3   [] 4  
6. Climbing 3-5 steps with a railing?    [] 1   [] 2   [x] 3   [] 4 © 2007, Trustees of [facility] [employer], under license to [employer]. All rights reserved Score:  Initial: 19 Most Recent: X (Date: -- ) Interpretation of Tool:  Represents activities that are increasingly more difficult (i.e. Bed mobility, Transfers, Gait). PLAN:  
FREQUENCY/DURATION: PT Plan of Care: 3 times/week for duration of hospital stay or until stated goals are met, whichever comes first. 
 
PROBLEM LIST:  
(Skilled intervention is medically necessary to address:) 1. Decreased Activity Tolerance 2. Decreased Balance 3. Decreased Coordination 4. Decreased Gait Ability 5. Decreased Strength 6. Decreased Transfer Abilities INTERVENTIONS PLANNED:  
(Benefits and precautions of physical therapy have been discussed with the patient.) 1. Therapeutic Activity 2. Therapeutic Exercise/HEP 3. Neuromuscular Re-education 4. Gait Training 5. Manual Therapy 6. Education TREATMENT:  
 
EVALUATION: Low Complexity : (Untimed Charge) TREATMENT:  
($$ Therapeutic Activity: 8-22 mins    ) Therapeutic Activity (12 Minutes): Therapeutic activity included Rolling, Supine to Sit, Scooting, Transfer Training, Ambulation on level ground, Sitting balance , Standing balance and chair transfers, seated and standing static/dynamic activties to improve functional Mobility, Strength, Activity tolerance and balance, activity pacing. TREATMENT GRID: 
N/A 
 
AFTER TREATMENT POSITION/PRECAUTIONS: 
Chair, Needs within reach and RN notified INTERDISCIPLINARY COLLABORATION: 
RN/PCT and PT/PTA TOTAL TREATMENT DURATION: 
PT Patient Time In/Time Out Time In: 0927 Time Out: 6801 Lizet Iverson DPT

## 2021-05-18 NOTE — PROGRESS NOTES
Report received from [facility]. Patient resting quietly in bed. Respirations present, even and unlabored on 2L NC. Bed low and locked, safety measures in place. No signs of distress, no needs expressed. Call light within reach, encouraged patient to call with any needs. Will continue to monitor.

## 2021-05-18 NOTE — PROGRESS NOTES
[facility] Hospitalist Progress Note Name:  Virginia Leach. Age:70 y.o. Sex:male :  1950 MRN:  [id number] Admit Date:  2021 Reason for Admission: COPD exacerbation ([facility] [address] [J44.1] Hospital Course/Interval history:  
 
Patient is a 79 y/o male with PMH of PAF, HTN, COPD (2L HS), chronic rhinosinusitis, lung ca s/p R lobectomy (2020), acute chemo and radiation. He further underwent R thoracotomy with R completion pneumonectomy in 2021 where final pathology indicated radiation-induced fibrosis. Patient had recent AV reg ablation and PM placement for refractory uncontrolled atrial fibrillation with discharge on 21. He presented to ED with CC SOB at rest, coughing fit 1-2 hours, and nasal drainage. He denies chest pain, dizziness, and any further cardiac symptoms. He has received 2 doses of the Covid vaccine. He is currently a pulmonary rehab patient. Subjective (21): Patient shaky on presentation. Alert and oriented, no dyspnea. On 1L NC. States the nebulizer treatments \"shake him up. \" He is on Xopenex instead of Albuterol. Denies pain, worked well with PT, no complaints. He hopes to get back to pulmonary rehab upon discharge. Review of Systems: 14 point review of systems is otherwise negative with the exception of the elements mentioned above. Assessment & Plan Acute on Chronic Respiratory Failure with Hypoxia 
-Vital signs stable on presentation, no SIRS, 94% on RA 
-O2 sat's dropped into high 80's during coughing fit in ED 
-Home O2 is intermittent use only HS, was on NC in ED after coughing fit, weaning down as tolerated, currently on 1L NC 
-CT Chest: patchy, groundglass infiltrates in LLL, near complete resolution of previously seen groundglass infiltrate in HOLA, large fluid collection in R hemithorax, small pericardial effusion 
-Questionable radiation pneumonitis, given pulmonary complexity and history of radiation pneumonitis, if no clinical improvement, will consult pulm 
-Empiric antibiotics 
-Sputum culture 
-Procal 0.05, repeat 0.05 
-WBC 11.7 -->9.5 
-5/18 Currently on 1L NC, he states he was on RA a bit yesterday but went back on O2, try to wean off O2 today, WBC back up to 16.2, may be reactive, remains afebrile, will repeat Procal in am, sputum culture with oropharyngeal contamination, will attempt to re-collect new specimen, will convert antibiotics to PO in anticipation of discharge tomorrow. Walk test ordered for am. 
  
Acute on Chronic COPD Exacerbation 
-IV Solumedrol 
-Xopenex, Singulair, Spiriva 
-Aggressive pulmonary toilet 
-on 1L NC, wean O2 as tolerated 
 -Will taper IV Solumedrol, no wheezing on exam 
 
Atrial Fibrillation s/p AV Mario Ablation and PM implant 
-Stable, continue home Eliquis, Toprol 
  
Chronic Rhinosinusitis 
-CT Maxillofacial: multiple polyps/mucous retention cysts but no evidence of acute sinusitis 
-Symptomatic management-Afrin, Flonase, Atrovent 
-5/18 symptoms somewhat improved on nasal spray therapy  
  
GERD 
-stable, continue home meds 
  
Essential HTN 
-stable, continue home meds 
  
Pericardial Effusion 
-Echo Dec 2020 EF 50%, technically difficult study 
-Echo normal LVEF 55-60%, small pericardial effusion identified 
-No signs of hemodynamic compromise, continue to monitor 
  
Diet:  DIET REGULAR 
DVT PPx: Eliquis Code status: Full Code Disposition/Expected LOS: anticipate d/c in 1-2 days Objective:  
 
Patient Vitals for the past 24 hrs: 
 Temp Pulse Resp BP SpO2  
05/18/21 1526 97.6 °F (36.4 °C) 70 18 92/63 93 % 05/18/21 1341     100 % 05/18/21 1151 97.7 °F (36.5 °C) 72 18 114/74 91 % 05/18/21 0803     96 % 05/18/21 0747 97.7 °F (36.5 °C) 70 19 102/68 96 % 05/18/21 0401 97.8 °F (36.6 °C) 70 18 108/71 97 % 05/17/21 2236 97.8 °F (36.6 °C) 70 18 95/60 95 % 05/17/21 2005     91 % 05/17/21 1941 97.6 °F (36.4 °C) 77 18 100/60 92 % 05/17/21 1535 98.1 °F (36.7 °C) 70 19 97/66 96 % Oxygen Therapy O2 Sat (%): 93 % (05/18/21 1526) Pulse via Oximetry: 70 beats per minute (05/18/21 1341) O2 Device: None (Room air) (05/18/21 1341) Skin Assessment: Clean, dry, & intact (05/17/21 2005) Skin Protection for O2 Device: No (05/17/21 2005) O2 Flow Rate (L/min): 2 l/min (05/18/21 0803) Body mass index is 24.74 kg/m². Physical Exam:  
General:          No acute distress, ill-appearing Lungs:             R pneumonectomy, Coarse breath sounds in LLL, no wheezing auscultated CV:                  Regular rate and rhythm with normal S1 and S2 Abdomen:        Soft, nontender, nondistended, normoactive bowel sounds Extremities:     No cyanosis clubbing or edema Skin:  Pale, normal skin turgor Neuro:             Nonfocal, A&O x3  
Psych:             Flat affect Data Review: 
I have reviewed all labs, meds, and studies from the last 24 hours: 
 
Labs: 
 
Recent Results (from the past 24 hour(s)) CULTURE, RESPIRATORY/SPUTUM/BRONCH W GRAM STAIN Collection Time: 05/17/21  9:44 PM  
 Specimen: Sputum Result Value Ref Range Special Requests: NO SPECIAL REQUESTS    
 GRAM STAIN     
  GRAM STAIN EXAMINATION OF THIS SPECIMEN INDICATED OROPHARYNGEAL CONTAMINATION. PLEASE SUBMIT A NEW SPECIMEN OR NOTIFY THE MICRO DEPT WITHIN 48HRS IF FURTHER WORKUP IS DESIRED. Culture result: Please reorder and recollect. Culture result: RESULTS VERIFIED, PHONED TO AND READ BACK BY 
JORGITO LEE ON 5/18/21 @9362,  
  
METABOLIC PANEL, COMPREHENSIVE Collection Time: 05/18/21  6:58 AM  
Result Value Ref Range Sodium 144 136 - 145 mmol/L Potassium 4.3 3.5 - 5.1 mmol/L Chloride 109 (H) 98 - 107 mmol/L  
 CO2 27 21 - 32 mmol/L Anion gap 8 7 - 16 mmol/L Glucose 157 (H) 65 - 100 mg/dL BUN 11 8 - 23 MG/DL Creatinine 0.63 (L) 0.8 - 1.5 MG/DL  
 GFR est AA >60 >60 ml/min/1.73m2 GFR est non-AA >60 >60 ml/min/1.73m2  Calcium 9.3 8.3 - 10.4 MG/DL  
 Bilirubin, total 0.3 0.2 - 1.1 MG/DL  
 ALT (SGPT) 17 12 - 65 U/L  
 AST (SGOT) 24 15 - 37 U/L Alk. phosphatase 113 50 - 136 U/L Protein, total 6.8 6.3 - 8.2 g/dL Albumin 2.3 (L) 3.2 - 4.6 g/dL Globulin 4.5 (H) 2.3 - 3.5 g/dL A-G Ratio 0.5 (L) 1.2 - 3.5    
CBC WITH AUTOMATED DIFF Collection Time: 05/18/21  6:58 AM  
Result Value Ref Range WBC 16.2 (H) 4.3 - 11.1 K/uL  
 RBC 3.94 (L) 4.23 - 5.6 M/uL  
 HGB 10.0 (L) 13.6 - 17.2 g/dL HCT 32.9 (L) 41.1 - 50.3 % MCV 83.5 79.6 - 97.8 FL  
 MCH 25.4 (L) 26.1 - 32.9 PG  
 MCHC 30.4 (L) 31.4 - 35.0 g/dL  
 RDW 16.8 (H) 11.9 - 14.6 % PLATELET 412 185 - 170 K/uL MPV 9.9 9.4 - 12.3 FL ABSOLUTE NRBC 0.00 0.0 - 0.2 K/uL  
 DF AUTOMATED NEUTROPHILS 95 (H) 43 - 78 % LYMPHOCYTES 3 (L) 13 - 44 % MONOCYTES 2 (L) 4.0 - 12.0 % EOSINOPHILS 0 (L) 0.5 - 7.8 % BASOPHILS 0 0.0 - 2.0 % IMMATURE GRANULOCYTES 1 0.0 - 5.0 %  
 ABS. NEUTROPHILS 15.3 (H) 1.7 - 8.2 K/UL  
 ABS. LYMPHOCYTES 0.4 (L) 0.5 - 4.6 K/UL  
 ABS. MONOCYTES 0.3 0.1 - 1.3 K/UL  
 ABS. EOSINOPHILS 0.0 0.0 - 0.8 K/UL  
 ABS. BASOPHILS 0.0 0.0 - 0.2 K/UL  
 ABS. IMM. GRANS. 0.1 0.0 - 0.5 K/UL  
MSSA/MRSA SC BY PCR, NASAL SWAB Collection Time: 05/18/21 11:31 AM  
 Specimen: Nasal swab Result Value Ref Range Special Requests: NO SPECIAL REQUESTS Culture result:     
  SA target not detected. A MRSA NEGATIVE, SA NEGATIVE test result does not preclude MRSA or SA nasal colonization. All Micro Results Procedure Component Value Units Date/Time MSSA/MRSA SC BY PCR, NASAL SWAB [[id number]] Collected: 05/18/21 1131 Order Status: Completed Specimen: Nasal swab Updated: 05/18/21 1303 Special Requests: NO SPECIAL REQUESTS Culture result:    
  SA target not detected. A MRSA NEGATIVE, SA NEGATIVE test result does not preclude MRSA or SA nasal colonization.   
     
 CULTURE, RESPIRATORY/SPUTUM/BRONCH Maria Fernanda Irby [[id number]] Collected: 05/17/21 2144 Order Status: Completed Specimen: Sputum Updated: 05/18/21 1152 Special Requests: NO SPECIAL REQUESTS     
  GRAM STAIN    
  GRAM STAIN EXAMINATION OF THIS SPECIMEN INDICATED OROPHARYNGEAL CONTAMINATION. PLEASE SUBMIT A NEW SPECIMEN OR NOTIFY THE MICRO DEPT WITHIN 48HRS IF FURTHER WORKUP IS DESIRED. Culture result:    
  Please reorder and recollect. RESULTS VERIFIED, PHONED TO AND READ BACK BY 
JORGITO LEE ON 5/18/21 @1152, TA 
  
  
 
 
EKG Results Procedure 720 Value Units Date/Time EKG [[id number]] Collected: 05/16/21 1619 Order Status: Completed Updated: 05/17/21 6664 Ventricular Rate 69 BPM   
  Atrial Rate 122 BPM   
  QRS Duration 162 ms Q-T Interval 442 ms QTC Calculation (Bezet) 473 ms Calculated P Axis 89 degrees Calculated R Axis -101 degrees Calculated T Axis 61 degrees Diagnosis --  
  AV dual-paced rhythm Abnormal ECG No previous ECGs available Confirmed by Cristin Shirley ([id number]) on 5/17/2021 6:58:42 AM 
  
  
 
 
Other Studies: No results found. Current Meds:  
Current Facility-Administered Medications Medication Dose Route Frequency  methylPREDNISolone (PF) (SOLU-MEDROL) injection 40 mg  40 mg IntraVENous Q12H  levalbuterol (XOPENEX) nebulizer soln 0.63 mg/3 mL  0.63 mg Nebulization Q6HWA RT  
 sodium chloride (NS) flush 5-10 mL  5-10 mL IntraVENous Q8H  
 sodium chloride (NS) flush 5-10 mL  5-10 mL IntraVENous PRN  
 apixaban (ELIQUIS) tablet 5 mg  5 mg Oral BID  pantoprazole (PROTONIX) tablet 40 mg  40 mg Oral ACB  ipratropium (ATROVENT) 21 mcg (0.03 %) nasal spray 2 Spray  2 Spray Both Nostrils BID  metoprolol succinate (TOPROL-XL) XL tablet 50 mg  50 mg Oral DAILY  sodium chloride (NS) flush 5-40 mL  5-40 mL IntraVENous Q8H  
 sodium chloride (NS) flush 5-40 mL  5-40 mL IntraVENous PRN  
 acetaminophen (TYLENOL) tablet 650 mg  650 mg Oral Q6H PRN Or  
 acetaminophen (TYLENOL) suppository 650 mg  650 mg Rectal Q6H PRN  polyethylene glycol (MIRALAX) packet 17 g  17 g Oral DAILY  senna-docusate (PERICOLACE) 8.6-50 mg per tablet 1 Tab  1 Tab Oral BID  magnesium hydroxide (MILK OF MAGNESIA) 400 mg/5 mL oral suspension 30 mL  30 mL Oral DAILY PRN  
 bisacodyL (DULCOLAX) suppository 10 mg  10 mg Rectal DAILY PRN  
 ondansetron (ZOFRAN ODT) tablet 4 mg  4 mg Oral Q8H PRN Or  
 ondansetron (ZOFRAN) injection 4 mg  4 mg IntraVENous Q6H PRN  
 tiotropium bromide (SPIRIVA RESPIMAT) 2.5 mcg /actuation  2 Puff Inhalation DAILY  budesonide-formoteroL (SYMBICORT) 160-4.5 mcg/actuation HFA inhaler 2 Puff  2 Puff Inhalation BID RT  
 montelukast (SINGULAIR) tablet 10 mg  10 mg Oral QHS  azithromycin (ZITHROMAX) 500 mg in 0.9% sodium chloride 250 mL (VIAL-MATE)  500 mg IntraVENous Q24H  
 fluticasone propionate (FLONASE) 50 mcg/actuation nasal spray 2 Spray  2 Spray Both Nostrils DAILY  oxymetazoline (AFRIN) 0.05 % nasal spray 2 Spray  2 Spray Both Nostrils BID  LORazepam (ATIVAN) tablet 0.5 mg  0.5 mg Oral Q4H PRN  
 ampicillin-sulbactam (UNASYN) 3 g in 0.9% sodium chloride (MBP/ADV) 100 mL MBP  3 g IntraVENous Q6H Problem List: 
Hospital Problems as of 5/18/2021 Date Reviewed: 4/13/2021 Codes Class Noted - Resolved POA * (Principal) COPD exacerbation ([facility] ICD-10-CM: J44.1 ICD-9-CM: 491.21  5/16/2021 - Present Unknown Labs, vital signs, diagnostic testing reviewed. Case discussed with patient and care team. 
 
Part of this note was written by using a voice dictation software and the note has been proof read but may still contain some grammatical/other typographical errors. Signed By: [facility] [address] [facility]  May 18, 2021  5:15 PM

## 2021-05-18 NOTE — PROGRESS NOTES
Problem: Falls - Risk of 
Goal: *Absence of Falls Description: Document Geovani Lal Fall Risk and appropriate interventions in the flowsheet. Outcome: Progressing Towards Goal 
Note: Fall Risk Interventions: 
  
 
  
 
Medication Interventions: Evaluate medications/consider consulting pharmacy

## 2021-05-18 NOTE — PROGRESS NOTES
ACUTE PHYSICAL THERAPY GOALS: 
(Developed with and agreed upon by patient and/or caregiver. ) LTG: 
(1.)Mr. Nimisha Atwood will move from supine to sit and sit to supine, scoot up and down and roll side to side in bed INDEPENDENTLY with bed flat within 7 treatment day(s). (2.)Mr. Nimisha Atwood will transfer from bed to chair and chair to bed with MODIFIED INDEPENDENCE using the least restrictive device within 7 treatment day(s). (3.)Mr. Nimisha Atwood will ambulate with MODIFIED INDEPENDENCE for 450 feet with the least restrictive device within 7 treatment day(s). (4.)Mr. Nimisha Atwood will ascend and descend 4 steps with STAND BY ASSIST using handrail(s) within 7 days. (5.)Mr. Nimisha Atwood will perform exercises per HEP independently to improve strength and mobility within 7 days. PHYSICAL THERAPY: Daily Note and AM Treatment Day # 2 Naina Marin is a 78 y.o. male PRIMARY DIAGNOSIS: COPD exacerbation ([facility] [address] COPD exacerbation ([facility] [address] [J44.1] ASSESSMENT:  
 
REHAB RECOMMENDATIONS: CURRENT LEVEL OF FUNCTION: 
(Most Recently Demonstrated) Recommendation to date pending progress: 
[address] [place] [address] Equipment:  
 already has RW at home Bed Mobility: 
Rik Mae Sit to Stand: 
[employer] Transfers: 
[employer] Gait/Mobility: 
[employer] ASSESSMENT: 
Mr. Nimisha Atwood presents in supine. He ambulates 2 x 250' w/ RW (intermixed with a few standing rest breaks), followed by seated therex in room. Good progress in activity tolerance today. SUBJECTIVE:  
Mr. Nimisha Atwood states, \"I played a Leita Sitter" SOCIAL HISTORY/ LIVING ENVIRONMENT:  
Home Environment: Private residence # Steps to Enter: 4 One/Two Story Residence: One story Living Alone: Yes Support Systems: Spouse/Significant Other/Partner, Family member(s) OBJECTIVE:  
 
PAIN: VITAL SIGNS: LINES/DRAINS:  
Pre Treatment: Pain Screen Pain Scale 1: Numeric (0 - 10) Pain Intensity 1: 0 Post Treatment: 0   IV O2 Device: Nasal cannula MOBILITY: I Mod I S SBA CGA Min Mod Max Total  NT x2 Comments:  
Bed Mobility Rolling [] [] [] [x] [] [] [] [] [] [] [] Supine to Sit [] [] [] [x] [] [] [] [] [] [] [] Scooting [] [] [] [x] [] [] [] [] [] [] [] Sit to Supine [] [] [] [x] [] [] [] [] [] [] []   
Transfers Sit to Stand [] [] [] [] [x] [] [] [] [] [] [] Bed to Chair [] [] [] [] [] [] [] [] [] [x] [] Stand to Sit [] [] [] [] [x] [] [] [] [] [] [] I=Independent, Mod I=Modified Independent, S=Supervision, SBA=Standby Assistance, CGA=Contact Guard Assistance,  
Min=Minimal Assistance, Mod=Moderate Assistance, Max=Maximal Assistance, Total=Total Assistance, NT=Not Tested BALANCE: Good Fair+ Fair Fair- Poor NT Comments Sitting Static [] [x] [] [] [] [] Sitting Dynamic [] [x] [] [] [] []   
         
Standing Static [] [x] [] [] [] []   
Standing Dynamic [] [x] [] [] [] [] GAIT: I Mod I S SBA CGA Min Mod Max Total  NT x2 Comments:  
Level of Assistance [] [] [] [] [x] [] [] [] [] [] [] Distance 2 x 250' DME Rolling Walker and [employer] Gait Quality Slow, mild shuffle Weightbearing Status N/A I=Independent, Mod I=Modified Independent, S=Supervision, SBA=Standby Assistance, CGA=Contact Guard Assistance,  
Min=Minimal Assistance, Mod=Moderate Assistance, Max=Maximal Assistance, Total=Total Assistance, NT=Not Tested PLAN:  
FREQUENCY/DURATION: PT Plan of Care: 3 times/week for duration of hospital stay or until stated goals are met, whichever comes first. 
TREATMENT:  
 
TREATMENT:  
($$ Therapeutic Exercises: 23-37 mins    ) Therapeutic Exercise (29 Minutes): Therapeutic exercises noted below to improve functional activity tolerance and mobility. TREATMENT GRID: 
N/A Date: 
5/18 Date: 
 Date: Activity/Exercise Parameters Parameters Parameters LAQs 2x10 Ankle Pumps 2x10 Seated Marches 2x10 Seated Hip Abduction 2 x10 Level Ground Ambulation 2 x 250' AFTER TREATMENT POSITION/PRECAUTIONS: 
Bed, Needs within reach and RN notified INTERDISCIPLINARY COLLABORATION: 
RN/PCT and PT/PTA TOTAL TREATMENT DURATION: 
PT Patient Time In/Time Out Time In: 8162 Time Out: 1116 Jean Pierre Mendez

## 2021-05-18 NOTE — PROGRESS NOTES
Pt resting in bed comfortably at this time, alert and oriented times 4. No distress noted, respirations even and unlabored on room air. Pt denies pain at this time. Pt instructed to call for assistance if needed. Will prepare for bedside shift report.

## 2021-05-19 NOTE — PROGRESS NOTES
Report received from [facility]. Patient resting quietly in bed. Respirations present, even and unlabored on room air. Bed low and locked, safety measures in place. No signs of distress, no needs expressed by the patient. Call light within reach, encouraged patient to call with any needs. Will continue to monitor.

## 2021-05-19 NOTE — DISCHARGE INSTRUCTIONS
DISCHARGE SUMMARY from Nurse    PATIENT INSTRUCTIONS:    After general anesthesia or intravenous sedation, for 24 hours or while taking prescription Narcotics:  · Limit your activities  · Do not drive and operate hazardous machinery  · Do not make important personal or business decisions  · Do  not drink alcoholic beverages  · If you have not urinated within 8 hours after discharge, please contact your surgeon on call. What to do at Home:  Recommended activity: Activity as tolerated. If you experience any of the following symptoms worsening cough or wheezing, shortness of breath or fatigue not relieved with rest, please follow up with MD.    *  Please give a list of your current medications to your Primary Care Provider. *  Please update this list whenever your medications are discontinued, doses are      changed, or new medications (including over-the-counter products) are added. *  Please carry medication information at all times in case of emergency situations. These are general instructions for a healthy lifestyle:    No smoking/ No tobacco products/ Avoid exposure to second hand smoke  Surgeon General's Warning:  Quitting smoking now greatly reduces serious risk to your health. Obesity, smoking, and sedentary lifestyle greatly increases your risk for illness    A healthy diet, regular physical exercise & weight monitoring are important for maintaining a healthy lifestyle    You may be retaining fluid if you have a history of heart failure or if you experience any of the following symptoms:  Weight gain of 3 pounds or more overnight or 5 pounds in a week, increased swelling in our hands or feet or shortness of breath while lying flat in bed. Please call your doctor as soon as you notice any of these symptoms; do not wait until your next office visit. The discharge information has been reviewed with the patient. The patient verbalized understanding.   Discharge medications reviewed with the patient and appropriate educational materials and side effects teaching were provided. Patient Education        Chronic Obstructive Pulmonary Disease (COPD) Flare-Ups: Care Instructions  Your Care Instructions     Chronic obstructive pulmonary disease (COPD) is a lung disease that makes it hard to breathe. It is caused by damage to the lungs over many years, usually from smoking. COPD is often a mix of two diseases:  · Chronic bronchitis: The airways that carry air to the lungs (bronchial tubes) get inflamed and make a lot of mucus. This can narrow or block the airways. · Emphysema: In a healthy person, the tiny air sacs in the lungs are like balloons. As you breathe in and out, they get bigger and smaller to move air through your lungs. But with emphysema, these air sacs are damaged and lose their stretch. Less air gets in and out of the lungs. Many people with COPD have attacks called flare-ups or exacerbations. This is when your usual symptoms quickly get worse and stay worse. The doctor has checked you carefully. But problems can develop later. If you notice any problems or new symptoms, get medical treatment right away. Follow-up care is a key part of your treatment and safety. Be sure to make and go to all appointments, and call your doctor if you are having problems. It's also a good idea to know your test results and keep a list of the medicines you take. How can you care for yourself at home? · Be safe with medicines. Take your medicines exactly as prescribed. Call your doctor if you think you are having a problem with your medicine. You may be taking medicines such as:  ? Bronchodilators. These help open your airways and make breathing easier. ? Corticosteroids. These reduce airway inflammation. They may be given as pills, in a vein, or in an inhaled form. You may go home with pills in addition to an inhaler that you already use.   · A spacer may help you get more inhaled medicine to your lungs. Ask your doctor or pharmacist if a spacer is right for you. If it is, ask how to use it properly. · If your doctor prescribed antibiotics, take them as directed. Do not stop taking them just because you feel better. You need to take the full course of antibiotics. · If your doctor prescribed oxygen, use the flow rate your doctor has recommended. Do not change it without talking to your doctor first.  · Do not smoke. Smoking makes COPD worse. If you need help quitting, talk to your doctor about stop-smoking programs and medicines. These can increase your chances of quitting for good. When should you call for help? Call 911 anytime you think you may need emergency care. For example, call if:    · You have severe trouble breathing. Call your doctor now or seek immediate medical care if:    · You have new or worse trouble breathing.     · Your coughing or wheezing gets worse.     · You cough up dark brown or bloody mucus (sputum).     · You have a new or higher fever. Watch closely for changes in your health, and be sure to contact your doctor if:    · You notice more mucus or a change in the color of your mucus.     · You need to use your antibiotic or steroid pills.     · You do not get better as expected. Where can you learn more? Go to http://www.gray.com/  Enter D989 in the search box to learn more about \"Chronic Obstructive Pulmonary Disease (COPD) Flare-Ups: Care Instructions. \"  Current as of: October 26, 2020               Content Version: 12.8  © 9153-0400 [employer]. Care instructions adapted under license by [employer] (which disclaims liability or warranty for this information). If you have questions about a medical condition or this instruction, always ask your healthcare professional. [place] [address] any warranty or liability for your use of this information. ___________________________________________________________________________________________________________________________________

## 2021-05-19 NOTE — PROGRESS NOTES
Oxygen Qualifier Room air: SpO2 with O2 and liter flow Resting SpO2  92% Ambulating SpO2  91% Completed by:Patient ambulated without difficulty with no need for supplemental O2 Roselyn Gama, RT

## 2021-05-19 NOTE — DISCHARGE SUMMARY
[address] [facility] Hospitalist Discharge Summary Name:  Norrine Primrose. Age:70 y.o. Sex:male :  1950 MRN:  [id number] Admitting Physician: Sukumar Hooks DO Admit Date: 2021  4:12 PM  
Attending Physician: Alexis Ly DO 
Primary Care Physician: Belkis Jordan DO Discharge Physician: Mya Cox NP  Discharge date: 21 Discharged Condition: Stable Indication for Admission: Chief Complaint Patient presents with  Cough Reasons for hospitalization: 
Hospital Problems as of 2021 Date Reviewed: 2021 Codes Class Noted - Resolved POA * (Principal) [facility] ICD-10-CM: J44.1 ICD-9-CM: 491.21  2021 - Present Unknown Discharge Diagnosis: Acute on Chronic COPD Exacerbation Did Patient have Sepsis (YES OR NO): No  
 
 
Hospital Course : 
Acute on Chronic Respiratory Failure with Hypoxia 
-Vital signs stable on presentation, no SIRS, 94% on RA 
-O2 sat's dropped into high 80's during coughing fit in ED 
-Home O2 is intermittent use only HS, was on NC in ED after coughing fit, weaning down as tolerated, currently on 1L NC 
-CT Chest: patchy, groundglass infiltrates in LLL, near complete resolution of previously seen groundglass infiltrate in HOLA, large fluid collection in R hemithorax, small pericardial effusion 
-Questionable radiation pneumonitis, given pulmonary complexity and history of radiation pneumonitis, if no clinical improvement, will consult pulm 
-Empiric antibiotics 
-Sputum culture 
-Procal 0.05, repeat 0.05 
-WBC 11.7 -->9.5 
- Currently on 1L NC, he states he was on RA a bit yesterday but went back on O2, try to wean off O2 today, WBC back up to 16.2, may be reactive, remains afebrile, will repeat Procal in am, sputum culture with oropharyngeal contamination, will attempt to re-collect new specimen, will convert antibiotics to PO in anticipation of discharge tomorrow.  Walk test ordered for am. 
 
5/19/21 Walking ambulatory assessment completed and negative. Patient discharged with abx to completion and titrating steroids with order for f/u with PCP Acute on Chronic COPD Exacerbation 
-IV Solumedrol 
-Xopenex, Singulair, Spiriva 
-Aggressive pulmonary toilet 
-on 1L NC, wean O2 as tolerated 
 -Will taper IV Solumedrol, no wheezing on exam 
5/19/21 Patient to discharge as above Atrial Fibrillation s/p AV Mario Ablation and PM implant 
-Stable, continue home Eliquis, Toprol 5/19/21 Stable NSR continue BB and Eliquis 
  
Chronic Rhinosinusitis 
-CT Maxillofacial: multiple polyps/mucous retention cysts but no evidence of acute sinusitis 
-Symptomatic management-Afrin, Flonase, Atrovent 
-5/18 symptoms somewhat improved on nasal spray therapy  
 5/19/21 No nasal congestion noted. Patient discharged with Afrin/Singular GERD 
-stable, continue home meds 
  
Essential HTN 
-stable, continue home meds 5/19/21 Stable BP; continue current regimen Pericardial Effusion 
-Echo Dec 2020 EF 50%, technically difficult study 
-Echo normal LVEF 55-60%, small pericardial effusion identified 
-No signs of hemodynamic compromise, continue to monitor 
  
 
Consults: None Disposition: Home or Self Care Diet: DIET REGULAR Code Status: Full Code Discharge Info:  
 
Current Discharge Medication List  
  
START taking these medications Details  
amoxicillin-clavulanate (AUGMENTIN) 875-125 mg per tablet Take 1 Tablet by mouth every twelve (12) hours for 6 doses. Qty: 6 Tablet, Refills: 0 Start date: 5/19/2021, End date: 5/22/2021  
  
oxymetazoline (AFRIN) 0.05 % nasal spray 2 Sprays by Both Nostrils route two (2) times a day for 3 days. Qty: 1 Each, Refills: 0 Start date: 5/19/2021, End date: 5/22/2021  
  
montelukast (SINGULAIR) 10 mg tablet Take 1 Tablet by mouth nightly for 7 days. Qty: 7 Tablet, Refills: 0 Start date: 5/19/2021, End date: 5/26/2021  
  
predniSONE (DELTASONE) 10 mg tablet Take 40 mg by mouth daily for 3 days, THEN 20 mg daily for 3 days, THEN 10 mg daily for 3 days. Qty: 21 Tablet, Refills: 0 Start date: 5/19/2021, End date: 5/28/2021 CONTINUE these medications which have NOT CHANGED Details  
gabapentin (NEURONTIN) 300 mg capsule TAKE 1 CAPSULE BY MOUTH THREE TIMES A DAY Qty: 270 Cap, Refills: 0  
  
metoprolol succinate (TOPROL-XL) 50 mg XL tablet Take 1 Tab by mouth daily. Qty: 30 Tab, Refills: 5  
  
umeclidinium (Incruse Ellipta) 62.5 mcg/actuation inhaler Take 1 Puff by inhalation daily. Qty: 1 Inhaler, Refills: 11  
  
apixaban (Eliquis) 5 mg tablet Take 1 Tab by mouth two (2) times a day. Qty: 180 Tab, Refills: 3 OXYGEN-AIR DELIVERY SYSTEMS by Does Not Apply route. 2lpm qhs  
  
ipratropium (ATROVENT) 0.03 % nasal spray 2 Sprays by Both Nostrils route two (2) times a day. Qty: 30 mL, Refills: 5 Nebulizer & Compressor machine COPD Qty: 1 Each, Refills: 0  
  
polyethylene glycol (MIRALAX) 17 gram packet Take 1 Packet by mouth daily. Indications: constipation 
Qty: 30 Packet, Refills: 0  
  
esomeprazole (NexIUM) 20 mg capsule Take 20 mg by mouth nightly. prn  
  
ondansetron hcl (ZOFRAN) 8 mg tablet Take 1 Tab by mouth every eight (8) hours as needed for Nausea. Indications: nausea and vomiting caused by cancer drugs Qty: 60 Tab, Refills: 3 Associated Diagnoses: CINV (chemotherapy-induced nausea and vomiting) lidocaine-prilocaine (EMLA) topical cream Apply  to affected area as needed for Pain. Qty: 30 g, Refills: 0 Associated Diagnoses: Port-A-Cath in place Medications Discontinued During This Encounter Medication Reason  enoxaparin (LOVENOX) injection 40 mg REORDER  
 cefTRIAXone (ROCEPHIN) 2 g in 0.9% sodium chloride (MBP/ADV) 50 mL MBP  albuterol (PROVENTIL HFA, VENTOLIN HFA, PROAIR HFA) inhaler 2 Puff  albuterol (PROVENTIL HFA, VENTOLIN HFA, PROAIR HFA) inhaler 2 Puff  methylPREDNISolone (PF) (SOLU-MEDROL) injection 40 mg   
 azithromycin (ZITHROMAX) 500 mg in 0.9% sodium chloride 250 mL (VIAL-MATE)  ampicillin-sulbactam (UNASYN) 3 g in 0.9% sodium chloride (MBP/ADV) 100 mL MBP Follow Up Orders: Follow-up Appointments Procedures  FOLLOW UP VISIT Appointment in: One Week With PCP With PCP Standing Status:   Standing Number of Occurrences:   1 Order Specific Question:   Appointment in Answer: One Week Follow-up Information Follow up With Specialties Details Why Contact Info Axel Esqueda DO Internal Medicine   [address] 
[address] [facility] [address] 
[phone] Discharge Exam: 
 
Patient Vitals for the past 24 hrs: 
 Temp Pulse Resp BP SpO2  
05/19/21 1118 98.2 °F (36.8 °C) 72 18 115/72 93 % 05/19/21 0734     94 % 05/19/21 0731 97.5 °F (36.4 °C) 70 18 125/79 94 % 05/19/21 0300 97.4 °F (36.3 °C) 70 18 113/69 93 % 05/18/21 2229 97.7 °F (36.5 °C) 70 18 102/60 93 % 05/18/21 2005     92 % 05/18/21 1830 98.2 °F (36.8 °C) 70 18 113/67 95 % 05/18/21 1526 97.6 °F (36.4 °C) 70 18 92/63 93 % 05/18/21 1341     100 % Oxygen Therapy O2 Sat (%): 93 % (05/19/21 1118) Pulse via Oximetry: 70 beats per minute (05/19/21 0734) O2 Device: None (Room air) (05/19/21 0751) Skin Assessment: Clean, dry, & intact (05/18/21 2005) Skin Protection for O2 Device: N/A (05/18/21 2005) O2 Flow Rate (L/min): 0 l/min (05/19/21 0734) Estimated body mass index is 24.78 kg/m² as calculated from the following: 
  Height as of this encounter: 5' 10\" (1.778 m). Weight as of this encounter: 78.3 kg (172 lb 11.2 oz). Intake/Output Summary (Last 24 hours) at 5/19/2021 1215 Last data filed at 5/19/2021 1118 Gross per 24 hour Intake 840 ml Output 1175 ml Net -335 ml *Note that automatically entered I/Os may not be accurate; dependent on patient compliance with collection and accurate  by assistants. Physical Exam:  
General: No acute distress, speaking in full sentences, no use of accessory muscles HEENT: Pupils equal; oropharynx is clear Neck: no JVD Lungs: Clear to auscultation bilaterally Cardiovascular: Regular rate and rhythm with normal S1 and S2 Abdomen: Soft, nontender, nondistended, normoactive bowel sounds Extremities: No cyanosis clubbing or edema Neuro: Nonfocal, A&O x3  
Psych: Normal affect All Labs from Last 24 Hrs: 
Recent Results (from the past 24 hour(s)) METABOLIC PANEL, COMPREHENSIVE Collection Time: 05/19/21  5:53 AM  
Result Value Ref Range Sodium 143 136 - 145 mmol/L Potassium 4.4 3.5 - 5.1 mmol/L Chloride 108 (H) 98 - 107 mmol/L  
 CO2 29 21 - 32 mmol/L Anion gap 6 (L) 7 - 16 mmol/L Glucose 116 (H) 65 - 100 mg/dL BUN 13 8 - 23 MG/DL Creatinine 0.47 (L) 0.8 - 1.5 MG/DL  
 GFR est AA >60 >60 ml/min/1.73m2 GFR est non-AA >60 >60 ml/min/1.73m2 Calcium 9.3 8.3 - 10.4 MG/DL Bilirubin, total 0.2 0.2 - 1.1 MG/DL  
 ALT (SGPT) 17 12 - 65 U/L  
 AST (SGOT) 25 15 - 37 U/L Alk. phosphatase 112 50 - 136 U/L Protein, total 6.7 6.3 - 8.2 g/dL Albumin 2.4 (L) 3.2 - 4.6 g/dL Globulin 4.3 (H) 2.3 - 3.5 g/dL A-G Ratio 0.6 (L) 1.2 - 3.5    
CBC WITH AUTOMATED DIFF Collection Time: 05/19/21  5:53 AM  
Result Value Ref Range WBC 14.0 (H) 4.3 - 11.1 K/uL  
 RBC 4.04 (L) 4.23 - 5.6 M/uL  
 HGB 10.5 (L) 13.6 - 17.2 g/dL HCT 33.7 (L) 41.1 - 50.3 % MCV 83.4 79.6 - 97.8 FL  
 MCH 26.0 (L) 26.1 - 32.9 PG  
 MCHC 31.2 (L) 31.4 - 35.0 g/dL  
 RDW 17.2 (H) 11.9 - 14.6 % PLATELET 088 209 - 675 K/uL MPV 9.8 9.4 - 12.3 FL ABSOLUTE NRBC 0.00 0.0 - 0.2 K/uL  
 DF AUTOMATED NEUTROPHILS 93 (H) 43 - 78 % LYMPHOCYTES 4 (L) 13 - 44 % MONOCYTES 2 (L) 4.0 - 12.0 % EOSINOPHILS 0 (L) 0.5 - 7.8 % BASOPHILS 0 0.0 - 2.0 % IMMATURE GRANULOCYTES 2 0.0 - 5.0 %  
 ABS. NEUTROPHILS 13.0 (H) 1.7 - 8.2 K/UL  
 ABS. LYMPHOCYTES 0.5 0.5 - 4.6 K/UL  
 ABS. MONOCYTES 0.3 0.1 - 1.3 K/UL  
 ABS. EOSINOPHILS 0.0 0.0 - 0.8 K/UL  
 ABS. BASOPHILS 0.0 0.0 - 0.2 K/UL  
 ABS. IMM. GRANS. 0.2 0.0 - 0.5 K/UL PROCALCITONIN Collection Time: 05/19/21  5:53 AM  
Result Value Ref Range Procalcitonin <0.05 ng/mL All Micro Results Procedure Component Value Units Date/Time CULTURE, RESPIRATORY/SPUTUM/BRONCH Asher Muckle STAIN [[id number]] Order Status: Sent Specimen: Sputum MSSA/MRSA SC BY PCR, NASAL SWAB [[id number]] Collected: 05/18/21 1131 Order Status: Completed Specimen: Nasal swab Updated: 05/18/21 1305 Special Requests: NO SPECIAL REQUESTS Culture result:    
  SA target not detected. A MRSA NEGATIVE, SA NEGATIVE test result does not preclude MRSA or SA nasal colonization. CULTURE, RESPIRATORY/SPUTUM/BRONCH Checo Primrose [[id number]] Collected: 05/17/21 2144 Order Status: Completed Specimen: Sputum Updated: 05/18/21 1152 Special Requests: NO SPECIAL REQUESTS     
  GRAM STAIN    
  GRAM STAIN EXAMINATION OF THIS SPECIMEN INDICATED OROPHARYNGEAL CONTAMINATION. PLEASE SUBMIT A NEW SPECIMEN OR NOTIFY THE MICRO DEPT WITHIN 48HRS IF FURTHER WORKUP IS DESIRED. Culture result:    
  Please reorder and recollect. RESULTS VERIFIED, PHONED TO AND READ BACK BY 
JORGITO LEE ON 5/18/21 @1152, TA 
  
  
 
 
SARS-CoV-2 Lab Results \"Novel Coronavirus\" Test:  
Lab Results Component Value Date/Time COV2NT Not Detected 06/30/2020 09:25 PM  
  
\"Emergent Disease\" Test: No results found for: [facility] \"SARS-COV-2\" Test:  
Lab Results Component Value Date/Time XGCOVT Not detected 07/02/2020 10:00 AM  
 
Rapid Test:  
Lab Results Component Value Date/Time COVR Not detected 01/13/2021 12:10 PM  
 
  
 
 
Diagnostic Imaging/Tests:  
CT MAXILLOFACIAL WO CONT Result Date: 5/17/2021 EXAMINATION: CT MAXILLOFACIAL WO CONT HISTORY: Chronic rhinosinusitis. TECHNIQUE: CT of the facial bones was performed without contrast. Images were obtained in the axial plane and coronal and sagittal reconstructions were performed. CT scan performed using appropriate/available dose optimization/reduction/ALARA techniques. COMPARISON: None. FINDINGS: There are a few small polyps or mucous retention cysts in the superior and medial aspect of the maxillary sinus bilaterally. The frontal sinuses are clear. A polyp or mucous retention cyst is seen in a single left posterior ethmoid air cell. Similar finding is noted in the left sphenoid sinus. The remaining paranasal sinuses are clear. There are no fluid levels observed throughout the paranasal sinuses. The medial wall of the right maxillary sinus is absent, likely surgically. The ostiomeatal complex is patent bilaterally. Multiple polyps or mucous retention cysts as described above. No evidence to suggest acute sinusitis. CT CHEST W CONT Result Date: 5/16/2021 EXAMINATION: CT CHEST W CONT HISTORY: Lung cancer, rule out infection. TECHNIQUE: Axial images of the chest were obtained following the administration of intravenous contrast. Coronal reformatted images were submitted. All CT scans are performed using dose optimization technique as appropriate to a performed exam including automated exposure control and/or standardized protocols for targeted exams where dose is matched to indication/reason for exam/patient size. COMPARISON: 1/12/2021 and 12/22/2020 FINDINGS: The visualized thyroid gland is unremarkable. There are no enlarged mediastinal, hilar, or axillary lymph nodes. The heart is not enlarged. There is a small pericardial effusion. The esophagus appears normal. The airways are patent. There are mild patchy groundglass infiltrates seen in the left lower lobe. Groundglass opacity previously seen in the left upper lobe is almost completely resolved. The patient is post right pneumonectomy.  There is a moderate-sized fluid collection layering in the right hemithorax. There is pleural thickening throughout the right hemithorax. No pulmonary nodules. Visualized upper upper abdomen is normal. Cholelithiasis. No aggressive osseous lesions. Multiple healing posterior right rib fractures. The soft tissues are normal.  
 
Subtle, patchy groundglass infiltrates are noted throughout the left lower lobe. Near complete resolution of the previously described groundglass infiltrate in the left upper lobe. The patient is status post right pneumonectomy. A large fluid collection is layering in the right hemithorax. Small pericardial effusion. XR CHEST PORT Result Date: 5/16/2021 Portable chest x-ray CLINICAL INDICATION: Cough, syncope, prior right pneumonectomy FINDINGS: Single AP view the chest compared to a similar exam dated 5/3/2021 again shows a stable amount of air and fluid in the right chest cavity unchanged from the prior exam in a patient with a known right pneumonectomy. A right-sided chest port is in place. There is a left-sided pacer device in place. The left lung is well-expanded and clear. The cardiac silhouette and mediastinum are stable. No significant interval change. XR CHEST PORT Result Date: 5/3/2021 EXAMINATION: XR CHEST PORT 5/3/2021 6:53 PM ACCESSION NUMBER: [id number] COMPARISON: 04/05/2021 INDICATION: Status post pacer placement TECHNIQUE: A single view of the chest was obtained. FINDINGS: Support Devices: There is interval placement of a left chest wall pacer. The wires appear in appropriate location. There is a right IJ port with the tip in the superior vena cava. Lungs: There is previous right pneumonectomy. Fluid and gas is seen in the right hemithorax. There is no left pneumothorax. There is no effusion. There is no infiltrate. Cardiac Silhouette: Within normal limits in size. Mediastinum: The aorta is normal. Upper Abdomen: Normal Miscellaneous:  There are no lytic and blastic lesions. 1. Status post pacer placement without evidence of complication. Otherwise stable exam.  
 
 
Echocardiogram/EKG results: 
Results for orders placed or performed during the hospital encounter of 21  
2D ECHO LIMTED ADULT W OR WO CONTR Narrative [address] [facility] [address] [place], [address] [facility] 
[phone] Transthoracic Echocardiogram 
2D, M-mode, Doppler, and Color Doppler Patient: Jennifer Zaldivar 
MR #: [id number] : [facility] Age: 78 years Gender: Male Study date: 17-May-2021 Account #: [de-identified] Height: 70 in Weight: 168.7 lb 
BSA: 1.94 mï¾² Status:Routine Location: 810 BP: 99/ 65 Allergies: ALBUTEROL, CELECOXIB Sonographer:  Carol Goodpasture Group:  [facility] Referring Physician:  Paolo Burgos. Adan Barkley MD 
Reading Physician:  Eloy Reyna. Gavi Pérez MD [facility] INDICATIONS: Pericardial effusion. PROCEDURE: This was a routine study. A transthoracic echocardiogram was 
performed. The study included limited 2D imaging, M-mode, limited spectral 
Doppler, and color Doppler. Intravenous contrast (Definity) was administered. This was a technically difficult study. LEFT VENTRICLE: Size was normal. Systolic function was normal. Ejection 
fraction was estimated in the range of 55 % to 60 %. Wall thickness was  
normal. 
 
PERICARDIUM: A small pericardial effusion was identified. SUMMARY: 
 
-  Left ventricle: Systolic function was normal. Ejection fraction was 
estimated in the range of 55 % to 60 %. -  Pericardium: A small pericardial effusion was identified. SYSTEM MEASUREMENT TABLES 
 
2D mode AoR Diam (2D): 3.5 cm 
LA Dimension (2D): 3.2 cm IVS/LVPW (2D): 1 IVSd (2D): 0.9 cm LVIDd (2D): 4.4 cm LVIDs (2D): 2.7 cm 
LVPWd (2D): 0.8 cm RVIDd (2D): 2.8 cm Prepared and signed by 
 
Eloy Reyna. Gavi Pérez MD [facility] Signed 17-May-2021 16:16:22 EKG Results Procedure 720 Value Units Date/Time  EKG [[id number]] Collected: 05/16/21 1619 Order Status: Completed Updated: 05/17/21 1196 Ventricular Rate 69 BPM   
  Atrial Rate 122 BPM   
  QRS Duration 162 ms Q-T Interval 442 ms QTC Calculation (Bezet) 473 ms Calculated P Axis 89 degrees Calculated R Axis -101 degrees Calculated T Axis 61 degrees Diagnosis --  
  AV dual-paced rhythm Abnormal ECG No previous ECGs available Confirmed by Gurinder Phan ([id number]) on 5/17/2021 6:58:42 AM 
  
  
 
 
Results for orders placed or performed during the hospital encounter of 05/16/21 EKG, 12 LEAD, INITIAL Result Value Ref Range Ventricular Rate 69 BPM  
 Atrial Rate 122 BPM  
 QRS Duration 162 ms Q-T Interval 442 ms QTC Calculation (Bezet) 473 ms Calculated P Axis 89 degrees Calculated R Axis -101 degrees Calculated T Axis 61 degrees Diagnosis AV dual-paced rhythm Abnormal ECG No previous ECGs available Confirmed by Gurinder Phan ([id number]) on 5/17/2021 6:58:42 AM 
  
Results for orders placed or performed in visit on 02/16/21 AMB POC EKG ROUTINE W/ 12 LEADS, INTER & REP Narrative EKG reveals regular tachycardia at a rate of 150, QT is probably prolonged. Procedures done this admission: * No surgery found * Time spent in patient discharge planning and coordination 40 minutes. Signed By: Mahnaz Rosenthal  [facility] Service  May 19, 2021  12:15 PM

## 2021-05-19 NOTE — PROGRESS NOTES
ACUTE PHYSICAL THERAPY GOALS: 
(Developed with and agreed upon by patient and/or caregiver. ) LTG: 
(1.)Mr. Blanca Saldaña will move from supine to sit and sit to supine, scoot up and down and roll side to side in bed INDEPENDENTLY with bed flat within 7 treatment day(s). met 2021  
(2.)Mr. Blanca Saldaña will transfer from bed to chair and chair to bed with MODIFIED INDEPENDENCE using the least restrictive device within 7 treatment day(s). (3.)Mr. Blanca Saldaña will ambulate with MODIFIED INDEPENDENCE for 450 feet with the least restrictive device within 7 treatment day(s). (4.)Mr. Blanca Saldaña will ascend and descend 4 steps with STAND BY ASSIST using handrail(s) within 7 days. (5.)Mr. Blanca Saldaña will perform exercises per HEP independently to improve strength and mobility within 7 days. PHYSICAL THERAPY: Daily Note and AM Treatment Day # 3 Virginia Arguelles is a 78 y.o. male PRIMARY DIAGNOSIS: COPD exacerbation ([facility] COPD exacerbation ([facility] [J44.1] ASSESSMENT:  
 
REHAB RECOMMENDATIONS: CURRENT LEVEL OF FUNCTION: 
(Most Recently Demonstrated) Recommendation to date pending progress: 
Settin [facility]  Vs OP PT Equipment:  
 None Bed Mobility: 
 Independent Sit to Stand: 
Rik Mae Transfers: 
[address] [facility] Gait/Mobility: 
[address] [facility] ASSESSMENT: 
Mr. Blanca Saldaña presents in supine on room air. He ambulates 500' w/ no AD, but slightly unsteady in standing with very flexed posture. States that he would like to be able to stand up straighter but he loses his balance. He would be a good candidate for OP PT. Good progress in activity tolerance today. SUBJECTIVE:  
Mr. Blanca Saldaña states, Melani Marking we done already? \" SOCIAL HISTORY/ LIVING ENVIRONMENT:  
Home Environment: Private residence # Steps to Enter: 4 One/Two Story Residence: One story Living Alone: Yes Support Systems: Spouse/Significant Other/Partner, Family member(s) OBJECTIVE:  
 
PAIN: VITAL SIGNS: LINES/DRAINS:  
Pre Treatment: Pain Screen Pain Scale 1: Numeric (0 - 10) Pain Intensity 1: 0 Post Treatment: 0   IV 
O2 Device: None (Room air) MOBILITY: I Mod I S SBA CGA Min Mod Max Total  NT x2 Comments:  
Bed Mobility Rolling [x] [] [] [] [] [] [] [] [] [] [] Supine to Sit [x] [] [] [] [] [] [] [] [] [] [] Scooting [x] [] [] [] [] [] [] [] [] [] [] Sit to Supine [] [] [] [] [] [] [] [] [] [] []   
Transfers Sit to Stand [] [] [] [x] [] [] [] [] [] [] [] Bed to Chair [] [] [] [x] [x] [] [] [] [] [] []   
Stand to Sit [] [] [] [x] [] [] [] [] [] [] [] I=Independent, Mod I=Modified Independent, S=Supervision, SBA=Standby Assistance, CGA=Contact Guard Assistance,  
Min=Minimal Assistance, Mod=Moderate Assistance, Max=Maximal Assistance, Total=Total Assistance, NT=Not Tested BALANCE: Good Fair+ Fair Fair- Poor NT Comments Sitting Static [] [x] [] [] [] [] Sitting Dynamic [] [x] [] [] [] []   
         
Standing Static [] [] [x] [] [] []   
Standing Dynamic [] [] [x] [] [] [] GAIT: I Mod I S SBA CGA Min Mod Max Total  NT x2 Comments:  
Level of Assistance [] [] [] [] [x] [] [] [] [] [] [] Distance 500' DME Gait Belt Gait Quality Slow, mild shuffle, flexed posture Weightbearing Status N/A I=Independent, Mod I=Modified Independent, S=Supervision, SBA=Standby Assistance, CGA=Contact Guard Assistance,  
Min=Minimal Assistance, Mod=Moderate Assistance, Max=Maximal Assistance, Total=Total Assistance, NT=Not Tested PLAN:  
FREQUENCY/DURATION: PT Plan of Care: 3 times/week for duration of hospital stay or until stated goals are met, whichever comes first. 
TREATMENT:  
 
TREATMENT:  
($$ Therapeutic Activity: 23-37 mins    ) Therapeutic Activity (24 Minutes): Therapeutic activity included Supine to Sit, Scooting, Transfer Training, Ambulation on level ground, Standing balance and exercises to improve functional Mobility, Strength and Activity tolerance. TREATMENT GRID: 
N/A Date: 
5/18 Date: 
5/19/21 Date: Activity/Exercise Parameters Parameters Parameters LAQs 2x10 Ankle Pumps 2x10 Seated Marches 2x10 Seated Hip Abduction 2 x10 Level Ground Ambulation 2 x 250' Standing heel raises  x20 AB Standing toe raises  x20 AB Standing hip ab/ad  2x10 AB Shoulder shrugs  x20 AB Scapular retraction  x20 AB Key:  A=active, AA=active assisted, B=bilaterally, R=right, L=left AFTER TREATMENT POSITION/PRECAUTIONS: 
Chair, Needs within reach and RN notified INTERDISCIPLINARY COLLABORATION: 
RN/PCT and PT/PTA TOTAL TREATMENT DURATION: 
PT Patient Time In/Time Out Time In: 1108 Time Out: 1134 A Kennedy Hines, PT, DPT

## 2021-05-19 NOTE — PROGRESS NOTES
[address] May 19, 2021 RE: Gilda Rodrigez. To Whom It May Concern, This is to certify that Gilda Rain. was hospitalized at [facility] from 5/16/2021-5/19/2021 and may return to work on Friday May 21,2021. Please feel free to contact my office if you have any questions or concerns. Thank you for your assistance in this matter.  
 
 
Sincerely, 
Russell Silva RN

## 2021-05-19 NOTE — PROGRESS NOTES
Patient d/c home today. Patient has declines  states he active with Pulmonary rehab and he'll stick with that. Family will transport patient home. Patient has met all treatment goals / milestones. CM will continue to monitor and remain available for any needs or concerns that may occur. Care Management Interventions PCP Verified by CM: Yes ([facility], ) Mode of Transport at Discharge: Other (see comment) Transition of Care Consult (CM Consult): Discharge Planning Discharge Durable Medical Equipment: No 
Physical Therapy Consult: Yes Occupational Therapy Consult: No 
Speech Therapy Consult: No 
Current Support Network: Own Home (girlfriend lives with patient ) Confirm Follow Up Transport: Family The Patient and/or Patient Representative was Provided with a Choice of Provider and Agrees with the Discharge Plan?: No 
Name of the Patient Representative Who was Provided with a Choice of Provider and Agrees with the Discharge Plan: patient Freedom of Choice List was Provided with Basic Dialogue that Supports the Patient's Individualized Plan of Care/Goals, Treatment Preferences and Shares the Quality Data Associated with the Providers?: No 
[place] Resource Information Provided?: No 
Discharge Location Discharge Placement: Home

## 2021-06-08 PROBLEM — J86.0 BRONCHOPLEURAL FISTULA (HCC): Status: ACTIVE | Noted: 2021-01-01

## 2021-06-08 PROBLEM — J86.9 EMPYEMA (HCC): Status: ACTIVE | Noted: 2021-01-01

## 2021-06-08 PROBLEM — J96.01 ACUTE HYPOXEMIC RESPIRATORY FAILURE (HCC): Status: ACTIVE | Noted: 2021-01-01

## 2021-06-08 PROBLEM — J96.21 ACUTE ON CHRONIC RESPIRATORY FAILURE WITH HYPOXEMIA (HCC): Status: ACTIVE | Noted: 2021-01-01

## 2021-06-08 NOTE — Clinical Note
Status[de-identified] INPATIENT [101]   Type of Bed: Medical [8]   Cardiac Monitoring Required?: Yes   Inpatient Hospitalization Certified Necessary for the Following Reasons: 4.  Patient requires ICU level of care interventions (further clarification in H&P documentation)   Admitting Diagnosis: Acute on chronic respiratory failure with hypoxemia [facility]) [[id number]]   Admitting Physician: Lolly Scheuermann [[id number]]   Attending Physician: Heddie Cowden   Estimated Length of Stay: 7+ Midnights   Discharge Plan[de-identified] [facility]

## 2021-06-08 NOTE — PROCEDURES
INDICATION:   pneumothorax RIGHT Anesthesia:  1% lidocaine (10 ml) and 100 mcg of IV fentanyl Time out done and correct patient identified and correct incision site identified. Right chest sterilized. Area was prepped and draped in a sterile fashion posterior axillary line 1/7 intercostal space posteriorly ultrasound Marked area with fluid above the diaphragm and lidocaine was used to incise above the rib total of 10cc needle was introduced into the pleural space with return of pus wire was introduced into the needle and over 6 modified Seldinger general technique incision was made and the catheter was placed over the wire return of 500 cc of pus material.  Foul-smelling. Patient tolerated procedure well, no complications EBL --less than 5 cc ml. Samples were sent CXR pending. Severo Blend. MD

## 2021-06-08 NOTE — ED TRIAGE NOTES
Pt to triage via w/c. Pt states cough, sob, fever x 1 day. . Denies taking ibuprofen. Pt states pain in chest on right side. Pt had lobectomy on right side. Pt states

## 2021-06-08 NOTE — ED NOTES
Came into room to find pt in respiratory distress with oxygen sats in the 70's and RR in the 40's. Pt was coughing and spitting up yellow stuff. MD's called to bedside immeditly. Pt placed on non rebreather mask. Pt tilted to right sided and slowly improved oxygen sats. Pts work of breathing still high. RT at bedside to try bipap at this time.

## 2021-06-08 NOTE — ED PROVIDER NOTES
71-year-old white male presents with increased shortness of breath associated with fever onset yesterday. Patient has history of lung cancer and has undergone a right pneumonectomy. States his last procedure was in January of this year. He states that since then he has noticed that whenever he lies on his left side he has a large amount of \"yellow fluid\" comes up into his throat. Denies nausea, vomiting, diarrhea. No chest pain or abdominal pain. The history is provided by the patient. Fever Associated symptoms include congestion, cough and shortness of breath. Pertinent negatives include no chest pain, no vomiting, no headaches, no neck pain and no rash. Cough Associated symptoms include shortness of breath. Pertinent negatives include no chest pain, no headaches, no nausea and no vomiting. Past Medical History:  
Diagnosis Date  Arrhythmia Afib  Chronic obstructive pulmonary disease ([address] O2 at [facility]  Chronic pain   
 right torn rotator cuff; left knee  GERD (gastroesophageal reflux disease)   
 controlled with med  Hypertension hx-- off meds since 4/2020--- followed by dr. Talia Monteiro  Hypoxia 02/24/2020  Malignant neoplasm of lower lobe of right lung ([facility] [address] 02/26/2020 REC'D CHEMO AND RADIATION--- FOLLOWED BY DR. Candy Wilkins Osteoarthritis  Right lower lobe lung mass 02/24/2020  Status post total right knee replacement 2/29/2016 Past Surgical History:  
Procedure Laterality Date  HX COLONOSCOPY    
 HX KNEE ARTHROSCOPY Left   
 scope X 1, ACL recon X 1  
 HX KNEE ARTHROSCOPY Right 1974, 1975 X 2   
 HX KNEE REPLACEMENT Right 2016 [address]  HX VASCULAR ACCESS Right placed 3/2020  
 port in chest   
 IR INSERT TUNL CVC W PORT OVER 5 YEARS  3/18/2020  [place] CHEST SURGERY PROCEDURE UNLISTED    
 R lobectomy 6/2020; R complete pneumonectomy 1/2021  [place] SINUS SURGERY PROC UNLISTED  1988, 1991  
 sinus surg Family History: Problem Relation Age of Onset  Cancer Mother   
     uterine  Arrhythmia Sister   
     afib Social History Socioeconomic History  Marital status:  Spouse name: Not on file  Number of children: Not on file  Years of education: Not on file  Highest education level: Not on file Occupational History  Not on file Tobacco Use  Smoking status: Former Smoker Packs/day: 1.00 Years: 20.00 Pack years: 20.00 Quit date:  Years since quittin.4  Smokeless tobacco: Never Used  Tobacco comment: patient has no desire to resume Substance and Sexual Activity  Alcohol use: Yes Alcohol/week: 4.0 standard drinks Types: 4 Glasses of wine per week  Drug use: No  
 Sexual activity: Not on file Other Topics Concern   Service No  
 Blood Transfusions No  
 Caffeine Concern No  
 Occupational Exposure No  
 Hobby Hazards No  
 Sleep Concern No  
 Stress Concern No  
 Weight Concern No  
 Special Diet No  
 Back Care Not Asked  Exercise Yes  Bike Helmet Not Asked  [address] Yes  Self-Exams Not Asked Social History Narrative . Has long-time girlfriend. Continues to work doing IT support. Social Determinants of Health Financial Resource Strain:  Difficulty of Paying Living Expenses:   
Food Insecurity:  Worried About [address] in the Last Year:   
[address] in the Last Year:   
Transportation Needs:   
 Lack of Transportation (Medical):  Lack of Transportation (Non-Medical): Physical Activity:   
 Days of Exercise per Week:  Minutes of Exercise per Session:   
Stress:  Feeling of Stress :   
Social Connections:  Frequency of Communication with Friends and Family:  Frequency of Social Gatherings with Friends and Family:  Attends Hindu Services:  Active Member of Clubs or Organizations:  Attends Club or Organization Meetings:  Marital Status: Intimate Partner Violence:  Fear of Current or Ex-Partner:  Emotionally Abused:   
 Physically Abused:   
 Sexually Abused: ALLERGIES: Albuterol and Celebrex [celecoxib] Review of Systems Constitutional: Positive for fever. HENT: Positive for congestion. Respiratory: Positive for cough and shortness of breath. Cardiovascular: Negative for chest pain. Gastrointestinal: Negative for abdominal pain, nausea and vomiting. Genitourinary: Negative for dysuria. Musculoskeletal: Negative for back pain and neck pain. Skin: Negative for rash. Neurological: Negative for headaches. All other systems reviewed and are negative. Vitals:  
 06/08/21 1440 BP: 106/67 Pulse: 73 Resp: 24 Temp: (!) 102.6 °F (39.2 °C) SpO2: 92% Weight: 76.7 kg (169 lb) Height: 5' 10\" (1.778 m) Physical Exam 
Vitals and nursing note reviewed. Constitutional:   
   General: He is not in acute distress. Appearance: Normal appearance. He is not toxic-appearing. HENT:  
   Head: Normocephalic and atraumatic. Nose: Nose normal.  
   Mouth/Throat:  
   Mouth: Mucous membranes are dry. Pharynx: Oropharynx is clear. Comments: Erythematous uvula Eyes:  
   Conjunctiva/sclera: Conjunctivae normal.  
   Pupils: Pupils are equal, round, and reactive to light. Cardiovascular:  
   Rate and Rhythm: Normal rate and regular rhythm. Heart sounds: No murmur heard. Pulmonary:  
   Comments: Slight increased respiratory effort with coarse breath sounds on the left and diminished breath sounds on the right Abdominal:  
   General: There is no distension. Palpations: Abdomen is soft. Tenderness: There is no abdominal tenderness. Musculoskeletal:     
   General: No swelling. Normal range of motion. Cervical back: Normal range of motion and neck supple. Skin: 
   General: Skin is warm and dry.   
Neurological:  
   Mental Status: He is alert and oriented to person, place, and time. Psychiatric:     
   Mood and Affect: Mood normal.     
   Behavior: Behavior normal.  
 
  
 
MDM Number of Diagnoses or Management Options Diagnosis management comments: EKG shows a paced rhythm at 86. Chest x-ray shows right pneumonectomy with large air-fluid collection raising suspicion for bronchopleural fistula. Blood work shows mildly elevated lactic acid 2.2 otherwise unremarkable. Shortly after my initial evaluation, the patient had increased fluid/sputum production and developed respiratory distress. O2 saturation dropped into the 70s. He was placed on a nonrebreather. Patient was placed in a more upright position and leaned toward the right. Symptoms have improved, however he still has significant increased work of breathing. Will try BiPAP. ICU contacted and Dr. Piotr West currently evaluating the patient. Local care time 35 minutes management of respiratory distress with hypoxia Amount and/or Complexity of Data Reviewed Clinical lab tests: ordered and reviewed Tests in the radiology section of CPT®: ordered and reviewed Tests in the medicine section of CPT®: ordered and reviewed Discuss the patient with other providers: yes Independent visualization of images, tracings, or specimens: yes Risk of Complications, Morbidity, and/or Mortality Presenting problems: high Diagnostic procedures: high Management options: high EKG Date/Time: 6/8/2021 4:38 PM 
Performed by: Hammad Campos MD 
Authorized by: Hammad Campos MD  
 
ECG reviewed by ED Physician in the absence of a cardiologist: yes Interpretation: Interpretation: abnormal   
Rate:  
  ECG rate:  86 ECG rate assessment: normal   
Rhythm:  
  Rhythm: paced Pacing:  
  Capture:  Complete Type of pacing:  Ventricular

## 2021-06-08 NOTE — H&P
HISTORY AND PHYSICAL Dunia Joseph. 
 
6/8/2021 Date of Admission:  6/8/2021 The patient's chart is reviewed and the patient is discussed with the staff. Subjective:  
 
Patient is F 26 y.o.  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 Timeline of events: 
--RLL mass 2/2020, measuring 6.7cm x 5.9cm.   
--EBUS On 2/26/2020 and biopsy consistent with SCC.  
--Brain MRI was negative for brain mets but revealed a 2cm R parotid mass.  
--PET CT revealed a hypermetabolic RLL mass extending into the hilum and excessively hypermetabolic R parotid gland.  
--He had radiation and chemo and then underwent RML lobectomy 7/23/2020 for squamous cell carcinoma 
--He then completed 6 more rounds of chemo around 10/2020.   
--Repeat Chest CT on 12/22/2020 which revealed a large R pleural effusion and collapse of the remaining RUL.   
--R thoracentesis on 12/28/2020 by Dr. Afia Vasquez with 900mL bloody fluid removed. Unable to tolerate complete drainage. --CT placement on 1/4/21 with 800cc of serosanguineous drainage.  
--General Surgery was consulted and CXR revealed persistent loculated R effusion.  
-- OR on 1/6 for R VATS, converted to an open thoracotomy with extensive pneumolysis with decortication. Difficult surgery. Consulted pulmonary intraoperatively for bronch. Unable to get lung to inflate intraoperative 
--Pathology non-diagnostic. 
 Pt was hypotensive postoperatively requiring aylin. Pt was transferred to [address] [place], [place] [address]e had a repeat bronch on 1/7 secondary to persistent RUL atelectasis. He did not have any mucous plugging but his apical segment of the RUL was blocked by a web and the bronchoscope couldn't advance any further. Pt did have a stump area from prior lobectomy with abnormal appearing tissue that was biopsied,pathology noted below- non-diagnostic.  
-- 1/12 bronchoscopy. Since pneumonectomy he has had congestion every time he lays on his right side. He states it's been more recently with yellow sputum. It doesn't look much different, but over the past 36 hours he's developed more difficult breathing and fevers. He presented to ED today for the same. He has also been very weak. He was going to pulmonary rehab as recently as yesterday, but didn't have energy to do anything today. In ED he was acutely hypoxemic while leaning to his left and was coughing up copious amounts of yellow sputum. He has been slightly better on his right side on BIPAP. BPs have been borderline and he remains tachycardic and tachypneic though improving. Review of Systems A comprehensive review of systems was negative except for that written in the HPI. Patient Active Problem List  
Diagnosis Code  Essential hypertension with goal blood pressure less than 130/80 I10  Mass of lower lobe of right lung R91.8  Right lower lobe lung mass R91.8  Malignant neoplasm of lower lobe of right lung ([facility]) C34.31  
 GERD (gastroesophageal reflux disease) K21.9  Hypotension I95.9  Sepsis due to undetermined organism ([address] A41.9  Pulmonary emphysema ([address] J43.9  Cancer of bronchus of right lower lobe ([facility]) C34.31  
 Lung cancer ([address] C34.90  
 Cancer of right lung ([facility]) C34.91  
 Atrial tachycardia ([facility]) I47.1  S/P lobectomy of lung Z90.2  Cough R05  Dehydration E86.0  Chemotherapy induced neutropenia ([facility]) D70.1, T45.1X5A  Pleural effusion on right J90  
 Atelectasis of right lung J98.11  
 A-fib ([facility]) I48.91  
 Chronic respiratory failure with hypoxia ([facility]) J96.11  
 S/P thoracotomy Z98.890  
 PAF (paroxysmal atrial fibrillation) ([facility]) I48.0  Hypertension I10  Atrial fibrillation with RVR ([facility]) I48.91  
 Sick sinus syndrome ([facility]) I49.5  Pacemaker Z95.0  Heart block AV complete ([facility]) I44.2  COPD exacerbation ([address] J44.1  Acute hypoxemic respiratory failure ([address] J96.01  
 Bronchopleural fistula ([address] J86.0  Empyema ([facility] J86.9  Acute on chronic respiratory failure with hypoxemia (HCC) J96.21 Prior to Admission Medications Prescriptions Last Dose Informant Patient Reported? Taking? Nebulizer & Compressor machine   No No  
Sig: COPD OXYGEN-AIR DELIVERY SYSTEMS   Yes No  
Sig: by Does Not Apply route. 2lpm qhs  
apixaban (Eliquis) 5 mg tablet   No No  
Sig: Take 1 Tab by mouth two (2) times a day. esomeprazole (NexIUM) 20 mg capsule   Yes No  
Sig: Take 20 mg by mouth nightly. prn  
gabapentin (NEURONTIN) 300 mg capsule   No No  
Sig: TAKE 1 CAPSULE BY MOUTH THREE TIMES A DAY  
ipratropium (ATROVENT) 0.03 % nasal spray   No No  
Si Sprays by Both Nostrils route two (2) times a day. lidocaine-prilocaine (EMLA) topical cream   No No  
Sig: Apply  to affected area as needed for Pain.  
metoprolol succinate (TOPROL-XL) 50 mg XL tablet   No No  
Sig: Take 1 Tab by mouth daily. ondansetron hcl (ZOFRAN) 8 mg tablet   No No  
Sig: Take 1 Tab by mouth every eight (8) hours as needed for Nausea. Indications: nausea and vomiting caused by cancer drugs  
pantoprazole (PROTONIX) 40 mg tablet   No No  
Sig: Take 1 Tablet by mouth daily for 30 days. polyethylene glycol (MIRALAX) 17 gram packet   No No  
Sig: Take 1 Packet by mouth daily. Indications: constipation Patient taking differently: Take 17 g by mouth daily as needed. Indications: constipation  
umeclidinium (Incruse Ellipta) 62.5 mcg/actuation inhaler   No No  
Sig: Take 1 Puff by inhalation daily. Facility-Administered Medications: None Past Medical History:  
Diagnosis Date  Arrhythmia Afib  Chronic obstructive pulmonary disease ([facility] O2 [facility]  Chronic pain   
 right torn rotator cuff; left knee  GERD (gastroesophageal reflux disease)   
 controlled with med  Hypertension hx-- off meds since 2020--- followed by dr. Zoltan Perez  Hypoxia 2020  Malignant neoplasm of lower lobe of right lung ([facility] 2020 REC'D CHEMO AND RADIATION--- FOLLOWED BY DR. Alycia Ugarte Osteoarthritis  Right lower lobe lung mass 2020  Status post total right knee replacement 2016 Past Surgical History:  
Procedure Laterality Date  HX COLONOSCOPY    
 HX KNEE ARTHROSCOPY Left   
 scope X 1, ACL recon X 1  
 HX KNEE ARTHROSCOPY Right 1974, 1975 X 2   
 HX KNEE REPLACEMENT Right 2016 [address]  HX VASCULAR ACCESS Right placed 3/2020  
 port in chest   
 IR INSERT TUNL CVC W PORT OVER 5 YEARS  3/18/2020  ME CHEST SURGERY PROCEDURE UNLISTED    
 R lobectomy 2020; R complete pneumonectomy 2021  ME SINUS SURGERY PROC UNLISTED  ,   
 sinus surg Social History Socioeconomic History  Marital status:  Spouse name: Not on file  Number of children: Not on file  Years of education: Not on file  Highest education level: Not on file Occupational History  Not on file Tobacco Use  Smoking status: Former Smoker Packs/day: 1.00 Years: 20.00 Pack years: 20.00 Quit date:  Years since quittin.4  Smokeless tobacco: Never Used  Tobacco comment: patient has no desire to resume Substance and Sexual Activity  Alcohol use: Yes Alcohol/week: 4.0 standard drinks Types: 4 Glasses of wine per week  Drug use: No  
 Sexual activity: Not on file Other Topics Concern   Service No  
 Blood Transfusions No  
 Caffeine Concern No  
 Occupational Exposure No  
 Hobby Hazards No  
 Sleep Concern No  
 Stress Concern No  
 Weight Concern No  
 Special Diet No  
 Back Care Not Asked  Exercise Yes  Bike Helmet Not Asked [place] Yes  Self-Exams Not Asked Social History Narrative . Has long-time girlfriend. Continues to work doing IT support. Social Determinants of Health Financial Resource Strain:  Difficulty of Paying Living Expenses:   
Food Insecurity:  Worried About [facility] in the Last Year:   
[address] in the Last Year:   
Transportation Needs:   
 Lack of Transportation (Medical):  Lack of Transportation (Non-Medical): Physical Activity:   
 Days of Exercise per Week:  Minutes of Exercise per Session:   
Stress:  Feeling of Stress :   
Social Connections:  Frequency of Communication with Friends and Family:  Frequency of Social Gatherings with Friends and Family:  Attends [employer] Services:  Active Member of Clubs or Organizations:  Attends Club or Organization Meetings:  Marital Status: Intimate Partner Violence:  Fear of Current or Ex-Partner:  Emotionally Abused:   
 Physically Abused:   
 Sexually Abused:   
 
Family History Problem Relation Age of Onset  Cancer Mother   
     uterine  Arrhythmia Sister   
     afib Allergies Allergen Reactions  Albuterol Palpitations  Celebrex [Celecoxib] Itching Current Facility-Administered Medications Medication Dose Route Frequency  sodium chloride (NS) flush 5-10 mL  5-10 mL IntraVENous Q8H  
 sodium chloride (NS) flush 5-10 mL  5-10 mL IntraVENous PRN Current Outpatient Medications Medication Sig  pantoprazole (PROTONIX) 40 mg tablet Take 1 Tablet by mouth daily for 30 days.  gabapentin (NEURONTIN) 300 mg capsule TAKE 1 CAPSULE BY MOUTH THREE TIMES A DAY  metoprolol succinate (TOPROL-XL) 50 mg XL tablet Take 1 Tab by mouth daily.  umeclidinium (Incruse Ellipta) 62.5 mcg/actuation inhaler Take 1 Puff by inhalation daily.  apixaban (Eliquis) 5 mg tablet Take 1 Tab by mouth two (2) times a day.  OXYGEN-AIR DELIVERY SYSTEMS by Does Not Apply route. 2lpm qhs  
 ipratropium (ATROVENT) 0.03 % nasal spray 2 Sprays by Both Nostrils route two (2) times a day.  Nebulizer & Compressor machine COPD  polyethylene glycol (MIRALAX) 17 gram packet Take 1 Packet by mouth daily. Indications: constipation (Patient taking differently: Take 17 g by mouth daily as needed. Indications: constipation)  esomeprazole (NexIUM) 20 mg capsule Take 20 mg by mouth nightly. prn  
 ondansetron hcl (ZOFRAN) 8 mg tablet Take 1 Tab by mouth every eight (8) hours as needed for Nausea. Indications: nausea and vomiting caused by cancer drugs  lidocaine-prilocaine (EMLA) topical cream Apply  to affected area as needed for Pain. Objective:  
 
Vitals:  
 06/08/21 1647 06/08/21 1704 06/08/21 1722 06/08/21 1723 BP:  (!) 81/51  94/63 Pulse:  88  69 Resp:  (!) 42  12 Temp:      
SpO2: 99% 100% 99% 95% Weight:      
Height: PHYSICAL EXAM  
 
Constitutional:  the patient is acutely ill appearing EENMT:  Sclera clear, pupils equal, oral mucosa moist 
Respiratory: rhonchi, air movement bilaterally with coarse sound on R Cardiovascular:  RRR without M,G,R 
Gastrointestinal: soft and non-tender; with positive bowel sounds. Musculoskeletal: warm without cyanosis. There is no lower extremity edema. Skin:  no jaundice or rashes, no wounds Neurologic: no gross neuro deficits Psychiatric:  alert and oriented x 4 CXR: 
 
CXR Results  (Last 48 hours) 06/08/21 1520  XR CHEST PORT Final result Narrative:  History: Prior right pneumonectomy Exam: portable chest  
   
Comparison: 5/16/2021 Findings: Postpneumonectomy changes noted on the right with large air-fluid  
collection seen, similar to the findings present previously. A port overlies the  
right chest wall. The left lung is clear. A cardiac device overlies the left  
chest.  
   
IMPRESSIONs:   
   
Findings similar to those seen previously. The persistence of the large air and  
fluid collection on the right does raise the possibility of bronchopleural  
fistula status post pneumonectomy. Recent Labs  
  06/08/21 
1450 WBC 8.8 HGB 12.7* HCT 39.6*  
 Recent Labs  
  21 
1450   
K 4.1  * CO2 29 BUN 13  
CREA 0.77* CA 9.2 ALB 3.1* TBILI 1.2* ALT 8* No results for input(s): PH, PCO2, PO2, HCO3, PHI, PCO2I, PO2I, HCO3I in the last 72 hours. Recent Labs  
  21 
1450 LAC 2.2* Assessment:  (Medical Decision Making) Hospital Problems  Date Reviewed: 2021 Codes Class Noted POA Acute hypoxemic respiratory failure ([facility] ICD-10-CM: J96.01 
ICD-9-CM: 518.81  2021 Unknown Bronchopleural fistula ([facility]) ICD-10-CM: J86.0 ICD-9-CM: 510.0  2021 Unknown Empyema ([facility] ICD-10-CM: J86.9 ICD-9-CM: 510.9  2021 Unknown Acute on chronic respiratory failure with hypoxemia [facility]) ICD-10-CM: Q87.82 
ICD-9-CM: 518.84  2021 Unknown S/P thoracotomy ICD-10-CM: V14.408 ICD-9-CM: V45.89  2021 Yes Cancer of bronchus of right lower lobe [facility]) ICD-10-CM: C34.31 
ICD-9-CM: 162.5  2020 Yes Sepsis due to undetermined organism [facility]) ICD-10-CM: A41.9 ICD-9-CM: 038.9  2020 Yes 79 y/o male with prior RLL lung cancer lobectomy with complicated hemothorax necessitating completion pneumonectomy in 2021 now with apparent bronchopleural fistula, sepsis and acute on chronic respiratory failure from presumed empyema +/- pneumonia. Plan:  (Medical Decision Making) --Will admit for further medical management to the ICU 
--Continue BIPAP 
--D/w Dr. Mendoza [place] for chest tube this evening for drainage of possible empyema/sepsis --continue abx, Vanc, Zosyn 
--spoke with Dr. Elijah Aguero, we think he will need flap closer of stump, but needs to be stabilized first.  
--likely will need bronch to inspect for bronchopleural fistula ideally when more stabilized or if requires intubation 
--Respiratory nebulizer treatments; xopenex 3% 
--culture sputum, blood, pleural fluid --discussed the complicated course with him and his wife and plan for support and numerous procedures 
--may need additional fluid, central line or pressors 
--he is on Eliquis, I held this, but did Dr. Viola Gerardo and will plan on pigtail chest tube over wire rather than surgical chest tube due to this, will have to upsize if not draining adequately Full Code The patient is critically ill with respiratory failure, circulatory failure and requires high complexity decision making for assessment and support including frequent ventilator adjustment , frequent evaluation and titration of therapies , application of advanced monitoring technologies and extensive interpretation of multiple databases Time devoted to patient care services described in this note- 15 min face to face/ 25 min total evaluation time. Cumulative time devoted to patient care services by me for day of service -40 min Sha Rojas MD

## 2021-06-08 NOTE — PROGRESS NOTES
TRANSFER - IN REPORT: 
 
Verbal report received from [facility] LILIANA RN (name) on [address].  being received from ED (unit) for routine progression of care Report consisted of patients Situation, Background, Assessment and  
Recommendations(SBAR). Information from the following report(s) SBAR, Kardex, ED Summary, OR Summary, Procedure Summary, Intake/Output, MAR, Recent Results, Med Rec Status, Cardiac Rhythm PACED, Alarm Parameters  and Quality Measures was reviewed with the receiving nurse. Opportunity for questions and clarification was provided. Assessment completed upon patients arrival to unit and care assumed.

## 2021-06-08 NOTE — ED NOTES
TRANSFER - OUT REPORT: 
 
Verbal report given to [employer] (name) on Sheri Egan.  being transferred to [address] [place] [address] (unit) for routine progression of care Report consisted of patients Situation, Background, Assessment and  
Recommendations(SBAR). Information from the following report(s) SBAR was reviewed with the receiving nurse. Lines:  
Venous Access Device chest port  03/18/20 Upper chest (subclavicular area, right (Active) Peripheral IV 06/08/21 Distal;Left;Posterior Forearm (Active) Peripheral IV 06/08/21 Right Forearm (Active) Site Assessment Clean, dry, & intact 06/08/21 1739 Phlebitis Assessment 0 06/08/21 1739 Infiltration Assessment 0 06/08/21 1739 Dressing Status Clean, dry, & intact 06/08/21 1739 Opportunity for questions and clarification was provided. Patient transported with: 
 Monitor Registered Nurse

## 2021-06-09 NOTE — PROGRESS NOTES
Bedside and verbal shift change report received from  [address]  (offgoing nurse). Report included the following information SBAR, Kardex, ED Summary, Procedure Summary, Intake/Output, MAR, Recent Results, Med Rec Status, Cardiac Rhythm PACED, Alarm Parameters  and Quality Measures. Dual skin assessment completed at bedside: R CT IN PLACE SMALL AIR LEAK  (list pertinent skin assessment findings) Dual verification of gtts completed (name of gtts verified): NO GTTS

## 2021-06-09 NOTE — CONSULTS
H&P/Consult Note/Progress Note/Office Note:   Alexi Rene MRN: [id number]  :1950  Age:75 y.o.    HPI: Alexi Rene is a 78 y.o. male who we are asked by Dr. 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 The patient has a PMHx of RLL lung cancer and is s/p pneumonectomy in 2021 with Dr. Johnaa Matamoros. He presented with complaints of fever and shortness of breath. He was admitted on 2021 for sepsis r/t right sided empyema. The patient states that over the course of the past few months when he would roll on his left side he would have \"gushing\" fluid from his chest. He also complained of congestion when he would lie on his right side. A chest tube was inserted by pulmonary with the return of  500mL of purulent, foul smelling material. A culture was obtained and has not resulted yet. At present, his chest tube output is serous. He is on Levo and Zosyn. Past Medical History:   Diagnosis Date    Arrhythmia     Afib     Chronic obstructive pulmonary disease (HCC)     O2 at HS    Chronic pain     right torn rotator cuff; left knee    GERD (gastroesophageal reflux disease)     controlled with med    Hypertension     hx-- off meds since 2020--- followed by dr. Christy Bermudez Hypoxia 2020    Malignant neoplasm of lower lobe of right lung ([facility] [address] 2020    REC'D CHEMO AND RADIATION--- FOLLOWED BY DR. FOX    Osteoarthritis     Right lower lobe lung mass 2020    Status post total right knee replacement 2016     Past Surgical History:   Procedure Laterality Date    HX COLONOSCOPY      HX KNEE ARTHROSCOPY Left     scope X 1, ACL recon X 1    HX KNEE ARTHROSCOPY Right , 1975    X 2     HX KNEE REPLACEMENT Right     HX TONSILLECTOMY      HX VASCULAR ACCESS Right placed 3/2020    port in chest     IR INSERT TUNL CVC W PORT OVER 5 YEARS  3/18/2020    DC CHEST SURGERY PROCEDURE UNLISTED      R lobectomy 2020; R complete pneumonectomy 2021    DC SINUS SURGERY PROC UNLISTED  ,     sinus surg     Current Facility-Administered Medications   Medication Dose Route Frequency    NOREPINephrine (LEVOPHED) 4 mg in 5% dextrose 250 mL infusion  0.5-30 mcg/min IntraVENous TITRATE    NOREPINephrine (LEVOPHED) 4 mg/250 mL (16 mcg/mL) in dextrose 5% 250 mL infusion        heparin 25,000 units in dextrose 500 mL infusion  12-25 Units/kg/hr IntraVENous TITRATE    piperacillin-tazobactam (ZOSYN) 4.5 g in 0.9% sodium chloride (MBP/ADV) 100 mL MBP  4.5 g IntraVENous Q8H    [Held by provider] apixaban (ELIQUIS) tablet 5 mg  5 mg Oral BID    pantoprazole (PROTONIX) tablet 40 mg  40 mg Oral ACB    gabapentin (NEURONTIN) capsule 300 mg  300 mg Oral TID    polyethylene glycol (MIRALAX) packet 17 g  17 g Oral DAILY PRN    sodium chloride (NS) flush 5-40 mL  5-40 mL IntraVENous Q8H    sodium chloride (NS) flush 5-40 mL  5-40 mL IntraVENous PRN    oxyCODONE-acetaminophen (PERCOCET) 5-325 mg per tablet 1 Tablet  1 Tablet Oral Q4H PRN    morphine injection 2 mg  2 mg IntraVENous Q3H PRN    sodium chloride 3% hypertonic nebulizer soln  4 mL Nebulization BID    levalbuterol (XOPENEX) nebulizer soln 1.25 mg/3 mL  1.25 mg Nebulization QID RT    vancomycin (VANCOCIN) 1250 mg in  ml infusion  1,250 mg IntraVENous Q8H    Vancomycin Trough Reminder   Other ONCE     Albuterol and Celebrex [celecoxib]  Social History     Socioeconomic History    Marital status:      Spouse name: Not on file    Number of children: Not on file    Years of education: Not on file    Highest education level: Not on file   Tobacco Use    Smoking status: Former Smoker     Packs/day: 1.00     Years: 20.00     Pack years: 20.00     Quit date:      Years since quittin.4    Smokeless tobacco: Never Used    Tobacco comment: patient has no desire to resume    Substance and Sexual Activity    Alcohol use:  Yes     Alcohol/week: 4.0 standard drinks     Types: 4 Glasses of wine per week    Drug use: No   Other Topics Concern     Service No    Blood Transfusions No    Caffeine Concern No    Occupational Exposure No    Hobby Hazards No    Sleep Concern No    Stress Concern No    Weight Concern No    Special Diet No    Exercise Yes    Seat Belt Yes   Social History Narrative    . Has long-time girlfriend. Continues to work doing IT support. Social Determinants of Health     Financial Resource Strain:     Difficulty of Paying Living Expenses:    Food Insecurity:     Worried About Running Out of Food in the Last Year:     [address] in the Last Year:    Transportation Needs:     Lack of Transportation (Medical):  Lack of Transportation (Non-Medical):    Physical Activity:     Days of Exercise per Week:     Minutes of Exercise per Session:    Stress:     Feeling of Stress :    Social Connections:     Frequency of Communication with Friends and Family:     Frequency of Social Gatherings with Friends and Family:     Attends Buddhist Services:     Active Member of Clubs or Organizations:     Attends Club or Organization Meetings:     Marital Status:      Social History     Tobacco Use   Smoking Status Former Smoker    Packs/day: 1.00    Years: 20.00    Pack years: 20.00    Quit date:     Years since quittin.4   Smokeless Tobacco Never Used   Tobacco Comment    patient has no desire to resume      Family History   Problem Relation Age of Onset    Cancer Mother         uterine    Arrhythmia Sister         afib     ROS: The patient has no difficulty with chest pain or shortness of breath. No fever or chills. Comprehensive review of systems was otherwise unremarkable except as noted above.     Physical Exam:   Visit Vitals  BP (!) 83/53   Pulse 80   Temp 97.6 °F (36.4 °C)   Resp 24   Ht 5' 10\" (1.778 m)   Wt 170 lb 9.6 oz (77.4 kg)   SpO2 96%   BMI 24.48 kg/m²     Constitutional: Alert, oriented, cooperative patient in no acute distress; appears stated age    Eyes:Sclera are clear. EOMs intact  ENMT: no external lesions gross hearing normal; no obvious neck masses, no ear or lip lesions, nares normal  CV: RRR. Normal perfusion  Resp: No JVD. Breathing is  non-labored; no audible wheezing. Right sided chest tube to under water seal without air leak   GI: soft and non-distended     Musculoskeletal: unremarkable with normal function. No embolic signs or cyanosis.    Neuro:  Oriented; moves all 4; no focal deficits  Psychiatric: normal affect and mood, no memory impairment    Recent vitals (if inpt):  Patient Vitals for the past 24 hrs:   BP Temp Pulse Resp SpO2 Height Weight   06/09/21 0914 (!) 83/53  80 24 96 %     06/09/21 0900   80 (!) 36 95 %     06/09/21 0859 (!) 81/52    95 %     06/09/21 0844 (!) 82/55    95 %     06/09/21 0830   80 24 96 %     06/09/21 0829 (!) 93/57  80 19 94 %     06/09/21 0814 (!) 83/55  80 22 97 %     06/09/21 0800   74 18 100 %     06/09/21 0759 (!) 83/54  74 19 100 %     06/09/21 0749     100 %     06/09/21 0746   74 17 100 %     06/09/21 0730   78 27 99 %     06/09/21 0729 (!) 85/52  79 19 100 %     06/09/21 0714 (!) 86/55  75 23 100 %     06/09/21 0700  97.6 °F (36.4 °C) 78 29 97 %     06/09/21 0659 (!) 88/56  75  99 %     06/09/21 0629 (!) 89/57  74 (!) 33 100 %     06/09/21 0618 (!) 86/55    99 %     06/09/21 0559 90/61  72 17 99 %     06/09/21 0545 (!) 95/53  69 15 98 %     06/09/21 0459 (!) 88/54  97 23 100 %     06/09/21 0415 (!) 82/51  69 21 100 %     06/09/21 0400 (!) 86/56  69 22 100 %     06/09/21 0344 (!) 84/55  69 15 100 %     06/09/21 0329 (!) 88/52  71 30 100 %     06/09/21 0315 (!) 85/57  69 12 100 %     06/09/21 0259 (!) 91/55 96.9 °F (36.1 °C) 69 15 100 %     06/09/21 0229 (!) 91/53  71 26 99 %     06/09/21 0214 (!) 85/53  69 28 100 %     06/09/21 0159 (!) 76/52  69 19 100 %     06/09/21 0144 (!) 75/51  69 21 99 %     06/09/21 0114 (!) 82/53  69 21 100 %     06/09/21 0059 (!) 88/59  69 30 100 %     06/09/21 0014 (!) 83/55  69 22 100 %     06/08/21 2359 (!) 80/54 98.1 °F (36.7 °C) 69 22 99 %     06/08/21 2259 (!) 86/56  70 25 100 %     06/08/21 2244 (!) 84/54  72 20 100 %     06/08/21 2230 (!) 83/55  73 19 100 %     06/08/21 2159 (!) 85/54  78 22 99 %     06/08/21 2129 (!) 86/58  69 (!) 44 99 %     06/08/21 2114 (!) 90/59  78 (!) 34 98 %     06/08/21 2059 90/60  79 24 99 %     06/08/21 2044 101/60  80 14 100 %     06/08/21 2029 (!) 97/53  83 13 99 %     06/08/21 2025       170 lb 9.6 oz (77.4 kg)   06/08/21 2014 (!) 96/55  85 17 98 %     06/08/21 1949 101/67  82 28 100 %     06/08/21 1944 91/63  80 (!) 31 97 %     06/08/21 1929 95/65  81  100 %     06/08/21 1924 95/67  81  100 %     06/08/21 1859 101/67 97.9 °F (36.6 °C) 83 26 99 %     06/08/21 1820  (!) 101.4 °F (38.6 °C)        06/08/21 1819  (!) 101.4 °F (38.6 °C) 69 26 100 %  172 lb (78 kg)   06/08/21 1723 94/63  69 12 95 %     06/08/21 1722     99 %     06/08/21 1708 102/65  82 (!) 31 99 %     06/08/21 1704 (!) 81/51  88 (!) 42 100 %     06/08/21 1647     99 %     06/08/21 1640   78 (!) 38 99 %     06/08/21 1629 (!) 152/74 99.9 °F (37.7 °C) 73 (!) 31 100 %     06/08/21 1626 (!) 167/110  75 (!) 52 98 %     06/08/21 1624   (!) 114 (!) 51 93 %     06/08/21 1622   (!) 112 22 (!) 74 %     06/08/21 1440 106/67 (!) 102.6 °F (39.2 °C) 73 24 92 % 5' 10\" (1.778 m) 169 lb (76.7 kg)       Labs:  Recent Labs     06/09/21  0333 06/08/21  2200 06/08/21  1450   WBC 6.3  --  8.8   HGB 9.1*  --  12.7*   *  --  167     --  137   K 4.2  --  4.1   *  --  103   CO2 26  --  29   BUN 13  --  13   CREA 0.54*  --  0.77*   *  --  132*   PTP  --  21.7*  --    INR  --  1.9  --    APTT  --  45.6*  --    TBILI  --   --  1.2*   ALT  --   --  8*   AP  --   --  142* Lab Results   Component Value Date/Time    WBC 6.3 06/09/2021 03:33 AM    HGB 9.1 (L) 06/09/2021 03:33 AM    PLATELET 184 (L) 26/05/6218 03:33 AM    Sodium 141 06/09/2021 03:33 AM    Potassium 4.2 06/09/2021 03:33 AM    Chloride 108 (H) 06/09/2021 03:33 AM    CO2 26 06/09/2021 03:33 AM    BUN 13 06/09/2021 03:33 AM    Creatinine 0.54 (L) 06/09/2021 03:33 AM    Glucose 110 (H) 06/09/2021 03:33 AM    INR 1.9 06/08/2021 10:00 PM    aPTT 45.6 (H) 06/08/2021 10:00 PM    Bilirubin, total 1.2 (H) 06/08/2021 02:50 PM    Bilirubin, direct 0.8 (H) 05/05/2020 02:27 PM    ALT (SGPT) 8 (L) 06/08/2021 02:50 PM    Alk. phosphatase 142 (H) 06/08/2021 02:50 PM    Troponin-I, Qt. <0.02 (L) 05/05/2020 01:09 AM       I reviewed recent labs and recent radiologic studies. CT Results (most recent):  Results from Hospital Encounter encounter on 05/16/21    CT CHEST W CONT    Narrative  EXAMINATION: CT CHEST W CONT    HISTORY: Lung cancer, rule out infection. TECHNIQUE: Axial images of the chest were obtained following the administration  of intravenous contrast. Coronal reformatted images were submitted. All CT scans  are performed using dose optimization technique as appropriate to a performed  exam including automated exposure control and/or standardized protocols for  targeted exams where dose is matched to indication/reason for exam/patient size. COMPARISON: 1/12/2021 and 12/22/2020    FINDINGS:  The visualized thyroid gland is unremarkable. There are no enlarged mediastinal,  hilar, or axillary lymph nodes. The heart is not enlarged. There is a small pericardial effusion. The esophagus  appears normal.    The airways are patent. There are mild patchy groundglass infiltrates seen in  the left lower lobe. Groundglass opacity previously seen in the left upper lobe  is almost completely resolved. The patient is post right pneumonectomy. There is a moderate-sized fluid  collection layering in the right hemithorax.  There is pleural thickening  throughout the right hemithorax. No pulmonary nodules. Visualized upper upper abdomen is normal. Cholelithiasis. No aggressive osseous lesions. Multiple healing posterior right rib fractures. The soft tissues are normal.    Impression  Subtle, patchy groundglass infiltrates are noted throughout the left lower lobe. Near complete resolution of the previously described groundglass infiltrate in  the left upper lobe. The patient is status post right pneumonectomy. A large fluid collection is  layering in the right hemithorax. Small pericardial effusion. I independently reviewed radiology images for studies I described above or studies I have ordered.    Admission date (for inpatients): 6/8/2021   * No surgery found *  * No surgery found *    ASSESSMENT/PLAN:  Problem List  Date Reviewed: 4/13/2021        Codes Class Noted    Pacemaker ICD-10-CM: Z95.0  ICD-9-CM: V45.01  5/3/2021        Acute hypoxemic respiratory failure [facility]) ICD-10-CM: J96.01  ICD-9-CM: 518.81  6/8/2021        Bronchopleural fistula ([facility] ICD-10-CM: J86.0  ICD-9-CM: 510.0  6/8/2021        Empyema ([facility] ICD-10-CM: J86.9  ICD-9-CM: 510.9  6/8/2021        Acute on chronic respiratory failure with hypoxemia (HCC) ICD-10-CM: J96.21  ICD-9-CM: 518.84  6/8/2021        COPD exacerbation ([facility] [address] ICD-10-CM: J44.1  ICD-9-CM: 491.21  5/16/2021        Heart block AV complete ([facility] [address] ICD-10-CM: I44.2  ICD-9-CM: 426.0  5/3/2021        Sick sinus syndrome (HCC) ICD-10-CM: I49.5  ICD-9-CM: 427.81  3/18/2021        PAF (paroxysmal atrial fibrillation) (HCC) ICD-10-CM: I48.0  ICD-9-CM: 427.31  2/25/2021        Hypertension ICD-10-CM: I10  ICD-9-CM: 401.9  2/25/2021        Atrial fibrillation with RVR ([facility]) ICD-10-CM: I48.91  ICD-9-CM: 427.31  2/25/2021        S/P thoracotomy ICD-10-CM: H37.229  ICD-9-CM: V45.89  1/7/2021        Atelectasis of right lung ICD-10-CM: J98.11  ICD-9-CM: 518.0  1/4/2021        A-fib (HCC) (Chronic) ICD-10-CM: I48.91  ICD-9-CM: 427.31  1/4/2021        Chronic respiratory failure with hypoxia (HCC) (Chronic) ICD-10-CM: J96.11  ICD-9-CM: 518.83, 799.02  1/4/2021        Pleural effusion on right ICD-10-CM: J90  ICD-9-CM: 511.9  12/28/2020        Chemotherapy induced neutropenia (HCC) ICD-10-CM: D70.1, T45.1X5A  ICD-9-CM: 288.03, E933.1  10/23/2020        Dehydration ICD-10-CM: E86.0  ICD-9-CM: 276.51  9/15/2020        S/P lobectomy of lung ICD-10-CM: Z90.2  ICD-9-CM: V45.89  7/29/2020        Cough ICD-10-CM: R05  ICD-9-CM: 786.2  7/29/2020        Atrial tachycardia (HCC) ICD-10-CM: I47.1  ICD-9-CM: 427.89  7/26/2020        Cancer of bronchus of right lower lobe (HCC) ICD-10-CM: C34.31  ICD-9-CM: 162.5  7/23/2020        Lung cancer (HCC) (Chronic) ICD-10-CM: C34.90  ICD-9-CM: 162.9  7/23/2020        [facility] ([facility] ICD-10-CM: C34.91  ICD-9-CM: 162.9  7/23/2020        Pulmonary emphysema (HCC) (Chronic) ICD-10-CM: J43.9  ICD-9-CM: 492.8  7/7/2020        GERD (gastroesophageal reflux disease) ICD-10-CM: K21.9  ICD-9-CM: 530.81  Unknown        Hypotension (Chronic) ICD-10-CM: I95.9  ICD-9-CM: 458.9  5/5/2020    Overview Signed 1/11/2021  8:39 AM by Julianna Healy NP     On midodrine             Sepsis due to undetermined organism [facility]) ICD-10-CM: A41.9  ICD-9-CM: 038.9  5/5/2020        Malignant neoplasm of lower lobe of right lung ([address] (Chronic) ICD-10-CM: C34.31  ICD-9-CM: 162.5  2/26/2020    Overview Signed 1/4/2021  7:38 AM by Reji Eason MD     Dec 2020- Echo EF 50%, technically difficult, cannot assess regional wall motion. Aortic root 4.1 cm. No significant valvular disease.   Normal DF             Mass of lower lobe of right lung ICD-10-CM: R91.8  ICD-9-CM: 786.6  2/23/2020        Right lower lobe lung mass ICD-10-CM: R91.8  ICD-9-CM: 786.6  2/23/2020        Essential hypertension with goal blood pressure less than 130/80 (Chronic) ICD-10-CM: I10  ICD-9-CM: 401.9  2/19/2018 Active Problems:    Sepsis due to undetermined organism ([address] (5/5/2020)      Cancer of bronchus of right lower lobe ([address] (7/23/2020)      S/P thoracotomy (1/7/2021)      Acute hypoxemic respiratory failure ([address] (6/8/2021)      Bronchopleural fistula ([address] (6/8/2021)      Empyema ([address] (6/8/2021)      Acute on chronic respiratory failure with hypoxemia ([address] (6/8/2021)       Care management per primary  CT to underwater seal  ABX  Await Cx  Surgical plan per Dr. Celia Milner    Signed:  Olinda Kemp NP     I have seen and examined the patient with the Nurse Practitioner and agree with the above assessment and plan. Concerning for right bronchopleural fistula. Once confirmed surgical intervention will be needed.      Faith Horowitz MD

## 2021-06-09 NOTE — PROGRESS NOTES
Critical Care Daily Progress Note: 6/9/2021 Caryn Lama. Admission Date: 6/8/2021 The patient's chart is reviewed and the patient is discussed with the staff. 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 Timeline of events: 
--RLL mass 2/2020, measuring 6.7cm x 5.9cm.   
--EBUS On 2/26/2020 and biopsy consistent with SCC.  
--Brain MRI was negative for brain mets but revealed a 2cm R parotid mass.  
--PET CT revealed a hypermetabolic RLL mass extending into the hilum and excessively hypermetabolic R parotid gland.  
--He had radiation and chemo and then underwent RML lobectomy 7/23/2020 for squamous cell carcinoma 
--He then completed 6 more rounds of chemo around 10/2020.   
--Repeat Chest CT on 12/22/2020 which revealed a large R pleural effusion and collapse of the remaining RUL.   
--R thoracentesis on 12/28/2020 by Dr. Ana Paula Sampson with 900mL bloody fluid removed. Unable to tolerate complete drainage. --CT placement on 1/4/21 with 800cc of serosanguineous drainage.  
--General Surgery was consulted and CXR revealed persistent loculated R effusion.  
-- OR on 1/6 for R VATS, converted to an open thoracotomy with extensive pneumolysis with decortication. Difficult surgery. Consulted pulmonary intraoperatively for bronch. Unable to get lung to inflate intraoperative 
--Pathology non-diagnostic. 
 Pt was hypotensive postoperatively requiring aylin. Pt was transferred to [address] [place], [place] [address]e had a repeat bronch on 1/7 secondary to persistent RUL atelectasis. He did not have any mucous plugging but his apical segment of the RUL was blocked by a web and the bronchoscope couldn't advance any further. Pt did have a stump area from prior lobectomy with abnormal appearing tissue that was biopsied,pathology noted below- non-diagnostic.  
-- 1/12 bronchoscopy.   
 
Since pneumonectomy he has had congestion every time he lays on his right side. He states it's been more recently with yellow sputum. Presented to the ER 6/8 after developing more difficulty breathing and fevers as well as weakness. In ER patient was hypoxic and coughing copius amounts of yellow sputum while on L Side, slightly improved on R side with BiPAP. Chest tube placed on R side for possible empyema. Subjective:  
 
Patient states he is feeling better today. Received 3L of fluid overnight for hypotension. States his breathing feels better today, still having some SOB. Currently on 1L NC. Also having cough with clear sputum production. Cough causing some R shoulder pain. Chest tube to water seal- no air leak seen. Current Facility-Administered Medications Medication Dose Route Frequency  NOREPINephrine (LEVOPHED) 4 mg in 5% dextrose 250 mL infusion  0.5-30 mcg/min IntraVENous TITRATE  
 NOREPINephrine (LEVOPHED) 4 mg/250 mL (16 mcg/mL) in dextrose 5% 250 mL infusion  piperacillin-tazobactam (ZOSYN) 4.5 g in 0.9% sodium chloride (MBP/ADV) 100 mL MBP  4.5 g IntraVENous Q8H  
 [Held by provider] apixaban (ELIQUIS) tablet 5 mg  5 mg Oral BID  pantoprazole (PROTONIX) tablet 40 mg  40 mg Oral ACB  gabapentin (NEURONTIN) capsule 300 mg  300 mg Oral TID  polyethylene glycol (MIRALAX) packet 17 g  17 g Oral DAILY PRN  
 sodium chloride (NS) flush 5-40 mL  5-40 mL IntraVENous Q8H  
 sodium chloride (NS) flush 5-40 mL  5-40 mL IntraVENous PRN  
 oxyCODONE-acetaminophen (PERCOCET) 5-325 mg per tablet 1 Tablet  1 Tablet Oral Q4H PRN  
 morphine injection 2 mg  2 mg IntraVENous Q3H PRN  
 sodium chloride 3% hypertonic nebulizer soln  4 mL Nebulization BID  levalbuterol (XOPENEX) nebulizer soln 1.25 mg/3 mL  1.25 mg Nebulization QID RT  
 vancomycin (VANCOCIN) 1250 mg in  ml infusion  1,250 mg IntraVENous Q8H  Vancomycin Trough Reminder   Other ONCE Review of Systems + cough, shortness of breath Constitutional:  negative for fever, chills, sweats Cardiovascular:  negative for chest pain, palpitations, syncope, edema Gastrointestinal:  negative for dysphagia, reflux, vomiting, diarrhea, abdominal pain, or melena Neurologic:  negative for focal weakness, numbness, headache Objective:  
 
Vitals:  
 06/09/21 0700 06/09/21 0714 06/09/21 0746 06/09/21 0749 BP:  (!) 86/55 Pulse: 78 75 74 Resp: 29 23 17 Temp: 97.6 °F (36.4 °C) SpO2: 97% 100% 100% 100% Weight:      
Height:      
 
 
 
Intake/Output Summary (Last 24 hours) at 6/9/2021 8727 Last data filed at 6/9/2021 0700 Gross per 24 hour Intake 5050 ml Output 1070 ml Net 3980 ml Physical Exam:         
Constitutional:  the patient is well developed and in no acute distress EENMT:  Sclera clear, pupils equal, oral mucosa moist 
Respiratory: corase, some expiratory wheeze, on 1L NC, chest tube to water seal 
Cardiovascular:  RRR without M,G,R- paced Gastrointestinal: soft and non-tender; with positive bowel sounds. Musculoskeletal: warm without cyanosis. There is no lower extremity edema. Skin:  no jaundice or rashes, no wounds Neurologic: no gross neuro deficits Psychiatric:  alert and oriented x 3 LINES: 
Chest port PIV 
R Chest tube DRIPS: 
none CXR 6/9:  
 
 
LAB Recent Labs  
  06/09/21 
0333 06/08/21 
2200 06/08/21 
1450 WBC 6.3  --  8.8 HGB 9.1*  --  12.7* HCT 30.3*  --  39.6*  
*  --  167 INR  --  1.9  --   
 
Recent Labs  
  06/09/21 
0333 06/08/21 
1450  137  
K 4.2 4.1 * 103 CO2 26 29 * 132* BUN 13 13 CREA 0.54* 0.77* MG 2.2  --   
PHOS 3.5  --   
CA 8.4 9.2 ALB  --  3.1* TBILI  --  1.2* ALT  --  8* Recent Labs  
  06/09/21 
0333 06/08/21 
1450 LAC 2.2* 2.2* Patient Active Problem List  
Diagnosis Code  Essential hypertension with goal blood pressure less than 130/80 I10  Mass of lower lobe of right lung R91.8  Right lower lobe lung mass R91.8  Malignant neoplasm of lower lobe of right lung (HCC) C34.31  
 GERD (gastroesophageal reflux disease) K21.9  Hypotension I95.9  Sepsis due to undetermined organism ([address] A41.9  Pulmonary emphysema ([facility] J43.9  Cancer of bronchus of right lower lobe (HCC) C34.31  
 Lung cancer ([facility] C34.90  
 Cancer of right lung (HCC) C34.91  
 Atrial tachycardia (HCC) I47.1  S/P lobectomy of lung Z90.2  Cough R05  Dehydration E86.0  Chemotherapy induced neutropenia (HCC) D70.1, T45.1X5A  Pleural effusion on right J90  
 Atelectasis of right lung J98.11  
 A-fib (HCC) I48.91  
 Chronic respiratory failure with hypoxia (HCC) J96.11  
 S/P thoracotomy Z98.890  
 PAF (paroxysmal atrial fibrillation) (HCC) I48.0  Hypertension I10  Atrial fibrillation with RVR (HCC) I48.91  
 Sick sinus syndrome (HCC) I49.5  Pacemaker Z95.0  Heart block AV complete (HCC) I44.2  COPD exacerbation ([address] J44.1  Acute hypoxemic respiratory failure (HCC) J96.01  
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 J86.9  Acute on chronic respiratory failure with hypoxemia (HCC) J96.21 Assessment:  (Medical Decision Making) Hospital Problems  Date Reviewed: 4/13/2021 Codes Class Noted POA Acute hypoxemic respiratory failure ([facility] ICD-10-CM: J96.01 
ICD-9-CM: 518.81  6/8/2021 Unknown Bronchopleural fistula (HCC) ICD-10-CM: J86.0 ICD-9-CM: 510.0  6/8/2021 Unknown Empyema ([facility] ICD-10-CM: J86.9 ICD-9-CM: 510.9  6/8/2021 Unknown Acute on chronic respiratory failure with hypoxemia [facility]) ICD-10-CM: I43.19 
ICD-9-CM: 518.84  6/8/2021 Unknown S/P thoracotomy ICD-10-CM: T86.260 ICD-9-CM: V45.89  1/7/2021 Yes Cancer of bronchus of right lower lobe [facility]) ICD-10-CM: C34.31 
ICD-9-CM: 162.5  7/23/2020 Yes Sepsis due to undetermined organism [facility]) ICD-10-CM: A41.9 ICD-9-CM: 038.9  5/5/2020 Yes 77 y/o male with prior RLL lung cancer lobectomy with complicated hemothorax necessitating completion pneumonectomy in 1/2021 now with apparent bronchopleural fistula, sepsis and acute on chronic respiratory failure from presumed empyema +/- pneumonia. Plan:  (Medical Decision Making) -- Wean O2 as able, only on 2L QHS at home -- levophed if needed for hypotension 
-- follow cultures- continue vanc and zosyn -- start heparin gtt  
-- CT to water seal 
-- Dr. Maryann Bernal following 
-- continue nebs More than 50% of the time documented was spent in face-to-face contact with the patient and in the care of the patient on the floor/unit where the patient is located. Ginny November, NP Lungs: no sounds Right chest with noted chest tube. No air leak noted Heart S1 and S2 audible, no murmers or rubs appreciated Surgery following and pathology and cultures pending. May need surgery in future for BP fistula. Currently chest tube not leaking but clamped. Was leaking a lot when on BIPAP lst night. BP is borderline and may end up on levophed. Will add proamitine Try OOB to chair if BP holding later. I have spoken with and examined the patient. I have reviewed the history, examination, assessment, and plan and agree with the above. Deondre Le MD 
 
 
This note was signed electronically. Errors are unfortunately her likely due to dictation software.

## 2021-06-09 NOTE — ACP (ADVANCE CARE PLANNING)
Advance Care Planning General Advance Care Planning (ACP) Conversation Date of Conversation: 6/8/2021 Conducted with: Patient with Decision Making Capacity Healthcare Decision Maker:  
  Primary Decision Maker: Rajani Best - Sister - [phone] Secondary Decision Maker: Fidelia Gamez - Sister - [phone] Supplemental (Other) Decision Maker: Erica Newsome - Girlfriend - [phone] Click here to complete [address] including selection of the Healthcare Decision Maker Relationship (ie \"Primary\") Today we documented Decision Maker(s) consistent with Legal Next of Kin hierarchy. Content/Action Overview:  
Pt has document on file that is incomplete. Was never notarized. Discussed with pt and he would like to complete HCPOA with . Order placed. Legal NOK until document completed is 2 sisters. Pt is not . No children. No parents living and no other siblings. Understands if wants GF to be decision maker, he has to put in writing. Full code per MD orders. Length of Voluntary ACP Conversation in minutes:  30 minutes Alex Lyman RN

## 2021-06-09 NOTE — PROGRESS NOTES
Pharmacokinetic Consult to Pharmacist 
 
Jose Steele. is a 78 y.o. male being treated for Empyema with Vancomycin and Zosyn. Height: 5' 10\" (177.8 cm)  Weight: 77.4 kg (170 lb 9.6 oz) Lab Results Component Value Date/Time BUN 13 06/08/2021 02:50 PM  
 Creatinine 0.77 (L) 06/08/2021 02:50 PM  
 WBC 8.8 06/08/2021 02:50 PM  
 Procalcitonin <0.05 05/19/2021 05:53 AM  
 Lactic acid 2.2 (H) 06/08/2021 02:50 PM  
  
Estimated Creatinine Clearance: 94.8 mL/min (A) (based on SCr of 0.77 mg/dL (L)). CULTURES: 
- 
 
Lab Results Component Value Date/Time Vancomycin,trough 6.6 05/06/2020 10:28 AM  
 
 
Day 1 of vancomycin. Goal trough is 15-20. Will Start Vancomycin 2GM IV x 1 dose followed by Vancomycin 1250mg IV q8h. Trough to be drawn tomorrow at 1900. Will continue to follow patient and order levels when clinically indicated. Thank you, ALBERTO Corado, PharmD

## 2021-06-09 NOTE — PROGRESS NOTES
2010: Patient w/ c/o nausea. Bipap removed and placed on NC at 2 lpm. O2 sat 99%. Nausea resolved w/o intervention. 2310: MAP's have been 60-65 on recent checks. MD Abhi Stover informed and orders received to give 1 L LR over 4 hrs. 0150: Last BP 75/51 (58). Per MD Isail give 2nd 1 L Bolus over 2 hours then start Levo if necessary.

## 2021-06-09 NOTE — PROGRESS NOTES
met with patient and girlfriend jamal at bedside, assisted patient with completing [place] healthcare power of  paperwork. Luma BOONE

## 2021-06-09 NOTE — PROGRESS NOTES
Pt seen in ICU s/p admission acute on chronic respiratory failure. Alert and oriented. Confirms demographics. Recent d/c. Lives with Amandeep Dickinson. Independent of ADL's. Has cane from ambulation if needed. Recently retired a couple of weeks ago he states. Was avid snow skier until about 5 years ago. States he skied at least 25+ days a year. Currently doing Pulmonary rehab at [facility]. Asking about portable tanks, explained process for this. Will send referral to [place] [id number] to see if pt qualifies. Aware CM will follow for any further needs/d/c POC when stable. ACP completed, with referral to  for new document. Care Management Interventions PCP Verified by CM: Yes Mode of Transport at Discharge: Other (see comment) Transition of Care Consult (CM Consult): Discharge Planning Discharge Durable Medical Equipment:  (O2, [employer], cane, pacemaker check box) Current Support Network: Own Home, Other (Amandeep Dickinson lives with him. ) Confirm Follow Up Transport: Family Freedom of Choice List was Provided with Basic Dialogue that Supports the Patient's Individualized Plan of Care/Goals, Treatment Preferences and Shares the Quality Data Associated with the Providers?: Yes [employer] Information Provided?:  (Dejuan Newell -recently retired, Katie still primary) Discharge Location Discharge Placement: Unable to determine at this time

## 2021-06-09 NOTE — PROGRESS NOTES
Initial visit was made, prayer, emotional support and a spiritual presence were provided. His two sisters were present, Jennifer oSlares and Kayla Toledo. He shared that he had been in the hospital recently. Chuy Farrell, [address] [facility], [facility]

## 2021-06-09 NOTE — ACP (ADVANCE CARE PLANNING)
Advance Care Planning Advance Care Planning Inpatient Note [facility] Today's Date: 6/9/2021 Unit: [facility] 3 INTENSIVE CARE Received request from IDT member. Upon review of chart and communication with care team, patient's decision making abilities are not in question. Patient and girlfriend Geovanni Dee present in the room during visit. Goals of ACP Conversation: 
Discuss Advance Care planning documents Facilitate a discussion related to patient's goals of care as they align with the patient's values and beliefs Health Care Decision Makers:   
 
Click here to complete [address] including selection of the Healthcare Decision Maker Relationship (ie \"Primary\") Today we: 
Updated Documents Updated Healthcare Decision Maker Advance Care Planning Documents (Patient Wishes) on file: 
Healthcare Power of /Advance Directive appointment of Health care agent Assessment:   
  met with patient and girlfriend jamal at bedside, assisted patient with completing [place] healthcare power of  paperwork. Interventions: 
Provided education on documents for clarity and greater understanding Assisted in the completion of documents according to patient's wishes at this time Encouraged ongoing ACP conversation with future decision makers and loved ones Returned original document(s) to patient, as well as copies for distribution to appointed agents Outcomes/Plan: 
ACP Discussion Completed New Advance Directive completed Original Advance Directive form and copies given to patient Copy of Advance Directive given to staff to scan into medical record Electronically signed by Ant Mansfield [address] [place] [address] on 6/9/2021 at 6:13 PM

## 2021-06-09 NOTE — PROGRESS NOTES
Bedside and verbal shift change report received from  Tino Mitchell RN (offgoing nurse). Report included the following information SBAR, Kardex, Procedure Summary, Intake/Output, MAR, Recent Results, Med Rec Status and Cardiac Rhythm AV paced. Dual skin assessment completed at bedside: R chest tube paced at bedside my MD Corie Lewis. 520 ml of output of purulent then serous appearing drainage. Dressing CDI. Otherwise skin intact and Allevyn in place. Patient AAO x 4 on Bipap at 60%. (list pertinent skin assessment findings) Dual verification of gtts completed (name of gtts verified): None

## 2021-06-09 NOTE — PROGRESS NOTES
Bedside and verbal shift change report received from  Estelle Nichols RN (offgoing nurse). Report included the following information SBAR, Kardex, Intake/Output, MAR, Recent Results, Med Rec Status and Cardiac Rhythm AV paced. Dual skin assessment completed at bedside: R CT to water seal. No other skin impairments noted. Dual verification of gtts completed (name of gtts verified): Heparin at 9 units/kg/hr

## 2021-06-10 PROBLEM — I48.92 ATRIAL FLUTTER (HCC): Status: ACTIVE | Noted: 2021-01-01

## 2021-06-10 NOTE — PROGRESS NOTES
Bedside and verbal shift change report received from  Kaur Portillo (offgoing nurse). Report included the following information SBAR, Kardex, Intake/Output, MAR, Recent Results, Med Rec Status, Cardiac Rhythm paced and Alarm Parameters . Dual skin assessment completed at bedside: R chest tube in place with air leak, site dry and intact with old drainage to dressing (list pertinent skin assessment findings) Dual verification of gtts completed (name of gtts verified): N/A

## 2021-06-10 NOTE — PROGRESS NOTES
Bedside and verbal shift change report received from  Luz Calderón, [facility] (offgoing nurse). Report included the following information SBAR, Kardex, Intake/Output, MAR, Recent Results and Cardiac Rhythm AV PACED. Dual skin assessment completed at bedside: right CT in place with small air leak (list pertinent skin assessment findings) Dual verification of gtts completed (name of gtts verified): heparin gtt per protocol, see emar

## 2021-06-10 NOTE — PROGRESS NOTES
Occupational Therapy Note: 
 
Participated in interdisciplinary rounds in ICU/CCU and chart reviewed. Patient is experiencing decrease in function from baseline. Patient would benefit from skilled acute therapy to increase independence with self care/ADLs, strength, endurance, and functional mobility. Recommend OT/PT consult when medically stable and MD agrees.  
 
Thank you for your consideration, 
Julia Abdi OTR/L

## 2021-06-10 NOTE — ACP (ADVANCE CARE PLANNING)
Advance Care Planning General Advance Care Planning (ACP) Conversation Date of Conversation: 6/8/2021 Conducted with: Patient with Decision Making Capacity  By [employer] Healthcare Decision Maker:  
  Primary Decision Maker: Michelle Tessa - Girlfriend - [phone] Secondary Decision Maker: Aracelis Gatica - Sister - [phone] Supplemental (Other) Decision Maker: Dominique Castro - Sister - [phone] Click here to complete [employer] including selection of the Healthcare Decision Maker Relationship (ie \"Primary\") Content/Action Overview:  
New document completed by  and placed in paper chart until be scanned in. Length of Voluntary ACP Conversation in minutes:  <16 minutes (Non-Billable) Jose A Landeros, RN

## 2021-06-10 NOTE — PROGRESS NOTES
H&P/Consult Note/Progress Note/Office Note:  
Jose Cruz Washington. MRN: [id number]  :1950  Age:75 y.o. 
 
HPI: Jose Cruz Washington. is a 78 y.o. male who we are asked by Dr. 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 The patient has a PMHx of RLL lung cancer and is s/p pneumonectomy in 2021 with Dr. Boston Garcia. He presented with complaints of fever and shortness of breath. He was admitted on 2021 for sepsis r/t right sided empyema. The patient states that over the course of the past few months when he would roll on his left side he would have \"gushing\" fluid from his chest. He also complained of congestion when he would lie on his right side. A chest tube was inserted by pulmonary with the return of  500mL of purulent, foul smelling material. A culture was obtained and has not resulted yet. At present, his chest tube output is serous. He is on Levo and Zosyn. 06/10/21- Patient still in ICU. On heparin gtt. Chest tube now with noted air leak on right. No SOB noted. Discussed options -1 surgical vs possible less invasive pulmonary options. Will plan to discuss with pulmonary to see what they have to offer. Past Medical History:  
Diagnosis Date  Arrhythmia Afib  Chronic obstructive pulmonary disease ([facility] [facility] O2 at [facility]  Chronic pain   
 right torn rotator cuff; left knee  GERD (gastroesophageal reflux disease)   
 controlled with med  Hypertension hx-- off meds since 2020--- followed by dr. Rojas Service  Hypoxia 2020  Malignant neoplasm of lower lobe of right lung ([facility] [address] 2020 REC'D CHEMO AND RADIATION--- FOLLOWED BY DR. [place] Osteoarthritis  Right lower lobe lung mass 2020  Status post total right knee replacement 2016 Past Surgical History:  
Procedure Laterality Date  HX COLONOSCOPY    
 HX KNEE ARTHROSCOPY Left   
 scope X 1, ACL recon X 1  
 HX KNEE ARTHROSCOPY Right , 1975 X 2   
 HX KNEE REPLACEMENT Right 2016  HX TONSILLECTOMY    HX VASCULAR ACCESS Right placed 3/2020  
 port in chest   
 IR INSERT TUNL CVC W PORT OVER 5 YEARS  3/18/2020  [place] CHEST SURGERY PROCEDURE UNLISTED    
 R lobectomy 2020; R complete pneumonectomy 2021  [place] SINUS SURGERY PROC UNLISTED  ,   
 sinus surg Current Facility-Administered Medications Medication Dose Route Frequency  cefTRIAXone (ROCEPHIN) 2 g in 0.9% sodium chloride (MBP/ADV) 50 mL MBP  2 g IntraVENous Q24H  
 NOREPINephrine (LEVOPHED) 4 mg in 5% dextrose 250 mL infusion  0.5-30 mcg/min IntraVENous TITRATE  heparin 25,000 units in dextrose 500 mL infusion  12-25 Units/kg/hr IntraVENous TITRATE  midodrine (PROAMATINE) tablet 5 mg  5 mg Oral TID WITH MEALS  sodium chloride 3% hypertonic nebulizer soln  4 mL Nebulization BID RT  
 [Held by provider] apixaban (ELIQUIS) tablet 5 mg  5 mg Oral BID  pantoprazole (PROTONIX) tablet 40 mg  40 mg Oral ACB  gabapentin (NEURONTIN) capsule 300 mg  300 mg Oral TID  polyethylene glycol (MIRALAX) packet 17 g  17 g Oral DAILY PRN  
 sodium chloride (NS) flush 5-40 mL  5-40 mL IntraVENous Q8H  
 sodium chloride (NS) flush 5-40 mL  5-40 mL IntraVENous PRN  
 oxyCODONE-acetaminophen (PERCOCET) 5-325 mg per tablet 1 Tablet  1 Tablet Oral Q4H PRN  
 morphine injection 2 mg  2 mg IntraVENous Q3H PRN  
 levalbuterol (XOPENEX) nebulizer soln 1.25 mg/3 mL  1.25 mg Nebulization QID RT Albuterol and Celebrex [celecoxib] Social History Socioeconomic History  Marital status:  Spouse name: Not on file  Number of children: Not on file  Years of education: Not on file  Highest education level: Not on file Tobacco Use  Smoking status: Former Smoker Packs/day: 1.00 Years: 20.00 Pack years: 20.00 Quit date:  Years since quittin.4  Smokeless tobacco: Never Used  Tobacco comment: patient has no desire to resume Substance and Sexual Activity  Alcohol use: Yes Alcohol/week: 4.0 standard drinks Types: 4 Glasses of wine per week  Drug use: No  
Other Topics Concern   Service No  
 Blood Transfusions No  
 Caffeine Concern No  
 Occupational Exposure No  
 Hobby Hazards No  
 Sleep Concern No  
 Stress Concern No  
 Weight Concern No  
 Special Diet No  
 Exercise Yes  [address] Yes Social History Narrative . Has long-time girlfriend. Continues to work doing IT support. Social Determinants of Health Financial Resource Strain:  Difficulty of Paying Living Expenses:   
Food Insecurity:  Worried About [address] in the Last Year:   
[address] in the Last Year:   
Transportation Needs:   
 Lack of Transportation (Medical):  Lack of Transportation (Non-Medical): Physical Activity:   
 Days of Exercise per Week:  Minutes of Exercise per Session:   
Stress:  Feeling of Stress :   
Social Connections:  Frequency of Communication with Friends and Family:  Frequency of Social Gatherings with Friends and Family:  Attends Baptism Services:  Active Member of Clubs or Organizations:  Attends Club or Organization Meetings:  Marital Status:   
 
Social History Tobacco Use Smoking Status Former Smoker  Packs/day: 1.00  Years: 20.00  Pack years: 20.00  Quit date:   Years since quittin.4 Smokeless Tobacco Never Used Tobacco Comment  
 patient has no desire to resume Family History Problem Relation Age of Onset  Cancer Mother   
     uterine  Arrhythmia Sister   
     afib ROS: The patient has no difficulty with chest pain or shortness of breath. No fever or chills. Comprehensive review of systems was otherwise unremarkable except as noted above. Physical Exam:  
Visit Vitals BP (!) 98/55 Pulse 86 Temp 97.9 °F (36.6 °C) Resp 28 Ht 5' 10\" (1.778 m) Wt 170 lb 9.6 oz (77.4 kg) SpO2 97% BMI 24.48 kg/m² Constitutional: Alert, oriented, cooperative patient in no acute distress; appears stated age Eyes:Sclera are clear. EOMs intact ENMT: no external lesions gross hearing normal; no obvious neck masses, no ear or lip lesions, nares normal 
CV: RRR. Normal perfusion Resp: No JVD. Breathing is  non-labored; no audible wheezing. Right sided chest tube to under water seal with air leak GI: soft and non-distended Musculoskeletal: unremarkable with normal function. No embolic signs or cyanosis. Neuro:  Oriented; moves all 4; no focal deficits Psychiatric: normal affect and mood, no memory impairment Recent vitals (if inpt): 
Patient Vitals for the past 24 hrs: 
 BP Temp Pulse Resp SpO2  
06/10/21 1000   86 28 97 % 06/10/21 0959 (!) 98/55  86 26 97 % 06/10/21 0900   82 15 100 % 06/10/21 0859 95/60  84 22 100 % 06/10/21 0855     96 % 06/10/21 0800   79 25 95 % 06/10/21 0759 (!) 81/54  79 24 95 % 06/10/21 0700   81 20 96 % 06/10/21 0659 (!) 99/59 97.9 °F (36.6 °C) 82 27 96 % 06/10/21 0359 (!) 83/57  85 29 95 % 06/10/21 0329 (!) 102/57  83 27 95 % 06/10/21 0259 (!) 85/56  84 (!) 31 95 % 06/10/21 0159 (!) 91/50  89 29 94 % 06/10/21 0129 (!) 97/56  91 30 94 % 06/10/21 0059 (!) 88/54  69 (!) 31 93 % 06/09/21 2359 (!) 100/50  69 30 93 % 06/09/21 2329 (!) 110/59  90 (!) 34 95 % 06/09/21 2259 (!) 96/54 97.7 °F (36.5 °C) 90 30 91 % 06/09/21 2229 (!) 95/52  87 (!) 35 93 % 06/09/21 2159 97/61    96 % 06/09/21 2129 (!) 97/57  91  97 % 06/09/21 2059 (!) 93/59  89 (!) 31 92 % 06/09/21 2044 (!) 92/52  92 (!) 33 93 % 06/09/21 2029 (!) 91/52  92 23 96 % 06/09/21 2014 (!) 94/52  92 (!) 36 96 % 06/09/21 1959 (!) 106/55  95 21 97 % 06/09/21 1944 (!) 116/59  90 25 100 % 06/09/21 1929 (!) 95/59  89 16 93 % 06/09/21 1925     92 % 06/09/21 1914 (!) 94/57  90  93 % 06/09/21 1859 98/64 97.9 °F (36.6 °C) 91 (!) 32 94 % 06/09/21 1800   87 30 93 % 06/09/21 1759 100/60  87 26 94 % 06/09/21 1744 106/66  87 20 95 % 06/09/21 1730   89 (!) 33 91 % 06/09/21 1729 102/63  88 26 (!) 85 % 06/09/21 1700   89 27 94 % 06/09/21 1659 (!) 105/58  88 14 93 % 06/09/21 1630   88  94 % 06/09/21 1629 (!) 94/59  88 10 93 % 06/09/21 1617 95/61  86    
06/09/21 1600  97.6 °F (36.4 °C) 79 29 97 % 06/09/21 1559 98/61  79 (!) 31 97 % 06/09/21 1530   80 (!) 31 98 % 06/09/21 1500   82 (!) 37 96 % 06/09/21 1459 100/63    96 % 06/09/21 1430   80 23 98 % 06/09/21 1400   80 22 95 % 06/09/21 1330   80 19 96 % 06/09/21 1314 92/62  80 20 96 % 06/09/21 1300   84 (!) 31 98 % 06/09/21 1259 (!) 91/59  78 (!) 38 95 % 06/09/21 1230   81 27 97 % 06/09/21 1229 96/63 97.9 °F (36.6 °C) 84 (!) 37 98 % 06/09/21 1200   86 (!) 38 95 % 06/09/21 1159 (!) 88/56  85 20   
06/09/21 1144 (!) 95/53  86 19 97 % 06/09/21 1130   75 18 100 % 06/09/21 1129 (!) 82/52  76 17 100 % 06/09/21 1124     97 % 06/09/21 1110 (!) 80/52  78    
06/09/21 1100   79 (!) 35 96 % 06/09/21 1059 (!) 80/52  79 28 97 % Labs: 
Recent Labs  
  06/10/21 
0309 06/09/21 1019 06/08/21 
2200 06/08/21 
1450 WBC 3.8*  --   --  8.8 HGB 7.0*  --   --  12.7*  
*  --   --  167   --   --  137  
K 3.6  --   --  4.1   --   --  103 CO2 28  --   --  29 BUN 11  --   --  13  
CREA 0.97  --   --  0.77* *  --   --  132* PTP  --   --  21.7*  --   
INR  --   --  1.9  --   
APTT  --  43.4* 45.6*  --   
TBILI  --   --   --  1.2* ALT  --   --   --  8* AP  --   --   --  142* Lab Results Component Value Date/Time  WBC 3.8 (L) 06/10/2021 03:09 AM  
 HGB 7.0 (L) 06/10/2021 03:09 AM  
 PLATELET 618 (L) 63/52/2379 03:09 AM  
 Sodium 140 06/10/2021 03:09 AM  
 Potassium 3.6 06/10/2021 03:09 AM  
 Chloride 107 06/10/2021 03:09 AM  
 CO2 28 06/10/2021 03:09 AM  
 BUN 11 06/10/2021 03:09 AM  
 Creatinine 0.97 06/10/2021 03:09 AM  
 Glucose 103 (H) 06/10/2021 03:09 AM  
 INR 1.9 06/08/2021 10:00 PM  
 aPTT 43.4 (H) 06/09/2021 10:19 AM  
 Bilirubin, total 1.2 (H) 06/08/2021 02:50 PM  
 Bilirubin, direct 0.8 (H) 05/05/2020 02:27 PM  
 ALT (SGPT) 8 (L) 06/08/2021 02:50 PM  
 Alk. phosphatase 142 (H) 06/08/2021 02:50 PM  
 Troponin-I, Qt. <0.02 (L) 05/05/2020 01:09 AM  
 
 
I reviewed recent labs and recent radiologic studies. CT Results (most recent): 
Results from [facility] Encounter encounter on 05/16/21 CT CHEST W CONT Narrative EXAMINATION: CT CHEST W CONT 
 
HISTORY: Lung cancer, rule out infection. TECHNIQUE: Axial images of the chest were obtained following the administration 
of intravenous contrast. Coronal reformatted images were submitted. All CT scans 
are performed using dose optimization technique as appropriate to a performed 
exam including automated exposure control and/or standardized protocols for 
targeted exams where dose is matched to indication/reason for exam/patient size. COMPARISON: 1/12/2021 and 12/22/2020 FINDINGS: 
The visualized thyroid gland is unremarkable. There are no enlarged mediastinal, 
hilar, or axillary lymph nodes. The heart is not enlarged. There is a small pericardial effusion. The esophagus 
appears normal. 
 
The airways are patent. There are mild patchy groundglass infiltrates seen in 
the left lower lobe. Groundglass opacity previously seen in the left upper lobe 
is almost completely resolved. The patient is post right pneumonectomy. There is a moderate-sized fluid 
collection layering in the right hemithorax. There is pleural thickening 
throughout the right hemithorax. No pulmonary nodules. Visualized upper upper abdomen is normal. Cholelithiasis. No aggressive osseous lesions. Multiple healing posterior right rib fractures. The soft tissues are normal. 
 
Impression Subtle, patchy groundglass infiltrates are noted throughout the left lower lobe. Near complete resolution of the previously described groundglass infiltrate in 
the left upper lobe. The patient is status post right pneumonectomy. A large fluid collection is 
layering in the right hemithorax. Small pericardial effusion. I independently reviewed radiology images for studies I described above or studies I have ordered. Admission date (for inpatients): 6/8/2021 * No surgery found *  * No surgery found * ASSESSMENT/PLAN: 
Problem List  Date Reviewed: 6/10/2021 Codes Class Noted Pacemaker ICD-10-CM: Z95.0 ICD-9-CM: V45.01  5/3/2021 Acute hypoxemic respiratory failure ([facility] [address] ICD-10-CM: J96.01 
ICD-9-CM: 518.81  6/8/2021 Bronchopleural fistula (HCC) ICD-10-CM: J86.0 ICD-9-CM: 510.0  6/8/2021 Empyema ([facility] ICD-10-CM: J86.9 ICD-9-CM: 510.9  6/8/2021 Acute on chronic respiratory failure with hypoxemia [facility]) ICD-10-CM: B10.53 
ICD-9-CM: 518.84  6/8/2021 COPD exacerbation ([facility] [address] ICD-10-CM: J44.1 ICD-9-CM: 491.21  5/16/2021 Heart block AV complete (HCC) ICD-10-CM: I44.2 ICD-9-CM: 426.0  5/3/2021 Sick sinus syndrome (HCC) ICD-10-CM: I49.5 ICD-9-CM: 427.81  3/18/2021 PAF (paroxysmal atrial fibrillation) (HCC) ICD-10-CM: I48.0 ICD-9-CM: 427.31  2/25/2021 Hypertension ICD-10-CM: I10 
ICD-9-CM: 401.9  2/25/2021 Atrial fibrillation with RVR ([facility] ICD-10-CM: I48.91 
ICD-9-CM: 427.31  2/25/2021 S/P thoracotomy ICD-10-CM: W24.718 ICD-9-CM: V45.89  1/7/2021 Atelectasis of right lung ICD-10-CM: J98.11 ICD-9-CM: 518.0  1/4/2021 A-fib (HCC) (Chronic) ICD-10-CM: I48.91 
ICD-9-CM: 427.31  1/4/2021 Chronic respiratory failure with hypoxia (HCC) (Chronic) ICD-10-CM: J96.11 
ICD-9-CM: 518.83, 799.02  1/4/2021 Pleural effusion on right ICD-10-CM: J90 ICD-9-CM: 511.9  12/28/2020  Chemotherapy induced neutropenia (HCC) ICD-10-CM: D70.1, T45.1X5A 
ICD-9-CM: 288.03, E933.1  10/23/2020 Dehydration ICD-10-CM: E86.0 ICD-9-CM: 276.51  9/15/2020 S/P lobectomy of lung ICD-10-CM: Z90.2 ICD-9-CM: V45.89  7/29/2020 Cough ICD-10-CM: R05 ICD-9-CM: 786.2  7/29/2020 Atrial tachycardia (HCC) ICD-10-CM: I47.1 ICD-9-CM: 427.89  7/26/2020 Cancer of bronchus of right lower lobe [facility]) ICD-10-CM: C34.31 
ICD-9-CM: 162.5  7/23/2020 Lung cancer ([address] (Chronic) ICD-10-CM: C34.90 ICD-9-CM: 162.9  7/23/2020 Cancer of right lung [facility]) ICD-10-CM: C34.91 
ICD-9-CM: 162.9  7/23/2020 Pulmonary emphysema (HCC) (Chronic) ICD-10-CM: J43.9 ICD-9-CM: 492.8  7/7/2020 GERD (gastroesophageal reflux disease) ICD-10-CM: K21.9 ICD-9-CM: 530.81  Unknown Hypotension (Chronic) ICD-10-CM: I95.9 ICD-9-CM: 458.9  5/5/2020 Overview Signed 1/11/2021  8:39 AM by Jessica España NP On midodrine Sepsis due to undetermined organism [facility]) ICD-10-CM: A41.9 ICD-9-CM: 038.9  5/5/2020 Malignant neoplasm of lower lobe of right lung (HCC) (Chronic) ICD-10-CM: C34.31 
ICD-9-CM: 162.5  2/26/2020 Overview Signed 1/4/2021  7:38 AM by Haseeb Wilkerson MD  
  Dec 2020- Echo EF 50%, technically difficult, cannot assess regional wall motion. Aortic root 4.1 cm. No significant valvular disease. Normal DF Mass of lower lobe of right lung ICD-10-CM: R91.8 ICD-9-CM: 786.6  2/23/2020 Right lower lobe lung mass ICD-10-CM: R91.8 ICD-9-CM: 786.6  2/23/2020 Essential hypertension with goal blood pressure less than 130/80 (Chronic) ICD-10-CM: I10 
ICD-9-CM: 401.9  2/19/2018 Active Problems: 
  Sepsis due to undetermined organism ([facility] [address] (5/5/2020) Cancer of bronchus of right lower lobe ([facility] [address] (7/23/2020) S/P thoracotomy (1/7/2021) Acute hypoxemic respiratory failure ([facility] [address] (6/8/2021) Bronchopleural fistula ([facility] [address] (6/8/2021) Empyema ([facility] [address] (6/8/2021) Acute on chronic respiratory failure with hypoxemia ([facility] [address] (6/8/2021) Plan Care management per primary CT to underwater seal 
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 Discussed surgical intervention, also discussed will get opinion from pulmonary if any less invasive interventions. Signed:  MEHDI Abbott

## 2021-06-10 NOTE — H&P
[facility] Cardiology Initial Cardiac Evaluation Date of  Admission: 6/8/2021  4:02 PM  
 
Primary Care Physician:  Dr. Vianney White. Oscar Cornejo Primary Cardiologist:  Dr. Anthony Mars Referring Physician:  Dr. Kait Cramer Attending Physician:  Dr. Angela Azul CC/Reason for consult:  tachycardia Alexi Rene is a 78 y.o. male with PMH of AF on Eliquis, Biotronik BIV PPM with AVN ablation, prior tobacco abuse, RLL SCC, chronic nocturnal hypoxia on 2L qhs, R pneumonectomy, HTN, GERD, and TAMERA, who presented to the ER with shortness of breath and fever of 102. 6. He was found to be acutely hypoxic with productive cough with copious amounts of yellow sputum. He was placed on BIPAP with some improvement. Patient was also hypotensive and was admitted to ICU for further management. There was concern for an empyema and a CT was placed on R with 500 cc of purulent, foul smelling drainage. In ICU he was placed on levophed drip and is receiving abx for sepsis. Eliquis has been held and he is on heparin drip. Surgery was consulted for concern of right bronchopleural fistula. Culture of chest tube fluid is +alpha streptococcus. Patient noted to have runs of tachycardia. Biotronik contacted for interrogation of his device and found he is having runs of atrial flutter. Device changes were made to ensure proper mode switching during a flutter. Timeline of events: 
--RLL mass 2/2020, measuring 6.7cm x 5.9cm.   
--EBUS On 2/26/2020 and biopsy consistent with SCC.  
--Brain MRI was negative for brain mets but revealed a 2cm R parotid mass.  
--PET CT revealed a hypermetabolic RLL mass extending into the hilum and excessively hypermetabolic R parotid gland.  
--He had radiation and chemo and then underwent RML lobectomy 7/23/2020 for squamous cell carcinoma 
--He then completed 6 more rounds of chemo around 10/2020.   
--Repeat Chest CT on 12/22/2020 which revealed a large R pleural effusion and collapse of the remaining RUL.   --R thoracentesis on 12/28/2020 by Dr. Joanne Clarke with 900mL bloody fluid removed. Unable to tolerate complete drainage. --CT placement on 1/4/21 with 800cc of serosanguineous drainage.  
--General Surgery was consulted and CXR revealed persistent loculated R effusion.  
-- OR on 1/6 for R VATS, converted to an open thoracotomy with extensive pneumolysis with decortication. Difficult surgery. Consulted pulmonary intraoperatively for bronch. Unable to get lung to inflate intraoperative 
--Pathology non-diagnostic. 
 Pt was hypotensive postoperatively requiring aylin. Pt was transferred to [address] [place] [address] Ave had a repeat bronch on 1/7 secondary to persistent RUL atelectasis. He did not have any mucous plugging but his apical segment of the RUL was blocked by a web and the bronchoscope couldn't advance any further. Pt did have a stump area from prior lobectomy with abnormal appearing tissue that was biopsied,pathology noted below- non-diagnostic.  
-- 1/12 bronchoscopy. Past Medical History:  
Diagnosis Date  Arrhythmia Afib  Chronic obstructive pulmonary disease ([address] O2 at [facility]  Chronic pain   
 right torn rotator cuff; left knee  GERD (gastroesophageal reflux disease)   
 controlled with med  Hypertension hx-- off meds since 4/2020--- followed by dr. Alla Aase  Hypoxia 02/24/2020  Malignant neoplasm of lower lobe of right lung ([address] 02/26/2020 REC'D CHEMO AND RADIATION--- FOLLOWED BY DR. Shaniqua Jurado Osteoarthritis  Right lower lobe lung mass 02/24/2020  Status post total right knee replacement 2/29/2016 Past Surgical History:  
Procedure Laterality Date  HX COLONOSCOPY    
 HX KNEE ARTHROSCOPY Left   
 scope X 1, ACL recon X 1  
 HX KNEE ARTHROSCOPY Right 1974, 1975 X 2   
 HX KNEE REPLACEMENT Right 2016 [address]  HX VASCULAR ACCESS Right placed 3/2020  
 port in chest   
 IR INSERT TUNL CVC W PORT OVER 5 YEARS  3/18/2020  [facility] CHEST SURGERY PROCEDURE UNLISTED    
 R lobectomy 6/2020; R complete pneumonectomy 1/2021  [place] SINUS SURGERY PROC UNLISTED  1988, 1991  
 sinus surg Allergies Allergen Reactions  Albuterol Palpitations  Celebrex [Celecoxib] Itching Family History Problem Relation Age of Onset  Cancer Mother   
     uterine  Arrhythmia Sister   
     afib Current Facility-Administered Medications Medication Dose Route Frequency  cefTRIAXone (ROCEPHIN) 2 g in 0.9% sodium chloride (MBP/ADV) 50 mL MBP  2 g IntraVENous Q24H  
 0.9% sodium chloride infusion 250 mL  250 mL IntraVENous PRN  
 NOREPINephrine (LEVOPHED) 4 mg in 5% dextrose 250 mL infusion  0.5-30 mcg/min IntraVENous TITRATE  heparin 25,000 units in dextrose 500 mL infusion  12-25 Units/kg/hr IntraVENous TITRATE  midodrine (PROAMATINE) tablet 5 mg  5 mg Oral TID WITH MEALS  sodium chloride 3% hypertonic nebulizer soln  4 mL Nebulization BID RT  
 [Held by provider] apixaban (ELIQUIS) tablet 5 mg  5 mg Oral BID  pantoprazole (PROTONIX) tablet 40 mg  40 mg Oral ACB  gabapentin (NEURONTIN) capsule 300 mg  300 mg Oral TID  polyethylene glycol (MIRALAX) packet 17 g  17 g Oral DAILY PRN  
 sodium chloride (NS) flush 5-40 mL  5-40 mL IntraVENous Q8H  
 sodium chloride (NS) flush 5-40 mL  5-40 mL IntraVENous PRN  
 oxyCODONE-acetaminophen (PERCOCET) 5-325 mg per tablet 1 Tablet  1 Tablet Oral Q4H PRN  
 morphine injection 2 mg  2 mg IntraVENous Q3H PRN  
 levalbuterol (XOPENEX) nebulizer soln 1.25 mg/3 mL  1.25 mg Nebulization QID RT Review of Systems Constitutional: Positive for malaise/fatigue. HENT: Negative. Eyes: Negative. Respiratory: Positive for shortness of breath. Cardiovascular: Positive for chest pain. Gastrointestinal: Negative. Genitourinary: Negative. Musculoskeletal: Negative. Skin: Negative. Neurological: Negative. Endo/Heme/Allergies: Negative. Psychiatric/Behavioral: Negative. Physical Exam 
Vitals:  
 06/10/21 1259 06/10/21 1300 06/10/21 1359 06/10/21 1400 BP: 96/62  (!) 101/56 Pulse: 69 69 (!) 121 (!) 121 Resp: (!) 38 21 30 21 Temp:      
SpO2: 95% 98% 98% 97% Weight:      
Height:      
 
 
Physical Exam: 
Physical Exam 
Constitutional:   
   Appearance: He is ill-appearing. Eyes:  
   Pupils: Pupils are equal, round, and reactive to light. Cardiovascular:  
   Rate and Rhythm: Normal rate and regular rhythm. Pulmonary:  
   Effort: Pulmonary effort is normal.  
   Breath sounds: Wheezing and rhonchi present. Chest:  
   Breasts:  
      Right: Tenderness present. Comments: R chest tube Abdominal:  
   General: Bowel sounds are normal.  
Musculoskeletal:     
   General: Normal range of motion. Skin: 
   General: Skin is warm and dry. Neurological:  
   General: No focal deficit present. Mental Status: He is alert and oriented to person, place, and time. Psychiatric:     
   Mood and Affect: Mood normal.     
   Behavior: Behavior normal.     
   Thought Content: Thought content normal.     
   Judgment: Judgment normal.  
 
 
 
Cardiographics Telemetry: paced ECG: paced Echocardiogram: 5/2021 -  Left ventricle: Systolic function was normal. Ejection fraction was 
estimated in the range of 55 % to 60 %.   
-  Pericardium: A small pericardial effusion was identified. Labs:  
Recent Labs  
  06/10/21 
1155 06/10/21 
0309 06/09/21 
0333 06/08/21 
2200 NA  --  140 141  --   
K  --  3.6 4.2  --   
MG  --  2.1 2.2  --   
BUN  --  11 13  --   
CREA  --  0.97 0.54*  --   
GLU  --  103* 110*  --   
WBC  --  3.8* 6.3  --   
HGB 6.7* 7.0* 9.1*  --   
HCT 21.9* 22.2* 30.3*  --   
PLT  --  124* 121*  --   
INR  --   --   --  1.9 Assessment/Plan: 
 
 Assessment:  
  
Principal Problem: 
  Sepsis due to undetermined organism-- Culture from chest tube fluid + Alpha streptococcus, on abx.    
 
Active Problems: 
  Cancer of bronchus of right lower lobe S/P thoracotomy -- surgery following Acute hypoxemic respiratory failure -- sats acceptable on NC @ 2L/min. Bronchopleural fistula -- per surgery Empyema -- CT in place and on abx Atrial flutter -- device interrogated, runs of atrial flutter noted. Changes made on device to ensure proper mode switching during atrial flutter. Thank you very much for this referral. We appreciate the opportunity to participate in this patient's care. We will sign off. Vanna Stewart NP Attending MD: Orly Ortiz

## 2021-06-10 NOTE — PROGRESS NOTES
Critical Care Daily Progress Note: 6/10/2021 Pete Rob. Admission Date: 6/8/2021 The patient's chart is reviewed and the patient is discussed with the staff. Pt is a 77 yo  male with a history of chronic respiratory failure on 2L O2 qhs, prior tobacco abuse, RLL SCC s/p excision 7/23/2020, afib on Eliquis, HTN, GERD and OA. Pt completed chemo 10/2020. He had repeat chest CT 12/22/2020 with a large R pleural effusion and collapse of remaining RUL. Pt had a R thoracentesis on 12/28/2020 with 900mL bloody fluid removed but unable to tolerate a complete drainage. A chest tube was placed on 1/4/21 with 800cc serosanguineous drainage. General surgery was consulted and pt went to the OR On 1/6/21 for R VATS with conversion to open thoracotomy with extensive pneumolysis with decortication. We were consulted intraoperatively for bronch and were unable to get lung to inflate intraoperatively. Pt was hypotensive postoperatively requiring aylin and transfer to the ICU. Pt had repeat bronch on 1/7/21 secondary to persistent RUL atelectasis. He was found to have a stump area from prior lobectomy with abnormal appearing tissue that was biopsied and nondiagnostic. Pt had a repeat bronch on 1/12. Pt has had congestion since his pneumonectomy whenever he lies on his R side and has been able to cough up yellow phlegm. Pt presented to the ER On 6/8/21 with shortness of breath, fever, and weakness. He was in acute respiratory failure. A R chest tube was placed for possible empyema. Pt was hypotensive and admitted to ICU. General surgery was consulted because of concern for a possible bronchopleural fistula. Subjective:  
 
Pt is lying in bed on 2L O2. Pt continues to cough and have some discomfort with the chest tube. He is coughing up clear phlegm. Current Facility-Administered Medications Medication Dose Route Frequency  cefTRIAXone (ROCEPHIN) 2 g in 0.9% sodium chloride (MBP/ADV) 50 mL MBP  2 g IntraVENous Q24H  
 NOREPINephrine (LEVOPHED) 4 mg in 5% dextrose 250 mL infusion  0.5-30 mcg/min IntraVENous TITRATE  heparin 25,000 units in dextrose 500 mL infusion  12-25 Units/kg/hr IntraVENous TITRATE  midodrine (PROAMATINE) tablet 5 mg  5 mg Oral TID WITH MEALS  sodium chloride 3% hypertonic nebulizer soln  4 mL Nebulization BID RT  
 [Held by provider] apixaban (ELIQUIS) tablet 5 mg  5 mg Oral BID  pantoprazole (PROTONIX) tablet 40 mg  40 mg Oral ACB  gabapentin (NEURONTIN) capsule 300 mg  300 mg Oral TID  polyethylene glycol (MIRALAX) packet 17 g  17 g Oral DAILY PRN  
 sodium chloride (NS) flush 5-40 mL  5-40 mL IntraVENous Q8H  
 sodium chloride (NS) flush 5-40 mL  5-40 mL IntraVENous PRN  
 oxyCODONE-acetaminophen (PERCOCET) 5-325 mg per tablet 1 Tablet  1 Tablet Oral Q4H PRN  
 morphine injection 2 mg  2 mg IntraVENous Q3H PRN  
 levalbuterol (XOPENEX) nebulizer soln 1.25 mg/3 mL  1.25 mg Nebulization QID RT Review of Systems 
+cough 
+chest discomfort Constitutional:  negative for fever, chills, sweats Cardiovascular:  negative for chest pain, palpitations, syncope, edema Gastrointestinal:  negative for dysphagia, reflux, vomiting, diarrhea, abdominal pain, or melena Neurologic:  negative for focal weakness, numbness, headache Objective:  
 
Vitals:  
 06/10/21 0800 06/10/21 0855 06/10/21 0859 06/10/21 0900 BP:   95/60 Pulse: 79  84 82 Resp: 25  22 15 Temp:      
SpO2: 95% 96% 100% 100% Weight:      
Height:      
 
 
 
Intake/Output Summary (Last 24 hours) at 6/10/2021 1020 Last data filed at 6/10/2021 5304 Gross per 24 hour Intake 1709.71 ml Output 2240 ml Net -530.29 ml Physical Exam:         
Constitutional:  the patient is well developed and in no acute distress, on 2L O2  
EENMT:  Sclera clear, pupils equal, oral mucosa moist 
Respiratory: coarse rhonchi/wheezing Cardiovascular:  RRR without M,G,R Gastrointestinal: soft and non-tender; with positive bowel sounds. Musculoskeletal: warm without cyanosis. There is no lower extremity edema. Skin:  no jaundice or rashes, Neurologic: no gross neuro deficits Psychiatric:  alert and oriented x 3 LINES: 
Peripheral R forearm, port, CT 
 
DRIPS: 
None CXR:  
 
 
LAB No results for input(s): GLUCPOC in the last 72 hours. No lab exists for component: [place] Recent Labs  
  06/10/21 
0309 06/09/21 0333 06/08/21 
2200 06/08/21 
1450 WBC 3.8* 6.3  --  8.8 HGB 7.0* 9.1*  --  12.7* HCT 22.2* 30.3*  --  39.6*  
* 121*  --  167 INR  --   --  1.9  --   
 
Recent Labs  
  06/10/21 
0309 06/09/21 0333 06/08/21 
1450  141 137  
K 3.6 4.2 4.1  108* 103 CO2 28 26 29 * 110* 132* BUN 11 13 13 CREA 0.97 0.54* 0.77* MG 2.1 2.2  --   
PHOS 4.5* 3.5  --   
CA 8.1* 8.4 9.2 ALB  --   --  3.1* TBILI  --   --  1.2* ALT  --   --  8* No results for input(s): PH, PCO2, PO2, HCO3, PHI, PCO2I, PO2I, HCO3I in the last 72 hours. Recent Labs  
  06/09/21 0333 06/08/21 
1450 LAC 2.2* 2.2* No results for input(s): PH, PCO2, PO2, HCO3, PHI, PCO2I, PO2I, HCO3I in the last 72 hours. Patient Active Problem List  
Diagnosis Code  Essential hypertension with goal blood pressure less than 130/80 I10  Mass of lower lobe of right lung R91.8  Right lower lobe lung mass R91.8  Malignant neoplasm of lower lobe of right lung (HCC) C34.31  
 GERD (gastroesophageal reflux disease) K21.9  Hypotension I95.9  Sepsis due to undetermined organism ([address] A41.9  Pulmonary emphysema ([facility] J43.9  Cancer of bronchus of right lower lobe (HCC) C34.31  
 Lung cancer ([address] C34.90  
 Cancer of right lung (HCC) C34.91  
 Atrial tachycardia (HCC) I47.1  S/P lobectomy of lung Z90.2  Cough R05  Dehydration E86.0  Chemotherapy induced neutropenia (HCC) D70.1, T45.1X5A  Pleural effusion on right J90  
 Atelectasis of right lung J98.11  
 A-fib (HCC) I48.91  
 Chronic respiratory failure with hypoxia (HCC) J96.11  
 S/P thoracotomy Z98.890  
 PAF (paroxysmal atrial fibrillation) (HCC) I48.0  Hypertension I10  Atrial fibrillation with RVR (HCC) I48.91  
 Sick sinus syndrome (HCC) I49.5  Pacemaker Z95.0  Heart block AV complete (HCC) I44.2  COPD exacerbation ([facility] J44.1  Acute hypoxemic respiratory failure (HCC) J96.01  
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 J86.9  Acute on chronic respiratory failure with hypoxemia ([facility]) J96.21 Assessment:  (Medical Decision Making) Hospital Problems  Date Reviewed: 6/10/2021 Codes Class Noted POA Acute hypoxemic respiratory failure ([facility] ICD-10-CM: J96.01 
ICD-9-CM: 518.81  6/8/2021 Unknown Wean O2 as tolerated Bronchopleural fistula (HCC) ICD-10-CM: J86.0 ICD-9-CM: 510.0  6/8/2021 Unknown General surgery has been consulted Empyema ([facility] ICD-10-CM: J86.9 ICD-9-CM: 510.9  6/8/2021 Unknown CT in place, will likely require surgical intervention Acute on chronic respiratory failure with hypoxemia [facility]) ICD-10-CM: F75.71 
ICD-9-CM: 518.84  6/8/2021 Unknown On O2 at home S/P thoracotomy ICD-10-CM: V41.804 ICD-9-CM: V45.89  1/7/2021 Yes Cancer of bronchus of right lower lobe [facility]) ICD-10-CM: C34.31 
ICD-9-CM: 162.5  7/23/2020 Yes S/p treatment Sepsis due to undetermined organism [facility]) ICD-10-CM: A41.9 ICD-9-CM: 038.9  5/5/2020 Yes Plan:  (Medical Decision Making)  
 
--wean O2 as tolerated  
--fluid cx growing out heavy alpha streptococcus, change abx to Rocephin 
--continue xopenex/3% hypertonic saline 
--continue midodrine 
--await further recommendations from surgery 
--off of levophed --pt is a FULL CODE More than 50% of the time documented was spent in face-to-face contact with the patient and in the care of the patient on the floor/unit where the patient is located. HARSH Esparza Lungs:  Huge air leak on R with cough; continuous leak otherwise. L clear Heart:  RRR with no Murmur/Rubs/Gallops Additional Comments:  77 yo WM post R pneumonectomy with large air leak most certainly from stump breakdown. Alpha strep growing in pleural fluid. Can pare down ABx to Rocephin. Discussed non surgical treatment for stump breakdown with Dr. Leila Pope. Surgery is needed at this point. I have spoken with and examined the patient. I agree with the above assessment and plan as documented.  
 
Jessy Aggarwal MD

## 2021-06-11 NOTE — PROGRESS NOTES
TRANSFER - IN REPORT: 
 
Verbal report received from [place] on [facility]. being received from ICU for routine progression of care. Report consisted of patients Situation, Background, Assessment and Recommendations(SBAR). Information from the following report(s) SBAR, Kardex, Procedure Summary, Intake/Output, MAR, Accordion and Recent Results was reviewed. Opportunity for questions and clarification was provided. Assessment completed upon patients arrival to unit and care assumed. Patient received to room 334. Patient connected to monitor and assessment completed. Plan of care reviewed. Patient oriented to room and call light. Patient aware to use call light to communicate any chest pain or needs. Admission skin assessment completed with second RN and reveals the following:  
 
Heels are free of skin breakdown and/or pressure sores. Pt has a red but blanchable spot on sacral area, pt and girlfriend both state that \"it was there at home, too\". Pt has Right chest tube, dressing is clean, dry, and intact.

## 2021-06-11 NOTE — PROGRESS NOTES
Bedside and verbal shift change report received from  David Mattson [place] (offgoing nurse). Report included the following information SBAR, Kardex, Intake/Output, MAR, Recent Results, Med Rec Status, Cardiac Rhythm AV PACED and Alarm Parameters . Dual skin assessment completed at bedside: Right CT in place with known air leak (list pertinent skin assessment findings) Dual verification of gtts completed (name of gtts verified): N/A

## 2021-06-11 NOTE — PROGRESS NOTES
Chart reviewed and pt discussed in am IDR as pt remains ICU. Currently 2L O2. Per MD notes will need bronchopleural fistula surgery. Tx orders to floor now. CM following for any assist and d/c needs/POC when stable per MD. LOS 3 days.

## 2021-06-11 NOTE — PROGRESS NOTES
TRANSFER - OUT REPORT: 
 
Verbal report given to Lorin Hewitt RN (name) on [address] [place].  being transferred to Tele-room 334 (unit) for routine progression of care Report consisted of patients Situation, Background, Assessment and  
Recommendations(SBAR). Information from the following report(s) SBAR, Kardex, Intake/Output, MAR, Recent Results, Cardiac Rhythm AV PACED and Alarm Parameters  was reviewed with the receiving nurse. Lines:  
Venous Access Device Port 21 Upper chest (subclavicular area, right (Active) Central Line Being Utilized No 21 1090 Criteria for Appropriate Use Irritant/vesicant medication 21 7351 Site Assessment Clean, dry, & intact 21 9741 Date of Last Dressing Change 21 7737 Dressing Status Clean, dry, & intact 21 1963 Dressing Type Disk with Chlorhexadine gluconate (CHG); Transparent 21 4387 Action Taken Open ports on tubing capped 21 0659 Access Medial Site Assessment Unable to draw 21 Date Accessed (Medial Site) 21 Access Time (Medial Site) 21 Access Needle Size (Site #1) 21 G 21 Access Needle Length (Medial Site) 0.75 inches 21 Positive Blood Return (Medial Site) Yes 21 5520 Action Taken (Medial Site) Flushed 21 0978 Peripheral IV 21 Right Forearm (Active) Site Assessment Clean, dry, & intact 21 3182 Phlebitis Assessment 0 21 0659 Infiltration Assessment 0 21 0659 Dressing Status Clean, dry, & intact 21 4664 Dressing Type Transparent;Tape 21 5661 Hub Color/Line Status Pink;Patent; Flushed 21 2093 Alcohol Cap Used No 06/10/21 1929 Opportunity for questions and clarification was provided. Patient will be transported with: 
 Monitor O2 @ 2 liters Registered Nurse Tech

## 2021-06-11 NOTE — PROGRESS NOTES
H&P/Consult Note/Progress Note/Office Note:  
Naina Marin MRN: [id number]  :1950  Age:75 y.o. 
 
HPI: Naina Marin is a 78 y.o. male who we are asked by Dr. Marques Alvarez to see for possible bronchopleural fistula. The patient has a PMHx of RLL lung cancer and is s/p pneumonectomy in 2021 with Dr. Naila Mendez. He presented with complaints of fever and shortness of breath. He was admitted on 2021 for sepsis r/t right sided empyema. The patient states that over the course of the past few months when he would roll on his left side he would have \"gushing\" fluid from his chest. He also complained of congestion when he would lie on his right side. A chest tube was inserted by pulmonary with the return of  500mL of purulent, foul smelling material. A culture was obtained and has not resulted yet. At present, his chest tube output is serous. He is on Levo and Zosyn. 06/10/21- Patient still in ICU. On heparin gtt. Chest tube now with noted air leak on right. No SOB noted. Discussed options -1 surgical vs possible less invasive pulmonary options. Will plan to discuss with pulmonary to see what they have to offer. 21- Remains in ICU. Chest tube continues with air leak on right. No SOB noted. On 2L O2 via NC. Plan of care discussed with Pulmonary - Dr. Marietta Espinoza. Pt will need surgical intervention. Past Medical History:  
Diagnosis Date  Arrhythmia Afib  Chronic obstructive pulmonary disease ([address] O2 at [facility]  Chronic pain   
 right torn rotator cuff; left knee  GERD (gastroesophageal reflux disease)   
 controlled with med  Hypertension hx-- off meds since 2020--- followed by dr. Surjit Jeronimo  Hypoxia 2020  Malignant neoplasm of lower lobe of right lung ([facility] 2020 REC'D CHEMO AND RADIATION--- FOLLOWED BY DR. Marylin Stoll Osteoarthritis  Right lower lobe lung mass 2020  Status post total right knee replacement 2016 Past Surgical History:  
Procedure Laterality Date  HX COLONOSCOPY    
 HX KNEE ARTHROSCOPY Left   
 scope X 1, ACL recon X 1  
 HX KNEE ARTHROSCOPY Right 1974, 1975 X 2   
 HX KNEE REPLACEMENT Right 2016 [address]  HX VASCULAR ACCESS Right placed 3/2020  
 port in chest   
 IR INSERT TUNL CVC W PORT OVER 5 YEARS  3/18/2020  [place] CHEST SURGERY PROCEDURE UNLISTED    
 R lobectomy 6/2020; R complete pneumonectomy 1/2021  [place] SINUS SURGERY PROC UNLISTED  1988, 1991  
 sinus surg Current Facility-Administered Medications Medication Dose Route Frequency  potassium bicarb-citric acid (EFFER-K) tablet 40 mEq  40 mEq Oral BID  polyethylene glycol (MIRALAX) packet 17 g  17 g Oral DAILY  bisacodyL (DULCOLAX) tablet 5 mg  5 mg Oral DAILY PRN  
 budesonide (PULMICORT) 500 mcg/2 ml nebulizer suspension  500 mcg Nebulization BID  cefTRIAXone (ROCEPHIN) 2 g in 0.9% sodium chloride (MBP/ADV) 50 mL MBP  2 g IntraVENous Q24H  
 0.9% sodium chloride infusion 250 mL  250 mL IntraVENous PRN  
 enoxaparin (LOVENOX) injection 80 mg  80 mg SubCUTAneous Q12H  
 midodrine (PROAMATINE) tablet 5 mg  5 mg Oral TID WITH MEALS  sodium chloride 3% hypertonic nebulizer soln  4 mL Nebulization BID RT  
 [Held by provider] apixaban (ELIQUIS) tablet 5 mg  5 mg Oral BID  pantoprazole (PROTONIX) tablet 40 mg  40 mg Oral ACB  gabapentin (NEURONTIN) capsule 300 mg  300 mg Oral TID  sodium chloride (NS) flush 5-40 mL  5-40 mL IntraVENous Q8H  
 sodium chloride (NS) flush 5-40 mL  5-40 mL IntraVENous PRN  
 oxyCODONE-acetaminophen (PERCOCET) 5-325 mg per tablet 1 Tablet  1 Tablet Oral Q4H PRN  
 morphine injection 2 mg  2 mg IntraVENous Q3H PRN  
 levalbuterol (XOPENEX) nebulizer soln 1.25 mg/3 mL  1.25 mg Nebulization QID RT Albuterol and Celebrex [celecoxib] Social History Socioeconomic History  Marital status:  Spouse name: Not on file  Number of children: Not on file  Years of education: Not on file  Highest education level: Not on file Tobacco Use  Smoking status: Former Smoker Packs/day: 1.00 Years: 20.00 Pack years: 20.00 Quit date:  Years since quittin.4  Smokeless tobacco: Never Used  Tobacco comment: patient has no desire to resume Substance and Sexual Activity  Alcohol use: Yes Alcohol/week: 4.0 standard drinks Types: 4 Glasses of wine per week  Drug use: No  
Other Topics Concern   Service No  
 Blood Transfusions No  
 Caffeine Concern No  
 Occupational Exposure No  
 Hobby Hazards No  
 Sleep Concern No  
 Stress Concern No  
 Weight Concern No  
 Special Diet No  
 Exercise Yes  [facility] [facility] Yes Social History Narrative . Has long-time girlfriend. Continues to work doing IT support. Social Determinants of Health Financial Resource Strain:  Difficulty of Paying Living Expenses:   
Food Insecurity:  Worried About [address] in the Last Year:   
[address] in the Last Year:   
Transportation Needs:   
 Lack of Transportation (Medical):  Lack of Transportation (Non-Medical): Physical Activity:   
 Days of Exercise per Week:  Minutes of Exercise per Session:   
Stress:  Feeling of Stress :   
Social Connections:  Frequency of Communication with Friends and Family:  Frequency of Social Gatherings with Friends and Family:  Attends Holiness Services:  Active Member of Clubs or Organizations:  Attends Club or Organization Meetings:  Marital Status:   
 
Social History Tobacco Use Smoking Status Former Smoker  Packs/day: 1.00  Years: 20.00  Pack years: 20.00  Quit date:   Years since quittin.4 Smokeless Tobacco Never Used Tobacco Comment  
 patient has no desire to resume Family History Problem Relation Age of Onset  Cancer Mother   
     uterine  Arrhythmia Sister   
     afib ROS: The patient has no difficulty with chest pain or shortness of breath. No fever or chills. Comprehensive review of systems was otherwise unremarkable except as noted above. Physical Exam:  
Visit Vitals BP (!) 108/56 Pulse 74 Temp 98.4 °F (36.9 °C) Resp 18 Ht 5' 10\" (1.778 m) Wt 170 lb 9.6 oz (77.4 kg) SpO2 95% BMI 24.48 kg/m² Constitutional: Alert, oriented, cooperative patient in no acute distress; appears stated age Eyes: Sclera are clear. EOMs intact ENMT: no external lesions gross hearing normal; no obvious neck masses, no ear or lip lesions, nares normal 
CV: RRR. Normal perfusion Resp: No JVD. Breathing is  non-labored; no audible wheezing. Right sided chest tube to under water seal with air leak GI: soft and non-distended Musculoskeletal: unremarkable with normal function. No embolic signs or cyanosis. Neuro:  Oriented; moves all 4; no focal deficits Psychiatric: normal affect and mood, no memory impairment Recent vitals (if inpt): 
Patient Vitals for the past 24 hrs: 
 BP Temp Pulse Resp SpO2  
06/11/21 1000 (!) 108/56  74 18 95 % 06/11/21 0900   81 16 98 % 06/11/21 0859 103/63  79 28 98 % 06/11/21 0800   (!) 126 21 93 % 06/11/21 0759 93/63  89 (!) 37 94 % 06/11/21 0700   93 (!) 34 94 % 06/11/21 0659 (!) 100/58 98.4 °F (36.9 °C) 81 25 97 % 06/11/21 0629 (!) 85/56  79 14 (!) 89 % 06/11/21 0614 95/65  84 21 94 % 06/11/21 0600 103/72  85 (!) 37 (!) 88 % 06/11/21 0544 (!) 92/59  79 22 93 % 06/11/21 0529 (!) 92/56  (!) 109 19 93 % 06/11/21 0514 (!) 103/55  83 24 93 % 06/11/21 0459 (!) 98/57  83 17 95 % 06/11/21 0444 103/60  69 12 95 % 06/11/21 0429 105/68  81  94 % 06/11/21 0414 100/61  83 30 94 % 06/11/21 0359 106/67  84 13 93 % 06/11/21 0344 107/68 98.1 °F (36.7 °C) 84 11 95 % 06/11/21 0329 108/66  84 20 94 % 06/11/21 0314 108/68  89 (!) 39 92 % 06/11/21 0259 98/65  83 17 92 % 06/11/21 0244 100/65  83 26 92 % 06/11/21 0229 99/61  84 18 92 % 06/11/21 0214 106/61  84 (!) 31 93 % 06/11/21 0159 (!) 106/59  85 22 93 % 06/11/21 0144 108/67  86 (!) 32 91 % 06/11/21 0129 (!) 100/55  90 14 94 % 06/11/21 0114 (!) 99/59  91 23 94 % 06/11/21 0100   90 24 93 % 06/11/21 0059 (!) 97/57  90 29 93 % 06/11/21 0044 106/62  90 17 94 % 06/11/21 0030 106/70  69 20 93 % 06/11/21 0014 (!) 101/58  91 16 91 % 06/10/21 2359 (!) 100/56  69 24 92 % 06/10/21 2344 (!) 102/58  70 12 93 % 06/10/21 2329 107/64  71 (!) 37 93 % 06/10/21 2314 99/63 98.7 °F (37.1 °C) 72 22 93 % 06/10/21 2259 104/63  74 23 93 % 06/10/21 2244 (!) 97/55  78  92 % 06/10/21 2229 (!) 103/55  76 (!) 35 91 % 06/10/21 2214 (!) 104/58  76 (!) 31 92 % 06/10/21 2159 (!) 101/56  80  91 % 06/10/21 2144 (!) 99/56  78 27 92 % 06/10/21 2130 (!) 109/53  77 29 94 % 06/10/21 2114 (!) 105/58  80 (!) 36 93 % 06/10/21 2059 (!) 103/56  80  95 % 06/10/21 2044 (!) 115/56  96 24 97 % 06/10/21 2029 (!) 106/59  72 17 100 % 06/10/21 2025     96 % 06/10/21 2014 102/61  75 18 95 % 06/10/21 1959 102/62  74 (!) 32 95 % 06/10/21 1944 111/65  75 (!) 35 97 % 06/10/21 1929 (!) 101/56 98.6 °F (37 °C) 72 30 96 % 06/10/21 1914 (!) 97/56  76 (!) 39 95 % 06/10/21 1900   88 25 97 % 06/10/21 1859 106/65  89 (!) 31 97 % 06/10/21 1835 106/66 98.4 °F (36.9 °C) 91 26 96 % 06/10/21 1814 102/63 98.4 °F (36.9 °C) 90 25 96 % 06/10/21 1800   87 30 97 % 06/10/21 1759   88 (!) 33 97 % 06/10/21 1758 101/60  89 (!) 31 98 % 06/10/21 1712 104/64 98.4 °F (36.9 °C) 89 27 97 % 06/10/21 1700   88 26 95 % 06/10/21 1659   88 19 96 % 06/10/21 1658 99/60  89 28 94 % 06/10/21 1627 (!) 98/58 98.7 °F (37.1 °C) (!) 9 20 99 % 06/10/21 1612 (!) 101/57 98.4 °F (36.9 °C) 90 30 96 % 06/10/21 1600   90 28 100 % 06/10/21 1559   89 (!) 47 100 % 06/10/21 1558 (!) 105/59  87 25 100 % 06/10/21 1552     96 % 06/10/21 1500   82 (!) 36 98 % 06/10/21 1459   81 24 97 % 06/10/21 1458 95/69  (!) 124 17 97 % 06/10/21 1400   (!) 121 21 97 % 06/10/21 1359 (!) 101/56  (!) 121 30 98 % 06/10/21 1300   69 21 98 % 06/10/21 1259 96/62  69 (!) 38 95 % 06/10/21 1203     95 % 06/10/21 1200   79 (!) 32 99 % 06/10/21 1159 95/64  80 25 100 % 06/10/21 1100   83 (!) 34 97 % 06/10/21 1059 (!) 94/57 97.9 °F (36.6 °C) 83 (!) 46 98 % Labs: 
Recent Labs  
  06/11/21 
0346 06/09/21 1019 06/08/21 
2200 06/08/21 
1450 WBC 4.5  --   --  8.8 HGB 7.3*  --   --  12.7*  
*  --   --  167   --   --  137  
K 3.5  --   --  4.1   --   --  103 CO2 28  --   --  29  
BUN 9  --   --  13  
CREA 1.03  --   --  0.77* GLU 93  --   --  132* PTP  --   --  21.7*  --   
INR  --   --  1.9  --   
APTT  --  43.4* 45.6*  --   
TBILI  --   --   --  1.2* ALT  --   --   --  8* AP  --   --   --  142* Lab Results Component Value Date/Time WBC 4.5 06/11/2021 03:46 AM  
 HGB 7.3 (L) 06/11/2021 03:46 AM  
 PLATELET 753 (L) 85/39/9920 03:46 AM  
 Sodium 141 06/11/2021 03:46 AM  
 Potassium 3.5 06/11/2021 03:46 AM  
 Chloride 107 06/11/2021 03:46 AM  
 CO2 28 06/11/2021 03:46 AM  
 BUN 9 06/11/2021 03:46 AM  
 Creatinine 1.03 06/11/2021 03:46 AM  
 Glucose 93 06/11/2021 03:46 AM  
 INR 1.9 06/08/2021 10:00 PM  
 aPTT 43.4 (H) 06/09/2021 10:19 AM  
 Bilirubin, total 1.2 (H) 06/08/2021 02:50 PM  
 Bilirubin, direct 0.8 (H) 05/05/2020 02:27 PM  
 ALT (SGPT) 8 (L) 06/08/2021 02:50 PM  
 Alk. phosphatase 142 (H) 06/08/2021 02:50 PM  
 Troponin-I, Qt. <0.02 (L) 05/05/2020 01:09 AM  
 
 
I reviewed recent labs and recent radiologic studies. CT Results (most recent): 
[place] [facility] [place]16[place]21 CT CHEST W CONT Narrative EXAMINATION: CT CHEST W CONT 
 
HISTORY: Lung cancer, rule out infection.  
 
TECHNIQUE: Axial images of the chest were obtained following the administration 
of intravenous contrast. Coronal reformatted images were submitted. All CT scans 
are performed using dose optimization technique as appropriate to a performed 
exam including automated exposure control and/or standardized protocols for 
targeted exams where dose is matched to indication/reason for exam/patient size. COMPARISON: 1/12/2021 and 12/22/2020 FINDINGS: 
The visualized thyroid gland is unremarkable. There are no enlarged mediastinal, 
hilar, or axillary lymph nodes. The heart is not enlarged. There is a small pericardial effusion. The esophagus 
appears normal. 
 
The airways are patent. There are mild patchy groundglass infiltrates seen in 
the left lower lobe. Groundglass opacity previously seen in the left upper lobe 
is almost completely resolved. The patient is post right pneumonectomy. There is a moderate-sized fluid 
collection layering in the right hemithorax. There is pleural thickening 
throughout the right hemithorax. No pulmonary nodules. Visualized upper upper abdomen is normal. Cholelithiasis. No aggressive osseous lesions. Multiple healing posterior right rib fractures. The soft tissues are normal. 
 
Impression Subtle, patchy groundglass infiltrates are noted throughout the left lower lobe. Near complete resolution of the previously described groundglass infiltrate in 
the left upper lobe. The patient is status post right pneumonectomy. A large fluid collection is 
layering in the right hemithorax. Small pericardial effusion. I independently reviewed radiology images for studies I described above or studies I have ordered. Admission date (for inpatients): 6/8/2021 * No surgery found *  * No surgery found * ASSESSMENT/PLAN: 
Problem List  Date Reviewed: 6/11/2021 Codes Class Noted Pacemaker ICD-10-CM: Z95.0 ICD-9-CM: V45.01  5/3/2021  Atrial flutter ([facility] [address] [address] ICD-10-CM: X57.32 
ICD-9-CM: 427.32  6/10/2021 Acute hypoxemic respiratory failure ([facility] ICD-10-CM: J96.01 
ICD-9-CM: 518.81  6/8/2021 Bronchopleural fistula (HCC) ICD-10-CM: J86.0 ICD-9-CM: 510.0  6/8/2021 Empyema ([facility] ICD-10-CM: J86.9 ICD-9-CM: 510.9  6/8/2021 Acute on chronic respiratory failure with hypoxemia [facility]) ICD-10-CM: C39.22 
ICD-9-CM: 518.84  6/8/2021 COPD exacerbation ([place] [id number] ICD-10-CM: J44.1 ICD-9-CM: 491.21  5/16/2021 Heart block AV complete (HCC) ICD-10-CM: I44.2 ICD-9-CM: 426.0  5/3/2021 Sick sinus syndrome (HCC) ICD-10-CM: I49.5 ICD-9-CM: 427.81  3/18/2021 PAF (paroxysmal atrial fibrillation) (HCC) ICD-10-CM: I48.0 ICD-9-CM: 427.31  2/25/2021 Hypertension ICD-10-CM: I10 
ICD-9-CM: 401.9  2/25/2021 Atrial fibrillation with RVR ([facility] ICD-10-CM: I48.91 
ICD-9-CM: 427.31  2/25/2021 S/P thoracotomy ICD-10-CM: D08.426 ICD-9-CM: V45.89  1/7/2021 Atelectasis of right lung ICD-10-CM: J98.11 ICD-9-CM: 518.0  1/4/2021 A-fib (HCC) (Chronic) ICD-10-CM: I48.91 
ICD-9-CM: 427.31  1/4/2021 Chronic respiratory failure with hypoxia (HCC) (Chronic) ICD-10-CM: J96.11 
ICD-9-CM: 518.83, 799.02  1/4/2021 Pleural effusion on right ICD-10-CM: J90 ICD-9-CM: 511.9  12/28/2020 Chemotherapy induced neutropenia (HCC) ICD-10-CM: D70.1, T45.1X5A 
ICD-9-CM: 288.03, E933.1  10/23/2020 Dehydration ICD-10-CM: E86.0 ICD-9-CM: 276.51  9/15/2020 S/P lobectomy of lung ICD-10-CM: Z90.2 ICD-9-CM: V45.89  7/29/2020 Cough ICD-10-CM: R05 ICD-9-CM: 786.2  7/29/2020 Atrial tachycardia (HCC) ICD-10-CM: I47.1 ICD-9-CM: 427.89  7/26/2020 Cancer of bronchus of right lower lobe [facility]) ICD-10-CM: C34.31 
ICD-9-CM: 162.5  7/23/2020 Lung cancer ([address] (Chronic) ICD-10-CM: C34.90 ICD-9-CM: 162.9  7/23/2020 Cancer of right lung [facility]) ICD-10-CM: C34.91 
ICD-9-CM: 162.9  7/23/2020  Pulmonary emphysema (HCC) (Chronic) ICD-10-CM: J43.9 ICD-9-CM: 492.8  7/7/2020 GERD (gastroesophageal reflux disease) ICD-10-CM: K21.9 ICD-9-CM: 530.81  Unknown Hypotension (Chronic) ICD-10-CM: I95.9 ICD-9-CM: 458.9  5/5/2020 Overview Signed 1/11/2021  8:39 AM by Mary Lou Lomax NP On midodrine * (Principal) Sepsis due to undetermined organism [facility]) ICD-10-CM: A41.9 ICD-9-CM: 038.9  5/5/2020 Malignant neoplasm of lower lobe of right lung (HCC) (Chronic) ICD-10-CM: C34.31 
ICD-9-CM: 162.5  2/26/2020 Overview Signed 1/4/2021  7:38 AM by Gavino Duarte MD  
  Dec 2020- Echo EF 50%, technically difficult, cannot assess regional wall motion. Aortic root 4.1 cm. No significant valvular disease. Normal DF Mass of lower lobe of right lung ICD-10-CM: R91.8 ICD-9-CM: 786.6  2/23/2020 Right lower lobe lung mass ICD-10-CM: R91.8 ICD-9-CM: 786.6  2/23/2020 Essential hypertension with goal blood pressure less than 130/80 (Chronic) ICD-10-CM: I10 
ICD-9-CM: 401.9  2/19/2018 Principal Problem: 
  Sepsis due to undetermined organism ([address] (5/5/2020) Active Problems: 
  Cancer of bronchus of right lower lobe ([address] (7/23/2020) S/P thoracotomy (1/7/2021) Acute hypoxemic respiratory failure ([address] (6/8/2021) Bronchopleural fistula ([address] (6/8/2021) Empyema ([address] (6/8/2021) Acute on chronic respiratory failure with hypoxemia ([address] (6/8/2021) Atrial flutter ([address] (6/10/2021) Plan Care management per primary CT to underwater seal 
ABX Likely to OR on Monday Signed:  MEHDI Ascencio

## 2021-06-11 NOTE — PROGRESS NOTES
Critical Care Outreach Nurse Progress Report: 
 
Subjective: In to assess pt secondary to transfer from CCU. MEWS Score: 3 (06/11/21 1600) Vitals:  
 06/11/21 1119 06/11/21 1219 06/11/21 1545 06/11/21 1600 BP:    95/71 Pulse:  88  81 Resp:    22 Temp:    98.6 °F (37 °C) SpO2: 98%  96% 97% Weight:      
Height:      
  
 
LAB DATA: 
 
Recent Labs  
  06/11/21 
0346 06/10/21 
0309 06/09/21 
6866  140 141  
K 3.5 3.6 4.2  107 108* CO2 28 28 26 AGAP 6* 5* 7 GLU 93 103* 110* BUN 9 11 13 CREA 1.03 0.97 0.54* GFRAA >60 >60 >60 GFRNA >60 >60 >60  
CA 8.1* 8.1* 8.4 MG 2.2 2.1 2.2 PHOS 3.9* 4.5* 3.5 Recent Labs  
  06/11/21 
0346 06/10/21 
2016 06/10/21 
1155 06/10/21 
0309 06/09/21 
5229 WBC 4.5  --   --  3.8* 6.3 HGB 7.3* 7.7* 6.7* 7.0* 9.1*  
HCT 23.7* 24.5* 21.9* 22.2* 30.3* *  --   --  124* 121* Objective:  
 
Pain Intensity 1: 0 (06/11/21 1646) Pain Location 1: Chest 
Pain Intervention(s) 1: Medication (see MAR) Patient Stated Pain Goal: 0 Assessment: Patient alert and oriented. Respirations unlabored. Chest tube in place to waterseal. Patient denies complaints. VS, labs, and progress notes reviewed Plan: Will continue to follow per outreach protocol.

## 2021-06-11 NOTE — PROGRESS NOTES
Critical Care Daily Progress Note: 6/11/2021 Nai Juan. Admission Date: 6/8/2021 The patient's chart is reviewed and the patient is discussed with the staff. Pt is a 79 yo  male with a history of chronic respiratory failure on 2L O2 qhs, prior tobacco abuse, RLL SCC s/p excision 7/23/2020, afib on Eliquis, HTN, GERD and OA. Pt completed chemo 10/2020. He had repeat chest CT 12/22/2020 with a large R pleural effusion and collapse of remaining RUL. Pt had a R thoracentesis on 12/28/2020 with 900mL bloody fluid removed but unable to tolerate a complete drainage. A chest tube was placed on 1/4/21 with 800cc serosanguineous drainage. General surgery was consulted and pt went to the OR On 1/6/21 for R VATS with conversion to open thoracotomy with extensive pneumolysis with decortication. We were consulted intraoperatively for bronch and were unable to get lung to inflate intraoperatively. Pt was hypotensive postoperatively requiring aylin and transfer to the ICU. Pt had repeat bronch on 1/7/21 secondary to persistent RUL atelectasis. He was found to have a stump area from prior lobectomy with abnormal appearing tissue that was biopsied and nondiagnostic. Pt had a repeat bronch on 1/12. Pt has had congestion since his pneumonectomy whenever he lies on his R side and has been able to cough up yellow phlegm. Pt presented to the ER On 6/8/21 with shortness of breath, fever, and weakness. He was in acute respiratory failure. A R chest tube was placed for possible empyema. Pt was hypotensive and admitted to ICU. General surgery was consulted because of concern for a bronchopleural fistula. Pt developed tachycardia and his device was interrogated. Pt was found to be in atrial flutter. His device was adjusted and his HR improved. Subjective:  
 
Pt is sitting up in bed on 2L O2. Pt reports thatt he feels a little less wheezing today compared to yesterday.  He is coughing but not as much.  
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 He does complain of constipation. Current Facility-Administered Medications Medication Dose Route Frequency  potassium bicarb-citric acid (EFFER-K) tablet 40 mEq  40 mEq Oral BID  cefTRIAXone (ROCEPHIN) 2 g in 0.9% sodium chloride (MBP/ADV) 50 mL MBP  2 g IntraVENous Q24H  
 0.9% sodium chloride infusion 250 mL  250 mL IntraVENous PRN  
 enoxaparin (LOVENOX) injection 80 mg  80 mg SubCUTAneous Q12H  
 midodrine (PROAMATINE) tablet 5 mg  5 mg Oral TID WITH MEALS  sodium chloride 3% hypertonic nebulizer soln  4 mL Nebulization BID RT  
 [Held by provider] apixaban (ELIQUIS) tablet 5 mg  5 mg Oral BID  pantoprazole (PROTONIX) tablet 40 mg  40 mg Oral ACB  gabapentin (NEURONTIN) capsule 300 mg  300 mg Oral TID  polyethylene glycol (MIRALAX) packet 17 g  17 g Oral DAILY PRN  
 sodium chloride (NS) flush 5-40 mL  5-40 mL IntraVENous Q8H  
 sodium chloride (NS) flush 5-40 mL  5-40 mL IntraVENous PRN  
 oxyCODONE-acetaminophen (PERCOCET) 5-325 mg per tablet 1 Tablet  1 Tablet Oral Q4H PRN  
 morphine injection 2 mg  2 mg IntraVENous Q3H PRN  
 levalbuterol (XOPENEX) nebulizer soln 1.25 mg/3 mL  1.25 mg Nebulization QID RT Review of Systems 
+constipation 
+cough 
+wheezing Constitutional:  negative for fever, chills, sweats Cardiovascular:  negative for chest pain, palpitations, syncope, edema Gastrointestinal:  negative for dysphagia, reflux, vomiting, diarrhea, abdominal pain, or melena Neurologic:  negative for focal weakness, numbness, headache Objective:  
 
Vitals:  
 06/11/21 0544 06/11/21 0600 06/11/21 1936 06/11/21 0679 BP: (!) 92/59 103/72 95/65 (!) 85/56 Pulse: 79 85 84 79 Resp: 22 (!) 37 21 14 Temp:      
SpO2: 93% (!) 88% 94% (!) 89% Weight:      
Height:      
 
 
 
Intake/Output Summary (Last 24 hours) at 6/11/2021 2015 Last data filed at 6/11/2021 2133 Gross per 24 hour Intake 1405.89 ml Output 1870 ml Net -464.11 ml Physical Exam:         
Constitutional:  the patient is well developed and in no acute distress, on 2L O2 
EENMT:  Sclera clear, pupils equal, oral mucosa moist 
Respiratory: scattered wheezing anteriorly Cardiovascular:  RRR without M,G,R 
Gastrointestinal: soft and non-tender; with positive bowel sounds. Musculoskeletal: warm without cyanosis. There is no lower extremity edema. Skin:  no jaundice or rashes Neurologic: no gross neuro deficits Psychiatric:  alert and oriented x 3 LINES: 
Port R upper chest, peripheral IV R forearm, CT x 1 
 
DRIPS: 
none CXR:  
 
 
LAB No results for input(s): GLUCPOC in the last 72 hours. No lab exists for component: [place] Recent Labs  
  06/11/21 
0346 06/10/21 
2016 06/10/21 
1155 06/10/21 
0309 06/09/21 0333 06/08/21 
2200 WBC 4.5  --   --  3.8* 6.3  --   
HGB 7.3* 7.7* 6.7* 7.0* 9.1*  --   
HCT 23.7* 24.5* 21.9* 22.2* 30.3*  --   
*  --   --  124* 121*  --   
INR  --   --   --   --   --  1.9 Recent Labs  
  06/11/21 
0346 06/10/21 
0309 06/09/21 
0333 06/08/21 
1450  140 141 137  
K 3.5 3.6 4.2 4.1  107 108* 103 CO2 28 28 26 29 GLU 93 103* 110* 132* BUN 9 11 13 13 CREA 1.03 0.97 0.54* 0.77* MG 2.2 2.1 2.2  --   
PHOS 3.9* 4.5* 3.5  --   
CA 8.1* 8.1* 8.4 9.2 ALB  --   --   --  3.1* TBILI  --   --   --  1.2* ALT  --   --   --  8* No results for input(s): PH, PCO2, PO2, HCO3, PHI, PCO2I, PO2I, HCO3I in the last 72 hours. Recent Labs  
  06/09/21 0333 06/08/21 
1450 LAC 2.2* 2.2* No results for input(s): PH, PCO2, PO2, HCO3, PHI, PCO2I, PO2I, HCO3I in the last 72 hours. Patient Active Problem List  
Diagnosis Code  Essential hypertension with goal blood pressure less than 130/80 I10  Mass of lower lobe of right lung R91.8  Right lower lobe lung mass R91.8  Malignant neoplasm of lower lobe of right lung (HCC) C34.31  
 GERD (gastroesophageal reflux disease) K21.9  Hypotension I95.9  Sepsis due to undetermined organism ([facility] A41.9  Pulmonary emphysema ([facility] J43.9  Cancer of bronchus of right lower lobe (HCC) C34.31  
 Lung cancer ([facility] C34.90  
 Cancer of right lung (HCC) C34.91  
 Atrial tachycardia (HCC) I47.1  S/P lobectomy of lung Z90.2  Cough R05  Dehydration E86.0  Chemotherapy induced neutropenia (HCC) D70.1, T45.1X5A  Pleural effusion on right J90  
 Atelectasis of right lung J98.11  
 A-fib ([facility]) I48.91  
 Chronic respiratory failure with hypoxia ([facility]) J96.11  
 S/P thoracotomy Z98.890  
 PAF (paroxysmal atrial fibrillation) ([facility]) I48.0  Hypertension I10  Atrial fibrillation with RVR ([facility]) I48.91  
 Sick sinus syndrome ([facility]) I49.5  Pacemaker Z95.0  Heart block AV complete ([facility]) I44.2  COPD exacerbation ([facility] J44.1  Acute hypoxemic respiratory failure (HCC) J96.01  
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  
 Atrial flutter ([facility]) I48.92 Assessment:  (Medical Decision Making) Hospital Problems  Date Reviewed: 6/11/2021 Codes Class Noted POA Atrial flutter ([facility] ICD-10-CM: Y53.14 
ICD-9-CM: 427.32  6/10/2021 Unknown Per cardiology Acute hypoxemic respiratory failure ([facility] ICD-10-CM: J96.01 
ICD-9-CM: 518.81  6/8/2021 Unknown Wean O2 as tolerated Bronchopleural fistula (HCC) ICD-10-CM: J86.0 ICD-9-CM: 510.0  6/8/2021 Unknown Empyema ([facility] ICD-10-CM: J86.9 ICD-9-CM: 510.9  6/8/2021 Unknown CT in place, needs surgery Acute on chronic respiratory failure with hypoxemia [facility]) ICD-10-CM: W39.43 
ICD-9-CM: 518.84  6/8/2021 Unknown Wean O2 as tolerated S/P thoracotomy ICD-10-CM: V60.470 ICD-9-CM: V45.89  1/7/2021 Yes Cancer of bronchus of right lower lobe [facility]) ICD-10-CM: C34.31 
ICD-9-CM: 162.5  7/23/2020 Yes  * (Principal) Sepsis due to undetermined organism [facility]) ICD-10-CM: A41.9 ICD-9-CM: 038.9  5/5/2020 Yes Plan:  (Medical Decision Making)  
 
--wean O2 as tolerated 
--fluid cx: Alpha streptococcus --continue Rocephin 
--continue midodrine 
--await surgery recommendations --pt is a FULL CODE More than 50% of the time documented was spent in face-to-face contact with the patient and in the care of the patient on the floor/unit where the patient is located. HARSH Campbell Lungs:  Few trace rhonchi Heart:  RRR with no Murmur/Rubs/Gallops Additional Comments: Pt reports some ongoing wheezing. Will add budesonide to treatments. VSS. [id number] [address] for floor with monitored bed. I have spoken with and examined the patient. I agree with the above assessment and plan as documented.  
 
Alice Ly MD

## 2021-06-12 NOTE — PROGRESS NOTES
Caryn Lama. Admission Date: 6/8/2021 Daily Progress Note: 6/12/2021 The patient's chart is reviewed and the patient is discussed with the staff. Pt is a 79 yo  male with a history of chronic respiratory failure on 2L O2 qhs, prior tobacco abuse, RLL SCC s/p excision 7/23/2020, afib on Eliquis, HTN, GERD and OA. Pt completed chemo 10/2020. He had repeat chest CT 12/22/2020 with a large R pleural effusion and collapse of remaining RUL. Pt had a R thoracentesis on 12/28/2020 with 900mL bloody fluid removed but unable to tolerate a complete drainage. A chest tube was placed on 1/4/21 with 800cc serosanguineous drainage. General surgery was consulted and pt went to the OR On 1/6/21 for R VATS with conversion to open thoracotomy with extensive pneumolysis with decortication. We were consulted intraoperatively for bronch and were unable to get lung to inflate intraoperatively. Pt was hypotensive postoperatively requiring aylin and transfer to the ICU. Pt had repeat bronch on 1/7/21 secondary to persistent RUL atelectasis. He was found to have a stump area from prior lobectomy with abnormal appearing tissue that was biopsied and nondiagnostic. Pt had a repeat bronch on 1/12. Pt has had congestion since his pneumonectomy whenever he lies on his R side and has been able to cough up yellow phlegm. Pt presented to the ER On 6/8/21 with shortness of breath, fever, and weakness. He was in acute respiratory failure. A R chest tube was placed for possible empyema. Pt was hypotensive and admitted to ICU. General surgery was consulted because of concern for a bronchopleural fistula. Pt developed tachycardia and his device was interrogated. Pt was found to be in atrial flutter. His device was adjusted and his HR improved. Subjective: Pt was transferred out of ICU. Resting in bed. Chest tube in place. Ongoing air leak. No new complaints today.   
 
Current Facility-Administered Medications Medication Dose Route Frequency  budesonide (PULMICORT) 500 mcg/2 ml nebulizer suspension  500 mcg Nebulization BID RT  
 polyethylene glycol (MIRALAX) packet 17 g  17 g Oral DAILY  bisacodyL (DULCOLAX) tablet 5 mg  5 mg Oral DAILY PRN  
 cefTRIAXone (ROCEPHIN) 2 g in 0.9% sodium chloride (MBP/ADV) 50 mL MBP  2 g IntraVENous Q24H  
 0.9% sodium chloride infusion 250 mL  250 mL IntraVENous PRN  
 enoxaparin (LOVENOX) injection 80 mg  80 mg SubCUTAneous Q12H  
 midodrine (PROAMATINE) tablet 5 mg  5 mg Oral TID WITH MEALS  sodium chloride 3% hypertonic nebulizer soln  4 mL Nebulization BID RT  
 [Held by provider] apixaban (ELIQUIS) tablet 5 mg  5 mg Oral BID  pantoprazole (PROTONIX) tablet 40 mg  40 mg Oral ACB  gabapentin (NEURONTIN) capsule 300 mg  300 mg Oral TID  sodium chloride (NS) flush 5-40 mL  5-40 mL IntraVENous Q8H  
 sodium chloride (NS) flush 5-40 mL  5-40 mL IntraVENous PRN  
 oxyCODONE-acetaminophen (PERCOCET) 5-325 mg per tablet 1 Tablet  1 Tablet Oral Q4H PRN  
 morphine injection 2 mg  2 mg IntraVENous Q3H PRN  
 levalbuterol (XOPENEX) nebulizer soln 1.25 mg/3 mL  1.25 mg Nebulization QID RT Review of Systems Constitutional: negative for fever, chills, sweats Cardiovascular: negative for chest pain, palpitations, syncope, edema Gastrointestinal: negative for dysphagia, reflux, vomiting, diarrhea, abdominal pain, or melena Neurologic: negative for focal weakness, numbness, headache Objective:  
 
Vitals:  
 06/12/21 0731 06/12/21 0755 06/12/21 1031 06/12/21 1108 BP:  120/78 100/62 Pulse:  86 84 Resp:  22 Temp:  97.6 °F (36.4 °C) SpO2: 95% 95%  97% Weight:      
Height:      
 
Intake and Output:  
06/10 1901 - 06/12 0700 In: 384 [P.O.:384] Out: 4083 [PSPYS:8967] 06/12 0701 - 06/12 1900 In: -  
Out: 525 [Urine:525] Physical Exam:  
Constitution:  the patient is well developed and in no acute distress EENMT:  Sclera clear, pupils equal, oral mucosa moist 
Respiratory: Chest tube related sounds on the right that transmit to the left. Cardiovascular:  RRR without M,G,R 
Gastrointestinal: soft and non-tender; with positive bowel sounds. Musculoskeletal: warm without cyanosis. There is no lower leg edema. Skin:  no jaundice or rashes, no wounds Neurologic: no gross neuro deficits Psychiatric:  alert and oriented x ppt CHEST XRAY:  
CXR Results  (Last 48 hours) 06/11/21 0414  XR CHEST SNGL V Final result Impression: 1. Findings as described above. Narrative:  EXAMINATION: One view chest  
   
HISTORY: resp failure TECHNIQUE: Frontal chest.  
   
COMPARISON: 6/10/2021 FINDINGS:    
Stable small right chest tube. No significant change in the cavitary defect in the right hemithorax. Stable left basilar atelectasis. No left pneumothorax. No other significant interval changes. LAB No lab exists for component: [place] Recent Labs  
  06/12/21 
0501 06/11/21 
0346 06/10/21 
2016 06/10/21 
1155 06/10/21 
0309 WBC 3.9* 4.5  --   --  3.8* HGB 7.9* 7.3* 7.7* 6.7* 7.0*  
HCT 24.9* 23.7* 24.5* 21.9* 22.2*  
 131*  --   --  124* Recent Labs  
  06/12/21 
0501 06/11/21 
0346 06/10/21 
0309  141 140  
K 4.2 3.5 3.6  107 107 CO2 30 28 28 GLU 86 93 103* BUN 8 9 11 CREA 1.02 1.03 0.97  
MG 2.4 2.2 2.1 CA 8.6 8.1* 8.1*  
PHOS 3.5 3.9* 4.5* ABG:  No results found for: PH, PHI, PCO2, PCO2I, PO2, PO2I, HCO3, HCO3I, FIO2, FIO2I 
 
 
MICRO 
 
SARS-CoV-2 LAB Results [facility] Test:  
Lab Results Component Value Date/Time COV2NT Not Detected 06/30/2020 09:25 PM  
  
[facility] Test: No results found for: [facility] Test: No components found for: [id number] Assessment:  (Medical Decision Making) Hospital Problems  Date Reviewed: 6/11/2021 Codes Class Noted POA  Atrial flutter ([facility] ICD-10-CM: Z88.80 ICD-9-CM: 427.32  6/10/2021 Unknown Acute hypoxemic respiratory failure ([facility] ICD-10-CM: J96.01 
ICD-9-CM: 518.81  6/8/2021 Unknown Bronchopleural fistula (HCC) ICD-10-CM: J86.0 ICD-9-CM: 510.0  6/8/2021 Unknown Empyema ([facility] ICD-10-CM: J86.9 ICD-9-CM: 510.9  6/8/2021 Unknown Acute on chronic respiratory failure with hypoxemia [facility]) ICD-10-CM: M85.52 
ICD-9-CM: 518.84  6/8/2021 Unknown S/P thoracotomy ICD-10-CM: E72.800 ICD-9-CM: V45.89  1/7/2021 Yes Cancer of bronchus of right lower lobe [facility]) ICD-10-CM: C34.31 
ICD-9-CM: 162.5  7/23/2020 Yes * (Principal) Sepsis due to undetermined organism [facility]) ICD-10-CM: A41.9 ICD-9-CM: 038.9  5/5/2020 Yes Patient s/p completion pneumonectomy, now with leaking RMSB stump. Chest tube evacuated fluid but ongoing leak. Plan:  (Medical Decision Making)  
-to go for stump repair Monday morning.  
-cont ceftriaxone for alpha strep grown in cultures. -cont chest tube with air leak.  
-daily cxr's ordered.  
-on treatment dose lovenox at present. Will need to be held Sunday for surgery. -protonix.  
-consult hospitalists to assume primary now that patient is out of ICU.   
 
 
Liam Barrios MD

## 2021-06-12 NOTE — PROGRESS NOTES
Date of Outreach Update: 
Joe Coelho was seen and assessed. MEWS Score: 2 (06/12/21 1230) Vitals:  
 06/12/21 1247 06/12/21 1503 06/12/21 1523 06/12/21 1608 BP: 105/65 109/66  117/69 Pulse:  83  91 Resp:    18 Temp:    98.1 °F (36.7 °C) SpO2:   95% 98% Weight:      
Height:      
  
 
 Pain Assessment Pain Intensity 1: 0 (06/12/21 1247) Pain Location 1: Rib cage Pain Intervention(s) 1: Medication (see MAR) Patient Stated Pain Goal: 0 Previous Outreach assessment has been reviewed. There have been no significant clinical changes since the completion of the last dated Outreach assessment. Will continue to follow up per outreach protocol.  
 
Signed By:   Erika Whitley RN 
  June 12, 2021 4:52 PM

## 2021-06-12 NOTE — ROUTINE PROCESS
Bedside and Verbal shift change report given to self (oncoming nurse) by Bronson Kimble (offgoing nurse). Report included the following information SBAR, Kardex, ED Summary, Procedure Summary, Intake/Output, MAR, Recent Results.

## 2021-06-12 NOTE — ROUTINE PROCESS
Bedside and Verbal shift change report received from [address]. Report included the following information SBAR, Kardex, Intake/Output, MAR and Recent Results.

## 2021-06-12 NOTE — H&P
[facility]  
History and Physical  
 
 
Name:  Keerthi Alexander. Age:70 y.o. Sex:male :  1950 MRN:  [id number] PCP:  Zac Diaz DO Admit Date:  2021  4:02 PM  
Chief Complaint: assume care Requesting RACHEL Roís Reason for Admission:  
Acute on chronic respiratory failure with hypoxemia ([facility] [J96.21] Assessment & Plan:  
 
 
acute hypoxic respiratory failure, empyema, septic shock: 
· On 2 L NC 
· pulmonary and surgery following · Continued rocephin Aflutter s/p PPM: 
· device adjusted per cardiology · On treatment dose lovenox · eliquis held Anemia: 
·  trend HGB Disposition:  
Diet: DIET ADULT 
VTE ppx: lovenox GI ppx: protonix CODE STATUS: Full Code Surrogate decision-maker:  
 
 
History of Presenting Illness:  
 
Keerthi William is a 78 y.o. male with medical history of  Chronic hypoxic respiratory failure  On 2 L NC QHS, tobacco use, RLL SCC s/p excision / chemo, AFIB on eliquis s/p PPM and AVN ablation, HTN, GERD, right pleural effusion s/p thoracentesis / Chest tube / VATs with decortication and remaining stump admitted with acute hypoxic respiratory failure, empyema, septic shock. He required ICU course for pressors that are weaned. He has been seen by pulmonary and Dr. Jennifer Field of surgery. 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 He is on IV antibiotics. Pleural cultures growing alpha strep. BC NGTD. Plans are for thoracotomy on . He has additionally been seen by cardiology for atrial flutter and his device mode switched for atrial flutter with improvement. Discharge plans pending Review of Systems: 
breathing improved, no BM, some anorexia Past Medical History:  
Diagnosis Date  Arrhythmia Afib  Chronic obstructive pulmonary disease ([facility] O2 at [facility]  Chronic pain   
 right torn rotator cuff; left knee  GERD (gastroesophageal reflux disease)   
 controlled with med  Hypertension hx-- off meds since 2020--- followed by dr. Dara Smith  Hypoxia 2020  Malignant neoplasm of lower lobe of right lung ([facility] 2020 REC'D CHEMO AND RADIATION--- FOLLOWED BY DR. Cullen Andrade Osteoarthritis  Right lower lobe lung mass 2020  Status post total right knee replacement 2016 Past Surgical History:  
Procedure Laterality Date  HX COLONOSCOPY    
 HX KNEE ARTHROSCOPY Left   
 scope X 1, ACL recon X 1  
 HX KNEE ARTHROSCOPY Right , 1975 X 2   
 HX KNEE REPLACEMENT Right 2016 [address] [place]  HX VASCULAR ACCESS Right placed 3/2020  
 port in chest   
 IR INSERT TUNL CVC W PORT OVER 5 YEARS  3/18/2020  AR CHEST SURGERY PROCEDURE UNLISTED    
 R lobectomy 2020; R complete pneumonectomy 2021  AR SINUS SURGERY PROC UNLISTED  1991  
 sinus surg Family History : reviewed Family History Problem Relation Age of Onset  Cancer Mother   
     uterine  Arrhythmia Sister   
     afib Social History Tobacco Use  Smoking status: Former Smoker Packs/day: 1.00 Years: 20.00 Pack years: 20.00 Quit date:  Years since quittin.4  Smokeless tobacco: Never Used  Tobacco comment: patient has no desire to resume Substance Use Topics  Alcohol use: Yes Alcohol/week: 4.0 standard drinks Types: 4 Glasses of wine per week Allergies Allergen Reactions  Albuterol Palpitations  Celebrex [Celecoxib] Itching Immunization History Administered Date(s) Administered  COVID-19, PFIZER, MRNA, LNP-S, PF, 30MCG/0.3ML DOSE 02/10/2021, 03/10/2021  Influenza Vaccine Placeword) PF (>6 Mo Flulaval, Fluarix, and >3 Yrs [place], Fluzone [id number]) 2018  TB Skin Test (PPD) Intradermal 2016 PTA Medications: 
Current Outpatient Medications Medication Instructions  apixaban (ELIQUIS) 5 mg, Oral, 2 TIMES DAILY  esomeprazole (NEXIUM) 20 mg, Oral, EVERY BEDTIME, prn  
 gabapentin (NEURONTIN) 300 mg capsule TAKE 1 CAPSULE BY MOUTH THREE TIMES A DAY  ipratropium (ATROVENT) 0.03 % nasal spray 2 Sprays, Both Nostrils, 2 TIMES DAILY  lidocaine-prilocaine (EMLA) topical cream Topical, AS NEEDED  
 metoprolol succinate (TOPROL-XL) 50 mg, Oral, DAILY  Nebulizer & Compressor machine COPD  
 ondansetron hcl (ZOFRAN) 8 mg, Oral, EVERY 8 HOURS AS NEEDED  
 OXYGEN-AIR DELIVERY SYSTEMS Does Not Apply, 2lpm qhs   
 pantoprazole (PROTONIX) 40 mg, Oral, DAILY  polyethylene glycol (MIRALAX) 17 g, Oral, DAILY  umeclidinium (Incruse Ellipta) 62.5 mcg/actuation inhaler 1 Puff, Inhalation, DAILY Objective:  
 
Patient Vitals for the past 24 hrs: 
 Temp Pulse Resp BP SpO2  
06/12/21 1247    105/65   
06/12/21 1230 97.5 °F (36.4 °C) 90 22 111/73 94 % 06/12/21 1108     97 % 06/12/21 1031  84  100/62   
06/12/21 0755 97.6 °F (36.4 °C) 86 22 120/78 95 % 06/12/21 0731     95 % 06/12/21 0400 98.5 °F (36.9 °C) 82 24 101/71 94 % 06/12/21 0030 98.4 °F (36.9 °C) 70 22 103/67 91 % 06/11/21 2050     94 % 06/11/21 2000 98.9 °F (37.2 °C) 72 26 114/74 93 % 06/11/21 1600 98.6 °F (37 °C) 81 22 95/71 97 % 06/11/21 1545     96 % Oxygen Therapy O2 Sat (%): 94 % (06/12/21 1230) Pulse via Oximetry: 79 beats per minute (06/12/21 1108) O2 Device: Nasal cannula (06/12/21 1247) Skin Assessment: Clean, dry, & intact (06/12/21 1247) Skin Protection for O2 Device: Yes (06/11/21 0700) Location: Ear (06/11/21 0700) Interventions: Reposition Device; Ear Cushion (06/10/21 6498) O2 Flow Rate (L/min): 2 l/min (06/12/21 1247) FIO2 (%): 28 % (06/12/21 0731) Body mass index is 26.34 kg/m². Physical Exam: 
 
General: No acute distress, speaking in full sentences, no use of accessory muscles Lungs: Coarse anterior Cardiovascular: Regular rate and rhythm with normal S1 and S2 , no edema Abdomen: Soft, nontender, nondistended, normoactive bowel sounds Neuro: Nonfocal 
Psych: Normal mood and affect Data Reviewed: I have reviewed all labs, meds, and studies. Recent Results (from the past 24 hour(s)) MAGNESIUM Collection Time: 06/12/21  5:01 AM  
Result Value Ref Range Magnesium 2.4 1.8 - 2.4 mg/dL PHOSPHORUS Collection Time: 06/12/21  5:01 AM  
Result Value Ref Range Phosphorus 3.5 2.3 - 3.7 MG/DL  
CBC WITH AUTOMATED DIFF Collection Time: 06/12/21  5:01 AM  
Result Value Ref Range WBC 3.9 (L) 4.3 - 11.1 K/uL  
 RBC 2.88 (L) 4.23 - 5.6 M/uL HGB 7.9 (L) 13.6 - 17.2 g/dL HCT 24.9 (L) 41.1 - 50.3 % MCV 86.5 79.6 - 97.8 FL  
 MCH 27.4 26.1 - 32.9 PG  
 MCHC 31.7 31.4 - 35.0 g/dL  
 RDW 18.0 (H) 11.9 - 14.6 % PLATELET 095 255 - 103 K/uL MPV 10.1 9.4 - 12.3 FL ABSOLUTE NRBC 0.00 0.0 - 0.2 K/uL  
 DF AUTOMATED NEUTROPHILS 71 43 - 78 % LYMPHOCYTES 17 13 - 44 % MONOCYTES 9 4.0 - 12.0 % EOSINOPHILS 3 0.5 - 7.8 % BASOPHILS 0 0.0 - 2.0 % IMMATURE GRANULOCYTES 1 0.0 - 5.0 %  
 ABS. NEUTROPHILS 2.8 1.7 - 8.2 K/UL  
 ABS. LYMPHOCYTES 0.7 0.5 - 4.6 K/UL  
 ABS. MONOCYTES 0.3 0.1 - 1.3 K/UL  
 ABS. EOSINOPHILS 0.1 0.0 - 0.8 K/UL  
 ABS. BASOPHILS 0.0 0.0 - 0.2 K/UL  
 ABS. IMM. GRANS. 0.0 0.0 - 0.5 K/UL METABOLIC PANEL, BASIC Collection Time: 06/12/21  5:01 AM  
Result Value Ref Range Sodium 140 136 - 145 mmol/L Potassium 4.2 3.5 - 5.1 mmol/L Chloride 105 98 - 107 mmol/L  
 CO2 30 21 - 32 mmol/L Anion gap 5 (L) 7 - 16 mmol/L Glucose 86 65 - 100 mg/dL BUN 8 8 - 23 MG/DL Creatinine 1.02 0.8 - 1.5 MG/DL  
 GFR est AA >60 >60 ml/min/1.73m2 GFR est non-AA >60 >60 ml/min/1.73m2 Calcium 8.6 8.3 - 10.4 MG/DL  
 
 
EKG Results Procedure 720 Value Units Date/Time EKG, 12 LEAD, INITIAL [[id number]] Collected: 06/08/21 1454 Order Status: Completed Updated: 06/09/21 1437   Ventricular Rate 86 BPM   
  Atrial Rate 74 BPM   
  QRS Duration 138 ms   
  Q-T Interval 444 ms QTC Calculation (Bezet) 531 ms Calculated P Axis -63 degrees Calculated R Axis -107 degrees Calculated T Axis 62 degrees Diagnosis -- Biventricular pacemaker detected Abnormal ECG When compared with ECG of 16-MAY-2021 16:19, 
Vent. rate has increased BY  17 BPM 
Confirmed by Basilio Rahman ([id number]) on 6/9/2021 2:37:07 PM 
  
  
 
 
All Micro Results Procedure Component Value Units Date/Time BLOOD CULTURE [[id number]] Collected: 06/08/21 1445 Order Status: Completed Specimen: Blood Updated: 06/12/21 1126 Special Requests: LEFT FOOT Culture result: NO GROWTH 4 DAYS     
 BLOOD CULTURE [[id number]] Collected: 06/08/21 1627 Order Status: Completed Specimen: Blood Updated: 06/12/21 1126 Special Requests: --     
  RIGHT 
FOREARM Culture result: NO GROWTH 4 DAYS     
 CULTURE, BODY FLUID Laura Arm STAIN [[id number]]  (Abnormal) Collected: 06/08/21 1941 Order Status: Completed Specimen: Body Fluid from Right Updated: 06/11/21 9651 Special Requests: NO SPECIAL REQUESTS     
  GRAM STAIN 45  WBC'S/OIF  
   MANY GRAM POSITIVE COCCI MODERATE GRAM NEGATIVE RODS Culture result:    
  HEAVY MIXED GRAM POSITIVE COCCI RESEMBLING NORMAL RESPIRATORY EDU  
     
      
  THIS ORGANISM WILL BE HELD FOR 7 DAYS. IF FURTHER TESTING IS REQUIRED PLEASE NOTIFY MICROBIOLOGY  
     
 CULTURE, RESPIRATORY/SPUTUM/BRONCH W Wero Merlinelar [[id number]] Collected: 06/08/21 2305 Order Status: Completed Specimen: Sputum Updated: 06/10/21 1939 Special Requests: NO SPECIAL REQUESTS     
  GRAM STAIN    
  GRAM STAIN EXAMINATION OF THIS SPECIMEN INDICATED OROPHARYNGEAL CONTAMINATION. PLEASE SUBMIT A NEW SPECIMEN OR NOTIFY THE MICRO DEPT WITHIN 48HRS IF FURTHER WORKUP IS DESIRED. Culture result:    
  Please reorder and recollect. Caitlin Christianson 6/9/21 @1546, TA Other Studies: No results found. 
 
 
Medications: 
Medications Administered   
 acetaminophen (TYLENOL) tablet 1,000 mg Admin Date 
06/08/2021 Action Given Dose 
1,000 mg Route Oral Administered By Adwoa ATKINSON  
  
  
 alteplase (CATHFLO) injection 1 mg Admin Date 
06/09/2021 Action Given Dose 
1 mg Route InterCATHeter Administered By Delacroix, Judyth Nissen, RN  
  
  
 budesonide (PULMICORT) 500 mcg/2 ml nebulizer suspension Admin Date 
06/11/2021 Action Given Dose 
500 mcg Route Nebulization Administered By 
Fer Eastman RT Admin Date 
06/11/2021 Action Given Dose 
500 mcg Route Nebulization Administered By Yusra Romero Dr Admin Date 
06/12/2021 Action Given Dose 
500 mcg Route Nebulization Administered By Danny Sotomayor, RT  
  
  
 cefTRIAXone (ROCEPHIN) 2 g in 0.9% sodium chloride (MBP/ADV) 50 mL MBP Admin Date 
06/10/2021 Action New Bag Dose 
2 g Rate 
100 mL/hr Route IntraVENous Administered By 
Gavi Ross RN Admin Date 
06/11/2021 Action New Bag Dose 
2 g Rate 
100 mL/hr Route IntraVENous Administered By 
Estela Alberts RN Admin Date 
06/12/2021 Action New Bag Dose 
2 g Rate 
100 mL/hr Route IntraVENous Administered By 
Estela Alberts RN  
  
  
 enoxaparin (LOVENOX) injection 80 mg   
 Admin Date 
06/10/2021 Action Given Dose 80 mg Route SubCUTAneous Administered By 
Gavi Ross RN Admin Date 
06/11/2021 Action Given Dose 80 mg Route SubCUTAneous Administered By Willeen Osgood, RN Admin Date 
06/11/2021 Action Given Dose 80 mg Route SubCUTAneous Administered By 
Estela Alberts RN Admin Date 
06/12/2021 Action Given Dose 80 mg Route SubCUTAneous Administered By 
Lluvia Junior RN  
  
  
 fentaNYL citrate (PF) injection 100 mcg Admin Date 
06/08/2021 Action Given Dose 
100 mcg Route IntraVENous Administered By Mikel Jang RN  
  
  
 gabapentin (NEURONTIN) capsule 300 mg Admin Date 
06/08/2021 Action Given Dose 
300 mg Route Oral Administered By Thalia Benitez RN Admin Date 
06/09/2021 Action Given Dose 
300 mg Route Oral Administered By Prudence Doan RN Admin Date 
06/09/2021 Action Given Dose 
300 mg Route Oral Administered By Prudence Doan RN Admin Date 
06/09/2021 Action Given Dose 
300 mg Route Oral Administered By Thalia Benitez RN Admin Date 
06/10/2021 Action Given Dose 
300 mg Route Oral Administered By 
Alia Mccoy RN Admin Date 
06/10/2021 Action Given Dose 
300 mg Route Oral Administered By 
Alia Mccoy RN Admin Date 
06/10/2021 Action Given Dose 
300 mg Route Oral Administered By Navi Grayson RN Admin Date 
06/11/2021 Action Given Dose 
300 mg Route Oral Administered By 
Alia Mccoy RN Admin Date 
06/11/2021 Action Given Dose 
300 mg Route Oral Administered By 
Janell Lopez RN Admin Date 
06/11/2021 Action Given Dose 
300 mg Route Oral Administered By 
Natali Nicolas RN Admin Date 
06/12/2021 Action Given Dose 
300 mg Route Oral Administered By 
Janell Lopez RN  
  
  
 heparin 25,000 units in dextrose 500 mL infusion Admin Date 
06/09/2021 Action New Bag Dose 12 Units/kg/hr Rate 18.6 mL/hr Route IntraVENous Administered By Prudence Doan RN Admin Date 
06/09/2021 Action Rate Change Dose 
9 Units/kg/hr Rate 13.9 mL/hr Route IntraVENous Administered By Prudence Doan RN Admin Date 
06/09/2021 Action Rate Verify Dose 
9 Units/kg/hr Rate 13.9 mL/hr Route IntraVENous Administered By Prudence Doan RN Admin Date 
06/10/2021 Action Restarted Dose 
6 Units/kg/hr Rate 9.3 mL/hr Route IntraVENous Administered By Thalia Benitez RN  Admin Date 
06/10/2021 Action Rate Verify Dose 
6 Units/kg/hr Rate 9.3 mL/hr Route IntraVENous Administered By Nohemy Pérez, JORGITO Admin Date 
06/10/2021 Action Restarted Dose 
3 Units/kg/hr Rate 4.6 mL/hr Route IntraVENous Administered By 
Katya Vasquez RN  
  
  
 lactated ringers bolus infusion 1,000 mL Admin Date 
06/08/2021 Action New Bag Dose 
1,000 mL Rate 
250 mL/hr Route IntraVENous Administered By Nohemy Pérez RN Admin Date 
06/09/2021 Action New Bag Dose 
1,000 mL Rate 500 mL/hr Route IntraVENous Administered By Lacey Pelaez RN  
  
  
 levalbuterol (XOPENEX) nebulizer soln 1.25 mg/3 mL Admin Date 
06/08/2021 Action Given Dose 1.25 mg Route Nebulization Administered By 
Ngozi Banks, RT Admin Date 
06/09/2021 Action Given Dose 1.25 mg Route Nebulization Administered By 
Leeroy King Admin Date 
06/09/2021 Action Given Dose 1.25 mg Route Nebulization Administered By Jose Harmon RN Admin Date 
06/09/2021 Action Given Dose 1.25 mg Route Nebulization Administered By 
Brisa Nuñez Admin Date 
06/09/2021 Action Given Dose 1.25 mg Route Nebulization Administered By 
Birdena Lefort, RT Admin Date 
06/09/2021 Action Given Dose 1.25 mg Route Nebulization Administered By 
Audelia Martinez, RT Admin Date 
06/10/2021 Action Given Dose 1.25 mg Route Nebulization Administered By Jay Rodriguez, RT Admin Date 
06/10/2021 Action Given Dose 1.25 mg Route Nebulization Administered By 
Ngozi Banks, RT Admin Date 
06/11/2021 Action Given Dose 1.25 mg Route Nebulization Administered By Gilles Gerardo, RT Admin Date 
06/11/2021 Action Given Dose 1.25 mg Route Nebulization Administered By 
Gilles Gerardo, RT Admin Date 
06/11/2021 Action Given Dose 1.25 mg Route Nebulization Administered By Yusra Romero Dr Admin Date 
06/12/2021 Action Given Dose 1.25 mg Route Nebulization Administered By Anayeli Sotomayor, RT Admin Date 
06/12/2021 Action Given Dose 1.25 mg Route Nebulization Administered By Danny Sotomayor, RT  
  
  
 midodrine (PROAMATINE) tablet 5 mg Admin Date 
06/09/2021 Action Given Dose 5 mg Route Oral Administered By Flower Sanchez RN Admin Date 
06/09/2021 Action Given Dose 5 mg Route Oral Administered By Flower Sanchez RN Admin Date 
06/10/2021 Action Given Dose 5 mg Route Oral Administered By 
Katerin Mullen RN Admin Date 
06/10/2021 Action Given Dose 5 mg Route Oral Administered By 
Katerin Mullen RN Admin Date 
06/10/2021 Action Given Dose 5 mg Route Oral Administered By 
Katerin Mullen RN Admin Date 
06/11/2021 Action Given Dose 5 mg Route Oral Administered By 
Katerin Mullen RN Admin Date 
06/11/2021 Action Given Dose 5 mg Route Oral Administered By 
Gemini White RN Admin Date 
06/11/2021 Action Given Dose 5 mg Route Oral Administered By 
Gemini White RN Admin Date 
06/12/2021 Action Given Dose 5 mg Route Oral Administered By 
Gemini White RN  
  
  
 morphine injection 2 mg Admin Date 
06/10/2021 Action Given Dose 
2 mg Route IntraVENous Administered By 
Katerin Mullen RN Admin Date 
06/10/2021 Action Given Dose 
2 mg Route IntraVENous Administered By Shayan Tran RN Admin Date 
06/11/2021 Action Given Dose 
2 mg Route IntraVENous Administered By 
Jael Welch RN  
  
  
 oxyCODONE-acetaminophen [facility]) 5-325 mg per tablet 1 Tablet Admin Date 
06/11/2021 Action Given Dose 1 Tablet Route Oral Administered By Salomon Dahl RN  
  
  
 pantoprazole (PROTONIX) tablet 40 mg   
 Admin Date 
06/09/2021 Action Given Dose 40 mg Route Oral Administered By Evangelina Bowens RN Admin Date 
06/10/2021 Action Given Dose 40 mg Route Oral Administered By 
Sylvain Vides RN Admin Date 
06/11/2021 Action Given Dose 40 mg Route Oral Administered By 
Sylvain Vides RN Admin Date 
06/12/2021 Action Given Dose 40 mg Route Oral Administered By 
Delfina Felty, RN  
  
  
 piperacillin-tazobactam (ZOSYN) 4.5 g in 0.9% sodium chloride (MBP/ADV) 100 mL MBP Admin Date 
06/08/2021 Action New Bag Dose 4.5 g Rate 
200 mL/hr Route IntraVENous Administered By Alicia Aviles Admin Date 
06/08/2021 Action New Bag Dose 4.5 g Rate 25 mL/hr Route IntraVENous Administered By Adan Tapia RN Admin Date 
06/09/2021 Action New Bag Dose 4.5 g Rate 25 mL/hr Route IntraVENous Administered By Adan Tapia RN Admin Date 
06/09/2021 Action New Bag Dose 4.5 g Rate 25 mL/hr Route IntraVENous Administered By Evangelina Bowens RN Admin Date 
06/09/2021 Action New Bag Dose 4.5 g Rate 25 mL/hr Route IntraVENous Administered By Adan Tapia RN Admin Date 
06/10/2021 Action New Bag Dose 4.5 g Rate 25 mL/hr Route IntraVENous Administered By Washington Pelaez RN  
  
  
 polyethylene glycol (MIRALAX) packet 17 g Admin Date 
06/11/2021 Action Given Dose 
17 g Route Oral Administered By 
Delfina Felty, RN Admin Date 
06/12/2021 Action Given Dose 
17 g Route Oral Administered By 
Delfina Felty, RN  
  
  
 potassium bicarb-citric acid (EFFER-K) tablet 40 mEq Admin Date 
06/11/2021 Action Given Dose 40 mEq Route Oral Administered By Venita Whitley RN Admin Date 
06/11/2021 Action Given Dose 40 mEq Route Oral Administered By 
Eliseo Knowles RN  
  
  
 sodium chloride (NS) flush 5-40 mL Admin Date 
06/08/2021 Action Given Dose 
10 mL Route IntraVENous Administered By Justo Schmidt RN Admin Date 
06/09/2021 Action Given Dose 
10 mL Route IntraVENous Administered By Justo Schmidt RN Admin Date 
06/09/2021 Action Given Dose 
10 mL Route IntraVENous Administered By Darline Farrell RN Admin Date 
06/09/2021 Action Given Dose 
10 mL Route IntraVENous Administered By Justo Schmidt RN Admin Date 
06/10/2021 Action Given Dose 
10 mL Route IntraVENous Administered By Justo Schmidt RN Admin Date 
06/10/2021 Action Given Dose 
10 mL Route IntraVENous Administered By 
Venita Whitley RN Admin Date 
06/10/2021 Action Given Dose 
10 mL Route IntraVENous Administered By Tessa Parikh RN Admin Date 
06/11/2021 Action Given Dose 
10 mL Route IntraVENous Administered By Tessa Parikh RN Admin Date 
06/11/2021 Action Given Dose 5 mL Route IntraVENous Administered By 
Eliseo Knowles RN Admin Date 
06/11/2021 Action Given Dose 
10 mL Route IntraVENous Administered By 
Wes Blanchard RN Admin Date 
06/12/2021 Action Given Dose 
20 mL Route IntraVENous Administered By 
Wes Blanchard RN Admin Date 
06/12/2021 Action Given Dose 
10 mL Route IntraVENous Administered By 
Eliseo Knowles RN  
  
  
 sodium chloride 0.9 % bolus infusion 1,000 mL Admin Date 
06/08/2021 Action New Bag Dose 
1,000 mL Rate 
1,000 mL/hr Route IntraVENous Administered By Jamel Medeiros RN  
  
  
 sodium chloride 3% hypertonic nebulizer soln Admin Date 
06/08/2021 Action Given Dose 4 mL Route Nebulization Administered By 
Jonathan Victoria Storm KELSEY, RT Admin Date 
06/09/2021 Action Given Dose 4 mL Route Nebulization Administered By 
Mahesh Pérez Admin Date 
06/09/2021 Action Given Dose 4 mL Route Nebulization Administered By 
Ashlyn Barrett, RT Admin Date 
06/10/2021 Action Given Dose 4 mL Route Nebulization Administered By Zac Mcgregor, RT Admin Date 
06/10/2021 Action Given Dose 4 mL Route Nebulization Administered By 
Juliette Greenberg, RT Admin Date 
06/11/2021 Action Given Dose 4 mL Route Nebulization Administered By Yusra Romero Dr Admin Date 
06/12/2021 Action Given Dose 4 mL Route Nebulization Administered By Danny Sotomayor, RT  
  
  
 vancomycin (VANCOCIN) 1250 mg in  ml infusion Admin Date 
06/09/2021 Action New Bag Dose 
1,250 mg Rate 125 mL/hr Route IntraVENous Administered By Liza Shields RN Admin Date 
06/09/2021 Action New Bag Dose 
1,250 mg Rate 125 mL/hr Route IntraVENous Administered By Logan Shirley RN Admin Date 
06/09/2021 Action New Bag Dose 
1,250 mg Rate 125 mL/hr Route IntraVENous Administered By Liza Shields RN Admin Date 
06/10/2021 Action New Bag Dose 
1,250 mg Rate 125 mL/hr Route IntraVENous Administered By Ambreen Pelaez RN  
  
  
 vancomycin (VANCOCIN) 2000 mg in  ml infusion Admin Date 
06/08/2021 Action New Bag Dose 
2,000 mg Rate 
250 mL/hr Route IntraVENous Administered By Liza Shields RN Vancomycin Trough Reminder Admin Date 
06/09/2021 Action Given Dose Route Other Administered By Ambreen Pelaez RN  
  
  
  
 
 
 
 
Problem List:  
 
Hospital Problems as of 6/12/2021 Date Reviewed: 6/11/2021 Codes Class Noted - Resolved POA [facility] ICD-10-CM: J04.00 
ICD-9-CM: 427.32  6/10/2021 - Present Unknown  Acute hypoxemic respiratory failure [facility]) ICD-10-CM: J96.01 
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 
ICD-9-CM: 518.84  6/8/2021 - Present Unknown S/P thoracotomy ICD-10-CM: A24.826 ICD-9-CM: V45.89  1/7/2021 - Present Yes Cancer of bronchus of right lower lobe [facility]) ICD-10-CM: C34.31 
ICD-9-CM: 162.5  7/23/2020 - Present Yes * (Principal) Sepsis due to undetermined organism [facility]) ICD-10-CM: A41.9 ICD-9-CM: 038.9  5/5/2020 - Present Yes Signed By: Amber Mcmahon MD  
[employer] [facility]  June 12, 2021  2:08 PM

## 2021-06-12 NOTE — PROGRESS NOTES
Date of Outreach Update: 
Candace Thompson was seen and assessed. MEWS Score: 2 (06/12/21 0400) Vitals:  
 06/12/21 0030 06/12/21 0400 06/12/21 0731 06/12/21 0755 BP: 103/67 101/71  120/78 Pulse: 70 82  86 Resp: 22 24 22 Temp: 98.4 °F (36.9 °C) 98.5 °F (36.9 °C)  97.6 °F (36.4 °C) SpO2: 91% 94% 95% 95% Weight:  83.3 kg (183 lb 9.6 oz) Height:      
  
 
 Pain Assessment Pain Intensity 1: 0 (06/12/21 0430) Pain Location 1: Rib cage Pain Intervention(s) 1: Medication (see MAR) Patient Stated Pain Goal: 0 Previous Outreach assessment has been reviewed. There have been no significant clinical changes since the completion of the last dated Outreach assessment. Patient resting comfortably. Respirations even and unlabored. Right CT remains in place to water seal. VS, labs, and progress notes reviewed. Will continue to follow up per outreach protocol.  
 
Signed By:   Jatinder Burgos RN 
  June 12, 2021 9:44 AM

## 2021-06-12 NOTE — ROUTINE PROCESS
Critical Care Outreach Nurse Progress Report: 
 
Subjective: In to assess pt secondary to transfer from ICU. MEWS Score: 3 (06/11/21 1600) Vitals:  
 06/11/21 1219 06/11/21 1545 06/11/21 1600 06/11/21 2050 BP:   95/71 Pulse: 88  81 Resp:   22 Temp:   98.6 °F (37 °C) SpO2:  96% 97% 94% Weight:      
Height:      
  
 
Objective: Pt sitting up in bed, watching TV. Assessment: Pt is A&O x4 and appears to be in NAD at this time. O2 sat 94% on 2L NC . Pt denies any pain. Pt reports increased work of breathing but knows he has breathing treatments scheduled for 8pm and says those help him. R chest tube in place. Plan: Will continue to follow per outreach program protocol. Neal Lee RN.

## 2021-06-12 NOTE — PROGRESS NOTES
Date of Outreach Update: 
Joe Coelho was seen and assessed. Previous Outreach assessment has been reviewed. There have been no significant clinical changes since the completion of the last dated Outreach assessment. Will continue to follow up per outreach protocol.  
 
Signed By:   Alberta Cali RN 
  June 12, 2021 2:47 AM

## 2021-06-12 NOTE — ROUTINE PROCESS
Bedside and Verbal shift change report given to be given to Felicia Jesus (oncoming nurse) by self Blake Sat nurse). Report included the following information SBAR, Kardex, and Recent Results.

## 2021-06-13 NOTE — PROGRESS NOTES
Problem: Falls - Risk of 
Goal: *Absence of Falls Description: Document Ovidio Gale Fall Risk and appropriate interventions in the flowsheet. Outcome: Progressing Towards Goal 
Note: Fall Risk Interventions: 
Mobility Interventions: Communicate number of staff needed for ambulation/transfer, Patient to call before getting OOB, Strengthening exercises (ROM-active/passive) Medication Interventions: Assess postural VS orthostatic hypotension, Evaluate medications/consider consulting pharmacy, Patient to call before getting OOB, Teach patient to arise slowly Elimination Interventions: Call light in reach, Patient to call for help with toileting needs History of Falls Interventions: Door open when patient unattended

## 2021-06-13 NOTE — PROGRESS NOTES
ACUTE OT GOALS: 
(Developed with and agreed upon by patient and/or caregiver.) 1. Patient will complete lower body bathing and dressing with MOD I and adaptive equipment as needed. 2. Patient will complete toileting with MOD I . 3. Patient will tolerate MOD I minutes of OT treatment with 1-2 rest breaks to increase activity tolerance for ADLs. 4. Patient will complete functional transfers with MOD I and adaptive equipment as needed. 5. Patient will complete functional mobility for household distances with MOD I and adaptive equipment as needed. 6. Patient will complete self-grooming while standing edge of sink with MOD I and adaptive equipment as needed. Timeframe: 7 visits OCCUPATIONAL THERAPY ASSESSMENT: Initial Assessment and Daily Note OT Treatment Day # 1 Elyssa Rojas is a 78 y.o. male PRIMARY DIAGNOSIS: Sepsis due to undetermined organism ([facility] [address] Acute on chronic respiratory failure with hypoxemia ([facility] [address] [J96.21] Procedure(s) (LRB): 
RIGHT THORACOTOMY/ BRONCHIOPLEURAL FISTULA REPAIR/ ROOM 3108 (Right) Reason for Referral: ICD-10: Treatment Diagnosis: Generalized Muscle Weakness (M62.81) INPATIENT: Payor: Agusto Lane / Plan: Мария Johnson / Product Type: PPO /  
ASSESSMENT:  
 
REHAB RECOMMENDATIONS:  
Recommendation to date pending progress: 
Setting:  No further skilled therapy Equipment:  
 None PRIOR LEVEL OF FUNCTION: 
(Prior to Hospitalization)  INITIAL/CURRENT LEVEL OF FUNCTION: 
(Based on today's evaluation) Bathing: 
 Independent Dressing:  Independent Feeding/Grooming:  Independent Toileting:  Independent Functional Mobility: 
 Independent Bathing:  Minimal Assistance Dressing:  Minimal Assistance Feeding/Grooming:  Set Up Toileting: 
Rik Mae Functional Mobility: 
[address] ASSESSMENT: 
Mr. Larry Hare presents with deficits in overall strength, activity tolerance, ADL performance and functional Pt fighting sedation      Lisa Heranndez RN  03/17/21 1381 mobility. Admitted for thoracotomy and bronchiofistual repair with R chest tube. BUE AROM and strength (4/5) are generally decreased but WFL. CGA for functional mobility/transfers. SBA for self-grooming tasks, including washing face and brushing teeth, while standing EOS. At this time, Onofre Trejo is functioning below baseline for ADLs and functional mobility. Pt would benefit from skilled OT services to address OT goals and and plan of care. .  
 
SUBJECTIVE:  
Mr. Fuentes Meyer states, [place] when I cough does it hurt. \" SOCIAL HISTORY/LIVING ENVIRONMENT:  
Home Environment: Private residence One/Two Story Residence: One story Living Alone: No 
Support Systems: Family member(s), Friends \ neighbors, Spouse/Significant Other/Partner OBJECTIVE:  
 
PAIN: VITAL SIGNS: LINES/DRAINS:  
Pre Treatment: Pain Screen Pain Scale 1: Numeric (0 - 10) Pain Intensity 1: 0 Post Treatment: 0   Chest Tube O2 Device: Nasal cannula GROSS EVALUATION: 
BUEs Within Functional Limits Abnormal/ Functional Abnormal/ Non-Functional (see comments) Not Tested Comments: AROM [] [x] [] [] PROM [] [x] [] [] Strength [] [x] [] [] 4/5 Balance [] [x] [] [] Intact sitting; fair (+) standing balance Posture [x] [] [] [] Sensation [x] [] [] [] Coordination [x] [] [] [] Tone [x] [] [] [] Edema [x] [] [] [] Activity Tolerance [] [x] [] [] 4L 02; dyspnea  
 [] [] [] [] COGNITION/ 
PERCEPTION: Intact Impaired  
(see comments) Comments:  
Orientation [x] [] Vision [x] [] Hearing [x] [] Judgment/ Insight [x] [] Attention [x] [] Memory [x] [] Command Following [x] [] Emotional Regulation [x] []   
 [] [] ACTIVITIES OF DAILY LIVING: I Mod I S SBA CGA Min Mod Max Total NT Comments BASIC ADLs:             
Bathing/ Showering [] [] [] [] [] [] [] [] [] [x] Toileting [] [] [] [] [] [] [] [] [] [x] Dressing [] [] [] [] [] [] [] [] [] [x] Feeding [] [] [] [] [] [] [] [] [] [x] Grooming [] [] [x] [] [] [] [] [] [] [] Grooming EOS Personal Device Care [] [] [] [] [] [] [] [] [] [x] Functional Mobility [] [] [] [x] [x] [] [] [] [] [] I=Independent, Mod I=Modified Independent, S=Supervision, SBA=Standby Assistance, CGA=Contact Guard Assistance,  
Min=Minimal Assistance, Mod=Moderate Assistance, Max=Maximal Assistance, Total=Total Assistance, NT=Not Tested MOBILITY: I Mod I S SBA CGA Min Mod Max Total  NT x2 Comments:  
Supine to sit [] [] [] [] [x] [] [] [] [] [] [] Sit to supine [] [] [] [] [] [] [] [] [] [x] [] Sit to stand [] [] [] [] [x] [] [] [] [] [] [] Bed to chair [] [] [] [] [x] [] [] [] [] [] [] I=Independent, Mod I=Modified Independent, S=Supervision, SBA=Standby Assistance, CGA=Contact Guard Assistance,  
Min=Minimal Assistance, Mod=Moderate Assistance, Max=Maximal Assistance, Total=Total Assistance, NT=Not Tested [facility] Daily Activity Inpatient Short Form How much help from another person does the patient currently need. .. Total A Lot A Little None 1. Putting on and taking off regular lower body clothing? [] 1   [] 2   [x] 3   [] 4  
2. Bathing (including washing, rinsing, drying)? [] 1   [] 2   [x] 3   [] 4  
3. Toileting, which includes using toilet, bedpan or urinal?   [] 1   [] 2   [x] 3   [] 4  
4. Putting on and taking off regular upper body clothing? [] 1   [] 2   [x] 3   [] 4  
5. Taking care of personal grooming such as brushing teeth? [] 1   [] 2   [x] 3   [] 4  
6. Eating meals? [] 1   [] 2   [x] 3   [] 4  
© 2007, Trustees of [employer], under license to [employer]. All rights reserved Score:  Initial: 18 Most Recent: X (Date: -- ) Interpretation of Tool:  Represents activities that are increasingly more difficult (i.e. Bed mobility, Transfers, Gait).  
 
PLAN:  
FREQUENCY/DURATION: OT Plan of Care: 3 times/week for duration of hospital stay or until stated goals are met, whichever comes first. 
 
PROBLEM LIST:  
(Skilled intervention is medically necessary to address:) 1. Decreased ADL/Functional Activities 2. Decreased Activity Tolerance 3. Decreased Balance 4. Decreased Cognition 5. Decreased Gait Ability 6. Decreased Strength 7. Decreased Transfer Abilities 8. Increased Pain INTERVENTIONS PLANNED:  
(Benefits and precautions of occupational therapy have been discussed with the patient.) 1. Self Care Training 2. Therapeutic Activity 3. Therapeutic Exercise/HEP 4. Neuromuscular Re-education 5. Education TREATMENT:  
 
EVALUATION: Low Complexity : (Untimed Charge) TREATMENT:  
($$ Self Care/Home Management: 8-22 mins$$ Neuromuscular Re-Education: 8-22 mins   ) Self Care (13 Minutes): Self care including Grooming to increase independence and decrease level of assistance required. Neuromuscular Re-education (10 Minutes): Neuromuscular Re-education included Balance Training, Coordination training, Functional mobility with facilitation, Postural training, Sitting balance training and Standing balance training to improve Balance, Coordination, Functional Mobility and Postural Control. TREATMENT GRID: 
N/A 
 
AFTER TREATMENT POSITION/PRECAUTIONS: 
Chair, Needs within reach and RN notified INTERDISCIPLINARY COLLABORATION: 
RN/PCT, PT/PTA and OT/WOLFF TOTAL TREATMENT DURATION: 
OT Patient Time In/Time Out Time In: 0809 Time Out: 0674 Catarino Gerard OT

## 2021-06-13 NOTE — PROGRESS NOTES
Date of Outreach Update: 
Pete Loya was seen and assessed. MEWS Score: 3 (06/13/21 0413) Vitals:  
 06/13/21 2094 06/13/21 0421 06/13/21 0701 06/13/21 2964 BP: 94/60   93/63 Pulse: 82   86 Resp: 24   20 Temp: 97.7 °F (36.5 °C)   98 °F (36.7 °C) SpO2: 97%  97% 98% Weight:  84.9 kg (187 lb 3.2 oz) Height:      
  
 
 Pain Assessment Pain Intensity 1: 0 (06/13/21 0421) Pain Location 1: Chest (side/chest tube site) Pain Intervention(s) 1: Medication (see MAR) Patient Stated Pain Goal: 0 Previous Outreach assessment has been reviewed. There have been no significant clinical changes since the completion of the last dated Outreach assessment. Pt resting in recliner talking with anesthesia about procedure in the a.m. On 2L NC and in no distress - lung sounds diminished. Will continue to follow up per outreach protocol. Signed By:   Camacho Franklin   June 13, 2021 10:21 AM

## 2021-06-13 NOTE — PROGRESS NOTES
Pt with apparent expiratory wheezing and coarse lung sounds. MD Jeff Renae contacted via perfect serve, order received for PRN duo-neb treatments. Will contact RT and continue to monitor patient after treatment.   
 
Update: Xopenex PRN ordered instead due to albuterol making patient feel \"jittery\" in the past.

## 2021-06-13 NOTE — ROUTINE PROCESS
Bedside and Verbal shift change report to be received from [facility]. Report included the following information SBAR, Kardex, Intake/Output, MAR and Recent Results.

## 2021-06-13 NOTE — PROGRESS NOTES
[facility] Hospital[facility]  
History and Physical  
 
 
Name:  Amanda Deluca. Age:70 y.o. Sex:male :  1950 MRN:  [id number] PCP:  Dre Mills DO Admit Date:  2021  4:02 PM  
Chief Complaint: assume care Requesting RACHEL Kwan Reason for Admission:  
Acute on chronic respiratory failure with hypoxemia ([facility] [address] [J96.21] Assessment & Plan:  
 
 
acute hypoxic respiratory failure, empyema, septic shock: 
· On 2 L NC 
· pulmonary and surgery following · Continued rocephin :  ceftriaxone as per pulmonary provider. Appreciate subspecialty Rafa. Will need surgery tomorrow. As patient presented with empyema per report and cultures not growing significant organism. Will consult ID to help with antimicrobial therapy given his immunocompromise status. Chest tube present: Concern for fistula per notes. Will monitor and will follow subspecialty recommendation Aflutter s/p PPM: 
· device adjusted per cardiology · On treatment dose lovenox · eliquis held : Patient blood pressure low normal.  Heart rate controlled. Will hold his home beta-blocker for now. Eliquis and Lovenox on hold for surgery tomorrow by surgeon. Anemia: 
·  trend HGB History of lung cancer: Finished his chemotherapy within the last year. Oncology consulted. Disposition:  
Diet: DIET ADULT 
DIET NPO 
VTE ppx: lovenox GI ppx: protonix CODE STATUS: Full Code Patient wants his girlfriend Wing Carter to be power of  and he wants to be full code. I offered but he would like to update family by himself. I encouraged him to asked the nurse to page me if he or his family has any questions. He verbalized understanding that his condition meditated in spite of treatment.  
 
 
History of Presenting Illness:  
 
Amanda Leslie is a 78 y.o. male with medical history of  Chronic hypoxic respiratory failure  On 2 L NC QHS, tobacco use, RLL SCC s/p excision / chemo, AFIB on eliquis s/p PPM and AVN ablation, HTN, GERD, right pleural effusion s/p thoracentesis / Chest tube / VATs with decortication and remaining stump admitted with acute hypoxic respiratory failure, empyema, septic shock. He required ICU course for pressors that are weaned. He has been seen by pulmonary and Dr. Davidson Jerome of surgery. 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 He is on IV antibiotics. Pleural cultures growing alpha strep. BC NGTD. Plans are for thoracotomy on 6-1-4-21. He has additionally been seen by cardiology for atrial flutter and his device mode switched for atrial flutter with improvement. June 13: Plan for surgery tomorrow. As per patient is feeling better since he came in. Looking forward for the surgery tomorrow. Denies any chest pain, palpitations, nausea or vomiting. Rest review of system negative except mentioned above PTA Medications: 
Current Outpatient Medications Medication Instructions  apixaban (ELIQUIS) 5 mg, Oral, 2 TIMES DAILY  esomeprazole (NEXIUM) 20 mg, Oral, EVERY BEDTIME, prn  
 gabapentin (NEURONTIN) 300 mg capsule TAKE 1 CAPSULE BY MOUTH THREE TIMES A DAY  ipratropium (ATROVENT) 0.03 % nasal spray 2 Sprays, Both Nostrils, 2 TIMES DAILY  lidocaine-prilocaine (EMLA) topical cream Topical, AS NEEDED  
 metoprolol succinate (TOPROL-XL) 50 mg, Oral, DAILY  Nebulizer & Compressor machine COPD  
 ondansetron hcl (ZOFRAN) 8 mg, Oral, EVERY 8 HOURS AS NEEDED  
 OXYGEN-AIR DELIVERY SYSTEMS Does Not Apply, 2lpm qhs   
 pantoprazole (PROTONIX) 40 mg, Oral, DAILY  polyethylene glycol (MIRALAX) 17 g, Oral, DAILY  umeclidinium (Incruse Ellipta) 62.5 mcg/actuation inhaler 1 Puff, Inhalation, DAILY Objective:  
 
Patient Vitals for the past 24 hrs: 
 Temp Pulse Resp BP SpO2  
06/13/21 1208 98.1 °F (36.7 °C) 90 24 93/63 95 % 06/13/21 1139     92 % 06/13/21 0916 98 °F (36.7 °C) 86 20 93/63 98 % 06/13/21 0701     97 % 06/13/21 0413 97.7 °F (36.5 °C) 82 24 94/60 97 % 06/13/21 0058 98.6 °F (37 °C) 89 26 98/64 94 % 06/12/21 2000 98.8 °F (37.1 °C) 71 24 112/73 95 % 06/12/21 1900     94 % 06/12/21 1608 98.1 °F (36.7 °C) 91 18 117/69 98 % 06/12/21 1523     95 % 06/12/21 1503  83  109/66  Oxygen Therapy O2 Sat (%): 95 % (06/13/21 1208) Pulse via Oximetry: 85 beats per minute (06/13/21 1139) O2 Device: None (Room air) (06/13/21 1139) Skin Assessment: Clean, dry, & intact (06/13/21 0421) Skin Protection for O2 Device: N/A (06/13/21 0421) Location: Ear (06/11/21 0700) Interventions: Reposition Device; Ear Cushion (06/10/21 2314) O2 Flow Rate (L/min): 0 l/min (06/13/21 1139) FIO2 (%): 21 % (06/13/21 1139) Body mass index is 26.86 kg/m². Physical Exam: 
 
General: No acute distress, speaking in full sentences, no use of accessory muscles Lungs: Coarse anterior. Chest tube present Cardiovascular: Regular rate and rhythm with normal S1 and S2 , no edema Abdomen: Soft, nontender, nondistended, normoactive bowel sounds Neuro: AOx3, speech normal, all 4 extremities Psych: Normal mood and affect Data Reviewed: I have reviewed all labs, meds, and studies. Problem List:  
 
Hospital Problems as of 6/13/2021 Date Reviewed: 6/11/2021 Codes Class Noted - Resolved POA Atrial flutter ([facility] [address] ICD-10-CM: T16.48 
ICD-9-CM: 427.32  6/10/2021 - Present Unknown Acute hypoxemic respiratory failure ([facility] [address] ICD-10-CM: J96.01 
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 
ICD-9-CM: 518.84  6/8/2021 - Present Unknown S/P thoracotomy ICD-10-CM: M13.118 ICD-9-CM: V45.89  1/7/2021 - Present Yes  Cancer of bronchus of right lower lobe [facility]) ICD-10-CM: C34.31 
ICD-9-CM: 162.5  7/23/2020 - Present Yes * (Principal) Sepsis due to undetermined organism [facility]) ICD-10-CM: A41.9 ICD-9-CM: 038.9  5/5/2020 - Present Yes Signed By: Shavon Rivero MD  
[employer] Hospitalist Service June 13, 2021

## 2021-06-13 NOTE — ROUTINE PROCESS
Bedside and Verbal shift change report to be given to Suzy Hammonds RN (oncoming nurse) by self (offgoing nurse). Report included the following information SBAR, Kardex, Intake/Output, MAR and Recent Results.

## 2021-06-13 NOTE — ANESTHESIA PREPROCEDURE EVALUATION
Anesthetic History   No history of anesthetic complications            Review of Systems / Medical History  Patient summary reviewed and pertinent labs reviewed    Pulmonary    COPD: moderate          Pertinent negatives: No smoker (former, quit 7 years ago)  Comments: Lung CA s/p resection of R middle and R lower lobes and CXRT. Returned to OR for nonfunctional right lung. 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  Comments: Echo 5/2021 - normal LV function, small pericardial effusion          GI/Hepatic/Renal     GERD: well controlled           Endo/Other        Arthritis, cancer (lung) and anemia (Hb 7.8)     Other Findings   Comments: S/p completion R pneumonectomy January         Physical Exam    Airway  Mallampati: II  TM Distance: > 6 cm  Neck ROM: normal range of motion   Mouth opening: Normal     Cardiovascular    Rhythm: regular  Rate: normal        Comments: Paced rythym, s/p afib ablation. Dental    Dentition: Caps/crowns     Pulmonary    Rhonchi (L sided )             Abdominal  GI exam deferred       Other Findings            Anesthetic Plan    ASA: 4  Anesthesia type: general    Monitoring Plan: Arterial line  Post-op pain plan if not by surgeon: peripheral nerve block single    Induction: Intravenous  Anesthetic plan and risks discussed with: Patient and Family      Discussed increased perioperative respiratory risks including post-op ventilation, as well as high likelihood of blood products.  Patient has had an AV reg ablation for Afib and BiV pacer in his L chest. Will plan on erector spinae block, GETA with HERIBERTO, A line, and magnet over pacemaker. Will plan on multimodal analgesia as well with ketamine and lidocaine drip.  Patient and family endorsed understanding and wish to proceed

## 2021-06-13 NOTE — PROGRESS NOTES
Date of Outreach Update: 
Arcadio Rivas was seen and assessed. MEWS Score: 3 (06/13/21 0413) Vitals:  
 06/12/21 2000 06/13/21 7784 06/13/21 0413 06/13/21 0421 BP: 112/73 98/64 94/60 Pulse: 71 89 82 Resp: 24 26 24 Temp: 98.8 °F (37.1 °C) 98.6 °F (37 °C) 97.7 °F (36.5 °C) SpO2: 95% 94% 97% Weight:    84.9 kg (187 lb 3.2 oz) Height:      
  
 
 Pain Assessment Pain Intensity 1: 0 (06/13/21 0421) Pain Location 1: Chest (side/chest tube site) Pain Intervention(s) 1: Medication (see MAR) Patient Stated Pain Goal: 0 Previous Outreach assessment has been reviewed. There have been no significant clinical changes since the completion of the last dated Outreach assessment. Will continue to follow up per outreach protocol.  
 
Signed By:   Travon Winter RN 
  June 13, 2021 5:55 AM

## 2021-06-13 NOTE — PROGRESS NOTES
Date of Outreach Update: 
Jarvis Simons. was seen and assessed. MEWS Score: 2 (06/12/21 2000) Vitals:  
 06/12/21 1608 06/12/21 1900 06/12/21 2000 06/13/21 0058 BP: 117/69  112/73 Pulse: 91  71 Resp: 18  24 Temp: 98.1 °F (36.7 °C)  98.8 °F (37.1 °C) SpO2: 98% 94% 95% 94% Weight:      
Height:      
  
 
 Pain Assessment Pain Intensity 1: 0 (06/13/21 0008) Pain Location 1: Chest (side/chest tube site) Pain Intervention(s) 1: Medication (see MAR) Patient Stated Pain Goal: 0 Previous Outreach assessment has been reviewed. There have been no significant clinical changes since the completion of the last dated Outreach assessment. Pt noted to have bilateral expiratory wheezing and slight dypsnea. Currently on 2 LNC, spoke with primary RN about getting PRN breathing treatments ordered as scheduled doses have been completed for the day. Will continue to follow up per outreach protocol.  
 
Signed By:   Jorgito Miranda RN 
  June 13, 2021 1:26 AM

## 2021-06-13 NOTE — PROGRESS NOTES
ACUTE PHYSICAL THERAPY GOALS: 
(Developed with and agreed upon by patient and/or caregiver. ) LTG: 
(1.)Mr. Rock Johnson will move from supine to sit and sit to supine , scoot up and down and roll side to side in flat bed without siderails with  INDEPENDENT within 7 day(s). (2.)Mr. Rock Johnson will perform all functional transfers with  MODIFIED INDEPENDENCE using the least restrictive/no device within 7 day(s). (3.)Mr. Rock Johnson will ambulate with  MODIFIED INDEPENDENCE for 500+ feet with normal vital sign response with the least restrictive/no device within 7 day(s). (4.)Mr. Rock Johnson will ambulate up/down 3 steps with bilateral  railing with  STAND BY ASSIST with no device within 7 day(s). PHYSICAL THERAPY ASSESSMENT: Initial Assessment and Daily Note PT Treatment Day # 1 Sheri Arechiga is a 78 y.o. male PRIMARY DIAGNOSIS: Sepsis due to undetermined organism ([facility] Acute on chronic respiratory failure with hypoxemia ([facility] [J96.21] Procedure(s) (LRB): 
RIGHT THORACOTOMY/ BRONCHIOPLEURAL FISTULA REPAIR/ ROOM 3108 (Right) Reason for Referral: ICD-10: Treatment Diagnosis: Other abnormalities of gait and mobility (R26.89) INPATIENT: Payor: Ana Simpson / Plan: Allayne Cogan / Product Type: PPO /  
 
ASSESSMENT:  
 
REHAB RECOMMENDATIONS:  
Recommendation to date pending progress: 
[address] [place] [address] return to pulmonary rehab Equipment:  To Be Determined PRIOR LEVEL OF FUNCTION: 
(Prior to Hospitalization) INITIAL/CURRENT LEVEL OF FUNCTION: 
(Most Recently Demonstrated) Bed Mobility: 
 Independent Sit to Stand:  Independent Transfers:  Independent Gait/Mobility: 
 Independent Bed Mobility:  Not tested Sit to Stand: 
[address] Transfers: 
[address] Gait/Mobility: 
[address] ASSESSMENT: 
Mr. Rock Johnson lives with his GF. He ambulates independently in home and community and attends pulmonary rehab. Patient has chest tube today and required RW to ambulate-has declined in functional mobility. Patient required 3L during ambulation to maintain SpO2 >89%. Mr. Stevie Bingham would benefit from skilled physical therapy (medically necessary) to address his deficits and maximize his function. .  
 
SUBJECTIVE:  
Mr. Stevie Bingham states, \"It hurts when I cough. \" 
 
SOCIAL HISTORY/LIVING ENVIRONMENT: Mr. Stevie Bingham lives with his GF. He ambulates independently in home and community and attends pulmonary rehab. Home Environment: Private residence One/Two Story Residence: One story Living Alone: No 
Support Systems: Family member(s), Friends \ neighbors, Spouse/Significant Other/Partner OBJECTIVE:  
 
PAIN: VITAL SIGNS: LINES/DRAINS:  
Pre Treatment: Pain Screen Pain Scale 1: Numeric (0 - 10) Pain Intensity 1: 0 Post Treatment: 0   Chest Tube O2 Device: None (Room air) GROSS EVALUATION: 
B LE's Within Functional Limits Abnormal/ Functional Abnormal/ Non-Functional (see comments) Not Tested Comments: AROM [x] [] [] [] PROM [] [] [] [] Strength [x] [] [] [] Balance [] [x] [] [] Posture [] [x] [] [] Sensation [] [] [] [] Coordination [] [] [] [] Tone [] [] [] [] Edema [] [] [] [] Activity Tolerance [] [] [x] []   
 [] [] [] [] COGNITION/ 
PERCEPTION: Intact Impaired  
(see comments) Comments:  
Orientation [x] [] Vision [] [] Hearing [x] [] Command Following [x] [] Safety Awareness [x] []   
 [] [] MOBILITY: I Mod I S SBA CGA Min Mod Max Total  NT x2 Comments:  
Bed Mobility Rolling [] [] [] [] [] [] [] [] [] [x] [] Supine to Sit [] [] [] [] [] [] [] [] [] [x] [] Scooting [] [] [] [] [] [] [] [] [] [x] [] Sit to Supine [] [] [] [] [] [] [] [] [] [x] [] Transfers Sit to Stand [] [] [] [] [x] [] [] [] [] [] [] Bed to Chair [] [] [] [] [x] [] [] [] [] [] []   
Stand to Sit [] [] [] [] [x] [] [] [] [] [] [] I=Independent, Mod I=Modified Independent, S=Supervision, SBA=Standby Assistance, CGA=Contact Guard Assistance,  
Min=Minimal Assistance, Mod=Moderate Assistance, Max=Maximal Assistance, Total=Total Assistance, NT=Not Tested GAIT: I Mod I S SBA CGA Min Mod Max Total  NT x2 Comments:  
Level of Assistance [] [] [] [] [x] [] [] [] [] [] [] Distance 150'+100' SHADE [place] Gait Quality Flexed trunk/C spine, slow, short steps Weightbearing Status N/A I=Independent, Mod I=Modified Independent, S=Supervision, SBA=Standby Assistance, CGA=Contact Guard Assistance,  
Min=Minimal Assistance, Mod=Moderate Assistance, Max=Maximal Assistance, Total=Total Assistance, NT=Not Tested [facility] Mobility Inpatient Short Form How much difficulty does the patient currently have. .. Unable A Lot A Little None 1. Turning over in bed (including adjusting bedclothes, sheets and blankets)? [] 1   [] 2   [x] 3   [] 4  
2. Sitting down on and standing up from a chair with arms ( e.g., wheelchair, bedside commode, etc.)   [] 1   [] 2   [x] 3   [] 4  
3. Moving from lying on back to sitting on the side of the bed? [] 1   [] 2   [x] 3   [] 4 How much help from another person does the patient currently need. .. Total A Lot A Little None 4. Moving to and from a bed to a chair (including a wheelchair)? [] 1   [] 2   [x] 3   [] 4  
5. Need to walk in hospital room? [] 1   [] 2   [x] 3   [] 4  
6. Climbing 3-5 steps with a railing? [] 1   [] 2   [x] 3   [] 4  
© 2007, Trustees of [address] [place] [address] [address] [id number], under license to [employer]. All rights reserved Score:  Initial: 18 Most Recent: X (Date: -- ) Interpretation of Tool:  Represents activities that are increasingly more difficult (i.e. Bed mobility, Transfers, Gait).  
 
PLAN:  
FREQUENCY/DURATION: PT Plan of Care: 3 times/week for duration of hospital stay or until stated goals are met, whichever comes first. 
 
PROBLEM LIST:  
(Skilled intervention is medically necessary to address:) 1. Decreased Activity Tolerance 2. Decreased Balance 3. Decreased Gait Ability 4. Decreased Transfer Abilities 5. Increased Pain INTERVENTIONS PLANNED:  
(Benefits and precautions of physical therapy have been discussed with the patient.) 1. Therapeutic Activity 2. Therapeutic Exercise/HEP 3. Neuromuscular Re-education 4. Gait Training 5. Manual Therapy 6. Education TREATMENT:  
 
EVALUATION: Low Complexity : (Untimed Charge) TREATMENT:  
($$ Therapeutic Activity: 23-37 mins    ) Therapeutic Activity (23 Minutes): Therapeutic activity included Ambulation on level ground, Standing balance and exercises to improve functional Mobility, Strength and Activity tolerance. DATE: 6/13/21 Ambulation Hip Flexion [address] Hip ab/ad Heel Raises x20 AB Toe Raises x20 AB Key:  A=active, AA=active assisted, P=passive, B=bilaterally, R=right, L=left DF=dorsiflexion, PF=plantarflexion TREATMENT GRID: 
N/A 
 
AFTER TREATMENT POSITION/PRECAUTIONS: 
Chair, Needs within reach and RN notified INTERDISCIPLINARY COLLABORATION: 
RN/PCT, PT/PTA and RT 
 
TOTAL TREATMENT DURATION: 
PT Patient Time In/Time Out Time In: 8124 Time Out: 1130 A Rolan Loud, PT, DPT

## 2021-06-14 PROBLEM — J86.0: Status: ACTIVE | Noted: 2021-01-01

## 2021-06-14 NOTE — BRIEF OP NOTE
Brief Postoperative Note Patient: Keri Black YOB: 1950 MRN: [id number] Date of Procedure: 6/14/2021 Pre-Op Diagnosis: RIGHT BRONCHIO  PLEURAL FISTULA Post-Op Diagnosis: Same as preoperative diagnosis. Procedure(s): RIGHT THORACOTOMY with decortication BRONCHOPLEURAL FISTULA REPAIR Intercostal nerve Cryoablation Surgeon(s): Olivier Goodwin MD 
 
Surgical Assistant: None Anesthesia: General  
 
Estimated Blood Loss (mL): Minimal 
 
Complications: None Specimens:  
ID Type Source Tests Collected by Time Destination 1 : empyema Fresh Chest  Olivier Goodwin MD 6/14/2021 1451 Pathology 1 : Empyema Tissue Lung CULTURE, FUNGUS, ANAEROBIC/AEROBIC/GRAM STAIN, CULTURE, ANAEROBIC Olivier Goodwin MD 6/14/2021 1508 Microbiology Implants: * No implants in log * Drains:  
Ying Vazquez #1 06/14/21 Right; Outer; Lower Chest (Active) Findings: empyema with bronchopleural fistula at right main bronchial stump Electronically Signed by Lloyd Alcantara MD on 6/14/2021 at 3:56 PM

## 2021-06-14 NOTE — PROGRESS NOTES
Unable to see patient today for consult d/t surgery. We will follow up tomorrow. Lonzell Hatchet, NP [facility]

## 2021-06-14 NOTE — PERIOP NOTES
TRANSFER - IN REPORT: 
 
[facility] [place].  being received from [address] [place] [address] for ordered procedure Report consisted of patients Situation, Background, Assessment and  
Recommendations(SBAR). Information from the following report(s) SBAR was reviewed with the receiving nurse. Opportunity for questions and clarification was provided. Assessment to be completed upon patients arrival to unit and care to be assumed.

## 2021-06-14 NOTE — ROUTINE PROCESS
Bedside and Verbal shift change report to be given to JORGITO Clark (oncoming nurse) by self (offgoing nurse). Report included the following information SBAR, Kardex, Intake/Output, MAR and Recent Results.

## 2021-06-14 NOTE — PROGRESS NOTES
[facility] Hospitalist  
History and Physical  
 
 
Name:  Dunia Joseph. Age:70 y.o. Sex:male :  1950 MRN:  [id number] PCP:  Nellene Romberg, DO Admit Date:  2021  4:02 PM  
Chief Complaint: assume care Requesting RACHEL Disla Reason for Admission:  
Acute on chronic respiratory failure with hypoxemia ([address] [J96.21] Pleural fistula ([address] [J86.0] Assessment & Plan:  
 
 
acute hypoxic respiratory failure, empyema, septic shock: 
· On 2 L NC 
· pulmonary and surgery following · Continued rocephin :  ceftriaxone as per pulmonary provider. Appreciate subspecialty Rafa. Will need surgery tomorrow. As patient presented with empyema per report and cultures not growing significant organism. Will consult ID to help with antimicrobial therapy given his immunocompromise status. : Appreciate infectious disease recommendation. Flagyl added to ceftriaxone. Chest tube present: Concern for fistula per notes. Will monitor and will follow subspecialty recommendation Aflutter s/p PPM: 
· device adjusted per cardiology · On treatment dose lovenox · eliquis held : Patient blood pressure low normal.  Heart rate controlled. Will hold his home beta-blocker for now. Eliquis and Lovenox on hold for surgery tomorrow by surgeon. Holding beta-blocker given low blood pressure. Anemia: 
·  trend HGB. Appreciate hematology recommendation. History of lung cancer: Finished his chemotherapy within the last year. Oncology consulted. Disposition:  
Diet: DIET NPO 
VTE ppx: lovenox GI ppx: protonix CODE STATUS: Full Code Patient wants his girlfriend Kristy Hurt to be power of  and he wants to be full code. Patient girlfriend was present in the room. Both verbalized understanding that patient condition meditated in spite of treatment. I realized that general surgery is primary on the team right now.   I will add myself is consulted and will talk to general surgery once surgery is completed. History of Presenting Illness:  
 
Ino Butler is a 78 y.o. male with medical history of  Chronic hypoxic respiratory failure  On 2 L NC QHS, tobacco use, RLL SCC s/p excision 7,2020/ chemo, AFIB on eliquis s/p PPM and AVN ablation, HTN, GERD, right pleural effusion s/p thoracentesis / Chest tube / VATs with decortication and remaining stump admitted with acute hypoxic respiratory failure, empyema, septic shock. He required ICU course for pressors that are weaned. He has been seen by pulmonary and Dr. Lianet Martinez of surgery. 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 He is on IV antibiotics. Pleural cultures growing alpha strep. BC NGTD. Plans are for thoracotomy on 6-1-4-21. He has additionally been seen by cardiology for atrial flutter and his device mode switched for atrial flutter with improvement. June 13: Plan for surgery tomorrow. As per patient is feeling better since he came in. Looking forward for the surgery tomorrow. Denies any chest pain, palpitations, nausea or vomiting. June 14: Plan for surgery today. Feeling better. Denies any chest pain, palpitation, vomiting or nausea. Rest review of system negative except mentioned above PTA Medications: 
Current Outpatient Medications Medication Instructions  apixaban (ELIQUIS) 5 mg, Oral, 2 TIMES DAILY  esomeprazole (NEXIUM) 20 mg, Oral, EVERY BEDTIME, prn  
 gabapentin (NEURONTIN) 300 mg capsule TAKE 1 CAPSULE BY MOUTH THREE TIMES A DAY  ipratropium (ATROVENT) 0.03 % nasal spray 2 Sprays, Both Nostrils, 2 TIMES DAILY  lidocaine-prilocaine (EMLA) topical cream Topical, AS NEEDED  
 metoprolol succinate (TOPROL-XL) 50 mg, Oral, DAILY  Nebulizer & Compressor machine COPD  
 ondansetron hcl (ZOFRAN) 8 mg, Oral, EVERY 8 HOURS AS NEEDED  
 OXYGEN-AIR DELIVERY SYSTEMS Does Not Apply, 2lpm qhs   
 pantoprazole (PROTONIX) 40 mg, Oral, DAILY  polyethylene glycol (MIRALAX) 17 g, Oral, DAILY  umeclidinium (Incruse Ellipta) 62.5 mcg/actuation inhaler 1 Puff, Inhalation, DAILY Objective:  
 
Patient Vitals for the past 24 hrs: 
 Temp Pulse Resp BP SpO2  
06/14/21 1300  72 22 97/64 94 % 06/14/21 1245  71 22 (!) 97/58 94 % 06/14/21 1230  73 22 98/63 95 % 06/14/21 1225  71 22 107/66 96 % 06/14/21 1220  71 20 107/66 95 % 06/14/21 1215  75 20 100/65 95 % 06/14/21 1210  76 20 100/68 98 % 06/14/21 1043 98.3 °F (36.8 °C) 76 22 104/67 96 % 06/14/21 0831 98.4 °F (36.9 °C) 91 22 98/67 95 % 06/14/21 0820  85  98/67   
06/14/21 0559     96 % 06/14/21 0400 98.4 °F (36.9 °C) 86 24 117/69 96 % 06/14/21 0007 99.2 °F (37.3 °C) 92 26 112/72 93 % 06/13/21 2319     96 % 06/13/21 2017 99.1 °F (37.3 °C) 70 26 119/74 95 % 06/13/21 1940     94 % 06/13/21 1639 98 °F (36.7 °C) 70 22 102/68 98 % 06/13/21 1507     97 % Oxygen Therapy O2 Sat (%): 94 % (06/14/21 1300) Pulse via Oximetry: 81 beats per minute (06/14/21 0559) O2 Device: Nasal cannula (06/14/21 1300) Skin Assessment: Clean, dry, & intact (06/14/21 0816) Skin Protection for O2 Device: N/A (06/14/21 0402) Location: Ear (06/11/21 0700) Interventions: Reposition Device; Ear Cushion (06/10/21 2314) O2 Flow Rate (L/min): 2 l/min (06/14/21 1300) FIO2 (%): 28 % (06/13/21 1507) Body mass index is 25.45 kg/m². Physical Exam: 
 
General: No acute distress, speaking in full sentences, no use of accessory muscles Lungs: Coarse anterior. Chest tube present Cardiovascular: Regular rate and rhythm with normal S1 and S2 , no edema Abdomen: Soft, nontender, nondistended, normoactive bowel sounds Neuro: AOx3, speech normal, all 4 extremities Psych: Normal mood and affect Data Reviewed: I have reviewed all labs, meds, and studies. Problem List:  
 
Hospital Problems as of 6/14/2021 Date Reviewed: 6/11/2021      Codes Class Noted - Resolved POA Pleural fistula (HCC) ICD-10-CM: J86.0 ICD-9-CM: 510.0  6/14/2021 - Present Unknown Atrial flutter [facility] ICD-10-CM: C44.50 
ICD-9-CM: 427.32  6/10/2021 - Present Unknown Acute hypoxemic respiratory failure ([facility] ICD-10-CM: J96.01 
ICD-9-CM: 518.81  6/8/2021 - Present Unknown Bronchopleural fistula (HCC) ICD-10-CM: J86.0 ICD-9-CM: 510.0  6/8/2021 - Present Unknown Empyema ([facility] ICD-10-CM: J86.9 ICD-9-CM: 510.9  6/8/2021 - Present Unknown Acute on chronic respiratory failure with hypoxemia [facility]) ICD-10-CM: E13.35 
ICD-9-CM: 518.84  6/8/2021 - Present Unknown S/P thoracotomy ICD-10-CM: V94.365 ICD-9-CM: V45.89  1/7/2021 - Present Yes Cancer of bronchus of right lower lobe [facility]) ICD-10-CM: C34.31 
ICD-9-CM: 162.5  7/23/2020 - Present Yes * (Principal) Sepsis due to undetermined organism [facility]) ICD-10-CM: A41.9 ICD-9-CM: 038.9  5/5/2020 - Present Yes Signed By: Arti Zapien MD  
[employer] [facility] June 14, 2021

## 2021-06-14 NOTE — PROGRESS NOTES
Problem: Pressure Injury - Risk of 
Goal: *Prevention of pressure injury Description: Document Sidney Scale and appropriate interventions in the flowsheet. Outcome: Progressing Towards Goal 
Note: Pressure Injury Interventions: 
  
 
Moisture Interventions: Absorbent underpads, Minimize layers Activity Interventions: Increase time out of bed, Pressure redistribution bed/mattress(bed type) Mobility Interventions: HOB 30 degrees or less, Pressure redistribution bed/mattress (bed type) Nutrition Interventions: Document food/fluid/supplement intake Friction and Shear Interventions: Foam dressings/transparent film/skin sealants, HOB 30 degrees or less

## 2021-06-14 NOTE — PROGRESS NOTES
Occupational Therapy Note: 
 
OT treatment attempted this a.m. however pt currently KVNG for planned procedure. Will check back later as pt's condition and schedule permits.  
 
Marleny Liz, OTR/L, CLT

## 2021-06-14 NOTE — ANESTHESIA PROCEDURE NOTES
Peripheral Block Start time: 6/14/2021 12:05 PM 
End time: 6/14/2021 12:10 PM 
Performed by: John Huitron MD 
Authorized by: John Huitron MD  
 
 
Pre-procedure: Indications: at surgeon's request and post-op pain management Preanesthetic Checklist: patient identified, risks and benefits discussed, site marked, timeout performed, anesthesia consent given and patient being monitored Timeout Time: 12:05 EDT Block Type:  
Block Type:  Erector spinae Laterality:  Right Monitoring:  Standard ASA monitoring, continuous pulse ox, frequent vital sign checks, heart rate, responsive to questions and oxygen Injection Technique:  Single shot Procedures: ultrasound guided Patient Position: seated Prep: alcohol   
: R scapular area. Needle Type:  SonoPlex Needle Gauge:  20 G Needle Localization:  Anatomical landmarks and ultrasound guidance Medication Injected:  Ropivacaine (PF) (NAROPIN)(0.5%) 5 mg/mL injection, 30 mL 
dexamethasone (DECADRON) 4 mg/mL injection, 4 mg Med Admin Time: 6/14/2021 12:10 PM 
 
Assessment: 
Number of attempts:  1 Injection Assessment:  Incremental injection every 5 mL, local visualized surrounding nerve on ultrasound, negative aspiration for CSF, negative aspiration for blood, no paresthesia, no intravascular symptoms and ultrasound image on chart Patient tolerance:  Patient tolerated the procedure well with no immediate complications

## 2021-06-14 NOTE — MANAGEMENT PLAN
[facility] Hematology & Oncology Inpatient Hematology / Oncology Plan of Care Reason for Consult:  Acute on chronic respiratory failure with hypoxemia ([address] [J96.21] Pleural fistula ([address] [J86.0] Referring Physician:  Maverick Rosas MD 
 
History of Present Illness: Mr. Dick Sorensen is a 78 y.o. male admitted on 6/8/2021. The primary encounter diagnosis was Respiratory distress. Diagnoses of Fever, unspecified fever cause, Acute on chronic respiratory failure with hypoxemia ([address] Empyema ([address] S/P thoracotomy, Sepsis due to undetermined organism ([address] Bronchopleural fistula ([address] Cancer of bronchus of right lower lobe ([address] Acute hypoxemic respiratory failure ([address] Atypical atrial flutter ([address] Malignant neoplasm of right lung, unspecified part of lung ([address] and Pleural fistula ([address] were also pertinent to this visit. Avinash Sol His PMH includes chronic hypoxic resp failure on 2L NC qhs, tobacco use, Afib s/p PPM and AVN ablation on Eliquis, HTN, GERD, and R pleural effusion s/p thoracentesis / CT / VATS with decortication and remaining stump. He is a known patient of Dr. Odilia Arredondo with history of squamous cell carcinoma of the lung s/p right lower and middle lobectomy 7/23/20, path pCR and CT with CARINE. He completed chemorad with carbo/taxol/XRT on 10/27/20. He was admitted with acute hypoxic respiratory failure, empyema, and septic shock. On CTX/Flagyl. Required pressor support. R chest tube was placed for concern for bronchopleural fistula. Surgery following. He is POD #1 s/p R thoracotomy with decortication, bronchopleural fistula repair, and intercostal nerve cryoblation. Hgb 8.6. We were consulted for recommendations for our patient. Allergies Allergen Reactions  Albuterol Palpitations  Celebrex [Celecoxib] Itching Past Medical History:  
Diagnosis Date  Arrhythmia Afib  Chronic obstructive pulmonary disease ([address] O2 at [facility]  Chronic pain   
 right torn rotator cuff; left knee  GERD (gastroesophageal reflux disease)   
 controlled with med  Hypertension hx-- off meds since 2020--- followed by dr. Arash Burkett  Hypoxia 2020  Malignant neoplasm of lower lobe of right lung ([address] 2020 REC'D CHEMO AND RADIATION--- FOLLOWED BY DR. Victor M De Paz Osteoarthritis  Right lower lobe lung mass 2020  Status post total right knee replacement 2016 Past Surgical History:  
Procedure Laterality Date  HX COLONOSCOPY    
 HX KNEE ARTHROSCOPY Left   
 scope X 1, ACL recon X 1  
 HX KNEE ARTHROSCOPY Right , 1975 X 2   
 HX KNEE REPLACEMENT Right 2016 [address]  HX VASCULAR ACCESS Right placed 3/2020  
 port in chest   
 IR INSERT TUNL CVC W PORT OVER 5 YEARS  3/18/2020  TX CHEST SURGERY PROCEDURE UNLISTED    
 R lobectomy 2020; R complete pneumonectomy 2021  TX SINUS SURGERY PROC UNLISTED  ,   
 sinus surg Family History Problem Relation Age of Onset  Cancer Mother   
     uterine  Arrhythmia Sister   
     afib Social History Socioeconomic History  Marital status:  Spouse name: Not on file  Number of children: Not on file  Years of education: Not on file  Highest education level: Not on file Occupational History  Not on file Tobacco Use  Smoking status: Former Smoker Packs/day: 1.00 Years: 20.00 Pack years: 20.00 Quit date:  Years since quittin.4  Smokeless tobacco: Never Used  Tobacco comment: patient has no desire to resume Substance and Sexual Activity  Alcohol use: Yes Alcohol/week: 4.0 standard drinks Types: 4 Glasses of wine per week  Drug use: No  
 Sexual activity: Not on file Other Topics Concern   Service No  
 Blood Transfusions No  
 Caffeine Concern No  
 Occupational Exposure No  
 Hobby Hazards No  
 Sleep Concern No  
 Stress Concern No  
 Weight Concern No  
 Special Diet No  
 Back Care Not Asked  Exercise Yes  Bike Helmet Not Asked [address] Yes  Self-Exams Not Asked Social History Narrative . Has long-time girlfriend. Continues to work doing IT support. Social Determinants of Health Financial Resource Strain:  Difficulty of Paying Living Expenses:   
Food Insecurity:  Worried About [employer] [employer] in the Last Year:   
[address] in the Last Year:   
Transportation Needs:   
 Lack of Transportation (Medical):  Lack of Transportation (Non-Medical): Physical Activity:   
 Days of Exercise per Week:  Minutes of Exercise per Session:   
Stress:  Feeling of Stress :   
Social Connections:  Frequency of Communication with Friends and Family:  Frequency of Social Gatherings with Friends and Family:  Attends Synagogue Services:  Active Member of Clubs or Organizations:  Attends Club or Organization Meetings:  Marital Status: Intimate Partner Violence:  Fear of Current or Ex-Partner:  Emotionally Abused:   
 Physically Abused:   
 Sexually Abused:   
 
Current Facility-Administered Medications Medication Dose Route Frequency Provider Last Rate Last Admin  metroNIDAZOLE (FLAGYL) tablet 500 mg  500 mg Oral Q12H Gary Cook MD   500 mg at 06/15/21 3741  docusate sodium (COLACE) capsule 100 mg  100 mg Oral BID Marissa Garcia MD   100 mg at 06/15/21 3935  lidocaine (XYLOCAINE) 10 mg/mL (1 %) injection 0.1 mL  0.1 mL SubCUTAneous PRN Gary Cook MD      
 heparin 25,000 units in dextrose 500 mL infusion  600 Units/hr IntraVENous TITRATE Gary Cook MD 12 mL/hr at 06/15/21 0913 600 Units/hr at 06/15/21 3685  albuterol-ipratropium (DUO-NEB) 2.5 MG-0.5 MG/3 ML  3 mL Nebulization Q4H PRN Gary Cook MD      
 levalbuterol Ardia Gun) nebulizer soln 1.25 mg/3 mL  1.25 mg Nebulization Q3H PRN Gary Cook MD   1.25 mg at 06/13/21 9308  [Held by provider] metoprolol tartrate (LOPRESSOR) tablet 12.5 mg  12.5 mg Oral Q12H Milton Brantley MD      
 budesonide (PULMICORT) 500 mcg/2 ml nebulizer suspension  500 mcg Nebulization BID RT Kaylen Villareal MD   500 mcg at 06/15/21 4386  polyethylene glycol (MIRALAX) packet 17 g  17 g Oral DAILY Kaylen Villareal MD   17 g at 06/15/21 2376  bisacodyL (DULCOLAX) tablet 5 mg  5 mg Oral DAILY PRN Kaylen Villareal MD      
 cefTRIAXone (ROCEPHIN) 2 g in 0.9% sodium chloride (MBP/ADV) 50 mL MBP  2 g IntraVENous Q24H Kaylen Villareal  mL/hr at 06/13/21 1428 2 g at 06/13/21 1428  midodrine (PROAMATINE) tablet 5 mg  5 mg Oral TID WITH MEALS Kaylen Villareal MD   5 mg at 06/15/21 4605  sodium chloride 3% hypertonic nebulizer soln  4 mL Nebulization BID RT Kaylen Villareal MD   4 mL at 06/15/21 0926  
 [Held by provider] apixaban (ELIQUIS) tablet 5 mg  5 mg Oral BID Maureen Mendez MD      
 pantoprazole (PROTONIX) tablet 40 mg  40 mg Oral ACB Kaylen Villareal MD   40 mg at 06/15/21 5068  
 gabapentin (NEURONTIN) capsule 300 mg  300 mg Oral TID Kaylen Villareal MD   300 mg at 06/15/21 1045  sodium chloride (NS) flush 5-40 mL  5-40 mL IntraVENous Q8H Kaylen Villareal MD   10 mL at 06/15/21 1920  sodium chloride (NS) flush 5-40 mL  5-40 mL IntraVENous PRN Kaylen Villareal MD   5 mL at 06/11/21 1244  oxyCODONE-acetaminophen (PERCOCET) 5-325 mg per tablet 1 Tablet  1 Tablet Oral Q4H PRN aKylen Villareal MD   1 Tablet at 06/12/21 2135  morphine injection 2 mg  2 mg IntraVENous Q3H PRN Kaylen Villareal MD   2 mg at 06/15/21 1623  levalbuterol (XOPENEX) nebulizer soln 1.25 mg/3 mL  1.25 mg Nebulization QID RT Kaylen Villareal MD   1.25 mg at 06/15/21 0800 OBJECTIVE: 
Patient Vitals for the past 8 hrs: 
 BP Temp Pulse Resp SpO2  
06/15/21 0928     100 % 06/15/21 0709 125/70 97 °F (36.1 °C) 74 (!) 32 95 % 06/15/21 0630   69 (!) 31 98 % 06/15/21 0615   69 14 98 % 06/15/21 0600   69 17 96 % 06/15/21 0559 112/72      
06/15/21 0545   69 18 97 % 06/15/21 0530   69 14 96 % 06/15/21 0515   69 19 97 % 06/15/21 0500   69  95 % 06/15/21 0459 107/70      
06/15/21 0445   77 11 95 % 06/15/21 0430   71 26 95 % 06/15/21 0415   69 17 95 % 06/15/21 0407   71 13 96 % 06/15/21 0400   69 8 96 % 06/15/21 0359 96/68  69 15 95 % 06/15/21 0349   69    
06/15/21 0345   69 18 97 % 06/15/21 0330   69 14 96 % 06/15/21 0315   69  95 % 06/15/21 0300 100/ 97.6 °F (36.4 °C) 69 11 94 % 06/15/21 0259 100/67      
06/15/21 0245   69 10 95 % 06/15/21 0230   69 11 95 % 06/15/21 0215   70 19 97 % Temp (24hrs), Av.7 °F (36.5 °C), Min:97 °F (36.1 °C), Max:98.3 °F (36.8 °C) 
 
06/15 0701 - 06/15 1900 In: 328 [P.O.:328] Out: - Physical Exam: 
Constitutional: Well developed, well nourished male in no acute distress, sitting comfortably in the hospital bed. HEENT: Normocephalic and atraumatic. Oropharynx is clear, mucous membranes are moist.  Extraocular muscles are intact. Sclerae anicteric. Neck supple without JVD. No thyromegaly present. Skin Warm and dry. No bruising and no rash noted. No erythema. No pallor. Respiratory Lungs are coarse bilaterally, normal air exchange without accessory muscle use. CVS Normal rate, regular rhythm and normal S1 and S2. No murmurs, gallops, or rubs. Abdomen Soft, nontender and nondistended, normoactive bowel sounds. No palpable mass. No hepatosplenomegaly. Neuro Grossly nonfocal with no obvious sensory or motor deficits. MSK Normal range of motion in general.  No edema and no tenderness. Psych Appropriate mood and affect. Labs:   
Recent Results (from the past 24 hour(s)) RBC, ALLOCATE Collection Time: 21 12:30 PM  
Result Value Ref Range HISTORY CHECKED? Historical check performed BLOOD GAS & LYTES, ARTERIAL Collection Time: 21  2:01 PM  
Result Value Ref Range  pH (POC) 7.44 7.35 - 7.45    
 pCO2 (POC) 42.3 35 - 45 MMHG  
 pO2 (POC) 365 (H) 75 - 100 MMHG Sodium,  136 - 145 MMOL/L Potassium, POC 4.5 3.5 - 5.1 MMOL/L Calcium, ionized (POC) 1.20 1. 12 - 1.32 mmol/L Glucose,  (H) 65 - 100 MG/DL Base excess (POC) 4.3 mmol/L  
 HCO3 (POC) 28.9 (H) 22 - 26 MMOL/L  
 CO2, POC 29 (H) 13 - 23 MMOL/L  
 O2  % Sample source ARTERIAL Performed by Melissa Álvarez, POC Cannot be calculated >60 ml/min/1.73m2 GFRNA, POC Cannot be calculated >60 ml/min/1.73m2 CULTURE, TISSUE W GRAM STAIN Collection Time: 06/14/21  3:08 PM  
 Specimen: Lung EMPYEMA Result Value Ref Range Special Requests: NO SPECIAL REQUESTS    
 GRAM STAIN 12 TO 18 WBCS SEEN PER OIF   
 GRAM STAIN FEW GRAM POSITIVE COCCI Culture result:     
  NO GROWTH AFTER SHORT PERIOD OF INCUBATION. FURTHER RESULTS TO FOLLOW AFTER OVERNIGHT INCUBATION. BLOOD GAS, ARTERIAL POC Collection Time: 06/14/21  4:30 PM  
Result Value Ref Range Device: NASAL CANNULA    
 FIO2 (POC) 32 % pH (POC) 7.34 (L) 7.35 - 7.45    
 pCO2 (POC) 54.3 (H) 35 - 45 MMHG  
 pO2 (POC) 94 75 - 100 MMHG  
 HCO3 (POC) 29.5 (H) 22 - 26 MMOL/L  
 sO2 (POC) 96.6 95 - 98 % Base excess (POC) 3.1 mmol/L Allens test (POC) NOT APPLICABLE Site DRAWN FROM ARTERIAL LINE Specimen type (POC) ARTERIAL Performed by Jenna   
PTT Collection Time: 06/14/21  9:20 PM  
Result Value Ref Range aPTT 36.1 (H) 24.1 - 35.1 SEC  
CBC WITH AUTOMATED DIFF Collection Time: 06/14/21  9:25 PM  
Result Value Ref Range WBC 6.2 4.3 - 11.1 K/uL  
 RBC 3.30 (L) 4.23 - 5.6 M/uL HGB 9.0 (L) 13.6 - 17.2 g/dL HCT 29.3 (L) 41.1 - 50.3 % MCV 88.8 79.6 - 97.8 FL  
 MCH 27.3 26.1 - 32.9 PG  
 MCHC 30.7 (L) 31.4 - 35.0 g/dL  
 RDW 18.4 (H) 11.9 - 14.6 % PLATELET 110 242 - 956 K/uL MPV 9.5 9.4 - 12.3 FL ABSOLUTE NRBC 0.00 0.0 - 0.2 K/uL  
 DF AUTOMATED NEUTROPHILS 89 (H) 43 - 78 % LYMPHOCYTES 8 (L) 13 - 44 %  MONOCYTES 2 (L) 4.0 - 12.0 %  
 EOSINOPHILS 0 (L) 0.5 - 7.8 % BASOPHILS 0 0.0 - 2.0 % IMMATURE GRANULOCYTES 1 0.0 - 5.0 %  
 ABS. NEUTROPHILS 5.5 1.7 - 8.2 K/UL  
 ABS. LYMPHOCYTES 0.5 0.5 - 4.6 K/UL  
 ABS. MONOCYTES 0.1 0.1 - 1.3 K/UL  
 ABS. EOSINOPHILS 0.0 0.0 - 0.8 K/UL  
 ABS. BASOPHILS 0.0 0.0 - 0.2 K/UL  
 ABS. IMM. GRANS. 0.1 0.0 - 0.5 K/UL MAGNESIUM Collection Time: 06/15/21  3:33 AM  
Result Value Ref Range Magnesium 2.5 (H) 1.8 - 2.4 mg/dL PHOSPHORUS Collection Time: 06/15/21  3:33 AM  
Result Value Ref Range Phosphorus 4.0 (H) 2.3 - 3.7 MG/DL  
CBC WITH AUTOMATED DIFF Collection Time: 06/15/21  3:33 AM  
Result Value Ref Range WBC 6.4 4.3 - 11.1 K/uL  
 RBC 3.17 (L) 4.23 - 5.6 M/uL HGB 8.6 (L) 13.6 - 17.2 g/dL HCT 27.7 (L) 41.1 - 50.3 % MCV 87.4 79.6 - 97.8 FL  
 MCH 27.1 26.1 - 32.9 PG  
 MCHC 31.0 (L) 31.4 - 35.0 g/dL  
 RDW 18.0 (H) 11.9 - 14.6 % PLATELET 850 539 - 479 K/uL MPV 9.4 9.4 - 12.3 FL ABSOLUTE NRBC 0.00 0.0 - 0.2 K/uL  
 DF AUTOMATED NEUTROPHILS 90 (H) 43 - 78 % LYMPHOCYTES 6 (L) 13 - 44 % MONOCYTES 3 (L) 4.0 - 12.0 % EOSINOPHILS 0 (L) 0.5 - 7.8 % BASOPHILS 0 0.0 - 2.0 % IMMATURE GRANULOCYTES 1 0.0 - 5.0 %  
 ABS. NEUTROPHILS 5.8 1.7 - 8.2 K/UL  
 ABS. LYMPHOCYTES 0.4 (L) 0.5 - 4.6 K/UL  
 ABS. MONOCYTES 0.2 0.1 - 1.3 K/UL  
 ABS. EOSINOPHILS 0.0 0.0 - 0.8 K/UL  
 ABS. BASOPHILS 0.0 0.0 - 0.2 K/UL  
 ABS. IMM. GRANS. 0.1 0.0 - 0.5 K/UL METABOLIC PANEL, BASIC Collection Time: 06/15/21  3:33 AM  
Result Value Ref Range Sodium 138 136 - 145 mmol/L Potassium 5.2 (H) 3.5 - 5.1 mmol/L Chloride 104 98 - 107 mmol/L  
 CO2 30 21 - 32 mmol/L Anion gap 4 (L) 7 - 16 mmol/L Glucose 120 (H) 65 - 100 mg/dL BUN 15 8 - 23 MG/DL Creatinine 0.77 (L) 0.8 - 1.5 MG/DL  
 GFR est AA >60 >60 ml/min/1.73m2 GFR est non-AA >60 >60 ml/min/1.73m2 Calcium 8.8 8.3 - 10.4 MG/DL  
PTT Collection Time: 06/15/21  3:33 AM  
Result Value Ref Range  aPTT 41. 2 (H) 24.1 - 35.1 SEC Imaging: XR CHEST SNGL V [[id number]] Collected: 06/14/21 0315 Order Status: Completed Updated: 06/14/21 7080 Narrative:    
EXAM: Chest x-ray. INDICATION: Dyspnea. COMPARISON: Yesterday's chest x-ray. TECHNIQUE: Frontal view chest x-ray. FINDINGS: The right post pneumonectomy cavity is unchanged in appearance, with a  
right basilar chest tube remain in place. Mild left lung base atelectasis is  
also unchanged. The heart size is stable. Again noted is a left chest wall  
pacemaker and right chest wall infusion port catheter. Impression:    
No interval change. XR CHEST SNGL V [[id number]] Collected: 06/13/21 0439 Order Status: Completed Updated: 06/13/21 3247 Narrative:    
EXAM: Chest x-ray. INDICATION: Dyspnea. COMPARISON: June 11, 2021. TECHNIQUE: Frontal view chest x-ray. FINDINGS: No change in the appearance of the posterior pneumonectomy right lung  
cavity. A pleural catheter remains along the right base. There is mild left lung  
base atelectasis. The heart is upper normal in size, a left chest wall  
pacemaker. Again noted is a right chest wall infusion port catheter. Impression:    
Unchanged post-right pneumonectomy cavity and left basilar  
atelectasis. XR CHEST SNGL V [[id number]] Order Status: No result XR CHEST SNGL V [[id number]] Order Status: No result XR CHEST SNGL V [[id number]] Order Status: Canceled XR CHEST SNGL V [[id number]] Order Status: Canceled XR CHEST SNGL V [[id number]] Order Status: Canceled XR CHEST SINGLE VIEW [[id number]] Collected: 06/11/21 0525 Order Status: Completed Updated: 06/11/21 9285 Narrative:    
EXAMINATION: One view chest  
 
HISTORY: resp failure TECHNIQUE: Frontal chest.  
 
COMPARISON: 6/10/2021 FINDINGS:    
Stable small right chest tube. No significant change in the cavitary defect in the right hemithorax. Stable left basilar atelectasis. No left pneumothorax. No other significant interval changes. Impression:    
 
1. Findings as described above. XR CHEST SINGLE VIEW [[id number]] Collected: 06/10/21 2979 Order Status: Completed Updated: 06/10/21 2311 Narrative:    
EXAMINATION: One view chest  
 
HISTORY: resp failure TECHNIQUE: Frontal chest.  
 
COMPARISON: 6/9/2021 FINDINGS:    
Stable small right chest tube. Stable right MediPort. Stable postoperative cavity in the right hemithorax. There is atelectasis at the left lung base. Blunting of the left costophrenic angle may be secondary to the atelectasis  
and/or a small left pleural effusion. There is no appreciable pneumothorax on the left. Stable cardiac device. Impression:    
 
1. Findings as described above. XR CHEST SINGLE VIEW [[id number]] Collected: 06/09/21 0438 Order Status: Completed Updated: 06/09/21 4974 Narrative:    
EXAMINATION: One view chest  
 
HISTORY: resp failure TECHNIQUE: Frontal chest.  
 
COMPARISON: 6/8/2021 FINDINGS:    
 Stable small right chest tube. Stable large right pneumonectomy cavity. Stable small fluid volume in the right  
hemithorax. The left lung shows mild basilar atelectasis. 2 right rib fractures are again observed. Stable right MediPort and left cardiac device. Impression:    
 
1. Findings as described above. No significant interval changes. XR CHEST PORT [[id number]] Collected: 06/08/21 1946 Order Status: Completed Updated: 06/08/21 2007 Narrative:    
EXAM: Single view chest radiograph. INDICATION: Status post right chest tube placement. COMPARISON: Chest radiograph dated June 08, 2021. FINDINGS:  
Postoperative change of right pneumonectomy. Interval insertion of a right-sided  
chest tube and the right hemithorax a decrease in a small right-sided  
postpneumonectomy fluid collection. Thoracotomy defect of the right seventh and  
eighth ribs.  Right-sided chest port tip terminating in the right atrium. Heart  
appears normal in size. Cardiac pacer/stimulator evident without overt  
complication. Left lung is clear. Impression:    
1. Postoperative change of right pneumonectomy with placement of a right-sided  
chest tube which appears appropriate position. 2. Interval decrease in fluid in the postpneumonectomy cavity. Left lung is  
clear. XR CHEST PORT [[id number]] Collected: 06/08/21 1527 Order Status: Completed Updated: 06/08/21 1531 Narrative:    
History: Prior right pneumonectomy Exam: portable chest  
 
Comparison: 5/16/2021 Findings: Postpneumonectomy changes noted on the right with large air-fluid  
collection seen, similar to the findings present previously. A port overlies the  
right chest wall. The left lung is clear. A cardiac device overlies the left  
chest.  
 
IMPRESSIONs:  
 
Findings similar to those seen previously. The persistence of the large air and  
fluid collection on the right does raise the possibility of bronchopleural  
fistula status post pneumonectomy. ASSESSMENT: 
Problem List  Date Reviewed: 6/11/2021 Codes Class Noted Pacemaker ICD-10-CM: Z95.0 ICD-9-CM: V45.01  5/3/2021 Pleural fistula (HCC) ICD-10-CM: J86.0 ICD-9-CM: 510.0  6/14/2021 Atrial flutter ([facility] ICD-10-CM: E10.14 
ICD-9-CM: 427.32  6/10/2021 Acute hypoxemic respiratory failure ([facility] ICD-10-CM: J96.01 
ICD-9-CM: 518.81  6/8/2021 Bronchopleural fistula (HCC) ICD-10-CM: J86.0 ICD-9-CM: 510.0  6/8/2021 Empyema ([facility] ICD-10-CM: J86.9 ICD-9-CM: 510.9  6/8/2021 Acute on chronic respiratory failure with hypoxemia [facility]) ICD-10-CM: R57.90 
ICD-9-CM: 518.84  6/8/2021 COPD exacerbation ([facility] ICD-10-CM: J44.1 ICD-9-CM: 491.21  5/16/2021 Heart block AV complete (HCC) ICD-10-CM: I44.2 ICD-9-CM: 426.0  5/3/2021 Sick sinus syndrome (HCC) ICD-10-CM: I49.5 ICD-9-CM: 427.81  3/18/2021 PAF (paroxysmal atrial fibrillation) (HCC) ICD-10-CM: I48.0 ICD-9-CM: 427.31  2/25/2021 Hypertension ICD-10-CM: I10 
ICD-9-CM: 401.9  2/25/2021 Atrial fibrillation with RVR ([facility] ICD-10-CM: I48.91 
ICD-9-CM: 427.31  2/25/2021 S/P thoracotomy ICD-10-CM: T01.038 ICD-9-CM: V45.89  1/7/2021 Atelectasis of right lung ICD-10-CM: J98.11 ICD-9-CM: 518.0  1/4/2021 A-fib (HCC) (Chronic) ICD-10-CM: I48.91 
ICD-9-CM: 427.31  1/4/2021 Chronic respiratory failure with hypoxia (HCC) (Chronic) ICD-10-CM: J96.11 
ICD-9-CM: 518.83, 799.02  1/4/2021 Pleural effusion on right ICD-10-CM: J90 ICD-9-CM: 511.9  12/28/2020 Chemotherapy induced neutropenia (HCC) ICD-10-CM: D70.1, T45.1X5A 
ICD-9-CM: 288.03, E933.1  10/23/2020 Dehydration ICD-10-CM: E86.0 ICD-9-CM: 276.51  9/15/2020 S/P lobectomy of lung ICD-10-CM: Z90.2 ICD-9-CM: V45.89  7/29/2020 Cough ICD-10-CM: R05 ICD-9-CM: 786.2  7/29/2020 Atrial tachycardia (HCC) ICD-10-CM: I47.1 ICD-9-CM: 427.89  7/26/2020 Cancer of bronchus of right lower lobe [facility]) ICD-10-CM: C34.31 
ICD-9-CM: 162.5  7/23/2020 Lung cancer ([facility] (Chronic) ICD-10-CM: C34.90 ICD-9-CM: 162.9  7/23/2020 Cancer of right lung [facility]) ICD-10-CM: C34.91 
ICD-9-CM: 162.9  7/23/2020 Pulmonary emphysema (HCC) (Chronic) ICD-10-CM: J43.9 ICD-9-CM: 492.8  7/7/2020 GERD (gastroesophageal reflux disease) ICD-10-CM: K21.9 ICD-9-CM: 530.81  Unknown Hypotension (Chronic) ICD-10-CM: I95.9 ICD-9-CM: 458.9  5/5/2020 Overview Signed 1/11/2021  8:39 AM by Sally Lawton NP On midodrine * (Principal) Sepsis due to undetermined organism [facility]) ICD-10-CM: A41.9 ICD-9-CM: 038.9  5/5/2020 Malignant neoplasm of lower lobe of right lung (HCC) (Chronic) ICD-10-CM: C34.31 
ICD-9-CM: 162.5  2/26/2020  Overview Signed 1/4/2021  7:38 AM by Yari Alvarado MD  
  Dec 2020- Echo EF 50%, technically difficult, cannot assess regional wall motion. Aortic root 4.1 cm. No significant valvular disease. Normal DF Mass of lower lobe of right lung ICD-10-CM: R91.8 ICD-9-CM: 786.6  2/23/2020 Right lower lobe lung mass ICD-10-CM: R91.8 ICD-9-CM: 786.6  2/23/2020 Essential hypertension with goal blood pressure less than 130/80 (Chronic) ICD-10-CM: I10 
ICD-9-CM: 401.9  2/19/2018 RECOMMENDATIONS: 
Squamous cell carcinoma of lung - s/p right lower and middle lobectomy 7/23/20, path pCR and CT with CARINE 
- completed chemorad with carbo/taxol/XRT on 10/27/20 
- Due for follow up scans. Will order CT chest while IP (pt requests this not be performed today, will order for tomorrow). Empyema / Bronchopleural fistula 
- on CTX/ Flagyl - s/p R chest tube placement for concern for bronchopleural fistula - POD #1 s/p R thoracotomy with decortication, bronchopleural fistula repair, and intercostal nerve cryoblation Acute hypoxic resp failure / septic shock - Mgmt per primary team 
 
Anemia - Check iron studies, B12, folate, hemolysis labs - No ALLA, B12, or folate deficiency noted. Hemolysis labs pending. 
- Transfuse prn to keep Hgb >7-8 Lab studies and imaging studies were personally reviewed. Thank you for allowing us to participate in the care of Mr. Tim Gutierrez. We will sign off; please do not hesitate to call with any further questions. Mr. Tim Gutierrez will need to f/u with Dr. Anam Hanson in two weeks. Formal consult note by Dr. Katie Javier to follow. Nelly Blas, MEHDI [facility] [address] [address] Office : [phone] Fax : [phone]

## 2021-06-14 NOTE — PROGRESS NOTES
Care Management Interventions PCP Verified by CM: Yes Mode of Transport at Discharge: Other (see comment) Transition of Care Consult (CM Consult): Discharge Planning Discharge Durable Medical Equipment:  (O2, Pender Med, cane, pacemaker check box) Current Support Network: Own Home, Other (Rekha Colon lives with him. ) Confirm Follow Up Transport: Family Freedom of Choice List was Provided with Basic Dialogue that Supports the Patient's Individualized Plan of Care/Goals, Treatment Preferences and Shares the Quality Data Associated with the Providers?: Yes [employer] Information Provided?:  (Latia Krueger -recently retired, Katie still primary) Discharge Location Discharge Placement: Unable to determine at this time CM met with pt and his GF, Sharon, this AM to discuss status and DCP. Pt scheduled for (R) thoracotomy today. Pt with h/o RLL lung cancer, s/p pneumonectomy 01/2021. Pt was enrolled in pulm rehab after discharge. Pt admitted 6/8 for sepsis-right sided empyema. CM will follow pt's status and update DCP.

## 2021-06-14 NOTE — ANESTHESIA POSTPROCEDURE EVALUATION
Procedure(s): RIGHT THORACOTOMY/ BRONCHIOPLEURAL FISTULA REPAIR. general 
 
Anesthesia Post Evaluation Multimodal analgesia: multimodal analgesia used between 6 hours prior to anesthesia start to PACU discharge Patient location during evaluation: PACU Patient participation: complete - patient participated Level of consciousness: awake and alert Pain management: adequate Airway patency: patent Anesthetic complications: no 
Cardiovascular status: acceptable Respiratory status: acceptable Hydration status: acceptable Post anesthesia nausea and vomiting:  controlled Final Post Anesthesia Temperature Assessment:  Normothermia (36.0-37.5 degrees C) INITIAL Post-op Vital signs:  
Vitals Value Taken Time /73 06/14/21 1743 Temp 36.8 °C (98.2 °F) 06/14/21 1717 Pulse 69 06/14/21 1744 Resp 16 06/14/21 1717 SpO2 97 % 06/14/21 1744 Vitals shown include unvalidated device data.

## 2021-06-14 NOTE — CONSULTS
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  
· Continue ceftriaxone and add flagyl. Will continue to follow. Anti-infectives: · Ceftriaxone (6/10 - 
· Zosyn (6/8-6/10) · Vancomycin (6/8-6/10) Subjective:  
Date of Consultation:  June 14, 2021 Referring Physician: Heidi Panchal, Hospitalist 
 
Patient is a 78 y.o. male with an underlying medical history that includes hypertension, tobacco use, RLL squamous cell carcinoma s/p excision 7/23/2020, treated with chemo, completed 10/2020, with R parotid mass, A fib with pacer placed 5/23/21 and s/p AV node ablation on Eliquis. He has had a persistent R pleural effusion and required a R open thoracotomy 1/6/2021 with extensive pneumolysis with decortication. The lung was difficult to inflate intraoperatively. Ever since, he has had congestion every time he lays on the R side. He was recently hospitalized in May 2021 with hypoxia, and CT at that time noted groundglass infiltrates in the LLL with persistent R sided fluid collection. He was treated with steroids and antibiotics. ID did not see him during that admission. He was readmitted 6/8/21 with fever 102.6, weakness and yellow sputum. BC no growth. He was admitted to the ICU and chest tube was placed with return of 500 ml purulent, foul-smelling material. Gram stain had GPCs and GNRs, and cultures grew heavy mixed GPCs resembling normal respiratory beni. Sputum culture showed oropharyngeal contamination. He has had persistent air leak in the chest tube.  Per the notes, there is concern for bronchopleural fistula, and general surgery was consulted, and he has plan for OR today. He has been treated with antibiotics as above, and ID has been asked for further recommendations. Tmax 99.2, WBCs 5.2k. Patient Active Problem List  
Diagnosis Code  Essential hypertension with goal blood pressure less than 130/80 I10  Mass of lower lobe of right lung R91.8  Right lower lobe lung mass R91.8  Malignant neoplasm of lower lobe of right lung (HCC) C34.31  
 GERD (gastroesophageal reflux disease) K21.9  Hypotension I95.9  Sepsis due to undetermined organism ([address] A41.9  Pulmonary emphysema ([address] J43.9  Cancer of bronchus of right lower lobe (HCC) C34.31  
 Lung cancer ([address] C34.90  
 Cancer of right lung (HCC) C34.91  
 Atrial tachycardia ([facility]) I47.1  S/P lobectomy of lung Z90.2  Cough R05  Dehydration E86.0  Chemotherapy induced neutropenia ([facility]) D70.1, T45.1X5A  Pleural effusion on right J90  
 Atelectasis of right lung J98.11  
 A-fib ([facility]) I48.91  
 Chronic respiratory failure with hypoxia ([facility]) J96.11  
 S/P thoracotomy Z98.890  
 PAF (paroxysmal atrial fibrillation) ([facility]) I48.0  Hypertension I10  Atrial fibrillation with RVR ([facility]) I48.91  
 Sick sinus syndrome ([facility]) I49.5  Pacemaker Z95.0  Heart block AV complete ([facility]) I44.2  COPD exacerbation ([address] J44.1  Acute hypoxemic respiratory failure ([facility]) J96.01  
 Bronchopleural fistula ([facility]) J86.0  Empyema ([address] J86.9  Acute on chronic respiratory failure with hypoxemia ([facility]) J96.21  
 Atrial flutter ([facility]) I48.92 Past Medical History:  
Diagnosis Date  Arrhythmia Afib  Chronic obstructive pulmonary disease ([address] O2 at [facility]  Chronic pain   
 right torn rotator cuff; left knee  GERD (gastroesophageal reflux disease)   
 controlled with med  Hypertension hx-- off meds since 4/2020--- followed by dr. Leydi Greene  Hypoxia 02/24/2020  Malignant neoplasm of lower lobe of right lung ([address] 02/26/2020  REC'D CHEMO AND RADIATION--- FOLLOWED BY DR. Cullen nAdrade Osteoarthritis  Right lower lobe lung mass 2020  Status post total right knee replacement 2016 Family History Problem Relation Age of Onset  Cancer Mother   
     uterine  Arrhythmia Sister   
     afib Social History Tobacco Use  Smoking status: Former Smoker Packs/day: 1.00 Years: 20.00 Pack years: 20.00 Quit date:  Years since quittin.4  Smokeless tobacco: Never Used  Tobacco comment: patient has no desire to resume Substance Use Topics  Alcohol use: Yes Alcohol/week: 4.0 standard drinks Types: 4 Glasses of wine per week Past Surgical History:  
Procedure Laterality Date  HX COLONOSCOPY    
 HX KNEE ARTHROSCOPY Left   
 scope X 1, ACL recon X 1  
 HX KNEE ARTHROSCOPY Right , 1975 X 2   
 HX KNEE REPLACEMENT Right 2016 [address]  HX VASCULAR ACCESS Right placed 3/2020  
 port in chest   
 IR INSERT TUNL CVC W PORT OVER 5 YEARS  3/18/2020  MI CHEST SURGERY PROCEDURE UNLISTED    
 R lobectomy 2020; R complete pneumonectomy 2021  MI SINUS SURGERY PROC UNLISTED  ,   
 sinus surg Prior to Admission medications Medication Sig Start Date End Date Taking? Authorizing Provider  
pantoprazole (PROTONIX) 40 mg tablet Take 1 Tablet by mouth daily for 30 days. 21 Yes Arnulfo Schmidt NP  
gabapentin (NEURONTIN) 300 mg capsule TAKE 1 CAPSULE BY MOUTH THREE TIMES A DAY 21  Yes Eloisa Peoples MD  
metoprolol succinate (TOPROL-XL) 50 mg XL tablet Take 1 Tab by mouth daily. 21  Yes Waverly Cogan A, NP  
umeclidinium (Incruse Ellipta) 62.5 mcg/actuation inhaler Take 1 Puff by inhalation daily. 21  Yes Juan Antonio Matos NP  
apixaban (Eliquis) 5 mg tablet Take 1 Tab by mouth two (2) times a day. 3/18/21  Yes Safia Johnson MD  
OXYGEN-AIR DELIVERY SYSTEMS by Does Not Apply route.  2lpm qhs   Yes Provider, Historical  
ipratropium (ATROVENT) 0.03 % nasal spray 2 Sprays by Both Nostrils route two (2) times a day. 21  Yes Emilia Ayers NP Nebulizer & Compressor machine COPD 7/3/20  Yes Peyton Valadez DO  
polyethylene glycol (MIRALAX) 17 gram packet Take 1 Packet by mouth daily. Indications: constipation Patient taking differently: Take 17 g by mouth daily as needed. Indications: constipation 20  Yes Kendell Olmedo NP  
esomeprazole (NexIUM) 20 mg capsule Take 20 mg by mouth nightly. prn   Yes Jaciel Fregoso  
ondansetron hcl (ZOFRAN) 8 mg tablet Take 1 Tab by mouth every eight (8) hours as needed for Nausea. Indications: nausea and vomiting caused by cancer drugs 3/10/20  Yes Lea Rob MD  
lidocaine-prilocaine (EMLA) topical cream Apply  to affected area as needed for Pain. 3/10/20  Yes Lea Rob MD  
 
 
Allergies Allergen Reactions  Albuterol Palpitations  Celebrex [Celecoxib] Itching Review of Systems:  Pertinent items are noted in the History of Present Illness. Objective:  
 
Visit Vitals /69 (BP 1 Location: Left upper arm, BP Patient Position: Supine) Pulse 86 Temp 98.4 °F (36.9 °C) Resp 24 Ht 5' 10\" (1.778 m) Wt 80.5 kg (177 lb 6.4 oz) SpO2 96% BMI 25.45 kg/m² Temp (24hrs), Av.5 °F (36.9 °C), Min:98 °F (36.7 °C), Max:99.2 °F (37.3 °C) Lines:  Peripheral IV:    
 
Physical Exam:   
General:  Alert, cooperative, well nourished, well developed, appears stated age Eyes:  Sclera anicteric. Pupils equally round and reactive to light. Mouth/Throat: Mucous membranes normal, oral pharynx clear Neck: Supple Lungs:   Coarse rhonchi, R chest tube with green purulent drainage CV:  Regular rate and rhythm, no audible murmur, L chest pacer, non tender Abdomen:   Soft, non-tender. bowel sounds normal. non-distended Extremities: No cyanosis or edema Skin: Skin color, texture, turgor normal. no acute rash or lesions Musculoskeletal: No swelling or deformity Lines/Devices:  Intact, no erythema, drainage or tenderness Psych: Alert and oriented Data Review: CBC: 
Recent Labs  
  06/14/21 
0353 06/13/21 
0400 06/12/21 
0501 WBC 5.2 4.5 3.9* GRANS 75 73 71 MONOS 8 7 9 EOS 3 2 3 ANEU 3.9 3.3 2.8 ABL 0.7 0.8 0.7 HGB 7.8* 7.9* 7.9*  
HCT 25.4* 25.2* 24.9*  
 168 155 BMP: 
Recent Labs  
  06/14/21 
0353 06/13/21 
0400 06/12/21 
0501 CREA 1.00 0.97 1.02  
BUN 11 10 8  141 140  
K 4.1 3.8 4.2  105 105 CO2 32 32 30 AGAP 2* 4* 5* * 117* 86 LFTS: 
No results for input(s): TBILI, ALT, AP, TP, ALB in the last 72 hours. No lab exists for component: SGOT Microbiology:  
 
All Micro Results Procedure Component Value Units Date/Time BLOOD CULTURE [[id number]] Collected: 06/08/21 1445 Order Status: Completed Specimen: Blood Updated: 06/13/21 1032 Special Requests: LEFT FOOT Culture result: NO GROWTH 5 DAYS     
 BLOOD CULTURE [[id number]] Collected: 06/08/21 1627 Order Status: Completed Specimen: Blood Updated: 06/13/21 1032 Special Requests: --     
  RIGHT 
FOREARM Culture result: NO GROWTH 5 DAYS     
 CULTURE, BODY FLUID Rondal Omero STAIN [[id number]]  (Abnormal) Collected: 06/08/21 1941 Order Status: Completed Specimen: Body Fluid from Right Updated: 06/12/21 1457 Special Requests: NO SPECIAL REQUESTS     
  GRAM STAIN 45  WBC'S/OIF  
   MANY GRAM POSITIVE COCCI MODERATE GRAM NEGATIVE RODS Culture result:    
  HEAVY MIXED GRAM POSITIVE COCCI RESEMBLING NORMAL RESPIRATORY EDU  
     
      
  THIS ORGANISM WILL BE HELD FOR 7 DAYS. IF FURTHER TESTING IS REQUIRED PLEASE NOTIFY MICROBIOLOGY  
     
 CULTURE, RESPIRATORY/SPUTUM/BRONCH W Kendall Metzger [[id number]] Collected: 06/08/21 2305 Order Status: Completed Specimen: Sputum Updated: 06/10/21 1989   Special Requests: NO SPECIAL REQUESTS     
  GRAM STAIN    
  GRAM STAIN EXAMINATION OF THIS SPECIMEN INDICATED OROPHARYNGEAL CONTAMINATION. PLEASE SUBMIT A NEW SPECIMEN OR NOTIFY THE MICRO DEPT WITHIN 48HRS IF FURTHER WORKUP IS DESIRED. Culture result:    
  Please reorder and recollect. Jess Guardado 21 @1546, ABISAI Imagin2021 CXR FINDINGS: The right post pneumonectomy cavity is unchanged in appearance, with a 
right basilar chest tube remain in place. Mild left lung base atelectasis is 
also unchanged. The heart size is stable. Again noted is a left chest wall 
pacemaker and right chest wall infusion port catheter. 
  
 
Signed By: Stephany Loco NP   
 2021

## 2021-06-15 NOTE — OP NOTES
[facility]  OPERATIVE REPORT    Name:  Gilmar Wild  MR#:  [id number]  :  1950  ACCOUNT #:  [de-identified]  DATE OF SERVICE:  2021    PREOPERATIVE DIAGNOSIS:  Right bronchopleural fistula with empyema. POSTOPERATIVE DIAGNOSIS:  Right bronchopleural fistula with empyema. PROCEDURE PERFORMED:  1. Right thoracotomy with decortication. 2.  Bronchopleural fistula repair. 3. Intercostal nerve cryoablation. SURGEON:  German Santa MD    ANESTHESIA:  general    COMPLICATIONS:  none    SPECIMENS REMOVED:  As above    IMPLANTS: mickie x 1    ESTIMATED BLOOD LOSS:  Minimal.    INDICATION:  This is a 32-year-old gentleman who had a history of lung cancer more than a year ago. He had neoadjuvant chemoradiation followed by lobectomy and a few months later had developed nonfunctional right lung and he had to have completion right pneumonectomy. This was about six months later. He developed a right infected pleural effusion and a chest tube was placed. He was found to have a bronchopleural fistula. There is no other option other than surgery to fix this problem. He understood. His tissues are probably irradiated and non-healthy and the wound healing is going to be always an issue down the line. However, surgery is clearly indicated at the current time. He understood risks and benefits, agreed to proceed. FINDINGS:  He does have empyema in the right chest and there is a bronchopleural fistula at the right main bronchial stump. PROCEDURE:  After informed consent was obtained, the patient was brought into the operating room and left in supine position. General anesthesia was administered. A double-lumen tube was placed by Anesthesia. The patient was then left in left decubitus position with right side up. His right chest was prepped and draped in routine fashion. I reopened his thoracotomy incision.   Dissection was carried through the latissimus dorsi and then the serratus anterior fascia was opened posteriorly and the muscle was retracted anteriorly. His chest wall was completely frozen. The previous intercostal space, this was the fourth intercostal space, was entered and the posterior portion of the fifth rib was redivided with costal.  Then the pleural cavity was entered little by little. Again, the chest wall was extremely frozen and scarred down from previous surgery and radiation. I eventually was able to get into the right thoracic cavity and there was large amount of empyema tissues. This was carefully peeled off the chest wall and full decortication was performed along the diaphragm, mediastinum and the lateral chest wall pleural.  As noted, the entire chest wall was frozen, to mobilize a muscle flap was deemed impossible. Then the right hilum was explored and the bronchopleural fistula was immediately identified. There were some necrotic tissues around the fistula. This was debrided and then the fistula was fully mobilized. I used a 2-0 PDS stitch on a GI needle in figure-of-eight fashion and the entire bronchial stump was oversewn. At the end of the case and after Anesthesia increased airway pressure to 60 mmHg, there was no evidence of air leak. Then intercostal nerve cryoablation was performed for postop pain control. This covered about five levels. Then Progel was applied on the bronchial stump. A #19 Willy drain was left in the right chest and left on gravity. The rib was reapproximated with #2 Vicryl in figure-of-eight fashion. The serratus anterior fascia and the latissimus dorsi was reapproximated with 0 Vicryl stitch in running fashion, skin closed with staples. The patient tolerated the procedure well, transferred to recovery room in stable condition. All the instrument count and lap count were correct.   Estimated blood loss was minimal.      Palma Baca MD      [id number]  D:  06/14/2021 16:22  T:  06/14/2021 20:45  JOB #: [id number]

## 2021-06-15 NOTE — PROGRESS NOTES
A follow up visit was made to the patient. Emotional support, spiritual presence and  
prayer were provided for the patient. He was alert and oriented. He shared that he had recently had surgery. Carson Malik, [address], [facility]

## 2021-06-15 NOTE — PROGRESS NOTES
Bedside and verbal shift change report received from  [facility] (offgoing nurse). Report included the following information SBAR, OR Summary, Procedure Summary, Intake/Output, MAR, Recent Results, Med Rec Status and Cardiac Rhythm Paced. Dual skin assessment completed at bedside: R-chest tube (list pertinent skin assessment findings) Dual verification of gtts completed (name of gtts verified): Heparin

## 2021-06-15 NOTE — PROGRESS NOTES
[facility]  
History and Physical  
 
 
Name:  Elyssa Snowden. Age:70 y.o. Sex:male :  1950 MRN:  [id number] PCP:  Shamar Zendejas DO Admit Date:  2021  4:02 PM  
Chief Complaint: assume care Requesting RACHEL Maher Reason for Admission:  
Acute on chronic respiratory failure with hypoxemia ([address] [J96.21] Pleural fistula ([address] [J86.0] Assessment & Plan:  
 
 
acute hypoxic respiratory failure, empyema, septic shock: 
· On 3 L NC 
· ID, pulmonary and surgery following · Continued rocephin / flagyl Aflutter s/p PPM: 
· device adjusted per cardiology · On IV heparin drip · eliquis held Anemia: 
·  trend HGB Hyperkalemia: 
· Repeat lab Disposition:  Pending , ok for floor Diet: DIET ADULT 
VTE ppx: lovenox GI ppx: protonix CODE STATUS: Full Code Surrogate decision-maker:  
 
 
History of Presenting Illness:  
 
Elyssa Snowden. is a 78 y.o. male with medical history of  Chronic hypoxic respiratory failure  On 2 L NC QHS, tobacco use, RLL SCC s/p excision 7,/ chemo, AFIB on eliquis s/p PPM and AVN ablation, HTN, GERD, right pleural effusion s/p thoracentesis / Chest tube / VATs with decortication and remaining stump admitted with acute hypoxic respiratory failure, empyema, septic shock. He required ICU course for pressors that are weaned. He has been seen by pulmonary and Dr. Maryann Bernal of surgery. 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 He is on IV antibiotics. Pleural cultures growing polymicrobia. BC NGTD. ID following for anti infectives. He is s/p thoracotomy with decortication,  Broncho pleural fistula repair and intercostal nerve cryoablation on 21. He has additionally been seen by cardiology for atrial flutter and his device mode switched for atrial flutter with improvement. Oncology following. Discharge plans pending Review of Systems 21: Hungry and wants to eat, no BM since admitted, no dyspnea, no edema, some pain Past Medical History:  
Diagnosis Date  Arrhythmia Afib  Chronic obstructive pulmonary disease ([facility] O2 at [employer]  Chronic pain   
 right torn rotator cuff; left knee  GERD (gastroesophageal reflux disease)   
 controlled with med  Hypertension hx-- off meds since 2020--- followed by dr. Tiara Fragoso  Hypoxia 2020  Malignant neoplasm of lower lobe of right lung ([facility] 2020 REC'D CHEMO AND RADIATION--- FOLLOWED BY DR. Husam Villaseñor Osteoarthritis  Right lower lobe lung mass 2020  Status post total right knee replacement 2016 Past Surgical History:  
Procedure Laterality Date  HX COLONOSCOPY    
 HX KNEE ARTHROSCOPY Left   
 scope X 1, ACL recon X 1  
 HX KNEE ARTHROSCOPY Right , 1975 X 2   
 HX KNEE REPLACEMENT Right  [address]  HX VASCULAR ACCESS Right placed 3/2020  
 port in chest   
 IR INSERT TUNL CVC W PORT OVER 5 YEARS  3/18/2020  OK CHEST SURGERY PROCEDURE UNLISTED    
 R lobectomy 2020; R complete pneumonectomy 2021  OK SINUS SURGERY PROC UNLISTED  ,   
 sinus surg Family History : reviewed Family History Problem Relation Age of Onset  Cancer Mother   
     uterine  Arrhythmia Sister   
     afib Social History Tobacco Use  Smoking status: Former Smoker Packs/day: 1.00 Years: 20.00 Pack years: 20.00 Quit date:  Years since quittin.4  Smokeless tobacco: Never Used  Tobacco comment: patient has no desire to resume Substance Use Topics  Alcohol use: Yes Alcohol/week: 4.0 standard drinks Types: 4 Glasses of wine per week Allergies Allergen Reactions  Albuterol Palpitations  Celebrex [Celecoxib] Itching Immunization History Administered Date(s) Administered  COVID-19, PFIZER, MRNA, LNP-S, PF, 30MCG/0.3ML DOSE 02/10/2021, 03/10/2021  Influenza Vaccine [employer]) PF (>6 Mo Flulaval, Fluarix, and >3 Yrs [address] [place] [address], Fluzone [address]) 11/20/2018  TB Skin Test (PPD) Intradermal 02/29/2016 PTA Medications: 
Current Outpatient Medications Medication Instructions  apixaban (ELIQUIS) 5 mg, Oral, 2 TIMES DAILY  esomeprazole (NEXIUM) 20 mg, Oral, EVERY BEDTIME, prn  
 gabapentin (NEURONTIN) 300 mg capsule TAKE 1 CAPSULE BY MOUTH THREE TIMES A DAY  ipratropium (ATROVENT) 0.03 % nasal spray 2 Sprays, Both Nostrils, 2 TIMES DAILY  lidocaine-prilocaine (EMLA) topical cream Topical, AS NEEDED  
 metoprolol succinate (TOPROL-XL) 50 mg, Oral, DAILY  Nebulizer & Compressor machine COPD  
 ondansetron hcl (ZOFRAN) 8 mg, Oral, EVERY 8 HOURS AS NEEDED  
 OXYGEN-AIR DELIVERY SYSTEMS Does Not Apply, 2lpm qhs   
 pantoprazole (PROTONIX) 40 mg, Oral, DAILY  polyethylene glycol (MIRALAX) 17 g, Oral, DAILY  umeclidinium (Incruse Ellipta) 62.5 mcg/actuation inhaler 1 Puff, Inhalation, DAILY Objective:  
 
Patient Vitals for the past 24 hrs: 
 Temp Pulse Resp BP SpO2  
06/15/21 0928     100 % 06/15/21 0709 97 °F (36.1 °C) 74 (!) 32 125/70 95 % 06/15/21 0630  69 (!) 31  98 % 06/15/21 0615  69 14  98 % 06/15/21 0600  69 17  96 % 06/15/21 0559    112/72   
06/15/21 0545  69 18  97 % 06/15/21 0530  69 14  96 % 06/15/21 0515  69 19  97 % 06/15/21 0500  69   95 % 06/15/21 0459    107/70   
06/15/21 0445  77 11  95 % 06/15/21 0430  71 26  95 % 06/15/21 0415  69 17  95 % 06/15/21 0407  71 13  96 % 06/15/21 0400  69 8  96 % 06/15/21 0359  69 15 96/68 95 % 06/15/21 0349  69     
06/15/21 0345  69 18  97 % 06/15/21 0330  69 14  96 % 06/15/21 0315  69   95 % 06/15/21 0300 97.6 °F (36.4 °C) 69 11 100/67 94 % 06/15/21 0259    100/67   
06/15/21 0245  69 10  95 % 06/15/21 0230  69 11  95 % 06/15/21 0215  70 19  97 % 06/15/21 0200  69 21  97 % 06/15/21 0159 Bisi Page  99/66   
06/15/21 0145  71 17  98 % 06/15/21 0130  69 14  97 % 06/15/21 0115  69 12  95 % 06/15/21 0100  69 (!) 43  97 % 06/15/21 0059    95/66   
06/15/21 0045  69 15  96 % 06/15/21 0030  69 15  95 % 06/15/21 0015  69 20  96 % 06/15/21 0000  74 (!) 33  96 % 06/14/21 2359    102/65 96 % 06/14/21 2352  74     
06/14/21 2345  71 (!) 34  96 % 06/14/21 2330  74 (!) 79  96 % 06/14/21 2315  80 11  95 % 06/14/21 2300 97.6 °F (36.4 °C) 76 14 102/65 95 % 06/14/21 2259    104/61 95 % 06/14/21 2245  75 (!) 6  96 % 06/14/21 2230  76   98 % 06/14/21 2229    106/67   
06/14/21 2215  80 14  97 % 06/14/21 2200  79 29  98 % 06/14/21 2159  79 22 116/69 99 % 06/14/21 2145  75 13  98 % 06/14/21 2130  78 13  99 % 06/14/21 2129    120/71   
06/14/21 2115  69 10  100 % 06/14/21 2103     98 % 06/14/21 2100  69 (!) 5  98 % 06/14/21 2059    120/75   
06/14/21 2045  69 14  97 % 06/14/21 2030  73 17  99 % 06/14/21 2029    133/71   
06/14/21 2015  69 20  98 % 06/14/21 2000  72 25  98 % 06/14/21 1959    122/88   
06/14/21 1945  70 (!) 39  100 % 06/14/21 1900 97.6 °F (36.4 °C) 69 16 109/71 97 % 06/14/21 1852  69 16 109/71 97 % 06/14/21 1848  71 16 110/71 97 % 06/14/21 1843  70 15 120/74 98 % 06/14/21 1838  70 14 122/77 97 % 06/14/21 1833  70 15 124/78 98 % 06/14/21 1828  69 14 117/75 99 % 06/14/21 1823  71 16 120/71 99 % 06/14/21 1818  69 14 126/71 98 % 06/14/21 1813  69 16 107/71 97 % 06/14/21 1807  69 15 119/76 97 % 06/14/21 1802  69 14 119/73 98 % 06/14/21 1757  71 16 113/72 98 % 06/14/21 1752  70 14 116/73 98 % 06/14/21 1748  69 15 120/72 98 % 06/14/21 1743  69 16 110/73 97 % 06/14/21 1738  69 15 111/75 98 % 06/14/21 1733  69 14 116/73 99 % 06/14/21 1728  71 16 119/73 97 % 06/14/21 1723  69 15 116/72 98 % 06/14/21 1717 98.2 °F (36.8 °C) 69 16 118/71 99 % 06/14/21 1713  70 16 112/66 99 % 06/14/21 1707  69 14 122/72 99 % 06/14/21 1702  71 15 118/75 98 % 06/14/21 1657  69 14 121/74 100 % 06/14/21 1652  70 15 126/76 98 % 06/14/21 1648  70 14 122/77 98 % 06/14/21 1643  71 16 127/75 98 % 06/14/21 1638  71 15 129/74 99 % 06/14/21 1633  73 14 124/75 97 % 06/14/21 1628  74 16 118/75 99 % 06/14/21 1623  70 15 130/73 95 % 06/14/21 1617  76 16 130/76 94 % 06/14/21 1612  71 16 120/72 92 % 06/14/21 1608  72 16 116/72 99 % 06/14/21 1607  69 16 136/77 99 % 06/14/21 1606 97.7 °F (36.5 °C) 69 16 136/77 99 % 06/14/21 1300  72 22 97/64 94 % 06/14/21 1245  71 22 (!) 97/58 94 % 06/14/21 1230  73 22 98/63 95 % 06/14/21 1225  71 22 107/66 96 % 06/14/21 1220  71 20 107/66 95 % 06/14/21 1215  75 20 100/65 95 % 06/14/21 1210  76 20 100/68 98 % 06/14/21 1043 98.3 °F (36.8 °C) 76 22 104/67 96 % Oxygen Therapy O2 Sat (%): 100 % (06/15/21 0928) Pulse via Oximetry: 69 beats per minute (06/15/21 0928) O2 Device: Nasal cannula (06/15/21 0928) Skin Assessment: Clean, dry, & intact (06/14/21 1900) Skin Protection for O2 Device: N/A (06/14/21 1900) Location: Ear (06/11/21 0700) Interventions: Reposition Device; Ear Cushion (06/10/21 7944) O2 Flow Rate (L/min): 3 l/min (06/15/21 0928) FIO2 (%): 28 % (06/13/21 1507) Body mass index is 25.53 kg/m². Physical Exam: 
 
General: No acute distress, speaking in full sentences, no use of accessory muscles Lungs: Coarse anterior Cardiovascular: Regular rate and rhythm with normal S1 and S2 , no edema Abdomen: Soft, nontender, nondistended, normoactive bowel sounds Neuro: Nonfocal 
Psych: Normal mood and affect Data Reviewed: I have reviewed all labs, meds, and studies. Recent Results (from the past 24 hour(s)) RBC, ALLOCATE Collection Time: 06/14/21 12:30 PM  
Result Value Ref Range HISTORY CHECKED?  Historical check performed BLOOD GAS & LYTES, ARTERIAL Collection Time: 06/14/21  2:01 PM  
Result Value Ref Range pH (POC) 7.44 7.35 - 7.45    
 pCO2 (POC) 42.3 35 - 45 MMHG  
 pO2 (POC) 365 (H) 75 - 100 MMHG Sodium,  136 - 145 MMOL/L Potassium, POC 4.5 3.5 - 5.1 MMOL/L Calcium, ionized (POC) 1.20 1. 12 - 1.32 mmol/L Glucose,  (H) 65 - 100 MG/DL Base excess (POC) 4.3 mmol/L  
 HCO3 (POC) 28.9 (H) 22 - 26 MMOL/L  
 CO2, POC 29 (H) 13 - 23 MMOL/L  
 O2  % Sample source ARTERIAL Performed by Laura Kwan, POC Cannot be calculated >60 ml/min/1.73m2 GFRNA, POC Cannot be calculated >60 ml/min/1.73m2 CULTURE, TISSUE W GRAM STAIN Collection Time: 06/14/21  3:08 PM  
 Specimen: Lung EMPYEMA Result Value Ref Range Special Requests: NO SPECIAL REQUESTS    
 GRAM STAIN 12 TO 18 WBCS SEEN PER OIF   
 GRAM STAIN FEW GRAM POSITIVE COCCI Culture result:     
  NO GROWTH AFTER SHORT PERIOD OF INCUBATION. FURTHER RESULTS TO FOLLOW AFTER OVERNIGHT INCUBATION. BLOOD GAS, ARTERIAL POC Collection Time: 06/14/21  4:30 PM  
Result Value Ref Range Device: NASAL CANNULA    
 FIO2 (POC) 32 % pH (POC) 7.34 (L) 7.35 - 7.45    
 pCO2 (POC) 54.3 (H) 35 - 45 MMHG  
 pO2 (POC) 94 75 - 100 MMHG  
 HCO3 (POC) 29.5 (H) 22 - 26 MMOL/L  
 sO2 (POC) 96.6 95 - 98 % Base excess (POC) 3.1 mmol/L Allens test (POC) NOT APPLICABLE Site DRAWN FROM ARTERIAL LINE Specimen type (POC) ARTERIAL Performed by Jenna   
PTT Collection Time: 06/14/21  9:20 PM  
Result Value Ref Range aPTT 36.1 (H) 24.1 - 35.1 SEC  
CBC WITH AUTOMATED DIFF Collection Time: 06/14/21  9:25 PM  
Result Value Ref Range WBC 6.2 4.3 - 11.1 K/uL  
 RBC 3.30 (L) 4.23 - 5.6 M/uL HGB 9.0 (L) 13.6 - 17.2 g/dL HCT 29.3 (L) 41.1 - 50.3 % MCV 88.8 79.6 - 97.8 FL  
 MCH 27.3 26.1 - 32.9 PG  
 MCHC 30.7 (L) 31.4 - 35.0 g/dL  
 RDW 18.4 (H) 11.9 - 14.6 %  PLATELET 833 226 - 870 K/uL  
 MPV 9.5 9.4 - 12.3 FL ABSOLUTE NRBC 0.00 0.0 - 0.2 K/uL  
 DF AUTOMATED NEUTROPHILS 89 (H) 43 - 78 % LYMPHOCYTES 8 (L) 13 - 44 % MONOCYTES 2 (L) 4.0 - 12.0 % EOSINOPHILS 0 (L) 0.5 - 7.8 % BASOPHILS 0 0.0 - 2.0 % IMMATURE GRANULOCYTES 1 0.0 - 5.0 %  
 ABS. NEUTROPHILS 5.5 1.7 - 8.2 K/UL  
 ABS. LYMPHOCYTES 0.5 0.5 - 4.6 K/UL  
 ABS. MONOCYTES 0.1 0.1 - 1.3 K/UL  
 ABS. EOSINOPHILS 0.0 0.0 - 0.8 K/UL  
 ABS. BASOPHILS 0.0 0.0 - 0.2 K/UL  
 ABS. IMM. GRANS. 0.1 0.0 - 0.5 K/UL MAGNESIUM Collection Time: 06/15/21  3:33 AM  
Result Value Ref Range Magnesium 2.5 (H) 1.8 - 2.4 mg/dL PHOSPHORUS Collection Time: 06/15/21  3:33 AM  
Result Value Ref Range Phosphorus 4.0 (H) 2.3 - 3.7 MG/DL  
CBC WITH AUTOMATED DIFF Collection Time: 06/15/21  3:33 AM  
Result Value Ref Range WBC 6.4 4.3 - 11.1 K/uL  
 RBC 3.17 (L) 4.23 - 5.6 M/uL HGB 8.6 (L) 13.6 - 17.2 g/dL HCT 27.7 (L) 41.1 - 50.3 % MCV 87.4 79.6 - 97.8 FL  
 MCH 27.1 26.1 - 32.9 PG  
 MCHC 31.0 (L) 31.4 - 35.0 g/dL  
 RDW 18.0 (H) 11.9 - 14.6 % PLATELET 392 445 - 910 K/uL MPV 9.4 9.4 - 12.3 FL ABSOLUTE NRBC 0.00 0.0 - 0.2 K/uL  
 DF AUTOMATED NEUTROPHILS 90 (H) 43 - 78 % LYMPHOCYTES 6 (L) 13 - 44 % MONOCYTES 3 (L) 4.0 - 12.0 % EOSINOPHILS 0 (L) 0.5 - 7.8 % BASOPHILS 0 0.0 - 2.0 % IMMATURE GRANULOCYTES 1 0.0 - 5.0 %  
 ABS. NEUTROPHILS 5.8 1.7 - 8.2 K/UL  
 ABS. LYMPHOCYTES 0.4 (L) 0.5 - 4.6 K/UL  
 ABS. MONOCYTES 0.2 0.1 - 1.3 K/UL  
 ABS. EOSINOPHILS 0.0 0.0 - 0.8 K/UL  
 ABS. BASOPHILS 0.0 0.0 - 0.2 K/UL  
 ABS. IMM. GRANS. 0.1 0.0 - 0.5 K/UL METABOLIC PANEL, BASIC Collection Time: 06/15/21  3:33 AM  
Result Value Ref Range Sodium 138 136 - 145 mmol/L Potassium 5.2 (H) 3.5 - 5.1 mmol/L Chloride 104 98 - 107 mmol/L  
 CO2 30 21 - 32 mmol/L Anion gap 4 (L) 7 - 16 mmol/L Glucose 120 (H) 65 - 100 mg/dL BUN 15 8 - 23 MG/DL  Creatinine 0.77 (L) 0.8 - 1.5 MG/DL  
 GFR est AA >60 >60 ml/min/1.73m2 GFR est non-AA >60 >60 ml/min/1.73m2 Calcium 8.8 8.3 - 10.4 MG/DL  
PTT Collection Time: 06/15/21  3:33 AM  
Result Value Ref Range aPTT 41.2 (H) 24.1 - 35.1 SEC EKG Results Procedure 720 Value Units Date/Time EKG, 12 LEAD, INITIAL [[id number]] Collected: 06/08/21 1454 Order Status: Completed Updated: 06/09/21 1437 Ventricular Rate 86 BPM   
  Atrial Rate 74 BPM   
  QRS Duration 138 ms Q-T Interval 444 ms QTC Calculation (Bezet) 531 ms Calculated P Axis -63 degrees Calculated R Axis -107 degrees Calculated T Axis 62 degrees Diagnosis -- Biventricular pacemaker detected Abnormal ECG When compared with ECG of 16-MAY-2021 16:19, 
Vent. rate has increased BY  17 BPM 
Confirmed by Basilio Rahman ([id number]) on 6/9/2021 2:37:07 PM 
  
  
 
 
All Micro Results Procedure Component Value Units Date/Time CULTURE, TISSUE Mynor Evans [[id number]] Collected: 06/14/21 1508 Order Status: Completed Specimen: Lung Updated: 06/15/21 7441 Special Requests: NO SPECIAL REQUESTS     
  GRAM STAIN 12 TO 18 WBCS SEEN PER OIF  
   FEW GRAM POSITIVE COCCI Culture result:    
  NO GROWTH AFTER SHORT PERIOD OF INCUBATION. FURTHER RESULTS TO FOLLOW AFTER OVERNIGHT INCUBATION. Stacigordy La [[id number]] Collected: 06/14/21 1508 Order Status: Completed Specimen: Abdomen Updated: 06/14/21 2119 FUNGUS CULTURE AND SMEAR [[id number]] Collected: 06/14/21 1508 Order Status: Completed Updated: 06/14/21 1750 CULTURE, BODY FLUID Mynor Evans [[id number]] Collected: 06/14/21 1515 Order Status: Canceled Specimen: Body Fluid from Drainage Peewee Childers [[id number]] Order Status: Sent Specimen: Abdomen BLOOD CULTURE [[id number]] Collected: 06/08/21 1445 Order Status: Completed Specimen: Blood Updated: 06/13/21 1032 Special Requests: LEFT FOOT   Culture result: NO GROWTH 5 DAYS     
 BLOOD CULTURE [[id number]] Collected: 06/08/21 1627 Order Status: Completed Specimen: Blood Updated: 06/13/21 1032 Special Requests: --     
  RIGHT 
FOREARM Culture result: NO GROWTH 5 DAYS     
 CULTURE, BODY FLUID Hollie Hash STAIN [[id number]]  (Abnormal) Collected: 06/08/21 1941 Order Status: Completed Specimen: Body Fluid from Right Updated: 06/12/21 1457 Special Requests: NO SPECIAL REQUESTS     
  GRAM STAIN 45  WBC'S/OIF  
   MANY GRAM POSITIVE COCCI MODERATE GRAM NEGATIVE RODS Culture result:    
  HEAVY MIXED GRAM POSITIVE COCCI RESEMBLING NORMAL RESPIRATORY EDU  
     
      
  THIS ORGANISM WILL BE HELD FOR 7 DAYS. IF FURTHER TESTING IS REQUIRED PLEASE NOTIFY MICROBIOLOGY  
     
 CULTURE, RESPIRATORY/SPUTUM/BRONCH W Marcus Wilhelm [[id number]] Collected: 06/08/21 2305 Order Status: Completed Specimen: Sputum Updated: 06/10/21 1155 Special Requests: NO SPECIAL REQUESTS     
  GRAM STAIN    
  GRAM STAIN EXAMINATION OF THIS SPECIMEN INDICATED OROPHARYNGEAL CONTAMINATION. PLEASE SUBMIT A NEW SPECIMEN OR NOTIFY THE MICRO DEPT WITHIN 48HRS IF FURTHER WORKUP IS DESIRED. Culture result:    
  Please reorder and recollect. Avinash House 6/9/21 @1546, TA Other Studies: XR CHEST SNGL V Result Date: 6/15/2021 EXAM: Chest x-ray. INDICATION: Dyspnea. COMPARISON: Yesterday's chest x-ray. TECHNIQUE: Frontal view chest x-ray. FINDINGS: No change in the right lung pneumothorax or post pneumonectomy cavity, with an indwelling chest tube overlying skin staples. Left basilar atelectasis is unchanged. The heart size is stable. Again noted is a left chest wall pacemaker and right chest wall infusion port catheter. No interval change. XR CHEST SNGL V Result Date: 6/14/2021 PORTABLE CHEST, June 14, 2021 at 1621 hours CLINICAL HISTORY:  Follow-up right right thoracotomy with decortication and bronchopleural fistula repair today. COMPARISON:  Portable chest at 0308 hours today. FINDINGS:  AP semi-erect image demonstrates interval placement of a medial right chest tube with large residual pneumothorax and no significant reexpansion of the right lung. New right inferolateral skin staples are consistent with reported thoracotomy. Mild left basilar atelectasis persists. Mediport catheter remains in place. Heart size is unchanged. The bony thorax appears intact on this view. STATUS POST RIGHT THORACOTOMY WITH A MEDIAL CHEST TUBE BUT NO SIGNIFICANT RIGHT LUNG REEXPANSION STATUS POST REPORTED BRONCHOPLEURAL FISTULA REPAIR TODAY. Medications: 
Medications Administered   
 acetaminophen (TYLENOL) tablet 1,000 mg Admin Date 
2021 Action Given Dose 
1,000 mg Route Oral Administered By Gerry Milder G  
  
  
 alteplase (CATHFLO) injection 1 mg Admin Date 
2021 Action Given Dose 
1 mg Route InterCATHeter Administered By Clarence Pelaez RN  
  
  
 budesonide (PULMICORT) 500 mcg/2 ml nebulizer suspension Admin Date 
2021 Action Given Dose 
500 mcg Route Nebulization Administered By 
RT Magui Admin Date 
2021 Action Given Dose 
500 mcg Route Nebulization Administered By Yusra Romero Dr Admin Date 
2021 Action Given Dose 
500 mcg Route Nebulization Administered By Danny Sotomayor, RT  
  
  
 cefTRIAXone (ROCEPHIN) 2 g in 0.9% sodium chloride (MBP/ADV) 50 mL MBP Admin Date 
06/10/2021 Action New Bag Dose 
2 g Rate 
100 mL/hr Route IntraVENous Administered By 
Clair Simmons RN Admin Date 
2021 Action New Bag Dose 
2 g Rate 
100 mL/hr Route IntraVENous Administered By 
Samantha Mares RN Admin Date 
2021 Action New Bag Dose 
2 g Rate 
100 mL/hr Route IntraVENous Administered By 
Samantha Mares RN  
  
  
 enoxaparin (LOVENOX) injection 80 mg   
 Admin Date 
06/10/2021 Action Given Dose 
80 mg Route SubCUTAneous Administered By 
Mary Lou Grant RN Admin Date 
06/11/2021 Action Given Dose 80 mg Route SubCUTAneous Administered By Maribel Alsa RN Admin Date 
06/11/2021 Action Given Dose 80 mg Route SubCUTAneous Administered By 
Candy Mares RN Admin Date 
06/12/2021 Action Given Dose 80 mg Route SubCUTAneous Administered By 
Baldev Villagran RN  
  
  
 fentaNYL citrate (PF) injection 100 mcg Admin Date 
06/08/2021 Action Given Dose 
100 mcg Route IntraVENous Administered By Gus Webb RN  
  
  
 gabapentin (NEURONTIN) capsule 300 mg Admin Date 
06/08/2021 Action Given Dose 
300 mg Route Oral Administered By Klever Simeon RN Admin Date 
06/09/2021 Action Given Dose 
300 mg Route Oral Administered By Gus Webb RN Admin Date 
06/09/2021 Action Given Dose 
300 mg Route Oral Administered By Gus Webb RN Admin Date 
06/09/2021 Action Given Dose 
300 mg Route Oral Administered By Klever Simeon RN Admin Date 
06/10/2021 Action Given Dose 
300 mg Route Oral Administered By 
Mary Lou Grant RN Admin Date 
06/10/2021 Action Given Dose 
300 mg Route Oral Administered By 
Mary Lou Grant RN Admin Date 
06/10/2021 Action Given Dose 
300 mg Route Oral Administered By Maribel Alas RN Admin Date 
06/11/2021 Action Given Dose 
300 mg Route Oral Administered By 
Mary Lou Grant RN Admin Date 
06/11/2021 Action Given Dose 
300 mg Route Oral Administered By 
Candy Mares RN Admin Date 
06/11/2021 Action Given Dose 
300 mg Route Oral Administered By 
Baldev Villagran RN Admin Date 
06/12/2021 Action Given Dose 
300 mg Route Oral Administered By 
Candy Mares RN  
  
  
 heparin 25,000 units in dextrose 500 mL infusion  Admin Date 06/09/2021 Action New Bag Dose 12 Units/kg/hr Rate 18.6 mL/hr Route IntraVENous Administered By Chelsea Veridn RN Admin Date 
06/09/2021 Action Rate Change Dose 
9 Units/kg/hr Rate 13.9 mL/hr Route IntraVENous Administered By Chelsea Verdin RN Admin Date 
06/09/2021 Action Rate Verify Dose 
9 Units/kg/hr Rate 13.9 mL/hr Route IntraVENous Administered By Chelsea Verdin RN Admin Date 
06/10/2021 Action Restarted Dose 
6 Units/kg/hr Rate 9.3 mL/hr Route IntraVENous Administered By Alysha Johnson RN Admin Date 
06/10/2021 Action Rate Verify Dose 
6 Units/kg/hr Rate 9.3 mL/hr Route IntraVENous Administered By Alysha Johnson RN Admin Date 
06/10/2021 Action Restarted Dose 
3 Units/kg/hr Rate 4.6 mL/hr Route IntraVENous Administered By 
Wes Rausch RN  
  
  
 lactated ringers bolus infusion 1,000 mL Admin Date 
06/08/2021 Action New Bag Dose 
1,000 mL Rate 
250 mL/hr Route IntraVENous Administered By Alysha Johnson RN Admin Date 
06/09/2021 Action New Bag Dose 
1,000 mL Rate 500 mL/hr Route IntraVENous Administered By Delacroix, Meryle Chard, JORGITO  
  
  
 levalbuterol (XOPENEX) nebulizer soln 1.25 mg/3 mL Admin Date 
06/08/2021 Action Given Dose 1.25 mg Route Nebulization Administered By 
Cora Smith, RT Admin Date 
06/09/2021 Action Given Dose 1.25 mg Route Nebulization Administered By 
Catalina Figueroa Admin Date 
06/09/2021 Action Given Dose 1.25 mg Route Nebulization Administered By Chelsea Verdin RN Admin Date 
06/09/2021 Action Given Dose 1.25 mg Route Nebulization Administered By 
Vesta Ramirez Admin Date 
06/09/2021 Action Given Dose 1.25 mg Route Nebulization Administered By 
Nuria Deng RT Admin Date 
06/09/2021 Action Given Dose 1.25 mg Route Nebulization Administered By 
Ashlyn Barrett, RT Admin Date 
06/10/2021 Action Given Dose 1.25 mg Route Nebulization Administered By Zac Mcgregor, RT Admin Date 
06/10/2021 Action Given Dose 1.25 mg Route Nebulization Administered By 
Juliette Greenberg, RT Admin Date 
06/11/2021 Action Given Dose 1.25 mg Route Nebulization Administered By 
Filomena Gutierrez, RT Admin Date 
06/11/2021 Action Given Dose 1.25 mg Route Nebulization Administered By 
Filomena Gutierrez, RT Admin Date 
06/11/2021 Action Given Dose 1.25 mg Route Nebulization Administered By Yusra Romero Dr Admin Date 
06/12/2021 Action Given Dose 1.25 mg Route Nebulization Administered By Esteban Sotomayor, RT Admin Date 
06/12/2021 Action Given Dose 1.25 mg Route Nebulization Administered By Danny Sotomayor, RT  
  
  
 midodrine (PROAMATINE) tablet 5 mg Admin Date 
06/09/2021 Action Given Dose 5 mg Route Oral Administered By Logan Shirley RN Admin Date 
06/09/2021 Action Given Dose 5 mg Route Oral Administered By Logan Shirley RN Admin Date 
06/10/2021 Action Given Dose 5 mg Route Oral Administered By 
Damir Warren RN Admin Date 
06/10/2021 Action Given Dose 5 mg Route Oral Administered By 
Damir Warren RN Admin Date 
06/10/2021 Action Given Dose 5 mg Route Oral Administered By 
Damir Warren RN Admin Date 
06/11/2021 Action Given Dose 5 mg Route Oral Administered By 
Damir Warren RN Admin Date 
06/11/2021 Action Given Dose 5 mg Route Oral Administered By 
Deborah Burgos RN  Admin Date 
06/11/2021 Action Given Dose 5 mg Route Oral Administered By 
Estela Alberts RN Admin Date 
06/12/2021 Action Given Dose 5 mg Route Oral Administered By 
Estela Alberts RN  
  
  
 morphine injection 2 mg Admin Date 
06/10/2021 Action Given Dose 
2 mg Route IntraVENous Administered By 
Gavi Ross RN Admin Date 
06/10/2021 Action Given Dose 
2 mg Route IntraVENous Administered By Willeen Osgood, RN Admin Date 
06/11/2021 Action Given Dose 
2 mg Route IntraVENous Administered By 
Lluvia Junior RN  
  
  
 oxyCODONE-acetaminophen (PERCOCET) 5-325 mg per tablet 1 Tablet Admin Date 
06/11/2021 Action Given Dose 1 Tablet Route Oral Administered By Willeen Osgood, RN  
  
  
 pantoprazole (PROTONIX) tablet 40 mg   
 Admin Date 
06/09/2021 Action Given Dose 40 mg Route Oral Administered By Mikel Jang RN Admin Date 
06/10/2021 Action Given Dose 40 mg Route Oral Administered By 
Gavi Ross RN Admin Date 
06/11/2021 Action Given Dose 40 mg Route Oral Administered By 
Gavi Ross RN Admin Date 
06/12/2021 Action Given Dose 40 mg Route Oral Administered By 
Estela Alberts RN  
  
  
 piperacillin-tazobactam (ZOSYN) 4.5 g in 0.9% sodium chloride (MBP/ADV) 100 mL MBP Admin Date 
06/08/2021 Action New Bag Dose 4.5 g Rate 
200 mL/hr Route IntraVENous Administered By Carmen Hathaway Admin Date 
06/08/2021 Action New Bag Dose 4.5 g Rate 25 mL/hr Route IntraVENous Administered By Ronel German RN Admin Date 
06/09/2021 Action New Bag Dose 4.5 g Rate 25 mL/hr Route IntraVENous Administered By Ronel German RN Admin Date 
06/09/2021 Action New Bag Dose 4.5 g Rate 25 mL/hr Route IntraVENous Administered By Mikel Jang RN Admin Date 
06/09/2021 Action New Bag Dose 4.5 g Rate 25 mL/hr Route IntraVENous Administered By Johann Rajan RN Admin Date 
06/10/2021 Action New Bag Dose 4.5 g Rate 25 mL/hr Route IntraVENous Administered By Ming Pelaez RN  
  
  
 polyethylene glycol (MIRALAX) packet 17 g Admin Date 
06/11/2021 Action Given Dose 
17 g Route Oral Administered By 
Samatnha Bermudez RN Admin Date 
06/12/2021 Action Given Dose 
17 g Route Oral Administered By 
Samantha Bermudez RN  
  
  
 potassium bicarb-citric acid (EFFER-K) tablet 40 mEq Admin Date 
06/11/2021 Action Given Dose 40 mEq Route Oral Administered By 
Fernanda Jimenez RN Admin Date 
06/11/2021 Action Given Dose 40 mEq Route Oral Administered By 
Samantha Bermudez RN  
  
  
 sodium chloride (NS) flush 5-40 mL Admin Date 
06/08/2021 Action Given Dose 
10 mL Route IntraVENous Administered By Johann Rajan RN Admin Date 
06/09/2021 Action Given Dose 
10 mL Route IntraVENous Administered By Johann Rajan RN Admin Date 
06/09/2021 Action Given Dose 
10 mL Route IntraVENous Administered By Jacqueline Dixon RN Admin Date 
06/09/2021 Action Given Dose 
10 mL Route IntraVENous Administered By Johann Rajan RN Admin Date 
06/10/2021 Action Given Dose 
10 mL Route IntraVENous Administered By Johann Rajan RN Admin Date 
06/10/2021 Action Given Dose 
10 mL Route IntraVENous Administered By 
Fernanda Jimenez RN Admin Date 
06/10/2021 Action Given Dose 
10 mL Route IntraVENous Administered By Raquel Tafoya RN Admin Date 
06/11/2021 Action Given Dose 
10 mL Route IntraVENous Administered By Raquel Tafoya RN Admin Date 
06/11/2021 Action Given Dose 5 mL Route IntraVENous Administered By 
Samantha Bermudez RN Admin Date 
06/11/2021 Action Given Dose 
10 mL Route IntraVENous Administered By 
Jo Schwartz RN Admin Date 
06/12/2021 Action Given Dose 
20 mL Route IntraVENous Administered By 
Jo Schwartz RN Admin Date 
06/12/2021 Action Given Dose 
10 mL Route IntraVENous Administered By 
Pema Leyva RN  
  
  
 sodium chloride 0.9 % bolus infusion 1,000 mL Admin Date 
06/08/2021 Action New Bag Dose 
1,000 mL Rate 
1,000 mL/hr Route IntraVENous Administered By Russell Galarza RN  
  
  
 sodium chloride 3% hypertonic nebulizer soln Admin Date 
06/08/2021 Action Given Dose 4 mL Route Nebulization Administered By 
Jonathan Blancas, RT Admin Date 
06/09/2021 Action Given Dose 4 mL Route Nebulization Administered By 
Maralee Bosworth Admin Date 
06/09/2021 Action Given Dose 4 mL Route Nebulization Administered By 
Amara Das, RT Admin Date 
06/10/2021 Action Given Dose 4 mL Route Nebulization Administered By Miguel Cuba, RT Admin Date 
06/10/2021 Action Given Dose 4 mL Route Nebulization Administered By 
Jonathan Blancas, RT Admin Date 
06/11/2021 Action Given Dose 4 mL Route Nebulization Administered By Yusra Romero Dr Admin Date 
06/12/2021 Action Given Dose 4 mL Route Nebulization Administered By Danny Sotomayor, RT  
  
  
 vancomycin (VANCOCIN) 1250 mg in  ml infusion Admin Date 
06/09/2021 Action New Bag Dose 
1,250 mg Rate 125 mL/hr Route IntraVENous Administered By Mecca Casper RN Admin Date 
06/09/2021 Action New Bag Dose 
1,250 mg Rate 125 mL/hr Route IntraVENous Administered By Jose Altman RN Admin Date 
06/09/2021 Action New Bag Dose 
1,250 mg Rate 125 mL/hr Route IntraVENous Administered By Mecca Casper RN Admin Date 
06/10/2021 Action New Bag Dose 
1,250 mg Rate 125 mL/hr Route IntraVENous Administered By Mecca Casper RN  
  
  
 vancomycin (VANCOCIN) 2000 mg in  ml infusion Admin Date 
06/08/2021 Action New Bag Dose 
2,000 mg Rate 
250 mL/hr Route IntraVENous Administered By Tanja Epley, RN Vancomycin Trough Reminder Admin Date 
06/09/2021 Action Given Dose Route Other Administered By Debra Pelaez RN  
  
  
  
 
 
 
 
Problem List:  
 
Hospital Problems as of 6/15/2021 Date Reviewed: 6/11/2021 Codes Class Noted - Resolved POA Pleural fistula (HCC) ICD-10-CM: J86.0 ICD-9-CM: 510.0  6/14/2021 - Present Unknown Atrial flutter ([address] ICD-10-CM: C82.62 
ICD-9-CM: 427.32  6/10/2021 - Present Unknown Acute hypoxemic respiratory failure ([address] ICD-10-CM: J96.01 
ICD-9-CM: 518.81  6/8/2021 - Present Unknown Bronchopleural fistula (HCC) ICD-10-CM: J86.0 ICD-9-CM: 510.0  6/8/2021 - Present Unknown Empyema ([address] ICD-10-CM: J86.9 ICD-9-CM: 510.9  6/8/2021 - Present Unknown Acute on chronic respiratory failure with hypoxemia [facility]) ICD-10-CM: J89.46 
ICD-9-CM: 518.84  6/8/2021 - Present Unknown S/P thoracotomy ICD-10-CM: V48.529 ICD-9-CM: V45.89  1/7/2021 - Present Yes Cancer of bronchus of right lower lobe [facility]) ICD-10-CM: C34.31 
ICD-9-CM: 162.5  7/23/2020 - Present Yes * (Principal) Sepsis due to undetermined organism [facility]) ICD-10-CM: A41.9 ICD-9-CM: 038.9  5/5/2020 - Present Yes Signed By: Jr Duran MD  
[facility]  Sera 15, 2021  2:08 PM

## 2021-06-15 NOTE — PROGRESS NOTES
ACUTE PHYSICAL THERAPY GOALS: 
(Developed with and agreed upon by patient and/or caregiver. ) LTG: 
(1.)Mr. General Musa will move from supine to sit and sit to supine , scoot up and down and roll side to side in flat bed without siderails with  INDEPENDENT within 7 day(s). (2.)Mr. General Musa will perform all functional transfers with  MODIFIED INDEPENDENCE using the least restrictive/no device within 7 day(s). (3.)Mr. General Musa will ambulate with  MODIFIED INDEPENDENCE for 500+ feet with normal vital sign response with the least restrictive/no device within 7 day(s). (4.)Mr. General Musa will ambulate up/down 3 steps with bilateral  railing with  STAND BY ASSIST with no device within 7 day(s). PHYSICAL THERAPY: Daily Note and PM Treatment Day # 2 Phani Boston is a 78 y.o. male PRIMARY DIAGNOSIS: Sepsis due to undetermined organism ([address] Acute on chronic respiratory failure with hypoxemia ([address] [J96.21] Pleural fistula ([facility] [J86.0] Procedure(s) (LRB): 
RIGHT THORACOTOMY/ BRONCHIOPLEURAL FISTULA REPAIR (Right) 1 Day Post-Op ASSESSMENT:  
 
REHAB RECOMMENDATIONS: CURRENT LEVEL OF FUNCTION: 
(Most Recently Demonstrated) Recommendation to date pending progress: 
[address] [place] [address]  or pulmonary rehab Equipment:  To Be Determined Bed Mobility: 
[address] Sit to Stand: 
[address] Transfers: 
[address] Gait/Mobility: 
[address] ASSESSMENT: 
Mr. General Musa presents in ICU, on 2 L O2 via n.c, just received pain medication. Pt CGA for all mobility, used RW for ambulation into hallway. Pt with several standing rest breaks with ambulation, O2 increased to 4 L with mobility, stats 86-93%, cues for pursed lip breathing, resting as needed. Pt with slow yanique, shuffled steps, cues for posture, head control. Pt overall doing well with activity and making progress toward goals. PT to cont to follow for acute care needs. SUBJECTIVE:  
Mr. Lawrence Little states, \"I guess we can walk. \" SOCIAL HISTORY/ LIVING ENVIRONMENT: see eval 
Home Environment: Private residence One/Two Story Residence: One story Living Alone: No 
Support Systems: Family member(s), Friends \ neighbors, Spouse/Significant Other/Partner OBJECTIVE:  
 
PAIN: VITAL SIGNS: LINES/DRAINS:  
Pre Treatment: Pain Screen Pain Scale 1: Numeric (0 - 10) Pain Intensity 1:  (does not rate, just received pain meds) Post Treatment: does not rate, appears comfortable in chair Vital Signs O2 Sat (%): 96 % O2 Device: Nasal cannula O2 Flow Rate (L/min): 2 l/min Arterial Line, IV and drain O2 Device: Nasal cannula MOBILITY: I Mod I S SBA CGA Min Mod Max Total  NT x2 Comments:  
Bed Mobility Rolling [] [] [] [] [] [] [] [] [] [x] [] Supine to Sit [] [] [] [] [x] [] [] [] [] [] [] Scooting [] [] [x] [] [] [] [] [] [] [] [] Sit to Supine [] [] [] [] [] [] [] [] [] [x] [] chair Transfers Sit to Stand [] [] [] [] [x] [] [] [] [] [] [] Bed to Chair [] [] [] [] [x] [] [] [] [] [] []   
Stand to Sit [] [] [] [] [x] [] [] [] [] [] [] I=Independent, Mod I=Modified Independent, S=Supervision, SBA=Standby Assistance, CGA=Contact Guard Assistance,  
Min=Minimal Assistance, Mod=Moderate Assistance, Max=Maximal Assistance, Total=Total Assistance, NT=Not Tested BALANCE: Good Fair+ Fair Fair- Poor NT Comments Sitting Static [x] [] [] [] [] [] Sitting Dynamic [x] [] [] [] [] []   
         
Standing Static [x] [] [] [] [] []   
Standing Dynamic [] [x] [] [] [] [] GAIT: I Mod I S SBA CGA Min Mod Max Total  NT x2 Comments:  
Level of Assistance [] [] [] [] [x] [] [] [] [] [] [] Increased to 4 L O2 Distance 250 [address] [place] Pain Gait Quality Slow yanique, shuffled, forward posture Weightbearing Status N/A I=Independent, Mod I=Modified Independent, S=Supervision, SBA=Standby Assistance, CGA=Contact Guard Assistance,  
Min=Minimal Assistance, Mod=Moderate Assistance, Max=Maximal Assistance, Total=Total Assistance, NT=Not Tested PLAN:  
FREQUENCY/DURATION: PT Plan of Care: 3 times/week for duration of hospital stay or until stated goals are met, whichever comes first. 
TREATMENT:  
 
TREATMENT:  
($$ Therapeutic Activity: 23-37 mins    ) Therapeutic Activity (25 Minutes): Therapeutic activity included Supine to Sit, Scooting, Transfer Training, Ambulation on level ground, Sitting balance , Standing balance and walker safety, posture, weight shifting, pursed lip breathing to improve functional Mobility, Strength and Activity tolerance. TREATMENT GRID: 
N/A 
 
AFTER TREATMENT POSITION/PRECAUTIONS: 
Chair, Needs within reach and RN notified INTERDISCIPLINARY COLLABORATION: 
RN/PCT and PT/PTA TOTAL TREATMENT DURATION: 
PT Patient Time In/Time Out Time In: 1166 Time Out: 1612 Deja Anders, PT

## 2021-06-15 NOTE — PROGRESS NOTES
Keri Huggins. Admission Date: 6/8/2021 Daily Progress Note: 6/15/2021 The patient's chart is reviewed and the patient is discussed with the staff. Pt is a 79 yo  male with a history of chronic respiratory failure on 2L O2 qhs, prior tobacco abuse, RLL SCC s/p excision 7/23/2020, afib on Eliquis, HTN, GERD and OA. Pt completed chemo 10/2020. He had repeat chest CT 12/22/2020 with a large R pleural effusion and collapse of remaining RUL. Pt had a R thoracentesis on 12/28/2020 with 900mL bloody fluid removed but unable to tolerate a complete drainage. A chest tube was placed on 1/4/21 with 800cc serosanguineous drainage. General surgery was consulted and pt went to the OR On 1/6/21 for R VATS with conversion to open thoracotomy with extensive pneumolysis with decortication. We were consulted intraoperatively for bronch and were unable to get lung to inflate intraoperatively. Pt was hypotensive postoperatively requiring aylin and transfer to the ICU. Pt had repeat bronch on 1/7/21 secondary to persistent RUL atelectasis. He was found to have a stump area from prior lobectomy with abnormal appearing tissue that was biopsied and nondiagnostic. Pt had a repeat bronch on 1/12. Pt has had congestion since his pneumonectomy whenever he lies on his R side and has been able to cough up yellow phlegm. Pt presented to the ER On 6/8/21 with shortness of breath, fever, and weakness. He was in acute respiratory failure. A R chest tube was placed for possible empyema. Pt was hypotensive and admitted to ICU. General surgery was consulted and was taken for right thoracotomy with decortication and bronchopleural fistula repair. ID is following for antimicrobial recommendations. Subjective: S/P right thoracotomy with decortication and bronchopleural fistula repair, now in the ICU He is on 3 lpm 
 
Current Facility-Administered Medications Medication Dose Route Frequency  lidocaine (XYLOCAINE) 10 mg/mL (1 %) injection 0.1 mL  0.1 mL SubCUTAneous PRN  
 metroNIDAZOLE (FLAGYL) IVPB premix 500 mg  500 mg IntraVENous Q12H  
 heparin 25,000 units in dextrose 500 mL infusion  500 Units/hr IntraVENous TITRATE  albuterol-ipratropium (DUO-NEB) 2.5 MG-0.5 MG/3 ML  3 mL Nebulization Q4H PRN  
 levalbuterol (XOPENEX) nebulizer soln 1.25 mg/3 mL  1.25 mg Nebulization Q3H PRN  
 [Held by provider] metoprolol tartrate (LOPRESSOR) tablet 12.5 mg  12.5 mg Oral Q12H  
 budesonide (PULMICORT) 500 mcg/2 ml nebulizer suspension  500 mcg Nebulization BID RT  
 polyethylene glycol (MIRALAX) packet 17 g  17 g Oral DAILY  bisacodyL (DULCOLAX) tablet 5 mg  5 mg Oral DAILY PRN  
 cefTRIAXone (ROCEPHIN) 2 g in 0.9% sodium chloride (MBP/ADV) 50 mL MBP  2 g IntraVENous Q24H  
 midodrine (PROAMATINE) tablet 5 mg  5 mg Oral TID WITH MEALS  sodium chloride 3% hypertonic nebulizer soln  4 mL Nebulization BID RT  
 [Held by provider] apixaban (ELIQUIS) tablet 5 mg  5 mg Oral BID  pantoprazole (PROTONIX) tablet 40 mg  40 mg Oral ACB  gabapentin (NEURONTIN) capsule 300 mg  300 mg Oral TID  sodium chloride (NS) flush 5-40 mL  5-40 mL IntraVENous Q8H  
 sodium chloride (NS) flush 5-40 mL  5-40 mL IntraVENous PRN  
 oxyCODONE-acetaminophen (PERCOCET) 5-325 mg per tablet 1 Tablet  1 Tablet Oral Q4H PRN  
 morphine injection 2 mg  2 mg IntraVENous Q3H PRN  
 levalbuterol (XOPENEX) nebulizer soln 1.25 mg/3 mL  1.25 mg Nebulization QID RT Review of Systems Constitutional: negative for fever, chills, sweats Cardiovascular: negative for chest pain, palpitations, syncope, edema Gastrointestinal: negative for dysphagia, reflux, vomiting, diarrhea, abdominal pain, or melena Neurologic: negative for focal weakness, numbness, headache Objective:  
 
Vitals:  
 06/15/21 0600 06/15/21 0615 06/15/21 0630 06/15/21 4330 BP:    125/70 Pulse: 69 69 69 74 Resp: 17 14 (!) 31 (!) 32 Temp: 97 °F (36.1 °C) SpO2: 96% 98% 98% 95% Weight:      
Height:      
 
Blood pressure 125/70, pulse 74, temperature 97 °F (36.1 °C), resp. rate (!) 32, height 5' 10\" (1.778 m), weight 177 lb 14.4 oz (80.7 kg), SpO2 95 %. Intake and Output:  
06/13 1901 - 06/15 0700 In: 1758.3 [P.O.:600; I.V.:1158.3] Out: 8290 [Urine:2495; Drains:1] No intake/output data recorded. Physical Exam:  
Constitution:  the patient is weak EENMT:  Sclera clear, pupils equal, oral mucosa moist 
Respiratory: decreased bases, right drain Cardiovascular:  RRR without M,G,R 
Gastrointestinal: soft and non-tender; with positive bowel sounds. Musculoskeletal: warm without cyanosis. There is no lower leg edema. Skin:  no jaundice or rashes, no wounds Neurologic: no gross neuro deficits, alert and oriented x ppt Port, PIV, art line, [place] drain CHEST XRAY:  
CXR Results  (Last 48 hours) 06/15/21 0537  XR CHEST SNGL V Final result Impression:  No interval change. Narrative:  EXAM: Chest x-ray. INDICATION: Dyspnea. COMPARISON: Yesterday's chest x-ray. TECHNIQUE: Frontal view chest x-ray. FINDINGS: No change in the right lung pneumothorax or post pneumonectomy cavity,  
with an indwelling chest tube overlying skin staples. Left basilar atelectasis  
is unchanged. The heart size is stable. Again noted is a left chest wall  
pacemaker and right chest wall infusion port catheter. 06/14/21 1627  XR CHEST SNGL V Final result Impression:  STATUS POST RIGHT THORACOTOMY WITH A MEDIAL CHEST TUBE BUT NO  
SIGNIFICANT RIGHT LUNG REEXPANSION STATUS POST REPORTED BRONCHOPLEURAL FISTULA REPAIR TODAY. Narrative:  PORTABLE CHEST, June 14, 2021 at 1621 hours CLINICAL HISTORY:  Follow-up right right thoracotomy with decortication and  
bronchopleural fistula repair today. COMPARISON:  Portable chest at 0308 hours today.   
   
FINDINGS:  AP semi-erect image demonstrates interval placement of a medial right  
chest tube with large residual pneumothorax and no significant reexpansion of  
the right lung. New right inferolateral skin staples are consistent with  
reported thoracotomy. Mild left basilar atelectasis persists. Mediport  
catheter remains in place. Heart size is unchanged. The bony thorax appears  
intact on this view. 06/14/21 0310  XR CHEST SNGL V Final result Impression:  No interval change. Narrative:  EXAM: Chest x-ray. INDICATION: Dyspnea. COMPARISON: Yesterday's chest x-ray. TECHNIQUE: Frontal view chest x-ray. FINDINGS: The right post pneumonectomy cavity is unchanged in appearance, with a  
right basilar chest tube remain in place. Mild left lung base atelectasis is  
also unchanged. The heart size is stable. Again noted is a left chest wall  
pacemaker and right chest wall infusion port catheter. LAB No lab exists for component: [place] Recent Labs  
  06/15/21 
0333 06/14/21 
2125 06/14/21 
0353 06/13/21 
0400 WBC 6.4 6.2 5.2 4.5 HGB 8.6* 9.0* 7.8* 7.9*  
HCT 27.7* 29.3* 25.4* 25.2*  
 197 182 168 Recent Labs  
  06/15/21 
0333 06/14/21 
0353 06/13/21 
0400  139 141  
K 5.2* 4.1 3.8  105 105 CO2 30 32 32 * 110* 117* BUN 15 11 10 CREA 0.77* 1.00 0.97  
MG 2.5* 2.4 2.4 CA 8.8 8.6 8.1*  
PHOS 4.0* 3.6 3.8* ABG:   
Lab Results Component Value Date/Time PHI 7.34 (L) 06/14/2021 04:30 PM  
 PCO2I 54.3 (H) 06/14/2021 04:30 PM  
 PO2I 94 06/14/2021 04:30 PM  
 HCO3I 29.5 (H) 06/14/2021 04:30 PM  
 FIO2I 32 06/14/2021 04:30 PM  
 
GRAM STAIN    Final  
MODERATE GRAM NEGATIVE RODS Culture result: Abnormal     Final  
HEAVY MIXED GRAM POSITIVE COCCI RESEMBLING NORMAL RESPIRATORY EDU MICRO 
 
SARS-CoV-2 LAB Results [facility] Test:  
Lab Results Component Value Date/Time  COV2NT Not Detected 06/30/2020 09:25 PM  
  
[facility] Test: No results found for: [facility] Test: No components found for: [id number] Assessment:  (Medical Decision Making) Hospital Problems  Date Reviewed: 6/11/2021 Codes Class Noted POA Pleural fistula (HCC) ICD-10-CM: J86.0 ICD-9-CM: 510.0  6/14/2021 Unknown Atrial flutter ([facility] ICD-10-CM: G57.36 
ICD-9-CM: 427.32  6/10/2021 Unknown Acute hypoxemic respiratory failure ([facility] ICD-10-CM: J96.01 
ICD-9-CM: 518.81  6/8/2021 Unknown Bronchopleural fistula (HCC) ICD-10-CM: J86.0 ICD-9-CM: 510.0  6/8/2021 Unknown Empyema ([facility] ICD-10-CM: J86.9 ICD-9-CM: 510.9  6/8/2021 Unknown Acute on chronic respiratory failure with hypoxemia [facility]) ICD-10-CM: D83.56 
ICD-9-CM: 518.84  6/8/2021 Unknown S/P thoracotomy ICD-10-CM: O86.506 ICD-9-CM: V45.89  1/7/2021 Yes Cancer of bronchus of right lower lobe [facility]) ICD-10-CM: C34.31 
ICD-9-CM: 162.5  7/23/2020 Yes * (Principal) Sepsis due to undetermined organism [facility]) ICD-10-CM: A41.9 ICD-9-CM: 038.9  5/5/2020 Yes Patient s/p completion pneumonectomy, had leaking RMSB stump with positive cultures- now S/P decortication. Plan:  (Medical Decision Making)  
 
-per ID, cont ceftriaxone and flagyl 
-chest tube managed by surgery 
-on heparin drip at present. 
-protonix.  
-management by hospitalist and surgery 
-advance diet as able -PT/OT 
-okay to move to the floor from our standpoint. Abbie Peres, NP Lungs: no sounds Right chest. Left clear on 4 LPM oxygen Heart S1 and S2 audible, no murmers or rubs appreciated Abdomen: full but non-tender. Down to 4 LPM and baseline in 3 LPM. 
Some soreness in abdomen and not moving bowels will add in colace Continue abx on rocephin. R/C with GNR. Left lung base with infiltrates vs atelectasis. Start incentive spirometry when ok with Surgery. Chest tube per surgery. Can goto floor if ok with surgery and will then f/u as outpatient.   
 
I have spoken with and examined the patient. I have reviewed the history, examination, assessment, and plan and agree with the above. Ritchie David MD 
 
 
This note was signed electronically. Errors are unfortunately her likely due to dictation software.

## 2021-06-15 NOTE — PROGRESS NOTES
Problem: Pressure Injury - Risk of 
Goal: *Prevention of pressure injury Description: Document Sidney Scale and appropriate interventions in the flowsheet. Outcome: Progressing Towards Goal 
Note: Pressure Injury Interventions: 
  
 
Moisture Interventions: Internal/External urinary devices Activity Interventions: Increase time out of bed, Pressure redistribution bed/mattress(bed type) Mobility Interventions: HOB 30 degrees or less, Pressure redistribution bed/mattress (bed type) Nutrition Interventions: Document food/fluid/supplement intake, Discuss nutritional consult with provider Friction and Shear Interventions: Foam dressings/transparent film/skin sealants, Transferring/repositioning devices Problem: Falls - Risk of 
Goal: *Absence of Falls Description: Document Lilton Broad Fall Risk and appropriate interventions in the flowsheet. Outcome: Progressing Towards Goal 
Note: Fall Risk Interventions: 
Mobility Interventions: Bed/chair exit alarm, Communicate number of staff needed for ambulation/transfer, Patient to call before getting OOB Medication Interventions: Bed/chair exit alarm, Patient to call before getting OOB, Teach patient to arise slowly Elimination Interventions: Call light in reach, Bed/chair exit alarm, Patient to call for help with toileting needs, Toileting schedule/hourly rounds History of Falls Interventions: Bed/chair exit alarm, Door open when patient unattended, Investigate reason for fall, Room close to nurse's station Problem: Patient Education: Go to Patient Education Activity Goal: Patient/Family Education Outcome: Progressing Towards Goal 
  
Problem: Patient Education: Go to Patient Education Activity Goal: Patient/Family Education Outcome: Progressing Towards Goal

## 2021-06-15 NOTE — PROGRESS NOTES
PLAN: 
Howell out Regular diet Transfer to floor with remote tele (paced) Heparin gtt Mickie to right chest 
IS Eliquis held Continue Abx- rocephin/flagyl Pain control CXR AM 
 
ASSESSMENT:  Admit Date: 6/8/2021 1 Day Post-Op  Procedure(s): RIGHT THORACOTOMY/ BRONCHIOPLEURAL FISTULA REPAIR Principal Problem: 
  Sepsis due to undetermined organism ([address] (5/5/2020) Active Problems: 
  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 
6/15/21 POD#1 Pt without complaints. On Heparin gtt, paced. Delisa Simpler with 110mL out, no air in bag. AF, VSS, 3L NC. WBC 6. H/H 8/27. CXR: 
FINDINGS: No change in the right lung pneumothorax or post pneumonectomy cavity, 
with an indwelling chest tube overlying skin staples. Left basilar atelectasis 
is unchanged. The heart size is stable. Again noted is a left chest wall 
pacemaker and right chest wall infusion port catheter. 
  
IMPRESSION No interval change. OBJECTIVE: 
Constitutional: Alert oriented cooperative patient in no acute distress; appears stated age Visit Vitals /70 (BP 1 Location: Right upper arm, BP Patient Position: At rest) Pulse 83 Temp 97 °F (36.1 °C) Resp (!) 36 Ht 5' 10\" (1.778 m) Wt 177 lb 14.4 oz (80.7 kg) SpO2 91% BMI 25.53 kg/m² Eyes:Sclera are clear. ENMT: no external lesions gross hearing normal; no obvious neck masses, no ear or lip lesions CV: Regular rate; paced. Normal perfusion Resp: breathing is non labored; mickie with serosanguinous drainage, no air noted in bag. GI: soft and non-distended Musculoskeletal: unremarkable with normal function.  No embolic signs or cyanosis. Neuro:  Oriented; moves all 4; no focal deficits Psychiatric: normal affect and mood, no memory impairment Patient Vitals for the past 24 hrs: 
 BP Temp Pulse Resp SpO2 Weight  
06/15/21 1145   83 (!) 36 91 %   
06/15/21 1130   80 (!) 37 93 %   
06/15/21 1115   69 16 98 %   
06/15/21 1100   72 8 97 %   
06/15/21 1045   72 15 96 %   
06/15/21 1030   71 (!) 32 98 %   
06/15/21 1015   80 18 95 %   
06/15/21 1000   69 14 94 %   
06/15/21 0945   70 21 100 %   
06/15/21 0930   69 8 100 %   
06/15/21 0928     100 %   
06/15/21 0915   76 (!) 39 97 %   
06/15/21 0900   73 15 95 %   
06/15/21 0845   69 15 96 %   
06/15/21 0830   69 19 97 %   
06/15/21 0815   69 19 98 %   
06/15/21 0800   76 (!) 39 96 %   
06/15/21 0745   69 17 97 %   
06/15/21 0730   71 (!) 37 94 %   
06/15/21 0715   74 18 95 %   
06/15/21 0709 125/70 97 °F (36.1 °C) 74 (!) 32 95 %   
06/15/21 0630   69 (!) 31 98 %   
06/15/21 0615   69 14 98 %   
06/15/21 0600   69 17 96 %   
06/15/21 0559 112/72       
06/15/21 0545   69 18 97 %   
06/15/21 0530   69 14 96 %   
06/15/21 0515   69 19 97 %   
06/15/21 0500   69  95 %   
06/15/21 0459 107/70       
06/15/21 0445   77 11 95 %   
06/15/21 0430   71 26 95 %   
06/15/21 0415   69 17 95 %   
06/15/21 0407   71 13 96 %   
06/15/21 0400   69 8 96 %   
06/15/21 0359 96/68  69 15 95 %   
06/15/21 0349   69     
06/15/21 0345   69 18 97 %   
06/15/21 0330   69 14 96 %   
06/15/21 0315   69  95 %   
06/15/21 0300 100/67 97.6 °F (36.4 °C) 69 11 94 %   
06/15/21 0259 100/67       
06/15/21 0245   69 10 95 %   
06/15/21 0230   69 11 95 %   
06/15/21 0215   70 19 97 %   
06/15/21 0200   69 21 97 %   
06/15/21 0159 99/66       
06/15/21 0145   71 17 98 %   
06/15/21 0130   69 14 97 %   
06/15/21 0115   69 12 95 %   
06/15/21 0100   69 (!) 43 97 %  06/15/21 0059 95/66       
06/15/21 0045   69 15 96 %   
06/15/21 0030   69 15 95 %   
06/15/21 0015   69 20 96 %   
06/15/21 0000   74 (!) 33 96 %   
06/14/21 2359 102/65    96 %   
06/14/21 2352   74     
06/14/21 2345   71 (!) 34 96 %   
06/14/21 2330   74 (!) 79 96 %   
06/14/21 2315   80 11 95 %   
06/14/21 2300 102/65 97.6 °F (36.4 °C) 76 14 95 %   
06/14/21 2259 104/61    95 %   
06/14/21 2245   75 (!) 6 96 %   
06/14/21 2230   76  98 %   
06/14/21 2229 106/67       
06/14/21 2215   80 14 97 %   
06/14/21 2200   79 29 98 %   
06/14/21 2159 116/69  79 22 99 %   
06/14/21 2145   75 13 98 %   
06/14/21 2130   78 13 99 %   
06/14/21 2129 120/71       
06/14/21 2115   69 10 100 %   
06/14/21 2103     98 %   
06/14/21 2100   69 (!) 5 98 %   
06/14/21 2059 120/75       
06/14/21 2045   69 14 97 %   
06/14/21 2030   73 17 99 %   
06/14/21 2029 133/71       
06/14/21 2015   69 20 98 %   
06/14/21 2000   72 25 98 %   
06/14/21 1959 122/88       
06/14/21 1945   70 (!) 39 100 %   
06/14/21 1900 109/71 97.6 °F (36.4 °C) 69 16 97 % 177 lb 14.4 oz (80.7 kg) 06/14/21 1852 109/71  69 16 97 %   
06/14/21 1848 110/71  71 16 97 %   
06/14/21 1843 120/74  70 15 98 %   
06/14/21 1838 122/77  70 14 97 %   
06/14/21 1833 124/78  70 15 98 %   
06/14/21 1828 117/75  69 14 99 %   
06/14/21 1823 120/71  71 16 99 %   
06/14/21 1818 126/71  69 14 98 %   
06/14/21 1813 107/71  69 16 97 %   
06/14/21 1807 119/76  69 15 97 %   
06/14/21 1802 119/73  69 14 98 %   
06/14/21 1757 113/72  71 16 98 %   
06/14/21 1752 116/73  70 14 98 %   
06/14/21 1748 120/72  69 15 98 %   
06/14/21 1743 110/73  69 16 97 %   
06/14/21 1738 111/75  69 15 98 %   
06/14/21 1733 116/73  69 14 99 %   
06/14/21 1728 119/73  71 16 97 %   
06/14/21 1723 116/72  69 15 98 %   
06/14/21 1717 118/71 98.2 °F (36.8 °C) 69 16 99 %   
06/14/21 1713 112/66  70 16 99 %   
06/14/21 1707 122/72  69 14 99 %   
06/14/21 1702 118/75  71 15 98 %   
06/14/21 1657 121/74  69 14 100 %   
06/14/21 1652 126/76  70 15 98 %   
06/14/21 1648 122/77  70 14 98 %   
06/14/21 1643 127/75  71 16 98 %   
06/14/21 1638 129/74  71 15 99 %   
06/14/21 1633 124/75  73 14 97 %   
06/14/21 1628 118/75  74 16 99 %   
06/14/21 1623 130/73  70 15 95 %   
06/14/21 1617 130/76  76 16 94 %   
06/14/21 1612 120/72  71 16 92 %   
06/14/21 1608 116/72  72 16 99 %   
06/14/21 1607 136/77  69 16 99 %   
06/14/21 1606 136/77 97.7 °F (36.5 °C) 69 16 99 %   
06/14/21 1300 97/64  72 22 94 %   
06/14/21 1245 (!) 97/58  71 22 94 %   
06/14/21 1230 98/63  73 22 95 %   
06/14/21 1225 107/66  71 22 96 %  Labs:   
Recent Labs  
  06/15/21 
1052 06/15/21 
8916 WBC  --  6.4 HGB  --  8.6* PLT  --  200 NA  --  138  
K 4.5 5.2*  
CL  --  104 CO2  --  30  
BUN  --  15  
CREA  --  0.77* GLU  --  120* APTT  --  41.2* TBILI 0.7  --   
CBIL 0.2  -- HARSH García

## 2021-06-15 NOTE — PROGRESS NOTES
..Bedside and verbal shift change report received from  Shaniqua Mckeon RN (offgoing nurse). Report included the following information SBAR, Kardex, OR Summary, Procedure Summary, Intake/Output, MAR, Recent Results, Med Rec Status, Cardiac Rhythm AV paced, Alarm Parameters , Quality Measures and Dual Neuro Assessment. Dual skin assessment completed at bedside: Drain on R Side (list pertinent skin assessment findings) Dual verification of gtts completed (name of gtts verified): N/A

## 2021-06-15 NOTE — PROGRESS NOTES
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  
· Continue ceftriaxone / flagyl. Await operative cultures. Tentative plan is 2 weeks of treatment based on current and prior cultures, possibly longer. Anti-infectives: · Ceftriaxone (6/10 - 
· Zosyn (-6/10) · Vancomycin (-6/10) Subjective: Interval history: in ICU overnight; on 3 liters; feels well; Allergies Allergen Reactions  Albuterol Palpitations  Celebrex [Celecoxib] Itching Review of Systems:  Pertinent items are noted in the History of Present Illness. Objective:  
 
Visit Vitals /70 (BP 1 Location: Right upper arm, BP Patient Position: At rest) Pulse 74 Temp 97 °F (36.1 °C) Resp (!) 32 Ht 5' 10\" (1.778 m) Wt 80.7 kg (177 lb 14.4 oz) SpO2 95% BMI 25.53 kg/m² Temp (24hrs), Av.8 °F (36.6 °C), Min:97 °F (36.1 °C), Max:98.4 °F (36.9 °C) Lines:  R chest port Physical Exam:   
General:  Alert, cooperative, in no distress Eyes:  Sclera anicteric. Mouth/Throat: Mucous membranes normal, oral pharynx clear Neck: Supple Lungs:   Coarse breath sounds; diminished on R; R pleural drain in place with bloody drainage CV:  Regular rate and rhythm, no audible murmur, L chest pacer noted Abdomen:   non-distended Extremities: No cyanosis or edema Skin: no acute rash or lesions Musculoskeletal: No swelling or deformity Lines/Devices:  Intact, no erythema, drainage or tenderness Psych: Alert and oriented Data Review: CBC: 
Recent Labs  
  06/15/21 
0333 06/14/21 2125 06/14/21 
0353 WBC 6.4 6.2 5.2 GRANS 90* 89* 75 MONOS 3* 2* 8  
EOS 0* 0* 3 ANEU 5.8 5.5 3.9 ABL 0.4* 0.5 0.7 HGB 8.6* 9.0* 7.8* HCT 27.7* 29.3* 25.4*  
 197 182 BMP: 
Recent Labs  
  06/15/21 
0333 06/14/21 0353 06/13/21 
0400 CREA 0.77* 1.00 0.97 BUN 15 11 10  139 141  
K 5.2* 4.1 3.8  105 105 CO2 30 32 32 AGAP 4* 2* 4*  
* 110* 117* LFTS: 
No results for input(s): TBILI, ALT, AP, TP, ALB in the last 72 hours. No lab exists for component: SGOT Microbiology:  
 
All Micro Results Procedure Component Value Units Date/Time CULTURE, TISSUE Teetee Rahmaneve [[id number]] Collected: 06/14/21 1508 Order Status: Completed Specimen: Lung Updated: 06/15/21 9321 Special Requests: NO SPECIAL REQUESTS     
  GRAM STAIN 12 TO 18 WBCS SEEN PER OIF  
   FEW GRAM POSITIVE COCCI Culture result:    
  NO GROWTH AFTER SHORT PERIOD OF INCUBATION. FURTHER RESULTS TO FOLLOW AFTER OVERNIGHT INCUBATION. Alison Hernandez [[id number]] Collected: 06/14/21 1508 Order Status: Completed Specimen: Abdomen Updated: 06/14/21 2119 FUNGUS CULTURE AND SMEAR [[id number]] Collected: 06/14/21 1508 Order Status: Completed Updated: 06/14/21 1750 CULTURE, BODY FLUID Teetee Lind [[id number]] Collected: 06/14/21 1515 Order Status: Canceled Specimen: Body Fluid from Drainage Raquel Rojas [[id number]] Order Status: Sent Specimen: Abdomen BLOOD CULTURE [[id number]] Collected: 06/08/21 1445 Order Status: Completed Specimen: Blood Updated: 06/13/21 1032 Special Requests: LEFT FOOT Culture result: NO GROWTH 5 DAYS     
 BLOOD CULTURE [[id number]] Collected: 06/08/21 1627 Order Status: Completed Specimen: Blood Updated: 06/13/21 1032 Special Requests: --     
  RIGHT 
FOREARM   Culture result: NO GROWTH 5 DAYS     
 CULTURE, BODY FLUID Earnest Reji STAIN [[id number]]  (Abnormal) Collected: 21 1941 Order Status: Completed Specimen: Body Fluid from Right Updated: 21 1457 Special Requests: NO SPECIAL REQUESTS     
  GRAM STAIN 45  WBC'S/OIF  
   MANY GRAM POSITIVE COCCI MODERATE GRAM NEGATIVE RODS Culture result:    
  HEAVY MIXED GRAM POSITIVE COCCI RESEMBLING NORMAL RESPIRATORY EDU  
     
      
  THIS ORGANISM WILL BE HELD FOR 7 DAYS. IF FURTHER TESTING IS REQUIRED PLEASE NOTIFY MICROBIOLOGY  
     
 CULTURE, RESPIRATORY/SPUTUM/BRONCH W Tramaine Wynn 115 [[id number]] Collected: 21 2305 Order Status: Completed Specimen: Sputum Updated: 06/10/21 7984 Special Requests: NO SPECIAL REQUESTS     
  GRAM STAIN    
  GRAM STAIN EXAMINATION OF THIS SPECIMEN INDICATED OROPHARYNGEAL CONTAMINATION. PLEASE SUBMIT A NEW SPECIMEN OR NOTIFY THE MICRO DEPT WITHIN 48HRS IF FURTHER WORKUP IS DESIRED. Culture result:    
  Please reorder and recollect. Will Sanford 21 @1546, ABISAI Imagin/15/21 CXR: FINDINGS: No change in the right lung pneumothorax or post pneumonectomy cavity, 
with an indwelling chest tube overlying skin staples. Left basilar atelectasis 
is unchanged. The heart size is stable. Again noted is a left chest wall 
pacemaker and right chest wall infusion port catheter.  
 
Signed By: Danielle Pulido MD   
 Sera 15, 2021

## 2021-06-16 NOTE — PROGRESS NOTES
PLAN: 
Howell out Regular diet Transfer to floor with remote tele (paced)-- awaiting bed Heparin gtt Beth Argue to right chest-- d/c today IS Eliquis held, will restart at discharge Continue Abx- rocephin/flagyl Pain control Per pt, Onc has plans for routine f/u CT today ASSESSMENT:  Admit Date: 6/8/2021 2 Day Post-Op  Procedure(s): RIGHT THORACOTOMY/ BRONCHIOPLEURAL FISTULA REPAIR Principal Problem: 
  Sepsis due to undetermined organism ([address] (5/5/2020) Active Problems: 
  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 
6/15/21 POD#1 Pt without complaints. On Heparin gtt, paced. Beth Argue with 110mL out, no air in bag. AF, VSS, 3L NC. WBC 6. H/H 8/27. CXR: 
FINDINGS: No change in the right lung pneumothorax or post pneumonectomy cavity, 
with an indwelling chest tube overlying skin staples. Left basilar atelectasis 
is unchanged. The heart size is stable. Again noted is a left chest wall 
pacemaker and right chest wall infusion port catheter. 
  
IMPRESSION No interval change. 6/16/21 POD#1 AF, VSS, on 2L NC. Mickie with 225mL serosanguinous output, no air. WBC 6, H/H 8/27. CR 0.86. CXR stable. OBJECTIVE: 
Constitutional: Alert oriented cooperative patient in no acute distress; appears stated age Visit Vitals /65 Pulse 85 Temp 97.7 °F (36.5 °C) Resp (!) 37 Ht 5' 10\" (1.778 m) Wt 180 lb 4.8 oz (81.8 kg) SpO2 99% BMI 25.87 kg/m² Eyes:Sclera are clear. ENMT: no external lesions gross hearing normal; no obvious neck masses, no ear or lip lesions CV: Regular rate; paced. Normal perfusion Resp: breathing is non labored; mickie with serosanguinous drainage, no air noted in bag. GI: soft and non-distended Musculoskeletal: unremarkable with normal function. No embolic signs or cyanosis. Neuro:  Oriented; moves all 4; no focal deficits Psychiatric: normal affect and mood, no memory impairment Patient Vitals for the past 24 hrs: 
 BP Temp Pulse Resp SpO2 Weight  
06/16/21 1001 101/65  85 (!) 37 99 %   
06/16/21 0916   87 21 96 %   
06/16/21 0901 100/63  85 (!) 32 98 %   
06/16/21 0846   87 22 99 %   
06/16/21 0831   80 17 100 %   
06/16/21 0828     98 %   
06/16/21 0816   81 17 96 %   
06/16/21 0812 101/70 97.7 °F (36.5 °C) 83 15    
06/16/21 0801 101/70  83 15 95 %   
06/16/21 0800   83     
06/16/21 0746   80 23 96 %   
06/16/21 0731 97/66  89 (!) 35 (!) 89 %   
06/16/21 0716   80 (!) 49 96 %   
06/16/21 0701 102/65 97.7 °F (36.5 °C) 77 30 98 %   
06/16/21 0629 94/63  82 18 95 %   
06/16/21 0559 102/67  77 20 96 %   
06/16/21 0529 100/66  79 21 98 %   
06/16/21 0459 101/66  80 19 95 %   
06/16/21 0429 95/67  80 17 95 %   
06/16/21 0359 98/66  79 18 94 %   
06/16/21 0351   87     
06/16/21 0345      180 lb 4.8 oz (81.8 kg) 06/16/21 0329 101/66  79 18 94 %   
06/16/21 0300 106/69 98 °F (36.7 °C) 79 18 96 %   
06/16/21 0259 106/69  79 18 96 %   
06/16/21 0229 100/66  84 15 92 %   
06/16/21 0159 104/67  80 16 95 %   
06/16/21 0129 103/66  80 16 95 %   
06/16/21 0059 106/70  79 19 96 %   
06/16/21 0029 103/69  79 16 96 %   
06/15/21 2359 111/74  80 19 96 %   
06/15/21 2347   80     
06/15/21 2329 110/69  81 19 95 %   
06/15/21 2300 111/74 98.3 °F (36.8 °C) 84 19 94 %   
06/15/21 2259 111/74  84 19 94 %   
06/15/21 2229 114/74  86 14 94 %   
06/15/21 2159 113/73  82 19 95 %   
06/15/21 2129 124/77  85 9 96 %   
06/15/21 2059 128/77  84 19 96 %   
06/15/21 2029 125/81  87 23 95 %   
06/15/21 2000   81     
06/15/21 1959 119/71  76 18 100 %   
06/15/21 1956     93 %   
06/15/21 1929 127/82  78 17 95 %   
06/15/21 1900 123/80 97.9 °F (36.6 °C) 78 30 93 %   
06/15/21 1859 123/80  78 30 95 %   
06/15/21 1729 117/82  81 17 95 %   
06/15/21 1724 110/74  82     
06/15/21 1659 110/74  79 8 95 %   
06/15/21 1638 110/74 97.7 °F (36.5 °C) 79 18 95 %   
06/15/21 1545   78  97 %   
06/15/21 1530   72 15 100 %   
06/15/21 1527     95 %   
06/15/21 1515   72 (!) 35 97 %   
06/15/21 1500   72 25 98 %   
06/15/21 1459 107/68  71 30 99 %   
06/15/21 1445   72 26 98 %   
06/15/21 1430   74 20 98 %   
06/15/21 1359 112/69  83 18 97 %   
06/15/21 1259 104/66  78 22 98 %   
06/15/21 1230 130/60 96.8 °F (36 °C) 81 8 97 %   
06/15/21 1228 135/62  80     
06/15/21 1159 116/69  80 (!) 33 97 %   
06/15/21 1145   83 (!) 36 91 %   
06/15/21 1130   80 (!) 37 93 %   
06/15/21 1115   69 16 98 %   
06/15/21 1100   72 8 97 %   
06/15/21 1059 112/75  72 14 97 %   
06/15/21 1045   72 15 96 %  Labs:   
Recent Labs  
  06/16/21 
1311 06/15/21 
1052 06/15/21 
1218 WBC 6.8  --   --   
HGB 8.6*  --   --   
  --   --   
  --   --   
K 4.1 4.5   < >  
  --   --   
CO2 34*  --   --   
BUN 18  --   --   
CREA 0.86  --   --   
GLU 92  --   --   
APTT 71.2*  --   --   
TBILI  --  0.7  --   
CBIL  --  0.2  --   
 < > = values in this interval not displayed.   
 
HARSH Rizvi

## 2021-06-16 NOTE — PROGRESS NOTES
Occupational Therapy Note: 
 
OT treatment attempted this p.m. however pt currently off the floor. Will check back later as schedule and timing permits.  
 
Toma Guzámn, OTR/MARCELLA, CLT 
 
 arthritis/stiffness

## 2021-06-16 NOTE — PROGRESS NOTES
ACL want to give a heads up that they are faxing over an abnormal lab on Jaz[facility].   [facility] Hospitalist  
History and Physical  
 
 
Name:  Keri Huggins. Age:70 y.o. Sex:male :  1950 MRN:  [id number] PCP:  Brandie Casas DO Admit Date:  2021  4:02 PM  
Chief Complaint: assume care Requesting RACHEL Christensen Reason for Admission:  
Acute on chronic respiratory failure with hypoxemia ([address] [J96.21] Pleural fistula ([address] [J86.0] Assessment & Plan:  
 
 
acute hypoxic respiratory failure, empyema, septic shock: 
· Weaning O2 as tolerant · ID, pulmonary and surgery following · Continued rocephin / flagyl Aflutter s/p PPM: 
· device adjusted per cardiology · On IV heparin drip · eliquis held Anemia: 
·  trend HGB Hyperkalemia: 
· Repeat lab Disposition:  Pending , ok for floor Diet: DIET ADULT 
VTE ppx: lovenox GI ppx: protonix CODE STATUS: Full Code Surrogate decision-maker:  
 
 
History of Presenting Illness:  
 
Keri Huggins. is a 78 y.o. male with medical history of  Chronic hypoxic respiratory failure  On 2 L NC QHS, tobacco use, RLL SCC s/p excision 7,/ chemo, AFIB on eliquis s/p PPM and AVN ablation, HTN, GERD, right pleural effusion s/p thoracentesis / Chest tube / VATs with decortication and remaining stump admitted with acute hypoxic respiratory failure, empyema, septic shock. He required ICU course for pressors that are weaned. He has been seen by pulmonary and Dr. Miracle Cevallos of surgery. 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 He is on IV antibiotics. Pleural cultures growing polymicrobia. BC NGTD. ID following for anti infectives. He is s/p thoracotomy with decortication,  Broncho pleural fistula repair and intercostal nerve cryoablation on 21. He has additionally been seen by cardiology for atrial flutter and his device mode switched for atrial flutter with improvement. He has been on IV heparin perioperatively. Oncology following. Discharge plans pending Review of Systems 21: Ate little bit but hungry, no dyspnea, just worked with PT, drain pulled today Past Medical History:  
Diagnosis Date  Arrhythmia Afib  Chronic obstructive pulmonary disease ([facility] O2 at [address]  Chronic pain   
 right torn rotator cuff; left knee  GERD (gastroesophageal reflux disease)   
 controlled with med  Hypertension hx-- off meds since 2020--- followed by dr. Guicho Durbin  Hypoxia 2020  Malignant neoplasm of lower lobe of right lung ([facility] 2020 REC'D CHEMO AND RADIATION--- FOLLOWED BY DR. Radhika Pastrana Osteoarthritis  Right lower lobe lung mass 2020  Status post total right knee replacement 2016 Past Surgical History:  
Procedure Laterality Date  HX COLONOSCOPY    
 HX KNEE ARTHROSCOPY Left   
 scope X 1, ACL recon X 1  
 HX KNEE ARTHROSCOPY Right , 1975 X 2   
 HX KNEE REPLACEMENT Right  [address]  HX VASCULAR ACCESS Right placed 3/2020  
 port in chest   
 IR INSERT TUNL CVC W PORT OVER 5 YEARS  3/18/2020  LA CHEST SURGERY PROCEDURE UNLISTED    
 R lobectomy 2020; R complete pneumonectomy 2021  LA SINUS SURGERY PROC UNLISTED  ,   
 sinus surg Family History : reviewed Family History Problem Relation Age of Onset  Cancer Mother   
     uterine  Arrhythmia Sister   
     afib Social History Tobacco Use  Smoking status: Former Smoker Packs/day: 1.00 Years: 20.00 Pack years: 20.00 Quit date:  Years since quittin.4  Smokeless tobacco: Never Used  Tobacco comment: patient has no desire to resume Substance Use Topics  Alcohol use: Yes Alcohol/week: 4.0 standard drinks Types: 4 Glasses of wine per week Allergies Allergen Reactions  Albuterol Palpitations  Celebrex [Celecoxib] Itching Immunization History Administered Date(s) Administered  COVID-19, PFIZER, MRNA, LNP-S, PF, 30MCG/0.3ML DOSE 02/10/2021, 03/10/2021  Influenza Vaccine Lutonix) PF (>6 Mo Flulaval, Fluarix, and >3 Yrs [place], Fluzone [id number]) 11/20/2018  TB Skin Test (PPD) Intradermal 02/29/2016 PTA Medications: 
Current Outpatient Medications Medication Instructions  apixaban (ELIQUIS) 5 mg, Oral, 2 TIMES DAILY  esomeprazole (NEXIUM) 20 mg, Oral, EVERY BEDTIME, prn  
 gabapentin (NEURONTIN) 300 mg capsule TAKE 1 CAPSULE BY MOUTH THREE TIMES A DAY  ipratropium (ATROVENT) 0.03 % nasal spray 2 Sprays, Both Nostrils, 2 TIMES DAILY  lidocaine-prilocaine (EMLA) topical cream Topical, AS NEEDED  
 metoprolol succinate (TOPROL-XL) 50 mg, Oral, DAILY  Nebulizer & Compressor machine COPD  
 ondansetron hcl (ZOFRAN) 8 mg, Oral, EVERY 8 HOURS AS NEEDED  
 OXYGEN-AIR DELIVERY SYSTEMS Does Not Apply, 2lpm qhs   
 pantoprazole (PROTONIX) 40 mg, Oral, DAILY  polyethylene glycol (MIRALAX) 17 g, Oral, DAILY  umeclidinium (Incruse Ellipta) 62.5 mcg/actuation inhaler 1 Puff, Inhalation, DAILY Objective:  
 
Patient Vitals for the past 24 hrs: 
 Temp Pulse Resp BP SpO2  
06/16/21 1116  82 12    
06/16/21 1113     99 % 06/16/21 1101 98.2 °F (36.8 °C) 79 20 103/66 99 % 06/16/21 1046  90 (!) 37  95 % 06/16/21 1031  82 21 108/66 97 % 06/16/21 1016  89 (!) 46  (!) 89 % 06/16/21 1001  85 (!) 37 101/65 99 % 06/16/21 0916  87 21  96 % 06/16/21 0901  85 (!) 32 100/63 98 % 06/16/21 0846  87 22  99 % 06/16/21 0831  80 17  100 % 06/16/21 0828     98 % 06/16/21 0816  81 17  96 % 06/16/21 0812 97.7 °F (36.5 °C) 83 15 101/70   
06/16/21 0801  83 15 101/70 95 % 06/16/21 0800  83     
06/16/21 0746  80 23  96 % 06/16/21 0731  89 (!) 35 97/66 (!) 89 % 06/16/21 0716  80 (!) 49  96 % 06/16/21 0701 97.7 °F (36.5 °C) 77 30 102/65 98 % 06/16/21 0629  82 18 94/63 95 % 06/16/21 0559  77 20 102/67 96 % 06/16/21 0529  79 21 100/66 98 % 06/16/21 0459  80 19 101/66 95 % 06/16/21 0429  80 17 95/67 95 % 06/16/21 0359  79 18 98/66 94 % 06/16/21 0351  87     
06/16/21 0329  79 18 101/66 94 % 06/16/21 0300 98 °F (36.7 °C) 79 18 106/69 96 % 06/16/21 0259  79 18 106/69 96 % 06/16/21 0229  84 15 100/66 92 % 06/16/21 0159  80 16 104/67 95 % 06/16/21 0129  80 16 103/66 95 % 06/16/21 0059  79 19 106/70 96 % 06/16/21 0029  79 16 103/69 96 % 06/15/21 2359  80 19 111/74 96 % 06/15/21 2347  80     
06/15/21 2329  81 19 110/69 95 % 06/15/21 2300 98.3 °F (36.8 °C) 84 19 111/74 94 % 06/15/21 2259  84 19 111/74 94 % 06/15/21 2229  86 14 114/74 94 % 06/15/21 2159  82 19 113/73 95 % 06/15/21 2129  85 9 124/77 96 % 06/15/21 2059  84 19 128/77 96 % 06/15/21 2029  87 23 125/81 95 % 06/15/21 2000  81     
06/15/21 1959  76 18 119/71 100 % 06/15/21 1956     93 % 06/15/21 1929  78 17 127/82 95 % 06/15/21 1900 97.9 °F (36.6 °C) 78 30 123/80 93 % 06/15/21 1859  78 30 123/80 95 % 06/15/21 1729  81 17 117/82 95 % 06/15/21 1724  82  110/74   
06/15/21 1659  79 8 110/74 95 % 06/15/21 1638 97.7 °F (36.5 °C) 79 18 110/74 95 % 06/15/21 1545  78   97 % 06/15/21 1530  72 15  100 % 06/15/21 1527     95 % 06/15/21 1515  72 (!) 35  97 % 06/15/21 1500  72 25  98 % 06/15/21 1459  71 30 107/68 99 % 06/15/21 1445  72 26  98 % 06/15/21 1430  74 20  98 % 06/15/21 1359  83 18 112/69 97 % 06/15/21 1259  78 22 104/66 98 % 06/15/21 1230 96.8 °F (36 °C) 81 8 130/60 97 % 06/15/21 1228  80  135/62  Oxygen Therapy O2 Sat (%): 99 % (06/16/21 1113) Pulse via Oximetry: 88 beats per minute (06/16/21 1113) O2 Device: None (Room air) (06/16/21 1113) Skin Assessment: Clean, dry, & intact (06/16/21 0701) Skin Protection for O2 Device: Yes (06/16/21 0701) Orientation: Posterior (06/16/21 0701) Location: Ear (06/16/21 0701) Interventions: Reposition Device; Ear Cushion (06/10/21 2314) O2 Flow Rate (L/min): 2 l/min (06/16/21 0828) FIO2 (%): 28 % (06/16/21 0828) Body mass index is 25.87 kg/m². Physical Exam: 
 
General: No acute distress, speaking in full sentences, no use of accessory muscles Lungs: Coarse anterior Cardiovascular: Regular rate and rhythm with normal S1 and S2 , no edema Abdomen: Soft, nontender, nondistended, normoactive bowel sounds Neuro: Nonfocal 
Psych: Normal mood and affect Data Reviewed: I have reviewed all labs, meds, and studies. Recent Results (from the past 24 hour(s)) MAGNESIUM Collection Time: 06/16/21  6:43 AM  
Result Value Ref Range Magnesium 2.4 1.8 - 2.4 mg/dL PHOSPHORUS Collection Time: 06/16/21  6:43 AM  
Result Value Ref Range Phosphorus 3.3 2.3 - 3.7 MG/DL  
CBC WITH AUTOMATED DIFF Collection Time: 06/16/21  6:43 AM  
Result Value Ref Range WBC 6.8 4.3 - 11.1 K/uL  
 RBC 3.16 (L) 4.23 - 5.6 M/uL HGB 8.6 (L) 13.6 - 17.2 g/dL HCT 27.8 (L) 41.1 - 50.3 % MCV 88.0 79.6 - 97.8 FL  
 MCH 27.2 26.1 - 32.9 PG  
 MCHC 30.9 (L) 31.4 - 35.0 g/dL  
 RDW 18.8 (H) 11.9 - 14.6 % PLATELET 199 815 - 064 K/uL MPV 9.2 (L) 9.4 - 12.3 FL ABSOLUTE NRBC 0.00 0.0 - 0.2 K/uL  
 DF AUTOMATED NEUTROPHILS 78 43 - 78 % LYMPHOCYTES 10 (L) 13 - 44 % MONOCYTES 8 4.0 - 12.0 % EOSINOPHILS 1 0.5 - 7.8 % BASOPHILS 0 0.0 - 2.0 % IMMATURE GRANULOCYTES 2 0.0 - 5.0 %  
 ABS. NEUTROPHILS 5.4 1.7 - 8.2 K/UL  
 ABS. LYMPHOCYTES 0.7 0.5 - 4.6 K/UL  
 ABS. MONOCYTES 0.6 0.1 - 1.3 K/UL  
 ABS. EOSINOPHILS 0.1 0.0 - 0.8 K/UL  
 ABS. BASOPHILS 0.0 0.0 - 0.2 K/UL  
 ABS. IMM. GRANS. 0.1 0.0 - 0.5 K/UL METABOLIC PANEL, BASIC Collection Time: 06/16/21  6:43 AM  
Result Value Ref Range Sodium 139 138 - 145 mmol/L Potassium 4.1 3.5 - 5.1 mmol/L Chloride 103 98 - 107 mmol/L  
 CO2 34 (H) 21 - 32 mmol/L Anion gap 2 (L) 7 - 16 mmol/L Glucose 92 65 - 100 mg/dL  BUN 18 8 - 23 MG/DL  
 Creatinine 0.86 0.8 - 1.5 MG/DL  
 GFR est AA >60 >60 ml/min/1.73m2 GFR est non-AA >60 >60 ml/min/1.73m2 Calcium 9.0 8.3 - 10.4 MG/DL  
PTT Collection Time: 06/16/21  6:43 AM  
Result Value Ref Range aPTT 71.2 (H) 24.1 - 35.1 SEC EKG Results Procedure 720 Value Units Date/Time EKG, 12 LEAD, INITIAL [[id number]] Collected: 06/08/21 1454 Order Status: Completed Updated: 06/09/21 1437 Ventricular Rate 86 BPM   
  Atrial Rate 74 BPM   
  QRS Duration 138 ms Q-T Interval 444 ms QTC Calculation (Bezet) 531 ms Calculated P Axis -63 degrees Calculated R Axis -107 degrees Calculated T Axis 62 degrees Diagnosis -- Biventricular pacemaker detected Abnormal ECG When compared with ECG of 16-MAY-2021 16:19, 
Vent. rate has increased BY  17 BPM 
Confirmed by Laura Sanders ([id number]) on 6/9/2021 2:37:07 PM 
  
  
 
 
All Micro Results Procedure Component Value Units Date/Time CULTURE, TISSUE Willi Kothari [[id number]] Collected: 06/14/21 1508 Order Status: Completed Specimen: Lung Updated: 06/16/21 1058 Special Requests: NO SPECIAL REQUESTS     
  GRAM STAIN 12 TO 18 WBCS SEEN PER OIF  
   FEW GRAM POSITIVE COCCI Culture result: NO GROWTH 1 DAY     
 CULTURE, ANAEROBIC [[id number]] Collected: 06/14/21 1508 Order Status: Completed Specimen: Lung Updated: 06/16/21 0940 Special Requests: NO SPECIAL REQUESTS Culture result: SUBCULTURE IS NECESSARY TO DETERMINE PRESENCE OR ABSENCE OF ANAEROBIC BACTERIA IN THIS CULTURE. FURTHER REPORT TO FOLLOW AFTER INCUBATION OF SUBCULTURE. FUNGUS CULTURE AND SMEAR [[id number]] Collected: 06/14/21 1508 Order Status: Completed Updated: 06/14/21 1750 CULTURE, BODY FLUID Willi Kothari [[id number]] Collected: 06/14/21 1515 Order Status: Canceled Specimen: Body Fluid from Drainage Burley Rinne [[id number]] Order Status: Sent Specimen: Abdomen  BLOOD CULTURE [[id number]] Collected: 06/08/21 1445 Order Status: Completed Specimen: Blood Updated: 06/13/21 1032 Special Requests: LEFT FOOT Culture result: NO GROWTH 5 DAYS     
 BLOOD CULTURE [[id number]] Collected: 06/08/21 1627 Order Status: Completed Specimen: Blood Updated: 06/13/21 1032 Special Requests: --     
  RIGHT 
FOREARM Culture result: NO GROWTH 5 DAYS     
 CULTURE, BODY FLUID Rondal Omero STAIN [[id number]]  (Abnormal) Collected: 06/08/21 1941 Order Status: Completed Specimen: Body Fluid from Right Updated: 06/12/21 1457 Special Requests: NO SPECIAL REQUESTS     
  GRAM STAIN 45  WBC'S/OIF  
   MANY GRAM POSITIVE COCCI MODERATE GRAM NEGATIVE RODS Culture result:    
  HEAVY MIXED GRAM POSITIVE COCCI RESEMBLING NORMAL RESPIRATORY EDU  
     
      
  THIS ORGANISM WILL BE HELD FOR 7 DAYS. IF FURTHER TESTING IS REQUIRED PLEASE NOTIFY MICROBIOLOGY  
     
 CULTURE, RESPIRATORY/SPUTUM/BRONCH W Kendall Metzger [[id number]] Collected: 06/08/21 2305 Order Status: Completed Specimen: Sputum Updated: 06/10/21 2134 Special Requests: NO SPECIAL REQUESTS     
  GRAM STAIN    
  GRAM STAIN EXAMINATION OF THIS SPECIMEN INDICATED OROPHARYNGEAL CONTAMINATION. PLEASE SUBMIT A NEW SPECIMEN OR NOTIFY THE MICRO DEPT WITHIN 48HRS IF FURTHER WORKUP IS DESIRED. Culture result:    
  Please reorder and recollect. Jonnie Headher 6/9/21 @1546, ABISAI Other Studies: XR CHEST SNGL V Result Date: 6/16/2021 EXAM: Chest x-ray. INDICATION: Dyspnea. COMPARISON: Yesterday's chest x-ray. TECHNIQUE: Frontal view chest x-ray. FINDINGS: The right-sided pneumothorax or postsurgical cavity is unchanged, as is mild left basilar atelectasis. Again noted is a right-sided chest tube, a right chest wall infusion port catheter and a left chest wall biventricular pacemaker. No interval change. CT CHEST W CONT Result Date: 6/16/2021 History: Restaging lung cancer. History of bronchopleural fistula repair. EXAM: CT chest with IV contrast TECHNIQUE: Thin section axial CT images are obtained from the thoracic inlet through the upper abdomen after the uneventful administration of 80 cc Isovue-370. Radiation dose reduction techniques were used for this study. Our CT scanners use one or all of the following: Automated exposure control, adjustment of the mA and/or kV according to patient size, use of iterative reconstruction. COMPARISON: 5/16/2021 FINDINGS: The patient is status post right pneumonectomy. There is a persistent air and fluid collection on the right with a right-sided chest tube. There is a trace left pleural effusion with left basilar atelectasis. No suspicious pulmonary nodules on the left. No adenopathy. There is a small pericardial effusion. Evaluation of the upper abdomen demonstrates no definite abnormality. Bone window evaluation demonstrates no aggressive osseous lesions. 1. Status post right pneumonectomy with persistent large air and fluid collection in the right hemithorax. There is a right sided chest tube present. 2. Small left pleural effusion with left basilar atelectasis. 3. Small pericardial effusion. Medications: 
Medications Administered   
 acetaminophen (TYLENOL) tablet 1,000 mg Admin Date 
06/08/2021 Action Given Dose 
1,000 mg Route Oral Administered By Hilario ATKINSON  
  
  
 alteplase (CATHFLO) injection 1 mg Admin Date 
06/09/2021 Action Given Dose 
1 mg Route InterCATHeter Administered By Everardo Pelaez RN  
  
  
 budesonide (PULMICORT) 500 mcg/2 ml nebulizer suspension Admin Date 
06/11/2021 Action Given Dose 
500 mcg Route Nebulization Administered By 
RT Denise Admin Date 
06/11/2021 Action Given Dose 
500 mcg Route Nebulization Administered By Yusra Romero Dr Admin Date 
06/12/2021 Action Given Dose 
500 mcg Route Nebulization Administered By Светлана Danny GARNICA, RT  
  
  
 cefTRIAXone (ROCEPHIN) 2 g in 0.9% sodium chloride (MBP/ADV) 50 mL MBP Admin Date 
06/10/2021 Action New Bag Dose 
2 g Rate 
100 mL/hr Route IntraVENous Administered By 
Ghazal Combs RN Admin Date 
06/11/2021 Action New Bag Dose 
2 g Rate 
100 mL/hr Route IntraVENous Administered By 
Lexi Flowers RN Admin Date 
06/12/2021 Action New Bag Dose 
2 g Rate 
100 mL/hr Route IntraVENous Administered By 
Lexi Flowers RN  
  
  
 enoxaparin (LOVENOX) injection 80 mg   
 Admin Date 
06/10/2021 Action Given Dose 80 mg Route SubCUTAneous Administered By 
Ghazal Combs RN Admin Date 
06/11/2021 Action Given Dose 80 mg Route SubCUTAneous Administered By Brionna Levy RN Admin Date 
06/11/2021 Action Given Dose 80 mg Route SubCUTAneous Administered By 
Lexi Flowers RN Admin Date 
06/12/2021 Action Given Dose 80 mg Route SubCUTAneous Administered By 
Ozzy Salmeron RN  
  
  
 fentaNYL citrate (PF) injection 100 mcg Admin Date 
06/08/2021 Action Given Dose 
100 mcg Route IntraVENous Administered By Tasia Najera RN  
  
  
 gabapentin (NEURONTIN) capsule 300 mg Admin Date 
06/08/2021 Action Given Dose 
300 mg Route Oral Administered By Aftab Gillespie RN Admin Date 
06/09/2021 Action Given Dose 
300 mg Route Oral Administered By Tasia Najera RN Admin Date 
06/09/2021 Action Given Dose 
300 mg Route Oral Administered By Tasia Najera RN Admin Date 
06/09/2021 Action Given Dose 
300 mg Route Oral Administered By Aftab Gillespie RN Admin Date 
06/10/2021 Action Given Dose 
300 mg Route Oral Administered By 
Ghazal Combs RN Admin Date 
06/10/2021 Action Given Dose 
300 mg Route Oral Administered By 
Ghazal Combs RN Admin Date 
06/10/2021 Action Given Dose 
300 mg Route Oral Administered By Benjamin Villa RN Admin Date 
2021 Action Given Dose 
300 mg Route Oral Administered By 
Clair Simmons RN Admin Date 
2021 Action Given Dose 
300 mg Route Oral Administered By 
Samantha Mares RN Admin Date 
2021 Action Given Dose 
300 mg Route Oral Administered By 
Hughie Kanner, RN Admin Date 
2021 Action Given Dose 
300 mg Route Oral Administered By 
Samantha Mares RN  
  
  
 heparin 25,000 units in dextrose 500 mL infusion Admin Date 
2021 Action New Bag Dose 12 Units/kg/hr Rate 18.6 mL/hr Route IntraVENous Administered By Vanna Junior RN Admin Date 
2021 Action Rate Change Dose 
9 Units/kg/hr Rate 13.9 mL/hr Route IntraVENous Administered By Vanna Junior RN Admin Date 
2021 Action Rate Verify Dose 
9 Units/kg/hr Rate 13.9 mL/hr Route IntraVENous Administered By Vanna Junior RN Admin Date 
06/10/2021 Action Restarted Dose 
6 Units/kg/hr Rate 9.3 mL/hr Route IntraVENous Administered By Lucia Colorado RN Admin Date 
06/10/2021 Action Rate Verify Dose 
6 Units/kg/hr Rate 9.3 mL/hr Route IntraVENous Administered By Lucia Colorado RN Admin Date 
06/10/2021 Action Restarted Dose 
3 Units/kg/hr Rate 4.6 mL/hr Route IntraVENous Administered By 
Clair Simmons RN  
  
  
 lactated ringers bolus infusion 1,000 mL Admin Date 
2021 Action New Bag Dose 
1,000 mL Rate 
250 mL/hr Route IntraVENous Administered By Lucia Colorado RN Admin Date 
2021 Action New Bag Dose 
1,000 mL Rate 500 mL/hr Route IntraVENous Administered By Clarenec Pelaez RN  
  
  
 levalbuterol (XOPENEX) nebulizer soln 1.25 mg/3 mL Admin Date 
2021 Action Given Dose 1.25 mg Route Nebulization Administered By 
Nate Garcia Storm KELSEY, RT Admin Date 
06/09/2021 Action Given Dose 1.25 mg Route Nebulization Administered By 
Lianet Zapata Admin Date 
06/09/2021 Action Given Dose 1.25 mg Route Nebulization Administered By Sorin Martinez RN Admin Date 
06/09/2021 Action Given Dose 1.25 mg Route Nebulization Administered By 
Tino Ruiz Admin Date 
06/09/2021 Action Given Dose 1.25 mg Route Nebulization Administered By 
Dmitry Salazar, RT Admin Date 
06/09/2021 Action Given Dose 1.25 mg Route Nebulization Administered By 
Casandra Barrera, RT Admin Date 
06/10/2021 Action Given Dose 1.25 mg Route Nebulization Administered By Rita Miles, RT Admin Date 
06/10/2021 Action Given Dose 1.25 mg Route Nebulization Administered By 
Stephen Tinoco, RT Admin Date 
06/11/2021 Action Given Dose 1.25 mg Route Nebulization Administered By 
Bertram Hammer, RT Admin Date 
06/11/2021 Action Given Dose 1.25 mg Route Nebulization Administered By 
Bertram Hammer, RT Admin Date 
06/11/2021 Action Given Dose 1.25 mg Route Nebulization Administered By Yusra Romero Dr Admin Date 
06/12/2021 Action Given Dose 1.25 mg Route Nebulization Administered By Luanne Sotomayor, RT Admin Date 
06/12/2021 Action Given Dose 1.25 mg Route Nebulization Administered By Danny Sotomayor, RT  
  
  
 midodrine (PROAMATINE) tablet 5 mg Admin Date 
06/09/2021 Action Given Dose 5 mg Route Oral Administered By Sorin Martinez RN Admin Date 
06/09/2021 Action Given Dose 5 mg Route Oral Administered By Sorni Martinez RN  
   
  
 Admin Date 
06/10/2021 Action Given Dose 5 mg Route Oral Administered By 
Patty Morrow RN Admin Date 
06/10/2021 Action Given Dose 5 mg Route Oral Administered By 
Patty Morrow RN Admin Date 
06/10/2021 Action Given Dose 5 mg Route Oral Administered By 
Patty Morrow RN Admin Date 
06/11/2021 Action Given Dose 5 mg Route Oral Administered By 
Patty Morrow RN Admin Date 
06/11/2021 Action Given Dose 5 mg Route Oral Administered By 
Markus Rodriguez RN Admin Date 
06/11/2021 Action Given Dose 5 mg Route Oral Administered By 
Markus Rodriguez RN Admin Date 
06/12/2021 Action Given Dose 5 mg Route Oral Administered By 
Markus Rodriguez RN  
  
  
 morphine injection 2 mg Admin Date 
06/10/2021 Action Given Dose 
2 mg Route IntraVENous Administered By 
Patty Morrow RN Admin Date 
06/10/2021 Action Given Dose 
2 mg Route IntraVENous Administered By Kareem Becerril RN Admin Date 
06/11/2021 Action Given Dose 
2 mg Route IntraVENous Administered By 
David Hollis RN  
  
  
 oxyCODONE-acetaminophen (PERCOCET) 5-325 mg per tablet 1 Tablet Admin Date 
06/11/2021 Action Given Dose 1 Tablet Route Oral Administered By Kareem Becerril RN  
  
  
 pantoprazole (PROTONIX) tablet 40 mg   
 Admin Date 
06/09/2021 Action Given Dose 40 mg Route Oral Administered By Maureen Wills RN Admin Date 
06/10/2021 Action Given Dose 40 mg Route Oral Administered By 
Patty Morrow RN Admin Date 
06/11/2021 Action Given Dose 40 mg Route Oral Administered By 
Patty Morrow RN Admin Date 
06/12/2021 Action Given Dose 40 mg Route Oral Administered By 
Markus Rodriguez RN  
  
  
 piperacillin-tazobactam (ZOSYN) 4.5 g in 0.9% sodium chloride (MBP/ADV) 100 mL MBP Admin Date 
06/08/2021 Action New Bag Dose 4.5 g Rate 
200 mL/hr Route IntraVENous Administered By Chuyita Jm Admin Date 
06/08/2021 Action New Bag Dose 4.5 g Rate 25 mL/hr Route IntraVENous Administered By Franciso Area, RN Admin Date 
06/09/2021 Action New Bag Dose 4.5 g Rate 25 mL/hr Route IntraVENous Administered By Franciso Area, RN Admin Date 
06/09/2021 Action New Bag Dose 4.5 g Rate 25 mL/hr Route IntraVENous Administered By Amor Mathew, RN Admin Date 
06/09/2021 Action New Bag Dose 4.5 g Rate 25 mL/hr Route IntraVENous Administered By Franciso Area, RN Admin Date 
06/10/2021 Action New Bag Dose 4.5 g Rate 25 mL/hr Route IntraVENous Administered By Violeta Pelaez, JORGITO  
  
  
 polyethylene glycol (MIRALAX) packet 17 g Admin Date 
06/11/2021 Action Given Dose 
17 g Route Oral Administered By 
Arvind Shelton, RN Admin Date 
06/12/2021 Action Given Dose 
17 g Route Oral Administered By 
Arvind Shelton RN  
  
  
 potassium bicarb-citric acid (EFFER-K) tablet 40 mEq Admin Date 
06/11/2021 Action Given Dose 40 mEq Route Oral Administered By 
Nick Reyes, RN Admin Date 
06/11/2021 Action Given Dose 40 mEq Route Oral Administered By 
Arvind Shelton RN  
  
  
 sodium chloride (NS) flush 5-40 mL Admin Date 
06/08/2021 Action Given Dose 
10 mL Route IntraVENous Administered By Franciso Area, RN Admin Date 
06/09/2021 Action Given Dose 
10 mL Route IntraVENous Administered By Franciso Area, RN Admin Date 
06/09/2021 Action Given Dose 
10 mL Route IntraVENous Administered By Amor Mathew, RN Admin Date 
06/09/2021 Action Given Dose 
10 mL Route IntraVENous Administered By Franciso Area, RN Admin Date 
06/10/2021 Action Given Dose 
10 mL Route IntraVENous Administered By Benigno, Porsche Barnett, RN Admin Date 
06/10/2021 Action Given Dose 
10 mL Route IntraVENous Administered By 
Renny Carey, RN Admin Date 
06/10/2021 Action Given Dose 
10 mL Route IntraVENous Administered By Zo Trammell RN Admin Date 
06/11/2021 Action Given Dose 
10 mL Route IntraVENous Administered By Zo Trammell RN Admin Date 
06/11/2021 Action Given Dose 5 mL Route IntraVENous Administered By 
Eliot Ray RN Admin Date 
06/11/2021 Action Given Dose 
10 mL Route IntraVENous Administered By 
Donna Valdez RN Admin Date 
06/12/2021 Action Given Dose 
20 mL Route IntraVENous Administered By 
Donna Valdez RN Admin Date 
06/12/2021 Action Given Dose 
10 mL Route IntraVENous Administered By 
Eliot Ray RN  
  
  
 sodium chloride 0.9 % bolus infusion 1,000 mL Admin Date 
06/08/2021 Action New Bag Dose 
1,000 mL Rate 
1,000 mL/hr Route IntraVENous Administered By Hima Severino RN  
  
  
 sodium chloride 3% hypertonic nebulizer soln Admin Date 
06/08/2021 Action Given Dose 4 mL Route Nebulization Administered By 
Marty Jaimes, RT Admin Date 
06/09/2021 Action Given Dose 4 mL Route Nebulization Administered By 
Shazia Stephenson Admin Date 
06/09/2021 Action Given Dose 4 mL Route Nebulization Administered By 
Jermaine Osuna RT Admin Date 
06/10/2021 Action Given Dose 4 mL Route Nebulization Administered By RT Ronni Admin Date 
06/10/2021 Action Given Dose 4 mL Route Nebulization Administered By 
Marty Jaimes RT Admin Date 
06/11/2021 Action Given Dose 4 mL Route Nebulization Administered By Yusra Romero Dr Admin Date 
06/12/2021 Action Given Dose 4 mL Route Nebulization Administered By Danny Sotomayor, RT  
  
  
 vancomycin (VANCOCIN) 1250 mg in  ml infusion Admin Date 
06/09/2021 Action New Bag Dose 
1,250 mg Rate 125 mL/hr Route IntraVENous Administered By Janina Mckinney RN Admin Date 
06/09/2021 Action New Bag Dose 
1,250 mg Rate 125 mL/hr Route IntraVENous Administered By Chad Carbajal RN Admin Date 
06/09/2021 Action New Bag Dose 
1,250 mg Rate 125 mL/hr Route IntraVENous Administered By Janina Mckinney RN Admin Date 
06/10/2021 Action New Bag Dose 
1,250 mg Rate 125 mL/hr Route IntraVENous Administered By Conor Pelaez RN  
  
  
 vancomycin (VANCOCIN) 2000 mg in  ml infusion Admin Date 
06/08/2021 Action New Bag Dose 
2,000 mg Rate 
250 mL/hr Route IntraVENous Administered By Janina Mckinney RN Vancomycin Trough Reminder Admin Date 
06/09/2021 Action Given Dose Route Other Administered By Conor Pelaez RN  
  
  
  
 
 
 
 
Problem List:  
 
Hospital Problems as of 6/16/2021 Date Reviewed: 6/11/2021 Codes Class Noted - Resolved POA Pleural fistula (HCC) ICD-10-CM: J86.0 ICD-9-CM: 510.0  6/14/2021 - Present Unknown Atrial flutter ([facility] ICD-10-CM: Y61.92 
ICD-9-CM: 427.32  6/10/2021 - Present Unknown Acute hypoxemic respiratory failure ([facility] ICD-10-CM: J96.01 
ICD-9-CM: 518.81  6/8/2021 - Present Unknown Bronchopleural fistula (HCC) ICD-10-CM: J86.0 ICD-9-CM: 510.0  6/8/2021 - Present Unknown Empyema ([facility] ICD-10-CM: J86.9 ICD-9-CM: 510.9  6/8/2021 - Present Unknown Acute on chronic respiratory failure with hypoxemia [facility]) ICD-10-CM: B57.76 
ICD-9-CM: 518.84  6/8/2021 - Present Unknown S/P thoracotomy ICD-10-CM: K98.682 ICD-9-CM: V45.89  1/7/2021 - Present Yes Cancer of bronchus of right lower lobe [facility]) ICD-10-CM: C34.31 
ICD-9-CM: 162.5  7/23/2020 - Present Yes * (Principal) Sepsis due to undetermined organism [facility]) ICD-10-CM: A41.9 ICD-9-CM: 038.9 5/5/2020 - Present Yes Signed By: Yessica Gill MD  
[facility] Hospitalist Service  June 16, 2021  2:08 PM

## 2021-06-16 NOTE — PROGRESS NOTES
Patient transferred from the third floor to room 217. Per PT orders patient has been recommended for [place] or Pulmonary Rehab. CM will continue to follow patient during hospitalization for discharge planning and needs. Please consult or notify CM of new needs.

## 2021-06-16 NOTE — PROGRESS NOTES
ACUTE PHYSICAL THERAPY GOALS: 
(Developed with and agreed upon by patient and/or caregiver. ) LTG: 
(1.)Mr. Crow Parada will move from supine to sit and sit to supine , scoot up and down and roll side to side in flat bed without siderails with  INDEPENDENT within 7 day(s). (2.)Mr. Crow Parada will perform all functional transfers with  MODIFIED INDEPENDENCE using the least restrictive/no device within 7 day(s). (3.)Mr. Crow Parada will ambulate with  MODIFIED INDEPENDENCE for 500+ feet with normal vital sign response with the least restrictive/no device within 7 day(s). (4.)Mr. Crow Parada will ambulate up/down 3 steps with bilateral  railing with  STAND BY ASSIST with no device within 7 day(s). PHYSICAL THERAPY: Daily Note and AM Treatment Day # 3 Hui Rodrigez is a 78 y.o. male PRIMARY DIAGNOSIS: Sepsis due to undetermined organism ([address] Acute on chronic respiratory failure with hypoxemia ([address] [J96.21] Pleural fistula ([facility] [J86.0] Procedure(s) (LRB): 
RIGHT THORACOTOMY/ BRONCHIOPLEURAL FISTULA REPAIR (Right) 2 Days Post-Op ASSESSMENT:  
 
REHAB RECOMMENDATIONS: CURRENT LEVEL OF FUNCTION: 
(Most Recently Demonstrated) Recommendation to date pending progress: 
Settin [facility]  or pulmonary rehab Equipment:  To Be Determined Bed Mobility: 
Rik Mae Sit to Stand: 
[address] Transfers: 
[address] Gait/Mobility: 
[address] ASSESSMENT: 
Mr. Crow Parada is making steady progress towards PT goals. Patient reports more fatigue today and needed frequent rest breaks throughout treatment. Patient performs supine to sit with SBA and transfer to standing with CGA. Once standing patient ambulates 250' with rolling walker and several standing rest breaks. Will continue efforts SUBJECTIVE:  
Mr. Crow Parada states, \"You can tell I'm not very anxious to do this today\" SOCIAL HISTORY/ LIVING ENVIRONMENT: see eval 
Home Environment: Private residence One/Two Story Residence: One story Living Alone: No 
Support Systems: Family member(s), Friends \ neighbors, Spouse/Significant Other/Partner OBJECTIVE:  
 
PAIN: VITAL SIGNS: LINES/DRAINS:  
Pre Treatment: Pain Screen Pain Scale 1: FLACC Pain Intensity 1: 0 Post Treatment: does not rate, appears comfortable in chair   Arterial Line, IV and drain O2 Device: None (Room air) MOBILITY: I Mod I S SBA CGA Min Mod Max Total  NT x2 Comments:  
Bed Mobility Rolling [] [] [] [] [] [] [] [] [] [x] [] Supine to Sit [] [] [] [x] [] [] [] [] [] [] [] Scooting [] [] [] [] [] [] [] [] [] [] [] Sit to Supine [] [] [] [] [] [] [] [] [] [x] [] chair Transfers Sit to Stand [] [] [] [] [x] [] [] [] [] [] [] Bed to Chair [] [] [] [] [x] [] [] [] [] [] []   
Stand to Sit [] [] [] [] [x] [] [] [] [] [] [] I=Independent, Mod I=Modified Independent, S=Supervision, SBA=Standby Assistance, CGA=Contact Guard Assistance,  
Min=Minimal Assistance, Mod=Moderate Assistance, Max=Maximal Assistance, Total=Total Assistance, NT=Not Tested BALANCE: Good Fair+ Fair Fair- Poor NT Comments Sitting Static [x] [] [] [] [] [] Sitting Dynamic [x] [] [] [] [] []   
         
Standing Static [] [x] [] [] [] []   
Standing Dynamic [] [x] [] [] [] [] GAIT: I Mod I S SBA CGA Min Mod Max Total  NT x2 Comments:  
Level of Assistance [] [] [] [] [x] [] [] [] [] [] [] 4 L O2 Distance 250' DME [place] Gait Quality Slow yanique, shuffled, forward posture Weightbearing Status N/A I=Independent, Mod I=Modified Independent, S=Supervision, SBA=Standby Assistance, CGA=Contact Guard Assistance,  
Min=Minimal Assistance, Mod=Moderate Assistance, Max=Maximal Assistance, Total=Total Assistance, NT=Not Tested PLAN:  
FREQUENCY/DURATION: PT Plan of Care: 3 times/week for duration of hospital stay or until stated goals are met, whichever comes first. 
TREATMENT:  
 
TREATMENT:  
($$ Therapeutic Activity: 23-37 mins    ) Therapeutic Activity (24 Minutes): Therapeutic activity included Supine to Sit, Scooting, Transfer Training, Ambulation on level ground, Sitting balance , Standing balance and walker safety, posture, weight shifting, pursed lip breathing to improve functional Mobility, Strength and Activity tolerance. TREATMENT GRID: 
N/A 
 
AFTER TREATMENT POSITION/PRECAUTIONS: 
Chair, Needs within reach and RN notified INTERDISCIPLINARY COLLABORATION: 
RN/PCT and PT/PTA TOTAL TREATMENT DURATION: 
PT Patient Time In/Time Out Time In: 1115 Time Out: 1139 Ac Singh PTA

## 2021-06-16 NOTE — CONSULTS
Inpatient Hematology / Oncology Consult Note Reason for Consult:  Acute on chronic respiratory failure with hypoxemia ([address] [J96.21]; Pleural fistula ([address] [J86.0] Referring Physician:  Juan Carlos Jolly MD 
 
History of Present Illness: Mr. Bret Nettles is a 78 y.o. male admitted on 6/8/2021 with a primary diagnosis of The primary encounter diagnosis was Respiratory distress. Diagnoses of Fever, unspecified fever cause, Acute on chronic respiratory failure with hypoxemia ([address] Empyema ([address] S/P thoracotomy, Sepsis due to undetermined organism ([address] Bronchopleural fistula ([address] Cancer of bronchus of right lower lobe ([address] Acute hypoxemic respiratory failure ([address] Atypical atrial flutter ([address] Malignant neoplasm of right lung, unspecified part of lung ([address] and Pleural fistula ([address] were also pertinent to this visit. Rucker Patrice He has lung cancer and Empyema Review of Systems: 
Constitutional Denies fever, chills, weight loss, appetite changes, fatigue, night sweats. HEENT Denies trauma, blurry vision, hearing loss, ear pain, nosebleeds, sore throat, neck pain and ear discharge. Skin Denies lesions or rashes. Lungs Denies dyspnea, cough, sputum production or hemoptysis. Cardiovascular Denies chest pain, palpitations, or lower extremity edema. Gastrointestinal Denies nausea, vomiting, changes in bowel habits, bloody or black stools, abdominal pain.  Denies dysuria, frequency or hesitancy of urination. Neuro Denies headaches, visual changes or ataxia. Denies dizziness, tingling, tremors, sensory change, speech change, focal weakness or headaches. Hematology Denies easy bruising or bleeding, denies gingival bleeding or epistaxis. Endo Denies heat/cold intolerance, denies diabetes or thyroid abnormalities. MSK Denies back pain, arthralgias, myalgias or frequent falls. Psychiatric/Behavioral Denies depression and substance abuse. The patient is not nervous/anxious.  
  
 
 
Allergies Allergen Reactions  Albuterol Palpitations  Celebrex [Celecoxib] Itching Past Medical History:  
Diagnosis Date  Arrhythmia Afib  Chronic obstructive pulmonary disease ([facility] O2 at [address]  Chronic pain   
 right torn rotator cuff; left knee  GERD (gastroesophageal reflux disease)   
 controlled with med  Hypertension hx-- off meds since 2020--- followed by dr. Karely Jefferson  Hypoxia 2020  Malignant neoplasm of lower lobe of right lung ([facility] 2020 REC'D CHEMO AND RADIATION--- FOLLOWED BY DR. Latrelle Mcburney Osteoarthritis  Right lower lobe lung mass 2020  Status post total right knee replacement 2016 Past Surgical History:  
Procedure Laterality Date  HX COLONOSCOPY    
 HX KNEE ARTHROSCOPY Left   
 scope X 1, ACL recon X 1  
 HX KNEE ARTHROSCOPY Right , 1975 X 2   
 HX KNEE REPLACEMENT Right  [address]  HX VASCULAR ACCESS Right placed 3/2020  
 port in chest   
 IR INSERT TUNL CVC W PORT OVER 5 YEARS  3/18/2020  NC CHEST SURGERY PROCEDURE UNLISTED    
 R lobectomy 2020; R complete pneumonectomy 2021  NC SINUS SURGERY PROC UNLISTED  ,   
 sinus surg Family History Problem Relation Age of Onset  Cancer Mother   
     uterine  Arrhythmia Sister   
     afib Social History Socioeconomic History  Marital status:  Spouse name: Not on file  Number of children: Not on file  Years of education: Not on file  Highest education level: Not on file Occupational History  Not on file Tobacco Use  Smoking status: Former Smoker Packs/day: 1.00 Years: 20.00 Pack years: 20.00 Quit date:  Years since quittin.4  Smokeless tobacco: Never Used  Tobacco comment: patient has no desire to resume Substance and Sexual Activity  Alcohol use: Yes Alcohol/week: 4.0 standard drinks Types: 4 Glasses of wine per week  Drug use:  No  Sexual activity: Not on file Other Topics Concern   Service No  
 Blood Transfusions No  
 Caffeine Concern No  
 Occupational Exposure No  
 Hobby Hazards No  
 Sleep Concern No  
 Stress Concern No  
 Weight Concern No  
 Special Diet No  
 Back Care Not Asked  Exercise Yes  Bike Helmet Not Asked [address] Yes  Self-Exams Not Asked Social History Narrative . Has long-time girlfriend. Continues to work doing IT support. Social Determinants of Health Financial Resource Strain:  Difficulty of Paying Living Expenses:   
Food Insecurity:  Worried About [address] in the Last Year:   
[address] in the Last Year:   
Transportation Needs:   
 Lack of Transportation (Medical):  Lack of Transportation (Non-Medical): Physical Activity:   
 Days of Exercise per Week:  Minutes of Exercise per Session:   
Stress:  Feeling of Stress :   
Social Connections:  Frequency of Communication with Friends and Family:  Frequency of Social Gatherings with Friends and Family:  Attends Anabaptist Services:  Active Member of Clubs or Organizations:  Attends Club or Organization Meetings:  Marital Status: Intimate Partner Violence:  Fear of Current or Ex-Partner:  Emotionally Abused:   
 Physically Abused:   
 Sexually Abused:   
 
Current Facility-Administered Medications Medication Dose Route Frequency Provider Last Rate Last Admin  metroNIDAZOLE (FLAGYL) tablet 500 mg  500 mg Oral Q12H Moira Rosado MD   500 mg at 06/15/21 2059  docusate sodium (COLACE) capsule 100 mg  100 mg Oral BID Jair Staley MD   100 mg at 06/15/21 1724  lidocaine (XYLOCAINE) 10 mg/mL (1 %) injection 0.1 mL  0.1 mL SubCUTAneous PRN Moira Rosado MD      
 heparin 25,000 units in dextrose 500 mL infusion  600 Units/hr IntraVENous TITRATE Moira Rosado MD 12 mL/hr at 06/15/21 1900 600 Units/hr at 06/15/21 1900  albuterol-ipratropium (DUO-NEB) 2.5 MG-0.5 MG/3 ML  3 mL Nebulization Q4H PRN Kiera Khan MD      
 levalbuterol Teretha Chemo) nebulizer soln 1.25 mg/3 mL  1.25 mg Nebulization Q3H PRN Kiera Khan MD   1.25 mg at 06/13/21 2318  [Held by provider] metoprolol tartrate (LOPRESSOR) tablet 12.5 mg  12.5 mg Oral Q12H Milton Brantley MD      
 budesonide (PULMICORT) 500 mcg/2 ml nebulizer suspension  500 mcg Nebulization BID RT Kiera Khan MD   500 mcg at 06/15/21 1956  polyethylene glycol (MIRALAX) packet 17 g  17 g Oral DAILY Kiera Khan MD   17 g at 06/15/21 7895  bisacodyL (DULCOLAX) tablet 5 mg  5 mg Oral DAILY PRN Kiera Khan MD      
 cefTRIAXone (ROCEPHIN) 2 g in 0.9% sodium chloride (MBP/ADV) 50 mL MBP  2 g IntraVENous Q24H Kiera Khan  mL/hr at 06/15/21 1228 2 g at 06/15/21 1228  midodrine (PROAMATINE) tablet 5 mg  5 mg Oral TID WITH MEALS Kiera Khan MD   5 mg at 06/15/21 1724  
 sodium chloride 3% hypertonic nebulizer soln  4 mL Nebulization BID RT Kiera Khan MD   4 mL at 06/15/21 1956  [Held by provider] apixaban (ELIQUIS) tablet 5 mg  5 mg Oral BID Saúl Manzo MD      
 pantoprazole (PROTONIX) tablet 40 mg  40 mg Oral ACB Kiera Khan MD   40 mg at 06/15/21 9107  
 gabapentin (NEURONTIN) capsule 300 mg  300 mg Oral TID Kiera Khan MD   300 mg at 06/15/21 2100  
 sodium chloride (NS) flush 5-40 mL  5-40 mL IntraVENous Q8H Kiera Khan MD   10 mL at 06/15/21 2101  
 sodium chloride (NS) flush 5-40 mL  5-40 mL IntraVENous PRN Kiera Khan MD   5 mL at 06/11/21 1244  oxyCODONE-acetaminophen (PERCOCET) 5-325 mg per tablet 1 Tablet  1 Tablet Oral Q4H PRN Kiera Khan MD   1 Tablet at 06/15/21 1554  morphine injection 2 mg  2 mg IntraVENous Q3H PRN Kiera Khan MD   2 mg at 06/15/21 2238  levalbuterol (XOPENEX) nebulizer soln 1.25 mg/3 mL  1.25 mg Nebulization QID RT Kiera Khan MD   1.25 mg at 06/15/21 1956 OBJECTIVE: 
Patient Vitals for the past 8 hrs: 
 BP Temp Pulse Resp SpO2  
06/15/21 2000   81   06/15/21 1956     93 % 06/15/21 1729 117/82  81 17 95 % 06/15/21 1724 110/74  82    
06/15/21 1659 110/74  79 8 95 % 06/15/21 1638 110/74 97.7 °F (36.5 °C) 79 18 95 % 06/15/21 1545   78  97 % 06/15/21 1530   72 15 100 % 06/15/21 1527     95 % 06/15/21 1515   72 (!) 35 97 % 06/15/21 1500   72 25 98 % 06/15/21 1459 107/68  71 30 99 % 06/15/21 1445   72 26 98 % 06/15/21 1430   74 20 98 % 06/15/21 1359 112/69  83 18 97 % Temp (24hrs), Av.3 °F (36.3 °C), Min:96.8 °F (36 °C), Max:97.7 °F (36.5 °C) No intake/output data recorded. Physical Exam: 
Constitutional: Well developed, well nourished male in no acute distress, sitting comfortably in the hospital bed. HEENT: Normocephalic and atraumatic. Oropharynx is clear, mucous membranes are moist.  Pupils are equal, round, and reactive to light. Extraocular muscles are intact. Sclerae anicteric. Neck supple without JVD. No thyromegaly present. Lymph node No palpable submandibular, cervical, supraclavicular, axillary or inguinal lymph nodes. Skin Warm and dry. No bruising and no rash noted. No erythema. No pallor. Respiratory Lungs are clear to auscultation bilaterally without wheezes, rales or rhonchi, normal air exchange without accessory muscle use. CVS Normal rate, regular rhythm and normal S1 and S2. No murmurs, gallops, or rubs. Abdomen Soft, nontender and nondistended, normoactive bowel sounds. No palpable mass. No hepatosplenomegaly. Neuro Grossly nonfocal with no obvious sensory or motor deficits. MSK Normal range of motion in general.  No edema and no tenderness. Psych Appropriate mood and affect. Labs:   
Recent Results (from the past 24 hour(s)) MAGNESIUM Collection Time: 06/15/21  3:33 AM  
Result Value Ref Range Magnesium 2.5 (H) 1.8 - 2.4 mg/dL PHOSPHORUS Collection Time: 06/15/21  3:33 AM  
Result Value Ref Range  Phosphorus 4.0 (H) 2.3 - 3.7 MG/DL  
CBC WITH AUTOMATED DIFF Collection Time: 06/15/21  3:33 AM  
Result Value Ref Range WBC 6.4 4.3 - 11.1 K/uL  
 RBC 3.17 (L) 4.23 - 5.6 M/uL HGB 8.6 (L) 13.6 - 17.2 g/dL HCT 27.7 (L) 41.1 - 50.3 % MCV 87.4 79.6 - 97.8 FL  
 MCH 27.1 26.1 - 32.9 PG  
 MCHC 31.0 (L) 31.4 - 35.0 g/dL  
 RDW 18.0 (H) 11.9 - 14.6 % PLATELET 919 056 - 521 K/uL MPV 9.4 9.4 - 12.3 FL ABSOLUTE NRBC 0.00 0.0 - 0.2 K/uL  
 DF AUTOMATED NEUTROPHILS 90 (H) 43 - 78 % LYMPHOCYTES 6 (L) 13 - 44 % MONOCYTES 3 (L) 4.0 - 12.0 % EOSINOPHILS 0 (L) 0.5 - 7.8 % BASOPHILS 0 0.0 - 2.0 % IMMATURE GRANULOCYTES 1 0.0 - 5.0 %  
 ABS. NEUTROPHILS 5.8 1.7 - 8.2 K/UL  
 ABS. LYMPHOCYTES 0.4 (L) 0.5 - 4.6 K/UL  
 ABS. MONOCYTES 0.2 0.1 - 1.3 K/UL  
 ABS. EOSINOPHILS 0.0 0.0 - 0.8 K/UL  
 ABS. BASOPHILS 0.0 0.0 - 0.2 K/UL  
 ABS. IMM. GRANS. 0.1 0.0 - 0.5 K/UL METABOLIC PANEL, BASIC Collection Time: 06/15/21  3:33 AM  
Result Value Ref Range Sodium 138 136 - 145 mmol/L Potassium 5.2 (H) 3.5 - 5.1 mmol/L Chloride 104 98 - 107 mmol/L  
 CO2 30 21 - 32 mmol/L Anion gap 4 (L) 7 - 16 mmol/L Glucose 120 (H) 65 - 100 mg/dL BUN 15 8 - 23 MG/DL Creatinine 0.77 (L) 0.8 - 1.5 MG/DL  
 GFR est AA >60 >60 ml/min/1.73m2 GFR est non-AA >60 >60 ml/min/1.73m2 Calcium 8.8 8.3 - 10.4 MG/DL  
PTT Collection Time: 06/15/21  3:33 AM  
Result Value Ref Range aPTT 41.2 (H) 24.1 - 35.1 SEC  
TRANSFERRIN SATURATION Collection Time: 06/15/21 10:52 AM  
Result Value Ref Range Iron 36 35 - 150 ug/dL TIBC 179 (L) 250 - 450 ug/dL Transferrin Saturation 20 (L) >20 % FERRITIN Collection Time: 06/15/21 10:52 AM  
Result Value Ref Range Ferritin 815 (H) 8 - 388 NG/ML  
VITAMIN B12 Collection Time: 06/15/21 10:52 AM  
Result Value Ref Range Vitamin B12 413 193 - 986 pg/mL FOLATE Collection Time: 06/15/21 10:52 AM  
Result Value Ref Range Folate 9.2 3.1 - 17.5 ng/mL LD  
 Collection Time: 06/15/21 10:52 AM  
Result Value Ref Range  (H) 110 - 210 U/L  
RETICULOCYTE COUNT Collection Time: 06/15/21 10:52 AM  
Result Value Ref Range Reticulocyte count 3.2 (H) 0.3 - 2.0 % Absolute Retic Cnt. 0.1002 (H) 0.026 - 0.095 M/ul Immature Retic Fraction 24.3 (H) 2.3 - 13.4 % Retic Hgb Conc. 32 29 - 35 pg HAPTOGLOBIN Collection Time: 06/15/21 10:52 AM  
Result Value Ref Range Haptoglobin 172 30 - 200 mg/dL BILIRUBIN, FRACTIONATED Collection Time: 06/15/21 10:52 AM  
Result Value Ref Range Bilirubin, total 0.7 0.2 - 1.1 MG/DL Bilirubin, direct 0.2 <0.4 MG/DL Bilirubin, indirect 0.5 0.0 - 1.1 MG/DL  
POTASSIUM Collection Time: 06/15/21 10:52 AM  
Result Value Ref Range Potassium 4.5 3.5 - 5.1 mmol/L Imaging: 
 
 
 
ASSESSMENT: 
Problem List  Date Reviewed: 6/11/2021 Codes Class Noted Pacemaker ICD-10-CM: Z95.0 ICD-9-CM: V45.01  5/3/2021 Pleural fistula (HCC) ICD-10-CM: J86.0 ICD-9-CM: 510.0  6/14/2021 Atrial flutter ([facility] ICD-10-CM: U53.66 
ICD-9-CM: 427.32  6/10/2021 Acute hypoxemic respiratory failure ([facility] ICD-10-CM: J96.01 
ICD-9-CM: 518.81  6/8/2021 Bronchopleural fistula (HCC) ICD-10-CM: J86.0 ICD-9-CM: 510.0  6/8/2021 Empyema ([facility] ICD-10-CM: J86.9 ICD-9-CM: 510.9  6/8/2021 Acute on chronic respiratory failure with hypoxemia [facility]) ICD-10-CM: F81.53 
ICD-9-CM: 518.84  6/8/2021 COPD exacerbation ([facility] ICD-10-CM: J44.1 ICD-9-CM: 491.21  5/16/2021 Heart block AV complete (HCC) ICD-10-CM: I44.2 ICD-9-CM: 426.0  5/3/2021 Sick sinus syndrome (HCC) ICD-10-CM: I49.5 ICD-9-CM: 427.81  3/18/2021 PAF (paroxysmal atrial fibrillation) ([facility]) ICD-10-CM: I48.0 ICD-9-CM: 427.31  2/25/2021 Hypertension ICD-10-CM: I10 
ICD-9-CM: 401.9  2/25/2021 Atrial fibrillation with RVR ([facility] ICD-10-CM: I48.91 
ICD-9-CM: 427.31  2/25/2021  S/P thoracotomy ICD-10-CM: A62.381 ICD-9-CM: V45.89  1/7/2021 Atelectasis of right lung ICD-10-CM: J98.11 ICD-9-CM: 518.0  1/4/2021 A-fib (HCC) (Chronic) ICD-10-CM: I48.91 
ICD-9-CM: 427.31  1/4/2021 Chronic respiratory failure with hypoxia (HCC) (Chronic) ICD-10-CM: J96.11 
ICD-9-CM: 518.83, 799.02  1/4/2021 Pleural effusion on right ICD-10-CM: J90 ICD-9-CM: 511.9  12/28/2020 Chemotherapy induced neutropenia (HCC) ICD-10-CM: D70.1, T45.1X5A 
ICD-9-CM: 288.03, E933.1  10/23/2020 Dehydration ICD-10-CM: E86.0 ICD-9-CM: 276.51  9/15/2020 S/P lobectomy of lung ICD-10-CM: Z90.2 ICD-9-CM: V45.89  7/29/2020 Cough ICD-10-CM: R05 ICD-9-CM: 786.2  7/29/2020 Atrial tachycardia (HCC) ICD-10-CM: I47.1 ICD-9-CM: 427.89  7/26/2020 Cancer of bronchus of right lower lobe [facility]) ICD-10-CM: C34.31 
ICD-9-CM: 162.5  7/23/2020 Lung cancer ([facility] (Chronic) ICD-10-CM: C34.90 ICD-9-CM: 162.9  7/23/2020 Cancer of right lung [facility]) ICD-10-CM: C34.91 
ICD-9-CM: 162.9  7/23/2020 Pulmonary emphysema (HCC) (Chronic) ICD-10-CM: J43.9 ICD-9-CM: 492.8  7/7/2020 GERD (gastroesophageal reflux disease) ICD-10-CM: K21.9 ICD-9-CM: 530.81  Unknown Hypotension (Chronic) ICD-10-CM: I95.9 ICD-9-CM: 458.9  5/5/2020 Overview Signed 1/11/2021  8:39 AM by Fred Braswell NP On midodrine * (Principal) Sepsis due to undetermined organism [facility]) ICD-10-CM: A41.9 ICD-9-CM: 038.9  5/5/2020 Malignant neoplasm of lower lobe of right lung (HCC) (Chronic) ICD-10-CM: C34.31 
ICD-9-CM: 162.5  2/26/2020 Overview Signed 1/4/2021  7:38 AM by Payal Mendoza MD  
  Dec 2020- Echo EF 50%, technically difficult, cannot assess regional wall motion. Aortic root 4.1 cm. No significant valvular disease. Normal DF Mass of lower lobe of right lung ICD-10-CM: R91.8 ICD-9-CM: 786.6  2/23/2020  Right lower lobe lung mass ICD-10-CM: R91.8 ICD-9-CM: 786.6  2/23/2020 Essential hypertension with goal blood pressure less than 130/80 (Chronic) ICD-10-CM: I10 
ICD-9-CM: 401.9  2/19/2018 RECOMMENDATIONS: 
· Continue ABX · Transfuse PRBCs to keep Hemoglobin above 8 gm/dl · We will arrange for him to follow-up with Dr. Azael Bermudez in clinic. Lab studies and imaging studies were personally reviewed. Pertinent old records were reviewed from [facility]. Thank you for allowing us to participate in the care of Mr. Stevie Bingham. Jack Concepcion MD 
[address] Office : [phone] Fax : [phone]

## 2021-06-16 NOTE — PROGRESS NOTES
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  
· Continue ceftriaxone/flagyl pending operative cultures. F/up CT chest. 
 
Anti-infectives: · Ceftriaxone (6/10 - 
· Flagyl ( - 
· Zosyn (-6/10) · Vancomycin (-6/10) Subjective: Interval history: CT chest ordered for cancer staging; afebrile; Allergies Allergen Reactions  Albuterol Palpitations  Celebrex [Celecoxib] Itching Review of Systems:  Pertinent items are noted in the History of Present Illness. Objective:  
 
Visit Vitals /70 Pulse 83 Temp 97.7 °F (36.5 °C) Resp 15 Ht 5' 10\" (1.778 m) Wt 81.8 kg (180 lb 4.8 oz) SpO2 95% BMI 25.87 kg/m² Temp (24hrs), Av.7 °F (36.5 °C), Min:96.8 °F (36 °C), Max:98.3 °F (36.8 °C) Lines:  R chest port Physical Exam:   
General:  Alert, cooperative, in no distress Eyes:  Sclera anicteric. Mouth/Throat: Mucous membranes normal, oral pharynx clear Neck: Supple Lungs:   Coarse breath sounds; diminished on R; R pleural drain in place with bloody drainage CV:  Regular rate and rhythm, no audible murmur, L chest pacer noted Abdomen:   non-distended Extremities: No cyanosis or edema Skin: no acute rash or lesions Musculoskeletal: No swelling or deformity Lines/Devices:  Intact, no erythema, drainage or tenderness Psych: Alert and oriented Data Review: CBC: 
Recent Labs  
  21 
0643 06/15/21 
0333 21 
2125 WBC 6.8 6.4 6.2 GRANS 78 90* 89* MONOS 8 3* 2* EOS 1 0* 0* ANEU 5.4 5.8 5.5 ABL 0.7 0.4* 0.5 HGB 8.6* 8.6* 9.0*  
HCT 27.8* 27.7* 29.3*  
 200 197 BMP: 
Recent Labs  
  06/16/21 
0643 06/15/21 
1052 06/15/21 
0333 06/14/21 
0353 CREA 0.86  --  0.77* 1.00 BUN 18  --  15 11   --  138 139  
K 4.1 4.5 5.2* 4.1   --  104 105 CO2 34*  --  30 32 AGAP 2*  --  4* 2*  
GLU 92  --  120* 110* LFTS: 
Recent Labs  
  06/15/21 
1052 TBILI 0.7 Microbiology:  
 
All Micro Results Procedure Component Value Units Date/Time CULTURE, TISSUE Forrest Mack [[id number]] Collected: 06/14/21 1508 Order Status: Completed Specimen: Lung Updated: 06/15/21 0942 Special Requests: NO SPECIAL REQUESTS     
  GRAM STAIN 12 TO 18 WBCS SEEN PER OIF  
   FEW GRAM POSITIVE COCCI Culture result:    
  NO GROWTH AFTER SHORT PERIOD OF INCUBATION. FURTHER RESULTS TO FOLLOW AFTER OVERNIGHT INCUBATION. Miranda Reich [[id number]] Collected: 06/14/21 1508 Order Status: Completed Specimen: Abdomen Updated: 06/14/21 2119 FUNGUS CULTURE AND SMEAR [[id number]] Collected: 06/14/21 1508 Order Status: Completed Updated: 06/14/21 1750 CULTURE, BODY FLUID Forrest Mack [[id number]] Collected: 06/14/21 1515 Order Status: Canceled Specimen: Body Fluid from Drainage [facility] [[id number]] Order Status: Sent Specimen: Abdomen BLOOD CULTURE [[id number]] Collected: 06/08/21 1445 Order Status: Completed Specimen: Blood Updated: 06/13/21 1032 Special Requests: LEFT FOOT Culture result: NO GROWTH 5 DAYS     
 BLOOD CULTURE [[id number]] Collected: 06/08/21 1627 Order Status: Completed Specimen: Blood Updated: 06/13/21 1032 Special Requests: --     
  RIGHT 
FOREARM Culture result: NO GROWTH 5 DAYS     
 CULTURE, BODY FLUID Ministerio Gale STAIN [[id number]]  (Abnormal) Collected: 06/08/21 1941 Order Status: Completed Specimen:  Body Fluid from Right Updated: 21 1457 Special Requests: NO SPECIAL REQUESTS     
  GRAM STAIN 45  WBC'S/OIF  
   MANY GRAM POSITIVE COCCI MODERATE GRAM NEGATIVE RODS Culture result:    
  HEAVY MIXED GRAM POSITIVE COCCI RESEMBLING NORMAL RESPIRATORY EDU  
     
      
  THIS ORGANISM WILL BE HELD FOR 7 DAYS. IF FURTHER TESTING IS REQUIRED PLEASE NOTIFY MICROBIOLOGY  
     
 CULTURE, RESPIRATORY/SPUTUM/BRONCH W Phil White [[id number]] Collected: 21 2305 Order Status: Completed Specimen: Sputum Updated: 06/10/21 0585 Special Requests: NO SPECIAL REQUESTS     
  GRAM STAIN    
  GRAM STAIN EXAMINATION OF THIS SPECIMEN INDICATED OROPHARYNGEAL CONTAMINATION. PLEASE SUBMIT A NEW SPECIMEN OR NOTIFY THE MICRO DEPT WITHIN 48HRS IF FURTHER WORKUP IS DESIRED. Culture result:    
  Please reorder and recollect. Michelle Paul 21 @1546, ABISAI Imagin21 CXR: FINDINGS: The right-sided pneumothorax or postsurgical cavity is unchanged, as 
is mild left basilar atelectasis. Again noted is a right-sided chest tube, a 
right chest wall infusion port catheter and a left chest wall biventricular 
pacemaker. Signed By: Montserrat Perkins MD   
 2021

## 2021-06-16 NOTE — PROGRESS NOTES
TRANSFER - OUT REPORT: 
 
Verbal report given to Randee Seat (name) on Eris Stevens.  being transferred to [facility](unit) for routine progression of care Report consisted of patients Situation, Background, Assessment and  
Recommendations(SBAR). Information from the following report(s) SBAR, Kardex, Procedure Summary, Intake/Output, MAR, Recent Results, Med Rec Status, Cardiac Rhythm  and Alarm Parameters  was reviewed with the receiving nurse. Lines:  
Venous Access Device Port 06/09/21 Upper chest (subclavicular area, right (Active) Central Line Being Utilized Yes 06/16/21 1000 Criteria for Appropriate Use Hemodynamically unstable, requiring monitoring lines, vasopressors, or volume resuscitation 06/16/21 1000 Site Assessment Clean, dry, & intact 06/16/21 1000 Date of Last Dressing Change 06/15/21 06/16/21 1000 Dressing Status Clean, dry, & intact 06/16/21 1000 Dressing Type Disk with Chlorhexadine gluconate (CHG); Transparent 06/16/21 1000 Action Taken Other (comment) 06/16/21 1000 Access Medial Site Assessment Unable to draw 06/09/21 2137 Date Accessed (Medial Site) 06/09/21 06/09/21 2137 Access Time (Medial Site) 2137 06/09/21 2137 Access Needle Size (Site #1) 21 G 06/09/21 2137 Access Needle Length (Medial Site) 0.75 inches 06/09/21 2137 Positive Blood Return (Medial Site) Yes 06/14/21 0402 Action Taken (Medial Site) Infusing 06/16/21 1000 Alcohol Cap Used No 06/16/21 1000 Peripheral IV 06/14/21 Left;Posterior Hand (Active) Site Assessment Clean, dry, & intact 06/16/21 0701 Phlebitis Assessment 0 06/16/21 0701 Infiltration Assessment 0 06/16/21 0701 Dressing Status Clean, dry, & intact 06/16/21 0701 Dressing Type Transparent;Tape 06/15/21 1900 Hub Color/Line Status Patent; Flushed;Pink 06/15/21 1900 Alcohol Cap Used No 06/15/21 1900 Opportunity for questions and clarification was provided. Patient transported with: 
 Monitor O2 @ 3 liters Tech

## 2021-06-16 NOTE — PROGRESS NOTES
Comprehensive Nutrition Assessment Type and Reason for Visit:  (LOS) Nutrition Recommendations/Plan:  
 Continue current diet.  Start vanilla Ensure Enlive (350 calories, 20 gm protein per bottle) with all trays. Malnutrition Assessment: 
Malnutrition Status: Moderate malnutrition Context: Chronic illness Findings of clinical characteristics of malnutrition:  
Energy Intake:  Mild decrease in energy intake (specify) Weight Loss:  7.00 - Greater than 10% over 6 months Body Fat Loss:  1 - Mild body fat loss, Buccal region Muscle Mass Loss:  Unable to assess, Fluid Accumulation:  No significant fluid accumulation,   
 Strength:  Not performed Nutrition Assessment:  
Nutrition History: The patient reports \"significant weight loss\" since the start of his cancer treatments and his numerous hospitalizations this year. He reports his UBW was ~230 pounds \"before all of this crazy stuff started\". Reports that he and his girlfriend eat out or get take-out alot since she doesn't like to cook. Nutrition Background: 78year old gentleman with a h/o tobacco abuse s/p RUL mass (SCC) found 2/2020., s/p chemotherapy and XRT, followed by RML lobectomy 7/2020 and right VATS 1/2021. He is s/p right thoractomy and bronchopleural fistula repair. 6/14/2021. Daily Update: 
Eating ~50% of meals at this point. Reports, \"the food sucks here. \" Abdominal Status (last documented): Soft, Intact abdomen with Active  bowel sounds. Last BM 06/12/21. Pertinent Medications: Rocephin, Flagyl, Miralax Drips: Heparin Pertinent Labs:  
Lab Results Component Value Date/Time  Sodium 139 06/16/2021 06:43 AM  
 Potassium 4.1 06/16/2021 06:43 AM  
 Chloride 103 06/16/2021 06:43 AM  
 CO2 34 (H) 06/16/2021 06:43 AM  
 Anion gap 2 (L) 06/16/2021 06:43 AM  
 Glucose 92 06/16/2021 06:43 AM  
 BUN 18 06/16/2021 06:43 AM  
 Creatinine 0.86 06/16/2021 06:43 AM  
 Calcium 9.0 06/16/2021 06:43 AM  
 Albumin 3.1 (L) 06/08/2021 02:50 PM  
 Magnesium 2.4 06/16/2021 06:43 AM  
 Phosphorus 3.3 06/16/2021 06:43 AM  
 
Nutrition Related Findings:  
Minimal signs of muscle or fat wasting. O2NC. Wound Type: Surgical incision Current Nutrition Therapies: ADULT DIET Regular Current Intake: Average Meal Intake: 26-50% Average Supplement Intake: None ordered Anthropometric Measures: 
Height: 5' 10\" (177.8 cm) Current Body Wt: 81.8 kg (180 lb 5.4 oz) (6/16/2021 ICU), Weight source: Bed scale BMI: 25.9, Overweight (BMI 25.0-29. 9) Ideal Body Weight (lbs) (Calculated): 166 lbs (75 kg), 108.6 % Usual Body Wt: 104.3 kg (230 lb) (\"1 year ago\"), Percent weight change: -21.6 Edema: No data recorded Estimated Daily Nutrient Needs: 
Energy (kcal/day): 5075-8087  (Kcal/kg (20-25 derick/kg/day using 82 kg), Weight Used: Current) Protein (g/day):   (1.2-1.5 gm protein/kg/day using 82 kg - post-op) Weight Used: (Current) Fluid (ml/day):   (1 ml/kcal) Nutrition Diagnosis:  
· Unintended weight loss related to catabolic illness, increased demand for energy/nutrients (numerous hospitalizations, s/p chemo/XRT) as evidenced by weight loss, poor intake prior to admission · Moderate malnutrition, In context of chronic illness related to catabolic illness, increased demand for energy/nutrients, inadequate protein-energy intake as evidenced by weight loss, poor intake prior to admission Nutrition Interventions:  
Food and/or Nutrient Delivery: Continue current diet, Start oral nutrition supplement Coordination of Nutrition Care: Continue to monitor while inpatient, Interdisciplinary rounds Plan of Care discussed with Ebbie Montrell Goals: Active Goal: Meet >75% of daily nutrition needs by the time of next follow-up visit. Nutrition Monitoring and Evaluation:  
  
Food/Nutrient Intake Outcomes: Food and nutrient intake, Supplement intake Physical Signs/Symptoms Outcomes: Biochemical data, GI status Discharge Planning: Too soon to determine Lysle Kilts. Yari Garcia RD, LD on 6/16/2021 at 3:59 PM 
Contact: [phone]

## 2021-06-16 NOTE — PROGRESS NOTES
..Bedside and verbal shift change report received from  [facility] (offgoing nurse). Report included the following information SBAR, Kardex, Intake/Output, MAR, Recent Results, Med Rec Status, Cardiac Rhythm AV Paced, Alarm Parameters , Quality Measures and Dual Neuro Assessment. Dual skin assessment completed at bedside: Right Chest Drain; Chemo Port (list pertinent skin assessment findings) Dual verification of gtts completed (name of gtts verified): Heparin (Dr. Bushra Lozano)

## 2021-06-17 PROBLEM — E44.0 MODERATE PROTEIN-CALORIE MALNUTRITION (HCC): Status: ACTIVE | Noted: 2021-01-01

## 2021-06-17 NOTE — PROGRESS NOTES
END OF SHIFT NOTE: 
 
INTAKE/OUTPUT 
06/15 0701 - 06/16 0700 In: 0642 [P.O.:828; I.V.:325] Out: 5191 [Urine:1600; Drains:225] Voiding: YES Catheter: NO 
Drain:   
 
 
 
 
 
Flatus: Patient does have flatus present. Stool:  0 occurrences. Characteristics: 
  
 
Emesis: 0 occurrences. Characteristics: VITAL SIGNS Patient Vitals for the past 12 hrs: 
 Temp Pulse Resp BP SpO2  
06/16/21 1927 98.9 °F (37.2 °C) 65 20 100/63 94 % 06/16/21 1445 98.3 °F (36.8 °C) 71 22 102/71 98 % 06/16/21 1416  69 (!) 44  97 % 06/16/21 1401  83 29 104/62 94 % 06/16/21 1346  77 23  98 % 06/16/21 1331  76 (!) 41 103/68 93 % 06/16/21 1316  76 28  94 % 06/16/21 1301  80 20 104/65 96 % 06/16/21 1246  71 14  98 % 06/16/21 1231  69 17 98/64 98 % 06/16/21 1216  69 30  99 % 06/16/21 1201  69 22 98/63 98 % 06/16/21 1200  82     
06/16/21 1146  69 22  97 % 06/16/21 1116  82 12    
06/16/21 1113     99 % 06/16/21 1101 98.2 °F (36.8 °C) 79 20 103/66 99 % 06/16/21 1046  90 (!) 37  95 % 06/16/21 1031  82 21 108/66 97 % 06/16/21 1016  89 (!) 46  (!) 89 % 06/16/21 1001  85 (!) 37 101/65 99 % 06/16/21 0916  87 21  96 % 06/16/21 0901  85 (!) 32 100/63 98 % 06/16/21 0846  87 22  99 % Pain Assessment Pain Intensity 1: 0 (06/16/21 1445) Pain Location 1: Flank Pain Intervention(s) 1: Medication (see MAR) Patient Stated Pain Goal: 0 Ambulating No 
 
Shift report given to oncoming nurse at the bedside.  
 
Sejal Toro RN

## 2021-06-17 NOTE — PROGRESS NOTES
Care Management Interventions PCP Verified by CM: Yes Mode of Transport at Discharge: Other (see comment) Transition of Care Consult (CM Consult): Discharge Planning Discharge Durable Medical Equipment: No (O2, [place] [facility], cane, pacemaker check box) Physical Therapy Consult: Yes Speech Therapy Consult: No 
Current Support Network: Own Home, Other (Milagro Duarte lives with him. ) Confirm Follow Up Transport: Family The Patient and/or Patient Representative was Provided with a Choice of Provider and Agrees with the Discharge Plan?: Yes Name of the Patient Representative Who was Provided with a Choice of Provider and Agrees with the Discharge Plan: self Freedom of Choice List was Provided with Basic Dialogue that Supports the Patient's Individualized Plan of Care/Goals, Treatment Preferences and Shares the Quality Data Associated with the Providers?: Yes [employer] Information Provided?: No (FELICE Wilson -recently retired, Katie still primary) Discharge Location Discharge Placement: Home Patient to discharge today. Patient refused [place] stated \"it doesn't help because they don't do anything. \" Patient would like to have pulmonary rehab but this will need to be set up by pulmonology. CM sent updated information to [facility] for his increase O2 needs. Patient needs and oxygen tank to return home, CM provided a tank. Patient will transport home with family. All milestones have been met for discharge. CM provided patient with a new nebulizer as patient stated he threw away the last one.  Nebulizer was provided by [facility].

## 2021-06-17 NOTE — PROGRESS NOTES
ACUTE OT GOALS: 
(Developed with and agreed upon by patient and/or caregiver.) 1. Patient will complete lower body bathing and dressing with MOD I and adaptive equipment as needed. 2. Patient will complete toileting with MOD I . 3. Patient will tolerate MOD I minutes of OT treatment with 1-2 rest breaks to increase activity tolerance for ADLs. 4. Patient will complete functional transfers with MOD I and adaptive equipment as needed. 5. Patient will complete functional mobility for household distances with MOD I and adaptive equipment as needed. 6. Patient will complete self-grooming while standing edge of sink with MOD I and adaptive equipment as needed. 
  
Timeframe: 7 visits OCCUPATIONAL THERAPY: Daily Note OT Treatment Day # 2 Juanis Valdez is a 78 y.o. male PRIMARY DIAGNOSIS: Sepsis due to undetermined organism ([facility] [address] Acute on chronic respiratory failure with hypoxemia ([facility] [address] [J96.21] Pleural fistula ([facility] [address] [J86.0] Procedure(s) (LRB): 
RIGHT THORACOTOMY/ BRONCHIOPLEURAL FISTULA REPAIR (Right) 3 Days Post-Op Payor: Velvet Leonard / Plan: [address] [place] [address] / Product Type: PPO /  
ASSESSMENT:  
 
REHAB RECOMMENDATIONS: CURRENT LEVEL OF FUNCTION: 
(Most Recently Demonstrated) Recommendation to date pending progress: 
S[address] [place] [address] Equipment:  
 Rolling Walker Bathing: 
 Not tested [address] [place] [address] Feeding/Groomin [facility] Toileting:  Not tested Functional Mobility: 
[address] [place] [address] ASSESSMENT: 
Mr. Archer Romberg is progressing well with OT services. Received supine in bed on 4L of O2. Performed bed mobility supervision. Donned socks SBA sitting EOB. Sit>stand SBA to RW. Ambulated in room SBA w/ RW. Completed grooming tasks standing at sink SBA--no rest breaks required on this date. Pt continues to demonstrate decreased strength, coordination, balance, and activity tolerance for completion of ADLs/functional mobility.  Mr. Archer Romberg would benefit from continued skilled OT services to address plan of care. SUBJECTIVE:  
Mr. Maxene Frankel states, \"I am doing good! \" SOCIAL HISTORY/LIVING ENVIRONMENT:  
Home Environment: Private residence One/Two Story Residence: One story Living Alone: No 
Support Systems: Family member(s), Friends \ neighbors, Spouse/Significant Other/Partner OBJECTIVE:  
 
PAIN: VITAL SIGNS: LINES/DRAINS:  
Pre Treatment: Pain Screen Pain Scale 1: Numeric (0 - 10) Pain Intensity 1: 0 Post Treatment: no change  Vital Signs O2 Device: Nasal cannula O2 Flow Rate (L/min): 4 l/min IV 
O2 Device: Nasal cannula ACTIVITIES OF DAILY LIVING: I Mod I S SBA CGA Min Mod Max Total NT Comments BASIC ADLs:             
Bathing/ Showering [] [] [] [] [] [] [] [] [] [x] Toileting [] [] [] [] [] [] [] [] [] [x] Dressing [] [] [] [x] [] [] [] [] [] [] Donned socks Feeding [] [] [] [] [] [] [] [] [] [x] Grooming [] [] [] [x] [] [] [] [] [] [] Brushed teeth and washed face standing at sink Personal Device Care [] [] [] [] [] [] [] [] [] [x] Functional Mobility [] [] [] [x] [] [] [] [] [] [] With RW  
I=Independent, Mod I=Modified Independent, S=Supervision, SBA=Standby Assistance, CGA=Contact Guard Assistance,  
Min=Minimal Assistance, Mod=Moderate Assistance, Max=Maximal Assistance, Total=Total Assistance, NT=Not Tested MOBILITY: I Mod I S SBA CGA Min Mod Max Total  NT x2 Comments:  
Supine to sit [] [] [x] [] [] [] [] [] [] [] [] Sit to supine [] [] [x] [] [] [] [] [] [] [] [] Sit to stand [] [] [] [x] [] [] [] [] [] [] [] Bed to chair [] [] [] [] [] [] [] [] [] [x] [] Ambulated in room and returned to bed due to chair not being comfortable I=Independent, Mod I=Modified Independent, S=Supervision, SBA=Standby Assistance, CGA=Contact Guard Assistance,  
Min=Minimal Assistance, Mod=Moderate Assistance, Max=Maximal Assistance, Total=Total Assistance, NT=Not Tested BALANCE: Good Fair+ Fair Fair- Poor NT Comments Sitting Static [x] [] [] [] [] [] Sitting Dynamic [x] [] [] [] [] []   
         
Standing Static [] [x] [] [] [] []   
Standing Dynamic [] [x] [] [] [] [] PLAN:  
FREQUENCY/DURATION: OT Plan of Care: 3 times/week for duration of hospital stay or until stated goals are met, whichever comes first. 
 
TREATMENT:  
TREATMENT:  
($$ Self Care/Home Management: 23-37 mins    ) Self Care (24 Minutes): Self care including Lower Body Dressing and Grooming to increase independence and decrease level of assistance required. TREATMENT GRID: 
N/A 
 
AFTER TREATMENT POSITION/PRECAUTIONS: 
Bed, Needs within reach and RN notified INTERDISCIPLINARY COLLABORATION: 
RN/PCT and OT/WOLFF TOTAL TREATMENT DURATION: 
OT Patient Time In/Time Out Time In: 1477 Time Out: 1045 Parris Canchola OT

## 2021-06-17 NOTE — PROGRESS NOTES
PLAN: 
Discharge home today on PTA Eliquis Abx recs per ID Drain out PTA Home O2 at baseline ASSESSMENT:  Admit Date: 6/8/2021  
3 Day Post-Op  Procedure(s): RIGHT THORACOTOMY/ BRONCHIOPLEURAL FISTULA REPAIR Principal Problem: 
  Sepsis due to undetermined organism ([address] (5/5/2020) Active Problems: 
  Cancer of bronchus of right lower lobe ([address] (7/23/2020) S/P thoracotomy (1/7/2021) Acute hypoxemic respiratory failure ([address] (6/8/2021) Bronchopleural fistula ([address] (6/8/2021) Empyema ([address] (6/8/2021) Acute on chronic respiratory failure with hypoxemia ([address] (6/8/2021) Atrial flutter ([address] (6/10/2021) Pleural fistula ([address] (6/14/2021) Moderate protein-calorie malnutrition ([address] (6/17/2021) SUBJECTIVE: 
6/15/21 POD#1 Pt without complaints. On Heparin gtt, paced. Ifeomae Riedel with 110mL out, no air in bag. AF, VSS, 3L NC. WBC 6. H/H 8/27. CXR: 
FINDINGS: No change in the right lung pneumothorax or post pneumonectomy cavity, 
with an indwelling chest tube overlying skin staples. Left basilar atelectasis 
is unchanged. The heart size is stable. Again noted is a left chest wall 
pacemaker and right chest wall infusion port catheter. 
  
IMPRESSION No interval change. 6/16/21 POD#1 AF, VSS, on 2L NC. Wlily with 225mL serosanguinous output, no air. WBC 6, H/H 8/27. CR 0.86. CXR stable. 6/17/21  POD#2 AF, VSS, on 2L NC. Good UOP. Samira Riedel removed. WBC 7. Pain controlled. OBJECTIVE: 
Constitutional: Alert oriented cooperative patient in no acute distress; appears stated age Visit Vitals BP 95/60 Pulse 86 Temp 97.3 °F (36.3 °C) Resp 18 Ht 5' 10\" (1.778 m) Wt 180 lb 4.8 oz (81.8 kg) SpO2 93% BMI 25.87 kg/m² Eyes:Sclera are clear. ENMT: no external lesions gross hearing normal; no obvious neck masses, no ear or lip lesions CV: Regular rate; paced. Normal perfusion Resp: breathing is non labored, on 2L NC 
GI: soft and non-distended Musculoskeletal: unremarkable with normal function. No embolic signs or cyanosis. Neuro:  Oriented; moves all 4; no focal deficits Psychiatric: normal affect and mood, no memory impairment Patient Vitals for the past 24 hrs: 
 BP Temp Pulse Resp SpO2 Height  
06/17/21 0924     93 %   
06/17/21 0715 95/60 97.3 °F (36.3 °C) 86 18 94 %   
06/17/21 0624 100/60       
06/17/21 0257 94/64 98.4 °F (36.9 °C) 86 20 95 %   
06/16/21 2355   90     
06/16/21 2324 96/62 98.5 °F (36.9 °C) 91 20 94 %   
06/16/21 2012   68 20 94 %   
06/16/21 1927 100/63 98.9 °F (37.2 °C) 65 20 94 %   
06/16/21 1532      5' 10\" (1.778 m) 06/16/21 1445 102/71 98.3 °F (36.8 °C) 71 22 98 %   
06/16/21 1416   69 (!) 44 97 %   
06/16/21 1401 104/62  83 29 94 %   
06/16/21 1346   77 23 98 %   
06/16/21 1331 103/68  76 (!) 41 93 %   
06/16/21 1316   76 28 94 %   
06/16/21 1301 104/65  80 20 96 %   
06/16/21 1246   71 14 98 %   
06/16/21 1231 98/64  69 17 98 %   
06/16/21 1216   69 30 99 %   
06/16/21 1201 98/63  69 22 98 %   
06/16/21 1200   82     
06/16/21 1146   69 22 97 %  Labs:   
Recent Labs  
  06/17/21 
0540 06/15/21 
1052 WBC 7.1  --   
HGB 8.0*  --   
  --   
  --   
K 4.1 4.5  
  --   
CO2 34*  --   
BUN 16  --   
CREA 0.96  --   
GLU 92  --   
APTT 51.0*  --   
TBILI  --  0.7 CBIL  --  0.2 Delaney Crigler, PA

## 2021-06-17 NOTE — PROGRESS NOTES
Date of Outreach Update: 
[facility]. was seen and assessed. MEWS Score: 2 (06/16/21 1445) Vitals:  
 06/16/21 2012 06/16/21 2324 06/16/21 2355 06/17/21 1900 BP:  96/62  94/64 Pulse: 68 91 90 86 Resp: 20 20  20 Temp:  98.5 °F (36.9 °C)  98.4 °F (36.9 °C) SpO2: 94% 94%  95% Weight:      
Height:      
  
 
 Pain Assessment Pain Intensity 1: 0 (06/17/21 0210) Pain Location 1: Abdomen, Chest 
Pain Intervention(s) 1: Medication (see MAR) Patient Stated Pain Goal: 0 Previous Outreach assessment has been reviewed. There have been no significant clinical changes since the completion of the last dated Outreach assessment. Will continue to follow up per outreach protocol.  
 
Signed By:   Corinne Artist, RN 
  June 17, 2021 4:36 AM

## 2021-06-17 NOTE — PROGRESS NOTES
[address] [facility] OUTREACH NURSE PROGRESS REPORT SUBJECTIVE: Called to assess patient secondary to transfer from ICU. MEWS Score: 2 (06/16/21 1445) Vitals:  
 06/16/21 1445 06/16/21 1532 06/16/21 1927 06/16/21 2012 BP: 102/71  100/63 Pulse: 71  65 68 Resp: 22  20 20 Temp: 98.3 °F (36.8 °C)  98.9 °F (37.2 °C) SpO2: 98%  94% 94% Weight:      
Height:  5' 10\" (1.778 m) EKG: normal EKG, normal sinus rhythm, unchanged from previous tracings. LAB DATA: 
 
Recent Labs  
  06/16/21 
0358 06/15/21 
1052 06/15/21 
0333 06/14/21 
0353   --  138 139  
K 4.1 4.5 5.2* 4.1   --  104 105 CO2 34*  --  30 32 AGAP 2*  --  4* 2*  
GLU 92  --  120* 110* BUN 18  --  15 11 CREA 0.86  --  0.77* 1.00 GFRAA >60  --  >60 >60 GFRNA >60  --  >60 >60  
CA 9.0  --  8.8 8.6 MG 2.4  --  2.5* 2.4 PHOS 3.3  --  4.0* 3.6 Recent Labs  
  06/16/21 
3098 06/15/21 
0333 06/14/21 
2125 WBC 6.8 6.4 6.2 HGB 8.6* 8.6* 9.0*  
HCT 27.8* 27.7* 29.3*  
 200 197 OBJECTIVE: On arrival to room, I found patient to be resting in bed. Pain Assessment Pain Intensity 1: 0 (06/16/21 3194) Pain Location 1: Abdomen, Chest 
Pain Intervention(s) 1: Medication (see MAR) Patient Stated Pain Goal: 0 
 
  
  
  
  
 
  
  
  
   
 
ASSESSMENT:  Patient resting in bed on 2L nc O2 sat 91%. O2 increased to 3L and pt sat increased to 93%. Lung sounds on L side clear. R highly lung diminished. Pt is alert and oriented to person place time and situation. Pt breathing unlabored. Pt has no complaints at this time. PLAN:  Will continue to follow per outreach protocol.

## 2021-06-17 NOTE — PROGRESS NOTES
Spiritual Care Visit, initial visit. Visited with patient at bedside. After surgery and lengthy surgery, patient expects to be discharged today. Prayed for patient's healing and health. Visit by Narendra Nugent, Staff .  Drea., Th.B., B.A.

## 2021-06-17 NOTE — DISCHARGE INSTRUCTIONS
Discharge Instructions/Follow-up Plans:   MD Instructions:     Follow-up with Dr. Prosper Young in 1 week. Keep incisions clean and dry, may remain uncovered. Do not apply lotions, creams or ointments to incisions.     Diet - as tolerated - Soft foods diet. Activity - ambulate - as tolerated - no heavy lifting >10lb. May shower - no tub baths or soaking/submerging.     No driving while taking narcotics. Do not drink alcohol while taking narcotics. Resume other home medications.      If problems or questions arise, please call our office at [phone].     Greater than 30 minutes were spent discharging the patient            DISCHARGE SUMMARY from Nurse    PATIENT INSTRUCTIONS:    After general anesthesia or intravenous sedation, for 24 hours or while taking prescription Narcotics:  · Limit your activities  · Do not drive and operate hazardous machinery  · Do not make important personal or business decisions  · Do  not drink alcoholic beverages  · If you have not urinated within 8 hours after discharge, please contact your surgeon on call. Report the following to your surgeon:  · Excessive pain, swelling, redness or odor of or around the surgical area  · Temperature over 100.5  · Nausea and vomiting lasting longer than 4 hours or if unable to take medications  · Any signs of decreased circulation or nerve impairment to extremity: change in color, persistent  numbness, tingling, coldness or increase pain  · Any questions    What to do at Home:    Recommended activity: No heavy lifting. May shower but no tub baths, hot tubs, or swimming. No driving until off pain medications for 24 hours and approved by MD.  After showering, never leave a wet dressing in place.     If you experience any of the following symptoms, please call your doctor: Temperature of 100.5 or greater, persistent nausea and vomiting, increased pain, any signs of abnormal bleeding, increased shortness of breath, increase in green, yellow or blood tinged secretions, or increased redness, swelling, or drainage from surgical site    *  Please give a list of your current medications to your Primary Care Provider. *  Please update this list whenever your medications are discontinued, doses are      changed, or new medications (including over-the-counter products) are added. *  Please carry medication information at all times in case of emergency situations. These are general instructions for a healthy lifestyle:    No smoking/ No tobacco products/ Avoid exposure to second hand smoke  Surgeon General's Warning:  Quitting smoking now greatly reduces serious risk to your health. Obesity, smoking, and sedentary lifestyle greatly increases your risk for illness    A healthy diet, regular physical exercise & weight monitoring are important for maintaining a healthy lifestyle    You may be retaining fluid if you have a history of heart failure or if you experience any of the following symptoms:  Weight gain of 3 pounds or more overnight or 5 pounds in a week, increased swelling in our hands or feet or shortness of breath while lying flat in bed. Please call your doctor as soon as you notice any of these symptoms; do not wait until your next office visit. The discharge information has been reviewed with the patient. The patient verbalized understanding. Discharge medications reviewed with the patient and appropriate educational materials and side effects teaching were provided. ___________________________________________________________________________________________________________________________________   Thoracotomy: What to Expect at Home  Your Recovery  A thoracotomy (say \"fdfo-on-ZQA-tuh-danny\") is a cut (incision) that the doctor makes in the chest wall through your front, side, or back. The doctor is able to do surgery inside the chest through the incision.  A thoracotomy may be used to do surgery on the lungs, esophagus, trachea, heart, aorta, or diaphragm. The exact place in the chest where the doctor makes the incision depends on the reason for the surgery. It is common to feel tired for 6 to 8 weeks after surgery. Your chest may hurt and be swollen for up to 6 weeks. It may ache or feel stiff for up to 3 months. You may also feel tightness, itching, numbness, or tingling around the incision for up to 3 months. Your doctor will give you medicine to help with pain. You will have stitches or staples in the incision. You may have one or more tubes coming out of your chest to drain fluid and air that can build up after surgery. The tubes are often removed before you leave the hospital. Your doctor will remove the stitches or staples at your follow-up visit. You may feel short of breath at first after the surgery. Your doctor, nurse, or respiratory therapist will teach you deep-breathing and coughing exercises to help your body get as much oxygen as possible. You also may need to get extra oxygen through a mask or a plastic tube in your nostrils (nasal cannula). This is called oxygen therapy. The amount of time you will need to recover depends on the surgery you had. You probably will need to take at least 1 to 2 months off work. This care sheet gives you a general idea about how long it will take for you to recover. But each person recovers at a different pace. Follow the steps below to get better as quickly as possible. How can you care for yourself at home? Activity    · Rest when you feel tired. Getting enough sleep will help you recover.     · Try to walk each day. Start by walking a little more than you did the day before. Bit by bit, increase the amount you walk. Walking boosts blood flow and helps prevent pneumonia and constipation.     · Do not smoke or allow others to smoke around you. If you need help quitting, talk to your doctor about stop-smoking programs and medicines.  These can increase your chances of quitting for good.     · Try to avoid being around people who you know have a cold, the flu, or other illness.     · Avoid strenuous activities, such as bicycle riding, jogging, weight lifting, or aerobic exercise, until your doctor says it is okay. Also avoid swimming, tennis, golf, or other activities that could strain your arm and shoulder muscles, until your doctor says it is okay.     · Until your doctor says it is okay, avoid lifting anything that would make you strain. This may include a child, heavy grocery bags and milk containers, a heavy briefcase or backpack, cat litter or dog food bags, or a vacuum .     · If your incision is in the front or the side of your chest, hold a pillow over the incision when you cough or take deep breaths. This will support your chest and decrease your pain.     · Ask your doctor when it is safe to you to drive or fly. You probably will not be able to drive for at least 4 weeks. This is because your arm and shoulder muscles may be stiff after surgery and could make it difficult to steer.     · You may be able to take showers (unless you have a drain near your incision). If you have a drain near your incision, follow your doctor's instructions to empty and care for it. Do not take a bath for the first 2 weeks, or until your doctor tells you it is okay.     · Ask your doctor when it is okay for you to have sex.     · You will probably need to take at least 1 to 2 months off from work. It depends on the surgery you had and the type of work you do. Diet    · You can eat your normal diet. If your stomach is upset, try bland, low-fat foods like plain rice, broiled chicken, toast, and yogurt.     · Drink plenty of fluids (unless your doctor tells you not to).     · You may notice that your bowel movements are not regular right after your surgery. This is common. Try to avoid constipation and straining with bowel movements. You may want to take a fiber supplement every day.  If you have not had a bowel movement after a couple of days, ask your doctor about taking a mild laxative. Medicines    · Your doctor will tell you if and when you can restart your medicines. He or she will also give you instructions about taking any new medicines.     · If you take aspirin or some other blood thinner, ask your doctor if and when to start taking it again. Make sure that you understand exactly what your doctor wants you to do.     · Be safe with medicines. Take pain medicines exactly as directed. ? If the doctor gave you a prescription medicine for pain, take it as prescribed. ? If you are not taking a prescription pain medicine, ask your doctor if you can take an over-the-counter medicine. ? Do not take two or more pain medicines at the same time unless the doctor told you to. Many pain medicines have acetaminophen, which is Tylenol. Too much acetaminophen (Tylenol) can be harmful.     · If you think your pain medicine is making you sick to your stomach:  ? Take your medicine after meals (unless your doctor has told you not to). ? Ask your doctor for a different pain medicine.     · If your doctor prescribed antibiotics, take them as directed. Do not stop taking them just because you feel better. You need to take the full course of antibiotics. Incision care    · If you have strips of tape on the incision, leave the tape on for a week or until it falls off.     · Wash the area daily with warm, soapy water, and pat it dry. Don't use hydrogen peroxide or alcohol, which may delay healing. You may cover the area with a gauze bandage if it weeps or rubs against clothing. Change the bandage every day.     · Keep the area clean and dry. Exercise    · To help keep your lungs clear, cough and do deep breathing exercises as you are told by your doctor, nurse, or respiratory therapist.     · Your doctor may send you home with an incentive spirometer.  This device helps you practice taking deep breaths, which can help keep your lungs healthy.     · Ask your doctor about exercises to keep your arm and shoulder muscles strong and flexible while you recover. Follow-up care is a key part of your treatment and safety. Be sure to make and go to all appointments, and call your doctor if you are having problems. It's also a good idea to know your test results and keep a list of the medicines you take. When should you call for help? Call 911 anytime you think you may need emergency care. For example, call if:    · You passed out (lost consciousness).     · You have severe trouble breathing.     · You have sudden chest pain and shortness of breath, or you cough up blood. Call your doctor now or seek immediate medical care if:    · You are sick to your stomach or cannot keep fluids down.     · You have pain that does not get better after you take pain medicine.     · You have a fever over 100°F.     · You have loose stitches, or your incision comes open.     · Bright red blood has soaked through the bandage over your incision.     · You have signs of infection, such as:  ? Increased pain, swelling, warmth, or redness. ? Red streaks leading from the incision. ? Pus draining from the incision. ? Swollen lymph nodes in your neck, armpits, or groin. ? A fever.     · You cough up a lot more mucus than normal, or your mucus changes color. Watch closely for changes in your health, and be sure to contact your doctor if you have any problems. Where can you learn more? Go to http://www.gray.com/  Enter T374 in the search box to learn more about \"Thoracotomy: What to Expect at Home. \"  Current as of: October 26, 2020               Content Version: 12.8  © 2505-3887 [employer]. Care instructions adapted under license by [employer] (which disclaims liability or warranty for this information).  If you have questions about a medical condition or this instruction, always ask your healthcare professional. [address] any warranty or liability for your use of this information. Chest Tube Removal: What to Expect at Home  Your Recovery     A chest tube is placed through the chest wall between two ribs. You may have had a chest tube put in to help your collapsed lung expand. Or the tube may have helped drain fluid from a chest infection or surgery. The tube was removed before you came home. You may have some pain in your chest from the cut (incision) where the tube was put in. For most people, the pain goes away after about 2 weeks. You will have a bandage taped over the wound. Your doctor will remove the bandage and examine the wound in about 2 days. It will take about 3 to 4 weeks for your incision to heal completely. It may leave a small scar that will fade with time. This care sheet gives you a general idea about how long it will take for you to recover. But each person recovers at a different pace. Follow the steps below to feel better as quickly as possible. How can you care for yourself at home? Activity    · Rest when you feel tired. Getting enough sleep will help you recover.     · Try to walk each day. Start by walking a little more than you did the day before. Bit by bit, increase the amount you walk. Walking boosts blood flow and helps prevent pneumonia and constipation.     · Avoid strenuous activities, such as bicycle riding, jogging, weight lifting, or aerobic exercise, until your doctor says it is okay.     · How soon you can return to work or your normal routine depends on what health problems you have. Talk with your doctor about how long it will take you to recover.     · You may shower after your bandage is removed. Pat the cut (incision) dry. Do not take a bath for 2 weeks after your chest tube is out, or until your doctor tells you it is okay.     · Practice deep breathing exercises as directed by your doctor.  Coughing exercises also can help drain fluid out of your chest.   Diet    · You can eat your normal diet. If your stomach is upset, try bland, low-fat foods like plain rice, broiled chicken, toast, and yogurt.     · Drink plenty of fluids (unless your doctor tells you not to). Medicines    · Your doctor will tell you if and when you can restart your medicines. You will also get instructions about taking any new medicines.     · If you take aspirin or some other blood thinner, ask your doctor if and when to start taking it again. Make sure that you understand exactly what your doctor wants you to do.     · Be safe with medicines. Take pain medicines exactly as directed. ? If the doctor gave you a prescription medicine for pain, take it as prescribed. ? If you are not taking a prescription pain medicine, ask your doctor if you can take an over-the-counter medicine.     · Take your antibiotics as directed. Do not stop taking them just because you feel better. You need to take the full course of antibiotics. Incision care    · Keep the incision dry as it heals. You will have a bandage over it to help the incision heal and protect it. Your doctor will tell you how to take care of this. Other instructions    · Do not smoke. Smoking makes lung problems worse. If you need help quitting, talk to your doctor about stop-smoking programs and medicines. These can increase your chances of quitting for good. Follow-up care is a key part of your treatment and safety. Be sure to make and go to all appointments, and call your doctor if you are having problems. It's also a good idea to know your test results and keep a list of the medicines you take. When should you call for help? Call 911 anytime you think you may need emergency care. For example, call if:    · You passed out (lost consciousness).     · You have severe trouble breathing.     · You have sudden chest pain and shortness of breath, or you cough up blood.    Call your doctor now or seek immediate medical care if:    · You have trouble breathing.     · Your shortness of breath is getting worse.     · Bright red blood soaks through the bandage over your incision.     · You have a fever. Watch closely for any changes in your health, and be sure to contact your doctor if:    · You do not get better as expected. Where can you learn more? Go to http://www.gray.com/  Enter U748 in the search box to learn more about \"Chest Tube Removal: What to Expect at Home. \"  Current as of: October 26, 2020               Content Version: 12.8  © 7065-1928 [employer]. Care instructions adapted under license by [employer] (which disclaims liability or warranty for this information). If you have questions about a medical condition or this instruction, always ask your healthcare professional. [address] any warranty or liability for your use of this information.

## 2021-06-17 NOTE — PROGRESS NOTES
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  
 
· Plan is for discharge today. Discussed with microlab and isolate needed to be subcultured and will take another day or two to finalize. · Will plan four week course of cephalexin 500 mg PO QID and flagyl 500 mg PO TID. · No ID follow up will be needed as long as he is doing well. Anti-infectives: · Ceftriaxone (6/10 - 
· Flagyl ( - 
· Zosyn (-6/10) · Vancomycin (-6/10) Subjective: Interval history: afebrile, WBCs 7.1k, creatinine 0.96, denies nausea, vomiting, diarrhea. Allergies Allergen Reactions  Albuterol Palpitations  Celebrex [Celecoxib] Itching Review of Systems:  Pertinent items are noted in the History of Present Illness. Objective:  
 
Visit Vitals BP 91/60 Pulse 84 Temp 97.3 °F (36.3 °C) Resp 18 Ht 5' 10\" (1.778 m) Wt 81.8 kg (180 lb 4.8 oz) SpO2 98% BMI 25.87 kg/m² Temp (24hrs), Av.3 °F (36.8 °C), Min:97.3 °F (36.3 °C), Max:98.9 °F (37.2 °C) General:  Alert, no acute distress, appears stated age, well nourished and well developed Head:    Normocephalic, atraumatic Eyes:   Anicteric sclerae, no drainage, not injected, EOMI Mouth:  Moist mucosa Neck:   Supple, symmetrical, trachea midline, no JVD Lungs:   Course lungs throughout, with diminished RML/RLL 
CV:   Regular rate and rhythm without audible murmur Abdomen:  Soft, non tender, not distended, active bowel sounds Extremities:  No cyanosis or edema Musculoskeletal: Moves all extremities with equal strength, no deformity Skin:   No acute rash or lesions Psych:  Alert, oriented and appropriate without evidence of thought disorder Lines:    Benign; R chest port, PIV Data Review: CBC: 
Recent Labs  
  06/17/21 
0540 06/16/21 
0643 06/15/21 
4524 WBC 7.1 6.8 6.4 GRANS 79* 78 90* MONOS 8 8 3* EOS 1 1 0* ANEU 5.6 5.4 5.8 ABL 0.8 0.7 0.4* HGB 8.0* 8.6* 8.6* HCT 26.4* 27.8* 27.7*  
 217 200 BMP: 
Recent Labs  
  06/17/21 
0540 06/16/21 
0643 06/15/21 
1052 06/15/21 
4970 CREA 0.96 0.86  --  0.77* BUN 16 18  --  15  
 139  --  138  
K 4.1 4.1 4.5 5.2*  
 103  --  104 CO2 34* 34*  --  30 AGAP 3* 2*  --  4*  
GLU 92 92  --  120* LFTS: 
Recent Labs  
  06/15/21 
1052 TBILI 0.7 Microbiology:  
 
All Micro Results Procedure Component Value Units Date/Time FUNGUS CULTURE AND SMEAR [[id number]] Collected: 06/14/21 1508 Order Status: Completed Specimen: Miscellaneous sample Updated: 06/17/21 1136 Source LUNG Comment: EMPYEMA Fungus stain Comment Comment: (NOTE) Tissue Grinding and Digestion/Decontamination Fungus (Mycology) Culture Other source received Comment: (NOTE) Performed At: [address] [id number] Rain Connelly MD YO:[id number] CULTURE, TISSUE [address] [[id number]]  (Abnormal) Collected: 06/14/21 1508 Order Status: Completed Specimen: Lung Updated: 06/17/21 0452 Special Requests: NO SPECIAL REQUESTS     
  GRAM STAIN 12 TO 18 WBCS SEEN PER OIF  
   FEW GRAM POSITIVE COCCI Culture result: SCANT GRAM POSITIVE COCCI SUBCULTURE IN PROGRESS  
     
      
  CULTURE IN PROGRESS,FURTHER UPDATES TO FOLLOW Walter Muñoz [[id number]] Collected: 06/14/21 1508 Order Status: Completed Specimen: Lung Updated: 06/16/21 0940   Special Requests: NO SPECIAL REQUESTS Culture result: SUBCULTURE IS NECESSARY TO DETERMINE PRESENCE OR ABSENCE OF ANAEROBIC BACTERIA IN THIS CULTURE. FURTHER REPORT TO FOLLOW AFTER INCUBATION OF SUBCULTURE. CULTURE, BODY FLUID Willow Piter [[id number]] Collected: 21 1515 Order Status: Canceled Specimen: Body Fluid from Drainage Lopez Bravo [[id number]] Order Status: Sent Specimen: Abdomen BLOOD CULTURE [[id number]] Collected: 21 1445 Order Status: Completed Specimen: Blood Updated: 21 1032 Special Requests: LEFT FOOT Culture result: NO GROWTH 5 DAYS     
 BLOOD CULTURE [[id number]] Collected: 21 1627 Order Status: Completed Specimen: Blood Updated: 21 1032 Special Requests: --     
  RIGHT 
FOREARM Culture result: NO GROWTH 5 DAYS     
 CULTURE, BODY FLUID Rise Luis STAIN [[id number]]  (Abnormal) Collected: 21 1941 Order Status: Completed Specimen: Body Fluid from Right Updated: 21 1457 Special Requests: NO SPECIAL REQUESTS     
  GRAM STAIN 45  WBC'S/OIF  
   MANY GRAM POSITIVE COCCI MODERATE GRAM NEGATIVE RODS Culture result:    
  HEAVY MIXED GRAM POSITIVE COCCI RESEMBLING NORMAL RESPIRATORY EDU  
     
      
  THIS ORGANISM WILL BE HELD FOR 7 DAYS. IF FURTHER TESTING IS REQUIRED PLEASE NOTIFY MICROBIOLOGY  
     
 CULTURE, RESPIRATORY/SPUTUM/BRONCH W Anthony Chirinos [[id number]] Collected: 21 2305 Order Status: Completed Specimen: Sputum Updated: 06/10/21 2321 Special Requests: NO SPECIAL REQUESTS     
  GRAM STAIN    
  GRAM STAIN EXAMINATION OF THIS SPECIMEN INDICATED OROPHARYNGEAL CONTAMINATION. PLEASE SUBMIT A NEW SPECIMEN OR NOTIFY THE MICRO DEPT WITHIN 48HRS IF FURTHER WORKUP IS DESIRED. Culture result:    
  Please reorder and recollect. Cecilio Rodriguez 21 @1546, ABISAI Imagin21 CXR: FINDINGS: The right-sided pneumothorax or postsurgical cavity is unchanged, as 
is mild left basilar atelectasis. Again noted is a right-sided chest tube, a 
right chest wall infusion port catheter and a left chest wall biventricular 
pacemaker. Signed By: Arpit Gonzales NP   
 June 17, 2021

## 2021-06-17 NOTE — PROGRESS NOTES
Problem: Pressure Injury - Risk of 
Goal: *Prevention of pressure injury Description: Document Sidney Scale and appropriate interventions in the flowsheet. Outcome: Progressing Towards Goal 
Note: Pressure Injury Interventions: 
  
 
Moisture Interventions: Absorbent underpads, Apply protective barrier, creams and emollients, Assess need for specialty bed, Check for incontinence Q2 hours and as needed, Contain wound drainage, Maintain skin hydration (lotion/cream), Minimize layers, Moisture barrier Activity Interventions: Increase time out of bed, Pressure redistribution bed/mattress(bed type) Mobility Interventions: Pressure redistribution bed/mattress (bed type), PT/OT evaluation Nutrition Interventions: Document food/fluid/supplement intake, Offer support with meals,snacks and hydration Friction and Shear Interventions: Apply protective barrier, creams and emollients, Feet elevated on foot rest, Foam dressings/transparent film/skin sealants, HOB 30 degrees or less, Lift sheet, Lift team/patient mobility team, Minimize layers Problem: Patient Education: Go to Patient Education Activity Goal: Patient/Family Education Outcome: Progressing Towards Goal 
  
Problem: Falls - Risk of 
Goal: *Absence of Falls Description: Document Lilton Broad Fall Risk and appropriate interventions in the flowsheet. Outcome: Progressing Towards Goal 
Note: Fall Risk Interventions: 
Mobility Interventions: Communicate number of staff needed for ambulation/transfer, Patient to call before getting OOB Medication Interventions: Patient to call before getting OOB, Teach patient to arise slowly Elimination Interventions: Call light in reach, Patient to call for help with toileting needs, Urinal in reach History of Falls Interventions: Bed/chair exit alarm, Door open when patient unattended, Room close to nurse's station Problem: Patient Education: Go to Patient Education Activity Goal: Patient/Family Education Outcome: Progressing Towards Goal 
  
Problem: Patient Education: Go to Patient Education Activity Goal: Patient/Family Education Outcome: Progressing Towards Goal 
  
Problem: Patient Education: Go to Patient Education Activity Goal: Patient/Family Education Outcome: Progressing Towards Goal

## 2021-06-17 NOTE — PROGRESS NOTES
Pt's D/C instructions completed. Verbalized understanding of all instructions including diet, activity, s/sx to alert MD, medications, wound care, and f/u appointment. Family at [facility]. Dressing changed and instructions given on removing when at home.  obtaining nubulizer for pt

## 2021-06-17 NOTE — PROGRESS NOTES
06/16/21 1445 Dual Skin Pressure Injury Assessment Dual Skin Pressure Injury Assessment X Second Care Provider (Based on [facility]) Chris Howard, [address] Sacrum   
(2 small open areas) Skin Integumentary Skin Integumentary (WDL) X Skin Integrity Incision (comment) 
(R chest) Skin Color Appropriate for ethnicity Skin Condition/Temp Dry; Warm Turgor Non-tenting Hair Growth Present Wound Prevention and Protection Methods Orientation of Wound Prevention Posterior Location of Wound Prevention Sacrum/Coccyx Dressing Present  Yes; Intact, not due to be changed Dressing Status Intact Wound Offloading (Prevention Methods) Bed, pressure reduction mattress; Foam silicone

## 2021-06-25 PROBLEM — Z98.890 HISTORY OF THORACOTOMY: Chronic | Status: ACTIVE | Noted: 2021-01-01

## 2021-06-25 PROBLEM — J40 TRACHEOBRONCHITIS: Status: ACTIVE | Noted: 2021-01-01

## 2021-06-25 PROBLEM — Z98.890 HISTORY OF THORACOTOMY: Status: ACTIVE | Noted: 2021-01-01

## 2021-06-25 PROBLEM — C34.31 CANCER OF BRONCHUS OF RIGHT LOWER LOBE (HCC): Chronic | Status: ACTIVE | Noted: 2020-07-23

## 2021-06-25 NOTE — PROGRESS NOTES
LTG: Pt will consume/tolerate least restrictive diet without signs/sx of aspiration with 100% accuracy     SPEECH LANGUAGE PATHOLOGY: DYSPHAGIA- Initial Assessment and Discharge    NAME/AGE/GENDER: April Mercado is a 78 y.o. male  DATE: 6/25/2021  PRIMARY DIAGNOSIS: Cough [R05]  History of thoracotomy [Z98.890]       ICD-10: Treatment Diagnosis: R13.13 Dysphagia, Pharyngeal Phase    RECOMMENDATIONS   DIET:   Regular Consistency  Thin Liquids    MEDICATIONS: With liquid     ASPIRATION PRECAUTIONS  Upright at 90 degrees during meal     COMPENSATORY STRATEGIES/MODIFICATIONS  None     RECOMMENDATIONS for CONTINUED SPEECH THERAPY:   No further speech therapy indicated at this time. ASSESSMENT   Patient presents with oropharyngeal swallow function that is within normal limits. No s/sx of dysphagia identified. Recommend No need for skilled speech therapy at this time. EDUCATION:  Recommendations discussed with Patient and Family  CONTINUATION OF SKILLED SERVICES/MEDICAL NECESSITY:  No additional speech services warranted. REHABILITATION POTENTIAL FOR STATED GOALS: Excellent    PLAN    FREQUENCY/DURATION: No further speech therapy indicated at this time as oropharyngeal swallow function is within normal limits. - Recommendations for next treatment session: No additional speech therapy indicated at this time. SUBJECTIVE   \"I speak fine\"  Patient resting in his room visiting with his cousin. Patient reports no trouble eating. Just gets short of breath due to surgery.     Oxygen Device: nasal cannula  Pain: Pain Scale 1: Numeric (0 - 10)  Pain Intensity 1: 0  Pain Location 1: Generalized    History of Present Injury/Illness: Mr. Naresh Suarez  has a past medical history of Arrhythmia, Chronic obstructive pulmonary disease ([address] Chronic pain, GERD (gastroesophageal reflux disease), Hypertension, Hypoxia (02/24/2020), Malignant neoplasm of lower lobe of right lung ([address] (02/26/2020), Osteoarthritis, Right lower lobe lung mass (02/24/2020), and Status post total right knee replacement (2/29/2016). He also has no past medical history of Aneurysm ([address] Asthma, Autoimmune disease ([address] CAD (coronary artery disease), Chronic kidney disease, Coagulation disorder ([address] Diabetes ([address] Difficult intubation, Endocarditis, Heart failure ([address] Liver disease, Malignant hyperthermia due to anesthesia, Nausea & vomiting, Pseudocholinesterase deficiency, Psychiatric disorder, PUD (peptic ulcer disease), Rheumatic fever, Seizures ([address] Sleep apnea, Stroke ([address] Thromboembolus ([address] or Thyroid disease. Sonam Reyes He also  has a past surgical history that includes hx colonoscopy; ir insert tunl cvc w port over 5 years (3/18/2020); hx vascular access (Right, placed 3/2020); hx knee arthroscopy (Left); hx knee arthroscopy (Right, 1974, 1975); hx knee replacement (Right, 2016); hx tonsillectomy (1959); pr sinus surgery proc unlisted (1988, 1991); and pr chest surgery procedure unlisted. PRECAUTIONS/ALLERGIES: Albuterol and Celebrex [celecoxib]     Problem List:  (Impairments causing functional limitations):  NO signs or symptoms of aspiration or dysphagia    Previous Dysphagia: NONE REPORTED  Diet Prior to Evaluation: Bernardino Tucker / alex    Orientation:  Person  Place  Time  Situation    Cognitive-Linguistic Screening:  Alertness  Alert  Speech Production:   Fully intelligible  Expressive Language:  Fluent speech  Receptive Language: Answers yes/no questions appropriately and Follows commands  Cognition:   Within functional limits  Prior Level of Function: just retired a few weeks ago  Recommendations: Given results of screening, patient appears to be functioning at baseline. No acute assessment of speech, language, or cognition warranted.      OBJECTIVE   Oral Motor:   Labial: No impairment  Lingual: No impairment    Dysphagia evaluation:   Patient consumed trials of ginger ale at bedside    Tool Used: Dysphagia Outcome and Severity Scale (DELIA)    Score Comments   Normal Diet  [x] 7 With no strategies or extra time needed   Functional Swallow  [] 6 May have mild oral or pharyngeal delay   Mild Dysphagia  [] 5 Which may require one diet consistency restricted    Mild-Moderate Dysphagia  [] 4 With 1-2 diet consistencies restricted   Moderate Dysphagia  [] 3 With 2 or more diet consistencies restricted   Moderate-Severe Dysphagia  [] 2 With partial PO strategies (trials with ST only)   Severe Dysphagia  [] 1 With inability to tolerate any PO safely      Score:  Initial: 7 Most Recent: 7 (Date 06/25/21 )   Interpretation of Tool: The Dysphagia Outcome and Severity Scale (DELIA) is a simple, easy-to-use, 7-point scale developed to systematically rate the functional severity of dysphagia based on objective assessment and make recommendations for diet level, independence level, and type of nutrition. Current Medications:   No current facility-administered medications on file prior to encounter. Current Outpatient Medications on File Prior to Encounter   Medication Sig Dispense Refill    pantoprazole (Protonix) 40 mg tablet Take 40 mg by mouth daily. cephALEXin (KEFLEX) 500 mg capsule Take 1 Capsule by mouth four (4) times daily for 30 days. 120 Capsule 0    metroNIDAZOLE (FLAGYL) 500 mg tablet Take 1 Tablet by mouth three (3) times daily for 30 days. 90 Tablet 0    levalbuterol (Xopenex) 1.25 mg/3 mL nebu 3 mL by Nebulization route four (4) times daily. 120 Nebule 5    gabapentin (NEURONTIN) 300 mg capsule TAKE 1 CAPSULE BY MOUTH THREE TIMES A  Cap 0    metoprolol succinate (TOPROL-XL) 50 mg XL tablet Take 1 Tab by mouth daily. 30 Tab 5    umeclidinium (Incruse Ellipta) 62.5 mcg/actuation inhaler Take 1 Puff by inhalation daily. 1 Inhaler 11    apixaban (Eliquis) 5 mg tablet Take 1 Tab by mouth two (2) times a day. 180 Tab 3    OXYGEN-AIR DELIVERY SYSTEMS by Does Not Apply route.  2lpm qhs      Nebulizer & Compressor machine COPD 1 Each 0    ondansetron (Zofran ODT) 8 mg disintegrating tablet Take 1 Tablet by mouth every six (6) hours as needed for Nausea (and vomiting). 20 Tablet 0    docusate sodium (COLACE) 100 mg capsule Take 1 Capsule by mouth two (2) times a day. 60 Capsule 2    ipratropium (ATROVENT) 0.03 % nasal spray 2 Sprays by Both Nostrils route two (2) times a day. (Patient not taking: Reported on 6/25/2021) 30 mL 5    polyethylene glycol (MIRALAX) 17 gram packet Take 1 Packet by mouth daily. Indications: constipation (Patient taking differently: Take 17 g by mouth daily as needed. Indications: constipation) 30 Packet 0    esomeprazole (NexIUM) 20 mg capsule Take 20 mg by mouth nightly. prn (Patient not taking: Reported on 6/25/2021)      ondansetron hcl (ZOFRAN) 8 mg tablet Take 1 Tab by mouth every eight (8) hours as needed for Nausea. Indications: nausea and vomiting caused by cancer drugs 60 Tab 3    lidocaine-prilocaine (EMLA) topical cream Apply  to affected area as needed for Pain.  (Patient not taking: Reported on 6/25/2021) 30 g 0        INTERDISCIPLINARY COLLABORATION: RN    After treatment position/precautions:  Call light within reach  Cousin  at bedside    Total Treatment Duration:           Steven Edwards, SLP

## 2021-06-25 NOTE — H&P
H&P/Consult Note/Progress Note/Office Note:   Kristen Gonsales MRN: [id number]  :1950  Age:75 y.o.    HPI: Kristen Gonsales is a 78 y.o. male who is well known to Dr. 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 Now he is s/p bronchiopleural fistula repair 21. His post-operative course was unremarkable. Prior to surgery he presented with complaints of fever and shortness of breath. He was admitted on 2021 for sepsis r/t right sided empyema. The patient states that over the course of the past few months when he would roll on his left side he would have \"gushing\" fluid from his chest. He presented to the office today for routine follow up and has complaints of a cough. Due to concern for disrupting surgical repair, he will be admitted for further observation and care. Past Medical History:   Diagnosis Date    Arrhythmia     Afib     Chronic obstructive pulmonary disease (HCC)     O2 at HS    Chronic pain     right torn rotator cuff; left knee    GERD (gastroesophageal reflux disease)     controlled with med    Hypertension     hx-- off meds since 2020--- followed by dr. Burnette Dk Hypoxia 2020    Malignant neoplasm of lower lobe of right lung ([facility] 2020    REC'D CHEMO AND RADIATION--- FOLLOWED BY DR. FOX    Osteoarthritis     Right lower lobe lung mass 2020    Status post total right knee replacement 2016     Past Surgical History:   Procedure Laterality Date    HX COLONOSCOPY      HX KNEE ARTHROSCOPY Left     scope X 1, ACL recon X 1    HX KNEE ARTHROSCOPY Right , 1975    X 2     HX KNEE REPLACEMENT Right     HX TONSILLECTOMY      HX VASCULAR ACCESS Right placed 3/2020    port in chest     IR INSERT TUNL CVC W PORT OVER 5 YEARS  3/18/2020    SD CHEST SURGERY PROCEDURE UNLISTED      R lobectomy 2020; R complete pneumonectomy 2021    SD SINUS SURGERY PROC UNLISTED  ,     sinus surg     No current facility-administered medications for this encounter. Current Outpatient Medications   Medication Sig    ondansetron (Zofran ODT) 8 mg disintegrating tablet Take 1 Tablet by mouth every six (6) hours as needed for Nausea (and vomiting).  docusate sodium (COLACE) 100 mg capsule Take 1 Capsule by mouth two (2) times a day.  cephALEXin (KEFLEX) 500 mg capsule Take 1 Capsule by mouth four (4) times daily for 30 days.  metroNIDAZOLE (FLAGYL) 500 mg tablet Take 1 Tablet by mouth three (3) times daily for 30 days.  levalbuterol (Xopenex) 1.25 mg/3 mL nebu 3 mL by Nebulization route four (4) times daily.  gabapentin (NEURONTIN) 300 mg capsule TAKE 1 CAPSULE BY MOUTH THREE TIMES A DAY    metoprolol succinate (TOPROL-XL) 50 mg XL tablet Take 1 Tab by mouth daily.  umeclidinium (Incruse Ellipta) 62.5 mcg/actuation inhaler Take 1 Puff by inhalation daily.  apixaban (Eliquis) 5 mg tablet Take 1 Tab by mouth two (2) times a day.  OXYGEN-AIR DELIVERY SYSTEMS by Does Not Apply route. 2lpm qhs    ipratropium (ATROVENT) 0.03 % nasal spray 2 Sprays by Both Nostrils route two (2) times a day.  Nebulizer & Compressor machine COPD    polyethylene glycol (MIRALAX) 17 gram packet Take 1 Packet by mouth daily. Indications: constipation (Patient taking differently: Take 17 g by mouth daily as needed. Indications: constipation)    esomeprazole (NexIUM) 20 mg capsule Take 20 mg by mouth nightly. prn    ondansetron hcl (ZOFRAN) 8 mg tablet Take 1 Tab by mouth every eight (8) hours as needed for Nausea. Indications: nausea and vomiting caused by cancer drugs    lidocaine-prilocaine (EMLA) topical cream Apply  to affected area as needed for Pain.      Albuterol and Celebrex [celecoxib]  Social History     Socioeconomic History    Marital status:      Spouse name: Not on file    Number of children: Not on file    Years of education: Not on file    Highest education level: Not on file   Tobacco Use  Smoking status: Former Smoker     Packs/day: 1.00     Years: 20.00     Pack years: 20.00     Quit date:      Years since quittin.4    Smokeless tobacco: Never Used    Tobacco comment: patient has no desire to resume    Substance and Sexual Activity    Alcohol use: Yes     Alcohol/week: 4.0 standard drinks     Types: 4 Glasses of wine per week    Drug use: No   Other Topics Concern     Service No    Blood Transfusions No    Caffeine Concern No    Occupational Exposure No    Hobby Hazards No    Sleep Concern No    Stress Concern No    Weight Concern No    Special Diet No    Exercise Yes    Seat Belt Yes   Social History Narrative    . Has long-time girlfriend. Continues to work doing IT support. Social Determinants of Health     Financial Resource Strain:     Difficulty of Paying Living Expenses:    Food Insecurity:     Worried About Running Out of Food in the Last Year:     [address] in the Last Year:    Transportation Needs:     Lack of Transportation (Medical):      Lack of Transportation (Non-Medical):    Physical Activity:     Days of Exercise per Week:     Minutes of Exercise per Session:    Stress:     Feeling of Stress :    Social Connections:     Frequency of Communication with Friends and Family:     Frequency of Social Gatherings with Friends and Family:     Attends Taoist Services:     Active Member of Clubs or Organizations:     Attends Club or Organization Meetings:     Marital Status:      Social History     Tobacco Use   Smoking Status Former Smoker    Packs/day: 1.00    Years: 20.00    Pack years: 20.00    Quit date:     Years since quittin.4   Smokeless Tobacco Never Used   Tobacco Comment    patient has no desire to resume      Family History   Problem Relation Age of Onset    Cancer Mother         uterine    Arrhythmia Sister         afib     ROS: The patient has no difficulty with chest pain or shortness of breath. No fever or chills. Comprehensive review of systems was otherwise unremarkable except as noted above. Physical Exam:   There were no vitals taken for this visit. Constitutional: Alert, oriented, cooperative patient in no acute distress; appears stated age    Eyes:Sclera are clear. EOMs intact  ENMT: no external lesions gross hearing normal; no obvious neck masses, no ear or lip lesions, nares normal  CV: RRR. Normal perfusion  Resp: No JVD. Breathing is  non-labored; no audible wheezing. + nonproductive cough    GI: soft and non-distended     Musculoskeletal: unremarkable with normal function. No embolic signs or cyanosis. Neuro:  Oriented; moves all 4; no focal deficits  Psychiatric: normal affect and mood, no memory impairment    Recent vitals (if inpt):  No data found. Labs:  No results for input(s): WBC, HGB, PLT, NA, K, CL, CO2, BUN, CREA, GLU, PTP, INR, APTT, TBIL, TBILI, CBIL, ALT, AP, AML, LPSE, LCAD, NH4, TROPT, TROIQ, PCO2, PO2, HCO3, HGBEXT, PLTEXT, INREXT in the last 72 hours. No lab exists for component: SGOT, GPT,  PH    Lab Results   Component Value Date/Time    WBC 7.1 06/17/2021 05:40 AM    HGB 8.0 (L) 06/17/2021 05:40 AM    PLATELET 651 68/88/0311 05:40 AM    Sodium 140 06/17/2021 05:40 AM    Potassium 4.1 06/17/2021 05:40 AM    Chloride 103 06/17/2021 05:40 AM    CO2 34 (H) 06/17/2021 05:40 AM    BUN 16 06/17/2021 05:40 AM    Creatinine 0.96 06/17/2021 05:40 AM    Glucose 92 06/17/2021 05:40 AM    INR 1.9 06/08/2021 10:00 PM    aPTT 51.0 (H) 06/17/2021 05:40 AM    Bilirubin, total 0.7 06/15/2021 10:52 AM    Bilirubin, direct 0.2 06/15/2021 10:52 AM    ALT (SGPT) 8 (L) 06/08/2021 02:50 PM    Alk. phosphatase 142 (H) 06/08/2021 02:50 PM    Troponin-I, Qt. <0.02 (L) 05/05/2020 01:09 AM       I reviewed recent labs and recent radiologic studies. I independently reviewed radiology images for studies I described above or studies I have ordered.    Admission date (for inpatients): (Not on file)   * No surgery found *  * No surgery found *    ASSESSMENT/PLAN:  Problem List  Date Reviewed: 6/11/2021        Codes Class Noted    Pacemaker ICD-10-CM: Z95.0  ICD-9-CM: V45.01  5/3/2021        Moderate protein-calorie malnutrition ([facility] ICD-10-CM: E44.0  ICD-9-CM: 263.0  6/17/2021        Pleural fistula ([facility] ICD-10-CM: J86.0  ICD-9-CM: 510.0  6/14/2021        Atrial flutter ([facility] ICD-10-CM: I48.92  ICD-9-CM: 427.32  6/10/2021        Acute hypoxemic respiratory failure (HCC) ICD-10-CM: J96.01  ICD-9-CM: 518.81  6/8/2021        Bronchopleural fistula ([facility] ICD-10-CM: J86.0  ICD-9-CM: 510.0  6/8/2021        Empyema ([facility] ICD-10-CM: J86.9  ICD-9-CM: 510.9  6/8/2021        Acute on chronic respiratory failure with hypoxemia (HCC) ICD-10-CM: J96.21  ICD-9-CM: 518.84  6/8/2021        COPD exacerbation ([facility] ICD-10-CM: J44.1  ICD-9-CM: 491.21  5/16/2021        Heart block AV complete ([facility] ICD-10-CM: I44.2  ICD-9-CM: 426.0  5/3/2021        Sick sinus syndrome (HCC) ICD-10-CM: I49.5  ICD-9-CM: 427.81  3/18/2021        PAF (paroxysmal atrial fibrillation) ([facility] ICD-10-CM: I48.0  ICD-9-CM: 427.31  2/25/2021        Hypertension ICD-10-CM: I10  ICD-9-CM: 401.9  2/25/2021        Atrial fibrillation with RVR ([facility] ICD-10-CM: I48.91  ICD-9-CM: 427.31  2/25/2021        S/P thoracotomy ICD-10-CM: Z50.001  ICD-9-CM: V45.89  1/7/2021        Atelectasis of right lung ICD-10-CM: J98.11  ICD-9-CM: 518.0  1/4/2021        A-fib (HCC) (Chronic) ICD-10-CM: I48.91  ICD-9-CM: 427.31  1/4/2021        Chronic respiratory failure with hypoxia (HCC) (Chronic) ICD-10-CM: J96.11  ICD-9-CM: 518.83, 799.02  1/4/2021        Pleural effusion on right ICD-10-CM: J90  ICD-9-CM: 511.9  12/28/2020        Chemotherapy induced neutropenia (HCC) ICD-10-CM: D70.1, T45.1X5A  ICD-9-CM: 288.03, E933.1  10/23/2020        Dehydration ICD-10-CM: E86.0  ICD-9-CM: 276.51  9/15/2020        S/P lobectomy of lung ICD-10-CM: Z90.2  ICD-9-CM: V45.89  7/29/2020        Cough ICD-10-CM: R05  ICD-9-CM: 786.2  7/29/2020        Atrial tachycardia ([facility] ICD-10-CM: I47.1  ICD-9-CM: 427.89  7/26/2020        Cancer of bronchus of right lower lobe (HCC) ICD-10-CM: C34.31  ICD-9-CM: 162.5  7/23/2020        Lung cancer (HCC) (Chronic) ICD-10-CM: C34.90  ICD-9-CM: 162.9  7/23/2020        Cancer of right lung ([facility] ICD-10-CM: C34.91  ICD-9-CM: 162.9  7/23/2020        Pulmonary emphysema (HCC) (Chronic) ICD-10-CM: J43.9  ICD-9-CM: 492.8  7/7/2020        GERD (gastroesophageal reflux disease) ICD-10-CM: K21.9  ICD-9-CM: 530.81  Unknown        Hypotension (Chronic) ICD-10-CM: I95.9  ICD-9-CM: 458.9  5/5/2020    Overview Signed 1/11/2021  8:39 AM by Leatha Middleton NP     On midodrine             Sepsis due to undetermined organism [facility]) ICD-10-CM: A41.9  ICD-9-CM: 038.9  5/5/2020        Malignant neoplasm of lower lobe of right lung ([facility] (Chronic) ICD-10-CM: C34.31  ICD-9-CM: 162.5  2/26/2020    Overview Signed 1/4/2021  7:38 AM by Robert Laurent MD     Dec 2020- Echo EF 50%, technically difficult, cannot assess regional wall motion. Aortic root 4.1 cm. No significant valvular disease. Normal DF             Mass of lower lobe of right lung ICD-10-CM: R91.8  ICD-9-CM: 786.6  2/23/2020        Right lower lobe lung mass ICD-10-CM: R91.8  ICD-9-CM: 786.6  2/23/2020        Essential hypertension with goal blood pressure less than 130/80 (Chronic) ICD-10-CM: I10  ICD-9-CM: 401.9  2/19/2018            Active Problems:    * No active hospital problems.  *       Admit to Dr. Forrest Estimable  Regular diet  Mucinex  PTA meds  CXR  Pulmonary consult    Signed:  Briana Martin NP

## 2021-06-25 NOTE — PROGRESS NOTES
Pt is alert and oriented x4. Pt in bed, resting. Bed in low position, wheels locked, call light within reach, instructed to call with any needs. Hourly rounds completed since arrival to unit. NAD noted. VSS. Pt has not c/o pain. R chest port is SL, and dressing is clean, dry, and intact. Report to be given to night shift nurse.

## 2021-06-25 NOTE — PROGRESS NOTES
06/25/21 1424   Dual Skin Pressure Injury Assessment   Dual Skin Pressure Injury Assessment X   Second Care Provider (Based on [address] [place] [address]) Zachariah Candelaria RN   Sacrum  Mid   Skin Integumentary   Skin Integumentary (WDL) X    Pressure  Injury Documentation Pressure Injury Noted-See Wound LDA to Document   Skin Color Appropriate for ethnicity   Skin Condition/Temp Warm;Dry;Flaky;Fragile   Skin Integrity Incision (comment); Wound (add Wound LDA)  (R lateral chest, sacrum)   Turgor Non-tenting   Wound Prevention and Protection Methods   Orientation of Wound Prevention Posterior   Location of Wound Prevention Sacrum/Coccyx   Dressing Present  Yes; Intact, not due to be changed   Dressing Status Intact; Changed   Wound Offloading (Prevention Methods) Bed, pressure reduction mattress;Pillows;Repositioning;Turning; Foam silicone     Pt has steri-strips to previous R lateral chest incision. Non-blanchable redness to sacrum with small skin tear to gluteal crease.

## 2021-06-25 NOTE — CONSULTS
C/ Jewels Brown Martin Lu ENCOUNTER :  6/25/2021    Date of Admission:  6/25/2021    The patient's chart has been reviewed and the chart has been discussed with nursing staff. Subjective: This patient has been seen and evaluated at the request of Dr. Jena Flowers.     Patient is a 78 y.o.  male presents with uncontrollable coughing. He is well known to us and was recently seen while hospitalized on 6/15.    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 Pt completed chemo 10/2020. He had repeat chest CT 12/22/2020 with a large R pleural effusion and collapse of remaining RUL. Pt had a R thoracentesis on 12/28/2020 with 900mL bloody fluid removed but unable to tolerate a complete drainage. A chest tube was placed on 1/4/21 with 800cc serosanguineous drainage. General surgery was consulted and underwent R VATS with conversion to open thoracotomy with extensive pneumolysis with decortication 1/6/2021. We were consulted intraoperatively for bronch and were unable to get lung to inflate intraoperatively. Pt had repeat bronch on 1/7/21 secondary to persistent RUL atelectasis. He was found to have a stump area from prior lobectomy with abnormal appearing tissue that was biopsied and nondiagnostic. Pt had a repeat bronch on 1/12. Pt has had congestion since his pneumonectomy whenever he lies on his R side. He was admitted from the ER On 6/8/21 with shortness of breath, fever, and weakness. A R chest tube was placed for possible empyema. Pt was hypotensive and admitted to ICU. 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 ID is following for antimicrobial recommendations. He was discharged home on 6/17 on keflex and flagyl.       Today, he was seen int he the office by Dr Jena Flowers and reportedly was having uncontrolled coughing after major bronchopleural fistula repair. He was admitted to control coughing immediately and we were asked to see. Past Surgical History:   Procedure Laterality Date    HX COLONOSCOPY      HX KNEE ARTHROSCOPY Left     scope X 1, ACL recon X 1    HX KNEE ARTHROSCOPY Right , 1975    X 2     HX KNEE REPLACEMENT Right 2016    HX TONSILLECTOMY  9    HX VASCULAR ACCESS Right placed 3/2020    port in chest     IR INSERT TUNL CVC W PORT OVER 5 YEARS  3/18/2020    OR CHEST SURGERY PROCEDURE UNLISTED      R lobectomy 2020; R complete pneumonectomy 2021    OR SINUS SURGERY PROC UNLISTED  ,     sinus surg      Social History     Tobacco Use    Smoking status: Former Smoker     Packs/day: 1.00     Years: 20.00     Pack years: 20.00     Quit date:      Years since quittin.4    Smokeless tobacco: Never Used    Tobacco comment: patient has no desire to resume    Substance Use Topics    Alcohol use: Yes     Alcohol/week: 4.0 standard drinks     Types: 4 Glasses of wine per week      Family History   Problem Relation Age of Onset    Cancer Mother         uterine    Arrhythmia Sister         afib      Allergies   Allergen Reactions    Albuterol Palpitations    Celebrex [Celecoxib] Itching      Prior to Admission Medications   Prescriptions Last Dose Informant Patient Reported? Taking? Nebulizer & Compressor machine   No No   Sig: COPD   OXYGEN-AIR DELIVERY SYSTEMS   Yes No   Sig: by Does Not Apply route. 2lpm qhs   apixaban (Eliquis) 5 mg tablet   No No   Sig: Take 1 Tab by mouth two (2) times a day. cephALEXin (KEFLEX) 500 mg capsule   No No   Sig: Take 1 Capsule by mouth four (4) times daily for 30 days. docusate sodium (COLACE) 100 mg capsule   Yes No   Sig: Take 1 Capsule by mouth two (2) times a day. esomeprazole (NexIUM) 20 mg capsule   Yes No   Sig: Take 20 mg by mouth nightly.  prn   gabapentin (NEURONTIN) 300 mg capsule   No No   Sig: TAKE 1 CAPSULE BY MOUTH THREE TIMES A DAY   ipratropium (ATROVENT) 0.03 % nasal spray   No No   Si Sprays by Both Nostrils route two (2) times a day. levalbuterol (Xopenex) 1.25 mg/3 mL nebu   No No   Sig: 3 mL by Nebulization route four (4) times daily. lidocaine-prilocaine (EMLA) topical cream   No No   Sig: Apply  to affected area as needed for Pain.   metoprolol succinate (TOPROL-XL) 50 mg XL tablet   No No   Sig: Take 1 Tab by mouth daily. metroNIDAZOLE (FLAGYL) 500 mg tablet   No No   Sig: Take 1 Tablet by mouth three (3) times daily for 30 days. ondansetron (Zofran ODT) 8 mg disintegrating tablet   No No   Sig: Take 1 Tablet by mouth every six (6) hours as needed for Nausea (and vomiting). ondansetron hcl (ZOFRAN) 8 mg tablet   No No   Sig: Take 1 Tab by mouth every eight (8) hours as needed for Nausea. Indications: nausea and vomiting caused by cancer drugs   polyethylene glycol (MIRALAX) 17 gram packet   No No   Sig: Take 1 Packet by mouth daily. Indications: constipation   Patient taking differently: Take 17 g by mouth daily as needed. Indications: constipation   umeclidinium (Incruse Ellipta) 62.5 mcg/actuation inhaler   No No   Sig: Take 1 Puff by inhalation daily. Facility-Administered Medications: None       MEDS SCHEDULED:    Current Facility-Administered Medications   Medication Dose Route Frequency    HYDROcodone-homatropine (HYCODAN) 5-1.5 mg/5 mL (5 mL) oral solution 5 mL  5 mL Oral Q4H PRN    cephALEXin (KEFLEX) capsule 500 mg  500 mg Oral Q6H    apixaban (ELIQUIS) tablet 5 mg  5 mg Oral Q12H    [START ON 2021] metoprolol succinate (TOPROL-XL) XL tablet 50 mg  50 mg Oral DAILY    metroNIDAZOLE (FLAGYL) tablet 500 mg  500 mg Oral Q12H    levalbuterol (XOPENEX) nebulizer soln 1.25 mg/3 mL  1.25 mg Nebulization Q4H RT         Review of Systems  A comprehensive review of systems was negative except for that written in the HPI.     Objective:     Vitals:    21 1402   BP: 118/78   Pulse: 69 Resp: 24   Temp: 97.9 °F (36.6 °C)   SpO2: 99%     No intake/output data recorded. No intake/output data recorded. PHYSICAL EXAM     Physical Exam:   General:  Alert, increase WOB on 4-5 LPM   Eyes:  Conjunctivae/corneas clear. Nose: Nares patent and moist.    Mouth/Throat: Lips, mucosa, and tongue pink and intact. Neck: Supple, symmetrical.   Respiratory:   Scattered bilateral rales to auscultation bilaterally on 4-5 lpm NC   Cardiovascular:  Irregular rate and rhythm, S1, S2, no murmur, click, rub or gallop. GI:   Abdomen soft, non-tender. Bowel sounds active X 4 Q. Musculoskeletal: Extremities symmetrical, atraumatic, no cyanosis, 1+ edema. Skin: Right incision red with steri-strips       Neurologic:  Alert and oriented. Activity: as tolerated  Nutrition:    CHEST X-RAYS: ordered    CULTURES: ordered    LABS    No results for input(s): WBC, HGB, HCT, PLT, HGBEXT, HCTEXT, PLTEXT, HGBEXT, HCTEXT, PLTEXT in the last 72 hours. No results for input(s): NA, K, CL, GLU, CO2, BUN, CREA, MG, PHOS, TROIQ, INR, INREXT, INREXT in the last 72 hours. No lab exists for component: TROIP  No results for input(s): PH, PCO2, PO2, HCO3 in the last 72 hours.     Assessment:     Hospital Problems  Date Reviewed: 6/25/2021        Codes Class Noted POA    History of thoracotomy (Chronic) ICD-10-CM: X55.476  ICD-9-CM: V45.89  6/25/2021 Yes        Acute on chronic respiratory failure with hypoxemia ([facility] ICD-10-CM: J96.21  ICD-9-CM: 518.84  6/8/2021 Yes        Cough ICD-10-CM: R05  ICD-9-CM: 786.2  7/29/2020 Yes        Cancer of bronchus of right lower lobe (HCC) (Chronic) ICD-10-CM: C34.31  ICD-9-CM: 162.5  7/23/2020 Yes        Pulmonary emphysema (HCC) (Chronic) ICD-10-CM: J43.9  ICD-9-CM: 492.8  7/7/2020 Yes              Plan:   He is on keflex and flagyl  Eliquis   CXR ordered stat  Labs, BNP  Need to rule out aspiration, consult speech PTD  Add flutter valve  Culture  Add bronchodilators- xopenex  [employer] [facility] NARENDRA Garsia    More than 50% of time documented was spent in face-to-face contact with the patient and in the care of the patient on the floor/unit where the patient is located. Lungs:  Absent R sided breath sounds. L sided rhonchi. Heart:  RRR with no Murmur/Rubs/Gallops    Additional Comments:    Patient presneting with increasing cough and rhonchi a few days following discharge with recent surgical repair of R bronchus stump site. On keflex and flagyl. Concern for tracheobronchitis vs pneumonia. No labs or CXR yet. These have been ordered and are pending. Will broaden abx to zosyn for now. Sputum culture if able. Flutter valve, xopenex, 3% saline for airway clearance. If does not clear will consider bronch. I have spoken with and examined the patient. I agree with the above assessment and plan as documented.     Poonam Estes MD

## 2021-06-25 NOTE — PROGRESS NOTES
SPEECH PATHOLOGY NOTE:    Speech therapy consult received and appreciated. Pulmonology meeting with patient/family at bedside at time of SLP attempt. Will follow up at later time as patient is available and as schedule permits.      FANG Pickens, CCC-SLP  Speech Language Pathologist  [facility]  Contact: Brittany

## 2021-06-25 NOTE — CONSULTS
Infectious Disease Consult    Today's Date: 6/25/2021   Admit Date: 6/25/2021    Impression:   · Persistent R pleural effusion with concern for bronchopleural fistula  · S/p R thoracotomy, decortication, fistula repair, intercostal nerve cryoablation, pleurex drain placement, 6/14/21; Cx: Streptococcus sanguinis  · RLL squamous cell carcinoma, s/p excision (7/2020) and chemo (EOT 10/2020) with persistent effusion and R thoracotomy 1/6/21, with pneumolysis and decortication  · A fib with AV node ablation and permanent pacer placed 5/2021    Plan:   · Continue Zosyn for now  · Check MRSA nasal swab    Anti-infectives:   · IV Zosyn (6/25-    Subjective:   Date of Consultation:  June 25, 2021  Referring Physician: Oneil Shelton NP    Patient is a 78 y.o. male  who is well known to ID.   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 Now he is s/p bronchiopleural fistula repair 6/14/21. His post-operative course was unremarkable. Prior to surgery he presented with complaints of fever and shortness of breath. He was admitted on 6/8/2021 for sepsis r/t right sided empyema. Recently discharged (6/17) on 4 week course of PO Keflex and Flagyl. The patient states that over the course of the past few months when he would roll on his left side he would have \"gushing\" fluid from his chest. He presented to the office today for routine follow up and has complaints of a cough. Due to concern for disrupting surgical repair, he was admitted for further observation and care 6/25/21. CXR today showed Increased fluid within the right pneumonectomy cavity. Blood cx and respiratory cx pending. Started on IV Zosyn to day. ID is asked to evaluate patient for further treatment recommendations.     Patient Active Problem List   Diagnosis Code    Essential hypertension with goal blood pressure less than 130/80 I10    Mass of lower lobe of right lung R91.8    Right lower lobe lung mass R91.8    Malignant neoplasm of lower lobe of right lung (HCC) C34.31    GERD (gastroesophageal reflux disease) K21.9    Hypotension I95.9    Sepsis due to undetermined organism ([facility] A41.9    Pulmonary emphysema (HCC) J43.9    Cancer of bronchus of right lower lobe (HCC) C34.31    Lung cancer (HCC) C34.90    Cancer of right lung (HCC) C34.91    Atrial tachycardia ([facility]) I47.1    S/P lobectomy of lung Z90.2    Cough R05    Dehydration E86.0    Chemotherapy induced neutropenia (HCC) D70.1, T45.1X5A    Pleural effusion on right J90    Atelectasis of right lung J98.11    A-fib ([facility]) I48.91    Chronic respiratory failure with hypoxia ([facility]) J96.11    S/P thoracotomy Z98.890    PAF (paroxysmal atrial fibrillation) ([facility]) I48.0    Hypertension I10    Atrial fibrillation with RVR ([facility]) I48.91    Sick sinus syndrome ([facility]) I49.5    Pacemaker Z95.0    Heart block AV complete ([facility]) I44.2    COPD exacerbation ([facility]) J44.1    Acute hypoxemic respiratory failure (HCC) J96.01    Bronchopleural fistula (HCC) J86.0    Empyema ([facility]) J86.9    Acute on chronic respiratory failure with hypoxemia ([facility]) J96.21    Atrial flutter (HCC) I48.92    Pleural fistula (HCC) J86.0    Moderate protein-calorie malnutrition ([facility]) E44.0    History of thoracotomy Z98.890    Tracheobronchitis J40     Past Medical History:   Diagnosis Date    Arrhythmia     Afib     Chronic obstructive pulmonary disease (HCC)     O2 at HS    Chronic pain     right torn rotator cuff; left knee    GERD (gastroesophageal reflux disease)     controlled with med    Hypertension     hx-- off meds since 4/2020--- followed by dr. Drew Stoddard Hypoxia 02/24/2020    Malignant neoplasm of lower lobe of right lung ([facility] 02/26/2020    REC'D CHEMO AND RADIATION--- FOLLOWED BY DR. FOX    Osteoarthritis     Right lower lobe lung mass 02/24/2020    Status post total right knee replacement 2/29/2016      Family History   Problem Relation Age of Onset    Cancer Mother         uterine  Arrhythmia Sister         afib      Social History     Tobacco Use    Smoking status: Former Smoker     Packs/day: 1.00     Years: 20.00     Pack years: 20.00     Quit date:      Years since quittin.4    Smokeless tobacco: Never Used    Tobacco comment: patient has no desire to resume    Substance Use Topics    Alcohol use: Yes     Alcohol/week: 4.0 standard drinks     Types: 4 Glasses of wine per week     Past Surgical History:   Procedure Laterality Date    HX COLONOSCOPY      HX KNEE ARTHROSCOPY Left     scope X 1, ACL recon X 1    HX KNEE ARTHROSCOPY Right , 1975    X 2     HX KNEE REPLACEMENT Right 2016    HX TONSILLECTOMY      HX VASCULAR ACCESS Right placed 3/2020    port in chest     IR INSERT TUNL CVC W PORT OVER 5 YEARS  3/18/2020    [place] CHEST SURGERY PROCEDURE UNLISTED      R lobectomy 2020; R complete pneumonectomy 2021    [place] SINUS SURGERY PROC UNLISTED  ,     sinus surg      Prior to Admission medications    Medication Sig Start Date End Date Taking? Authorizing Provider   pantoprazole (Protonix) 40 mg tablet Take 40 mg by mouth daily. Yes Provider, Historical   cephALEXin (KEFLEX) 500 mg capsule Take 1 Capsule by mouth four (4) times daily for 30 days. 21 Yes Vanessa Cordoba NP   metroNIDAZOLE (FLAGYL) 500 mg tablet Take 1 Tablet by mouth three (3) times daily for 30 days. 21 Yes Vanessa Cordoba NP   levalbuterol (Xopenex) 1.25 mg/3 mL nebu 3 mL by Nebulization route four (4) times daily. 21  Yes Shy Rojas NP   gabapentin (NEURONTIN) 300 mg capsule TAKE 1 CAPSULE BY MOUTH THREE TIMES A DAY 21  Yes Ary Riojas MD   metoprolol succinate (TOPROL-XL) 50 mg XL tablet Take 1 Tab by mouth daily. 21  Yes Michael KELSEY NP   umeclidinium (Incruse Ellipta) 62.5 mcg/actuation inhaler Take 1 Puff by inhalation daily.  21  Yes Radha Shoemaker NP   apixaban (Eliquis) 5 mg tablet Take 1 Tab by mouth two (2) times a day. 3/18/21  Yes Chico Johnson MD   OXYGEN-AIR DELIVERY SYSTEMS by Does Not Apply route. 2lpm qhs   Yes Provider, Historical   Nebulizer & Compressor machine COPD 7/3/20  Yes Peyton Valadez,    ondansetron (Zofran ODT) 8 mg disintegrating tablet Take 1 Tablet by mouth every six (6) hours as needed for Nausea (and vomiting). 21   Melody Kwan MD   docusate sodium (COLACE) 100 mg capsule Take 1 Capsule by mouth two (2) times a day. 21   HARSH Treadwell   ipratropium (ATROVENT) 0.03 % nasal spray 2 Sprays by Both Nostrils route two (2) times a day. Patient not taking: Reported on 2021   Ariadna White NP   polyethylene glycol [facility]) 17 gram packet Take 1 Packet by mouth daily. Indications: constipation  Patient taking differently: Take 17 g by mouth daily as needed. Indications: constipation 20   Crissy Gonzalez NP   esomeprazole (NexIUM) 20 mg capsule Take 20 mg by mouth nightly. prn  Patient not taking: Reported on 2021    Provider, Historical   ondansetron hcl (ZOFRAN) 8 mg tablet Take 1 Tab by mouth every eight (8) hours as needed for Nausea. Indications: nausea and vomiting caused by cancer drugs 3/10/20   Brianna Cortez MD   lidocaine-prilocaine (EMLA) topical cream Apply  to affected area as needed for Pain. Patient not taking: Reported on 2021 3/10/20   Brianna Cortez MD       Allergies   Allergen Reactions    Albuterol Palpitations    Celebrex [Celecoxib] Itching        Review of Systems:  A comprehensive review of systems was negative except for that written in the History of Present Illness.     Objective:     Visit Vitals  /78 (BP 1 Location: Right arm, BP Patient Position: At rest)   Pulse 69   Temp 97.9 °F (36.6 °C)   Resp 24   SpO2 96%     Temp (24hrs), Av.9 °F (36.6 °C), Min:97.9 °F (36.6 °C), Max:97.9 °F (36.6 °C)       Lines:  Peripheral IV:       Physical Exam:    General:  Alert, cooperative, well noursished, well developed, appears stated age   Eyes:  Sclera anicteric. Pupils equally round and reactive to light. Mouth/Throat: Mucous membranes normal, oral pharynx clear   Neck: Supple   Lungs:   Decreased to auscultation bilaterally, Wet bilaterally,good effort   CV:  Regular rate and rhythm,no murmur, click, rub or gallop   Abdomen:   Soft, non-tender. bowel sounds normal. non-distended   Extremities: No cyanosis or edema   Skin: Skin color, texture, turgor normal. no acute rash or lesions   Lymph nodes: Cervical and supraclavicular normal   Musculoskeletal: No swelling or deformity   Lines/Devices:  Intact, no erythema, drainage or tenderness   Psych: Alert and oriented, normal mood affect given the setting       Data Review:     CBC:  Recent Labs     06/25/21  1516   WBC 6.6   GRANS 83*   MONOS 7   EOS 1   ANEU 5.5   ABL 0.6   HGB 9.7*   HCT 32.5*          BMP:  Recent Labs     06/25/21 1516   CREA 0.88   BUN 13      K 3.8      CO2 30   AGAP 5*   GLU 97       LFTS:  No results for input(s): TBILI, ALT, AP, TP, ALB in the last 72 hours.     No lab exists for component: SGOT    Microbiology:     All Micro Results     Procedure Component Value Units Date/Time    CULTURE, BLOOD [[id number]] Collected: 06/25/21 1516    Order Status: Completed Specimen: Blood Updated: 06/25/21 1533    CULTURE, RESPIRATORY/SPUTUM/BRONCH [employer] STAIN [[id number]]     Order Status: Sent Specimen: Sputum           Imaging:   Reviewed    Signed By: Tan Omalley NP     June 25, 2021

## 2021-06-25 NOTE — DISCHARGE SUMMARY
[facility] [address]  [place], [address]  [phone]   Discharge Summary     [facility] MR[facility] [id number]     : 1950     Age: 78 y.o. Admit date: 2021     Discharge date: 2021   Attending Physician: HARSH Stringer MD, FACS  Primary Discharge Diagnosis:   Principal Problem:    Sepsis due to undetermined organism [facility]) (2020)    Active Problems:    Cancer of bronchus of right lower lobe ([address] (2020)      S/P thoracotomy (2021)      Acute hypoxemic respiratory failure ([address] (2021)      Bronchopleural fistula ([address] (2021)      Empyema ([address] (2021)      Acute on chronic respiratory failure with hypoxemia ([address] (2021)      Atrial flutter ([address] (6/10/2021)      Pleural fistula ([address] (2021)      Moderate protein-calorie malnutrition ([address] (2021)      Primary Operations or Procedures Performed :  Procedure(s):  RIGHT THORACOTOMY/ BRONCHIOPLEURAL FISTULA REPAIR     Brief History and Reason for Admission: Hui Rodrigez was admitted with the following history of present illness. Hui Rodrigez is a 78 y.o. male who we are asked by Dr. Roxanne Rucker to see for possible bronchopleural fistula. The patient has a PMHx of RLL lung cancer and is s/p pneumonectomy in 2021 with Dr. Farhana Ford. He presented with complaints of fever and shortness of breath. He was admitted on 2021 for sepsis r/t right sided empyema. The patient states that over the course of the past few months when he would roll on his left side he would have \"gushing\" fluid from his chest. He also complained of congestion when he would lie on his right side. A chest tube was inserted by pulmonary with the return of  500mL of purulent, foul smelling material. A culture was obtained and has not resulted yet. At present, his chest tube output is serous. He is on Levo and Zosyn.        Hospital Course:    Procedure(s):  RIGHT THORACOTOMY/ BRONCHIOPLEURAL FISTULA REPAI        SUBJECTIVE:  6/15/21 POD#1 Pt without complaints. On Heparin gtt, paced. Hannah Ron with 110mL out, no air in bag. AF, VSS, 3L NC. WBC 6. H/H 8/27.     CXR:  FINDINGS: No change in the right lung pneumothorax or post pneumonectomy cavity,  with an indwelling chest tube overlying skin staples. Left basilar atelectasis  is unchanged. The heart size is stable. Again noted is a left chest wall  pacemaker and right chest wall infusion port catheter.     IMPRESSION  No interval change. 6/16/21 POD#1 AF, VSS, on 2L NC. Willy with 225mL serosanguinous output, no air. WBC 6, H/H 8/27. CR 0.86. CXR stable. Condition at Discharge: good    Discharge Medications: Cannot display discharge medications since this patient is not currently admitted. Resume home medications including Eliquis. Discharged on Keflex and Flagyl per ID. Disposition/Discharge Instructions/Follow-up Care:      Discharge Instructions/Follow-up Plans:   MD Instructions:     Follow up with ID and pulmonary. Follow-up with Dr. Nai Zamudio in 1 week. Keep incisions clean and dry, may remain uncovered. Do not apply lotions, creams or ointments to incisions.     Diet - as tolerated - Soft foods diet. Activity - ambulate - as tolerated - no heavy lifting >10lb. May shower - no tub baths or soaking/submerging.     No driving while taking narcotics. Do not drink alcohol while taking narcotics.   Resume other home medications.      If problems or questions arise, please call our office at [phone].     Greater than 30 minutes were spent discharging the patient       Signed:  HARSH Moreno,6/25/2021  12:30 PM

## 2021-06-25 NOTE — H&P (VIEW-ONLY)
C/ [facility] [address] ENCOUNTER :  6/25/2021    Date of Admission:  6/25/2021    The patient's chart has been reviewed and the chart has been discussed with nursing staff. Subjective: This patient has been seen and evaluated at the request of Dr. Crystal Aguero.     Patient is a 78 y.o.  male presents with uncontrollable coughing. He is well known to us and was recently seen while hospitalized on 6/15.    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 Pt completed chemo 10/2020. He had repeat chest CT 12/22/2020 with a large R pleural effusion and collapse of remaining RUL. Pt had a R thoracentesis on 12/28/2020 with 900mL bloody fluid removed but unable to tolerate a complete drainage. A chest tube was placed on 1/4/21 with 800cc serosanguineous drainage. General surgery was consulted and underwent R VATS with conversion to open thoracotomy with extensive pneumolysis with decortication 1/6/2021. We were consulted intraoperatively for bronch and were unable to get lung to inflate intraoperatively. Pt had repeat bronch on 1/7/21 secondary to persistent RUL atelectasis. He was found to have a stump area from prior lobectomy with abnormal appearing tissue that was biopsied and nondiagnostic. Pt had a repeat bronch on 1/12. Pt has had congestion since his pneumonectomy whenever he lies on his R side. He was admitted from the ER On 6/8/21 with shortness of breath, fever, and weakness. A R chest tube was placed for possible empyema. Pt was hypotensive and admitted to ICU. 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 ID is following for antimicrobial recommendations. He was discharged home on 6/17 on keflex and flagyl.       Today, he was seen int he the office by Dr Crystal Aguero and reportedly was having uncontrolled coughing after major bronchopleural fistula repair. He was admitted to control coughing immediately and we were asked to see. Past Surgical History:   Procedure Laterality Date    HX COLONOSCOPY      HX KNEE ARTHROSCOPY Left     scope X 1, ACL recon X 1    HX KNEE ARTHROSCOPY Right , 1975    X 2     HX KNEE REPLACEMENT Right 2016    HX TONSILLECTOMY  9    HX VASCULAR ACCESS Right placed 3/2020    port in chest     IR INSERT TUNL CVC W PORT OVER 5 YEARS  3/18/2020    [place] CHEST SURGERY PROCEDURE UNLISTED      R lobectomy 2020; R complete pneumonectomy 2021    [place] SINUS SURGERY PROC UNLISTED  ,     sinus surg      Social History     Tobacco Use    Smoking status: Former Smoker     Packs/day: 1.00     Years: 20.00     Pack years: 20.00     Quit date:      Years since quittin.4    Smokeless tobacco: Never Used    Tobacco comment: patient has no desire to resume    Substance Use Topics    Alcohol use: Yes     Alcohol/week: 4.0 standard drinks     Types: 4 Glasses of wine per week      Family History   Problem Relation Age of Onset    Cancer Mother         uterine    Arrhythmia Sister         afib      Allergies   Allergen Reactions    Albuterol Palpitations    Celebrex [Celecoxib] Itching      Prior to Admission Medications   Prescriptions Last Dose Informant Patient Reported? Taking? Nebulizer & Compressor machine   No No   Sig: COPD   OXYGEN-AIR DELIVERY SYSTEMS   Yes No   Sig: by Does Not Apply route. 2lpm qhs   apixaban (Eliquis) 5 mg tablet   No No   Sig: Take 1 Tab by mouth two (2) times a day. cephALEXin (KEFLEX) 500 mg capsule   No No   Sig: Take 1 Capsule by mouth four (4) times daily for 30 days. docusate sodium (COLACE) 100 mg capsule   Yes No   Sig: Take 1 Capsule by mouth two (2) times a day. esomeprazole (NexIUM) 20 mg capsule   Yes No   Sig: Take 20 mg by mouth nightly.  prn   gabapentin (NEURONTIN) 300 mg capsule   No No   Sig: TAKE 1 CAPSULE BY MOUTH THREE TIMES A DAY   ipratropium (ATROVENT) 0.03 % nasal spray   No No   Si Sprays by Both Nostrils route two (2) times a day. levalbuterol (Xopenex) 1.25 mg/3 mL nebu   No No   Sig: 3 mL by Nebulization route four (4) times daily. lidocaine-prilocaine (EMLA) topical cream   No No   Sig: Apply  to affected area as needed for Pain.   metoprolol succinate (TOPROL-XL) 50 mg XL tablet   No No   Sig: Take 1 Tab by mouth daily. metroNIDAZOLE (FLAGYL) 500 mg tablet   No No   Sig: Take 1 Tablet by mouth three (3) times daily for 30 days. ondansetron (Zofran ODT) 8 mg disintegrating tablet   No No   Sig: Take 1 Tablet by mouth every six (6) hours as needed for Nausea (and vomiting). ondansetron hcl (ZOFRAN) 8 mg tablet   No No   Sig: Take 1 Tab by mouth every eight (8) hours as needed for Nausea. Indications: nausea and vomiting caused by cancer drugs   polyethylene glycol (MIRALAX) 17 gram packet   No No   Sig: Take 1 Packet by mouth daily. Indications: constipation   Patient taking differently: Take 17 g by mouth daily as needed. Indications: constipation   umeclidinium (Incruse Ellipta) 62.5 mcg/actuation inhaler   No No   Sig: Take 1 Puff by inhalation daily. Facility-Administered Medications: None       MEDS SCHEDULED:    Current Facility-Administered Medications   Medication Dose Route Frequency    HYDROcodone-homatropine (HYCODAN) 5-1.5 mg/5 mL (5 mL) oral solution 5 mL  5 mL Oral Q4H PRN    cephALEXin (KEFLEX) capsule 500 mg  500 mg Oral Q6H    apixaban (ELIQUIS) tablet 5 mg  5 mg Oral Q12H    [START ON 2021] metoprolol succinate (TOPROL-XL) XL tablet 50 mg  50 mg Oral DAILY    metroNIDAZOLE (FLAGYL) tablet 500 mg  500 mg Oral Q12H    levalbuterol (XOPENEX) nebulizer soln 1.25 mg/3 mL  1.25 mg Nebulization Q4H RT         Review of Systems  A comprehensive review of systems was negative except for that written in the HPI.     Objective:     Vitals:    21 1402   BP: 118/78   Pulse: 69 Resp: 24   Temp: 97.9 °F (36.6 °C)   SpO2: 99%     No intake/output data recorded. No intake/output data recorded. PHYSICAL EXAM     Physical Exam:   General:  Alert, increase WOB on 4-5 LPM   Eyes:  Conjunctivae/corneas clear. Nose: Nares patent and moist.    Mouth/Throat: Lips, mucosa, and tongue pink and intact. Neck: Supple, symmetrical.   Respiratory:   Scattered bilateral rales to auscultation bilaterally on 4-5 lpm NC   Cardiovascular:  Irregular rate and rhythm, S1, S2, no murmur, click, rub or gallop. GI:   Abdomen soft, non-tender. Bowel sounds active X 4 Q. Musculoskeletal: Extremities symmetrical, atraumatic, no cyanosis, 1+ edema. Skin: Right incision red with steri-strips       Neurologic:  Alert and oriented. Activity: as tolerated  Nutrition:    CHEST X-RAYS: ordered    CULTURES: ordered    LABS    No results for input(s): WBC, HGB, HCT, PLT, HGBEXT, HCTEXT, PLTEXT, HGBEXT, HCTEXT, PLTEXT in the last 72 hours. No results for input(s): NA, K, CL, GLU, CO2, BUN, CREA, MG, PHOS, TROIQ, INR, INREXT, INREXT in the last 72 hours. No lab exists for component: TROIP  No results for input(s): PH, PCO2, PO2, HCO3 in the last 72 hours.     Assessment:     Hospital Problems  Date Reviewed: 6/25/2021        Codes Class Noted POA    History of thoracotomy (Chronic) ICD-10-CM: M59.837  ICD-9-CM: V45.89  6/25/2021 Yes        Ac[facility] on chronic [facility] ICD-10-CM: J96.21  ICD-9-CM: 518.84  6/8/2021 Yes        Cough ICD-10-CM: R05  ICD-9-CM: 786.2  7/29/2020 Yes        Cancer of bronchus of right lower lobe (HCC) (Chronic) ICD-10-CM: C34.31  ICD-9-CM: 162.5  7/23/2020 Yes        Pulmonary emphysema (HCC) (Chronic) ICD-10-CM: J43.9  ICD-9-CM: 492.8  7/7/2020 Yes              Plan:   He is on keflex and flagyl  Eliquis   CXR ordered stat  Labs, BNP  Need to rule out aspiration, consult speech PTD  Add flutter valve  Culture  Add bronchodilators- xopenex  [employer] [facility] NARENDRA Rangel    More than 50% of time documented was spent in face-to-face contact with the patient and in the care of the patient on the floor/unit where the patient is located. Lungs:  Absent R sided breath sounds. L sided rhonchi. Heart:  RRR with no Murmur/Rubs/Gallops    Additional Comments:    Patient presneting with increasing cough and rhonchi a few days following discharge with recent surgical repair of R bronchus stump site. On keflex and flagyl. Concern for tracheobronchitis vs pneumonia. No labs or CXR yet. These have been ordered and are pending. Will broaden abx to zosyn for now. Sputum culture if able. Flutter valve, xopenex, 3% saline for airway clearance. If does not clear will consider bronch. I have spoken with and examined the patient. I agree with the above assessment and plan as documented.     Marli Fagan MD

## 2021-06-25 NOTE — PROGRESS NOTES
Fluid noted within the pneumonectomy cavity on the right side. This is not necessarily an abnormal finding as would expect this empty space to fill with fluid over time. Will cont abx coverage and monitor response for now.      Poonam Estes MD

## 2021-06-26 NOTE — PROGRESS NOTES
H&P/Consult Note/Progress Note/Office Note:   Jose Valentino MRN: [id number]  :1950  Age:75 y.o.    HPI: Jose Valentino is a 78 y.o. male who is well known to Dr. 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 Now he is s/p bronchiopleural fistula repair 21. His post-operative course was unremarkable. Prior to surgery he presented with complaints of fever and shortness of breath. He was admitted on 2021 for sepsis r/t right sided empyema. The patient states that over the course of the past few months when he would roll on his left side he would have \"gushing\" fluid from his chest. He presented to the office today for routine follow up and has complaints of a cough. Due to concern for disrupting surgical repair, he will be admitted for further observation and care. 21: Pt awake in bed. Feels like coughing is beginning to improved. 24hr Max Temp 99.2. O2 Sat 96% on 4L o2 via NC. Past Medical History:   Diagnosis Date    Arrhythmia     Afib     Chronic obstructive pulmonary disease (HCC)     O2 at HS    Chronic pain     right torn rotator cuff; left knee    GERD (gastroesophageal reflux disease)     controlled with med    Hypertension     hx-- off meds since 2020--- followed by dr. Vin Champagne Hypoxia 2020    Malignant neoplasm of lower lobe of right lung ([facility] [address] 2020    REC'D CHEMO AND RADIATION--- FOLLOWED BY DR. FOX    Osteoarthritis     Right lower lobe lung mass 2020    Status post total right knee replacement 2016     Past Surgical History:   Procedure Laterality Date    HX COLONOSCOPY      HX KNEE ARTHROSCOPY Left     scope X 1, ACL recon X 1    HX KNEE ARTHROSCOPY Right , 1975    X 2     HX KNEE REPLACEMENT Right     HX TONSILLECTOMY      HX VASCULAR ACCESS Right placed 3/2020    port in chest     IR INSERT TUNL CVC W PORT OVER 5 YEARS  3/18/2020    [place] CHEST SURGERY PROCEDURE UNLISTED      R lobectomy 2020; R complete pneumonectomy 2021    [place] SINUS SURGERY PROC UNLISTED  ,     sinus surg     Current Facility-Administered Medications   Medication Dose Route Frequency    HYDROcodone-homatropine (HYCODAN) 5-1.5 mg/5 mL (5 mL) oral solution 5 mL  5 mL Oral Q4H PRN    apixaban (ELIQUIS) tablet 5 mg  5 mg Oral Q12H    metoprolol succinate (TOPROL-XL) XL tablet 50 mg  50 mg Oral DAILY    levalbuterol (XOPENEX) nebulizer soln 1.25 mg/3 mL  1.25 mg Nebulization Q4H RT    piperacillin-tazobactam (ZOSYN) 4.5 g in 0.9% sodium chloride (MBP/ADV) 100 mL MBP  4.5 g IntraVENous Q8H    sodium chloride 3% hypertonic nebulizer soln  4 mL Nebulization BID RT     Albuterol and Celebrex [celecoxib]  Social History     Socioeconomic History    Marital status:      Spouse name: Not on file    Number of children: Not on file    Years of education: Not on file    Highest education level: Not on file   Tobacco Use    Smoking status: Former Smoker     Packs/day: 1.00     Years: 20.00     Pack years: 20.00     Quit date:      Years since quittin.4    Smokeless tobacco: Never Used    Tobacco comment: patient has no desire to resume    Substance and Sexual Activity    Alcohol use: Yes     Alcohol/week: 4.0 standard drinks     Types: 4 Glasses of wine per week    Drug use: No   Other Topics Concern     Service No    Blood Transfusions No    Caffeine Concern No    Occupational Exposure No    Hobby Hazards No    Sleep Concern No    Stress Concern No    Weight Concern No    Special Diet No    Exercise Yes    Seat Belt Yes   Social History Narrative    . Has long-time girlfriend. Continues to work doing IT support.      Social Determinants of Health     Financial Resource Strain:     Difficulty of Paying Living Expenses:    Food Insecurity:     Worried About Running Out of Food in the Last Year:     [address] in the Last Year:    Transportation Needs:     Lack of Transportation (Medical):  Lack of Transportation (Non-Medical):    Physical Activity:     Days of Exercise per Week:     Minutes of Exercise per Session:    Stress:     Feeling of Stress :    Social Connections:     Frequency of Communication with Friends and Family:     Frequency of Social Gatherings with Friends and Family:     Attends Synagogue Services:     Active Member of Clubs or Organizations:     Attends Club or Organization Meetings:     Marital Status:      Social History     Tobacco Use   Smoking Status Former Smoker    Packs/day: 1.00    Years: 20.00    Pack years: 20.00    Quit date:     Years since quittin.4   Smokeless Tobacco Never Used   Tobacco Comment    patient has no desire to resume      Family History   Problem Relation Age of Onset    Cancer Mother         uterine    Arrhythmia Sister         afib     ROS: The patient has no difficulty with chest pain or shortness of breath. No fever or chills. Comprehensive review of systems was otherwise unremarkable except as noted above. Physical Exam:   Visit Vitals  BP 99/66 (BP 1 Location: Left upper arm, BP Patient Position: At rest)   Pulse 91   Temp 98.6 °F (37 °C)   Resp 17   SpO2 96%     Constitutional: Alert, oriented, cooperative patient in no acute distress; appears stated age    Eyes: Sclera are clear. EOMs intact  ENMT: no external lesions gross hearing normal; no obvious neck masses, no ear or lip lesions, nares normal  CV: RRR. Normal perfusion  Resp: No JVD. Breathing is  non-labored; no audible wheezing. + nonproductive cough    GI: soft and non-distended     Musculoskeletal: unremarkable with normal function. No embolic signs or cyanosis.    Neuro:  Oriented; moves all 4; no focal deficits  Psychiatric: normal affect and mood, no memory impairment    Recent vitals (if inpt):  Patient Vitals for the past 24 hrs:   BP Temp Pulse Resp SpO2   21 0757     96 %   21 0748 99/66 98.6 °F (37 °C) 91 17 96 %   06/26/21 0348     96 %   06/26/21 0231 94/63 97.3 °F (36.3 °C) 92 18 96 %   06/26/21 0011     94 %   06/25/21 2246 115/74 99.2 °F (37.3 °C) 96 20 98 %   06/25/21 2008     92 %   06/25/21 1946 103/69 99.2 °F (37.3 °C) 88 22 97 %   06/25/21 1517     96 %   06/25/21 1402 118/78 97.9 °F (36.6 °C) 69 24 99 %       Labs:  Recent Labs     06/25/21  1516   WBC 6.6   HGB 9.7*         K 3.8      CO2 30   BUN 13   CREA 0.88   GLU 97       Lab Results   Component Value Date/Time    WBC 6.6 06/25/2021 03:16 PM    HGB 9.7 (L) 06/25/2021 03:16 PM    PLATELET 508 91/33/5995 03:16 PM    Sodium 139 06/25/2021 03:16 PM    Potassium 3.8 06/25/2021 03:16 PM    Chloride 104 06/25/2021 03:16 PM    CO2 30 06/25/2021 03:16 PM    BUN 13 06/25/2021 03:16 PM    Creatinine 0.88 06/25/2021 03:16 PM    Glucose 97 06/25/2021 03:16 PM    INR 1.9 06/08/2021 10:00 PM    aPTT 51.0 (H) 06/17/2021 05:40 AM    Bilirubin, total 0.7 06/15/2021 10:52 AM    Bilirubin, direct 0.2 06/15/2021 10:52 AM    ALT (SGPT) 8 (L) 06/08/2021 02:50 PM    Alk. phosphatase 142 (H) 06/08/2021 02:50 PM    Troponin-I, Qt. <0.02 (L) 05/05/2020 01:09 AM       I reviewed recent labs and recent radiologic studies. I independently reviewed radiology images for studies I described above or studies I have ordered.    Admission date (for inpatients): 6/25/2021   * No surgery found *  * No surgery found *    ASSESSMENT/PLAN:  Problem List  Date Reviewed: 6/25/2021        Codes Class Noted    Pacemaker ICD-10-CM: Z95.0  ICD-9-CM: V45.01  5/3/2021        History of thoracotomy (Chronic) ICD-10-CM: O80.746  ICD-9-CM: V45.89  6/25/2021        Tracheobronchitis ICD-10-CM: J40  ICD-9-CM: 404  6/25/2021        Moderate protein-calorie malnutrition ([facility] ICD-10-CM: E44.0  ICD-9-CM: 263.0  6/17/2021        Pleural fistula ([facility] [address] [facility] ICD-10-CM: J86.0  ICD-9-CM: 510.0  6/14/2021        Atrial flutter ([facility] ICD-10-CM: O02.19  ICD-9-CM: 427.32 6/10/2021        Acute hypoxemic respiratory failure [facility]) ICD-10-CM: J96.01  ICD-9-CM: 518.81  6/8/2021        Bronchopleural fistula ([facility] ICD-10-CM: J86.0  ICD-9-CM: 510.0  6/8/2021        Empyema ([facility] ICD-10-CM: J86.9  ICD-9-CM: 510.9  6/8/2021        Acute on chronic respiratory failure with hypoxemia [facility]) ICD-10-CM: J96.21  ICD-9-CM: 518.84  6/8/2021        COPD exacerbation ([facility] ICD-10-CM: J44.1  ICD-9-CM: 491.21  5/16/2021        Heart block AV complete ([place] [id number].) ICD-10-CM: I44.2  ICD-9-CM: 426.0  5/3/2021        Sick sinus syndrome (HCC) ICD-10-CM: I49.5  ICD-9-CM: 427.81  3/18/2021        PAF (paroxysmal atrial fibrillation) ([facility] ICD-10-CM: I48.0  ICD-9-CM: 427.31  2/25/2021        Hypertension ICD-10-CM: I10  ICD-9-CM: 401.9  2/25/2021        Atrial fibrillation with RVR (HCC) ICD-10-CM: I48.91  ICD-9-CM: 427.31  2/25/2021        S/P thoracotomy ICD-10-CM: O33.660  ICD-9-CM: V45.89  1/7/2021        Atelectasis of right lung ICD-10-CM: J98.11  ICD-9-CM: 518.0  1/4/2021        A-fib (HCC) (Chronic) ICD-10-CM: I48.91  ICD-9-CM: 427.31  1/4/2021        Chronic respiratory failure with hypoxia (HCC) (Chronic) ICD-10-CM: J96.11  ICD-9-CM: 518.83, 799.02  1/4/2021        Pleural effusion on right ICD-10-CM: J90  ICD-9-CM: 511.9  12/28/2020        Chemotherapy induced neutropenia ([facility] ICD-10-CM: D70.1, T45.1X5A  ICD-9-CM: 288.03, E933.1  10/23/2020        Dehydration ICD-10-CM: E86.0  ICD-9-CM: 276.51  9/15/2020        S/P lobectomy of lung ICD-10-CM: Z90.2  ICD-9-CM: V45.89  7/29/2020        Cough ICD-10-CM: R05  ICD-9-CM: 786.2  7/29/2020        Atrial tachycardia ([facility] ICD-10-CM: I47.1  ICD-9-CM: 427.89  7/26/2020        Cancer of bronchus of right lower lobe (HCC) (Chronic) ICD-10-CM: C34.31  ICD-9-CM: 162.5  7/23/2020        Lung cancer ([facility] (Chronic) ICD-10-CM: C34.90  ICD-9-CM: 162.9  7/23/2020        Cancer of right lung [facility]) ICD-10-CM: C34.91  ICD-9-CM: 162.9  7/23/2020        Pulmonary emphysema [facility]) (Chronic) ICD-10-CM: J43.9  ICD-9-CM: 492.8  7/7/2020        GERD (gastroesophageal reflux disease) ICD-10-CM: K21.9  ICD-9-CM: 530.81  Unknown        Hypotension (Chronic) ICD-10-CM: I95.9  ICD-9-CM: 458.9  5/5/2020    Overview Signed 1/11/2021  8:39 AM by Tyson Arthur NP     On midodrine             Sepsis due to undetermined organism [facility]) ICD-10-CM: A41.9  ICD-9-CM: 038.9  5/5/2020        Malignant neoplasm of lower lobe of right lung ([facility] [address] (Chronic) ICD-10-CM: C34.31  ICD-9-CM: 162.5  2/26/2020    Overview Signed 1/4/2021  7:38 AM by Adri Soriano MD     Dec 2020- Echo EF 50%, technically difficult, cannot assess regional wall motion. Aortic root 4.1 cm. No significant valvular disease.   Normal DF             Mass of lower lobe of right lung ICD-10-CM: R91.8  ICD-9-CM: 786.6  2/23/2020        Right lower lobe lung mass ICD-10-CM: R91.8  ICD-9-CM: 786.6  2/23/2020        Essential hypertension with goal blood pressure less than 130/80 (Chronic) ICD-10-CM: I10  ICD-9-CM: 401.9  2/19/2018            Active Problems:    Pulmonary emphysema ([address] (7/7/2020)      Cancer of bronchus of right lower lobe (HCC) (7/23/2020)      Cough (7/29/2020)      Acute on chronic respiratory failure with hypoxemia ([address] (6/8/2021)      History of thoracotomy (6/25/2021)      Tracheobronchitis (6/25/2021)         Continue current treatment plan  Regular diet  Mucinex  PTA meds  CXR  Pulmonary following  IV Abx - Zosyn      Signed:  Nicho Zafar NP

## 2021-06-26 NOTE — PROGRESS NOTES
Patient is alert and oriented x 4 hourly rounds completed , bed locked low call light within reach. All needs met will continue to monitor care.        Bedside and Verbal shift change report will be  given to oncoming Day shift  RN .  Report will  included the following information Kardex,  MAR and Recent Results.   Opportunity for question will be offered.

## 2021-06-26 NOTE — PROGRESS NOTES
Lorene Pop. Admission Date: 6/25/2021             Daily Progress Note: 6/26/2021   The patients chart has been reviewed and discussed with staff. Patient is a 78 y.o  male seen at the request of Dr Irma Chahal who presented as a direct admit from Dr Romulo Cunningham office for uncontrollable coughing. Pt is known to our office and was seen while hospitalized on 6/15. PMHx includes chronic respiratory failure on 2L O2 qhs, prior tobacco abuse, RLL SCC s/p excision 7/23/2020, afib on Eliquis, HTN, GERD and OA. Pt completed chemo 10/2020. He had repeat chest CT 12/22/2020 with a large R pleural effusion and collapse of remaining RUL. Pt had a R thoracentesis on 12/28/2020 with 900mL bloody fluid removed but unable to tolerate a complete drainage. A chest tube was placed on 1/4/21 with 800cc serosanguineous drainage. General surgery was consulted and underwent R VATS with conversion to open thoracotomy with extensive pneumolysis with decortication 1/6/2021. We were consulted intraoperatively for bronch and were unable to get lung to inflate intraoperatively. Pt had repeat bronch on 1/7/21 secondary to persistent RUL atelectasis. He was found to have a stump area from prior lobectomy with abnormal appearing tissue that was biopsied and nondiagnostic. Pt had a repeat bronch on 1/12. Pt has had congestion since his pneumonectomy whenever he lies on his R side. He was admitted from the ER On 6/8/21 with shortness of breath, fever, and weakness. A R chest tube was placed for possible empyema. Pt was hypotensive and admitted to ICU. 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 ID is following for antimicrobial recommendations. He was discharged home on 6/17 on keflex and flagyl. Id has been consulted this admission and is following. Currently on Zosyn (6/25- ), cultures pending. Nasal MRSA negative.      Subjective:   Pt awake in bed. Feels like coughing is beginning to improved. 24hr Max Temp 99.2. O2 Sat 96% on 4L o2 via NC.  Reports SOB even with rest.     Review of Systems  Constitutional: positive for fevers, fatigue and malaise, negative for chills and sweats  Respiratory: positive for cough, sputum or dyspnea on exertion, negative for hemoptysis  Cardiovascular: positive for dyspnea, fatigue, dyspnea on exertion, negative for chest pain, chest pressure/discomfort  Gastrointestinal: negative for nausea, vomiting, diarrhea and constipation    Current Facility-Administered Medications   Medication Dose Route Frequency    HYDROcodone-homatropine (HYCODAN) 5-1.5 mg/5 mL (5 mL) oral solution 5 mL  5 mL Oral Q4H PRN    apixaban (ELIQUIS) tablet 5 mg  5 mg Oral Q12H    metoprolol succinate (TOPROL-XL) XL tablet 50 mg  50 mg Oral DAILY    levalbuterol (XOPENEX) nebulizer soln 1.25 mg/3 mL  1.25 mg Nebulization Q4H RT    piperacillin-tazobactam (ZOSYN) 4.5 g in 0.9% sodium chloride (MBP/ADV) 100 mL MBP  4.5 g IntraVENous Q8H    sodium chloride 3% hypertonic nebulizer soln  4 mL Nebulization BID RT         Objective:     Vitals:    06/26/21 0348 06/26/21 0748 06/26/21 0757 06/26/21 1109   BP:  99/66  102/69   Pulse:  91  81   Resp:  17  18   Temp:  98.6 °F (37 °C)  98.6 °F (37 °C)   SpO2: 96% 96% 96% 94%     Intake and Output:   06/24 1901 - 06/26 0700  In: -   Out: 750 [Urine:750]  06/26 0701 - 06/26 1900  In: -   Out: 275 [Urine:275]      Intake/Output Summary (Last 24 hours) at 6/26/2021 1150  Last data filed at 6/26/2021 1109  Gross per 24 hour   Intake    Output 1025 ml   Net -1025 ml       Physical Exam:            GEN: well developed and in no acute distress,   HEENT:  PERRL, EOMI, no alar flaring or epistaxis, oral mucosa moist without cyanosis,   NECK:  no JVD, no retractions, no thyromegaly or masses,   LUNGS: R diminished, L scattered occasional wheeze, rhonchi  HEART:  RRR with no M,G,R;  ABDOMEN:  soft with no tenderness, positive bowel sounds present  EXTREMITIES:  warm with no cyanosis, no edema  SKIN:  no jaundice or ecchymosis, R thora site (healing) no drainage at present  NEURO:  alert and oriented, grossly non-focal        CHEST XRAY:   6/26/2021  None today     6/25/2021          LAB  Recent Labs     06/25/21  1516   WBC 6.6   HGB 9.7*   HCT 32.5*        Recent Labs     06/25/21  1516      K 3.8      CO2 30   GLU 97   BUN 13   CREA 0.88     ABG:  No results found for: PH, PHI, PCO2, PCO2I, PO2, PO2I, HCO3, HCO3I, FIO2, FIO2I    Cultures:   Results     Procedure Component Value Units Date/Time    MSSA/MRSA SC BY PCR, NASAL SWAB [[id number]] Collected: 06/25/21 1923    Order Status: Completed Specimen: Nasal Updated: 06/25/21 2205     Special Requests: NO SPECIAL REQUESTS        Culture result:       SA target not detected. A MRSA NEGATIVE, SA NEGATIVE test result does not preclude MRSA or SA nasal colonization. MSSA/MRSA SC BY PCR, NASAL SWAB [[id number]] Collected: 06/25/21 1839    Order Status: Canceled Specimen: Nasal swab     CULTURE, RESPIRATORY/SPUTUM/BRONCH Carlos Civatte STAIN [[id number]] Collected: 06/25/21 1552    Order Status: Completed Specimen: Sputum Updated: 06/26/21 1136     Special Requests: NO SPECIAL REQUESTS        GRAM STAIN 10 TO 50 WBCS SEEN PER OIF      0 TO 5 EPITHELIAL CELLS SEEN PER OIF      MANY GRAM POSITIVE COCCI         MODERATE YEAST         2+ MUCUS PRESENT        Culture result:       NO GROWTH AFTER SHORT PERIOD OF INCUBATION. FURTHER RESULTS TO FOLLOW AFTER OVERNIGHT INCUBATION. CULTURE, BLOOD [[id number]] Collected: 06/25/21 1516    Order Status: Completed Specimen: Blood Updated: 06/26/21 0828     Special Requests: --        RIGHT  ARM       Culture result: NO GROWTH AFTER 14 HOURS       CULTURE, BODY FLUID W Tracy Bottom [[id number]] Collected: 06/14/21 1515    Order Status: Canceled Specimen:  Body Fluid from Drainage     CULTURE, FUNGUS [[id number]]     Order Status: Sent Specimen: Abdomen     CULTURE, ANAEROBIC [[id number]]  (Abnormal) Collected: 06/14/21 1508    Order Status: Completed Specimen: Lung Updated: 06/23/21 1001     Special Requests: NO SPECIAL REQUESTS        Culture result:       LIGHT ANAEROBIC GRAM NEGATIVE RODS                  LIGHT SECOND ANAEROBIC GRAM NEGATIVE RODS                  SENT TO [facility] FOR TESTING 06/23/21          CULTURE, TISSUE Negrita Speed STAIN [[id number]]  (Abnormal)  (Susceptibility) Collected: 06/14/21 1508    Order Status: Completed Specimen: Lung Updated: 06/19/21 0826     Special Requests: NO SPECIAL REQUESTS        GRAM STAIN 12 TO 18 WBCS SEEN PER OIF      FEW GRAM POSITIVE COCCI        Culture result:       SCANT STREPTOCOCCUS SANGUINIS          Susceptibility      Streptococcus sanguinis      ESTEFANI      Amoxicillin Intermediate      Cefepime ($$) Susceptible      Ceftriaxone ($) Susceptible      Clindamycin ($) Resistant      Penicillin G ($$) Intermediate      Vancomycin ($) Susceptible               Linear View                   FUNGUS CULTURE AND SMEAR [[id number]] Collected: 06/14/21 1508    Order Status: Completed Specimen: Miscellaneous sample Updated: 06/24/21 1236     Source LUNG        Comment: EMPYEMA        Fungus stain Comment        Comment: (NOTE)  Tissue Grinding and Digestion/Decontamination          Fungus (Mycology) Culture Other source received     Comment: (NOTE)  Performed At: [address] [id number]  Jim Renee MD [id number]                 Assessment:     Hospital Problems  Date Reviewed: 6/25/2021        Codes Class Noted POA    History of thoracotomy (Chronic) ICD-10-CM: G08.253  ICD-9-CM: V45.89  6/25/2021 Yes        Tracheobronchitis ICD-10-CM: J40  ICD-9-CM: 853  6/25/2021 Unknown        Acute on chronic respiratory failure with hypoxemia ([facility] [address] ICD-10-CM: J96.21  ICD-9-CM: 518.84  6/8/2021 Yes        Cough ICD-10-CM: R05  ICD-9-CM: 786.2  7/29/2020 Yes        Cancer of bronchus of right lower lobe (HCC) (Chronic) ICD-10-CM: C34.31  ICD-9-CM: 162.5  7/23/2020 Yes        Pulmonary emphysema (HCC) (Chronic) ICD-10-CM: J43.9  ICD-9-CM: 492.8  7/7/2020 Yes              PLAN:   --cont zosyn per ID  --on eliquis  --cont 3% saline nebs, flutter valve, and xopenex for now  --CXR yesterday showed fluid in pneumonectomy cavity on R side>>monitor and cont ABX for now  --wean O2 as tolerated, baseline 4 LPM NC per patient   --FULL CODE     Surjit Villagran NP     I have spoken with and examined the patient. I agree with the above assessment and plan as documented. Gen: uncomfortable generally  Lungs:  CTA on left  Heart:  RRR with no Murmur/Rubs/Gallops  Abd: NTND  Ext: no edema    --continue antibiotics per ID  --pleural fluid isn't causing shift and so will watchfully wait as fluid is naturally expected to fill post-pneumonectomy site. Yaneli Gamez MD      More than 50% of time documented was spent in face-to-face contact with the patient and in the care of the patient on the floor/unit where the patient is located.

## 2021-06-27 NOTE — PROGRESS NOTES
Patient is alert and oriented x 4 hourly rounds and  assessment completed , bed locked low call light within reach.  All needs met will continue to monitor care.    Bedside and verbal shift change report will be  given to oncoming Day shift  RN .  Report will  included the following information Kardex,  MAR and Recent Results.   Opportunity for question will be offered.

## 2021-06-27 NOTE — PROGRESS NOTES
H&P/Consult Note/Progress Note/Office Note:   Lucy Landaverde MRN: [id number]  :1950  Age:75 y.o.    HPI: Lucy Munroe. is a 78 y.o. male who is well known to Dr. 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 Now he is s/p bronchiopleural fistula repair 21. His post-operative course was unremarkable. Prior to surgery he presented with complaints of fever and shortness of breath. He was admitted on 2021 for sepsis r/t right sided empyema. The patient states that over the course of the past few months when he would roll on his left side he would have \"gushing\" fluid from his chest. He presented to the office today for routine follow up and has complaints of a cough. Due to concern for disrupting surgical repair, he will be admitted for further observation and care. 21: Pt awake in bed. Feels like coughing is beginning to improved. 24hr Max Temp 99.2. O2 Sat 96% on 4L o2 via NC.   21: Pt awake in bed. Reports continues to cough. O2 Sat 94% on 4L o2 via NC. AF. NAD. Past Medical History:   Diagnosis Date    Arrhythmia     Afib     Chronic obstructive pulmonary disease (HCC)     O2 at HS    Chronic pain     right torn rotator cuff; left knee    GERD (gastroesophageal reflux disease)     controlled with med    Hypertension     hx-- off meds since 2020--- followed by dr. Flores Members Hypoxia 2020    Malignant neoplasm of lower lobe of right lung ([facility] [address] 2020    REC'D CHEMO AND RADIATION--- FOLLOWED BY DR. FOX    Osteoarthritis     Right lower lobe lung mass 2020    Status post total right knee replacement 2016     Past Surgical History:   Procedure Laterality Date    HX COLONOSCOPY      HX KNEE ARTHROSCOPY Left     scope X 1, ACL recon X 1    HX KNEE ARTHROSCOPY Right , 1975    X 2     HX KNEE REPLACEMENT Right     HX TONSILLECTOMY      HX VASCULAR ACCESS Right placed 3/2020    port in chest     IR INSERT TUNL CVC W PORT OVER 5 YEARS  3/18/2020    PA CHEST SURGERY PROCEDURE UNLISTED      R lobectomy 2020; R complete pneumonectomy 2021    PA SINUS SURGERY PROC UNLISTED  ,     sinus surg     Current Facility-Administered Medications   Medication Dose Route Frequency    HYDROcodone-homatropine (HYCODAN) 5-1.5 mg/5 mL (5 mL) oral solution 5 mL  5 mL Oral Q4H PRN    apixaban (ELIQUIS) tablet 5 mg  5 mg Oral Q12H    metoprolol succinate (TOPROL-XL) XL tablet 50 mg  50 mg Oral DAILY    levalbuterol (XOPENEX) nebulizer soln 1.25 mg/3 mL  1.25 mg Nebulization Q4H RT    piperacillin-tazobactam (ZOSYN) 4.5 g in 0.9% sodium chloride (MBP/ADV) 100 mL MBP  4.5 g IntraVENous Q8H    sodium chloride 3% hypertonic nebulizer soln  4 mL Nebulization BID RT     Albuterol and Celebrex [celecoxib]  Social History     Socioeconomic History    Marital status:      Spouse name: Not on file    Number of children: Not on file    Years of education: Not on file    Highest education level: Not on file   Tobacco Use    Smoking status: Former Smoker     Packs/day: 1.00     Years: 20.00     Pack years: 20.00     Quit date:      Years since quittin.4    Smokeless tobacco: Never Used    Tobacco comment: patient has no desire to resume    Substance and Sexual Activity    Alcohol use: Yes     Alcohol/week: 4.0 standard drinks     Types: 4 Glasses of wine per week    Drug use: No   Other Topics Concern     Service No    Blood Transfusions No    Caffeine Concern No    Occupational Exposure No    Hobby Hazards No    Sleep Concern No    Stress Concern No    Weight Concern No    Special Diet No    Exercise Yes    Seat Belt Yes   Social History Narrative    . Has long-time girlfriend. Continues to work doing IT support.      Social Determinants of Health     Financial Resource Strain:     Difficulty of Paying Living Expenses:    Food Insecurity:     Worried About Running Out of Food in the Last Year:    [address] in the Last Year:    Transportation Needs:     Lack of Transportation (Medical):  Lack of Transportation (Non-Medical):    Physical Activity:     Days of Exercise per Week:     Minutes of Exercise per Session:    Stress:     Feeling of Stress :    Social Connections:     Frequency of Communication with Friends and Family:     Frequency of Social Gatherings with Friends and Family:     Attends Yazidi Services:     Active Member of Clubs or Organizations:     Attends Club or Organization Meetings:     Marital Status:      Social History     Tobacco Use   Smoking Status Former Smoker    Packs/day: 1.00    Years: 20.00    Pack years: 20.00    Quit date:     Years since quittin.4   Smokeless Tobacco Never Used   Tobacco Comment    patient has no desire to resume      Family History   Problem Relation Age of Onset    Cancer Mother         uterine    Arrhythmia Sister         afib     ROS: The patient has no difficulty with chest pain or shortness of breath. No fever or chills. Comprehensive review of systems was otherwise unremarkable except as noted above. Physical Exam:   Visit Vitals  /74 (BP 1 Location: Left upper arm, BP Patient Position: At rest)   Pulse 77   Temp 98.5 °F (36.9 °C)   Resp 17   SpO2 94%     Constitutional: Alert, oriented, cooperative patient in no acute distress; appears stated age    Eyes: Sclera are clear. EOMs intact  ENMT: no external lesions gross hearing normal; no obvious neck masses, no ear or lip lesions, nares normal  CV: RRR. Normal perfusion  Resp: No JVD. Breathing is  non-labored; no audible wheezing. + nonproductive cough    GI: soft and non-distended     Musculoskeletal: unremarkable with normal function. No embolic signs or cyanosis.    Neuro:  Oriented; moves all 4; no focal deficits  Psychiatric: normal affect and mood, no memory impairment    Recent vitals (if inpt):  Patient Vitals for the past 24 hrs:   BP Temp Pulse Resp SpO2   06/27/21 0907     94 %   06/27/21 0708 108/74 98.5 °F (36.9 °C) 77 17 94 %   06/27/21 0415     95 %   06/27/21 0341 112/77 98 °F (36.7 °C) 75 18 97 %   06/26/21 2357 110/73 98 °F (36.7 °C) 79 18 95 %   06/26/21 1943 96/62 98.5 °F (36.9 °C) 73 18 92 %   06/26/21 1613     94 %   06/26/21 1559 106/68 98.7 °F (37.1 °C) 80 17 94 %   06/26/21 1239     95 %   06/26/21 1109 102/69 98.6 °F (37 °C) 81 18 94 %       Labs:  Recent Labs     06/25/21  1516   WBC 6.6   HGB 9.7*         K 3.8      CO2 30   BUN 13   CREA 0.88   GLU 97       Lab Results   Component Value Date/Time    WBC 6.6 06/25/2021 03:16 PM    HGB 9.7 (L) 06/25/2021 03:16 PM    PLATELET 283 95/93/8682 03:16 PM    Sodium 139 06/25/2021 03:16 PM    Potassium 3.8 06/25/2021 03:16 PM    Chloride 104 06/25/2021 03:16 PM    CO2 30 06/25/2021 03:16 PM    BUN 13 06/25/2021 03:16 PM    Creatinine 0.88 06/25/2021 03:16 PM    Glucose 97 06/25/2021 03:16 PM    INR 1.9 06/08/2021 10:00 PM    aPTT 51.0 (H) 06/17/2021 05:40 AM    Bilirubin, total 0.7 06/15/2021 10:52 AM    Bilirubin, direct 0.2 06/15/2021 10:52 AM    ALT (SGPT) 8 (L) 06/08/2021 02:50 PM    Alk. phosphatase 142 (H) 06/08/2021 02:50 PM    Troponin-I, Qt. <0.02 (L) 05/05/2020 01:09 AM       I reviewed recent labs and recent radiologic studies. I independently reviewed radiology images for studies I described above or studies I have ordered.    Admission date (for inpatients): 6/25/2021   * No surgery found *  * No surgery found *    ASSESSMENT/PLAN:  Problem List  Date Reviewed: 6/25/2021        Codes Class Noted    Pacemaker ICD-10-CM: Z95.0  ICD-9-CM: V45.01  5/3/2021        History of thoracotomy (Chronic) ICD-10-CM: L72.735  ICD-9-CM: V45.89  6/25/2021        Tracheobronchitis ICD-10-CM: J40  ICD-9-CM: 845  6/25/2021        Moderate protein-calorie malnutrition ([facility] [address] ICD-10-CM: E44.0  ICD-9-CM: 263.0  6/17/2021        Pleural fistula [facility]) ICD-10-CM: J86.0  ICD-9-CM: 510.0  6/14/2021        Atrial flutter ([facility] ICD-10-CM: I48.92  ICD-9-CM: 427.32  6/10/2021        Acute hypoxemic respiratory failure [facility]) ICD-10-CM: J96.01  ICD-9-CM: 518.81  6/8/2021        Bronchopleural fistula ([facility] ICD-10-CM: J86.0  ICD-9-CM: 510.0  6/8/2021        Empyema ([place] [address] ICD-10-CM: J86.9  ICD-9-CM: 510.9  6/8/2021        Acute on chronic respiratory failure with hypoxemia [facility]) ICD-10-CM: J96.21  ICD-9-CM: 518.84  6/8/2021        COPD exacerbation ([place] [address] ICD-10-CM: J44.1  ICD-9-CM: 491.21  5/16/2021        Heart block AV complete ([place] [address] ICD-10-CM: I44.2  ICD-9-CM: 426.0  5/3/2021        Sick sinus syndrome (HCC) ICD-10-CM: I49.5  ICD-9-CM: 427.81  3/18/2021        PAF (paroxysmal atrial fibrillation) ([facility] ICD-10-CM: I48.0  ICD-9-CM: 427.31  2/25/2021        Hypertension ICD-10-CM: I10  ICD-9-CM: 401.9  2/25/2021        Atrial fibrillation with RVR (HCC) ICD-10-CM: I48.91  ICD-9-CM: 427.31  2/25/2021        S/P thoracotomy ICD-10-CM: K02.283  ICD-9-CM: V45.89  1/7/2021        Atelectasis of right lung ICD-10-CM: J98.11  ICD-9-CM: 518.0  1/4/2021        A-fib (HCC) (Chronic) ICD-10-CM: I48.91  ICD-9-CM: 427.31  1/4/2021        Chronic respiratory failure with hypoxia (HCC) (Chronic) ICD-10-CM: J96.11  ICD-9-CM: 518.83, 799.02  1/4/2021        Pleural effusion on right ICD-10-CM: J90  ICD-9-CM: 511.9  12/28/2020        Chemotherapy induced neutropenia ([facility] ICD-10-CM: D70.1, T45.1X5A  ICD-9-CM: 288.03, E933.1  10/23/2020        Dehydration ICD-10-CM: E86.0  ICD-9-CM: 276.51  9/15/2020        S/P lobectomy of lung ICD-10-CM: Z90.2  ICD-9-CM: V45.89  7/29/2020        Cough ICD-10-CM: R05  ICD-9-CM: 786.2  7/29/2020        Atrial tachycardia ([facility] ICD-10-CM: I47.1  ICD-9-CM: 427.89  7/26/2020        Cancer of bronchus of right lower lobe (HCC) (Chronic) ICD-10-CM: C34.31  ICD-9-CM: 162.5  7/23/2020        Lung cancer (HCC) (Chronic) ICD-10-CM: C34.90  ICD-9-CM: 162.9 7/23/2020        Cancer of right lung [facility]) ICD-10-CM: C34.91  ICD-9-CM: 162.9  7/23/2020        Pulmonary emphysema (HCC) (Chronic) ICD-10-CM: J43.9  ICD-9-CM: 492.8  7/7/2020        GERD (gastroesophageal reflux disease) ICD-10-CM: K21.9  ICD-9-CM: 530.81  Unknown        Hypotension (Chronic) ICD-10-CM: I95.9  ICD-9-CM: 458.9  5/5/2020    Overview Signed 1/11/2021  8:39 AM by Udell Dakin, NP     On midodrine             Sepsis due to undetermined organism [facility]) ICD-10-CM: A41.9  ICD-9-CM: 038.9  5/5/2020        Malignant neoplasm of lower lobe of right lung ([facility] [address] (Chronic) ICD-10-CM: C34.31  ICD-9-CM: 162.5  2/26/2020    Overview Signed 1/4/2021  7:38 AM by Henry Mena MD     Dec 2020- Echo EF 50%, technically difficult, cannot assess regional wall motion. Aortic root 4.1 cm. No significant valvular disease.   Normal DF             Mass of lower lobe of right lung ICD-10-CM: R91.8  ICD-9-CM: 786.6  2/23/2020        Right lower lobe lung mass ICD-10-CM: R91.8  ICD-9-CM: 786.6  2/23/2020        Essential hypertension with goal blood pressure less than 130/80 (Chronic) ICD-10-CM: I10  ICD-9-CM: 401.9  2/19/2018            Active Problems:    Pulmonary emphysema ([address] (7/7/2020)      Cancer of bronchus of right lower lobe (HCC) (7/23/2020)      Cough (7/29/2020)      Acute on chronic respiratory failure with hypoxemia ([address] (6/8/2021)      History of thoracotomy (6/25/2021)      Tracheobronchitis (6/25/2021)         Continue current treatment plan  Regular diet  Mucinex  PTA meds  CXR  Pulmonary following  IV Abx - Zosyn      Signed:  Kg Stevenson NP

## 2021-06-27 NOTE — PROGRESS NOTES
Pt resting in bed. Hourly rounds completed. No complaints. Will continue to monitor and report to oncoming shift.

## 2021-06-27 NOTE — PROGRESS NOTES
Briseida Velásquez. Admission Date: 6/25/2021             Daily Progress Note: 6/27/2021   The patients chart has been reviewed and discussed with staff. Patient is a 78 y.o  male seen at the request of Dr Klaus Olivas who presented as a direct admit from Dr Paul Lozano office for uncontrollable coughing. Pt is known to our office and was seen while hospitalized on 6/15. PMHx includes chronic respiratory failure on 2L O2 qhs, prior tobacco abuse, RLL SCC s/p excision 7/23/2020, afib on Eliquis, HTN, GERD and OA. Pt completed chemo 10/2020. He had repeat chest CT 12/22/2020 with a large R pleural effusion and collapse of remaining RUL. Pt had a R thoracentesis on 12/28/2020 with 900mL bloody fluid removed but unable to tolerate a complete drainage. A chest tube was placed on 1/4/21 with 800cc serosanguineous drainage. General surgery was consulted and underwent R VATS with conversion to open thoracotomy with extensive pneumolysis with decortication 1/6/2021. We were consulted intraoperatively for bronch and were unable to get lung to inflate intraoperatively. Pt had repeat bronch on 1/7/21 secondary to persistent RUL atelectasis. He was found to have a stump area from prior lobectomy with abnormal appearing tissue that was biopsied and nondiagnostic. Pt had a repeat bronch on 1/12. Pt has had congestion since his pneumonectomy whenever he lies on his R side. He was admitted from the ER On 6/8/21 with shortness of breath, fever, and weakness. A R chest tube was placed for possible empyema. Pt was hypotensive and admitted to ICU. 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 ID is following for antimicrobial recommendations. He was discharged home on 6/17 on keflex and flagyl. Id has been consulted this admission and is following. Currently on Zosyn (6/25- ), cultures pending. Nasal MRSA negative.      Subjective:   Afebrile past 24 hours. Remains on 4 LPM NC, which is home dose. He does not feel as good today and is complaining of cough and gurgling. Per MAR no hycodan has been given since last PM.      Review of Systems  Constitutional: positive for fevers, fatigue and malaise, negative for chills and sweats  Respiratory: positive for cough, sputum or dyspnea on exertion, negative for hemoptysis  Cardiovascular: positive for dyspnea, fatigue, dyspnea on exertion, negative for chest pain, chest pressure/discomfort  Gastrointestinal: negative for nausea, vomiting, diarrhea and constipation    Current Facility-Administered Medications   Medication Dose Route Frequency    HYDROcodone-homatropine (HYCODAN) 5-1.5 mg/5 mL (5 mL) oral solution 5 mL  5 mL Oral Q4H PRN    apixaban (ELIQUIS) tablet 5 mg  5 mg Oral Q12H    metoprolol succinate (TOPROL-XL) XL tablet 50 mg  50 mg Oral DAILY    levalbuterol (XOPENEX) nebulizer soln 1.25 mg/3 mL  1.25 mg Nebulization Q4H RT    piperacillin-tazobactam (ZOSYN) 4.5 g in 0.9% sodium chloride (MBP/ADV) 100 mL MBP  4.5 g IntraVENous Q8H    sodium chloride 3% hypertonic nebulizer soln  4 mL Nebulization BID RT         Objective:     Vitals:    06/27/21 0341 06/27/21 0415 06/27/21 0708 06/27/21 0907   BP: 112/77  108/74    Pulse: 75  77    Resp: 18  17    Temp: 98 °F (36.7 °C)  98.5 °F (36.9 °C)    SpO2: 97% 95% 94% 94%     Intake and Output:   06/25 1901 - 06/27 0700  In: -   Out: 1025 [Urine:1025]  No intake/output data recorded.       Intake/Output Summary (Last 24 hours) at 6/27/2021 0940  Last data filed at 6/26/2021 1109  Gross per 24 hour   Intake    Output 275 ml   Net -275 ml       Physical Exam:            GEN: well developed, appears weak today   HEENT:  PERRL, EOMI, no alar flaring or epistaxis, oral mucosa moist without cyanosis,   NECK:  no JVD, no retractions, no thyromegaly or masses,   LUNGS: R diminished, L coarse rhonchi  HEART:  RRR with no M,G,R;  ABDOMEN:  soft with no tenderness, positive bowel sounds present  EXTREMITIES:  warm with no cyanosis, no edema  SKIN:  no jaundice or ecchymosis, R thora site (healing) no drainage at present  NEURO:  alert and oriented, grossly non-focal        CHEST XRAY:   6/27/2021  None today     6/25/2021          LAB  Recent Labs     06/25/21  1516   WBC 6.6   HGB 9.7*   HCT 32.5*        Recent Labs     06/25/21  1516      K 3.8      CO2 30   GLU 97   BUN 13   CREA 0.88     ABG:  No results found for: PH, PHI, PCO2, PCO2I, PO2, PO2I, HCO3, HCO3I, FIO2, FIO2I    Cultures:   Results     Procedure Component Value Units Date/Time    MSSA/MRSA SC BY PCR, NASAL SWAB [[id number]] Collected: 06/25/21 1923    Order Status: Completed Specimen: Nasal Updated: 06/25/21 2205     Special Requests: NO SPECIAL REQUESTS        Culture result:       SA target not detected. A MRSA NEGATIVE, SA NEGATIVE test result does not preclude MRSA or SA nasal colonization. MSSA/MRSA SC BY PCR, NASAL SWAB [[id number]] Collected: 06/25/21 1839    Order Status: Canceled Specimen: Nasal swab     CULTURE, RESPIRATORY/SPUTUM/BRONCH Hamlet Orris STAIN [[id number]] Collected: 06/25/21 1552    Order Status: Completed Specimen: Sputum Updated: 06/27/21 0741     Special Requests: NO SPECIAL REQUESTS        GRAM STAIN 10 TO 50 WBCS SEEN PER OIF      0 TO 5 EPITHELIAL CELLS SEEN PER OIF      MANY GRAM POSITIVE COCCI         MODERATE YEAST         2+ MUCUS PRESENT        Culture result:       MODERATE NORMAL RESPIRATORY EDU          CULTURE, BLOOD [[id number]] Collected: 06/25/21 1516    Order Status: Completed Specimen: Blood Updated: 06/26/21 0828     Special Requests: --        RIGHT  ARM       Culture result: NO GROWTH AFTER 14 HOURS       CULTURE, BODY FLUID W Vesta Pryora [[id number]] Collected: 06/14/21 1515    Order Status: Canceled Specimen:  Body Fluid from Drainage     CULTURE, FUNGUS [[id number]]     Order Status: Sent Specimen: Abdomen CULTURE, ANAEROBIC [[id number]]  (Abnormal) Collected: 06/14/21 1508    Order Status: Completed Specimen: Lung Updated: 06/23/21 1001     Special Requests: NO SPECIAL REQUESTS        Culture result:       LIGHT ANAEROBIC GRAM NEGATIVE RODS                  LIGHT SECOND ANAEROBIC GRAM NEGATIVE RODS                  SENT TO [facility] FOR TESTING 06/23/21          CULTURE, TISSUE Shayna Ee STAIN [[id number]]  (Abnormal)  (Susceptibility) Collected: 06/14/21 1508    Order Status: Completed Specimen: Lung Updated: 06/19/21 0826     Special Requests: NO SPECIAL REQUESTS        GRAM STAIN 12 TO 18 WBCS SEEN PER OIF      FEW GRAM POSITIVE COCCI        Culture result:       SCANT STREPTOCOCCUS SANGUINIS          Susceptibility      Streptococcus sanguinis      ESTEFANI      Amoxicillin Intermediate      Cefepime ($$) Susceptible      Ceftriaxone ($) Susceptible      Clindamycin ($) Resistant      Penicillin G ($$) Intermediate      Vancomycin ($) Susceptible               Linear View                   FUNGUS CULTURE AND SMEAR [[id number]] Collected: 06/14/21 1508    Order Status: Completed Specimen: Miscellaneous sample Updated: 06/24/21 1236     Source LUNG        Comment: EMPYEMA        Fungus stain Comment        Comment: (NOTE)  Tissue Grinding and Digestion/Decontamination          Fungus (Mycology) Culture Other source received     Comment: (NOTE)  Performed At: [address] [id number]  Stephanie Vick MD NS:[id number]                 Assessment:     Hospital Problems  Date Reviewed: 6/25/2021        Codes Class Noted POA    History of thoracotomy (Chronic) ICD-10-CM: G89.532  ICD-9-CM: V45.89  6/25/2021 Yes        Tracheobronchitis ICD-10-CM: J40  ICD-9-CM: 838  6/25/2021 Unknown        Acute on chronic respiratory failure with hypoxemia ([facility] [address] ICD-10-CM: J96.21  ICD-9-CM: 518.84  6/8/2021 Yes        Cough ICD-10-CM: E46  ICD-9-CM: 786.2  7/29/2020 Yes        Cancer of bronchus of right lower lobe (HCC) (Chronic) ICD-10-CM: C34.31  ICD-9-CM: 162.5  7/23/2020 Yes        Pulmonary emphysema (HCC) (Chronic) ICD-10-CM: J43.9  ICD-9-CM: 492.8  7/7/2020 Yes              PLAN:   --cont zosyn per ID  --on eliquis  --cont 3% saline nebs, flutter valve, and xopenex for now  --pleural fluid isn't causing shift and so will watchfully wait as fluid is naturally expected to fill post-pneumonectomy site  --discussed with RN to please given hycodan as ordered when requested  --cont O2 baseline 4 LPM NC  --FULL CODE     Ke Boykin NP   I have spoken with and examined the patient. I agree with the above assessment and plan as documented. Gen: uncomfortable  Lungs:  CTA o n left  Heart:  RRR with no Murmur/Rubs/Gallops  Abd: NTND  Ext: no edema    --f/u with ID tomorrow  --OOB if possible, IS for L basilar atelectasis    Donnette Castleman, MD        More than 50% of time documented was spent in face-to-face contact with the patient and in the care of the patient on the floor/unit where the patient is located.

## 2021-06-28 PROBLEM — R53.81 DEBILITY: Status: ACTIVE | Noted: 2021-01-01

## 2021-06-28 NOTE — PROGRESS NOTES
Comprehensive Nutrition Assessment    Type and Reason for Visit: Initial, Positive nutrition screen  Best Practice Alert for Pressure Injury stage 2 or greater    Nutrition Recommendations/Plan:   Meals and Snacks:  Continue current diet. Discussed ordering with pt dining associate for meal preferences. Encouraged use of foods of pref, outside foods to increase overall intake. Limit meal disruptions for optimal intake. Nutrition Supplement Therapy:   Medical food supplement therapy:  Continue Ensure Enlive three times per day (this provides 350 kcal and 20 grams protein per bottle) - vanilla      Malnutrition Assessment:  Malnutrition Status: Moderate malnutrition  Context: Chronic illness  Findings of clinical characteristics of malnutrition:   Energy Intake:  Mild decrease in energy intake (specify) (Consuming <50% baseline intake)  Weight Loss:  7.000 - Greater than 20% over 1 year (22% loss over the past yr, wt more stable recently)    Body Fat Loss:  1 - Mild body fat loss, Triceps, Buccal region   Muscle Mass Loss:  1 - Mild muscle mass loss, Temples (temporalis)   Strength:  Not performed     Nutrition Assessment:   Nutrition History: Pt and girlfriend report loss of appetite ongoing for months. Taste changes + sweet items beign for overly sweet items, prior foods of preference no longer desirable. Recall of intake regarding take out food items with pt consuming 50% or less of his baseline. Nutrition Background: h/o tobacco abuse s/p RUL mass (SCC) found 2/2020, s/p chemotherapy and XRT, followed by RML lobectomy 7/2020 and right VATS 1/2021. He is s/p right thoractomy and bronchopleural fistula repair 6/14/2021. Admitted from surgical office d/t uncontrolled coughing, pulmonary emphysema, acute on chronic respiratory failure with hypoxemia. Daily Update:  Visit with pt and girlfriend at bedside. Pt with multiple complaints of food dislike regarding hospital food.   Girlfriend providing outside food sources as well with her noting limited success in inreasing intake with these foods as well. Pt has used ensure in the past when on chemo, no recent use. Partial serving consumed at my visit. He indicates most recent known wt at pulmonary rehab of 175-176#. In addition to dislike of many food options, taste alterations and meal disruptions are reported to limit his intake here as well. Nutrition Related Findings:   Pt with less than 50% of sausage mcmuffin consumed Wound Type:  (Sacral stage II documented by primary RN)    Current Nutrition Therapies:  ADULT DIET Regular; allergy to strawberries  ADULT ORAL NUTRITION SUPPLEMENT Breakfast, Lunch, Dinner; Standard High Calorie/High Protein    Current Intake:   Average Meal Intake: 26-50% Average Supplement Intake: 26-50%      Anthropometric Measures:  Height: 5' 10\" (177.8 cm)  Current Body Wt: 81.6 kg (179 lb 14.3 oz) (6/25), Weight source: Not specified  BMI: 25.8, Overweight (BMI 25.0-29. 9)  Admission Body Weight: 179 lb 14.3 oz (source not specified)  Ideal Body Weight (lbs) (Calculated): 166 lbs (75 kg), 108.4 %  Usual Body Wt: 104.3 kg (230 lb) (stated ~1 yr ago, 81.8 kg bed scale 6/16 per EMR), Percent weight change: -21.8          Edema: LLE: 3+;Pitting (6/27/2021  7:08 AM)  RLE: 3+;Pitting (6/27/2021  7:08 AM)     Estimated Daily Nutrient Needs:  Energy (kcal/day): 9334-5175 (Kcal/kg (20-25), Weight Used: Admission (81.6 kg))  Protein (g/day): 82-98 (1-1.2 g/kg) Weight Used: (Admission)  Fluid (ml/day):   (1 ml/kcal)    Nutrition Diagnosis:   · Inadequate oral intake related to altered taste perception (loss of appetite) as evidenced by  (self reproted barriers, intake <50% needs)    · Moderate malnutrition, In context of chronic illness related to catabolic illness as evidenced by  (criteria identified in malnutrition assessment)    Nutrition Interventions:   Food and/or Nutrient Delivery: Continue current diet, Continue oral nutrition supplement     Coordination of Nutrition Care: Continue to monitor while inpatient  Plan of Care discussed with Francia Goss RN. Goals: Active Goal: Meet >75% estimated needs by nutrition follow-up    Nutrition Monitoring and Evaluation:      Food/Nutrient Intake Outcomes: Food and nutrient intake, Supplement intake  Physical Signs/Symptoms Outcomes: Biochemical data, Meal time behavior, Weight    Discharge Planning:     Too soon to determine    Cookie Cabrales Ma, RD, LDN on 6/28/2021 at 12:20 PM  Contact: [phone]

## 2021-06-28 NOTE — PROGRESS NOTES
Parris Tierney. Admission Date: 6/25/2021             Daily Progress Note: 6/28/2021   The patients chart has been reviewed and discussed with staff. Patient is a 78 y.o  male seen at the request of Dr Evangelist Carey who presented as a direct admit from Dr Cardenas Flank office for uncontrollable coughing. Pt is known to our office and was seen while hospitalized on 6/15. PMHx includes chronic respiratory failure on 2L O2 qhs, prior tobacco abuse, RLL SCC s/p excision 7/23/2020, afib on Eliquis, HTN, GERD and OA. Pt completed chemo 10/2020. He had repeat chest CT 12/22/2020 with a large R pleural effusion and collapse of remaining RUL. Pt had a R thoracentesis on 12/28/2020 with 900mL bloody fluid removed but unable to tolerate a complete drainage. A chest tube was placed on 1/4/21 with 800cc serosanguineous drainage. General surgery was consulted and underwent R VATS with conversion to open thoracotomy with extensive pneumolysis with decortication 1/6/2021. We were consulted intraoperatively for bronch and were unable to get lung to inflate intraoperatively. Pt had repeat bronch on 1/7/21 secondary to persistent RUL atelectasis. He was found to have a stump area from prior lobectomy with abnormal appearing tissue that was biopsied and nondiagnostic. Pt had a repeat bronch on 1/12. Pt has had congestion since his pneumonectomy whenever he lies on his R side. He was admitted from the ER On 6/8/21 with shortness of breath, fever, and weakness. A R chest tube was placed for possible empyema. 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 ID is following for antimicrobial recommendations. He was discharged home on 6/17 on keflex and flagyl. Id has been consulted this admission and is following. Currently on Zosyn (6/25- ), cultures pending. Nasal MRSA negative. Subjective: On 4 lpm NC, sat 94%. Afebrile since admission.  ID following. Sputum with normal beni. Significant debility, weak cough. He can barely sit up. Review of Systems  Constitutional: positive for fevers, fatigue and malaise, negative for chills and sweats  Respiratory: positive for cough, sputum or dyspnea on exertion, negative for hemoptysis  Cardiovascular: positive for dyspnea, fatigue, dyspnea on exertion, negative for chest pain, chest pressure/discomfort  Gastrointestinal: negative for nausea, vomiting, diarrhea and constipation    Current Facility-Administered Medications   Medication Dose Route Frequency    cefTRIAXone (ROCEPHIN) 2 g in 0.9% sodium chloride (MBP/ADV) 50 mL MBP  2 g IntraVENous Q24H    metroNIDAZOLE (FLAGYL) tablet 500 mg  500 mg Oral Q8H    HYDROcodone-homatropine (HYCODAN) 5-1.5 mg/5 mL (5 mL) oral solution 5 mL  5 mL Oral Q4H PRN    apixaban (ELIQUIS) tablet 5 mg  5 mg Oral Q12H    metoprolol succinate (TOPROL-XL) XL tablet 50 mg  50 mg Oral DAILY    levalbuterol (XOPENEX) nebulizer soln 1.25 mg/3 mL  1.25 mg Nebulization Q4H RT    sodium chloride 3% hypertonic nebulizer soln  4 mL Nebulization BID RT         Objective:     Vitals:    06/28/21 0343 06/28/21 0440 06/28/21 0752 06/28/21 0813   BP: 109/72   109/76   Pulse: 77   99   Resp: 16   18   Temp: 98.3 °F (36.8 °C)   98.2 °F (36.8 °C)   SpO2: 96% 96% 94% 92%     Intake and Output:   06/26 1901 - 06/28 0700  In: -   Out: 1100 [Urine:1100]  No intake/output data recorded.       Intake/Output Summary (Last 24 hours) at 6/28/2021 1110  Last data filed at 6/28/2021 1709  Gross per 24 hour   Intake    Output 1100 ml   Net -1100 ml       Physical Exam:            GEN: well developed, weak   HEENT:   no alar flaring or epistaxis, oral mucosa moist without cyanosis,   NECK:  no JVD, no retractions, no thyromegaly or masses,   LUNGS: rales anteriorly on left on 4 lpm.  HEART:  RRR with no M,G,R;  ABDOMEN:  soft with no tenderness, positive bowel sounds present  EXTREMITIES:  warm with no cyanosis, no edema  SKIN:  no jaundice or ecchymosis, steri strip on lateral right   NEURO:  alert and oriented, grossly non-focal        CHEST XRAY:   6/27/2021  None today     6/25/2021          LAB  Recent Labs     06/25/21  1516   WBC 6.6   HGB 9.7*   HCT 32.5*        Recent Labs     06/25/21  1516      K 3.8      CO2 30   GLU 97   BUN 13   CREA 0.88       Cultures:   Results     Procedure Component Value Units Date/Time    MSSA/MRSA SC BY PCR, NASAL SWAB [[id number]] Collected: 06/25/21 1923    Order Status: Completed Specimen: Nasal Updated: 06/25/21 2205     Special Requests: NO SPECIAL REQUESTS        Culture result:       SA target not detected. A MRSA NEGATIVE, SA NEGATIVE test result does not preclude MRSA or SA nasal colonization. MSSA/MRSA SC BY PCR, NASAL SWAB [[id number]] Collected: 06/25/21 1839    Order Status: Canceled Specimen: Nasal swab     CULTURE, RESPIRATORY/SPUTUM/BRONCH Eugena Armani STAIN [[id number]] Collected: 06/25/21 1552    Order Status: Completed Specimen: Sputum Updated: 06/28/21 0803     Special Requests: NO SPECIAL REQUESTS        GRAM STAIN 10 TO 50 WBCS SEEN PER OIF      0 TO 5 EPITHELIAL CELLS SEEN PER OIF      MANY GRAM POSITIVE COCCI         MODERATE YEAST         2+ MUCUS PRESENT        Culture result:       MODERATE NORMAL RESPIRATORY EDU          CULTURE, BLOOD [[id number]] Collected: 06/25/21 1516    Order Status: Completed Specimen: Blood Updated: 06/28/21 0647     Special Requests: --        RIGHT  ARM       Culture result: NO GROWTH 3 DAYS       CULTURE, BODY FLUID W Sly Islas [[id number]] Collected: 06/14/21 1515    Order Status: Canceled Specimen:  Body Fluid from Drainage     CULTURE, FUNGUS [[id number]]     Order Status: Sent Specimen: Abdomen     CULTURE, ANAEROBIC [[id number]]  (Abnormal) Collected: 06/14/21 1508    Order Status: Completed Specimen: Lung Updated: 06/23/21 1001     Special Requests: NO SPECIAL REQUESTS        Culture result:       LIGHT ANAEROBIC GRAM NEGATIVE RODS                  LIGHT SECOND ANAEROBIC GRAM NEGATIVE RODS                  SENT TO [facility] FOR TESTING 06/23/21          CULTURE, TISSUE Herschell Rinks STAIN [[id number]]  (Abnormal)  (Susceptibility) Collected: 06/14/21 1508    Order Status: Completed Specimen: Lung Updated: 06/19/21 0826     Special Requests: NO SPECIAL REQUESTS        GRAM STAIN 12 TO 18 WBCS SEEN PER OIF      FEW GRAM POSITIVE COCCI        Culture result:       SCANT STREPTOCOCCUS SANGUINIS          Susceptibility      Streptococcus sanguinis      ESTEFANI      Amoxicillin Intermediate      Cefepime ($$) Susceptible      Ceftriaxone ($) Susceptible      Clindamycin ($) Resistant      Penicillin G ($$) Intermediate      Vancomycin ($) Susceptible               Linear View                   FUNGUS CULTURE AND SMEAR [[id number]] Collected: 06/14/21 1508    Order Status: Completed Specimen: Miscellaneous sample Updated: 06/24/21 1236     Source LUNG        Comment: EMPYEMA        Fungus stain Comment        Comment: (NOTE)  Tissue Grinding and Digestion/Decontamination          Fungus (Mycology) Culture Other source received     Comment: (NOTE)  Performed At: [address] [id number]  [facility] Mat CERVANTES KX:[id number]                 Assessment:     Hospital Problems  Date Reviewed: 6/25/2021        Codes Class Noted POA    History of thoracotomy (Chronic) ICD-10-CM: L43.868  ICD-9-CM: V45.89  6/25/2021 Yes        Tracheobronchitis ICD-10-CM: J40  ICD-9-CM: 742  6/25/2021 Unknown        Acute on chronic respiratory failure with hypoxemia ([facility] [address] ICD-10-CM: J96.21  ICD-9-CM: 518.84  6/8/2021 Yes        * (Principal) Cough ICD-10-CM: G84  ICD-9-CM: 786.2  7/29/2020 Yes        Cancer of bronchus of right lower lobe (HCC) (Chronic) ICD-10-CM: C34.31  ICD-9-CM: 162.5  7/23/2020 Yes        Pulmonary emphysema (HCC) (Chronic) ICD-10-CM: J43.9  ICD-9-CM: 492.8  7/7/2020 Yes            Here with cough, and worsening SOB. 6/14, he had surgical repair of R bronchus stump site. On keflex and flagyl. Fluid noted within the pneumonectomy cavity on the right side. This is not necessarily an abnormal finding as would expect this empty space to fill with fluid over time. He is on eliquis. PLAN:   --afebrile with normal WBC. ID following. He is on rocephin and flagyl  --cont 3% saline nebs, reordered flutter valve, xopenex and pulmicort   --repeat CXR  --IS, mobilize as able  --hycodan ordered for cough  --he is significantly debilitated with weak cough, SOB with just sitting up in the bed. Add pulse ox and ask PT/OT to evaluate. Needs rehab, he is interested. Consult TAMIA López NP     The patient has been seen and examined by me personally, the chart,labs, and radiographic studies have been reviewed. Chest:clear on l decreased on R  Extremities:trace edema    I agree with the above assessment and plan.     Alex Becker MD.

## 2021-06-28 NOTE — PROGRESS NOTES
Hycodan x1 thus far per pt request. Remains on 4L NC. Problem: Airway Clearance - Ineffective  Goal: *Patent airway  Outcome: Progressing Towards Goal  Goal: *Absence of airway secretions  Outcome: Progressing Towards Goal  Goal: *Able to cough effectively  Outcome: Progressing Towards Goal  Goal: *PALLIATIVE CARE:  Alleviation of secretions, cough and/or nasal congestion  Outcome: Progressing Towards Goal     Problem: Patient Education: Go to Patient Education Activity  Goal: Patient/Family Education  Outcome: Progressing Towards Goal     Problem: Falls - Risk of  Goal: *Absence of Falls  Description: Document Belen Fall Risk and appropriate interventions in the flowsheet.   Outcome: Progressing Towards Goal  Note: Fall Risk Interventions:            Medication Interventions: Assess postural VS orthostatic hypotension, Bed/chair exit alarm, Patient to call before getting OOB, Teach patient to arise slowly, Evaluate medications/consider consulting pharmacy                   Problem: Patient Education: Go to Patient Education Activity  Goal: Patient/Family Education  Outcome: Progressing Towards Goal

## 2021-06-28 NOTE — PROGRESS NOTES
Infectious Disease Progress Note    Today's Date: 2021   Admit Date: 2021    Impression:   · Persistent R pleural effusion  · S/p R thoracotomy, decortication, bronchopleural fistula repair, intercostal nerve cryoablation, pleurex drain placement, 21; Cx: Streptococcus sanguinis- discharged on 4 week course of Keflex/Flagyl  · RLL squamous cell carcinoma, s/p excision (2020) and chemo (EOT 10/2020) with persistent effusion and R thoracotomy 21, with pneumolysis and decortication  · A fib with AV node ablation and permanent pacer placed 2021    Plan:   · Will stop Zosyn. Transition to IV Ceftriaxone and PO flagyl today. Anti-infectives:   · IV Zosyn (-)  · Ceftriaxone -  · Flagyl -    Subjective: Interval History:  Seen resting in bed with family member at bedside. He reports lack of appetite and increased fatigue today. Allergies   Allergen Reactions    Albuterol Palpitations    Celebrex [Celecoxib] Itching        Review of Systems:  A comprehensive review of systems was negative except for that written in the History of Present Illness. Objective:     Visit Vitals  /76 (BP 1 Location: Right upper arm, BP Patient Position: At rest)   Pulse 99   Temp 98.2 °F (36.8 °C)   Resp 18   SpO2 92%     Temp (24hrs), Av.4 °F (36.9 °C), Min:98.2 °F (36.8 °C), Max:98.8 °F (37.1 °C)       Lines:  Peripheral IV:       Physical Exam:    General:  Alert, cooperative, ill-appearing   Eyes:  Sclera anicteric. Pupils equally round and reactive to light. Mouth/Throat: Mucous membranes normal, oral pharynx clear   Neck: Supple   Lungs:   R diminished, coarse lung sounds on left, good effort on 4L via NC   CV:  Regular rate and rhythm,no murmur, click, rub or gallop   Abdomen:   Soft, non-tender.  bowel sounds normal. non-distended   Extremities: No cyanosis or edema   Skin: Skin color, texture, turgor normal. no acute rash or lesions   Lymph nodes: Cervical and supraclavicular normal   Musculoskeletal: No swelling or deformity   Lines/Devices:  Intact, no erythema, drainage or tenderness   Psych: Alert and oriented, normal mood affect given the setting       Data Review:     CBC:  Recent Labs     06/25/21  1516   WBC 6.6   GRANS 83*   MONOS 7   EOS 1   ANEU 5.5   ABL 0.6   HGB 9.7*   HCT 32.5*          BMP:  Recent Labs     06/25/21  1516   CREA 0.88   BUN 13      K 3.8      CO2 30   AGAP 5*   GLU 97       LFTS:  No results for input(s): TBILI, ALT, AP, TP, ALB in the last 72 hours. No lab exists for component: SGOT    Microbiology:     All Micro Results     Procedure Component Value Units Date/Time    CULTURE, RESPIRATORY/SPUTUM/BRONCH Liguori Damir [[id number]] Collected: 06/25/21 1552    Order Status: Completed Specimen: Sputum Updated: 06/28/21 0803     Special Requests: NO SPECIAL REQUESTS        GRAM STAIN 10 TO 50 WBCS SEEN PER OIF      0 TO 5 EPITHELIAL CELLS SEEN PER OIF      MANY GRAM POSITIVE COCCI         MODERATE YEAST         2+ MUCUS PRESENT        Culture result:       MODERATE NORMAL RESPIRATORY EDU          CULTURE, BLOOD [[id number]] Collected: 06/25/21 1516    Order Status: Completed Specimen: Blood Updated: 06/28/21 0647     Special Requests: --        RIGHT  ARM       Culture result: NO GROWTH 3 DAYS       MSSA/MRSA SC BY PCR, NASAL SWAB [[id number]] Collected: 06/25/21 1923    Order Status: Completed Specimen: Nasal Updated: 06/25/21 2205     Special Requests: NO SPECIAL REQUESTS        Culture result:       SA target not detected. A MRSA NEGATIVE, SA NEGATIVE test result does not preclude MRSA or SA nasal colonization.           MSSA/MRSA SC BY PCR, NASAL SWAB [[id number]] Collected: 06/25/21 1839    Order Status: Canceled Specimen: Nasal swab           Imaging:   Reviewed    Signed By: JOHANNA Gan     June 28, 2021

## 2021-06-28 NOTE — PROGRESS NOTES
CM spoke with patient at bedside for discharge planning. Demographics verified. Patient lives with his girlfriend, Maricruz Holland, in one level home with 3 steps to enter. Patient is independent with ADLs. He has cane, oxygen, and nebulizer in the home. Oxygen supplied by [facility]. CM consult received for rehab placement. PT/OT evals pending. CM provided patient with list of SNFs in area. PPD ordered. CM will continue to follow to assist with discharge planning. Care Management Interventions  PCP Verified by CM:  Yes (Dr. Madie Javier)  Mode of Transport at Discharge: Self  Transition of Care Consult (CM Consult): Discharge Planning  Discharge Durable Medical Equipment: No  Physical Therapy Consult: Yes  Occupational Therapy Consult: Yes  Speech Therapy Consult: Yes  Current Support Network: Own Home  Confirm Follow Up Transport: Family  Discharge Location  Discharge Placement: Unable to determine at this time

## 2021-06-28 NOTE — PROGRESS NOTES
Pt resting in bed with partner at bedside. Hourly rounds completed. Pt has bronchoscopy scheduled for 845AM.  No complaints at this time. Will continue to monitor and report to oncoming shift.

## 2021-06-28 NOTE — PROGRESS NOTES
Spoke to [facility] and Dr. Luly Peck. ID has concern about fluid and only way to know would be to do thora. I will hold Eliquis for now, plan for thora Wednesday. I think he also needs a bronchoscopy with ongoing rhonchi to make sure there is no visible bronchopleural fistula and to clear airways of possible mucus plugging. Scheduled for Tuesday 845 for bronch/airway  Thora Wednesday afternoon-only need culture sample sent, unless looks like pus there is no reason to try to drain aggressively.     Jenelle Jacome MD

## 2021-06-28 NOTE — PROGRESS NOTES
H&P/Consult Note/Progress Note/Office Note:   Bola Mcqueen MRN: [id number]  :1950  Age:75 y.o.    HPI: Bola Mcqueen is a 78 y.o. male who is well known to Dr. Caitie Sullivan and is s/p pneumonectomy in 2021 who developed a bronchiopleural fistula. Now he is s/p bronchiopleural fistula repair 21. His post-operative course was unremarkable. Prior to surgery he presented with complaints of fever and shortness of breath. He was admitted on 2021 for sepsis r/t right sided empyema. The patient states that over the course of the past few months when he would roll on his left side he would have \"gushing\" fluid from his chest. He presented to the office today for routine follow up and has complaints of a cough. Due to concern for disrupting surgical repair, he will be admitted for further observation and care. 21: Pt awake in bed. Feels like coughing is beginning to improved. 24hr Max Temp 99.2. O2 Sat 96% on 4L o2 via NC.   21: Pt awake in bed. Reports continues to cough. O2 Sat 94% on 4L o2 via NC. AF. NAD.   21 Pt eating breakfast- with complaints of little to no appetite. . Reports cough better- states he is coughing up some mucous. Continues to be SOB with conversation. ID and pulmonary following. Remains AF. CXR reviwed. Past Medical History:   Diagnosis Date    Arrhythmia     Afib     Chronic obstructive pulmonary disease (HCC)     O2 at HS    Chronic pain     right torn rotator cuff; left knee    GERD (gastroesophageal reflux disease)     controlled with med    Hypertension     hx-- off meds since 2020--- followed by dr. Svetlana Nayak Hypoxia 2020    Malignant neoplasm of lower lobe of right lung ([facility] [address] 2020    REC'D CHEMO AND RADIATION--- FOLLOWED BY DR. FOX    Osteoarthritis     Right lower lobe lung mass 2020    Status post total right knee replacement 2016     Past Surgical History:   Procedure Laterality Date    HX COLONOSCOPY      HX KNEE ARTHROSCOPY Left     scope X 1, ACL recon X 1    HX KNEE ARTHROSCOPY Right , 1975    X 2     HX KNEE REPLACEMENT Right 2016    HX TONSILLECTOMY  1959    HX VASCULAR ACCESS Right placed 3/2020    port in chest     IR INSERT TUNL CVC W PORT OVER 5 YEARS  3/18/2020    NE CHEST SURGERY PROCEDURE UNLISTED      R lobectomy 2020; R complete pneumonectomy 2021    NE SINUS SURGERY PROC UNLISTED  ,     sinus surg     Current Facility-Administered Medications   Medication Dose Route Frequency    cefTRIAXone (ROCEPHIN) 2 g in 0.9% sodium chloride (MBP/ADV) 50 mL MBP  2 g IntraVENous Q24H    metroNIDAZOLE (FLAGYL) tablet 500 mg  500 mg Oral Q8H    HYDROcodone-homatropine (HYCODAN) 5-1.5 mg/5 mL (5 mL) oral solution 5 mL  5 mL Oral Q4H PRN    apixaban (ELIQUIS) tablet 5 mg  5 mg Oral Q12H    metoprolol succinate (TOPROL-XL) XL tablet 50 mg  50 mg Oral DAILY    levalbuterol (XOPENEX) nebulizer soln 1.25 mg/3 mL  1.25 mg Nebulization Q4H RT    sodium chloride 3% hypertonic nebulizer soln  4 mL Nebulization BID RT     Albuterol and Celebrex [celecoxib]  Social History     Socioeconomic History    Marital status:      Spouse name: Not on file    Number of children: Not on file    Years of education: Not on file    Highest education level: Not on file   Tobacco Use    Smoking status: Former Smoker     Packs/day: 1.00     Years: 20.00     Pack years: 20.00     Quit date:      Years since quittin.4    Smokeless tobacco: Never Used    Tobacco comment: patient has no desire to resume    Substance and Sexual Activity    Alcohol use:  Yes     Alcohol/week: 4.0 standard drinks     Types: 4 Glasses of wine per week    Drug use: No   Other Topics Concern     Service No    Blood Transfusions No    Caffeine Concern No    Occupational Exposure No    Hobby Hazards No    Sleep Concern No    Stress Concern No    Weight Concern No    Special Diet No    Exercise Yes    Seat Belt Yes   Social History Narrative    . Has long-time girlfriend. Continues to work doing IT support. Social Determinants of Health     Financial Resource Strain:     Difficulty of Paying Living Expenses:    Food Insecurity:     Worried About Running Out of Food in the Last Year:     [address] in the Last Year:    Transportation Needs:     Lack of Transportation (Medical):  Lack of Transportation (Non-Medical):    Physical Activity:     Days of Exercise per Week:     Minutes of Exercise per Session:    Stress:     Feeling of Stress :    Social Connections:     Frequency of Communication with Friends and Family:     Frequency of Social Gatherings with Friends and Family:     Attends Muslim Services:     Active Member of Clubs or Organizations:     Attends Club or Organization Meetings:     Marital Status:      Social History     Tobacco Use   Smoking Status Former Smoker    Packs/day: 1.00    Years: 20.00    Pack years: 20.00    Quit date:     Years since quittin.4   Smokeless Tobacco Never Used   Tobacco Comment    patient has no desire to resume      Family History   Problem Relation Age of Onset    Cancer Mother         uterine    Arrhythmia Sister         afib     ROS: The patient has no difficulty with chest pain . No fever or chills. Comprehensive review of systems was otherwise unremarkable except as noted above. Physical Exam:   Visit Vitals  /76 (BP 1 Location: Right upper arm, BP Patient Position: At rest)   Pulse 99   Temp 98.2 °F (36.8 °C)   Resp 18   SpO2 92%     Constitutional: Alert, oriented, cooperative patient in no acute distress; appears stated age    Eyes: Sclera are clear. EOMs intact  ENMT: no external lesions gross hearing normal; no obvious neck masses, no ear or lip lesions, nares normal  CV: RRR. Normal perfusion  Resp: No JVD. no audible wheezing.  Mild SOB with conversation on 4L NC 94 % sat   GI: soft and non-distended Musculoskeletal: unremarkable with normal function. No embolic signs or cyanosis. Neuro:  Oriented; moves all 4; no focal deficits  Psychiatric: normal affect and mood, no memory impairment    Recent vitals (if inpt):  Patient Vitals for the past 24 hrs:   BP Temp Pulse Resp SpO2   06/28/21 0813 109/76 98.2 °F (36.8 °C) 99 18 92 %   06/28/21 0752     94 %   06/28/21 0440     96 %   06/28/21 0343 109/72 98.3 °F (36.8 °C) 77 16 96 %   06/28/21 0034     93 %   06/27/21 2300 112/75 98.3 °F (36.8 °C) 77 16 95 %   06/1950     97 %   06/27/21 1915 107/73 98.8 °F (37.1 °C) 78 16 94 %   06/27/21 1631     95 %   06/27/21 1540 106/82 98.3 °F (36.8 °C) 77 17 95 %   06/27/21 1129     94 %       Labs:  Recent Labs     06/25/21  1516   WBC 6.6   HGB 9.7*         K 3.8      CO2 30   BUN 13   CREA 0.88   GLU 97       Lab Results   Component Value Date/Time    WBC 6.6 06/25/2021 03:16 PM    HGB 9.7 (L) 06/25/2021 03:16 PM    PLATELET 370 60/92/6555 03:16 PM    Sodium 139 06/25/2021 03:16 PM    Potassium 3.8 06/25/2021 03:16 PM    Chloride 104 06/25/2021 03:16 PM    CO2 30 06/25/2021 03:16 PM    BUN 13 06/25/2021 03:16 PM    Creatinine 0.88 06/25/2021 03:16 PM    Glucose 97 06/25/2021 03:16 PM    INR 1.9 06/08/2021 10:00 PM    aPTT 51.0 (H) 06/17/2021 05:40 AM    Bilirubin, total 0.7 06/15/2021 10:52 AM    Bilirubin, direct 0.2 06/15/2021 10:52 AM    ALT (SGPT) 8 (L) 06/08/2021 02:50 PM    Alk. phosphatase 142 (H) 06/08/2021 02:50 PM    Troponin-I, Qt. <0.02 (L) 05/05/2020 01:09 AM       I reviewed recent labs and recent radiologic studies. I independently reviewed radiology images for studies I described above or studies I have ordered.    Admission date (for inpatients): 6/25/2021   * No surgery found *  * No surgery found *    ASSESSMENT/PLAN:  Problem List  Date Reviewed: 6/25/2021        Codes Class Noted    Pacemaker ICD-10-CM: Z95.0  ICD-9-CM: V45.01  5/3/2021 History of thoracotomy (Chronic) ICD-10-CM: Z98.890  ICD-9-CM: V45.89  6/25/2021        Tracheobronchitis ICD-10-CM: J40  ICD-9-CM: 641  6/25/2021        Moderate protein-calorie malnutrition ([facility] ICD-10-CM: E44.0  ICD-9-CM: 263.0  6/17/2021        Pleural fistula (HCC) ICD-10-CM: J86.0  ICD-9-CM: 510.0  6/14/2021        Atrial flutter ([facility] ICD-10-CM: I48.92  ICD-9-CM: 427.32  6/10/2021        Acute hypoxemic respiratory failure (HCC) ICD-10-CM: J96.01  ICD-9-CM: 518.81  6/8/2021        Bronchopleural fistula ([facility] ICD-10-CM: J86.0  ICD-9-CM: 510.0  6/8/2021        Empyema ([facility] ICD-10-CM: J86.9  ICD-9-CM: 510.9  6/8/2021        Acute on chronic respiratory failure with hypoxemia (HCC) ICD-10-CM: J96.21  ICD-9-CM: 518.84  6/8/2021        COPD exacerbation ([facility] ICD-10-CM: J44.1  ICD-9-CM: 491.21  5/16/2021        Heart block AV complete ([facility] ICD-10-CM: I44.2  ICD-9-CM: 426.0  5/3/2021        Sick sinus syndrome (HCC) ICD-10-CM: I49.5  ICD-9-CM: 427.81  3/18/2021        PAF (paroxysmal atrial fibrillation) ([facility] ICD-10-CM: I48.0  ICD-9-CM: 427.31  2/25/2021        Hypertension ICD-10-CM: I10  ICD-9-CM: 401.9  2/25/2021        Atrial fibrillation with RVR ([facility] ICD-10-CM: I48.91  ICD-9-CM: 427.31  2/25/2021        S/P thoracotomy ICD-10-CM: C36.595  ICD-9-CM: V45.89  1/7/2021        Atelectasis of right lung ICD-10-CM: J98.11  ICD-9-CM: 518.0  1/4/2021        A-fib (HCC) (Chronic) ICD-10-CM: I48.91  ICD-9-CM: 427.31  1/4/2021        Chronic respiratory failure with hypoxia (HCC) (Chronic) ICD-10-CM: J96.11  ICD-9-CM: 518.83, 799.02  1/4/2021        Pleural effusion on right ICD-10-CM: J90  ICD-9-CM: 511.9  12/28/2020        Chemotherapy induced neutropenia ([facility] [address] ICD-10-CM: D70.1, T45.1X5A  ICD-9-CM: 288.03, E933.1  10/23/2020        Dehydration ICD-10-CM: E86.0  ICD-9-CM: 276.51  9/15/2020        S/P lobectomy of lung ICD-10-CM: Z90.2  ICD-9-CM: V45.89  7/29/2020        * (Principal) Cough ICD-10-CM: D07  ICD-9-CM: 786.2  7/29/2020        Atrial tachycardia ([facility] ICD-10-CM: I47.1  ICD-9-CM: 427.89  7/26/2020        Cancer of bronchus of right lower lobe (HCC) (Chronic) ICD-10-CM: C34.31  ICD-9-CM: 162.5  7/23/2020        Lung cancer (HCC) (Chronic) ICD-10-CM: C34.90  ICD-9-CM: 162.9  7/23/2020        Cancer of right lung ([facility] [address] ICD-10-CM: C34.91  ICD-9-CM: 162.9  7/23/2020        Pulmonary emphysema (HCC) (Chronic) ICD-10-CM: J43.9  ICD-9-CM: 492.8  7/7/2020        GERD (gastroesophageal reflux disease) ICD-10-CM: K21.9  ICD-9-CM: 530.81  Unknown        Hypotension (Chronic) ICD-10-CM: I95.9  ICD-9-CM: 458.9  5/5/2020    Overview Signed 1/11/2021  8:39 AM by Ashok Allen NP     On midodrine             Sepsis due to undetermined organism [facility]) ICD-10-CM: A41.9  ICD-9-CM: 038.9  5/5/2020        Malignant neoplasm of lower lobe of right lung ([facility] (Chronic) ICD-10-CM: C34.31  ICD-9-CM: 162.5  2/26/2020    Overview Signed 1/4/2021  7:38 AM by Robyn Giron MD     Dec 2020- Echo EF 50%, technically difficult, cannot assess regional wall motion. Aortic root 4.1 cm. No significant valvular disease.   Normal DF             Mass of lower lobe of right lung ICD-10-CM: R91.8  ICD-9-CM: 786.6  2/23/2020        Right lower lobe lung mass ICD-10-CM: R91.8  ICD-9-CM: 786.6  2/23/2020        Essential hypertension with goal blood pressure less than 130/80 (Chronic) ICD-10-CM: I10  ICD-9-CM: 401.9  2/19/2018            Principal Problem:    Cough (7/29/2020)    Active Problems:    Pulmonary emphysema ([facility] [address] (7/7/2020)      Cancer of bronchus of right lower lobe ([facility] [address] (7/23/2020)      Acute on chronic respiratory failure with hypoxemia ([facility] [address] (6/8/2021)      History of thoracotomy (6/25/2021)      Tracheobronchitis (6/25/2021)         Continue current treatment plan  Regular diet/ensure  Suppress cough- Hycodan  PTA meds  CXR  Pulmonary following  ID followin      Signed:  Dhaval Alvarez, NP

## 2021-06-28 NOTE — ANESTHESIA PREPROCEDURE EVALUATION
Anesthetic History   No history of anesthetic complications            Review of Systems / Medical History  Patient summary reviewed and pertinent labs reviewed    Pulmonary    COPD: moderate          Pertinent negatives: No smoker (former, quit 7 years ago)  Comments: Lung CA s/p resection of R middle and R lower lobes and CXRT. Returned to OR for nonfunctional right lung. Completion of pneumonectomy - now with persistent air leak of R sided chest tube concerning for bronchopleural fistula    Normally on O2 QHS   Neuro/Psych   Within defined limits           Cardiovascular    Hypertension        Dysrhythmias (Afib s/p AV node ablation and BiV pacer - normally on eliquis - held while in the hospital and was on heparin) : atrial fibrillation  Pacemaker (BiV pacer ) and hyperlipidemia    Exercise tolerance: <4 METS: .  Comments: Echo 5/2021 - normal LV function, small pericardial effusion          GI/Hepatic/Renal     GERD: well controlled           Endo/Other        Arthritis, cancer (lung) and anemia (Hb 7.8)     Other Findings   Comments: S/p completion R pneumonectomy January Bronch to assess for BP fistula and/or mucus plugging as source for hypoxia and effusion         Physical Exam    Airway  Mallampati: II  TM Distance: > 6 cm  Neck ROM: normal range of motion   Mouth opening: Normal     Cardiovascular    Rhythm: regular  Rate: normal        Comments: Paced rythym, s/p afib ablation. Dental    Dentition: Caps/crowns     Pulmonary    Rhonchi (L sided )             Abdominal  GI exam deferred       Other Findings            Anesthetic Plan    ASA: 4  Anesthesia type: general        Post procedure ventilation   Induction: Intravenous  Anesthetic plan and risks discussed with: Patient and Family      Eliquis held 6/28 mid day. Discussed possibility of post-op ventilation. Pt understands.

## 2021-06-29 NOTE — PROGRESS NOTES
Dual RN skin assessment completed at bedside. Small sacral wound noted. Allevyn in place with zinc paste.

## 2021-06-29 NOTE — PROCEDURES
PROCEDURE    Bronchoscopy with airway inspection /cleanout . INDICATION   Tracheobronchitis, s/p revision of RMSB stump needing visual inspection. EQUIPMENT:  Olympus  Bronchoscope    ANESTHESIA    General anesthesia and intubation performed by anesthesia    IMAGING:  CXR 6/28        AIRWAY INSPECTION    After obtaining informed consent, Olympus  Bronchoscopewas introduced into the trachea without complication. RIGHT    LOCATION NORM/ABNORM DESCRIPTION   Larynx ETT    VOCAL CORDS ETT    TRACHEA NL    MARISSA NL    RMSB ABNL Some hypervascularity and easy bleeding   RUL ABNL Stump, site appeared intact with no bubbling or other signs of leak. BI ABNL Stump, some mucus vs granulation tissue suctioned away revealing what appeared to be intact site with no bubbling or other signs of leak. LEFT    LOCATION NORM/ABNORM DESCRIPTION   LMSB NL    HOLA NL    LINGULA NL    LLL NL    SUPERIOR SEG LLL NL    MAAME-MEDIAL LLL NL    LATERAL LLL NL    POSTERIOR LLL NL      Despite the patient's rhonchi on exam, there were little to no secretions present in the lower airways. No samples were obtained. The procedure was completed without complication and the patient tolerated the procedure well. EBL: <5cc oozing from RMSB    Recommendations:  Continue airway clearance regimen. Thoracentesis planned for tomorrow to help rule out infection in the R thoracic cavity.      Charmayne Modest, MD

## 2021-06-29 NOTE — ANESTHESIA POSTPROCEDURE EVALUATION
Procedure(s):  BRONCHOSCOPY/ ROOM 616. general    Anesthesia Post Evaluation      Multimodal analgesia: multimodal analgesia used between 6 hours prior to anesthesia start to PACU discharge  Patient location during evaluation: bedside  Patient participation: complete - patient participated  Level of consciousness: awake and alert  Pain score: 1  Pain management: adequate  Airway patency: patent  Anesthetic complications: no  Cardiovascular status: acceptable  Respiratory status: BIPAP  Hydration status: acceptable  Comments: Dr. Kenneth Jason at bedside. Transfer to ICU. Pt alert but SOB. Post anesthesia nausea and vomiting:  none  Final Post Anesthesia Temperature Assessment:  Normothermia (36.0-37.5 degrees C)      INITIAL Post-op Vital signs:   Vitals Value Taken Time   /88 06/29/21 1017   Temp 36.3 °C (97.3 °F) 06/29/21 0933   Pulse 99 06/29/21 1020   Resp 34 06/29/21 1009   SpO2 100 % 06/29/21 1020   Vitals shown include unvalidated device data.

## 2021-06-29 NOTE — PERIOP NOTES
Pt became tachipnic with increased wheezing. Dr. Romana Tobar notified. RT notified. X-ray and ABG completed, see results. Nebulizer treatment given, pt placed on BIPAP. Continue to monitor.

## 2021-06-29 NOTE — PROGRESS NOTES
TRANSFER - IN REPORT:    Verbal report received from Nara Garcia RN(name) on Parris Tierney.  being received from [facility]) for routine progression of care      Report consisted of patients Situation, Background, Assessment and   Recommendations(SBAR). Information from the following report(s) SBAR, Kardex, OR Summary, Procedure Summary, Intake/Output, MAR, Recent Results, Med Rec Status and Cardiac Rhythm AV paced was reviewed with the receiving nurse. Opportunity for questions and clarification was provided. Assessment completed upon patients arrival to unit and care assumed.

## 2021-06-29 NOTE — PROGRESS NOTES
TRANSFER - IN REPORT:    Verbal report received from Sandra Ewing RN(name) on [address].  being received from 616(unit) for ordered procedure      Report consisted of patients Situation, Background, Assessment and   Recommendations(SBAR). Information from the following report(s) Kardex was reviewed with the receiving nurse. Opportunity for questions and clarification was provided. Assessment completed upon patients arrival to unit and care assumed.

## 2021-06-29 NOTE — PROGRESS NOTES
Bedside report received from Janet Peña, [address] [facility]    2015 Pt moved from chair to bed and became SOB. Call placed to respiratory; . Will attempt to call again. Assessed sacral wound. Applied fresh zinc paste and placed allevyn back in place. Girlfriend brought pillow Pt likes to have for sacrum. Pt wanted a new bed prior to this. Offered Pt hycodan for cough. Administered per MAR. Pt does not want other pain medication at this time. Offered to call MD for PRN but Pt would like to wait.       0620 Pt has been NPO since MN except sip of water with meds. Pt remained on 4 L NC humidified. Slight blood noted when Pt blew nose; lubrication provided for comfort. Pt sat ok this shift except when moving around. Ear protectors provided for O2 tubing for Pt comfort. Shampoo cap used this am. Pt ready for procedure. Report to be given to oncoming RN.

## 2021-06-29 NOTE — PROGRESS NOTES
Informed that after extubation with patient in PACU, he was tachpyneic with increased wheeze. He was given albuterol nebulizer treatment and ultimately was placed on bipap. Came to evaluate patient following my next procedure completion. Patient currently on bipap with RR in the 30's. No longer wheezing but does have some ongoing increased work of breathing. Feel like the best course of action would be to continue bipap support for now and monitor him in an ICU setting. Looking for ICU bed that is not yet available. ICU staff notified.      Harriet Barros MD

## 2021-06-29 NOTE — PROGRESS NOTES
6/29/2021  OT order received but patient is currently off the floor at this time. Will re-attempt as time permits.   Thank you,  Kar Carter, OT

## 2021-06-29 NOTE — PROGRESS NOTES
H&P/Consult Note/Progress Note/Office Note:   Jose Valentino MRN: [id number]  :1950  Age:75 y.o.    HPI: Jose Valentino is a 78 y.o. male who is well known to Dr. 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 Now he is s/p bronchiopleural fistula repair 21. His post-operative course was unremarkable. Prior to surgery he presented with complaints of fever and shortness of breath. He was admitted on 2021 for sepsis r/t right sided empyema. The patient states that over the course of the past few months when he would roll on his left side he would have \"gushing\" fluid from his chest. He presented to the office today for routine follow up and has complaints of a cough. Due to concern for disrupting surgical repair, he will be admitted for further observation and care. 21: Pt awake in bed. Feels like coughing is beginning to improved. 24hr Max Temp 99.2. O2 Sat 96% on 4L o2 via NC.   21: Pt awake in bed. Reports continues to cough. O2 Sat 94% on 4L o2 via NC. AF. NAD.   21 Pt eating breakfast- with complaints of little to no appetite. . Reports cough better- states he is coughing up some mucous. Continues to be SOB with conversation. ID and pulmonary following. Remains AF. CXR reviwed. 21  Seen in PACU after bronchoscopy. Appears comfortable on BiPap. Noted no fistula and plan thoracentesis tomorrow    Past Medical History:   Diagnosis Date    Arrhythmia     Afib     Chronic obstructive pulmonary disease (HCC)     O2 at HS    Chronic pain     right torn rotator cuff; left knee    GERD (gastroesophageal reflux disease)     controlled with med    Hypertension     hx-- off meds since 2020--- followed by dr. Vin Champagne Hypoxia 2020    Malignant neoplasm of lower lobe of right lung ([facility] [address] 2020    REC'D CHEMO AND RADIATION--- FOLLOWED BY DR. FOX    Osteoarthritis     Right lower lobe lung mass 2020    Status post total right knee replacement 2016     Past Surgical History:   Procedure Laterality Date    HX COLONOSCOPY      HX KNEE ARTHROSCOPY Left     scope X 1, ACL recon X 1    HX KNEE ARTHROSCOPY Right , 1975    X 2     HX KNEE REPLACEMENT Right 2016    HX TONSILLECTOMY  1959    HX VASCULAR ACCESS Right placed 3/2020    port in chest     IR INSERT TUNL CVC W PORT OVER 5 YEARS  3/18/2020    RI CHEST SURGERY PROCEDURE UNLISTED      R lobectomy 2020; R complete pneumonectomy 2021    RI SINUS SURGERY PROC UNLISTED  ,     sinus surg     Current Facility-Administered Medications   Medication Dose Route Frequency    budesonide (PULMICORT) 500 mcg/2 ml nebulizer suspension  500 mcg Nebulization BID RT    tuberculin injection 5 Units  5 Units IntraDERMal ONCE    HYDROcodone-homatropine (HYCODAN) 5-1.5 mg/5 mL (5 mL) oral solution 5 mL  5 mL Oral Q4H PRN    [Held by provider] apixaban (ELIQUIS) tablet 5 mg  5 mg Oral Q12H    metoprolol succinate (TOPROL-XL) XL tablet 50 mg  50 mg Oral DAILY    levalbuterol (XOPENEX) nebulizer soln 1.25 mg/3 mL  1.25 mg Nebulization Q4H RT    sodium chloride 3% hypertonic nebulizer soln  4 mL Nebulization BID RT     Albuterol and Celebrex [celecoxib]  Social History     Socioeconomic History    Marital status:      Spouse name: Not on file    Number of children: Not on file    Years of education: Not on file    Highest education level: Not on file   Tobacco Use    Smoking status: Former Smoker     Packs/day: 1.00     Years: 20.00     Pack years: 20.00     Quit date:      Years since quittin.4    Smokeless tobacco: Never Used    Tobacco comment: patient has no desire to resume    Substance and Sexual Activity    Alcohol use:  Yes     Alcohol/week: 4.0 standard drinks     Types: 4 Glasses of wine per week    Drug use: No   Other Topics Concern     Service No    Blood Transfusions No    Caffeine Concern No    Occupational Exposure No    Hobby Hazards No    Sleep Concern No    Stress Concern No    Weight Concern No    Special Diet No    Exercise Yes    Seat Belt Yes   Social History Narrative    . Has long-time girlfriend. Continues to work doing IT support. Social Determinants of Health     Financial Resource Strain:     Difficulty of Paying Living Expenses:    Food Insecurity:     Worried About Running Out of Food in the Last Year:     [address] in the Last Year:    Transportation Needs:     Lack of Transportation (Medical):  Lack of Transportation (Non-Medical):    Physical Activity:     Days of Exercise per Week:     Minutes of Exercise per Session:    Stress:     Feeling of Stress :    Social Connections:     Frequency of Communication with Friends and Family:     Frequency of Social Gatherings with Friends and Family:     Attends Jainism Services:     Active Member of Clubs or Organizations:     Attends Club or Organization Meetings:     Marital Status:      Social History     Tobacco Use   Smoking Status Former Smoker    Packs/day: 1.00    Years: 20.00    Pack years: 20.00    Quit date:     Years since quittin.4   Smokeless Tobacco Never Used   Tobacco Comment    patient has no desire to resume      Family History   Problem Relation Age of Onset    Cancer Mother         uterine    Arrhythmia Sister         afib     ROS: The patient has no difficulty with chest pain . No fever or chills. Comprehensive review of systems was otherwise unremarkable except as noted above. Physical Exam:   Visit Vitals  /68   Pulse 73   Temp 97 °F (36.1 °C)   Resp 19   Ht 5' 10\" (1.778 m)   SpO2 94%   BMI 25.83 kg/m²     Constitutional seen in PACU after bronch- on BiPap. Eyes: Sclera are clear. EOMs intact  ENMT: no external lesions gross hearing normal; no obvious neck masses, no ear or lip lesions, nares normal  CV: RRR. Normal perfusion  Resp: No JVD. no audible wheezing.  BiPap  GI: soft and non-distended     Musculoskeletal: unremarkable with normal function. No embolic signs or cyanosis.    Neuro:  Oriented; moves all 4; no focal deficits  Psychiatric: normal affect and mood, no memory impairment    Recent vitals (if inpt):  Patient Vitals for the past 24 hrs:   BP Temp Pulse Resp SpO2   06/29/21 1234   73 19    06/29/21 1217 111/68  78 19 94 %   06/29/21 1158 112/70  79 19 96 %   06/29/21 1146    19 95 %   06/29/21 1137 110/72  80 28 94 %   06/29/21 1117 112/73  86 24 92 %   06/29/21 1113 111/70  86 30 92 %   06/29/21 1108 101/65  86 (!) 32 93 %   06/29/21 1103 105/65  85 30 92 %   06/29/21 1058 109/66  87 24 94 %   06/29/21 1053 103/69  89 24 94 %   06/29/21 1048 92/68 97 °F (36.1 °C) 93 23 97 %   06/29/21 1044 108/66  91 25 93 %   06/29/21 1038 (!) 154/82  93 (!) 31 94 %   06/29/21 1027 124/76  95 (!) 31 99 %   06/29/21 1023     100 %   06/29/21 1013 113/80  69 (!) 31 100 %   06/29/21 1009 117/72  69 (!) 34 100 %   06/29/21 1003 131/71  70 30 97 %   06/29/21 0958 (!) 175/79  69 28 96 %   06/29/21 0954 (!) 165/69  86 28 95 %   06/29/21 0949 (!) 145/68  91 26 93 %   06/29/21 0943 137/79  81 20 93 %   06/29/21 0938 126/76  90 18 94 %   06/29/21 0933 (!) 144/70 97.3 °F (36.3 °C) 83 16 95 %   06/29/21 0928 (!) 150/71  85  93 %   06/29/21 0818 133/84 97.9 °F (36.6 °C) 95 28 95 %   06/29/21 0645 115/85 97.9 °F (36.6 °C) 88 16 95 %   06/29/21 0457 107/80 98.1 °F (36.7 °C) 69 16 94 %   06/29/21 0331     96 %   06/29/21 0049     97 %   06/29/21 0048 112/68 98.4 °F (36.9 °C) 83 18 97 %   06/28/21 2115     94 %   06/28/21 2009 130/76 98.6 °F (37 °C) 89 18 92 %   06/28/21 1544     94 %   06/28/21 1541 113/82 98.7 °F (37.1 °C) 81 18 98 %       Labs:  No results for input(s): WBC, HGB, PLT, NA, K, CL, CO2, BUN, CREA, GLU, PTP, INR, APTT, TBIL, TBILI, CBIL, ALT, AP, AML, LPSE, LCAD, NH4, TROPT, TROIQ, PCO2, PO2, HCO3, HGBEXT, PLTEXT, INREXT, HGBEXT, PLTEXT, INREXT in the last 72 hours. No lab exists for component: SGOT, GPT,  PH    Lab Results   Component Value Date/Time    WBC 6.6 06/25/2021 03:16 PM    HGB 9.7 (L) 06/25/2021 03:16 PM    PLATELET 036 99/53/3503 03:16 PM    Sodium 139 06/25/2021 03:16 PM    Potassium 3.8 06/25/2021 03:16 PM    Chloride 104 06/25/2021 03:16 PM    CO2 30 06/25/2021 03:16 PM    BUN 13 06/25/2021 03:16 PM    Creatinine 0.88 06/25/2021 03:16 PM    Glucose 97 06/25/2021 03:16 PM    INR 1.9 06/08/2021 10:00 PM    aPTT 51.0 (H) 06/17/2021 05:40 AM    Bilirubin, total 0.7 06/15/2021 10:52 AM    Bilirubin, direct 0.2 06/15/2021 10:52 AM    ALT (SGPT) 8 (L) 06/08/2021 02:50 PM    Alk. phosphatase 142 (H) 06/08/2021 02:50 PM    Troponin-I, Qt. <0.02 (L) 05/05/2020 01:09 AM       I reviewed recent labs and recent radiologic studies. I independently reviewed radiology images for studies I described above or studies I have ordered.    Admission date (for inpatients): 6/25/2021   * No surgery found *  Procedure(s):  BRONCHOSCOPY/ ROOM 616    ASSESSMENT/PLAN:  Problem List  Date Reviewed: 6/25/2021        Codes Class Noted    Pacemaker ICD-10-CM: Z95.0  ICD-9-CM: V45.01  5/3/2021        Debility ICD-10-CM: R53.81  ICD-9-CM: 799.3  6/28/2021        History of thoracotomy (Chronic) ICD-10-CM: M18.811  ICD-9-CM: V45.89  6/25/2021        Tracheobronchitis ICD-10-CM: J40  ICD-9-CM: 616  6/25/2021        Moderate protein-calorie malnutrition ([facility] ICD-10-CM: E44.0  ICD-9-CM: 263.0  6/17/2021        Pleural fistula ([address] ICD-10-CM: J86.0  ICD-9-CM: 510.0  6/14/2021        Atrial flutter ([facility] [address] ICD-10-CM: I48.92  ICD-9-CM: 427.32  6/10/2021        Acute hypoxemic respiratory failure ([facility] [address] ICD-10-CM: J96.01  ICD-9-CM: 518.81  6/8/2021        Bronchopleural fistula ([facility] [address] ICD-10-CM: J86.0  ICD-9-CM: 510.0  6/8/2021        Empyema ([facility] [address] ICD-10-CM: J86.9  ICD-9-CM: 510.9  6/8/2021        Acute on chronic respiratory failure with hypoxemia ([facility] [address] ICD-10-CM: J96.21  ICD-9-CM: 518.84  6/8/2021        COPD exacerbation ([place] [address] ICD-10-CM: J44.1  ICD-9-CM: 491.21  5/16/2021        Heart block AV complete ([facility] ICD-10-CM: I44.2  ICD-9-CM: 426.0  5/3/2021        Sick sinus syndrome (HCC) ICD-10-CM: I49.5  ICD-9-CM: 427.81  3/18/2021        PAF (paroxysmal atrial fibrillation) ([place] [address] ICD-10-CM: I48.0  ICD-9-CM: 427.31  2/25/2021        Hypertension ICD-10-CM: I10  ICD-9-CM: 401.9  2/25/2021        Atrial fibrillation with RVR (HCC) ICD-10-CM: I48.91  ICD-9-CM: 427.31  2/25/2021        S/P thoracotomy ICD-10-CM: P09.318  ICD-9-CM: V45.89  1/7/2021        Atelectasis of right lung ICD-10-CM: J98.11  ICD-9-CM: 518.0  1/4/2021        A-fib (HCC) (Chronic) ICD-10-CM: I48.91  ICD-9-CM: 427.31  1/4/2021        Chronic respiratory failure with hypoxia (HCC) (Chronic) ICD-10-CM: J96.11  ICD-9-CM: 518.83, 799.02  1/4/2021        Pleural effusion on right ICD-10-CM: J90  ICD-9-CM: 511.9  12/28/2020        Chemotherapy induced neutropenia ([place] [address] ICD-10-CM: D70.1, T45.1X5A  ICD-9-CM: 288.03, E933.1  10/23/2020        Dehydration ICD-10-CM: E86.0  ICD-9-CM: 276.51  9/15/2020        S/P lobectomy of lung ICD-10-CM: Z90.2  ICD-9-CM: V45.89  7/29/2020        * (Principal) Cough ICD-10-CM: R05  ICD-9-CM: 786.2  7/29/2020        Atrial tachycardia ([facility] ICD-10-CM: I47.1  ICD-9-CM: 427.89  7/26/2020        Cancer of bronchus of right lower lobe (HCC) (Chronic) ICD-10-CM: C34.31  ICD-9-CM: 162.5  7/23/2020        Lung cancer ([facility] (Chronic) ICD-10-CM: C34.90  ICD-9-CM: 162.9  7/23/2020        Cancer of right lung ([facility] ICD-10-CM: C34.91  ICD-9-CM: 162.9  7/23/2020        Pulmonary emphysema (HCC) (Chronic) ICD-10-CM: J43.9  ICD-9-CM: 492.8  7/7/2020        GERD (gastroesophageal reflux disease) ICD-10-CM: K21.9  ICD-9-CM: 530.81  Unknown        Hypotension (Chronic) ICD-10-CM: I95.9  ICD-9-CM: 458.9  5/5/2020    Overview Signed 1/11/2021  8:39 AM by Melva Fisher NP     On midodrine Sepsis due to undetermined organism [facility]) ICD-10-CM: A41.9  ICD-9-CM: 038.9  5/5/2020        Malignant neoplasm of lower lobe of right lung ([address] (Chronic) ICD-10-CM: C34.31  ICD-9-CM: 162.5  2/26/2020    Overview Signed 1/4/2021  7:38 AM by Masood Elias MD     Dec 2020- Echo EF 50%, technically difficult, cannot assess regional wall motion. Aortic root 4.1 cm. No significant valvular disease.   Normal DF             Mass of lower lobe of right lung ICD-10-CM: R91.8  ICD-9-CM: 786.6  2/23/2020        Right lower lobe lung mass ICD-10-CM: R91.8  ICD-9-CM: 786.6  2/23/2020        Essential hypertension with goal blood pressure less than 130/80 (Chronic) ICD-10-CM: I10  ICD-9-CM: 401.9  2/19/2018            Principal Problem:    Cough (7/29/2020)    Active Problems:    Pulmonary emphysema ([address] (7/7/2020)      Cancer of bronchus of right lower lobe ([address] (7/23/2020)      Acute on chronic respiratory failure with hypoxemia (HCC) (6/8/2021)      Moderate protein-calorie malnutrition ([address] (6/17/2021)      History of thoracotomy (6/25/2021)      Tracheobronchitis (6/25/2021)      Debility (6/28/2021)         Continue current treatment plan  Regular diet/ensure  Suppress cough- Hycodan  PTA meds    Pulmonary following  ID following  Had broch today- no fistula  Plan for thoracentesis tomorrow for sampling of right efffusion    Home when OK with pulmonary and ID- no surgical needs      Signed:  Catherine Michele NP

## 2021-06-29 NOTE — PROGRESS NOTES
[facility]. Admission Date: 6/25/2021             Daily Progress Note: 6/29/2021    The patient's chart is reviewed and the patient is discussed with the staff. Patient is a 78 y.o  male seen at the request of Dr Josh Aburto who presented as a direct admit from Dr Armando Watts office for uncontrollable coughing. Pt is known to our office and was seen while hospitalized on 6/15. PMHx includes chronic respiratory failure on 2L O2 qhs, prior tobacco abuse, RLL SCC s/p excision 7/23/2020, afib on Eliquis, HTN, GERD and OA. Pt completed chemo 10/2020. He had repeat chest CT 12/22/2020 with a large R pleural effusion and collapse of remaining RUL. Pt had a R thoracentesis on 12/28/2020 with 900mL bloody fluid removed but unable to tolerate a complete drainage. A chest tube was placed on 1/4/21 with 800cc serosanguineous drainage. General surgery was consulted and underwent R VATS with conversion to open thoracotomy with extensive pneumolysis with decortication 1/6/2021. We were consulted intraoperatively for bronch and were unable to get lung to inflate intraoperatively.  Pt had repeat bronch on 1/7/21 secondary to persistent RUL atelectasis. He was found to have a stump area from prior lobectomy with abnormal appearing tissue that was biopsied and nondiagnostic. Pt had a repeat bronch on 1/12. Pt has had congestion since his pneumonectomy whenever he lies on his R side. He was admitted from the ER On 6/8/21 with shortness of breath, fever, and weakness.  A R chest tube was placed for possible empyema. General surgery was consulted and was taken for right thoracotomy with decortication and bronchopleural fistula repair 6/14/21. ID is following for antimicrobial recommendations. He was discharged home on 6/17 on keflex and flagyl.    Id has been consulted this admission and is following. Currently on Zosyn (6/25- ), cultures pending. Nasal MRSA negative.      Subjective:     Patient seen in [address] [place] [address] prep just prior to planned bronchoscopy. Ongoing cough and rhonchi without much mucus clearance. No additional issues reported overnight. Remains on 4L o2. Current Facility-Administered Medications   Medication Dose Route Frequency    lactated Ringers infusion 1,000 mL  1,000 mL IntraVENous CONTINUOUS    budesonide (PULMICORT) 500 mcg/2 ml nebulizer suspension  500 mcg Nebulization BID RT    tuberculin injection 5 Units  5 Units IntraDERMal ONCE    HYDROcodone-homatropine (HYCODAN) 5-1.5 mg/5 mL (5 mL) oral solution 5 mL  5 mL Oral Q4H PRN    [Held by provider] apixaban (ELIQUIS) tablet 5 mg  5 mg Oral Q12H    metoprolol succinate (TOPROL-XL) XL tablet 50 mg  50 mg Oral DAILY    levalbuterol (XOPENEX) nebulizer soln 1.25 mg/3 mL  1.25 mg Nebulization Q4H RT    sodium chloride 3% hypertonic nebulizer soln  4 mL Nebulization BID RT       Review of Systems  Constitutional: negative for fever, chills, sweats  Cardiovascular: negative for chest pain, palpitations, syncope, edema  Gastrointestinal: negative for dysphagia, reflux, vomiting, diarrhea, abdominal pain, or melena  Neurologic: negative for focal weakness, numbness, headache    Objective:     Vitals:    06/29/21 0331 06/29/21 0457 06/29/21 0645 06/29/21 0818   BP:  107/80 115/85 133/84   Pulse:  69 88 95   Resp:  16 16 28   Temp:  98.1 °F (36.7 °C) 97.9 °F (36.6 °C) 97.9 °F (36.6 °C)   SpO2: 96% 94% 95% 95%   Height:         Intake and Output:   06/27 1901 - 06/29 0700  In: -   Out: 2050 [Urine:2050]  No intake/output data recorded. Physical Exam:   Constitution:  the patient is well developed and in no acute distress  EENMT:  Sclera clear, pupils equal, oral mucosa moist  Respiratory: Transmitted breath sounds on the right. Rhonchi on the left. Cardiovascular:  RRR without M,G,R  Gastrointestinal: soft and non-tender; with positive bowel sounds. Musculoskeletal: warm without cyanosis. There is no lower leg edema.   Skin:  no jaundice or rashes, no wounds   Neurologic: no gross neuro deficits     Psychiatric:  alert and oriented x ppt    CHEST XRAY:   CXR Results  (Last 48 hours)               06/28/21 1300  XR CHEST PORT Final result    Impression:  Acute linear atelectasis of left lower lobe. Narrative:  EXAM: XR CHEST PORT       INDICATION: respiratory failure       COMPARISON: 6/25/2021       FINDINGS: A portable AP radiograph of the chest was obtained at 1247 hours. There is post right pneumonectomy with the pleural space filling in with pleural   fluid unchanged. . Acute linear atelectasis of left lower lobe. The cardiac and   mediastinal contours and pulmonary vascularity are normal.  Changes of right   thoracotomy. [facility]yl [facility] LAB  No lab exists for component: GLPOC   No results for input(s): WBC, HGB, HCT, PLT, INR, HGBEXT, HCTEXT, PLTEXT, INREXT in the last 72 hours. No results for input(s): NA, K, CL, CO2, GLU, BUN, CREA, MG, CA, PHOS, ALB, TBIL, BNPP in the last 72 hours. No lab exists for component: TROIP, GPT, SGOT  ABG:  No results found for: PH, PHI, PCO2, PCO2I, PO2, PO2I, HCO3, HCO3I, FIO2, FIO2I      MICRO    SARS-CoV-2 LAB Results  [facility] Test:   Lab Results   Component Value Date/Time    COV2NT Not Detected 06/30/2020 09:25 PM      [facility] Test: No results found for: EDPR  [facility] Test: No components found for: [id number]       Sputum 6/25 - moderate normal beni.      Assessment:  (Medical Decision Making)     Hospital Problems  Date Reviewed: 6/25/2021        Codes Class Noted POA    Debility ICD-10-CM: R53.81  ICD-9-CM: 799.3  6/28/2021 Unknown        History of thoracotomy (Chronic) ICD-10-CM: V21.503  ICD-9-CM: V45.89  6/25/2021 Yes        Tracheobronchitis ICD-10-CM: J40  ICD-9-CM: 846  6/25/2021 Unknown        Moderate protein-calorie malnutrition ([facility] ICD-10-CM: E44.0  ICD-9-CM: 263.0  6/17/2021 Yes        Acute on chronic respiratory failure with hypoxemia ([address] ICD-10-CM: J96.21  ICD-9-CM: 518.84  6/8/2021 Yes * (Principal) Cough ICD-10-CM: R05  ICD-9-CM: 786.2  7/29/2020 Yes        Cancer of bronchus of right lower lobe (HCC) (Chronic) ICD-10-CM: C34.31  ICD-9-CM: 162.5  7/23/2020 Yes        Pulmonary emphysema (HCC) (Chronic) ICD-10-CM: J43.9  ICD-9-CM: 492.8  7/7/2020 Yes              Patient with ongoing rhonchi, poor clearance of audible secretions. Plan:  (Medical Decision Making)   -will proceed with planned bronchoscopy for airway clearance and also visual inspection of R stump site.   -plan for thoracentesis tomorrow for sampling of R effusion to ensure just expected post pneumonectomy fluid without signs of infection. -now off abx as directed by ID.   -cont 3% saline and airway clearance regimen.        Macey Alberts MD

## 2021-06-29 NOTE — INTERVAL H&P NOTE
Update History & Physical    The Patient's History and Physical of June 25,    was reviewed with the patient and I examined the patient. There was no change. The surgical site was confirmed by the patient and me. Plan:  The risk, benefits, expected outcome, and alternative to the recommended procedure have been discussed with the patient. Patient understands and wants to proceed with the procedure.     Electronically signed by Km Mason MD on 6/29/2021 at 8:42 AM

## 2021-06-29 NOTE — PROGRESS NOTES
TRANSFER - OUT REPORT:    Verbal report given to Gokul(name) on Olinda Mack.  being transferred to [address] [place] [address]) for ordered procedure       Report consisted of patients Situation, Background, Assessment and   Recommendations(SBAR). Information from the following report(s) SBAR, Kardex, [facility] and Recent Results was reviewed with the receiving nurse. Opportunity for questions and clarification was provided.       Patient transported with:   [employer]

## 2021-06-29 NOTE — PROGRESS NOTES
Chart reviewed as pt tx to 3107 from PACU for BIPAP post bronch. Screen completed by [facility] prior to tx from 6th floor with d/c POC for STR at SNF. [facility] Pt will need PT/OT per MD when stable. PPD for rehab needs already placed. CM following.

## 2021-06-29 NOTE — PROGRESS NOTES
CM spoke with patient's girlfriend, Nanette Lind, at bedside; patient off floor for procedure. Nanette Lind states she and patient reviewed SNF list and would like referral sent to [facility], as it is close to their home. Referral sent to [facility] PT/OT evals are pending; facility will need to review therapy notes when available. CM will continue to follow.

## 2021-06-30 NOTE — PROGRESS NOTES
Initial visit made to patient and a prayer was provided.        Carline Flores, [address] [facility], [facility]

## 2021-06-30 NOTE — PROGRESS NOTES
H&P/Consult Note/Progress Note/Office Note:   Salbador Al MRN: [id number]  :1950  Age:75 y.o.    HPI: Salbador Al is a 78 y.o. male who is well known to Dr. Adriane Jensen and is s/p pneumonectomy in 2021 who developed a bronchiopleural fistula. Now he is s/p bronchiopleural fistula repair 21. His post-operative course was unremarkable. Prior to surgery he presented with complaints of fever and shortness of breath. He was admitted on 2021 for sepsis r/t right sided empyema. The patient states that over the course of the past few months when he would roll on his left side he would have \"gushing\" fluid from his chest. He presented to the office today for routine follow up and has complaints of a cough. Due to concern for disrupting surgical repair, he will be admitted for further observation and care. 21: Pt awake in bed. Feels like coughing is beginning to improved. 24hr Max Temp 99.2. O2 Sat 96% on 4L o2 via NC.   21: Pt awake in bed. Reports continues to cough. O2 Sat 94% on 4L o2 via NC. AF. NAD.   21 Pt eating breakfast- with complaints of little to no appetite. . Reports cough better- states he is coughing up some mucous. Continues to be SOB with conversation. ID and pulmonary following. Remains AF. CXR reviwed. 21  Seen in PACU after bronchoscopy. Appears comfortable on BiPap. Noted no fistula and plan thoracentesis tomorrow  21- In ICU off Bi Pap today appears to be breathing better. Awaiting thoracentesis.   Past Medical History:   Diagnosis Date    Arrhythmia     Afib     Chronic obstructive pulmonary disease (HCC)     O2 at HS    Chronic pain     right torn rotator cuff; left knee    GERD (gastroesophageal reflux disease)     controlled with med    Hypertension     hx-- off meds since 2020--- followed by dr. Gil Anthony Hypoxia 2020    Malignant neoplasm of lower lobe of right lung ([facility] [address] 2020    REC'D CHEMO AND RADIATION--- FOLLOWED BY  RUDDY    Osteoarthritis     Right lower lobe lung mass 2020    Status post total right knee replacement 2016     Past Surgical History:   Procedure Laterality Date    HX COLONOSCOPY      HX KNEE ARTHROSCOPY Left     scope X 1, ACL recon X 1    HX KNEE ARTHROSCOPY Right , 1975    X 2     HX KNEE REPLACEMENT Right 2016    HX TONSILLECTOMY  1959    HX VASCULAR ACCESS Right placed 3/2020    port in chest     IR INSERT TUNL CVC W PORT OVER 5 YEARS  3/18/2020    FL CHEST SURGERY PROCEDURE UNLISTED      R lobectomy 2020; R complete pneumonectomy 2021    FL SINUS SURGERY PROC UNLISTED  ,     sinus surg     Current Facility-Administered Medications   Medication Dose Route Frequency    budesonide (PULMICORT) 500 mcg/2 ml nebulizer suspension  500 mcg Nebulization BID RT    HYDROcodone-homatropine (HYCODAN) 5-1.5 mg/5 mL (5 mL) oral solution 5 mL  5 mL Oral Q4H PRN    [Held by provider] apixaban (ELIQUIS) tablet 5 mg  5 mg Oral Q12H    metoprolol succinate (TOPROL-XL) XL tablet 50 mg  50 mg Oral DAILY    levalbuterol (XOPENEX) nebulizer soln 1.25 mg/3 mL  1.25 mg Nebulization Q4H RT    sodium chloride 3% hypertonic nebulizer soln  4 mL Nebulization BID RT     Albuterol and Celebrex [celecoxib]  Social History     Socioeconomic History    Marital status:      Spouse name: Not on file    Number of children: Not on file    Years of education: Not on file    Highest education level: Not on file   Tobacco Use    Smoking status: Former Smoker     Packs/day: 1.00     Years: 20.00     Pack years: 20.00     Quit date:      Years since quittin.4    Smokeless tobacco: Never Used    Tobacco comment: patient has no desire to resume    Substance and Sexual Activity    Alcohol use:  Yes     Alcohol/week: 4.0 standard drinks     Types: 4 Glasses of wine per week    Drug use: No   Other Topics Concern     Service No    Blood Transfusions No    Caffeine Concern No  Occupational Exposure No    Hobby Hazards No    Sleep Concern No    Stress Concern No    Weight Concern No    Special Diet No    Exercise Yes    Seat Belt Yes   Social History Narrative    . Has long-time girlfriend. Continues to work doing IT support. Social Determinants of Health     Financial Resource Strain:     Difficulty of Paying Living Expenses:    Food Insecurity:     Worried About Running Out of Food in the Last Year:     [address] in the Last Year:    Transportation Needs:     Lack of Transportation (Medical):  Lack of Transportation (Non-Medical):    Physical Activity:     Days of Exercise per Week:     Minutes of Exercise per Session:    Stress:     Feeling of Stress :    Social Connections:     Frequency of Communication with Friends and Family:     Frequency of Social Gatherings with Friends and Family:     Attends Mu-ism Services:     Active Member of Clubs or Organizations:     Attends Club or Organization Meetings:     Marital Status:      Social History     Tobacco Use   Smoking Status Former Smoker    Packs/day: 1.00    Years: 20.00    Pack years: 20.00    Quit date:     Years since quittin.4   Smokeless Tobacco Never Used   Tobacco Comment    patient has no desire to resume      Family History   Problem Relation Age of Onset    Cancer Mother         uterine    Arrhythmia Sister         afib     ROS: The patient has no difficulty with chest pain . No fever or chills. Comprehensive review of systems was otherwise unremarkable except as noted above. Physical Exam:   Visit Vitals  /74   Pulse 80   Temp 98.7 °F (37.1 °C)   Resp (!) 32   Ht 5' 10\" (1.778 m)   Wt 178 lb 5.6 oz (80.9 kg)   SpO2 95%   BMI 25.59 kg/m²       Eyes: Sclera are clear. EOMs intact  ENMT: no external lesions gross hearing normal; no obvious neck masses, no ear or lip lesions, nares normal  CV: RRR. Normal perfusion  Resp: No JVD. no audible wheezing. GI: soft and non-distended     Musculoskeletal: unremarkable with normal function. No embolic signs or cyanosis.    Neuro:  Oriented; moves all 4; no focal deficits  Psychiatric: normal affect and mood, no memory impairment    Recent vitals (if inpt):  Patient Vitals for the past 24 hrs:   BP Temp Pulse Resp SpO2 Weight   06/30/21 1030 111/74   (!) 32     06/30/21 0930 (!) 105/58 98.7 °F (37.1 °C) 80 (!) 40     06/30/21 0753     95 %    06/30/21 0749     93 %    06/30/21 0730 108/68  80 25 96 %    06/30/21 0630 125/72  83 24 97 %    06/30/21 0600 112/75  82 28 94 %    06/30/21 0530 104/66  84 24 95 %    06/30/21 0500 111/69  86 27 95 %    06/30/21 0430 100/65  86 15 92 %    06/30/21 0400 103/67 98.9 °F (37.2 °C) 87 28 93 %    06/30/21 0343     95 %    06/30/21 0330 105/68  81 27 95 %    06/30/21 0300 97/65  82 24 95 %    06/30/21 0230 92/63  84 26 94 %    06/30/21 0200 106/63  77 25 94 %    06/30/21 0130 95/63  69 27 93 %    06/30/21 0100 93/66  69 28 94 %    06/30/21 0030 100/70  69 30 92 %    06/30/21 0000 107/68 97.7 °F (36.5 °C) 80 29 96 %    06/29/21 2343     97 %    06/29/21 2330 96/63  77 25 96 %    06/29/21 2300 109/71  78 29 98 %    06/29/21 2230 109/67  71 26 97 %    06/29/21 2200 101/66  81 27 95 %    06/29/21 2130 108/69  85 (!) 39 92 %    06/29/21 2100 100/68  69 28 94 %    06/29/21 2030 100/66  69 (!) 34 98 %    06/29/21 2025     94 %    06/29/21 2000 99/65 97.7 °F (36.5 °C) 69 26 95 %    06/29/21 1930 118/71  78 27 96 %    06/29/21 1900 93/60  69 27 95 %    06/29/21 1830 129/74  71 (!) 31 96 %    06/29/21 1630 111/74 98 °F (36.7 °C) 78 28 100 % 178 lb 5.6 oz (80.9 kg)   06/29/21 1617 114/68  78  97 %    06/29/21 1600 110/71 98 °F (36.7 °C) 81  99 %    06/29/21 1545 117/75  90  98 %    06/29/21 1515 114/75  76  99 %    06/29/21 1500 113/72  78  100 %    06/29/21 1417 115/73 97.1 °F (36.2 °C) 81 16 98 %  06/29/21 1358 129/74  76 17 100 %    06/29/21 1337 119/77  78 18 97 %    06/29/21 1258 115/77  98 17 98 %    06/29/21 1237 113/71  80 16 96 %    06/29/21 1234   73 19     06/29/21 1217 111/68  78 19 94 %    06/29/21 1158 112/70  79 19 96 %    06/29/21 1146    19 95 %    06/29/21 1137 110/72  80 28 94 %        Labs:  No results for input(s): WBC, HGB, PLT, NA, K, CL, CO2, BUN, CREA, GLU, PTP, INR, APTT, TBIL, TBILI, CBIL, ALT, AP, AML, LPSE, LCAD, NH4, TROPT, TROIQ, PCO2, PO2, HCO3, HGBEXT, PLTEXT, INREXT, HGBEXT, PLTEXT, INREXT in the last 72 hours. No lab exists for component: SGOT, GPT,  PH    Lab Results   Component Value Date/Time    WBC 6.6 06/25/2021 03:16 PM    HGB 9.7 (L) 06/25/2021 03:16 PM    PLATELET 433 58/74/0777 03:16 PM    Sodium 139 06/25/2021 03:16 PM    Potassium 3.8 06/25/2021 03:16 PM    Chloride 104 06/25/2021 03:16 PM    CO2 30 06/25/2021 03:16 PM    BUN 13 06/25/2021 03:16 PM    Creatinine 0.88 06/25/2021 03:16 PM    Glucose 97 06/25/2021 03:16 PM    INR 1.9 06/08/2021 10:00 PM    aPTT 51.0 (H) 06/17/2021 05:40 AM    Bilirubin, total 0.7 06/15/2021 10:52 AM    Bilirubin, direct 0.2 06/15/2021 10:52 AM    ALT (SGPT) 8 (L) 06/08/2021 02:50 PM    Alk. phosphatase 142 (H) 06/08/2021 02:50 PM    Troponin-I, Qt. <0.02 (L) 05/05/2020 01:09 AM       I reviewed recent labs and recent radiologic studies. I independently reviewed radiology images for studies I described above or studies I have ordered.    Admission date (for inpatients): 6/25/2021   * No surgery found *  Procedure(s):  BRONCHOSCOPY/ ROOM 616    ASSESSMENT/PLAN:  Problem List  Date Reviewed: 6/25/2021        Codes Class Noted    Pacemaker ICD-10-CM: Z95.0  ICD-9-CM: V45.01  5/3/2021        Debility ICD-10-CM: R53.81  ICD-9-CM: 799.3  6/28/2021        History of thoracotomy (Chronic) ICD-10-CM: D61.406  ICD-9-CM: V45.89  6/25/2021        Tracheobronchitis ICD-10-CM: J40  ICD-9-CM: 847  6/25/2021        Moderate protein-calorie malnutrition ([facility] ICD-10-CM: E44.0  ICD-9-CM: 263.0  6/17/2021        Pleural fistula ([facility] ICD-10-CM: J86.0  ICD-9-CM: 510.0  6/14/2021        Atrial flutter ([facility] ICD-10-CM: I48.92  ICD-9-CM: 427.32  6/10/2021        Acute hypoxemic respiratory failure (HCC) ICD-10-CM: J96.01  ICD-9-CM: 518.81  6/8/2021        Bronchopleural fistula ([facility] ICD-10-CM: J86.0  ICD-9-CM: 510.0  6/8/2021        Empyema ([facility] ICD-10-CM: J86.9  ICD-9-CM: 510.9  6/8/2021        Acute on chronic respiratory failure with hypoxemia [facility]) ICD-10-CM: J96.21  ICD-9-CM: 518.84  6/8/2021        COPD exacerbation ([facility] ICD-10-CM: J44.1  ICD-9-CM: 491.21  5/16/2021        Heart block AV complete ([facility] ICD-10-CM: I44.2  ICD-9-CM: 426.0  5/3/2021        Sick sinus syndrome (HCC) ICD-10-CM: I49.5  ICD-9-CM: 427.81  3/18/2021        PAF (paroxysmal atrial fibrillation) ([facility] ICD-10-CM: I48.0  ICD-9-CM: 427.31  2/25/2021        Hypertension ICD-10-CM: I10  ICD-9-CM: 401.9  2/25/2021        Atrial fibrillation with RVR (HCC) ICD-10-CM: I48.91  ICD-9-CM: 427.31  2/25/2021        S/P thoracotomy ICD-10-CM: K83.243  ICD-9-CM: V45.89  1/7/2021        Atelectasis of right lung ICD-10-CM: J98.11  ICD-9-CM: 518.0  1/4/2021        A-fib (HCC) (Chronic) ICD-10-CM: I48.91  ICD-9-CM: 427.31  1/4/2021        Chronic respiratory failure with hypoxia (HCC) (Chronic) ICD-10-CM: J96.11  ICD-9-CM: 518.83, 799.02  1/4/2021        Pleural effusion on right ICD-10-CM: J90  ICD-9-CM: 511.9  12/28/2020        Chemotherapy induced neutropenia ([facility] ICD-10-CM: D70.1, T45.1X5A  ICD-9-CM: 288.03, E933.1  10/23/2020        Dehydration ICD-10-CM: E86.0  ICD-9-CM: 276.51  9/15/2020        S/P lobectomy of lung ICD-10-CM: Z90.2  ICD-9-CM: V45.89  7/29/2020        * (Principal) Cough ICD-10-CM: R05  ICD-9-CM: 786.2  7/29/2020        Atrial tachycardia ([facility] ICD-10-CM: I47.1  ICD-9-CM: 427.89  7/26/2020        Cancer of bronchus of right lower lobe (HCC) (Chronic) ICD-10-CM: C34.31  ICD-9-CM: 162.5  7/23/2020        Lung cancer ([facility] (Chronic) ICD-10-CM: C34.90  ICD-9-CM: 162.9  7/23/2020        Cancer of right lung [facility]) ICD-10-CM: C34.91  ICD-9-CM: 162.9  7/23/2020        Pulmonary emphysema (HCC) (Chronic) ICD-10-CM: J43.9  ICD-9-CM: 492.8  7/7/2020        GERD (gastroesophageal reflux disease) ICD-10-CM: K21.9  ICD-9-CM: 530.81  Unknown        Hypotension (Chronic) ICD-10-CM: I95.9  ICD-9-CM: 458.9  5/5/2020    Overview Signed 1/11/2021  8:39 AM by Alysa Astudillo NP     On midodrine             Sepsis due to undetermined organism [facility]) ICD-10-CM: A41.9  ICD-9-CM: 038.9  5/5/2020        Malignant neoplasm of lower lobe of right lung ([facility] (Chronic) ICD-10-CM: C34.31  ICD-9-CM: 162.5  2/26/2020    Overview Signed 1/4/2021  7:38 AM by Jay PresMD lorna     Dec 2020- Echo EF 50%, technically difficult, cannot assess regional wall motion. Aortic root 4.1 cm. No significant valvular disease.   Normal DF             Mass of lower lobe of right lung ICD-10-CM: R91.8  ICD-9-CM: 786.6  2/23/2020        Right lower lobe lung mass ICD-10-CM: R91.8  ICD-9-CM: 786.6  2/23/2020        Essential hypertension with goal blood pressure less than 130/80 (Chronic) ICD-10-CM: I10  ICD-9-CM: 401.9  2/19/2018            Principal Problem:    Cough (7/29/2020)    Active Problems:    Pulmonary emphysema ([facility] [address] (7/7/2020)      Cancer of bronchus of right lower lobe ([facility] (7/23/2020)      Acute on chronic respiratory failure with hypoxemia (HCC) (6/8/2021)      Moderate protein-calorie malnutrition ([facility] [address] (6/17/2021)      History of thoracotomy (6/25/2021)      Tracheobronchitis (6/25/2021)      Debility (6/28/2021)         Continue current treatment plan  Regular diet/ensure  Suppress cough- Hycodan  PTA meds    Pulmonary following  ID following  Had broch yesterday- no fistula  Plan for thoracentesis today for sampling of right efffusion    No surgical needs      Signed:  Elvira NARANJO Bea Winters, NP

## 2021-06-30 NOTE — PROGRESS NOTES
Physical Therapy Note:    Therapist is discontinuing acute PT services secondary to change in medical status and transfer to the ICU. Patient's goals were not met. Please re-consult acute PT services when medically appropriate.  Thank you,    Keri Flores, PT, DPT

## 2021-06-30 NOTE — PROGRESS NOTES
Radha Easley. Admission Date: 6/25/2021             Daily Progress Note: 6/30/2021    The patient's chart is reviewed and the patient is discussed with the staff. Patient is a 78 y.o  male seen at the request of Dr Ruth Dodge who presented as a direct admit from Dr Cyrus Martinez office for uncontrollable coughing. Pt is known to our office and was seen while hospitalized on 6/15. PMHx includes chronic respiratory failure on 2L O2 qhs, prior tobacco abuse, RLL SCC s/p excision 7/23/2020, afib on Eliquis, HTN, GERD and OA. Pt completed chemo 10/2020. He had repeat chest CT 12/22/2020 with a large R pleural effusion and collapse of remaining RUL. Pt had a R thoracentesis on 12/28/2020 with 900mL bloody fluid removed but unable to tolerate a complete drainage. A chest tube was placed on 1/4/21 with 800cc serosanguineous drainage. General surgery was consulted and underwent R VATS with conversion to open thoracotomy with extensive pneumolysis with decortication 1/6/2021. We were consulted intraoperatively for bronch and were unable to get lung to inflate intraoperatively.  Pt had repeat bronch on 1/7/21 secondary to persistent RUL atelectasis. He was found to have a stump area from prior lobectomy with abnormal appearing tissue that was biopsied and nondiagnostic. Pt had a repeat bronch on 1/12. Pt has had congestion since his pneumonectomy whenever he lies on his R side. He was admitted from the ER On 6/8/21 with shortness of breath, fever, and weakness.  A R chest tube was placed for possible empyema. General surgery was consulted and was taken for right thoracotomy with decortication and bronchopleural fistula repair 6/14/21. ID is following for antimicrobial recommendations. He was discharged home on 6/17 on keflex and flagyl.    Id has been consulted this admission and is following. Currently on Zosyn (6/25- ), cultures pending. Nasal MRSA negative.   Bronched 6/29 with anesthesia; after extubation had increased wheeze and tachypnea requiring BiPAP and transferred to ICU. Subjective:     Patient resting but awakens to voice. States he feels better this morning. Still feels some shortness of breath. Has pain with cough. Currently on 4L NC. Current Facility-Administered Medications   Medication Dose Route Frequency    budesonide (PULMICORT) 500 mcg/2 ml nebulizer suspension  500 mcg Nebulization BID RT    HYDROcodone-homatropine (HYCODAN) 5-1.5 mg/5 mL (5 mL) oral solution 5 mL  5 mL Oral Q4H PRN    [Held by provider] apixaban (ELIQUIS) tablet 5 mg  5 mg Oral Q12H    metoprolol succinate (TOPROL-XL) XL tablet 50 mg  50 mg Oral DAILY    levalbuterol (XOPENEX) nebulizer soln 1.25 mg/3 mL  1.25 mg Nebulization Q4H RT    sodium chloride 3% hypertonic nebulizer soln  4 mL Nebulization BID RT       Review of Systems  + SOB, dry cough  Constitutional: negative for fever, chills, sweats  Cardiovascular: negative for chest pain, palpitations, syncope, edema  Gastrointestinal: negative for dysphagia, reflux, vomiting, diarrhea, abdominal pain, or melena  Neurologic: negative for focal weakness, numbness, headache    Objective:     Vitals:    06/30/21 0630 06/30/21 0730 06/30/21 0749 06/30/21 0753   BP: 125/72 108/68     Pulse: 83 80     Resp: 24 25     Temp:       SpO2: 97% 96% 93% 95%   Weight:       Height:         Intake and Output:   06/28 1901 - 06/30 0700  In: 490 [P.O.:240; I.V.:250]  Out: 1225 [Urine:1225]  No intake/output data recorded. Physical Exam:   Constitution:  the patient is well developed and in no acute distress  EENMT:  Sclera clear, pupils equal, oral mucosa moist  Respiratory: clear on L side, no wheeze, on 4L NC  Cardiovascular:  RRR without M,G,R- paced on monitor  Gastrointestinal: soft and non-tender; with positive bowel sounds. Musculoskeletal: warm without cyanosis. There is no lower leg edema.   Skin:  no jaundice or rashes, no wounds   Neurologic: no gross neuro deficits     Psychiatric:  alert and oriented x ppt    CHEST XRAY: 6/29      LAB  ABG:    Lab Results   Component Value Date/Time    PHI 7.34 (L) 06/29/2021 10:27 AM    PCO2I 55.6 (H) 06/29/2021 10:27 AM    PO2I 190 (H) 06/29/2021 10:27 AM    HCO3I 29.8 (H) 06/29/2021 10:27 AM    FIO2I 50 06/29/2021 10:27 AM         MICRO  Date Source Result   6/25 blood NG   6/25 sputum NRF           SARS-CoV-2 LAB Results  [facility] Test:   Lab Results   Component Value Date/Time    COV2NT Not Detected 06/30/2020 09:25 PM      [facility] Test: No results found for: EDPR  [facility] Test: No components found for: [id number]         Assessment:  (Medical Decision Making)     Hospital Problems  Date Reviewed: 6/25/2021        Codes Class Noted POA    Debility ICD-10-CM: R53.81  ICD-9-CM: 799.3  6/28/2021 Unknown        History of thoracotomy (Chronic) ICD-10-CM: E02.126  ICD-9-CM: V45.89  6/25/2021 Yes        Tracheobronchitis ICD-10-CM: J40  ICD-9-CM: 200  6/25/2021 Unknown        Moderate protein-calorie malnutrition ([facility] ICD-10-CM: E44.0  ICD-9-CM: 263.0  6/17/2021 Yes        Acute on chronic respiratory failure with hypoxemia ([facility] ICD-10-CM: J96.21  ICD-9-CM: 518.84  6/8/2021 Yes        * (Principal) Cough ICD-10-CM: C38  ICD-9-CM: 786.2  7/29/2020 Yes        Cancer of bronchus of right lower lobe (HCC) (Chronic) ICD-10-CM: C34.31  ICD-9-CM: 162.5  7/23/2020 Yes        Pulmonary emphysema (HCC) (Chronic) ICD-10-CM: J43.9  ICD-9-CM: 492.8  7/7/2020 Yes              Patient with ongoing rhonchi, poor clearance of audible secretions. Plan:  (Medical Decision Making)   -- Wean O2 as tolerated  -- Bronch yesterday with no fistula seen  -- Plan for thoracentesis today to sample fluid r/o infection  -- Continue nebs  -- PT/OT  -- Okay to transfer to floor      Dipesh Nails NP  I have spoken with and examined the patient. I agree with the above assessment and plan as documented. Appears to be feeling a little better.   Made a joke today.    Gen: pleasant on nasal cannula  Lungs:  Clear on left, no bs on right  Heart:  RRR with no Murmur/Rubs/Gallops  Abd: NTND  Ext: no edema    --f/u CBC  --PT today  --diagnostic thora on R today  --transfer to floor.   Charley Buckner MD

## 2021-06-30 NOTE — PROGRESS NOTES
Infectious Disease Progress Note    Today's Date: 2021   Admit Date: 2021    Impression:   · Persistent R pleural effusion: s/p bronchoscopy , plans for thoracentesis   · S/p R thoracotomy, decortication, bronchopleural fistula repair, intercostal nerve cryoablation, pleurex drain placement, 21; Cx: Streptococcus sanguinis- discharged on 4 week course of Keflex/Flagyl  · RLL squamous cell carcinoma, s/p excision (2020) and chemo (EOT 10/2020) with persistent effusion and R thoracotomy 21, with pneumolysis and decortication  · A fib with AV node ablation and permanent pacer placed 2021    Plan:   · Continue IV Ceftriaxone and PO flagyl today. · Plans noted for thoracentesis today- follow. Anti-infectives:   · IV Zosyn (-)  · Ceftriaxone -  · Flagyl -    Subjective: Interval History:  Seen resting in bed with no complaints today. Denies any n/v/d/f/c/s. Denies shortness of breath. Allergies   Allergen Reactions    Albuterol Palpitations    Celebrex [Celecoxib] Itching        Review of Systems:  A comprehensive review of systems was negative except for that written in the History of Present Illness. Objective:     Visit Vitals  /76   Pulse 81   Temp 98.7 °F (37.1 °C)   Resp (!) 36   Ht 5' 10\" (1.778 m)   Wt 80.9 kg (178 lb 5.6 oz)   SpO2 98%   BMI 25.59 kg/m²     Temp (24hrs), Av °F (36.7 °C), Min:97.1 °F (36.2 °C), Max:98.9 °F (37.2 °C)       Lines:  Peripheral IV:       Physical Exam:    General:  Alert, cooperative, well noursished, well developed, appears stated age   Eyes:  Sclera anicteric. Pupils equally round and reactive to light. Mouth/Throat: Mucous membranes normal, oral pharynx clear   Neck: Supple   Lungs:   R lung diminished, good effort   CV:  Regular rate and rhythm,no murmur, click, rub or gallop   Abdomen:   Soft, non-tender.  bowel sounds normal. non-distended   Extremities: No cyanosis or edema   Skin: Skin color, texture, turgor normal. no acute rash or lesions   Lymph nodes: Cervical and supraclavicular normal   Musculoskeletal: No swelling or deformity   Lines/Devices:  Intact, no erythema, drainage or tenderness   Psych: Alert and oriented, normal mood affect given the setting     Data Review:     CBC:  No results for input(s): WBC, GRANS, MONOS, EOS, ANEU, ABL, HGB, HCT, PLT, HGBEXT, HCTEXT, PLTEXT, HGBEXT, HCTEXT, PLTEXT in the last 72 hours. No lab exists for component: LYMPHS,  RICHAR    BMP:  No results for input(s): CREA, BUN, NA, K, CL, CO2, AGAP, GLU in the last 72 hours. LFTS:  No results for input(s): TBILI, ALT, AP, TP, ALB in the last 72 hours. No lab exists for component: SGOT    Microbiology:     All Micro Results     Procedure Component Value Units Date/Time    CULTURE, BLOOD [[id number]] Collected: 06/25/21 1516    Order Status: Completed Specimen: Blood Updated: 06/30/21 0723     Special Requests: --        RIGHT  ARM       Culture result: NO GROWTH 5 DAYS       CULTURE, RESPIRATORY/SPUTUM/BRONCH Madlyn Carlos Manuel STAIN [[id number]] Collected: 06/25/21 1552    Order Status: Completed Specimen: Sputum Updated: 06/28/21 0803     Special Requests: NO SPECIAL REQUESTS        GRAM STAIN 10 TO 50 WBCS SEEN PER OIF      0 TO 5 EPITHELIAL CELLS SEEN PER OIF      MANY GRAM POSITIVE COCCI         MODERATE YEAST         2+ MUCUS PRESENT        Culture result:       MODERATE NORMAL RESPIRATORY EDU          MSSA/MRSA SC BY PCR, NASAL SWAB [[id number]] Collected: 06/25/21 1923    Order Status: Completed Specimen: Nasal Updated: 06/25/21 2205     Special Requests: NO SPECIAL REQUESTS        Culture result:       SA target not detected. A MRSA NEGATIVE, SA NEGATIVE test result does not preclude MRSA or SA nasal colonization.           MSSA/MRSA SC BY PCR, NASAL SWAB [[id number]] Collected: 06/25/21 1839    Order Status: Canceled Specimen: Nasal swab           Imaging:   Reviewed    Signed By: Alvarez Swenson JOHANNA To     June 30, 2021

## 2021-06-30 NOTE — INTERVAL H&P NOTE
Update History & Physical    The Patient's History and Physical of June 25,    was reviewed with the patient and I examined the patient. There was no change. The surgical site was confirmed by the patient and me. Plan:  The risk, benefits, expected outcome, and alternative to the recommended procedure have been discussed with the patient. Patient understands and wants to proceed with the procedure.     Electronically signed by Charmayne Modest, MD on 6/30/2021 at 2:31 PM

## 2021-06-30 NOTE — PROGRESS NOTES
Occupational Therapy Note:  Therapist is discharging patient from OT at this time due to change in medical status and transfer to ICU. Please reconsult OT when MD deems patient appropriate for continued services. Thank you.   Jodee Rush, OTR/L

## 2021-06-30 NOTE — PROGRESS NOTES
Bedside and verbal shift change report received from  [facility] (offgoing nurse). Report included the following information SBAR, Kardex, Intake/Output and Recent Results.      Dual skin assessment completed at bedside: redness to sacrum (list pertinent skin assessment findings)    Dual verification of gtts completed (name of gtts verified): n/a

## 2021-06-30 NOTE — PROGRESS NOTES
Pt sat up on side of bed for thoracentesis. Consent obtained. Time out performed. Pts vitals monitored throughout procedure. Right ultrasound done and pic taken of pleural fluid.  ~30 ml thick red pleural fluid from right. Pt tolerated procedure well with no adverse rxn. Specimens sent to the lab x 3 and labeled appropriately. Site dressed appropriately and report given to pts RN.    Lung sliding done and ultrasound findings reviewed by MD.

## 2021-06-30 NOTE — PROCEDURES
PROCEDURE:  DIAGNOSTIC THORACENTESIS, THERAPEUTIC THORACENTESIS       PRE-OP DIAGNOSIS:  R PLEURAL EFFUSION    POST-OP DIAGNOSIS:  R PLEURAL EFFUSION    VOLUME REMOVED:    30cc    ANESTHESIA:    LOCAL ANESTHESIA WITH 1% LIDOCAINE 10 CC TOTAL. CHEST ULTRASOUND FINDINGS:    A Turbo-M, Sonosite ultrasound with a 5-16 mHz probe was used to image the chest and localize the pleural effusion on the right chest.      A large, complex pleural space was identified on ultrasound on the right. There were multiple loculations and septations present, with the pleural fluid having a mixed echogenic character. DESCRIPTION OF PROCEDURE:    After obtaining informed consent and localizing the safest location for thoracentesis, the  8th intercostal space was marked with a blunt, plastic needle cap in the mid scapular line. (Location selection was greatly limited by the patient's recent thoracotomy scar that covered the usual entry locations on the chest). An [employer] [id number]-0100 Pleral-Seal thoracentesis kit was used to perform the procedure. The skin was cleansed with the supplied chlorhexidine swab and then draped in the usual fashion. Using the previously marked location as a guide, a 22 G 1.5 inch needle was used to inject 1% lidocaine into the skin and subcutaneous tissue, as well as onto the underlying rib and inter-costal muscles. Pleural fluid was aspirated to assure proper location and additional lidocaine was injected into the pleural space prior to removing the anesthesia needle. A 3mm incision was then made with the supplied scalpel in the usual fashion to facilitate the insertion of the thoracentesis needle. The needle with an 8 Sami thoracentesis catheter was then introduced into the chest through the previously made incision in the usual fashion, the rib localized with the needle, and the catheter then marched over the rib into the pleural space.     After aspirating fluid, the thoracentesis catheter was then placed into the chest using the needle itself as a trocar. The needle was then removed and the catheter was attached to the supplied tubing without complication. 30 cc of thick, red fluid was aspirated and sent for analysis. Fluid was sent for the following tests:      LDH  Total Protein  Glucose  Cell count with differential  Routine culture and Gram stain  Cytology  AFB  Fungus    Post procedure US confirmed incomplete drainage of the effusion and presence of lung sliding, ruling out pneumothorax. ([id number])    EBL:     <3BA    COMPLICATIONS:    Unable to drain much fluid due to the nature of the fluid. Once the drainage catheter was removed and inspected, could see that the side fenestrations were all plugged with thick debris. Additional attempt at thoracentesis or drainage with anything short of a large bore chest tube are unlikely to be successful, nor do they seem warranted at present.        Jon Henry MD

## 2021-06-30 NOTE — ROUTINE PROCESS
Respiratory Care Services Policy Number: [id number]    Title: Aerosolized Medication Protocol    Effective Date: 10/1998    Revised Date: 06/13, 03/16, 11/17, 07/19     Reviewed Date: 05/14/ 03/15 , 06/17, 5/18, 11/2020   I. Policy: The Aerosolized Medication Protocol shall by implemented by Respiratory Care Practitioners (RCP) for patients with orders to receive aerosol therapy with medication. II. Purpose: To open and maintain obstructed airways, the RCP, will utilize the following   protocol to select the indicated aerosolized medication(s) and determine the most effective method of delivery to the patient. III. Patient Type: All patients who are determined to meet aerosolized medication criteria as          outlined in this protocol. IV. Responsibility: Director, [address], registered Respiratory Care Practitioners (RCP's) with documented competency in the performance of                                     respiratory therapeutic techniques. V. Equipment needed:  A. Stethoscope  B. Pulse oximeter  C. AeroEclipse nebulizer  D. Meter Dose Inhaler (MDI)    VI. Protocol:   A. The following conditions are accepted indications for aerosolized medication therapy. 1. Bronchospasm/wheezing  2. Impaired mucociliary clearance  3. Tracheobronchial mucosal congestion/and laryngeal stridor  4. Diseases which commonly require aerosolized medication therapy include, but are not limited to:  a. Asthma/reactive airway disease  b. Bronchitis/emphysema (COPD)  c. Cystic fibrosis  d. Severe laryngitis/tracheitis  e. Bronchiectasis  f. Smoke inhalation or chemical trauma to the lung or upper airway  g. Physical trauma to the upper airway  h. Laryngotracheobronchitis  i. Bronchiolitis  j. Non-specific wheezing              B. Indications for bronchodilator medications will include:  a. Bronchospasm/ wheezing  b. Asthma/reactive airway disease  c.  Chronic obstructive pulmonary disease  d. Obstructive defect on pulmonary function testing  C. Administration of medications  1. If a bronchodilator or any other type of respiratory medication is needed, a physician order must be indicated in the medication section in the patient's EMR. 2. When the physician specifies the medication and dosage at the time of request, the ordered medication will be used as part of the care plan. D. The following guidelines will be utilized in the evaluation and selection of the appropriate delivery device for indicated medication(s):  1. MDI is the preferred method of medication delivery via common canister. a. Patient should be alert/cooperative  b. Able to perform 3 second breath hold. c. Patient may be changed to self-administer as appropriate. d. Patients in isolation will be provided an individual inhaler. 2. Unassisted aerosol (UA) is indicated if  a. Ventilation is inadequate  b. Patient is unable to perform MDI appropriately     VII. Guidelines:   Monitor patient's vital signs and evaluate patient's clinical status. The need to change medication and/or modality may be indicated by:  1. A pulse greater than 120 bpm, or if a pulse increase of 20 bpm occurs with bronchodilator medications. 2. Significant worsening of dyspnea or wheezing occurring during or within 30 minutes of discontinuing therapy. 3. Worsening of patient's sensorium (e.g. patient becomes confused or obtunded, and unable to follow directions). 4. Worsening of patient's chest x-ray. 5. Change in sputum (e.g. increased pulmonary infiltrate, which might indicate need for volume expansion therapy). 6. Patient has difficulty coughing up secretions, which might indicate need for acetylcysteine and/or bronchial hygiene therapy. 7. Call physician immediately if dyspnea worsens and is not responsive to modifications allowed by protocol. VIII. Clinical Responsibility:  A.  The therapy assessment guidelines will be used to evaluate all patients receiving aerosolized medications with the exception of critical care areas. 1. RCP's will perform changes in therapy per protocol. 1. It will be the responsibility of RCP to provide instruction regarding respiratory medications, possible side effects, aerosol therapy and proper MDI technique, as well as, spacer usage to patients ordered MDI therapy. 1. Current therapy that is part of a patient's home regimen will not be discontinued. a. Provide spacer and educate patient on proper inhaler technique if needed. IX. Documentation  A. Document assessment findings in the respiratory assessment section of the patient's EMR. B. Document changes in therapy per protocol in the respiratory orders section and in the care plan section of the patient's EMR. C. Document patient education in the patient education section of the patient's EMR. X. Outcome Criteria:  A. Relief of wheezes and obstruction  B. Improved cough and sputum color and consistency  C. Improved chest x-ray  D. Improved arterial oxygen tension and or SaO2  E. Improved Peak Flow on asthmatic patients        XI. Related Policy/Protocols:  A. Respiratory Patient Care Protocols  B. Bronchial Hygiene Therapy  C. Oxygen Protocol  D. [facility] Administration of Metered Dose Inhalers via a Common Canister  E. [address] Generating Procedures  Reference:  L - Respiratory Care Department Policy, Procedure and Protocol Guideline Manual, 1995, QUYEN Kmuar. L -  Therapist Driven Respiratory Care Protocols  A Practitioner's Guide for Criteria-Based Respiratory Care by Fred Mcgee M.D., and QUYEN Partida, RRT. L - The rationale for therapist-driven protocols: an update. Respiratory Care 1998; [id number].  -[facility] Clinical Practice Guidelines.                   Respiratory Care Services       Policy Number: [id number]    Title: Patient Care Assessment Protocol    Effective Date: 01/1999    Revised Date: 05/2014, 04/2018, 12/2018, 07/2019    Reviewed Date: 06/2013/ 03/2015, 03/2016, 06/2017, 11/2020        Overview  In an effort to improve quality and reduce costs of respiratory care at [facility], the Respiratory Department has developed a number of Patient Care Protocols. These protocols have been developed according to Mane 3 and are utilized for those patients who are ordered respiratory therapy using therapeutic indications and standardized approaches for accomplishing objectives. Patient Care Protocols are intended to improve care by:   Defining the indications and standards of care agreed upon by the Pulmonary Medicine and [address] [place] [address] of [facility].  Training respiratory care practitioners to apply those criteria to individual patients and modify therapy as indicated by the protocols.  Documenting the indication and care plan as part of the initial ordering process.  Tapering or discontinuing treatments once the indication for therapy changes. The Patient Care Protocols shall be universally applied throughout the hospital as determined by   the Pulmonary Medicine and [address] [place] [address].     Rationale for Patient Care Assessment Protocols:   Continuous Quality Improvement   Cost containment   Standardization of care   Enhanced continuity of care   Utilization review   Timely intervention    The following patient care assessment protocols have been developed:   Aerosolized Medication Protocol    Bronchial Hygiene Protocol    Oxygen Protocol   CVRU Fast Track Weaning Protocol    Asthma Treatment Protocol ER   Pediatric Asthma Treatment Protocol ER   Alpha-1 Antitrypsin Deficiency Protocol   Prone Positioning Protocol    COPD Protocol    Home Oxygen Assessment Protocol   Ventilator Weaning Protocol    Lung Volume Expansion Protocol    The Director of Respiratory Care Services oversees the Patient Care Assessment Program. The Respiratory Educator is responsible for protocol development and training. The Supervisor is responsible for implementation and  activities. Each patient with an order for respiratory treatments will receive an evaluation. Respiratory Care Practitioners (RCP's) will perform the evaluations. The same evaluation tool will be utilized for initial and follow-up assessments. If the patient does not meet criteria for ordered therapy, the therapy will be discontinued. If the patient demonstrates an adverse response to initially ordered therapy, the therapy will be discontinued and the physician will be contacted. Specific physician's orders that deviate from protocols and are deemed \"inappropriate\" or \"unsafe\" will be addressed with ordering physician and/or medical director as required. Respiratory Patient Care Assessment Protocols    I. Policy: In an effort to provide quality patient care and effective utilization of services, physicians who order respiratory therapy will have their patients treated via the protocols established (see attached) Respiratory Care Practitioners (RCP's) will complete the initial assessment which will indicate patient needs,  the care plan developed and will performed within 24 hours of admission. Frequency of the therapy will be set according to the results of the respiratory therapy evaluation and frequency guidelines policy. Reassessment will be continued every 48 hours and more frequently as needed for the individual patient. II. Purpose:     E. To provide a process that will allow for ongoing assessment and care plan modification for patients receiving respiratory services based on both objective and or subjective patient responses to interventions. This process of protocol utilization will assist in patient care progression while eliminating the need for the physician to continually update respiratory therapy orders. F. To assure continuity of respiratory care that meets [facility] Clinical Practice Guidelines. III. Initiation:  Implement Respiratory Care Protocols for patients who are ordered by physician          to receive respiratory therapy procedures or for ventilator management. IV. Protocol:  B. Upon receiving an order for therapy the RCP will review the patient's EMR (electronic medical record) for all pertinent information includin. Physician's order for therapy  6. Patient history and physical examination  7. Physician progress notes  8. Diagnostic. X-rays, PFT's, arterial blood gases etc.  C. The RCP will perform a respiratory assessment in the following manner:  3. General observations: color, pattern and effort of breathing, chest expansion, (symmetrical and bilateral), level of consciousness and the ability to ambulate. 4. The RCP will assess patient's cough ability and determine if bronchial hygiene is needed. If patient is unable to produce sputum, at that time, the RCP should question the patient with regard to their sputum: production, color consistency, frequency and amount. 5. Auscultation: Using a stethoscope, the RCP will listen and note quality of breath sounds and presence or absence of adventitious breath sounds in all lung fields, both anteriorly and posteriorly. 6. Upon completing the EMR review and physical assessment, the RCP will document findings in the RT Assessment section of the EMR. The score level will be provided and will be used to determine the frequency of therapy. V. Indications:   A. Bronchial Hygiene Protocol indications:   2. Potential for or presence of atelectasis. 2. Need for hydration and removal of retained secretions. 2. Need for improvement of cough effectiveness. 1. Presence of conditions associated with disorder of pulmonary clearance:  k. Cystic fibrosis  l. Bronchiectasis  m. Neuromuscular disease  n. Obstructive lung diseases  o.  Restrictive lung diseases   Aerosolized Medication(s) Protocol indications:Treatment of bronchospasm/wheezing  9. Improvement of mucociliary clearance  10. Treatment of stridor  11. History of Bronchiectasis, Asthma or COPD  C. Oxygen Therapy Protocol indications:   1. Documented hypoxemia  2. Severe trauma  3. Acute myocardial infarction  4. Short-term therapy (e.g. post anesthesia recovery)  D. CVRU Ventilator Weaning Protocol indications:  1. All mechanically ventilated surgical patients unless they have a no wean order. E. Asthma Treatment Protocol ER indications:  1. Patients 15years of age and older that have been triaged or diagnosed with   asthma exacerbation shall be indicated for the ER Asthma Treatment Protocol. A physician order will be required to initiate the protocol. F. Pediatric Asthma protocol in the ER indications:  1. Patients less than 15years old that have been triaged or diagnosed with asthma exacerbation shall be indicated for the Pediatric Asthma protocol. A physician order will be required to initiate the protocol. G. Alpha-1 Antitrypsin Deficiency Testing protocol indications:         1. Patients admitted and diagnosed with COPD. H. Prone Positioning Protocol indications:         1. Acute lung injury         2. Acute respiratory distress syndrome (ARDS)   I. Respiratory Care COPD Protocol indications:         1. History of COPD in patient's records         2. Smoking history   J. Home Oxygen Assessment Protocol indications:         1. Chronic lung disease         2. Cor pulmonale         3. Unable to wean to room air 48 hours prior to anticipated discharge. K.  Ventilator Weaning Protocol indications:         1. Patient's mechanically ventilated          2. Managed by intensivist  L. Lung Volume Expansion Protocol indications:        1. Any patient at risk for pulmonary complications. VI. Maintenance:    B.  Timely patient assessment is an integral part of this protocol therefore the following will be applied:  1. All non- critical care patients will be evaluated upon receiving initial respiratory care orders within 24 hours and re-evaluated within 48 hours (or more as needed). 2. Orders requesting a Respiratory Consult will be responded to in the following manner:  e. In patient emergency situations, the RCP assigned to the floor will respond immediately to the patient, provide an initial respiratory assessment, and contact the patient's physician as necessary for appropriate orders. f. In non-emergent situations, the RCP assigned to the floor will respond to the patient within 90 minutes and provide an initial respiratory assessment and contact patient's physician as necessary for appropriate orders. g. An RCP will provide a comprehensive assessment as soon as possible. 3. Upon completion of an evaluation, the RCP will complete documentation in the patient's EMR in the RT Assessment section. 4. The RCP who completes the assessment will document orders for therapy in the orders section of the patient's EMR selecting new order. Next, per protocol should be selected indicating it is a protocol order and sign orders should be selected to complete the process. The applicable protocol must be added to the progress note per Joint Commission guidelines. 5. The Pharmacy and Therapeutics (P&T) Committee has mandated that the medication Xopenex may be changed to unit dose albuterol without an order, except for those patients receiving Xopenex due to cardiac arrhythmias. 1. The dosage for these patients should be 0.63 mg. and may be changed from 1.25 mg. to 0.63 mg per P & T Committee by the RCP completing the assessment. 6. Patients who are not experiencing cardiac arrhythmias, and are ordered Xopenex and Atrovent may be changed to Duoneb. VII. Safety:        F. The following safety issues shall be monitored:  1.  The RCP will perform hand hygiene per hospital policy utilizing Standard Precautions for all patients and following transmission-based isolation as indicated per hospital policy. 2. The RCP must exercise professional judgment in classifying the patient for frequency of therapy. 3. Appropriate classification of the patient will require an evaluation utilizing the Therapy Assessment Protocol Guidelines. 4. The RCP will confer with the physician concerning the care of the patient at any time questions or problems arise. 5. If during therapy, the patient exhibits no improvement, or deterioration in clinical status the RCP will notify the physician and the patient's nurse. VIII. Interventions:   G. The patient's nurse is responsible concerning all items related to his/her care. Ongoing communication with nursing is essential to successful protocol management. H. The RCP recognizes the value of the team approach in meeting the patient's needs. Nursing input regarding the patient's pulmonary condition will be sought as needed. IX. Reportable conditions: The RCP will inform the physician if:  1. There are acute changes in patient's respiratory status. 2. The therapist is unable to determine appropriate care plan upon assessment. 3. The patient fails to reach therapeutic objective. 4. A change or additional medication is needed. X.  Patient Education:    A. Patient will receive instruction on the followin. The treatment modality, including objectives and proper technique of therapy  2. Respiratory medications  B. Documentation shall occur in the patient education section of the patient's EMR. XI. Documentation: Record all findings as described above in the patient's EMR. Related Protocols: A. Aerosolized Medication Protocol  B. Bronchial Hygiene   C. Oxygen Protocol   D. CVRU Fast Track Weaning Protocol  E. Asthma Treatment protocol ER  F. Alpha-1 antitrypsin Deficiency Protocol  G. Prone Positioning Protocol  H. Respiratory Care COPD Protocol  I.  Home Oxygen assessment Protocol  J. Ventilator Weaning Protocols   K. Volume Expansion protocol    Indications      Frequency          Level  A. Aerosol therapy   1.  q4h     Severe SOB, wheezing, unable to sleep 1   2.  qid, q4 wa or q6h   Moderate SOB, wheezing   2   3.  tid      Hx of asthma, or COPD mild wheezing,         or facilitate secretion removal              3   4.  bid      Asthma, or COPD, Intermittent wheezing 4   5. PRN, i.e. tid PRN, qid PRN Asthma, or COPD, occasional wheezing 5       B. Bronchopulmonary Hygiene    1. q4h             Copious secretions, SOB, unable to sleep 1   2. qid & PRN            Moderate amounts of secretions   2   3. tid           Small amounts of secretions and poor cough,               history of secretions    3    4. PRN, i.e. tid PRN, qid PRN     Breathing exercises, encourage cough only 4      C. Oxygen Therapy     Follow hospital approved Oxygen Protocol      Note:  qid treatments are due 0800, 1200, 1600, and 2000. tid treatments are due 0800, 1400, and 2000  Q6h treatments are due 0800, 1400, 2000, 0200  Q4 wa teatments are due 0800, 1200, 1600, and 2000. Q4h treatments are due  0800, 1200, 1600, 2000, 0000, and 0400. The Level 1-5 rating system is only to be used as criteria for determining appropriate frequency of therapy. References:   N   Joint Commission Consolidated [place] Standard   L    Respiratory Care Department Policy, Procedure and Protocol Guideline Manual, 1995, QUYEN Kumar. L  Therapist Driven Respiratory Care Protocols  A Practitioner's Guide for Criteria-Based Respiratory Care by Shahriar Garcia M.D., and QUYEN Tanner, JUAN. L  The rationale for therapist-driven protocols: an update. Respiratory Care 1998; 08:734-441   L Therapist Driven Respiratory Care Protocols  A Practitioner's Guide for Criteria-Based Respiratory Care by Shahriar Garcia M.D., and QUYEN Tanner, JUAN. L The rationale for therapist-driven protocols: an update.  Respiratory Care 1998; 95:005-317. N   [facility] Clinical Practice Guidelines. A. The RCP will perform a respiratory assessment in the following manner:  1. Perform hand hygiene per hospital policy utilizing Standard Precautions for all patients and following transmission-based isolation as indicated per policy. 2. Identify patient via ID bracelet verifying patient name and birth date. 3. General observations: color, pattern and effort of breathing, chest expansion, (symmetrical and bilateral), level of consciousness and the ability to ambulate. 4. The RCP will assess patients cough ability and determine if Nasotracheal suctioning is needed. If patient is unable to produce sputum, at that time, the RCP should question the patient with regard to their sputum: production, color consistency, frequency and amount. 5. Auscultation: Using a stethoscope, the RCP will listen and note quality of breath sounds and presence or absence of adventitious breath sounds in all lung fields, both anteriorly and posteriorly. 6. Upon completing the EMR review and physical assessment, the RCP will document findings in the RT Assessment section of the EMR. The score level will be provided and will be used to determine the frequency of therapy. V. Indications:   A. Indications for Bronchial Hygiene Protocol will include:  1. Potential for or presence of atelectasis. 2. Need for hydration and removal of retained secretions. 3. Need for improvement of cough effectiveness. 4. Presence of conditions associated with disorder of pulmonary clearance:  c. Cystic fibrosis  d. Bronchiectasis  B. Indications for Aerosolized Medication(s) Protocol should include:  1. Treatment of bronchospasm/wheezing  2. Improvement of mucociliary clearance  3. Treatment of stridor  4. History of Asthma or COPD             C.  Indications for Oxygen Therapy Protocol should include:  1. Documented hypoxemia  2. Severe trauma  3.  Acute myocardial infarction  4. Short-term therapy (e.g. post anesthesia recovery)    VI. Maintenance:    A. Timely patient assessment is an integral part of this protocol therefore the following will be applied:  1. All non- critical care patients will be evaluated upon receiving initial respiratory care orders within 24 hours and re-evaluated within 48 hours (or more as needed). 2. Orders requesting a Respiratory Consult will be responded to in the following manner:  a. In patient emergency situations, the RCP assigned to the floor will respond immediately to the patient, provide an initial respiratory assessment, and contact the patients physician as necessary for appropriate orders. b. In non-emergent situations, the RCP assigned to the floor will respond to the patient within 90 minutes and provide an initial respiratory assessment and contact patients physician as necessary for appropriate orders. c. An RCP will provide a comprehensive assessment as soon as possible. 3. Upon completion of an evaluation, the RCP will complete documentation in the patients EMR in the RT Assessment section. 4. The RCP who completes the assessment will document orders for therapy in the orders section of the patients EMR selecting new order. Next, per protocol should be selected indicating it is a protocol order and sign orders should be selected to complete the process. 5. The Pharmacy and Therapeutics (P&T) Committee has mandated that the medication Xopenex may be changed to unit dose albuterol without an order, except for those patients receiving Xopenex due to cardiac arrhythmias. The dosage for these patients should be 0.63 mg. and may be changed from 1.25 mg. to 0.63 mg per P & T Committee by the RCP completing the assessment. 6. Patients who are not experiencing cardiac arrhythmias, and are ordered Xopenex and Atrovent may be changed to Duoneb. VII. Safety:        A.  The following safety issues shall be monitored:  1. The RCP will perform hand hygiene per hospital policy utilizing Standard Precautions for all patients and following transmission-based isolation as indicated per hospital policy. 2. The RCP must exercise professional judgment in classifying the patient for frequency of therapy. 3. Appropriate classification of the patient will require an evaluation utilizing the Therapy Assessment Protocol Guidelines. 4. The RCP will confer with the physician concerning the care of the patient at any time questions or problems arise. 5. If during therapy, the patient exhibits no improvement or deterioration in clinical status the RCP will notify the physician and the patients nurse. VIII. Interventions:   A. The patients nurse is responsible concerning all items related to his/her care. Ongoing communication with nursing is essential to successful protocol management. B. The RCP recognizes the value of the team approach in meeting the patients needs. Nursing input regarding the patients pulmonary condition will be sought as needed. IX. Reportable conditions: The RCP will inform the physician if:  1. There are acute changes in patients respiratory status. 2. The therapist is unable to determine appropriate care plan upon assessment. 3. The patient fails to reach therapeutic objective. 4. A change or additional medication is needed. X.  Patient Education:    A. Patient will receive instruction on the followin. The treatment modality, including objectives and proper technique of therapy  2. Respiratory medications  B. Documentation shall occur in the patient education section of the patients EMR. XI. Documentation: Record all findings as described above in the patients EMR. Related Protocols: A. Aerosolized Medication Protocol  B. Bronchial Hygiene  C. Oxygen Protocol   D. Volume Expansion/Secretion Clearance  E.  Ventilator Weaning Protocols    References:  [employer] National Standard   L    Respiratory Care Department Policy, Procedure and Protocol Guideline Manual, , QUYEN NARANJO  Therapist Driven Respiratory Care Protocols  A Practitioners Guide for Criteria-Based Respiratory Care by Shanti Villagomez M.D., and CARMEN Oden  The rationale for therapist-driven protocols: an update. Respiratory Care 1998; [facility] Guidelines. Respiratory Care Services     Policy Number: [id number]    Title: Noninvasive Positive Pressure Ventilation, (NIPPV)    Effective Date: 2005, 2017    Revised Date: 2012, 2014, 2015, 2016, 2018, 2019    Reviewed: 2019, 2020   I. Description: Non Invasive Ventilation (NIV) is a ventilatory assist technique used as an alternative to endotracheal intubation. NIV is pressure ventilation delivered as BIPAP® (Bi-level Positive Airway Pressure) which is a low pressure electronically driven device intended for use as a ventilatory support system for patients who have an intact respiratory drive. The device provides non-invasive ventilatory assistance through the use of a nasal or full face mask. Bi-Level  (two levels of ventilatory support) known as inspired positive airway pressure (IPAP) and  positive airway support (EPAP). One level of support can also be delivered which is delivered as EPAP or CPAP which is delivered throughout the respiratory cycle. The aim is to maintain adequate ventilation and minimize the effort of breathing. The BREAS Vivo 50, BIPAP® Vision System and the Respironics V60 are the systems available for non-invasive ventilation. These devices are also capable of being used for invasive ventilation in the critical care units. BIPAP Vision: This device uses an electronic pressure control sensing monitor pressure differential in the patient circuit.  This feedback allows for adjustment of the flow and pressure output  to assist in inhalation or exhalation through the administration at two distinct levels of positive pressure. During inspiration, the level is variably positive and is always higher than the expiratory level. During exhalation, pressure is variably positive or near ambient. In addition, this device has the ability to compensate for leaks through automatic adjustment of the trigger threshold. This capability allows for the application of BIPAP®  for mask-applied ventilation assistance. Respironics V60  The Respironics V60 uses Auto-Trak technology to help ensure patient synchrony and therapy acceptance and is designed to address the specific challenges of NIV. By providing auto-adaptive leak compensation, inspiratory triggering, and expiratory cycling, Auto-Trak delivers optimal synchrony in the face of dynamic leak and changing patient demand. The Respironics V60 is designed to include pediatric use and is equipped with several modes, which allows the practitioner to meet the specific needs of the patients. See Appendix 1 for definitions of settings. [employer] Vivo 50   The Vivo 50 ventilator (with or without the SpO2 and CO2 sensor) is intended to provide continuous or intermittent ventilatory support for the care of individuals who require mechanical ventilation. The Vivo 50 with the SpO2 sensor is intended to measure functional oxygen saturation of arterial hemoglobin (%SpO2) and pulse rate. The Vivo 50 with the CO2 sensor is intended to measure CO2 in the inspiratory and expiratory gas. The device is intended to be used in home, institution, hospitals and portable applications such as wheelchairs and gurneys. It may be used for both invasive and non-invasive ventilation. Suitable for a wide range of pathologies and for patients with changing requirements over time. Multiple circuits: dual limb with exhaled volume measurements, single limb with exhalation valve or leakage port.  Multiple modes  Pressure and Volume Modes with Target Volume and SIMV  II. Policy: The administration of non-invasive positive pressure ventilation (NPPV) will be the responsibility of the Respiratory Care Practitioner (RCP). Upon receipt of a physician order, proper patient instruction, set up and monitoring will be provided to ensure patient understanding, compliance and proper utilization of prescribed therapy. G. A patient may be allowed to use their own NPPV machine after having their equipment checked and approved by [employer]. H. A consent and Release for Home Ventilator form must be signed by the patient and witnessed by a health care provider if the patient chooses to use his home ventilator. I. A patient that is being treated for acute respiratory failure and placed on non-invasive ventilation must be placed in a critical care unit, per Medical Director. D.  A patient who is being treated for sleep apnea may be treated on the floors. E.   A patient has the option of using a hospital owned NPPV unit. F.   A patient may use a hospital unit and their own mask if they choose. G. Patients may be placed on full face mask with NPPV units on the hospital floors under the following conditions:  1. Patient has an end stage disease process. 2. Patient was placed on full face mask and NPPV unit in the Critical Care Units and it has been established as safe and effective therapy. 3. Patient is ordered full face mask with NPPV unit for home use. 4. In the event patient is to be set up or transitioned to home on a NPPV unit, the Respironics V60 (in the AVAPS mode) shall be utilized while the patient is in the hospital. If a Chandler Salas is unavailable, a home NPPV unit may be provided by the DME provider for no more than 48 hours. III. Responsibility: Director, Lore Joshi, and all Respiratory Care Practitioners with documented competency. IV. Indications for non-invasive ventilation:  D.  Acute respiratory failure (Patient must be in Critical Care Unit)  E. Chronic respiratory failure  F. Alveolar hypoventilation  G. Documented sleep apnea  V. Contraindications:  A. Patients with severe respiratory failure without a spontaneous respiratory drive  B. Noninvasive ventilation may be contraindicated for patients with the followin. Inability to maintain a patent airway or adequately clear secretions  2. Acute sinusitis or otitis media  3. Risk for aspiration of gastric contents  4. Hypotension  5. Pre-existing pneumothorax or pneumomediastinum  6. Epistaxis  7. Recent facial, oral or skull surgery or trauma  8. history of allergy or sensitivity to mask materials where the risk from allergic reaction outweighs the benefit of ventilatory assistance  C. Potential Complications:  1. Cardiovascular compromise  2. Skin breakdown and discomfort from mask  3. Gastric distention  4. Increased intracranial pressure  5. Pulmonary barotraumas    VI. Mask fit  C. It is essential that the patient be correctly fitted with an appropriate size mask. 9. Choose mask according to sizing template on disposable setups. 10. The mask should fit comfortably on the patient's nose, not occluding the nares and the base of the mask should fit comfortably between the chin and bottom lip see picture on setup guide. 11. Headgear should fit comfortably not tight. The bottom edge of the headgear should sit at the base of the nape of the neck with side straps underneath the earlobes. 12. The face masks are better for patients who are dyspneic and tachypneic as they tend to mouth breathe with a nasal mask and reduce the effectiveness of the ventilation. 15. Masks that are too tight are uncomfortable and may cause pressure areas. Masks that are too loose leak which reduce the efficiency of the system. 14. When using a nasal mask the patient must keep their mouth closed to obtain the desired effect of the ventilation.    15. May place an Allevyn Gentle Border dressing over bridge of nose to avoid skin breakdown. VII. Procedure for Non-invasive ventilation via Vivo 50:   Refer to 's recommendations. VIII. Procedure for non-invasive ventilation using the V60 or BiPAP ® Vision:            Refer to [facility] recommendations. IX. Monitoring:  G. While in use, the RCP should check the patient and non-invasive unit no less than every four hours. H. Failure of NIV should be suspected if:   3. The patient is unable to maintain adequate oxygenation, decreasing 02 saturations despite increases in 02.  4. There is a reduction in neurological or conscious state. 5. The patient has excessive secretions or increasing respiratory rate. 6. Failure of PaCO2 or PH to improve on ABG sample. 7. The patient has poorly compliant lungs. X. Weaning               A. Weaning or cessation of non-invasive ventilation should occur under the following                        conditions:  7. PaC02 returns to normal and patient maintains O2 saturations with minimal                oxygen. 8. Respiratory rate is returned within normal limits. 9. Patient is unable to tolerate non-invasive ventilation. 10. Patient's dyspnea is reduced. 11. Patient exhibits normal overnight ventilation. 12. Patient is receiving palliative treatment. XI. Documentation:  I. Documentation should include the followin. Ventilator settings comply with physician order. 13. Ventilator is functioning properly as evidenced by a check of measured volumes, rates, pressures and FIO2. 14. Alarms are set appropriately. 15. Measured inspired gas temperature (invasive mode only)  16. Vital signs (pulse, respiratory rate, oxygen saturation, breath sounds)  17. Patient tolerance to therapy.   18. Manual resuscitator and appropriate size mask at patient bedside      REFERENCES  Respironics V60 user Manual Delmon Green Kevinmon Green \. Delmon Green \Equipment\[id number].pdf    VIVO 50 clinical Guide . Delmon Green \. Delmon Green \Equipment\VIVO 50 [id number]. pdf     [address] [place] and Respiratory Care Section. AL Grewal 2001. Introducing non-invasive positive pressure ventilation. Nursing Standard. 15,26, 42-45. Dawit Medina. 2003. Using non-invasive ventilation in acute wards: part 2. Nursing Standard. 18,1, 41-44. Hang 47. Use of continuous positive airway pressure (CPAP) in the critically ill-physiological principles. Critical Care 12 (4 154-58     SHAGUFTA ePterson2002. Bi-level positive airway pressure (BiPAP) and acute cardiogenicpulmonary edema   ( ACPO) in the emergency department. Critical Care 15 (2):51-63        DEFINITIONS AND USUAL SETTINGS                                                            APPENDIX 1                      Settings   Settings in  Active   CPAP Settings in  Active   BiPAP Description Range Usual Setting  Used in NIV         CPAP Mode                     Continuous Positive Airway  Pressure   4-24 cmH20 8-14     ST or  BiPAP MODE                           Spontaneous Timed or BiLevel Positive Airway Pressure Ventilation. Two level system of alternating during non- invasive ventilation in sync with breathing-set IPAP and EPAP      __     __   AVAPS Mode    (only available  on V60)          The volume-targeted AVAPS (average volume-assured pressure support) mode combines the attributes of pressure-controlled and volume-targeted ventilation. __     __       EPAP                                       Positive Airway Pressure. Recruits under ventilated alveoli to remain open during expiration by providing a constant pressure throughout resp. cycle.  Must be less than or equal to IPAP    4-25 cmH20 5-14                 Settings Settings in Active CPAP Settings in Active BiPAP Description Range Usual Setting Used in NIV     IPAP  Inspired Positive Airway Pressure provides pressure throughout the inspiratory phase to support patient ventilation  4-40 cmH20 10-20               I-time                    Inspiratory time- time taken to inspire in seconds  0.3  3.0 sec 1:3   FIO2                   Oxygen delivered  21- 100% As ordered         Ramp time                                      The Ramp Time function helps your patient adapt to ventilation by gradually increasing inspiratory and expiratory pressure over a set interval (minutes). This time gradually delivers pressures so to reduce patient anxiety and increases comfort. Off  or  5-45 minutes 10 minutes                   Rate (RR)          Patient's respiratory rate 4- 60 BPM 4     Rise time          Speed at which the inspiratory pressure rises to the set pressure. 1-5  (1 is the fastest) Set at 3         Patient Data  Data Description Range Usual setting in NIV   Breath phase/trigger Indicator  Bar in left hand corner. Colored according to breath trigger. n/a n/a   PIP Positive Inspiratory pressure  0-50 n/a   Patient total leak Est. or unintentional leak  0-200 n/a   Patient trigger Patient triggered breaths as a percentage  0-100% Should be 100%   Respiratory rate Respiratory rate 0-90 n/a   Ti/Ttot Inspiratory duty cycle or inspiratory time divided by total cycle time  0-91% n/a   Minute volume Est. minute ventilation.  TV x rate=MV  0-99 LPM n/a   Tidal volume Est.  tidal volume  0-3000 ml n/a     Alarms  Alarm Description Range Set  at   [facility] Rate High respiratory rate 5-90 10 breaths above patient's breath rate   Low Rate Low respiratory rate alarm 1-89 BPM 5 breaths below patient's breath rate   Hi VT High tidal volume alarm 200-2500 ml 200 ml above patient's own   Low VT Low tidal volume alarm OFF  1500 ml OFF   HIP High Inspiratory pressure alarm 5-50 cm H20 10 cm above IPAP   LIP Low inspiratory pressure alarm OFF, 1-40 cmH20 OFF   Lo VE Low minute vent alarm OFF, 0.1 to 99L/min OFF   LIP T Low inspiratory delay time 5-60 seconds 5 seconds       CPAP Settings BiPAP Settings     Set CPAP level as ordered Set breath rate as ordered     Set FIO2 as ordered Set I-time as 1.3     Set Ramp time as ordered Set EPAP as ordered     Set C-Flex at 3 Set IPAP as ordered      Set Rise time as ordered      Set FIO2 as ordered           Respiratory Care Services  Consent and Release for Home Ventilator      Patient Name: _________________________________________ Room: ___________    SSN: _______________________________           Account Number:  __________________    Use and care of my personal home ventilator, (invasive or non-invasive) mechanical life support device while at [facility] system has been discussed with me by my physician and I have been provided with an opportunity to ask questions which have been answered to my satisfaction. I also understand a respiratory care practitioner is not expected to remain in my room or general vicinity at all times. It is understood that every precaution consistent with the best medical practice will be taken and I give Respiratory Care Services permission to monitor my ventilator during my hospital stay. I hereby release [facility] system, it's agents, employees and medical staff from liability arising from any and all claims, demands, losses and damages to person and/or property as a result of the above mentioned requests. I certify that I have read and understand this consent agreement and release and that I am legally qualified to srinivasan this authorization. ___________________  Date    _____________________________________        ________________________  Signature of Patient or Parent (of minor patient,                  Relationship (if other than Patient)  long term parent) if applicable.  Closest Relative,  Guardian or legal Representative    _____________________________________  Witness  Legal representative is defined as person named by the court as a guardian, executor or , or having power of  specifically set forth to authorize this action. [facility] [phone] (3/02, 7/14)   Consent and Release for Home Ventilator            Respiratory Care Services     Policy Number: [id number]    Title: Bronchial Hygiene Protocol    Effective Date: 01/1999    Revised Date: 12/2014, 11/2017, 7/2019    Reviewed Date: 06/2013, 05/2014, 03/2015, 03/2016, 06/2017, 4/2018, 11/2020     I. Purpose: The Respiratory Care Practitioner (RCP) will utilize the following protocol to select and initiate bronchial hygiene therapy to open and maintain obstructed airways when indicated. II. Patients: All patients who are ordered bronchial hygiene therapy. III. Clinical Area: All general patient floors     IV. Protocol: The following conditions or diseases are indications for bronchial hygiene                           therapy. J. Oscillating PEP Therapy        Indications should include:   16. Atelectasis caused by mucus plugging or foreign body  17. Chronic mucociliary clearance disorders  18. Retained secretions which may be associated with the following conditions:  p. Bronchitis  q. Bronchiectasis  r. Pneumonia  K. PAP- Positive airway pressure therapy  Indications include:  13. Patients with post-operative atelectasis or to prevent post operative atelectasis. 14. Patients who cannot perform deep breathing exercises due to pain. 15. Patients requiring lung expansion therapy who cannot follow instructions. 16. Patients requiring lung expansion therapy with poor inspiratory capacity <10cc/kg. 17. Patients requiring aerosol therapy in conjunction with opening their airways. L. Vibratory / Acoustical Airway Clearance Therapy (ACT)- i.e. (Vibralung, Vest, or Percussor)  Indications should include  19.  Patient conditions  that involve retained secretions, increased mucus production and defective mucociliary clearance such as:  h. Cystic fibrosis  i. Chronic bronchitis  j. Bronchiectasis  k. Pneumonia  l. Asthma  m. Muscular dystrophy  n. Post-operative atelectasis  o. Neuromuscular respiratory impairments  p. ACT may be considered in patients with COPD with  symptomatic secretion retention, guided by patient preference,  toleration, and effectiveness of therapy Jessy Victor et al., 2013). M. Nasotracheal suctioning indications should include:  5. Inability to cough effectively  6. Excessive secretions  7. Artificial airway      V. Equipment:   A. PEP therapy device   B. Vest therapy equipment   Alison Vigo   D. AccuPap   Martha Mortensen NT suction equipment       VI. Guidelines:   Monitor patient's vital signs and evaluate patient's clinical status. The need to                                change therapy modality may be indicated by:  Bridget Narvaez in patient's sensorium (patient now confused or obtunded, and unable to follow directions). I. A significant deterioration is evident on patient's chest radiograph or increased sputum production. J. Increased thickening of secretions (e.g. mucolytic therapy may be indicated.)  K. Development of wheezing  L. Decrease in oxygen saturation  M. Development of chest pain. VII. Clinical Responsibility: The Therapy Assessment Protocol guidelines will be used to                re-evaluate all patients on bronchial hygiene therapy (See Therapy Assessment Protocol). D. RCP's will perform changes in therapy according to protocol. .  E. Bronchial hygiene therapy may be discontinued when goals of therapy are met, e.g., secretions easily expectorated for 48 hours, atelectasis is resolved, etc.  F. PAP Therapy may be utilized in place of IPPB therapy per discretion of the RCP, as approved by the Pulmonary Medicine and [facility] Timoteo Joshi. VIII.  Outcome Criteria:  Outcome criteria for bronchial hygiene therapy should include:  I. Decrease in sputum production  J. Improved breath sounds  K. Improved arterial oxygen tension and/or SaO2  L. Improved chest X-ray  M. Subjective response to therapy    IX. Documentation  D. Document assessment findings in the respiratory assessment section of the patient's EMR. E. Document changes in therapy per protocol in the respiratory orders section and in the care plan section of the patient's EMR. F. Document patient education in the patient education section of the patient's EMR. X. Related Protocols:  8. Respiratory Patient Care Assessment Protocols  3. Aerosolized Medication Protocol  3. Oxygen Therapy Protocol        Reference:    L - Respiratory Care Department Policy, Procedure and Protocol Guideline Manual, 1995, QUYEN Kumar. L - Therapist Driven Respiratory Care Protocols  A Practitioner's Guide for Criteria-Based         Respiratory Care by Verna Calderon M.D., and QUYEN Lindsay RRT. N  [facility] Clinical Practice Guidelines. Celina Mendez., Danny Santa., Lamar Lyon., Faby Anderson., Coralie Meigs., . . . YULIA Mendez (2013, December). [facility] Clinical Practice Guideline: Effectiveness of Nonpharmacologic Airway Clearance Therapies in Hospitalized Patients. Respiratory Care, 58(12), 2163-7095. Retrieved June 28, 2019        Respiratory Care Services     Policy Number: [id number]    Title: Oxygen Protocol    Effective Date: 01/1996    Revised Date: 06/2013, 02/29/2016, 4/2018, 7/2019    Reviewed Date: 05/2014, 03/2015, 06/2017, 11/2020        I. Policy: The Oxygen Protocol will be initiated for all patients upon written order from physician for administration of oxygen therapy or if a patient is found to have an oxygen saturation of 88% or less. Special consideration: the goal of oxygen therapy for COPD patients is to maintain oxygen saturation between 88% - 92% to comply with GOLD Guidelines. II. Purpose:  To provide protocol driven respiratory therapy for the administration of oxygen at concentrations greater than that in ambient air with the intent of treating or preventing the symptoms and manifestations of hypoxia. III. Responsibility: Director Respiratory Care Services, all Respiratory Care Practitioners     IV. Indications:   N. Implement this protocol for patients when physician orders oxygen to be administered or when patient is found to have an oxygen saturation of 88% or less. O. To assure routine monitoring of patient's oxygen saturation b.i.d. and to make appropriate adjustments in accordance with ordered oxygen saturation parameters. P. To assure continuity of respiratory care that meets [facility] Clinical Practice Guidelines and GOLD Guidelines. Q. Hb < 8  R. Sickle Cell anemia crisis    V. Assessment:  Assess the following parameters to determine the need to adjust oxygen:  N. Measurement of patient's oxygen saturation via pulse oximetry. O. Observation of patient's color, respiratory effort, and responsiveness. P. Measurement of heart rate and respiratory rate. Q. Complete a three-step ambulatory oxygen saturation when ordered. VI. Initiation:  Upon receipt of an order for oxygen, the RCP will:   G. Verify order in the patient's EMR, which should include the desired oxygen saturation to be maintained. H. The patient shall be placed on oxygen with humidity (except for those oxygen delivery devices that do not require humidity, i.e. venturi masks and non-rebreather masks) as ordered by the physician to achieve the prescribed oxygen saturation. I. In the event that no saturation is specified, a saturation of 90% will be maintained. J. Patients, who are found to have a SaO2 of 88% or less, may be started on supplemental oxygen as described above. K. Patients admitted with cardiac problems/disease shall be maintained at 92% per Chest Committee recommendation. L.  The patient will be informed of the \"no smoking policy\" and instructed in the proper use of oxygen therapy. M. Once desired oxygen saturation has been achieved, the RCP will document FIO2 and oxygen saturation in the respiratory section of the patient's EMR. VII. Maintenance:   N. 30-second oxygen saturation check will be taken to maintain the saturation ordered by the physician each day. O. Patients will be assessed each shift and as needed by pulse oximetry to determine if oxygen needs to be decreased, increased or discontinued. P. If changes in FIO2 are indicated, all changes will be documented in the respiratory section of the patient's EMR. Q. If no changes in FIO2 are required, the patient's oxygen flow rate and saturation will be recorded in the respiratory section of the patient's EMR. R. Per [facility], patients who are receiving oxygen therapy but are not on oxygen at home, should be weaned off oxygen as soon as possible or when anticipated discharge becomes evident. S. Oxygen will be discontinued after oxygen saturation has been maintained for 24 hours on room air and documented in the patient's EMR. T. Patients on the Inpatient Rehabilitation area on 9th floor will be exempt from having their oxygen discontinued per protocol. Oxygen may be weaned but will be changed to prn to meet the needs of the patient when exercising and participating in therapy. U. The goal of oxygen therapy is to maintain patients with a diagnosis of COPD at oxygen saturation between 88% - 92% to comply with GOLD Guidelines. VIII. Safety: [facility] will address the following safety issues:  J. Identify patient using the two patient identifiers name and birth date via ID bracelet. K. Perform hand hygiene per hospital policy utilizing Standard Precautions for all patients and following transmission-based isolation as indicated per hospital policy. L. Cardiac patients will be maintained at an oxygen saturation of 92%. M.  If a patient's FIO2 requirements necessitate changing oxygen delivery devices to a high concentration of oxygen, documentation indicating the change must be included in the progress notes, as well as in the respiratory flowsheet. N. If a patient has a hemoglobin level <8 mg. RCP will consult physician before discontinuing oxygen. IX. Interventions:   C. RCP will assess patient for signs of respiratory distress or suspicion of CO2 retention. D. An ABG may be obtained for patients exhibiting respiratory distress or sickle cell crisis. E. An order should be entered into patient's EMR for ABG under per protocol. X. Documentation  G. Document assessment findings in the respiratory section of the patient's EMR. H. Document changes in therapy per protocol in the respiratory orders section and in the care plan section of the patient's EMR. I. Document patient education in the patient education section of the patient's EMR. XI. Reportable Conditions:  Report to the physician immediately:  B. Acute changes in patient's respiratory status. C. An oxygen saturation <85%. D. A change in oxygen delivery device to provide a high concentration of oxygen. XII. Patient Instructions: Review with Patient  B. Purpose of oxygen therapy  C. Proper technique for using oxygen  D. No smoking policy    Approval: Pulmonary Committee (1-25-96)  Revision: Chest Committee (4-28-05)    L - Respiratory Care Department Policy, Procedure and Protocol Guideline Manual, 1995,         QUYEN Kumar. L - Therapist Driven Respiratory Care Protocols  A Practitioner's Guide for Criteria-Based       Respiratory Care by Corinne Griggs M.D., and QUYEN Goldman, JUAN. L - The rationale for therapist-driven protocols: an update. Respiratory Care 1998. N  [facility] Clinical Practice Guidelines.       Respiratory Care Services     Policy Number: [id number]      Title: Lung Volume Expansion Protocol     Effective Date: 9/28/11    Revised Date: 5/2012, 06/2017, 07/2019    Reviewed Date: 06/2013, 5/2014, 03/2015, 03/2016. 05/2018, 11/2020      I. Policy: This policy shall identify patients who are considered at risk for or who have pulmonary complications and those with documented atelectasis/lung volume loss, with or with out increased or thickened secretions. This protocol will be followed until discontinued via established criteria. II. Purpose: To provide the respiratory therapist with a set of guidelines and therapies specific for lung volume expansion, atelectasis and lung volume loss prevention, in applicable non-vented patients. The appropriate therapy shall be used for the prevention of or resolution of atelectasis. While this approach to lung volume expansion will be effective in patients with established pulmonary processes, the purpose is to be proactive and prevent atelectasis and lung volume loss. III. Responsibility: Director, Lore Joshi and all Respiratory Care Practitioners with documented competency. IV. Indications: Criteria for lung volume expansion protocol are listed. A. Patients with pulmonary complications as listed below but not limited to:  1. Restrictive lung diseases such as interstitial lung disease, pulmonary fibrosis, sarcoidosis, scoliosis, neuromuscular diseases such as muscular dystrophy or amyotrophic lateral sclerosis (ALS), obesity with BMI >40, including obesity hypoventilation syndrome, pulmonary edema, pleural effusions, pneumonia, lung cancers, myasthenia gravis, tuberculosis, and acute respiratory distress syndrome (ARDS). 2. Obstructive lung disease such as Chronic Obstuctive Pulmonay Disease (COPD), Asthma, Bronchiectasis, and cystic fibrosis. 3. Any patient with expected immobility of >48 hours. B. Surgical patients with or at risk for pulmonary complications  1. Pulmonary contusions  2. Atelectasis  3. Rib fractures  4. Obesity with BMI >40  5. Pleural Effusions  6.  Any patient with expected immobility of >48 hours. 7. <5 days Post-operative laparotomy, sternotomy   8. Major abdominal or thoracic or cardiac surgeries  C. Additional factors to consider:  1. Underlying pulmonary disease or exacerbation  2. Home nebulizer/MDI or oxygen therapy  3. Mild to severely labored breathing pattern  4. Any patient requiring high flow O2 (> 6L/min)  5. Respiratory Rate > 25 bpm  6. Respiratory/Metabolic Acidosis  7. Untreated Obstructive Sleep Apnea (TAMERA)  8. Paralysis  9. Adventitious breath sounds such as:  a. Wheezing/rhonchi/crackles  b. Weak/ineffective cough  c. Productive cough     V.  Procedure:   A. When an order is received for the RT- Respiratory Therpay Miscellaneous Routine or IP Consult to Respiratory Care for Lung Volume Expansion, the RCP shall follow the attached Algorithm. B. All treatment results and effectiveness will be documented in the patient's EMR. C. The RCP shall identify the patient using the two identifiers name and date of birth. D. The RCP shall assess the patient and determine the appropriate therapy or therapies listed below. E. Combinations of the listed therapies will be appropriate in specific patients and is encouraged. Included in their assessment shall be a review of current x-ray if available, cough ability and quality, oxygenation, secretions or the risk for secretion development and patient mobility. VI. Available Therapies for Non-Vented Lung Volume Expansion:  A. Positive expiratory pressure (PEP) airway clearance therapy device  B. High-frequency chest wall compression (HFCWC) aka: Vest  C. AccuPAP Positive Airway Pressure Device  D. Optiflow/AIRVO  E. Bronchodilator therapy:    a. The utilization of a bronchodilator shall be determined by the assessment of the patient utilizing the current assessment tool.    b. Should a bronchodilator be indicated due to patient exhibiting wheezing, 2.5 mg albuterol shall be the default bronchodilator that will be delivered per protocol. VII. Respiratory Assessment  A. For patients who do not meet the Lung Volume Expansion Protocol treatment criteria, subsequent respiratory assessments shall be performed and documented as described above. B. Specific questions to be addressed by the respiratory therapist are:  1. Does the patient meet entry criteria for lung volume expansion? If yes, refer to the options available in this protocol. 2. For patients receiving therapy according to protocol: is therapy still indicated or can it be discontinued? 3. If the patient does not meet criteria, therapies may be discontinued. 4. For patients receiving therapy according to protocol, is the current therapy the most appropriate approach? If not, change therapy to most appropriate within this policy. 5. The appropriate physician or their designee shall be notified immediately of any change in respiratory status or changes in respiratory related therapies. VIII. Surgery Patients  A. Initiation Criteria  1. Cardiothoracic, Cardiovascular, or post-op shoulder surgery patients may receive this treatment regimen post extubation. 2. Pneumonectomy or lobectomy patients require a written order from the attending surgeon stating that the patient's condition is stable enough to proceed with any positive pressure and/or vibratory treatments. 3. For non-vented patients with a tracheostomy, a cuff leak shall be utilized to enhance secretion mobilization with any vibratory treatment. a. The patient shall be orally suctioned thoroughly before a cuff leak is instituted. b. The therapist shall remain in the patient's room for the entirety of the treatment to monitor airway patency. c. Trach suctioning shall be evaluated Q4 & PRN.  B. Clinical Indicators for lung volume expansion therapy. 1.  CXR documentation of atelectasis or infiltrates. 2.  Documentation must include results of a recent CXR.   3.  Oxygen requirements equal to or greater than 6 L/min.  4. A repeat CXR can be ordered per protocol to determined effectiveness of lung expansion therapy. 5.  Lung volume expansion therapy may be ordered day two post op. 6.  The patient shall be assessed each day on therapy. IX. Discontinuation Criteria  A. >5 Days post-operative thoracotomy, sternotomy, or other major surgery with no additional entry criteria present. B. ? 72 hours post Critical Care discharge and the absence of clinical indicator criteria. C. No active respiratory process indicated on CXR. D.  No other evident factors placing the patient at risk for pulmonary complications. 1. Once a patient meets the discontinuation criteria, their therapies shall be    discontinued. 2. The assessment and orders to discontinue shall be documented in [facility]. 3. Chronic pulmonary patients who are on nebulizer therapy while at home shall continue to receive nebulizer therapy on the same frequency of home regement, regardless of their meeting the discontinuation criteria for Lung Volume Expansionthrough being discharged. X. Monitoring:  A. All patients on the Volume Expansion protocol shall be monitored during all therapies for the followin. Changes in clinical state  2. Intolerance of any or all therapies  3. Dizziness  4. Bradycardia  5. Hemoptysis  6. Tachycardia  7. Pain  8. Nausea and/or vomiting  9. Decreased or absent breath sounds  10. Gastric distention  11. Hypotension  12. Change in mental status  13. Any condition that may adversely affect the patient  B. For floor patients,      1. The therapist shall make their judgment based on the patient's process and the therapist's assessment of the patient using the current respiratory assessment tool. 2. For patients ordered Volume Expansion/Secretion Mobilization therapies on the floors, the treatments shall be started qid. 3. The current respiratory assessment tool will be utilized for increasing/decreasing frequencies. Jaimie Payne. Record all pertinent data in the patient's electronic patient medical record (EMR)  19. FIO2 or liter flow  20. Tolerance of therapy  21. Cough and sputum  22. Respiratory rate and breath sounds  23. Medication and dosage (if ordered)  24.  Patient education

## 2021-07-01 PROBLEM — Z90.2 S/P PNEUMONECTOMY: Status: ACTIVE | Noted: 2021-01-01

## 2021-07-01 NOTE — PROGRESS NOTES
Infectious Disease Progress Note    Today's Date: 2021   Admit Date: 2021    Impression:   · Persistent R pleural effusion: s/p bronchoscopy , s/p thora : largely bloody, WBC 1915 with 49%N, LDH 1368, glucose 7; uncertain if this represents infection vs malignancy vs other? 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 Still having pain in R chest but no other complaints. thora completed, culture pending     Allergies   Allergen Reactions    Albuterol Palpitations    Celebrex [Celecoxib] Itching        Review of Systems:  A comprehensive review of systems was negative except for that written in the History of Present Illness. Objective:     Visit Vitals  /70   Pulse 94   Temp 98.3 °F (36.8 °C)   Resp 18   Ht 5' 10\" (1.778 m)   Wt 80.9 kg (178 lb 5.6 oz)   SpO2 95%   BMI 25.59 kg/m²     Temp (24hrs), Av.2 °F (36.8 °C), Min:97.6 °F (36.4 °C), Max:98.5 °F (36.9 °C)       Lines:  Peripheral IV:     Exam performed  and unchanged except as noted below  Physical Exam:    General:  Alert, cooperative, well noursished, well developed, appears stated age   Eyes:  Sclera anicteric. Pupils equally round and reactive to light.    Mouth/Throat: Mucous membranes normal, oral pharynx clear       Lungs:   R lung diminished, good effort, supplemental O2   CV:  Regular rate and rhythm,no murmur, click, rub or gallop   Abdomen:   Soft, non-tender. bowel sounds normal. non-distended   Extremities: No cyanosis or edema   Skin: Skin color, texture, turgor normal. no acute rash or lesions       Musculoskeletal: No swelling or deformity   Lines/Devices:  Intact, no erythema, drainage or tenderness   Psych: Alert and oriented, normal mood affect given the setting     Data Review:     CBC:  Recent Labs     07/01/21  0354   WBC 7.0   HGB 8.4*   HCT 28.6*          BMP:  No results for input(s): CREA, BUN, NA, K, CL, CO2, AGAP, GLU in the last 72 hours. LFTS:  No results for input(s): TBILI, ALT, AP, TP, ALB in the last 72 hours. No lab exists for component: SGOT    Microbiology:     All Micro Results     Procedure Component Value Units Date/Time    CULTURE, BODY FLUID Marcie Men STAIN [[id number]] Collected: 06/30/21 1419    Order Status: Completed Specimen: Pleural Fluid Updated: 07/01/21 1029     Special Requests: NO SPECIAL REQUESTS        GRAM STAIN 0 TO 1 WBCS/OIF      NO DEFINITE ORGANISM SEEN        Culture result:       NO GROWTH AFTER SHORT PERIOD OF INCUBATION. FURTHER RESULTS TO FOLLOW AFTER OVERNIGHT INCUBATION.           FUNGUS CULTURE AND SMEAR [[id number]] Collected: 06/30/21 1419    Order Status: Completed Updated: 06/30/21 1614    AFB CULTURE + SMEAR W/RFLX ID FROM CULTURE [[id number]] Collected: 06/30/21 1419    Order Status: Completed Updated: 06/30/21 1613    CULTURE, BLOOD [[id number]] Collected: 06/25/21 1516    Order Status: Completed Specimen: Blood Updated: 06/30/21 0723     Special Requests: --        RIGHT  ARM       Culture result: NO GROWTH 5 DAYS       CULTURE, RESPIRATORY/SPUTUM/BRONCH Marcie Men STAIN [[id number]] Collected: 06/25/21 1552    Order Status: Completed Specimen: Sputum Updated: 06/28/21 0803     Special Requests: NO SPECIAL REQUESTS        GRAM STAIN 10 TO 50 WBCS SEEN PER OIF      0 TO 5 EPITHELIAL CELLS SEEN PER OIF      MANY GRAM POSITIVE COCCI         MODERATE YEAST 2+ MUCUS PRESENT        Culture result:       MODERATE NORMAL RESPIRATORY EDU          MSSA/MRSA SC BY PCR, NASAL SWAB [[id number]] Collected: 06/25/21 1923    Order Status: Completed Specimen: Nasal Updated: 06/25/21 2205     Special Requests: NO SPECIAL REQUESTS        Culture result:       SA target not detected. A MRSA NEGATIVE, SA NEGATIVE test result does not preclude MRSA or SA nasal colonization.           MSSA/MRSA SC BY PCR, NASAL SWAB [[id number]] Collected: 06/25/21 1839    Order Status: Canceled Specimen: Nasal swab           Imaging:   Reviewed    Signed By: Agustina Jimenez MD     July 1, 2021

## 2021-07-01 NOTE — PROGRESS NOTES
TRANSFER - OUT REPORT:    Verbal report given to [facility] (name) on [address].  being transferred to [facility] (unit) for routine progression of care       Report consisted of patients Situation, Background, Assessment and   Recommendations(SBAR). Information from the following report(s) SBAR, MAR, Recent Results and Cardiac Rhythm NSR was reviewed with the receiving nurse. Lines:   Venous Access Device Port-a-cath 03/10/20 (Active)   Central Line Being Utilized Yes 07/01/21 0700   Criteria for Appropriate Use Limited/no vessel suitable for conventional peripheral access 07/01/21 0700   Site Assessment Clean, dry, & intact 07/01/21 0700   Date of Last Dressing Change 06/25/21 06/30/21 1915   Dressing Status Clean, dry, & intact 07/01/21 0700   Dressing Type Disk with Chlorhexadine gluconate (CHG); Stabilization/securement device;Transparent 07/01/21 0700   Action Taken Other (comment) 06/30/21 0836   Date Accessed (Medial Site) 06/26/21 06/29/21 1630   Access Time (Medial Site) 1907 06/26/21 1943   Access Needle Size (Site #1) 20 G 06/27/21 1930   Access Needle Length (Medial Site) 0.75 inches 06/27/21 1930   Positive Blood Return (Medial Site) Yes 06/30/21 0836   Action Taken (Medial Site) Flushed 06/30/21 0836   Date Needle Changed (Medial Site) 06/25/21 06/27/21 1930   Alcohol Cap Used No 06/30/21 0836        Opportunity for questions and clarification was provided.       Patient transported with:   O2 @ 2 liters  Registered Nurse

## 2021-07-01 NOTE — INTERDISCIPLINARY ROUNDS
Interdisciplinary team rounds were held 7/1/2021 with the following team members:Care Management, Nursing, Nurse Practitioner, Nutrition, Occupational Therapy, Palliative Care, Pastoral Care, Patient Relations, Pharmacy, Physical Therapy, Physician, [de-identified] Assistant, Respiratory Therapy, Speech Therapy and Clinical Coordinator and the patient. Plan of care discussed. See clinical pathway and/or care plan for interventions and desired outcomes.

## 2021-07-01 NOTE — PROGRESS NOTES
Patient transferred from the unit to room 219. A referral was sent to [facility] 6/29/2021 no response has been noted. CM sent the referral again and messaged the liaison. CM will continue to follow patient during hospitalization for discharge planning and needs. Please consult or notify CM of new needs. PPD was placed on 6/28/2021 and was never read by staff in the unit. Primary RN notified nurse in the unit before patient was transferred to the 2nd floor.

## 2021-07-01 NOTE — PROGRESS NOTES
Bedside and verbal shift change report received from [address] [place] [address] (offgoing nurse). Report included the following information SBAR, ED Summary, Procedure Summary, Intake/Output, MAR, Recent Results, Cardiac Rhythm NSR and Quality Measures. Dual skin assessment completed at bedside: sacral wound (list pertinent skin assessment findings)    Dual verification of gtts completed (name of gtts verified): no gtts infusing.

## 2021-07-01 NOTE — ROUTINE PROCESS
TRANSFER - IN REPORT:    Verbal report received from JORGITO Sweeney(name) on Peggy [place].  being received from ICU(unit) for routine progression of care      Report consisted of patients Situation, Background, Assessment and   Recommendations(SBAR). Information from the following report(s) SBAR, Kardex, Procedure Summary, Intake/Output and MAR was reviewed with the receiving nurse. Opportunity for questions and clarification was provided. Assessment completed upon patients arrival to unit and care assumed.

## 2021-07-01 NOTE — PROGRESS NOTES
[facility] denied patient as they have no availability. CM spoke with patient and friend who gave 3 other choices, [address], [address] and [facility]. CM sent referrals and will await offers.

## 2021-07-01 NOTE — PROGRESS NOTES
H&P/Consult Note/Progress Note/Office Note:   Laurann Saint. MRN: [id number]  :1950  Age:75 y.o.    HPI: Laurann Saint. is a 78 y.o. male who is well known to Dr. Sally Flynn and is s/p pneumonectomy in 2021 who developed a bronchiopleural fistula. Now he is s/p bronchiopleural fistula repair 21. His post-operative course was unremarkable. Prior to surgery he presented with complaints of fever and shortness of breath. He was admitted on 2021 for sepsis r/t right sided empyema. The patient states that over the course of the past few months when he would roll on his left side he would have \"gushing\" fluid from his chest. He presented to the office today for routine follow up and has complaints of a cough. Due to concern for disrupting surgical repair, he will be admitted for further observation and care. 21: Pt awake in bed. Feels like coughing is beginning to improved. 24hr Max Temp 99.2. O2 Sat 96% on 4L o2 via NC.   21: Pt awake in bed. Reports continues to cough. O2 Sat 94% on 4L o2 via NC. AF. NAD.   21 Pt eating breakfast- with complaints of little to no appetite. . Reports cough better- states he is coughing up some mucous. Continues to be SOB with conversation. ID and pulmonary following. Remains AF. CXR reviwed. 21  Seen in PACU after bronchoscopy. Appears comfortable on BiPap. Noted no fistula and plan thoracentesis tomorrow  21- In ICU off Bi Pap today appears to be breathing better. Awaiting thoracentesis. 21 In ICU awaiting floor bed. Cough is better. Breather is better. Awaiting results from thoracentesis.           Past Medical History:   Diagnosis Date    Arrhythmia     Afib     Chronic obstructive pulmonary disease (HCC)     O2 at HS    Chronic pain     right torn rotator cuff; left knee    GERD (gastroesophageal reflux disease)     controlled with med    Hypertension     hx-- off meds since 2020--- followed by dr. Helena Jacques 2020    Malignant neoplasm of lower lobe of right lung ([facility] [address] [address] 2020    REC'D CHEMO AND RADIATION--- FOLLOWED BY DR. FOX    Osteoarthritis     Right lower lobe lung mass 2020    Status post total right knee replacement 2016     Past Surgical History:   Procedure Laterality Date    HX COLONOSCOPY      HX KNEE ARTHROSCOPY Left     scope X 1, ACL recon X 1    HX KNEE ARTHROSCOPY Right 1974, 1975    X 2     HX KNEE REPLACEMENT Right 2016    HX TONSILLECTOMY  1959    HX VASCULAR ACCESS Right placed 3/2020    port in chest     IR INSERT TUNL CVC W PORT OVER 5 YEARS  3/18/2020    [place] CHEST SURGERY PROCEDURE UNLISTED      R lobectomy 2020; R complete pneumonectomy 2021    [place] SINUS SURGERY PROC UNLISTED  ,     sinus surg     Current Facility-Administered Medications   Medication Dose Route Frequency    budesonide (PULMICORT) 500 mcg/2 ml nebulizer suspension  500 mcg Nebulization BID RT    HYDROcodone-homatropine (HYCODAN) 5-1.5 mg/5 mL (5 mL) oral solution 5 mL  5 mL Oral Q4H PRN    [Held by provider] apixaban (ELIQUIS) tablet 5 mg  5 mg Oral Q12H    metoprolol succinate (TOPROL-XL) XL tablet 50 mg  50 mg Oral DAILY    levalbuterol (XOPENEX) nebulizer soln 1.25 mg/3 mL  1.25 mg Nebulization Q4H RT    sodium chloride 3% hypertonic nebulizer soln  4 mL Nebulization BID RT     Albuterol and Celebrex [celecoxib]  Social History     Socioeconomic History    Marital status:      Spouse name: Not on file    Number of children: Not on file    Years of education: Not on file    Highest education level: Not on file   Tobacco Use    Smoking status: Former Smoker     Packs/day: 1.00     Years: 20.00     Pack years: 20.00     Quit date:      Years since quittin.5    Smokeless tobacco: Never Used    Tobacco comment: patient has no desire to resume    Substance and Sexual Activity    Alcohol use:  Yes     Alcohol/week: 4.0 standard drinks     Types: 4 Glasses of wine per week    Drug use: No   Other Topics Concern     Service No    Blood Transfusions No    Caffeine Concern No    Occupational Exposure No    Hobby Hazards No    Sleep Concern No    Stress Concern No    Weight Concern No    Special Diet No    Exercise Yes    Seat Belt Yes   Social History Narrative    . Has long-time girlfriend. Continues to work doing IT support. Social Determinants of Health     Financial Resource Strain:     Difficulty of Paying Living Expenses:    Food Insecurity:     Worried About Running Out of Food in the Last Year:     [address] in the Last Year:    Transportation Needs:     Lack of Transportation (Medical):  Lack of Transportation (Non-Medical):    Physical Activity:     Days of Exercise per Week:     Minutes of Exercise per Session:    Stress:     Feeling of Stress :    Social Connections:     Frequency of Communication with Friends and Family:     Frequency of Social Gatherings with Friends and Family:     Attends Yaz[employer] Services:     Active Member of Clubs or Organizations:     Attends Club or Organization Meetings:     Marital Status:      Social History     Tobacco Use   Smoking Status Former Smoker    Packs/day: 1.00    Years: 20.00    Pack years: 20.00    Quit date:     Years since quittin.5   Smokeless Tobacco Never Used   Tobacco Comment    patient has no desire to resume      Family History   Problem Relation Age of Onset    Cancer Mother         uterine    Arrhythmia Sister         afib     ROS: The patient has no difficulty with chest pain or SOB . No fever or chills. Comprehensive review of systems was otherwise unremarkable except as noted above. Physical Exam:   Visit Vitals  /72   Pulse 92   Temp 97.9 °F (36.6 °C)   Resp 18   Ht 5' 10\" (1.778 m)   Wt 178 lb 5.6 oz (80.9 kg)   SpO2 96%   BMI 25.59 kg/m²       Eyes: Sclera are clear.  EOMs intact  ENMT: no external lesions gross hearing normal; no obvious neck masses, no ear or lip lesions, nares normal  CV: RRR. Normal perfusion  Resp: No JVD. no audible wheezing. GI: soft and non-distended     Musculoskeletal: unremarkable with normal function. No embolic signs or cyanosis.    Neuro:  Oriented; moves all 4; no focal deficits  Psychiatric: normal affect and mood, no memory impairment    Recent vitals (if inpt):  Patient Vitals for the past 24 hrs:   BP Temp Pulse Resp SpO2   07/01/21 1133     96 %   07/01/21 1125 110/72 97.9 °F (36.6 °C) 92 18 95 %   07/01/21 0948 108/70  94  95 %   07/01/21 0931 110/70  69  96 %   07/01/21 0924 99/66  69  93 %   07/01/21 0916 105/69  69 12 97 %   07/01/21 0901 104/70  83 18 95 %   07/01/21 0846 117/71  90 20 93 %   07/01/21 0831 122/80  86 17 93 %   07/01/21 0830 122/80  86     07/01/21 0816 119/78  83 16 95 %   07/01/21 0801 117/75  76 27 95 %   07/01/21 0746 116/75  78 17 96 %   07/01/21 0735     95 %   07/01/21 0731 113/79  80 26 95 %   07/01/21 0716 117/77  73 24 95 %   07/01/21 0701 121/77 98.3 °F (36.8 °C) 81 14 94 %   07/01/21 0700     94 %   07/01/21 0518     97 %   07/01/21 0401 106/71 98.4 °F (36.9 °C) 79 22 94 %   06/30/21 2329     95 %   06/30/21 2301 102/71  79 24 95 %   06/30/21 1916 103/64  75 26 91 %   06/30/21 1915 103/64 98.5 °F (36.9 °C) 75 24 95 %   06/30/21 1846 104/64  74 24 94 %   06/30/21 1831 94/67  78 30 (!) 89 %   06/30/21 1816 98/64  69 29 94 %   06/30/21 1801 101/63  69 28 95 %   06/30/21 1746 (!) 94/59  72 29 92 %   06/30/21 1731 105/67  79 27 94 %   06/30/21 1716 105/65  80 27 94 %   06/30/21 1701 103/64  82 28 94 %   06/30/21 1646 (!) 99/59  90 (!) 37 91 %   06/30/21 1631 (!) 104/57  85 14 94 %   06/30/21 1616 101/65  84 26 95 %   06/30/21 1601 104/65 97.6 °F (36.4 °C) 80 (!) 33 95 %   06/30/21 1550     96 %   06/30/21 1546 105/70  80 28 96 %   06/30/21 1531 103/68  81 29 96 %   06/30/21 1501 98/64  84 (!) 41 94 % 06/30/21 1446 95/64  86 25 94 %   06/30/21 1426 96/63  89 26 92 %   06/30/21 1421 100/62  90 (!) 62 95 %   06/30/21 1416 102/64  91 (!) 55 90 %   06/30/21 1411 104/64  89 (!) 42 94 %   06/30/21 1406 112/72  85 (!) 41 91 %   06/30/21 1401 109/67  86 (!) 41 95 %   06/30/21 1331 109/67  74 25 98 %       Labs:  Recent Labs     07/01/21  0354   WBC 7.0   HGB 8.4*          Lab Results   Component Value Date/Time    WBC 7.0 07/01/2021 03:54 AM    HGB 8.4 (L) 07/01/2021 03:54 AM    PLATELET 787 18/41/4957 03:54 AM    Sodium 139 06/25/2021 03:16 PM    Potassium 3.8 06/25/2021 03:16 PM    Chloride 104 06/25/2021 03:16 PM    CO2 30 06/25/2021 03:16 PM    BUN 13 06/25/2021 03:16 PM    Creatinine 0.88 06/25/2021 03:16 PM    Glucose 97 06/25/2021 03:16 PM    INR 1.9 06/08/2021 10:00 PM    aPTT 51.0 (H) 06/17/2021 05:40 AM    Bilirubin, total 0.7 06/15/2021 10:52 AM    Bilirubin, direct 0.2 06/15/2021 10:52 AM    ALT (SGPT) 8 (L) 06/08/2021 02:50 PM    Alk. phosphatase 142 (H) 06/08/2021 02:50 PM    Troponin-I, Qt. <0.02 (L) 05/05/2020 01:09 AM       I reviewed recent labs and recent radiologic studies. I independently reviewed radiology images for studies I described above or studies I have ordered.    Admission date (for inpatients): 6/25/2021   * No surgery found *  Procedure(s):  ULTRASOUND  THORACENTESIS    ASSESSMENT/PLAN:  Problem List  Date Reviewed: 6/25/2021        Codes Class Noted    Pacemaker ICD-10-CM: Z95.0  ICD-9-CM: V45.01  5/3/2021        S/P pneumonectomy ICD-10-CM: Z90.2  ICD-9-CM: V45.76  7/1/2021        Debility ICD-10-CM: R53.81  ICD-9-CM: 799.3  6/28/2021        History of thoracotomy (Chronic) ICD-10-CM: S83.460  ICD-9-CM: V45.89  6/25/2021        Tracheobronchitis ICD-10-CM: J40  ICD-9-CM: 395  6/25/2021        Moderate protein-calorie malnutrition ([facility] [address] ICD-10-CM: E44.0  ICD-9-CM: 263.0  6/17/2021        Pleural fistula ([facility] [address] ICD-10-CM: J86.0  ICD-9-CM: 510.0  6/14/2021        Atrial flutter ([place] [address] ICD-10-CM: I48.92  ICD-9-CM: 427.32  6/10/2021        Acute hypoxemic respiratory failure [facility]) ICD-10-CM: J96.01  ICD-9-CM: 518.81  6/8/2021        Bronchopleural fistula ([place] [address] ICD-10-CM: J86.0  ICD-9-CM: 510.0  6/8/2021        Empyema ([place] [address] ICD-10-CM: J86.9  ICD-9-CM: 510.9  6/8/2021        Acute on chronic respiratory failure with hypoxemia [facility]) ICD-10-CM: J96.21  ICD-9-CM: 518.84  6/8/2021        COPD exacerbation ([place] [address] ICD-10-CM: J44.1  ICD-9-CM: 491.21  5/16/2021        Heart block AV complete ([place] [address] ICD-10-CM: I44.2  ICD-9-CM: 426.0  5/3/2021        Sick sinus syndrome (HCC) ICD-10-CM: I49.5  ICD-9-CM: 427.81  3/18/2021        PAF (paroxysmal atrial fibrillation) ([place] [address] ICD-10-CM: I48.0  ICD-9-CM: 427.31  2/25/2021        Hypertension ICD-10-CM: I10  ICD-9-CM: 401.9  2/25/2021        Atrial fibrillation with RVR (HCC) ICD-10-CM: I48.91  ICD-9-CM: 427.31  2/25/2021        S/P thoracotomy ICD-10-CM: P52.416  ICD-9-CM: V45.89  1/7/2021        Atelectasis of right lung ICD-10-CM: J98.11  ICD-9-CM: 518.0  1/4/2021        A-fib (HCC) (Chronic) ICD-10-CM: I48.91  ICD-9-CM: 427.31  1/4/2021        Chronic respiratory failure with hypoxia (HCC) (Chronic) ICD-10-CM: J96.11  ICD-9-CM: 518.83, 799.02  1/4/2021        Pleural effusion on right ICD-10-CM: J90  ICD-9-CM: 511.9  12/28/2020        Chemotherapy induced neutropenia ([email or web address] ICD-10-CM: D70.1, T45.1X5A  ICD-9-CM: 288.03, E933.1  10/23/2020        Dehydration ICD-10-CM: E86.0  ICD-9-CM: 276.51  9/15/2020        S/P lobectomy of lung ICD-10-CM: Z90.2  ICD-9-CM: V45.89  7/29/2020        * (Principal) Cough ICD-10-CM: R05  ICD-9-CM: 786.2  7/29/2020        Atrial tachycardia ([email or web address] ICD-10-CM: I47.1  ICD-9-CM: 427.89  7/26/2020        Cancer of bronchus of right lower lobe (HCC) (Chronic) ICD-10-CM: C34.31  ICD-9-CM: 162.5  7/23/2020        Lung cancer ([facility] (Chronic) ICD-10-CM: C34.90  ICD-9-CM: 162.9  7/23/2020        Cancer of right lung ([facility] ICD-10-CM: C34.91  ICD-9-CM: 162.9  7/23/2020        Pulmonary emphysema (HCC) (Chronic) ICD-10-CM: J43.9  ICD-9-CM: 492.8  7/7/2020        GERD (gastroesophageal reflux disease) ICD-10-CM: K21.9  ICD-9-CM: 530.81  Unknown        Hypotension (Chronic) ICD-10-CM: I95.9  ICD-9-CM: 458.9  5/5/2020    Overview Signed 1/11/2021  8:39 AM by Betha Oppenheim, NP     On midodrine             Sepsis due to undetermined organism [facility]) ICD-10-CM: A41.9  ICD-9-CM: 038.9  5/5/2020        Malignant neoplasm of lower lobe of right lung ([address] (Chronic) ICD-10-CM: C34.31  ICD-9-CM: 162.5  2/26/2020    Overview Signed 1/4/2021  7:38 AM by Lisa Lovelace MD     Dec 2020- Echo EF 50%, technically difficult, cannot assess regional wall motion. Aortic root 4.1 cm. No significant valvular disease.   Normal DF             Mass of lower lobe of right lung ICD-10-CM: R91.8  ICD-9-CM: 786.6  2/23/2020        Right lower lobe lung mass ICD-10-CM: R91.8  ICD-9-CM: 786.6  2/23/2020        Essential hypertension with goal blood pressure less than 130/80 (Chronic) ICD-10-CM: I10  ICD-9-CM: 401.9  2/19/2018            Principal Problem:    Cough (7/29/2020)    Active Problems:    Pulmonary emphysema ([address] (7/7/2020)      Cancer of bronchus of right lower lobe ([address] (7/23/2020)      Acute on chronic respiratory failure with hypoxemia (HCC) (6/8/2021)      Moderate protein-calorie malnutrition ([address] (6/17/2021)      History of thoracotomy (6/25/2021)      Tracheobronchitis (6/25/2021)      Debility (6/28/2021)      S/P pneumonectomy (7/1/2021)         Continue current treatment plan  Regular diet/ensure  Suppress cough- Hycodan  PTA meds    Pulmonary following  ID following  Awaiting results of thoracentesis  Referral sent to [facility] 6/29/21- PPD already placed    No surgical needs at this time      Signed:  Nadiya Angel, NP

## 2021-07-01 NOTE — PROGRESS NOTES
Chart reviewed and pt discussed in am IDR. Remains ICU awaiting floor bed. 2L NC, staff attempting to wean. ID following for abx. PT/OT. D/C POC for STR at SNF, with CM [facility] PPD already placed. CM following. LOS 6 days.

## 2021-07-01 NOTE — PROGRESS NOTES
ACUTE PHYSICAL THERAPY GOALS:  (Developed with and agreed upon by patient and/or caregiver. )    LTG:  (1.)Mr. Fabricio Dueñas will move from supine to sit and sit to supine , scoot up and down and roll side to side in flat bed without siderails with  INDEPENDENT within 7 day(s). (2.)Mr. Fabricio Dueñas will perform all functional transfers with  SUPERVISION using the least restrictive/no device within 7 day(s). (3.)Mr. Fabricio Dueñas will ambulate with  STAND BY ASSIST for 250+ feet with normal vital sign response with the least restrictive/no device within 7 day(s). 4.  Mr. Fabricio Dueñas will sit with upright posture on EOB for 10 minutes while performing exercises within 7 days. PHYSICAL THERAPY ASSESSMENT: Initial Assessment and Daily Note PT Treatment Day # 1      Natasha Zamarripa is a 78 y.o. male   PRIMARY DIAGNOSIS: Cough  Cough [R05]  History of thoracotomy [Z98.890]  Procedure(s) (LRB):  ULTRASOUND (Bilateral)  THORACENTESIS (N/A)  1 Day Post-Op  Reason for Referral:     ICD-10: Treatment Diagnosis: Other abnormalities of gait and mobility (R26.89)  INPATIENT: [place] / [place] / Product Type: Medicare /     ASSESSMENT:     REHAB RECOMMENDATIONS:   Recommendation to date pending progress:  [address] [place] [address] vs. Short-term Rehab  Equipment:    To Be Determined     PRIOR LEVEL OF FUNCTION:  (Prior to Hospitalization) INITIAL/CURRENT LEVEL OF FUNCTION:  (Most Recently Demonstrated)   Bed Mobility:   Independent  Sit to Stand:   Independent  Transfers:   Independent  Gait/Mobility:   Independent Bed Mobility:   Contact Guard Assistance  Sit to Stand:   Contact Guard Assistance  Transfers:   Contact Guard Assistance  Gait/Mobility:   Contact Guard Assistance     ASSESSMENT:  Mr. Fabricio Dueñas lives with his girlfriend. He is typically independent with all functional mobility. However, since January he has declined in mobility, has been very sedentary and needing more oxygen. Today patient on 2 L O2. With exercises in bed he desats to 80's so increased O2 to 3L. On 3L, SpO2 remained >90% for remainder of tx. Patient with very flexed posture in sitting which results in sacral sitting-patient reports he has a bedsore. Patient has declined in functional mobility, now needing a RW to ambulate and his activity tolerance is very low. Mr. Yuli Queen would benefit from skilled physical therapy (medically necessary) to address his deficits and maximize his function. .     SUBJECTIVE:   Mr. Yuli Queen states, \"I want to go to rehab. .\"    SOCIAL HISTORY/LIVING ENVIRONMENT: Mr. Yuli Queen lives with his girlfriend. He is typically independent with all functional mobility. However, since January he has declined in mobility, has been very sedentary and needing more oxygen.   Home Environment: Independent living  One/Two Story Residence: One story  Living Alone: No  Support Systems: Family member(s)  OBJECTIVE:     PAIN: VITAL SIGNS: LINES/DRAINS:   Pre Treatment: Pain Screen  Pain Scale 1: Numeric (0 - 10)  Pain Intensity 1: 0  Post Treatment: 0  >90% on 3L ICU monitors  O2 Device: Nasal cannula     GROSS EVALUATION:  B LE's Within Functional Limits Abnormal/ Functional Abnormal/ Non-Functional (see comments) Not Tested Comments:   AROM [x] [] [] []    PROM [] [] [] []    Strength [x] [] [] []    Balance [] [x] [] []    Posture [] [] [x] []    Sensation [] [] [] []    Coordination [] [] [] []    Tone [x] [] [] []    Edema [x] [] [] []    Activity Tolerance [] [] [x] []     [] [] [] []      COGNITION/  PERCEPTION: Intact Impaired   (see comments) Comments:   Orientation [x] []    Vision [x] []    Hearing [x] []    Command Following [x] []    Safety Awareness [x] []     [] []      MOBILITY: I Mod I S SBA CGA Min Mod Max Total  NT x2 Comments:   Bed Mobility    Rolling [] [] [] [] [x] [] [] [] [] [] []    Supine to Sit [] [] [] [] [x] [] [] [] [] [] []    Scooting [] [] [] [] [x] [] [] [] [] [] []    Sit to Supine [] [] [] [] [x] [] [] [] [] [] []    Transfers    Sit to Stand [] [] [] [] [x] [] [] [] [] [] []    Bed to Chair [] [] [] [] [x] [] [] [] [] [] []    Stand to Sit [] [] [] [] [x] [] [] [] [] [] []    I=Independent, Mod I=Modified Independent, S=Supervision, SBA=Standby Assistance, CGA=Contact Guard Assistance,   Min=Minimal Assistance, Mod=Moderate Assistance, Max=Maximal Assistance, Total=Total Assistance, NT=Not Tested  GAIT: I Mod I S SBA CGA Min Mod Max Total  NT x2 Comments:   Level of Assistance [] [] [] [] [x] [] [] [] [] [] []    Distance 16'x2    DME Rolling Walker and Gait Belt    Gait Quality Flexed posture, slow, short steps    Weightbearing Status N/A     I=Independent, Mod I=Modified Independent, S=Supervision, SBA=Standby Assistance, CGA=Contact Guard Assistance,   Min=Minimal Assistance, Mod=Moderate Assistance, Max=Maximal Assistance, Total=Total Assistance, NT=Not Tested    [address] [facility] [address] [address] [facility]       How much difficulty does the patient currently have. .. Unable A Lot A Little None   1. Turning over in bed (including adjusting bedclothes, sheets and blankets)? [] 1   [] 2   [x] 3   [] 4   2. Sitting down on and standing up from a chair with arms ( e.g., wheelchair, bedside commode, etc.)   [] 1   [] 2   [x] 3   [] 4   3. Moving from lying on back to sitting on the side of the bed? [] 1   [] 2   [x] 3   [] 4   How much help from another person does the patient currently need. .. Total A Lot A Little None   4. Moving to and from a bed to a chair (including a wheelchair)? [] 1   [] 2   [x] 3   [] 4   5. Need to walk in hospital room? [] 1   [] 2   [x] 3   [] 4   6. Climbing 3-5 steps with a railing? [] 1   [] 2   [x] 3   [] 4   © 2007, Trustees of [address] [place] [address] [address] [id number], under license to [employer]  All rights reserved     Score:  Initial: 18 Most Recent: X (Date: -- )    Interpretation of Tool:  Represents activities that are increasingly more difficult (i.e. Bed mobility, Transfers, Gait). PLAN:   FREQUENCY/DURATION: PT Plan of Care: 3 times/week for duration of hospital stay or until stated goals are met, whichever comes first.    PROBLEM LIST:   (Skilled intervention is medically necessary to address:)  1. Decreased Activity Tolerance  2. Decreased Balance  3. Decreased Coordination  4. Decreased Gait Ability  5. Decreased Strength  6. Decreased Transfer Abilities   INTERVENTIONS PLANNED:   (Benefits and precautions of physical therapy have been discussed with the patient.)  1. Therapeutic Activity  2. Therapeutic Exercise/HEP  3. Neuromuscular Re-education  4. Gait Training  5. Manual Therapy  6. Education     TREATMENT:     EVALUATION: Moderate Complexity : (Untimed Charge)    TREATMENT:   ($$ Therapeutic Activity: 23-37 mins    )  Therapeutic Activity (25 Minutes): Therapeutic activity included Ambulation on level ground, Sitting balance , Standing balance and sitting posture and exercises to improve functional Mobility, Strength and Activity tolerance.     TREATMENT GRID:      DATE: 7/1/21       Ambulation        Hip Flexion 2x10 AB       Long Arc Quads 2x10 AB       Hip ab/ad        Heel Raises 2x10 AB       Toe Raises 2x10 AB       Heel slides x10 aB                        Key:  A=active, AA=active assisted, P=passive, B=bilaterally, R=right, L=left   DF=dorsiflexion, PF=plantarflexion    AFTER TREATMENT POSITION/PRECAUTIONS:  Bed, Needs within reach and RN notified    INTERDISCIPLINARY COLLABORATION:  MD/PA/NP, RN/PCT and PT/PTA    TOTAL TREATMENT DURATION:  PT Patient Time In/Time Out  Time In: 0914  Time Out: Sylvester Donis, PT, DPT

## 2021-07-02 NOTE — PROGRESS NOTES
H&P/Consult Note/Progress Note/Office Note:   Kaylene Reeves MRN: [id number]  :1950  Age:75 y.o.    HPI: Kaylene Reeves is a 78 y.o. male who is well known to Dr. Ty Vaughan and is s/p pneumonectomy in 2021 who developed a bronchiopleural fistula. Now he is s/p bronchiopleural fistula repair 21. His post-operative course was unremarkable. Prior to surgery he presented with complaints of fever and shortness of breath. He was admitted on 2021 for sepsis r/t right sided empyema. The patient states that over the course of the past few months when he would roll on his left side he would have \"gushing\" fluid from his chest. He presented to the office today for routine follow up and has complaints of a cough. Due to concern for disrupting surgical repair, he will be admitted for further observation and care. 21: Pt awake in bed. Feels like coughing is beginning to improved. 24hr Max Temp 99.2. O2 Sat 96% on 4L o2 via NC.   21: Pt awake in bed. Reports continues to cough. O2 Sat 94% on 4L o2 via NC. AF. NAD.   21 Pt eating breakfast- with complaints of little to no appetite. . Reports cough better- states he is coughing up some mucous. Continues to be SOB with conversation. ID and pulmonary following. Remains AF. CXR reviwed. 21  Seen in PACU after bronchoscopy. Appears comfortable on BiPap. Noted no fistula and plan thoracentesis tomorrow  21- In ICU off Bi Pap today appears to be breathing better. Awaiting thoracentesis. 21 In ICU awaiting floor bed. Cough is better. Breather is better. Awaiting results from thoracentesis. 21Transferred to surgical floor yesterday. Denies sob, feels breathing is better. Productive cough. Awaiting results from thoracentesis.         Past Medical History:   Diagnosis Date    Arrhythmia     Afib     Chronic obstructive pulmonary disease (HCC)     O2 at HS    Chronic pain     right torn rotator cuff; left knee    GERD (gastroesophageal reflux disease)     controlled with med    Hypertension     hx-- off meds since 2020--- followed by dr. [place] Hypoxia 2020    Malignant neoplasm of lower lobe of right lung ([address] 2020    REC'D CHEMO AND RADIATION--- FOLLOWED BY DR. FOX    Osteoarthritis     Right lower lobe lung mass 2020    Status post total right knee replacement 2016     Past Surgical History:   Procedure Laterality Date    HX COLONOSCOPY      HX KNEE ARTHROSCOPY Left     scope X 1, ACL recon X 1    HX KNEE ARTHROSCOPY Right , 1975    X 2     HX KNEE REPLACEMENT Right 2016    HX TONSILLECTOMY  1959    HX VASCULAR ACCESS Right placed 3/2020    port in chest     IR INSERT TUNL CVC W PORT OVER 5 YEARS  3/18/2020    MD CHEST SURGERY PROCEDURE UNLISTED      R lobectomy 2020; R complete pneumonectomy 2021    MD SINUS SURGERY PROC UNLISTED  ,     sinus surg     Current Facility-Administered Medications   Medication Dose Route Frequency    budesonide (PULMICORT) 500 mcg/2 ml nebulizer suspension  500 mcg Nebulization BID RT    HYDROcodone-homatropine (HYCODAN) 5-1.5 mg/5 mL (5 mL) oral solution 5 mL  5 mL Oral Q4H PRN    [Held by provider] apixaban (ELIQUIS) tablet 5 mg  5 mg Oral Q12H    metoprolol succinate (TOPROL-XL) XL tablet 50 mg  50 mg Oral DAILY    levalbuterol (XOPENEX) nebulizer soln 1.25 mg/3 mL  1.25 mg Nebulization Q4H RT    sodium chloride 3% hypertonic nebulizer soln  4 mL Nebulization BID RT     Albuterol and Celebrex [celecoxib]  Social History     Socioeconomic History    Marital status:      Spouse name: Not on file    Number of children: Not on file    Years of education: Not on file    Highest education level: Not on file   Tobacco Use    Smoking status: Former Smoker     Packs/day: 1.00     Years: 20.00     Pack years: 20.00     Quit date:      Years since quittin.5    Smokeless tobacco: Never Used    Tobacco comment: patient has no desire to resume    Substance and Sexual Activity    Alcohol use: Yes     Alcohol/week: 4.0 standard drinks     Types: 4 Glasses of wine per week    Drug use: No   Other Topics Concern     Service No    Blood Transfusions No    Caffeine Concern No    Occupational Exposure No    Hobby Hazards No    Sleep Concern No    Stress Concern No    Weight Concern No    Special Diet No    Exercise Yes    Seat Belt Yes   Social History Narrative    . Has long-time girlfriend. Continues to work doing IT support. Social Determinants of Health     Financial Resource Strain:     Difficulty of Paying Living Expenses:    Food Insecurity:     Worried About Running Out of Food in the Last Year:     [address] in the Last Year:    Transportation Needs:     Lack of Transportation (Medical):  Lack of Transportation (Non-Medical):    Physical Activity:     Days of Exercise per Week:     Minutes of Exercise per Session:    Stress:     Feeling of Stress :    Social Connections:     Frequency of Communication with Friends and Family:     Frequency of Social Gatherings with Friends and Family:     Attends Protestant Services:     Active Member of Clubs or Organizations:     Attends Club or Organization Meetings:     Marital Status:      Social History     Tobacco Use   Smoking Status Former Smoker    Packs/day: 1.00    Years: 20.00    Pack years: 20.00    Quit date:     Years since quittin.5   Smokeless Tobacco Never Used   Tobacco Comment    patient has no desire to resume      Family History   Problem Relation Age of Onset    Cancer Mother         uterine    Arrhythmia Sister         afib     ROS: The patient has no difficulty with chest pain or SOB . No fever or chills. Comprehensive review of systems was otherwise unremarkable except as noted above.     Physical Exam:   Visit Vitals  /75 (BP 1 Location: Left upper arm, BP Patient Position: At rest)   Pulse 80   Temp 98 °F (36.7 °C)   Resp 18   Ht 5' 10\" (1.778 m)   Wt 178 lb 5.6 oz (80.9 kg)   SpO2 97%   BMI 25.59 kg/m²       Eyes: Sclera are clear. EOMs intact  ENMT: no external lesions gross hearing normal; no obvious neck masses, no ear or lip lesions, nares normal  CV: RRR. Normal perfusion  Resp: No JVD. no audible wheezing. GI: soft and non-distended     Musculoskeletal: unremarkable with normal function. No embolic signs or cyanosis. Neuro:  Oriented; moves all 4; no focal deficits  Psychiatric: normal affect and mood, no memory impairment    Recent vitals (if inpt):  Patient Vitals for the past 24 hrs:   BP Temp Pulse Resp SpO2   07/02/21 0753     97 %   07/02/21 0724 110/75 98 °F (36.7 °C) 80 18 96 %   07/02/21 0430 110/73 98.4 °F (36.9 °C) 80 18 98 %   07/02/21 0429     98 %   07/02/21 0110     99 %   07/01/21 2302 104/62 98.3 °F (36.8 °C) 89 18 94 %   07/01/21 1942     99 %   07/01/21 1926 119/69 98.6 °F (37 °C) 94 18 94 %   07/01/21 1550 116/69 98.3 °F (36.8 °C) 81 18 99 %   07/01/21 1535     96 %   07/01/21 1133     96 %   07/01/21 1125 110/72 97.9 °F (36.6 °C) 92 18 95 %       Labs:  Recent Labs     07/01/21  0354   WBC 7.0   HGB 8.4*          Lab Results   Component Value Date/Time    WBC 7.0 07/01/2021 03:54 AM    HGB 8.4 (L) 07/01/2021 03:54 AM    PLATELET 192 09/06/9491 03:54 AM    Sodium 139 06/25/2021 03:16 PM    Potassium 3.8 06/25/2021 03:16 PM    Chloride 104 06/25/2021 03:16 PM    CO2 30 06/25/2021 03:16 PM    BUN 13 06/25/2021 03:16 PM    Creatinine 0.88 06/25/2021 03:16 PM    Glucose 97 06/25/2021 03:16 PM    INR 1.9 06/08/2021 10:00 PM    aPTT 51.0 (H) 06/17/2021 05:40 AM    Bilirubin, total 0.7 06/15/2021 10:52 AM    Bilirubin, direct 0.2 06/15/2021 10:52 AM    ALT (SGPT) 8 (L) 06/08/2021 02:50 PM    Alk.  phosphatase 142 (H) 06/08/2021 02:50 PM    Troponin-I, Qt. <0.02 (L) 05/05/2020 01:09 AM       I reviewed recent labs and recent radiologic studies. I independently reviewed radiology images for studies I described above or studies I have ordered.    Admission date (for inpatients): 6/25/2021   * No surgery found *  Procedure(s):  ULTRASOUND  THORACENTESIS    ASSESSMENT/PLAN:  Problem List  Date Reviewed: 6/25/2021        Codes Class Noted    Pacemaker ICD-10-CM: Z95.0  ICD-9-CM: V45.01  5/3/2021        S/P pneumonectomy ICD-10-CM: Z90.2  ICD-9-CM: V45.76  7/1/2021        Debility ICD-10-CM: R53.81  ICD-9-CM: 799.3  6/28/2021        History of thoracotomy (Chronic) ICD-10-CM: W80.010  ICD-9-CM: V45.89  6/25/2021        Tracheobronchitis ICD-10-CM: J40  ICD-9-CM: 624  6/25/2021        Moderate protein-calorie malnutrition ([facility] ICD-10-CM: E44.0  ICD-9-CM: 263.0  6/17/2021        Pleural fistula ([facility] ICD-10-CM: J86.0  ICD-9-CM: 510.0  6/14/2021        Atrial flutter ([facility] ICD-10-CM: I48.92  ICD-9-CM: 427.32  6/10/2021        Acute hypoxemic respiratory failure ([facility] ICD-10-CM: J96.01  ICD-9-CM: 518.81  6/8/2021        Bronchopleural fistula ([facility] ICD-10-CM: J86.0  ICD-9-CM: 510.0  6/8/2021        Empyema ([facility] ICD-10-CM: J86.9  ICD-9-CM: 510.9  6/8/2021        Acute on chronic respiratory failure with hypoxemia (HCC) ICD-10-CM: J96.21  ICD-9-CM: 518.84  6/8/2021        COPD exacerbation ([facility] ICD-10-CM: J44.1  ICD-9-CM: 491.21  5/16/2021        Heart block AV complete ([facility] ICD-10-CM: I44.2  ICD-9-CM: 426.0  5/3/2021        Sick sinus syndrome ([facility] ICD-10-CM: I49.5  ICD-9-CM: 427.81  3/18/2021        PAF (paroxysmal atrial fibrillation) ([facility]) ICD-10-CM: I48.0  ICD-9-CM: 427.31  2/25/2021        Hypertension ICD-10-CM: I10  ICD-9-CM: 401.9  2/25/2021        Atrial fibrillation with RVR (HCC) ICD-10-CM: I48.91  ICD-9-CM: 427.31  2/25/2021        S/P thoracotomy ICD-10-CM: V48.607  ICD-9-CM: V45.89  1/7/2021        Atelectasis of right lung ICD-10-CM: J98.11  ICD-9-CM: 518.0  1/4/2021        A-fib (HCC) (Chronic) ICD-10-CM: I48.91  ICD-9-CM: 427.31 1/4/2021        Chronic respiratory failure with hypoxia (HCC) (Chronic) ICD-10-CM: J96.11  ICD-9-CM: 518.83, 799.02  1/4/2021        Pleural effusion on right ICD-10-CM: J90  ICD-9-CM: 511.9  12/28/2020        Chemotherapy induced neutropenia (HCC) ICD-10-CM: D70.1, T45.1X5A  ICD-9-CM: 288.03, E933.1  10/23/2020        Dehydration ICD-10-CM: E86.0  ICD-9-CM: 276.51  9/15/2020        S/P lobectomy of lung ICD-10-CM: Z90.2  ICD-9-CM: V45.89  7/29/2020        * (Principal) Cough ICD-10-CM: R05  ICD-9-CM: 786.2  7/29/2020        Atrial tachycardia (HCC) ICD-10-CM: I47.1  ICD-9-CM: 427.89  7/26/2020        Cancer of bronchus of right lower lobe (HCC) (Chronic) ICD-10-CM: C34.31  ICD-9-CM: 162.5  7/23/2020        Lung cancer ([facility] (Chronic) ICD-10-CM: C34.90  ICD-9-CM: 162.9  7/23/2020        Cancer of right lung ([facility] ICD-10-CM: C34.91  ICD-9-CM: 162.9  7/23/2020        Pulmonary emphysema (HCC) (Chronic) ICD-10-CM: J43.9  ICD-9-CM: 492.8  7/7/2020        GERD (gastroesophageal reflux disease) ICD-10-CM: K21.9  ICD-9-CM: 530.81  Unknown        Hypotension (Chronic) ICD-10-CM: I95.9  ICD-9-CM: 458.9  5/5/2020    Overview Signed 1/11/2021  8:39 AM by Jessica Madrigal NP     On midodrine             Sepsis due to undetermined organism [facility]) ICD-10-CM: A41.9  ICD-9-CM: 038.9  5/5/2020        Malignant neoplasm of lower lobe of right lung ([facility] (Chronic) ICD-10-CM: C34.31  ICD-9-CM: 162.5  2/26/2020    Overview Signed 1/4/2021  7:38 AM by Mona Hogue MD     Dec 2020- Echo EF 50%, technically difficult, cannot assess regional wall motion. Aortic root 4.1 cm. No significant valvular disease.   Normal DF             Mass of lower lobe of right lung ICD-10-CM: R91.8  ICD-9-CM: 786.6  2/23/2020        Right lower lobe lung mass ICD-10-CM: R91.8  ICD-9-CM: 786.6  2/23/2020        Essential hypertension with goal blood pressure less than 130/80 (Chronic) ICD-10-CM: I10  ICD-9-CM: 401.9  2/19/2018            Principal Problem:    Cough (7/29/2020)    Active Problems:    Pulmonary emphysema ([facility] (7/7/2020)      Cancer of bronchus of right lower lobe ([facility] (7/23/2020)      Acute on chronic respiratory failure with hypoxemia ([address] (6/8/2021)      Moderate protein-calorie malnutrition ([facility] (6/17/2021)      History of thoracotomy (6/25/2021)      Tracheobronchitis (6/25/2021)      Debility (6/28/2021)      S/P pneumonectomy (7/1/2021)         Continue current treatment plan  Regular diet/ensure  Suppress cough- Hycodan  PTA meds    Pulmonary following  ID following  Awaiting results of thoracentesis  Referral sent to [facility] 6/29/21- PPD already placed    No surgical needs at this time.  Ok to discharge when rehab bed is available      Signed:  Duy East NP

## 2021-07-02 NOTE — PROGRESS NOTES
Infectious Disease Progress Note    Today's Date: 2021   Admit Date: 2021    Impression:   · Persistent R pleural effusion: s/p bronchoscopy , s/p thora : largely bloody, WBC 1915 with 49%N, LDH 3501, glucose 7; uncertain if this represents infection vs malignancy vs other? Cultures NGTD  · S/p R thoracotomy, decortication, bronchopleural fistula repair, intercostal nerve cryoablation, pleurex drain placement, 21; Cx: Streptococcus sanguinis- discharged on 4 week course of Keflex/Flagyl  · RLL squamous cell carcinoma, s/p excision (2020) and chemo (EOT 10/2020) with persistent effusion and R thoracotomy 21, with pneumolysis and decortication  · A fib with AV node ablation and permanent pacer placed 2021    Plan:   · Starting keflex 500 QID and doxy 100 BID today  · Plan for 3-4 weeks of therapy   · Repeat CT chest prior to discontinuing  · ID follow up scheduled for  at 11 with MEHDI De Paz Ards - as unclear if this is truly recurrent infection do not feel strongly that he requires additional therapy after 4 weeks even if residual fluid remains    Thank you for allowing us to participate In the care of this patient, ID will sign off at this time. Please don't hesitate to contact us with further questions or concerns. Anti-infectives:   · IV Zosyn (-)  · Ceftriaxone -  · Flagyl -    Subjective: Interval History:  Resting comfortably today, no complaints. No fevers. Appetite remains poor. Allergies   Allergen Reactions    Albuterol Palpitations    Celebrex [Celecoxib] Itching        Review of Systems:  A comprehensive review of systems was negative except for that written in the History of Present Illness.     Objective:     Visit Vitals  /75 (BP 1 Location: Left upper arm, BP Patient Position: At rest)   Pulse 80   Temp 98 °F (36.7 °C)   Resp 18   Ht 5' 10\" (1.778 m)   Wt 80.9 kg (178 lb 5.6 oz)   SpO2 96%   BMI 25.59 kg/m²     Temp (24hrs), Av.3 °F (36.8 °C), Min:98 °F (36.7 °C), Max:98.6 °F (37 °C)       Lines:  Peripheral IV:     Exam performed 7/2 and unchanged except as noted below  Physical Exam:    General:  Alert, cooperative, well noursished, well developed, appears stated age   Eyes:  Sclera anicteric. Pupils equally round and reactive to light. Mouth/Throat: Mucous membranes normal, oral pharynx clear       Lungs:   R lung diminished, good effort, supplemental O2   CV:  Regular rate and rhythm,no murmur, click, rub or gallop   Abdomen:   Soft, non-tender. bowel sounds normal. non-distended   Extremities: No cyanosis or edema   Skin: Skin color, texture, turgor normal. no acute rash or lesions       Musculoskeletal: No swelling or deformity   Lines/Devices:  Intact, no erythema, drainage or tenderness   Psych: Alert and oriented, normal mood affect given the setting     Data Review:     CBC:  Recent Labs     07/01/21  0354   WBC 7.0   HGB 8.4*   HCT 28.6*          BMP:  No results for input(s): CREA, BUN, NA, K, CL, CO2, AGAP, GLU in the last 72 hours. LFTS:  No results for input(s): TBILI, ALT, AP, TP, ALB in the last 72 hours.     No lab exists for component: SGOT    Microbiology:     All Micro Results     Procedure Component Value Units Date/Time    CULTURE, BODY FLUID Marget Flicker STAIN [[id number]] Collected: 06/30/21 1419    Order Status: Completed Specimen: Pleural Fluid Updated: 07/02/21 0826     Special Requests: NO SPECIAL REQUESTS        GRAM STAIN 0 TO 1 WBCS/OIF      NO DEFINITE ORGANISM SEEN        Culture result: NO GROWTH 1 DAY       FUNGUS CULTURE AND SMEAR [[id number]] Collected: 06/30/21 1419    Order Status: Completed Updated: 06/30/21 1614    AFB CULTURE + SMEAR W/RFLX ID FROM CULTURE [[id number]] Collected: 06/30/21 1419    Order Status: Completed Updated: 06/30/21 1613    CULTURE, BLOOD [[id number]] Collected: 06/25/21 1516    Order Status: Completed Specimen: Blood Updated: 06/30/21 0793     Special Requests: -- RIGHT  ARM       Culture result: NO GROWTH 5 DAYS       CULTURE, RESPIRATORY/SPUTUM/BRONCH Margret Powers [[id number]] Collected: 06/25/21 1552    Order Status: Completed Specimen: Sputum Updated: 06/28/21 0803     Special Requests: NO SPECIAL REQUESTS        GRAM STAIN 10 TO 50 WBCS SEEN PER OIF      0 TO 5 EPITHELIAL CELLS SEEN PER OIF      MANY GRAM POSITIVE COCCI         MODERATE YEAST         2+ MUCUS PRESENT        Culture result:       MODERATE NORMAL RESPIRATORY EDU          MSSA/MRSA SC BY PCR, NASAL SWAB [[id number]] Collected: 06/25/21 1923    Order Status: Completed Specimen: Nasal Updated: 06/25/21 2205     Special Requests: NO SPECIAL REQUESTS        Culture result:       SA target not detected. A MRSA NEGATIVE, SA NEGATIVE test result does not preclude MRSA or SA nasal colonization.           MSSA/MRSA SC BY PCR, NASAL SWAB [[id number]] Collected: 06/25/21 1839    Order Status: Canceled Specimen: Nasal swab           Imaging:   Reviewed    Signed By: Tammy Bernardo MD     July 2, 2021

## 2021-07-02 NOTE — PROGRESS NOTES
Elaine Rodriguez. Admission Date: 6/25/2021             Daily Progress Note: 7/2/2021    The patient's chart is reviewed and the patient is discussed with the staff. Patient is a 78 y.o  male seen at the request of Dr Dominic Wolfe who presented as a direct admit from Dr Amie Torres office for uncontrollable coughing. Pt is known to our office and was seen while hospitalized on 6/15. PMHx includes chronic respiratory failure on 2L O2 qhs, prior tobacco abuse, RLL SCC s/p excision 7/23/2020, afib on Eliquis, HTN, GERD and OA. Pt completed chemo 10/2020. He had repeat chest CT 12/22/2020 with a large R pleural effusion and collapse of remaining RUL. Pt had a R thoracentesis on 12/28/2020 with 900mL bloody fluid removed but unable to tolerate a complete drainage. A chest tube was placed on 1/4/21 with 800cc serosanguineous drainage. General surgery was consulted and underwent R VATS with conversion to open thoracotomy with extensive pneumolysis with decortication 1/6/2021. We were consulted intraoperatively for bronch and were unable to get lung to inflate intraoperatively.  Pt had repeat bronch on 1/7/21 secondary to persistent RUL atelectasis. He was found to have a stump area from prior lobectomy with abnormal appearing tissue that was biopsied and nondiagnostic. Pt had a repeat bronch on 1/12. Pt has had congestion since his pneumonectomy whenever he lies on his R side. He was admitted from the ER On 6/8/21 with shortness of breath, fever, and weakness.  A R chest tube was placed for possible empyema. General surgery was consulted and was taken for right thoracotomy with decortication and bronchopleural fistula repair 6/14/21. ID is following for antimicrobial recommendations. He was discharged home on 6/17 on keflex and flagyl.    Id has been consulted this admission and is following. Currently on Zosyn (6/25- ), cultures pending. Nasal MRSA negative.   Bronched 6/29 with anesthesia; after extubation had increased wheeze and tachypnea requiring BiPAP and transferred to ICU. Diagnostic thoracentesis 6/30. Subjective:     On 2L. Moved to floor. Feels better. Very weak and wants to go to rehab from hospital. Pleural fluid culture NGTD. Current Facility-Administered Medications   Medication Dose Route Frequency    budesonide (PULMICORT) 500 mcg/2 ml nebulizer suspension  500 mcg Nebulization BID RT    HYDROcodone-homatropine (HYCODAN) 5-1.5 mg/5 mL (5 mL) oral solution 5 mL  5 mL Oral Q4H PRN    [Held by provider] apixaban (ELIQUIS) tablet 5 mg  5 mg Oral Q12H    metoprolol succinate (TOPROL-XL) XL tablet 50 mg  50 mg Oral DAILY    levalbuterol (XOPENEX) nebulizer soln 1.25 mg/3 mL  1.25 mg Nebulization Q4H RT    sodium chloride 3% hypertonic nebulizer soln  4 mL Nebulization BID RT     Review of Systems  + SOB, cough  Constitutional: negative for fever, chills, sweats  Cardiovascular: negative for chest pain, palpitations, syncope, edema  Gastrointestinal: negative for dysphagia, reflux, vomiting, diarrhea, abdominal pain, or melena  Neurologic: negative for focal weakness, numbness, headache    Objective:     Vitals:    07/02/21 0429 07/02/21 0430 07/02/21 0724 07/02/21 0753   BP:  110/73 110/75    Pulse:  80 80    Resp:  18 18    Temp:  98.4 °F (36.9 °C) 98 °F (36.7 °C)    SpO2: 98% 98% 96% 97%   Weight:       Height:         Intake and Output:   06/30 1901 - 07/02 0700  In: 730 [P.O.:730]  Out: 1675 [Urine:1675]  No intake/output data recorded. Physical Exam:   Constitution:  the patient is well developed and in no acute distress  EENMT:  Sclera clear, pupils equal, oral mucosa moist  Respiratory: clear on left, on 2L NC  Cardiovascular:  RRR without M,G,R- paced on monitor  Gastrointestinal: soft and non-tender; with positive bowel sounds. Musculoskeletal: warm without cyanosis. There is no lower leg edema.   Skin:  no jaundice or rashes, no wounds; surgical site CDI   Neurologic: no gross neuro deficits     Psychiatric:  alert and oriented x ppt    CHEST XRAY: 6/29      LAB  Recent Results (from the past 24 hour(s))   PLEASE READ & DOCUMENT PPD TEST IN 72 HRS    Collection Time: 07/01/21  3:27 PM   Result Value Ref Range    PPD Negative Negative    mm Induration 0 0 - 5 mm     MICRO  Date Source Result   6/25 blood NG   6/25 sputum NRF     SARS-CoV-2 LAB Results  [facility] Test:   Lab Results   Component Value Date/Time    COV2NT Not Detected 06/30/2020 09:25 PM      [facility] Test: No results found for: EDPR  [facility] Test: No components found for: [id number]     Assessment:  (Medical Decision Making)     Hospital Problems  Date Reviewed: 6/25/2021        Codes Class Noted POA    S/P pneumonectomy ICD-10-CM: Z90.2  ICD-9-CM: V45.76  7/1/2021 Unknown        Debility ICD-10-CM: R53.81  ICD-9-CM: 799.3  6/28/2021 Unknown        History of thoracotomy (Chronic) ICD-10-CM: O34.704  ICD-9-CM: V45.89  6/25/2021 Yes        Tracheobronchitis ICD-10-CM: J40  ICD-9-CM: 756  6/25/2021 Unknown        Moderate protein-calorie malnutrition ([facility] ICD-10-CM: E44.0  ICD-9-CM: 263.0  6/17/2021 Yes        Acute on chronic respiratory failure with hypoxemia ([facility] ICD-10-CM: J96.21  ICD-9-CM: 518.84  6/8/2021 Yes        * (Principal) Cough ICD-10-CM: R05  ICD-9-CM: 786.2  7/29/2020 Yes        Cancer of bronchus of right lower lobe (HCC) (Chronic) ICD-10-CM: C34.31  ICD-9-CM: 162.5  7/23/2020 Yes        Pulmonary emphysema (HCC) (Chronic) ICD-10-CM: J43.9  ICD-9-CM: 492.8  7/7/2020 Yes            Patient with ongoing rhonchi, poor clearance of audible secretions. Plan:  (Medical Decision Making)   -- Wean O2 as tolerated, currently on 2L NC  -- Continue nebs, encourage CPT or at least postural drainage for airway clearance  -- follow cultures, thoracentesis- exudative-NGTD  -- ABX with cef and flagyl- ID following  -- PT/OT  -- needs rehab    Will see again Monday, call with questions over the weekend.      Sulema Schlatter, MD

## 2021-07-02 NOTE — PROGRESS NOTES
CM met with patient / girl friend Pratik Pierce) upon there request,  Adrianna Sung was requesting information regarding the referrals that has been made to facilities. All referrals remain pending at this time. CM will continue to follow.

## 2021-07-02 NOTE — PROGRESS NOTES
Critical Care Outreach Nurse Progress Report:    Subjective: In to assess pt secondary to transfer from ICU. MEWS Score: 1 (07/02/21 0724)    Vitals:    07/02/21 1122 07/02/21 1135 07/02/21 1516 07/02/21 1521   BP: 107/73   109/78   Pulse: 78   87   Resp: 20   18   Temp: 98.1 °F (36.7 °C)   98.3 °F (36.8 °C)   SpO2: 96% 96% 96% 95%   Weight:       Height:              Objective: Pt asleep. Assessment: Pt asleep but easily awakens to voice and is A&O x4. Pt voices no complaints and appears to be in NAD at this time. O2 sat 96% on 2L NC. Pt denies any pain. Plan: Will continue to follow per outreach program protocol. Gita Grace RN.

## 2021-07-02 NOTE — PROGRESS NOTES
Problem: Airway Clearance - Ineffective  Goal: *Patent airway  Outcome: Progressing Towards Goal  Goal: *Absence of airway secretions  Outcome: Progressing Towards Goal  Goal: *Able to cough effectively  Outcome: Progressing Towards Goal  Goal: *PALLIATIVE CARE:  Alleviation of secretions, cough and/or nasal congestion  Outcome: Progressing Towards Goal     Problem: Patient Education: Go to Patient Education Activity  Goal: Patient/Family Education  Outcome: Progressing Towards Goal     Problem: Falls - Risk of  Goal: *Absence of Falls  Description: Document Belen Fall Risk and appropriate interventions in the flowsheet. Outcome: Progressing Towards Goal  Note: Fall Risk Interventions:  Mobility Interventions: Communicate number of staff needed for ambulation/transfer, Patient to call before getting OOB         Medication Interventions: Patient to call before getting OOB, Teach patient to arise slowly    Elimination Interventions: Call light in reach, Patient to call for help with toileting needs              Problem: Patient Education: Go to Patient Education Activity  Goal: Patient/Family Education  Outcome: Progressing Towards Goal     Problem: Pressure Injury - Risk of  Goal: *Prevention of pressure injury  Description: Document Sidney Scale and appropriate interventions in the flowsheet. Outcome: Progressing Towards Goal  Note: Pressure Injury Interventions:  Sensory Interventions: Assess changes in LOC    Moisture Interventions: Apply protective barrier, creams and emollients    Activity Interventions: Increase time out of bed, Pressure redistribution bed/mattress(bed type)    Mobility Interventions: Float heels, Pressure redistribution bed/mattress (bed type), Turn and reposition approx.  every two hours(pillow and wedges)    Nutrition Interventions: Document food/fluid/supplement intake, Offer support with meals,snacks and hydration    Friction and Shear Interventions: Foam dressings/transparent film/skin sealants, Apply protective barrier, creams and emollients                Problem: Patient Education: Go to Patient Education Activity  Goal: Patient/Family Education  Outcome: Progressing Towards Goal     Problem: Patient Education: Go to Patient Education Activity  Goal: Patient/Family Education  Outcome: Progressing Towards Goal

## 2021-07-02 NOTE — PROGRESS NOTES
Spiritual Care Visit, initial visit. Visited with patient at bedside. Prayed for patient's healing and health. Visit by Lisa White, Staff .  Mar, Libia., B.A.

## 2021-07-02 NOTE — PROGRESS NOTES
END OF SHIFT NOTE:    INTAKE/OUTPUT  07/01 0701 - 07/02 0700  In: 730 [P.O.:730]  Out: 1050 [Urine:1050]  Voiding: YES  Catheter: NO  Drain:              Flatus: Patient does have flatus present. Stool:  0 occurrences. Characteristics:       Emesis: 0 occurrences. Characteristics:        VITAL SIGNS  Patient Vitals for the past 12 hrs:   Temp Pulse Resp BP SpO2   07/02/21 1521 98.3 °F (36.8 °C) 87 18 109/78 95 %   07/02/21 1516     96 %   07/02/21 1135     96 %   07/02/21 1122 98.1 °F (36.7 °C) 78 20 107/73 96 %   07/02/21 0753     97 %   07/02/21 0724 98 °F (36.7 °C) 80 18 110/75 96 %       Pain Assessment  Pain Intensity 1: 0 (07/02/21 0750)  Pain Location 1: Incisional  Pain Intervention(s) 1: Repositioned (will ask MD for additional meds; pt does not want narcotics)  Patient Stated Pain Goal: 0    Ambulating  No    Shift report given to oncoming nurse at the bedside.     Ne Nguyen

## 2021-07-02 NOTE — PROGRESS NOTES
[facility] 81 CRITICAL CARE OUTREACH NURSE PROGRESS REPORT      SUBJECTIVE: Called to assess patient secondary to transfer out of ICU. MEWS Score: 1 (07/01/21 2302)  Vitals:    07/01/21 2302 07/02/21 0110 07/02/21 0429 07/02/21 0430   BP: 104/62   110/73   Pulse: 89   80   Resp: 18   18   Temp: 98.3 °F (36.8 °C)   98.4 °F (36.9 °C)   SpO2: 94% 99% 98% 98%   Weight:       Height:          EKG: normal EKG, normal sinus rhythm, unchanged from previous tracings. LAB DATA:    No results for input(s): NA, K, CL, CO2, AGAP, GLU, BUN, CREA, GFRAA, GFRNA, CA, MG, PHOS, ALB, TBIL, TP, ALB, GLOB, AGRAT, ALT in the last 72 hours. No lab exists for component: GPT     Recent Labs     07/01/21  0354   WBC 7.0   HGB 8.4*   HCT 28.6*             OBJECTIVE: On arrival to room, I found patient to be resting quietly. Pain Assessment  Pain Intensity 1: 5 (07/01/21 2305)  Pain Location 1: Incisional  Pain Intervention(s) 1: Repositioned (will ask MD for additional meds; pt does not want narcotics)  Patient Stated Pain Goal: 0                                 ASSESSMENT:  Pt A&O. On 2L NC. R absent breath sounds due to previous lobectomy, L lung sounds clear. No complaints from pt overnight, Primary RN had no concerns. PLAN:  Will follow per outreach protocol.

## 2021-07-02 NOTE — PROGRESS NOTES
Spiritual Care Visit, initial visit. Visited with patient at bedside. Prayed for patient's healing and health. Visit by Sydnie Mesa, Staff .  Drea., Kelechi.B., B.A.

## 2021-07-03 NOTE — PROGRESS NOTES
[facility] 81 CRITICAL CARE OUTREACH NURSE PROGRESS REPORT      SUBJECTIVE: Called to assess patient secondary to recent transfer from ICU. MEWS Score: 1 (07/03/21 3711)  Vitals:    07/03/21 5500 07/03/21 0343 07/03/21 0710 07/03/21 0721   BP: 115/80   111/79   Pulse: 81   82   Resp: 18   19   Temp: 98.2 °F (36.8 °C)   97.8 °F (36.6 °C)   SpO2: 97% 95% 98% 100%   Weight: 81.1 kg (178 lb 14.4 oz)      Height:            LAB DATA:    No results for input(s): NA, K, CL, CO2, AGAP, GLU, BUN, CREA, GFRAA, GFRNA, CA, MG, PHOS, ALB, TBIL, TP, ALB, GLOB, AGRAT, ALT in the last 72 hours. No lab exists for component: GPT     Recent Labs     07/01/21  0354   WBC 7.0   HGB 8.4*   HCT 28.6*             OBJECTIVE: On arrival to room, I found patient to be in bed eating breakfast.      Pain Assessment  Pain Intensity 1: 0 (07/03/21 0750)  Pain Location 1: Incisional  Pain Intervention(s) 1: Repositioned (will ask MD for additional meds; pt does not want narcotics)  Patient Stated Pain Goal: 0                                 ASSESSMENT:  Patient is alert and oriented. 2L NC present, denies pain or sob. Patient states he is ready to be discharged and just waiting for a rehab bed. No needs expressed at this time. PLAN:  Patient is now discharged from the outreach program - please call with any concerns.

## 2021-07-03 NOTE — PROGRESS NOTES
Problem: Airway Clearance - Ineffective  Goal: *Patent airway  Outcome: Progressing Towards Goal  Goal: *Absence of airway secretions  Outcome: Progressing Towards Goal  Goal: *Able to cough effectively  Outcome: Progressing Towards Goal  Goal: *PALLIATIVE CARE:  Alleviation of secretions, cough and/or nasal congestion  Outcome: Progressing Towards Goal     Problem: Patient Education: Go to Patient Education Activity  Goal: Patient/Family Education  Outcome: Progressing Towards Goal     Problem: Falls - Risk of  Goal: *Absence of Falls  Description: Document Belen Fall Risk and appropriate interventions in the flowsheet. Outcome: Progressing Towards Goal  Note: Fall Risk Interventions:  Mobility Interventions: Communicate number of staff needed for ambulation/transfer, Patient to call before getting OOB         Medication Interventions: Teach patient to arise slowly, Patient to call before getting OOB    Elimination Interventions: Call light in reach, Patient to call for help with toileting needs              Problem: Patient Education: Go to Patient Education Activity  Goal: Patient/Family Education  Outcome: Progressing Towards Goal     Problem: Pressure Injury - Risk of  Goal: *Prevention of pressure injury  Description: Document Sidney Scale and appropriate interventions in the flowsheet. Outcome: Progressing Towards Goal  Note: Pressure Injury Interventions:  Sensory Interventions: Avoid rigorous massage over bony prominences, Float heels, Keep linens dry and wrinkle-free, Monitor skin under medical devices, Pad between skin to skin, Pressure redistribution bed/mattress (bed type), Turn and reposition approx.  every two hours (pillows and wedges if needed)    Moisture Interventions: Absorbent underpads, Apply protective barrier, creams and emollients    Activity Interventions: Increase time out of bed, Pressure redistribution bed/mattress(bed type)    Mobility Interventions: Float heels, Pressure redistribution bed/mattress (bed type), Turn and reposition approx.  every two hours(pillow and wedges)    Nutrition Interventions: Document food/fluid/supplement intake, Offer support with meals,snacks and hydration    Friction and Shear Interventions: Apply protective barrier, creams and emollients, Foam dressings/transparent film/skin sealants                Problem: Patient Education: Go to Patient Education Activity  Goal: Patient/Family Education  Outcome: Progressing Towards Goal     Problem: Patient Education: Go to Patient Education Activity  Goal: Patient/Family Education  Outcome: Progressing Towards Goal

## 2021-07-03 NOTE — PROGRESS NOTES
H&P/Consult Note/Progress Note/Office Note:   Kristen Gonsales MRN: [id number]  :1950  Age:75 y.o.    HPI: Kristen Gonsales is a 78 y.o. male who is well known to Dr. 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 Now he is s/p bronchiopleural fistula repair 21. His post-operative course was unremarkable. Prior to surgery he presented with complaints of fever and shortness of breath. He was admitted on 2021 for sepsis r/t right sided empyema. The patient states that over the course of the past few months when he would roll on his left side he would have \"gushing\" fluid from his chest. He presented to the office today for routine follow up and has complaints of a cough. Due to concern for disrupting surgical repair, he will be admitted for further observation and care. 21: Pt awake in bed. Feels like coughing is beginning to improved. 24hr Max Temp 99.2. O2 Sat 96% on 4L o2 via NC.   21: Pt awake in bed. Reports continues to cough. O2 Sat 94% on 4L o2 via NC. AF. NAD.   21 Pt eating breakfast- with complaints of little to no appetite. . Reports cough better- states he is coughing up some mucous. Continues to be SOB with conversation. ID and pulmonary following. Remains AF. CXR reviwed. 21  Seen in PACU after bronchoscopy. Appears comfortable on BiPap. Noted no fistula and plan thoracentesis tomorrow  21- In ICU off Bi Pap today appears to be breathing better. Awaiting thoracentesis. 21 In ICU awaiting floor bed. Cough is better. Breather is better. Awaiting results from thoracentesis. 21Transferred to surgical floor yesterday. Denies sob, feels breathing is better. Productive cough. Awaiting results from thoracentesis. 21 Awake in bed, No new issues. Denies sob, feels breathing is better. Productive cough. Awaiting results from thoracentesis. Currently on 2L O2 via NC.        Past Medical History:   Diagnosis Date  Arrhythmia     Afib     Chronic obstructive pulmonary disease (HCC)     O2 at HS    Chronic pain     right torn rotator cuff; left knee    GERD (gastroesophageal reflux disease)     controlled with med    Hypertension     hx-- off meds since 4/2020--- followed by dr. Haylee Tadeo Hypoxia 02/24/2020    Malignant neoplasm of lower lobe of right lung ([facility] [address] 02/26/2020    REC'D CHEMO AND RADIATION--- FOLLOWED BY DR. FOX    Osteoarthritis     Right lower lobe lung mass 02/24/2020    Status post total right knee replacement 2/29/2016     Past Surgical History:   Procedure Laterality Date    HX COLONOSCOPY      HX KNEE ARTHROSCOPY Left     scope X 1, ACL recon X 1    HX KNEE ARTHROSCOPY Right 1974, 1975    X 2     HX KNEE REPLACEMENT Right 2016    HX TONSILLECTOMY  1959    HX VASCULAR ACCESS Right placed 3/2020    port in chest     IR INSERT TUNL CVC W PORT OVER 5 YEARS  3/18/2020    PA CHEST SURGERY PROCEDURE UNLISTED      R lobectomy 6/2020; R complete pneumonectomy 1/2021    PA SINUS SURGERY PROC UNLISTED  1988, 1991    sinus surg     Current Facility-Administered Medications   Medication Dose Route Frequency    cephALEXin (KEFLEX) capsule 500 mg  500 mg Oral QID    doxycycline (VIBRAMYCIN) capsule 100 mg  100 mg Oral Q12H    budesonide (PULMICORT) 500 mcg/2 ml nebulizer suspension  500 mcg Nebulization BID RT    HYDROcodone-homatropine (HYCODAN) 5-1.5 mg/5 mL (5 mL) oral solution 5 mL  5 mL Oral Q4H PRN    [Held by provider] apixaban (ELIQUIS) tablet 5 mg  5 mg Oral Q12H    metoprolol succinate (TOPROL-XL) XL tablet 50 mg  50 mg Oral DAILY    levalbuterol (XOPENEX) nebulizer soln 1.25 mg/3 mL  1.25 mg Nebulization Q4H RT    sodium chloride 3% hypertonic nebulizer soln  4 mL Nebulization BID RT     Albuterol, Celebrex [celecoxib], and Ragley  Social History     Socioeconomic History    Marital status:      Spouse name: Not on file    Number of children: Not on file    Years of education: Not on file    Highest education level: Not on file   Tobacco Use    Smoking status: Former Smoker     Packs/day: 1.00     Years: 20.00     Pack years: 20.00     Quit date:      Years since quittin.5    Smokeless tobacco: Never Used    Tobacco comment: patient has no desire to resume    Substance and Sexual Activity    Alcohol use: Yes     Alcohol/week: 4.0 standard drinks     Types: 4 Glasses of wine per week    Drug use: No   Other Topics Concern     Service No    Blood Transfusions No    Caffeine Concern No    Occupational Exposure No    Hobby Hazards No    Sleep Concern No    Stress Concern No    Weight Concern No    Special Diet No    Exercise Yes    Seat Belt Yes   Social History Narrative    . Has long-time girlfriend. Continues to work doing IT support. Social Determinants of Health     Financial Resource Strain:     Difficulty of Paying Living Expenses:    Food Insecurity:     Worried About Running Out of Food in the Last Year:     [address] in the Last Year:    Transportation Needs:     Lack of Transportation (Medical):      Lack of Transportation (Non-Medical):    Physical Activity:     Days of Exercise per Week:     Minutes of Exercise per Session:    Stress:     Feeling of Stress :    Social Connections:     Frequency of Communication with Friends and Family:     Frequency of Social Gatherings with Friends and Family:     Attends Nondenominational Services:     Active Member of Clubs or Organizations:     Attends Club or Organization Meetings:     Marital Status:      Social History     Tobacco Use   Smoking Status Former Smoker    Packs/day: 1.00    Years: 20.00    Pack years: 20.00    Quit date:     Years since quittin.5   Smokeless Tobacco Never Used   Tobacco Comment    patient has no desire to resume      Family History   Problem Relation Age of Onset    Cancer Mother         uterine    Arrhythmia Sister afib     ROS: The patient has no difficulty with chest pain or SOB . No fever or chills. Comprehensive review of systems was otherwise unremarkable except as noted above. Physical Exam:   Visit Vitals  /79   Pulse 82   Temp 97.8 °F (36.6 °C)   Resp 19   Ht 5' 10\" (1.778 m)   Wt 178 lb 14.4 oz (81.1 kg)   SpO2 100%   BMI 25.67 kg/m²       Eyes: Sclera are clear. EOMs intact  ENMT: no external lesions gross hearing normal; no obvious neck masses, no ear or lip lesions, nares normal  CV: RRR. Normal perfusion  Resp: No JVD. no audible wheezing. GI: soft and non-distended     Musculoskeletal: unremarkable with normal function. No embolic signs or cyanosis.    Neuro:  Oriented; moves all 4; no focal deficits  Psychiatric: normal affect and mood, no memory impairment    Recent vitals (if inpt):  Patient Vitals for the past 24 hrs:   BP Temp Pulse Resp SpO2 Weight   07/03/21 0721 111/79 97.8 °F (36.6 °C) 82 19 100 %    07/03/21 0710     98 %    07/03/21 0343     95 %    07/03/21 0318 115/80 98.2 °F (36.8 °C) 81 18 97 % 178 lb 14.4 oz (81.1 kg)   07/03/21 0005     96 %    07/02/21 2338 109/72 98.1 °F (36.7 °C) 83 18 95 %    07/02/21 2126     95 %    07/02/21 2122     95 %    07/02/21 1950 109/72 98.3 °F (36.8 °C) 84 18 97 %    07/02/21 1521 109/78 98.3 °F (36.8 °C) 87 18 95 %    07/02/21 1516     96 %    07/02/21 1135     96 %    07/02/21 1122 107/73 98.1 °F (36.7 °C) 78 20 96 %        Labs:  Recent Labs     07/01/21  0354   WBC 7.0   HGB 8.4*          Lab Results   Component Value Date/Time    WBC 7.0 07/01/2021 03:54 AM    HGB 8.4 (L) 07/01/2021 03:54 AM    PLATELET 371 79/22/8061 03:54 AM    Sodium 139 06/25/2021 03:16 PM    Potassium 3.8 06/25/2021 03:16 PM    Chloride 104 06/25/2021 03:16 PM    CO2 30 06/25/2021 03:16 PM    BUN 13 06/25/2021 03:16 PM    Creatinine 0.88 06/25/2021 03:16 PM    Glucose 97 06/25/2021 03:16 PM    INR 1.9 06/08/2021 10:00 PM aPTT 51.0 (H) 06/17/2021 05:40 AM    Bilirubin, total 0.7 06/15/2021 10:52 AM    Bilirubin, direct 0.2 06/15/2021 10:52 AM    ALT (SGPT) 8 (L) 06/08/2021 02:50 PM    Alk. phosphatase 142 (H) 06/08/2021 02:50 PM    Troponin-I, Qt. <0.02 (L) 05/05/2020 01:09 AM       I reviewed recent labs and recent radiologic studies. I independently reviewed radiology images for studies I described above or studies I have ordered.    Admission date (for inpatients): 6/25/2021   * No surgery found *  Procedure(s):  ULTRASOUND  THORACENTESIS    ASSESSMENT/PLAN:  Problem List  Date Reviewed: 6/25/2021        Codes Class Noted    Pacemaker ICD-10-CM: Z95.0  ICD-9-CM: V45.01  5/3/2021        S/P pneumonectomy ICD-10-CM: Z90.2  ICD-9-CM: V45.76  7/1/2021        Debility ICD-10-CM: R53.81  ICD-9-CM: 799.3  6/28/2021        History of thoracotomy (Chronic) ICD-10-CM: Y62.332  ICD-9-CM: V45.89  6/25/2021        Tracheobronchitis ICD-10-CM: J40  ICD-9-CM: 824  6/25/2021        Moderate protein-calorie malnutrition ([facility] [address] ICD-10-CM: E44.0  ICD-9-CM: 263.0  6/17/2021        Pleural fistula ([facility] [address] ICD-10-CM: J86.0  ICD-9-CM: 510.0  6/14/2021        Atrial flutter ([facility] [address] ICD-10-CM: I48.92  ICD-9-CM: 427.32  6/10/2021        Acute hypoxemic respiratory failure ([address] ICD-10-CM: J96.01  ICD-9-CM: 518.81  6/8/2021        Bronchopleural fistula ([facility] [address] ICD-10-CM: J86.0  ICD-9-CM: 510.0  6/8/2021        Empyema ([facility] ICD-10-CM: J86.9  ICD-9-CM: 510.9  6/8/2021        Acute on chronic respiratory failure with hypoxemia (HCC) ICD-10-CM: J96.21  ICD-9-CM: 518.84  6/8/2021        COPD exacerbation ([facility] ICD-10-CM: J44.1  ICD-9-CM: 491.21  5/16/2021        Heart block AV complete ([facility] ICD-10-CM: I44.2  ICD-9-CM: 426.0  5/3/2021        Sick sinus syndrome (HCC) ICD-10-CM: I49.5  ICD-9-CM: 427.81  3/18/2021        PAF (paroxysmal atrial fibrillation) ([facility] ICD-10-CM: I48.0  ICD-9-CM: 427.31  2/25/2021        Hypertension ICD-10-CM: I10  ICD-9-CM: 401.9 2/25/2021        Atrial fibrillation with RVR (HCC) ICD-10-CM: I48.91  ICD-9-CM: 427.31  2/25/2021        S/P thoracotomy ICD-10-CM: Z98.890  ICD-9-CM: V45.89  1/7/2021        Atelectasis of right lung ICD-10-CM: J98.11  ICD-9-CM: 518.0  1/4/2021        A-fib (HCC) (Chronic) ICD-10-CM: I48.91  ICD-9-CM: 427.31  1/4/2021        Chronic respiratory failure with hypoxia (HCC) (Chronic) ICD-10-CM: J96.11  ICD-9-CM: 518.83, 799.02  1/4/2021        Pleural effusion on right ICD-10-CM: J90  ICD-9-CM: 511.9  12/28/2020        Chemotherapy induced neutropenia ([facility] ICD-10-CM: D70.1, T45.1X5A  ICD-9-CM: 288.03, E933.1  10/23/2020        Dehydration ICD-10-CM: E86.0  ICD-9-CM: 276.51  9/15/2020        S/P lobectomy of lung ICD-10-CM: Z90.2  ICD-9-CM: V45.89  7/29/2020        * (Principal) Cough ICD-10-CM: R05  ICD-9-CM: 786.2  7/29/2020        Atrial tachycardia ([facility] ICD-10-CM: I47.1  ICD-9-CM: 427.89  7/26/2020        Cancer of bronchus of right lower lobe (HCC) (Chronic) ICD-10-CM: C34.31  ICD-9-CM: 162.5  7/23/2020        Lung cancer ([facility] (Chronic) ICD-10-CM: C34.90  ICD-9-CM: 162.9  7/23/2020        Cancer of right lung ([facility] ICD-10-CM: C34.91  ICD-9-CM: 162.9  7/23/2020        Pulmonary emphysema (HCC) (Chronic) ICD-10-CM: J43.9  ICD-9-CM: 492.8  7/7/2020        GERD (gastroesophageal reflux disease) ICD-10-CM: K21.9  ICD-9-CM: 530.81  Unknown        Hypotension (Chronic) ICD-10-CM: I95.9  ICD-9-CM: 458.9  5/5/2020    Overview Signed 1/11/2021  8:39 AM by Tyson Arthur NP     On midodrine             Sepsis due to undetermined organism [facility]) ICD-10-CM: A41.9  ICD-9-CM: 038.9  5/5/2020        Malignant neoplasm of lower lobe of right lung ([address] (Chronic) ICD-10-CM: C34.31  ICD-9-CM: 162.5  2/26/2020    Overview Signed 1/4/2021  7:38 AM by Adri Soriano MD     Dec 2020- Echo EF 50%, technically difficult, cannot assess regional wall motion. Aortic root 4.1 cm. No significant valvular disease.   Normal DF Mass of lower lobe of right lung ICD-10-CM: R91.8  ICD-9-CM: 786.6  2/23/2020        Right lower lobe lung mass ICD-10-CM: R91.8  ICD-9-CM: 786.6  2/23/2020        Essential hypertension with goal blood pressure less than 130/80 (Chronic) ICD-10-CM: I10  ICD-9-CM: 401.9  2/19/2018            Principal Problem:    Cough (7/29/2020)    Active Problems:    Pulmonary emphysema ([address] (7/7/2020)      Cancer of bronchus of right lower lobe ([address] (7/23/2020)      Acute on chronic respiratory failure with hypoxemia (HCC) (6/8/2021)      Moderate protein-calorie malnutrition ([address] (6/17/2021)      History of thoracotomy (6/25/2021)      Tracheobronchitis (6/25/2021)      Debility (6/28/2021)      S/P pneumonectomy (7/1/2021)         Continue current treatment plan  Regular diet/ensure  Suppress cough- Hycodan  PTA meds    Pulmonary following  ID following  Awaiting results of thoracentesis  Referral sent to [facility] 6/29/21- PPD already placed    No surgical needs at this time.  Ok to discharge when rehab bed is available      Signed:  Pawan Rojas NP

## 2021-07-03 NOTE — PROGRESS NOTES
END OF SHIFT NOTE:    INTAKE/OUTPUT  07/02 0701 - 07/03 0700  In: -   Out: 575 [Urine:575]  Voiding: YES  Catheter: NO  Drain:              Flatus: Patient does have flatus present. Stool:  0 occurrences. Characteristics:       Emesis: 0 occurrences. Characteristics:        VITAL SIGNS  Patient Vitals for the past 12 hrs:   Temp Pulse Resp BP SpO2   07/03/21 0343     95 %   07/03/21 0318 98.2 °F (36.8 °C) 81 18 115/80 97 %   07/03/21 0005     96 %   07/02/21 2338 98.1 °F (36.7 °C) 83 18 109/72 95 %   07/02/21 2126     95 %   07/02/21 2122     95 %   07/02/21 1950 98.3 °F (36.8 °C) 84 18 109/72 97 %       Pain Assessment  Pain Intensity 1: 0 (07/02/21 2048)  Pain Location 1: Incisional  Pain Intervention(s) 1: Repositioned (will ask MD for additional meds; pt does not want narcotics)  Patient Stated Pain Goal: 0    Ambulating  No    Shift report given to oncoming nurse at the bedside.     Warner Flood, RN

## 2021-07-03 NOTE — PROGRESS NOTES
END OF SHIFT NOTE:    INTAKE/OUTPUT  07/02 0701 - 07/03 0700  In: -   Out: 575 [Urine:575]  Voiding: YES  Catheter: NO  Drain:              Flatus: Patient does have flatus present. Stool:  0 occurrences. Characteristics:       Emesis: 0 occurrences. Characteristics:        VITAL SIGNS  Patient Vitals for the past 12 hrs:   Temp Pulse Resp BP SpO2   07/03/21 1604     95 %   07/03/21 1519 98 °F (36.7 °C) 76 17 109/74 96 %   07/03/21 1155     96 %   07/03/21 1114 97.3 °F (36.3 °C) 91 18 114/80 97 %   07/03/21 0721 97.8 °F (36.6 °C) 82 19 111/79 100 %   07/03/21 0710     98 %       Pain Assessment  Pain Intensity 1: 0 (07/03/21 0750)  Pain Location 1: Incisional  Pain Intervention(s) 1: Repositioned (will ask MD for additional meds; pt does not want narcotics)  Patient Stated Pain Goal: 0    Ambulating  Yes    Shift report given to oncoming nurse at the bedside.     German Kahn

## 2021-07-03 NOTE — PROGRESS NOTES
Date of Outreach Update:    Previous Outreach assessment has been reviewed. There have been no significant clinical changes since the completion of the last dated Outreach assessment. Will continue to follow up per outreach protocol.     Signed By:   Raymond Awad RN    July 3, 2021 1:35 AM

## 2021-07-04 NOTE — PROGRESS NOTES
Problem: Falls - Risk of  Goal: *Absence of Falls  Description: Document Murrpj Peaks Fall Risk and appropriate interventions in the flowsheet. Outcome: Progressing Towards Goal  Note: Fall Risk Interventions:  Mobility Interventions: Communicate number of staff needed for ambulation/transfer         Medication Interventions: Teach patient to arise slowly    Elimination Interventions: Call light in reach              Problem: Patient Education: Go to Patient Education Activity  Goal: Patient/Family Education  Outcome: Progressing Towards Goal     Problem: Patient Education: Go to Patient Education Activity  Goal: Patient/Family Education  Outcome: Progressing Towards Goal     Problem: Pressure Injury - Risk of  Goal: *Prevention of pressure injury  Description: Document Sidney Scale and appropriate interventions in the flowsheet.   Note: Pressure Injury Interventions:  Sensory Interventions: Assess changes in LOC, Avoid rigorous massage over bony prominences    Moisture Interventions: Absorbent underpads, Minimize layers    Activity Interventions: Increase time out of bed    Mobility Interventions: Float heels, PT/OT evaluation    Nutrition Interventions: Document food/fluid/supplement intake    Friction and Shear Interventions: Apply protective barrier, creams and emollients

## 2021-07-04 NOTE — PROGRESS NOTES
H&P/Consult Note/Progress Note/Office Note:   Yolette Yee MRN: [id number]  :1950  Age:75 y.o.    HPI: Yolette Yee is a 78 y.o. male who is well known to Dr. 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 Now he is s/p bronchiopleural fistula repair 21. His post-operative course was unremarkable. Prior to surgery he presented with complaints of fever and shortness of breath. He was admitted on 2021 for sepsis r/t right sided empyema. The patient states that over the course of the past few months when he would roll on his left side he would have \"gushing\" fluid from his chest. He presented to the office today for routine follow up and has complaints of a cough. Due to concern for disrupting surgical repair, he will be admitted for further observation and care. 21: Pt awake in bed. Feels like coughing is beginning to improved. 24hr Max Temp 99.2. O2 Sat 96% on 4L o2 via NC.   21: Pt awake in bed. Reports continues to cough. O2 Sat 94% on 4L o2 via NC. AF. NAD.   21 Pt eating breakfast- with complaints of little to no appetite. . Reports cough better- states he is coughing up some mucous. Continues to be SOB with conversation. ID and pulmonary following. Remains AF. CXR reviwed. 21  Seen in PACU after bronchoscopy. Appears comfortable on BiPap. Noted no fistula and plan thoracentesis tomorrow  21- In ICU off Bi Pap today appears to be breathing better. Awaiting thoracentesis. 21 In ICU awaiting floor bed. Cough is better. Breather is better. Awaiting results from thoracentesis. 21Transferred to surgical floor yesterday. Denies sob, feels breathing is better. Productive cough. Awaiting results from thoracentesis. 21 Awake in bed, No new issues. Denies sob, feels breathing is better. Productive cough. Awaiting results from thoracentesis. Currently on 2L O2 via NC.   21 Awake in bed, No new issues. Denies sob. Productive cough. Awaiting results from thoracentesis. Currently on 3L O2 via NC. Past Medical History:   Diagnosis Date    Arrhythmia     Afib     Chronic obstructive pulmonary disease (HCC)     O2 at HS    Chronic pain     right torn rotator cuff; left knee    GERD (gastroesophageal reflux disease)     controlled with med    Hypertension     hx-- off meds since 4/2020--- followed by dr. Pacheco Stephens Hypoxia 02/24/2020    Malignant neoplasm of lower lobe of right lung ([address] 02/26/2020    REC'D CHEMO AND RADIATION--- FOLLOWED BY DR. FOX    Osteoarthritis     Right lower lobe lung mass 02/24/2020    Status post total right knee replacement 2/29/2016     Past Surgical History:   Procedure Laterality Date    HX COLONOSCOPY      HX KNEE ARTHROSCOPY Left     scope X 1, ACL recon X 1    HX KNEE ARTHROSCOPY Right 1974, 1975    X 2     HX KNEE REPLACEMENT Right 2016    HX TONSILLECTOMY  1959    HX VASCULAR ACCESS Right placed 3/2020    port in chest     IR INSERT TUNL CVC W PORT OVER 5 YEARS  3/18/2020    PA CHEST SURGERY PROCEDURE UNLISTED      R lobectomy 6/2020; R complete pneumonectomy 1/2021    PA SINUS SURGERY PROC UNLISTED  1988, 1991    sinus surg     Current Facility-Administered Medications   Medication Dose Route Frequency    cephALEXin (KEFLEX) capsule 500 mg  500 mg Oral QID    doxycycline (VIBRAMYCIN) capsule 100 mg  100 mg Oral Q12H    budesonide (PULMICORT) 500 mcg/2 ml nebulizer suspension  500 mcg Nebulization BID RT    HYDROcodone-homatropine (HYCODAN) 5-1.5 mg/5 mL (5 mL) oral solution 5 mL  5 mL Oral Q4H PRN    apixaban (ELIQUIS) tablet 5 mg  5 mg Oral Q12H    metoprolol succinate (TOPROL-XL) XL tablet 50 mg  50 mg Oral DAILY    levalbuterol (XOPENEX) nebulizer soln 1.25 mg/3 mL  1.25 mg Nebulization Q4H RT    sodium chloride 3% hypertonic nebulizer soln  4 mL Nebulization BID RT     Albuterol, Celebrex [celecoxib], and [place]  Social History     Socioeconomic History    Marital status:      Spouse name: Not on file    Number of children: Not on file    Years of education: Not on file    Highest education level: Not on file   Tobacco Use    Smoking status: Former Smoker     Packs/day: 1.00     Years: 20.00     Pack years: 20.00     Quit date:      Years since quittin.5    Smokeless tobacco: Never Used    Tobacco comment: patient has no desire to resume    Substance and Sexual Activity    Alcohol use: Yes     Alcohol/week: 4.0 standard drinks     Types: 4 Glasses of wine per week    Drug use: No   Other Topics Concern     Service No    Blood Transfusions No    Caffeine Concern No    Occupational Exposure No    Hobby Hazards No    Sleep Concern No    Stress Concern No    Weight Concern No    Special Diet No    Exercise Yes    Seat Belt Yes   Social History Narrative    . Has long-time girlfriend. Continues to work doing IT support. Social Determinants of Health     Financial Resource Strain:     Difficulty of Paying Living Expenses:    Food Insecurity:     Worried About Running Out of Food in the Last Year:     [address] in the Last Year:    Transportation Needs:     Lack of Transportation (Medical):      Lack of Transportation (Non-Medical):    Physical Activity:     Days of Exercise per Week:     Minutes of Exercise per Session:    Stress:     Feeling of Stress :    Social Connections:     Frequency of Communication with Friends and Family:     Frequency of Social Gatherings with Friends and Family:     Attends [employer] Services:     Active Member of Clubs or Organizations:     Attends Club or Organization Meetings:     Marital Status:      Social History     Tobacco Use   Smoking Status Former Smoker    Packs/day: 1.00    Years: 20.00    Pack years: 20.00    Quit date:     Years since quittin.5   Smokeless Tobacco Never Used   Tobacco Comment    patient has no desire to resume Family History   Problem Relation Age of Onset    Cancer Mother         uterine    Arrhythmia Sister         afib     ROS: The patient has no difficulty with chest pain or SOB . No fever or chills. Comprehensive review of systems was otherwise unremarkable except as noted above. Physical Exam:   Visit Vitals  /76   Pulse 84   Temp 98.3 °F (36.8 °C)   Resp 20   Ht 5' 10\" (1.778 m)   Wt 176 lb 12.8 oz (80.2 kg)   SpO2 98%   BMI 25.37 kg/m²       Eyes: Sclera are clear. EOMs intact  ENMT: no external lesions gross hearing normal; no obvious neck masses, no ear or lip lesions, nares normal  CV: RRR. Normal perfusion  Resp: No JVD. no audible wheezing. GI: soft and non-distended     Musculoskeletal: unremarkable with normal function. No embolic signs or cyanosis. Neuro:  Oriented; moves all 4; no focal deficits  Psychiatric: normal affect and mood, no memory impairment    Recent vitals (if inpt):  Patient Vitals for the past 24 hrs:   BP Temp Pulse Resp SpO2 Weight   07/04/21 0818     98 %    07/04/21 0729 115/76 98.3 °F (36.8 °C) 84 20 97 %    07/04/21 0444     99 %    07/04/21 0346 108/70 98.6 °F (37 °C) 79 16 97 % 176 lb 12.8 oz (80.2 kg)   07/03/21 2348     94 %    07/03/21 2301 117/79 98.2 °F (36.8 °C) 78 16     07/03/21 2125     97 %    07/03/21 1849 117/79 98.2 °F (36.8 °C) 78 16 98 %    07/03/21 1604     95 %    07/03/21 1519 109/74 98 °F (36.7 °C) 76 17 96 %        Labs:  No results for input(s): WBC, HGB, PLT, NA, K, CL, CO2, BUN, CREA, GLU, PTP, INR, APTT, TBIL, TBILI, CBIL, ALT, AP, AML, LPSE, LCAD, NH4, TROPT, TROIQ, PCO2, PO2, HCO3, HGBEXT, PLTEXT, INREXT, HGBEXT, PLTEXT, INREXT in the last 72 hours.     No lab exists for component: SGOT, GPT,  PH    Lab Results   Component Value Date/Time    WBC 7.0 07/01/2021 03:54 AM    HGB 8.4 (L) 07/01/2021 03:54 AM    PLATELET 944 20/14/6062 03:54 AM    Sodium 139 06/25/2021 03:16 PM    Potassium 3.8 06/25/2021 03:16 PM    Chloride 104 06/25/2021 03:16 PM    CO2 30 06/25/2021 03:16 PM    BUN 13 06/25/2021 03:16 PM    Creatinine 0.88 06/25/2021 03:16 PM    Glucose 97 06/25/2021 03:16 PM    INR 1.9 06/08/2021 10:00 PM    aPTT 51.0 (H) 06/17/2021 05:40 AM    Bilirubin, total 0.7 06/15/2021 10:52 AM    Bilirubin, direct 0.2 06/15/2021 10:52 AM    ALT (SGPT) 8 (L) 06/08/2021 02:50 PM    Alk. phosphatase 142 (H) 06/08/2021 02:50 PM    Troponin-I, Qt. <0.02 (L) 05/05/2020 01:09 AM       I reviewed recent labs and recent radiologic studies. I independently reviewed radiology images for studies I described above or studies I have ordered.    Admission date (for inpatients): 6/25/2021   * No surgery found *  Procedure(s):  ULTRASOUND  THORACENTESIS    ASSESSMENT/PLAN:  Problem List  Date Reviewed: 6/25/2021        Codes Class Noted    Pacemaker ICD-10-CM: Z95.0  ICD-9-CM: V45.01  5/3/2021        S/P pneumonectomy ICD-10-CM: Z90.2  ICD-9-CM: V45.76  7/1/2021        Debility ICD-10-CM: R53.81  ICD-9-CM: 799.3  6/28/2021        History of thoracotomy (Chronic) ICD-10-CM: Y35.524  ICD-9-CM: V45.89  6/25/2021        Tracheobronchitis ICD-10-CM: J40  ICD-9-CM: 777  6/25/2021        Moderate protein-calorie malnutrition ([facility] ICD-10-CM: E44.0  ICD-9-CM: 263.0  6/17/2021        Pleural fistula ([facility] ICD-10-CM: J86.0  ICD-9-CM: 510.0  6/14/2021        Atrial flutter ([facility] ICD-10-CM: I48.92  ICD-9-CM: 427.32  6/10/2021        Acute hypoxemic respiratory failure ([facility] ICD-10-CM: J96.01  ICD-9-CM: 518.81  6/8/2021        Bronchopleural fistula ([facility] ICD-10-CM: J86.0  ICD-9-CM: 510.0  6/8/2021        Empyema ([facility] ICD-10-CM: J86.9  ICD-9-CM: 510.9  6/8/2021        Acute on chronic respiratory failure with hypoxemia (HCC) ICD-10-CM: J96.21  ICD-9-CM: 518.84  6/8/2021        COPD exacerbation ([facility] ICD-10-CM: J44.1  ICD-9-CM: 491.21  5/16/2021        Heart block AV complete [facility] ICD-10-CM: I44.2  ICD-9-CM: 426.0  5/3/2021        Sick sinus syndrome [facility]) ICD-10-CM: I49.5  ICD-9-CM: 427.81  3/18/2021        PAF (paroxysmal atrial fibrillation) ([facility] ICD-10-CM: I48.0  ICD-9-CM: 427.31  2/25/2021        Hypertension ICD-10-CM: I10  ICD-9-CM: 401.9  2/25/2021        Atrial fibrillation with RVR (HCC) ICD-10-CM: I48.91  ICD-9-CM: 427.31  2/25/2021        S/P thoracotomy ICD-10-CM: R01.184  ICD-9-CM: V45.89  1/7/2021        Atelectasis of right lung ICD-10-CM: J98.11  ICD-9-CM: 518.0  1/4/2021        A-fib (HCC) (Chronic) ICD-10-CM: I48.91  ICD-9-CM: 427.31  1/4/2021        Chronic respiratory failure with hypoxia (HCC) (Chronic) ICD-10-CM: J96.11  ICD-9-CM: 518.83, 799.02  1/4/2021        Pleural effusion on right ICD-10-CM: J90  ICD-9-CM: 511.9  12/28/2020        Chemotherapy induced neutropenia ([facility] ICD-10-CM: D70.1, T45.1X5A  ICD-9-CM: 288.03, E933.1  10/23/2020        Dehydration ICD-10-CM: E86.0  ICD-9-CM: 276.51  9/15/2020        S/P lobectomy of lung ICD-10-CM: Z90.2  ICD-9-CM: V45.89  7/29/2020        * (Principal) Cough ICD-10-CM: R05  ICD-9-CM: 786.2  7/29/2020        Atrial tachycardia ([facility] ICD-10-CM: I47.1  ICD-9-CM: 427.89  7/26/2020        Cancer of bronchus of right lower lobe (HCC) (Chronic) ICD-10-CM: C34.31  ICD-9-CM: 162.5  7/23/2020        Lung cancer (HCC) (Chronic) ICD-10-CM: C34.90  ICD-9-CM: 162.9  7/23/2020        Cancer of right lung ([facility] ICD-10-CM: C34.91  ICD-9-CM: 162.9  7/23/2020        Pulmonary emphysema (HCC) (Chronic) ICD-10-CM: J43.9  ICD-9-CM: 492.8  7/7/2020        GERD (gastroesophageal reflux disease) ICD-10-CM: K21.9  ICD-9-CM: 530.81  Unknown        Hypotension (Chronic) ICD-10-CM: I95.9  ICD-9-CM: 458.9  5/5/2020    Overview Signed 1/11/2021  8:39 AM by Rosy Vaca NP     On midodrine             Sepsis due to undetermined organism [facility]) ICD-10-CM: A41.9  ICD-9-CM: 038.9  5/5/2020        Malignant neoplasm of lower lobe of right lung ([facility] (Chronic) ICD-10-CM: C34.31  ICD-9-CM: 162.5  2/26/2020    Overview Signed 1/4/2021  7:38 AM by Catherin Sicard, MD     Dec 2020- Echo EF 50%, technically difficult, cannot assess regional wall motion. Aortic root 4.1 cm. No significant valvular disease. Normal DF             Mass of lower lobe of right lung ICD-10-CM: R91.8  ICD-9-CM: 786.6  2/23/2020        Right lower lobe lung mass ICD-10-CM: R91.8  ICD-9-CM: 786.6  2/23/2020        Essential hypertension with goal blood pressure less than 130/80 (Chronic) ICD-10-CM: I10  ICD-9-CM: 401.9  2/19/2018            Principal Problem:    Cough (7/29/2020)    Active Problems:    Pulmonary emphysema ([facility] (7/7/2020)      Cancer of bronchus of right lower lobe ([facility] (7/23/2020)      Acute on chronic respiratory failure with hypoxemia (HCC) (6/8/2021)      Moderate protein-calorie malnutrition ([facility] (6/17/2021)      History of thoracotomy (6/25/2021)      Tracheobronchitis (6/25/2021)      Debility (6/28/2021)      S/P pneumonectomy (7/1/2021)         Continue current treatment plan  Regular diet/ensure  Suppress cough- Hycodan  PTA meds    Pulmonary following  ID following  Awaiting results of thoracentesis  Referral sent to [facility] 6/29/21- PPD already placed    No surgical needs at this time.  Ok to discharge when rehab bed is available      Signed:  Anil Byrnes NP

## 2021-07-04 NOTE — PROGRESS NOTES
H&P/Consult Note/Progress Note/Office Note:   April Mercado MRN: [id number]  :1950  Age:75 y.o.    HPI: April Mercado is a 78 y.o. male who is well known to Dr. Josh Aburto and is s/p pneumonectomy in 2021 who developed a bronchiopleural fistula. Now he is s/p bronchiopleural fistula repair 21. His post-operative course was unremarkable. Prior to surgery he presented with complaints of fever and shortness of breath. He was admitted on 2021 for sepsis r/t right sided empyema. The patient states that over the course of the past few months when he would roll on his left side he would have \"gushing\" fluid from his chest. He presented to the office today for routine follow up and has complaints of a cough. Due to concern for disrupting surgical repair, he will be admitted for further observation and care. 21: Pt awake in bed. Feels like coughing is beginning to improved. 24hr Max Temp 99.2. O2 Sat 96% on 4L o2 via NC.   21: Pt awake in bed. Reports continues to cough. O2 Sat 94% on 4L o2 via NC. AF. NAD.   21 Pt eating breakfast- with complaints of little to no appetite. . Reports cough better- states he is coughing up some mucous. Continues to be SOB with conversation. ID and pulmonary following. Remains AF. CXR reviwed. 21  Seen in PACU after bronchoscopy. Appears comfortable on BiPap. Noted no fistula and plan thoracentesis tomorrow  21- In ICU off Bi Pap today appears to be breathing better. Awaiting thoracentesis. 21 In ICU awaiting floor bed. Cough is better. Breather is better. Awaiting results from thoracentesis. 21Transferred to surgical floor yesterday. Denies sob, feels breathing is better. Productive cough. Awaiting results from thoracentesis. 21 Awake in bed, No new issues. Denies sob, feels breathing is better. Productive cough. Awaiting results from thoracentesis. Currently on 2L O2 via NC.        Past Medical History:   Diagnosis Date  Arrhythmia     Afib     Chronic obstructive pulmonary disease (HCC)     O2 at HS    Chronic pain     right torn rotator cuff; left knee    GERD (gastroesophageal reflux disease)     controlled with med    Hypertension     hx-- off meds since 4/2020--- followed by dr. Pacheco Stephens Hypoxia 02/24/2020    Malignant neoplasm of lower lobe of right lung ([facility] [address] 02/26/2020    REC'D CHEMO AND RADIATION--- FOLLOWED BY DR. FOX    Osteoarthritis     Right lower lobe lung mass 02/24/2020    Status post total right knee replacement 2/29/2016     Past Surgical History:   Procedure Laterality Date    HX COLONOSCOPY      HX KNEE ARTHROSCOPY Left     scope X 1, ACL recon X 1    HX KNEE ARTHROSCOPY Right 1974, 1975    X 2     HX KNEE REPLACEMENT Right 2016    HX TONSILLECTOMY  1959    HX VASCULAR ACCESS Right placed 3/2020    port in chest     IR INSERT TUNL CVC W PORT OVER 5 YEARS  3/18/2020    MI CHEST SURGERY PROCEDURE UNLISTED      R lobectomy 6/2020; R complete pneumonectomy 1/2021    MI SINUS SURGERY PROC UNLISTED  1988, 1991    sinus surg     Current Facility-Administered Medications   Medication Dose Route Frequency    cephALEXin (KEFLEX) capsule 500 mg  500 mg Oral QID    doxycycline (VIBRAMYCIN) capsule 100 mg  100 mg Oral Q12H    budesonide (PULMICORT) 500 mcg/2 ml nebulizer suspension  500 mcg Nebulization BID RT    HYDROcodone-homatropine (HYCODAN) 5-1.5 mg/5 mL (5 mL) oral solution 5 mL  5 mL Oral Q4H PRN    apixaban (ELIQUIS) tablet 5 mg  5 mg Oral Q12H    metoprolol succinate (TOPROL-XL) XL tablet 50 mg  50 mg Oral DAILY    levalbuterol (XOPENEX) nebulizer soln 1.25 mg/3 mL  1.25 mg Nebulization Q4H RT    sodium chloride 3% hypertonic nebulizer soln  4 mL Nebulization BID RT     Albuterol, Celebrex [celecoxib], and Drayton  Social History     Socioeconomic History    Marital status:      Spouse name: Not on file    Number of children: Not on file    Years of education: Not on file  Highest education level: Not on file   Tobacco Use    Smoking status: Former Smoker     Packs/day: 1.00     Years: 20.00     Pack years: 20.00     Quit date:      Years since quittin.5    Smokeless tobacco: Never Used    Tobacco comment: patient has no desire to resume    Substance and Sexual Activity    Alcohol use: Yes     Alcohol/week: 4.0 standard drinks     Types: 4 Glasses of wine per week    Drug use: No   Other Topics Concern     Service No    Blood Transfusions No    Caffeine Concern No    Occupational Exposure No    Hobby Hazards No    Sleep Concern No    Stress Concern No    Weight Concern No    Special Diet No    Exercise Yes    Seat Belt Yes   Social History Narrative    . Has long-time girlfriend. Continues to work doing IT support. Social Determinants of Health     Financial Resource Strain:     Difficulty of Paying Living Expenses:    Food Insecurity:     Worried About Running Out of Food in the Last Year:     [address] in the Last Year:    Transportation Needs:     Lack of Transportation (Medical):      Lack of Transportation (Non-Medical):    Physical Activity:     Days of Exercise per Week:     Minutes of Exercise per Session:    Stress:     Feeling of Stress :    Social Connections:     Frequency of Communication with Friends and Family:     Frequency of Social Gatherings with Friends and Family:     Attends [employer] Services:     Active Member of Clubs or Organizations:     Attends Club or Organization Meetings:     Marital Status:      Social History     Tobacco Use   Smoking Status Former Smoker    Packs/day: 1.00    Years: 20.00    Pack years: 20.00    Quit date:     Years since quittin.5   Smokeless Tobacco Never Used   Tobacco Comment    patient has no desire to resume      Family History   Problem Relation Age of Onset    Cancer Mother         uterine    Arrhythmia Sister         afib     ROS: The patient has no difficulty with chest pain or SOB . No fever or chills. Comprehensive review of systems was otherwise unremarkable except as noted above. Physical Exam:   Visit Vitals  /76   Pulse 84   Temp 98.3 °F (36.8 °C)   Resp 20   Ht 5' 10\" (1.778 m)   Wt 176 lb 12.8 oz (80.2 kg)   SpO2 98%   BMI 25.37 kg/m²       Eyes: Sclera are clear. EOMs intact  ENMT: no external lesions gross hearing normal; no obvious neck masses, no ear or lip lesions, nares normal  CV: RRR. Normal perfusion  Resp: No JVD. no audible wheezing. GI: soft and non-distended     Musculoskeletal: unremarkable with normal function. No embolic signs or cyanosis. Neuro:  Oriented; moves all 4; no focal deficits  Psychiatric: normal affect and mood, no memory impairment    Recent vitals (if inpt):  Patient Vitals for the past 24 hrs:   BP Temp Pulse Resp SpO2 Weight   07/04/21 0818     98 %    07/04/21 0729 115/76 98.3 °F (36.8 °C) 84 20 97 %    07/04/21 0444     99 %    07/04/21 0346 108/70 98.6 °F (37 °C) 79 16 97 % 176 lb 12.8 oz (80.2 kg)   07/03/21 2348     94 %    07/03/21 2301 117/79 98.2 °F (36.8 °C) 78 16     07/03/21 2125     97 %    07/03/21 1849 117/79 98.2 °F (36.8 °C) 78 16 98 %    07/03/21 1604     95 %    07/03/21 1519 109/74 98 °F (36.7 °C) 76 17 96 %        Labs:  No results for input(s): WBC, HGB, PLT, NA, K, CL, CO2, BUN, CREA, GLU, PTP, INR, APTT, TBIL, TBILI, CBIL, ALT, AP, AML, LPSE, LCAD, NH4, TROPT, TROIQ, PCO2, PO2, HCO3, HGBEXT, PLTEXT, INREXT, HGBEXT, PLTEXT, INREXT in the last 72 hours.     No lab exists for component: SGOT, GPT,  PH    Lab Results   Component Value Date/Time    WBC 7.0 07/01/2021 03:54 AM    HGB 8.4 (L) 07/01/2021 03:54 AM    PLATELET 017 43/61/9443 03:54 AM    Sodium 139 06/25/2021 03:16 PM    Potassium 3.8 06/25/2021 03:16 PM    Chloride 104 06/25/2021 03:16 PM    CO2 30 06/25/2021 03:16 PM    BUN 13 06/25/2021 03:16 PM    Creatinine 0.88 06/25/2021 03:16 PM Glucose 97 06/25/2021 03:16 PM    INR 1.9 06/08/2021 10:00 PM    aPTT 51.0 (H) 06/17/2021 05:40 AM    Bilirubin, total 0.7 06/15/2021 10:52 AM    Bilirubin, direct 0.2 06/15/2021 10:52 AM    ALT (SGPT) 8 (L) 06/08/2021 02:50 PM    Alk. phosphatase 142 (H) 06/08/2021 02:50 PM    Troponin-I, Qt. <0.02 (L) 05/05/2020 01:09 AM       I reviewed recent labs and recent radiologic studies. I independently reviewed radiology images for studies I described above or studies I have ordered.    Admission date (for inpatients): 6/25/2021   * No surgery found *  Procedure(s):  ULTRASOUND  THORACENTESIS    ASSESSMENT/PLAN:  Problem List  Date Reviewed: 6/25/2021        Codes Class Noted    Pacemaker ICD-10-CM: Z95.0  ICD-9-CM: V45.01  5/3/2021        S/P pneumonectomy ICD-10-CM: Z90.2  ICD-9-CM: V45.76  7/1/2021        Debility ICD-10-CM: R53.81  ICD-9-CM: 799.3  6/28/2021        History of thoracotomy (Chronic) ICD-10-CM: F34.014  ICD-9-CM: V45.89  6/25/2021        Tracheobronchitis ICD-10-CM: J40  ICD-9-CM: 322  6/25/2021        Moderate protein-calorie malnutrition ([address] ICD-10-CM: E44.0  ICD-9-CM: 263.0  6/17/2021        Pleural fistula ([facility] [address] ICD-10-CM: J86.0  ICD-9-CM: 510.0  6/14/2021        Atrial flutter ([address] ICD-10-CM: I48.92  ICD-9-CM: 427.32  6/10/2021        Acute hypoxemic respiratory failure ([address] ICD-10-CM: J96.01  ICD-9-CM: 518.81  6/8/2021        Bronchopleural fistula ([address] ICD-10-CM: J86.0  ICD-9-CM: 510.0  6/8/2021        Empyema ([facility] ICD-10-CM: J86.9  ICD-9-CM: 510.9  6/8/2021        Acute on chronic respiratory failure with hypoxemia (HCC) ICD-10-CM: J96.21  ICD-9-CM: 518.84  6/8/2021        COPD exacerbation ([facility] ICD-10-CM: J44.1  ICD-9-CM: 491.21  5/16/2021        Heart block AV complete ([facility] ICD-10-CM: I44.2  ICD-9-CM: 426.0  5/3/2021        Sick sinus syndrome (HCC) ICD-10-CM: I49.5  ICD-9-CM: 427.81  3/18/2021        PAF (paroxysmal atrial fibrillation) (HCC) ICD-10-CM: I48.0  ICD-9-CM: 427.31 2/25/2021        Hypertension ICD-10-CM: I10  ICD-9-CM: 401.9  2/25/2021        Atrial fibrillation with RVR (HCC) ICD-10-CM: I48.91  ICD-9-CM: 427.31  2/25/2021        S/P thoracotomy ICD-10-CM: N54.853  ICD-9-CM: V45.89  1/7/2021        Atelectasis of right lung ICD-10-CM: J98.11  ICD-9-CM: 518.0  1/4/2021        A-fib (HCC) (Chronic) ICD-10-CM: I48.91  ICD-9-CM: 427.31  1/4/2021        Chronic respiratory failure with hypoxia (HCC) (Chronic) ICD-10-CM: J96.11  ICD-9-CM: 518.83, 799.02  1/4/2021        Pleural effusion on right ICD-10-CM: J90  ICD-9-CM: 511.9  12/28/2020        Chemotherapy induced neutropenia ([facility] ICD-10-CM: D70.1, T45.1X5A  ICD-9-CM: 288.03, E933.1  10/23/2020        Dehydration ICD-10-CM: E86.0  ICD-9-CM: 276.51  9/15/2020        S/P lobectomy of lung ICD-10-CM: Z90.2  ICD-9-CM: V45.89  7/29/2020        * (Principal) Cough ICD-10-CM: R05  ICD-9-CM: 786.2  7/29/2020        Atrial tachycardia ([facility] ICD-10-CM: I47.1  ICD-9-CM: 427.89  7/26/2020        Cancer of bronchus of right lower lobe (HCC) (Chronic) ICD-10-CM: C34.31  ICD-9-CM: 162.5  7/23/2020        Lung cancer ([facility] (Chronic) ICD-10-CM: C34.90  ICD-9-CM: 162.9  7/23/2020        Cancer of right lung ([facility] ICD-10-CM: C34.91  ICD-9-CM: 162.9  7/23/2020        Pulmonary emphysema (HCC) (Chronic) ICD-10-CM: J43.9  ICD-9-CM: 492.8  7/7/2020        GERD (gastroesophageal reflux disease) ICD-10-CM: K21.9  ICD-9-CM: 530.81  Unknown        Hypotension (Chronic) ICD-10-CM: I95.9  ICD-9-CM: 458.9  5/5/2020    Overview Signed 1/11/2021  8:39 AM by April Montgomery NP     On midodrine             Sepsis due to undetermined organism [facility]) ICD-10-CM: A41.9  ICD-9-CM: 038.9  5/5/2020        Malignant neoplasm of lower lobe of right lung ([address] (Chronic) ICD-10-CM: C34.31  ICD-9-CM: 162.5  2/26/2020    Overview Signed 1/4/2021  7:38 AM by Jevon Alatorre MD     Dec 2020- Echo EF 50%, technically difficult, cannot assess regional wall motion. Aortic root 4.1 cm. No significant valvular disease. Normal DF             Mass of lower lobe of right lung ICD-10-CM: R91.8  ICD-9-CM: 786.6  2/23/2020        Right lower lobe lung mass ICD-10-CM: R91.8  ICD-9-CM: 786.6  2/23/2020        Essential hypertension with goal blood pressure less than 130/80 (Chronic) ICD-10-CM: I10  ICD-9-CM: 401.9  2/19/2018            Principal Problem:    Cough (7/29/2020)    Active Problems:    Pulmonary emphysema ([address] (7/7/2020)      Cancer of bronchus of right lower lobe ([address] (7/23/2020)      Acute on chronic respiratory failure with hypoxemia (HCC) (6/8/2021)      Moderate protein-calorie malnutrition ([address] (6/17/2021)      History of thoracotomy (6/25/2021)      Tracheobronchitis (6/25/2021)      Debility (6/28/2021)      S/P pneumonectomy (7/1/2021)         Continue current treatment plan  Regular diet/ensure  Suppress cough- Hycodan  PTA meds    Pulmonary following  ID following  Awaiting results of thoracentesis  Referral sent to [facility] 6/29/21- PPD already placed    No surgical needs at this time.  Ok to discharge when rehab bed is available      Signed:  Samuel Dickinson NP

## 2021-07-04 NOTE — PROGRESS NOTES
END OF SHIFT NOTE:    INTAKE/OUTPUT  07/03 0701 - 07/04 0700  In: -   Out: 750 [Urine:750]  Voiding: YES  Catheter: NO  Drain:              Flatus: Patient does have flatus present. Stool:  1 occurrences. Characteristics:  Stool Assessment  Stool Color: Brown  Stool Appearance: Formed  Stool Amount: Medium  Stool Source/Status: Rectum    Emesis: 0 occurrences. Characteristics:        VITAL SIGNS  Patient Vitals for the past 12 hrs:   Temp Pulse Resp BP SpO2   07/04/21 1705 98.3 °F (36.8 °C) 78 18 109/76 94 %   07/04/21 1639     95 %   07/04/21 1244 98 °F (36.7 °C) 77 18 109/74 95 %   07/04/21 1235     99 %   07/04/21 0818     98 %   07/04/21 0729 98.3 °F (36.8 °C) 84 20 115/76 97 %       Pain Assessment  Pain Intensity 1: 5 (07/04/21 0845)  Pain Location 1: Incisional  Pain Intervention(s) 1: Declines  Patient Stated Pain Goal: 0    Ambulating  Yes, ambulated to BR. Working with PT    Shift report given to oncoming nurse at the bedside.     Joshua Tovar, RN

## 2021-07-05 NOTE — PROGRESS NOTES
END OF SHIFT NOTE:    INTAKE/OUTPUT  07/04 0701 - 07/05 0700  In: -   Out: 825 [Urine:825]  Voiding: YES  Catheter: NO  Drain:              Flatus: Patient does have flatus present. Stool:  0 occurrences. Characteristics:  Stool Assessment  Stool Color: Brown  Stool Appearance: Formed  Stool Amount: Medium  Stool Source/Status: Rectum    Emesis: 0 occurrences. Characteristics:        VITAL SIGNS  Patient Vitals for the past 12 hrs:   Temp Pulse Resp BP SpO2   07/05/21 0410     99 %   07/05/21 0343 98.4 °F (36.9 °C) 82 18 100/64 99 %   07/05/21 0013     98 %   07/04/21 2319 98.3 °F (36.8 °C) 81 18 113/78 98 %   07/04/21 2050     97 %   07/04/21 1934 98.5 °F (36.9 °C) 86 18 110/74 94 %   07/04/21 1705 98.3 °F (36.8 °C) 78 18 109/76 94 %   07/04/21 1639     95 %       Pain Assessment  Pain Intensity 1: 5 (07/04/21 0845)  Pain Location 1: Incisional  Pain Intervention(s) 1: Declines  Patient Stated Pain Goal: 0    Ambulating  Not oob this shift. . Productive cough tan phlegm. Shift report given to oncoming nurse at the bedside.     Ramon Martinez RN

## 2021-07-05 NOTE — PROGRESS NOTES
Dave Saint. Admission Date: 6/25/2021             Daily Progress Note: 7/5/2021    The patient's chart is reviewed and the patient is discussed with the staff. 78 y.o  male seen at the request of Dr Sally Flynn who presented as a direct admit from Dr Pily Coates office for uncontrollable coughing.  Pt is known to our office and was seen while hospitalized on 6/15.  PMHx includes chronic respiratory failure on 2L O2 qhs, prior tobacco abuse, RLL SCC s/p excision 7/23/2020, afib on Eliquis, HTN, GERD and OA. Pt completed chemo 10/2020. He had repeat chest CT 12/22/2020 with a large R pleural effusion and collapse of remaining RUL. Pt had a R thoracentesis on 12/28/2020 with 900mL bloody fluid removed but unable to tolerate a complete drainage. A chest tube was placed on 1/4/21 with 800cc serosanguineous drainage. General surgery was consulted and underwent R VATS with conversion to open thoracotomy with extensive pneumolysis with decortication 1/6/2021. We were consulted intraoperatively for bronch and were unable to get lung to inflate intraoperatively.  Pt had repeat bronch on 1/7/21 secondary to persistent RUL atelectasis. He was found to have a stump area from prior lobectomy with abnormal appearing tissue that was biopsied and nondiagnostic. Pt had a repeat bronch on 1/12. Pt has had congestion since his pneumonectomy whenever he lies on his R side. He was admitted from the ER On 6/8/21 with shortness of breath, fever, and weakness.  A R chest tube was placed for possible empyema.  General surgery was consulted and was taken for right thoracotomy with decortication and bronchopleural fistula repair 6/14/21. ID is following for antimicrobial recommendations. He was discharged home on 6/17 on keflex and flagyl.    Id has been consulted this admission and is following. Currently on Zosyn (6/25- ), cultures pending. Nasal MRSA negative.   Bronched 6/29 with anesthesia; after extubation had increased wheeze and tachypnea requiring BiPAP and transferred to ICU. Diagnostic thoracentesis 6/30.   Subjective:   Currently up in chair on 4 L,, having some pain    Current Facility-Administered Medications   Medication Dose Route Frequency    gabapentin (NEURONTIN) capsule 300 mg  300 mg Oral TID    saline peripheral flush soln 10 mL  10 mL InterCATHeter PRN    cephALEXin (KEFLEX) capsule 500 mg  500 mg Oral QID    doxycycline (VIBRAMYCIN) capsule 100 mg  100 mg Oral Q12H    budesonide (PULMICORT) 500 mcg/2 ml nebulizer suspension  500 mcg Nebulization BID RT    HYDROcodone-homatropine (HYCODAN) 5-1.5 mg/5 mL (5 mL) oral solution 5 mL  5 mL Oral Q4H PRN    apixaban (ELIQUIS) tablet 5 mg  5 mg Oral Q12H    metoprolol succinate (TOPROL-XL) XL tablet 50 mg  50 mg Oral DAILY    levalbuterol (XOPENEX) nebulizer soln 1.25 mg/3 mL  1.25 mg Nebulization Q4H RT    sodium chloride 3% hypertonic nebulizer soln  4 mL Nebulization BID RT       Review of Systems    Constitutional: negative for fever, chills, sweats  Cardiovascular: negative for chest pain, palpitations, syncope, edema  Gastrointestinal:  negative for dysphagia, reflux, vomiting, diarrhea, abdominal pain, or melena  Neurologic:  negative for focal weakness, numbness, headache    Objective:     Vitals:    07/05/21 0410 07/05/21 0708 07/05/21 0736 07/05/21 1148   BP:  98/67  101/69   Pulse:  89  72   Resp:  18  19   Temp:  98.3 °F (36.8 °C)  98.1 °F (36.7 °C)   SpO2: 99% 99% 97% 98%   Weight:       Height:             Intake/Output Summary (Last 24 hours) at 7/5/2021 1302  Last data filed at 7/5/2021 1046  Gross per 24 hour   Intake 240 ml   Output 750 ml   Net -510 ml       Physical Exam:   Constitution:  the patient is well developed and in no acute distress  EENMT:  Sclera clear, pupils equal, oral mucosa moist  Respiratory: decreased breath sounds  Cardiovascular:  RRR without M,G,R  Gastrointestinal: soft and non-tender; with positive bowel sounds. Musculoskeletal: warm without cyanosis. There is no lower extremity edema. Skin:  no jaundice or rashes, surgical wounds   Neurologic: no gross neuro deficits     Psychiatric:  alert and oriented x 3    CXR:       LAB  No results for input(s): GLUCPOC in the last 72 hours. No lab exists for component: GLPOC   Recent Labs     07/05/21  0413   WBC 8.9   HGB 8.5*   HCT 28.3*        No results for input(s): NA, K, CL, CO2, GLU, BUN, CREA, MG, CA, PHOS, TROIQ, ALB, TBIL, TBILI, ALT, BNPP in the last 72 hours. No lab exists for component: TROIP, GPT, SGOT  No results for input(s): PH, PCO2, PO2, HCO3, PHI, PCO2I, PO2I, HCO3I in the last 72 hours. No results for input(s): LCAD, LAC in the last 72 hours.       Assessment:  (Medical Decision Making)     Hospital Problems  Date Reviewed: 6/25/2021        Codes Class Noted POA    S/P pneumonectomy ICD-10-CM: Z90.2  ICD-9-CM: V45.76  7/1/2021 Unknown        Debility ICD-10-CM: R53.81  ICD-9-CM: 799.3  6/28/2021 Unknown        History of thoracotomy (Chronic) ICD-10-CM: A05.238  ICD-9-CM: V45.89  6/25/2021 Yes        Tracheobronchitis ICD-10-CM: J40  ICD-9-CM: 987  6/25/2021 Unknown        Moderate protein-calorie malnutrition ([facility] [address] ICD-10-CM: E44.0  ICD-9-CM: 263.0  6/17/2021 Yes        Acute on chronic respiratory failure with hypoxemia ([facility] [address] ICD-10-CM: J96.21  ICD-9-CM: 518.84  6/8/2021 Yes        * (Principal) Cough ICD-10-CM: R05  ICD-9-CM: 786.2  7/29/2020 Yes        Cancer of bronchus of right lower lobe (HCC) (Chronic) ICD-10-CM: C34.31  ICD-9-CM: 162.5  7/23/2020 Yes        Pulmonary emphysema (HCC) (Chronic) ICD-10-CM: J43.9  ICD-9-CM: 492.8  7/7/2020 Yes              Plan:  (Medical Decision Making)   1   Repeat cxr  2   Nebulizer rx- can decrease frequenxcy  3   Days he is waiting on transfer to rehab  --    More than 50% of the time documented was spent in face-to-face contact with the patient and in the care of the patient on the floor/unit where the patient is located.     Geraldine Holt MD

## 2021-07-05 NOTE — PROGRESS NOTES
H&P/Consult Note/Progress Note/Office Note:   Kristen Gonsales MRN: [id number]  :1950  Age:75 y.o.    HPI: rKisten Gonsales is a 78 y.o. male who is well known to Dr. 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 Now he is s/p bronchiopleural fistula repair 21. His post-operative course was unremarkable. Prior to surgery he presented with complaints of fever and shortness of breath. He was admitted on 2021 for sepsis r/t right sided empyema. The patient states that over the course of the past few months when he would roll on his left side he would have \"gushing\" fluid from his chest. He presented to the office today for routine follow up and has complaints of a cough. Due to concern for disrupting surgical repair, he will be admitted for further observation and care. 21: Pt awake in bed. Feels like coughing is beginning to improved. 24hr Max Temp 99.2. O2 Sat 96% on 4L o2 via NC.   21: Pt awake in bed. Reports continues to cough. O2 Sat 94% on 4L o2 via NC. AF. NAD.   21 Pt eating breakfast- with complaints of little to no appetite. . Reports cough better- states he is coughing up some mucous. Continues to be SOB with conversation. ID and pulmonary following. Remains AF. CXR reviwed. 21  Seen in PACU after bronchoscopy. Appears comfortable on BiPap. Noted no fistula and plan thoracentesis tomorrow  21- In ICU off Bi Pap today appears to be breathing better. Awaiting thoracentesis. 21 In ICU awaiting floor bed. Cough is better. Breather is better. Awaiting results from thoracentesis. 21Transferred to surgical floor yesterday. Denies sob, feels breathing is better. Productive cough. Awaiting results from thoracentesis. 21 Awake in bed, No new issues. Denies sob, feels breathing is better. Productive cough. Awaiting results from thoracentesis. Currently on 2L O2 via NC.   21 Awake in bed, No new issues. Denies sob. Productive cough. Awaiting results from thoracentesis. Currently on 3L O2 via NC.   7/5/21 Awake in bed, no change in status. C/o of some tightness in upper right chest with deep breath. Oxygenating well on 3L NC. No SOB. Past Medical History:   Diagnosis Date    Arrhythmia     Afib     Chronic obstructive pulmonary disease (HCC)     O2 at HS    Chronic pain     right torn rotator cuff; left knee    GERD (gastroesophageal reflux disease)     controlled with med    Hypertension     hx-- off meds since 4/2020--- followed by dr. Bob Dailey Hypoxia 02/24/2020    Malignant neoplasm of lower lobe of right lung ([facility] [address] 02/26/2020    REC'D CHEMO AND RADIATION--- FOLLOWED BY DR. FOX    Osteoarthritis     Right lower lobe lung mass 02/24/2020    Status post total right knee replacement 2/29/2016     Past Surgical History:   Procedure Laterality Date    HX COLONOSCOPY      HX KNEE ARTHROSCOPY Left     scope X 1, ACL recon X 1    HX KNEE ARTHROSCOPY Right 1974, 1975    X 2     HX KNEE REPLACEMENT Right 2016    HX TONSILLECTOMY  1959    HX VASCULAR ACCESS Right placed 3/2020    port in chest     IR INSERT TUNL CVC W PORT OVER 5 YEARS  3/18/2020    [place] CHEST SURGERY PROCEDURE UNLISTED      R lobectomy 6/2020; R complete pneumonectomy 1/2021    [place] SINUS SURGERY PROC UNLISTED  1988, 1991    sinus surg     Current Facility-Administered Medications   Medication Dose Route Frequency    gabapentin (NEURONTIN) capsule 300 mg  300 mg Oral TID    saline peripheral flush soln 10 mL  10 mL InterCATHeter PRN    cephALEXin (KEFLEX) capsule 500 mg  500 mg Oral QID    doxycycline (VIBRAMYCIN) capsule 100 mg  100 mg Oral Q12H    budesonide (PULMICORT) 500 mcg/2 ml nebulizer suspension  500 mcg Nebulization BID RT    HYDROcodone-homatropine (HYCODAN) 5-1.5 mg/5 mL (5 mL) oral solution 5 mL  5 mL Oral Q4H PRN    apixaban (ELIQUIS) tablet 5 mg  5 mg Oral Q12H    metoprolol succinate (TOPROL-XL) XL tablet 50 mg 50 mg Oral DAILY    levalbuterol (XOPENEX) nebulizer soln 1.25 mg/3 mL  1.25 mg Nebulization Q4H RT    sodium chloride 3% hypertonic nebulizer soln  4 mL Nebulization BID RT     Albuterol, Celebrex [celecoxib], and [place]  Social History     Socioeconomic History    Marital status:      Spouse name: Not on file    Number of children: Not on file    Years of education: Not on file    Highest education level: Not on file   Tobacco Use    Smoking status: Former Smoker     Packs/day: 1.00     Years: 20.00     Pack years: 20.00     Quit date:      Years since quittin.5    Smokeless tobacco: Never Used    Tobacco comment: patient has no desire to resume    Substance and Sexual Activity    Alcohol use: Yes     Alcohol/week: 4.0 standard drinks     Types: 4 Glasses of wine per week    Drug use: No   Other Topics Concern     Service No    Blood Transfusions No    Caffeine Concern No    Occupational Exposure No    Hobby Hazards No    Sleep Concern No    Stress Concern No    Weight Concern No    Special Diet No    Exercise Yes    Seat Belt Yes   Social History Narrative    . Has long-time girlfriend. Continues to work doing IT support. Social Determinants of Health     Financial Resource Strain:     Difficulty of Paying Living Expenses:    Food Insecurity:     Worried About Running Out of Food in the Last Year:     [address] in the Last Year:    Transportation Needs:     Lack of Transportation (Medical):      Lack of Transportation (Non-Medical):    Physical Activity:     Days of Exercise per Week:     Minutes of Exercise per Session:    Stress:     Feeling of Stress :    Social Connections:     Frequency of Communication with Friends and Family:     Frequency of Social Gatherings with Friends and Family:     Attends Yarsanism Services:     Active Member of Clubs or Organizations:     Attends Club or Organization Meetings:     Marital Status: Social History     Tobacco Use   Smoking Status Former Smoker    Packs/day: 1.00    Years: 20.00    Pack years: 20.00    Quit date:     Years since quittin.5   Smokeless Tobacco Never Used   Tobacco Comment    patient has no desire to resume      Family History   Problem Relation Age of Onset    Cancer Mother         uterine    Arrhythmia Sister         afib     ROS: The patient has no difficulty with chest pain or SOB . No fever or chills. Comprehensive review of systems was otherwise unremarkable except as noted above. Physical Exam:   Visit Vitals  /69   Pulse 72   Temp 98.1 °F (36.7 °C)   Resp 19   Ht 5' 10\" (1.778 m)   Wt 176 lb 12.8 oz (80.2 kg)   SpO2 98%   BMI 25.37 kg/m²       Eyes: Sclera are clear. EOMs intact  ENMT: no external lesions gross hearing normal; no obvious neck masses, no ear or lip lesions, nares normal  CV: RRR. Normal perfusion  Resp: No JVD. no audible wheezing. GI: soft and non-distended     Musculoskeletal: unremarkable with normal function. No embolic signs or cyanosis.    Neuro:  Oriented; moves all 4; no focal deficits  Psychiatric: normal affect and mood, no memory impairment    Recent vitals (if inpt):  Patient Vitals for the past 24 hrs:   BP Temp Pulse Resp SpO2   21 1148 101/69 98.1 °F (36.7 °C) 72 19 98 %   21 0736     97 %   21 0708 98/67 98.3 °F (36.8 °C) 89 18 99 %   21 0410     99 %   21 0343 100/64 98.4 °F (36.9 °C) 82 18 99 %   21 0013     98 %   21 2319 113/78 98.3 °F (36.8 °C) 81 18 98 %   21 2050     97 %   21 1934 110/74 98.5 °F (36.9 °C) 86 18 94 %   21 1705 109/76 98.3 °F (36.8 °C) 78 18 94 %   21 1639     95 %       Labs:  Recent Labs     21  0413   WBC 8.9   HGB 8.5*          Lab Results   Component Value Date/Time    WBC 8.9 2021 04:13 AM    HGB 8.5 (L) 2021 04:13 AM    PLATELET 938  04:13 AM    Sodium 139 06/25/2021 03:16 PM    Potassium 3.8 06/25/2021 03:16 PM    Chloride 104 06/25/2021 03:16 PM    CO2 30 06/25/2021 03:16 PM    BUN 13 06/25/2021 03:16 PM    Creatinine 0.88 06/25/2021 03:16 PM    Glucose 97 06/25/2021 03:16 PM    INR 1.9 06/08/2021 10:00 PM    aPTT 51.0 (H) 06/17/2021 05:40 AM    Bilirubin, total 0.7 06/15/2021 10:52 AM    Bilirubin, direct 0.2 06/15/2021 10:52 AM    ALT (SGPT) 8 (L) 06/08/2021 02:50 PM    Alk. phosphatase 142 (H) 06/08/2021 02:50 PM    Troponin-I, Qt. <0.02 (L) 05/05/2020 01:09 AM       I reviewed recent labs and recent radiologic studies. I independently reviewed radiology images for studies I described above or studies I have ordered.    Admission date (for inpatients): 6/25/2021   * No surgery found *  Procedure(s):  ULTRASOUND  THORACENTESIS    ASSESSMENT/PLAN:  Problem List  Date Reviewed: 6/25/2021        Codes Class Noted    Pacemaker ICD-10-CM: Z95.0  ICD-9-CM: V45.01  5/3/2021        S/P pneumonectomy ICD-10-CM: Z90.2  ICD-9-CM: V45.76  7/1/2021        Debility ICD-10-CM: R53.81  ICD-9-CM: 799.3  6/28/2021        History of thoracotomy (Chronic) ICD-10-CM: I27.292  ICD-9-CM: V45.89  6/25/2021        Tracheobronchitis ICD-10-CM: J40  ICD-9-CM: 299  6/25/2021        Moderate protein-calorie malnutrition ([facility] [address] ICD-10-CM: E44.0  ICD-9-CM: 263.0  6/17/2021        Pleural fistula ([facility] [address] ICD-10-CM: J86.0  ICD-9-CM: 510.0  6/14/2021        Atrial flutter ([address] ICD-10-CM: I48.92  ICD-9-CM: 427.32  6/10/2021        Acute hypoxemic respiratory failure ([facility] ICD-10-CM: J96.01  ICD-9-CM: 518.81  6/8/2021        Bronchopleural fistula ([facility] ICD-10-CM: J86.0  ICD-9-CM: 510.0  6/8/2021        Empyema ([facility] ICD-10-CM: J86.9  ICD-9-CM: 510.9  6/8/2021        Acute on chronic respiratory failure with hypoxemia (HCC) ICD-10-CM: J96.21  ICD-9-CM: 518.84  6/8/2021        COPD exacerbation ([facility] ICD-10-CM: J44.1  ICD-9-CM: 491.21  5/16/2021        Heart block AV complete ([facility] ICD-10-CM: I44.2  ICD-9-CM: 426.0  5/3/2021        Sick sinus syndrome (HCC) ICD-10-CM: I49.5  ICD-9-CM: 427.81  3/18/2021        PAF (paroxysmal atrial fibrillation) (HCC) ICD-10-CM: I48.0  ICD-9-CM: 427.31  2/25/2021        Hypertension ICD-10-CM: I10  ICD-9-CM: 401.9  2/25/2021        Atrial fibrillation with RVR (HCC) ICD-10-CM: I48.91  ICD-9-CM: 427.31  2/25/2021        S/P thoracotomy ICD-10-CM: B71.768  ICD-9-CM: V45.89  1/7/2021        Atelectasis of right lung ICD-10-CM: J98.11  ICD-9-CM: 518.0  1/4/2021        A-fib (HCC) (Chronic) ICD-10-CM: I48.91  ICD-9-CM: 427.31  1/4/2021        Chronic respiratory failure with hypoxia (HCC) (Chronic) ICD-10-CM: J96.11  ICD-9-CM: 518.83, 799.02  1/4/2021        Pleural effusion on right ICD-10-CM: J90  ICD-9-CM: 511.9  12/28/2020        Chemotherapy induced neutropenia ([facility] ICD-10-CM: D70.1, T45.1X5A  ICD-9-CM: 288.03, E933.1  10/23/2020        Dehydration ICD-10-CM: E86.0  ICD-9-CM: 276.51  9/15/2020        S/P lobectomy of lung ICD-10-CM: Z90.2  ICD-9-CM: V45.89  7/29/2020        * (Principal) Cough ICD-10-CM: R05  ICD-9-CM: 786.2  7/29/2020        Atrial tachycardia ([facility] ICD-10-CM: I47.1  ICD-9-CM: 427.89  7/26/2020        Cancer of bronchus of right lower lobe (HCC) (Chronic) ICD-10-CM: C34.31  ICD-9-CM: 162.5  7/23/2020        Lung cancer ([facility] [address] (Chronic) ICD-10-CM: C34.90  ICD-9-CM: 162.9  7/23/2020        Cancer of right lung ([facility] [address] ICD-10-CM: C34.91  ICD-9-CM: 162.9  7/23/2020        Pulmonary emphysema (HCC) (Chronic) ICD-10-CM: J43.9  ICD-9-CM: 492.8  7/7/2020        GERD (gastroesophageal reflux disease) ICD-10-CM: K21.9  ICD-9-CM: 530.81  Unknown        Hypotension (Chronic) ICD-10-CM: I95.9  ICD-9-CM: 458.9  5/5/2020    Overview Signed 1/11/2021  8:39 AM by Melva Fisher NP     On midodrine             Sepsis due to undetermined organism [facility]) ICD-10-CM: A41.9  ICD-9-CM: 038.9  5/5/2020        Malignant neoplasm of lower lobe of right lung ([facility] [address] (Chronic) ICD-10-CM: C34.31  ICD-9-CM: 162.5  2/26/2020    Overview Signed 1/4/2021  7:38 AM by Mona Hogue MD     Dec 2020- Echo EF 50%, technically difficult, cannot assess regional wall motion. Aortic root 4.1 cm. No significant valvular disease. Normal DF             Mass of lower lobe of right lung ICD-10-CM: R91.8  ICD-9-CM: 786.6  2/23/2020        Right lower lobe lung mass ICD-10-CM: R91.8  ICD-9-CM: 786.6  2/23/2020        Essential hypertension with goal blood pressure less than 130/80 (Chronic) ICD-10-CM: I10  ICD-9-CM: 401.9  2/19/2018            Principal Problem:    Cough (7/29/2020)    Active Problems:    Pulmonary emphysema ([address] (7/7/2020)      Cancer of bronchus of right lower lobe ([address] (7/23/2020)      Acute on chronic respiratory failure with hypoxemia (HCC) (6/8/2021)      Moderate protein-calorie malnutrition ([address] (6/17/2021)      History of thoracotomy (6/25/2021)      Tracheobronchitis (6/25/2021)      Debility (6/28/2021)      S/P pneumonectomy (7/1/2021)         Continue current treatment plan  Regular diet/ensure  Suppress cough- Hycodan  Add neurontin  PTA meds    Pulmonary following  ID following  Awaiting results of thoracentesis--no growth so far  Referral sent to [facility] 6/29/21- PPD already placed    No surgical needs at this time.  Ok to discharge when rehab bed is available      Signed:  Beronica Brewster NP

## 2021-07-05 NOTE — PROGRESS NOTES
Comprehensive Nutrition Assessment    Type and Reason for Visit: [address] for Pressure Injury stage 2 or greater    Nutrition Recommendations/Plan:   Meals and Snacks:  Continue current diet. Nutrition Supplement Therapy:   Medical food supplement therapy:  Continue Ensure Enlive three times per day (this provides 350 kcal and 20 grams protein per bottle)   Consider addition of antiemetic PRN in order to help manage nausea and encourage increasing po intake at meals. Malnutrition Assessment:  Malnutrition Status: Moderate malnutrition  Context: Chronic illness  Findings of clinical characteristics of malnutrition:   Energy Intake:  Mild decrease in energy intake (specify) (Consuming <50% baseline intake)  Weight Loss:  7.000 - Greater than 20% over 1 year (22% loss over the past yr, wt more stable recently)     Body Fat Loss:  1 - Mild body fat loss, Triceps, Buccal region   Muscle Mass Loss:  1 - Mild muscle mass loss, Temples (temporalis)  Fluid Accumulation:  Unable to assess,     Strength:  Not performed     Nutrition Assessment:   Nutrition History: Pt and girlfriend report loss of appetite ongoing for months. Taste changes + sweet items beign for overly sweet items, prior foods of preference no longer desirable. Recall of intake regarding take out food items with pt consuming 50% or less of his baseline. Nutrition Background: h/o tobacco abuse s/p RUL mass (SCC) found 2/2020, s/p chemotherapy and XRT, followed by RML lobectomy 7/2020 and right VATS 1/2021. He is s/p right thoractomy and bronchopleural fistula repair 6/14/2021. Admitted from surgical office d/t uncontrolled coughing, pulmonary emphysema, acute on chronic respiratory failure with hypoxemia. Daily Update:  Pt seen in bed at time of visit. Observed lunch tray <25% consumed. He reports drinking 100% of Ensure Enlive during the day. He states having nausea and runny noise as his main barrier to po intake. No antiemetics available per [facility] during time of visit. Overall, he reports eating 25-50% of meals. His girlfriend brought in a sirloin burger and squash casserole which he ate 50% of the other day. Encouraged him to allow girlfriend/family/friends to bring foods to help increase kcal and protein intake. Nutrition Related Findings:     Wound Type:  (Sacral stage II documented by primary RN)    Current Nutrition Therapies:  ADULT ORAL NUTRITION SUPPLEMENT Breakfast, Lunch, Dinner; Standard High Calorie/High Protein  ADULT DIET Regular; allergy to strawberries    Current Intake:   Average Meal Intake: 26-50% Average Supplement Intake: %      Anthropometric Measures:  Height: 5' 10\" (177.8 cm)  Current Body Wt: 80.2 kg (176 lb 12.9 oz) (7/4), Weight source: Bed scale  BMI: 25.4, Overweight (BMI 25.0-29. 9)  Admission Body Weight: 179 lb 14.3 oz (source not specified)  Ideal Body Weight (lbs) (Calculated): 166 lbs (75 kg), 108.4 %  Usual Body Wt: 104.3 kg (230 lb) (stated ~1 yr ago, 81.8 kg bed scale 6/16 per EMR), Percent weight change: -21.8          Edema: LLE: 1+ (7/5/2021  7:14 AM)  RLE: 1+ (7/5/2021  7:14 AM)     Estimated Daily Nutrient Needs:  Energy (kcal/day): 7867-7243 (Kcal/kg (20-25), Weight Used: Admission (81.6 kg))  Protein (g/day): 82-98 (1-1.2 g/kg) Weight Used: (Admission)  Fluid (ml/day):   (1 ml/kcal)    Nutrition Diagnosis:   · Inadequate oral intake related to altered taste perception (loss of appetite) as evidenced by  (self reproted barriers, intake <50% needs)    · Moderate malnutrition, In context of chronic illness related to catabolic illness as evidenced by  (criteria identified in malnutrition assessment)    Nutrition Interventions:   Food and/or Nutrient Delivery: Continue current diet, Continue oral nutrition supplement     Coordination of Nutrition Care: Continue to monitor while inpatient  Plan of Care discussed with Judy Carranza RN    Goals:   Previous Goal Met: Progressing toward goal(s)  Active Goal: Meet >75% estimated nutrition needs with po and supplement intake    Nutrition Monitoring and Evaluation:      Food/Nutrient Intake Outcomes: Food and nutrient intake, Supplement intake  Physical Signs/Symptoms Outcomes: Biochemical data, GI status    Discharge Planning:    Continue oral nutrition supplement    Electronically signed by Areli Arana MS, ABBEY, CHAD 7/5/2021 at 4:10 PM  Contact: [phone]

## 2021-07-06 NOTE — PROGRESS NOTES
H&P/Consult Note/Progress Note/Office Note:   Mateo Garcia. MRN: [id number]  :1950  Age:75 y.o.    HPI: Mateo Garcia. is a 78 y.o. male who is well known to Dr. 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 Now he is s/p bronchiopleural fistula repair 21. His post-operative course was unremarkable. Prior to surgery he presented with complaints of fever and shortness of breath. He was admitted on 2021 for sepsis r/t right sided empyema. The patient states that over the course of the past few months when he would roll on his left side he would have \"gushing\" fluid from his chest. He presented to the office today for routine follow up and has complaints of a cough. Due to concern for disrupting surgical repair, he will be admitted for further observation and care. 21: Pt awake in bed. Feels like coughing is beginning to improved. 24hr Max Temp 99.2. O2 Sat 96% on 4L o2 via NC.   21: Pt awake in bed. Reports continues to cough. O2 Sat 94% on 4L o2 via NC. AF. NAD.   21 Pt eating breakfast- with complaints of little to no appetite. . Reports cough better- states he is coughing up some mucous. Continues to be SOB with conversation. ID and pulmonary following. Remains AF. CXR reviwed. 21  Seen in PACU after bronchoscopy. Appears comfortable on BiPap. Noted no fistula and plan thoracentesis tomorrow  21- In ICU off Bi Pap today appears to be breathing better. Awaiting thoracentesis. 21 In ICU awaiting floor bed. Cough is better. Breather is better. Awaiting results from thoracentesis. 21Transferred to surgical floor yesterday. Denies sob, feels breathing is better. Productive cough. Awaiting results from thoracentesis. 21 Awake in bed, No new issues. Denies sob, feels breathing is better. Productive cough. Awaiting results from thoracentesis. Currently on 2L O2 via NC.   21 Awake in bed, No new issues. Denies sob. Productive cough. Awaiting results from thoracentesis. Currently on 3L O2 via NC.   7/5/21 Awake in bed, no change in status. C/o of some tightness in upper right chest with deep breath. Oxygenating well on 3L NC. No SOB. 07/06/21 No changes. States the Neurontin helped with the tightness. No complaints. Awaiting placement. Past Medical History:   Diagnosis Date    Arrhythmia     Afib     Chronic obstructive pulmonary disease (HCC)     O2 at HS    Chronic pain     right torn rotator cuff; left knee    GERD (gastroesophageal reflux disease)     controlled with med    Hypertension     hx-- off meds since 4/2020--- followed by dr. Anthony Potts Hypoxia 02/24/2020    Malignant neoplasm of lower lobe of right lung ([facility] 02/26/2020    REC'D CHEMO AND RADIATION--- FOLLOWED BY DR. FOX    Osteoarthritis     Right lower lobe lung mass 02/24/2020    Status post total right knee replacement 2/29/2016     Past Surgical History:   Procedure Laterality Date    HX COLONOSCOPY      HX KNEE ARTHROSCOPY Left     scope X 1, ACL recon X 1    HX KNEE ARTHROSCOPY Right 1974, 1975    X 2     HX KNEE REPLACEMENT Right 2016    HX TONSILLECTOMY  1959    HX VASCULAR ACCESS Right placed 3/2020    port in chest     IR INSERT TUNL CVC W PORT OVER 5 YEARS  3/18/2020    NH CHEST SURGERY PROCEDURE UNLISTED      R lobectomy 6/2020; R complete pneumonectomy 1/2021    NH SINUS SURGERY PROC UNLISTED  1988, 1991    sinus surg     Current Facility-Administered Medications   Medication Dose Route Frequency    gabapentin (NEURONTIN) capsule 300 mg  300 mg Oral TID    levalbuterol (XOPENEX) nebulizer soln 1.25 mg/3 mL  1.25 mg Nebulization QID RT    ondansetron (ZOFRAN) injection 4 mg  4 mg IntraVENous Q4H PRN    saline peripheral flush soln 10 mL  10 mL InterCATHeter PRN    cephALEXin (KEFLEX) capsule 500 mg  500 mg Oral QID    doxycycline (VIBRAMYCIN) capsule 100 mg  100 mg Oral Q12H    budesonide (PULMICORT) 500 mcg/2 ml nebulizer suspension  500 mcg Nebulization BID RT    HYDROcodone-homatropine (HYCODAN) 5-1.5 mg/5 mL (5 mL) oral solution 5 mL  5 mL Oral Q4H PRN    apixaban (ELIQUIS) tablet 5 mg  5 mg Oral Q12H    metoprolol succinate (TOPROL-XL) XL tablet 50 mg  50 mg Oral DAILY    sodium chloride 3% hypertonic nebulizer soln  4 mL Nebulization BID RT     Albuterol, Celebrex [celecoxib], and [place]  Social History     Socioeconomic History    Marital status:      Spouse name: Not on file    Number of children: Not on file    Years of education: Not on file    Highest education level: Not on file   Tobacco Use    Smoking status: Former Smoker     Packs/day: 1.00     Years: 20.00     Pack years: 20.00     Quit date:      Years since quittin.5    Smokeless tobacco: Never Used    Tobacco comment: patient has no desire to resume    Substance and Sexual Activity    Alcohol use: Yes     Alcohol/week: 4.0 standard drinks     Types: 4 Glasses of wine per week    Drug use: No   Other Topics Concern     Service No    Blood Transfusions No    Caffeine Concern No    Occupational Exposure No    Hobby Hazards No    Sleep Concern No    Stress Concern No    Weight Concern No    Special Diet No    Exercise Yes    Seat Belt Yes   Social History Narrative    . Has long-time girlfriend. Continues to work doing IT support. Social Determinants of Health     Financial Resource Strain:     Difficulty of Paying Living Expenses:    Food Insecurity:     Worried About Running Out of Food in the Last Year:     [address] in the Last Year:    Transportation Needs:     Lack of Transportation (Medical):      Lack of Transportation (Non-Medical):    Physical Activity:     Days of Exercise per Week:     Minutes of Exercise per Session:    Stress:     Feeling of Stress :    Social Connections:     Frequency of Communication with Friends and Family:     Frequency of Social Gatherings with Friends and Family:     Attends Rastafari Services:     Active Member of Clubs or Organizations:     Attends Club or Organization Meetings:     Marital Status:      Social History     Tobacco Use   Smoking Status Former Smoker    Packs/day: 1.00    Years: 20.00    Pack years: 20.00    Quit date:     Years since quittin.5   Smokeless Tobacco Never Used   Tobacco Comment    patient has no desire to resume      Family History   Problem Relation Age of Onset    Cancer Mother         uterine    Arrhythmia Sister         afib     ROS: The patient has no difficulty with chest pain or SOB . No fever or chills. Comprehensive review of systems was otherwise unremarkable except as noted above. Physical Exam:   Visit Vitals  /73   Pulse 85   Temp 97.4 °F (36.3 °C)   Resp 16   Ht 5' 10\" (1.778 m)   Wt 176 lb 12.8 oz (80.2 kg)   SpO2 98%   BMI 25.37 kg/m²       Eyes: Sclera are clear. EOMs intact  ENMT: no external lesions gross hearing normal; no obvious neck masses, no ear or lip lesions, nares normal  CV: RRR. Normal perfusion  Resp: No JVD. no audible wheezing. GI: soft and non-distended     Musculoskeletal: unremarkable with normal function. No embolic signs or cyanosis.    Neuro:  Oriented; moves all 4; no focal deficits  Psychiatric: normal affect and mood, no memory impairment    Recent vitals (if inpt):  Patient Vitals for the past 24 hrs:   BP Temp Pulse Resp SpO2   21 0724 101/73 97.4 °F (36.3 °C) 85 16 98 %   21 0701     99 %   21 0332 109/71 98.2 °F (36.8 °C) 69 18 97 %   21 2332 111/77 98.7 °F (37.1 °C) 81 18 98 %   21 2053     90 %   21 1916 111/74 98.8 °F (37.1 °C) 69 18 96 %   21 1548     99 %   21 1546 110/76 98.4 °F (36.9 °C) 84 18 99 %   21 1148 101/69 98.1 °F (36.7 °C) 72 19 98 %       Labs:  Recent Labs     21  0316 21  0413   WBC  --  8.9   HGB  --  8.5*   PLT  --  214     -- K 4.0  --      --    CO2 33*  --    BUN 15  --    CREA 0.63*  --      --        Lab Results   Component Value Date/Time    WBC 8.9 07/05/2021 04:13 AM    HGB 8.5 (L) 07/05/2021 04:13 AM    PLATELET 313 87/08/4543 04:13 AM    Sodium 139 07/06/2021 03:16 AM    Potassium 4.0 07/06/2021 03:16 AM    Chloride 102 07/06/2021 03:16 AM    CO2 33 (H) 07/06/2021 03:16 AM    BUN 15 07/06/2021 03:16 AM    Creatinine 0.63 (L) 07/06/2021 03:16 AM    Glucose 100 07/06/2021 03:16 AM    INR 1.9 06/08/2021 10:00 PM    aPTT 51.0 (H) 06/17/2021 05:40 AM    Bilirubin, total 0.7 06/15/2021 10:52 AM    Bilirubin, direct 0.2 06/15/2021 10:52 AM    ALT (SGPT) 8 (L) 06/08/2021 02:50 PM    Alk. phosphatase 142 (H) 06/08/2021 02:50 PM    Troponin-I, Qt. <0.02 (L) 05/05/2020 01:09 AM       I reviewed recent labs and recent radiologic studies. I independently reviewed radiology images for studies I described above or studies I have ordered.    Admission date (for inpatients): 6/25/2021   * No surgery found *  Procedure(s):  ULTRASOUND  THORACENTESIS    ASSESSMENT/PLAN:  Problem List  Date Reviewed: 6/25/2021        Codes Class Noted    Pacemaker ICD-10-CM: Z95.0  ICD-9-CM: V45.01  5/3/2021        S/P pneumonectomy ICD-10-CM: Z90.2  ICD-9-CM: V45.76  7/1/2021        Debility ICD-10-CM: R53.81  ICD-9-CM: 799.3  6/28/2021        History of thoracotomy (Chronic) ICD-10-CM: O67.901  ICD-9-CM: V45.89  6/25/2021        Tracheobronchitis ICD-10-CM: J40  ICD-9-CM: 272  6/25/2021        Moderate protein-calorie malnutrition ([facility] [address] ICD-10-CM: E44.0  ICD-9-CM: 263.0  6/17/2021        Pleural fistula ([facility] ICD-10-CM: J86.0  ICD-9-CM: 510.0  6/14/2021        Atrial flutter ([facility] ICD-10-CM: I48.92  ICD-9-CM: 427.32  6/10/2021        Acute hypoxemic respiratory failure ([facility] ICD-10-CM: J96.01  ICD-9-CM: 518.81  6/8/2021        Bronchopleural fistula ([facility] ICD-10-CM: J86.0  ICD-9-CM: 510.0  6/8/2021        Empyema ([facility] ICD-10-CM: J86.9  ICD-9-CM: 510.9  6/8/2021        Acute on chronic respiratory failure with hypoxemia [facility]) ICD-10-CM: J96.21  ICD-9-CM: 518.84  6/8/2021        COPD exacerbation ([facility] ICD-10-CM: J44.1  ICD-9-CM: 491.21  5/16/2021        Heart block AV complete ([facility] ICD-10-CM: I44.2  ICD-9-CM: 426.0  5/3/2021        Sick sinus syndrome (HCC) ICD-10-CM: I49.5  ICD-9-CM: 427.81  3/18/2021        PAF (paroxysmal atrial fibrillation) ([facility] ICD-10-CM: I48.0  ICD-9-CM: 427.31  2/25/2021        Hypertension ICD-10-CM: I10  ICD-9-CM: 401.9  2/25/2021        Atrial fibrillation with RVR (HCC) ICD-10-CM: I48.91  ICD-9-CM: 427.31  2/25/2021        S/P thoracotomy ICD-10-CM: K22.398  ICD-9-CM: V45.89  1/7/2021        Atelectasis of right lung ICD-10-CM: J98.11  ICD-9-CM: 518.0  1/4/2021        A-fib (HCC) (Chronic) ICD-10-CM: I48.91  ICD-9-CM: 427.31  1/4/2021        Chronic respiratory failure with hypoxia (HCC) (Chronic) ICD-10-CM: J96.11  ICD-9-CM: 518.83, 799.02  1/4/2021        Pleural effusion on right ICD-10-CM: J90  ICD-9-CM: 511.9  12/28/2020        Chemotherapy induced neutropenia ([facility] ICD-10-CM: D70.1, T45.1X5A  ICD-9-CM: 288.03, E933.1  10/23/2020        Dehydration ICD-10-CM: E86.0  ICD-9-CM: 276.51  9/15/2020        S/P lobectomy of lung ICD-10-CM: Z90.2  ICD-9-CM: V45.89  7/29/2020        * (Principal) Cough ICD-10-CM: R05  ICD-9-CM: 786.2  7/29/2020        Atrial tachycardia ([facility] ICD-10-CM: I47.1  ICD-9-CM: 427.89  7/26/2020        Cancer of bronchus of right lower lobe (HCC) (Chronic) ICD-10-CM: C34.31  ICD-9-CM: 162.5  7/23/2020        Lung cancer ([facility] (Chronic) ICD-10-CM: C34.90  ICD-9-CM: 162.9  7/23/2020        Cancer of right lung [facility]) ICD-10-CM: C34.91  ICD-9-CM: 162.9  7/23/2020        Pulmonary emphysema (HCC) (Chronic) ICD-10-CM: J43.9  ICD-9-CM: 492.8  7/7/2020        GERD (gastroesophageal reflux disease) ICD-10-CM: K21.9  ICD-9-CM: 530.81  Unknown        Hypotension (Chronic) ICD-10-CM: I95.9  ICD-9-CM: 458.9  5/5/2020 Overview Signed 1/11/2021  8:39 AM by Roselyn Perkins NP     On midodrine             Sepsis due to undetermined organism [facility]) ICD-10-CM: A41.9  ICD-9-CM: 038.9  5/5/2020        Malignant neoplasm of lower lobe of right lung ([facility] [address] (Chronic) ICD-10-CM: C34.31  ICD-9-CM: 162.5  2/26/2020    Overview Signed 1/4/2021  7:38 AM by Valerie Grimes MD     Dec 2020- Echo EF 50%, technically difficult, cannot assess regional wall motion. Aortic root 4.1 cm. No significant valvular disease. Normal DF             Mass of lower lobe of right lung ICD-10-CM: R91.8  ICD-9-CM: 786.6  2/23/2020        Right lower lobe lung mass ICD-10-CM: R91.8  ICD-9-CM: 786.6  2/23/2020        Essential hypertension with goal blood pressure less than 130/80 (Chronic) ICD-10-CM: I10  ICD-9-CM: 401.9  2/19/2018            Principal Problem:    Cough (7/29/2020)    Active Problems:    Pulmonary emphysema ([address] (7/7/2020)      Cancer of bronchus of right lower lobe ([address] (7/23/2020)      Acute on chronic respiratory failure with hypoxemia (HCC) (6/8/2021)      Moderate protein-calorie malnutrition ([address] (6/17/2021)      History of thoracotomy (6/25/2021)      Tracheobronchitis (6/25/2021)      Debility (6/28/2021)      S/P pneumonectomy (7/1/2021)         Continue current treatment plan  Regular diet/ensure  Suppress cough- Hycodan  Add neurontin  PTA meds  Resumed Eliquis    Pulmonary following  ID following- recommend keflex 500 mg qid and doxycycline 100mg every 12  Hours for 3-4 weeks- follow appointment with ID 7/28- will need repeat CT chest prior to finish of antibiotics (per ID note 7/2/21)  Awaiting results of thoracentesis--no growth so far  Referral sent to [facility] 6/29/21- denied and now other referrals sent and awaiting approval.  PPD complete. No surgical needs at this time.  Ok to discharge when rehab bed is available      Signed:  Fermin Ernandez NP

## 2021-07-06 NOTE — PROGRESS NOTES
Bedside shift report given to Kishor Haider RN (oncoming nurse) by Shanae Arvizu RN (offgoing nurse). Bedside shift report included the following information: SBAR, Kardex, Procedure Summary, MAR, and Recent Results.

## 2021-07-06 NOTE — PROGRESS NOTES
SHIVANI called [facility]ia[facility] to follow up to see if they will be able to accept him. Patient insurance is now [employer] through [place], [address] provided information to the liaison who is going to attempt to get authorization.

## 2021-07-06 NOTE — PROGRESS NOTES
Bedside shift report received from [facility] (offgoing nurse). Report given to Irish Lopez RN. Vital signs stable. No complaints present. Patient in stable condition.

## 2021-07-06 NOTE — PROGRESS NOTES
END OF SHIFT NOTE:    INTAKE/OUTPUT  07/05 0701 - 07/06 0700  In: 1 [P.O.:970]  Out: 800 [Urine:800]  Voiding: YES  Catheter: YES  Drain:              Flatus: Patient does have flatus present. Stool:  0 occurrences. Characteristics:  Stool Assessment  Stool Color: Brown  Stool Appearance: Formed  Stool Amount: Medium  Stool Source/Status: Rectum    Emesis: 0 occurrences. Characteristics:        VITAL SIGNS  Patient Vitals for the past 12 hrs:   Temp Pulse Resp BP SpO2   07/06/21 1522 98.1 °F (36.7 °C) 78 18 110/74 98 %   07/06/21 1504     97 %   07/06/21 1200     98 %   07/06/21 1102 97.4 °F (36.3 °C) 70 16 94/67 99 %   07/06/21 0724 97.4 °F (36.3 °C) 85 16 101/73 98 %   07/06/21 0701     99 %       Pain Assessment  Pain Intensity 1: 0 (07/06/21 1522)  Pain Location 1: Incisional  Pain Intervention(s) 1: Declines  Patient Stated Pain Goal: 0    Ambulating  Yes    Shift report given to oncoming nurse at the bedside.     Deangelo Sanders RN

## 2021-07-06 NOTE — PROGRESS NOTES
ACUTE PHYSICAL THERAPY GOALS:  (Developed with and agreed upon by patient and/or caregiver. )  LTG:  (1.)Mr. Jose Luis Camarena will move from supine to sit and sit to supine , scoot up and down and roll side to side in flat bed without siderails with  INDEPENDENT within 7 day(s). (2.)Mr. Jose Luis Camarena will perform all functional transfers with  SUPERVISION using the least restrictive/no device within 7 day(s). (3.)Mr. Jose Luis Camarena will ambulate with  STAND BY ASSIST for 250+ feet with normal vital sign response with the least restrictive/no device within 7 day(s). 4.  Mr. Jose Luis Camarena will sit with upright posture on EOB for 10 minutes while performing exercises within 7 days. PHYSICAL THERAPY: Daily Note and PM Treatment Day # 2    Basilio Estrada is a 78 y.o. male   PRIMARY DIAGNOSIS: Cough  Cough [R05]  History of thoracotomy [Z98.890]  Procedure(s) (LRB):  ULTRASOUND (Bilateral)  THORACENTESIS (N/A)  6 Days Post-Op    ASSESSMENT:     REHAB RECOMMENDATIONS: CURRENT LEVEL OF FUNCTION:  (Most Recently Demonstrated)   Recommendation to date pending progress:  [address] [place] [address] vs. Short-term Rehab  Equipment:    To Be Determined Bed Mobility:   Minimal Assistance  Sit to Stand:   Contact Guard Assistance  Transfers:   Contact Guard Assistance  Gait/Mobility:   Contact Guard Assistance     ASSESSMENT:  Mr. Jose Luis Camarena is supine in bed and very agreeable to getting up to do therapy. Bed mobility is with min assist.  Sitting balance good. Sit to stand with contact guard assist to the walker. Gait training with the rolling walker x 15 feet x 2 with contact guard assist.  Patient is returned to sitting edge of bed and performed some LE's exercises then returned to supine in bed. Patient is positioned for comfort with needs within reach. Good session. Patient is most pleasant to work with. Needs impatient rehab vs short term rehab. Continue PT efforts. SUBJECTIVE:   Mr. Jose Luis Camarena states, \"I'm ready. \"    SOCIAL HISTORY/ LIVING ENVIRONMENT: see eval  Home Environment: Independent living  One/Two Story Residence: One story  Living Alone: No  Support Systems: Family member(s)  OBJECTIVE:     PAIN: VITAL SIGNS: LINES/DRAINS:   Pre Treatment: Pain Screen  Pain Scale 1: Numeric (0 - 10)  Pain Intensity 1: 0  Post Treatment: 0/10   O2  O2 Device: Nasal cannula     MOBILITY: I Mod I S SBA CGA Min Mod Max Total  NT x2 Comments:   Bed Mobility    Rolling [] [] [x] [] [] [] [] [] [] [] []    Supine to Sit [] [] [] [] [] [x] [] [] [] [] []    Scooting [] [] [x] [] [] [] [] [] [] [] []    Sit to Supine [] [] [x] [] [] [] [] [] [] [] []    Transfers    Sit to Stand [] [] [] [] [x] [] [] [] [] [] []    Bed to Chair [] [] [] [] [] [] [] [] [] [x] []    Stand to Sit [] [] [] [] [x] [] [] [] [] [] []    I=Independent, Mod I=Modified Independent, S=Supervision, SBA=Standby Assistance, CGA=Contact Guard Assistance,   Min=Minimal Assistance, Mod=Moderate Assistance, Max=Maximal Assistance, Total=Total Assistance, NT=Not Tested    BALANCE: Good Fair+ Fair Fair- Poor NT Comments   Sitting Static [x] [] [] [] [] []    Sitting Dynamic [x] [] [] [] [] []              Standing Static [] [x] [] [] [] []    Standing Dynamic [] [x] [] [] [] []      GAIT: I Mod I S SBA CGA Min Mod Max Total  NT x2 Comments:   Level of Assistance [] [] [] [] [x] [] [] [] [] [] []    Distance 15x 2 feet     DME Rolling Walker    Gait Quality Slow forward flexed posture    Weightbearing  Status N/A     I=Independent, Mod I=Modified Independent, S=Supervision, SBA=Standby Assistance, CGA=Contact Guard Assistance,   Min=Minimal Assistance, Mod=Moderate Assistance, Max=Maximal Assistance, Total=Total Assistance, NT=Not Tested    PLAN:   FREQUENCY/DURATION: PT Plan of Care: 3 times/week for duration of hospital stay or until stated goals are met, whichever comes first.  TREATMENT:     TREATMENT:   ($$ Therapeutic Activity: 23-37 mins    )  Therapeutic Activity (23 Minutes): Therapeutic activity included Rolling, Supine to Sit, Sit to Supine, Scooting, Transfer Training, Ambulation on level ground, Sitting balance  and Standing balance to improve functional Mobility, Strength and Activity tolerance.     TREATMENT GRID:  N/A    AFTER TREATMENT POSITION/PRECAUTIONS:  Bed, Needs within reach and RN notified    INTERDISCIPLINARY COLLABORATION:  RN/PCT and PT/PTA    TOTAL TREATMENT DURATION:  PT Patient Time In/Time Out  Time In: 1540  Time Out: 46    Richar Bhatti, PTA

## 2021-07-06 NOTE — PROGRESS NOTES
PT note:  Treatment deferred as the patient states that he just returned to bed but would like to work with therapy but would like to rest for 30 minutes. Wants this writer to return. Will return as time/schedule permits.   Alisha Awad, PTA

## 2021-07-06 NOTE — PROGRESS NOTES
Kaylene Gardenia. Admission Date: 6/25/2021             Daily Progress Note: 7/6/2021    The patient's chart is reviewed and the patient is discussed with the staff. 78 y.o  male seen at the request of Dr Ty Vaughan who presented as a direct admit from Dr Dana Arriola office for uncontrollable coughing.  Pt is known to our office and was seen while hospitalized on 6/15.  PMHx includes chronic respiratory failure on 2L O2 qhs, prior tobacco abuse, RLL SCC s/p excision 7/23/2020, afib on Eliquis, HTN, GERD and OA. Pt completed chemo 10/2020. He had repeat chest CT 12/22/2020 with a large R pleural effusion and collapse of remaining RUL. Pt had a R thoracentesis on 12/28/2020 with 900mL bloody fluid removed but unable to tolerate a complete drainage. A chest tube was placed on 1/4/21 with 800cc serosanguineous drainage. General surgery was consulted and underwent R VATS with conversion to open thoracotomy with extensive pneumolysis with decortication 1/6/2021. We were consulted intraoperatively for bronch and were unable to get lung to inflate intraoperatively.  Pt had repeat bronch on 1/7/21 secondary to persistent RUL atelectasis. He was found to have a stump area from prior lobectomy with abnormal appearing tissue that was biopsied and nondiagnostic. Pt had a repeat bronch on 1/12. Pt has had congestion since his pneumonectomy whenever he lies on his R side. He was admitted from the [facility] On 6/8/21 with shortness of breath, fever, and weakness.  A R chest tube was placed for possible empyema.  General surgery was consulted and was taken for right thoracotomy with decortication and bronchopleural fistula repair 6/14/21. ID is following for antimicrobial recommendations. He was discharged home on 6/17 on keflex and flagyl.    Id has been consulted this admission and is following. Currently on Zosyn (6/25- ), cultures pending.  Nasal MRSA negative.  Bronched 6/29 with anesthesia; after extubation had increased wheeze and tachypnea requiring BiPAP and transferred to ICU. Diagnostic thoracentesis 6/30.   Subjective:   Doing ok -up in chair on 5 L O2    Current Facility-Administered Medications   Medication Dose Route Frequency    gabapentin (NEURONTIN) capsule 300 mg  300 mg Oral TID    levalbuterol (XOPENEX) nebulizer soln 1.25 mg/3 mL  1.25 mg Nebulization QID RT    ondansetron (ZOFRAN) injection 4 mg  4 mg IntraVENous Q4H PRN    saline peripheral flush soln 10 mL  10 mL InterCATHeter PRN    cephALEXin (KEFLEX) capsule 500 mg  500 mg Oral QID    doxycycline (VIBRAMYCIN) capsule 100 mg  100 mg Oral Q12H    budesonide (PULMICORT) 500 mcg/2 ml nebulizer suspension  500 mcg Nebulization BID RT    HYDROcodone-homatropine (HYCODAN) 5-1.5 mg/5 mL (5 mL) oral solution 5 mL  5 mL Oral Q4H PRN    apixaban (ELIQUIS) tablet 5 mg  5 mg Oral Q12H    metoprolol succinate (TOPROL-XL) XL tablet 50 mg  50 mg Oral DAILY    sodium chloride 3% hypertonic nebulizer soln  4 mL Nebulization BID RT       Review of Systems    Constitutional: negative for fever, chills, sweats  Cardiovascular: negative for chest pain, palpitations, syncope, edema  Gastrointestinal:  negative for dysphagia, reflux, vomiting, diarrhea, abdominal pain, or melena  Neurologic:  negative for focal weakness, numbness, headache    Objective:     Vitals:    07/06/21 0701 07/06/21 0724 07/06/21 1102 07/06/21 1200   BP:  101/73 94/67    Pulse:  85 70    Resp:  16 16    Temp:  97.4 °F (36.3 °C) 97.4 °F (36.3 °C)    SpO2: 99% 98% 99% 98%   Weight:       Height:             Intake/Output Summary (Last 24 hours) at 7/6/2021 1256  Last data filed at 7/6/2021 0916  Gross per 24 hour   Intake 1330 ml   Output 550 ml   Net 780 ml       Physical Exam:   Constitution:  the patient is well developed and in no acute distress  EENMT:  Sclera clear, pupils equal, oral mucosa moist  Respiratory: clear  Cardiovascular:  RRR without M,G,R  Gastrointestinal: soft and non-tender; with positive bowel sounds. Musculoskeletal: warm without cyanosis. There is no lower extremity edema. Skin:  no jaundice or rashes, surgical wounds   Neurologic: no gross neuro deficits     Psychiatric:  alert and oriented x 3    CXR:       LAB  No results for input(s): GLUCPOC in the last 72 hours. No lab exists for component: GLPOC   Recent Labs     07/05/21  0413   WBC 8.9   HGB 8.5*   HCT 28.3*        Recent Labs     07/06/21  0316      K 4.0      CO2 33*      BUN 15   CREA 0.63*   CA 8.7     No results for input(s): PH, PCO2, PO2, HCO3, PHI, PCO2I, PO2I, HCO3I in the last 72 hours. No results for input(s): LCAD, LAC in the last 72 hours. Assessment:  (Medical Decision Making)     Hospital Problems  Date Reviewed: 6/25/2021        Codes Class Noted POA    S/P pneumonectomy ICD-10-CM: Z90.2  ICD-9-CM: V45.76  7/1/2021 Unknown        Debility ICD-10-CM: R53.81  ICD-9-CM: 799.3  6/28/2021 Unknown        History of thoracotomy (Chronic) ICD-10-CM: P72.862  ICD-9-CM: V45.89  6/25/2021 Yes        Tracheobronchitis ICD-10-CM: J40  ICD-9-CM: 108  6/25/2021 Unknown        Moderate protein-calorie malnutrition ([facility] ICD-10-CM: E44.0  ICD-9-CM: 263.0  6/17/2021 Yes        Acute on chronic respiratory failure with hypoxemia ([facility] ICD-10-CM: J96.21  ICD-9-CM: 518.84  6/8/2021 Yes        * (Principal) Cough ICD-10-CM: R05  ICD-9-CM: 786.2  7/29/2020 Yes        Cancer of bronchus of right lower lobe (HCC) (Chronic) ICD-10-CM: C34.31  ICD-9-CM: 162.5  7/23/2020 Yes        Pulmonary emphysema (HCC) (Chronic) ICD-10-CM: J43.9  ICD-9-CM: 492.8  7/7/2020 Yes              Plan:  (Medical Decision Making)   1    Wean o2- on 5 l  2    To go to rehab  --    More than 50% of the time documented was spent in face-to-face contact with the patient and in the care of the patient on the floor/unit where the patient is located.     Geraldine Holt MD

## 2021-07-06 NOTE — PROGRESS NOTES
Problem: Airway Clearance - Ineffective  Goal: *Patent airway  Outcome: Progressing Towards Goal  Goal: *Absence of airway secretions  Outcome: Progressing Towards Goal  Goal: *Able to cough effectively  Outcome: Progressing Towards Goal  Goal: *PALLIATIVE CARE:  Alleviation of secretions, cough and/or nasal congestion  Outcome: Progressing Towards Goal     Problem: Patient Education: Go to Patient Education Activity  Goal: Patient/Family Education  Outcome: Progressing Towards Goal     Problem: Falls - Risk of  Goal: *Absence of Falls  Description: Document Belen Fall Risk and appropriate interventions in the flowsheet. Outcome: Progressing Towards Goal  Note: Fall Risk Interventions:  Mobility Interventions: Communicate number of staff needed for ambulation/transfer, Patient to call before getting OOB         Medication Interventions: Patient to call before getting OOB, Teach patient to arise slowly    Elimination Interventions: Call light in reach, Patient to call for help with toileting needs              Problem: Patient Education: Go to Patient Education Activity  Goal: Patient/Family Education  Outcome: Progressing Towards Goal     Problem: Pressure Injury - Risk of  Goal: *Prevention of pressure injury  Description: Document Sidney Scale and appropriate interventions in the flowsheet.   Outcome: Progressing Towards Goal  Note: Pressure Injury Interventions:  Sensory Interventions: Assess changes in LOC    Moisture Interventions: Absorbent underpads, Contain wound drainage, Maintain skin hydration (lotion/cream), Moisture barrier    Activity Interventions: Increase time out of bed, Pressure redistribution bed/mattress(bed type)    Mobility Interventions: HOB 30 degrees or less, Pressure redistribution bed/mattress (bed type), PT/OT evaluation, Chair cushion    Nutrition Interventions: Document food/fluid/supplement intake, Offer support with meals,snacks and hydration    Friction and Shear Interventions: Apply protective barrier, creams and emollients, Foam dressings/transparent film/skin sealants, HOB 30 degrees or less, Minimize layers                Problem: Patient Education: Go to Patient Education Activity  Goal: Patient/Family Education  Outcome: Progressing Towards Goal     Problem: Patient Education: Go to Patient Education Activity  Goal: Patient/Family Education  Outcome: Progressing Towards Goal     Problem: General Medical Care Plan  Goal: *Vital signs within specified parameters  Outcome: Progressing Towards Goal  Goal: *Labs within defined limits  Outcome: Progressing Towards Goal  Goal: *Absence of infection signs and symptoms  Outcome: Progressing Towards Goal  Goal: *Optimal pain control at patient's stated goal  Outcome: Progressing Towards Goal  Goal: *Skin integrity maintained  Outcome: Progressing Towards Goal  Goal: *Fluid volume balance  Outcome: Progressing Towards Goal  Goal: *Optimize nutritional status  Outcome: Progressing Towards Goal  Goal: *Anxiety reduced or absent  Outcome: Progressing Towards Goal  Goal: *Progressive mobility and function (eg: ADL's)  Outcome: Progressing Towards Goal     Problem: Patient Education: Go to Patient Education Activity  Goal: Patient/Family Education  Outcome: Progressing Towards Goal

## 2021-07-07 NOTE — PROGRESS NOTES
END OF SHIFT NOTE:    INTAKE/OUTPUT  07/06 0701 - 07/07 0700  In: 1440 [P.O.:1440]  Out: 4713 [Urine:1575]  Voiding: YES  Catheter: NO  Drain:              Flatus: Patient does have flatus present. Stool:  0 occurrences. Characteristics:  Stool Assessment  Stool Color: Brown  Stool Appearance: Formed  Stool Amount: Medium  Stool Source/Status: Rectum    Emesis: 0 occurrences. Characteristics:        VITAL SIGNS  Patient Vitals for the past 12 hrs:   Temp Pulse Resp BP SpO2   07/07/21 1539     94 %   07/07/21 1530 97.9 °F (36.6 °C) 86 18 102/68 97 %   07/07/21 1155     95 %   07/07/21 1135 97.7 °F (36.5 °C) 74 18 120/72 94 %   07/07/21 0859     97 %   07/07/21 0725 98 °F (36.7 °C) 79 18 115/77 96 %       Pain Assessment  Pain Intensity 1: 0 (07/07/21 1155)  Pain Location 1: Incisional  Pain Intervention(s) 1: Rest  Patient Stated Pain Goal: 0    Ambulating  Yes    Shift report given to oncoming nurse at the bedside.     Lacie Bella RN

## 2021-07-07 NOTE — PROGRESS NOTES
END OF SHIFT NOTE:    INTAKE/OUTPUT  07/06 0701 - 07/07 0700  In: 1440 [P.O.:1440]  Out: 8602 [Urine:1575]  Voiding: YES  Catheter: NO  Drain:              Flatus: Patient does have flatus present. Stool:  0 occurrences. Characteristics:  Stool Assessment  Stool Color: Brown  Stool Appearance: Formed  Stool Amount: Medium  Stool Source/Status: Rectum    Emesis: 0 occurrences. Characteristics:        VITAL SIGNS  Patient Vitals for the past 12 hrs:   Temp Pulse Resp BP SpO2   07/07/21 0247 98.1 °F (36.7 °C) 75 16 132/80 98 %   07/06/21 2306 99 °F (37.2 °C) 70 18 110/72 98 %   07/06/21 2036     98 %   07/06/21 1928 98.7 °F (37.1 °C) 81 18 104/70 97 %       Pain Assessment  Pain Intensity 1: 0 (07/07/21 0231)  Pain Location 1: Incisional  Pain Intervention(s) 1: Rest  Patient Stated Pain Goal: 0    Ambulating  No    Shift report given to oncoming nurse at the bedside.     Keerthi Whitley RN

## 2021-07-07 NOTE — PROGRESS NOTES
H&P/Consult Note/Progress Note/Office Note:   Lyla Weaver MRN: [id number]  :1950  Age:75 y.o.    HPI: Lyla Zazueta. is a 78 y.o. male who is well known to Dr. 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 Now he is s/p bronchiopleural fistula repair 21. His post-operative course was unremarkable. Prior to surgery he presented with complaints of fever and shortness of breath. He was admitted on 2021 for sepsis r/t right sided empyema. The patient states that over the course of the past few months when he would roll on his left side he would have \"gushing\" fluid from his chest. He presented to the office today for routine follow up and has complaints of a cough. Due to concern for disrupting surgical repair, he will be admitted for further observation and care. 21: Pt awake in bed. Feels like coughing is beginning to improved. 24hr Max Temp 99.2. O2 Sat 96% on 4L o2 via NC.   21: Pt awake in bed. Reports continues to cough. O2 Sat 94% on 4L o2 via NC. AF. NAD.   21 Pt eating breakfast- with complaints of little to no appetite. . Reports cough better- states he is coughing up some mucous. Continues to be SOB with conversation. ID and pulmonary following. Remains AF. CXR reviwed. 21  Seen in PACU after bronchoscopy. Appears comfortable on BiPap. Noted no fistula and plan thoracentesis tomorrow  21- In ICU off Bi Pap today appears to be breathing better. Awaiting thoracentesis. 21 In ICU awaiting floor bed. Cough is better. Breather is better. Awaiting results from thoracentesis. 21Transferred to surgical floor yesterday. Denies sob, feels breathing is better. Productive cough. Awaiting results from thoracentesis. 21 Awake in bed, No new issues. Denies sob, feels breathing is better. Productive cough. Awaiting results from thoracentesis. Currently on 2L O2 via NC.   21 Awake in bed, No new issues. Denies sob. Productive cough. Awaiting results from thoracentesis. Currently on 3L O2 via NC.   7/5/21 Awake in bed, no change in status. C/o of some tightness in upper right chest with deep breath. Oxygenating well on 3L NC. No SOB. 07/06/21 No changes. States the Neurontin helped with the tightness. No complaints. Awaiting placement. 07/07/21 No changes- awaiting rehab bed placement. Currently on 4 liters oxygen. 98% sat    Past Medical History:   Diagnosis Date    Arrhythmia     Afib     Chronic obstructive pulmonary disease (HCC)     O2 at HS    Chronic pain     right torn rotator cuff; left knee    GERD (gastroesophageal reflux disease)     controlled with med    Hypertension     hx-- off meds since 4/2020--- followed by dr. Abbie Maynard Hypoxia 02/24/2020    Malignant neoplasm of lower lobe of right lung ([address] 02/26/2020    REC'D CHEMO AND RADIATION--- FOLLOWED BY DR. FOX    Osteoarthritis     Right lower lobe lung mass 02/24/2020    Status post total right knee replacement 2/29/2016     Past Surgical History:   Procedure Laterality Date    HX COLONOSCOPY      HX KNEE ARTHROSCOPY Left     scope X 1, ACL recon X 1    HX KNEE ARTHROSCOPY Right 1974, 1975    X 2     HX KNEE REPLACEMENT Right 2016    HX TONSILLECTOMY  1959    HX VASCULAR ACCESS Right placed 3/2020    port in chest     IR INSERT TUNL CVC W PORT OVER 5 YEARS  3/18/2020    [facility] CHE[facility] SURG[facility] PROCED[facility] UNLISTED      R lobectomy 6/2020; R complete pneumonectomy 1/2021    [facility] SINUS SURGERY PROC UNLISTED  1988, 1991    sinus surg     Current Facility-Administered Medications   Medication Dose Route Frequency    gabapentin (NEURONTIN) capsule 300 mg  300 mg Oral TID    levalbuterol (XOPENEX) nebulizer soln 1.25 mg/3 mL  1.25 mg Nebulization QID RT    ondansetron (ZOFRAN) injection 4 mg  4 mg IntraVENous Q4H PRN    saline peripheral flush soln 10 mL  10 mL InterCATHeter PRN    cephALEXin (KEFLEX) capsule 500 mg  500 mg Oral QID    doxycycline (VIBRAMYCIN) capsule 100 mg  100 mg Oral Q12H    budesonide (PULMICORT) 500 mcg/2 ml nebulizer suspension  500 mcg Nebulization BID RT    HYDROcodone-homatropine (HYCODAN) 5-1.5 mg/5 mL (5 mL) oral solution 5 mL  5 mL Oral Q4H PRN    apixaban (ELIQUIS) tablet 5 mg  5 mg Oral Q12H    metoprolol succinate (TOPROL-XL) XL tablet 50 mg  50 mg Oral DAILY    sodium chloride 3% hypertonic nebulizer soln  4 mL Nebulization BID RT     Albuterol, Celebrex [celecoxib], and [place]  Social History     Socioeconomic History    Marital status:      Spouse name: Not on file    Number of children: Not on file    Years of education: Not on file    Highest education level: Not on file   Tobacco Use    Smoking status: Former Smoker     Packs/day: 1.00     Years: 20.00     Pack years: 20.00     Quit date:      Years since quittin.5    Smokeless tobacco: Never Used    Tobacco comment: patient has no desire to resume    Substance and Sexual Activity    Alcohol use: Yes     Alcohol/week: 4.0 standard drinks     Types: 4 Glasses of wine per week    Drug use: No   Other Topics Concern     Service No    Blood Transfusions No    Caffeine Concern No    Occupational Exposure No    Hobby Hazards No    Sleep Concern No    Stress Concern No    Weight Concern No    Special Diet No    Exercise Yes    Seat Belt Yes   Social History Narrative    . Has long-time girlfriend. Continues to work doing IT support. Social Determinants of Health     Financial Resource Strain:     Difficulty of Paying Living Expenses:    Food Insecurity:     Worried About Running Out of Food in the Last Year:     [address] in the Last Year:    Transportation Needs:     Lack of Transportation (Medical):      Lack of Transportation (Non-Medical):    Physical Activity:     Days of Exercise per Week:     Minutes of Exercise per Session:    Stress:     Feeling of Stress :    Social Connections:     Frequency of Communication with Friends and Family:     Frequency of Social Gatherings with Friends and Family:     Attends [employer] Services:     Active Member of Clubs or Organizations:     Attends Club or Organization Meetings:     Marital Status:      Social History     Tobacco Use   Smoking Status Former Smoker    Packs/day: 1.00    Years: 20.00    Pack years: 20.00    Quit date:     Years since quittin.5   Smokeless Tobacco Never Used   Tobacco Comment    patient has no desire to resume      Family History   Problem Relation Age of Onset    Cancer Mother         uterine    Arrhythmia Sister         afib     ROS: The patient has no difficulty with chest pain or SOB . No fever or chills. Comprehensive review of systems was otherwise unremarkable except as noted above. Physical Exam:   Visit Vitals  /77   Pulse 79   Temp 98 °F (36.7 °C)   Resp 18   Ht 5' 10\" (1.778 m)   Wt 176 lb 12.8 oz (80.2 kg)   SpO2 96%   BMI 25.37 kg/m²       Eyes: Sclera are clear. EOMs intact  ENMT: no external lesions gross hearing normal; no obvious neck masses, no ear or lip lesions, nares normal  CV: RRR. Normal perfusion  Resp: No JVD. no audible wheezing. GI: soft and non-distended     Musculoskeletal: unremarkable with normal function. No embolic signs or cyanosis.    Neuro:  Oriented; moves all 4; no focal deficits  Psychiatric: normal affect and mood, no memory impairment    Recent vitals (if inpt):  Patient Vitals for the past 24 hrs:   BP Temp Pulse Resp SpO2   21 0725 115/77 98 °F (36.7 °C) 79 18 96 %   21 0247 132/80 98.1 °F (36.7 °C) 75 16 98 %   21 2306 110/72 99 °F (37.2 °C) 70 18 98 %   21 2036     98 %   21 1928 104/70 98.7 °F (37.1 °C) 81 18 97 %   21 1522 110/74 98.1 °F (36.7 °C) 78 18 98 %   21 1504     97 %   21 1200     98 %   21 1102 94/67 97.4 °F (36.3 °C) 70 16 99 %       Labs:  Recent Labs 07/06/21  0316 07/05/21  0413   WBC  --  8.9   HGB  --  8.5*   PLT  --  214     --    K 4.0  --      --    CO2 33*  --    BUN 15  --    CREA 0.63*  --      --        Lab Results   Component Value Date/Time    WBC 8.9 07/05/2021 04:13 AM    HGB 8.5 (L) 07/05/2021 04:13 AM    PLATELET 223 51/14/9323 04:13 AM    Sodium 139 07/06/2021 03:16 AM    Potassium 4.0 07/06/2021 03:16 AM    Chloride 102 07/06/2021 03:16 AM    CO2 33 (H) 07/06/2021 03:16 AM    BUN 15 07/06/2021 03:16 AM    Creatinine 0.63 (L) 07/06/2021 03:16 AM    Glucose 100 07/06/2021 03:16 AM    INR 1.9 06/08/2021 10:00 PM    aPTT 51.0 (H) 06/17/2021 05:40 AM    Bilirubin, total 0.7 06/15/2021 10:52 AM    Bilirubin, direct 0.2 06/15/2021 10:52 AM    ALT (SGPT) 8 (L) 06/08/2021 02:50 PM    Alk. phosphatase 142 (H) 06/08/2021 02:50 PM    Troponin-I, Qt. <0.02 (L) 05/05/2020 01:09 AM       I reviewed recent labs and recent radiologic studies. I independently reviewed radiology images for studies I described above or studies I have ordered.    Admission date (for inpatients): 6/25/2021   * No surgery found *  Procedure(s):  ULTRASOUND  THORACENTESIS    ASSESSMENT/PLAN:  Problem List  Date Reviewed: 6/25/2021        Codes Class Noted    Pacemaker ICD-10-CM: Z95.0  ICD-9-CM: V45.01  5/3/2021        S/P pneumonectomy ICD-10-CM: Z90.2  ICD-9-CM: V45.76  7/1/2021        Debility ICD-10-CM: R53.81  ICD-9-CM: 799.3  6/28/2021        History of thoracotomy (Chronic) ICD-10-CM: C82.582  ICD-9-CM: V45.89  6/25/2021        Tracheobronchitis ICD-10-CM: J40  ICD-9-CM: 997  6/25/2021        Moderate protein-calorie malnutrition ([facility] ICD-10-CM: E44.0  ICD-9-CM: 263.0  6/17/2021        Pleural fistula ([facility] ICD-10-CM: J86.0  ICD-9-CM: 510.0  6/14/2021        Atrial flutter ([facility] ICD-10-CM: I48.92  ICD-9-CM: 427.32  6/10/2021        Acute hypoxemic respiratory failure ([facility] ICD-10-CM: J96.01  ICD-9-CM: 518.81  6/8/2021        Bronchopleural fistula ([facility] ICD-10-CM: J86.0  ICD-9-CM: 510.0  6/8/2021        Empyema ([facility] ICD-10-CM: J86.9  ICD-9-CM: 510.9  6/8/2021        Acute on chronic respiratory failure with hypoxemia [facility]) ICD-10-CM: J96.21  ICD-9-CM: 518.84  6/8/2021        COPD exacerbation ([facility] ICD-10-CM: J44.1  ICD-9-CM: 491.21  5/16/2021        Heart block AV complete ([facility] ICD-10-CM: I44.2  ICD-9-CM: 426.0  5/3/2021        Sick sinus syndrome (HCC) ICD-10-CM: I49.5  ICD-9-CM: 427.81  3/18/2021        PAF (paroxysmal atrial fibrillation) ([facility] ICD-10-CM: I48.0  ICD-9-CM: 427.31  2/25/2021        Hypertension ICD-10-CM: I10  ICD-9-CM: 401.9  2/25/2021        Atrial fibrillation with RVR (HCC) ICD-10-CM: I48.91  ICD-9-CM: 427.31  2/25/2021        S/P thoracotomy ICD-10-CM: I27.663  ICD-9-CM: V45.89  1/7/2021        Atelectasis of right lung ICD-10-CM: J98.11  ICD-9-CM: 518.0  1/4/2021        A-fib (HCC) (Chronic) ICD-10-CM: I48.91  ICD-9-CM: 427.31  1/4/2021        Chronic respiratory failure with hypoxia (HCC) (Chronic) ICD-10-CM: J96.11  ICD-9-CM: 518.83, 799.02  1/4/2021        Pleural effusion on right ICD-10-CM: J90  ICD-9-CM: 511.9  12/28/2020        Chemotherapy induced neutropenia ([facility] ICD-10-CM: D70.1, T45.1X5A  ICD-9-CM: 288.03, E933.1  10/23/2020        Dehydration ICD-10-CM: E86.0  ICD-9-CM: 276.51  9/15/2020        S/P lobectomy of lung ICD-10-CM: Z90.2  ICD-9-CM: V45.89  7/29/2020        * (Principal) Cough ICD-10-CM: R05  ICD-9-CM: 786.2  7/29/2020        Atrial tachycardia ([facility] ICD-10-CM: I47.1  ICD-9-CM: 427.89  7/26/2020        Cancer of bronchus of right lower lobe (HCC) (Chronic) ICD-10-CM: C34.31  ICD-9-CM: 162.5  7/23/2020        Lung cancer (HCC) (Chronic) ICD-10-CM: C34.90  ICD-9-CM: 162.9  7/23/2020        Cancer of right lung (HCC) ICD-10-CM: C34.91  ICD-9-CM: 162.9  7/23/2020        Pulmonary emphysema (HCC) (Chronic) ICD-10-CM: J43.9  ICD-9-CM: 492.8  7/7/2020        GERD (gastroesophageal reflux disease) ICD-10-CM: K21.9  ICD-9-CM: 530.81  Unknown        Hypotension (Chronic) ICD-10-CM: I95.9  ICD-9-CM: 458.9  5/5/2020    Overview Signed 1/11/2021  8:39 AM by Marylin Valladares NP     On midodrine             Sepsis due to undetermined organism [facility]) ICD-10-CM: A41.9  ICD-9-CM: 038.9  5/5/2020        Malignant neoplasm of lower lobe of right lung ([address] (Chronic) ICD-10-CM: C34.31  ICD-9-CM: 162.5  2/26/2020    Overview Signed 1/4/2021  7:38 AM by Svitlana Antonio MD     Dec 2020- Echo EF 50%, technically difficult, cannot assess regional wall motion. Aortic root 4.1 cm. No significant valvular disease. Normal DF             Mass of lower lobe of right lung ICD-10-CM: R91.8  ICD-9-CM: 786.6  2/23/2020        Right lower lobe lung mass ICD-10-CM: R91.8  ICD-9-CM: 786.6  2/23/2020        Essential hypertension with goal blood pressure less than 130/80 (Chronic) ICD-10-CM: I10  ICD-9-CM: 401.9  2/19/2018            Principal Problem:    Cough (7/29/2020)    Active Problems:    Pulmonary emphysema ([address] (7/7/2020)      Cancer of bronchus of right lower lobe ([facility] (7/23/2020)      Acute on chronic respiratory failure with hypoxemia (HCC) (6/8/2021)      Moderate protein-calorie malnutrition ([address] (6/17/2021)      History of thoracotomy (6/25/2021)      Tracheobronchitis (6/25/2021)      Debility (6/28/2021)      S/P pneumonectomy (7/1/2021)         Continue current treatment plan  Regular diet/ensure  Suppress cough- Hycodan  PTA meds  Eliquis    Pulmonary following  ID following- recommend keflex 500 mg qid and doxycycline 100mg every 12  Hours for 3-4 weeks- follow appointment with ID 7/28- will need repeat CT chest prior to finish of antibiotics (per ID note 7/2/21)  Referral sent to [facility] 6/29/21- denied and now other referrals sent and awaiting approval.  PPD complete. No surgical needs at this time.  Ok to discharge when rehab bed is available      Signed:  Kimberly Antonio, NP

## 2021-07-07 NOTE — PROGRESS NOTES
Dave Saint. Admission Date: 6/25/2021             Daily Progress Note: 7/7/2021    The patient's chart is reviewed and the patient is discussed with the staff. 78 y.o  male seen at the request of Dr Sally Flynn who presented as a direct admit from Dr Pily Coates office for uncontrollable coughing.  Pt is known to our office and was seen while hospitalized on 6/15.  PMHx includes chronic respiratory failure on 2L O2 qhs, prior tobacco abuse, RLL SCC s/p excision 7/23/2020, afib on Eliquis, HTN, GERD and OA. Pt completed chemo 10/2020. He had repeat chest CT 12/22/2020 with a large R pleural effusion and collapse of remaining RUL. Pt had a R thoracentesis on 12/28/2020 with 900mL bloody fluid removed but unable to tolerate a complete drainage. A chest tube was placed on 1/4/21 with 800cc serosanguineous drainage. General surgery was consulted and underwent R VATS with conversion to open thoracotomy with extensive pneumolysis with decortication 1/6/2021. We were consulted intraoperatively for bronch and were unable to get lung to inflate intraoperatively.  Pt had repeat bronch on 1/7/21 secondary to persistent RUL atelectasis. He was found to have a stump area from prior lobectomy with abnormal appearing tissue that was biopsied and nondiagnostic. Pt had a repeat bronch on 1/12. Pt has had congestion since his pneumonectomy whenever he lies on his R side. He was admitted from the ER On 6/8/21 with shortness of breath, fever, and weakness.  A R chest tube was placed for possible empyema.  General surgery was consulted and was taken for right thoracotomy with decortication and bronchopleural fistula repair 6/14/21. ID is following for antimicrobial recommendations. He was discharged home on 6/17 on keflex and flagyl.    Id has been consulted this admission and is following. Currently on Zosyn (6/25- ), cultures pending.  Nasal MRSA negative.  Bronched 6/29 with anesthesia; after extubation had increased wheeze and tachypnea requiring BiPAP and transferred to ICU. Diagnostic thoracentesis 6/30. Subjective:   Pt states he feels like breathing more labored. Sat up and repositioned and he states that feels better. Pt on 4L, turned down to 3L. Pt states he wears O2 2-4L at home. Per last office note, was wearing O2 at night  Pt on incruse at home, currently getting xopenex and pulmicort with 3% neb saline. Current Facility-Administered Medications   Medication Dose Route Frequency    gabapentin (NEURONTIN) capsule 300 mg  300 mg Oral TID    levalbuterol (XOPENEX) nebulizer soln 1.25 mg/3 mL  1.25 mg Nebulization QID RT    ondansetron (ZOFRAN) injection 4 mg  4 mg IntraVENous Q4H PRN    saline peripheral flush soln 10 mL  10 mL InterCATHeter PRN    cephALEXin (KEFLEX) capsule 500 mg  500 mg Oral QID    doxycycline (VIBRAMYCIN) capsule 100 mg  100 mg Oral Q12H    budesonide (PULMICORT) 500 mcg/2 ml nebulizer suspension  500 mcg Nebulization BID RT    HYDROcodone-homatropine (HYCODAN) 5-1.5 mg/5 mL (5 mL) oral solution 5 mL  5 mL Oral Q4H PRN    apixaban (ELIQUIS) tablet 5 mg  5 mg Oral Q12H    metoprolol succinate (TOPROL-XL) XL tablet 50 mg  50 mg Oral DAILY    sodium chloride 3% hypertonic nebulizer soln  4 mL Nebulization BID RT       Review of Systems    Constitutional: negative for fever, chills, sweats  Cardiovascular: negative for chest pain, palpitations, syncope, edema  Respiratory: cough, clear sputum; pain at incision site.   Gastrointestinal:  negative for dysphagia, reflux, vomiting, diarrhea, abdominal pain, or melena  Neurologic:  negative for focal weakness, numbness, headache  Integument: incision with dressing to left flank/chest    Objective:     Vitals:    07/06/21 2036 07/06/21 2306 07/07/21 0247 07/07/21 0725   BP:  110/72 132/80 115/77   Pulse:  70 75 79   Resp:  18 16 18   Temp:  99 °F (37.2 °C) 98.1 °F (36.7 °C) 98 °F (36.7 °C)   SpO2: 98% 98% 98% 96%   Weight: Height:         Intake/Output:       Physical Exam:   Constitution:  the patient is well developed and in no acute distress  EENMT:  Sclera clear, pupils equal, oral mucosa moist  Respiratory: clear, on 4L NC  Cardiovascular:  RRR without M,G,R  Gastrointestinal: soft and non-tender; with positive bowel sounds. Musculoskeletal: warm without cyanosis. There is no lower extremity edema. Skin:  no jaundice or rashes, surgical wounds   Neurologic: no gross neuro deficits     Psychiatric:  alert and oriented x 3    CXR:   7/6 6/29      LAB  No results for input(s): GLUCPOC in the last 72 hours. No lab exists for component: GLPOC   Recent Labs     07/05/21  0413   WBC 8.9   HGB 8.5*   HCT 28.3*        Recent Labs     07/06/21  0316      K 4.0      CO2 33*      BUN 15   CREA 0.63*   CA 8.7     No results for input(s): PH, PCO2, PO2, HCO3, PHI, PCO2I, PO2I, HCO3I in the last 72 hours. No results for input(s): LCAD, LAC in the last 72 hours.     Pleural Fluid:           Cultures:  Blood x2-6/25-no growth  Sputum-6/25-normal beni  Pleural fluid-6/30-no growth    Inpat Anti-Infectives (From admission, onward)     Start     Ordered Stop    07/02/21 1300  cephALEXin (KEFLEX) capsule 500 mg  500 mg,   Oral,   4 TIMES DAILY      07/02/21 1149 --    07/02/21 1200  doxycycline (VIBRAMYCIN) capsule 100 mg  100 mg,   Oral,   EVERY 12 HOURS      07/02/21 1149 --                 Assessment:  (Medical Decision Making)     Hospital Problems  Date Reviewed: 6/25/2021        Codes Class Noted POA    S/P pneumonectomy ICD-10-CM: Z90.2  ICD-9-CM: V45.76  7/1/2021 Unknown        Debility ICD-10-CM: R53.81  ICD-9-CM: 799.3  6/28/2021 Unknown        History of thoracotomy (Chronic) ICD-10-CM: A22.927  ICD-9-CM: V45.89  6/25/2021 Yes        Tracheobronchitis ICD-10-CM: J40  ICD-9-CM: 588  6/25/2021 Unknown        Moderate protein-calorie malnutrition ([facility] ICD-10-CM: E44.0  ICD-9-CM: 263.0  6/17/2021 Yes Acute on chronic respiratory failure with hypoxemia ([facility] [address] ICD-10-CM: J96.21  ICD-9-CM: 518.84  6/8/2021 Yes        * (Principal) Cough ICD-10-CM: R05  ICD-9-CM: 786.2  7/29/2020 Yes        Cancer of bronchus of right lower lobe (HCC) (Chronic) ICD-10-CM: C34.31  ICD-9-CM: 162.5  7/23/2020 Yes        Pulmonary emphysema (HCC) (Chronic) ICD-10-CM: J43.9  ICD-9-CM: 492.8  7/7/2020 Yes              Plan:  (Medical Decision Making)   --Pt on home O2 regimen; Wants order for portable concentrator because he can't carry the large tank; info sent to office. --To go to rehab-needs to get up with PT/OT;  --Continue use of IS.  --exudative fluid from thoracentesis, but no growth thus far on cultures. AFB negative. No malignant cells seen on cytology. --will need follow up in office in 1 month after discharge. More than 50% of the time documented was spent in face-to-face contact with the patient and in the care of the patient on the floor/unit where the patient is located. Mallory Lefort, NP  Lungs:  Clear on left,decreased on R  Heart:  RRR with no Murmur/Rubs/Gallops    Additional Comments:  Waiting on approval for rehab    I have spoken with and examined the patient. I agree with the above assessment and plan as documented.     Mai Kenyon MD

## 2021-07-07 NOTE — PROGRESS NOTES
Problem: Airway Clearance - Ineffective  Goal: *Patent airway  Outcome: Progressing Towards Goal  Goal: *Absence of airway secretions  Outcome: Progressing Towards Goal  Goal: *Able to cough effectively  Outcome: Progressing Towards Goal  Goal: *PALLIATIVE CARE:  Alleviation of secretions, cough and/or nasal congestion  Outcome: Progressing Towards Goal     Problem: Patient Education: Go to Patient Education Activity  Goal: Patient/Family Education  Outcome: Progressing Towards Goal     Problem: Falls - Risk of  Goal: *Absence of Falls  Description: Document Belen Fall Risk and appropriate interventions in the flowsheet. Outcome: Progressing Towards Goal  Note: Fall Risk Interventions:  Mobility Interventions: Communicate number of staff needed for ambulation/transfer, Patient to call before getting OOB         Medication Interventions: Patient to call before getting OOB, Teach patient to arise slowly    Elimination Interventions: Call light in reach, Patient to call for help with toileting needs              Problem: Patient Education: Go to Patient Education Activity  Goal: Patient/Family Education  Outcome: Progressing Towards Goal     Problem: Pressure Injury - Risk of  Goal: *Prevention of pressure injury  Description: Document Sidney Scale and appropriate interventions in the flowsheet.   Outcome: Progressing Towards Goal  Note: Pressure Injury Interventions:  Sensory Interventions: Assess changes in LOC    Moisture Interventions: Absorbent underpads, Contain wound drainage, Maintain skin hydration (lotion/cream), Moisture barrier    Activity Interventions: Increase time out of bed, Pressure redistribution bed/mattress(bed type)    Mobility Interventions: HOB 30 degrees or less, Pressure redistribution bed/mattress (bed type), PT/OT evaluation    Nutrition Interventions: Document food/fluid/supplement intake, Offer support with meals,snacks and hydration    Friction and Shear Interventions: Minimize layers, Foam dressings/transparent film/skin sealants, Apply protective barrier, creams and emollients                Problem: Patient Education: Go to Patient Education Activity  Goal: Patient/Family Education  Outcome: Progressing Towards Goal     Problem: Patient Education: Go to Patient Education Activity  Goal: Patient/Family Education  Outcome: Progressing Towards Goal     Problem: General Medical Care Plan  Goal: *Vital signs within specified parameters  Outcome: Progressing Towards Goal  Goal: *Labs within defined limits  Outcome: Progressing Towards Goal  Goal: *Absence of infection signs and symptoms  Outcome: Progressing Towards Goal  Goal: *Optimal pain control at patient's stated goal  Outcome: Progressing Towards Goal  Goal: *Skin integrity maintained  Outcome: Progressing Towards Goal  Goal: *Fluid volume balance  Outcome: Progressing Towards Goal  Goal: *Optimize nutritional status  Outcome: Progressing Towards Goal  Goal: *Anxiety reduced or absent  Outcome: Progressing Towards Goal  Goal: *Progressive mobility and function (eg: ADL's)  Outcome: Progressing Towards Goal     Problem: Patient Education: Go to Patient Education Activity  Goal: Patient/Family Education  Outcome: Progressing Towards Goal

## 2021-07-07 NOTE — PROGRESS NOTES
ACUTE PHYSICAL THERAPY GOALS:  (Developed with and agreed upon by patient and/or caregiver. )  LTG:  (1.)Mr. Sarabia will move from supine to sit and sit to supine , scoot up and down and roll side to side in flat bed without siderails with  INDEPENDENT within 7 day(s). (2.)Mr. Sarabia will perform all functional transfers with  SUPERVISION using the least restrictive/no device within 7 day(s). (3.)Mr. Sarabia will ambulate with  STAND BY ASSIST for 250+ feet with normal vital sign response with the least restrictive/no device within 7 day(s). Goal met 21  4. Mr. Sarabia will sit with upright posture on EOB for 10 minutes while performing exercises within 7 days. PHYSICAL THERAPY: Daily Note and AM Treatment Day # 3    Lucy Later. is a 78 y.o. male   PRIMARY DIAGNOSIS: Cough  Cough [R05]  History of thoracotomy [Z98.890]  Procedure(s) (LRB):  ULTRASOUND (Bilateral)  THORACENTESIS (N/A)  7 Days Post-Op    ASSESSMENT:     REHAB RECOMMENDATIONS: CURRENT LEVEL OF FUNCTION:  (Most Recently Demonstrated)   Recommendation to date pending progress:  [address] [place] [address] vs. Short-term Rehab  Equipment:    To Be Determined Bed Mobility:   Supervision  Sit to Stand:   Contact Guard Assistance  Transfers:   Contact Guard Assistance  Gait/Mobility:   Contact Guard Assistance     ASSESSMENT:  Mr. Sarabia is supine in bed and very agreeable to getting up to do therapy. Bed mobility is with supervision. Sitting balance good. Sit to stand with contact guard assist to the walker. Gait training with the rolling walker x 250 feet  with contact guard assist.  Patient is returned to the recliner and the patient is positioned for comfort with needs within reach. Good session. Patient is most pleasant to work with. Having trouble with insurance approval.    Continue PT efforts. SUBJECTIVE:   Mr. Sarabia states, \"Good morning. \"    SOCIAL HISTORY/ LIVING ENVIRONMENT: see eval  Home Environment: Independent living  One/Two Story Residence: One story  Living Alone: No  Support Systems: Family member(s)  OBJECTIVE:     PAIN: VITAL SIGNS: LINES/DRAINS:   Pre Treatment: Pain Screen  Pain Scale 1: Numeric (0 - 10)  Pain Intensity 1: 0  Post Treatment: 0/10   O2  O2 Device: Nasal cannula     MOBILITY: I Mod I S SBA CGA Min Mod Max Total  NT x2 Comments:   Bed Mobility    Rolling [] [] [x] [] [] [] [] [] [] [] []    Supine to Sit [] [] [] [] [] [x] [] [] [] [] []    Scooting [] [] [x] [] [] [] [] [] [] [] []    Sit to Supine [] [] [] [] [] [] [] [] [] [x] []    Transfers    Sit to Stand [] [] [] [] [x] [] [] [] [] [] []    Bed to Chair [] [] [] [] [] [] [] [] [] [x] []    Stand to Sit [] [] [] [] [x] [] [] [] [] [] []    I=Independent, Mod I=Modified Independent, S=Supervision, SBA=Standby Assistance, CGA=Contact Guard Assistance,   Min=Minimal Assistance, Mod=Moderate Assistance, Max=Maximal Assistance, Total=Total Assistance, NT=Not Tested    BALANCE: Good Fair+ Fair Fair- Poor NT Comments   Sitting Static [x] [] [] [] [] []    Sitting Dynamic [x] [] [] [] [] []              Standing Static [] [x] [] [] [] []    Standing Dynamic [] [x] [] [] [] []      GAIT: I Mod I S SBA CGA Min Mod Max Total  NT x2 Comments:   Level of Assistance [] [] [] [] [x] [] [] [] [] [] []    Distance 250 feet     DME Rolling Walker    Gait Quality Slow forward flexed posture    Weightbearing  Status N/A     I=Independent, Mod I=Modified Independent, S=Supervision, SBA=Standby Assistance, CGA=Contact Guard Assistance,   Min=Minimal Assistance, Mod=Moderate Assistance, Max=Maximal Assistance, Total=Total Assistance, NT=Not Tested    PLAN:   FREQUENCY/DURATION: PT Plan of Care: 3 times/week for duration of hospital stay or until stated goals are met, whichever comes first.  TREATMENT:     TREATMENT:   ($$ Therapeutic Activity: 23-37 mins    )  Therapeutic Activity (23 Minutes):  Therapeutic activity included Rolling, Supine to Sit, Sit to Supine, Scooting, Transfer Training, Ambulation on level ground, Sitting balance  and Standing balance to improve functional Mobility, Strength and Activity tolerance.     TREATMENT GRID:  N/A    AFTER TREATMENT POSITION/PRECAUTIONS:  Bed, Needs within reach and RN notified    INTERDISCIPLINARY COLLABORATION:  RN/PCT and PT/PTA    TOTAL TREATMENT DURATION:  PT Patient Time In/Time Out  Time In: 1016  Time Out: 1001 [facility]

## 2021-07-07 NOTE — PROGRESS NOTES
Patient Scarlett Warren has been difficult to get cleared for STR. The facility is currently in contact with patient previous employer [address] [place] [address] to get assistance with this issue. Insurance authorization is required for patient to discharge to STR.

## 2021-07-08 NOTE — PROGRESS NOTES
TRANSFER - OUT REPORT:    Verbal report given to Katelin on [address].  [facility]      Report consisted of patients Situation, Background, Assessment and   Recommendations(SBAR). Information from the following report(s) Kardex was reviewed with the receiving nurse. Lines:   Venous Access Device Port-a-cath 03/10/20 (Active)   Central Line Being Utilized Yes 07/08/21 7209   Criteria for Appropriate Use Limited/no vessel suitable for conventional peripheral access 07/08/21 0909   Site Assessment Clean, dry, & intact 07/08/21 0909   Date of Last Dressing Change 07/02/21 07/08/21 0909   Dressing Status Clean, dry, & intact 07/08/21 0909   Dressing Type Disk with Chlorhexadine gluconate (CHG); Transparent 07/08/21 0909   Action Taken Other (comment) 07/08/21 0909   Date Accessed (Medial Site) 06/26/21 06/29/21 1630   Access Time (Medial Site) 1907 06/26/21 1943   Access Needle Size (Site #1) 20 G 07/02/21 1007   Access Needle Length (Medial Site) 1 inch 07/02/21 1007   Positive Blood Return (Medial Site) Yes 07/08/21 0909   Action Taken (Medial Site) Flushed 07/08/21 0909   Date Needle Changed (Medial Site) 07/02/21 07/04/21 0845   Alcohol Cap Used No 07/08/21 0909        Opportunity for questions and clarification was provided.       Patient transported with:   [facility]

## 2021-07-08 NOTE — PROGRESS NOTES
END OF SHIFT NOTE:    INTAKE/OUTPUT  07/07 0701 - 07/08 0700  In: 620 [P.O.:620]  Out: 9305 [Urine:1670]  Voiding: YES  Catheter: NO  Drain:              Flatus: Patient does have flatus present. Stool:  0 occurrences. Characteristics:  Stool Assessment  Stool Color: Brown  Stool Appearance: Formed  Stool Amount: Medium  Stool Source/Status: Rectum        Emesis: 0 occurrences. Characteristics:        VITAL SIGNS  Patient Vitals for the past 12 hrs:   Temp Pulse Resp BP SpO2   07/08/21 0354 97.8 °F (36.6 °C) 69 18 101/67 99 %   07/07/21 2322 98.4 °F (36.9 °C) 72 18 110/75 97 %   07/07/21 2000     98 %   07/07/21 1926 99.5 °F (37.5 °C) 97 18 105/72 97 %       Pain Assessment  Pain Intensity 1: 5 (07/07/21 2011)  Pain Location 1: Incisional  Pain Intervention(s) 1: Rest  Patient Stated Pain Goal: 0    Ambulating  Yes    Shift report given to oncoming nurse at the bedside.     Sydell Hashimoto, RN

## 2021-07-08 NOTE — DISCHARGE SUMMARY
Landon Bhatti MRN: [id number]     : 1950     Age: 78 y.o. Admit date: 2021     Discharge date: 2021   Attending Physician: Dr. 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 Landon Bhatti is a 78 y.o. male who is well known to Dr. 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 Now he is s/p bronchiopleural fistula repair 21. His post-operative course was unremarkable. Prior to surgery he presented with complaints of fever and shortness of breath. He was admitted on 2021 for sepsis r/t right sided empyema. The patient states that over the course of the past few months when he would roll on his left side he would have \"gushing\" fluid from his chest. He presented to the office today for routine follow up and has complaints of a cough. Due to concern for disrupting surgical repair, he will be admitted for further observation and care. Hospital Course:  Persistent cough and persistent right pleural effusion- bronchoscopy  and thoracentesis  with cultures NGTD. No fistula seen on bronchoscopy. Previous cultures streptococcus sanguinis. He spent couple days in the ICU after bronchoscopy related to need for BiPap. Then transferred to floor.  Maintaining oxygen with NC 2-4L and aggressive pulmonary toilet. Cough better controlled with medication. Tolerating diet. Will continue antibiotics per ID for 4 weeks with repeat CT scheduled. Condition at Discharge: good    Discharge Medications:   Current Discharge Medication List      START taking these medications    Details   doxycycline (VIBRAMYCIN) 100 mg capsule Take 1 Capsule by mouth every twelve (12) hours for 28 days. Qty: 56 Capsule, Refills: 0    Associated Diagnoses: Sepsis due to undetermined organism ([address]      HYDROcodone-homatropine (HYCODAN) 5-1.5 mg/5 mL (5 mL) oral solution Take 5 mL by mouth every four (4) hours as needed for Cough for up to 5 days. Max Daily Amount: 30 mL. Qty: 150 mL, Refills: 0    Associated Diagnoses: Cough      montelukast (SINGULAIR) 10 mg tablet Take 1 Tablet by mouth nightly. Qty: 30 Tablet, Refills: 1      sodium chloride 3 % nebulizer solution 4 mL by Nebulization route two (2) times a day for 30 days. Qty: 240 mL, Refills: 0         CONTINUE these medications which have CHANGED    Details   cephALEXin (KEFLEX) 500 mg capsule Take 1 Capsule by mouth four (4) times daily for 22 days. Qty: 88 Capsule, Refills: 0      gabapentin (NEURONTIN) 300 mg capsule Take 1 Capsule by mouth three (3) times daily. Indications: acute pain following an operation  Qty: 90 Capsule, Refills: 1         CONTINUE these medications which have NOT CHANGED    Details   pantoprazole (Protonix) 40 mg tablet Take 40 mg by mouth daily. levalbuterol (Xopenex) 1.25 mg/3 mL nebu 3 mL by Nebulization route four (4) times daily. Qty: 120 Nebule, Refills: 5      metoprolol succinate (TOPROL-XL) 50 mg XL tablet Take 1 Tab by mouth daily. Qty: 30 Tab, Refills: 5      umeclidinium (Incruse Ellipta) 62.5 mcg/actuation inhaler Take 1 Puff by inhalation daily. Qty: 1 Inhaler, Refills: 11      apixaban (Eliquis) 5 mg tablet Take 1 Tab by mouth two (2) times a day.   Qty: 180 Tab, Refills: 3      OXYGEN-AIR DELIVERY SYSTEMS by Does Not Apply route. 2lpm qhs      Nebulizer & Compressor machine COPD  Qty: 1 Each, Refills: 0      docusate sodium (COLACE) 100 mg capsule Take 1 Capsule by mouth two (2) times a day. Qty: 60 Capsule, Refills: 2      ipratropium (ATROVENT) 0.03 % nasal spray 2 Sprays by Both Nostrils route two (2) times a day. Qty: 30 mL, Refills: 5      polyethylene glycol (MIRALAX) 17 gram packet Take 1 Packet by mouth daily. Indications: constipation  Qty: 30 Packet, Refills: 0      ondansetron hcl (ZOFRAN) 8 mg tablet Take 1 Tab by mouth every eight (8) hours as needed for Nausea. Indications: nausea and vomiting caused by cancer drugs  Qty: 60 Tab, Refills: 3    Associated Diagnoses: CINV (chemotherapy-induced nausea and vomiting)         STOP taking these medications       metroNIDAZOLE (FLAGYL) 500 mg tablet Comments:   Reason for Stopping:         ondansetron (Zofran ODT) 8 mg disintegrating tablet Comments:   Reason for Stopping:         esomeprazole (NexIUM) 20 mg capsule Comments:   Reason for Stopping:         lidocaine-prilocaine (EMLA) topical cream Comments:   Reason for Stopping:                 Disposition/Discharge Instructions/Follow-up Care: Follow-up with Dr. Caitie Sullivan.  Follow up with pulmonary 2-4 weeks  Follow up with Infectious disease 7/28/21  CT chest ordered for July 21st  Antibiotic scheduled to July 30th. Oxygen as needed  Please continue to use Incentive spirometer and flutter valve  Continue PT / OT     Diet - as tolerated  Activity - ambulate - as tolerated . May shower      Hycodan to control cough. Resume other home medications.   Take Rx as prescribed   Take stool softeners while on narcotics to avoid constipation     If problems or questions arise, please call our office at [phone].     Greater than 30 minutes were spent discharging the patient        Signed:  Lolis Salcedo NP  7/8/2021  1:23 PM

## 2021-07-08 NOTE — PROGRESS NOTES
Care Management Interventions  PCP Verified by CM: Yes (Dr. Rosa Maria Box)  Mode of Transport at Discharge: BLS  Transition of Care Consult (CM Consult): Discharge Planning, SNF  Discharge Durable Medical Equipment: No  Physical Therapy Consult: Yes  Occupational Therapy Consult: Yes  Speech Therapy Consult: No  Current Support Network: Own Home, Lives with Spouse  Confirm Follow Up Transport: Self  The Patient and/or Patient Representative was Provided with a Choice of Provider and Agrees with the Discharge Plan?: Yes  Name of the Patient Representative Who was Provided with a Choice of Provider and Agrees with the Discharge Plan: self  Freedom of Choice List was Provided with Basic Dialogue that Supports the Patient's Individualized Plan of Care/Goals, Treatment Preferences and Shares the Quality Data Associated with the Providers?: Yes   Resource Information Provided?: No  Discharge Location  Discharge Placement: Skilled nursing facility    Patient to discharge to [facility] today to room 107W. Patient to transport via [facility] services at [facility]. CM notified patient who verbalized understanding and is in agreement. All milestones for discharge have been met. CM provided RN with number to call report for continuation of care. CM remains available should any need arise.

## 2021-07-08 NOTE — PROGRESS NOTES
Dave Saint. Admission Date: 6/25/2021             Daily Progress Note: 7/8/2021    The patient's chart is reviewed and the patient is discussed with the staff. 78 y.o  male seen at the request of Dr Sally Flynn who presented as a direct admit from Dr Pily Coates office for uncontrollable coughing.  Pt is known to our office and was seen while hospitalized on 6/15.  PMHx includes chronic respiratory failure on 2L O2 qhs, prior tobacco abuse, RLL SCC s/p excision 7/23/2020, afib on Eliquis, HTN, GERD and OA. Pt completed chemo 10/2020. He had repeat chest CT 12/22/2020 with a large R pleural effusion and collapse of remaining RUL. Pt had a R thoracentesis on 12/28/2020 with 900mL bloody fluid removed but unable to tolerate a complete drainage. A chest tube was placed on 1/4/21 with 800cc serosanguineous drainage. General surgery was consulted and underwent R VATS with conversion to open thoracotomy with extensive pneumolysis with decortication 1/6/2021. We were consulted intraoperatively for bronch and were unable to get lung to inflated intraoperatively.  Pt had repeat bronch on 1/7/21 secondary to persistent RUL atelectasis. He was found to have a stump area from prior lobectomy with abnormal appearing tissue that was biopsied and nondiagnostic. Pt had a repeat bronch on 1/12. Pt has had congestion since his pneumonectomy whenever he lies on his R side. He was admitted from the [facility] On 6/8/21 with shortness of breath, fever, and weakness.  A R chest tube was placed for possible empyema.  General surgery was consulted and was taken for right thoracotomy with decortication and bronchopleural fistula repair 6/14/21. ID is following for antimicrobial recommendations. He was discharged home on 6/17 on keflex and flagyl.    Id has been consulted this admission and is following. Currently on Zosyn (6/25- ), cultures pending.  Nasal MRSA negative.  Bronched 6/29 with anesthesia; after extubation had increased wheeze and tachypnea requiring BiPAP and transferred to ICU. Diagnostic thoracentesis 6/30. Subjective:   Pt feels breathing is ok, c/o runny nose. Eating breakfast.  Pt on 3L. Pt states he wears O2 2-4L at home. Current Facility-Administered Medications   Medication Dose Route Frequency    gabapentin (NEURONTIN) capsule 300 mg  300 mg Oral TID    levalbuterol (XOPENEX) nebulizer soln 1.25 mg/3 mL  1.25 mg Nebulization QID RT    ondansetron (ZOFRAN) injection 4 mg  4 mg IntraVENous Q4H PRN    saline peripheral flush soln 10 mL  10 mL InterCATHeter PRN    cephALEXin (KEFLEX) capsule 500 mg  500 mg Oral QID    doxycycline (VIBRAMYCIN) capsule 100 mg  100 mg Oral Q12H    budesonide (PULMICORT) 500 mcg/2 ml nebulizer suspension  500 mcg Nebulization BID RT    HYDROcodone-homatropine (HYCODAN) 5-1.5 mg/5 mL (5 mL) oral solution 5 mL  5 mL Oral Q4H PRN    apixaban (ELIQUIS) tablet 5 mg  5 mg Oral Q12H    metoprolol succinate (TOPROL-XL) XL tablet 50 mg  50 mg Oral DAILY    sodium chloride 3% hypertonic nebulizer soln  4 mL Nebulization BID RT       Review of Systems    Constitutional: negative for fever, chills, sweats  Cardiovascular: negative for chest pain, palpitations, syncope, edema  Respiratory: cough, clear sputum; pain at incision site.   Gastrointestinal:  negative for dysphagia, reflux, vomiting, diarrhea, abdominal pain, or melena  Neurologic:  negative for focal weakness, numbness, headache  Integument: incision with dressing to left flank/chest    Objective:     Vitals:    07/07/21 2000 07/07/21 2322 07/08/21 0354 07/08/21 0745   BP:  110/75 101/67 105/72   Pulse:  72 69 75   Resp:  18 18 18   Temp:  98.4 °F (36.9 °C) 97.8 °F (36.6 °C) 97.9 °F (36.6 °C)   SpO2: 98% 97% 99% 97%   Weight:       Height:         Intake/Output:       Physical Exam:   Constitution:  the patient is well developed and in no acute distress  EENMT:  Sclera clear, pupils equal, oral mucosa moist  Respiratory: clear, on 4L NC  Cardiovascular:  RRR without M,G,R  Gastrointestinal: soft and non-tender; with positive bowel sounds. Musculoskeletal: warm without cyanosis. There is no lower extremity edema. Skin:  no jaundice or rashes, surgical wounds   Neurologic: no gross neuro deficits     Psychiatric:  alert and oriented x 3    CXR:   7/6 6/29      LAB  No results for input(s): GLUCPOC in the last 72 hours. No lab exists for component: GLPOC   No results for input(s): WBC, HGB, HCT, PLT, INR, HGBEXT, HCTEXT, PLTEXT, INREXT, HGBEXT, HCTEXT, PLTEXT, INREXT in the last 72 hours. Recent Labs     07/06/21  0316      K 4.0      CO2 33*      BUN 15   CREA 0.63*   CA 8.7     No results for input(s): PH, PCO2, PO2, HCO3, PHI, PCO2I, PO2I, HCO3I in the last 72 hours. No results for input(s): LCAD, LAC in the last 72 hours.     Pleural Fluid:           Cultures:  Blood x2-6/25-no growth  Sputum-6/25-normal beni  Pleural fluid-6/30-no growth    Inpat Anti-Infectives (From admission, onward)     Start     Ordered Stop    07/02/21 1300  cephALEXin (KEFLEX) capsule 500 mg  500 mg,   Oral,   4 TIMES DAILY      07/02/21 1149 --    07/02/21 1200  doxycycline (VIBRAMYCIN) capsule 100 mg  100 mg,   Oral,   EVERY 12 HOURS      07/02/21 1149 --                 Assessment:  (Medical Decision Making)     Hospital Problems  Date Reviewed: 6/25/2021        Codes Class Noted POA    S/P pneumonectomy ICD-10-CM: Z90.2  ICD-9-CM: V45.76  7/1/2021 Unknown        Debility ICD-10-CM: R53.81  ICD-9-CM: 799.3  6/28/2021 Unknown        History of thoracotomy (Chronic) ICD-10-CM: D41.672  ICD-9-CM: V45.89  6/25/2021 Yes        Tracheobronchitis ICD-10-CM: J40  ICD-9-CM: 150  6/25/2021 Unknown        Moderate protein-calorie malnutrition ([facility] ICD-10-CM: E44.0  ICD-9-CM: 263.0  6/17/2021 Yes        Acute on chronic respiratory failure with hypoxemia ([facility] ICD-10-CM: J96.21  ICD-9-CM: 518.84  6/8/2021 Yes * (Principal) Cough ICD-10-CM: R05  ICD-9-CM: 786.2  7/29/2020 Yes        Cancer of bronchus of right lower lobe (HCC) (Chronic) ICD-10-CM: C34.31  ICD-9-CM: 162.5  7/23/2020 Yes        Pulmonary emphysema (HCC) (Chronic) ICD-10-CM: J43.9  ICD-9-CM: 492.8  7/7/2020 Yes              Plan:  (Medical Decision Making)   --To go to rehab-unknown when. Per CM, awaiting insurance approval, but has bed ready. continue PT/OT; For D/C meds, continue incruse, xopenex nebs. Ordered on DC form. --Continue use of IS.  --start Singulair for daily allergy symptoms and rhinorrhea     More than 50% of the time documented was spent in face-to-face contact with the patient and in the care of the patient on the floor/unit where the patient is located. Ruthie Jaramillo, MEHDI      Lungs:  Absent on right. Nearly clear on the left. Heart:  RRR with no Murmur/Rubs/Gallops    Additional Comments:    Patient seems to be doing well. Completing course of oral abx as outlined by ID. Per their note they plan outpatient follow up. It should be noted, it is expected for him to have fluid present on the right side s/p completion pneumonectomy. From a pulmonary standpoint he seems stable. Would continue current regimen and make sure he has albuterol and 3% saline nebs and flutter valve to use at home at the time of discharged to help with airway clearance. Can follow up with us as outpatient in 2-4 weeks following discharge. No additional pulmonary input at this time. We will sign off but please let us know if new issues arise. I have spoken with and examined the patient. I agree with the above assessment and plan as documented.     Sav Nuñez MD

## 2021-07-08 NOTE — PROGRESS NOTES
Problem: Airway Clearance - Ineffective  Goal: *Patent airway  Outcome: Progressing Towards Goal  Goal: *Absence of airway secretions  Outcome: Progressing Towards Goal  Goal: *Able to cough effectively  Outcome: Progressing Towards Goal  Goal: *PALLIATIVE CARE:  Alleviation of secretions, cough and/or nasal congestion  Outcome: Progressing Towards Goal     Problem: Patient Education: Go to Patient Education Activity  Goal: Patient/Family Education  Outcome: Progressing Towards Goal     Problem: Falls - Risk of  Goal: *Absence of Falls  Description: Document Belen Fall Risk and appropriate interventions in the flowsheet. Outcome: Progressing Towards Goal  Note: Fall Risk Interventions:  Mobility Interventions: Communicate number of staff needed for ambulation/transfer, Patient to call before getting OOB         Medication Interventions: Patient to call before getting OOB, Teach patient to arise slowly    Elimination Interventions: Call light in reach, Patient to call for help with toileting needs, Stay With Me (per policy), Toileting schedule/hourly rounds, Toilet paper/wipes in reach, Urinal in reach              Problem: Patient Education: Go to Patient Education Activity  Goal: Patient/Family Education  Outcome: Progressing Towards Goal     Problem: Pressure Injury - Risk of  Goal: *Prevention of pressure injury  Description: Document Sidney Scale and appropriate interventions in the flowsheet.   Outcome: Progressing Towards Goal  Note: Pressure Injury Interventions:  Sensory Interventions: Assess changes in LOC    Moisture Interventions: Absorbent underpads, Contain wound drainage, Maintain skin hydration (lotion/cream), Moisture barrier    Activity Interventions: Increase time out of bed, Pressure redistribution bed/mattress(bed type)    Mobility Interventions: Pressure redistribution bed/mattress (bed type)    Nutrition Interventions: Document food/fluid/supplement intake, Offer support with meals,snacks and hydration    Friction and Shear Interventions: Apply protective barrier, creams and emollients, Foam dressings/transparent film/skin sealants, Minimize layers                Problem: Patient Education: Go to Patient Education Activity  Goal: Patient/Family Education  Outcome: Progressing Towards Goal     Problem: Patient Education: Go to Patient Education Activity  Goal: Patient/Family Education  Outcome: Progressing Towards Goal     Problem: General Medical Care Plan  Goal: *Vital signs within specified parameters  Outcome: Progressing Towards Goal  Goal: *Labs within defined limits  Outcome: Progressing Towards Goal  Goal: *Absence of infection signs and symptoms  Outcome: Progressing Towards Goal  Goal: *Optimal pain control at patient's stated goal  Outcome: Progressing Towards Goal  Goal: *Skin integrity maintained  Outcome: Progressing Towards Goal  Goal: *Fluid volume balance  Outcome: Progressing Towards Goal  Goal: *Optimize nutritional status  Outcome: Progressing Towards Goal  Goal: *Anxiety reduced or absent  Outcome: Progressing Towards Goal  Goal: *Progressive mobility and function (eg: ADL's)  Outcome: Progressing Towards Goal     Problem: Patient Education: Go to Patient Education Activity  Goal: Patient/Family Education  Outcome: Progressing Towards Goal

## 2021-07-08 NOTE — PROGRESS NOTES
SHIVANI spoke with the liaison from [facility] who stated they now have authorization and patient can discharge to the facility today.  SHIVANI notified Macon Najjar, NP.

## 2021-07-08 NOTE — DISCHARGE INSTRUCTIONS
Discharge Instructions/Follow-up Plans:   MD Instructions:     Follow-up with Dr. Michelle Escalona.  Follow up with pulmonary 2-4 weeks  Follow up with Infectious disease 7/28/21  CT chest ordered for July 21st  Antibiotic scheduled to July 30th. Oxygen as needed  Please continue to use Incentive spirometer and flutter valve  Continue PT / OT     Diet - as tolerated  Activity - ambulate - as tolerated . May shower      Hycodan to control cough. Resume other home medications.   Take Rx as prescribed   Take stool softeners while on narcotics to avoid constipation     If problems or questions arise, please call our office at [phone].     Greater than 30 minutes were spent discharging the patient

## 2021-07-08 NOTE — PROGRESS NOTES
H&P/Consult Note/Progress Note/Office Note:   Briseida Velásquez. MRN: [id number]  :1950  Age:75 y.o.    HPI: Briseida Velásquez. is a 78 y.o. male who is well known to Dr. 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 Now he is s/p bronchiopleural fistula repair 21. His post-operative course was unremarkable. Prior to surgery he presented with complaints of fever and shortness of breath. He was admitted on 2021 for sepsis r/t right sided empyema. The patient states that over the course of the past few months when he would roll on his left side he would have \"gushing\" fluid from his chest. He presented to the office today for routine follow up and has complaints of a cough. Due to concern for disrupting surgical repair, he will be admitted for further observation and care. 21: Pt awake in bed. Feels like coughing is beginning to improved. 24hr Max Temp 99.2. O2 Sat 96% on 4L o2 via NC.   21: Pt awake in bed. Reports continues to cough. O2 Sat 94% on 4L o2 via NC. AF. NAD.   21 Pt eating breakfast- with complaints of little to no appetite. . Reports cough better- states he is coughing up some mucous. Continues to be SOB with conversation. ID and pulmonary following. Remains AF. CXR reviwed. 21  Seen in PACU after bronchoscopy. Appears comfortable on BiPap. Noted no fistula and plan thoracentesis tomorrow  21- In ICU off Bi Pap today appears to be breathing better. Awaiting thoracentesis. 21 In ICU awaiting floor bed. Cough is better. Breather is better. Awaiting results from thoracentesis. 21Transferred to surgical floor yesterday. Denies sob, feels breathing is better. Productive cough. Awaiting results from thoracentesis. 21 Awake in bed, No new issues. Denies sob, feels breathing is better. Productive cough. Awaiting results from thoracentesis. Currently on 2L O2 via NC.   21 Awake in bed, No new issues. Denies sob. Productive cough. Awaiting results from thoracentesis. Currently on 3L O2 via NC.   7/5/21 Awake in bed, no change in status. C/o of some tightness in upper right chest with deep breath. Oxygenating well on 3L NC. No SOB. 07/06/21 No changes. States the Neurontin helped with the tightness. No complaints. Awaiting placement. 07/07/21 No changes- awaiting rehab bed placement. Currently on 4 liters oxygen. 98% sat  07/08/21 Continuing to await insurance approval for rehab bed. No complaints. Past Medical History:   Diagnosis Date    Arrhythmia     Afib     Chronic obstructive pulmonary disease (HCC)     O2 at HS    Chronic pain     right torn rotator cuff; left knee    GERD (gastroesophageal reflux disease)     controlled with med    Hypertension     hx-- off meds since 4/2020--- followed by dr. Cely Hester Hypoxia 02/24/2020    Malignant neoplasm of lower lobe of right lung ([facility] [address] 02/26/2020    REC'D CHEMO AND RADIATION--- FOLLOWED BY DR. FOX    Osteoarthritis     Right lower lobe lung mass 02/24/2020    Status post total right knee replacement 2/29/2016     Past Surgical History:   Procedure Laterality Date    HX COLONOSCOPY      HX KNEE ARTHROSCOPY Left     scope X 1, ACL recon X 1    HX KNEE ARTHROSCOPY Right 1974, 1975    X 2     HX KNEE REPLACEMENT Right 2016    HX TONSILLECTOMY  1959    HX VASCULAR ACCESS Right placed 3/2020    port in chest     IR INSERT TUNL CVC W PORT OVER 5 YEARS  3/18/2020    MT CHEST SURGERY PROCEDURE UNLISTED      R lobectomy 6/2020; R complete pneumonectomy 1/2021    MT SINUS SURGERY PROC UNLISTED  1988, 1991    sinus surg     Current Facility-Administered Medications   Medication Dose Route Frequency    montelukast (SINGULAIR) tablet 10 mg  10 mg Oral QHS    gabapentin (NEURONTIN) capsule 300 mg  300 mg Oral TID    levalbuterol (XOPENEX) nebulizer soln 1.25 mg/3 mL  1.25 mg Nebulization QID RT    ondansetron (ZOFRAN) injection 4 mg  4 mg IntraVENous Q4H PRN    saline peripheral flush soln 10 mL  10 mL InterCATHeter PRN    cephALEXin (KEFLEX) capsule 500 mg  500 mg Oral QID    doxycycline (VIBRAMYCIN) capsule 100 mg  100 mg Oral Q12H    budesonide (PULMICORT) 500 mcg/2 ml nebulizer suspension  500 mcg Nebulization BID RT    HYDROcodone-homatropine (HYCODAN) 5-1.5 mg/5 mL (5 mL) oral solution 5 mL  5 mL Oral Q4H PRN    apixaban (ELIQUIS) tablet 5 mg  5 mg Oral Q12H    metoprolol succinate (TOPROL-XL) XL tablet 50 mg  50 mg Oral DAILY    sodium chloride 3% hypertonic nebulizer soln  4 mL Nebulization BID RT     Albuterol, Celebrex [celecoxib], and [place]  Social History     Socioeconomic History    Marital status:      Spouse name: Not on file    Number of children: Not on file    Years of education: Not on file    Highest education level: Not on file   Tobacco Use    Smoking status: Former Smoker     Packs/day: 1.00     Years: 20.00     Pack years: 20.00     Quit date:      Years since quittin.5    Smokeless tobacco: Never Used    Tobacco comment: patient has no desire to resume    Substance and Sexual Activity    Alcohol use: Yes     Alcohol/week: 4.0 standard drinks     Types: 4 Glasses of wine per week    Drug use: No   Other Topics Concern     Service No    Blood Transfusions No    Caffeine Concern No    Occupational Exposure No    Hobby Hazards No    Sleep Concern No    Stress Concern No    Weight Concern No    Special Diet No    Exercise Yes    Seat Belt Yes   Social History Narrative    . Has long-time girlfriend. Continues to work doing IT support. Social Determinants of Health     Financial Resource Strain:     Difficulty of Paying Living Expenses:    Food Insecurity:     Worried About Running Out of Food in the Last Year:     [address] in the Last Year:    Transportation Needs:     Lack of Transportation (Medical):      Lack of Transportation (Non-Medical):    Physical Activity:     Days of Exercise per Week:     Minutes of Exercise per Session:    Stress:     Feeling of Stress :    Social Connections:     Frequency of Communication with Friends and Family:     Frequency of Social Gatherings with Friends and Family:     Attends [employer] Services:     Active Member of Clubs or Organizations:     Attends Club or Organization Meetings:     Marital Status:      Social History     Tobacco Use   Smoking Status Former Smoker    Packs/day: 1.00    Years: 20.00    Pack years: 20.00    Quit date:     Years since quittin.5   Smokeless Tobacco Never Used   Tobacco Comment    patient has no desire to resume      Family History   Problem Relation Age of Onset    Cancer Mother         uterine    Arrhythmia Sister         afib     ROS: The patient has no difficulty with chest pain or SOB . No fever or chills. Comprehensive review of systems was otherwise unremarkable except as noted above. Physical Exam:   Visit Vitals  /75   Pulse 76   Temp 97.9 °F (36.6 °C)   Resp 18   Ht 5' 10\" (1.778 m)   Wt 176 lb 12.8 oz (80.2 kg)   SpO2 91%   BMI 25.37 kg/m²       Eyes: Sclera are clear. EOMs intact  ENMT: no external lesions gross hearing normal; no obvious neck masses, no ear or lip lesions, nares normal  CV: RRR. Normal perfusion  Resp: No JVD. no audible wheezing. GI: soft and non-distended     Musculoskeletal: unremarkable with normal function. No embolic signs or cyanosis.    Neuro:  Oriented; moves all 4; no focal deficits  Psychiatric: normal affect and mood, no memory impairment    Recent vitals (if inpt):  Patient Vitals for the past 24 hrs:   BP Temp Pulse Resp SpO2   21 0904     91 %   21 0856 110/75  76     21 0745 105/72 97.9 °F (36.6 °C) 75 18 97 %   21 0354 101/67 97.8 °F (36.6 °C) 69 18 99 %   21 2322 110/75 98.4 °F (36.9 °C) 72 18 97 %   21 2000     98 %   21 1926 105/72 99.5 °F (37.5 °C) 97 18 97 %   07/07/21 1539     94 %   07/07/21 1530 102/68 97.9 °F (36.6 °C) 86 18 97 %   07/07/21 1155     95 %   07/07/21 1135 120/72 97.7 °F (36.5 °C) 74 18 94 %       Labs:  Recent Labs     07/06/21  0316      K 4.0      CO2 33*   BUN 15   CREA 0.63*          Lab Results   Component Value Date/Time    WBC 8.9 07/05/2021 04:13 AM    HGB 8.5 (L) 07/05/2021 04:13 AM    PLATELET 768 15/22/0392 04:13 AM    Sodium 139 07/06/2021 03:16 AM    Potassium 4.0 07/06/2021 03:16 AM    Chloride 102 07/06/2021 03:16 AM    CO2 33 (H) 07/06/2021 03:16 AM    BUN 15 07/06/2021 03:16 AM    Creatinine 0.63 (L) 07/06/2021 03:16 AM    Glucose 100 07/06/2021 03:16 AM    INR 1.9 06/08/2021 10:00 PM    aPTT 51.0 (H) 06/17/2021 05:40 AM    Bilirubin, total 0.7 06/15/2021 10:52 AM    Bilirubin, direct 0.2 06/15/2021 10:52 AM    ALT (SGPT) 8 (L) 06/08/2021 02:50 PM    Alk. phosphatase 142 (H) 06/08/2021 02:50 PM    Troponin-I, Qt. <0.02 (L) 05/05/2020 01:09 AM       I reviewed recent labs and recent radiologic studies. I independently reviewed radiology images for studies I described above or studies I have ordered.    Admission date (for inpatients): 6/25/2021   * No surgery found *  Procedure(s):  ULTRASOUND  THORACENTESIS    ASSESSMENT/PLAN:  Problem List  Date Reviewed: 6/25/2021        Codes Class Noted    Pacemaker ICD-10-CM: Z95.0  ICD-9-CM: V45.01  5/3/2021        S/P pneumonectomy ICD-10-CM: Z90.2  ICD-9-CM: V45.76  7/1/2021        Debility ICD-10-CM: R53.81  ICD-9-CM: 799.3  6/28/2021        History of thoracotomy (Chronic) ICD-10-CM: I69.263  ICD-9-CM: V45.89  6/25/2021        Tracheobronchitis ICD-10-CM: J40  ICD-9-CM: 539  6/25/2021        Moderate protein-calorie malnutrition ([facility] [address] ICD-10-CM: E44.0  ICD-9-CM: 263.0  6/17/2021        Pleural fistula ([facility] [address] ICD-10-CM: J86.0  ICD-9-CM: 510.0  6/14/2021        Atrial flutter ([facility] [address] ICD-10-CM: I48.92  ICD-9-CM: 427.32  6/10/2021        Acute hypoxemic respiratory failure [facility]) ICD-10-CM: J96.01  ICD-9-CM: 518.81  6/8/2021        Bronchopleural fistula ([facility] ICD-10-CM: J86.0  ICD-9-CM: 510.0  6/8/2021        Empyema ([place] [address] ICD-10-CM: J86.9  ICD-9-CM: 510.9  6/8/2021        Acute on chronic respiratory failure with hypoxemia [facility]) ICD-10-CM: J96.21  ICD-9-CM: 518.84  6/8/2021        COPD exacerbation ([place] [address] ICD-10-CM: J44.1  ICD-9-CM: 491.21  5/16/2021        Heart block AV complete ([place] [address] ICD-10-CM: I44.2  ICD-9-CM: 426.0  5/3/2021        Sick sinus syndrome (HCC) ICD-10-CM: I49.5  ICD-9-CM: 427.81  3/18/2021        PAF (paroxysmal atrial fibrillation) ([place] [address] ICD-10-CM: I48.0  ICD-9-CM: 427.31  2/25/2021        Hypertension ICD-10-CM: I10  ICD-9-CM: 401.9  2/25/2021        Atrial fibrillation with RVR (HCC) ICD-10-CM: I48.91  ICD-9-CM: 427.31  2/25/2021        S/P thoracotomy ICD-10-CM: Z27.137  ICD-9-CM: V45.89  1/7/2021        Atelectasis of right lung ICD-10-CM: J98.11  ICD-9-CM: 518.0  1/4/2021        A-fib (HCC) (Chronic) ICD-10-CM: I48.91  ICD-9-CM: 427.31  1/4/2021        Chronic respiratory failure with hypoxia (HCC) (Chronic) ICD-10-CM: J96.11  ICD-9-CM: 518.83, 799.02  1/4/2021        Pleural effusion on right ICD-10-CM: J90  ICD-9-CM: 511.9  12/28/2020        Chemotherapy induced neutropenia ([facility] ICD-10-CM: D70.1, T45.1X5A  ICD-9-CM: 288.03, E933.1  10/23/2020        Dehydration ICD-10-CM: E86.0  ICD-9-CM: 276.51  9/15/2020        S/P lobectomy of lung ICD-10-CM: Z90.2  ICD-9-CM: V45.89  7/29/2020        * (Principal) Cough ICD-10-CM: R05  ICD-9-CM: 786.2  7/29/2020        Atrial tachycardia ([facility] ICD-10-CM: I47.1  ICD-9-CM: 427.89  7/26/2020        Cancer of bronchus of right lower lobe (HCC) (Chronic) ICD-10-CM: C34.31  ICD-9-CM: 162.5  7/23/2020        Lung cancer ([facility] (Chronic) ICD-10-CM: C34.90  ICD-9-CM: 162.9  7/23/2020        Cancer of right lung ([facility] ICD-10-CM: C34.91  ICD-9-CM: 162.9  7/23/2020        Pulmonary emphysema (HCC) (Chronic) ICD-10-CM: J43.9  ICD-9-CM: 492.8  7/7/2020        GERD (gastroesophageal reflux disease) ICD-10-CM: K21.9  ICD-9-CM: 530.81  Unknown        Hypotension (Chronic) ICD-10-CM: I95.9  ICD-9-CM: 458.9  5/5/2020    Overview Signed 1/11/2021  8:39 AM by Roxi Farias NP     On midodrine             Sepsis due to undetermined organism [facility]) ICD-10-CM: A41.9  ICD-9-CM: 038.9  5/5/2020        Malignant neoplasm of lower lobe of right lung ([facility] [address] (Chronic) ICD-10-CM: C34.31  ICD-9-CM: 162.5  2/26/2020    Overview Signed 1/4/2021  7:38 AM by Patti Cramer MD     Dec 2020- Echo EF 50%, technically difficult, cannot assess regional wall motion. Aortic root 4.1 cm. No significant valvular disease. Normal DF             Mass of lower lobe of right lung ICD-10-CM: R91.8  ICD-9-CM: 786.6  2/23/2020        Right lower lobe lung mass ICD-10-CM: R91.8  ICD-9-CM: 786.6  2/23/2020        Essential hypertension with goal blood pressure less than 130/80 (Chronic) ICD-10-CM: I10  ICD-9-CM: 401.9  2/19/2018            Principal Problem:    Cough (7/29/2020)    Active Problems:    Pulmonary emphysema ([address] (7/7/2020)      Cancer of bronchus of right lower lobe ([address] (7/23/2020)      Acute on chronic respiratory failure with hypoxemia (HCC) (6/8/2021)      Moderate protein-calorie malnutrition ([address] (6/17/2021)      History of thoracotomy (6/25/2021)      Tracheobronchitis (6/25/2021)      Debility (6/28/2021)      S/P pneumonectomy (7/1/2021)         Continue current treatment plan  Regular diet/ensure  Suppress cough- Hycodan  PTA meds  Eliquis    Pulmonary following- gave recommendations for discharge medications  ID following- recommend keflex 500 mg qid and doxycycline 100mg every 12  Hours for 3-4 weeks- follow appointment with ID 7/28- will need repeat CT chest prior to finish of antibiotics (per ID note 7/2/21)  Referral sent to [facility] 6/29/21- denied and now other referrals sent and awaiting approval.  PPD complete.     No surgical needs at this time.  Ok to discharge when rehab bed is available      Signed:  Hood Shi NP

## 2021-07-08 NOTE — PROGRESS NOTES
Problem: Airway Clearance - Ineffective  Goal: *Patent airway  Outcome: Resolved/Met  Goal: *Absence of airway secretions  Outcome: Resolved/Met  Goal: *Able to cough effectively  Outcome: Resolved/Met  Goal: *PALLIATIVE CARE:  Alleviation of secretions, cough and/or nasal congestion  Outcome: Resolved/Met     Problem: Patient Education: Go to Patient Education Activity  Goal: Patient/Family Education  Outcome: Resolved/Met     Problem: Falls - Risk of  Goal: *Absence of Falls  Description: Document Belen Fall Risk and appropriate interventions in the flowsheet. Outcome: Resolved/Met  Note: Fall Risk Interventions:  Mobility Interventions: Communicate number of staff needed for ambulation/transfer, Patient to call before getting OOB         Medication Interventions: Patient to call before getting OOB, Teach patient to arise slowly    Elimination Interventions: Call light in reach, Patient to call for help with toileting needs              Problem: Patient Education: Go to Patient Education Activity  Goal: Patient/Family Education  Outcome: Resolved/Met     Problem: Pressure Injury - Risk of  Goal: *Prevention of pressure injury  Description: Document Sidney Scale and appropriate interventions in the flowsheet.   Outcome: Resolved/Met  Note: Pressure Injury Interventions:  Sensory Interventions: Assess changes in LOC    Moisture Interventions: Absorbent underpads, Contain wound drainage, Maintain skin hydration (lotion/cream), Moisture barrier    Activity Interventions: Increase time out of bed, Pressure redistribution bed/mattress(bed type)    Mobility Interventions: HOB 30 degrees or less, Pressure redistribution bed/mattress (bed type), PT/OT evaluation    Nutrition Interventions: Document food/fluid/supplement intake, Offer support with meals,snacks and hydration    Friction and Shear Interventions: Apply protective barrier, creams and emollients, HOB 30 degrees or less, Minimize layers, Foam dressings/transparent film/skin sealants                Problem: Patient Education: Go to Patient Education Activity  Goal: Patient/Family Education  Outcome: Resolved/Met     Problem: Patient Education: Go to Patient Education Activity  Goal: Patient/Family Education  Outcome: Resolved/Met     Problem: General Medical Care Plan  Goal: *Vital signs within specified parameters  Outcome: Resolved/Met  Goal: *Labs within defined limits  Outcome: Resolved/Met  Goal: *Absence of infection signs and symptoms  Outcome: Resolved/Met  Goal: *Optimal pain control at patient's stated goal  Outcome: Resolved/Met  Goal: *Skin integrity maintained  Outcome: Resolved/Met  Goal: *Fluid volume balance  Outcome: Resolved/Met  Goal: *Optimize nutritional status  Outcome: Resolved/Met  Goal: *Anxiety reduced or absent  Outcome: Resolved/Met  Goal: *Progressive mobility and function (eg: ADL's)  Outcome: Resolved/Met     Problem: Patient Education: Go to Patient Education Activity  Goal: Patient/Family Education  Outcome: Resolved/Met

## 2021-07-18 PROBLEM — I95.9 HYPOTENSION: Chronic | Status: RESOLVED | Noted: 2020-05-05 | Resolved: 2021-01-01

## 2021-08-03 PROBLEM — I10 HYPERTENSION: Status: RESOLVED | Noted: 2021-01-01 | Resolved: 2021-01-01

## 2021-08-03 PROBLEM — I48.91 A-FIB (HCC): Status: RESOLVED | Noted: 2021-01-01 | Resolved: 2021-01-01

## 2021-08-05 PROBLEM — J96.01 ACUTE HYPOXEMIC RESPIRATORY FAILURE (HCC): Status: RESOLVED | Noted: 2021-01-01 | Resolved: 2021-01-01

## 2021-08-05 PROBLEM — J96.21 ACUTE ON CHRONIC RESPIRATORY FAILURE WITH HYPOXEMIA (HCC): Status: RESOLVED | Noted: 2021-01-01 | Resolved: 2021-01-01

## 2021-08-05 PROBLEM — R91.8 RIGHT LOWER LOBE LUNG MASS: Status: RESOLVED | Noted: 2020-02-23 | Resolved: 2021-01-01

## 2021-08-05 PROBLEM — J98.11 ATELECTASIS OF RIGHT LUNG: Status: RESOLVED | Noted: 2021-01-01 | Resolved: 2021-01-01

## 2021-08-05 PROBLEM — T17.500A MUCUS PLUGGING OF BRONCHI: Status: ACTIVE | Noted: 2021-01-01

## 2021-08-05 PROBLEM — J40 TRACHEOBRONCHITIS: Status: RESOLVED | Noted: 2021-01-01 | Resolved: 2021-01-01

## 2021-08-05 PROBLEM — C34.90 LUNG CANCER (HCC): Chronic | Status: RESOLVED | Noted: 2020-07-23 | Resolved: 2021-01-01

## 2021-08-05 PROBLEM — J44.9 COPD (CHRONIC OBSTRUCTIVE PULMONARY DISEASE) (HCC): Status: ACTIVE | Noted: 2020-07-07

## 2021-08-05 PROBLEM — J44.1 COPD EXACERBATION (HCC): Status: RESOLVED | Noted: 2021-01-01 | Resolved: 2021-01-01

## 2021-08-05 PROBLEM — J86.0 BRONCHOPLEURAL FISTULA (HCC): Status: RESOLVED | Noted: 2021-01-01 | Resolved: 2021-01-01

## 2021-08-05 PROBLEM — C34.91 CANCER OF RIGHT LUNG (HCC): Status: RESOLVED | Noted: 2020-07-23 | Resolved: 2021-01-01

## 2021-08-05 PROBLEM — J86.0: Status: RESOLVED | Noted: 2021-01-01 | Resolved: 2021-01-01

## 2021-09-08 PROBLEM — T17.500A MUCUS PLUGGING OF BRONCHI: Status: RESOLVED | Noted: 2021-01-01 | Resolved: 2021-01-01

## 2021-09-08 PROBLEM — A41.9 SEPSIS DUE TO UNDETERMINED ORGANISM (HCC): Status: RESOLVED | Noted: 2020-05-05 | Resolved: 2021-01-01

## 2021-09-24 PROBLEM — J96.22 ACUTE ON CHRONIC RESPIRATORY FAILURE WITH HYPOXIA AND HYPERCAPNIA (HCC): Status: ACTIVE | Noted: 2021-01-01

## 2021-09-24 PROBLEM — J96.21 ACUTE ON CHRONIC RESPIRATORY FAILURE WITH HYPOXIA AND HYPERCAPNIA (HCC): Status: ACTIVE | Noted: 2021-01-01

## 2021-09-24 PROBLEM — J18.9 PNEUMONIA DUE TO INFECTIOUS ORGANISM: Status: ACTIVE | Noted: 2021-01-01

## 2021-09-24 NOTE — PROGRESS NOTES
Chart reviewed by [address] [place] [address] [phone]. Pt remains in critical care on a vent. fi02 60%, levo gtt R chest tube placed poor prognosis per MD note  Will continue to follow for discharge planning needs  Please consult  if any new issues arise. Discharge plan is undetermined at this time. Care Management Interventions  PCP Verified by CM:  Yes (Mason Cortés, )  Transition of Care Consult (CM Consult): Discharge Planning  Support Systems: Spouse/Significant Other (Alireza Musa  [phone])  Confirm Follow Up Transport: Family  The Plan for Transition of Care is Related to the Following Treatment Goals : return to baseline  The Patient and/or Patient Representative was Provided with a Choice of Provider and Agrees with the Discharge Plan?: Yes  Name of the Patient Representative Who was Provided with a Choice of Provider and Agrees with the Discharge Plan: roel  Freedom of Choice List was Provided with Basic Dialogue that Supports the Patient's Individualized Plan of Care/Goals, Treatment Preferences and Shares the Quality Data Associated with the Providers?: Yes   Resource Information Provided?: No  Discharge Location  Discharge Placement: Unable to determine at this time

## 2021-09-24 NOTE — ACP (ADVANCE CARE PLANNING)
Advance Care Planning     General Advance Care Planning (ACP) Conversation      Date of Conversation: 9/24/2021  Conducted with: Healthcare Decision Maker: Named in Advance Directive or Healthcare Power of [employer] Decision Maker:     Primary Decision Maker: Benitezwagner Guillermo - Girlfriend - [phone]    Secondary Decision Maker: Aracelis Gatica - Sister - [phone]    Supplemental (Other) Decision Maker: Niru Levy - Sister - [phone]  Click here to [address] including selection of the Healthcare Decision Maker Relationship (ie \"Primary\")          Content/Action Overview:    Has ACP document(s) on file - reflects the patient's care preferences      Length of Voluntary ACP Conversation in minutes:  <16 minutes (Non-Billable)    Lyle Green RN

## 2021-09-24 NOTE — PROGRESS NOTES
Pharmacokinetic Consult to Pharmacist    Ruthy Mcqueen. is a 78 y.o. male being treated for CAP with vancomycin. Height: 5' 8.5\" (174 cm)  Weight: 76.7 kg (169 lb)  Lab Results   Component Value Date/Time    BUN 14 09/24/2021 05:43 AM    Creatinine 0.82 09/24/2021 05:43 AM    WBC 21.5 (H) 09/24/2021 05:43 AM    Procalcitonin 0.19 09/24/2021 05:43 AM    Lactic acid 1.7 09/24/2021 08:27 AM      Estimated Creatinine Clearance: 84.9 mL/min (based on SCr of 0.82 mg/dL). CULTURES:  pending    Lab Results   Component Value Date/Time    Vancomycin,trough 20.1 (HH) 06/09/2021 07:15 PM       Day 1 of vancomycin. Goal trough is 15-20. Patient was loaded with 2000 mg overnight, will initiate 1750 mg every 12 hours for now based on previous dosing history. Will continue to follow patient and order levels when clinically indicated.       Thank you,  Enoc Colbert, PharmD, [id number]  Clinical Pharmacy Specialist  [phone]

## 2021-09-24 NOTE — H&P
HISTORY AND PHYSICAL  Jeramie Leon.  9/24/2021   Date of Admission:  9/24/2021    The patient's chart is reviewed and the patient is discussed with the staff. Subjective:     Patient is a 78 y.o. male presents with acute on chronic respiratory failure with hypoxemia and hypercarbia. Pt has a history of chronic respiratory failure on 2L O2 qhs, prior tobacco abuse, RLL SCC s/p excision 7/23/2020, afib on Eliquis, HTN, GERD and OA. Pt completed chemo 10/2020. He had repeat chest CT 12/22/2020 with a large R pleural effusion and collapse of remaining RUL. Pt had a R thoracentesis on 12/28/2020 with 900mL bloody fluid removed but unable to tolerate a complete drainage. A chest tube was placed on 1/4/21 with 800cc serosanguineous drainage. General surgery was consulted and underwent R VATS with conversion to open thoracotomy with extensive pneumolysis with decortication 1/6/2021. We were consulted intraoperatively for bronch and were unable to get lung to inflate intraoperatively.  Pt had repeat bronch on 1/7/21 secondary to persistent RUL atelectasis. He was found to have a stump area from prior lobectomy with abnormal appearing tissue that was biopsied and nondiagnostic. Pt had a repeat bronch on 1/12. Pt has had congestion since his pneumonectomy whenever he lies on his R side. He was admitted from the ER On 6/8/21 with shortness of breath, fever, and weakness.  A R chest tube was placed for possible empyema.  General surgery was consulted and was taken for right thoracotomy with decortication and bronchopleural fistula repair 6/14/21. He was initially on antibiotics for concern of empyema, but nothing grew on his thoracentesis nor bronchoscopy and the only mucus plugging seem to be within the residual right mainstem bronchus. He has since followed up with infectious disease as an outpatient and antibiotics were stopped. He was last seen in our office on 9/8/21 to requalify for O2.  Pt developed worsening shortness of breath over the last few days. This AM, he fell in the bathroom and his girlfriend called EMS. Pt was hypoxic on home O2 and was placed on 4L O2 with improvement. Pt was placed on BiPAP in the ER. We were asked to admit to the ICU. Pt reports that he developed increased congestion and cough several days ago. His sputum is dark brown in color. He denies fever, chills, or sweats. His girlfriend reports that he had temp 99.8* at its highest. He has not had any sick contacts per her report. His rapid COVID is negative. His girlfriend is his POA and confirms FULL CODE status. Review of Systems  Constitutional: positive for fatigue  Eyes: positive for contacts/glasses  Ears, nose, mouth, throat, and face: positive for nasal congestion  Respiratory: positive for cough, sputum, dyspnea on exertion or chronic bronchitis  Cardiovascular: negative  Gastrointestinal: negative  Genitourinary:negative  Hematologic/lymphatic: negative  Musculoskeletal:positive for arthralgias and stiff joints  Neurological: positive for weakness  Behavioral/Psych: positive for anxiety and depression  Allergic/Immunologic: negative    Prior to Admission Medications   Prescriptions Last Dose Informant Patient Reported? Taking? HYDROcodone-homatropine (HYCODAN) 5-1.5 mg/5 mL oral solution   Yes No   Sig: Take 5 mL by mouth four (4) times daily as needed. Nebulizer & Compressor machine   No No   Sig: COPD   OXYGEN-AIR DELIVERY SYSTEMS   Yes No   Sig: by Does Not Apply route. 3 LPM continuous   apixaban (Eliquis) 5 mg tablet   No No   Sig: Take 1 Tab by mouth two (2) times a day. docusate sodium (COLACE) 100 mg capsule   Yes No   Sig: Take 1 Capsule by mouth two (2) times a day. fexofenadine-pseudoephedrine (Allegra-D 12 Hour)  mg Tb12   Yes No   Sig: Take 1 Tablet by mouth every twelve (12) hours.    gabapentin (NEURONTIN) 300 mg capsule   No No   Sig: Take 1 Capsule by mouth three (3) times daily. Indications: acute pain following an operation   ipratropium (ATROVENT) 21 mcg (0.03 %) nasal spray   No No   Sig: INSTILL 2 SPRAYS BY BOTH NOSTRILS ROUTE TWO (2) TIMES A DAY. levalbuterol (Xopenex) 1.25 mg/3 mL nebu   No No   Sig: 3 mL by Nebulization route four (4) times daily. metoprolol succinate (TOPROL-XL) 50 mg XL tablet   No No   Sig: Take 1 Tab by mouth daily. montelukast (SINGULAIR) 10 mg tablet   No No   Sig: Take 1 Tablet by mouth nightly. ondansetron hcl (ZOFRAN) 8 mg tablet   No No   Sig: Take 1 Tab by mouth every eight (8) hours as needed for Nausea. Indications: nausea and vomiting caused by cancer drugs   Patient not taking: Reported on 9/8/2021   pantoprazole (Protonix) 40 mg tablet   Yes No   Sig: Take 40 mg by mouth daily. polyethylene glycol (MIRALAX) 17 gram packet   No No   Sig: Take 1 Packet by mouth daily. Indications: constipation   umeclidinium (Incruse Ellipta) 62.5 mcg/actuation inhaler   No No   Sig: Take 1 Puff by inhalation daily. Facility-Administered Medications: None     Past Medical History:   Diagnosis Date    Arrhythmia     Afib     Chronic obstructive pulmonary disease (HCC)     O2 at HS    Chronic pain     right torn rotator cuff; left knee    GERD (gastroesophageal reflux disease)     controlled with med    Hypoxia 02/24/2020    Malignant neoplasm of lower lobe of right lung ([address] 02/26/2020    REC'D CHEMO AND RADIATION--- FOLLOWED BY DR. FOX    Mucus plugging of bronchi 8/5/2021    Osteoarthritis     Right lower lobe lung mass 02/24/2020    Status post total right knee replacement 2/29/2016     Past Surgical History:   Procedure Laterality Date    HX COLONOSCOPY      HX KNEE ARTHROSCOPY Left     scope X 1, ACL recon X 1    HX KNEE ARTHROSCOPY Right 1974, 1975    X 2     HX KNEE REPLACEMENT Right 2016    HX TONSILLECTOMY  1959    HX VASCULAR ACCESS Right placed 3/2020    port in chest     HX WISDOM TEETH EXTRACTION      IR INSERT TUNL CVC W PORT OVER 5 YEARS  3/18/2020    FL CHEST SURGERY PROCEDURE UNLISTED      R lobectomy 2020; R complete pneumonectomy 2021    [place] SINUS SURGERY PROC UNLISTED  ,     sinus surg     Social History     Socioeconomic History    Marital status:      Spouse name: Not on file    Number of children: Not on file    Years of education: Not on file    Highest education level: Not on file   Occupational History    Not on file   Tobacco Use    Smoking status: Former Smoker     Packs/day: 1.00     Years: 30.00     Pack years: 30.00     Quit date:      Years since quittin.7    Smokeless tobacco: Never Used    Tobacco comment: patient has no desire to resume    Vaping Use    Vaping Use: Never used   Substance and Sexual Activity    Alcohol use: Yes     Alcohol/week: 3.0 standard drinks     Types: 3 Glasses of wine per week    Drug use: No    Sexual activity: Not on file   Other Topics Concern     Service No    Blood Transfusions No    Caffeine Concern No    Occupational Exposure No    Hobby Hazards No    Sleep Concern No    Stress Concern No    Weight Concern No    Special Diet No    Back Care Not Asked    Exercise Yes    Bike Helmet Not Asked    Seat Belt Yes    Self-Exams Not Asked   Social History Narrative    . Has long-time girlfriend. Continues to work doing IT support. Social Determinants of Health     Financial Resource Strain:     Difficulty of Paying Living Expenses:    Food Insecurity:     Worried About Running Out of Food in the Last Year:     [address] in the Last Year:    Transportation Needs:     Lack of Transportation (Medical):      Lack of Transportation (Non-Medical):    Physical Activity:     Days of Exercise per Week:     Minutes of Exercise per Session:    Stress:     Feeling of Stress :    Social Connections:     Frequency of Communication with Friends and Family:     Frequency of Social Gatherings with Friends and Family:     Attends Sikh Services:     Active Member of Clubs or Organizations:     Attends Club or Organization Meetings:     Marital Status:    Intimate Partner Violence:     Fear of Current or Ex-Partner:     Emotionally Abused:     Physically Abused:     Sexually Abused:      Family History   Problem Relation Age of Onset    Cancer Mother         uterine    Arrhythmia Sister         afib    Cancer Paternal Grandfather      Allergies   Allergen Reactions    Albuterol Palpitations     Tremor     Cat Dander Shortness of Breath    Celebrex [Celecoxib] Itching    Strawberry Itching     Current Facility-Administered Medications   Medication Dose Route Frequency    sodium chloride (NS) flush 5-10 mL  5-10 mL IntraVENous Q8H    sodium chloride (NS) flush 5-10 mL  5-10 mL IntraVENous PRN    vancomycin (VANCOCIN) 2000 mg in  ml infusion  2,000 mg IntraVENous ONCE    heparin (porcine) pf 300 Units  300 Units InterCATHeter PRN     Current Outpatient Medications   Medication Sig    fexofenadine-pseudoephedrine (Allegra-D 12 Hour)  mg Tb12 Take 1 Tablet by mouth every twelve (12) hours.  HYDROcodone-homatropine (HYCODAN) 5-1.5 mg/5 mL oral solution Take 5 mL by mouth four (4) times daily as needed.  ipratropium (ATROVENT) 21 mcg (0.03 %) nasal spray INSTILL 2 SPRAYS BY BOTH NOSTRILS ROUTE TWO (2) TIMES A DAY.  montelukast (SINGULAIR) 10 mg tablet Take 1 Tablet by mouth nightly.  gabapentin (NEURONTIN) 300 mg capsule Take 1 Capsule by mouth three (3) times daily. Indications: acute pain following an operation    pantoprazole (Protonix) 40 mg tablet Take 40 mg by mouth daily.  docusate sodium (COLACE) 100 mg capsule Take 1 Capsule by mouth two (2) times a day.  levalbuterol (Xopenex) 1.25 mg/3 mL nebu 3 mL by Nebulization route four (4) times daily.  metoprolol succinate (TOPROL-XL) 50 mg XL tablet Take 1 Tab by mouth daily.     umeclidinium (Incruse Ellipta) 62.5 mcg/actuation inhaler Take 1 Puff by inhalation daily.  apixaban (Eliquis) 5 mg tablet Take 1 Tab by mouth two (2) times a day.  OXYGEN-AIR DELIVERY SYSTEMS by Does Not Apply route. 3 LPM continuous    Nebulizer & Compressor machine COPD    polyethylene glycol (MIRALAX) 17 gram packet Take 1 Packet by mouth daily. Indications: constipation    ondansetron hcl (ZOFRAN) 8 mg tablet Take 1 Tab by mouth every eight (8) hours as needed for Nausea. Indications: nausea and vomiting caused by cancer drugs (Patient not taking: Reported on 9/8/2021)     Objective:     Vitals:    09/24/21 0623 09/24/21 4654 09/24/21 0649 09/24/21 0702   BP: (!) 150/72 124/63  130/83   Pulse:  78 69 69   Resp:  (!) 44 29 30   Temp:       SpO2:  95% 96% 92%   Weight:       Height:         PHYSICAL EXAM   Constitutional:  the patient is well developed and in mild distress on BiPAP 20/8 @ 55%  EENMT:  Sclera clear, pupils equal, oral mucosa moist  Respiratory: coarse crackles anteriorly L>R  Cardiovascular:  RRR without M,G,R  Gastrointestinal: soft and non-tender; with positive bowel sounds. Musculoskeletal: warm without cyanosis. There is no lower extremity edema. Skin:  no jaundice or rashes, no wounds   Neurologic: no gross neuro deficits     Psychiatric:  alert and oriented x 3    CXR:   CXR Results  (Last 48 hours)               09/24/21 0554  XR CHEST PORT Final result    Impression:  Patchy infiltrates are seen in the left lower lung. These may represent   pneumonia. Follow-up is recommended. No other significant interval changes. Narrative:  EXAM: XR CHEST PORT       HISTORY: meets SIRS criteria. TECHNIQUE: Frontal chest.       COMPARISON: 7/6/2021        FINDINGS:    The visualized cardiac silhouette, mediastinum, and pulmonary vasculature are   within normal limits. Persistent, partially fluid-filled, right pneumonectomy cavity. Patchy infiltrates are seen in the left lower lung.        There is no left pleural effusion or pneumothorax. Stable left cardiac device. No significant osseous abnormalities are observed. LAB:  Recent Labs     09/24/21  0543   WBC 21.5*   HGB 12.1*   HCT 40.7*      PCT 0.19   LAC 3.0*     Recent Labs     09/24/21  0543   *   K 4.2      CO2 25   *   BUN 14   CREA 0.82   CA 10.1   ALB 3.2   AST 18   ALT <6*   *     Recent Labs     09/24/21  0543   LAC 3.0*     Recent Labs     09/24/21  0536   PHI 7.26*   PCO2I 58.1*   PO2I 112*   HCO3I 25.9     No results for input(s): SDES, CULT in the last 72 hours. Assessment and Plan:  (Medical Decision Making)   Impression: Pt is a 79 yo  male with a history of chronic respiratory failure on 2L O2 qhs, prior tobacco abuse, RLL SCC s/p excision 7/23/2020, afib on Eliquis, HTN, GERD and OA. Pt completed chemo 10/2020. He has had a complicated history requiring R VATS with conversion to open thoracotomy with extensive pneumolysis with decortication 1/6/2021. We were consulted intraoperatively for bronch and were unable to get lung to inflate intraoperatively.  Pt had repeat bronch on 1/7/21 secondary to persistent RUL atelectasis. He was found to have a stump area from prior lobectomy with abnormal appearing tissue that was biopsied and nondiagnostic. He presented to the ER 9/24 with acute on chronic respiratory failure requiring BiPAP.      Active Problems:  Specialty Problems        Pulmonary Problems    Mass of lower lobe of right lung-s/p VATS        Malignant neoplasm of lower lobe of right lung (HCC)-s/p VATs        COPD (chronic obstructive pulmonary disease) (HCC)-continue nebs        Cancer of bronchus of right lower lobe (HCC)-s/p treatment         Pleural effusion on right        Chronic respiratory failure with hypoxia (HCC)-on 2L O2 at home         * (Principal) Acute on chronic respiratory failure with hypoxia and hypercapnia (HCC)-admit to ICU on BiPAP Pneumonia due to infectious organism-start Vancomycin/Zosyn             More than 50% of the time documented was spent in face-to-face contact with the patient and in the care of the patient on the floor/unit where the patient is located. HARSH [place]    I have spoken with and examined the patient. I agree with the above assessment and plan as documented. 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 He appears to have pneumonia/bronchitis with fever, elevated WBC. He is on BIPAP and looks to be doing poorly and likely to need intubation and may benefit from bronchoscopy to clear airways if not able to suction clear after intubation. I don't think he will be able to keep up with breathing as he is doing for long and ABG is persistently hypercapneic uncorrected despite BIPAP. Will get him to ICU and then plan to intubate and consider bronch later in day.      Abad Scott MD

## 2021-09-24 NOTE — PROCEDURES
PROCEDURE:  Bronchoscopy with airway inspection /cleanout    INDICATION: Rhonchi, mucus plugging, respiratory failure    EQUIPMENT:  Olympus T 180 Bronchoscope    ANESTHESIA:   Please see ICU/CCU/CTICU medication record. IMAGING: See CXR    AIRWAY INSPECTION  After obtaining informed consent, orally with use of a bite block, an Olympus T 180 Bronchoscope was introduced into the trachea without complication. RIGHT  LOCATION NORM/ABNORM DESCRIPTION   Larynx NL    VOCAL CORDS NL    TRACHEA NL    MARISSA NL    RM stump abNL Devitalized white, green tissue with opening and continuous pleural fluid leaking into airway to left side. The          LEFT  LOCATION NORM/ABNORM DESCRIPTION   LMSB NL    HOLA NL    LINGULA NL    LLL NL Cleared greenish think foul smelling fluid all coming from right   SUPERIOR SEG LLL NL    MAAME-MEDIAL LLL NL    LATERAL LLL NL    POSTERIOR LLL NL      The following samples were obtained:    Bronchial Wash:  Samples were sent for:  sputum    The procedure was completed without complication and the patient tolerated the procedure well. EBL: none    Recommendations:   Discussed with Dr. Rosa Pappas and patient's girlfriend. She and his sisters are going to come in to see patient. He is saturating at 60-70%s. He has no local surgical options and is not safe for transfer. I am doubtful any intervention can seal this leak. For now will place chest tube to drain apparent infected fluid and try to decrease the drainage into the airway. May need to pursue comfort care if not showing some improvement with chest tube.      Gilmar Jenkins MD

## 2021-09-24 NOTE — PROGRESS NOTES
Discussed with girlfriend, sister ([address] on [address] form [facility]) and anesthesia. We discussed option for trying to change ET (with sats 70%) to double lumen ETT though risk will be quite high and benefits are really unclear since both short and long term mortality appear extremely high. They are really just hoping he can survive until his other sister can get here. Explained I can't guarantee that no matter what we do or don't do given his situation. With concerns about difficulty in exchanging ETT and difficulty with double lumen tubes they did not want to proceed with this and just want to try to support him until his sister gets here. No good options here and I think will need to be made comfort if survives until his sister arrives. I explained this to the girlfriend and sister.     Vitor Hancock MD

## 2021-09-24 NOTE — PROGRESS NOTES
Met with girlfriend and patient's self appointed decision maker. He is unfortunately doing very poorly without any real options. The chest tube and bronchopleural fistula are bypassing his ventilation and while I think the leaking to the left lung should be better he is on 15 levo and 100% FiO2 with sats 69%. They are hoping to get his sisters here, but are a while away. I am going to try to wean sedation and put him on CPAP to minimize positive pressure. I think he will do better breathing on his own, but I don't think well enough to survive and thus I think we are likely headed towards palliative care. Will see how his CXR and sedation wean/ vent wean go and can adjust care as he does as the family arrives. DNR, grim prognosis. Additional 45 minutes critical care time with patient.      Cesar Morejon MD

## 2021-09-24 NOTE — ED PROVIDER NOTES
79-year-old male brought in by EMS with shortness of breath. Patient is a lung cancer patient finished radiation chemo therapy last year patient use 3 L of oxygen at home has COPD A. fib. While in route the EMS was able to maintain SaO2 of 92% on 4 L however at arrival he became acutely short of breath diaphoretic sats dropped into the low 80s. Patient was brought in from home. EMS reports the family found him in the floor of the bathroom is unsure how long he had been there. No bleeding or lacerations noted. Patient is awake alert and oriented now. Excerpts from his last office visit are as follows:    He has a long and complicated history summarized---He completed chemo 10/2020. Catracho Linares had repeat chest CT 12/22/2020 with a large R pleural effusion and collapse of remaining RUL. Had repeat right t-cent on 12/28/20 with 900 cc bloody fluid but unable to tolerate complete drainage. Chest tube place on 1/4/21 with 800 cc of serosanguinous fluid. Had right VATS with conversion to open thoractomy with extensive pneumolysis and decortication on 1/6/21. We were consulted intraoperatively for bronch and were unable to get lung to inflate intraoperatively.  He had  RUL atelectasis. He was found to have a stump area from prior lobectomy with abnormal appearing tissue that was biopsied and nondiagnostic. He had a repeat bronch on 1/12/21. He complained of congestion since his pneumonectomy whenever he lies on his right side.  He was admitted from the ER On 6/8/21 with shortness of breath, fever, and weakness.  A right chest tube was placed for possible empyema.  General surgery was consulted and was taken for right thoracotomy with decortication and bronchopleural fistula repair 6/14/21.  He was initially on antibiotics for concern of empyema, but nothing grew on his thoracentesis nor bronchoscopy and the only mucus plugging seem to be within the residual right mainstem bronchus.  He has since followed up with infectious disease as an outpatient and antibiotics were stopped.         The history is provided by the patient and the EMS personnel. Shortness of Breath  This is a recurrent problem. The problem occurs continuously. The current episode started more than 2 days ago. The problem has been rapidly worsening. Associated symptoms include cough, sputum production, wheezing and orthopnea. Pertinent negatives include no fever. It is unknown what precipitated the problem. He has tried nothing for the symptoms. Associated medical issues include COPD and chronic lung disease. Past Medical History:   Diagnosis Date    Arrhythmia     Afib     Chronic obstructive pulmonary disease (HCC)     O2 at HS    Chronic pain     right torn rotator cuff; left knee    GERD (gastroesophageal reflux disease)     controlled with med    Hypoxia 02/24/2020    Malignant neoplasm of lower lobe of right lung ([facility] [address] 02/26/2020    REC'D CHEMO AND RADIATION--- FOLLOWED BY DR. FOX    Mucus plugging of bronchi 8/5/2021    Osteoarthritis     Right lower lobe lung mass 02/24/2020    Status post total right knee replacement 2/29/2016       Past Surgical History:   Procedure Laterality Date    HX COLONOSCOPY      HX KNEE ARTHROSCOPY Left     scope X 1, ACL recon X 1    HX KNEE ARTHROSCOPY Right 1974, 1975    X 2     HX KNEE REPLACEMENT Right 2016    HX TONSILLECTOMY  1959    HX VASCULAR ACCESS Right placed 3/2020    port in chest     HX WISDOM TEETH EXTRACTION      IR INSERT TUNL CVC W PORT OVER 5 YEARS  3/18/2020    NV CHEST SURGERY PROCEDURE UNLISTED      R lobectomy 6/2020; R complete pneumonectomy 1/2021    NV SINUS SURGERY PROC UNLISTED  1988, 1991    sinus surg         Family History:   Problem Relation Age of Onset    Cancer Mother         uterine    Arrhythmia Sister         afib    Cancer Paternal Grandfather        Social History     Socioeconomic History    Marital status:      Spouse name: Not on file    Number of children: Not on file    Years of education: Not on file    Highest education level: Not on file   Occupational History    Not on file   Tobacco Use    Smoking status: Former Smoker     Packs/day: 1.00     Years: 30.00     Pack years: 30.00     Quit date:      Years since quittin.7    Smokeless tobacco: Never Used    Tobacco comment: patient has no desire to resume    Vaping Use    Vaping Use: Never used   Substance and Sexual Activity    Alcohol use: Yes     Alcohol/week: 3.0 standard drinks     Types: 3 Glasses of wine per week    Drug use: No    Sexual activity: Not on file   Other Topics Concern     Service No    Blood Transfusions No    Caffeine Concern No    Occupational Exposure No    Hobby Hazards No    Sleep Concern No    Stress Concern No    Weight Concern No    Special Diet No    Back Care Not Asked    Exercise Yes    Bike Helmet Not Asked    Seat Belt Yes    Self-Exams Not Asked   Social History Narrative    . Has long-time girlfriend. Continues to work doing IT support. Social Determinants of Health     Financial Resource Strain:     Difficulty of Paying Living Expenses:    Food Insecurity:     Worried About Running Out of Food in the Last Year:     [address] in the Last Year:    Transportation Needs:     Lack of Transportation (Medical):      Lack of Transportation (Non-Medical):    Physical Activity:     Days of Exercise per Week:     Minutes of Exercise per Session:    Stress:     Feeling of Stress :    Social Connections:     Frequency of Communication with Friends and Family:     Frequency of Social Gatherings with Friends and Family:     Attends [employer] Services:     Active Member of Clubs or Organizations:     Attends Club or Organization Meetings:     Marital Status:    Intimate Partner Violence:     Fear of Current or Ex-Partner:     Emotionally Abused:     Physically Abused:     Sexually Abused: ALLERGIES: Albuterol, Cat dander, Celebrex [celecoxib], and [place]    Review of Systems   Constitutional: Positive for activity change, chills and diaphoresis. Negative for fever. HENT: Negative. Eyes: Negative. Respiratory: Positive for cough, sputum production, shortness of breath and wheezing. Cardiovascular: Positive for orthopnea. Gastrointestinal: Negative. Genitourinary: Negative. Musculoskeletal: Negative. Skin: Negative. Neurological: Negative. Psychiatric/Behavioral: Negative. All other systems reviewed and are negative. Vitals:    09/24/21 0520   Pulse: 69   Resp: (!) 40   SpO2: (!) 84%   Weight: 76.7 kg (169 lb)   Height: 5' 8.5\" (1.74 m)            Physical Exam  Vitals and nursing note reviewed. Constitutional:       General: He is in acute distress. Appearance: He is well-developed. He is ill-appearing, toxic-appearing and diaphoretic. HENT:      Head: Normocephalic and atraumatic. Right Ear: External ear normal.      Left Ear: External ear normal.      Nose: Nose normal.   Eyes:      General: No scleral icterus. Right eye: No discharge. Left eye: No discharge. Conjunctiva/sclera: Conjunctivae normal.      Pupils: Pupils are equal, round, and reactive to light. Cardiovascular:      Rate and Rhythm: Normal rate and regular rhythm. Pulmonary:      Effort: Pulmonary effort is normal. No respiratory distress. Breath sounds: No stridor. Examination of the left-middle field reveals rhonchi. Examination of the right-lower field reveals decreased breath sounds. Examination of the left-lower field reveals decreased breath sounds. Decreased breath sounds and rhonchi present. No wheezing or rales. Abdominal:      General: Bowel sounds are normal. There is no distension. Palpations: Abdomen is soft. Tenderness: There is no abdominal tenderness. Musculoskeletal:         General: Normal range of motion. Cervical back: Normal range of motion. Skin:     General: Skin is warm. Coloration: Skin is pale. Findings: No rash. Neurological:      Mental Status: He is alert and oriented to person, place, and time. Motor: No abnormal muscle tone. Coordination: Coordination normal.   Psychiatric:         Mood and Affect: Mood is anxious. Behavior: Behavior normal.          MDM  Number of Diagnoses or Management Options  Diagnosis management comments: Arrived diaphoretic working very hard to breathe and not keeping his sats above 90% he was switched over to BiPAP and after an hour he looks much more comfortable although he is still using accessory muscles to breathe his SaO2 is staying above 96%. Patient has a lot of pulmonary issues includes lobectomy on the right he has christian out right side. Pulmonologist was contacted for admission to the ICU.        Amount and/or Complexity of Data Reviewed  Clinical lab tests: ordered and reviewed  Tests in the radiology section of CPT®: ordered and reviewed  Tests in the medicine section of CPT®: ordered and reviewed  Decide to obtain previous medical records or to obtain history from someone other than the patient: yes  Review and summarize past medical records: yes  Discuss the patient with other providers: yes  Independent visualization of images, tracings, or specimens: yes    Risk of Complications, Morbidity, and/or Mortality  Presenting problems: high  Diagnostic procedures: high  Management options: high    Patient Progress  Patient progress: stable         Procedures

## 2021-09-24 NOTE — PROGRESS NOTES
's follow-up with staff to explore patient needs. Mr. Christopher Enriquez is being ruled out for COVID. Chaplains remain available for support.      Luz Maria Juilan 70  Board Certified

## 2021-09-24 NOTE — PROCEDURES
Emergent Intubation  Performed by: Indiana Mcpherson MD  Authorized by: Indiana Mcpherson MD     Emergent Intubation:   Location:  ICU  Date/Time:  9/24/2021 11:53 AM  Indications:  Respiratory failure  Spontaneous Ventilation: present    Level of Consciousness: awake  Preoxygenated: Yes      Airway Documentation:   Airway:  ETT - Cuffed  Technique:  Cricoid pressure  Advanced Technique:  Glide scope  Insertion Site:  Oral  Blade Size:  4  ETT size (mm):  8.0  ETT Line Severo:  Lips  ETT Insertion depth (cm):  26  Placement verified by: auscultation, EtCO2 and BBS    Attempts:  1  Difficult airway: No    Asked by Dr. Astorga Host for assistance with intubation in this patient with respiratory failure and mucus plugging. Requiring a bronch later this afternoon. Currently on BiPAP 100% FiO2. RR of 40. Prior lobectomy and blind stump. Initially intubated with Glidescope and ETT secured at 23cm at the lip. BBS were equal and good color change for CO2. Respiratory secured ETT and noted a leak. I looked again with the Glidescope and noted the cuff to be herniated through the cords and ETT was at 21 at the lip. Cuff deflated and looked again with Glidescope to advance ETT under direct visualization. Cuff was then reinflated and secured at 26cm at the lip. CXR pending.

## 2021-09-24 NOTE — PROGRESS NOTES
's visit with Mr. Mari Batista and his family to offer spiritual and emotional support, including payer. Patient's girlfriend Hao Milner and sister Trung Rice were at bedside. Another sister, Matthias Le, is en route. Family expressed gratitude for the visit.      Luz Maria Mccracken 70  Board Certified

## 2021-09-24 NOTE — PROGRESS NOTES
Patient was intubated with a number 8.0 ET Tube. Tube placement verified by auscultation and by CXR. ET Tube is secured at the 26 cm luis alberto at the lip and on the right side. Patient was intubated by Dr. Lilliana Alejo on the 1 attempt. Breath sounds are coarse. Patient is Negative for subcutaneous air and chest excursion is symmetric. Trachea is midline. Patient is also Negative for cyanosis and is Negative for pitting edema. Patient placed on ventilator on documented settings. All alarms are set and audible. Resuscitation bag is at the head of the bed. Ventilator Settings  Mode FIO2 Rate Tidal Volume Pressure PEEP I:E Ratio   PRVC  60 %    400 ml     8 cm H20  1:2      Peak airway pressure: 27 cm H2O   Minute ventilation: 10.3 l/min     ABG: No results for input(s): PH, PCO2, PO2, HCO3 in the last 72 hours.

## 2021-09-24 NOTE — PROGRESS NOTES
09/24/21 0545   Oxygen Therapy   O2 Sat (%) 100 %   Pulse via Oximetry 70 beats per minute   O2 Device BIPAP   FIO2 (%) 55 %   Respiratory   Respiratory (WDL) X   Respiratory Pattern Tachypneic;Labored   Chest/Tracheal Assessment Chest expansion, symmetrical   Breath Sounds Bilateral Wet   Cough Moist   CPAP/BIPAP   CPAP/BIPAP Start/Stop On   Device Mode BIPAP;S/T   $$ Bipap Daily Yes   Mask Type and Size Full face  (XL)   Skin Condition intact  (barrier on bridge of nose)   PIP Observed 23 cm H20   IPAP (cm H2O) 20 cm H2O   EPAP (cm H2O) 10 cm H2O   Inspiratory Time (sec) 0.8 seconds   Vt Spont (ml) 673 ml   Ve Observed (l/min) 21.6 l/min   Backup Rate 12   Total RR (Spontaneous) 32 breaths per minute   Insp Rise Time (sec) 3   Leak (Estimated) 0 L/min   Pt's Home Machine No   Biomedical Check Performed Yes   Settings Verified Yes   Alarm Settings   High Pressure 40   Low Pressure 5   Apnea 20   Low Ve 3   High Rate 60   Low Rate 8   Pulmonary Toilet   Pulmonary Toilet Cough and deep breath;H. O.B elevated

## 2021-09-24 NOTE — ED NOTES
Patient report received from [facility]. Patient care to be assumed at this time.

## 2021-09-24 NOTE — ED NOTES
TRANSFER - OUT REPORT:    Verbal report given to Darrynrj Humberto (name) on [address].  being transferred to [facility] (unit) for routine progression of care       Report consisted of patients Situation, Background, Assessment and   Recommendations(SBAR). Information from the following report(s) SBAR, ED Summary, [facility] and Recent Results was reviewed with the receiving nurse. Lines:   Venous Access Device Power Port-present upon arrival 7/21/2021 07/21/21 Upper chest (subclavicular area, right (Active)       Peripheral IV 09/24/21 Posterior;Right Hand (Active)       Peripheral IV 09/24/21 Left;Posterior Hand (Active)       Peripheral IV 09/24/21 Left Forearm (Active)   Site Assessment Clean, dry, & intact 09/24/21 0826   Phlebitis Assessment 0 09/24/21 0826   Infiltration Assessment 0 09/24/21 0826   Dressing Status Clean, dry, & intact 09/24/21 5966        Opportunity for questions and clarification was provided.       Patient transported with:   Registered Nurse

## 2021-09-24 NOTE — ED NOTES
RT called for repeat ABG. Intensivist called due to decline in patient condition. Increased work of breathing, diaphoresis, O2 sat 90% on bipap.

## 2021-09-24 NOTE — PROGRESS NOTES
Spoke with girlfriend, 2 sisters no that they are all here. Discussed situation again and efforts for care. Unfortunately, he is on 2 pressors, sat in 60% despite aggressive care. Cannot be stabilized for any additional treatments and has no local options for surgery. They agree he has been through enough and have decided to make him comfort and will proceed with compassionate extubation.     Mag Wade MD

## 2021-09-24 NOTE — PROGRESS NOTES
Asystole at this time; family at bedside. Emotional support provided. MD, , and nursing house supervisor notified.

## 2021-09-24 NOTE — PROCEDURES
PROCEDURE:  12095  Right 20F chest tube placement    INDICATION:  Empyema leaking to left airway through stump . Summary:    The 20 chest was prepped and draped in the usual fashion with chlorhexidine. The 5th and 6th IC space in the right midaxillary line was identified and marked with needle cap. The skin, subcutaneous tissue and rib along with the IC muscles were also anesthetised with a total of 10cc of 1% lidocaine. Sterile technique was utilized during the entire procedure. An incision was then made in the skin followed by blunt dissection to the IC space previously marked as noted above. A curved hemostat was then used to penetrate the chest with a rush/ no rush of air present upon ontry into the chest indicating/excluding tension. The index finger was then placed into the chest throught the skin opening to assure lack of lung adherence to the chest and the pleura swiped 360 degrees through the chest opening. Following this a 20F chest tube was inserted into the chest with the aid of a large curved hemostat without complication. 1.0 silk suture was used to close the lateral edge of the wound and affix the chest tube in place. A pursed suture was then placed around the chest tube and the loose ends wrapped arround it and affixed to the chest tube with silk tape. An atrium was then attached and the entire apparatus placed to -20 cm H2O of suction. Dressing was applied and chest tube drainage affixed to the chest with silk tape. There were no complications.     EBL: <5cc    Jamal Mccracken MD

## 2021-09-24 NOTE — ED NOTES
Have spoken to lab regarding blood cultures sent by previous shift. Unlabeled. Will attempt to collect. Lab asked to come attempt to collect as well. Antibiotics already administered by previous staff.

## 2021-09-24 NOTE — PROGRESS NOTES
Re-evaluated patient and still working hard. Discussed with him and his girlfriend intubation and bronchoscopy. They agree. Continue abx, steroids, nebs. Once stabilized on vent will try to do bronchoscopy and clear airways.      Andrew Chapman MD

## 2021-09-24 NOTE — ED TRIAGE NOTES
Arrives from home via [facility]. Spouse contacted EMS after pt found lying in bathroom floor. Pt with hx lung cancer, on home 02 at 3l at all times. Increased shortness of breath, productive cough with yellow sputum, fatigue, congestion. Onset 4 days pta. Upon EMS arrival, pt sats 88% on 3l. Up to 92% with increased 02 to 4l. Shortly before arriving to ED, EMS reports sats dropped to 80% and lungs sounds became more congested with increased distress. Pt diaphoretic, tachypneic and labored, rhonchi on arrival. Completed chemo 1 year ago, followed by dr Dilia Simpson. VATS 1/2021. Denies hx CHF.

## 2021-09-25 NOTE — PROGRESS NOTES
Spiritual Care Visit, initial visit. Multiple emergencies were occurring in hospital. Even though  was here, family left hospital before  entered unit. Visit by Inez Last, Staff .  M.Ed., Kelechi.B., B.A.

## 2021-09-25 NOTE — PROGRESS NOTES
Report received from Filomena Da Silva RN that pt has . Post mortem care performed, all IVs, drains and tubes removed.  Pts body prepared and awaiting  homes arrival.

## 2021-09-27 NOTE — DISCHARGE SUMMARY
Death Summary    Lenon Blitz.   Admission date:  9/24/2021  Discharge date:  9/24/2021    Admitting Diagnosis:  Acute on chronic respiratory failure with hypoxia and hypercapnia (HCC) [J96.21, J96.22]  Discharge Diagnosis:    Problem List as of 9/24/2021 Date Reviewed: 9/24/2021        Codes Class Noted - Resolved    * (Principal) Acute on chronic respiratory failure with hypoxia and hypercapnia (HCC) ICD-10-CM: J96.21, J96.22  ICD-9-CM: 518.84, 786.09, 799.02  9/24/2021 - Present        Pneumonia due to infectious organism ICD-10-CM: J18.9  ICD-9-CM: 486  9/24/2021 - Present        S/P pneumonectomy ICD-10-CM: Z90.2  ICD-9-CM: V45.76  7/1/2021 - Present        Debility ICD-10-CM: R53.81  ICD-9-CM: 799.3  6/28/2021 - Present        History of thoracotomy (Chronic) ICD-10-CM: Z98.890  ICD-9-CM: V45.89  6/25/2021 - Present        Moderate protein-calorie malnutrition ([facility] ICD-10-CM: E44.0  ICD-9-CM: 263.0  6/17/2021 - Present        Atrial flutter ([facility] ICD-10-CM: I48.92  ICD-9-CM: 427.32  6/10/2021 - Present        Empyema ([facility] ICD-10-CM: J86.9  ICD-9-CM: 510.9  6/8/2021 - Present        Heart block AV complete ([facility] ICD-10-CM: I44.2  ICD-9-CM: 426.0  5/3/2021 - Present        Sick sinus syndrome (HCC) ICD-10-CM: I49.5  ICD-9-CM: 427.81  3/18/2021 - Present        PAF (paroxysmal atrial fibrillation) (HCC) ICD-10-CM: I48.0  ICD-9-CM: 427.31  2/25/2021 - Present        Atrial fibrillation with RVR (HCC) ICD-10-CM: I48.91  ICD-9-CM: 427.31  2/25/2021 - Present        S/P thoracotomy ICD-10-CM: Y80.019  ICD-9-CM: V45.89  1/7/2021 - Present        Chronic respiratory failure with hypoxia (HCC) (Chronic) ICD-10-CM: J96.11  ICD-9-CM: 518.83, 799.02  1/4/2021 - Present        Pleural effusion on right ICD-10-CM: J90  ICD-9-CM: 511.9  12/28/2020 - Present        Chemotherapy induced neutropenia (HCC) ICD-10-CM: D70.1, T45.1X5A  ICD-9-CM: 288.03, E933.1  10/23/2020 - Present        Dehydration ICD-10-CM: E86.0  ICD-9-CM: 276.51  9/15/2020 - Present        S/P lobectomy of lung ICD-10-CM: Z90.2  ICD-9-CM: V45.89  7/29/2020 - Present        Cough ICD-10-CM: R05  ICD-9-CM: 786.2  7/29/2020 - Present        Atrial tachycardia ([facility] ICD-10-CM: I47.1  ICD-9-CM: 427.89  7/26/2020 - Present        Cancer of bronchus of right lower lobe (HCC) (Chronic) ICD-10-CM: C34.31  ICD-9-CM: 162.5  7/23/2020 - Present        COPD (chronic obstructive pulmonary disease) (HCC) ICD-10-CM: J44.9  ICD-9-CM: 496  7/7/2020 - Present        GERD (gastroesophageal reflux disease) ICD-10-CM: K21.9  ICD-9-CM: 530.81  Unknown - Present        Malignant neoplasm of lower lobe of right lung (HCC) (Chronic) ICD-10-CM: C34.31  ICD-9-CM: 162.5  2/26/2020 - Present    Overview Signed 1/4/2021  7:38 AM by Charles Ward MD     Dec 2020- Echo EF 50%, technically difficult, cannot assess regional wall motion. Aortic root 4.1 cm. No significant valvular disease.   Normal DF             Mass of lower lobe of right lung ICD-10-CM: R91.8  ICD-9-CM: 786.6  2/23/2020 - Present        Essential hypertension with goal blood pressure less than 130/80 (Chronic) ICD-10-CM: I10  ICD-9-CM: 401.9  2/19/2018 - Present        RESOLVED: Mucus plugging of bronchi ICD-10-CM: T17.500A  ICD-9-CM: 519.19  8/5/2021 - 9/8/2021        RESOLVED: Tracheobronchitis ICD-10-CM: J40  ICD-9-CM: 090  6/25/2021 - 8/5/2021        RESOLVED: Pleural fistula ([facility] ICD-10-CM: J86.0  ICD-9-CM: 510.0  6/14/2021 - 8/5/2021        RESOLVED: Acute hypoxemic respiratory failure ([facility] ICD-10-CM: J96.01  ICD-9-CM: 518.81  6/8/2021 - 8/5/2021        RESOLVED: Bronchopleural fistula ([facility] ICD-10-CM: J86.0  ICD-9-CM: 510.0  6/8/2021 - 8/5/2021        RESOLVED: Acute on chronic respiratory failure with hypoxemia ([facility] ICD-10-CM: J96.21  ICD-9-CM: 518.84  6/8/2021 - 8/5/2021        RESOLVED: COPD exacerbation ([facility] ICD-10-CM: J44.1  ICD-9-CM: 491.21  5/16/2021 - 8/5/2021        RESOLVED: Hypertension ICD-10-CM: I10  ICD-9-CM: 401.9  2/25/2021 - 8/3/2021        RESOLVED: Atelectasis of right lung ICD-10-CM: J98.11  ICD-9-CM: 518.0  1/4/2021 - 8/5/2021        RESOLVED: A-fib ([facility] ICD-10-CM: I48.91  ICD-9-CM: 427.31  1/4/2021 - 8/3/2021        RESOLVED: Lung cancer ([facility] (Chronic) ICD-10-CM: C34.90  ICD-9-CM: 162.9  7/23/2020 - 8/5/2021        RESOLVED: Cancer of right lung ([facility] ICD-10-CM: C34.91  ICD-9-CM: 162.9  7/23/2020 - 8/5/2021        RESOLVED: Fever ICD-10-CM: R50.9  ICD-9-CM: 780.60  7/1/2020 - 7/7/2020        RESOLVED: Tachycardia ICD-10-CM: R00.0  ICD-9-CM: 785.0  5/5/2020 - 7/7/2020        RESOLVED: Febrile neutropenia ([facility] ICD-10-CM: D70.9, R50.81  ICD-9-CM: 288.00, 780.61  5/5/2020 - 7/7/2020        RESOLVED: Hypotension (Chronic) ICD-10-CM: I95.9  ICD-9-CM: 458.9  5/5/2020 - 7/18/2021    Overview Signed 1/11/2021  8:39 AM by Sera Watkins NP     On midodrine             RESOLVED: Sepsis due to undetermined organism ([facility] ICD-10-CM: A41.9  ICD-9-CM: 038.9  5/5/2020 - 9/8/2021        RESOLVED: Hypoxia ICD-10-CM: R09.02  ICD-9-CM: 799.02  2/24/2020 - 7/7/2020        RESOLVED: Acute respiratory failure with hypoxia ([facility] ICD-10-CM: J96.01  ICD-9-CM: 518.81  2/24/2020 - 7/7/2020        RESOLVED: Hemoptysis ICD-10-CM: Y60.7  ICD-9-CM: 786.30  2/24/2020 - 7/7/2020        RESOLVED: Right lower lobe lung mass ICD-10-CM: R91.8  ICD-9-CM: 786.6  2/23/2020 - 8/5/2021        RESOLVED: Status post total right knee replacement ICD-10-CM: T07.059  ICD-9-CM: V43.65  2/29/2016 - 2/23/2020        Pacemaker ICD-10-CM: Z95.0  ICD-9-CM: V45.01  5/3/2021 - Present              Consultants: None    Studies/Procedures: labs, ABG, CXR, intubation, bronchoscopy, chest tube placement, OG tube placement    Hospital course: Patient presented to ER with worsening shortness of breath, cough, sputum production and wheezing. Upon arrival he became acutely short of breath and hypoxic and required BiPAP.  Patient was started on empiric coverage for CAP. He was started on nebulizers and steroids as well. He had increased work of breathing and was intubated and underwent bronchoscopy which showed continuous pleural fluid leaking in to airway to left side and greenish foul smelling fluid. Chest tube was placed for empyema leaking to left airway. He was not a surgical candidate and not stable for a transfer to another facility. Patient required multiple vasopressors and could not maintain O2 sats above 60% despite agressive care. Given grim prognosis family decided to pursue comfort measures. Patient was compassionately extubated at [id number] and went in to asystole at 1800. Patient was pronounced  by Dr. Jennifer Quintanilla at 1800. Final:  --Pronounced dead at 1800 on 2021. --Total discharge greater than 30 minutes in duration.     Chris Denis NP

## 2021-09-29 NOTE — PROGRESS NOTES
Physician Progress Note      Jayla Martinez  CSN #:                  [id number]  :                       1950  ADMIT DATE:       2021 5:18 AM  DISCH DATE:        2021 6:00 PM  RESPONDING  PROVIDER #:        Dc VEGA MD          QUERY TEXT:    Pt admitted with acute on chronic respiratory failure due to PNA. Pending sale to [facility] Pt noted to have a need for vasopressors before being made comfort care. If possible, please document in the progress notes and discharge summary if you are evaluating and/or treating any of the following: The medical record reflects the following:  Risk Factors: PNA,  hx of lung ca, afib on eliquis, htn, chronic resp failure, collapsed lung. Clinical Indicators:  sbp in the 70's. note stating --  Unfortunately, he is on 2 pressors, sat in 60% despite aggressive care. \" wbc shot up to 21.5 from 8.9. ----was on steroids. Treatment: zosyn, vancomycin, labs, xcray, bronch, ICU care. pressors x2, eventually made comfort care and patient succumbed to illness. Options provided:  -- Cardiogenic Shock  -- Hemorrhagic Shock  -- Septic Shock  -- Hypovolemic Shock  -- Hypovolemia without Shock  -- Hypotension without Shock  -- Other - I will add my own diagnosis  -- Disagree - Not applicable / Not valid  -- Disagree - Clinically unable to determine / Unknown  -- Refer to Clinical Documentation Reviewer    PROVIDER RESPONSE TEXT:    This patient has Septic Shock.     Query created by: Matt June on 2021 2:47 PM      Electronically signed by:  Dc VEGA MD 2021 2:50 PM

## 2022-08-25 NOTE — PROGRESS NOTES
"Wound Debridement    Date/Time: 8/25/2022 8:45 AM  Performed by: Sherice Caraballo DPM  Authorized by: Sherice Caraballo DPM     Time out: Immediately prior to procedure a "time out" was called to verify the correct patient, procedure, equipment, support staff and site/side marked as required.    Consent Done?:  Yes (Verbal)    Preparation: Patient was prepped and draped in usual sterile fashion      Wound Details:    Location:  Right foot    Location:  Right 3rd Toe    Type of Debridement:  Excisional       Length (cm):  1       Area (sq cm):  1       Width (cm):  1       Percent Debrided (%):  100       Depth (cm):  0.5       Total Area Debrided (sq cm):  1    Depth of debridement:  Subcutaneous tissue    Tissue debrided:  Subcutaneous    Devitalized tissue debrided:  Callus and Biofilm    Instruments:  Blade and Curette    Bleeding:  Minimal  Hemostasis Achieved: Yes    Method Used:  Pressure  Patient tolerance:  Patient tolerated the procedure well with no immediate complications      " MD SORIA at bedside for morning rounds CT changed to water seal  
  
No other orders placed at this time

## 2024-07-01 NOTE — PROGRESS NOTES
Dave Saint. Admission Date: 6/25/2021             Daily Progress Note: 7/1/2021    The patient's chart is reviewed and the patient is discussed with the staff. Patient is a 78 y.o  male seen at the request of Dr Sally Flynn who presented as a direct admit from Dr Pily Coates office for uncontrollable coughing. Pt is known to our office and was seen while hospitalized on 6/15. PMHx includes chronic respiratory failure on 2L O2 qhs, prior tobacco abuse, RLL SCC s/p excision 7/23/2020, afib on Eliquis, HTN, GERD and OA. Pt completed chemo 10/2020. He had repeat chest CT 12/22/2020 with a large R pleural effusion and collapse of remaining RUL. Pt had a R thoracentesis on 12/28/2020 with 900mL bloody fluid removed but unable to tolerate a complete drainage. A chest tube was placed on 1/4/21 with 800cc serosanguineous drainage. General surgery was consulted and underwent R VATS with conversion to open thoracotomy with extensive pneumolysis with decortication 1/6/2021. We were consulted intraoperatively for bronch and were unable to get lung to inflate intraoperatively.  Pt had repeat bronch on 1/7/21 secondary to persistent RUL atelectasis. He was found to have a stump area from prior lobectomy with abnormal appearing tissue that was biopsied and nondiagnostic. Pt had a repeat bronch on 1/12. Pt has had congestion since his pneumonectomy whenever he lies on his R side. He was admitted from the ER On 6/8/21 with shortness of breath, fever, and weakness.  A R chest tube was placed for possible empyema. General surgery was consulted and was taken for right thoracotomy with decortication and bronchopleural fistula repair 6/14/21. ID is following for antimicrobial recommendations. He was discharged home on 6/17 on keflex and flagyl.    Id has been consulted this admission and is following. Currently on Zosyn (6/25- ), cultures pending. Nasal MRSA negative.   Bronched 6/29 with anesthesia; after Speech Language Pathology  Daily Treatment     Patient Name: Peter Torrez  Age:  75 y.o., Sex:  male  Medical Record #: [id number]  Today's Date: 7/1/2024     Precautions  Precautions: Fall Risk  Comments: Parkinsons, Dementia, monitor BP    Subjective    Pt pleasant and cooperative, perseverative on family arrival however willing to attend ST in office with reassurance provided that should family arrive they would be assisted back to SLP office.      Objective       07/01/24 0903   Treatment Charges   SLP Cognitive Skill Development First 15 Minutes 1   SLP Cognitive Skill Development Additional 15 Minutes 1   SLP Total Time Spent   SLP Individual Total Time Spent (Mins) 30         Assessment    Simple attention addressed at this time. Pt asked to name three targets given a concrete category, pt completed with 100% accuracy indep with SLP providing set up and direct support for the next category. SLP then presented pt with task to place target words within their appropriate category, SLP removed self from task to see if pt was able to maintain attention and cont task indep, pt did so and completed approx 80% of the task given the time constraints and his completed portion was completed with 100% accuracy. Increased attention and processing noted at this time compared to last weeks session with this familiar therapist.    Strengths: Willingly participates in therapeutic activities, Pleasant and cooperative, Supportive family  Barriers: Impaired carryover of learning, Impaired insight/denial of deficits    Plan    Simple attention tasks with increasing complexity as appropriate.     Speech Therapy Problems (Active)       Problem: Problem Solving STGs       Dates: Start:  06/24/24         Goal: STG-Within one week, patient will complete simple attention tasks with 80% accuracy provided MIN A       Dates: Start:  06/24/24               Problem: Speech/Swallowing LTGs       Dates: Start:  06/20/24         Goal: LTG-By  discharge, patient will complete functional problem solving and recall information with 80% accuracy provided SPV       Dates: Start:  06/20/24                extubation had increased wheeze and tachypnea requiring BiPAP and transferred to ICU. Diagnostic thoracentesis 6/30. Subjective:     Currently on 2L NC. Having some SOB still also states he is having pain 6/10. RN at bedside to give meds. Concerned that CPT will cause sutures to open, discussed the likelihood of this is low. Still having cough and unable to always clear it. Has pain with cough.       Current Facility-Administered Medications   Medication Dose Route Frequency    budesonide (PULMICORT) 500 mcg/2 ml nebulizer suspension  500 mcg Nebulization BID RT    HYDROcodone-homatropine (HYCODAN) 5-1.5 mg/5 mL (5 mL) oral solution 5 mL  5 mL Oral Q4H PRN    [Held by provider] apixaban (ELIQUIS) tablet 5 mg  5 mg Oral Q12H    metoprolol succinate (TOPROL-XL) XL tablet 50 mg  50 mg Oral DAILY    levalbuterol (XOPENEX) nebulizer soln 1.25 mg/3 mL  1.25 mg Nebulization Q4H RT    sodium chloride 3% hypertonic nebulizer soln  4 mL Nebulization BID RT       Review of Systems  + SOB, cough  Constitutional: negative for fever, chills, sweats  Cardiovascular: negative for chest pain, palpitations, syncope, edema  Gastrointestinal: negative for dysphagia, reflux, vomiting, diarrhea, abdominal pain, or melena  Neurologic: negative for focal weakness, numbness, headache    Objective:     Vitals:    07/01/21 0830 07/01/21 0831 07/01/21 0846 07/01/21 0901   BP: 122/80 122/80 117/71 104/70   Pulse: 86 86 90 83   Resp:  17 20 18   Temp:       SpO2:  93% 93% 95%   Weight:       Height:         Intake and Output:   06/29 1901 - 07/01 0700  In: 960 [P.O.:960]  Out: 1650 [Urine:1650]  07/01 0701 - 07/01 1900  In: -   Out: 100 [Urine:100]    Physical Exam:   Constitution:  the patient is well developed and in no acute distress  EENMT:  Sclera clear, pupils equal, oral mucosa moist  Respiratory: clear on left, on 2L NC  Cardiovascular:  RRR without M,G,R- paced on monitor  Gastrointestinal: soft and non-tender; with positive bowel sounds. Musculoskeletal: warm without cyanosis. There is no lower leg edema. Skin:  no jaundice or rashes, no wounds; surgical site CDI   Neurologic: no gross neuro deficits     Psychiatric:  alert and oriented x ppt    CHEST XRAY: 6/29      LAB  Recent Results (from the past 24 hour(s))   CELL COUNT, BODY FLUID    Collection Time: 06/30/21  2:19 PM   Result Value Ref Range    BODY FLUID TYPE R PLEU 2     FLUID COLOR RED      FLUID APPEARANCE CLOUDY      FLUID RBC CT. 74,000 /cu mm    FLUID WBC COUNT 1,915 /cu mm    [facility] NEUTROPHIL 49 %    [facility] LYMPHS 46 %    [facility] MACROPHAGES 5 %   GLUCOSE, FLUID    Collection Time: 06/30/21  2:19 PM   Result Value Ref Range    Fluid Type: R PLEU 2      Glucose, body fld. 7 MG/DL   LDH, BODY FLUID    Collection Time: 06/30/21  2:19 PM   Result Value Ref Range    Fluid Type: R PLEU 2     LD, body fld. 3,156 U/L   PROTEIN TOTAL, FLUID    Collection Time: 06/30/21  2:19 PM   Result Value Ref Range    Fluid Type: R PLEU 2     Protein total, body fld.  2.6 g/dL   CBC W/O DIFF    Collection Time: 07/01/21  3:54 AM   Result Value Ref Range    WBC 7.0 4.3 - 11.1 K/uL    RBC 3.20 (L) 4.23 - 5.6 M/uL    HGB 8.4 (L) 13.6 - 17.2 g/dL    HCT 28.6 (L) 41.1 - 50.3 %    MCV 89.4 79.6 - 97.8 FL    MCH 26.3 26.1 - 32.9 PG    MCHC 29.4 (L) 31.4 - 35.0 g/dL    RDW 17.9 (H) 11.9 - 14.6 %    PLATELET 452 558 - 085 K/uL    MPV 9.2 (L) 9.4 - 12.3 FL    ABSOLUTE NRBC 0.00 0.0 - 0.2 K/uL       MICRO  Date Source Result   6/25 blood NG   6/25 sputum NRF         SARS-CoV-2 LAB Results  [facility] Test:   Lab Results   Component Value Date/Time    COV2NT Not Detected 06/30/2020 09:25 PM      [facility] Test: No results found for: EDPR  [facility] Test: No components found for: [id number]         Assessment:  (Medical Decision Making)     Hospital Problems  Date Reviewed: 6/25/2021        Codes Class Noted POA    S/P pneumonectomy ICD-10-CM: Z90.2  ICD-9-CM: V45.76  7/1/2021 Unknown        Debility ICD-10-CM: R53.81  ICD-9-CM: 799.3  6/28/2021 Unknown        History of thoracotomy (Chronic) ICD-10-CM: G16.808  ICD-9-CM: V45.89  6/25/2021 Yes        Tracheobronchitis ICD-10-CM: J40  ICD-9-CM: 413  6/25/2021 Unknown        Moderate protein-calorie malnutrition ([facility] ICD-10-CM: E44.0  ICD-9-CM: 263.0  6/17/2021 Yes        Acute on chronic respiratory failure with hypoxemia ([facility] ICD-10-CM: J96.21  ICD-9-CM: 518.84  6/8/2021 Yes        * (Principal) Cough ICD-10-CM: G10  ICD-9-CM: 786.2  7/29/2020 Yes        Cancer of bronchus of right lower lobe (HCC) (Chronic) ICD-10-CM: C34.31  ICD-9-CM: 162.5  7/23/2020 Yes        Pulmonary emphysema (HCC) (Chronic) ICD-10-CM: J43.9  ICD-9-CM: 492.8  7/7/2020 Yes            Patient with ongoing rhonchi, poor clearance of audible secretions. Plan:  (Medical Decision Making)   -- Wean O2 as tolerated, currently on 2L NC  -- Continue nebs, encourage CPT  -- follow cultures, thoracentesis yesterday- exudative  -- ABX with cef and flagyl- ID following  -- PT/OT  -- transferring to floor    Poonam Estes MD      Lungs:  Minimal crackles at left base. Nearly absent sounds on the right. Heart:  RRR with no Murmur/Rubs/Gallops    Additional Comments:    Patient sounds much clearer than on admission. He is hesitant to continue with chest percussion. At least needs to continue postural drainage of his right stump 2-3 times a day. R effusion quite exudative. Will follow up cultures. No other clinical signs of infection at present. Awaiting floor bed. I have spoken with and examined the patient. I agree with the above assessment and plan as documented.     Poonam Estes MD

## 2024-12-17 NOTE — PROGRESS NOTES
Oxygen Qualifier Room air: SpO2 with O2 and liter flow Resting SpO2  87%  97% on 4L Ambulating SpO2   91% on 4L Completed by: 
 
Mahnaz Ledesma, RT 
 
 
 Yes

## (undated) DEVICE — GOWN,AURORA,NONREINFORCED,LARGE: Brand: MEDLINE

## (undated) DEVICE — CATHETER THOR 32FR L23IN PVC 5 EYELET STR ATRAUM

## (undated) DEVICE — SET EXTN L6IN W/ S STL CLMP

## (undated) DEVICE — MOUTHPIECE ENDOSCP 20X27MM --

## (undated) DEVICE — PROTECTOR CUSHION ULNAR NERVE --

## (undated) DEVICE — Device: Brand: BALLOON

## (undated) DEVICE — KENDALL RADIOLUCENT FOAM MONITORING ELECTRODE RECTANGULAR SHAPE: Brand: KENDALL

## (undated) DEVICE — 2000CC GUARDIAN II: Brand: GUARDIAN

## (undated) DEVICE — CONTAINER,SPECIMEN,O.R.STRL,4.5OZ: Brand: MEDLINE

## (undated) DEVICE — SPONGE GZ W4XL4IN COT 12 PLY TYP VII WVN C FLD DSGN

## (undated) DEVICE — BLADE ELECTRODE: Brand: EDGE

## (undated) DEVICE — FORCEPS BX L240CM JAW DIA2.8MM L CAP W/ NDL MIC MESH TOOTH

## (undated) DEVICE — 60 ML SYRINGE REGULAR TIP: Brand: MONOJECT

## (undated) DEVICE — VINYL URETHRAL CATHETER: Brand: DOVER

## (undated) DEVICE — PROBE CRYO CRYOSPHERE 11IN -- ARTRICURE

## (undated) DEVICE — REM POLYHESIVE ADULT PATIENT RETURN ELECTRODE: Brand: VALLEYLAB

## (undated) DEVICE — RELOAD STPL H1-2.5XL35MM VASC THN TISS WHT B FRM NAT ARTC

## (undated) DEVICE — SWIVEL BRONCHSCP ANES 15 MM RUBBER CAP FIBEROPTIC SIL REUSE

## (undated) DEVICE — TUBING INSUFFLATION SMK EVAC HI FLO SET PNEUMOCLEAR

## (undated) DEVICE — RELOAD STPL L60MM H2.3-4.2MM VERY THCK TISS BLK 6 ROW

## (undated) DEVICE — SOLUTION,WATER,IRRIGATION,1000ML,STERILE: Brand: MEDLINE

## (undated) DEVICE — 3M™ IOBAN™ 2 ANTIMICROBIAL INCISE DRAPE 6650EZ: Brand: IOBAN™ 2

## (undated) DEVICE — SUTURE PERMAHAND SZ 2-0 L30IN NONABSORBABLE BLK SH L26MM C016D

## (undated) DEVICE — SUTURE PDS II SZ 2-0 L27IN ABSRB VLT L26MM CT-2 1/2 CIR Z333H

## (undated) DEVICE — SPONGE DISSECT PNUT SM 3/8IN -- 5/PK

## (undated) DEVICE — KIT THORCENT 8FR L5IN POLYUR W/ 18/22/25GA NDL 3 W STPCOCK

## (undated) DEVICE — Device

## (undated) DEVICE — INTENDED FOR TISSUE SEPARATION, AND OTHER PROCEDURES THAT REQUIRE A SHARP SURGICAL BLADE TO PUNCTURE OR CUT.: Brand: BARD-PARKER ®  SAFETY SCALPED

## (undated) DEVICE — SOLUTION IRRIG 3000ML 0.9% SOD CHL FLX CONT 0797208] ICU MEDICAL INC]

## (undated) DEVICE — SUTURE PERMAHAND SZ 0 L30IN NONABSORBABLE BLK SILK BRAID A306H

## (undated) DEVICE — (D)SYR 10ML 1/5ML GRAD NSAF -- PKGING CHANGE USE ITEM 338027

## (undated) DEVICE — BRONCHOSCOPY PACK: Brand: MEDLINE INDUSTRIES, INC.

## (undated) DEVICE — PAD,NON-ADHERENT,3X8,STERILE,LF,1/PK: Brand: MEDLINE

## (undated) DEVICE — GARMENT,MEDLINE,DVT,SEQUENTIAL,CALF,MD: Brand: MEDLINE

## (undated) DEVICE — INTENDED FOR TISSUE SEPARATION, AND OTHER PROCEDURES THAT REQUIRE A SHARP SURGICAL BLADE TO PUNCTURE OR CUT.: Brand: BARD-PARKER SAFETY BLADES SIZE 10, STERILE

## (undated) DEVICE — PREP SKN CHLRAPRP APL 26ML STR --

## (undated) DEVICE — GLOVE SURG SZ 6.5 L11.2IN THK8.6MIL LT BRN LTX FREE

## (undated) DEVICE — GARMENT,MEDLINE,DVT,INT,CALF,MED, GEN2: Brand: MEDLINE

## (undated) DEVICE — CATHETER THOR 32FR L23IN PVC 6 EYELET STR ATRAUM

## (undated) DEVICE — CATHETER THOR 36FR L23IN PVC R ANG 5 EYE TAPR CONN TIP SFT

## (undated) DEVICE — TOTAL TRAY, DB, 100% SILI FOLEY, 16FR 10: Brand: MEDLINE

## (undated) DEVICE — BIFURCATED DRAIN EXTENSION FOR CLOSED WOUND DRAIN: Brand: AXIOM®

## (undated) DEVICE — SINGLE USE SUCTION VALVE MAJ-209: Brand: SINGLE USE SUCTION VALVE (STERILE)

## (undated) DEVICE — STAPLER SKIN L320MM 35MM VASC TISS 12 FIRING B FRM PWR

## (undated) DEVICE — DRAIN SURG 19FR 100% SIL RADPQ RND CHN FULL FLUT

## (undated) DEVICE — SURGICAL PROCEDURE PACK BASIC ST FRANCIS

## (undated) DEVICE — SPONGE LAP 18X18IN STRL -- 5/PK

## (undated) DEVICE — SPONGE DRAIN NONWOVEN 4X4IN -- 2/PK

## (undated) DEVICE — DRAPE TWL SURG 16X26IN BLU ORB04] ALLCARE INC]

## (undated) DEVICE — NEEDLE HYPO 25GA L1.5IN BLU POLYPR HUB S STL REG BVL STR

## (undated) DEVICE — GOWN,SIRUS,NONRNF,SETINSLV,2XL,18/CS: Brand: MEDLINE

## (undated) DEVICE — PREP CHLORAPREP 10.5 ML ORG --

## (undated) DEVICE — SUTURE PERMAHAND SZ 0 L30IN NONABSORBABLE BLK L30MM PSL 3/8 590H

## (undated) DEVICE — STERILE POLYISOPRENE POWDER-FREE SURGICAL GLOVES: Brand: PROTEXIS

## (undated) DEVICE — SUTURE PERMAHAND SZ 2-0 L30IN NONABSORBABLE BLK SILK W/O A305H

## (undated) DEVICE — KIT SEALNT PLEURAL PRGL 4ML --

## (undated) DEVICE — CONNECTOR TBNG Y 6IN 1 PLAS LTWT

## (undated) DEVICE — TRAY CATH 16F URIN MTR LTX -- CONVERT TO ITEM 363111

## (undated) DEVICE — RELOAD STPL L45MM H1.5-3.6MM REG TISS BLU GRIPPING SURF B

## (undated) DEVICE — CATHETER THOR 36FR L23CM DIA12MM POLYVI CHL TAPR CONN TIP

## (undated) DEVICE — DRSG GZ PETROLATM OCL 3X18IN -- CURAD

## (undated) DEVICE — VASELINE PETROLATUM GAUZE STRIP: Brand: VASELINE

## (undated) DEVICE — EXTRACTOR STPL L10CM REMV SKIN CLSR STPL SQUEEZE HNDL PROX

## (undated) DEVICE — SUTURE PDS II SZ 3-0 L27IN ABSRB VLT L26MM SH 1/2 CIR Z316H

## (undated) DEVICE — UNIVERSAL DRAPES: Brand: MEDLINE INDUSTRIES, INC.

## (undated) DEVICE — SUTURE PERMAHAND SZ 2-0 L18IN NONABSORBABLE BLK L26MM PS 1588H

## (undated) DEVICE — INTENDED FOR TISSUE SEPARATION, AND OTHER PROCEDURES THAT REQUIRE A SHARP SURGICAL BLADE TO PUNCTURE OR CUT.: Brand: BARD-PARKER SAFETY BLADES SIZE 15, STERILE

## (undated) DEVICE — SINGLE USE BIOPSY VALVE MAJ-210: Brand: SINGLE USE BIOPSY VALVE (STERILE)

## (undated) DEVICE — BLADE ASSEMB CLP HAIR FINE --

## (undated) DEVICE — RELOAD STPL OPN 4.2MM CLS 2.3MM BLK CART

## (undated) DEVICE — SOLUTION IRRIG 1000ML H2O STRL BLT

## (undated) DEVICE — DRESSING PETRO GZ 7.2X0.5 IN WND IMPREG OVERWRAP CURAD DISP

## (undated) DEVICE — TOWEL SURG W16XL26IN BLU NONFENESTRATED DLX ST 2 PER PK

## (undated) DEVICE — GOWN,SIRUS,NONRNF,SETINSLV,XL,20/CS: Brand: MEDLINE

## (undated) DEVICE — MINOR SPLIT GENERAL: Brand: MEDLINE INDUSTRIES, INC.

## (undated) DEVICE — Device: Brand: MEDICAL ACTION INDUSTRIES

## (undated) DEVICE — CHEST TUBE TRAY: Brand: MEDLINE INDUSTRIES, INC.

## (undated) DEVICE — SUTURE PERMAHAND SZ 3-0 L30IN NONABSORBABLE BLK L26MM SH C017D

## (undated) DEVICE — TRAY CATH 16FR 200ML W/ LUB URIN M STATLOK STBL DEV DISP

## (undated) DEVICE — STAPLER INT L34CM 60MM LNG ENDOSCP ARTC PWR + ECHELON FLX

## (undated) DEVICE — TUBING, SUCTION, 1/4" X 10', STRAIGHT: Brand: MEDLINE

## (undated) DEVICE — FORCEPS BX L115CM ALGTR JAW STP DISP

## (undated) DEVICE — DRAPE,INSTRUMENT,MAGNETIC,10X16: Brand: MEDLINE

## (undated) DEVICE — SUTURE PDS II SZ 2-0 L27IN ABSRB VLT SH L26MM 1/2 CIR Z317H

## (undated) DEVICE — SUTURE VCRL SZ 0 L36IN ABSRB UD L36MM CT-1 1/2 CIR J946H

## (undated) DEVICE — KIT,ANTI FOG,W/SPONGE & FLUID,SOFT PACK: Brand: MEDLINE

## (undated) DEVICE — 2, DISPOSABLE SUCTION/IRRIGATOR WITHOUT DISPOSABLE TIP: Brand: STRYKEFLOW

## (undated) DEVICE — GEL MEDC ULTRASOUND 5L -- REPLACED BY 326862

## (undated) DEVICE — SOL INJ SOD CL0.9% 10ML PREFIL --

## (undated) DEVICE — SET INFUS 20GA L0.75IN 300PSI 5ML/SEC W/O Y INJ SITE ULT LO

## (undated) DEVICE — INTENDED TO BE USED TO OCCLUDE, RETRACT AND IDENTIFY ARTERIES, VEINS, TENDONS AND NERVES IN SURGICAL PROCEDURES: Brand: STERION®  VESSEL LOOP

## (undated) DEVICE — SUTURE VCRL SZ 2 L54IN ABSRB UD L65MM TP-1 1/2 CIR J880T

## (undated) DEVICE — TROCAR: Brand: KII FIOS FIRST ENTRY

## (undated) DEVICE — SINGLE USE ASPIRATION NEEDLE: Brand: SINGLE USE ASPIRATION NEEDLE

## (undated) DEVICE — SUTURE PDS II SZ 1 L36IN ABSRB VLT L48MM CTX 1/2 CIR Z371T

## (undated) DEVICE — BUTTON SWITCH PENCIL BLADE ELECTRODE, HOLSTER: Brand: EDGE

## (undated) DEVICE — CRADLE POS 3X5X24IN RASPBERRY ARM PRONE FOAM DISP

## (undated) DEVICE — SYR 50ML SLIP TIP NSAF LF STRL --

## (undated) DEVICE — SPONGE: SPECIALTY PEANUT XR 100/CS: Brand: MEDICAL ACTION INDUSTRIES

## (undated) DEVICE — GAUZE,SPONGE,4"X4",16PLY,STRL,LF,10/TRAY: Brand: MEDLINE

## (undated) DEVICE — GOWN,REINFORCED,POLY,AURORA,XXLARGE,STR: Brand: MEDLINE

## (undated) DEVICE — LAPAROSCOPIC TROCAR SLEEVE/SINGLE USE: Brand: KII® OPTICAL ACCESS SYSTEM

## (undated) DEVICE — TROCAR: Brand: KII® SLEEVE

## (undated) DEVICE — GOWN,BREATHABLE SLV,AURORA,LG,STRL: Brand: MEDLINE

## (undated) DEVICE — MAGNETIC DRAPE: Brand: DEVON

## (undated) DEVICE — BAG DRAIN BILE TUBE T 19OZ --

## (undated) DEVICE — CATHETER THOR STR 32F FIRM PVC

## (undated) DEVICE — SOLUTION IV 1000ML 0.9% SOD CHL

## (undated) DEVICE — SIZER SURG NOVEL DSGN LEN DIAM MEAS ACCURACY LO COST

## (undated) DEVICE — SUTURE PERMAHAND SZ 3-0 L30IN NONABSORBABLE BLK SILK BRAID A304H

## (undated) DEVICE — ALLEVYN GENTLE BORDER 7IN X 7INSILICONE GEL ADHESIVE HYDROCELLULAR FOAM DRESSING: Brand: ALLEVYN GENTLE BORDER 7IN X 7IN

## (undated) DEVICE — 3M™ DURAPORE™ SURGICAL TAPE 2 INCHES X 10YARDS (5.0CM X 9.1M) 6ROLLS/CARTON 10CARTONS/CASE 1538-2: Brand: 3M™ DURAPORE™

## (undated) DEVICE — STAPLER INT L340MM 45MM STD 12 FIRING B FRM PWR + GRIPPING

## (undated) DEVICE — (D)PREP SKN CHLRAPRP APPL 26ML -- CONVERT TO ITEM 371833